# Patient Record
Sex: MALE | Race: WHITE | NOT HISPANIC OR LATINO | Employment: OTHER | URBAN - METROPOLITAN AREA
[De-identification: names, ages, dates, MRNs, and addresses within clinical notes are randomized per-mention and may not be internally consistent; named-entity substitution may affect disease eponyms.]

---

## 2017-06-08 ENCOUNTER — ALLSCRIPTS OFFICE VISIT (OUTPATIENT)
Dept: OTHER | Facility: OTHER | Age: 67
End: 2017-06-08

## 2017-08-10 ENCOUNTER — ALLSCRIPTS OFFICE VISIT (OUTPATIENT)
Dept: OTHER | Facility: OTHER | Age: 67
End: 2017-08-10

## 2017-11-09 ENCOUNTER — GENERIC CONVERSION - ENCOUNTER (OUTPATIENT)
Dept: OTHER | Facility: OTHER | Age: 67
End: 2017-11-09

## 2017-11-09 DIAGNOSIS — M54.16 RADICULOPATHY OF LUMBAR REGION: ICD-10-CM

## 2017-11-29 ENCOUNTER — ALLSCRIPTS OFFICE VISIT (OUTPATIENT)
Dept: RADIOLOGY | Facility: CLINIC | Age: 67
End: 2017-11-29
Payer: MEDICARE

## 2018-01-13 VITALS — TEMPERATURE: 97.5 F | DIASTOLIC BLOOD PRESSURE: 80 MMHG | WEIGHT: 220 LBS | SYSTOLIC BLOOD PRESSURE: 140 MMHG

## 2018-01-14 VITALS — TEMPERATURE: 98.2 F | DIASTOLIC BLOOD PRESSURE: 80 MMHG | SYSTOLIC BLOOD PRESSURE: 120 MMHG | WEIGHT: 214.5 LBS

## 2018-01-22 VITALS — SYSTOLIC BLOOD PRESSURE: 138 MMHG | DIASTOLIC BLOOD PRESSURE: 82 MMHG | WEIGHT: 240 LBS

## 2018-03-07 NOTE — PROCEDURES
Procedure    Fluoroscopically-guided lumbar Interlaminar Epidural Steroid Injection     Indication: Low back and leg pain   Preoperative diagnosis: Lumbar radiculitis   Postoperative diagnosis: Lumbar radiculitis    Procedure: Fluoroscopically-guided L5-S1 interlaminar epidural steroid injection under fluoroscopy     After discussing the risks, benefits, and alternatives to the procedure, the patient expressed understanding and wished to proceed  The patient was brought to the fluoroscopy suite and placed in the prone position  A procedural pause was conducted to verify: correct patient identity, procedure to be performed and as applicable, correct side and site, correct patient position, and availability of implants, special equipment and special requirements  After identifying the L5-S1 space fluoroscopically, the skin was sterilely prepped and draped in the usual fashion using Chloraprep skin prep  The skin and subcutaneous tissue were anesthetized with 1 % lidocaine  Utilizing a loss of resistance technique and intermittent fluoroscopic guidance, a 3 5" 20 gauge Tuohy needle was advanced into the epidural space  Proper needle positioning was confirmed using multiple fluoroscopic views  After negative aspiration, Omnipaque 300 contrast was injected confirming epidural spread without evidence of intravascular or intrathecal spread  A 4 ml solution consisting of 80 mg of Depo-Medrol in sterile saline was injected slowly and incrementally into the epidural space  Following the injection the needle was withdrawn slightly and flushed with 1 % lidocaine as it was fully extracted  The patient tolerated the procedure well and there were no apparent complications  After appropriate observation, the patient was dismissed from the clinic in good condition under their own power        Signatures   Electronically signed by : Annette Franco DO; Nov 29 2017  4:33PM EST                       (Author)

## 2018-04-13 ENCOUNTER — HOSPITAL ENCOUNTER (OUTPATIENT)
Dept: RADIOLOGY | Facility: CLINIC | Age: 68
Discharge: HOME | End: 2018-04-13
Admitting: ANESTHESIOLOGY
Payer: MEDICARE

## 2018-04-13 VITALS
OXYGEN SATURATION: 95 % | HEART RATE: 66 BPM | RESPIRATION RATE: 16 BRPM | DIASTOLIC BLOOD PRESSURE: 84 MMHG | SYSTOLIC BLOOD PRESSURE: 141 MMHG | TEMPERATURE: 99 F

## 2018-04-13 DIAGNOSIS — M54.16 LUMBAR RADICULOPATHY: ICD-10-CM

## 2018-04-13 PROCEDURE — 62323 NJX INTERLAMINAR LMBR/SAC: CPT | Performed by: ANESTHESIOLOGY

## 2018-04-13 RX ORDER — OMEPRAZOLE 20 MG/1
20 CAPSULE, DELAYED RELEASE ORAL DAILY
COMMUNITY
End: 2022-06-02

## 2018-04-13 RX ORDER — LIDOCAINE HYDROCHLORIDE 10 MG/ML
5 INJECTION, SOLUTION EPIDURAL; INFILTRATION; INTRACAUDAL; PERINEURAL ONCE
Status: COMPLETED | OUTPATIENT
Start: 2018-04-13 | End: 2018-04-13

## 2018-04-13 RX ORDER — LANOLIN ALCOHOL/MO/W.PET/CERES
1 CREAM (GRAM) TOPICAL DAILY
COMMUNITY
End: 2021-04-13

## 2018-04-13 RX ORDER — METHYLPREDNISOLONE ACETATE 80 MG/ML
80 INJECTION, SUSPENSION INTRA-ARTICULAR; INTRALESIONAL; INTRAMUSCULAR; PARENTERAL; SOFT TISSUE ONCE
Status: COMPLETED | OUTPATIENT
Start: 2018-04-13 | End: 2018-04-13

## 2018-04-13 RX ORDER — ATORVASTATIN CALCIUM 80 MG/1
80 TABLET, FILM COATED ORAL EVERY OTHER DAY
COMMUNITY

## 2018-04-13 RX ORDER — AMLODIPINE BESYLATE 5 MG/1
5 TABLET ORAL DAILY
COMMUNITY
End: 2022-06-20

## 2018-04-13 RX ORDER — METOPROLOL TARTRATE 100 MG/1
50 TABLET ORAL DAILY
COMMUNITY

## 2018-04-13 RX ORDER — TERAZOSIN 5 MG/1
5 CAPSULE ORAL
COMMUNITY
End: 2020-05-05

## 2018-04-13 RX ORDER — DICLOFENAC SODIUM 75 MG/1
75 TABLET, DELAYED RELEASE ORAL 2 TIMES DAILY PRN
COMMUNITY
End: 2019-11-22 | Stop reason: SDUPTHER

## 2018-04-13 RX ADMIN — METHYLPREDNISOLONE ACETATE 80 MG: 80 INJECTION, SUSPENSION INTRA-ARTICULAR; INTRALESIONAL; INTRAMUSCULAR; SOFT TISSUE at 15:50

## 2018-04-13 RX ADMIN — IOHEXOL 1 ML: 300 INJECTION, SOLUTION INTRAVENOUS at 15:49

## 2018-04-13 RX ADMIN — LIDOCAINE HYDROCHLORIDE 3 ML: 10 INJECTION, SOLUTION EPIDURAL; INFILTRATION; INTRACAUDAL; PERINEURAL at 15:46

## 2018-04-13 NOTE — DISCHARGE INSTRUCTIONS
Epidural Steroid Injection   WHAT YOU NEED TO KNOW:   An epidural steroid injection (JOHANNE) is a procedure to inject steroid medicine into the epidural space  The epidural space is between your spinal cord and vertebrae  Steroids reduce inflammation and fluid buildup in your spine that may be causing pain  You may be given pain medicine along with the steroids  ACTIVITY  · Do not drive or operate machinery today  · No strenuous activity today - bending, lifting, etc   · You may resume normal activites starting tomorrow - start slowly and as tolerated  · You may shower today, but no tub baths or hot tubs  · You may have numbness for several hours from the local anesthetic  Please use caution and common sense, especially with weight-bearing activities  CARE OF THE INJECTION SITE  · If you have soreness or pain, apply ice to the area today (20 minutes on/20 minutes off)  · Starting tomorrow, you may use warm, moist heat or ice if needed  · You may have an increase or change in your discomfort for 36-48 hours after your treatment  · Apply ice and continue with any pain medication you have been prescribed  · Notify [facility] if you have any of the following: redness, drainage, swelling, headache, stiff neck or fever above 100°F     SPECIAL INSTRUCTIONS  · Our office will contact you in approximately 7 days for a progress report  MEDICATIONS  · Continue to take all routine medications  · Our office may have instructed you to hold some medications  Resume Diclofenac tomorrow 4/14/18  If you have a problem specifically related to your procedure, please call our office at [phone]  Problems not related to your procedure should be directed to your primary care physician

## 2018-04-13 NOTE — H&P
History of Present Illness: The patient is a 70 y o  male who presents with complaints of low back and leg pain  There is no problem list on file for this patient  History reviewed  No pertinent past medical history  History reviewed  No pertinent surgical history  Current Outpatient Prescriptions:     amLODIPine (NORVASC) 5 mg tablet, Take 5 mg by mouth daily  , Disp: , Rfl:     atorvastatin (LIPITOR) 80 mg tablet, Take 80 mg by mouth daily, Disp: , Rfl:     calcium citrate-vitamin D (CITRACAL+D) 315-200 MG-UNIT per tablet, Take 1 tablet by mouth daily, Disp: , Rfl:     metoprolol tartrate (LOPRESSOR) 100 mg tablet, Take 100 mg by mouth daily  , Disp: , Rfl:     omeprazole (PriLOSEC) 20 mg delayed release capsule, Take 20 mg by mouth daily  , Disp: , Rfl:     terazosin (HYTRIN) 5 mg capsule, Take 5 mg by mouth daily at bedtime  , Disp: , Rfl:     diclofenac (VOLTAREN) 75 mg EC tablet, Take 75 mg by mouth 2 (two) times a day as needed  , Disp: , Rfl:     No Known Allergies    Physical Exam:   Vitals:    04/13/18 1532   BP: 119/75   Pulse: 71   Resp: 16   Temp: 99 °F (37 2 °C)   SpO2: 96%     General: Awake, Alert, Oriented x 3, Mood and affect appropriate  Respiratory: Respirations even and unlabored  Cardiovascular: Peripheral pulses intact; no edema  Musculoskeletal Exam:   Bilateral lumbar paraspinals tender to palpation  ASA Score: 2    Assessment:   1  Lumbar radiculopathy        Plan: REPEAT LESI         Assessment     1  Lumbar herniated disc (722 10) (M51 26)   2  Lumbar radiculopathy (724 4) (M54 16)   3  Myofascial pain (729 1) (M79 1)     Plan  Back pain, Lumbar herniated disc, Lumbar radiculopathy    · Renew: Diclofenac Sodium 75 MG Oral Tablet Delayed Release; take one tablet every 12hours as needed for pain  no other nsaids on this medication  take with food   Rx By: Brooke Lemos; Dispense: 30 Days ; #:60 Tablet Delayed Release;  Refill: 3;: Back pain, Lumbar herniated disc, Lumbar radiculopathy; RAHAT = N; Record  Lumbar radiculopathy    · *1 - [facility]  Consult: Evaluate and treatPlease evaluate the patient is a candidate for Luc based therapy  Status: Active Requested for: [id number]   Ordered;: Lumbar radiculopathy; Ordered By: Kristina March Performed:  Due: [id number]  () Care Summary provided  : Yes   50-year-old male returning for follow-up of lumbosacral back pain with radiculopathy in the L5-S1 distributions of bilateral lower extremities  The patient does have disc herniations at L4-5 and L5-S1 with possible right L4 and L5 nerve root impingement  He was initially improving with conservative therapy including oral NSAIDs, diclofenac 75 milligrams b i d  p r n  which she has tried to minimize his he wants to avoid long-term affects of NSAIDs  The patient feels as though the pain has plateaued and he is experiencing some numbness and paresthesias in his feet  1  I will schedule the patient for L5-S1 LESI to reduce the inflammatory component of his pain  The procedure was discussed in detail using diagrams and models  Risks associated with the procedure were discussed with the patient including, but not limited to bleeding, infection, bruising, and nerve damage  2  I will refill diclofenac 75 milligrams b i d  p r n  and he should avoid any other NSAIDs while on this medication 3  I did offer the patient a trial of gabapentin, however he would like to hold off on sedating medications 4  I will prescribe Luc based physical therapy for his radicular symptoms 5  I will follow up the patient in 4-6 weeks after inje   Discussion/Summary  The patient has the current Goals: Reduced pain and improved function  The patent has the current Barriers: Lumbar disc herniations and foraminal stenosis  Patient is able to Self-Care  Educational resources provided: Epidural steroid injection pamphlet     Possible side effects of new medications were reviewed with the patient/guardian today  The patient was counseled regarding diagnostic results,-- instructions for management,-- risk factor reductions,-- prognosis,-- patient and family education,-- impressions,-- risks and benefits of treatment options-- and-- importance of compliance with treatment  total time of encounter was 15 minutes       Self Referrals: No      Chief Complaint     1  Back Pain  Low back and leg pain      History of Present Illness  69-year-old male returning for follow-up of lumbosacral back pain with radiculopathy in the L5 distributions of bilateral lower extremities  He does have some numbness and paresthesias in his feet  He denies any subjective weakness, bladder or bowel incontinence, or saddle anesthesia  The patient does have disc herniations at L4-5 and L5-S1 with possible right L4 and L5 nerve root impingement  He was taking diclofenac 75 milligrams b i d  p r n  and this was giving him moderate relief, however he tried to cut back on this medication and is taking this sparingly as he does not like taking medications and he wanted to avoid any long-term affects of the NSAIDs  The patient feels fine when he is sitting and lying down, however when he is carrying anything of any substantial weight the pain limits his physical activity  patient rates his pain at 2/10 on the pain is worse in the morning and evening  The pain is intermittent and described as burning, pressure like, and stiffness  The pain is exacerbated with lifting, bending and twisting at the waist, and walking  The pain is alleviated with sitting and lying down  I have personally reviewed and/or updated the patient's past medical history, past surgical history, family history, social history, allergies, and vital signs today  than as stated above, the patient denies any interval changes in medications, medical condition, mental condition, symptoms, or allergies since the last office visit       Ovi Santoyo presents with complaints of bilateral lower back pain, described as burning and tingling, radiating to the bilateral buttock, bilateral thigh, bilateral lower leg and bilateral foot  On a scale of 1 to 10, the patient rates the pain as 2  Symptoms are improving  Review of Systems   Constitutional: no fever,-- no recent weight gain-- and-- no recent weight loss  Eyes: no double vision-- and-- no blurry vision  Cardiovascular: no chest pain,-- no palpitations-- and-- no lower extremity edema  Respiratory: no complaints of shortness of breath-- and-- no wheezing  Musculoskeletal: joint stiffness-- and-- pain in extremity , but-- no difficulty walking,-- no muscle weakness,-- no joint swelling,-- no limb swelling-- and-- no decreased range of motion  Neurological: no dizziness,-- no difficulty swallowing,-- no memory loss,-- no loss of consciousness-- and-- no seizures  Gastrointestinal: no nausea,-- no vomiting,-- no constipation-- and-- no diarrhea  Genitourinary: no difficulty initiating urine stream,-- no genital pain-- and-- no frequent urination  Integumentary: no complaints of skin rash  Psychiatric: no depression  Endocrine: no excessive thirst,-- no adrenal disease,-- no hypothyroidism-- and-- no hyperthyroidism  Hematologic/Lymphatic: no tendency for easy bruising-- and-- no tendency for easy bleeding       ROS reviewed  Active Problems  1  Back pain (724 5) (M54 9)   2  Lumbar herniated disc (722 10) (M51 26)   3  Lumbar radiculopathy (724 4) (M54 16)   4  Myofascial pain (729 1) (M79 1)     Past Medical History  1  History of hernia repair (V45 89) (Z98 890,Z87 19)   2  History of malignant neoplasm of skin (V10 83) (Z85 828)   3  History of Reported Hx Of Knee Replacement (V43 65)     Surgical History  1  History of Hernia Repair   2  History of Knee Arthroscopy With Medial And Lateral Meniscectomy     Family History  Family History    1   Family history of cerebrovascular accident (CVA) (V17 1) (Z82 3)   2  Family history of hypertension (V17 49) (Z82 49)   3  Family history of malignant neoplasm (V16 9) (Z80 9)     Social History      · Alcohol drinker (V49 89) (Z78 9)   · Lives with family   ·    · Non-smoker (V49 89) (Z78 9)     Current Meds   1  AmLODIPine Besylate 5 MG Oral Tablet; Therapy: (Recorded:08Jun2017) to Recorded   2  Diclofenac Sodium 75 MG Oral Tablet Delayed Release; take one tablet every 12 hours as needed for pain  no other nsaids on this medication  take with food; Last Rx:10Aug2017 Ordered   3  Methocarbamol 500 MG Oral Tablet; Therapy: (Recorded:08Jun2017) to Recorded   4  Metoprolol Tartrate 100 MG Oral Tablet; Therapy: (Recorded:08Jun2017) to Recorded   5  Omeprazole 40 MG Oral Capsule Delayed Release; Therapy: (Recorded:08Jun2017) to Recorded   6  Simvastatin 40 MG Oral Tablet; Therapy: (Recorded:08Jun2017) to Recorded   7  Terazosin HCl - 5 MG Oral Capsule; Therapy: (Recorded:08Jun2017) to Recorded     Allergies  1  No Known Drug Allergies     Vitals  Vital Signs         Physical Exam   Constitutional  General appearance: Well developed, well nourished, alert, in no distress, non-toxic and no overt pain behavior  Eyes  Sclera: anicteric  HEENT  Hearing grossly intact  Neck  Neck: Supple, symmetric, trachea midline, no masses  Pulmonary  Respiratory effort: Even and unlabored  Abdomen  Abdomen: Soft, non-tender, non-distended  Skin  Skin and subcutaneous tissue: Normal without rashes or lesions, well hydrated  Psychiatric  Mood and affect: Mood and affect appropriate  Neurologic  the muscle tone was normal  Musculoskeletal  Gait and station: Normal    Lumbar/Sacral Spine examination demonstrates  Bilateral lumbar paraspinals tender to palpation and ropy in texture  Equivocal seated straight leg raise bilaterally  Bilateral lower extremity strength 5/5 in all muscle groups

## 2018-04-19 ENCOUNTER — TELEPHONE (OUTPATIENT)
Dept: PAIN MEDICINE | Facility: CLINIC | Age: 68
End: 2018-04-19

## 2018-05-03 ENCOUNTER — EVALUATION (OUTPATIENT)
Dept: PHYSICAL THERAPY | Facility: CLINIC | Age: 68
End: 2018-05-03
Payer: MEDICARE

## 2018-05-03 DIAGNOSIS — M54.16 LUMBAR RADICULOPATHY: Primary | ICD-10-CM

## 2018-05-03 PROCEDURE — G8990 OTHER PT/OT CURRENT STATUS: HCPCS | Performed by: PHYSICAL THERAPIST

## 2018-05-03 PROCEDURE — 97163 PT EVAL HIGH COMPLEX 45 MIN: CPT | Performed by: PHYSICAL THERAPIST

## 2018-05-03 PROCEDURE — 97535 SELF CARE MNGMENT TRAINING: CPT | Performed by: PHYSICAL THERAPIST

## 2018-05-03 PROCEDURE — G8991 OTHER PT/OT GOAL STATUS: HCPCS | Performed by: PHYSICAL THERAPIST

## 2018-05-03 NOTE — PROGRESS NOTES
PT Evaluation     Today's date: 5/3/2018  Patient name: Briana Charles  : 1950  MRN: [id number]  Referring provider: Kylee Sheldon DO  Dx:   Encounter Diagnosis     ICD-10-CM    1  Lumbar radiculopathy M54 16                   Assessment  Impairments: abnormal or restricted ROM, activity intolerance, impaired balance, impaired physical strength and pain with function  Functional limitations: Difficulty with walking, lifting, standing, due to an increase in back pain  Assessment details: Briana Charles is a 70 y o  male who presents to outpatient PT with a  Lumbar radiculopathy  (primary encounter diagnosis)  No further referral appears necessary at this time based upon examination results  Pt presents with decreased strength, ROM, balance, functional activity tolerance, and pain with movement in Lumbar spine,  which is  limiting his ability to perform the aforementioned functional activities  L4 reflexes are a 1+ bilaterally, and patient has a directional preference for lumbar flexion exercises  Etiologic factors include repetitive poor body mechanics  Prognosis is good given HEP compliance and PT 2-3/wk  Please contact me if you have any questions or recommendations  Thank you for the opportunity to share in  [facility]  Understanding of Dx/Px/POC: good   Prognosis: good    Goals  1  In 4-6 weeks, patient will demonstrate a decrease in pain to 0/10 during functional activities  2  In 4-6 weeks, patient will demonstrate an increase in range of motion by 5 degrees  3  In 4-6 weeks, patient will demonstrate an increase in strength by 1/2 grade on MMT    1  In 6-8 weeks, patient will be independent with their home exercise program  2  In 6-8 weeks, patient will have zero limitations with strength  3  In 6-8 weeks, patient will have zero limitations with ROM  4  In 6-8 weeks, patient will have zero limitations with ambulation  5    In 6-8 weeks, patient will have zero limitations with stair negotiation    Plan  Patient would benefit from: skilled PT  Planned therapy interventions: manual therapy, therapeutic exercise and home exercise program  Frequency: 3x week  Duration in weeks: 4  Plan details: Patient was provided a home exercise program and demonstrated an understanding of exercises  Patient was advised to stop performing home exercise program if symptoms increase or new complaints developed  Verbal understanding demonstrated regarding home exercise program instructions  Subjective Evaluation    History of Present Illness  Date of onset: 5/3/1972  Mechanism of injury: Pr reports that he was diagnosed with arthritis in his lower back in , after he got out of the [place]  He reports that his back pain has just gotten worse over time  Pt reports that he is now getting injections in his lower back, and they seem to be helping  Pain  Current pain rating: 3  At best pain ratin  At worst pain ratin  Quality: throbbing  Aggravating factors: lifting, walking and standing  Progression: worsening    Treatments  Previous treatment: injection treatment and medication  Current treatment: physical therapy  Patient Goals  Patient goals for therapy: decreased pain          Objective     Special Questions  Negative for night pain, disturbed sleep, bladder dysfunction, bowel dysfunction and saddle (S4) numbness    Active Range of Motion     Lumbar   Flexion: 0 degrees   Extension: 25 degrees   Left lateral flexion: 57 degrees   Right lateral flexion: 54 degrees   Left Hip   Flexion: WFL  Abduction: WFL  Adduction: WFL    Right Hip   Flexion: WFL  Abduction: WFL  Adduction: WFL    Additional Active Range of Motion Details  Pain and tenderness to L4-L5 spinous process, L4-L5 paraspinals, L Piriformis  Right Rib hump during forward bending  Increased Lumbar lordosis during forward bending and static standing       90/90 hamstring -48 Right   -45 Left   YEFRI   + bilaterally  SLS   10 Seconds L    10 seconds R  Heel/toe waking Normal Bilaterally  Sensation - Intact Bilaterally  Reflexes L4 Abnormal Bilaterally 1+  S1 Normal 2+  Seated SLR - Negative Bilaterally  SLR Negative  Directional Preference for Lumbar flexion  Strength/Myotome Testing     Left Hip   Planes of Motion   Flexion: 4-  Abduction: 4-  Adduction: 4-    Right Hip   Planes of Motion   Flexion: 4-  Abduction: 4-  Adduction: 4-          Precautions:     Daily Treatment Diary     Manual              B HS              B Hip flexor                                                         Exercise Diary              PPT             Hip Add with Ball             Clamshells              SLR             Supine Marching              Pball ABS              Step Ups              Mini Squat              SLS              HR/TR             Nu Step              Lumbar flexion at edge of table              Pball flexion at edge of table              SKTC/DKTC                                                                                                  Modalities

## 2018-05-08 ENCOUNTER — OFFICE VISIT (OUTPATIENT)
Dept: PHYSICAL THERAPY | Facility: CLINIC | Age: 68
End: 2018-05-08
Payer: MEDICARE

## 2018-05-08 DIAGNOSIS — M54.16 LUMBAR RADICULOPATHY: Primary | ICD-10-CM

## 2018-05-08 PROCEDURE — 97140 MANUAL THERAPY 1/> REGIONS: CPT | Performed by: PHYSICAL THERAPIST

## 2018-05-08 PROCEDURE — 97110 THERAPEUTIC EXERCISES: CPT | Performed by: PHYSICAL THERAPIST

## 2018-05-08 NOTE — PROGRESS NOTES
Daily Note     Today's date: 2018  Patient name: Andrae Colón  : 1950  MRN: [id number]  Referring provider: Aniyah Jimenez DO  Dx:   Encounter Diagnosis     ICD-10-CM    1  Lumbar radiculopathy M54 16                   Subjective: Pt reports that his back hurts on the left side today  Objective: See treatment diary below    Daily Treatment Diary     Manual              B HS              B Hip flexor                                         15min                 Exercise Diary               PPT 10''10x            Hip Add with Ball 10''10x            Clamshells  3x10 blue             SLR 3x10 flexion             Supine Marching              Pball ABS  5''20x            Step Ups              Mini Squat  3x10            SLS              HR/TR 30x            Nu Step              Lumbar flexion at edge of table              Pball flexion at edge of table              SKTC/DKTC  Modalities                                                             Assessment: Tolerated treatment well  Patient exhibited good technique with therapeutic exercises  Good tolerance to initial treatment  Requires cuing for proper performance of mini squats       Plan: Continue per plan of care

## 2018-05-10 ENCOUNTER — OFFICE VISIT (OUTPATIENT)
Dept: PHYSICAL THERAPY | Facility: CLINIC | Age: 68
End: 2018-05-10
Payer: MEDICARE

## 2018-05-10 DIAGNOSIS — M54.16 LUMBAR RADICULOPATHY: Primary | ICD-10-CM

## 2018-05-10 PROCEDURE — 97140 MANUAL THERAPY 1/> REGIONS: CPT

## 2018-05-10 PROCEDURE — 97110 THERAPEUTIC EXERCISES: CPT

## 2018-05-10 NOTE — PROGRESS NOTES
Daily Note     Today's date: 5/10/2018  Patient name: Marzena Rodrigues  : 1950  MRN: [id number]  Referring provider: Dahlia Gregory DO  Dx:   Encounter Diagnosis     ICD-10-CM    1  Lumbar radiculopathy M54 16                   Subjective: Pt reports that he is having tightness on the L side of his L/S area  He is performing his HEP at home  Objective: See treatment diary below  Daily Treatment Diary     Manual  5/10            B HS              B Hip flexor                                         15min                 Exercise Diary  5/10            PPT 10''10x            Hip Add with Ball 5''20x            Clamshells  3x10 blue             SLR 3x10 flexion             Supine Marching  5" x20            Pball ABS  5''20x            Step Ups              Mini Squat  3x10            SLS              HR/TR 30x            Nu Step  10' L3            Lumbar flexion at edge of table              Pball flexion at edge of table  Blue Pball 10"x10            SKTC/DKTC  SKTC 30" x3 ea                                                                                              Modalities                                                         Assessment: Tolerated treatment well  Initiated a supine march, SKTC, and a seated Pball flexion stretch this date with no exacerbation of symptoms  Patient demonstrated fatigue post treatment, exhibited good technique with therapeutic exercises and would benefit from continued PT      Plan: Continue per plan of care  Progress treatment as tolerated

## 2018-05-11 ENCOUNTER — OFFICE VISIT (OUTPATIENT)
Dept: PHYSICAL THERAPY | Facility: CLINIC | Age: 68
End: 2018-05-11
Payer: MEDICARE

## 2018-05-11 DIAGNOSIS — M54.16 LUMBAR RADICULOPATHY: Primary | ICD-10-CM

## 2018-05-11 PROCEDURE — 97140 MANUAL THERAPY 1/> REGIONS: CPT

## 2018-05-11 PROCEDURE — 97150 GROUP THERAPEUTIC PROCEDURES: CPT

## 2018-05-11 NOTE — PROGRESS NOTES
Daily Note     Today's date: 2018  Patient name: Marzena Rodrigues  : 1950  MRN: [id number]  Referring provider: Dahlia Gregory DO  Dx:   Encounter Diagnosis     ICD-10-CM    1  Lumbar radiculopathy M54 16                   Subjective: Pt reports that he was sore after last visit  Objective: See treatment diary below  Daily Treatment Diary     Manual              B HS              B Hip flexor                                         15min                 Exercise Diary              PPT 10''10x            Hip Add with Ball 5''20x            Clamshells  3x10 blue             SLR 3x10 flexion             Supine Marching  5" x20            Pball ABS  5''20x            Step Ups              Mini Squat  3x10            SLS              HR/TR 30x            Nu Step  10' L3            Lumbar flexion at edge of table              Pball flexion at edge of table  Blue Pball 10"x10            SKTC/DKTC  SKTC 30" x3 ea                                                                                              Modalities                                                         Assessment: Tolerated treatment well  No progressions secondary to subjective comments Continues to have limited hip extension secondary to tight hip flexors during PROM  Patient demonstrated fatigue post treatment, exhibited good technique with therapeutic exercises and would benefit from continued PT      Plan: Continue per plan of care  Progress treatment as tolerated

## 2018-05-16 ENCOUNTER — OFFICE VISIT (OUTPATIENT)
Dept: PHYSICAL THERAPY | Facility: CLINIC | Age: 68
End: 2018-05-16
Payer: MEDICARE

## 2018-05-16 DIAGNOSIS — M54.16 LUMBAR RADICULOPATHY: Primary | ICD-10-CM

## 2018-05-16 PROCEDURE — 97140 MANUAL THERAPY 1/> REGIONS: CPT | Performed by: PHYSICAL THERAPIST

## 2018-05-16 PROCEDURE — 97110 THERAPEUTIC EXERCISES: CPT | Performed by: PHYSICAL THERAPIST

## 2018-05-16 NOTE — PROGRESS NOTES
Daily Note     Today's date: 2018  Patient name: Viky Smith  : 1950  MRN: [id number]  Referring provider: Katrina White DO  Dx:   Encounter Diagnosis     ICD-10-CM    1  Lumbar radiculopathy M54 16                   Subjective:     " my back really doesn't feel much different since I started PT  "       Objective: See treatment diary below      Daily Treatment Diary     Manual             B HS              B Hip flexor                                         15min  15 min               Exercise Diary            PPT 10''10x  10''10x          Hip Add with Ball 5''20x  5''20x          Clamshells  3x10 blue   3x10 Blue           SLR 3x10 flexion   3x10 flexion 2#          Supine Marching  5" x20            Pball ABS  5''20x  5''20x           Step Ups              Mini Squat  3x10  3x10          SLS              HR/TR 30x  30x          Nu Step  10' L3  10' L3           Lumbar flexion at edge of table    30x          Pball flexion at edge of table  Blue Pball 10"x10  Blue Ball 10''10x          SKTC/DKTC  SKTC 30" x3 ea  30''3x                                                                                            Modalities                                                           Assessment: Tolerated treatment well  Patient exhibited good technique with therapeutic exercises  Added 2 # ankle weights to SLR this session, to help with lower extremity strengthening  Added forward lumbar flexion to help increase flexibility in lumbar spine  Plan: Continue per plan of care

## 2018-05-18 ENCOUNTER — OFFICE VISIT (OUTPATIENT)
Dept: PHYSICAL THERAPY | Facility: CLINIC | Age: 68
End: 2018-05-18
Payer: MEDICARE

## 2018-05-18 DIAGNOSIS — M54.16 LUMBAR RADICULOPATHY: Primary | ICD-10-CM

## 2018-05-18 PROCEDURE — 97140 MANUAL THERAPY 1/> REGIONS: CPT

## 2018-05-18 PROCEDURE — 97110 THERAPEUTIC EXERCISES: CPT

## 2018-05-18 NOTE — PROGRESS NOTES
Daily Note     Today's date: 2018  Patient name: Tien Hirsch  : 1950  MRN: [id number]  Referring provider: Yisel De La Vega DO  Dx:   Encounter Diagnosis     ICD-10-CM    1  Lumbar radiculopathy M54 16                   Subjective: Pt offers no new comments and denies all pain  Objective: See treatment diary below    Daily Treatment Diary     Manual       B HS         B Hip flexor                          15min  15 min 15 min         Exercise Diary       PPT 10''10x 10''10x      Hip Add with Ball 5''20x 5''20x 5''20x     Clamshells  3x10 blue  3x10 Blue  3x10 Blue      SLR 3x10 flexion  3x10 flexion 2# 3x10 flexion 2#     Supine Marching  5" x20  5" x20 2#     Pball ABS  5''20x 5''20x  5" x20      Step Ups    3x10 ea "B"     Mini Squat  3x10 3x10 3x10     SLS         HR/TR 30x 30x 30x     Nu Step  10' L3 10' L3  L4 10'     Lumbar flexion at edge of table   30x 30x     Pball flexion at edge of table  Blue Pball 10"x10 [employer] 10''10x Blue Ball 10''10x     SKTC/DKTC  SKTC 30" x3 ea 30''3x SKTC 30" x3 ea                                                         Modalities                                     Assessment: Tolerated treatment well  Initiated step ups this date  Progressed by adding weight to multiple DLS exercises  Patient demonstrated fatigue post treatment, exhibited good technique with therapeutic exercises and would benefit from continued PT      Plan: Continue per plan of care  Progress treatment as tolerated

## 2018-05-21 ENCOUNTER — OFFICE VISIT (OUTPATIENT)
Dept: PHYSICAL THERAPY | Facility: CLINIC | Age: 68
End: 2018-05-21
Payer: MEDICARE

## 2018-05-21 DIAGNOSIS — M54.16 LUMBAR RADICULOPATHY: Primary | ICD-10-CM

## 2018-05-21 PROCEDURE — 97110 THERAPEUTIC EXERCISES: CPT | Performed by: PHYSICAL THERAPIST

## 2018-05-21 PROCEDURE — 97140 MANUAL THERAPY 1/> REGIONS: CPT | Performed by: PHYSICAL THERAPIST

## 2018-05-21 NOTE — PROGRESS NOTES
Daily Note     Today's date: 2018  Patient name: Jd Flanagan  : 1950  MRN: [id number]  Referring provider: Florinda Rodriguez DO  Dx:   Encounter Diagnosis     ICD-10-CM    1  Lumbar radiculopathy M54 16                   Subjective: "My back is really bothering me today, I really didn't do much, except get some plants for my yard "      Objective: See treatment diary below        Daily Treatment Diary     Manual      B HS         B Hip flexor                          15min  15 min 15 min 15 min        Exercise Diary      PPT 10''10x 10''10x      Hip Add with Ball 5''20x 5''20x 5''20x 5''20x    Clamshells  3x10 blue  3x10 Blue  3x10 Blue  3x10    SLR 3x10 flexion  3x10 flexion 2# 3x10 flexion 2#     Supine Marching  5" x20  5" x20 2# Supine marches    Pball ABS  5''20x 5''20x  5" x20  5''20x    Step Ups    3x10 ea "B" 3x10 B     Mini Squat  3x10 3x10 3x10 [id number]     SLS         HR/TR 30x 30x 30x 30x    Nu Step  10' L3 10' L3  L4 10' L4 10 min     Lumbar flexion at edge of table   30x 30x     Pball flexion at edge of table  Blue Pball 10"x10 [employer] 10''10x Blue Ball 10''10x     SKTC/DKTC  SKTC 30" x3 ea 30''3x SKTC 30" x3 ea     Bridge     5''20 H     Prone Ext     10''10x H     Prone Alt Hip ext     3''10x  H     MICHELLE     2 min H                        Modalities                                         Assessment: Tolerated treatment well  Patient exhibited good technique with therapeutic exercises  Pt continues to respond well to lumbar flexion exercises, attempted to add lumbar extension exercises, but pt notes increased pain  Pt notes decreased sx post treatment, after performing lumbar extension exercises  Plan: Continue per plan of care

## 2018-05-23 ENCOUNTER — OFFICE VISIT (OUTPATIENT)
Dept: PHYSICAL THERAPY | Facility: CLINIC | Age: 68
End: 2018-05-23
Payer: MEDICARE

## 2018-05-23 DIAGNOSIS — M54.16 LUMBAR RADICULOPATHY: Primary | ICD-10-CM

## 2018-05-23 PROCEDURE — 97110 THERAPEUTIC EXERCISES: CPT | Performed by: PHYSICAL THERAPIST

## 2018-05-23 PROCEDURE — 97140 MANUAL THERAPY 1/> REGIONS: CPT | Performed by: PHYSICAL THERAPIST

## 2018-05-23 NOTE — PROGRESS NOTES
Daily Note     Today's date: 2018  Patient name: Nicky Bran  : 1950  MRN: [id number]  Referring provider: Lynsey Sawant DO  Dx:   Encounter Diagnosis     ICD-10-CM    1  Lumbar radiculopathy M54 16                   Subjective: " My back felt pretty good yesterday, I think whatever we did here the last time helped "       Objective: See treatment diary below    Daily Treatment Diary     Manual     B HS         B Hip flexor                          15min  15 min 15 min 15 min 15 min       Exercise Diary     PPT 10''10x 10''10x      Hip Add with Ball 5''20x 5''20x 5''20x 5''20x 5''20x   Clamshells  3x10 blue  3x10 Blue  3x10 Blue  3x10 3x10   SLR 3x10 flexion  3x10 flexion 2# 3x10 flexion 2#     Supine Marching  5" x20  5" x20 2# Supine marches Supine Marches    Pball ABS  5''20x 5''20x  5" x20  5''20x 5''20x   Step Ups    3x10 ea "B" 3x10 B  3x10   Mini Squat  3x10 3x10 3x10 [id number]  3x10    SLS         HR/TR 30x 30x 30x 30x 30x   Nu Step  10' L3 10' L3  L4 10' L4 10 min  L4 10min    Lumbar flexion at edge of table   30x 30x     Pball flexion at edge of table  Blue Pball 10"x10 [employer] 10''10x Blue Ball 10''10x     SKTC/DKTC  SKTC 30" x3 ea 30''3x SKTC 30" x3 ea     Bridge     5''20  5''20x   Prone Ext     10''10x  10'10x   Prone Alt Hip ext     3''10x    3''10x   MICHELLE     2 min  2min                       Modalities                                             Assessment: Tolerated treatment well  Patient exhibited good technique with therapeutic exercises  Pt notes decreased sx post treatment  Continue with current POC  Plan: Continue per plan of care

## 2018-05-30 ENCOUNTER — OFFICE VISIT (OUTPATIENT)
Dept: PHYSICAL THERAPY | Facility: CLINIC | Age: 68
End: 2018-05-30
Payer: MEDICARE

## 2018-05-30 DIAGNOSIS — M54.16 LUMBAR RADICULOPATHY: Primary | ICD-10-CM

## 2018-05-30 PROCEDURE — 97012 MECHANICAL TRACTION THERAPY: CPT | Performed by: PHYSICAL THERAPIST

## 2018-05-30 PROCEDURE — 97140 MANUAL THERAPY 1/> REGIONS: CPT | Performed by: PHYSICAL THERAPIST

## 2018-05-30 PROCEDURE — 97110 THERAPEUTIC EXERCISES: CPT | Performed by: PHYSICAL THERAPIST

## 2018-05-30 NOTE — PROGRESS NOTES
Daily Note     Today's date: 2018  Patient name: Maryjane Kanner  : 1950  MRN: [id number]  Referring provider: Levy George DO  Dx:   Encounter Diagnosis     ICD-10-CM    1  Lumbar radiculopathy M54 16                   Subjective: "Really don't feel much difference since I started PT " " I just got in [place] last night "       Objective: See treatment diary below    Daily Treatment Diary     Manual     B HS         B Hip flexor                          15min  15 min 15 min 15 min 15 min       Exercise Diary     PPT 10''10x 10''10x      Hip Add with Ball 5''20x 5''20x 5''20x 5''20x 5''20x   Clamshells  3x10 blue  3x10 Blue  3x10 Blue  3x10 3x10   SLR 3x10 flexion  3x10 flexion 2# 3x10 flexion 2#     Supine Marching  5" x20  5" x20 2# Supine marches Supine Marches    Pball ABS   5''20x  5" x20  5''20x 5''20x   Step Ups    3x10 ea "B" 3x10 B  3x10   Mini Squat   3x10 3x10 [id number]  3x10    SLS         HR/TR  30x 30x 30x 30x   Nu Step   10' L3  L4 10' L4 10 min  L4 10min    Lumbar flexion at edge of table   30x 30x     Pball flexion at edge of table   [employer] 10''10x Blue Ball 10''10x     SKTC/DKTC    30''3x SKTC 30" x3 ea     Bridge     5''20  5''20x   Prone Ext     10''10x  10'10x   Prone Alt Hip ext     3''10x    3''10x   MICHELLE     2 min  2min                       Modalities          Mechanical Lumbar Traction, 100# 50% rope speed, 20 deg, 6 steps up and 6 steps down   15 minutes  (Pt denies Contraindications to traction, No adverse effects reported)                                   Assessment: Tolerated treatment well  Patient exhibited good technique with therapeutic exercises  Pt tolerated treatment, and the addition of mechanical traction well, without any exacerbation of sx  Held exercises in standing this session, due to patient request, and increased pain in Bilateral knees  Plan: Continue per plan of care

## 2018-06-01 ENCOUNTER — OFFICE VISIT (OUTPATIENT)
Dept: PHYSICAL THERAPY | Facility: CLINIC | Age: 68
End: 2018-06-01
Payer: MEDICARE

## 2018-06-01 DIAGNOSIS — M54.16 LUMBAR RADICULOPATHY: Primary | ICD-10-CM

## 2018-06-01 PROCEDURE — 97140 MANUAL THERAPY 1/> REGIONS: CPT

## 2018-06-01 PROCEDURE — 97110 THERAPEUTIC EXERCISES: CPT

## 2018-06-01 PROCEDURE — 97012 MECHANICAL TRACTION THERAPY: CPT

## 2018-06-01 NOTE — PROGRESS NOTES
Daily Note     Today's date: 2018  Patient name: Sukumar Bautista  : 1950  MRN: [id number]  Referring provider: Pratima Clarke DO  Dx:   Encounter Diagnosis     ICD-10-CM    1  Lumbar radiculopathy M54 16                   Subjective: Pt reports he was feeling very good after last treatment and mechanical traction  Objective: See treatment diary below  Daily Treatment Diary     Manual     B HS         B Hip flexor                          15min  15 min 15 min 15 min 15 min       Exercise Diary     PPT 10''10x 10''10x      Hip Add with Ball 5''20x 5''20x 5''20x 5''20x 5''20x   Clamshells  3x10 blue  3x10 Blue  3x10 Blue  3x10 3x10   SLR 3x10 flexion  3x10 flexion 2# 3x10 flexion 2#     Supine Marching  5" x20 5" x20 2# 5" x20 2# Supine marches Supine Marches    Pball ABS   5''20x  5" x20  5''20x 5''20x   Step Ups    3x10 ea "B" 3x10 B  3x10   Mini Squat    3x10 [id number]  3x10    SLS         HR/TR   30x 30x 30x   Nu Step   10' L3  L4 10' L4 10 min  L4 10min    Lumbar flexion at edge of table    30x     Pball flexion at edge of table    [employer] 10''10x     SKTC/DKTC  SKTC 30" x3 ea     Bridge     5''20  5''20x   Prone Ext     10''10x  10'10x   Prone Alt Hip ext     3''10x    3''10x   MICHELLE     2 min  2min                       Modalities        Mechanical Lumbar Traction  100# 50% rope speed, 20 deg, 6 steps up and 6 steps down   15 minutes  (Pt denies Contraindications to traction, No adverse effects reported) 100# 50% rope speed, 20 deg, 6 steps up and 6 steps down   10 minutes  (Pt denies Contraindications to traction, No adverse effects reported)                          Assessment: Tolerated treatment well  Pt has good tolerance to mechanical traction this date  Patient exhibited good technique with therapeutic exercises and would benefit from continued PT      Plan: Continue per plan of care  Progress note during next visit

## 2018-06-06 ENCOUNTER — EVALUATION (OUTPATIENT)
Dept: PHYSICAL THERAPY | Facility: CLINIC | Age: 68
End: 2018-06-06
Payer: MEDICARE

## 2018-06-06 DIAGNOSIS — M54.16 LUMBAR RADICULOPATHY: Primary | ICD-10-CM

## 2018-06-06 PROCEDURE — 97164 PT RE-EVAL EST PLAN CARE: CPT | Performed by: PHYSICAL THERAPIST

## 2018-06-06 PROCEDURE — G8991 OTHER PT/OT GOAL STATUS: HCPCS | Performed by: PHYSICAL THERAPIST

## 2018-06-06 PROCEDURE — 97110 THERAPEUTIC EXERCISES: CPT | Performed by: PHYSICAL THERAPIST

## 2018-06-06 PROCEDURE — 97012 MECHANICAL TRACTION THERAPY: CPT | Performed by: PHYSICAL THERAPIST

## 2018-06-06 PROCEDURE — G8990 OTHER PT/OT CURRENT STATUS: HCPCS | Performed by: PHYSICAL THERAPIST

## 2018-06-06 NOTE — PROGRESS NOTES
Assessment  Impairments: abnormal or restricted ROM, activity intolerance, impaired balance, impaired physical strength and pain with function  Functional limitations: Alex Gold has demonstrated decreased pain, increased strength, increased range of motion, and increased activity tolerance since starting physical therapy services  They report an overall improvement of 50% thus far  They continue to present with pain, decreased strength, decreased range of motion, and decreased activity tolerance and would benefit from additional skilled physical therapy interventions to address impairments and maximize function  Understanding of Dx/Px/POC: good   Prognosis: good    Goals  1  In 4-6 weeks, patient will demonstrate a decrease in pain to 0/10 during functional activities  Met   2  In 4-6 weeks, patient will demonstrate an increase in range of motion by 5 degrees  Met   3  In 4-6 weeks, patient will demonstrate an increase in strength by 1/2 grade on MMT  Met     1  In 6-8 weeks, patient will be independent with their home exercise program  Met   2  In 6-8 weeks, patient will have zero limitations with strength  Partially Met   3  In 6-8 weeks, patient will have zero limitations with ROM  Partially Met   4  In 6-8 weeks, patient will have zero limitations with ambulation  Met  5  In 6-8 weeks, patient will have zero limitations with stair negotiation  Met    Plan  Patient would benefit from: skilled PT  Planned therapy interventions: manual therapy, therapeutic exercise and home exercise program  Frequency: 3x week  Duration in weeks: 4  Plan details: Patient was provided a home exercise program and demonstrated an understanding of exercises  Patient was advised to stop performing home exercise program if symptoms increase or new complaints developed  Verbal understanding demonstrated regarding home exercise program instructions          Subjective Evaluation    History of Present Illness  Date of onset: 5/3/1972  Mechanism of injury:  Pt reports that his back feels about 50 percent better since beginning physical therapy  He reports that his lower back feels better since beginning the mechanical traction  He reports that he would like to continue to perform the mechanical traction  The patent reports that he has been getting in and out of bed a lot easier  Pain  Current pain ratin  At best pain ratin  At worst pain rating: 3  Quality: throbbing  Aggravating factors: lifting, walking and standing  Progression: worsening    Treatments  Previous treatment: injection treatment and medication  Current treatment: physical therapy  Patient Goals  Patient goals for therapy: decreased pain          Objective     Special Questions  Negative for night pain, disturbed sleep, bladder dysfunction, bowel dysfunction and saddle (S4) numbness    Active Range of Motion     Lumbar                                          6/5  Flexion: 0 degrees                        0 degrees  Extension: 25 degrees                  30 degrees   Left lateral flexion: 57 degrees      52 degrees   Right lateral flexion: 54 degrees    52 degrees   Left Hip   Flexion: WFL  Abduction: WFL  Adduction: WFL    Right Hip   Flexion: WFL  Abduction: WFL  Adduction: WFL    Additional Active Range of Motion Details  Pain and tenderness to L4-L5 spinous process, L4-L5 paraspinals, L Piriformis  Right Rib hump during forward bending  Increased Lumbar lordosis during forward bending and static standing  90/90 hamstring -48 Right   -45 Left   YEFRI   + bilaterally  SLS   10 Seconds L    10 seconds R  Heel/toe waking Normal Bilaterally  Sensation - Intact Bilaterally  Reflexes L4 Abnormal Bilaterally 1+  S1 Normal 2+  Seated SLR - Negative Bilaterally  SLR Negative  Directional Preference for Lumbar flexion         Strength/Myotome Testing     Left Hip                 6/6   Planes of Motion   Flexion: 4-            4  Abduction: 4-        4 Adduction: 4-        4     Right Hip               6/6  Planes of Motion   Flexion: 4-              4  Abduction: 4-          4  Adduction: 4-           4        Daily Treatment Diary     Manual  5/30 6/1 6/6 5/21 5/23   B HS         B Hip flexor                          15min  15 min NP due to time  15 min 15 min       Exercise Diary  5/30 6/1 6/6 5/21 5/23   PPT 10''10x 10''10x      Hip Add with Ball 5''20x 5''20x  5''20x 5''20x   Clamshells  3x10 blue  3x10 Blue   3x10 3x10   SLR 3x10 flexion  3x10 flexion 2#      Supine Marching  5" x20 5" x20 2#  Supine marches Supine Marches    Pball ABS   5''20x   5''20x 5''20x   Step Ups    3x10 ea "B"  Foam  3x10 B  3x10   Mini Squat    3x10 Foam  [id number]  3x10    SLS         HR/TR   30x 30x 30x   Nu Step   10' L3  L4 10' Foam  L4 10 min  L4 10min    Lumbar flexion at edge of table    30x     Pball flexion at edge of table    [employer] 10''10x     SKTC/DKTC  SKTC 30" x3 ea     Bridge     5''20  5''20x   Prone Ext     10''10x  10'10x   Prone Alt Hip ext     3''10x    3''10x   MICHELLE     2 min  2min                       Modalities  5/30 6/1 6/6     Mechanical Lumbar Traction  100# 50% rope speed, 20 deg, 6 steps up and 6 steps down   15 minutes  (Pt denies Contraindications to traction, No adverse effects reported) 100# 50% rope speed, 20 deg, 6 steps up and 6 steps down   10 minutes  (Pt denies Contraindications to traction, No adverse effects reported) 100# 50% rope speed, 20 deg, 6 steps up and 6 steps down   10 minutes   (Pt denies Contraindications to traction, No adverse effects reported)                        `

## 2018-06-08 ENCOUNTER — OFFICE VISIT (OUTPATIENT)
Dept: PHYSICAL THERAPY | Facility: CLINIC | Age: 68
End: 2018-06-08
Payer: MEDICARE

## 2018-06-08 DIAGNOSIS — M54.16 LUMBAR RADICULOPATHY: Primary | ICD-10-CM

## 2018-06-08 PROCEDURE — 97110 THERAPEUTIC EXERCISES: CPT | Performed by: PHYSICAL THERAPIST

## 2018-06-08 PROCEDURE — 97012 MECHANICAL TRACTION THERAPY: CPT | Performed by: PHYSICAL THERAPIST

## 2018-06-08 NOTE — PROGRESS NOTES
Daily Note     Today's date: 2018  Patient name: Brea Cervanets  : 1950  MRN: [id number]  Referring provider: Terrell Santoyo DO  Dx:   Encounter Diagnosis     ICD-10-CM    1  Lumbar radiculopathy M54 16                   Subjective: " I have to leave early, can we just do the traction table  Objective: See treatment diary below      Daily Treatment Diary     Manual     B HS         B Hip flexor                          15min  15 min NP due to time  15 min 15 min       Exercise Diary     PPT 10''10x 10''10x      Hip Add with Ball 5''20x 5''20x   5''20x   Clamshells  3x10 blue  3x10 Blue    3x10   SLR 3x10 flexion  3x10 flexion 2#      Supine Marching  5" x20 5" x20 2#   Supine Marches    Pball ABS   5''20x    5''20x   Step Ups    3x10 ea "B"  Foam   3x10   Mini Squat    3x10 Foam   3x10    SLS         HR/TR   30x  30x   Nu Step   10' L3  L4 10' Foam   L4 10min    Lumbar flexion at edge of table    30x     Pball flexion at edge of table    [employer] 10''10x     SKTC/DKTC  SKTC 30" x3 ea     Bridge      5''20x   Prone Ext      10'10x   Prone Alt Hip ext      3''10x   MICHELLE      2min                       Modalities      Mechanical Lumbar Traction  100# 50% rope speed, 20 deg, 6 steps up and 6 steps down   15 minutes  (Pt denies Contraindications to traction, No adverse effects reported) 100# 50% rope speed, 20 deg, 6 steps up and 6 steps down   10 minutes  (Pt denies Contraindications to traction, No adverse effects reported) 100# 50% rope speed, 20 deg, 6 steps up and 6 steps down   10 minutes  (Pt denies Contraindications to traction, No adverse effects reported) 100# 50% rope speed, 20 deg, 6 steps up and 6 steps down   10 minutes  (Pt denies Contraindications to traction, No adverse effects reported)                          `      Assessment: Tolerated treatment well  Patient exhibited good technique with therapeutic exercises   At this time, patient has achieved their maximum functional benefit from skilled physical therapy services and will be discharged to their [facility]  Patient is in agreement with the plan of care  As a result, patient is discharged from physical therapy      Plan: Continue per plan of care

## 2019-08-05 ENCOUNTER — TELEPHONE (OUTPATIENT)
Dept: PAIN MEDICINE | Facility: MEDICAL CENTER | Age: 69
End: 2019-08-05

## 2019-08-05 NOTE — TELEPHONE ENCOUNTER
Pt's wife called to schedule another procedure for her   Pt spoke to  already about having another inject  Pt is starting to feel pain again        Pt can be reached at [phone]

## 2019-08-05 NOTE — TELEPHONE ENCOUNTER
Dr Sneed Guardian was ok with scheduling Repeat LESI, patient has same pain  He will need to make an office appointment after that injection before scheduling any future injections  Pt's wife agreed  She denies any RX blood thinners or ASA products  Went over pre procedure instructions, NPO 1 hr prior, if sick or on abx needs to call to rs, wear loose, comf clothing- no buttons/zippers, needs   Pt verbalized understanding

## 2019-09-04 ENCOUNTER — HOSPITAL ENCOUNTER (OUTPATIENT)
Dept: RADIOLOGY | Facility: CLINIC | Age: 69
Discharge: HOME | End: 2019-09-04
Attending: ANESTHESIOLOGY
Payer: MEDICARE

## 2019-09-04 VITALS
OXYGEN SATURATION: 96 % | RESPIRATION RATE: 20 BRPM | DIASTOLIC BLOOD PRESSURE: 83 MMHG | HEART RATE: 64 BPM | SYSTOLIC BLOOD PRESSURE: 165 MMHG | TEMPERATURE: 98.3 F

## 2019-09-04 DIAGNOSIS — M54.16 LUMBAR RADICULOPATHY: ICD-10-CM

## 2019-09-04 PROCEDURE — 62323 NJX INTERLAMINAR LMBR/SAC: CPT | Performed by: ANESTHESIOLOGY

## 2019-09-04 RX ORDER — METHYLPREDNISOLONE ACETATE 80 MG/ML
80 INJECTION, SUSPENSION INTRA-ARTICULAR; INTRALESIONAL; INTRAMUSCULAR; PARENTERAL; SOFT TISSUE ONCE
Status: COMPLETED | OUTPATIENT
Start: 2019-09-04 | End: 2019-09-04

## 2019-09-04 RX ORDER — LIDOCAINE HYDROCHLORIDE 10 MG/ML
5 INJECTION, SOLUTION EPIDURAL; INFILTRATION; INTRACAUDAL; PERINEURAL ONCE
Status: COMPLETED | OUTPATIENT
Start: 2019-09-04 | End: 2019-09-04

## 2019-09-04 RX ADMIN — METHYLPREDNISOLONE ACETATE 80 MG: 80 INJECTION, SUSPENSION INTRA-ARTICULAR; INTRALESIONAL; INTRAMUSCULAR; SOFT TISSUE at 14:26

## 2019-09-04 RX ADMIN — LIDOCAINE HYDROCHLORIDE 3 ML: 10 INJECTION, SOLUTION EPIDURAL; INFILTRATION; INTRACAUDAL; PERINEURAL at 14:23

## 2019-09-04 RX ADMIN — IOHEXOL 1 ML: 300 INJECTION, SOLUTION INTRAVENOUS at 14:25

## 2019-09-04 NOTE — H&P
History of Present Illness: The patient is a 78 y o  male who presents with complaints of low back and hip pain  Patient Active Problem List   Diagnosis    Lumbar radiculopathy       History reviewed  No pertinent past medical history  History reviewed  No pertinent surgical history  Current Outpatient Medications:     amLODIPine (NORVASC) 5 mg tablet, Take 5 mg by mouth daily  , Disp: , Rfl:     atorvastatin (LIPITOR) 80 mg tablet, Take 80 mg by mouth daily, Disp: , Rfl:     calcium citrate-vitamin D (CITRACAL+D) 315-200 MG-UNIT per tablet, Take 1 tablet by mouth daily, Disp: , Rfl:     diclofenac (VOLTAREN) 75 mg EC tablet, Take 75 mg by mouth 2 (two) times a day as needed  , Disp: , Rfl:     metoprolol tartrate (LOPRESSOR) 100 mg tablet, Take 100 mg by mouth daily  , Disp: , Rfl:     omeprazole (PriLOSEC) 20 mg delayed release capsule, Take 20 mg by mouth daily  , Disp: , Rfl:     terazosin (HYTRIN) 5 mg capsule, Take 5 mg by mouth daily at bedtime  , Disp: , Rfl:     No Known Allergies    Physical Exam:   Vitals:    09/04/19 1400   BP: 138/82   Pulse: (!) 54   Resp: 16   Temp: 98 3 °F (36 8 °C)   SpO2: 96%     General: Awake, Alert, Oriented x 3, Mood and affect appropriate  Respiratory: Respirations even and unlabored  Cardiovascular: Peripheral pulses intact; no edema  Musculoskeletal Exam:  Bilateral lumbar paraspinals tender to palpation  ASA Score: 2    Patient/Chart Verification  Patient ID Verified: Verbal  ID Band Applied: No  Consents Confirmed: Procedural  H&P( within 30 days) Verified: To be obtained in the Pre-Procedure area  Interval H&P(within 24 hr) Complete (required for Outpatients and Surgery Admit only): To be obtained in the Pre-Procedure area  Allergies Reviewed: Yes  Anticoag/NSAID held?: No  Currently on antibiotics?: No  Pre-op Lab/Test Results Available: N/A    Assessment:   1   Lumbar radiculopathy        Plan: Repeat LESI

## 2019-09-04 NOTE — DISCHARGE INSTR - LAB
Epidural Steroid Injection   WHAT YOU NEED TO KNOW:   An epidural steroid injection (JOHANNE) is a procedure to inject steroid medicine into the epidural space  The epidural space is between your spinal cord and vertebrae  Steroids reduce inflammation and fluid buildup in your spine that may be causing pain  You may be given pain medicine along with the steroids  ACTIVITY  · Do not drive or operate machinery today  · No strenuous activity today - bending, lifting, etc   · You may resume normal activites starting tomorrow - start slowly and as tolerated  · You may shower today, but no tub baths or hot tubs  · You may have numbness for several hours from the local anesthetic  Please use caution and common sense, especially with weight-bearing activities  CARE OF THE INJECTION SITE  · If you have soreness or pain, apply ice to the area today (20 minutes on/20 minutes off)  · Starting tomorrow, you may use warm, moist heat or ice if needed  · You may have an increase or change in your discomfort for 36-48 hours after your treatment  · Apply ice and continue with any pain medication you have been prescribed  · Notify [facility] if you have any of the following: redness, drainage, swelling, headache, stiff neck or fever above 100°F     SPECIAL INSTRUCTIONS  · Our office will contact you in approximately 7 days for a progress report  MEDICATIONS  · Continue to take all routine medications  · Our office may have instructed you to hold some medications  If you have a problem specifically related to your procedure, please call our office at [phone]  Problems not related to your procedure should be directed to your primary care physician

## 2019-09-18 ENCOUNTER — TELEPHONE (OUTPATIENT)
Dept: PAIN MEDICINE | Facility: CLINIC | Age: 69
End: 2019-09-18

## 2019-10-04 ENCOUNTER — TRANSCRIBE ORDERS (OUTPATIENT)
Dept: ADMINISTRATIVE | Facility: HOSPITAL | Age: 69
End: 2019-10-04

## 2019-10-04 DIAGNOSIS — N20.0 URIC ACID NEPHROLITHIASIS: Primary | ICD-10-CM

## 2019-10-12 ENCOUNTER — HOSPITAL ENCOUNTER (OUTPATIENT)
Dept: ULTRASOUND IMAGING | Facility: HOSPITAL | Age: 69
Discharge: HOME | End: 2019-10-12
Attending: INTERNAL MEDICINE
Payer: MEDICARE

## 2019-10-12 DIAGNOSIS — N20.0 URIC ACID NEPHROLITHIASIS: ICD-10-CM

## 2019-10-12 PROCEDURE — 76770 US EXAM ABDO BACK WALL COMP: CPT

## 2019-11-22 ENCOUNTER — OFFICE VISIT (OUTPATIENT)
Dept: PAIN MEDICINE | Facility: CLINIC | Age: 69
End: 2019-11-22
Payer: MEDICARE

## 2019-11-22 VITALS
SYSTOLIC BLOOD PRESSURE: 154 MMHG | HEART RATE: 64 BPM | DIASTOLIC BLOOD PRESSURE: 90 MMHG | TEMPERATURE: 98.3 F | WEIGHT: 222 LBS

## 2019-11-22 DIAGNOSIS — M51.26 LUMBAR DISC HERNIATION: ICD-10-CM

## 2019-11-22 DIAGNOSIS — M47.816 LUMBAR SPONDYLOSIS: ICD-10-CM

## 2019-11-22 DIAGNOSIS — M54.16 LUMBAR RADICULOPATHY: Primary | ICD-10-CM

## 2019-11-22 PROCEDURE — 99214 OFFICE O/P EST MOD 30 MIN: CPT | Performed by: ANESTHESIOLOGY

## 2019-11-22 RX ORDER — DICLOFENAC SODIUM 75 MG/1
75 TABLET, DELAYED RELEASE ORAL 2 TIMES DAILY PRN
Qty: 60 TABLET | Refills: 2 | Status: SHIPPED | OUTPATIENT
Start: 2019-11-22 | End: 2019-11-22 | Stop reason: SDUPTHER

## 2019-11-22 RX ORDER — DICLOFENAC SODIUM 75 MG/1
75 TABLET, DELAYED RELEASE ORAL 2 TIMES DAILY PRN
Qty: 60 TABLET | Refills: 2 | Status: SHIPPED | OUTPATIENT
Start: 2019-11-22 | End: 2020-05-22

## 2019-11-22 NOTE — PROGRESS NOTES
Assessment  1  Lumbar radiculopathy    2  Lumbar disc herniation    3  Lumbar spondylosis        Plan  26-year-old male returning for follow-up of lumbosacral back pain with radiculopathy in bilateral lower extremities in the L5 distribution  The patient does have degenerative disc disease and disc herniations L4-5 and L5-S1 with possible right L4 and left L5 nerve root impingement  The last MRI of his lumbar spine was in 2017  The patient had an L5-S1 LESI in September 2019 which did not provide much relief of the patient's back or leg symptoms  The patient has previously had excellent relief with epidural steroid injections  He is currently taking diclofenac 75 mg b i d  P r n  He did not find much relief with gabapentin, however was on a low dose of this medication  1  I will order an updated MRI of the lumbar spine without contrast to evaluate for progression of discogenic disease  2  The patient may continue with diclofenac 75 mg b i d  P r n   3  The patient will continue his home exercise program  4  I will follow up the patient in 2 months and will call her with results of MRI once received and our recommendations based upon those results        My impressions and treatment recommendations were discussed in detail with the patient who verbalized understanding and had no further questions  Discharge instructions were provided  I personally saw and examined the patient and I agree with the above discussed plan of care  No orders of the defined types were placed in this encounter  No orders of the defined types were placed in this encounter  History of Present Illness    Deepti Baird is a 78 y o  male  returning for follow-up of lumbosacral back pain with radiculopathy in bilateral lower extremities in the L5 distribution  The patient does have degenerative disc disease and disc herniations L4-5 and L5-S1 with possible right L4 and left L5 nerve root impingement    The last MRI of his lumbar spine was in 2017  He denies any bladder or bowel incontinence or saddle anesthesia  He denies any lower extremity weakness  He does note that the back and leg pain precipitates even after short distances of walking and short periods of standing  He also notices that the numbness in his feet intensifies with prolonged walking  The patient had an L5-S1 LESI in September 2019 which did not provide much relief of the patient's back or leg symptoms  The patient has previously had excellent relief with epidural steroid injections  He is currently taking diclofenac 75 mg b i d  P r n  He did not find much relief with gabapentin, however was on a low dose of this medication  The patient rates his pain as 7/10 on the pain is worse in the morning  The pain is constant and described as burning, sharp, pressure-like, and numbness  The pain is worse with standing, walking, and exercise  The pain is alleviated with sitting, relaxation, and lying down  I have personally reviewed and/or updated the patient's past medical history, past surgical history, family history, social history, current medications, allergies, and vital signs today  Other than as stated above, the patient denies any interval changes in medications, medical condition, mental condition, symptoms, or allergies since the last office visit  Review of Systems   Constitutional: Negative for fever and unexpected weight change  HENT: Negative for trouble swallowing  Eyes: Negative for visual disturbance  Respiratory: Negative for shortness of breath and wheezing  Cardiovascular: Negative for chest pain and palpitations  Gastrointestinal: Negative for constipation, diarrhea, nausea and vomiting  Endocrine: Negative for cold intolerance, heat intolerance and polydipsia  Genitourinary: Negative for difficulty urinating and frequency  Musculoskeletal: Positive for gait problem and joint swelling   Negative for arthralgias and myalgias  Decreased ROM   Skin: Negative for rash  Neurological: Negative for dizziness, seizures, syncope, weakness and headaches  Hematological: Does not bruise/bleed easily  Psychiatric/Behavioral: Negative for dysphoric mood  All other systems reviewed and are negative  Patient Active Problem List   Diagnosis    Lumbar radiculopathy       No past medical history on file  No past surgical history on file  No family history on file  Social History     Occupational History    Not on file   Tobacco Use    Smoking status: Never Smoker    Smokeless tobacco: Never Used   Substance and Sexual Activity    Alcohol use: Not on file    Drug use: Not on file    Sexual activity: Not on file       Current Outpatient Medications on File Prior to Visit   Medication Sig    amLODIPine (NORVASC) 5 mg tablet Take 5 mg by mouth daily      atorvastatin (LIPITOR) 80 mg tablet Take 80 mg by mouth daily    calcium citrate-vitamin D (CITRACAL+D) 315-200 MG-UNIT per tablet Take 1 tablet by mouth daily    diclofenac (VOLTAREN) 75 mg EC tablet Take 75 mg by mouth 2 (two) times a day as needed      metoprolol tartrate (LOPRESSOR) 100 mg tablet Take 100 mg by mouth daily      omeprazole (PriLOSEC) 20 mg delayed release capsule Take 20 mg by mouth daily      terazosin (HYTRIN) 5 mg capsule Take 5 mg by mouth daily at bedtime       No current facility-administered medications on file prior to visit  No Known Allergies    Physical Exam    /90   Pulse 64   Temp 98 3 °F (36 8 °C) (Oral)   Wt 101 kg (222 lb)     Constitutional: normal, well developed, well nourished, alert, in no distress and non-toxic and no overt pain behavior    Eyes: anicteric  HEENT: grossly intact  Neck: supple, symmetric, trachea midline and no masses   Pulmonary:even and unlabored  Cardiovascular:No edema or pitting edema present  Skin:Normal without rashes or lesions and well hydrated  Psychiatric:Mood and affect appropriate  Neurologic:Cranial Nerves II-XII grossly intact  Musculoskeletal:normal gait  Bilateral lumbar paraspinals mildly tender to palpation from L4-L5  Bilateral SI joints nontender to palpation  Bilateral lower extremity strength 5/5 in all muscle groups  Sensation intact to light touch in L3 through S1 dermatomes bilaterally  Negative seated straight leg raise bilaterally      Imaging  Imaging reviewed

## 2019-12-02 ENCOUNTER — HOSPITAL ENCOUNTER (OUTPATIENT)
Dept: MRI IMAGING | Facility: HOSPITAL | Age: 69
Discharge: HOME | End: 2019-12-02
Payer: MEDICARE

## 2019-12-02 DIAGNOSIS — M54.16 LUMBAR RADICULOPATHY: ICD-10-CM

## 2019-12-02 PROCEDURE — 72148 MRI LUMBAR SPINE W/O DYE: CPT

## 2019-12-04 ENCOUNTER — TELEPHONE (OUTPATIENT)
Dept: PAIN MEDICINE | Facility: MEDICAL CENTER | Age: 69
End: 2019-12-04

## 2019-12-04 DIAGNOSIS — Z01.818 PREPROCEDURAL EXAMINATION: ICD-10-CM

## 2019-12-04 DIAGNOSIS — N28.89 RENAL MASS, LEFT: Primary | ICD-10-CM

## 2019-12-04 NOTE — TELEPHONE ENCOUNTER
Please notify the patient the MRI of his lumbar spine reveals degenerative disc disease and arthritis  He also has congenital stenosis (narrowing where nerves exist)  He has central stenosis from L3-4-L5-S1 and mild foraminal stenosis at L4-5  Stenosis is most severe at L3-4 and L4-5  Multiple left renal masses representing renal cysts were noted, however there is also a left upper pole renal mass in the radiologist has recommended a CT renal protocol for follow-up of this lesion  CT renal protocol was ordered for the patient

## 2019-12-04 NOTE — TELEPHONE ENCOUNTER
Bhumika Diaz from radiology called with significant findings on MRI of lumbar spine     Pt can be reached at [phone]

## 2019-12-10 ENCOUNTER — TRANSCRIBE ORDERS (OUTPATIENT)
Dept: LAB | Facility: HOSPITAL | Age: 69
End: 2019-12-10

## 2019-12-10 ENCOUNTER — APPOINTMENT (OUTPATIENT)
Dept: LAB | Facility: HOSPITAL | Age: 69
End: 2019-12-10
Payer: MEDICARE

## 2019-12-10 DIAGNOSIS — Z01.818 PREPROCEDURAL EXAMINATION: ICD-10-CM

## 2019-12-10 LAB
BUN SERPL-MCNC: 16 MG/DL (ref 7–25)
CREAT SERPL-MCNC: 1.24 MG/DL (ref 0.7–1.3)
GFR SERPL CREATININE-BSD FRML MDRD: 59 ML/MIN/1.73SQ M

## 2019-12-10 PROCEDURE — 36415 COLL VENOUS BLD VENIPUNCTURE: CPT

## 2019-12-10 PROCEDURE — 84520 ASSAY OF UREA NITROGEN: CPT

## 2019-12-10 PROCEDURE — 82565 ASSAY OF CREATININE: CPT

## 2019-12-10 NOTE — TELEPHONE ENCOUNTER
S/w Mrs Amato and reviewed  Patient will have blood work done tomorrow at a [facility] facility

## 2019-12-10 NOTE — TELEPHONE ENCOUNTER
[facility] dept  Phone: [phone]  Fax: [phone]    Chana is looking the patients BUN and creatinine for the last 3 months  Patient has an appt in 2 days

## 2019-12-12 ENCOUNTER — HOSPITAL ENCOUNTER (OUTPATIENT)
Dept: CT IMAGING | Facility: HOSPITAL | Age: 69
Discharge: HOME | End: 2019-12-12
Payer: MEDICARE

## 2019-12-12 DIAGNOSIS — N28.89 RENAL MASS, LEFT: ICD-10-CM

## 2019-12-12 PROCEDURE — 74178 CT ABD&PLV WO CNTR FLWD CNTR: CPT

## 2019-12-12 RX ADMIN — IOHEXOL 100 ML: 350 INJECTION, SOLUTION INTRAVENOUS at 09:43

## 2019-12-17 ENCOUNTER — TELEPHONE (OUTPATIENT)
Dept: PAIN MEDICINE | Facility: CLINIC | Age: 69
End: 2019-12-17

## 2019-12-17 DIAGNOSIS — N28.89 LEFT RENAL MASS: Primary | ICD-10-CM

## 2019-12-17 NOTE — TELEPHONE ENCOUNTER
Call from Dr Catherine Weaver, [facility] radiology  # [phone]    Caller would like to discuss CT scan with Dr Kim Morales   made aware

## 2019-12-17 NOTE — TELEPHONE ENCOUNTER
Spoke with Dr Ciera Reed and discussed the results of CT abdomen/pelvis renal protocol  Left upper pole renal mass is concerning for malignancy  I did reach out to the patient today via telephone, however went to voice mail  Message was left by myself to call our office at earliest convenience to discuss results  Dr Ciera Reed recommended urology consultation  An order was placed for Dr Mian Lew of Urology  The patient should call to set this up as soon as possible  If the patient would like to discuss further, please notify me and I will contact the patient personally again

## 2019-12-18 ENCOUNTER — TELEPHONE (OUTPATIENT)
Dept: UROLOGY | Facility: MEDICAL CENTER | Age: 69
End: 2019-12-18

## 2019-12-18 ENCOUNTER — TELEPHONE (OUTPATIENT)
Dept: PAIN MEDICINE | Facility: CLINIC | Age: 69
End: 2019-12-18

## 2019-12-18 NOTE — TELEPHONE ENCOUNTER
Called patient personally with results of CT renal protocol  Urology referral was placed  Please mail paper referral to the patient  Patient was given phone number and name of urologist, Dr Kimmy Jasso, to the patient so that he can schedule as soon as possible

## 2019-12-18 NOTE — TELEPHONE ENCOUNTER
Npt called stating he was referred by Dr Obrien who was contacting Miguel Angel Mirza to review CT Scan results please call his cell# [phone] when contacting with appointment or Dr Cortes's recommendation,he and his wife are scheduled to leave 12/21/19 for [facility] would like to know if they should cancel

## 2019-12-18 NOTE — TELEPHONE ENCOUNTER
Complaint/Diagnosis:Findings CT Scan Results    Insurance:[facility]/[employer] PPO    History of Cancer:NO    Previous urologist:NO    Outside testing/where:[facility]    If yes,what kind:[facility]    Records requested/where:[facility]    Preferred location:[place]

## 2019-12-19 NOTE — TELEPHONE ENCOUNTER
Called [phone] and left a message stating Dr Kimmy Jasso has an opening tomorrow at 315 pm at the [facility] office  Asked patient to please contact office to confirm

## 2019-12-19 NOTE — TELEPHONE ENCOUNTER
Patient currently in [place] and flying to [facility] from there thus not able to make appt offered tomorrow with Dr Louie Rubalcava  Patient will be returning to the [facility] on 12/28  Please arrange office visit promptly upon his return

## 2019-12-20 ENCOUNTER — TELEPHONE (OUTPATIENT)
Dept: UROLOGY | Facility: CLINIC | Age: 69
End: 2019-12-20

## 2019-12-20 NOTE — TELEPHONE ENCOUNTER
Contacted and spoke with patient to schedule new patient appointment  Patient scheduled January 3 2020 at 0730 with Dr Divina Case at the [facility] office  Patient aware of office location

## 2020-01-03 ENCOUNTER — TELEPHONE (OUTPATIENT)
Dept: PAIN MEDICINE | Facility: CLINIC | Age: 70
End: 2020-01-03

## 2020-01-03 ENCOUNTER — OFFICE VISIT (OUTPATIENT)
Dept: UROLOGY | Facility: CLINIC | Age: 70
End: 2020-01-03
Payer: MEDICARE

## 2020-01-03 VITALS — WEIGHT: 222 LBS | SYSTOLIC BLOOD PRESSURE: 122 MMHG | HEART RATE: 60 BPM | DIASTOLIC BLOOD PRESSURE: 70 MMHG

## 2020-01-03 DIAGNOSIS — N28.89 LEFT RENAL MASS: Primary | ICD-10-CM

## 2020-01-03 PROCEDURE — 88112 CYTOPATH CELL ENHANCE TECH: CPT | Performed by: PATHOLOGY

## 2020-01-03 PROCEDURE — 87086 URINE CULTURE/COLONY COUNT: CPT | Performed by: UROLOGY

## 2020-01-03 PROCEDURE — 99205 OFFICE O/P NEW HI 60 MIN: CPT | Performed by: UROLOGY

## 2020-01-03 NOTE — LETTER
January 3, 2020     Elver Martin DO  [id number] [facility]    Patient: Irene Oakes   YOB: 1950   Date of Visit: 1/3/2020       Dear Dr Carla Hendricks: Thank you for referring Irene Oakes to me for evaluation  Below are my notes for this consultation  If you have questions, please do not hesitate to call me  I look forward to following your patient along with you  Sincerely,        Bety Rivas MD        CC: DO Bety Maurer MD  1/3/2020  7:49 AM  Sign at close encounter    [address]   Irene Oakes is a 78 y o  male with a complaint of   Chief Complaint   Patient presents with    Renal Mass       History of Present Illness:     78 y o  male with a history of musculoskeletal back pain  Patient sees a pain management team and has been undergoing injections  MR imaging demonstrated a concerning left-sided renal lesion and the patient underwent a CT urogram   This demonstrates a endophytic infiltrating lesion, by my review of the films concerning for urothelial carcinoma  The patient has a remote history of smoking more than 40 years ago  He has no chemical exposures by his report  There is no family history of kidney or bladder cancer  Patient recently passed a stone in October which was his 1st episode  He had 1 day of hematuria following this but has not had any other gross hematuria that he noted  He had some flank pain at that time which has completely resolved  He presents today with his wife to discuss the findings of the CT scan  Past Medical History:   No past medical history on file  PAST SURGICAL HISTORY:   No past surgical history on file      CURRENT MEDICATIONS:     Current Outpatient Medications   Medication Sig Dispense Refill    amLODIPine (NORVASC) 5 mg tablet Take 5 mg by mouth daily        atorvastatin (LIPITOR) 80 mg tablet Take 80 mg by mouth daily      calcium citrate-vitamin D (CITRACAL+D) 315-200 MG-UNIT per tablet Take 1 tablet by mouth daily      diclofenac (VOLTAREN) 75 mg EC tablet Take 1 tablet (75 mg total) by mouth 2 (two) times a day as needed (moderate pain) 60 tablet 2    metoprolol tartrate (LOPRESSOR) 100 mg tablet Take 100 mg by mouth daily        omeprazole (PriLOSEC) 20 mg delayed release capsule Take 20 mg by mouth daily        terazosin (HYTRIN) 5 mg capsule Take 5 mg by mouth daily at bedtime         No current facility-administered medications for this visit          ALLERGIES:   No Known Allergies    SOCIAL HISTORY:     Social History     Socioeconomic History    Marital status: /Civil Union     Spouse name: Not on file    Number of children: Not on file    Years of education: Not on file    Highest education level: Not on file   Occupational History    Not on file   Social Needs    Financial resource strain: Not on file    Food insecurity:     Worry: Not on file     Inability: Not on file    Transportation needs:     Medical: Not on file     Non-medical: Not on file   Tobacco Use    Smoking status: Never Smoker    Smokeless tobacco: Never Used   Substance and Sexual Activity    Alcohol use: Not on file    Drug use: Not on file    Sexual activity: Not on file   Lifestyle    Physical activity:     Days per week: Not on file     Minutes per session: Not on file    Stress: Not on file   Relationships    Social connections:     Talks on phone: Not on file     Gets together: Not on file     Attends Congregational service: Not on file     Active member of club or organization: Not on file     Attends meetings of clubs or organizations: Not on file     Relationship status: Not on file    Intimate partner violence:     Fear of current or ex partner: Not on file     Emotionally abused: Not on file     Physically abused: Not on file     Forced sexual activity: Not on file   Other Topics Concern    Not on file   Social History Narrative    Not on file       SOCIAL HISTORY:   No family history on file  REVIEW OF SYSTEMS:     Review of Systems   Constitutional: Negative  Respiratory: Negative  Cardiovascular: Negative  Gastrointestinal: Negative  Genitourinary: Positive for flank pain and hematuria  Musculoskeletal: Positive for back pain  Skin: Negative  Psychiatric/Behavioral: Negative  PHYSICAL EXAM:     /70   Pulse 60   Wt 101 kg (222 lb)     General:  Healthy appearing male in no acute distress  They have a normal affect  There is not appear to be any gross neurologic defects or abnormalities  HEENT:  Normocephalic, atraumatic  Neck is supple without any palpable lymphadenopathy  Cardiovascular:  Patient has normal palpable distal radial pulses  There is no significant peripheral edema  No JVD is noted  Respiratory:  Patient has unlabored respirations  There is no audible wheeze or rhonchi  Abdomen:  Abdomen is with healed inguinal and umbilical (mesh by report) surgical scars  Abdomen is soft and nontender  There is no tympany  Inguinal and umbilical hernia are not appreciated  Musculoskeletal:  Patient does not have significant CVA tenderness in the  flank with palpation or percussion  They full range of motion in all 4 extremities  Strength in all 4 extremities appears congruent  Patient is able to ambulate without assistance or difficulty  Dermatologic:  Patient has no skin abnormalities or rashes  LABS:     CBC: No results found for: WBC, HGB, HCT, MCV, PLT    BMP:   Lab Results   Component Value Date    BUN 16 12/10/2019    CREATININE 1 24 12/10/2019     No results found for: PSA    IMAGIN/12/19  CT ABDOMEN AND PELVIS WITH AND WITHOUT IV CONTRAST     INDICATION:   N28 89:  Other specified disorders of kidney and ureter      COMPARISON:  None      TECHNIQUE: Initial CT of the abdomen and pelvis was performed without intravenous contrast   Subsequent dynamic CT evaluation of the abdomen and pelvis was performed after the administration of intravenous contrast in both nephrographic and delayed   phases after the administration of intravenous contrast   Axial, sagittal, and coronal 2D reformatted images were created from the source data and submitted for interpretation       Radiation dose length product (DLP) for this visit:  2345 1 mGy-cm   This examination, like all CT scans performed in the [facility], was performed utilizing techniques to minimize radiation dose exposure, including the use of iterative   reconstruction and automated exposure control      IV Contrast:  100 mL of iohexol (OMNIPAQUE)  Enteric Contrast:  Enteric contrast was not administered      FINDINGS:     ABDOMEN     RIGHT KIDNEY AND URETER:  No solid renal mass  Nonobstructing calculus seen in the lower pole of the right kidney which measures about 2 mm  No hydronephrosis or hydroureter  No urinary tract calculi  No perinephric collection      LEFT KIDNEY AND URETER:  There is an infiltrating lesion in the left kidney in the upper pole area which causes amputation and blunting of the upper pole kidney cysts  This mass measures 3 5 x 2 6 x 3 1 cm  There is no evidence of ureteric obstruction  There is a left renal cyst which measures 1 7 cm  No hydronephrosis or hydroureter  No urinary tract calculi  No perinephric collection      URINARY BLADDER:  There is mild urinary bladder wall thickening, this may be due to underdistention  No calculi         LOWER CHEST:  No clinically significant abnormality identified in the visualized lower chest      LIVER/BILIARY TREE:  Multiple hypodensities are seen within the liver parenchyma with attenuation of fluid compatible with cysts  A cluster of cysts /septated cyst seen in the left hepatic lobe in image 50 series 3     GALLBLADDER:  No calcified gallstones   No pericholecystic inflammatory change      SPLEEN:  Unremarkable      PANCREAS: Unremarkable      ADRENAL GLANDS:  Unremarkable      STOMACH AND BOWEL:  Diverticulosis seen     ABDOMINOPELVIC CAVITY:  No ascites  No free intraperitoneal air  No lymphadenopathy      VESSELS:  The celiac trunk appear unremarkable  The SMA appear unremarkable  The HODAN appear unremarkable     PELVIS     REPRODUCTIVE ORGANS:  Unremarkable for patient's age      APPENDIX: No findings to suggest appendicitis      ABDOMINAL WALL/INGUINAL REGIONS:  Small umbilical hernia seen     OSSEOUS STRUCTURES:  No acute fracture or destructive osseous lesion      IMPRESSION:     There is an infiltrating lesion in the upper pole of the left kidney which extends into the renal sinus with amputation of the upper pole calyces, measuring 3 5 x 2 6 x 3 1 cm  This represent a renal neoplasm, less likely may represent urothelial lesion     No retroperitoneal lymphadenopathy       Nonobstructing right renal calculus        ASSESSMENT:     78 y o  male with RIGHT upper pole infiltrating lesion, concerning for possible urothelial carcinoma    PLAN:       I had a lengthy discussion with the patient and his wife  We physically reviewed the films together  At this point, given my review of the films, I am concerned about an infiltrating upper pole urothelial carcinoma on the left side  In order to rule this in or out, I have strongly recommended that we proceed to the operating room for cystoscopy with bilateral retrograde pyelograms and selective cytology, left ureteroscopy with possible biopsy brushings and fulguration with stent placement and any necessary bladder biopsies  Risks and benefits of this procedure been discussed and the patient has signed informed consent  We will proceed with this surgery in the near future  If urothelial carcinoma is not the diagnosis, renal mass biopsy interventional Radiology may be prudent given the small size of the lesion prior to a completion nephrectomy    Patient would likely not be a candidate for partial nephrectomy given the endophytic nature of the tumor  Further testing is required and the patient understands the plan

## 2020-01-03 NOTE — PATIENT INSTRUCTIONS
Ureteroscopy   WHAT YOU NEED TO KNOW:   A ureteroscopy is a procedure to examine in the inside of your urinary tract, which includes your urethra, bladder, ureters, and kidneys  A ureteroscope is a small, thin tube with a light and camera on the end  Ureteroscopy can help your healthcare provider diagnose and treat problems in your urinary tract, such as kidney stones  HOW TO PREPARE:   The week before your procedure:   · Write down the correct date, time, and location of your procedure  · Ask your caregiver if you need to stop using aspirin or any other prescribed or over-the-counter medicine before your procedure or surgery  · Bring your medicine bottles or a list of your medicines when you see your caregiver  Tell your caregiver if you are allergic to any medicine  Tell your caregiver if you use any herbs, food supplements, or over-the-counter medicine  · Arrange a ride home  Ask a family member or friend to drive you home after your surgery or procedure  Do not drive yourself home  · You may need blood or urine tests before your procedure  You may also need an EKG  Ask your healthcare provider for more information about these and other tests that you may need  Write down the date, time, and location of each test   The night before your procedure:  Ask caregivers about directions for eating and drinking  The day of your procedure:   · Ask your caregiver before taking any medicine on the day of your procedure  These medicines include insulin, diabetic pills, high blood pressure pills, or heart pills  Bring a list of all the medicines you take, or your pill bottles, with you to the hospital     · You or a close family member will be asked to sign a legal document called a consent form  It gives caregivers permission to do the procedure or surgery  It also explains the problems that may happen, and your choices  Make sure all your questions are answered before you sign this form      · An anesthesiologist will talk to you before your surgery  You may need medicine to keep you asleep or numb an area of your body during surgery  Tell caregivers if you or anyone in your family has had a problem with anesthesia in the past     · Caregivers may insert an intravenous tube (IV) into your vein  A vein in the arm is usually chosen  Through the IV tube, you may be given liquids and medicine  WHAT WILL HAPPEN:   What will happen: Your healthcare provider will place the ureteroscope into your urethra  He will pass it through your bladder and into your ureters and kidneys  Your healthcare provider may place tools through the scope that will help him remove tissue or stones  The tools may also help him place stents or sheaths to help keep your ureters open  After your procedure: You will be taken to a room to rest until you are fully awake  Healthcare providers will monitor you closely for any problems  Do not get out of bed until your healthcare provider says it is okay  When your healthcare provider sees that you are okay, you will be able to go home or be taken to your hospital room  CONTACT YOUR HEALTHCARE PROVIDER IF:   · You cannot make it to your procedure  · You have a fever  · You get a cold or the flu  · You have questions or concerns about your procedure  SEEK CARE IMMEDIATELY IF:   · Your symptoms get worse  RISKS:   You may bleed more than expected or get an infection  One of your ureters may be injured  You may have a blockage in one of your ureters  You may need another procedure or surgery  CARE AGREEMENT:   You have the right to help plan your care  Learn about your health condition and how it may be treated  Discuss treatment options with your caregivers to decide what care you want to receive  You always have the right to refuse treatment     © 2017 [employer] Information is for End User's use only and may not be sold, redistributed or otherwise used for commercial purposes  All illustrations and images included in CareNotes® are the copyrighted property of [employer]  or Josse Sommer  The above information is an  only  It is not intended as medical advice for individual conditions or treatments  Talk to your doctor, nurse or pharmacist before following any medical regimen to see if it is safe and effective for you

## 2020-01-03 NOTE — H&P (VIEW-ONLY)
UROLOGY NEW CONSULT NOTE     CHIEF COMPLAINT   Donell Pinto is a 78 y o  male with a complaint of   Chief Complaint   Patient presents with    Renal Mass       History of Present Illness:     78 y o  male with a history of musculoskeletal back pain  Patient sees a pain management team and has been undergoing injections  MR imaging demonstrated a concerning left-sided renal lesion and the patient underwent a CT urogram   This demonstrates a endophytic infiltrating lesion, by my review of the films concerning for urothelial carcinoma  The patient has a remote history of smoking more than 40 years ago  He has no chemical exposures by his report  There is no family history of kidney or bladder cancer  Patient recently passed a stone in October which was his 1st episode  He had 1 day of hematuria following this but has not had any other gross hematuria that he noted  He had some flank pain at that time which has completely resolved  He presents today with his wife to discuss the findings of the CT scan  Past Medical History:   No past medical history on file  PAST SURGICAL HISTORY:   No past surgical history on file  CURRENT MEDICATIONS:     Current Outpatient Medications   Medication Sig Dispense Refill    amLODIPine (NORVASC) 5 mg tablet Take 5 mg by mouth daily        atorvastatin (LIPITOR) 80 mg tablet Take 80 mg by mouth daily      calcium citrate-vitamin D (CITRACAL+D) 315-200 MG-UNIT per tablet Take 1 tablet by mouth daily      diclofenac (VOLTAREN) 75 mg EC tablet Take 1 tablet (75 mg total) by mouth 2 (two) times a day as needed (moderate pain) 60 tablet 2    metoprolol tartrate (LOPRESSOR) 100 mg tablet Take 100 mg by mouth daily        omeprazole (PriLOSEC) 20 mg delayed release capsule Take 20 mg by mouth daily        terazosin (HYTRIN) 5 mg capsule Take 5 mg by mouth daily at bedtime         No current facility-administered medications for this visit          ALLERGIES:   No Known Allergies    SOCIAL HISTORY:     Social History     Socioeconomic History    Marital status: /Civil Union     Spouse name: Not on file    Number of children: Not on file    Years of education: Not on file    Highest education level: Not on file   Occupational History    Not on file   Social Needs    Financial resource strain: Not on file    Food insecurity:     Worry: Not on file     Inability: Not on file    Transportation needs:     Medical: Not on file     Non-medical: Not on file   Tobacco Use    Smoking status: Never Smoker    Smokeless tobacco: Never Used   Substance and Sexual Activity    Alcohol use: Not on file    Drug use: Not on file    Sexual activity: Not on file   Lifestyle    Physical activity:     Days per week: Not on file     Minutes per session: Not on file    Stress: Not on file   Relationships    Social connections:     Talks on phone: Not on file     Gets together: Not on file     Attends Islam service: Not on file     Active member of club or organization: Not on file     Attends meetings of clubs or organizations: Not on file     Relationship status: Not on file    Intimate partner violence:     Fear of current or ex partner: Not on file     Emotionally abused: Not on file     Physically abused: Not on file     Forced sexual activity: Not on file   Other Topics Concern    Not on file   Social History Narrative    Not on file       SOCIAL HISTORY:   No family history on file  REVIEW OF SYSTEMS:     Review of Systems   Constitutional: Negative  Respiratory: Negative  Cardiovascular: Negative  Gastrointestinal: Negative  Genitourinary: Positive for flank pain and hematuria  Musculoskeletal: Positive for back pain  Skin: Negative  Psychiatric/Behavioral: Negative  PHYSICAL EXAM:     /70   Pulse 60   Wt 101 kg (222 lb)     General:  Healthy appearing male in no acute distress  They have a normal affect    There is not appear to be any gross neurologic defects or abnormalities  HEENT:  Normocephalic, atraumatic  Neck is supple without any palpable lymphadenopathy  Cardiovascular:  Patient has normal palpable distal radial pulses  There is no significant peripheral edema  No JVD is noted  Respiratory:  Patient has unlabored respirations  There is no audible wheeze or rhonchi  Abdomen:  Abdomen is with healed inguinal and umbilical (mesh by report) surgical scars  Abdomen is soft and nontender  There is no tympany  Inguinal and umbilical hernia are not appreciated  Musculoskeletal:  Patient does not have significant CVA tenderness in the  flank with palpation or percussion  They full range of motion in all 4 extremities  Strength in all 4 extremities appears congruent  Patient is able to ambulate without assistance or difficulty  Dermatologic:  Patient has no skin abnormalities or rashes  LABS:     CBC: No results found for: WBC, HGB, HCT, MCV, PLT    BMP:   Lab Results   Component Value Date    BUN 16 12/10/2019    CREATININE 1 24 12/10/2019     No results found for: PSA    IMAGIN/12/19  CT ABDOMEN AND PELVIS WITH AND WITHOUT IV CONTRAST     INDICATION:   N28 89: Other specified disorders of kidney and ureter      COMPARISON:  None      TECHNIQUE: Initial CT of the abdomen and pelvis was performed without intravenous contrast   Subsequent dynamic CT evaluation of the abdomen and pelvis was performed after the administration of intravenous contrast in both nephrographic and delayed   phases after the administration of intravenous contrast   Axial, sagittal, and coronal 2D reformatted images were created from the source data and submitted for interpretation       Radiation dose length product (DLP) for this visit:  2345 1 mGy-cm     This examination, like all CT scans performed in the [facility], was performed utilizing techniques to minimize radiation dose exposure, including the use of iterative   reconstruction and automated exposure control      IV Contrast:  100 mL of iohexol (OMNIPAQUE)  Enteric Contrast:  Enteric contrast was not administered      FINDINGS:     ABDOMEN     RIGHT KIDNEY AND URETER:  No solid renal mass  Nonobstructing calculus seen in the lower pole of the right kidney which measures about 2 mm  No hydronephrosis or hydroureter  No urinary tract calculi  No perinephric collection      LEFT KIDNEY AND URETER:  There is an infiltrating lesion in the left kidney in the upper pole area which causes amputation and blunting of the upper pole kidney cysts  This mass measures 3 5 x 2 6 x 3 1 cm  There is no evidence of ureteric obstruction  There is a left renal cyst which measures 1 7 cm  No hydronephrosis or hydroureter  No urinary tract calculi  No perinephric collection      URINARY BLADDER:  There is mild urinary bladder wall thickening, this may be due to underdistention  No calculi         LOWER CHEST:  No clinically significant abnormality identified in the visualized lower chest      LIVER/BILIARY TREE:  Multiple hypodensities are seen within the liver parenchyma with attenuation of fluid compatible with cysts  A cluster of cysts /septated cyst seen in the left hepatic lobe in image 50 series 3     GALLBLADDER:  No calcified gallstones  No pericholecystic inflammatory change      SPLEEN:  Unremarkable      PANCREAS:  Unremarkable      ADRENAL GLANDS:  Unremarkable      STOMACH AND BOWEL:  Diverticulosis seen     ABDOMINOPELVIC CAVITY:  No ascites  No free intraperitoneal air   No lymphadenopathy      VESSELS:  The celiac trunk appear unremarkable  The SMA appear unremarkable  The HODAN appear unremarkable     PELVIS     REPRODUCTIVE ORGANS:  Unremarkable for patient's age      APPENDIX: No findings to suggest appendicitis      ABDOMINAL WALL/INGUINAL REGIONS:  Small umbilical hernia seen     OSSEOUS STRUCTURES:  No acute fracture or destructive osseous lesion      IMPRESSION:     There is an infiltrating lesion in the upper pole of the left kidney which extends into the renal sinus with amputation of the upper pole calyces, measuring 3 5 x 2 6 x 3 1 cm  This represent a renal neoplasm, less likely may represent urothelial lesion     No retroperitoneal lymphadenopathy       Nonobstructing right renal calculus        ASSESSMENT:     78 y o  male with RIGHT upper pole infiltrating lesion, concerning for possible urothelial carcinoma    PLAN:       I had a lengthy discussion with the patient and his wife  We physically reviewed the films together  At this point, given my review of the films, I am concerned about an infiltrating upper pole urothelial carcinoma on the left side  In order to rule this in or out, I have strongly recommended that we proceed to the operating room for cystoscopy with bilateral retrograde pyelograms and selective cytology, left ureteroscopy with possible biopsy brushings and fulguration with stent placement and any necessary bladder biopsies  Risks and benefits of this procedure been discussed and the patient has signed informed consent  We will proceed with this surgery in the near future  If urothelial carcinoma is not the diagnosis, renal mass biopsy interventional Radiology may be prudent given the small size of the lesion prior to a completion nephrectomy  Patient would likely not be a candidate for partial nephrectomy given the endophytic nature of the tumor  Further testing is required and the patient understands the plan

## 2020-01-03 NOTE — TELEPHONE ENCOUNTER
Aparna Garza  [facility] [employer] canceled More Detail >>   Appointment canceled   Catrachito Bingham   Sent: Nicola Ramos 2020  1:50 PM   To: [address] [place] [address]   MRN: [id number] : 1950   Pt Home: [phone]     Entered: [phone]        Message     Appointment canceled for Catrachito Bingham ([id number])   Visit Type: OFFICE VISIT SHORT [facility]   Date        Time      Length    Provider                  Department   2020    3:15 PM  15 mins  Dinorah Gómez DO         [facility]      Reason for Cancellation: Hospitalized      Patient Comments: surgery of kidney for biopsy on 2020

## 2020-01-03 NOTE — PROGRESS NOTES
UROLOGY NEW CONSULT NOTE     CHIEF COMPLAINT   Acosta Mosqueda is a 78 y o  male with a complaint of   Chief Complaint   Patient presents with    Renal Mass       History of Present Illness:     78 y o  male with a history of musculoskeletal back pain  Patient sees a pain management team and has been undergoing injections  MR imaging demonstrated a concerning left-sided renal lesion and the patient underwent a CT urogram   This demonstrates a endophytic infiltrating lesion, by my review of the films concerning for urothelial carcinoma  The patient has a remote history of smoking more than 40 years ago  He has no chemical exposures by his report  There is no family history of kidney or bladder cancer  Patient recently passed a stone in October which was his 1st episode  He had 1 day of hematuria following this but has not had any other gross hematuria that he noted  He had some flank pain at that time which has completely resolved  He presents today with his wife to discuss the findings of the CT scan  Past Medical History:   No past medical history on file  PAST SURGICAL HISTORY:   No past surgical history on file  CURRENT MEDICATIONS:     Current Outpatient Medications   Medication Sig Dispense Refill    amLODIPine (NORVASC) 5 mg tablet Take 5 mg by mouth daily        atorvastatin (LIPITOR) 80 mg tablet Take 80 mg by mouth daily      calcium citrate-vitamin D (CITRACAL+D) 315-200 MG-UNIT per tablet Take 1 tablet by mouth daily      diclofenac (VOLTAREN) 75 mg EC tablet Take 1 tablet (75 mg total) by mouth 2 (two) times a day as needed (moderate pain) 60 tablet 2    metoprolol tartrate (LOPRESSOR) 100 mg tablet Take 100 mg by mouth daily        omeprazole (PriLOSEC) 20 mg delayed release capsule Take 20 mg by mouth daily        terazosin (HYTRIN) 5 mg capsule Take 5 mg by mouth daily at bedtime         No current facility-administered medications for this visit          ALLERGIES:   No Known Allergies    SOCIAL HISTORY:     Social History     Socioeconomic History    Marital status: /Civil Union     Spouse name: Not on file    Number of children: Not on file    Years of education: Not on file    Highest education level: Not on file   Occupational History    Not on file   Social Needs    Financial resource strain: Not on file    Food insecurity:     Worry: Not on file     Inability: Not on file    Transportation needs:     Medical: Not on file     Non-medical: Not on file   Tobacco Use    Smoking status: Never Smoker    Smokeless tobacco: Never Used   Substance and Sexual Activity    Alcohol use: Not on file    Drug use: Not on file    Sexual activity: Not on file   Lifestyle    Physical activity:     Days per week: Not on file     Minutes per session: Not on file    Stress: Not on file   Relationships    Social connections:     Talks on phone: Not on file     Gets together: Not on file     Attends Latter-day service: Not on file     Active member of club or organization: Not on file     Attends meetings of clubs or organizations: Not on file     Relationship status: Not on file    Intimate partner violence:     Fear of current or ex partner: Not on file     Emotionally abused: Not on file     Physically abused: Not on file     Forced sexual activity: Not on file   Other Topics Concern    Not on file   Social History Narrative    Not on file       SOCIAL HISTORY:   No family history on file  REVIEW OF SYSTEMS:     Review of Systems   Constitutional: Negative  Respiratory: Negative  Cardiovascular: Negative  Gastrointestinal: Negative  Genitourinary: Positive for flank pain and hematuria  Musculoskeletal: Positive for back pain  Skin: Negative  Psychiatric/Behavioral: Negative  PHYSICAL EXAM:     /70   Pulse 60   Wt 101 kg (222 lb)     General:  Healthy appearing male in no acute distress  They have a normal affect    There is not appear to be any gross neurologic defects or abnormalities  HEENT:  Normocephalic, atraumatic  Neck is supple without any palpable lymphadenopathy  Cardiovascular:  Patient has normal palpable distal radial pulses  There is no significant peripheral edema  No JVD is noted  Respiratory:  Patient has unlabored respirations  There is no audible wheeze or rhonchi  Abdomen:  Abdomen is with healed inguinal and umbilical (mesh by report) surgical scars  Abdomen is soft and nontender  There is no tympany  Inguinal and umbilical hernia are not appreciated  Musculoskeletal:  Patient does not have significant CVA tenderness in the  flank with palpation or percussion  They full range of motion in all 4 extremities  Strength in all 4 extremities appears congruent  Patient is able to ambulate without assistance or difficulty  Dermatologic:  Patient has no skin abnormalities or rashes  LABS:     CBC: No results found for: WBC, HGB, HCT, MCV, PLT    BMP:   Lab Results   Component Value Date    BUN 16 12/10/2019    CREATININE 1 24 12/10/2019     No results found for: PSA    IMAGIN/12/19  CT ABDOMEN AND PELVIS WITH AND WITHOUT IV CONTRAST     INDICATION:   N28 89: Other specified disorders of kidney and ureter      COMPARISON:  None      TECHNIQUE: Initial CT of the abdomen and pelvis was performed without intravenous contrast   Subsequent dynamic CT evaluation of the abdomen and pelvis was performed after the administration of intravenous contrast in both nephrographic and delayed   phases after the administration of intravenous contrast   Axial, sagittal, and coronal 2D reformatted images were created from the source data and submitted for interpretation       Radiation dose length product (DLP) for this visit:  2345 1 mGy-cm     This examination, like all CT scans performed in the [facility], was performed utilizing techniques to minimize radiation dose exposure, including the use of iterative   reconstruction and automated exposure control      IV Contrast:  100 mL of iohexol (OMNIPAQUE)  Enteric Contrast:  Enteric contrast was not administered      FINDINGS:     ABDOMEN     RIGHT KIDNEY AND URETER:  No solid renal mass  Nonobstructing calculus seen in the lower pole of the right kidney which measures about 2 mm  No hydronephrosis or hydroureter  No urinary tract calculi  No perinephric collection      LEFT KIDNEY AND URETER:  There is an infiltrating lesion in the left kidney in the upper pole area which causes amputation and blunting of the upper pole kidney cysts  This mass measures 3 5 x 2 6 x 3 1 cm  There is no evidence of ureteric obstruction  There is a left renal cyst which measures 1 7 cm  No hydronephrosis or hydroureter  No urinary tract calculi  No perinephric collection      URINARY BLADDER:  There is mild urinary bladder wall thickening, this may be due to underdistention  No calculi         LOWER CHEST:  No clinically significant abnormality identified in the visualized lower chest      LIVER/BILIARY TREE:  Multiple hypodensities are seen within the liver parenchyma with attenuation of fluid compatible with cysts  A cluster of cysts /septated cyst seen in the left hepatic lobe in image 50 series 3     GALLBLADDER:  No calcified gallstones  No pericholecystic inflammatory change      SPLEEN:  Unremarkable      PANCREAS:  Unremarkable      ADRENAL GLANDS:  Unremarkable      STOMACH AND BOWEL:  Diverticulosis seen     ABDOMINOPELVIC CAVITY:  No ascites  No free intraperitoneal air   No lymphadenopathy      VESSELS:  The celiac trunk appear unremarkable  The SMA appear unremarkable  The HODAN appear unremarkable     PELVIS     REPRODUCTIVE ORGANS:  Unremarkable for patient's age      APPENDIX: No findings to suggest appendicitis      ABDOMINAL WALL/INGUINAL REGIONS:  Small umbilical hernia seen     OSSEOUS STRUCTURES:  No acute fracture or destructive osseous lesion      IMPRESSION:     There is an infiltrating lesion in the upper pole of the left kidney which extends into the renal sinus with amputation of the upper pole calyces, measuring 3 5 x 2 6 x 3 1 cm  This represent a renal neoplasm, less likely may represent urothelial lesion     No retroperitoneal lymphadenopathy       Nonobstructing right renal calculus        ASSESSMENT:     78 y o  male with RIGHT upper pole infiltrating lesion, concerning for possible urothelial carcinoma    PLAN:       I had a lengthy discussion with the patient and his wife  We physically reviewed the films together  At this point, given my review of the films, I am concerned about an infiltrating upper pole urothelial carcinoma on the left side  In order to rule this in or out, I have strongly recommended that we proceed to the operating room for cystoscopy with bilateral retrograde pyelograms and selective cytology, left ureteroscopy with possible biopsy brushings and fulguration with stent placement and any necessary bladder biopsies  Risks and benefits of this procedure been discussed and the patient has signed informed consent  We will proceed with this surgery in the near future  If urothelial carcinoma is not the diagnosis, renal mass biopsy interventional Radiology may be prudent given the small size of the lesion prior to a completion nephrectomy  Patient would likely not be a candidate for partial nephrectomy given the endophytic nature of the tumor  Further testing is required and the patient understands the plan

## 2020-01-04 LAB — BACTERIA UR CULT: NORMAL

## 2020-01-06 ENCOUNTER — ANESTHESIA EVENT (OUTPATIENT)
Dept: PERIOP | Facility: AMBULARY SURGERY CENTER | Age: 70
End: 2020-01-06
Payer: MEDICARE

## 2020-01-07 ENCOUNTER — HOSPITAL ENCOUNTER (OUTPATIENT)
Dept: NON INVASIVE DIAGNOSTICS | Facility: HOSPITAL | Age: 70
Discharge: HOME | End: 2020-01-07
Payer: MEDICARE

## 2020-01-07 DIAGNOSIS — R89.6 ABNORMAL CYTOLOGY: ICD-10-CM

## 2020-01-07 PROCEDURE — 93005 ELECTROCARDIOGRAM TRACING: CPT

## 2020-01-07 NOTE — PRE-PROCEDURE INSTRUCTIONS
Pre-Surgery Instructions:   Medication Instructions    amLODIPine (NORVASC) 5 mg tablet Instructed patient per Anesthesia Guidelines   atorvastatin (LIPITOR) 80 mg tablet Instructed patient per Anesthesia Guidelines   calcium citrate-vitamin D (CITRACAL+D) 315-200 MG-UNIT per tablet Instructed patient per Anesthesia Guidelines   diclofenac (VOLTAREN) 75 mg EC tablet Instructed patient per Anesthesia Guidelines   metoprolol tartrate (LOPRESSOR) 100 mg tablet Instructed patient per Anesthesia Guidelines   omeprazole (PriLOSEC) 20 mg delayed release capsule Instructed patient per Anesthesia Guidelines   terazosin (HYTRIN) 5 mg capsule Instructed patient per Anesthesia Guidelines      Pre-op medication, and showering instructions with antibacteral soap reviewed

## 2020-01-08 LAB
ATRIAL RATE: 61 BPM
P AXIS: 49 DEGREES
PR INTERVAL: 168 MS
QRS AXIS: 11 DEGREES
QRSD INTERVAL: 92 MS
QT INTERVAL: 402 MS
QTC INTERVAL: 404 MS
T WAVE AXIS: 25 DEGREES
VENTRICULAR RATE: 61 BPM

## 2020-01-08 PROCEDURE — 93010 ELECTROCARDIOGRAM REPORT: CPT | Performed by: INTERNAL MEDICINE

## 2020-01-09 ENCOUNTER — PREP FOR PROCEDURE (OUTPATIENT)
Dept: UROLOGY | Facility: CLINIC | Age: 70
End: 2020-01-09

## 2020-01-09 DIAGNOSIS — N39.8 UROTHELIAL LESION: Primary | ICD-10-CM

## 2020-01-09 RX ORDER — CEFAZOLIN SODIUM 2 G/50ML
2000 SOLUTION INTRAVENOUS ONCE
Status: CANCELLED | OUTPATIENT
Start: 2020-04-01 | End: 2020-01-09

## 2020-01-10 ENCOUNTER — TELEPHONE (OUTPATIENT)
Dept: UROLOGY | Facility: MEDICAL CENTER | Age: 70
End: 2020-01-10

## 2020-01-10 NOTE — TELEPHONE ENCOUNTER
Patient of Dr Fran Henriquez seen in the [address] office  Patient wife advised that she was not aware of the schedule surgery for 03/02/20  She would like more information on this  Please advise

## 2020-01-13 NOTE — TELEPHONE ENCOUNTER
Return the wife's call  Discussed the positive cytology  Discussed the March date which essentially is a tentative hold for robotic nephroureterectomy  Patient and his wife have plans to go to [place] on the 12th of March  They are unsure whether they would want to cancel their tripping keep the March 2nd date that I have tentatively held or push the surgery off until April  I would be fine either way  They understand that we would certainly move forward with her January surgery for visual inspection and definitive pathology but they do understand my concern given  The CT findings and cytology  They will call me back to let me know which way they would like to proceed

## 2020-01-14 ENCOUNTER — HOSPITAL ENCOUNTER (EMERGENCY)
Facility: HOSPITAL | Age: 70
Discharge: OTHER INSTITUTION | End: 2020-01-14
Attending: EMERGENCY MEDICINE | Admitting: EMERGENCY MEDICINE
Payer: MEDICARE

## 2020-01-14 ENCOUNTER — HOSPITAL ENCOUNTER (INPATIENT)
Facility: HOSPITAL | Age: 70
LOS: 1 days | Discharge: HOME | DRG: 687 | End: 2020-01-15
Attending: INTERNAL MEDICINE | Admitting: INTERNAL MEDICINE
Payer: MEDICARE

## 2020-01-14 ENCOUNTER — APPOINTMENT (EMERGENCY)
Dept: CT IMAGING | Facility: HOSPITAL | Age: 70
End: 2020-01-14
Payer: MEDICARE

## 2020-01-14 VITALS
TEMPERATURE: 97.4 F | SYSTOLIC BLOOD PRESSURE: 155 MMHG | HEART RATE: 64 BPM | OXYGEN SATURATION: 95 % | HEIGHT: 69 IN | RESPIRATION RATE: 18 BRPM | BODY MASS INDEX: 32.88 KG/M2 | DIASTOLIC BLOOD PRESSURE: 80 MMHG | WEIGHT: 222 LBS

## 2020-01-14 DIAGNOSIS — N28.89 RENAL MASS: ICD-10-CM

## 2020-01-14 DIAGNOSIS — N39.0 URINARY TRACT INFECTION WITH HEMATURIA: ICD-10-CM

## 2020-01-14 DIAGNOSIS — R31.9 URINARY TRACT INFECTION WITH HEMATURIA: ICD-10-CM

## 2020-01-14 DIAGNOSIS — R10.9 FLANK PAIN: ICD-10-CM

## 2020-01-14 DIAGNOSIS — N39.0 UTI (URINARY TRACT INFECTION): ICD-10-CM

## 2020-01-14 DIAGNOSIS — R31.9 HEMATURIA: Primary | ICD-10-CM

## 2020-01-14 DIAGNOSIS — N28.89 LEFT RENAL MASS: Primary | ICD-10-CM

## 2020-01-14 PROBLEM — I10 ESSENTIAL HYPERTENSION: Status: ACTIVE | Noted: 2020-01-14

## 2020-01-14 LAB
ALBUMIN SERPL BCP-MCNC: 4.1 G/DL (ref 3.5–5.7)
ALP SERPL-CCNC: 58 U/L (ref 55–165)
ALT SERPL W P-5'-P-CCNC: 22 U/L (ref 7–52)
ANION GAP SERPL CALCULATED.3IONS-SCNC: 6 MMOL/L (ref 4–13)
APTT PPP: 31 SECONDS (ref 23–37)
AST SERPL W P-5'-P-CCNC: 15 U/L (ref 13–39)
ATRIAL RATE: 56 BPM
BACTERIA UR QL AUTO: ABNORMAL /HPF
BASOPHILS # BLD AUTO: 0.1 THOUSANDS/ΜL (ref 0–0.1)
BASOPHILS NFR BLD AUTO: 1 % (ref 0–2)
BILIRUB SERPL-MCNC: 0.8 MG/DL (ref 0.2–1)
BILIRUB UR QL STRIP: ABNORMAL
BUN SERPL-MCNC: 18 MG/DL (ref 7–25)
CALCIUM SERPL-MCNC: 9.3 MG/DL (ref 8.6–10.5)
CHLORIDE SERPL-SCNC: 106 MMOL/L (ref 98–107)
CLARITY UR: ABNORMAL
CO2 SERPL-SCNC: 30 MMOL/L (ref 21–31)
COLOR UR: ABNORMAL
CREAT SERPL-MCNC: 1.22 MG/DL (ref 0.7–1.3)
EOSINOPHIL # BLD AUTO: 0.1 THOUSAND/ΜL (ref 0–0.61)
EOSINOPHIL NFR BLD AUTO: 2 % (ref 0–5)
ERYTHROCYTE [DISTWIDTH] IN BLOOD BY AUTOMATED COUNT: 13.2 % (ref 11.5–14.5)
GFR SERPL CREATININE-BSD FRML MDRD: 60 ML/MIN/1.73SQ M
GLUCOSE SERPL-MCNC: 101 MG/DL (ref 65–99)
GLUCOSE UR STRIP-MCNC: NEGATIVE MG/DL
HCT VFR BLD AUTO: 41 % (ref 42–47)
HGB BLD-MCNC: 13.4 G/DL (ref 14–18)
HGB UR QL STRIP.AUTO: ABNORMAL
INR PPP: 0.98 (ref 0.9–1.5)
KETONES UR STRIP-MCNC: NEGATIVE MG/DL
LEUKOCYTE ESTERASE UR QL STRIP: NEGATIVE
LIPASE SERPL-CCNC: 23 U/L (ref 11–82)
LYMPHOCYTES # BLD AUTO: 1.7 THOUSANDS/ΜL (ref 0.6–4.47)
LYMPHOCYTES NFR BLD AUTO: 30 % (ref 21–51)
MCH RBC QN AUTO: 30.1 PG (ref 26–34)
MCHC RBC AUTO-ENTMCNC: 32.6 G/DL (ref 31–37)
MCV RBC AUTO: 92 FL (ref 81–99)
MONOCYTES # BLD AUTO: 0.6 THOUSAND/ΜL (ref 0.17–1.22)
MONOCYTES NFR BLD AUTO: 10 % (ref 2–12)
NEUTROPHILS # BLD AUTO: 3.3 THOUSANDS/ΜL (ref 1.4–6.5)
NEUTS SEG NFR BLD AUTO: 57 % (ref 42–75)
NITRITE UR QL STRIP: POSITIVE
NON-SQ EPI CELLS URNS QL MICRO: ABNORMAL /HPF
P AXIS: 82 DEGREES
PH UR STRIP.AUTO: 5.5 [PH]
PLATELET # BLD AUTO: 208 THOUSANDS/UL (ref 149–390)
PMV BLD AUTO: 7.8 FL (ref 8.6–11.7)
POTASSIUM SERPL-SCNC: 4.1 MMOL/L (ref 3.5–5.5)
PR INTERVAL: 190 MS
PROT SERPL-MCNC: 6.6 G/DL (ref 6.4–8.9)
PROT UR STRIP-MCNC: ABNORMAL MG/DL
PROTHROMBIN TIME: 11.4 SECONDS (ref 10.2–13)
QRS AXIS: 12 DEGREES
QRSD INTERVAL: 96 MS
QT INTERVAL: 432 MS
QTC INTERVAL: 416 MS
RBC # BLD AUTO: 4.45 MILLION/UL (ref 4.3–5.9)
RBC #/AREA URNS AUTO: ABNORMAL /HPF
SODIUM SERPL-SCNC: 142 MMOL/L (ref 134–143)
SP GR UR STRIP.AUTO: >=1.03 (ref 1–1.03)
T WAVE AXIS: 41 DEGREES
UROBILINOGEN UR QL STRIP.AUTO: 1 E.U./DL
VENTRICULAR RATE: 56 BPM
WBC # BLD AUTO: 5.8 THOUSAND/UL (ref 4.8–10.8)
WBC #/AREA URNS AUTO: ABNORMAL /HPF

## 2020-01-14 PROCEDURE — 87086 URINE CULTURE/COLONY COUNT: CPT | Performed by: EMERGENCY MEDICINE

## 2020-01-14 PROCEDURE — 85025 COMPLETE CBC W/AUTO DIFF WBC: CPT | Performed by: EMERGENCY MEDICINE

## 2020-01-14 PROCEDURE — 96361 HYDRATE IV INFUSION ADD-ON: CPT

## 2020-01-14 PROCEDURE — 80053 COMPREHEN METABOLIC PANEL: CPT | Performed by: EMERGENCY MEDICINE

## 2020-01-14 PROCEDURE — 93005 ELECTROCARDIOGRAM TRACING: CPT

## 2020-01-14 PROCEDURE — 93010 ELECTROCARDIOGRAM REPORT: CPT | Performed by: INTERNAL MEDICINE

## 2020-01-14 PROCEDURE — 99285 EMERGENCY DEPT VISIT HI MDM: CPT

## 2020-01-14 PROCEDURE — 96365 THER/PROPH/DIAG IV INF INIT: CPT

## 2020-01-14 PROCEDURE — 36415 COLL VENOUS BLD VENIPUNCTURE: CPT | Performed by: EMERGENCY MEDICINE

## 2020-01-14 PROCEDURE — 99222 1ST HOSP IP/OBS MODERATE 55: CPT | Performed by: NURSE PRACTITIONER

## 2020-01-14 PROCEDURE — 99223 1ST HOSP IP/OBS HIGH 75: CPT | Performed by: INTERNAL MEDICINE

## 2020-01-14 PROCEDURE — 74177 CT ABD & PELVIS W/CONTRAST: CPT

## 2020-01-14 PROCEDURE — 83690 ASSAY OF LIPASE: CPT | Performed by: EMERGENCY MEDICINE

## 2020-01-14 PROCEDURE — 85610 PROTHROMBIN TIME: CPT | Performed by: EMERGENCY MEDICINE

## 2020-01-14 PROCEDURE — 96375 TX/PRO/DX INJ NEW DRUG ADDON: CPT

## 2020-01-14 PROCEDURE — 99285 EMERGENCY DEPT VISIT HI MDM: CPT | Performed by: EMERGENCY MEDICINE

## 2020-01-14 PROCEDURE — 85730 THROMBOPLASTIN TIME PARTIAL: CPT | Performed by: EMERGENCY MEDICINE

## 2020-01-14 PROCEDURE — 96374 THER/PROPH/DIAG INJ IV PUSH: CPT

## 2020-01-14 PROCEDURE — 96366 THER/PROPH/DIAG IV INF ADDON: CPT

## 2020-01-14 PROCEDURE — 81001 URINALYSIS AUTO W/SCOPE: CPT | Performed by: EMERGENCY MEDICINE

## 2020-01-14 RX ORDER — METOPROLOL TARTRATE 100 MG/1
100 TABLET ORAL DAILY
Status: DISCONTINUED | OUTPATIENT
Start: 2020-01-14 | End: 2020-01-15 | Stop reason: HOSPADM

## 2020-01-14 RX ORDER — PANTOPRAZOLE SODIUM 40 MG/1
40 TABLET, DELAYED RELEASE ORAL
Status: DISCONTINUED | OUTPATIENT
Start: 2020-01-14 | End: 2020-01-15 | Stop reason: HOSPADM

## 2020-01-14 RX ORDER — FENTANYL CITRATE 50 UG/ML
50 INJECTION, SOLUTION INTRAMUSCULAR; INTRAVENOUS ONCE
Status: COMPLETED | OUTPATIENT
Start: 2020-01-14 | End: 2020-01-14

## 2020-01-14 RX ORDER — OXYCODONE HYDROCHLORIDE 5 MG/1
2.5 TABLET ORAL EVERY 4 HOURS PRN
Status: DISCONTINUED | OUTPATIENT
Start: 2020-01-14 | End: 2020-01-15 | Stop reason: HOSPADM

## 2020-01-14 RX ORDER — SODIUM CHLORIDE 9 MG/ML
75 INJECTION, SOLUTION INTRAVENOUS CONTINUOUS
Status: DISCONTINUED | OUTPATIENT
Start: 2020-01-14 | End: 2020-01-15 | Stop reason: HOSPADM

## 2020-01-14 RX ORDER — ACETAMINOPHEN 325 MG/1
650 TABLET ORAL ONCE
Status: COMPLETED | OUTPATIENT
Start: 2020-01-14 | End: 2020-01-14

## 2020-01-14 RX ORDER — HYDROMORPHONE HCL/PF 1 MG/ML
0.6 SYRINGE (ML) INJECTION EVERY 4 HOURS PRN
Status: DISCONTINUED | OUTPATIENT
Start: 2020-01-14 | End: 2020-01-15 | Stop reason: HOSPADM

## 2020-01-14 RX ORDER — AMLODIPINE BESYLATE 5 MG/1
5 TABLET ORAL DAILY
Status: DISCONTINUED | OUTPATIENT
Start: 2020-01-14 | End: 2020-01-15 | Stop reason: HOSPADM

## 2020-01-14 RX ORDER — LIDOCAINE HYDROCHLORIDE 10 MG/ML
INJECTION, SOLUTION EPIDURAL; INFILTRATION; INTRACAUDAL; PERINEURAL
Status: DISCONTINUED
Start: 2020-01-14 | End: 2020-01-14 | Stop reason: HOSPADM

## 2020-01-14 RX ORDER — TERAZOSIN 5 MG/1
5 CAPSULE ORAL
Status: DISCONTINUED | OUTPATIENT
Start: 2020-01-14 | End: 2020-01-15 | Stop reason: HOSPADM

## 2020-01-14 RX ORDER — CEFAZOLIN SODIUM 1 G/50ML
1000 SOLUTION INTRAVENOUS EVERY 8 HOURS
Status: DISCONTINUED | OUTPATIENT
Start: 2020-01-14 | End: 2020-01-15 | Stop reason: HOSPADM

## 2020-01-14 RX ORDER — ONDANSETRON 2 MG/ML
4 INJECTION INTRAMUSCULAR; INTRAVENOUS ONCE
Status: COMPLETED | OUTPATIENT
Start: 2020-01-14 | End: 2020-01-14

## 2020-01-14 RX ORDER — ACETAMINOPHEN 325 MG/1
975 TABLET ORAL EVERY 8 HOURS SCHEDULED
Status: DISCONTINUED | OUTPATIENT
Start: 2020-01-14 | End: 2020-01-15 | Stop reason: HOSPADM

## 2020-01-14 RX ORDER — CALCIUM CARBONATE/VITAMIN D3 250-3.125
1 TABLET ORAL DAILY
Status: DISCONTINUED | OUTPATIENT
Start: 2020-01-14 | End: 2020-01-15 | Stop reason: HOSPADM

## 2020-01-14 RX ORDER — ONDANSETRON 2 MG/ML
4 INJECTION INTRAMUSCULAR; INTRAVENOUS EVERY 4 HOURS PRN
Status: DISCONTINUED | OUTPATIENT
Start: 2020-01-14 | End: 2020-01-15 | Stop reason: HOSPADM

## 2020-01-14 RX ORDER — MORPHINE SULFATE 4 MG/ML
4 INJECTION, SOLUTION INTRAMUSCULAR; INTRAVENOUS ONCE
Status: COMPLETED | OUTPATIENT
Start: 2020-01-14 | End: 2020-01-14

## 2020-01-14 RX ORDER — CEFTRIAXONE 1 G/50ML
1000 INJECTION, SOLUTION INTRAVENOUS ONCE
Status: COMPLETED | OUTPATIENT
Start: 2020-01-14 | End: 2020-01-14

## 2020-01-14 RX ORDER — ATORVASTATIN CALCIUM 40 MG/1
80 TABLET, FILM COATED ORAL DAILY
Status: DISCONTINUED | OUTPATIENT
Start: 2020-01-14 | End: 2020-01-15 | Stop reason: HOSPADM

## 2020-01-14 RX ORDER — OXYCODONE HYDROCHLORIDE 5 MG/1
5 TABLET ORAL EVERY 4 HOURS PRN
Status: DISCONTINUED | OUTPATIENT
Start: 2020-01-14 | End: 2020-01-15 | Stop reason: HOSPADM

## 2020-01-14 RX ORDER — AMOXICILLIN 250 MG
1 CAPSULE ORAL 2 TIMES DAILY
Status: DISCONTINUED | OUTPATIENT
Start: 2020-01-14 | End: 2020-01-15 | Stop reason: HOSPADM

## 2020-01-14 RX ADMIN — ACETAMINOPHEN 975 MG: 325 TABLET ORAL at 13:43

## 2020-01-14 RX ADMIN — SENNOSIDES AND DOCUSATE SODIUM 1 TABLET: 8.6; 5 TABLET ORAL at 18:09

## 2020-01-14 RX ADMIN — IOHEXOL 100 ML: 350 INJECTION, SOLUTION INTRAVENOUS at 03:31

## 2020-01-14 RX ADMIN — SENNOSIDES AND DOCUSATE SODIUM 1 TABLET: 8.6; 5 TABLET ORAL at 12:00

## 2020-01-14 RX ADMIN — METOPROLOL TARTRATE 100 MG: 100 TABLET, FILM COATED ORAL at 11:59

## 2020-01-14 RX ADMIN — SODIUM CHLORIDE 75 ML/HR: 0.9 INJECTION, SOLUTION INTRAVENOUS at 15:48

## 2020-01-14 RX ADMIN — CEFAZOLIN SODIUM 1000 MG: 1 SOLUTION INTRAVENOUS at 21:07

## 2020-01-14 RX ADMIN — Medication 106 MG: at 03:47

## 2020-01-14 RX ADMIN — CEFTRIAXONE 1000 MG: 1 INJECTION, SOLUTION INTRAVENOUS at 03:15

## 2020-01-14 RX ADMIN — ONDANSETRON 4 MG: 2 INJECTION INTRAMUSCULAR; INTRAVENOUS at 02:45

## 2020-01-14 RX ADMIN — HYDROMORPHONE HYDROCHLORIDE 0.6 MG: 1 INJECTION, SOLUTION INTRAMUSCULAR; INTRAVENOUS; SUBCUTANEOUS at 13:45

## 2020-01-14 RX ADMIN — TERAZOSIN HYDROCHLORIDE 5 MG: 5 CAPSULE ORAL at 21:06

## 2020-01-14 RX ADMIN — SODIUM CHLORIDE 75 ML/HR: 0.9 INJECTION, SOLUTION INTRAVENOUS at 12:12

## 2020-01-14 RX ADMIN — CEFAZOLIN SODIUM 1000 MG: 1 SOLUTION INTRAVENOUS at 13:50

## 2020-01-14 RX ADMIN — ACETAMINOPHEN 975 MG: 325 TABLET ORAL at 21:06

## 2020-01-14 RX ADMIN — HYDROMORPHONE HYDROCHLORIDE 0.6 MG: 1 INJECTION, SOLUTION INTRAMUSCULAR; INTRAVENOUS; SUBCUTANEOUS at 09:25

## 2020-01-14 RX ADMIN — ACETAMINOPHEN 650 MG: 325 TABLET ORAL at 02:45

## 2020-01-14 RX ADMIN — PANTOPRAZOLE SODIUM 40 MG: 40 TABLET, DELAYED RELEASE ORAL at 12:00

## 2020-01-14 RX ADMIN — FENTANYL CITRATE 50 MCG: 50 INJECTION INTRAMUSCULAR; INTRAVENOUS at 06:47

## 2020-01-14 RX ADMIN — MORPHINE SULFATE 4 MG: 4 INJECTION INTRAVENOUS at 04:37

## 2020-01-14 RX ADMIN — AMLODIPINE BESYLATE 5 MG: 5 TABLET ORAL at 12:01

## 2020-01-14 RX ADMIN — ATORVASTATIN CALCIUM 80 MG: 40 TABLET, FILM COATED ORAL at 11:59

## 2020-01-14 RX ADMIN — SODIUM CHLORIDE 1000 ML: 0.9 INJECTION, SOLUTION INTRAVENOUS at 02:45

## 2020-01-14 NOTE — ED NOTES
Reports pain was down to #8 but as administering morphine he reports his pain is going back to #10     Norberto Mccartney RN  01/14/20 [id number]

## 2020-01-14 NOTE — ED NOTES
Patient requested to get up to go to the bathroom - ambulatory to bathroom     Hany Mcdonnell RN  01/14/20 [id number]

## 2020-01-14 NOTE — PLAN OF CARE
Problem: PAIN - ADULT  Goal: Verbalizes/displays adequate comfort level or baseline comfort level  Description  Interventions:  - Encourage patient to monitor pain and request assistance  - Assess pain using appropriate pain scale  - Administer analgesics based on type and severity of pain and evaluate response  - Implement non-pharmacological measures as appropriate and evaluate response  - Consider cultural and social influences on pain and pain management  - Notify physician/advanced practitioner if interventions unsuccessful or patient reports new pain  Outcome: Progressing     Problem: INFECTION - ADULT  Goal: Absence or prevention of progression during hospitalization  Description  INTERVENTIONS:  - Assess and monitor for signs and symptoms of infection  - Monitor lab/diagnostic results  - Monitor all insertion sites, i e  indwelling lines, tubes, and drains  - Monitor endotracheal if appropriate and nasal secretions for changes in amount and color  - Butler appropriate cooling/warming therapies per order  - Administer medications as ordered  - Instruct and encourage patient and family to use good hand hygiene technique  - Identify and instruct in appropriate isolation precautions for identified infection/condition  Outcome: Progressing  Goal: Absence of fever/infection during neutropenic period  Description  INTERVENTIONS:  - Monitor WBC    Outcome: Progressing     Problem: DISCHARGE PLANNING  Goal: Discharge to home or other facility with appropriate resources  Description  INTERVENTIONS:  - Identify barriers to discharge w/patient and caregiver  - Arrange for needed discharge resources and transportation as appropriate  - Identify discharge learning needs (meds, wound care, etc )  - Arrange for interpretive services to assist at discharge as needed  - Refer to Case Management Department for coordinating discharge planning if the patient needs post-hospital services based on physician/advanced practitioner order or complex needs related to functional status, cognitive ability, or social support system  Outcome: Progressing     Problem: Knowledge Deficit  Goal: Patient/family/caregiver demonstrates understanding of disease process, treatment plan, medications, and discharge instructions  Description  Complete learning assessment and assess knowledge base    Interventions:  - Provide teaching at level of understanding  - Provide teaching via preferred learning methods  Outcome: Progressing

## 2020-01-14 NOTE — ED NOTES
Patient to be transferred to [address] [place] [address] for urology consultation     Walter Velázquez RN  01/14/20 9632

## 2020-01-14 NOTE — EMTALA/ACUTE CARE TRANSFER
[employer]  [address] [address] [phone]  Dept: [phone]      EMTALA TRANSFER CONSENT    NAME Bo CEJA 1950                              MRN [id number]    I have been informed of my rights regarding examination, treatment, and transfer   by Dr Nalini Price MD    Benefits: Specialized equipment and/or services available at the receiving facility (Include comment)________________________    Risks: Potential for delay in receiving treatment, Potential deterioration of medical condition, Loss of IV, Increased discomfort during transfer, Possible worsening of condition or death during transfer      Transfer Request   I acknowledge that my medical condition has been evaluated and explained to me by the emergency department physician or other qualified medical person and/or my attending physician who has recommended and offered to me further medical examination and treatment  I understand the Hospital's obligation with respect to the treatment and stabilization of my emergency medical condition  I nevertheless request to be transferred  I release the Hospital, the doctor, and any other persons caring for me from all responsibility or liability for any injury or ill effects that may result from my transfer and agree to accept all responsibility for the consequences of my choice to transfer, rather than receive stabilizing treatment at the Hospital  I understand that because the transfer is my request, my insurance may not provide reimbursement for the services  The Hospital will assist and direct me and my family in how to make arrangements for transfer, but the hospital is not liable for any fees charged by the transport service    In spite of this understanding, I refuse to consent to further medical examination and treatment which has been offered to me, and request transfer to Accepting Facility Name, [address] : THE [facility] AT [facility]  I authorize the performance of emergency medical procedures and treatments upon me in both transit and upon arrival at the receiving facility  Additionally, I authorize the release of any and all medical records to the receiving facility and request they be transported with me, if possible  I authorize the performance of emergency medical procedures and treatments upon me in both transit and upon arrival at the receiving facility  Additionally, I authorize the release of any and all medical records to the receiving facility and request they be transported with me, if possible  I understand that the safest mode of transportation during a medical emergency is an ambulance and that the Hospital advocates the use of this mode of transport  Risks of traveling to the receiving facility by car, including absence of medical control, life sustaining equipment, such as oxygen, and medical personnel has been explained to me and I fully understand them  (YUKI CORRECT BOX BELOW)  [  ]  I consent to the stated transfer and to be transported by ambulance/helicopter  [  ]  I consent to the stated transfer, but refuse transportation by ambulance and accept full responsibility for my transportation by car  I understand the risks of non-ambulance transfers and I exonerate the Hospital and its staff from any deterioration in my condition that results from this refusal     X___________________________________________    DATE  20  TIME________  Signature of patient or legally responsible individual signing on patient behalf           RELATIONSHIP TO PATIENT_________________________          Provider Certification    NAME Fiona Gabriel                                        [facility] 1950                              MRN [id number]    A medical screening exam was performed on the above named patient    Based on the examination:    Condition Necessitating Transfer The primary encounter diagnosis was Hematuria  Diagnoses of UTI (urinary tract infection) and Renal mass were also pertinent to this visit  Patient Condition: The patient has been stabilized such that within reasonable medical probability, no material deterioration of the patient condition or the condition of the unborn child(aaliyah) is likely to result from the transfer    Reason for Transfer: Level of Care needed not available at this facility    Transfer Requirements: [address]   · Space available and qualified personnel available for treatment as acknowledged by    · Agreed to accept transfer and to provide appropriate medical treatment as acknowledged by       [facility]  · Appropriate medical records of the examination and treatment of the patient are provided at the time of transfer   [address] _______  · Transfer will be performed by qualified personnel from    and appropriate transfer equipment as required, including the use of necessary and appropriate life support measures      Provider Certification: I have examined the patient and explained the following risks and benefits of being transferred/refusing transfer to the patient/family:  General risk, such as traffic hazards, adverse weather conditions, rough terrain or turbulence, possible failure of equipment (including vehicle or aircraft), or consequences of actions of persons outside the control of the transport personnel, Unanticipated needs of medical equipment and personnel during transport, The possibility of a transport vehicle being unavailable, Risk of worsening condition, Consent was not obtained as patient is committed to psychiatric facility and transfer is mandated      Based on these reasonable risks and benefits to the patient and/or the unborn child(aaliyah), and based upon the information available at the time of the patients examination, I certify that the medical benefits reasonably to be expected from the provision of appropriate medical treatments at another medical facility outweigh the increasing risks, if any, to the individuals medical condition, and in the case of labor to the unborn child, from effecting the transfer      X____________________________________________ DATE 01/14/20        TIME_______      ORIGINAL - SEND TO MEDICAL RECORDS   COPY - SEND WITH PATIENT DURING TRANSFER

## 2020-01-14 NOTE — ED NOTES
Patient returned from ct scan - still reports left flank pain -  lidocaine lulu Bonds RN  01/14/20 5212

## 2020-01-14 NOTE — ED NOTES
Patient standing at bedside and voided in urinal - urine tea colored - urine sample collected - patient's wife at bedside with patient     Zakia Cardoza RN  01/14/20 8460

## 2020-01-14 NOTE — ASSESSMENT & PLAN NOTE
· Presents from home with gross hematuria and left flank pain  · Recently diagnosed with left renal mass  · CT done in the ER at [facility] showed "Infiltrating lesion in the left kidney upper pole similar to prior study  Increased density within the left ureter representing a mass and less likely hemorrhage causing mild hydronephrosis"  · Urinalysis positive for moderate bacteria, 10-20 WBC, nitrite and blood    Urine cultures pending  · Patient reports diclofenac has been on hold prior to admission in preparation for urologic surgery  · Admit to med surg, NPO pending urology eval, pain control, continue empiric antibiotic  · Bladder scan protocol, if evidence of retention, initiate bladder irrigation  · Further definitive plans to be determined per Urology

## 2020-01-14 NOTE — ED NOTES
MD at bedside - patient and his wife questioning if his pain could be diverticulitits     Samuel Rader RN  01/14/20 9728

## 2020-01-14 NOTE — ED NOTES
Dr Mik Yeh returned paged regarding NPO order and all medications oral  Order to be changed to NPO except for medications       Jacques Pichardo RN  01/14/20 1007

## 2020-01-14 NOTE — ED NOTES
Patient dozing when aroused he reports he does feel a little better - pain still rated #7-8     True Rivera RN  01/14/20 9455

## 2020-01-14 NOTE — CONSULTS
UROLOGY CONSULTATION NOTE     Patient Identifiers: Cait Oakes (MRN [id number])  Service Requesting Consultation: Joan Bender MD    Service Providing Consultation:  Urology, RAGHU Bangura    Date of Service: 1/14/2020  Consults    Reason for Consultation:  Flank pain and hematuria    ASSESSMENT:     78 y o  old male with  recent diagnoses of left renal mass and possibly left ureteral mass with hydronephrosis, gross hematuria and possible UTI  PLAN:     Continue medical optimization and symptom management--IV fluids, IV pain control--weaned to oral   Await urine culture results  Continue antibiotics and narrow per sensitivities  Fortunately, patient is voiding adequately  Degree of hematuria is mild  Patient is neither hemorrhagic nor is there associated renal impairment  Recommend continue antibiosis  No requirement for emergent  instrumentation  May resume diet  If patient is adequately pain controlled, urine culture finalized and patient remains stable, can be discharged and proceed with ureteroscopy as scheduled  Prefer for patient to be sterilized prior to instrumentation  Patient/spouse/family member at bedside expressed understanding of impression and plan; in addition to, rationale and are in agreement  Will continue to follow  History of Present Illness:     Cait Oakes is a 78 y o  old male with history of chronic back pain, managed at pain management specialist   He was recently referred to outpatient MRI which demonstrated incidental finding of left-sided renal lesion  Patient was then referred for further workup with CT urogram   Findings suggested and if indicated infiltrating lesion concerning for urothelial carcinoma  Of note, patient denies prior urologic evaluation, consultation or  surgical manipulation  He has a remote history of tobacco use and quit greater than 40 years ago  He denies any familial history of  malignancy    Patient reports sensation of flank pain and hematuria with spontaneous resolution and October  He reported this to PCP and ultrasound was obtained positive for nonobstructing right nephrolithiasis  Patient was seen in the office by Dr Susan Gomez 01/03/2020 regarding recent image findings for further discussion of management options  Patient elected to undergo cystoscopy, retrograde pyelography, left ureteroscopy, possible ureteral brushings and ureteral stent placement on 1/17  He and spouse endorse positive onset of gross hematuria approximately 4:00 p m  Yesterday evening; not accompanied by fever, chills, dysuria, but positive occasional nausea/vomiting  Against background, emergency room provider's transferred patient to [facility] considering upcoming surgical procedure  Review of medical record does not show patient is anticoagulated  He is admitted to the Internal Medicine service  He is afebrile and hemodynamically stable  Hemoglobin 13 4  Lab work does not reveal either leukocytosis or acute kidney injury  Patient is voiding volitionally into bedside urinal which contained dark susanne/ tea colored urine  He has received 1 time dose of Dilaudid and is on IV fluids  Urinalysis is positive for nitrites, moderate bacteria and 10-20 WBCs per high-powered field  PTT is 31 and INR 0 98  Repeat CT demonstrates infiltrating lesion of the left upper pole with additional increased density within the left ureter, mass versus hemorrhage causing resultant mild hydronephrosis  Urologic consultation requested for further management recommendations      Past Medical, Past Surgical History:     Past Medical History:   Diagnosis Date    Cancer ([address] )     skin melanoma    GERD (gastroesophageal reflux disease)     Hyperlipidemia     Hypertension     Kidney lesion    :    Past Surgical History:   Procedure Laterality Date    COLONOSCOPY      HERNIA REPAIR      umbilical    JOINT REPLACEMENT Bilateral     partials     SKIN CANCER EXCISION      surface melanoma   :    Medications, Allergies:     Current Facility-Administered Medications   Medication Dose Route Frequency    acetaminophen (TYLENOL) tablet 975 mg  975 mg Oral Q8H [address]    amLODIPine (NORVASC) tablet 5 mg  5 mg Oral Daily    atorvastatin (LIPITOR) tablet 80 mg  80 mg Oral Daily    calcium citrate-vitamin D (CITRACAL+D) 315-200 MG-UNIT per tablet 1 tablet  1 tablet Oral Daily    ceFAZolin (ANCEF) IVPB (premix) 1,000 mg  1,000 mg Intravenous Q8H    HYDROmorphone (DILAUDID) injection 0 6 mg  0 6 mg Intravenous Q4H PRN    metoprolol tartrate (LOPRESSOR) tablet 100 mg  100 mg Oral Daily    naloxone (NARCAN) 0 04 mg/mL syringe 0 04 mg  0 04 mg Intravenous Q1MIN PRN    ondansetron (ZOFRAN) injection 4 mg  4 mg Intravenous Q4H PRN    oxyCODONE (ROXICODONE) IR tablet 2 5 mg  2 5 mg Oral Q4H PRN    oxyCODONE (ROXICODONE) IR tablet 5 mg  5 mg Oral Q4H PRN    pantoprazole (PROTONIX) EC tablet 40 mg  40 mg Oral Early Morning    senna-docusate sodium (SENOKOT S) 8 6-50 mg per tablet 1 tablet  1 tablet Oral BID    sodium chloride 0 9 % infusion  75 mL/hr Intravenous Continuous    terazosin (HYTRIN) capsule 5 mg  5 mg Oral HS       Allergies:  No Known Allergies:    Social and Family History:   Social History:   Social History     Tobacco Use    Smoking status: Never Smoker    Smokeless tobacco: Never Used   Substance Use Topics    Alcohol use: Yes     Alcohol/week: 4 0 standard drinks     Types: 4 Cans of beer per week     Frequency: 4 or more times a week     Drinks per session: 3 or 4     Comment: daily/socially    Drug use: Not Currently     Social History     Tobacco Use   Smoking Status Never Smoker   Smokeless Tobacco Never Used       Family History:  History reviewed   No pertinent family history :     Review of Systems:   Review of Systems - History obtained from chart review and the patient  General ROS: negative for - chills or fever  Respiratory ROS: no cough, shortness of breath, or wheezing  Cardiovascular ROS: no chest pain or dyspnea on exertion  Gastrointestinal ROS: positive for - abdominal pain and nausea/vomiting  Genito-Urinary ROS: positive for - hematuria  negative for - change in urinary stream, dysuria, genital discharge, genital ulcers, incontinence, nocturia, pelvic pain, scrotal mass/pain or urinary frequency/urgency  Neurological ROS: no TIA or stroke symptoms    All other systems queried were negative  Physical Exam:     /78 (BP Location: Left arm)   Pulse 63   Temp 97 8 °F (36 6 °C) (Oral)   Resp 18   Wt 105 kg (231 lb 7 7 oz)   SpO2 96%   BMI 34 18 kg/m² Temp (24hrs), Av 5 °F (36 4 °C), Min:97 4 °F (36 3 °C), Max:97 8 °F (36 6 °C)  current; Temperature: 97 8 °F (36 6 °C)  I/O last 24 hours: In: -   Out: 230 [Urine:230]    General appearance: alert and oriented, in no acute distress, appears stated age, cooperative and no distress  Head: Normocephalic, without obvious abnormality, atraumatic  Neck: no adenopathy, no carotid bruit, no JVD, supple, symmetrical, trachea midline and thyroid not enlarged, symmetric, no tenderness/mass/nodules  Lungs: diminished breath sounds  Heart: regular rate and rhythm, S1, S2 normal, no murmur, click, rub or gallop  Abdomen: abnormal findings:  moderate tenderness in the lower abdomen and in the left flank  Extremities: extremities normal, warm and well-perfused; no cyanosis, clubbing, or edema  Pulses: 2+ and symmetric  Neurologic: Grossly normal  No urinary drains    Bedside urinal contains dark susanne urine    Labs:     Lab Results   Component Value Date    HGB 13 4 (L) 2020    HCT 41 0 (L) 2020    WBC 5 80 2020     2020   ]    Lab Results   Component Value Date    K 4 1 2020     2020    CO2 30 2020    BUN 18 2020    CREATININE 1 22 2020    CALCIUM 9 3 2020   ]    Imaging:   I personally reviewed the images and report of the following studies, and reviewed them with the patient:    CT Abdomen: See below    CT abdomen pelvis with contrast [[id number]] Collected: 01/14/20 0338   Order Status: Completed Updated: 01/14/20 0418   Narrative:     CT ABDOMEN AND PELVIS WITH IV CONTRAST    INDICATION:   left flank pain, severe  COMPARISON:  CT scan dated December 12, 2019    TECHNIQUE:  CT examination of the abdomen and pelvis was performed  Axial, sagittal, and coronal 2D reformatted images were created from the source data and submitted for interpretation  Radiation dose length product (DLP) for this visit:  811 7 mGy-cm    This examination, like all CT scans performed in the [facility], was performed utilizing techniques to minimize radiation dose exposure, including the use of iterative   reconstruction and automated exposure control  IV Contrast:  100 mL of iohexol (OMNIPAQUE)  Enteric Contrast:  Enteric contrast was not administered  FINDINGS:    ABDOMEN    LOWER CHEST:  No clinically significant abnormality identified in the visualized lower chest     LIVER/BILIARY TREE:  One or more subcentimeter sharply circumscribed low-density hepatic lesion(s) are noted, too small to accurately characterize, but statistically most likely to represent subcentimeter hepatic cysts   No suspicious solid hepatic   lesion is identified   Hepatic contours are normal   No biliary dilatation  GALLBLADDER:  No calcified gallstones  No pericholecystic inflammatory change  SPLEEN:  Unremarkable  PANCREAS:  Unremarkable  ADRENAL GLANDS:  Unremarkable  KIDNEYS/URETERS:  Partially visualized infiltrating lesion in the left kidney upper pole is seen   Increased density in the left ureter representing a mass measuring 1 2 cm   Mild left-sided hydronephrosis is noted  STOMACH AND BOWEL: Magi Frater is colonic diverticulosis without evidence of acute diverticulitis      APPENDIX:  No findings to suggest appendicitis  ABDOMINOPELVIC CAVITY:  No ascites or free intraperitoneal air  No lymphadenopathy  VESSELS:  Unremarkable for patient's age  PELVIS    REPRODUCTIVE ORGANS:  Unremarkable for patient's age  URINARY BLADDER:  Unremarkable  ABDOMINAL WALL/INGUINAL REGIONS: Estle Anne Marie is a small fat-containing umbilical hernia  OSSEOUS STRUCTURES:  Degenerative changes in the spine  Impression:       Infiltrating lesion in the left kidney upper pole similar to prior study  Increased density within the left ureter representing a mass and less likely hemorrhage causing mild hydronephrosis         I personally discussed this study with Myriam Montoya on 1/14/2020 at 4:11 AM       1    Workstation performed: [id number]           Thank you for allowing me to participate in this patients care  Please do not hesitate to call with any additional questions    RAGHU Carey

## 2020-01-14 NOTE — ED PROVIDER NOTES
History  Chief Complaint   Patient presents with    Blood in Urine     reports since last pm around 6pm blood and clots in urine - patient is scheduled for cystoscopy on 1/17 due to recent CT scan showing a left renal lesion/mass     HPI     Patient is a pleasant 78year old male with hematuria and blood clots that started at around 6pm today  No vomiting or diarrhea  He notes severe left flank pain, radiates to anterior abdomen that started today  He is currently being seen by Barry Wolff for evaluation of a mass  Patient with tea colored urine and some clots  No lightheadedness or dizziness  MDM 78 yom, ct with mass in the ureter along with UTI on urinalysis, will admit for urology eval and antibiotics/pain control  Patient needs transfer to [facility] because that is where his urologist will see him, per request of urology  Prior to Admission Medications   Prescriptions Last Dose Informant Patient Reported? Taking?    amLODIPine (NORVASC) 5 mg tablet 1/13/2020 at Unknown time Self Yes Yes   Sig: Take 5 mg by mouth daily     atorvastatin (LIPITOR) 80 mg tablet 1/13/2020 at Unknown time Self Yes Yes   Sig: Take 80 mg by mouth daily   calcium citrate-vitamin D (CITRACAL+D) 315-200 MG-UNIT per tablet 1/13/2020 at Unknown time Self Yes Yes   Sig: Take 1 tablet by mouth daily   diclofenac (VOLTAREN) 75 mg EC tablet 1/13/2020 at Unknown time  No Yes   Sig: Take 1 tablet (75 mg total) by mouth 2 (two) times a day as needed (moderate pain)   metoprolol tartrate (LOPRESSOR) 100 mg tablet 1/13/2020 at Unknown time Self Yes Yes   Sig: Take 100 mg by mouth daily     omeprazole (PriLOSEC) 20 mg delayed release capsule 1/13/2020 at Unknown time Self Yes Yes   Sig: Take 20 mg by mouth daily     terazosin (HYTRIN) 5 mg capsule 1/13/2020 at Unknown time Self Yes Yes   Sig: Take 5 mg by mouth daily at bedtime        Facility-Administered Medications: None       Past Medical History:   Diagnosis Date    Cancer ([facility] [address] )     skin melanoma    GERD (gastroesophageal reflux disease)     Hyperlipidemia     Hypertension     Kidney lesion        Past Surgical History:   Procedure Laterality Date    COLONOSCOPY      HERNIA REPAIR      umbilical    JOINT REPLACEMENT Bilateral     partials     SKIN CANCER EXCISION      surface melanoma       History reviewed  No pertinent family history  I have reviewed and agree with the history as documented  Social History     Tobacco Use    Smoking status: Never Smoker    Smokeless tobacco: Never Used   Substance Use Topics    Alcohol use: Yes     Alcohol/week: 4 0 standard drinks     Types: 4 Cans of beer per week     Frequency: 4 or more times a week     Drinks per session: 3 or 4     Comment: daily/socially    Drug use: Not Currently        Review of Systems   Genitourinary: Positive for flank pain and hematuria  All other systems reviewed and are negative  Physical Exam  Physical Exam   Constitutional: He is oriented to person, place, and time  He appears well-developed and well-nourished  HENT:   Head: Normocephalic and atraumatic  Eyes: Pupils are equal, round, and reactive to light  EOM are normal    Neck: Normal range of motion  Neck supple  Cardiovascular: Normal rate, regular rhythm and normal heart sounds  No murmur heard  Pulmonary/Chest: Effort normal and breath sounds normal  No respiratory distress  He has no wheezes  Abdominal: Soft  Bowel sounds are normal  He exhibits no distension  There is no tenderness  + left CVA tenderness     Musculoskeletal: Normal range of motion  He exhibits no edema or tenderness  Neurological: He is alert and oriented to person, place, and time  No cranial nerve deficit  Coordination normal    Skin: Skin is warm and dry  He is not diaphoretic  No erythema  Psychiatric: He has a normal mood and affect  His behavior is normal    Nursing note and vitals reviewed        Vital Signs  ED Triage Vitals Temperature Pulse Respirations Blood Pressure SpO2   01/14/20 0222 01/14/20 0233 01/14/20 0222 01/14/20 0233 01/14/20 0222   (!) 97 4 °F (36 3 °C) 55 18 (!) 186/104 98 %      Temp Source Heart Rate Source Patient Position - Orthostatic VS BP Location FiO2 (%)   01/14/20 0222 01/14/20 0437 01/14/20 0233 01/14/20 0233 --   Temporal Monitor Sitting Left arm       Pain Score       01/14/20 0222       Worst Possible Pain           Vitals:    01/14/20 0416 01/14/20 0429 01/14/20 0437 01/14/20 0607   BP: 155/87 168/95 168/100 (!) 176/97   Pulse: 62 59 64 65   Patient Position - Orthostatic VS:             Visual Acuity      ED Medications  Medications   lidocaine (PF) (XYLOCAINE-MPF) 1 % injection **ADS Override Pull** (has no administration in time range)   sodium chloride 0 9 % bolus 1,000 mL (1,000 mL Intravenous New Bag 1/14/20 0245)   acetaminophen (TYLENOL) tablet 650 mg (650 mg Oral Given 1/14/20 0245)   lidocaine (PF) (XYLOCAINE-MPF) 1 % 106 mg in dextrose 5 % 50 mL IVPB (106 mg Intravenous New Bag 1/14/20 0347)   ondansetron (ZOFRAN) injection 4 mg (4 mg Intravenous Given 1/14/20 0245)   cefTRIAXone (ROCEPHIN) IVPB (premix) 1,000 mg (0 mg Intravenous Stopped 1/14/20 0347)   iohexol (OMNIPAQUE) 350 MG/ML injection (SINGLE-DOSE) 100 mL (100 mL Intravenous Given 1/14/20 0331)   morphine (PF) 4 mg/mL injection 4 mg (4 mg Intravenous Given 1/14/20 0437)       Diagnostic Studies  Results Reviewed     Procedure Component Value Units Date/Time    Urine Microscopic [[id number]]  (Abnormal) Collected:  01/14/20 0240    Lab Status:  Final result Specimen:  Urine, Clean Catch Updated:  01/14/20 0325     RBC, UA Innumerable /hpf      WBC, UA 10-20 /hpf      Epithelial Cells Occasional /hpf      Bacteria, UA Moderate /hpf     Urine culture [[id number]] Collected:  01/14/20 0240    Lab Status:   In process Specimen:  Urine, Clean Catch Updated:  01/14/20 0325    Lipase [[id number]]  (Normal) Collected:  01/14/20 0238    Lab Status:  Final result Specimen:  Blood from Arm, Right Updated:  01/14/20 0321     Lipase 23 u/L     Comprehensive metabolic panel [[id number]]  (Abnormal) Collected:  01/14/20 0238    Lab Status:  Final result Specimen:  Blood from Arm, Right Updated:  01/14/20 0321     Sodium 142 mmol/L      Potassium 4 1 mmol/L      Chloride 106 mmol/L      CO2 30 mmol/L      ANION GAP 6 mmol/L      BUN 18 mg/dL      Creatinine 1 22 mg/dL      Glucose 101 mg/dL      Calcium 9 3 mg/dL      AST 15 U/L      ALT 22 U/L      Alkaline Phosphatase 58 U/L      Total Protein 6 6 g/dL      Albumin 4 1 g/dL      Total Bilirubin 0 80 mg/dL      eGFR 60 ml/min/1 73sq m     Narrative:       [place] guidelines for Chronic Kidney Disease (CKD):     Stage 1 with normal or high GFR (GFR > 90 mL/min/1 73 square meters)    Stage 2 Mild CKD (GFR = 60-89 mL/min/1 73 square meters)    Stage 3A Moderate CKD (GFR = 45-59 mL/min/1 73 square meters)    Stage 3B Moderate CKD (GFR = 30-44 mL/min/1 73 square meters)    Stage 4 Severe CKD (GFR = 15-29 mL/min/1 73 square meters)    Stage 5 End Stage CKD (GFR <15 mL/min/1 73 square meters)  Note: GFR calculation is accurate only with a steady state creatinine    Protime-INR [[id number]]  (Normal) Collected:  01/14/20 0238    Lab Status:  Final result Specimen:  Blood from Arm, Right Updated:  01/14/20 0306     Protime 11 4 seconds      INR 0 98    APTT [[id number]]  (Normal) Collected:  01/14/20 0238    Lab Status:  Final result Specimen:  Blood from Arm, Right Updated:  01/14/20 0306     PTT 31 seconds     UA w Reflex to Microscopic w Reflex to Culture [[id number]]  (Abnormal) Collected:  01/14/20 0240    Lab Status:  Final result Specimen:  Urine, Clean Catch Updated:  01/14/20 0303     Color, UA Hemalatha Hobbs     Clarity, UA Cloudy     Specific Gravity, UA >=1 030     pH, UA 5 5     Leukocytes, UA Negative     Nitrite, UA Positive     Protein, UA 1+ mg/dl      Glucose, UA Negative mg/dl Ketones, UA Negative mg/dl      Urobilinogen, UA 1 0 E U /dl      Bilirubin, UA 1+     Blood, UA 3+    CBC and differential [[id number]]  (Abnormal) Collected:  01/14/20 0238    Lab Status:  Final result Specimen:  Blood from Arm, Right Updated:  01/14/20 0303     WBC 5 80 Thousand/uL      RBC 4 45 Million/uL      Hemoglobin 13 4 g/dL      Hematocrit 41 0 %      MCV 92 fL      MCH 30 1 pg      MCHC 32 6 g/dL      RDW 13 2 %      MPV 7 8 fL      Platelets 384 Thousands/uL      Neutrophils Relative 57 %      Lymphocytes Relative 30 %      Monocytes Relative 10 %      Eosinophils Relative 2 %      Basophils Relative 1 %      Neutrophils Absolute 3 30 Thousands/µL      Lymphocytes Absolute 1 70 Thousands/µL      Monocytes Absolute 0 60 Thousand/µL      Eosinophils Absolute 0 10 Thousand/µL      Basophils Absolute 0 10 Thousands/µL                  CT abdomen pelvis with contrast   Final Result by Melinda Vela DO (01/14 8683)      Infiltrating lesion in the left kidney upper pole similar to prior study  Increased density within the left ureter representing a mass and less likely hemorrhage causing mild hydronephrosis             I personally discussed this study with Miranda Madrid on 1/14/2020 at 4:11 AM          1      Workstation performed: [id number]                    Procedures  Procedures         ED Course  ED Course as of Jan 14 0612   Tue Jan 14, 2020   0426 IMPRESSION:     Infiltrating lesion in the left kidney upper pole similar to prior study      Increased density within the left ureter representing a mass and less likely hemorrhage causing mild hydronephrosis         0437 Dr Diana Gamboa is on call for Naty Zuleta [phone] Transfer to [facility]  [facility]  [facility]        [address]                                  [facility]      Disposition  Final diagnoses:   Hematuria   UTI (urinary tract infection)   Renal mass     Time reflects when diagnosis was documented in both MDM as applicable and the Disposition within this note     Time User Action Codes Description Comment    1/14/2020  6:12 AM Wendi Rich, [address] [R31 9] Hematuria     1/14/2020  6:12 AM Sonny Krause Add [N39 0] UTI (urinary tract infection)     1/14/2020  6:12 AM Sandoval Boswellalix Add [N28 89] Renal mass       ED Disposition     ED Disposition Condition Date/Time Comment    Transfer to Another Facility-In Network  Tue Jan 14, 2020  6:11 AM Radha Corral should be transferred out to [facility]  Follow-up Information    None         Patient's Medications   Discharge Prescriptions    No medications on file     No discharge procedures on file      ED Provider  Electronically Signed by           Joe Mae MD  01/14/20 8976

## 2020-01-14 NOTE — TELEPHONE ENCOUNTER
Great, do you mind moving the tentative hold for his robotic nephroureterectomy to early April  Can look for SLA date  Patient also being evaluated overnight in ER, may have to delay or adjust his ureteroscopy for 1/17  I will keep you posted   Thanks-Z

## 2020-01-14 NOTE — H&P
H&P- Sparkle Wang 1950, 78 y o  male MRN: [id number]    Unit/Bed#: ED 10 Encounter: [id number]    Primary Care Provider: Vida Gupta DO   Date and time admitted to hospital: 1/14/2020  8:26 AM    * Left renal mass  Assessment & Plan  · Presents from home with gross hematuria and left flank pain  · Recently diagnosed with left renal mass  · CT done in the ER at [facility] showed "Infiltrating lesion in the left kidney upper pole similar to prior study  Increased density within the left ureter representing a mass and less likely hemorrhage causing mild hydronephrosis"  · Urinalysis positive for moderate bacteria, 10-20 WBC, nitrite and blood  Urine cultures pending  · Patient reports diclofenac has been on hold prior to admission in preparation for urologic surgery  · Admit to med surg, NPO pending urology eval, pain control, continue empiric antibiotic  · Bladder scan protocol, if evidence of retention, initiate bladder irrigation  · Further definitive plans to be determined per Urology    Urinary tract infection with hematuria  Assessment & Plan  · UA on admission positive for moderate bacteria with 10-20 WBC, gross hematuria  · Continue empiric IV antibiotics with ceftriaxone pending urine culture results    Essential hypertension  Assessment & Plan  · Elevated blood pressure on presentation likely secondary to acute pain  · Continue Norvasc, add p r n  Hydralazine        VTE Prophylaxis: Pharmacologic VTE Prophylaxis contraindicated due to Gross hematuria  / sequential compression device     Code Status:  Full code    POLST: POLST form is not discussed and not completed at this time  Anticipated Length of Stay:  Patient will be admitted on an Inpatient basis with an anticipated length of stay of  > 2 midnights     Justification for Hospital Stay:  Left renal mass/gross hematuria    Chief Complaint:     Blood in stool with left flank pain    History of Present Illness:  Sparkle Wang is a 78 y o  male who presents with left flank pain  Patient reports onset of pain at about 2:00 a m  Last night  He had initially noted gross hematuria and presented to [place] ED  The patient was recently diagnosed with a left renal mass and was in the process of being scheduled for surgery  He reports pain has been intractable since onset  He received morphine and fentanyl at the ED without relief of symptoms  He denies any fever or chills  He has had nausea but no vomiting  Pain radiates to the back on the left  He recently stopped taking Voltaren in preparation for surgery  He denies any dysuria but reports gross hematuria       Review of Systems   Constitutional: Negative for chills, fatigue and fever  HENT: Negative  Respiratory: Negative  Cardiovascular: Negative  Gastrointestinal: Positive for abdominal pain and nausea  Negative for constipation, diarrhea and vomiting  Genitourinary: Positive for flank pain and hematuria  Negative for dysuria and urgency  Skin: Negative  Neurological: Negative  Psychiatric/Behavioral: Negative  All other systems reviewed and are negative        Past Medical History:   Diagnosis Date    Cancer ([facility] [address] )     skin melanoma    GERD (gastroesophageal reflux disease)     Hyperlipidemia     Hypertension     Kidney lesion        Past Surgical History:   Procedure Laterality Date    COLONOSCOPY      HERNIA REPAIR      umbilical    JOINT REPLACEMENT Bilateral     partials     SKIN CANCER EXCISION      surface melanoma       Family History:  non-contributory    Home Meds:  all medications and allergies reviewed    Allergies: No Known Allergies      Marital Status: /Civil Union     Social History     Substance and Sexual Activity   Alcohol Use Yes    Alcohol/week: 4 0 standard drinks    Types: 4 Cans of beer per week    Frequency: 4 or more times a week    Drinks per session: 3 or 4    Comment: daily/socially     Social History     Tobacco Use Smoking Status Never Smoker   Smokeless Tobacco Never Used     Social History     Substance and Sexual Activity   Drug Use Not Currently           Physical Exam:   Vitals:   Blood Pressure: 157/86 (01/14/20 1159)  Pulse: 75 (01/14/20 1130)  Temperature: 97 8 °F (36 6 °C) (01/14/20 0843)  Temp Source: Oral (01/14/20 0843)  Respirations: 18 (01/14/20 1130)  Weight - Scale: 105 kg (231 lb 7 7 oz) (01/14/20 0843)  SpO2: 95 % (01/14/20 1130)    Physical Exam   Constitutional: He is oriented to person, place, and time  He appears well-developed and well-nourished  No distress  HENT:   Head: Normocephalic and atraumatic  Eyes: EOM are normal  Right eye exhibits no discharge  Left eye exhibits no discharge  No scleral icterus  Neck: Normal range of motion  Neck supple  Cardiovascular: Normal rate, regular rhythm and normal heart sounds  No murmur heard  Pulmonary/Chest: Effort normal and breath sounds normal  No respiratory distress  He has no wheezes  He has no rales  Abdominal: Soft  Bowel sounds are normal  There is tenderness in the left upper quadrant and left lower quadrant  There is CVA tenderness  Musculoskeletal: Normal range of motion  He exhibits no edema or tenderness  Neurological: He is alert and oriented to person, place, and time  Skin: Skin is warm and dry  He is not diaphoretic  Psychiatric: He has a normal mood and affect  His behavior is normal    Vitals reviewed  Lab Results: I have personally reviewed pertinent reports        Results from last 7 days   Lab Units 01/14/20  0238   WBC Thousand/uL 5 80   HEMOGLOBIN g/dL 13 4*   HEMATOCRIT % 41 0*   PLATELETS Thousands/uL 208   NEUTROS PCT % 57   LYMPHS PCT % 30   MONOS PCT % 10   EOS PCT % 2     Results from last 7 days   Lab Units 01/14/20  0238   POTASSIUM mmol/L 4 1   CHLORIDE mmol/L 106   CO2 mmol/L 30   BUN mg/dL 18   CREATININE mg/dL 1 22   CALCIUM mg/dL 9 3   ALK PHOS U/L 58   ALT U/L 22   AST U/L 15     Results from last 7 days   Lab Units 01/14/20  0238   INR  0 98       Imaging: I have personally reviewed pertinent reports  Ct Abdomen Pelvis With Contrast    Result Date: 1/14/2020  Narrative: CT ABDOMEN AND PELVIS WITH IV CONTRAST INDICATION:   left flank pain, severe  COMPARISON:  CT scan dated December 12, 2019 TECHNIQUE:  CT examination of the abdomen and pelvis was performed  Axial, sagittal, and coronal 2D reformatted images were created from the source data and submitted for interpretation  Radiation dose length product (DLP) for this visit:  811 7 mGy-cm   This examination, like all CT scans performed in the [facility], was performed utilizing techniques to minimize radiation dose exposure, including the use of iterative reconstruction and automated exposure control  IV Contrast:  100 mL of iohexol (OMNIPAQUE) Enteric Contrast:  Enteric contrast was not administered  FINDINGS: ABDOMEN LOWER CHEST:  No clinically significant abnormality identified in the visualized lower chest  LIVER/BILIARY TREE:  One or more subcentimeter sharply circumscribed low-density hepatic lesion(s) are noted, too small to accurately characterize, but statistically most likely to represent subcentimeter hepatic cysts  No suspicious solid hepatic lesion is identified  Hepatic contours are normal   No biliary dilatation  GALLBLADDER:  No calcified gallstones  No pericholecystic inflammatory change  SPLEEN:  Unremarkable  PANCREAS:  Unremarkable  ADRENAL GLANDS:  Unremarkable  KIDNEYS/URETERS:  Partially visualized infiltrating lesion in the left kidney upper pole is seen  Increased density in the left ureter representing a mass measuring 1 2 cm  Mild left-sided hydronephrosis is noted  STOMACH AND BOWEL:  There is colonic diverticulosis without evidence of acute diverticulitis  APPENDIX:  No findings to suggest appendicitis  ABDOMINOPELVIC CAVITY:  No ascites or free intraperitoneal air  No lymphadenopathy   VESSELS: Unremarkable for patient's age  PELVIS REPRODUCTIVE ORGANS:  Unremarkable for patient's age  URINARY BLADDER:  Unremarkable  ABDOMINAL WALL/INGUINAL REGIONS:  There is a small fat-containing umbilical hernia  OSSEOUS STRUCTURES:  Degenerative changes in the spine  Impression: Infiltrating lesion in the left kidney upper pole similar to prior study  Increased density within the left ureter representing a mass and less likely hemorrhage causing mild hydronephrosis  I personally discussed this study with Lien Barger on 1/14/2020 at 4:11 AM  1 Workstation performed: [id number]     ** Please Note: Dragon 360 Dictation voice to text software may have been used in the creation of this document   **

## 2020-01-14 NOTE — ASSESSMENT & PLAN NOTE
· UA on admission positive for moderate bacteria with 10-20 WBC, gross hematuria  · Continue empiric IV antibiotics with ceftriaxone pending urine culture results

## 2020-01-14 NOTE — ASSESSMENT & PLAN NOTE
· Elevated blood pressure on presentation likely secondary to acute pain  · Continue Norvasc, add p r n   Hydralazine

## 2020-01-14 NOTE — ED NOTES
Patient returned from bathroom and requested to sit in chair at bedside - MD aware and ok if patient requests     Giancarlo Morse RN  01/14/20 1801

## 2020-01-15 VITALS
HEART RATE: 72 BPM | DIASTOLIC BLOOD PRESSURE: 75 MMHG | TEMPERATURE: 99.8 F | SYSTOLIC BLOOD PRESSURE: 140 MMHG | RESPIRATION RATE: 18 BRPM | OXYGEN SATURATION: 94 % | BODY MASS INDEX: 34.18 KG/M2 | WEIGHT: 231.48 LBS

## 2020-01-15 PROBLEM — R31.9 URINARY TRACT INFECTION WITH HEMATURIA: Status: RESOLVED | Noted: 2020-01-14 | Resolved: 2020-01-15

## 2020-01-15 PROBLEM — N39.0 URINARY TRACT INFECTION WITH HEMATURIA: Status: RESOLVED | Noted: 2020-01-14 | Resolved: 2020-01-15

## 2020-01-15 LAB
ANION GAP SERPL CALCULATED.3IONS-SCNC: 7 MMOL/L (ref 4–13)
BACTERIA UR CULT: NORMAL
BUN SERPL-MCNC: 22 MG/DL (ref 5–25)
CALCIUM SERPL-MCNC: 8.5 MG/DL (ref 8.3–10.1)
CHLORIDE SERPL-SCNC: 106 MMOL/L (ref 100–108)
CO2 SERPL-SCNC: 28 MMOL/L (ref 21–32)
CREAT SERPL-MCNC: 1.74 MG/DL (ref 0.6–1.3)
ERYTHROCYTE [DISTWIDTH] IN BLOOD BY AUTOMATED COUNT: 12.8 % (ref 11.6–15.1)
GFR SERPL CREATININE-BSD FRML MDRD: 39 ML/MIN/1.73SQ M
GLUCOSE SERPL-MCNC: 118 MG/DL (ref 65–140)
HCT VFR BLD AUTO: 37.3 % (ref 36.5–49.3)
HGB BLD-MCNC: 12 G/DL (ref 12–17)
MCH RBC QN AUTO: 30.3 PG (ref 26.8–34.3)
MCHC RBC AUTO-ENTMCNC: 32.2 G/DL (ref 31.4–37.4)
MCV RBC AUTO: 94 FL (ref 82–98)
PLATELET # BLD AUTO: 166 THOUSANDS/UL (ref 149–390)
PMV BLD AUTO: 9.8 FL (ref 8.9–12.7)
POTASSIUM SERPL-SCNC: 4.4 MMOL/L (ref 3.5–5.3)
RBC # BLD AUTO: 3.96 MILLION/UL (ref 3.88–5.62)
SODIUM SERPL-SCNC: 141 MMOL/L (ref 136–145)
WBC # BLD AUTO: 9.31 THOUSAND/UL (ref 4.31–10.16)

## 2020-01-15 PROCEDURE — 99239 HOSP IP/OBS DSCHRG MGMT >30: CPT | Performed by: INTERNAL MEDICINE

## 2020-01-15 PROCEDURE — 85027 COMPLETE CBC AUTOMATED: CPT | Performed by: INTERNAL MEDICINE

## 2020-01-15 PROCEDURE — 80048 BASIC METABOLIC PNL TOTAL CA: CPT | Performed by: INTERNAL MEDICINE

## 2020-01-15 PROCEDURE — 99232 SBSQ HOSP IP/OBS MODERATE 35: CPT | Performed by: NURSE PRACTITIONER

## 2020-01-15 RX ORDER — CEPHALEXIN 500 MG/1
500 CAPSULE ORAL EVERY 12 HOURS SCHEDULED
Qty: 6 CAPSULE | Refills: 0 | Status: SHIPPED | OUTPATIENT
Start: 2020-01-15 | End: 2020-01-18

## 2020-01-15 RX ORDER — OXYCODONE HYDROCHLORIDE 5 MG/1
5 TABLET ORAL EVERY 6 HOURS PRN
Qty: 20 TABLET | Refills: 0 | Status: ON HOLD | OUTPATIENT
Start: 2020-01-15 | End: 2020-04-01 | Stop reason: ALTCHOICE

## 2020-01-15 RX ORDER — ACETAMINOPHEN 325 MG/1
650 TABLET ORAL EVERY 6 HOURS PRN
Refills: 0
Start: 2020-01-15 | End: 2021-08-23 | Stop reason: SDUPTHER

## 2020-01-15 RX ADMIN — ACETAMINOPHEN 975 MG: 325 TABLET ORAL at 05:17

## 2020-01-15 RX ADMIN — ATORVASTATIN CALCIUM 80 MG: 40 TABLET, FILM COATED ORAL at 08:22

## 2020-01-15 RX ADMIN — PANTOPRAZOLE SODIUM 40 MG: 40 TABLET, DELAYED RELEASE ORAL at 05:17

## 2020-01-15 RX ADMIN — CALCIUM CARBONATE-CHOLECALCIFEROL TAB 250 MG-125 UNIT 1 TABLET: 250-125 TAB at 08:22

## 2020-01-15 RX ADMIN — METOPROLOL TARTRATE 100 MG: 100 TABLET, FILM COATED ORAL at 08:22

## 2020-01-15 RX ADMIN — SODIUM CHLORIDE 75 ML/HR: 0.9 INJECTION, SOLUTION INTRAVENOUS at 05:21

## 2020-01-15 RX ADMIN — CEFAZOLIN SODIUM 1000 MG: 1 SOLUTION INTRAVENOUS at 05:17

## 2020-01-15 RX ADMIN — AMLODIPINE BESYLATE 5 MG: 5 TABLET ORAL at 08:22

## 2020-01-15 RX ADMIN — SENNOSIDES AND DOCUSATE SODIUM 1 TABLET: 8.6; 5 TABLET ORAL at 08:22

## 2020-01-15 NOTE — TELEPHONE ENCOUNTER
Thank you  I am including Frankie Sidhuanita in this as well based on a recent question she emailed  I was scheduled to help Verges in the AM for a RALP 3/2 so he could be present for this patient's assist need in the PM     Since we are moving Mr Amato, I won't need to help Verges in AM and that MD assist can be cancelled on 3/2  I will still require Dr Hortencia Rhodes help on 4/1 for Mr Amato  If there is any robo case we place in the PM slot on 3/2, I'd be happy to provide MD assist if necessary      Thanks

## 2020-01-15 NOTE — DISCHARGE SUMMARY
Discharge Summary - [facility]    Patient Information: Jame Valladares 78 y o  male MRN: [id number]  Unit/Bed#: S -06 Encounter: [id number]    Discharging Physician / Practitioner: Darrin Clark MD  PCP: Merary Olivera DO  Admission Date: 1/14/2020  Discharge Date: 01/15/20    Reason for Admission:  Left flank pain    Discharge Diagnoses:     Principal Problem:    Left renal mass  Urothelial cancer  Active Problems:    Urinary tract infection with hematuria    Essential hypertension      Consultations During Hospital Stay:  · Urology    Procedures Performed:   · None    Significant findings:  · CT abdomen/pelvis -  Infiltrating lesion in the left kidney upper pole similar to prior study  Increased density within the left ureter representing a mass and less likely hemorrhage causing mild hydronephrosis    Hospital Course:   Jame Valladares is a 78 y o  male patient who originally presented to the hospital on 1/14/2020 due to left flank pain  The patient had recently been diagnosed with a left renal mass with positive cytology and was scheduled for outpatient urologic surgery  He presented to the emergency department due to sudden onset of left flank pain with gross hematuria  Initial urinalysis was concerning for UTI however subsequent urine cultures grew mixed contaminants less than 10,000  He was started on IV antibiotics with cephazolin and will complete a 5 day course  He was seen by urology and no immediate interventions were recommended in the hospital   Per Urology, "would prefer to continue plan for outpatient elective ureteroscopy"  The patient had resolution of pain over his hospital course and was cleared for discharge home 1/15/20  Patient was initially anticipated to require greater than 2 midnights in the setting of gross hematuria with flank pain and renal mass    However in view of resolution of symptoms with planned outpatient procedure, patient was cleared for discharge after 1st midnight  Condition at Discharge: stable     Discharge Day Visit / Exam:     Subjective:  Feels much better today  No pain  He reports urine clear at this morning  No gross blood    Vitals: Blood Pressure: 140/75 (01/15/20 0700)  Pulse: 72 (01/15/20 0700)  Temperature: 99 8 °F (37 7 °C) (01/15/20 0700)  Temp Source: Oral (01/15/20 0700)  Respirations: 18 (01/15/20 0700)  Weight - Scale: 105 kg (231 lb 7 7 oz) (01/14/20 0843)  SpO2: 94 % (01/15/20 0700)    General Appearance:    Alert, cooperative, no distress, appropriately responsive    Head:    Normocephalic, without obvious abnormality, atraumatic, mucous membranes moist    Eyes:    Conjunctiva/corneas clear, EOM's intact   Neck:   Supple   Lungs:     Clear to auscultation bilaterally, respirations unlabored, no crackles or wheeze     Heart:    Regular rate and rhythm, S1 and S2    Abdomen:     Soft, non-tender, no CVA tenderness   Extremities:   Extremities normal, atraumatic, no cyanosis or edema   Neurologic:  nonfocal      Discharge instructions/Information to patient and family:   See after visit summary for information provided to patient and family  Provisions for Follow-Up Care:  See after visit summary for information related to follow-up care and any pertinent home health orders  Disposition: Home    Discharge Statement:  I spent >30 minutes discharging the patient  This time was spent on the day of discharge  I had direct contact with the patient on the day of discharge  Greater than 50% of the total time was spent examining patient, answering all patient questions, arranging and discussing plan of care with patient as well as directly providing post-discharge instructions  Additional time then spent on discharge activities  Discharge Medications:  See after visit summary for reconciled discharge medications provided to patient and family        ** Please Note: Dragon 360 Dictation voice to text software may have been used in the creation of this document   **

## 2020-01-15 NOTE — PROGRESS NOTES
Progress Note - Diane Bruce 78 y o  male MRN: [id number]    Unit/Bed#: S -59 Encounter: [id number]      Assessment:  Diane Bruce is a 71-year-old male known to our group for left renal and ureteral lesion, admitted for left flank pain and gross hematuria  He was of for for approximately 24 hours with conservative management  He has responded positively to IV fluids, see pain medication and antibiosis  Plan:  We appreciate medical optimization   stable for discharge  Will proceed with ambulatory surgery 1/17 as scheduled  No further  intervention indicated  Subjective:   Endorses positive for relief from pain, nausea  Reports improvement in hematuria    Objective:     Vitals: Blood pressure 140/75, pulse 72, temperature 99 8 °F (37 7 °C), temperature source Oral, resp  rate 18, weight 105 kg (231 lb 7 7 oz), SpO2 94 %  ,Body mass index is 34 18 kg/m²        Intake/Output Summary (Last 24 hours) at 1/15/2020 1130  Last data filed at 1/15/2020 6970  Gross per 24 hour   Intake 1217 5 ml   Output 300 ml   Net 917 5 ml       Physical Exam: General appearance: alert and oriented, in no acute distress, appears stated age, cooperative and no distress  Head: Normocephalic, without obvious abnormality, atraumatic  Neck: no adenopathy, no carotid bruit, no JVD, supple, symmetrical, trachea midline and thyroid not enlarged, symmetric, no tenderness/mass/nodules  Lungs: diminished breath sounds  Heart: regular rate and rhythm, S1, S2 normal, no murmur, click, rub or gallop  Abdomen: soft, non-tender; bowel sounds normal; no masses,  no organomegaly  Extremities: extremities normal, warm and well-perfused; no cyanosis, clubbing, or edema  Pulses: 2+ and symmetric  Neurologic: Grossly normal  No urinary drains     Invasive Devices     Peripheral Intravenous Line            Peripheral IV 12/12/19 Left Antecubital 34 days    Peripheral IV 01/14/20 Right Antecubital 1 day              Lab Results Component Value Date    WBC 9 31 01/15/2020    HGB 12 0 01/15/2020    HCT 37 3 01/15/2020    MCV 94 01/15/2020     01/15/2020     Lab Results   Component Value Date    SODIUM 141 01/15/2020    K 4 4 01/15/2020     01/15/2020    CO2 28 01/15/2020    BUN 22 01/15/2020    CREATININE 1 74 (H) 01/15/2020    GLUC 118 01/15/2020    CALCIUM 8 5 01/15/2020       Lab, Imaging and other studies: I have personally reviewed pertinent reports

## 2020-01-15 NOTE — PLAN OF CARE
Problem: PAIN - ADULT  Goal: Verbalizes/displays adequate comfort level or baseline comfort level  Description  Interventions:  - Encourage patient to monitor pain and request assistance  - Assess pain using appropriate pain scale  - Administer analgesics based on type and severity of pain and evaluate response  - Implement non-pharmacological measures as appropriate and evaluate response  - Consider cultural and social influences on pain and pain management  - Notify physician/advanced practitioner if interventions unsuccessful or patient reports new pain  Outcome: Progressing     Problem: INFECTION - ADULT  Goal: Absence or prevention of progression during hospitalization  Description  INTERVENTIONS:  - Assess and monitor for signs and symptoms of infection  - Monitor lab/diagnostic results  - Monitor all insertion sites, i e  indwelling lines, tubes, and drains  - Monitor endotracheal if appropriate and nasal secretions for changes in amount and color  - Veblen appropriate cooling/warming therapies per order  - Administer medications as ordered  - Instruct and encourage patient and family to use good hand hygiene technique  - Identify and instruct in appropriate isolation precautions for identified infection/condition  Outcome: Progressing  Goal: Absence of fever/infection during neutropenic period  Description  INTERVENTIONS:  - Monitor WBC    Outcome: Progressing     Problem: DISCHARGE PLANNING  Goal: Discharge to home or other facility with appropriate resources  Description  INTERVENTIONS:  - Identify barriers to discharge w/patient and caregiver  - Arrange for needed discharge resources and transportation as appropriate  - Identify discharge learning needs (meds, wound care, etc )  - Arrange for interpretive services to assist at discharge as needed  - Refer to Case Management Department for coordinating discharge planning if the patient needs post-hospital services based on physician/advanced practitioner order or complex needs related to functional status, cognitive ability, or social support system  Outcome: Progressing     Problem: Knowledge Deficit  Goal: Patient/family/caregiver demonstrates understanding of disease process, treatment plan, medications, and discharge instructions  Description  Complete learning assessment and assess knowledge base    Interventions:  - Provide teaching at level of understanding  - Provide teaching via preferred learning methods  Outcome: Progressing

## 2020-01-15 NOTE — TELEPHONE ENCOUNTER
I was able to move the tentative hold for his robotic nephroureterectomy to April 1st at [facility] with Dr Rishi Prieto assisting  I will release the hold in March

## 2020-01-15 NOTE — DISCHARGE INSTR - AVS FIRST PAGE
Dear Raman Lombardi,     It was our pleasure to care for you here at [facility]  It is our hope that we were always able to exceed the expected standards for your care during your stay  You were hospitalized due to left flank pain with gross hematuria  You were cared for on the [facility] 4th floor under the service of Raquel Fabian MD with the [facility] who covers for your primary care physician (PCP), Shantal Brown DO, while you were hospitalized  If you have any questions or concerns related to this hospitalization, you may contact us at [phone]  For follow up as well as medication refills, we recommend that you follow up with your primary care physician  A registered nurse will reach out to you by phone within a few days after your discharge to answer any additional questions that you may have after going home  However, at this time we provide for you here, the most important instructions / recommendations at discharge:       · Notable Medication Adjustments -   · Added Keflex for presumed urinary infection x 3 more days  · Tylenol/Oxycodone to be taken as needed for mild or severe pain    · Testing Required after Discharge -   · None    · Important follow up information -   · Keep your scheduled appointment with the urologist for urethroscopy    · Other Instructions -   · Return to the emergency department for evaluation if you note recurrence of gross hematuria, worsening flank pain unrelieved with medications or fever  · Please review this entire after visit summary as additional general instructions including medication list, appointments, activity, diet, any pertinent wound care, and other additional recommendations from your care team that may be provided for you        Sincerely,     Raquel Fabian MD

## 2020-01-17 ENCOUNTER — APPOINTMENT (OUTPATIENT)
Dept: RADIOLOGY | Facility: AMBULARY SURGERY CENTER | Age: 70
End: 2020-01-17
Payer: MEDICARE

## 2020-01-17 ENCOUNTER — TELEPHONE (OUTPATIENT)
Dept: UROLOGY | Facility: CLINIC | Age: 70
End: 2020-01-17

## 2020-01-17 ENCOUNTER — ANESTHESIA (OUTPATIENT)
Dept: PERIOP | Facility: AMBULARY SURGERY CENTER | Age: 70
End: 2020-01-17
Payer: MEDICARE

## 2020-01-17 ENCOUNTER — HOSPITAL ENCOUNTER (OUTPATIENT)
Facility: AMBULARY SURGERY CENTER | Age: 70
Setting detail: OUTPATIENT SURGERY
Discharge: HOME | End: 2020-01-17
Attending: UROLOGY | Admitting: UROLOGY
Payer: MEDICARE

## 2020-01-17 VITALS
BODY MASS INDEX: 32.88 KG/M2 | WEIGHT: 222 LBS | HEIGHT: 69 IN | TEMPERATURE: 98.1 F | OXYGEN SATURATION: 96 % | DIASTOLIC BLOOD PRESSURE: 76 MMHG | HEART RATE: 77 BPM | RESPIRATION RATE: 18 BRPM | SYSTOLIC BLOOD PRESSURE: 158 MMHG

## 2020-01-17 DIAGNOSIS — N39.8 UROTHELIAL LESION: Primary | ICD-10-CM

## 2020-01-17 DIAGNOSIS — N28.89 LEFT RENAL MASS: ICD-10-CM

## 2020-01-17 PROCEDURE — 52332 CYSTOSCOPY AND TREATMENT: CPT | Performed by: UROLOGY

## 2020-01-17 PROCEDURE — 88305 TISSUE EXAM BY PATHOLOGIST: CPT | Performed by: PATHOLOGY

## 2020-01-17 PROCEDURE — 88342 IMHCHEM/IMCYTCHM 1ST ANTB: CPT | Performed by: PATHOLOGY

## 2020-01-17 PROCEDURE — C2617 STENT, NON-COR, TEM W/O DEL: HCPCS | Performed by: UROLOGY

## 2020-01-17 PROCEDURE — 88341 IMHCHEM/IMCYTCHM EA ADD ANTB: CPT | Performed by: PATHOLOGY

## 2020-01-17 PROCEDURE — 74420 UROGRAPHY RTRGR +-KUB: CPT

## 2020-01-17 PROCEDURE — 52204 CYSTOSCOPY W/BIOPSY(S): CPT | Performed by: UROLOGY

## 2020-01-17 PROCEDURE — 88112 CYTOPATH CELL ENHANCE TECH: CPT | Performed by: PATHOLOGY

## 2020-01-17 PROCEDURE — C1769 GUIDE WIRE: HCPCS | Performed by: UROLOGY

## 2020-01-17 PROCEDURE — 52354 CYSTOURETERO W/BIOPSY: CPT | Performed by: UROLOGY

## 2020-01-17 PROCEDURE — C1758 CATHETER, URETERAL: HCPCS | Performed by: UROLOGY

## 2020-01-17 DEVICE — INLAY OPTIMA URETERAL STENT W/O GUIDEWIRE
Type: IMPLANTABLE DEVICE | Status: FUNCTIONAL
Brand: BARD® INLAY OPTIMA® URETERAL STENT

## 2020-01-17 RX ORDER — TRAMADOL HYDROCHLORIDE 50 MG/1
50 TABLET ORAL EVERY 6 HOURS PRN
Qty: 10 TABLET | Refills: 0 | Status: SHIPPED | OUTPATIENT
Start: 2020-01-17 | End: 2020-05-22

## 2020-01-17 RX ORDER — MAGNESIUM HYDROXIDE 1200 MG/15ML
LIQUID ORAL AS NEEDED
Status: DISCONTINUED | OUTPATIENT
Start: 2020-01-17 | End: 2020-01-17 | Stop reason: HOSPADM

## 2020-01-17 RX ORDER — DEXAMETHASONE SODIUM PHOSPHATE 10 MG/ML
INJECTION, SOLUTION INTRAMUSCULAR; INTRAVENOUS AS NEEDED
Status: DISCONTINUED | OUTPATIENT
Start: 2020-01-17 | End: 2020-01-17 | Stop reason: SURG

## 2020-01-17 RX ORDER — LIDOCAINE HYDROCHLORIDE 10 MG/ML
0.5 INJECTION, SOLUTION EPIDURAL; INFILTRATION; INTRACAUDAL; PERINEURAL ONCE AS NEEDED
Status: DISCONTINUED | OUTPATIENT
Start: 2020-01-17 | End: 2020-01-17 | Stop reason: HOSPADM

## 2020-01-17 RX ORDER — SODIUM CHLORIDE, SODIUM LACTATE, POTASSIUM CHLORIDE, CALCIUM CHLORIDE 600; 310; 30; 20 MG/100ML; MG/100ML; MG/100ML; MG/100ML
125 INJECTION, SOLUTION INTRAVENOUS CONTINUOUS
Status: DISCONTINUED | OUTPATIENT
Start: 2020-01-17 | End: 2020-01-17 | Stop reason: HOSPADM

## 2020-01-17 RX ORDER — CEFTRIAXONE 1 G/1
1000 INJECTION, POWDER, FOR SOLUTION INTRAMUSCULAR; INTRAVENOUS ONCE
Status: COMPLETED | OUTPATIENT
Start: 2020-01-17 | End: 2020-01-17

## 2020-01-17 RX ORDER — LIDOCAINE HYDROCHLORIDE 10 MG/ML
INJECTION, SOLUTION EPIDURAL; INFILTRATION; INTRACAUDAL; PERINEURAL AS NEEDED
Status: DISCONTINUED | OUTPATIENT
Start: 2020-01-17 | End: 2020-01-17 | Stop reason: SURG

## 2020-01-17 RX ORDER — OXYBUTYNIN CHLORIDE 5 MG/1
5 TABLET, EXTENDED RELEASE ORAL
Qty: 21 TABLET | Refills: 0 | Status: ON HOLD | OUTPATIENT
Start: 2020-01-17 | End: 2020-04-01 | Stop reason: ALTCHOICE

## 2020-01-17 RX ORDER — ONDANSETRON 2 MG/ML
4 INJECTION INTRAMUSCULAR; INTRAVENOUS ONCE AS NEEDED
Status: DISCONTINUED | OUTPATIENT
Start: 2020-01-17 | End: 2020-01-17 | Stop reason: HOSPADM

## 2020-01-17 RX ORDER — OXYBUTYNIN CHLORIDE 5 MG/1
5 TABLET ORAL ONCE
Status: COMPLETED | OUTPATIENT
Start: 2020-01-17 | End: 2020-01-17

## 2020-01-17 RX ORDER — ONDANSETRON 2 MG/ML
INJECTION INTRAMUSCULAR; INTRAVENOUS AS NEEDED
Status: DISCONTINUED | OUTPATIENT
Start: 2020-01-17 | End: 2020-01-17 | Stop reason: SURG

## 2020-01-17 RX ORDER — LIDOCAINE HYDROCHLORIDE 20 MG/ML
JELLY TOPICAL AS NEEDED
Status: DISCONTINUED | OUTPATIENT
Start: 2020-01-17 | End: 2020-01-17 | Stop reason: HOSPADM

## 2020-01-17 RX ORDER — FENTANYL CITRATE 50 UG/ML
INJECTION, SOLUTION INTRAMUSCULAR; INTRAVENOUS AS NEEDED
Status: DISCONTINUED | OUTPATIENT
Start: 2020-01-17 | End: 2020-01-17 | Stop reason: SURG

## 2020-01-17 RX ORDER — SODIUM CHLORIDE, SODIUM LACTATE, POTASSIUM CHLORIDE, CALCIUM CHLORIDE 600; 310; 30; 20 MG/100ML; MG/100ML; MG/100ML; MG/100ML
100 INJECTION, SOLUTION INTRAVENOUS CONTINUOUS
Status: DISCONTINUED | OUTPATIENT
Start: 2020-01-17 | End: 2020-01-17 | Stop reason: HOSPADM

## 2020-01-17 RX ORDER — OXYBUTYNIN CHLORIDE 10 MG/1
10 TABLET, EXTENDED RELEASE ORAL
Qty: 30 TABLET | Refills: 0 | Status: SHIPPED | OUTPATIENT
Start: 2020-01-17 | End: 2020-01-17 | Stop reason: SDUPTHER

## 2020-01-17 RX ORDER — TAMSULOSIN HYDROCHLORIDE 0.4 MG/1
0.4 CAPSULE ORAL ONCE
Status: COMPLETED | OUTPATIENT
Start: 2020-01-17 | End: 2020-01-17

## 2020-01-17 RX ORDER — FENTANYL CITRATE/PF 50 MCG/ML
25 SYRINGE (ML) INJECTION
Status: DISCONTINUED | OUTPATIENT
Start: 2020-01-17 | End: 2020-01-17 | Stop reason: HOSPADM

## 2020-01-17 RX ORDER — PROPOFOL 10 MG/ML
INJECTION, EMULSION INTRAVENOUS AS NEEDED
Status: DISCONTINUED | OUTPATIENT
Start: 2020-01-17 | End: 2020-01-17 | Stop reason: SURG

## 2020-01-17 RX ORDER — CEFAZOLIN SODIUM 2 G/50ML
2000 SOLUTION INTRAVENOUS ONCE
Status: DISCONTINUED | OUTPATIENT
Start: 2020-01-17 | End: 2020-01-17

## 2020-01-17 RX ADMIN — DEXAMETHASONE SODIUM PHOSPHATE 4 MG: 10 INJECTION, SOLUTION INTRAMUSCULAR; INTRAVENOUS at 13:03

## 2020-01-17 RX ADMIN — ONDANSETRON 4 MG: 2 INJECTION INTRAMUSCULAR; INTRAVENOUS at 13:03

## 2020-01-17 RX ADMIN — FENTANYL CITRATE 50 MCG: 50 INJECTION, SOLUTION INTRAMUSCULAR; INTRAVENOUS at 13:00

## 2020-01-17 RX ADMIN — FENTANYL CITRATE 50 MCG: 50 INJECTION, SOLUTION INTRAMUSCULAR; INTRAVENOUS at 13:15

## 2020-01-17 RX ADMIN — FENTANYL CITRATE 50 MCG: 50 INJECTION, SOLUTION INTRAMUSCULAR; INTRAVENOUS at 13:14

## 2020-01-17 RX ADMIN — LIDOCAINE HYDROCHLORIDE 50 MG: 10 INJECTION, SOLUTION EPIDURAL; INFILTRATION; INTRACAUDAL; PERINEURAL at 13:03

## 2020-01-17 RX ADMIN — PHENYLEPHRINE HYDROCHLORIDE 100 MCG: 10 INJECTION INTRAVENOUS at 13:21

## 2020-01-17 RX ADMIN — PROPOFOL 200 MG: 10 INJECTION, EMULSION INTRAVENOUS at 13:03

## 2020-01-17 RX ADMIN — PHENYLEPHRINE HYDROCHLORIDE 200 MCG: 10 INJECTION INTRAVENOUS at 13:24

## 2020-01-17 RX ADMIN — OXYBUTYNIN CHLORIDE 5 MG: 5 TABLET ORAL at 14:53

## 2020-01-17 RX ADMIN — FENTANYL CITRATE 50 MCG: 50 INJECTION, SOLUTION INTRAMUSCULAR; INTRAVENOUS at 13:03

## 2020-01-17 RX ADMIN — CEFTRIAXONE 1000 MG: 1 INJECTION, POWDER, FOR SOLUTION INTRAMUSCULAR; INTRAVENOUS at 12:10

## 2020-01-17 RX ADMIN — TAMSULOSIN HYDROCHLORIDE 0.4 MG: 0.4 CAPSULE ORAL at 14:51

## 2020-01-17 RX ADMIN — SODIUM CHLORIDE, SODIUM LACTATE, POTASSIUM CHLORIDE, AND CALCIUM CHLORIDE: .6; .31; .03; .02 INJECTION, SOLUTION INTRAVENOUS at 13:00

## 2020-01-17 NOTE — INTERVAL H&P NOTE
H&P reviewed  After examining the patient I find no changes in the patients condition since the H&P had been written      Vitals:    01/17/20 1209   BP: (!) 216/101   Pulse:    Resp:    Temp:    SpO2:

## 2020-01-17 NOTE — DISCHARGE INSTRUCTIONS
Ureteral Stent Placement   WHAT YOU NEED TO KNOW:   Ureteral stent placement is a procedure to open a blocked or narrow ureter  The ureter is the tube that carries urine from your kidney into your bladder  A stent is a thin hollow plastic tube used to hold your ureter open and allow urine to flow  The stent may stay in for several weeks  DISCHARGE INSTRUCTIONS:   Medicines:   · Pain medicine  may be given to take away or decrease pain  Do not wait until the pain is severe before you take your medicine  · Antibiotics  help prevent infections  Your healthcare provider may prescribe these for you while your stent remains in  · Take your medicine as directed  Contact your healthcare provider if you think your medicine is not helping or if you have side effects  Tell him or her if you are allergic to any medicine  Keep a list of the medicines, vitamins, and herbs you take  Include the amounts, and when and why you take them  Bring the list or the pill bottles to follow-up visits  Carry your medicine list with you in case of an emergency  Follow up with your urologist as directed: You will need regular follow-up visits with your urologist as long as the stent remains in  He will check to make sure the stent is working properly  He may do urine cultures to check for infection  Write down your questions so you remember to ask them during your visits  Self-care:   · Drink liquids  as directed  Ask your healthcare provider how much liquid to drink each day and which liquids are best for you  Fluids such as cranberry or apple juice may be especially helpful to prevent urinary infections  · Return to normal activities  the day after your stent placement or as directed by your healthcare provider  · You may take a shower  the day after your stent placement if your healthcare provider says it is okay  Contact your healthcare provider or urologist if:   · You have a fever or chills      · You feel like you need to urinate often  · You have pain when you urinate or pain around your bladder or kidney  · You see blood in your urine or it looks cloudy  · You have questions or concerns about your condition or care  Seek care immediately or call 911 if:   · You urinate little or not at all  · You have severe pain in your abdomen  © 2017 [employer] Information is for End User's use only and may not be sold, redistributed or otherwise used for commercial purposes  All illustrations and images included in CareNotes® are the copyrighted property of [employer]  or Josse Sommer  The above information is an  only  It is not intended as medical advice for individual conditions or treatments  Talk to your doctor, nurse or pharmacist before following any medical regimen to see if it is safe and effective for you

## 2020-01-17 NOTE — ANESTHESIA PREPROCEDURE EVALUATION
Review of Systems/Medical History  Patient summary reviewed  Chart reviewed  No history of anesthetic complications     Cardiovascular  Hyperlipidemia, Hypertension (pt c/o signficant pain today causing HTN) poorly controlled,    Pulmonary  Negative pulmonary ROS Not a smoker , No recent URI , No sleep apnea ,        GI/Hepatic    GERD well controlled,        Chronic kidney disease stage 3, Genitourinary malignancy (left renal mass with pain, hematuria) ,   Comment: On keflex for UTI     Endo/Other    Obesity (BMI 33)    GYN       Hematology  Negative hematology ROS      Musculoskeletal    Arthritis     Neurology  Negative neurology ROS      Psychology   Negative psychology ROS            Physical Exam    Airway    Mallampati score: II  TM Distance: >3 FB  Neck ROM: full     Dental   No notable dental hx     Cardiovascular      Pulmonary      Other Findings       Lab Results   Component Value Date    WBC 9 31 01/15/2020    HGB 12 0 01/15/2020     01/15/2020     Lab Results   Component Value Date    SODIUM 141 01/15/2020    K 4 4 01/15/2020    BUN 22 01/15/2020    CREATININE 1 74 (H) 01/15/2020    EGFR 39 01/15/2020     Lab Results   Component Value Date    PTT 31 01/14/2020      Lab Results   Component Value Date    INR 0 98 01/14/2020     Anesthesia Plan  ASA Score- 3     Anesthesia Type- general with ASA Monitors  Additional Monitors:   Airway Plan: LMA  Plan Factors-    Induction- intravenous  Postoperative Plan-     Informed Consent- Anesthetic plan and risks discussed with patient and spouse  I personally reviewed this patient with the CRNA  Discussed and agreed on the Anesthesia Plan with the CRNA  Freddy Argueta

## 2020-01-17 NOTE — TELEPHONE ENCOUNTER
Can we please have availability for cystoscopy and stent removal for patient's followup 2/6? Would also like BMP prior to visit

## 2020-01-17 NOTE — ANESTHESIA POSTPROCEDURE EVALUATION
Post-Op Assessment Note    CV Status:  Stable    Pain management: adequate     Mental Status:  Alert and awake   Hydration Status:  Euvolemic   PONV Controlled:  Controlled   Airway Patency:  Patent   Post Op Vitals Reviewed: Yes      Staff: CRNA, Anesthesiologist           /81 (01/17/20 1402)    Temp 98 1 °F (36 7 °C) (01/17/20 1402)    Pulse 71 (01/17/20 1402)   Resp 16 (01/17/20 1402)    SpO2 93 % (01/17/20 1402)

## 2020-01-17 NOTE — OP NOTE
OPERATIVE REPORT  PATIENT NAME: Bridget Fine    :  1950  MRN: [id number]  Pt Location: [facility] OR ROOM 04    SURGERY DATE: 2020    Surgeon(s) and Role:     * Nikos Botello MD - Primary    Preop Diagnosis:  Left renal mass [N28 89]    Post-Op Diagnosis Codes:     * Left renal mass [N28 89]    Procedure(s) (LRB):  CYSTOSCOPY RETROGRADE PYELOGRAM WITH INSERTION STENT URETERAL, URETEROSCOPY, POSSIBLE BLADDER BIOPSY, POSSIBLE URETERAL BIOPSY, BRUSHING, SELECTIVE CYTOLOGY (Bilateral)    Specimen(s):  ID Type Source Tests Collected by Time Destination   1 : RIGHT URETERAL URINE FOR CYTOLOGY Urine Ureter, Right CYTOLOGY, URINE Nikos Botello MD 2020 1320    2 : LEFT RENAL PELVIS BIOPSY Tissue Kidney, Left TISSUE EXAM Nikos Botello MD 2020 1333    3 : BLADDER BIOPSY Tissue Urinary Bladder TISSUE EXAM Nikos Botello MD 2020 1351        Estimated Blood Loss:   Minimal    Drains:  Ureteral Drain/Stent Left ureter 4 7 Fr  (Active)   Number of days: 0       Anesthesia Type:   General    Operative Indications:  Left renal mass [N28 89]      Operative Findings:  1  Two small less than 0 5 cm papillary bladder lesions in the right posterior and left posterior bladder  2  Normal right retrograde grade pyelogram with cytologic sampling  3  Left upper portion of the renal pelvis was completely obscured by clot and a large papillary tumor which was biopsied  4  6 x 26 double-J stent placed in the normal standard fashion in the left collecting system    Complications:   None    Procedure and Technique:  Bridget Fine is a 78y o -year-old male who presented with flank pain and imaging from his pain management team that demonstrated a left renal versus urothelial lesion  Given its proximity the collecting system, urothelial carcinoma need to be ruled out    Office cytology returned positive the patient was admitted to the hospital before operation with flank pain and hematuria  They are proceeding to the operating room on 1/17/2020 to undergo cystoscopy with cytologic sampling, bilateral retrograde pyelograms and left-sided ureteroscopy with ureteral biopsy  Risk and benefits of the procedure were discussed and reviewed  Informed consent was obtained  After the smooth induction of general anesthesia, the patient was placed in the dorsal lithotomy position  His genitalia was prepped and draped in a sterile fashion  Intravenous antibiotics were administered in the form of ceftriaxone  A timeout was performed with all members of the operative team confirming the patient's identity, procedure to be performed, and laterality of the case  A 22 [place] rigid cystoscope with 30° lens was inserted  The bladder was thoroughly inspected  There were 2 small papillary bladder lesions in the right posterior and left posterior wall  These were biopsied with cold cup biopsy forceps and retrieved as specimen  Bugbee electrocautery was performed at the base  Attention was focused on the right ureteral orifice  Five [place] open-ended catheter and Bard solo +wire were used to cannulate the distal ureteral orifice  Retrograde pyelogram was performed and was noted to be normal in the distal mid and proximal ureter  Five [place] was advanced and a barbotage urine specimen was obtained for cytology  Since there was no filling abnormality ureteroscopy was not performed note stent was left in place  Attention was now turned to the left ureteral orifice  We cannulated left ureteral orifice with a Bard solo +wire and introduced over top a dual-lumen catheter  This was introduced into the distal ureter and retrograde pyelogram was performed  There did not appear to be distinct filling defects within the distal ureter  A 2nd wire was introduced  There was some bloody urine drainage at this time      With 2 wires in place, a 10/12 35cm ureteral access sheath was then inserted over the working wire  An 5 3F Olympus flexible ureteroscope was then passed  Immediately upon entering the renal pelvis, large amounts of clot and blood were noted  We did our best irrigate this for visualization  Visualization was noted to be poor  There appeared to be some abnormalities in the proximal portion of the kidney where the CT scan was noted to be abnormal   We did easily visualize a large papillary lesion concerning for urothelial carcinoma  Bard [employer] was used to grasp several large pieces of this tumor which were irrigated to do a saline solution to be passed off for pathology  There did not appear to be active bleeding at this point in time  Contrast was injected  Because of relatively poor visualization, careful inspection of each of the calices could not be performed individually  The flexible scope was slowly withdrawn through the ureter during a pull-out ureteroscopy  The wire was backloaded through the cystoscope and a 6 [place] 26cm double-J ureteral stent was then placed in the standard fashion  The proximal coil was appreciated in the left lateral calyx and the distal coil was visualized within the bladder  A string was not left in place  The bladder was emptied and the cystoscope was removed  2% viscous lidocaine was placed per urethra  Overall the patient tolerated the procedure well and there were no complications  The patient was taken out of dorsal lithotomy, extubated in the operating room and transferred to the PACU in stable condition at the conclusion of the case  Plan-I have some concerns that the patient may require nephro ureterectomy  He will return for pathology    If the patient is going to proceed with nephroureterectomy but is having significant stent colic, we may opt to remove his stent in advance of his intervention     I was present for the entire procedure    Patient Disposition:  PACU     SIGNATURE: Emilee Kincaid MD  DATE: January 17, 2020  TIME: 1:56 PM

## 2020-01-21 NOTE — PHYSICIAN ADVISOR
Current patient class: Outpatient Surgery  The patient is currently on Hospital Day: 1 at [facility]      The patient was admitted to the hospital  on N/A at N/A for the following diagnosis:  Left renal mass [N28 89]     After review of the relevant documentation, labs, vital signs and test results, the patient is a provider liable case and is most appropriate for OBSERVATION STATUS  In this particular case the patient was admitted to the hospital as an inpatient  The patient however fails to satisfy the 2 midnight benchmark and closer scrutiny of the case is warranted  After review of the patient presentation and relevant labs the patient was most appropriate for observation status on admission  Given that this patient has already been discharged prior to this review they become a provider liable case  Rationale is as follows: The patient is a 78 yrs   Male who presented to the ED at 1/17/2020 11:49 AM with a chief complaint of No chief complaint on file  Patient admitted with a report of gross hematuria and left flank pain  He was recently diagnosed with a left renal mass  He was placed on empiric IV antibiotics and kept NPO  He was evaluated by Urology who believed that no acute care was necessary and he was to have surgery for the renal mass in the near future  The patient had improvement with his symptoms after a one night stay and and OBSERVATION class would be considered appropriate for this patient               The patients vitals on arrival were ED Triage Vitals   Temperature Pulse Respirations Blood Pressure SpO2   01/17/20 1208 01/17/20 1208 01/17/20 1208 01/17/20 1209 01/17/20 1208   (!) 97 2 °F (36 2 °C) 66 18 (!) 216/101 98 %      Temp src Heart Rate Source Patient Position - Orthostatic VS BP Location FiO2 (%)   -- -- -- -- --             Pain Score       01/17/20 1208       8           Past Medical History:   Diagnosis Date    Cancer ([facility] [address] )     skin melanoma    GERD (gastroesophageal reflux disease)     Hyperlipidemia     Hypertension     Kidney lesion      Past Surgical History:   Procedure Laterality Date    COLONOSCOPY      HERNIA REPAIR      umbilical    JOINT REPLACEMENT Bilateral     partials     CA CYSTOURETHROSCOPY,URETER CATHETER Bilateral 1/17/2020    Procedure: CYSTOSCOPY; RIGHT RETROGRADE PYELOGRAM WITH RIGHT URETERAL CYTOLOGY SAMPLING; LEFT URETEROSCOPY WITH RENAL PELVIS BIOPSY AND LEFT STENT PLACEMENT;  Surgeon: Sindy Ribera MD;  [facility];  Service: Urology    SKIN CANCER EXCISION      surface melanoma           Consults have been placed to:   None    Vitals:    01/17/20 1420 01/17/20 1425 01/17/20 1430 01/17/20 1453   BP:  168/83 165/80 158/76   Pulse: 67 67 66 77   Resp: 20 18 18 18   Temp:       SpO2: 95% 96% 93% 96%   Weight:       Height:           Most recent labs:    No results for input(s): WBC, HGB, HCT, PLT, K, NA, CALCIUM, BUN, CREATININE, LIPASE, AMYLASE, INR, TROPONINI, CKTOTAL, AST, ALT, ALKPHOS, BILITOT in the last 72 hours  Scheduled Meds:  Continuous Infusions:  No current facility-administered medications for this encounter  PRN Meds:      Surgical procedures (if appropriate):  Procedure(s):  CYSTOSCOPY; RIGHT RETROGRADE PYELOGRAM WITH RIGHT URETERAL CYTOLOGY SAMPLING; LEFT URETEROSCOPY WITH RENAL PELVIS BIOPSY AND LEFT STENT PLACEMENT

## 2020-02-03 ENCOUNTER — APPOINTMENT (OUTPATIENT)
Dept: LAB | Facility: HOSPITAL | Age: 70
End: 2020-02-03
Payer: MEDICARE

## 2020-02-03 DIAGNOSIS — N39.8 UROTHELIAL LESION: ICD-10-CM

## 2020-02-03 LAB
ANION GAP SERPL CALCULATED.3IONS-SCNC: 5 MMOL/L (ref 4–13)
BUN SERPL-MCNC: 12 MG/DL (ref 7–25)
CALCIUM SERPL-MCNC: 9.3 MG/DL (ref 8.6–10.5)
CHLORIDE SERPL-SCNC: 106 MMOL/L (ref 98–107)
CO2 SERPL-SCNC: 31 MMOL/L (ref 21–31)
CREAT SERPL-MCNC: 1.23 MG/DL (ref 0.7–1.3)
GFR SERPL CREATININE-BSD FRML MDRD: 59 ML/MIN/1.73SQ M
GLUCOSE P FAST SERPL-MCNC: 101 MG/DL (ref 65–99)
POTASSIUM SERPL-SCNC: 4.6 MMOL/L (ref 3.5–5.5)
SODIUM SERPL-SCNC: 142 MMOL/L (ref 134–143)

## 2020-02-03 PROCEDURE — 36415 COLL VENOUS BLD VENIPUNCTURE: CPT

## 2020-02-03 PROCEDURE — 80048 BASIC METABOLIC PNL TOTAL CA: CPT

## 2020-02-07 ENCOUNTER — TELEPHONE (OUTPATIENT)
Dept: SURGICAL ONCOLOGY | Facility: CLINIC | Age: 70
End: 2020-02-07

## 2020-02-07 ENCOUNTER — PROCEDURE VISIT (OUTPATIENT)
Dept: UROLOGY | Facility: CLINIC | Age: 70
End: 2020-02-07
Payer: MEDICARE

## 2020-02-07 VITALS
DIASTOLIC BLOOD PRESSURE: 76 MMHG | WEIGHT: 220 LBS | SYSTOLIC BLOOD PRESSURE: 138 MMHG | HEART RATE: 62 BPM | BODY MASS INDEX: 32.58 KG/M2 | HEIGHT: 69 IN

## 2020-02-07 DIAGNOSIS — C64.2 UROTHELIAL CARCINOMA OF KIDNEY, LEFT (HCC): Primary | ICD-10-CM

## 2020-02-07 PROCEDURE — 52310 CYSTOSCOPY AND TREATMENT: CPT | Performed by: UROLOGY

## 2020-02-07 RX ORDER — LEVOFLOXACIN 250 MG/1
250 TABLET ORAL DAILY
Qty: 3 TABLET | Refills: 0 | Status: SHIPPED | OUTPATIENT
Start: 2020-02-07 | End: 2020-02-10

## 2020-02-07 NOTE — Clinical Note
Can we add cystoscopy and left ureteral catheter placement to surgery   May have to adjust surgical date if med onc wants to give chemo preop

## 2020-02-07 NOTE — TELEPHONE ENCOUNTER
New Patient Encounter    New Patient Intake Form   Patient Details:  Janelle Jamiosn  1950  [id number]    Background Information:  [address] starts by opening a telephone encounter and gathering the following information   Who is calling to schedule? If not self, relationship to patient? provider   Referring Provider uriology    What is the diagnosis? Urothelial carcinoma of kidney   Is this diagnosis confirmed Yes   Is there a confirmed diagnosis from a biopsy/tissue reviewed by pathology? Yes   Is there any prior history of Cancer? No   If yes, please explain na   When was the diagnosis? Is patient aware of diagnosis? Yes   Reason for visit? NP DX   Have you had any testing done? If so: when, where? Yes   Are records in [facility] [employer]? yes   Was the patient told to bring a disk? no   Scheduling Information:   Preferred [place]:  [facility]     Requesting Specific Provider? Dr Marcie Ford   Are there any dates/time the patient cannot be seen? na      Miscellaneous: Office stated that March 3 was okay, pt having surgery in  April  After completing the above information, please route to Financial Counselor and the appropriate Nurse Navigator for review

## 2020-02-07 NOTE — PROGRESS NOTES
UROLOGY FOLLOWUP NOTE     CHIEF COMPLAINT   Maria Esther Funk is a 78 y o  male with a complaint of   No chief complaint on file  History of Present Illness:     78 y o  male with a history of musculoskeletal back pain  Patient sees a pain management team and has been undergoing injections  MR imaging demonstrated a concerning left-sided renal lesion and the patient underwent a CT urogram   This demonstrates a endophytic infiltrating lesion, by my review of the films concerning for urothelial carcinoma  The patient has a remote history of smoking more than 40 years ago  He has no chemical exposures by his report  There is no family history of kidney or bladder cancer  Patient recently passed a stone in October which was his 1st episode  He had 1 day of hematuria following this but has not had any other gross hematuria that he noted  He had some flank pain at that time which has completely resolved  He presents today with his wife to discuss the findings of the CT scan  Patient presents today after his ureteroscopic biopsy  He presents to review pathology  We discussed removal of stent  We tentatively schedule the patient for definitive surgery in April  Patient did have 1 elevated creatinine value in preparation for surgery  I have strongly recommended repeat lab testing, renal scan for differential function and preoperative Nephrology consult  We also discussed the option of consideration of neoadjuvant chemotherapy given the removal of 1 of his renal units      Past Medical History:     Past Medical History:   Diagnosis Date    Cancer ([address] )     skin melanoma    GERD (gastroesophageal reflux disease)     Hyperlipidemia     Hypertension     Kidney lesion        PAST SURGICAL HISTORY:     Past Surgical History:   Procedure Laterality Date    COLONOSCOPY      FL RETROGRADE PYELOGRAM  1/17/2020    HERNIA REPAIR      umbilical    JOINT REPLACEMENT Bilateral     partials     LA CYSTOURETHROSCOPY,URETER CATHETER Bilateral 1/17/2020    Procedure: CYSTOSCOPY; RIGHT RETROGRADE PYELOGRAM WITH RIGHT URETERAL CYTOLOGY SAMPLING; LEFT URETEROSCOPY WITH RENAL PELVIS BIOPSY AND LEFT STENT PLACEMENT;  Surgeon: Sindy Ribera MD;  [facility];  Service: Urology    SKIN CANCER EXCISION      surface melanoma       CURRENT MEDICATIONS:     Current Outpatient Medications   Medication Sig Dispense Refill    acetaminophen (TYLENOL) 325 mg tablet Take 2 tablets (650 mg total) by mouth every 6 (six) hours as needed for mild pain or fever  0    amLODIPine (NORVASC) 5 mg tablet Take 5 mg by mouth daily        atorvastatin (LIPITOR) 80 mg tablet Take 80 mg by mouth daily      calcium citrate-vitamin D (CITRACAL+D) 315-200 MG-UNIT per tablet Take 1 tablet by mouth daily      diclofenac (VOLTAREN) 75 mg EC tablet Take 1 tablet (75 mg total) by mouth 2 (two) times a day as needed (moderate pain) 60 tablet 2    metoprolol tartrate (LOPRESSOR) 100 mg tablet Take 100 mg by mouth daily        omeprazole (PriLOSEC) 20 mg delayed release capsule Take 20 mg by mouth daily        oxybutynin (DITROPAN-XL) 5 mg 24 hr tablet Take 1 tablet (5 mg total) by mouth daily at bedtime for 21 doses 21 tablet 0    terazosin (HYTRIN) 5 mg capsule Take 5 mg by mouth daily at bedtime        oxyCODONE (ROXICODONE) 5 mg immediate release tablet Take 1 tablet (5 mg total) by mouth every 6 (six) hours as needed for severe painMax Daily Amount: 20 mg (Patient not taking: Reported on 2/7/2020) 20 tablet 0    traMADol (ULTRAM) 50 mg tablet Take 1 tablet (50 mg total) by mouth every 6 (six) hours as needed for moderate pain for up to 10 doses (Patient not taking: Reported on 2/7/2020) 10 tablet 0     No current facility-administered medications for this visit          ALLERGIES:   No Known Allergies    SOCIAL HISTORY:     Social History     Socioeconomic History    Marital status: /Civil Union     Spouse name: None    Number of children: None    Years of education: None    Highest education level: None   Occupational History    None   Social Needs    Financial resource strain: None    Food insecurity:     Worry: None     Inability: None    Transportation needs:     Medical: None     Non-medical: None   Tobacco Use    Smoking status: Never Smoker    Smokeless tobacco: Never Used   Substance and Sexual Activity    Alcohol use: Yes     Alcohol/week: 4 0 standard drinks     Types: 4 Cans of beer per week     Frequency: 4 or more times a week     Drinks per session: 3 or 4     Comment: daily/socially    Drug use: Not Currently    Sexual activity: None   Lifestyle    Physical activity:     Days per week: None     Minutes per session: None    Stress: None   Relationships    Social connections:     Talks on phone: None     Gets together: None     Attends Buddhist service: None     Active member of club or organization: None     Attends meetings of clubs or organizations: None     Relationship status: None    Intimate partner violence:     Fear of current or ex partner: None     Emotionally abused: None     Physically abused: None     Forced sexual activity: None   Other Topics Concern    None   Social History Narrative    None       SOCIAL HISTORY:   History reviewed  No pertinent family history  REVIEW OF SYSTEMS:     Review of Systems   Constitutional: Negative  Respiratory: Negative  Cardiovascular: Negative  Gastrointestinal: Negative  Genitourinary: Positive for flank pain  Skin: Negative  Psychiatric/Behavioral: Negative  PHYSICAL EXAM:     /76 (BP Location: Left arm, Patient Position: Sitting, Cuff Size: Adult)   Pulse 62   Ht 5' 9" (1 753 m)   Wt 99 8 kg (220 lb)   BMI 32 49 kg/m²     General:  Healthy appearing male in no acute distress  They have a normal affect  There is not appear to be any gross neurologic defects or abnormalities    HEENT:  Normocephalic, atraumatic  Neck is supple without any palpable lymphadenopathy  Cardiovascular:  Patient has normal palpable distal radial pulses  There is no significant peripheral edema  No JVD is noted  Respiratory:  Patient has unlabored respirations  There is no audible wheeze or rhonchi  Abdomen:  Abdomen is with healed inguinal and umbilical (mesh by report) surgical scars  Abdomen is soft and nontender  There is no tympany  Inguinal and umbilical hernia are not appreciated  : Circumcised phallus orthotopic meatus  Musculoskeletal:  Patient does not have significant CVA tenderness in the  flank with palpation or percussion  They full range of motion in all 4 extremities  Strength in all 4 extremities appears congruent  Patient is able to ambulate without assistance or difficulty  Dermatologic:  Patient has no skin abnormalities or rashes  LABS:     CBC:   Lab Results   Component Value Date    WBC 9 31 01/15/2020    HGB 12 0 01/15/2020    HCT 37 3 01/15/2020    MCV 94 01/15/2020     01/15/2020       BMP:   Lab Results   Component Value Date    CALCIUM 9 3 2020    K 4 6 2020    CO2 31 2020     2020    BUN 12 2020    CREATININE 1 23 2020     No results found for: PSA    IMAGIN/12/19  CT ABDOMEN AND PELVIS WITH AND WITHOUT IV CONTRAST     INDICATION:   N28 89: Other specified disorders of kidney and ureter      COMPARISON:  None      TECHNIQUE: Initial CT of the abdomen and pelvis was performed without intravenous contrast   Subsequent dynamic CT evaluation of the abdomen and pelvis was performed after the administration of intravenous contrast in both nephrographic and delayed   phases after the administration of intravenous contrast   Axial, sagittal, and coronal 2D reformatted images were created from the source data and submitted for interpretation       Radiation dose length product (DLP) for this visit:  2345 1 mGy-cm     This examination, like all CT scans performed in the [facility], was performed utilizing techniques to minimize radiation dose exposure, including the use of iterative   reconstruction and automated exposure control      IV Contrast:  100 mL of iohexol (OMNIPAQUE)  Enteric Contrast:  Enteric contrast was not administered      FINDINGS:     ABDOMEN     RIGHT KIDNEY AND URETER:  No solid renal mass  Nonobstructing calculus seen in the lower pole of the right kidney which measures about 2 mm  No hydronephrosis or hydroureter  No urinary tract calculi  No perinephric collection      LEFT KIDNEY AND URETER:  There is an infiltrating lesion in the left kidney in the upper pole area which causes amputation and blunting of the upper pole kidney cysts  This mass measures 3 5 x 2 6 x 3 1 cm  There is no evidence of ureteric obstruction  There is a left renal cyst which measures 1 7 cm  No hydronephrosis or hydroureter  No urinary tract calculi  No perinephric collection      URINARY BLADDER:  There is mild urinary bladder wall thickening, this may be due to underdistention  No calculi         LOWER CHEST:  No clinically significant abnormality identified in the visualized lower chest      LIVER/BILIARY TREE:  Multiple hypodensities are seen within the liver parenchyma with attenuation of fluid compatible with cysts  A cluster of cysts /septated cyst seen in the left hepatic lobe in image 50 series 3     GALLBLADDER:  No calcified gallstones  No pericholecystic inflammatory change      SPLEEN:  Unremarkable      PANCREAS:  Unremarkable      ADRENAL GLANDS:  Unremarkable      STOMACH AND BOWEL:  Diverticulosis seen     ABDOMINOPELVIC CAVITY:  No ascites  No free intraperitoneal air   No lymphadenopathy      VESSELS:  The celiac trunk appear unremarkable  The SMA appear unremarkable  The HODAN appear unremarkable     PELVIS     REPRODUCTIVE ORGANS:  Unremarkable for patient's age      APPENDIX: No findings to suggest appendicitis      ABDOMINAL WALL/INGUINAL REGIONS:  Small umbilical hernia seen     OSSEOUS STRUCTURES:  No acute fracture or destructive osseous lesion      IMPRESSION:     There is an infiltrating lesion in the upper pole of the left kidney which extends into the renal sinus with amputation of the upper pole calyces, measuring 3 5 x 2 6 x 3 1 cm  This represent a renal neoplasm, less likely may represent urothelial lesion     No retroperitoneal lymphadenopathy       Nonobstructing right renal calculus        PATHOLOGY:     1/17/20  Final Diagnosis   A  Ureter, Right, left renal pelvis biopsy  -Fragments of high grade urothelial carcinoma   -No evidence of lamina propria invasion   -Detrusor muscle/ muscularis propria is not present for evaluation      B  Urinary Bladder, bladder biopsy:  -Fragments of low grade papillary lesion  See note  -No evidence of invasion seen  -Unremarkable fragment of detrusor muscle seen      Separate fragment of urothelium with mild cytological atypia seen     Note  Differential diagnosis include low grade Papillary urothelial carcinoma vs apillary urothelial neoplasm of low malignant potential     Intradepartmental consultation with more than one staff pathologist is in agreement     PROCEDURE:     SEE NOTE    ASSESSMENT:     78 y o  male with LEFT upper pole infiltrating lesion,  Biopsy-proven high-grade urothelial carcinoma with small low-grade bladder tumor    PLAN:      I reviewed the patient's pathology  We removed his stent today with cystoscopy  Short course of antibiotics  The patient is scheduled for a left robotic assisted laparoscopic nephroureterectomy with bladder cuff excision on 4/1/2020  Because the patient's stent is removed, we may consider placement of ureteral catheter with cystoscopy at the time of the procedure  Risks and benefits were discussed and the patient signed informed consent    I recommended a renal scan repeat blood work to assess his kidney function prior to surgery  If these are abnormal, would consider preoperative Nephrology consult  I did discuss with the patient neoadjuvant chemotherapy and strongly recommended a preoperative consult with Medical Oncology in the event they would consider neoadjuvant chemotherapy  Patient is agreeable

## 2020-02-07 NOTE — PROGRESS NOTES
Cystoscopy  Date/Time: 2/7/2020 1:21 PM  Performed by: Sindy Ribera MD  Authorized by: Sindy Ribera MD     Procedure details: simple removal of a foreign body, stone, or stent    Patient tolerance: Patient tolerated the procedure well with no immediate complications    Additional Procedure Details:   Cystoscopy with stent removal procedure note: The patient returns to the office today to undergo cystoscopy with stent removal  Risks and benefits of the procedure were discussed and informed consent was obtained  The patient was placed in the modified supine position  Genitalia was prepped with Betadine and draped in a sterile fashion  Viscous 2% lidocaine was used for local anesthesia  The flexible cystoscope was passed  The bladder was inspected  The stent was identified coming from the Left ureteral orifice  The stent was grasped and easily removed fully intact without difficulty  Overall the patient tolerated the procedure

## 2020-02-10 NOTE — TELEPHONE ENCOUNTER
Pt was reviewed for potential clinical trial opportunities  He is not eligible for our urothelial trials due to upper tract disease and potentially resectable disease

## 2020-02-19 ENCOUNTER — HOSPITAL ENCOUNTER (OUTPATIENT)
Dept: NUCLEAR MEDICINE | Facility: HOSPITAL | Age: 70
Discharge: HOME | End: 2020-02-19
Payer: MEDICARE

## 2020-02-19 DIAGNOSIS — C64.2 UROTHELIAL CARCINOMA OF KIDNEY, LEFT (HCC): ICD-10-CM

## 2020-02-19 PROCEDURE — 78707 K FLOW/FUNCT IMAGE W/O DRUG: CPT

## 2020-02-19 PROCEDURE — A9562 TC99M MERTIATIDE: HCPCS

## 2020-02-21 ENCOUNTER — PATIENT OUTREACH (OUTPATIENT)
Dept: UROLOGY | Facility: AMBULATORY SURGERY CENTER | Age: 70
End: 2020-02-21

## 2020-02-21 NOTE — PROGRESS NOTES
Vesna Lynn was diagnosed with urothelial carcinoma of the left kidney  Called Pat  L/M [phone]  Left my direct extension and requested a call back

## 2020-02-24 NOTE — PROGRESS NOTES
Pat called  Introduced myself and gave him my contact information  He does not have any questions about his upcoming appointments  He is scheduled with Dr Marisel Hess 3/3/20 for an opinion about neoadjuvant chemotherapy  He is currently scheduled for nephroureterectomy 4/1/20 due to upcoming vacation  He was encouraged to call if he has questions or needs assistance

## 2020-03-03 ENCOUNTER — CONSULT (OUTPATIENT)
Dept: HEMATOLOGY ONCOLOGY | Facility: CLINIC | Age: 70
End: 2020-03-03
Payer: MEDICARE

## 2020-03-03 VITALS
SYSTOLIC BLOOD PRESSURE: 136 MMHG | HEIGHT: 69 IN | BODY MASS INDEX: 32.91 KG/M2 | TEMPERATURE: 96.7 F | WEIGHT: 222.2 LBS | DIASTOLIC BLOOD PRESSURE: 84 MMHG | HEART RATE: 66 BPM | RESPIRATION RATE: 18 BRPM | OXYGEN SATURATION: 97 %

## 2020-03-03 DIAGNOSIS — C64.2 UROTHELIAL CARCINOMA OF KIDNEY, LEFT (HCC): ICD-10-CM

## 2020-03-03 PROCEDURE — 99205 OFFICE O/P NEW HI 60 MIN: CPT | Performed by: INTERNAL MEDICINE

## 2020-03-03 NOTE — PROGRESS NOTES
Hematology/Oncology Outpatient Consult  Chivo Hughes 78 y o  male 1950 [id number]    Date:  3/3/2020        Assessment and Plan:  1  Urothelial carcinoma of kidney, left [facility])  The patient and his wife were both educated extensively about the new diagnosis of high-grade urothelial carcinoma of the left renal pelvis  They were told that Neoadjuvant chemotherapy has an important role in the management of patients with locally advanced urothelial cancer of the bladder  However, there are only limited data on the role of this approach in urothelial carcinoma of the upper urinary tract  Although neoadjuvant chemotherapy is not standard for upper tract malignancies, retrospective studies suggest improvement in DFS compared with historical controls  He was told that there is a theoretical advantage in using platinum-based chemotherapy prior to surgery, after which overall renal function will be reduced  However, the lack of high-level evidence for neoadjuvant chemotherapy makes this approach little bit questionable  He was told that the benefit of chemotherapy is only seen in patients treated with cisplatin based regimen  We did discuss the potential side effect and harm from cisplatin including worsening of hearing to the point that he can have severe hearing impairment since he has pre-existing hearing deficit  I strongly recommended audiometry testing and ENT evaluation if the patient is going to pursue any cisplatin based regimen at any point in time  We did discussed the timing of neoadjuvant chemotherapy with either gemcitabine/cisplatin versus dose dense MVAC type of regimen  We also discussed the potential benefit of cisplatin based adjuvant chemotherapy after pursuing the planned surgery according to the pathology, which is also an option even though it is not the most attractive option  The patient and his wife had a lot of questions, all the questions were answered    They stated that they are going to think about our discussion and make a final decision very soon regarding pursuing neoadjuvant chemotherapy followed by the surgery versus the surgery alone versus surgery followed by adjuvant chemotherapy if necessary  HPI:  This is a 50-year-old gentleman with history of hypertension, hyperlipidemia, chronic lower back pain  The patient had an MRI of the lower spine for further evaluation of his chronic lower back pain  The MRI on 12/02/2019 revealed Multiple left renal masses to include a left lower pole cyst and a rounded structure in the left upper pole which may also represent cyst   Left upper pole renal masses approximately 3 cm in greatest linear dimension  A CT with renal protocol was done on 12/12/2019 which also showed the infiltrating lesion in the upper pole of the left kidney measuring about the 3 5 cm in greatest dimension without any hint of retroperitoneal lymphadenopathy  A CT scan of the abdomen pelvis on 01/14/2020 showed the same findings with the mild hydronephrosis on the left  The urine cytology on 01/03/2020 showed high-grade urothelial carcinoma  A cystoscopy was then done, a biopsy was taken from the left renal pelvic region which showed high-grade urothelial carcinoma without evidence of lamina propria invasion  The detrusor muscle/muscularis propria were not present for evaluation  The recommendation was to pursue left robotic assisted laparoscopic nephroureterectomy with bladder cuff excision which is planned on 04/01/2020  The patient was sent to us for further evaluation and discussion of the neoadjuvant chemotherapy  Interval history:  The patient denies any hematuria at this point in time  He denied any pain  He seems to have the a history of hearing problems and uses the hearing aid in both ears  ROS: Review of Systems   Constitutional: Positive for fatigue  Negative for appetite change, diaphoresis and fever  HENT: Positive for hearing loss  Negative for congestion, dental problem, facial swelling, tinnitus and trouble swallowing  Eyes: Negative for visual disturbance  Respiratory: Negative for cough, chest tightness, shortness of breath and wheezing  Cardiovascular: Negative for chest pain and leg swelling  Gastrointestinal: Negative for abdominal distention, abdominal pain, blood in stool, constipation, diarrhea, nausea and vomiting  Genitourinary: Positive for dysuria  Negative for hematuria and urgency  Musculoskeletal: Negative for arthralgias, myalgias and neck pain  Skin: Negative  Negative for color change, pallor, rash and wound  Neurological: Positive for numbness  Negative for dizziness, weakness and headaches  Hematological: Negative for adenopathy  Psychiatric/Behavioral: Negative for agitation, behavioral problems, confusion, hallucinations, self-injury and sleep disturbance  The patient is not nervous/anxious and is not hyperactive          Past Medical History:   Diagnosis Date    Cancer ([facility] [address] )     skin melanoma    GERD (gastroesophageal reflux disease)     Hyperlipidemia     Hypertension     Kidney lesion        Past Surgical History:   Procedure Laterality Date    COLONOSCOPY      FL RETROGRADE PYELOGRAM  1/17/2020    HERNIA REPAIR      umbilical    JOINT REPLACEMENT Bilateral     partials     [facility] CYSTOURETHROSCOPY,URETER CATHETER Bilateral 1/17/2020    Procedure: CYSTOSCOPY; RIGHT RETROGRADE PYELOGRAM WITH RIGHT URETERAL CYTOLOGY SAMPLING; LEFT URETEROSCOPY WITH RENAL PELVIS BIOPSY AND LEFT STENT PLACEMENT;  Surgeon: Keven Walker MD;  [facility];  Service: Urology    SKIN CANCER EXCISION      surface melanoma       Social History     Socioeconomic History    Marital status: /Civil Union     Spouse name: None    Number of children: None    Years of education: None    Highest education level: None   Occupational History    None   Social Needs    Financial resource strain: None    Food insecurity:     Worry: None     Inability: None    Transportation needs:     Medical: None     Non-medical: None   Tobacco Use    Smoking status: Never Smoker    Smokeless tobacco: Never Used   Substance and Sexual Activity    Alcohol use: Yes     Alcohol/week: 4 0 standard drinks     Types: 4 Cans of beer per week     Frequency: 4 or more times a week     Drinks per session: 3 or 4     Comment: daily/socially    Drug use: Not Currently    Sexual activity: None   Lifestyle    Physical activity:     Days per week: None     Minutes per session: None    Stress: None   Relationships    Social connections:     Talks on phone: None     Gets together: None     Attends Orthodox service: None     Active member of club or organization: None     Attends meetings of clubs or organizations: None     Relationship status: None    Intimate partner violence:     Fear of current or ex partner: None     Emotionally abused: None     Physically abused: None     Forced sexual activity: None   Other Topics Concern    None   Social History Narrative    None       History reviewed  No pertinent family history      No Known Allergies      Current Outpatient Medications:     acetaminophen (TYLENOL) 325 mg tablet, Take 2 tablets (650 mg total) by mouth every 6 (six) hours as needed for mild pain or fever, Disp: , Rfl: 0    amLODIPine (NORVASC) 5 mg tablet, Take 5 mg by mouth daily  , Disp: , Rfl:     calcium citrate-vitamin D (CITRACAL+D) 315-200 MG-UNIT per tablet, Take 1 tablet by mouth daily, Disp: , Rfl:     diclofenac (VOLTAREN) 75 mg EC tablet, Take 1 tablet (75 mg total) by mouth 2 (two) times a day as needed (moderate pain), Disp: 60 tablet, Rfl: 2    metoprolol tartrate (LOPRESSOR) 100 mg tablet, Take 100 mg by mouth daily  , Disp: , Rfl:     omeprazole (PriLOSEC) 20 mg delayed release capsule, Take 20 mg by mouth daily  , Disp: , Rfl:     terazosin (HYTRIN) 5 mg capsule, Take 5 mg by mouth daily at bedtime  , Disp: , Rfl:     atorvastatin (LIPITOR) 80 mg tablet, Take 80 mg by mouth daily, Disp: , Rfl:     oxybutynin (DITROPAN-XL) 5 mg 24 hr tablet, Take 1 tablet (5 mg total) by mouth daily at bedtime for 21 doses, Disp: 21 tablet, Rfl: 0    oxyCODONE (ROXICODONE) 5 mg immediate release tablet, Take 1 tablet (5 mg total) by mouth every 6 (six) hours as needed for severe painMax Daily Amount: 20 mg (Patient not taking: Reported on 2/7/2020), Disp: 20 tablet, Rfl: 0    traMADol (ULTRAM) 50 mg tablet, Take 1 tablet (50 mg total) by mouth every 6 (six) hours as needed for moderate pain for up to 10 doses (Patient not taking: Reported on 2/7/2020), Disp: 10 tablet, Rfl: 0      Physical Exam:  /84 (BP Location: Left arm, Cuff Size: Adult)   Pulse 66   Temp (!) 96 7 °F (35 9 °C) (Tympanic)   Resp 18   Ht 5' 9" (1 753 m)   Wt 101 kg (222 lb 3 2 oz)   SpO2 97%   BMI 32 81 kg/m²     Physical Exam   Constitutional: He is oriented to person, place, and time  He appears well-developed and well-nourished  HENT:   Head: Normocephalic and atraumatic  Nose: Nose normal    Mouth/Throat: Oropharynx is clear and moist    Eyes: Pupils are equal, round, and reactive to light  Conjunctivae and EOM are normal  Right eye exhibits no discharge  Left eye exhibits no discharge  No scleral icterus  Neck: Normal range of motion  Neck supple  No tracheal deviation present  No thyromegaly present  Cardiovascular: Normal rate, regular rhythm and normal heart sounds  Exam reveals no friction rub  No murmur heard  Pulmonary/Chest: Effort normal and breath sounds normal  No respiratory distress  He has no wheezes  He has no rales  He exhibits no tenderness  Abdominal: Soft  Bowel sounds are normal  He exhibits no distension and no mass  There is no hepatosplenomegaly, splenomegaly or hepatomegaly  There is no tenderness  There is no rebound and no guarding  Musculoskeletal: Normal range of motion   He exhibits no edema, tenderness or deformity  Lymphadenopathy:     He has no cervical adenopathy  Neurological: He is alert and oriented to person, place, and time  He has normal reflexes  He displays normal reflexes  No cranial nerve deficit  Coordination normal    Skin: Skin is warm and dry  No rash noted  No erythema  No pallor  Psychiatric: He has a normal mood and affect  His behavior is normal  Judgment and thought content normal          Labs:  Lab Results   Component Value Date    WBC 9 31 01/15/2020    HGB 12 0 01/15/2020    HCT 37 3 01/15/2020    MCV 94 01/15/2020     01/15/2020     Lab Results   Component Value Date    K 4 6 02/03/2020     02/03/2020    CO2 31 02/03/2020    BUN 12 02/03/2020    CREATININE 1 23 02/03/2020    GLUF 101 (H) 02/03/2020    CALCIUM 9 3 02/03/2020    AST 15 01/14/2020    ALT 22 01/14/2020    ALKPHOS 58 01/14/2020    EGFR 59 02/03/2020     No results found for: TSH    Patient voiced understanding and agreement in the above discussion  Aware to contact our office with questions/symptoms in the interim

## 2020-03-04 NOTE — TELEPHONE ENCOUNTER
Pt has active medicare part A & B effective 03/01/14  Pt also has an active supplemental plan C thru [employer] effective 01/01/16 that plan  will pay the 20% that medicare doesn't pay   No need for me to contact the pt

## 2020-03-05 ENCOUNTER — TELEPHONE (OUTPATIENT)
Dept: HEMATOLOGY ONCOLOGY | Facility: CLINIC | Age: 70
End: 2020-03-05

## 2020-03-05 NOTE — TELEPHONE ENCOUNTER
Patient called to inform the office he decided to start chemo after his surgery on 04/01/2020 if it is needed  Patient can be reached at [phone]

## 2020-03-05 NOTE — TELEPHONE ENCOUNTER
Phoned patient back and spoke to wife  I told wife that I would discusws with Dr Clifford Davidson tomorrow and give them a call back

## 2020-03-05 NOTE — PROGRESS NOTES
Pre-op visit  3/9/2020      Chief Complaint   Patient presents with    Pre-op Exam       Assessment and Plan     78 y o  male managed by Dr Lisa Dotson    1  LEFT upper pole infiltrating lesion with biopsy-proven high-grade urothelial carcinoma  2  Bladder cancer with biopsy showing low grade bladder cancer    History and physical was performed for the patients upcoming nephroureterectomy scheduled for left nephrouterectomy with Dr Lisa Dotson  The patient and his wife had questions  All questions and concerns regarding surgery have been addressed and answered  Will proceed with surgery as planned as long as pre-admission testing within normal limits  History of Present Illness  Monika Loaiza is a 78 y o  male here for history and physical prior to their upcoming left nephroureterisctomy for a left upper pole infiltrating lesion  This was biopsied 1/17/20 and pathology revealedhigh-grade urothelial carcinoma  The patient also had a bladder lesion biopsy which revealed small low-grade bladder tumor  The patient has had no overall changes in their health since their last visit and denies any prior complications with anesthesia  Review of Systems   Constitutional: Negative for activity change, chills and fever  Gastrointestinal: Negative for abdominal distention and abdominal pain  Musculoskeletal: Negative for back pain and gait problem  Psychiatric/Behavioral: Negative for behavioral problems and confusion         Past Medical History  Past Medical History:   Diagnosis Date    Cancer ([facility] [address] )     skin melanoma    GERD (gastroesophageal reflux disease)     Hyperlipidemia     Hypertension     Kidney lesion        Past Social History  Past Surgical History:   Procedure Laterality Date    COLONOSCOPY      FL RETROGRADE PYELOGRAM  1/17/2020    HERNIA REPAIR      umbilical    JOINT REPLACEMENT Bilateral     partials     [place] CYSTOURETHROSCOPY,URETER CATHETER Bilateral 1/17/2020    Procedure: CYSTOSCOPY; RIGHT RETROGRADE PYELOGRAM WITH RIGHT URETERAL CYTOLOGY SAMPLING; LEFT URETEROSCOPY WITH RENAL PELVIS BIOPSY AND LEFT STENT PLACEMENT;  Surgeon: Mynor Marcelo MD;  [facility];  Service: Urology    SKIN CANCER EXCISION      surface melanoma       Past Family History  History reviewed  No pertinent family history  Past Social history  Social History     Socioeconomic History    Marital status: /Civil Union     Spouse name: Not on file    Number of children: Not on file    Years of education: Not on file    Highest education level: Not on file   Occupational History    Not on file   Social Needs    Financial resource strain: Not on file    Food insecurity:     Worry: Not on file     Inability: Not on file    Transportation needs:     Medical: Not on file     Non-medical: Not on file   Tobacco Use    Smoking status: Never Smoker    Smokeless tobacco: Never Used   Substance and Sexual Activity    Alcohol use:  Yes     Alcohol/week: 4 0 standard drinks     Types: 4 Cans of beer per week     Frequency: 4 or more times a week     Drinks per session: 3 or 4     Comment: daily/socially    Drug use: Not Currently    Sexual activity: Not on file   Lifestyle    Physical activity:     Days per week: Not on file     Minutes per session: Not on file    Stress: Not on file   Relationships    Social connections:     Talks on phone: Not on file     Gets together: Not on file     Attends Mu-ism service: Not on file     Active member of club or organization: Not on file     Attends meetings of clubs or organizations: Not on file     Relationship status: Not on file    Intimate partner violence:     Fear of current or ex partner: Not on file     Emotionally abused: Not on file     Physically abused: Not on file     Forced sexual activity: Not on file   Other Topics Concern    Not on file   Social History Narrative    Not on file       Current Medications  Current Outpatient Medications   Medication Sig Dispense Refill    acetaminophen (TYLENOL) 325 mg tablet Take 2 tablets (650 mg total) by mouth every 6 (six) hours as needed for mild pain or fever  0    amLODIPine (NORVASC) 5 mg tablet Take 5 mg by mouth daily        atorvastatin (LIPITOR) 80 mg tablet Take 80 mg by mouth daily      calcium citrate-vitamin D (CITRACAL+D) 315-200 MG-UNIT per tablet Take 1 tablet by mouth daily      diclofenac (VOLTAREN) 75 mg EC tablet Take 1 tablet (75 mg total) by mouth 2 (two) times a day as needed (moderate pain) 60 tablet 2    metoprolol tartrate (LOPRESSOR) 100 mg tablet Take 100 mg by mouth daily        omeprazole (PriLOSEC) 20 mg delayed release capsule Take 20 mg by mouth daily        oxybutynin (DITROPAN-XL) 5 mg 24 hr tablet Take 1 tablet (5 mg total) by mouth daily at bedtime for 21 doses 21 tablet 0    terazosin (HYTRIN) 5 mg capsule Take 5 mg by mouth daily at bedtime        oxyCODONE (ROXICODONE) 5 mg immediate release tablet Take 1 tablet (5 mg total) by mouth every 6 (six) hours as needed for severe painMax Daily Amount: 20 mg (Patient not taking: Reported on 2/7/2020) 20 tablet 0    traMADol (ULTRAM) 50 mg tablet Take 1 tablet (50 mg total) by mouth every 6 (six) hours as needed for moderate pain for up to 10 doses (Patient not taking: Reported on 2/7/2020) 10 tablet 0     No current facility-administered medications for this visit  Allergies  No Known Allergies      Past Medical History, Social History, Family History, medications and allergies were reviewed  Vitals  Vitals:    03/09/20 1240   Weight: 101 kg (223 lb)         Physical Exam  Constitutional   General appearance: Patient is seated and in no acute distress, well appearing and well nourished  Head and Face   Head and face: Normal     Eyes   Conjunctiva and lids: No erythema, swelling or discharge      Ears, Nose, Mouth, and Throat   Hearing: Normal     Pulmonary   Lungs: Clear to auscultation with no wheezes, rhonchi, or rales  Respiratory effort: No increased work of breathing or signs of respiratory distress  Cardiovascular   Heart: Regular rate and rhythm, no gallops, no murmurs  Examination of extremities for edema and/or varicosities: Normal     Abdomen   Abdomen: Non-tender, no masses  Musculoskeletal   Gait and station: Normal      Skin   Skin and subcutaneous tissue: Warm, dry, and intact  No visible rashes or lesions     Psychiatric   Mood and affect: Normal

## 2020-03-09 ENCOUNTER — APPOINTMENT (OUTPATIENT)
Dept: PREADMISSION TESTING | Facility: HOSPITAL | Age: 70
DRG: 658 | End: 2020-03-09
Payer: MEDICARE

## 2020-03-09 ENCOUNTER — HOSPITAL ENCOUNTER (OUTPATIENT)
Dept: NON INVASIVE DIAGNOSTICS | Facility: HOSPITAL | Age: 70
Discharge: HOME | DRG: 658 | End: 2020-03-09
Attending: UROLOGY
Payer: MEDICARE

## 2020-03-09 ENCOUNTER — ANESTHESIA EVENT (INPATIENT)
Dept: PERIOP | Facility: HOSPITAL | Age: 70
DRG: 658 | End: 2020-03-09
Payer: MEDICARE

## 2020-03-09 ENCOUNTER — OFFICE VISIT (OUTPATIENT)
Dept: UROLOGY | Facility: CLINIC | Age: 70
End: 2020-03-09
Payer: MEDICARE

## 2020-03-09 VITALS
BODY MASS INDEX: 32.93 KG/M2 | HEART RATE: 62 BPM | WEIGHT: 223 LBS | DIASTOLIC BLOOD PRESSURE: 80 MMHG | SYSTOLIC BLOOD PRESSURE: 132 MMHG

## 2020-03-09 DIAGNOSIS — N28.89 LEFT RENAL MASS: ICD-10-CM

## 2020-03-09 DIAGNOSIS — C64.2 UROTHELIAL CARCINOMA OF KIDNEY, LEFT (HCC): ICD-10-CM

## 2020-03-09 DIAGNOSIS — N39.8 UROTHELIAL LESION: Primary | ICD-10-CM

## 2020-03-09 LAB
ATRIAL RATE: 51 BPM
P AXIS: 48 DEGREES
PR INTERVAL: 172 MS
QRS AXIS: 3 DEGREES
QRSD INTERVAL: 84 MS
QT INTERVAL: 438 MS
QTC INTERVAL: 403 MS
T WAVE AXIS: 4 DEGREES
VENTRICULAR RATE: 51 BPM

## 2020-03-09 PROCEDURE — 99213 OFFICE O/P EST LOW 20 MIN: CPT | Performed by: PHYSICIAN ASSISTANT

## 2020-03-09 PROCEDURE — 93005 ELECTROCARDIOGRAM TRACING: CPT

## 2020-03-09 PROCEDURE — 87086 URINE CULTURE/COLONY COUNT: CPT | Performed by: PHYSICIAN ASSISTANT

## 2020-03-09 PROCEDURE — 93010 ELECTROCARDIOGRAM REPORT: CPT | Performed by: INTERNAL MEDICINE

## 2020-03-09 RX ORDER — SODIUM CHLORIDE 9 MG/ML
125 INJECTION, SOLUTION INTRAVENOUS CONTINUOUS
Status: CANCELLED | OUTPATIENT
Start: 2020-04-01

## 2020-03-10 LAB — BACTERIA UR CULT: NORMAL

## 2020-03-30 ENCOUNTER — CONSULT (OUTPATIENT)
Dept: CARDIOLOGY CLINIC | Facility: CLINIC | Age: 70
End: 2020-03-30
Payer: MEDICARE

## 2020-03-30 ENCOUNTER — TELEPHONE (OUTPATIENT)
Dept: UROLOGY | Facility: AMBULATORY SURGERY CENTER | Age: 70
End: 2020-03-30

## 2020-03-30 VITALS
HEIGHT: 69 IN | SYSTOLIC BLOOD PRESSURE: 130 MMHG | HEART RATE: 62 BPM | WEIGHT: 221 LBS | DIASTOLIC BLOOD PRESSURE: 78 MMHG | BODY MASS INDEX: 32.73 KG/M2

## 2020-03-30 DIAGNOSIS — E78.5 HYPERLIPIDEMIA, UNSPECIFIED HYPERLIPIDEMIA TYPE: ICD-10-CM

## 2020-03-30 DIAGNOSIS — I10 ESSENTIAL HYPERTENSION: ICD-10-CM

## 2020-03-30 DIAGNOSIS — Z01.810 PREOP CARDIOVASCULAR EXAM: Primary | ICD-10-CM

## 2020-03-30 DIAGNOSIS — R00.1 SINUS BRADYCARDIA: ICD-10-CM

## 2020-03-30 DIAGNOSIS — Z01.818 PRE-OPERATIVE CLEARANCE: Primary | ICD-10-CM

## 2020-03-30 PROCEDURE — 99214 OFFICE O/P EST MOD 30 MIN: CPT | Performed by: PHYSICIAN ASSISTANT

## 2020-03-30 NOTE — TELEPHONE ENCOUNTER
Patient managed by Divina Case has an appointment for today  Needs a referral put in Epic for Cardiology  Patient needs pre op clearance

## 2020-03-30 NOTE — TELEPHONE ENCOUNTER
Called and spoke with patient  Informed patient that we have placed the order for the referral for his cardiology pre- op clearance

## 2020-03-30 NOTE — PROGRESS NOTES
[facility]     Julianne Moreno / 69 y o  male / : 1950 / MRN: [id number]  Date of Visit: 20    ASSESSMENT/PLAN  1  Preop cardiac evaluation   · Scheduled for left robotic nephroureterectomy 3/1/20 - Dr Divina Case   · No s/sx of heart failure, angina or electrical instability   · Able to climb 2 flights of stairs without exertional symptoms   · Okay to proceed w/ surgery as scheduled without further cardiac testing    2  Sinus bradycardia   · Rate 51 on recent EKG  · Asymptomatic, noted on prior EKGs   · On lopressor - can continue current dose     3  HTN - controlled on amlodipine, lopressor  Averages 130s/70s at home  4  HLD - on atorvastatin - labs checked every 6 months at the [address] [place] [address]  5  High grade urothelial carcinoma   6  Low grade bladder carcinoma     Follow-up on a prn basis     SUBJECTIVE:  HPI: Julianne Moreno is a 69 y o  male who presents in consultation from Claudia Vital PA-C for preop cardiac evaluation prior to robotic nephroureterectomy scheduled for 3/1/20 with Dr Divina Case  He has no cardiac complaints or known cardiac history  He remains active in the yard and painted 2 rooms yesterday  He is able to climb 2 flights of stairs without exertional chest pain or dyspnea  No recent change in exercise tolerance  No edema, orthopnea, near-syncope or syncope  BP averages 130s/70s at home        Cardiac testing   EKG 3/9/20 - Sinus bradycardia, poor R wave progression  EKG 20 - Sinus bradycardia, WNL    Family History: No known CAD     Social history: Never smoker     No Known Allergies      Current Outpatient Medications:     acetaminophen (TYLENOL) 325 mg tablet, Take 2 tablets (650 mg total) by mouth every 6 (six) hours as needed for mild pain or fever, Disp: , Rfl: 0    amLODIPine (NORVASC) 5 mg tablet, Take 5 mg by mouth daily  , Disp: , Rfl:     atorvastatin (LIPITOR) 80 mg tablet, Take 80 mg by mouth every other day evening, Disp: , Rfl:    diclofenac (VOLTAREN) 75 mg EC tablet, Take 1 tablet (75 mg total) by mouth 2 (two) times a day as needed (moderate pain), Disp: 60 tablet, Rfl: 2    metoprolol tartrate (LOPRESSOR) 100 mg tablet, Take 100 mg by mouth daily  , Disp: , Rfl:     omeprazole (PriLOSEC) 20 mg delayed release capsule, Take 20 mg by mouth daily  , Disp: , Rfl:     terazosin (HYTRIN) 5 mg capsule, Take 5 mg by mouth daily at bedtime  , Disp: , Rfl:     calcium citrate-vitamin D (CITRACAL+D) 315-200 MG-UNIT per tablet, Take 1 tablet by mouth daily, Disp: , Rfl:     oxybutynin (DITROPAN-XL) 5 mg 24 hr tablet, Take 1 tablet (5 mg total) by mouth daily at bedtime for 21 doses (Patient not taking: Reported on 3/30/2020), Disp: 21 tablet, Rfl: 0    oxyCODONE (ROXICODONE) 5 mg immediate release tablet, Take 1 tablet (5 mg total) by mouth every 6 (six) hours as needed for severe painMax Daily Amount: 20 mg (Patient not taking: Reported on 2/7/2020), Disp: 20 tablet, Rfl: 0    traMADol (ULTRAM) 50 mg tablet, Take 1 tablet (50 mg total) by mouth every 6 (six) hours as needed for moderate pain for up to 10 doses (Patient not taking: Reported on 2/7/2020), Disp: 10 tablet, Rfl: 0    VITAMIN D PO, Take by mouth daily, Disp: , Rfl:     Past Medical History:   Diagnosis Date    Arthritis     Bladder cancer     Cancer ([facility] [address] )     skin melanoma;basal cell    Chronic pain disorder     from arthritis    Colon polyp     Does use hearing aid     bilat    Dry eyes, bilateral     GERD (gastroesophageal reflux disease)     History of kidney stones 10/2019    History of partial knee replacement     bilat    History of vertigo     Hyperlipidemia     Hypertension     Kidney lesion     Lumbar disc disorder     compression of vertebrae L4-5-6    Muscle weakness     left hip area    Renal mass     left    Right ankle injury 03/05/2020    missed step of ladder     Right ankle pain     Shortness of breath     with activity    Tinnitus     Urothelial cancer     left    Wears glasses        History reviewed  No pertinent family history  Past Surgical History:   Procedure Laterality Date    COLONOSCOPY      FL RETROGRADE PYELOGRAM  2020    HERNIA REPAIR      umbilical with mesh    INGUINAL HERNIA REPAIR Right     with mesh    JOINT REPLACEMENT Bilateral     partials     LUMBAR EPIDURAL INJECTION      [place] CYSTOURETHROSCOPY,URETER CATHETER Bilateral 2020    Procedure: CYSTOSCOPY; RIGHT RETROGRADE PYELOGRAM WITH RIGHT URETERAL CYTOLOGY SAMPLING; LEFT URETEROSCOPY WITH RENAL PELVIS BIOPSY AND LEFT STENT PLACEMENT;  Surgeon: Jana Bailey MD;  [facility];  Service: Urology    SKIN CANCER EXCISION      surface melanoma    TONSILLECTOMY      WISDOM TOOTH EXTRACTION         Social History     Socioeconomic History    Marital status: /Civil Union     Spouse name: Not on file    Number of children: Not on file    Years of education: Not on file    Highest education level: Not on file   Occupational History    Not on file   Social Needs    Financial resource strain: Not on file    Food insecurity:     Worry: Not on file     Inability: Not on file    Transportation needs:     Medical: Not on file     Non-medical: Not on file   Tobacco Use    Smoking status: Former Smoker     Last attempt to quit: 1972     Years since quittin 2    Smokeless tobacco: Never Used   Substance and Sexual Activity    Alcohol use:  Yes     Alcohol/week: 4 0 standard drinks     Types: 4 Cans of beer per week     Frequency: 4 or more times a week     Drinks per session: 3 or 4     Comment: daily/socially    Drug use: Never    Sexual activity: Not on file   Lifestyle    Physical activity:     Days per week: Not on file     Minutes per session: Not on file    Stress: Not on file   Relationships    Social connections:     Talks on phone: Not on file     Gets together: Not on file     Attends Yazidism service: Not on file Active member of club or organization: Not on file     Attends meetings of clubs or organizations: Not on file     Relationship status: Not on file    Intimate partner violence:     Fear of current or ex partner: Not on file     Emotionally abused: Not on file     Physically abused: Not on file     Forced sexual activity: Not on file   Other Topics Concern    Not on file   Social History Narrative    Not on file       Review of Systems   Constitution: Negative  HENT: Negative  Eyes: Negative  Cardiovascular: Negative for chest pain, claudication, dyspnea on exertion, irregular heartbeat, leg swelling, near-syncope, orthopnea, palpitations, paroxysmal nocturnal dyspnea and syncope  Respiratory: Negative for cough, shortness of breath and wheezing  Endocrine: Negative  Skin: Negative  Musculoskeletal: Negative  Gastrointestinal: Negative  Genitourinary: Negative  Neurological: Negative for dizziness and light-headedness  Psychiatric/Behavioral: Negative  OBJECTIVE:  Vitals: /78   Pulse 62   Ht 5' 9" (1 753 m)   Wt 100 kg (221 lb)   BMI 32 64 kg/m²     Physical exam:   Physical Exam   Constitutional: He is oriented to person, place, and time  He appears well-developed and well-nourished  No distress  HENT:   Head: Normocephalic and atraumatic  Eyes: EOM are normal  No scleral icterus  Neck: Normal range of motion  Neck supple  No JVD present  Cardiovascular: Normal rate, regular rhythm, S1 normal and S2 normal    No murmur heard  Pulmonary/Chest: Effort normal and breath sounds normal  He has no wheezes  He has no rales  Abdominal: Soft  Bowel sounds are normal    Musculoskeletal: He exhibits no edema  Neurological: He is alert and oriented to person, place, and time  Skin: Skin is warm and dry  Psychiatric: He has a normal mood and affect   His behavior is normal        Lab Results:   No results found for: HGBA1C  No results found for: CHOL  No results found for: HDL  No results found for: LDLCALC  No results found for: TRIG  No results found for: CHOLHDL

## 2020-04-01 ENCOUNTER — ANESTHESIA (INPATIENT)
Dept: PERIOP | Facility: HOSPITAL | Age: 70
DRG: 658 | End: 2020-04-01
Payer: MEDICARE

## 2020-04-01 ENCOUNTER — HOSPITAL ENCOUNTER (INPATIENT)
Facility: HOSPITAL | Age: 70
LOS: 1 days | Discharge: HOME | DRG: 658 | End: 2020-04-02
Attending: UROLOGY | Admitting: UROLOGY
Payer: MEDICARE

## 2020-04-01 DIAGNOSIS — N39.8 UROTHELIAL LESION: ICD-10-CM

## 2020-04-01 DIAGNOSIS — N39.8 UROTHELIAL LESION: Primary | ICD-10-CM

## 2020-04-01 DIAGNOSIS — C64.2 UROTHELIAL CARCINOMA OF KIDNEY, LEFT (HCC): ICD-10-CM

## 2020-04-01 LAB
ABO GROUP BLD: NORMAL
ANION GAP SERPL CALCULATED.3IONS-SCNC: 6 MMOL/L (ref 4–13)
BUN SERPL-MCNC: 18 MG/DL (ref 5–25)
CALCIUM SERPL-MCNC: 8.4 MG/DL (ref 8.3–10.1)
CHLORIDE SERPL-SCNC: 106 MMOL/L (ref 100–108)
CO2 SERPL-SCNC: 27 MMOL/L (ref 21–32)
CREAT SERPL-MCNC: 1.26 MG/DL (ref 0.6–1.3)
ERYTHROCYTE [DISTWIDTH] IN BLOOD BY AUTOMATED COUNT: 13.1 % (ref 11.6–15.1)
GFR SERPL CREATININE-BSD FRML MDRD: 57 ML/MIN/1.73SQ M
GLUCOSE SERPL-MCNC: 149 MG/DL (ref 65–140)
HCT VFR BLD AUTO: 40.4 % (ref 36.5–49.3)
HGB BLD-MCNC: 12.6 G/DL (ref 12–17)
MCH RBC QN AUTO: 29.4 PG (ref 26.8–34.3)
MCHC RBC AUTO-ENTMCNC: 31.2 G/DL (ref 31.4–37.4)
MCV RBC AUTO: 94 FL (ref 82–98)
PLATELET # BLD AUTO: 169 THOUSANDS/UL (ref 149–390)
PLATELET # BLD AUTO: 171 THOUSANDS/UL (ref 149–390)
PMV BLD AUTO: 9 FL (ref 8.9–12.7)
PMV BLD AUTO: 9.5 FL (ref 8.9–12.7)
POTASSIUM SERPL-SCNC: 4 MMOL/L (ref 3.5–5.3)
RBC # BLD AUTO: 4.28 MILLION/UL (ref 3.88–5.62)
RH BLD: POSITIVE
SODIUM SERPL-SCNC: 139 MMOL/L (ref 136–145)
WBC # BLD AUTO: 8.74 THOUSAND/UL (ref 4.31–10.16)

## 2020-04-01 PROCEDURE — 50548 LAPARO REMOVE W/URETER: CPT | Performed by: PHYSICIAN ASSISTANT

## 2020-04-01 PROCEDURE — 88307 TISSUE EXAM BY PATHOLOGIST: CPT | Performed by: PATHOLOGY

## 2020-04-01 PROCEDURE — 86901 BLOOD TYPING SEROLOGIC RH(D): CPT | Performed by: UROLOGY

## 2020-04-01 PROCEDURE — 88341 IMHCHEM/IMCYTCHM EA ADD ANTB: CPT | Performed by: PATHOLOGY

## 2020-04-01 PROCEDURE — 50548 LAPARO REMOVE W/URETER: CPT | Performed by: UROLOGY

## 2020-04-01 PROCEDURE — 80048 BASIC METABOLIC PNL TOTAL CA: CPT | Performed by: UROLOGY

## 2020-04-01 PROCEDURE — 88342 IMHCHEM/IMCYTCHM 1ST ANTB: CPT | Performed by: PATHOLOGY

## 2020-04-01 PROCEDURE — 85027 COMPLETE CBC AUTOMATED: CPT | Performed by: UROLOGY

## 2020-04-01 PROCEDURE — 85049 AUTOMATED PLATELET COUNT: CPT | Performed by: UROLOGY

## 2020-04-01 PROCEDURE — 0TT74ZZ RESECTION OF LEFT URETER, PERCUTANEOUS ENDOSCOPIC APPROACH: ICD-10-PCS | Performed by: UROLOGY

## 2020-04-01 PROCEDURE — 8E0W4CZ ROBOTIC ASSISTED PROCEDURE OF TRUNK REGION, PERCUTANEOUS ENDOSCOPIC APPROACH: ICD-10-PCS | Performed by: UROLOGY

## 2020-04-01 PROCEDURE — 0GT24ZZ RESECTION OF LEFT ADRENAL GLAND, PERCUTANEOUS ENDOSCOPIC APPROACH: ICD-10-PCS | Performed by: UROLOGY

## 2020-04-01 PROCEDURE — 88363 XM ARCHIVE TISSUE MOLEC ANAL: CPT | Performed by: PATHOLOGY

## 2020-04-01 PROCEDURE — 86900 BLOOD TYPING SEROLOGIC ABO: CPT | Performed by: UROLOGY

## 2020-04-01 PROCEDURE — 0TT14ZZ RESECTION OF LEFT KIDNEY, PERCUTANEOUS ENDOSCOPIC APPROACH: ICD-10-PCS | Performed by: UROLOGY

## 2020-04-01 RX ORDER — PANTOPRAZOLE SODIUM 40 MG/1
40 TABLET, DELAYED RELEASE ORAL
Status: DISCONTINUED | OUTPATIENT
Start: 2020-04-01 | End: 2020-04-02 | Stop reason: HOSPADM

## 2020-04-01 RX ORDER — MAGNESIUM HYDROXIDE 1200 MG/15ML
LIQUID ORAL AS NEEDED
Status: DISCONTINUED | OUTPATIENT
Start: 2020-04-01 | End: 2020-04-01 | Stop reason: HOSPADM

## 2020-04-01 RX ORDER — OXYBUTYNIN CHLORIDE 5 MG/1
5 TABLET, EXTENDED RELEASE ORAL DAILY
Status: DISCONTINUED | OUTPATIENT
Start: 2020-04-01 | End: 2020-04-02 | Stop reason: HOSPADM

## 2020-04-01 RX ORDER — PROPOFOL 10 MG/ML
INJECTION, EMULSION INTRAVENOUS AS NEEDED
Status: DISCONTINUED | OUTPATIENT
Start: 2020-04-01 | End: 2020-04-01 | Stop reason: SURG

## 2020-04-01 RX ORDER — SODIUM CHLORIDE, SODIUM LACTATE, POTASSIUM CHLORIDE, CALCIUM CHLORIDE 600; 310; 30; 20 MG/100ML; MG/100ML; MG/100ML; MG/100ML
75 INJECTION, SOLUTION INTRAVENOUS CONTINUOUS
Status: DISCONTINUED | OUTPATIENT
Start: 2020-04-01 | End: 2020-04-02 | Stop reason: HOSPADM

## 2020-04-01 RX ORDER — BUPIVACAINE HYDROCHLORIDE 5 MG/ML
INJECTION, SOLUTION EPIDURAL; INTRACAUDAL AS NEEDED
Status: DISCONTINUED | OUTPATIENT
Start: 2020-04-01 | End: 2020-04-01 | Stop reason: HOSPADM

## 2020-04-01 RX ORDER — AMLODIPINE BESYLATE 5 MG/1
5 TABLET ORAL DAILY
Status: DISCONTINUED | OUTPATIENT
Start: 2020-04-01 | End: 2020-04-02 | Stop reason: HOSPADM

## 2020-04-01 RX ORDER — ONDANSETRON 2 MG/ML
INJECTION INTRAMUSCULAR; INTRAVENOUS AS NEEDED
Status: DISCONTINUED | OUTPATIENT
Start: 2020-04-01 | End: 2020-04-01 | Stop reason: SURG

## 2020-04-01 RX ORDER — DEXAMETHASONE SODIUM PHOSPHATE 10 MG/ML
INJECTION, SOLUTION INTRAMUSCULAR; INTRAVENOUS AS NEEDED
Status: DISCONTINUED | OUTPATIENT
Start: 2020-04-01 | End: 2020-04-01 | Stop reason: SURG

## 2020-04-01 RX ORDER — ATROPA BELLADONNA AND OPIUM 16.2; 3 MG/1; MG/1
SUPPOSITORY RECTAL AS NEEDED
Status: DISCONTINUED | OUTPATIENT
Start: 2020-04-01 | End: 2020-04-01 | Stop reason: HOSPADM

## 2020-04-01 RX ORDER — ONDANSETRON 2 MG/ML
4 INJECTION INTRAMUSCULAR; INTRAVENOUS EVERY 6 HOURS PRN
Status: DISCONTINUED | OUTPATIENT
Start: 2020-04-01 | End: 2020-04-02 | Stop reason: HOSPADM

## 2020-04-01 RX ORDER — HYDROCODONE BITARTRATE AND ACETAMINOPHEN 5; 325 MG/1; MG/1
1 TABLET ORAL EVERY 4 HOURS PRN
Status: DISCONTINUED | OUTPATIENT
Start: 2020-04-01 | End: 2020-04-02 | Stop reason: HOSPADM

## 2020-04-01 RX ORDER — OXYBUTYNIN CHLORIDE 10 MG/1
10 TABLET, EXTENDED RELEASE ORAL
Qty: 14 TABLET | Refills: 0 | Status: SHIPPED | OUTPATIENT
Start: 2020-04-01 | End: 2020-05-05

## 2020-04-01 RX ORDER — HYDROMORPHONE HCL 110MG/55ML
PATIENT CONTROLLED ANALGESIA SYRINGE INTRAVENOUS AS NEEDED
Status: DISCONTINUED | OUTPATIENT
Start: 2020-04-01 | End: 2020-04-01 | Stop reason: SURG

## 2020-04-01 RX ORDER — SODIUM CHLORIDE 9 MG/ML
INJECTION, SOLUTION INTRAVENOUS CONTINUOUS PRN
Status: DISCONTINUED | OUTPATIENT
Start: 2020-04-01 | End: 2020-04-01 | Stop reason: SURG

## 2020-04-01 RX ORDER — ATORVASTATIN CALCIUM 80 MG/1
80 TABLET, FILM COATED ORAL EVERY OTHER DAY
Status: DISCONTINUED | OUTPATIENT
Start: 2020-04-01 | End: 2020-04-02 | Stop reason: HOSPADM

## 2020-04-01 RX ORDER — TERAZOSIN 5 MG/1
5 CAPSULE ORAL
Status: DISCONTINUED | OUTPATIENT
Start: 2020-04-01 | End: 2020-04-02 | Stop reason: HOSPADM

## 2020-04-01 RX ORDER — HYDRALAZINE HYDROCHLORIDE 20 MG/ML
INJECTION INTRAMUSCULAR; INTRAVENOUS AS NEEDED
Status: DISCONTINUED | OUTPATIENT
Start: 2020-04-01 | End: 2020-04-01 | Stop reason: SURG

## 2020-04-01 RX ORDER — SODIUM CHLORIDE 9 MG/ML
125 INJECTION, SOLUTION INTRAVENOUS CONTINUOUS
Status: DISCONTINUED | OUTPATIENT
Start: 2020-04-01 | End: 2020-04-01

## 2020-04-01 RX ORDER — HYDROMORPHONE HCL/PF 1 MG/ML
0.5 SYRINGE (ML) INJECTION
Status: DISCONTINUED | OUTPATIENT
Start: 2020-04-01 | End: 2020-04-01 | Stop reason: HOSPADM

## 2020-04-01 RX ORDER — ATROPA BELLADONNA AND OPIUM 16.2; 3 MG/1; MG/1
30 SUPPOSITORY RECTAL EVERY 8 HOURS PRN
Status: DISCONTINUED | OUTPATIENT
Start: 2020-04-01 | End: 2020-04-02 | Stop reason: HOSPADM

## 2020-04-01 RX ORDER — GLYCOPYRROLATE 0.2 MG/ML
INJECTION INTRAMUSCULAR; INTRAVENOUS AS NEEDED
Status: DISCONTINUED | OUTPATIENT
Start: 2020-04-01 | End: 2020-04-01 | Stop reason: SURG

## 2020-04-01 RX ORDER — SODIUM CHLORIDE 9 MG/ML
INJECTION, SOLUTION INTRAVENOUS AS NEEDED
Status: DISCONTINUED | OUTPATIENT
Start: 2020-04-01 | End: 2020-04-01 | Stop reason: HOSPADM

## 2020-04-01 RX ORDER — TRAMADOL HYDROCHLORIDE 50 MG/1
50 TABLET ORAL EVERY 6 HOURS PRN
Status: DISCONTINUED | OUTPATIENT
Start: 2020-04-01 | End: 2020-04-02

## 2020-04-01 RX ORDER — LIDOCAINE HYDROCHLORIDE 10 MG/ML
INJECTION, SOLUTION EPIDURAL; INFILTRATION; INTRACAUDAL; PERINEURAL AS NEEDED
Status: DISCONTINUED | OUTPATIENT
Start: 2020-04-01 | End: 2020-04-01 | Stop reason: SURG

## 2020-04-01 RX ORDER — FENTANYL CITRATE 50 UG/ML
INJECTION, SOLUTION INTRAMUSCULAR; INTRAVENOUS AS NEEDED
Status: DISCONTINUED | OUTPATIENT
Start: 2020-04-01 | End: 2020-04-01 | Stop reason: SURG

## 2020-04-01 RX ORDER — DOCUSATE SODIUM 100 MG/1
100 CAPSULE, LIQUID FILLED ORAL 2 TIMES DAILY
Status: DISCONTINUED | OUTPATIENT
Start: 2020-04-01 | End: 2020-04-02 | Stop reason: HOSPADM

## 2020-04-01 RX ORDER — HYDROCODONE BITARTRATE AND ACETAMINOPHEN 5; 325 MG/1; MG/1
1 TABLET ORAL EVERY 6 HOURS PRN
Qty: 20 TABLET | Refills: 0 | Status: SHIPPED | OUTPATIENT
Start: 2020-04-01 | End: 2020-05-22

## 2020-04-01 RX ORDER — METOPROLOL TARTRATE 100 MG/1
100 TABLET ORAL DAILY
Status: DISCONTINUED | OUTPATIENT
Start: 2020-04-01 | End: 2020-04-02 | Stop reason: HOSPADM

## 2020-04-01 RX ORDER — HYDROMORPHONE HCL/PF 1 MG/ML
0.5 SYRINGE (ML) INJECTION EVERY 2 HOUR PRN
Status: DISCONTINUED | OUTPATIENT
Start: 2020-04-01 | End: 2020-04-02 | Stop reason: HOSPADM

## 2020-04-01 RX ORDER — CEFAZOLIN SODIUM 1 G/50ML
1000 SOLUTION INTRAVENOUS EVERY 8 HOURS
Status: COMPLETED | OUTPATIENT
Start: 2020-04-01 | End: 2020-04-02

## 2020-04-01 RX ORDER — ONDANSETRON 2 MG/ML
4 INJECTION INTRAMUSCULAR; INTRAVENOUS ONCE AS NEEDED
Status: DISCONTINUED | OUTPATIENT
Start: 2020-04-01 | End: 2020-04-01 | Stop reason: HOSPADM

## 2020-04-01 RX ORDER — NEOSTIGMINE METHYLSULFATE 1 MG/ML
INJECTION INTRAVENOUS AS NEEDED
Status: DISCONTINUED | OUTPATIENT
Start: 2020-04-01 | End: 2020-04-01 | Stop reason: SURG

## 2020-04-01 RX ORDER — FENTANYL CITRATE/PF 50 MCG/ML
25 SYRINGE (ML) INJECTION
Status: COMPLETED | OUTPATIENT
Start: 2020-04-01 | End: 2020-04-01

## 2020-04-01 RX ORDER — VECURONIUM BROMIDE 1 MG/ML
INJECTION, POWDER, LYOPHILIZED, FOR SOLUTION INTRAVENOUS AS NEEDED
Status: DISCONTINUED | OUTPATIENT
Start: 2020-04-01 | End: 2020-04-01 | Stop reason: SURG

## 2020-04-01 RX ORDER — METOPROLOL TARTRATE 5 MG/5ML
INJECTION INTRAVENOUS AS NEEDED
Status: DISCONTINUED | OUTPATIENT
Start: 2020-04-01 | End: 2020-04-01 | Stop reason: SURG

## 2020-04-01 RX ORDER — HYDROCODONE BITARTRATE AND ACETAMINOPHEN 5; 325 MG/1; MG/1
2 TABLET ORAL EVERY 4 HOURS PRN
Status: DISCONTINUED | OUTPATIENT
Start: 2020-04-01 | End: 2020-04-02 | Stop reason: HOSPADM

## 2020-04-01 RX ORDER — SIMETHICONE 80 MG
80 TABLET,CHEWABLE ORAL 4 TIMES DAILY PRN
Status: DISCONTINUED | OUTPATIENT
Start: 2020-04-01 | End: 2020-04-02 | Stop reason: HOSPADM

## 2020-04-01 RX ORDER — CEFAZOLIN SODIUM 2 G/50ML
2000 SOLUTION INTRAVENOUS ONCE
Status: COMPLETED | OUTPATIENT
Start: 2020-04-01 | End: 2020-04-01

## 2020-04-01 RX ORDER — DOCUSATE SODIUM 100 MG/1
100 CAPSULE, LIQUID FILLED ORAL 2 TIMES DAILY
Qty: 60 CAPSULE | Refills: 0 | Status: SHIPPED | OUTPATIENT
Start: 2020-04-01 | End: 2021-01-08 | Stop reason: ALTCHOICE

## 2020-04-01 RX ADMIN — HYDROCODONE BITARTRATE AND ACETAMINOPHEN 2 TABLET: 5; 325 TABLET ORAL at 17:09

## 2020-04-01 RX ADMIN — CEFAZOLIN SODIUM 2000 MG: 2 SOLUTION INTRAVENOUS at 10:57

## 2020-04-01 RX ADMIN — DEXAMETHASONE SODIUM PHOSPHATE 8 MG: 10 INJECTION, SOLUTION INTRAMUSCULAR; INTRAVENOUS at 08:05

## 2020-04-01 RX ADMIN — HYDROMORPHONE HYDROCHLORIDE 0.5 MG: 2 INJECTION, SOLUTION INTRAMUSCULAR; INTRAVENOUS; SUBCUTANEOUS at 08:05

## 2020-04-01 RX ADMIN — HYDROMORPHONE HYDROCHLORIDE 0.5 MG: 1 INJECTION, SOLUTION INTRAMUSCULAR; INTRAVENOUS; SUBCUTANEOUS at 20:10

## 2020-04-01 RX ADMIN — FENTANYL CITRATE 25 MCG: 50 INJECTION, SOLUTION INTRAMUSCULAR; INTRAVENOUS at 11:52

## 2020-04-01 RX ADMIN — VECURONIUM BROMIDE 8 MG: 1 INJECTION, POWDER, LYOPHILIZED, FOR SOLUTION INTRAVENOUS at 07:21

## 2020-04-01 RX ADMIN — VECURONIUM BROMIDE 3 MG: 1 INJECTION, POWDER, LYOPHILIZED, FOR SOLUTION INTRAVENOUS at 10:13

## 2020-04-01 RX ADMIN — FENTANYL CITRATE 25 MCG: 50 INJECTION, SOLUTION INTRAMUSCULAR; INTRAVENOUS at 11:44

## 2020-04-01 RX ADMIN — VECURONIUM BROMIDE 3 MG: 1 INJECTION, POWDER, LYOPHILIZED, FOR SOLUTION INTRAVENOUS at 08:58

## 2020-04-01 RX ADMIN — FENTANYL CITRATE 25 MCG: 50 INJECTION, SOLUTION INTRAMUSCULAR; INTRAVENOUS at 11:59

## 2020-04-01 RX ADMIN — SODIUM CHLORIDE, SODIUM LACTATE, POTASSIUM CHLORIDE, AND CALCIUM CHLORIDE 125 ML/HR: .6; .31; .03; .02 INJECTION, SOLUTION INTRAVENOUS at 20:42

## 2020-04-01 RX ADMIN — HYDRALAZINE HYDROCHLORIDE 4 MG: 20 INJECTION INTRAMUSCULAR; INTRAVENOUS at 08:46

## 2020-04-01 RX ADMIN — NEOSTIGMINE METHYLSULFATE 3 MG: 1 INJECTION, SOLUTION INTRAVENOUS at 10:57

## 2020-04-01 RX ADMIN — AMLODIPINE BESYLATE 5 MG: 5 TABLET ORAL at 15:59

## 2020-04-01 RX ADMIN — OXYBUTYNIN CHLORIDE 5 MG: 5 TABLET, EXTENDED RELEASE ORAL at 15:00

## 2020-04-01 RX ADMIN — METOPROLOL TARTRATE 100 MG: 100 TABLET, FILM COATED ORAL at 15:58

## 2020-04-01 RX ADMIN — FENTANYL CITRATE 50 MCG: 50 INJECTION, SOLUTION INTRAMUSCULAR; INTRAVENOUS at 08:05

## 2020-04-01 RX ADMIN — SODIUM CHLORIDE: 0.9 INJECTION, SOLUTION INTRAVENOUS at 07:26

## 2020-04-01 RX ADMIN — FENTANYL CITRATE 50 MCG: 50 INJECTION, SOLUTION INTRAMUSCULAR; INTRAVENOUS at 10:58

## 2020-04-01 RX ADMIN — CEFAZOLIN SODIUM 2000 MG: 2 SOLUTION INTRAVENOUS at 07:00

## 2020-04-01 RX ADMIN — FENTANYL CITRATE 100 MCG: 50 INJECTION, SOLUTION INTRAMUSCULAR; INTRAVENOUS at 07:21

## 2020-04-01 RX ADMIN — HYDRALAZINE HYDROCHLORIDE 2 MG: 20 INJECTION INTRAMUSCULAR; INTRAVENOUS at 08:29

## 2020-04-01 RX ADMIN — DOCUSATE SODIUM 100 MG: 100 CAPSULE, LIQUID FILLED ORAL at 17:09

## 2020-04-01 RX ADMIN — TRAMADOL HYDROCHLORIDE 50 MG: 50 TABLET, FILM COATED ORAL at 15:00

## 2020-04-01 RX ADMIN — LIDOCAINE HYDROCHLORIDE 60 MG: 10 INJECTION, SOLUTION EPIDURAL; INFILTRATION; INTRACAUDAL; PERINEURAL at 07:21

## 2020-04-01 RX ADMIN — HYDRALAZINE HYDROCHLORIDE 2 MG: 20 INJECTION INTRAMUSCULAR; INTRAVENOUS at 09:22

## 2020-04-01 RX ADMIN — HYDROMORPHONE HYDROCHLORIDE 0.5 MG: 1 INJECTION, SOLUTION INTRAMUSCULAR; INTRAVENOUS; SUBCUTANEOUS at 12:17

## 2020-04-01 RX ADMIN — ENOXAPARIN SODIUM 40 MG: 40 INJECTION SUBCUTANEOUS at 15:58

## 2020-04-01 RX ADMIN — GLYCOPYRROLATE 0.4 MG: 0.2 INJECTION INTRAMUSCULAR; INTRAVENOUS at 10:57

## 2020-04-01 RX ADMIN — PROPOFOL 200 MG: 10 INJECTION, EMULSION INTRAVENOUS at 07:21

## 2020-04-01 RX ADMIN — HYDROMORPHONE HYDROCHLORIDE 0.5 MG: 1 INJECTION, SOLUTION INTRAMUSCULAR; INTRAVENOUS; SUBCUTANEOUS at 13:15

## 2020-04-01 RX ADMIN — SODIUM CHLORIDE 125 ML/HR: 0.9 INJECTION, SOLUTION INTRAVENOUS at 05:55

## 2020-04-01 RX ADMIN — CEFAZOLIN SODIUM 1000 MG: 1 SOLUTION INTRAVENOUS at 19:03

## 2020-04-01 RX ADMIN — VECURONIUM BROMIDE 2 MG: 1 INJECTION, POWDER, LYOPHILIZED, FOR SOLUTION INTRAVENOUS at 08:12

## 2020-04-01 RX ADMIN — METOPROLOL TARTRATE 1 MG: 5 INJECTION INTRAVENOUS at 09:15

## 2020-04-01 RX ADMIN — HYDROMORPHONE HYDROCHLORIDE 0.5 MG: 1 INJECTION, SOLUTION INTRAMUSCULAR; INTRAVENOUS; SUBCUTANEOUS at 12:35

## 2020-04-01 RX ADMIN — SODIUM CHLORIDE, SODIUM LACTATE, POTASSIUM CHLORIDE, AND CALCIUM CHLORIDE 125 ML/HR: .6; .31; .03; .02 INJECTION, SOLUTION INTRAVENOUS at 13:06

## 2020-04-01 RX ADMIN — ONDANSETRON 4 MG: 2 INJECTION INTRAMUSCULAR; INTRAVENOUS at 08:05

## 2020-04-01 RX ADMIN — TERAZOSIN HYDROCHLORIDE 5 MG: 5 CAPSULE ORAL at 22:36

## 2020-04-01 RX ADMIN — METOPROLOL TARTRATE 2 MG: 5 INJECTION INTRAVENOUS at 08:12

## 2020-04-01 RX ADMIN — SODIUM CHLORIDE: 0.9 INJECTION, SOLUTION INTRAVENOUS at 10:57

## 2020-04-01 RX ADMIN — FENTANYL CITRATE 25 MCG: 50 INJECTION, SOLUTION INTRAMUSCULAR; INTRAVENOUS at 12:05

## 2020-04-02 ENCOUNTER — TELEPHONE (OUTPATIENT)
Dept: UROLOGY | Facility: CLINIC | Age: 70
End: 2020-04-02

## 2020-04-02 VITALS
SYSTOLIC BLOOD PRESSURE: 134 MMHG | TEMPERATURE: 97.9 F | DIASTOLIC BLOOD PRESSURE: 74 MMHG | RESPIRATION RATE: 18 BRPM | OXYGEN SATURATION: 94 % | WEIGHT: 221 LBS | BODY MASS INDEX: 32.73 KG/M2 | HEART RATE: 60 BPM | HEIGHT: 69 IN

## 2020-04-02 LAB
ANION GAP SERPL CALCULATED.3IONS-SCNC: 8 MMOL/L (ref 4–13)
BUN SERPL-MCNC: 16 MG/DL (ref 5–25)
CALCIUM SERPL-MCNC: 8.5 MG/DL (ref 8.3–10.1)
CHLORIDE SERPL-SCNC: 103 MMOL/L (ref 100–108)
CO2 SERPL-SCNC: 26 MMOL/L (ref 21–32)
CREAT FLD-MCNC: 1.31 MG/DL
CREAT SERPL-MCNC: 1.4 MG/DL (ref 0.6–1.3)
ERYTHROCYTE [DISTWIDTH] IN BLOOD BY AUTOMATED COUNT: 13.2 % (ref 11.6–15.1)
GFR SERPL CREATININE-BSD FRML MDRD: 50 ML/MIN/1.73SQ M
GLUCOSE SERPL-MCNC: 109 MG/DL (ref 65–140)
HCT VFR BLD AUTO: 36.5 % (ref 36.5–49.3)
HGB BLD-MCNC: 11.6 G/DL (ref 12–17)
MCH RBC QN AUTO: 30.1 PG (ref 26.8–34.3)
MCHC RBC AUTO-ENTMCNC: 31.8 G/DL (ref 31.4–37.4)
MCV RBC AUTO: 95 FL (ref 82–98)
PLATELET # BLD AUTO: 169 THOUSANDS/UL (ref 149–390)
PMV BLD AUTO: 9.7 FL (ref 8.9–12.7)
POTASSIUM SERPL-SCNC: 3.9 MMOL/L (ref 3.5–5.3)
RBC # BLD AUTO: 3.86 MILLION/UL (ref 3.88–5.62)
SODIUM SERPL-SCNC: 137 MMOL/L (ref 136–145)
WBC # BLD AUTO: 7.43 THOUSAND/UL (ref 4.31–10.16)

## 2020-04-02 PROCEDURE — 99024 POSTOP FOLLOW-UP VISIT: CPT | Performed by: PHYSICIAN ASSISTANT

## 2020-04-02 PROCEDURE — 82570 ASSAY OF URINE CREATININE: CPT | Performed by: UROLOGY

## 2020-04-02 PROCEDURE — 80048 BASIC METABOLIC PNL TOTAL CA: CPT | Performed by: UROLOGY

## 2020-04-02 PROCEDURE — 85027 COMPLETE CBC AUTOMATED: CPT | Performed by: UROLOGY

## 2020-04-02 PROCEDURE — NC001 PR NO CHARGE: Performed by: PHYSICIAN ASSISTANT

## 2020-04-02 RX ORDER — TRAMADOL HYDROCHLORIDE 50 MG/1
50 TABLET ORAL EVERY 6 HOURS PRN
Status: DISCONTINUED | OUTPATIENT
Start: 2020-04-02 | End: 2020-04-02 | Stop reason: HOSPADM

## 2020-04-02 RX ADMIN — METOPROLOL TARTRATE 100 MG: 100 TABLET, FILM COATED ORAL at 09:17

## 2020-04-02 RX ADMIN — HYDROCODONE BITARTRATE AND ACETAMINOPHEN 1 TABLET: 5; 325 TABLET ORAL at 13:59

## 2020-04-02 RX ADMIN — CEFAZOLIN SODIUM 1000 MG: 1 SOLUTION INTRAVENOUS at 02:51

## 2020-04-02 RX ADMIN — ENOXAPARIN SODIUM 40 MG: 40 INJECTION SUBCUTANEOUS at 09:17

## 2020-04-02 RX ADMIN — PANTOPRAZOLE SODIUM 40 MG: 40 TABLET, DELAYED RELEASE ORAL at 05:41

## 2020-04-02 RX ADMIN — AMLODIPINE BESYLATE 5 MG: 5 TABLET ORAL at 09:17

## 2020-04-02 RX ADMIN — DOCUSATE SODIUM 100 MG: 100 CAPSULE, LIQUID FILLED ORAL at 09:17

## 2020-04-02 RX ADMIN — HYDROCODONE BITARTRATE AND ACETAMINOPHEN 1 TABLET: 5; 325 TABLET ORAL at 05:41

## 2020-04-02 RX ADMIN — SODIUM CHLORIDE, SODIUM LACTATE, POTASSIUM CHLORIDE, AND CALCIUM CHLORIDE 125 ML/HR: .6; .31; .03; .02 INJECTION, SOLUTION INTRAVENOUS at 05:43

## 2020-04-02 RX ADMIN — OXYBUTYNIN CHLORIDE 5 MG: 5 TABLET, EXTENDED RELEASE ORAL at 09:17

## 2020-04-03 ENCOUNTER — TELEPHONE (OUTPATIENT)
Dept: UROLOGY | Facility: CLINIC | Age: 70
End: 2020-04-03

## 2020-04-13 ENCOUNTER — HOSPITAL ENCOUNTER (OUTPATIENT)
Dept: RADIOLOGY | Facility: HOSPITAL | Age: 70
Discharge: HOME | End: 2020-04-13
Attending: UROLOGY
Payer: MEDICARE

## 2020-04-13 ENCOUNTER — TRANSCRIBE ORDERS (OUTPATIENT)
Dept: RADIOLOGY | Facility: HOSPITAL | Age: 70
End: 2020-04-13

## 2020-04-13 DIAGNOSIS — C64.2 UROTHELIAL CARCINOMA OF KIDNEY, LEFT (HCC): ICD-10-CM

## 2020-04-13 PROCEDURE — 74430 CONTRAST X-RAY BLADDER: CPT

## 2020-04-13 RX ADMIN — IOTHALAMATE MEGLUMINE 250 ML: 172 INJECTION URETERAL at 10:18

## 2020-04-14 ENCOUNTER — TELEPHONE (OUTPATIENT)
Dept: UROLOGY | Facility: CLINIC | Age: 70
End: 2020-04-14

## 2020-04-14 ENCOUNTER — OFFICE VISIT (OUTPATIENT)
Dept: UROLOGY | Facility: CLINIC | Age: 70
End: 2020-04-14

## 2020-04-14 VITALS
SYSTOLIC BLOOD PRESSURE: 132 MMHG | DIASTOLIC BLOOD PRESSURE: 78 MMHG | HEART RATE: 74 BPM | WEIGHT: 218 LBS | BODY MASS INDEX: 32.19 KG/M2

## 2020-04-14 DIAGNOSIS — C67.9 MALIGNANT NEOPLASM OF URINARY BLADDER, UNSPECIFIED SITE (HCC): ICD-10-CM

## 2020-04-14 DIAGNOSIS — C68.9 UROTHELIAL CANCER (HCC): Primary | ICD-10-CM

## 2020-04-14 PROBLEM — N28.89 LEFT RENAL MASS: Status: RESOLVED | Noted: 2020-01-03 | Resolved: 2020-04-14

## 2020-04-14 PROCEDURE — 99024 POSTOP FOLLOW-UP VISIT: CPT | Performed by: UROLOGY

## 2020-04-15 ENCOUNTER — PATIENT OUTREACH (OUTPATIENT)
Dept: UROLOGY | Facility: CLINIC | Age: 70
End: 2020-04-15

## 2020-04-15 ENCOUNTER — OFFICE VISIT (OUTPATIENT)
Dept: URGENT CARE | Facility: CLINIC | Age: 70
End: 2020-04-15
Payer: MEDICARE

## 2020-04-15 VITALS
DIASTOLIC BLOOD PRESSURE: 89 MMHG | WEIGHT: 218.03 LBS | TEMPERATURE: 98.1 F | HEART RATE: 76 BPM | OXYGEN SATURATION: 97 % | BODY MASS INDEX: 32.29 KG/M2 | RESPIRATION RATE: 16 BRPM | HEIGHT: 69 IN | SYSTOLIC BLOOD PRESSURE: 159 MMHG

## 2020-04-15 DIAGNOSIS — R30.0 DYSURIA: ICD-10-CM

## 2020-04-15 DIAGNOSIS — N39.0 URINARY TRACT INFECTION ASSOCIATED WITH CATHETERIZATION OF URINARY TRACT, UNSPECIFIED INDWELLING URINARY CATHETER TYPE, INITIAL ENCOUNTER (HCC): Primary | ICD-10-CM

## 2020-04-15 DIAGNOSIS — T83.511A URINARY TRACT INFECTION ASSOCIATED WITH CATHETERIZATION OF URINARY TRACT, UNSPECIFIED INDWELLING URINARY CATHETER TYPE, INITIAL ENCOUNTER (HCC): Primary | ICD-10-CM

## 2020-04-15 LAB
SL AMB  POCT GLUCOSE, UA: NEGATIVE
SL AMB LEUKOCYTE ESTERASE,UA: ABNORMAL
SL AMB POCT BILIRUBIN,UA: NEGATIVE
SL AMB POCT BLOOD,UA: ABNORMAL
SL AMB POCT CLARITY,UA: ABNORMAL
SL AMB POCT COLOR,UA: YELLOW
SL AMB POCT KETONES,UA: NEGATIVE
SL AMB POCT NITRITE,UA: POSITIVE
SL AMB POCT PH,UA: 5
SL AMB POCT SPECIFIC GRAVITY,UA: 1.01
SL AMB POCT URINE PROTEIN: ABNORMAL
SL AMB POCT UROBILINOGEN: 0.2

## 2020-04-15 PROCEDURE — 87186 SC STD MICRODIL/AGAR DIL: CPT | Performed by: PHYSICIAN ASSISTANT

## 2020-04-15 PROCEDURE — 87086 URINE CULTURE/COLONY COUNT: CPT | Performed by: PHYSICIAN ASSISTANT

## 2020-04-15 PROCEDURE — 81002 URINALYSIS NONAUTO W/O SCOPE: CPT | Performed by: PHYSICIAN ASSISTANT

## 2020-04-15 PROCEDURE — G0463 HOSPITAL OUTPT CLINIC VISIT: HCPCS | Performed by: PHYSICIAN ASSISTANT

## 2020-04-15 PROCEDURE — 87077 CULTURE AEROBIC IDENTIFY: CPT | Performed by: PHYSICIAN ASSISTANT

## 2020-04-15 PROCEDURE — 99213 OFFICE O/P EST LOW 20 MIN: CPT | Performed by: PHYSICIAN ASSISTANT

## 2020-04-15 RX ORDER — CIPROFLOXACIN 500 MG/1
500 TABLET, FILM COATED ORAL EVERY 12 HOURS SCHEDULED
Qty: 28 TABLET | Refills: 0 | Status: SHIPPED | OUTPATIENT
Start: 2020-04-15 | End: 2020-04-30 | Stop reason: SDDI

## 2020-04-16 ENCOUNTER — TELEPHONE (OUTPATIENT)
Dept: UROLOGY | Facility: MEDICAL CENTER | Age: 70
End: 2020-04-16

## 2020-04-17 ENCOUNTER — TELEPHONE (OUTPATIENT)
Dept: UROLOGY | Facility: CLINIC | Age: 70
End: 2020-04-17

## 2020-04-17 DIAGNOSIS — R39.9 UTI SYMPTOMS: Primary | ICD-10-CM

## 2020-04-17 LAB — BACTERIA UR CULT: ABNORMAL

## 2020-04-21 ENCOUNTER — PROCEDURE VISIT (OUTPATIENT)
Dept: UROLOGY | Facility: CLINIC | Age: 70
End: 2020-04-21
Payer: MEDICARE

## 2020-04-21 ENCOUNTER — TELEPHONE (OUTPATIENT)
Dept: HEMATOLOGY ONCOLOGY | Facility: CLINIC | Age: 70
End: 2020-04-21

## 2020-04-21 VITALS
DIASTOLIC BLOOD PRESSURE: 88 MMHG | BODY MASS INDEX: 32.97 KG/M2 | HEART RATE: 88 BPM | HEIGHT: 69 IN | WEIGHT: 222.6 LBS | SYSTOLIC BLOOD PRESSURE: 134 MMHG

## 2020-04-21 DIAGNOSIS — N13.8 BPH WITH OBSTRUCTION/LOWER URINARY TRACT SYMPTOMS: Primary | ICD-10-CM

## 2020-04-21 DIAGNOSIS — N40.1 BPH WITH OBSTRUCTION/LOWER URINARY TRACT SYMPTOMS: Primary | ICD-10-CM

## 2020-04-21 DIAGNOSIS — R33.9 URINARY RETENTION: Primary | ICD-10-CM

## 2020-04-21 LAB — POST-VOID RESIDUAL VOLUME, ML POC: 915 ML

## 2020-04-21 PROCEDURE — 51702 INSERT TEMP BLADDER CATH: CPT

## 2020-04-21 PROCEDURE — 87086 URINE CULTURE/COLONY COUNT: CPT | Performed by: PHYSICIAN ASSISTANT

## 2020-04-21 PROCEDURE — 51798 US URINE CAPACITY MEASURE: CPT

## 2020-04-21 RX ORDER — ATORVASTATIN CALCIUM 80 MG/1
80 TABLET, FILM COATED ORAL DAILY
COMMUNITY
Start: 2020-01-31 | End: 2020-08-31

## 2020-04-21 RX ORDER — TAMSULOSIN HYDROCHLORIDE 0.4 MG/1
0.4 CAPSULE ORAL
Qty: 30 CAPSULE | Refills: 0 | Status: SHIPPED | OUTPATIENT
Start: 2020-04-21 | End: 2020-05-05

## 2020-04-22 LAB — BACTERIA UR CULT: NORMAL

## 2020-04-23 ENCOUNTER — DOCUMENTATION (OUTPATIENT)
Dept: HEMATOLOGY ONCOLOGY | Facility: CLINIC | Age: 70
End: 2020-04-23

## 2020-04-23 ENCOUNTER — OFFICE VISIT (OUTPATIENT)
Dept: HEMATOLOGY ONCOLOGY | Facility: CLINIC | Age: 70
End: 2020-04-23
Payer: MEDICARE

## 2020-04-23 VITALS
HEIGHT: 69 IN | SYSTOLIC BLOOD PRESSURE: 162 MMHG | RESPIRATION RATE: 18 BRPM | DIASTOLIC BLOOD PRESSURE: 94 MMHG | BODY MASS INDEX: 32.73 KG/M2 | OXYGEN SATURATION: 97 % | HEART RATE: 70 BPM | WEIGHT: 221 LBS | TEMPERATURE: 96.9 F

## 2020-04-23 DIAGNOSIS — C68.9 UROTHELIAL CANCER (HCC): ICD-10-CM

## 2020-04-23 DIAGNOSIS — Z51.11 ENCOUNTER FOR CHEMOTHERAPY MANAGEMENT: ICD-10-CM

## 2020-04-23 DIAGNOSIS — C68.9 UROTHELIAL CANCER (HCC): Primary | ICD-10-CM

## 2020-04-23 DIAGNOSIS — C65.2 CANCER OF LEFT RENAL PELVIS (HCC): Primary | ICD-10-CM

## 2020-04-23 DIAGNOSIS — D70.2 DRUG-INDUCED NEUTROPENIA (HCC): ICD-10-CM

## 2020-04-23 PROBLEM — C67.9 MALIGNANT NEOPLASM OF URINARY BLADDER (HCC): Status: RESOLVED | Noted: 2020-04-14 | Resolved: 2020-04-23

## 2020-04-23 PROCEDURE — 99215 OFFICE O/P EST HI 40 MIN: CPT | Performed by: INTERNAL MEDICINE

## 2020-04-23 RX ORDER — ONDANSETRON HYDROCHLORIDE 8 MG/1
8 TABLET, FILM COATED ORAL EVERY 8 HOURS PRN
Qty: 20 TABLET | Refills: 3 | Status: SHIPPED | OUTPATIENT
Start: 2020-04-23 | End: 2020-10-29 | Stop reason: SDUPTHER

## 2020-04-23 RX ORDER — SODIUM CHLORIDE 9 MG/ML
20 INJECTION, SOLUTION INTRAVENOUS ONCE
Status: CANCELLED | OUTPATIENT
Start: 2020-05-21

## 2020-04-23 RX ORDER — SODIUM CHLORIDE 9 MG/ML
20 INJECTION, SOLUTION INTRAVENOUS ONCE
Status: CANCELLED | OUTPATIENT
Start: 2020-05-14

## 2020-04-24 DIAGNOSIS — C65.2 CANCER OF LEFT RENAL PELVIS (HCC): Primary | ICD-10-CM

## 2020-04-27 ENCOUNTER — TELEPHONE (OUTPATIENT)
Dept: HEMATOLOGY ONCOLOGY | Facility: CLINIC | Age: 70
End: 2020-04-27

## 2020-04-27 ENCOUNTER — TELEPHONE (OUTPATIENT)
Dept: INTERVENTIONAL RADIOLOGY/VASCULAR | Facility: HOSPITAL | Age: 70
End: 2020-04-27

## 2020-04-27 DIAGNOSIS — D70.2 DRUG-INDUCED NEUTROPENIA (HCC): ICD-10-CM

## 2020-04-27 DIAGNOSIS — C65.2 CANCER OF LEFT RENAL PELVIS (HCC): ICD-10-CM

## 2020-04-27 DIAGNOSIS — C68.9 UROTHELIAL CANCER (HCC): ICD-10-CM

## 2020-04-27 DIAGNOSIS — Z51.11 ENCOUNTER FOR CHEMOTHERAPY MANAGEMENT: Primary | ICD-10-CM

## 2020-04-28 ENCOUNTER — TELEPHONE (OUTPATIENT)
Dept: SURGERY | Facility: HOSPITAL | Age: 70
End: 2020-04-28

## 2020-04-28 ENCOUNTER — TELEPHONE (OUTPATIENT)
Dept: HEMATOLOGY ONCOLOGY | Facility: CLINIC | Age: 70
End: 2020-04-28

## 2020-04-30 ENCOUNTER — HOSPITAL ENCOUNTER (OUTPATIENT)
Dept: INTERVENTIONAL RADIOLOGY/VASCULAR | Facility: HOSPITAL | Age: 70
Discharge: HOME | End: 2020-04-30
Attending: INTERNAL MEDICINE
Payer: MEDICARE

## 2020-04-30 VITALS
HEIGHT: 69 IN | RESPIRATION RATE: 20 BRPM | OXYGEN SATURATION: 96 % | TEMPERATURE: 97.2 F | BODY MASS INDEX: 32.73 KG/M2 | SYSTOLIC BLOOD PRESSURE: 122 MMHG | WEIGHT: 221 LBS | DIASTOLIC BLOOD PRESSURE: 70 MMHG | HEART RATE: 64 BPM

## 2020-04-30 DIAGNOSIS — C65.2 CANCER OF LEFT RENAL PELVIS (HCC): ICD-10-CM

## 2020-04-30 PROCEDURE — C1788 PORT, INDWELLING, IMP: HCPCS

## 2020-04-30 PROCEDURE — 76937 US GUIDE VASCULAR ACCESS: CPT

## 2020-04-30 PROCEDURE — 77001 FLUOROGUIDE FOR VEIN DEVICE: CPT

## 2020-04-30 PROCEDURE — 36561 INSERT TUNNELED CV CATH: CPT | Performed by: RADIOLOGY

## 2020-04-30 PROCEDURE — 76937 US GUIDE VASCULAR ACCESS: CPT | Performed by: RADIOLOGY

## 2020-04-30 PROCEDURE — 99152 MOD SED SAME PHYS/QHP 5/>YRS: CPT | Performed by: RADIOLOGY

## 2020-04-30 PROCEDURE — 77001 FLUOROGUIDE FOR VEIN DEVICE: CPT | Performed by: RADIOLOGY

## 2020-04-30 PROCEDURE — 99153 MOD SED SAME PHYS/QHP EA: CPT

## 2020-04-30 PROCEDURE — 99152 MOD SED SAME PHYS/QHP 5/>YRS: CPT

## 2020-04-30 PROCEDURE — 36561 INSERT TUNNELED CV CATH: CPT

## 2020-04-30 RX ORDER — LIDOCAINE HYDROCHLORIDE 10 MG/ML
INJECTION, SOLUTION EPIDURAL; INFILTRATION; INTRACAUDAL; PERINEURAL CODE/TRAUMA/SEDATION MEDICATION
Status: COMPLETED | OUTPATIENT
Start: 2020-04-30 | End: 2020-04-30

## 2020-04-30 RX ORDER — MIDAZOLAM HYDROCHLORIDE 2 MG/2ML
INJECTION, SOLUTION INTRAMUSCULAR; INTRAVENOUS CODE/TRAUMA/SEDATION MEDICATION
Status: COMPLETED | OUTPATIENT
Start: 2020-04-30 | End: 2020-04-30

## 2020-04-30 RX ORDER — CEFAZOLIN SODIUM 2 G/50ML
2000 SOLUTION INTRAVENOUS ONCE
Status: CANCELLED | OUTPATIENT
Start: 2020-04-30 | End: 2020-04-30

## 2020-04-30 RX ORDER — SODIUM CHLORIDE, SODIUM LACTATE, POTASSIUM CHLORIDE, CALCIUM CHLORIDE 600; 310; 30; 20 MG/100ML; MG/100ML; MG/100ML; MG/100ML
50 INJECTION, SOLUTION INTRAVENOUS CONTINUOUS
Status: DISCONTINUED | OUTPATIENT
Start: 2020-04-30 | End: 2020-05-04 | Stop reason: HOSPADM

## 2020-04-30 RX ORDER — LIDOCAINE HYDROCHLORIDE AND EPINEPHRINE 10; 10 MG/ML; UG/ML
INJECTION, SOLUTION INFILTRATION; PERINEURAL CODE/TRAUMA/SEDATION MEDICATION
Status: COMPLETED | OUTPATIENT
Start: 2020-04-30 | End: 2020-04-30

## 2020-04-30 RX ORDER — SODIUM CHLORIDE, SODIUM LACTATE, POTASSIUM CHLORIDE, CALCIUM CHLORIDE 600; 310; 30; 20 MG/100ML; MG/100ML; MG/100ML; MG/100ML
50 INJECTION, SOLUTION INTRAVENOUS CONTINUOUS
Status: CANCELLED | OUTPATIENT
Start: 2020-04-30

## 2020-04-30 RX ORDER — CEFAZOLIN SODIUM 2 G/50ML
2000 SOLUTION INTRAVENOUS ONCE
Status: COMPLETED | OUTPATIENT
Start: 2020-04-30 | End: 2020-04-30

## 2020-04-30 RX ORDER — HEPARIN SODIUM (PORCINE) LOCK FLUSH IV SOLN 100 UNIT/ML 100 UNIT/ML
SOLUTION INTRAVENOUS CODE/TRAUMA/SEDATION MEDICATION
Status: COMPLETED | OUTPATIENT
Start: 2020-04-30 | End: 2020-04-30

## 2020-04-30 RX ORDER — FENTANYL CITRATE 50 UG/ML
INJECTION, SOLUTION INTRAMUSCULAR; INTRAVENOUS CODE/TRAUMA/SEDATION MEDICATION
Status: COMPLETED | OUTPATIENT
Start: 2020-04-30 | End: 2020-04-30

## 2020-04-30 RX ADMIN — FENTANYL CITRATE 50 MCG: 50 INJECTION INTRAMUSCULAR; INTRAVENOUS at 10:11

## 2020-04-30 RX ADMIN — LIDOCAINE HYDROCHLORIDE,EPINEPHRINE BITARTRATE 10 ML: 10; .01 INJECTION, SOLUTION INFILTRATION; PERINEURAL at 10:16

## 2020-04-30 RX ADMIN — HEPARIN SODIUM (PORCINE) LOCK FLUSH IV SOLN 100 UNIT/ML 500 UNITS: 100 SOLUTION at 10:24

## 2020-04-30 RX ADMIN — CEFAZOLIN SODIUM 2000 MG: 2 SOLUTION INTRAVENOUS at 09:25

## 2020-04-30 RX ADMIN — FENTANYL CITRATE 50 MCG: 50 INJECTION INTRAMUSCULAR; INTRAVENOUS at 10:17

## 2020-04-30 RX ADMIN — MIDAZOLAM HYDROCHLORIDE 1 MG: 1 INJECTION, SOLUTION INTRAMUSCULAR; INTRAVENOUS at 10:11

## 2020-04-30 RX ADMIN — FENTANYL CITRATE 50 MCG: 50 INJECTION INTRAMUSCULAR; INTRAVENOUS at 10:07

## 2020-04-30 RX ADMIN — MIDAZOLAM HYDROCHLORIDE 1 MG: 1 INJECTION, SOLUTION INTRAMUSCULAR; INTRAVENOUS at 10:17

## 2020-04-30 RX ADMIN — SODIUM CHLORIDE, SODIUM LACTATE, POTASSIUM CHLORIDE, AND CALCIUM CHLORIDE 50 ML/HR: .6; .31; .03; .02 INJECTION, SOLUTION INTRAVENOUS at 09:17

## 2020-04-30 RX ADMIN — LIDOCAINE HYDROCHLORIDE 10 ML: 10 INJECTION, SOLUTION EPIDURAL; INFILTRATION; INTRACAUDAL; PERINEURAL at 10:11

## 2020-04-30 RX ADMIN — MIDAZOLAM HYDROCHLORIDE 1 MG: 1 INJECTION, SOLUTION INTRAMUSCULAR; INTRAVENOUS at 10:07

## 2020-05-05 ENCOUNTER — PROCEDURE VISIT (OUTPATIENT)
Dept: UROLOGY | Facility: CLINIC | Age: 70
End: 2020-05-05
Payer: MEDICARE

## 2020-05-05 ENCOUNTER — TELEPHONE (OUTPATIENT)
Dept: UROLOGY | Facility: CLINIC | Age: 70
End: 2020-05-05

## 2020-05-05 ENCOUNTER — TELEPHONE (OUTPATIENT)
Dept: HEMATOLOGY ONCOLOGY | Facility: CLINIC | Age: 70
End: 2020-05-05

## 2020-05-05 VITALS
SYSTOLIC BLOOD PRESSURE: 142 MMHG | BODY MASS INDEX: 31.6 KG/M2 | HEART RATE: 72 BPM | WEIGHT: 214 LBS | DIASTOLIC BLOOD PRESSURE: 74 MMHG

## 2020-05-05 DIAGNOSIS — R33.9 URINARY RETENTION: Primary | ICD-10-CM

## 2020-05-05 DIAGNOSIS — R33.9 URINARY RETENTION: ICD-10-CM

## 2020-05-05 DIAGNOSIS — C68.9 UROTHELIAL CANCER (HCC): ICD-10-CM

## 2020-05-05 PROCEDURE — 51702 INSERT TEMP BLADDER CATH: CPT

## 2020-05-05 PROCEDURE — 51798 US URINE CAPACITY MEASURE: CPT

## 2020-05-05 PROCEDURE — NC001 PR NO CHARGE

## 2020-05-05 RX ORDER — TERAZOSIN 5 MG/1
10 CAPSULE ORAL
Qty: 30 CAPSULE | Refills: 6 | Status: SHIPPED | OUTPATIENT
Start: 2020-05-05 | End: 2022-04-28 | Stop reason: ALTCHOICE

## 2020-05-05 RX ORDER — FINASTERIDE 5 MG/1
5 TABLET, FILM COATED ORAL DAILY
Qty: 30 TABLET | Refills: 6 | Status: SHIPPED | OUTPATIENT
Start: 2020-05-05 | End: 2020-12-10 | Stop reason: ALTCHOICE

## 2020-05-08 ENCOUNTER — TELEPHONE (OUTPATIENT)
Dept: HEMATOLOGY ONCOLOGY | Facility: CLINIC | Age: 70
End: 2020-05-08

## 2020-05-11 ENCOUNTER — APPOINTMENT (OUTPATIENT)
Dept: LAB | Facility: MEDICAL CENTER | Age: 70
End: 2020-05-11
Payer: MEDICARE

## 2020-05-11 ENCOUNTER — PROCEDURE VISIT (OUTPATIENT)
Dept: UROLOGY | Facility: CLINIC | Age: 70
End: 2020-05-11
Payer: MEDICARE

## 2020-05-11 ENCOUNTER — DOCUMENTATION (OUTPATIENT)
Dept: HEMATOLOGY ONCOLOGY | Facility: CLINIC | Age: 70
End: 2020-05-11

## 2020-05-11 VITALS
SYSTOLIC BLOOD PRESSURE: 132 MMHG | WEIGHT: 218 LBS | DIASTOLIC BLOOD PRESSURE: 70 MMHG | HEART RATE: 60 BPM | BODY MASS INDEX: 32.29 KG/M2 | HEIGHT: 69 IN

## 2020-05-11 DIAGNOSIS — C68.9 UROTHELIAL CANCER (HCC): ICD-10-CM

## 2020-05-11 DIAGNOSIS — C68.9 UROTHELIAL CANCER (HCC): Primary | ICD-10-CM

## 2020-05-11 DIAGNOSIS — C65.2 CANCER OF LEFT RENAL PELVIS (HCC): ICD-10-CM

## 2020-05-11 DIAGNOSIS — D70.2 DRUG-INDUCED NEUTROPENIA (HCC): ICD-10-CM

## 2020-05-11 DIAGNOSIS — R33.9 URINARY RETENTION: ICD-10-CM

## 2020-05-11 LAB
ALBUMIN SERPL BCP-MCNC: 3.7 G/DL (ref 3.5–5)
ALP SERPL-CCNC: 78 U/L (ref 46–116)
ALT SERPL W P-5'-P-CCNC: 21 U/L (ref 12–78)
ANION GAP SERPL CALCULATED.3IONS-SCNC: 6 MMOL/L (ref 4–13)
AST SERPL W P-5'-P-CCNC: 9 U/L (ref 5–45)
BACTERIA UR QL AUTO: ABNORMAL /HPF
BASOPHILS # BLD AUTO: 0.06 THOUSANDS/ΜL (ref 0–0.1)
BASOPHILS NFR BLD AUTO: 1 % (ref 0–1)
BILIRUB SERPL-MCNC: 0.9 MG/DL (ref 0.2–1)
BILIRUB UR QL STRIP: NEGATIVE
BUN SERPL-MCNC: 19 MG/DL (ref 5–25)
CALCIUM SERPL-MCNC: 9.3 MG/DL (ref 8.3–10.1)
CHLORIDE SERPL-SCNC: 107 MMOL/L (ref 100–108)
CLARITY UR: CLEAR
CO2 SERPL-SCNC: 25 MMOL/L (ref 21–32)
COLOR UR: YELLOW
CREAT SERPL-MCNC: 1.6 MG/DL (ref 0.6–1.3)
EOSINOPHIL # BLD AUTO: 0.59 THOUSAND/ΜL (ref 0–0.61)
EOSINOPHIL NFR BLD AUTO: 10 % (ref 0–6)
ERYTHROCYTE [DISTWIDTH] IN BLOOD BY AUTOMATED COUNT: 13.6 % (ref 11.6–15.1)
GFR SERPL CREATININE-BSD FRML MDRD: 43 ML/MIN/1.73SQ M
GLUCOSE P FAST SERPL-MCNC: 90 MG/DL (ref 65–99)
GLUCOSE UR STRIP-MCNC: NEGATIVE MG/DL
HCT VFR BLD AUTO: 38.5 % (ref 36.5–49.3)
HGB BLD-MCNC: 12.1 G/DL (ref 12–17)
HGB UR QL STRIP.AUTO: NEGATIVE
HYALINE CASTS #/AREA URNS LPF: ABNORMAL /LPF
IMM GRANULOCYTES # BLD AUTO: 0.02 THOUSAND/UL (ref 0–0.2)
IMM GRANULOCYTES NFR BLD AUTO: 0 % (ref 0–2)
KETONES UR STRIP-MCNC: NEGATIVE MG/DL
LDH SERPL-CCNC: 166 U/L (ref 81–234)
LEUKOCYTE ESTERASE UR QL STRIP: ABNORMAL
LYMPHOCYTES # BLD AUTO: 1.44 THOUSANDS/ΜL (ref 0.6–4.47)
LYMPHOCYTES NFR BLD AUTO: 24 % (ref 14–44)
MAGNESIUM SERPL-MCNC: 2.2 MG/DL (ref 1.6–2.6)
MCH RBC QN AUTO: 28.9 PG (ref 26.8–34.3)
MCHC RBC AUTO-ENTMCNC: 31.4 G/DL (ref 31.4–37.4)
MCV RBC AUTO: 92 FL (ref 82–98)
MONOCYTES # BLD AUTO: 0.58 THOUSAND/ΜL (ref 0.17–1.22)
MONOCYTES NFR BLD AUTO: 10 % (ref 4–12)
NEUTROPHILS # BLD AUTO: 3.28 THOUSANDS/ΜL (ref 1.85–7.62)
NEUTS SEG NFR BLD AUTO: 55 % (ref 43–75)
NITRITE UR QL STRIP: NEGATIVE
NON-SQ EPI CELLS URNS QL MICRO: ABNORMAL /HPF
NRBC BLD AUTO-RTO: 0 /100 WBCS
PH UR STRIP.AUTO: 6 [PH]
PLATELET # BLD AUTO: 207 THOUSANDS/UL (ref 149–390)
PMV BLD AUTO: 9.9 FL (ref 8.9–12.7)
POST-VOID RESIDUAL VOLUME, ML POC: 49 ML
POTASSIUM SERPL-SCNC: 4.3 MMOL/L (ref 3.5–5.3)
PROT SERPL-MCNC: 7.1 G/DL (ref 6.4–8.2)
PROT UR STRIP-MCNC: NEGATIVE MG/DL
RBC # BLD AUTO: 4.18 MILLION/UL (ref 3.88–5.62)
RBC #/AREA URNS AUTO: ABNORMAL /HPF
SL AMB  POCT GLUCOSE, UA: NORMAL
SL AMB LEUKOCYTE ESTERASE,UA: NORMAL
SL AMB POCT BILIRUBIN,UA: NORMAL
SL AMB POCT BLOOD,UA: NORMAL
SL AMB POCT CLARITY,UA: CLEAR
SL AMB POCT COLOR,UA: YELLOW
SL AMB POCT KETONES,UA: NORMAL
SL AMB POCT NITRITE,UA: NORMAL
SL AMB POCT PH,UA: 5
SL AMB POCT SPECIFIC GRAVITY,UA: 1.01
SL AMB POCT URINE PROTEIN: NORMAL
SL AMB POCT UROBILINOGEN: 0.2
SODIUM SERPL-SCNC: 138 MMOL/L (ref 136–145)
SP GR UR STRIP.AUTO: 1.01 (ref 1–1.03)
UROBILINOGEN UR QL STRIP.AUTO: 0.2 E.U./DL
WBC # BLD AUTO: 5.97 THOUSAND/UL (ref 4.31–10.16)
WBC #/AREA URNS AUTO: ABNORMAL /HPF

## 2020-05-11 PROCEDURE — 83735 ASSAY OF MAGNESIUM: CPT

## 2020-05-11 PROCEDURE — ND001 PR NO DOCUMENTATION: Performed by: UROLOGY

## 2020-05-11 PROCEDURE — 80053 COMPREHEN METABOLIC PANEL: CPT

## 2020-05-11 PROCEDURE — 83615 LACTATE (LD) (LDH) ENZYME: CPT

## 2020-05-11 PROCEDURE — 85025 COMPLETE CBC W/AUTO DIFF WBC: CPT

## 2020-05-11 PROCEDURE — 99024 POSTOP FOLLOW-UP VISIT: CPT | Performed by: UROLOGY

## 2020-05-11 PROCEDURE — 81002 URINALYSIS NONAUTO W/O SCOPE: CPT | Performed by: UROLOGY

## 2020-05-11 PROCEDURE — 81001 URINALYSIS AUTO W/SCOPE: CPT

## 2020-05-11 PROCEDURE — 36415 COLL VENOUS BLD VENIPUNCTURE: CPT

## 2020-05-11 PROCEDURE — 51798 US URINE CAPACITY MEASURE: CPT | Performed by: UROLOGY

## 2020-05-11 PROCEDURE — 52000 CYSTOURETHROSCOPY: CPT | Performed by: UROLOGY

## 2020-05-12 ENCOUNTER — OFFICE VISIT (OUTPATIENT)
Dept: HEMATOLOGY ONCOLOGY | Facility: CLINIC | Age: 70
End: 2020-05-12
Payer: MEDICARE

## 2020-05-12 VITALS
TEMPERATURE: 98.3 F | SYSTOLIC BLOOD PRESSURE: 128 MMHG | WEIGHT: 220.1 LBS | OXYGEN SATURATION: 94 % | BODY MASS INDEX: 32.6 KG/M2 | HEIGHT: 69 IN | RESPIRATION RATE: 16 BRPM | HEART RATE: 73 BPM | DIASTOLIC BLOOD PRESSURE: 70 MMHG

## 2020-05-12 DIAGNOSIS — D70.2 DRUG-INDUCED NEUTROPENIA (HCC): ICD-10-CM

## 2020-05-12 DIAGNOSIS — C64.2 UROTHELIAL CARCINOMA OF KIDNEY, LEFT (HCC): Primary | ICD-10-CM

## 2020-05-12 PROCEDURE — 99214 OFFICE O/P EST MOD 30 MIN: CPT | Performed by: NURSE PRACTITIONER

## 2020-05-14 ENCOUNTER — DOCUMENTATION (OUTPATIENT)
Dept: INFUSION CENTER | Facility: CLINIC | Age: 70
End: 2020-05-14

## 2020-05-14 ENCOUNTER — HOSPITAL ENCOUNTER (OUTPATIENT)
Dept: INFUSION CENTER | Facility: HOSPITAL | Age: 70
Discharge: HOME | End: 2020-05-14
Payer: MEDICARE

## 2020-05-14 VITALS
HEART RATE: 71 BPM | SYSTOLIC BLOOD PRESSURE: 155 MMHG | HEIGHT: 69 IN | BODY MASS INDEX: 32.56 KG/M2 | WEIGHT: 219.8 LBS | OXYGEN SATURATION: 96 % | RESPIRATION RATE: 18 BRPM | TEMPERATURE: 98.7 F | DIASTOLIC BLOOD PRESSURE: 74 MMHG

## 2020-05-14 DIAGNOSIS — D70.2 DRUG-INDUCED NEUTROPENIA (HCC): Primary | ICD-10-CM

## 2020-05-14 DIAGNOSIS — C68.9 UROTHELIAL CANCER (HCC): ICD-10-CM

## 2020-05-14 DIAGNOSIS — C65.2 CANCER OF LEFT RENAL PELVIS (HCC): ICD-10-CM

## 2020-05-14 PROCEDURE — 96413 CHEMO IV INFUSION 1 HR: CPT

## 2020-05-14 PROCEDURE — 96361 HYDRATE IV INFUSION ADD-ON: CPT

## 2020-05-14 PROCEDURE — 96417 CHEMO IV INFUS EACH ADDL SEQ: CPT

## 2020-05-14 PROCEDURE — 96367 TX/PROPH/DG ADDL SEQ IV INF: CPT

## 2020-05-14 RX ORDER — SODIUM CHLORIDE 9 MG/ML
20 INJECTION, SOLUTION INTRAVENOUS ONCE
Status: COMPLETED | OUTPATIENT
Start: 2020-05-14 | End: 2020-05-14

## 2020-05-14 RX ADMIN — SODIUM CHLORIDE 150 MG: 0.9 INJECTION, SOLUTION INTRAVENOUS at 11:32

## 2020-05-14 RX ADMIN — SODIUM CHLORIDE 20 ML/HR: 0.9 INJECTION, SOLUTION INTRAVENOUS at 10:49

## 2020-05-14 RX ADMIN — SODIUM CHLORIDE 1000 ML: 0.9 INJECTION, SOLUTION INTRAVENOUS at 09:19

## 2020-05-14 RX ADMIN — DEXAMETHASONE SODIUM PHOSPHATE: 10 INJECTION, SOLUTION INTRAMUSCULAR; INTRAVENOUS at 10:49

## 2020-05-14 RX ADMIN — CISPLATIN 75.3 MG: 1 INJECTION INTRAVENOUS at 13:02

## 2020-05-14 RX ADMIN — GEMCITABINE 2200 MG: 38 INJECTION INTRAVENOUS at 12:24

## 2020-05-14 RX ADMIN — SODIUM CHLORIDE 1000 ML: 0.9 INJECTION, SOLUTION INTRAVENOUS at 14:07

## 2020-05-18 ENCOUNTER — TELEPHONE (OUTPATIENT)
Dept: HEMATOLOGY ONCOLOGY | Facility: CLINIC | Age: 70
End: 2020-05-18

## 2020-05-18 ENCOUNTER — APPOINTMENT (OUTPATIENT)
Dept: LAB | Facility: HOSPITAL | Age: 70
End: 2020-05-18
Payer: MEDICARE

## 2020-05-18 DIAGNOSIS — C64.2 UROTHELIAL CARCINOMA OF KIDNEY, LEFT (HCC): ICD-10-CM

## 2020-05-18 LAB
ALBUMIN SERPL BCP-MCNC: 3.7 G/DL (ref 3.5–5.7)
ALP SERPL-CCNC: 57 U/L (ref 55–165)
ALT SERPL W P-5'-P-CCNC: 18 U/L (ref 7–52)
ANION GAP SERPL CALCULATED.3IONS-SCNC: 5 MMOL/L (ref 4–13)
AST SERPL W P-5'-P-CCNC: 9 U/L (ref 13–39)
BASOPHILS # BLD AUTO: 0 THOUSANDS/ΜL (ref 0–0.1)
BASOPHILS NFR BLD AUTO: 1 % (ref 0–2)
BILIRUB SERPL-MCNC: 0.7 MG/DL (ref 0.2–1)
BUN SERPL-MCNC: 25 MG/DL (ref 7–25)
CALCIUM SERPL-MCNC: 9 MG/DL (ref 8.6–10.5)
CHLORIDE SERPL-SCNC: 104 MMOL/L (ref 98–107)
CO2 SERPL-SCNC: 29 MMOL/L (ref 21–31)
CREAT SERPL-MCNC: 1.57 MG/DL (ref 0.7–1.3)
EOSINOPHIL # BLD AUTO: 0.3 THOUSAND/ΜL (ref 0–0.61)
EOSINOPHIL NFR BLD AUTO: 6 % (ref 0–5)
ERYTHROCYTE [DISTWIDTH] IN BLOOD BY AUTOMATED COUNT: 14.1 % (ref 11.5–14.5)
GFR SERPL CREATININE-BSD FRML MDRD: 44 ML/MIN/1.73SQ M
GLUCOSE P FAST SERPL-MCNC: 137 MG/DL (ref 65–99)
HCT VFR BLD AUTO: 34.7 % (ref 42–47)
HGB BLD-MCNC: 11.2 G/DL (ref 14–18)
LYMPHOCYTES # BLD AUTO: 1 THOUSANDS/ΜL (ref 0.6–4.47)
LYMPHOCYTES NFR BLD AUTO: 20 % (ref 21–51)
MAGNESIUM SERPL-MCNC: 1.8 MG/DL (ref 1.9–2.7)
MCH RBC QN AUTO: 29.1 PG (ref 26–34)
MCHC RBC AUTO-ENTMCNC: 32.2 G/DL (ref 31–37)
MCV RBC AUTO: 90 FL (ref 81–99)
MONOCYTES # BLD AUTO: 0.1 THOUSAND/ΜL (ref 0.17–1.22)
MONOCYTES NFR BLD AUTO: 2 % (ref 2–12)
NEUTROPHILS # BLD AUTO: 3.4 THOUSANDS/ΜL (ref 1.4–6.5)
NEUTS SEG NFR BLD AUTO: 72 % (ref 42–75)
PLATELET # BLD AUTO: 168 THOUSANDS/UL (ref 149–390)
PMV BLD AUTO: 8.2 FL (ref 8.6–11.7)
POTASSIUM SERPL-SCNC: 4.1 MMOL/L (ref 3.5–5.5)
PROT SERPL-MCNC: 6.3 G/DL (ref 6.4–8.9)
RBC # BLD AUTO: 3.84 MILLION/UL (ref 4.3–5.9)
SODIUM SERPL-SCNC: 138 MMOL/L (ref 134–143)
WBC # BLD AUTO: 4.7 THOUSAND/UL (ref 4.8–10.8)

## 2020-05-18 PROCEDURE — 36415 COLL VENOUS BLD VENIPUNCTURE: CPT

## 2020-05-18 PROCEDURE — 85025 COMPLETE CBC W/AUTO DIFF WBC: CPT

## 2020-05-18 PROCEDURE — 80053 COMPREHEN METABOLIC PANEL: CPT

## 2020-05-18 PROCEDURE — 83735 ASSAY OF MAGNESIUM: CPT

## 2020-05-19 ENCOUNTER — OFFICE VISIT (OUTPATIENT)
Dept: HEMATOLOGY ONCOLOGY | Facility: CLINIC | Age: 70
End: 2020-05-19
Payer: MEDICARE

## 2020-05-19 VITALS
HEART RATE: 71 BPM | OXYGEN SATURATION: 97 % | BODY MASS INDEX: 32.56 KG/M2 | TEMPERATURE: 99 F | DIASTOLIC BLOOD PRESSURE: 68 MMHG | RESPIRATION RATE: 18 BRPM | SYSTOLIC BLOOD PRESSURE: 130 MMHG | HEIGHT: 69 IN | WEIGHT: 219.8 LBS

## 2020-05-19 DIAGNOSIS — I82.4Z9 DEEP VEIN THROMBOSIS (DVT) OF DISTAL VEIN OF LOWER EXTREMITY, UNSPECIFIED CHRONICITY, UNSPECIFIED LATERALITY (HCC): ICD-10-CM

## 2020-05-19 DIAGNOSIS — D70.2 DRUG-INDUCED NEUTROPENIA (HCC): ICD-10-CM

## 2020-05-19 DIAGNOSIS — C64.2 UROTHELIAL CARCINOMA OF KIDNEY, LEFT (HCC): Primary | ICD-10-CM

## 2020-05-19 PROCEDURE — 99214 OFFICE O/P EST MOD 30 MIN: CPT | Performed by: INTERNAL MEDICINE

## 2020-05-20 ENCOUNTER — HOSPITAL ENCOUNTER (OUTPATIENT)
Dept: NON INVASIVE DIAGNOSTICS | Facility: HOSPITAL | Age: 70
Discharge: HOME | End: 2020-05-20
Attending: INTERNAL MEDICINE
Payer: MEDICARE

## 2020-05-20 ENCOUNTER — APPOINTMENT (OUTPATIENT)
Dept: LAB | Facility: HOSPITAL | Age: 70
End: 2020-05-20
Attending: INTERNAL MEDICINE
Payer: MEDICARE

## 2020-05-20 ENCOUNTER — HOSPITAL ENCOUNTER (OUTPATIENT)
Dept: RADIOLOGY | Facility: HOSPITAL | Age: 70
Discharge: HOME | End: 2020-05-20
Attending: INTERNAL MEDICINE
Payer: MEDICARE

## 2020-05-20 DIAGNOSIS — C68.9 UROTHELIAL CANCER (HCC): ICD-10-CM

## 2020-05-20 DIAGNOSIS — C65.2 CANCER OF LEFT RENAL PELVIS (HCC): ICD-10-CM

## 2020-05-20 DIAGNOSIS — D70.2 DRUG-INDUCED NEUTROPENIA (HCC): ICD-10-CM

## 2020-05-20 DIAGNOSIS — I82.4Z9 DEEP VEIN THROMBOSIS (DVT) OF DISTAL VEIN OF LOWER EXTREMITY, UNSPECIFIED CHRONICITY, UNSPECIFIED LATERALITY (HCC): ICD-10-CM

## 2020-05-20 DIAGNOSIS — C64.2 UROTHELIAL CARCINOMA OF KIDNEY, LEFT (HCC): ICD-10-CM

## 2020-05-20 LAB
ALBUMIN SERPL BCP-MCNC: 4 G/DL (ref 3.5–5.7)
ALP SERPL-CCNC: 62 U/L (ref 55–165)
ALT SERPL W P-5'-P-CCNC: 13 U/L (ref 7–52)
ANION GAP SERPL CALCULATED.3IONS-SCNC: 8 MMOL/L (ref 4–13)
AST SERPL W P-5'-P-CCNC: 9 U/L (ref 13–39)
BILIRUB SERPL-MCNC: 0.7 MG/DL (ref 0.2–1)
BUN SERPL-MCNC: 27 MG/DL (ref 7–25)
CALCIUM SERPL-MCNC: 9.1 MG/DL (ref 8.6–10.5)
CHLORIDE SERPL-SCNC: 102 MMOL/L (ref 98–107)
CO2 SERPL-SCNC: 29 MMOL/L (ref 21–31)
CREAT SERPL-MCNC: 1.88 MG/DL (ref 0.7–1.3)
EOSINOPHIL # BLD AUTO: 0.12 THOUSAND/UL (ref 0–0.61)
EOSINOPHIL NFR BLD MANUAL: 5 % (ref 0–6)
ERYTHROCYTE [DISTWIDTH] IN BLOOD BY AUTOMATED COUNT: 13.7 % (ref 11.5–14.5)
GFR SERPL CREATININE-BSD FRML MDRD: 35 ML/MIN/1.73SQ M
GLUCOSE P FAST SERPL-MCNC: 100 MG/DL (ref 65–99)
HCT VFR BLD AUTO: 33.1 % (ref 42–47)
HGB BLD-MCNC: 10.9 G/DL (ref 14–18)
LYMPHOCYTES # BLD AUTO: 0.98 THOUSAND/UL (ref 0.6–4.47)
LYMPHOCYTES # BLD AUTO: 41 % (ref 20–51)
MAGNESIUM SERPL-MCNC: 1.8 MG/DL (ref 1.9–2.7)
MCH RBC QN AUTO: 29.4 PG (ref 26–34)
MCHC RBC AUTO-ENTMCNC: 33 G/DL (ref 31–37)
MCV RBC AUTO: 89 FL (ref 81–99)
MONOCYTES # BLD AUTO: 0.1 THOUSAND/UL (ref 0–1.22)
MONOCYTES NFR BLD AUTO: 4 % (ref 4–12)
NEUTS SEG # BLD: 1.2 THOUSAND/UL (ref 1.81–6.82)
NEUTS SEG NFR BLD AUTO: 50 % (ref 42–75)
PLATELET # BLD AUTO: 146 THOUSANDS/UL (ref 149–390)
PLATELET BLD QL SMEAR: ADEQUATE
PMV BLD AUTO: 7.6 FL (ref 8.6–11.7)
POTASSIUM SERPL-SCNC: 4.1 MMOL/L (ref 3.5–5.5)
PROT SERPL-MCNC: 6.7 G/DL (ref 6.4–8.9)
RBC # BLD AUTO: 3.71 MILLION/UL (ref 4.3–5.9)
RBC MORPH BLD: NORMAL
SODIUM SERPL-SCNC: 139 MMOL/L (ref 134–143)
TOTAL CELLS COUNTED SPEC: 100
URATE SERPL-MCNC: 7.5 MG/DL (ref 2.3–7.6)
WBC # BLD AUTO: 2.4 THOUSAND/UL (ref 4.8–10.8)

## 2020-05-20 PROCEDURE — 73620 X-RAY EXAM OF FOOT: CPT

## 2020-05-20 PROCEDURE — 85027 COMPLETE CBC AUTOMATED: CPT

## 2020-05-20 PROCEDURE — 84550 ASSAY OF BLOOD/URIC ACID: CPT

## 2020-05-20 PROCEDURE — 80053 COMPREHEN METABOLIC PANEL: CPT

## 2020-05-20 PROCEDURE — 85007 BL SMEAR W/DIFF WBC COUNT: CPT

## 2020-05-20 PROCEDURE — 93970 EXTREMITY STUDY: CPT

## 2020-05-20 PROCEDURE — 83735 ASSAY OF MAGNESIUM: CPT

## 2020-05-20 PROCEDURE — 36415 COLL VENOUS BLD VENIPUNCTURE: CPT

## 2020-05-21 ENCOUNTER — HOSPITAL ENCOUNTER (OUTPATIENT)
Dept: INFUSION CENTER | Facility: HOSPITAL | Age: 70
Discharge: HOME | End: 2020-05-21
Payer: MEDICARE

## 2020-05-21 VITALS
OXYGEN SATURATION: 95 % | WEIGHT: 220.68 LBS | HEART RATE: 58 BPM | SYSTOLIC BLOOD PRESSURE: 160 MMHG | BODY MASS INDEX: 32.69 KG/M2 | TEMPERATURE: 97.2 F | DIASTOLIC BLOOD PRESSURE: 82 MMHG | RESPIRATION RATE: 18 BRPM | HEIGHT: 69 IN

## 2020-05-21 DIAGNOSIS — C68.9 UROTHELIAL CANCER (HCC): ICD-10-CM

## 2020-05-21 DIAGNOSIS — D70.2 DRUG-INDUCED NEUTROPENIA (HCC): Primary | ICD-10-CM

## 2020-05-21 DIAGNOSIS — C65.2 CANCER OF LEFT RENAL PELVIS (HCC): ICD-10-CM

## 2020-05-21 PROCEDURE — 96377 APPLICATON ON-BODY INJECTOR: CPT

## 2020-05-21 PROCEDURE — 96361 HYDRATE IV INFUSION ADD-ON: CPT

## 2020-05-21 PROCEDURE — 96367 TX/PROPH/DG ADDL SEQ IV INF: CPT

## 2020-05-21 PROCEDURE — 96413 CHEMO IV INFUSION 1 HR: CPT

## 2020-05-21 PROCEDURE — 93970 EXTREMITY STUDY: CPT | Performed by: SURGERY

## 2020-05-21 PROCEDURE — 96417 CHEMO IV INFUS EACH ADDL SEQ: CPT

## 2020-05-21 RX ORDER — SODIUM CHLORIDE 9 MG/ML
20 INJECTION, SOLUTION INTRAVENOUS ONCE
Status: COMPLETED | OUTPATIENT
Start: 2020-05-21 | End: 2020-05-21

## 2020-05-21 RX ADMIN — ONDANSETRON 8 MG: 2 INJECTION INTRAMUSCULAR; INTRAVENOUS at 10:46

## 2020-05-21 RX ADMIN — SODIUM CHLORIDE 1000 ML: 0.9 INJECTION, SOLUTION INTRAVENOUS at 09:15

## 2020-05-21 RX ADMIN — CISPLATIN 75.3 MG: 1 INJECTION INTRAVENOUS at 12:11

## 2020-05-21 RX ADMIN — GEMCITABINE 2200 MG: 38 INJECTION INTRAVENOUS at 11:18

## 2020-05-21 RX ADMIN — SODIUM CHLORIDE 20 ML/HR: 0.9 INJECTION, SOLUTION INTRAVENOUS at 10:46

## 2020-05-21 RX ADMIN — SODIUM CHLORIDE 1000 ML: 0.9 INJECTION, SOLUTION INTRAVENOUS at 13:13

## 2020-05-21 RX ADMIN — PEGFILGRASTIM 6 MG: KIT SUBCUTANEOUS at 14:33

## 2020-05-22 ENCOUNTER — TELEMEDICINE (OUTPATIENT)
Dept: PAIN MEDICINE | Facility: CLINIC | Age: 70
End: 2020-05-22
Payer: MEDICARE

## 2020-05-22 ENCOUNTER — TELEPHONE (OUTPATIENT)
Dept: HEMATOLOGY ONCOLOGY | Facility: CLINIC | Age: 70
End: 2020-05-22

## 2020-05-22 DIAGNOSIS — M48.061 SPINAL STENOSIS OF LUMBAR REGION, UNSPECIFIED WHETHER NEUROGENIC CLAUDICATION PRESENT: ICD-10-CM

## 2020-05-22 DIAGNOSIS — G89.4 CHRONIC PAIN DISORDER: Primary | ICD-10-CM

## 2020-05-22 DIAGNOSIS — M47.816 LUMBAR SPONDYLOSIS: ICD-10-CM

## 2020-05-22 DIAGNOSIS — M54.16 LUMBAR RADICULOPATHY: ICD-10-CM

## 2020-05-22 PROCEDURE — 99442 PR PHYS/QHP TELEPHONE EVALUATION 11-20 MIN: CPT | Performed by: ANESTHESIOLOGY

## 2020-05-22 RX ORDER — TRAMADOL HYDROCHLORIDE 50 MG/1
50 TABLET ORAL 2 TIMES DAILY PRN
Qty: 60 TABLET | Refills: 0 | Status: SHIPPED | OUTPATIENT
Start: 2020-05-22 | End: 2020-08-31 | Stop reason: SDUPTHER

## 2020-05-26 DIAGNOSIS — C65.2 CANCER OF LEFT RENAL PELVIS (HCC): ICD-10-CM

## 2020-05-26 DIAGNOSIS — R11.0 NAUSEA: Primary | ICD-10-CM

## 2020-05-26 RX ORDER — PROCHLORPERAZINE MALEATE 10 MG
10 TABLET ORAL EVERY 6 HOURS PRN
Qty: 30 TABLET | Refills: 3 | Status: SHIPPED | OUTPATIENT
Start: 2020-05-26 | End: 2020-08-31

## 2020-06-02 ENCOUNTER — OFFICE VISIT (OUTPATIENT)
Dept: HEMATOLOGY ONCOLOGY | Facility: CLINIC | Age: 70
End: 2020-06-02
Payer: MEDICARE

## 2020-06-02 VITALS
BODY MASS INDEX: 31.92 KG/M2 | SYSTOLIC BLOOD PRESSURE: 124 MMHG | DIASTOLIC BLOOD PRESSURE: 70 MMHG | TEMPERATURE: 98.8 F | RESPIRATION RATE: 18 BRPM | HEIGHT: 69 IN | WEIGHT: 215.5 LBS | OXYGEN SATURATION: 97 % | HEART RATE: 65 BPM

## 2020-06-02 DIAGNOSIS — R07.89 OTHER CHEST PAIN: ICD-10-CM

## 2020-06-02 DIAGNOSIS — R11.2 CHEMOTHERAPY INDUCED NAUSEA AND VOMITING: ICD-10-CM

## 2020-06-02 DIAGNOSIS — D70.2 DRUG-INDUCED NEUTROPENIA (HCC): ICD-10-CM

## 2020-06-02 DIAGNOSIS — C68.9 UROTHELIAL CANCER (HCC): ICD-10-CM

## 2020-06-02 DIAGNOSIS — T45.1X5A CHEMOTHERAPY INDUCED NAUSEA AND VOMITING: ICD-10-CM

## 2020-06-02 DIAGNOSIS — C65.2 CANCER OF LEFT RENAL PELVIS (HCC): ICD-10-CM

## 2020-06-02 DIAGNOSIS — C64.2 UROTHELIAL CARCINOMA OF KIDNEY, LEFT (HCC): Primary | ICD-10-CM

## 2020-06-02 PROCEDURE — 99215 OFFICE O/P EST HI 40 MIN: CPT | Performed by: NURSE PRACTITIONER

## 2020-06-02 RX ORDER — LORAZEPAM 0.5 MG/1
0.5 TABLET ORAL EVERY 6 HOURS PRN
Qty: 30 TABLET | Refills: 0 | Status: SHIPPED | OUTPATIENT
Start: 2020-06-02 | End: 2020-12-29 | Stop reason: SDUPTHER

## 2020-06-02 RX ORDER — SODIUM CHLORIDE 9 MG/ML
20 INJECTION, SOLUTION INTRAVENOUS ONCE
Status: CANCELLED | OUTPATIENT
Start: 2020-06-18

## 2020-06-02 RX ORDER — COLCHICINE 0.6 MG/1
0.6 TABLET ORAL 2 TIMES DAILY
COMMUNITY
End: 2021-04-13

## 2020-06-02 RX ORDER — OLANZAPINE 10 MG/1
10 TABLET ORAL
Qty: 12 TABLET | Refills: 1 | Status: SHIPPED | OUTPATIENT
Start: 2020-06-02 | End: 2020-06-30 | Stop reason: ALTCHOICE

## 2020-06-02 RX ORDER — SODIUM CHLORIDE 9 MG/ML
20 INJECTION, SOLUTION INTRAVENOUS ONCE
Status: CANCELLED | OUTPATIENT
Start: 2020-06-12

## 2020-06-03 ENCOUNTER — APPOINTMENT (OUTPATIENT)
Dept: LAB | Facility: HOSPITAL | Age: 70
End: 2020-06-03
Payer: MEDICARE

## 2020-06-03 ENCOUNTER — OFFICE VISIT (OUTPATIENT)
Dept: LAB | Facility: HOSPITAL | Age: 70
End: 2020-06-03
Payer: MEDICARE

## 2020-06-03 DIAGNOSIS — R07.89 OTHER CHEST PAIN: ICD-10-CM

## 2020-06-03 DIAGNOSIS — C64.2 UROTHELIAL CARCINOMA OF KIDNEY, LEFT (HCC): ICD-10-CM

## 2020-06-03 LAB
ALBUMIN SERPL BCP-MCNC: 4.1 G/DL (ref 3.5–5.7)
ALP SERPL-CCNC: 81 U/L (ref 55–165)
ALT SERPL W P-5'-P-CCNC: 11 U/L (ref 7–52)
ANION GAP SERPL CALCULATED.3IONS-SCNC: 7 MMOL/L (ref 4–13)
ANISOCYTOSIS BLD QL SMEAR: PRESENT
AST SERPL W P-5'-P-CCNC: 13 U/L (ref 13–39)
BILIRUB SERPL-MCNC: 0.4 MG/DL (ref 0.2–1)
BUN SERPL-MCNC: 26 MG/DL (ref 7–25)
CALCIUM SERPL-MCNC: 9 MG/DL (ref 8.6–10.5)
CHLORIDE SERPL-SCNC: 105 MMOL/L (ref 98–107)
CO2 SERPL-SCNC: 31 MMOL/L (ref 21–31)
CREAT SERPL-MCNC: 2.23 MG/DL (ref 0.7–1.3)
ERYTHROCYTE [DISTWIDTH] IN BLOOD BY AUTOMATED COUNT: 14.1 % (ref 11.5–14.5)
GFR SERPL CREATININE-BSD FRML MDRD: 29 ML/MIN/1.73SQ M
GLUCOSE SERPL-MCNC: 80 MG/DL (ref 65–99)
HCT VFR BLD AUTO: 31.2 % (ref 42–47)
HGB BLD-MCNC: 10.6 G/DL (ref 14–18)
LYMPHOCYTES # BLD AUTO: 16 % (ref 20–51)
LYMPHOCYTES # BLD AUTO: 2.32 THOUSAND/UL (ref 0.6–4.47)
MAGNESIUM SERPL-MCNC: 1.6 MG/DL (ref 1.9–2.7)
MCH RBC QN AUTO: 29.9 PG (ref 26–34)
MCHC RBC AUTO-ENTMCNC: 34 G/DL (ref 31–37)
MCV RBC AUTO: 88 FL (ref 81–99)
METAMYELOCYTES NFR BLD MANUAL: 1 % (ref 0–1)
MONOCYTES # BLD AUTO: 1.16 THOUSAND/UL (ref 0–1.22)
MONOCYTES NFR BLD AUTO: 8 % (ref 4–12)
NEUTS BAND NFR BLD MANUAL: 3 % (ref 0–8)
NEUTS SEG # BLD: 10.88 THOUSAND/UL (ref 1.81–6.82)
NEUTS SEG NFR BLD AUTO: 72 % (ref 42–75)
PLATELET # BLD AUTO: 332 THOUSANDS/UL (ref 149–390)
PMV BLD AUTO: 7.5 FL (ref 8.6–11.7)
POTASSIUM SERPL-SCNC: 4.3 MMOL/L (ref 3.5–5.5)
PROT SERPL-MCNC: 7.3 G/DL (ref 6.4–8.9)
RBC # BLD AUTO: 3.55 MILLION/UL (ref 4.3–5.9)
RBC MORPH BLD: ABNORMAL
SODIUM SERPL-SCNC: 143 MMOL/L (ref 134–143)
TOTAL CELLS COUNTED SPEC: 100
URATE SERPL-MCNC: 9.2 MG/DL (ref 2.3–7.6)
WBC # BLD AUTO: 14.5 THOUSAND/UL (ref 4.8–10.8)

## 2020-06-03 PROCEDURE — 84550 ASSAY OF BLOOD/URIC ACID: CPT

## 2020-06-03 PROCEDURE — 85027 COMPLETE CBC AUTOMATED: CPT | Performed by: NURSE PRACTITIONER

## 2020-06-03 PROCEDURE — 36415 COLL VENOUS BLD VENIPUNCTURE: CPT

## 2020-06-03 PROCEDURE — 93005 ELECTROCARDIOGRAM TRACING: CPT

## 2020-06-03 PROCEDURE — 83735 ASSAY OF MAGNESIUM: CPT | Performed by: NURSE PRACTITIONER

## 2020-06-03 PROCEDURE — 80053 COMPREHEN METABOLIC PANEL: CPT | Performed by: NURSE PRACTITIONER

## 2020-06-03 PROCEDURE — 85007 BL SMEAR W/DIFF WBC COUNT: CPT | Performed by: NURSE PRACTITIONER

## 2020-06-04 ENCOUNTER — TELEPHONE (OUTPATIENT)
Dept: HEMATOLOGY ONCOLOGY | Facility: CLINIC | Age: 70
End: 2020-06-04

## 2020-06-04 DIAGNOSIS — C65.2 CANCER OF LEFT RENAL PELVIS (HCC): Primary | ICD-10-CM

## 2020-06-04 DIAGNOSIS — E79.0 ELEVATED BLOOD URIC ACID LEVEL: ICD-10-CM

## 2020-06-04 DIAGNOSIS — C65.2 CANCER OF LEFT RENAL PELVIS (HCC): ICD-10-CM

## 2020-06-04 DIAGNOSIS — D70.2 DRUG-INDUCED NEUTROPENIA (HCC): ICD-10-CM

## 2020-06-04 DIAGNOSIS — C68.9 UROTHELIAL CANCER (HCC): ICD-10-CM

## 2020-06-04 PROBLEM — Z45.2 ENCOUNTER FOR CENTRAL LINE CARE: Status: ACTIVE | Noted: 2020-06-04

## 2020-06-04 LAB
ATRIAL RATE: 51 BPM
P AXIS: 100 DEGREES
PR INTERVAL: 168 MS
QRS AXIS: 166 DEGREES
QRSD INTERVAL: 82 MS
QT INTERVAL: 450 MS
QTC INTERVAL: 414 MS
T WAVE AXIS: 163 DEGREES
VENTRICULAR RATE: 51 BPM

## 2020-06-04 PROCEDURE — 93010 ELECTROCARDIOGRAM REPORT: CPT | Performed by: INTERNAL MEDICINE

## 2020-06-05 ENCOUNTER — HOSPITAL ENCOUNTER (OUTPATIENT)
Dept: INFUSION CENTER | Facility: HOSPITAL | Age: 70
Discharge: HOME | End: 2020-06-05
Payer: MEDICARE

## 2020-06-05 ENCOUNTER — HOSPITAL ENCOUNTER (OUTPATIENT)
Dept: INFUSION CENTER | Facility: HOSPITAL | Age: 70
Discharge: HOME | End: 2020-06-05

## 2020-06-05 VITALS
SYSTOLIC BLOOD PRESSURE: 153 MMHG | HEART RATE: 61 BPM | RESPIRATION RATE: 18 BRPM | DIASTOLIC BLOOD PRESSURE: 72 MMHG | OXYGEN SATURATION: 95 % | TEMPERATURE: 97.8 F

## 2020-06-05 DIAGNOSIS — C68.9 UROTHELIAL CANCER (HCC): ICD-10-CM

## 2020-06-05 DIAGNOSIS — C65.2 CANCER OF LEFT RENAL PELVIS (HCC): ICD-10-CM

## 2020-06-05 DIAGNOSIS — E79.0 HYPERURICEMIA: Primary | ICD-10-CM

## 2020-06-05 PROCEDURE — 96361 HYDRATE IV INFUSION ADD-ON: CPT

## 2020-06-05 PROCEDURE — 96365 THER/PROPH/DIAG IV INF INIT: CPT

## 2020-06-05 RX ADMIN — SODIUM CHLORIDE 1000 ML: 0.9 INJECTION, SOLUTION INTRAVENOUS at 08:40

## 2020-06-05 RX ADMIN — SODIUM CHLORIDE 3 MG: 9 INJECTION, SOLUTION INTRAVENOUS at 09:16

## 2020-06-08 ENCOUNTER — APPOINTMENT (OUTPATIENT)
Dept: LAB | Facility: HOSPITAL | Age: 70
End: 2020-06-08
Payer: MEDICARE

## 2020-06-08 ENCOUNTER — TRANSCRIBE ORDERS (OUTPATIENT)
Dept: LAB | Facility: HOSPITAL | Age: 70
End: 2020-06-08

## 2020-06-08 DIAGNOSIS — C68.9 UROTHELIAL CANCER (HCC): ICD-10-CM

## 2020-06-08 DIAGNOSIS — D70.2 DRUG-INDUCED NEUTROPENIA (HCC): ICD-10-CM

## 2020-06-08 DIAGNOSIS — E79.0 ELEVATED BLOOD URIC ACID LEVEL: ICD-10-CM

## 2020-06-08 DIAGNOSIS — C65.2 CANCER OF LEFT RENAL PELVIS (HCC): ICD-10-CM

## 2020-06-08 LAB — URATE SERPL-MCNC: 2.7 MG/DL (ref 2.3–7.6)

## 2020-06-08 PROCEDURE — 84550 ASSAY OF BLOOD/URIC ACID: CPT

## 2020-06-09 ENCOUNTER — OFFICE VISIT (OUTPATIENT)
Dept: HEMATOLOGY ONCOLOGY | Facility: CLINIC | Age: 70
End: 2020-06-09
Payer: MEDICARE

## 2020-06-09 VITALS
BODY MASS INDEX: 31.5 KG/M2 | WEIGHT: 212.7 LBS | HEART RATE: 59 BPM | HEIGHT: 69 IN | RESPIRATION RATE: 18 BRPM | SYSTOLIC BLOOD PRESSURE: 130 MMHG | OXYGEN SATURATION: 95 % | DIASTOLIC BLOOD PRESSURE: 70 MMHG | TEMPERATURE: 97.2 F

## 2020-06-09 DIAGNOSIS — C68.9 UROTHELIAL CANCER (HCC): Primary | ICD-10-CM

## 2020-06-09 PROCEDURE — 99215 OFFICE O/P EST HI 40 MIN: CPT | Performed by: INTERNAL MEDICINE

## 2020-06-11 ENCOUNTER — HOSPITAL ENCOUNTER (OUTPATIENT)
Dept: INFUSION CENTER | Facility: HOSPITAL | Age: 70
Discharge: HOME | End: 2020-06-11
Payer: MEDICARE

## 2020-06-11 ENCOUNTER — HOSPITAL ENCOUNTER (OUTPATIENT)
Dept: INFUSION CENTER | Facility: HOSPITAL | Age: 70
End: 2020-06-11

## 2020-06-11 VITALS
SYSTOLIC BLOOD PRESSURE: 143 MMHG | DIASTOLIC BLOOD PRESSURE: 76 MMHG | TEMPERATURE: 97.9 F | RESPIRATION RATE: 18 BRPM | HEART RATE: 54 BPM | OXYGEN SATURATION: 95 %

## 2020-06-11 DIAGNOSIS — E79.0 HYPERURICEMIA: Primary | ICD-10-CM

## 2020-06-11 DIAGNOSIS — C68.9 UROTHELIAL CANCER (HCC): ICD-10-CM

## 2020-06-11 DIAGNOSIS — C65.2 CANCER OF LEFT RENAL PELVIS (HCC): ICD-10-CM

## 2020-06-11 PROCEDURE — 96360 HYDRATION IV INFUSION INIT: CPT

## 2020-06-11 PROCEDURE — 96361 HYDRATE IV INFUSION ADD-ON: CPT

## 2020-06-11 RX ADMIN — SODIUM CHLORIDE 1000 ML: 0.9 INJECTION, SOLUTION INTRAVENOUS at 08:27

## 2020-06-12 ENCOUNTER — HOSPITAL ENCOUNTER (OUTPATIENT)
Dept: INFUSION CENTER | Facility: HOSPITAL | Age: 70
Discharge: HOME | End: 2020-06-12
Payer: MEDICARE

## 2020-06-12 VITALS
HEART RATE: 62 BPM | TEMPERATURE: 97.4 F | SYSTOLIC BLOOD PRESSURE: 170 MMHG | DIASTOLIC BLOOD PRESSURE: 72 MMHG | RESPIRATION RATE: 18 BRPM | OXYGEN SATURATION: 96 %

## 2020-06-12 DIAGNOSIS — C68.9 UROTHELIAL CANCER (HCC): Primary | ICD-10-CM

## 2020-06-12 DIAGNOSIS — E79.0 ELEVATED BLOOD URIC ACID LEVEL: ICD-10-CM

## 2020-06-12 DIAGNOSIS — C65.2 CANCER OF LEFT RENAL PELVIS (HCC): ICD-10-CM

## 2020-06-12 DIAGNOSIS — E79.0 HYPERURICEMIA: Primary | ICD-10-CM

## 2020-06-12 DIAGNOSIS — C68.9 UROTHELIAL CANCER (HCC): ICD-10-CM

## 2020-06-12 PROCEDURE — 96360 HYDRATION IV INFUSION INIT: CPT

## 2020-06-12 PROCEDURE — 96361 HYDRATE IV INFUSION ADD-ON: CPT

## 2020-06-12 RX ADMIN — SODIUM CHLORIDE 1000 ML: 0.9 INJECTION, SOLUTION INTRAVENOUS at 08:10

## 2020-06-13 ENCOUNTER — HOSPITAL ENCOUNTER (OUTPATIENT)
Dept: ULTRASOUND IMAGING | Facility: HOSPITAL | Age: 70
Discharge: HOME | End: 2020-06-13
Payer: MEDICARE

## 2020-06-13 DIAGNOSIS — R33.9 URINARY RETENTION: ICD-10-CM

## 2020-06-13 PROCEDURE — 51798 US URINE CAPACITY MEASURE: CPT

## 2020-06-16 ENCOUNTER — APPOINTMENT (OUTPATIENT)
Dept: LAB | Facility: HOSPITAL | Age: 70
End: 2020-06-16
Attending: INTERNAL MEDICINE
Payer: MEDICARE

## 2020-06-16 DIAGNOSIS — C68.9 UROTHELIAL CANCER (HCC): ICD-10-CM

## 2020-06-16 DIAGNOSIS — E79.0 ELEVATED BLOOD URIC ACID LEVEL: ICD-10-CM

## 2020-06-16 LAB
ALBUMIN SERPL BCP-MCNC: 3.7 G/DL (ref 3.5–5.7)
ALP SERPL-CCNC: 53 U/L (ref 55–165)
ALT SERPL W P-5'-P-CCNC: 7 U/L (ref 7–52)
ANION GAP SERPL CALCULATED.3IONS-SCNC: 8 MMOL/L (ref 4–13)
AST SERPL W P-5'-P-CCNC: 8 U/L (ref 13–39)
BASOPHILS # BLD AUTO: 0.2 THOUSANDS/ΜL (ref 0–0.1)
BASOPHILS NFR BLD AUTO: 3 % (ref 0–2)
BILIRUB SERPL-MCNC: 1 MG/DL (ref 0.2–1)
BUN SERPL-MCNC: 31 MG/DL (ref 7–25)
CALCIUM SERPL-MCNC: 9.1 MG/DL (ref 8.6–10.5)
CHLORIDE SERPL-SCNC: 106 MMOL/L (ref 98–107)
CO2 SERPL-SCNC: 25 MMOL/L (ref 21–31)
CREAT SERPL-MCNC: 1.86 MG/DL (ref 0.7–1.3)
EOSINOPHIL # BLD AUTO: 0.1 THOUSAND/ΜL (ref 0–0.61)
EOSINOPHIL NFR BLD AUTO: 1 % (ref 0–5)
ERYTHROCYTE [DISTWIDTH] IN BLOOD BY AUTOMATED COUNT: 17.6 % (ref 11.5–14.5)
GFR SERPL CREATININE-BSD FRML MDRD: 36 ML/MIN/1.73SQ M
GLUCOSE P FAST SERPL-MCNC: 85 MG/DL (ref 65–99)
HCT VFR BLD AUTO: 32.2 % (ref 42–47)
HGB BLD-MCNC: 10.8 G/DL (ref 14–18)
LDH SERPL-CCNC: 131 U/L (ref 84–246)
LYMPHOCYTES # BLD AUTO: 1.5 THOUSANDS/ΜL (ref 0.6–4.47)
LYMPHOCYTES NFR BLD AUTO: 21 % (ref 21–51)
MAGNESIUM SERPL-MCNC: 1.7 MG/DL (ref 1.9–2.7)
MCH RBC QN AUTO: 30.4 PG (ref 26–34)
MCHC RBC AUTO-ENTMCNC: 33.6 G/DL (ref 31–37)
MCV RBC AUTO: 91 FL (ref 81–99)
MONOCYTES # BLD AUTO: 0.8 THOUSAND/ΜL (ref 0.17–1.22)
MONOCYTES NFR BLD AUTO: 11 % (ref 2–12)
NEUTROPHILS # BLD AUTO: 4.8 THOUSANDS/ΜL (ref 1.4–6.5)
NEUTS SEG NFR BLD AUTO: 65 % (ref 42–75)
PLATELET # BLD AUTO: 272 THOUSANDS/UL (ref 149–390)
PMV BLD AUTO: 8.5 FL (ref 8.6–11.7)
POTASSIUM SERPL-SCNC: 4.7 MMOL/L (ref 3.5–5.5)
PROT SERPL-MCNC: 6.5 G/DL (ref 6.4–8.9)
RBC # BLD AUTO: 3.56 MILLION/UL (ref 4.3–5.9)
SODIUM SERPL-SCNC: 139 MMOL/L (ref 134–143)
URATE SERPL-MCNC: 7.6 MG/DL (ref 2.3–7.6)
WBC # BLD AUTO: 7.3 THOUSAND/UL (ref 4.8–10.8)

## 2020-06-16 PROCEDURE — 83735 ASSAY OF MAGNESIUM: CPT

## 2020-06-16 PROCEDURE — 84550 ASSAY OF BLOOD/URIC ACID: CPT

## 2020-06-16 PROCEDURE — 85025 COMPLETE CBC W/AUTO DIFF WBC: CPT

## 2020-06-16 PROCEDURE — 80053 COMPREHEN METABOLIC PANEL: CPT

## 2020-06-16 PROCEDURE — 36415 COLL VENOUS BLD VENIPUNCTURE: CPT

## 2020-06-16 PROCEDURE — 83615 LACTATE (LD) (LDH) ENZYME: CPT

## 2020-06-18 ENCOUNTER — HOSPITAL ENCOUNTER (OUTPATIENT)
Dept: INFUSION CENTER | Facility: HOSPITAL | Age: 70
Discharge: HOME | End: 2020-06-18
Payer: MEDICARE

## 2020-06-18 VITALS
HEART RATE: 67 BPM | TEMPERATURE: 96 F | SYSTOLIC BLOOD PRESSURE: 123 MMHG | RESPIRATION RATE: 16 BRPM | DIASTOLIC BLOOD PRESSURE: 70 MMHG

## 2020-06-18 DIAGNOSIS — E79.0 HYPERURICEMIA: Primary | ICD-10-CM

## 2020-06-18 DIAGNOSIS — Z45.2 ENCOUNTER FOR CENTRAL LINE CARE: ICD-10-CM

## 2020-06-18 DIAGNOSIS — C68.9 UROTHELIAL CANCER (HCC): ICD-10-CM

## 2020-06-18 DIAGNOSIS — C65.2 CANCER OF LEFT RENAL PELVIS (HCC): ICD-10-CM

## 2020-06-18 PROCEDURE — 96360 HYDRATION IV INFUSION INIT: CPT

## 2020-06-18 PROCEDURE — 96361 HYDRATE IV INFUSION ADD-ON: CPT

## 2020-06-18 RX ADMIN — SODIUM CHLORIDE 1000 ML: 0.9 INJECTION, SOLUTION INTRAVENOUS at 08:41

## 2020-06-19 ENCOUNTER — TELEMEDICINE (OUTPATIENT)
Dept: PAIN MEDICINE | Facility: CLINIC | Age: 70
End: 2020-06-19
Payer: MEDICARE

## 2020-06-19 ENCOUNTER — HOSPITAL ENCOUNTER (OUTPATIENT)
Dept: INFUSION CENTER | Facility: HOSPITAL | Age: 70
Discharge: HOME | End: 2020-06-19
Payer: MEDICARE

## 2020-06-19 VITALS
SYSTOLIC BLOOD PRESSURE: 128 MMHG | TEMPERATURE: 98.6 F | OXYGEN SATURATION: 97 % | RESPIRATION RATE: 16 BRPM | HEART RATE: 72 BPM | DIASTOLIC BLOOD PRESSURE: 67 MMHG

## 2020-06-19 DIAGNOSIS — E79.0 HYPERURICEMIA: Primary | ICD-10-CM

## 2020-06-19 DIAGNOSIS — C65.2 CANCER OF LEFT RENAL PELVIS (HCC): ICD-10-CM

## 2020-06-19 DIAGNOSIS — F11.20 UNCOMPLICATED OPIOID DEPENDENCE (HCC): ICD-10-CM

## 2020-06-19 DIAGNOSIS — Z79.891 ENCOUNTER FOR LONG-TERM OPIATE ANALGESIC USE: ICD-10-CM

## 2020-06-19 DIAGNOSIS — M51.26 LUMBAR DISC HERNIATION: ICD-10-CM

## 2020-06-19 DIAGNOSIS — M48.061 SPINAL STENOSIS OF LUMBAR REGION, UNSPECIFIED WHETHER NEUROGENIC CLAUDICATION PRESENT: ICD-10-CM

## 2020-06-19 DIAGNOSIS — M47.816 LUMBAR SPONDYLOSIS: ICD-10-CM

## 2020-06-19 DIAGNOSIS — G89.4 CHRONIC PAIN DISORDER: Primary | ICD-10-CM

## 2020-06-19 DIAGNOSIS — C68.9 UROTHELIAL CANCER (HCC): ICD-10-CM

## 2020-06-19 DIAGNOSIS — M54.16 LUMBAR RADICULOPATHY: ICD-10-CM

## 2020-06-19 PROCEDURE — 96360 HYDRATION IV INFUSION INIT: CPT

## 2020-06-19 PROCEDURE — 99214 OFFICE O/P EST MOD 30 MIN: CPT | Performed by: NURSE PRACTITIONER

## 2020-06-19 PROCEDURE — 96361 HYDRATE IV INFUSION ADD-ON: CPT

## 2020-06-19 RX ADMIN — SODIUM CHLORIDE 1000 ML: 0.9 INJECTION, SOLUTION INTRAVENOUS at 12:35

## 2020-06-24 ENCOUNTER — TELEPHONE (OUTPATIENT)
Dept: HEMATOLOGY ONCOLOGY | Facility: CLINIC | Age: 70
End: 2020-06-24

## 2020-06-24 ENCOUNTER — HOSPITAL ENCOUNTER (OUTPATIENT)
Dept: RADIOLOGY | Facility: CLINIC | Age: 70
Discharge: HOME | End: 2020-06-24
Attending: ANESTHESIOLOGY | Admitting: ANESTHESIOLOGY
Payer: MEDICARE

## 2020-06-24 VITALS
OXYGEN SATURATION: 98 % | HEART RATE: 62 BPM | RESPIRATION RATE: 20 BRPM | SYSTOLIC BLOOD PRESSURE: 145 MMHG | DIASTOLIC BLOOD PRESSURE: 85 MMHG | TEMPERATURE: 97.3 F

## 2020-06-24 DIAGNOSIS — M47.816 LUMBAR SPONDYLOSIS: ICD-10-CM

## 2020-06-24 DIAGNOSIS — C68.9 UROTHELIAL CANCER (HCC): Primary | ICD-10-CM

## 2020-06-24 PROCEDURE — 64495 INJ PARAVERT F JNT L/S 3 LEV: CPT | Performed by: ANESTHESIOLOGY

## 2020-06-24 PROCEDURE — 64493 INJ PARAVERT F JNT L/S 1 LEV: CPT | Performed by: ANESTHESIOLOGY

## 2020-06-24 PROCEDURE — 64494 INJ PARAVERT F JNT L/S 2 LEV: CPT | Performed by: ANESTHESIOLOGY

## 2020-06-24 RX ORDER — LIDOCAINE HYDROCHLORIDE 10 MG/ML
10 INJECTION, SOLUTION EPIDURAL; INFILTRATION; INTRACAUDAL; PERINEURAL ONCE
Status: COMPLETED | OUTPATIENT
Start: 2020-06-24 | End: 2020-06-24

## 2020-06-24 RX ADMIN — LIDOCAINE HYDROCHLORIDE 8 ML: 10 INJECTION, SOLUTION EPIDURAL; INFILTRATION; INTRACAUDAL; PERINEURAL at 10:12

## 2020-06-24 RX ADMIN — LIDOCAINE HYDROCHLORIDE 4 ML: 20 INJECTION, SOLUTION EPIDURAL; INFILTRATION; INTRACAUDAL; PERINEURAL at 10:18

## 2020-06-25 ENCOUNTER — APPOINTMENT (OUTPATIENT)
Dept: LAB | Facility: HOSPITAL | Age: 70
End: 2020-06-25
Attending: INTERNAL MEDICINE
Payer: MEDICARE

## 2020-06-25 ENCOUNTER — TRANSCRIBE ORDERS (OUTPATIENT)
Dept: LAB | Facility: HOSPITAL | Age: 70
End: 2020-06-25

## 2020-06-25 DIAGNOSIS — C68.9 UROTHELIAL CANCER (HCC): ICD-10-CM

## 2020-06-25 LAB
ALBUMIN SERPL BCP-MCNC: 4.2 G/DL (ref 3.5–5.7)
ALP SERPL-CCNC: 54 U/L (ref 55–165)
ALT SERPL W P-5'-P-CCNC: 9 U/L (ref 7–52)
ANION GAP SERPL CALCULATED.3IONS-SCNC: 8 MMOL/L (ref 4–13)
AST SERPL W P-5'-P-CCNC: 11 U/L (ref 13–39)
BASOPHILS # BLD AUTO: 0.2 THOUSANDS/ΜL (ref 0–0.1)
BASOPHILS NFR BLD AUTO: 2 % (ref 0–2)
BILIRUB SERPL-MCNC: 0.8 MG/DL (ref 0.2–1)
BUN SERPL-MCNC: 33 MG/DL (ref 7–25)
CALCIUM SERPL-MCNC: 9.4 MG/DL (ref 8.6–10.5)
CHLORIDE SERPL-SCNC: 106 MMOL/L (ref 98–107)
CO2 SERPL-SCNC: 25 MMOL/L (ref 21–31)
CREAT SERPL-MCNC: 1.65 MG/DL (ref 0.7–1.3)
EOSINOPHIL # BLD AUTO: 0.3 THOUSAND/ΜL (ref 0–0.61)
EOSINOPHIL NFR BLD AUTO: 4 % (ref 0–5)
ERYTHROCYTE [DISTWIDTH] IN BLOOD BY AUTOMATED COUNT: 18.8 % (ref 11.5–14.5)
GFR SERPL CREATININE-BSD FRML MDRD: 41 ML/MIN/1.73SQ M
GLUCOSE P FAST SERPL-MCNC: 96 MG/DL (ref 65–99)
HCT VFR BLD AUTO: 33.8 % (ref 42–47)
HGB BLD-MCNC: 11.4 G/DL (ref 14–18)
LDH SERPL-CCNC: 115 U/L (ref 84–246)
LYMPHOCYTES # BLD AUTO: 1.5 THOUSANDS/ΜL (ref 0.6–4.47)
LYMPHOCYTES NFR BLD AUTO: 19 % (ref 21–51)
MAGNESIUM SERPL-MCNC: 1.8 MG/DL (ref 1.9–2.7)
MCH RBC QN AUTO: 30.8 PG (ref 26–34)
MCHC RBC AUTO-ENTMCNC: 33.7 G/DL (ref 31–37)
MCV RBC AUTO: 92 FL (ref 81–99)
MONOCYTES # BLD AUTO: 0.6 THOUSAND/ΜL (ref 0.17–1.22)
MONOCYTES NFR BLD AUTO: 8 % (ref 2–12)
NEUTROPHILS # BLD AUTO: 5.6 THOUSANDS/ΜL (ref 1.4–6.5)
NEUTS SEG NFR BLD AUTO: 68 % (ref 42–75)
PLATELET # BLD AUTO: 130 THOUSANDS/UL (ref 149–390)
PMV BLD AUTO: 8.5 FL (ref 8.6–11.7)
POTASSIUM SERPL-SCNC: 4.4 MMOL/L (ref 3.5–5.5)
PROT SERPL-MCNC: 6.5 G/DL (ref 6.4–8.9)
RBC # BLD AUTO: 3.7 MILLION/UL (ref 4.3–5.9)
SODIUM SERPL-SCNC: 139 MMOL/L (ref 134–143)
URATE SERPL-MCNC: 8 MG/DL (ref 2.3–7.6)
WBC # BLD AUTO: 8.2 THOUSAND/UL (ref 4.8–10.8)

## 2020-06-25 PROCEDURE — 36415 COLL VENOUS BLD VENIPUNCTURE: CPT

## 2020-06-25 PROCEDURE — 85025 COMPLETE CBC W/AUTO DIFF WBC: CPT

## 2020-06-25 PROCEDURE — 83735 ASSAY OF MAGNESIUM: CPT

## 2020-06-25 PROCEDURE — 84550 ASSAY OF BLOOD/URIC ACID: CPT

## 2020-06-25 PROCEDURE — 83615 LACTATE (LD) (LDH) ENZYME: CPT

## 2020-06-25 PROCEDURE — 80053 COMPREHEN METABOLIC PANEL: CPT

## 2020-06-26 ENCOUNTER — TELEPHONE (OUTPATIENT)
Dept: HEMATOLOGY ONCOLOGY | Facility: CLINIC | Age: 70
End: 2020-06-26

## 2020-06-26 ENCOUNTER — HOSPITAL ENCOUNTER (OUTPATIENT)
Dept: INFUSION CENTER | Facility: HOSPITAL | Age: 70
Discharge: HOME | End: 2020-06-26

## 2020-06-26 ENCOUNTER — TELEPHONE (OUTPATIENT)
Dept: RADIOLOGY | Facility: CLINIC | Age: 70
End: 2020-06-26

## 2020-06-29 ENCOUNTER — DOCUMENTATION (OUTPATIENT)
Dept: HEMATOLOGY ONCOLOGY | Facility: CLINIC | Age: 70
End: 2020-06-29

## 2020-06-30 ENCOUNTER — OFFICE VISIT (OUTPATIENT)
Dept: HEMATOLOGY ONCOLOGY | Facility: CLINIC | Age: 70
End: 2020-06-30
Payer: MEDICARE

## 2020-06-30 VITALS
BODY MASS INDEX: 30.95 KG/M2 | WEIGHT: 209.6 LBS | TEMPERATURE: 97.4 F | SYSTOLIC BLOOD PRESSURE: 156 MMHG | OXYGEN SATURATION: 99 % | DIASTOLIC BLOOD PRESSURE: 76 MMHG | RESPIRATION RATE: 18 BRPM | HEART RATE: 61 BPM

## 2020-06-30 DIAGNOSIS — D64.9 NORMOCYTIC ANEMIA: ICD-10-CM

## 2020-06-30 DIAGNOSIS — C64.2 UROTHELIAL CARCINOMA OF KIDNEY, LEFT (HCC): Primary | ICD-10-CM

## 2020-06-30 DIAGNOSIS — D53.9 NUTRITIONAL ANEMIA, UNSPECIFIED: ICD-10-CM

## 2020-06-30 DIAGNOSIS — R30.0 DYSURIA: ICD-10-CM

## 2020-06-30 DIAGNOSIS — E79.0 ELEVATED BLOOD URIC ACID LEVEL: ICD-10-CM

## 2020-06-30 PROCEDURE — 99215 OFFICE O/P EST HI 40 MIN: CPT | Performed by: NURSE PRACTITIONER

## 2020-06-30 RX ORDER — ALLOPURINOL 100 MG/1
100 TABLET ORAL DAILY
Qty: 30 TABLET | Refills: 3 | Status: SHIPPED | OUTPATIENT
Start: 2020-06-30 | End: 2021-05-06

## 2020-07-06 ENCOUNTER — HOSPITAL ENCOUNTER (OUTPATIENT)
Dept: RADIOLOGY | Facility: CLINIC | Age: 70
Discharge: HOME | End: 2020-07-06
Attending: ANESTHESIOLOGY
Payer: MEDICARE

## 2020-07-06 VITALS
HEART RATE: 67 BPM | SYSTOLIC BLOOD PRESSURE: 139 MMHG | OXYGEN SATURATION: 98 % | DIASTOLIC BLOOD PRESSURE: 88 MMHG | RESPIRATION RATE: 18 BRPM | TEMPERATURE: 98.4 F

## 2020-07-06 DIAGNOSIS — M47.816 LUMBAR SPONDYLOSIS: ICD-10-CM

## 2020-07-06 PROCEDURE — 64494 INJ PARAVERT F JNT L/S 2 LEV: CPT | Performed by: ANESTHESIOLOGY

## 2020-07-06 PROCEDURE — 64495 INJ PARAVERT F JNT L/S 3 LEV: CPT | Performed by: ANESTHESIOLOGY

## 2020-07-06 PROCEDURE — 64493 INJ PARAVERT F JNT L/S 1 LEV: CPT | Performed by: ANESTHESIOLOGY

## 2020-07-06 RX ORDER — LIDOCAINE HYDROCHLORIDE 10 MG/ML
10 INJECTION, SOLUTION EPIDURAL; INFILTRATION; INTRACAUDAL; PERINEURAL ONCE
Status: COMPLETED | OUTPATIENT
Start: 2020-07-06 | End: 2020-07-06

## 2020-07-06 RX ORDER — BUPIVACAINE HYDROCHLORIDE 5 MG/ML
30 INJECTION, SOLUTION EPIDURAL; INTRACAUDAL ONCE
Status: COMPLETED | OUTPATIENT
Start: 2020-07-06 | End: 2020-07-06

## 2020-07-06 RX ADMIN — LIDOCAINE HYDROCHLORIDE 8 ML: 10 INJECTION, SOLUTION EPIDURAL; INFILTRATION; INTRACAUDAL; PERINEURAL at 15:38

## 2020-07-06 RX ADMIN — BUPIVACAINE HYDROCHLORIDE 4 ML: 5 INJECTION, SOLUTION EPIDURAL; INTRACAUDAL at 15:45

## 2020-07-06 NOTE — H&P
History of Present Illness: The patient is a 69 y o  male who presents with complaints of low back pain      Patient Active Problem List   Diagnosis    Lumbar radiculopathy    Lumbar disc herniation    Lumbar spondylosis    [facility]rothelial cancer ([facility] [address] )    Essential hypertension    Sinus bradycardia    Hyperlipidemia    Cancer of left renal pelvis (HCC)    Drug-induced neutropenia (HCC)    Chronic pain disorder    Spinal stenosis of lumbar region    Encounter for central line care    Hyperuricemia    Encounter for long-term opiate analgesic use    Uncomplicated opioid dependence ([facility] [address] )       Past Medical History:   Diagnosis Date    Arthritis     Bladder cancer     Cancer ([facility] [address] )     skin melanoma;basal cell    Chronic pain disorder     from arthritis    Colon polyp     Does use hearing aid     bilat    Dry eyes, bilateral     GERD (gastroesophageal reflux disease)     History of kidney stones 10/2019    History of partial knee replacement     bilat    History of vertigo     Hyperlipidemia     Hypertension     Kidney lesion     Lumbar disc disorder     compression of vertebrae L4-5-6    Muscle weakness     left hip area    Renal mass     left    Right ankle injury 03/05/2020    missed step of ladder     Right ankle pain     Shortness of breath     with activity    Tinnitus     Urothelial cancer     left    Wears glasses        Past Surgical History:   Procedure Laterality Date    COLONOSCOPY      CYSTOSCOPY Left 4/1/2020    Procedure: CYSTOSCOPY; URETERAL CATHETER PLACEMENT;  Surgeon: Yesenia Nugent MD;  [facility];  Service: Urology    CYSTOSCOPY  05/11/2020    FL CYSTOGRAM  4/13/2020    FL RETROGRADE PYELOGRAM  1/17/2020    HERNIA REPAIR      umbilical with mesh    INGUINAL HERNIA REPAIR Right     with mesh    IR PORT PLACEMENT  4/30/2020    JOINT REPLACEMENT Bilateral     partials knee    LUMBAR EPIDURAL INJECTION      [place] CYSTOURETHROSCOPY,URETER CATHETER Bilateral 1/17/2020    Procedure: CYSTOSCOPY; RIGHT RETROGRADE PYELOGRAM WITH RIGHT URETERAL CYTOLOGY SAMPLING; LEFT URETEROSCOPY WITH RENAL PELVIS BIOPSY AND LEFT STENT PLACEMENT;  Surgeon: Ana Sorensen MD;  [facility];  Service: Urology    ND NEPHRECTOMY, W/PART   URETECTOMY Left 4/1/2020    Procedure: ROBOTIC LAPAROSCOPIC NEPHRO-URETERECTOMY;  Surgeon: Ana Sorensen MD;  [facility];  Service: Urology    SKIN CANCER EXCISION      surface melanoma    TONSILLECTOMY      WISDOM TOOTH EXTRACTION           Current Outpatient Medications:     acetaminophen (TYLENOL) 325 mg tablet, Take 2 tablets (650 mg total) by mouth every 6 (six) hours as needed for mild pain or fever, Disp: , Rfl: 0    allopurinol (ZYLOPRIM) 100 mg tablet, Take 1 tablet (100 mg total) by mouth daily, Disp: 30 tablet, Rfl: 3    amLODIPine (NORVASC) 5 mg tablet, Take 5 mg by mouth daily  , Disp: , Rfl:     atorvastatin (LIPITOR) 80 mg tablet, Take 80 mg by mouth every other day evening, Disp: , Rfl:     atorvastatin (LIPITOR) 80 mg tablet, Take 80 mg by mouth Daily, Disp: , Rfl:     calcium citrate-vitamin D (CITRACAL+D) 315-200 MG-UNIT per tablet, Take 1 tablet by mouth daily, Disp: , Rfl:     colchicine (COLCRYS) 0 6 mg tablet, Take 0 6 mg by mouth 2 (two) times a day, Disp: , Rfl:     docusate sodium (COLACE) 100 mg capsule, Take 1 capsule (100 mg total) by mouth 2 (two) times a day for 60 doses, Disp: 60 capsule, Rfl: 0    finasteride (PROSCAR) 5 mg tablet, Take 1 tablet (5 mg total) by mouth daily for 30 doses, Disp: 30 tablet, Rfl: 6    LORazepam (ATIVAN) 0 5 mg tablet, Take 1 tablet (0 5 mg total) by mouth every 6 (six) hours as needed (nausea and vomiting), Disp: 30 tablet, Rfl: 0    metoprolol tartrate (LOPRESSOR) 100 mg tablet, Take 100 mg by mouth daily  , Disp: , Rfl:     omeprazole (PriLOSEC) 20 mg delayed release capsule, Take 20 mg by mouth daily  , Disp: , Rfl:     ondansetron (ZOFRAN) 8 mg tablet, Take 1 tablet (8 mg total) by mouth every 8 (eight) hours as needed for nausea or vomiting, Disp: 20 tablet, Rfl: 3    prochlorperazine (COMPAZINE) 10 mg tablet, Take 1 tablet (10 mg total) by mouth every 6 (six) hours as needed for nausea or vomiting, Disp: 30 tablet, Rfl: 3    terazosin (HYTRIN) 5 mg capsule, Take 2 capsules (10 mg total) by mouth daily at bedtime, Disp: 30 capsule, Rfl: 6    traMADol (ULTRAM) 50 mg tablet, Take 1 tablet (50 mg total) by mouth 2 (two) times a day as needed for severe pain, Disp: 60 tablet, Rfl: 0    VITAMIN D PO, Take 1,000 Units by mouth daily , Disp: , Rfl:     No Known Allergies    Physical Exam: There were no vitals filed for this visit  General: Awake, Alert, Oriented x 3, Mood and affect appropriate  Respiratory: Respirations even and unlabored  Cardiovascular: Peripheral pulses intact; no edema  Musculoskeletal Exam:  Bilateral lumbar paraspinals tender to palpation  ASA Score: 3    Patient/Chart Verification  Patient ID Verified: Verbal  ID Band Applied: No  Consents Confirmed: Procedural, To be obtained in the Pre-Procedure area  H&P( within 30 days) Verified: To be obtained in the Pre-Procedure area  Allergies Reviewed: Yes  Anticoag/NSAID held?: No  Currently on antibiotics?: No    Assessment:   1   Lumbar spondylosis        Plan: B/L L2-L5 MBB#2

## 2020-07-06 NOTE — DISCHARGE INSTR - LAB

## 2020-07-07 ENCOUNTER — APPOINTMENT (OUTPATIENT)
Dept: LAB | Facility: CLINIC | Age: 70
End: 2020-07-07
Payer: MEDICARE

## 2020-07-07 DIAGNOSIS — R30.0 DYSURIA: ICD-10-CM

## 2020-07-07 LAB
BACTERIA UR QL AUTO: ABNORMAL /HPF
BILIRUB UR QL STRIP: NEGATIVE
CLARITY UR: CLEAR
COLOR UR: YELLOW
GLUCOSE UR STRIP-MCNC: NEGATIVE MG/DL
HGB UR QL STRIP.AUTO: NEGATIVE
HYALINE CASTS #/AREA URNS LPF: ABNORMAL /LPF
KETONES UR STRIP-MCNC: NEGATIVE MG/DL
LEUKOCYTE ESTERASE UR QL STRIP: ABNORMAL
NITRITE UR QL STRIP: NEGATIVE
NON-SQ EPI CELLS URNS QL MICRO: ABNORMAL /HPF
PH UR STRIP.AUTO: 5.5 [PH]
PROT UR STRIP-MCNC: NEGATIVE MG/DL
RBC #/AREA URNS AUTO: ABNORMAL /HPF
SP GR UR STRIP.AUTO: 1.02 (ref 1–1.03)
UROBILINOGEN UR QL STRIP.AUTO: 0.2 E.U./DL
WBC #/AREA URNS AUTO: ABNORMAL /HPF

## 2020-07-07 PROCEDURE — 87086 URINE CULTURE/COLONY COUNT: CPT

## 2020-07-07 PROCEDURE — 81001 URINALYSIS AUTO W/SCOPE: CPT

## 2020-07-08 LAB
BACTERIA UR CULT: NORMAL
BACTERIA UR CULT: NORMAL

## 2020-07-09 DIAGNOSIS — M47.816 LUMBAR SPONDYLOSIS: Primary | ICD-10-CM

## 2020-07-14 ENCOUNTER — HOSPITAL ENCOUNTER (OUTPATIENT)
Dept: CT IMAGING | Facility: HOSPITAL | Age: 70
Discharge: HOME | End: 2020-07-14
Payer: MEDICARE

## 2020-07-14 DIAGNOSIS — C68.9 UROTHELIAL CANCER (HCC): ICD-10-CM

## 2020-07-14 DIAGNOSIS — C67.9 MALIGNANT NEOPLASM OF URINARY BLADDER, UNSPECIFIED SITE (HCC): ICD-10-CM

## 2020-07-14 PROCEDURE — 74178 CT ABD&PLV WO CNTR FLWD CNTR: CPT

## 2020-07-14 PROCEDURE — 71260 CT THORAX DX C+: CPT

## 2020-07-14 RX ADMIN — IODIXANOL 100 ML: 320 INJECTION, SOLUTION INTRAVASCULAR at 09:05

## 2020-07-15 ENCOUNTER — HOSPITAL ENCOUNTER (OUTPATIENT)
Dept: RADIOLOGY | Facility: CLINIC | Age: 70
Discharge: HOME | End: 2020-07-15
Attending: ANESTHESIOLOGY | Admitting: ANESTHESIOLOGY
Payer: MEDICARE

## 2020-07-15 ENCOUNTER — TELEPHONE (OUTPATIENT)
Dept: PAIN MEDICINE | Facility: CLINIC | Age: 70
End: 2020-07-15

## 2020-07-15 VITALS
TEMPERATURE: 98.5 F | RESPIRATION RATE: 20 BRPM | HEART RATE: 55 BPM | OXYGEN SATURATION: 97 % | DIASTOLIC BLOOD PRESSURE: 88 MMHG | SYSTOLIC BLOOD PRESSURE: 146 MMHG

## 2020-07-15 DIAGNOSIS — M47.816 LUMBAR SPONDYLOSIS: ICD-10-CM

## 2020-07-15 PROCEDURE — 64636 DESTROY L/S FACET JNT ADDL: CPT | Performed by: ANESTHESIOLOGY

## 2020-07-15 PROCEDURE — 64635 DESTROY LUMB/SAC FACET JNT: CPT | Performed by: ANESTHESIOLOGY

## 2020-07-15 RX ORDER — LIDOCAINE HYDROCHLORIDE 10 MG/ML
10 INJECTION, SOLUTION EPIDURAL; INFILTRATION; INTRACAUDAL; PERINEURAL ONCE
Status: COMPLETED | OUTPATIENT
Start: 2020-07-15 | End: 2020-07-15

## 2020-07-15 RX ORDER — BUPIVACAINE HYDROCHLORIDE 5 MG/ML
30 INJECTION, SOLUTION EPIDURAL; INTRACAUDAL ONCE
Status: COMPLETED | OUTPATIENT
Start: 2020-07-15 | End: 2020-07-15

## 2020-07-15 RX ADMIN — LIDOCAINE HYDROCHLORIDE 8 ML: 10 INJECTION, SOLUTION EPIDURAL; INFILTRATION; INTRACAUDAL; PERINEURAL at 13:14

## 2020-07-15 RX ADMIN — BUPIVACAINE HYDROCHLORIDE 4 ML: 5 INJECTION, SOLUTION EPIDURAL; INTRACAUDAL at 13:19

## 2020-07-15 RX ADMIN — LIDOCAINE HYDROCHLORIDE 4 ML: 20 INJECTION, SOLUTION EPIDURAL; INFILTRATION; INTRACAUDAL; PERINEURAL at 13:18

## 2020-07-15 NOTE — DISCHARGE INSTR - LAB

## 2020-07-15 NOTE — H&P
History of Present Illness: The patient is a 69 y o  male who presents with complaints of low back pain      Patient Active Problem List   Diagnosis    Lumbar radiculopathy    Lumbar disc herniation    Lumbar spondylosis    [facility]rothelial cancer ([facility] [address] )    Essential hypertension    Sinus bradycardia    Hyperlipidemia    Cancer of left renal pelvis (HCC)    Drug-induced neutropenia (HCC)    Chronic pain disorder    Spinal stenosis of lumbar region    Encounter for central line care    Hyperuricemia    Encounter for long-term opiate analgesic use    Uncomplicated opioid dependence ([facility] [address] )       Past Medical History:   Diagnosis Date    Arthritis     Bladder cancer     Cancer ([facility] [address] )     skin melanoma;basal cell    Chronic pain disorder     from arthritis    Colon polyp     Does use hearing aid     bilat    Dry eyes, bilateral     GERD (gastroesophageal reflux disease)     History of kidney stones 10/2019    History of partial knee replacement     bilat    History of vertigo     Hyperlipidemia     Hypertension     Kidney lesion     Lumbar disc disorder     compression of vertebrae L4-5-6    Muscle weakness     left hip area    Renal mass     left    Right ankle injury 03/05/2020    missed step of ladder     Right ankle pain     Shortness of breath     with activity    Tinnitus     Urothelial cancer     left    Wears glasses        Past Surgical History:   Procedure Laterality Date    COLONOSCOPY      CYSTOSCOPY Left 4/1/2020    Procedure: CYSTOSCOPY; URETERAL CATHETER PLACEMENT;  Surgeon: Brayden Potts MD;  [facility];  Service: Urology    CYSTOSCOPY  05/11/2020    FL CYSTOGRAM  4/13/2020    FL RETROGRADE PYELOGRAM  1/17/2020    HERNIA REPAIR      umbilical with mesh    INGUINAL HERNIA REPAIR Right     with mesh    IR PORT PLACEMENT  4/30/2020    JOINT REPLACEMENT Bilateral     partials knee    LUMBAR EPIDURAL INJECTION      [place] CYSTOURETHROSCOPY,URETER CATHETER Bilateral 1/17/2020    Procedure: CYSTOSCOPY; RIGHT RETROGRADE PYELOGRAM WITH RIGHT URETERAL CYTOLOGY SAMPLING; LEFT URETEROSCOPY WITH RENAL PELVIS BIOPSY AND LEFT STENT PLACEMENT;  Surgeon: Verdell Gilford, MD;  [facility];  Service: Urology    KY NEPHRECTOMY, W/PART   URETECTOMY Left 4/1/2020    Procedure: ROBOTIC LAPAROSCOPIC NEPHRO-URETERECTOMY;  Surgeon: Verdell Gilford, MD;  [facility];  Service: Urology    SKIN CANCER EXCISION      surface melanoma    TONSILLECTOMY      WISDOM TOOTH EXTRACTION           Current Outpatient Medications:     acetaminophen (TYLENOL) 325 mg tablet, Take 2 tablets (650 mg total) by mouth every 6 (six) hours as needed for mild pain or fever, Disp: , Rfl: 0    allopurinol (ZYLOPRIM) 100 mg tablet, Take 1 tablet (100 mg total) by mouth daily, Disp: 30 tablet, Rfl: 3    amLODIPine (NORVASC) 5 mg tablet, Take 5 mg by mouth daily  , Disp: , Rfl:     atorvastatin (LIPITOR) 80 mg tablet, Take 80 mg by mouth every other day evening, Disp: , Rfl:     atorvastatin (LIPITOR) 80 mg tablet, Take 80 mg by mouth Daily, Disp: , Rfl:     calcium citrate-vitamin D (CITRACAL+D) 315-200 MG-UNIT per tablet, Take 1 tablet by mouth daily, Disp: , Rfl:     colchicine (COLCRYS) 0 6 mg tablet, Take 0 6 mg by mouth 2 (two) times a day, Disp: , Rfl:     docusate sodium (COLACE) 100 mg capsule, Take 1 capsule (100 mg total) by mouth 2 (two) times a day for 60 doses, Disp: 60 capsule, Rfl: 0    finasteride (PROSCAR) 5 mg tablet, Take 1 tablet (5 mg total) by mouth daily for 30 doses, Disp: 30 tablet, Rfl: 6    LORazepam (ATIVAN) 0 5 mg tablet, Take 1 tablet (0 5 mg total) by mouth every 6 (six) hours as needed (nausea and vomiting), Disp: 30 tablet, Rfl: 0    metoprolol tartrate (LOPRESSOR) 100 mg tablet, Take 100 mg by mouth daily  , Disp: , Rfl:     omeprazole (PriLOSEC) 20 mg delayed release capsule, Take 20 mg by mouth daily  , Disp: , Rfl:     ondansetron (ZOFRAN) 8 mg tablet, Take 1 tablet (8 mg total) by mouth every 8 (eight) hours as needed for nausea or vomiting, Disp: 20 tablet, Rfl: 3    prochlorperazine (COMPAZINE) 10 mg tablet, Take 1 tablet (10 mg total) by mouth every 6 (six) hours as needed for nausea or vomiting, Disp: 30 tablet, Rfl: 3    terazosin (HYTRIN) 5 mg capsule, Take 2 capsules (10 mg total) by mouth daily at bedtime, Disp: 30 capsule, Rfl: 6    traMADol (ULTRAM) 50 mg tablet, Take 1 tablet (50 mg total) by mouth 2 (two) times a day as needed for severe pain, Disp: 60 tablet, Rfl: 0    VITAMIN D PO, Take 1,000 Units by mouth daily , Disp: , Rfl:     No Known Allergies    Physical Exam: There were no vitals filed for this visit  General: Awake, Alert, Oriented x 3, Mood and affect appropriate  Respiratory: Respirations even and unlabored  Cardiovascular: Peripheral pulses intact; no edema  Musculoskeletal Exam:  Bilateral lumbar paraspinals tender to palpation  ASA Score: 3         Assessment:   1   Lumbar spondylosis        Plan: LEFT L2-L5 RADIO FREQUENCY ABLATION

## 2020-07-15 NOTE — TELEPHONE ENCOUNTER
Patient is S/P a left L2-L5 RFA c/ JW on 7/15/20  Next ovs scheduled for 8/4  Next OPRO scheduled for 8/5  Please call on 7/16 post OPRO   Thanks

## 2020-07-16 NOTE — TELEPHONE ENCOUNTER
Pt's wife called and stated the pt is doing fine and feels his pain level is now at a 4  Injection site feels fine        Pt can be reached at [phone] (3) adequate

## 2020-07-16 NOTE — TELEPHONE ENCOUNTER
KHURRAM patient, states he has slight soreness this morning  Patient denies any s/s of infection, fever >100, redness, drainage, headache, sun-burning sensation  Patient is scheduled for 8/5/2020 for next procedure

## 2020-07-17 ENCOUNTER — TELEPHONE (OUTPATIENT)
Dept: UROLOGY | Facility: CLINIC | Age: 70
End: 2020-07-17

## 2020-07-17 DIAGNOSIS — C68.9 UROTHELIAL CANCER (HCC): Primary | ICD-10-CM

## 2020-07-30 ENCOUNTER — TELEPHONE (OUTPATIENT)
Dept: UROLOGY | Facility: CLINIC | Age: 70
End: 2020-07-30

## 2020-07-31 ENCOUNTER — HOSPITAL ENCOUNTER (OUTPATIENT)
Dept: INFUSION CENTER | Facility: HOSPITAL | Age: 70
Discharge: HOME | End: 2020-07-31
Payer: MEDICARE

## 2020-07-31 VITALS
HEART RATE: 69 BPM | RESPIRATION RATE: 18 BRPM | SYSTOLIC BLOOD PRESSURE: 132 MMHG | OXYGEN SATURATION: 96 % | TEMPERATURE: 97.7 F | DIASTOLIC BLOOD PRESSURE: 63 MMHG

## 2020-07-31 DIAGNOSIS — C65.2 CANCER OF LEFT RENAL PELVIS (HCC): ICD-10-CM

## 2020-07-31 DIAGNOSIS — Z45.2 ENCOUNTER FOR CENTRAL LINE CARE: Primary | ICD-10-CM

## 2020-07-31 PROCEDURE — 96523 IRRIG DRUG DELIVERY DEVICE: CPT

## 2020-08-05 ENCOUNTER — TELEPHONE (OUTPATIENT)
Dept: RADIOLOGY | Facility: CLINIC | Age: 70
End: 2020-08-05

## 2020-08-05 ENCOUNTER — HOSPITAL ENCOUNTER (OUTPATIENT)
Dept: RADIOLOGY | Facility: CLINIC | Age: 70
Discharge: HOME | End: 2020-08-05
Attending: ANESTHESIOLOGY
Payer: MEDICARE

## 2020-08-05 VITALS
SYSTOLIC BLOOD PRESSURE: 156 MMHG | HEART RATE: 58 BPM | OXYGEN SATURATION: 96 % | TEMPERATURE: 99 F | DIASTOLIC BLOOD PRESSURE: 79 MMHG | RESPIRATION RATE: 20 BRPM

## 2020-08-05 DIAGNOSIS — M47.816 LUMBAR SPONDYLOSIS: ICD-10-CM

## 2020-08-05 PROCEDURE — 64635 DESTROY LUMB/SAC FACET JNT: CPT | Performed by: ANESTHESIOLOGY

## 2020-08-05 PROCEDURE — 64636 DESTROY L/S FACET JNT ADDL: CPT | Performed by: ANESTHESIOLOGY

## 2020-08-05 RX ORDER — BUPIVACAINE HYDROCHLORIDE 5 MG/ML
30 INJECTION, SOLUTION EPIDURAL; INTRACAUDAL ONCE
Status: COMPLETED | OUTPATIENT
Start: 2020-08-05 | End: 2020-08-05

## 2020-08-05 RX ORDER — LIDOCAINE HYDROCHLORIDE 10 MG/ML
10 INJECTION, SOLUTION EPIDURAL; INFILTRATION; INTRACAUDAL; PERINEURAL ONCE
Status: COMPLETED | OUTPATIENT
Start: 2020-08-05 | End: 2020-08-05

## 2020-08-05 RX ADMIN — LIDOCAINE HYDROCHLORIDE 4 ML: 20 INJECTION, SOLUTION EPIDURAL; INFILTRATION; INTRACAUDAL; PERINEURAL at 13:21

## 2020-08-05 RX ADMIN — BUPIVACAINE HYDROCHLORIDE 4 ML: 5 INJECTION, SOLUTION EPIDURAL; INTRACAUDAL at 13:22

## 2020-08-05 RX ADMIN — LIDOCAINE HYDROCHLORIDE 8 ML: 10 INJECTION, SOLUTION EPIDURAL; INFILTRATION; INTRACAUDAL; PERINEURAL at 13:11

## 2020-08-05 NOTE — H&P
History of Present Illness: The patient is a 69 y o  male who presents with complaints of low back pain      Patient Active Problem List   Diagnosis    Lumbar radiculopathy    Lumbar disc herniation    Lumbar spondylosis    [facility]ro[facility] ([facility] [address] [facility]    Essential hypertension    Sinus bradycardia    Hyperlipidemia    Cancer of left renal pelvis (HCC)    Drug-induced neutropenia (HCC)    Chronic pain disorder    Spinal stenosis of lumbar region    Encounter for central line care    Hyperuricemia    Encounter for long-term opiate analgesic use    Uncomplicated opioid dependence ([facility] [address] )       Past Medical History:   Diagnosis Date    Arthritis     Bladder cancer     Cancer ([facility] [address] [facility]     skin melanoma;basal cell    Chronic pain disorder     from arthritis    Colon polyp     Does use hearing aid     bilat    Dry eyes, bilateral     GERD (gastroesophageal reflux disease)     History of kidney stones 10/2019    History of partial knee replacement     bilat    History of vertigo     Hyperlipidemia     Hypertension     Kidney lesion     Lumbar disc disorder     compression of vertebrae L4-5-6    Muscle weakness     left hip area    Renal mass     left    Right ankle injury 03/05/2020    missed step of ladder     Right ankle pain     Shortness of breath     with activity    Tinnitus     Urothelial cancer     left    Wears glasses        Past Surgical History:   Procedure Laterality Date    COLONOSCOPY      CYSTOSCOPY Left 4/1/2020    Procedure: CYSTOSCOPY; URETERAL CATHETER PLACEMENT;  Surgeon: Kelsy Tatum MD;  [facility];  Service: Urology    CYSTOSCOPY  05/11/2020    FL CYSTOGRAM  4/13/2020    FL RETROGRADE PYELOGRAM  1/17/2020    HERNIA REPAIR      umbilical with mesh    INGUINAL HERNIA REPAIR Right     with mesh    IR PORT PLACEMENT  4/30/2020    JOINT REPLACEMENT Bilateral     partials knee    LUMBAR EPIDURAL INJECTION      SD CYSTOURETHROSCOPY,URETER CATHETER Bilateral 1/17/2020    Procedure: CYSTOSCOPY; RIGHT RETROGRADE PYELOGRAM WITH RIGHT URETERAL CYTOLOGY SAMPLING; LEFT URETEROSCOPY WITH RENAL PELVIS BIOPSY AND LEFT STENT PLACEMENT;  Surgeon: Martita Calle MD;  [facility];  Service: Urology    ME NEPHRECTOMY, W/PART   URETECTOMY Left 4/1/2020    Procedure: ROBOTIC LAPAROSCOPIC NEPHRO-URETERECTOMY;  Surgeon: Martita Calle MD;  [facility];  Service: Urology    SKIN CANCER EXCISION      surface melanoma    TONSILLECTOMY      WISDOM TOOTH EXTRACTION           Current Outpatient Medications:     acetaminophen (TYLENOL) 325 mg tablet, Take 2 tablets (650 mg total) by mouth every 6 (six) hours as needed for mild pain or fever, Disp: , Rfl: 0    allopurinol (ZYLOPRIM) 100 mg tablet, Take 1 tablet (100 mg total) by mouth daily, Disp: 30 tablet, Rfl: 3    amLODIPine (NORVASC) 5 mg tablet, Take 5 mg by mouth daily  , Disp: , Rfl:     atorvastatin (LIPITOR) 80 mg tablet, Take 80 mg by mouth every other day evening, Disp: , Rfl:     atorvastatin (LIPITOR) 80 mg tablet, Take 80 mg by mouth Daily, Disp: , Rfl:     calcium citrate-vitamin D (CITRACAL+D) 315-200 MG-UNIT per tablet, Take 1 tablet by mouth daily, Disp: , Rfl:     colchicine (COLCRYS) 0 6 mg tablet, Take 0 6 mg by mouth 2 (two) times a day, Disp: , Rfl:     docusate sodium (COLACE) 100 mg capsule, Take 1 capsule (100 mg total) by mouth 2 (two) times a day for 60 doses, Disp: 60 capsule, Rfl: 0    finasteride (PROSCAR) 5 mg tablet, Take 1 tablet (5 mg total) by mouth daily for 30 doses, Disp: 30 tablet, Rfl: 6    LORazepam (ATIVAN) 0 5 mg tablet, Take 1 tablet (0 5 mg total) by mouth every 6 (six) hours as needed (nausea and vomiting), Disp: 30 tablet, Rfl: 0    metoprolol tartrate (LOPRESSOR) 100 mg tablet, Take 100 mg by mouth daily  , Disp: , Rfl:     omeprazole (PriLOSEC) 20 mg delayed release capsule, Take 20 mg by mouth daily  , Disp: , Rfl:     ondansetron (ZOFRAN) 8 mg tablet, Take 1 tablet (8 mg total) by mouth every 8 (eight) hours as needed for nausea or vomiting, Disp: 20 tablet, Rfl: 3    prochlorperazine (COMPAZINE) 10 mg tablet, Take 1 tablet (10 mg total) by mouth every 6 (six) hours as needed for nausea or vomiting, Disp: 30 tablet, Rfl: 3    terazosin (HYTRIN) 5 mg capsule, Take 2 capsules (10 mg total) by mouth daily at bedtime, Disp: 30 capsule, Rfl: 6    traMADol (ULTRAM) 50 mg tablet, Take 1 tablet (50 mg total) by mouth 2 (two) times a day as needed for severe pain, Disp: 60 tablet, Rfl: 0    VITAMIN D PO, Take 1,000 Units by mouth daily , Disp: , Rfl:     No Known Allergies    Physical Exam:   Vitals:    08/05/20 1258   BP: 135/77   Pulse: 77   Resp: 20   Temp: 99 °F (37 2 °C)   SpO2: 94%     General: Awake, Alert, Oriented x 3, Mood and affect appropriate  Respiratory: Respirations even and unlabored  Cardiovascular: Peripheral pulses intact; no edema  Musculoskeletal Exam:  Right lumbar paraspinals tender to palpation    ASA Score: 3         Assessment:   1   Lumbar spondylosis        Plan: RIGHT L2-L5 RADIO FREQUENCY ABLATION

## 2020-08-05 NOTE — DISCHARGE INSTRUCTIONS

## 2020-08-05 NOTE — TELEPHONE ENCOUNTER
Patient is S/P a Right L2-L5 RFA c / JW on 8/5/20  Next ovs scheduled for 8/31/20  Please call on 8/6/20   thanks

## 2020-08-06 NOTE — TELEPHONE ENCOUNTER
SW patient, denies any redness, drainage, s/s of infection, fever, headache, or sun-burning sensation  Patient is scheduled for follow office visit on 8/312020

## 2020-08-18 ENCOUNTER — TRANSCRIBE ORDERS (OUTPATIENT)
Dept: LAB | Facility: CLINIC | Age: 70
End: 2020-08-18

## 2020-08-18 ENCOUNTER — APPOINTMENT (OUTPATIENT)
Dept: LAB | Facility: CLINIC | Age: 70
End: 2020-08-18
Payer: MEDICARE

## 2020-08-18 ENCOUNTER — APPOINTMENT (OUTPATIENT)
Dept: RADIOLOGY | Facility: CLINIC | Age: 70
End: 2020-08-18
Payer: MEDICARE

## 2020-08-18 DIAGNOSIS — R10.9 ABDOMINAL PAIN, UNSPECIFIED ABDOMINAL LOCATION: Primary | ICD-10-CM

## 2020-08-18 DIAGNOSIS — R10.9 ABDOMINAL PAIN, UNSPECIFIED ABDOMINAL LOCATION: ICD-10-CM

## 2020-08-18 LAB
ANION GAP SERPL CALCULATED.3IONS-SCNC: 5 MMOL/L (ref 4–13)
BILIRUB UR QL STRIP: NEGATIVE
BUN SERPL-MCNC: 19 MG/DL (ref 5–25)
CALCIUM SERPL-MCNC: 9 MG/DL (ref 8.3–10.1)
CHLORIDE SERPL-SCNC: 110 MMOL/L (ref 100–108)
CLARITY UR: CLEAR
CO2 SERPL-SCNC: 26 MMOL/L (ref 21–32)
COLOR UR: YELLOW
CREAT SERPL-MCNC: 1.49 MG/DL (ref 0.6–1.3)
ERYTHROCYTE [DISTWIDTH] IN BLOOD BY AUTOMATED COUNT: 13.2 % (ref 11.6–15.1)
GFR SERPL CREATININE-BSD FRML MDRD: 47 ML/MIN/1.73SQ M
GLUCOSE P FAST SERPL-MCNC: 108 MG/DL (ref 65–99)
GLUCOSE UR STRIP-MCNC: NEGATIVE MG/DL
HCT VFR BLD AUTO: 37.9 % (ref 36.5–49.3)
HGB BLD-MCNC: 11.8 G/DL (ref 12–17)
HGB UR QL STRIP.AUTO: NEGATIVE
KETONES UR STRIP-MCNC: NEGATIVE MG/DL
LEUKOCYTE ESTERASE UR QL STRIP: NEGATIVE
MCH RBC QN AUTO: 30.6 PG (ref 26.8–34.3)
MCHC RBC AUTO-ENTMCNC: 31.1 G/DL (ref 31.4–37.4)
MCV RBC AUTO: 98 FL (ref 82–98)
NITRITE UR QL STRIP: NEGATIVE
PH UR STRIP.AUTO: 6 [PH]
PLATELET # BLD AUTO: 177 THOUSANDS/UL (ref 149–390)
PMV BLD AUTO: 9.7 FL (ref 8.9–12.7)
POTASSIUM SERPL-SCNC: 4.2 MMOL/L (ref 3.5–5.3)
PROT UR STRIP-MCNC: NEGATIVE MG/DL
RBC # BLD AUTO: 3.86 MILLION/UL (ref 3.88–5.62)
SODIUM SERPL-SCNC: 141 MMOL/L (ref 136–145)
SP GR UR STRIP.AUTO: 1.02 (ref 1–1.03)
UROBILINOGEN UR QL STRIP.AUTO: 0.2 E.U./DL
WBC # BLD AUTO: 5.69 THOUSAND/UL (ref 4.31–10.16)

## 2020-08-18 PROCEDURE — 85027 COMPLETE CBC AUTOMATED: CPT

## 2020-08-18 PROCEDURE — 74022 RADEX COMPL AQT ABD SERIES: CPT

## 2020-08-18 PROCEDURE — 36415 COLL VENOUS BLD VENIPUNCTURE: CPT

## 2020-08-18 PROCEDURE — 81003 URINALYSIS AUTO W/O SCOPE: CPT | Performed by: INTERNAL MEDICINE

## 2020-08-18 PROCEDURE — 80048 BASIC METABOLIC PNL TOTAL CA: CPT

## 2020-08-27 ENCOUNTER — PROCEDURE VISIT (OUTPATIENT)
Dept: UROLOGY | Facility: CLINIC | Age: 70
End: 2020-08-27
Payer: MEDICARE

## 2020-08-27 VITALS
HEART RATE: 64 BPM | TEMPERATURE: 97.5 F | BODY MASS INDEX: 30.78 KG/M2 | SYSTOLIC BLOOD PRESSURE: 122 MMHG | DIASTOLIC BLOOD PRESSURE: 80 MMHG | WEIGHT: 208.4 LBS

## 2020-08-27 DIAGNOSIS — C68.9 UROTHELIAL CANCER (HCC): Primary | ICD-10-CM

## 2020-08-27 LAB

## 2020-08-27 PROCEDURE — 51798 US URINE CAPACITY MEASURE: CPT | Performed by: UROLOGY

## 2020-08-27 PROCEDURE — 81002 URINALYSIS NONAUTO W/O SCOPE: CPT | Performed by: UROLOGY

## 2020-08-27 PROCEDURE — 99213 OFFICE O/P EST LOW 20 MIN: CPT | Performed by: UROLOGY

## 2020-08-27 PROCEDURE — 52000 CYSTOURETHROSCOPY: CPT | Performed by: UROLOGY

## 2020-08-27 NOTE — PROGRESS NOTES
UROLOGY FOLLOWUP NOTE     CHIEF COMPLAINT   Steve Rueda is a 69 y o  male with a complaint of   Chief Complaint   Patient presents with    Urothelial cancer ([facility] [address] )    Cystoscopy       History of Present Illness:     69 y o  male with a history of musculoskeletal back pain  Patient sees a pain management team and has been undergoing injections  MR imaging demonstrated a concerning left-sided renal lesion and the patient underwent a CT urogram   This demonstrates a endophytic infiltrating lesion, by my review of the films concerning for urothelial carcinoma  The patient has a remote history of smoking more than 40 years ago  Patient did undergo a robotic assisted left nephroureterectomy 4/1/20  Removal of the kidney was somewhat challenging given perihilar fat  Pathology does return T3 disease with invasion into the peripelvic fat  Patient had been sent for preoperative evaluation for neoadjuvant chemotherapy but had deferred  It was recommended the patient return to see medical oncology postoperatively and he did agree to adjuvant chemotherapy  Port has been placed  Unfortunately, with some progressive renal dysfunction and concerns about hearing loss, additional chemotherapy was aborted  Patient had some postoperative retention which has resolved  He reports he has been urinating well  He does have some left lower quadrant abdominal tenderness and the feeling of hardness slightly medial to his extraction site  He denies issues with his bowels      Past Medical History:     Past Medical History:   Diagnosis Date    Arthritis     Bladder cancer     Cancer ([facility] [address] )     skin melanoma;basal cell    Chronic pain disorder     from arthritis    Colon polyp     Does use hearing aid     bilat    Dry eyes, bilateral     GERD (gastroesophageal reflux disease)     History of kidney stones 10/2019    History of partial knee replacement     bilat    History of vertigo     Hyperlipidemia  Hypertension     Kidney lesion     Lumbar disc disorder     compression of vertebrae L4-5-6    Muscle weakness     left hip area    Renal mass     left    Right ankle injury 03/05/2020    missed step of ladder     Right ankle pain     Shortness of breath     with activity    Tinnitus     Urothelial cancer     left    Wears glasses        PAST SURGICAL HISTORY:     Past Surgical History:   Procedure Laterality Date    COLONOSCOPY      CYSTOSCOPY Left 4/1/2020    Procedure: CYSTOSCOPY; URETERAL CATHETER PLACEMENT;  Surgeon: Brayden Potts MD;  [facility];  Service: Urology    CYSTOSCOPY  05/11/2020    FL CYSTOGRAM  4/13/2020    FL RETROGRADE PYELOGRAM  1/17/2020    HERNIA REPAIR      umbilical with mesh    INGUINAL HERNIA REPAIR Right     with mesh    IR PORT PLACEMENT  4/30/2020    JOINT REPLACEMENT Bilateral     partials knee    LUMBAR EPIDURAL INJECTION      [place] CYSTOURETHROSCOPY,URETER CATHETER Bilateral 1/17/2020    Procedure: CYSTOSCOPY; RIGHT RETROGRADE PYELOGRAM WITH RIGHT URETERAL CYTOLOGY SAMPLING; LEFT URETEROSCOPY WITH RENAL PELVIS BIOPSY AND LEFT STENT PLACEMENT;  Surgeon: Brayden Potts MD;  [facility];  Service: Urology    [place] NEPHRECTOMY, W/PART   URETECTOMY Left 4/1/2020    Procedure: ROBOTIC LAPAROSCOPIC NEPHRO-URETERECTOMY;  Surgeon: Brayden Potts MD;  [facility];  Service: Urology    SKIN CANCER EXCISION      surface melanoma    TONSILLECTOMY      WISDOM TOOTH EXTRACTION         CURRENT MEDICATIONS:     Current Outpatient Medications   Medication Sig Dispense Refill    acetaminophen (TYLENOL) 325 mg tablet Take 2 tablets (650 mg total) by mouth every 6 (six) hours as needed for mild pain or fever  0    allopurinol (ZYLOPRIM) 100 mg tablet Take 1 tablet (100 mg total) by mouth daily 30 tablet 3    amLODIPine (NORVASC) 5 mg tablet Take 5 mg by mouth daily        atorvastatin (LIPITOR) 80 mg tablet Take 80 mg by mouth every other day evening      atorvastatin (LIPITOR) 80 mg tablet Take 80 mg by mouth Daily      calcium citrate-vitamin D (CITRACAL+D) 315-200 MG-UNIT per tablet Take 1 tablet by mouth daily      colchicine (COLCRYS) 0 6 mg tablet Take 0 6 mg by mouth 2 (two) times a day      LORazepam (ATIVAN) 0 5 mg tablet Take 1 tablet (0 5 mg total) by mouth every 6 (six) hours as needed (nausea and vomiting) 30 tablet 0    metoprolol tartrate (LOPRESSOR) 100 mg tablet Take 100 mg by mouth daily        omeprazole (PriLOSEC) 20 mg delayed release capsule Take 20 mg by mouth daily        ondansetron (ZOFRAN) 8 mg tablet Take 1 tablet (8 mg total) by mouth every 8 (eight) hours as needed for nausea or vomiting 20 tablet 3    terazosin (HYTRIN) 5 mg capsule Take 2 capsules (10 mg total) by mouth daily at bedtime 30 capsule 6    traMADol (ULTRAM) 50 mg tablet Take 1 tablet (50 mg total) by mouth 2 (two) times a day as needed for severe pain 60 tablet 0    VITAMIN D PO Take 1,000 Units by mouth daily       docusate sodium (COLACE) 100 mg capsule Take 1 capsule (100 mg total) by mouth 2 (two) times a day for 60 doses 60 capsule 0    finasteride (PROSCAR) 5 mg tablet Take 1 tablet (5 mg total) by mouth daily for 30 doses 30 tablet 6    prochlorperazine (COMPAZINE) 10 mg tablet Take 1 tablet (10 mg total) by mouth every 6 (six) hours as needed for nausea or vomiting 30 tablet 3     No current facility-administered medications for this visit          ALLERGIES:   No Known Allergies    SOCIAL HISTORY:     Social History     Socioeconomic History    Marital status: /Civil Union     Spouse name: None    Number of children: None    Years of education: None    Highest education level: None   Occupational History    None   Social Needs    Financial resource strain: None    Food insecurity     Worry: None     Inability: None    Transportation needs     Medical: None     Non-medical: None   Tobacco Use    Smoking status: Former Smoker     Last attempt to quit: 1972     Years since quittin 10    Smokeless tobacco: Never Used   Substance and Sexual Activity    Alcohol use: Yes     Alcohol/week: 4 0 standard drinks     Types: 4 Cans of beer per week     Frequency: 4 or more times a week     Drinks per session: 3 or 4     Binge frequency: Daily or almost daily     Comment: daily/socially    Drug use: Never    Sexual activity: Not Currently   Lifestyle    Physical activity     Days per week: None     Minutes per session: None    Stress: None   Relationships    Social connections     Talks on phone: None     Gets together: None     Attends [employer] service: None     Active member of club or organization: None     Attends meetings of clubs or organizations: None     Relationship status: None    Intimate partner violence     Fear of current or ex partner: None     Emotionally abused: None     Physically abused: None     Forced sexual activity: None   Other Topics Concern    None   Social History Narrative    Daily caffeine use - 2 cups coffee        SOCIAL HISTORY:   History reviewed  No pertinent family history  REVIEW OF SYSTEMS:     Review of Systems   Constitutional: Negative  Respiratory: Negative  Cardiovascular: Negative  Gastrointestinal: Positive for abdominal pain (Improving postop pain)  Musculoskeletal: Positive for back pain  Skin: Negative  Psychiatric/Behavioral: Negative  PHYSICAL EXAM:     /80   Pulse 64   Temp 97 5 °F (36 4 °C)   Wt 94 5 kg (208 lb 6 4 oz)   BMI 30 78 kg/m²     General:  Healthy appearing male in no acute distress  They have a normal affect  There is not appear to be any gross neurologic defects or abnormalities  HEENT:  Normocephalic, atraumatic  Neck is supple without any palpable lymphadenopathy  Cardiovascular:  Patient has normal palpable distal radial pulses  There is no significant peripheral edema  No JVD is noted    Respiratory: Patient has unlabored respirations  There is no audible wheeze or rhonchi  Abdomen:  Abdomen is with healed inguinal and umbilical (mesh by report) surgical scars  Abdomen is soft and nontender  There is no tympany  Inguinal and umbilical hernia are not appreciated  : Circumcised phallus orthotopic meatus  Musculoskeletal:  Patient does not have significant CVA tenderness in the  flank with palpation or percussion  They full range of motion in all 4 extremities  Strength in all 4 extremities appears congruent  Patient is able to ambulate without assistance or difficulty  Dermatologic:  Patient has no skin abnormalities or rashes  LABS:     CBC:   Lab Results   Component Value Date    WBC 5 69 2020    HGB 11 8 (L) 2020    HCT 37 9 2020    MCV 98 2020     2020       BMP:   Lab Results   Component Value Date    CALCIUM 9 0 2020    K 4 2 2020    CO2 26 2020     (H) 2020    BUN 19 2020    CREATININE 1 49 (H) 2020     IMAGIN/14/20  CT CHEST WITH IV CONTRAST     INDICATION:  C68 9: Malignant neoplasm of urinary organ, unspecified  C67 9: Malignant neoplasm of bladder, unspecified      COMPARISON:  No prior chest CT study, CT abdomen and pelvis 2020     TECHNIQUE: CT examination of the chest was performed  Axial, sagittal, and coronal 2D reformatted images were created from the source data and submitted for interpretation        Radiation dose length product (DLP) for this visit:  653 1 mGy-cm   This examination, like all CT scans performed in the [facility], was performed utilizing techniques to minimize radiation dose exposure, including the use of iterative   reconstruction and automated exposure control      IV Contrast:  100 mL of iodixanol (VISIPAQUE)     FINDINGS:     LUNGS:  Lungs are clear    There is no tracheal or endobronchial lesion      PLEURA:  Unremarkable      HEART/GREAT VESSELS: The heart is normal size  Tiny amount of pericardial fluid noted anteriorly  Coronary artery calcifications      MEDIASTINUM AND MIGUEL:  No pathologic adenopathy by CT criteria  A couple subcentimeter lymph nodes are seen along the distal thoracic aorta medially  These are more prominent than CT abdomen study from January 2020, however, could be reactive from   recent surgery  The esophagus is unremarkable      CHEST WALL AND LOWER NECK:   Right chest wall Port-A-Cath      VISUALIZED STRUCTURES IN THE UPPER ABDOMEN:  Multiple small hepatic cysts and subcentimeter hypodensities too small to characterize are seen  The left adrenal gland is not clearly visualized  Scar tissue is suggested in the medial left retroperitoneum   presumably from left nephrectomy which is incompletely visualized  See separate report of CT abdomen also on this date      Descending colon diverticulosis      OSSEOUS STRUCTURES:  No acute fracture or destructive osseous lesion  Degenerative changes of the spine and suggestive evidence of DISH  Osteoarthritis right glenohumeral joint      IMPRESSION:     No convincing evidence of metastatic disease in the chest      Subcentimeter right periaortic lower mediastinal lymph nodes appear more prominent than abdominal CT imaging in January 2020, however could be reactive from recent abdominal surgery  Attention on follow-up advised      Coronary artery calcifications  6/13/20  RENAL ULTRASOUND     INDICATION:   R33 9: Retention of urine, unspecified      COMPARISON: Renal ultrasound 10/12/2019     TECHNIQUE:   Ultrasound of the retroperitoneum was performed with a curvilinear transducer utilizing volumetric sweeps and still imaging techniques       FINDINGS:     KIDNEYS:  Normal size  Right kidney:  13 x 5 4 cm  Left kidney:  Interval left nephrectomy      Right kidney  Normal echogenicity and contour  No suspicious masses detected    7 mm exophytic simple cyst arising from the lower pole minimally enlarged since October 2019 when it measured 5 mm  This is not an unexpected finding  No hydronephrosis  No shadowing calculi  No perinephric fluid collections      Left kidney  Interval nephrectomy  Suboptimal evaluation of the postsurgical bed due to overlying bowel gas      URETERS:  Nonvisualized      BLADDER:   Normally distended  No focal thickening or mass lesions  Right ureteral jet is present  Pre and post void bladder volume approximately 290 cc and 60 cc respectively      IMPRESSION:     Interval left nephrectomy  Suboptimal evaluation of the postsurgical bed due to overlying bowel gas      7 mm right renal simple cyst      Small post void bladder residual      PATHOLOGY:     4/1/20  Case Report   Surgical Pathology Report                         Case: [id number]                                    Authorizing Provider: Verdell Gilford, MD   Collected:           04/01/2020 1048               Ordering Location:     [facility]        Received:            04/01/2020 [address] [place] [address] Operating Room                                                      Pathologist:           Sara Self MD                                                                  Specimen:    Kidney, Left, left kidney, ureter and bladder cuff                                         Final Diagnosis   A  Left kidney, ureter, and bladder cuff, nephroureterectomy:  - Invasive high grade urothelial carcinoma arising in renal pelvis  - Bladder cuff margin is negative for carcinoma and no evidence of high grade dysplasia  - Ureters with no significant pathologic abnormality  - Two benign simple cysts  - Adrenal gland is negative for malignancy  - One lymph node, negative for malignancy (0/1)  1/17/20  Final Diagnosis   A   Ureter, Right, left renal pelvis biopsy  -Fragments of high grade urothelial carcinoma   -No evidence of lamina propria invasion   -Detrusor muscle/ muscularis propria is not present for evaluation      B  Urinary Bladder, bladder biopsy:  -Fragments of low grade papillary lesion  See note  -No evidence of invasion seen  -Unremarkable fragment of detrusor muscle seen      Separate fragment of urothelium with mild cytological atypia seen     Note  Differential diagnosis include low grade Papillary urothelial carcinoma vs apillary urothelial neoplasm of low malignant potential     Intradepartmental consultation with more than one staff pathologist is in agreement     PROCEDURE:     SEE NOTE    ASSESSMENT:     69 y o  male with LEFT upper tract urothelial carcinoma s/p robotic LEFT nephroureterectomy for [id number] disease, planning to undergoing adjuvant chemotherapy, now with recurrent urinary retention    PLAN:       Patient's cystoscopy today is normal   Will repeat in 3 months  Patient continues to follow with Medical Oncology and is due for upcoming visit  Will reach out to Dr Humberto Torres  Who recommended CT of the chest abdomen pelvis at 3 month interval following the June visit  Patient did have an interval CT of the chest which showed some small sub cm mediastinal nodes  Patient will likely have CT scan before his next visit with me  Will defer timing for regular films to the Medical Oncology group

## 2020-08-27 NOTE — PROGRESS NOTES
Cystoscopy    Date/Time: 8/27/2020 10:13 AM  Performed by: Kristina Dennis MD  Authorized by: Kristina Dennis MD     Procedure details: cystoscopy    Patient tolerance: Patient tolerated the procedure well with no immediate complications    Additional Procedure Details:   Cystoscopy procedure note:    Risk and benefits of flexible cystoscopy were discussed  Informed consent was obtained  Urine dip was adequate for cystoscopy  The patient was placed in the supine position  His genitalia was prepped with Betadine and draped in a sterile fashion  Viscous 2% lidocaine jelly was instilled into the urethra and allowed dwell time for local anesthesia  Flexible cystoscopy was then performed using a 16F scope  The distal urethra and prostatic urethra were evaluated and were normal in course and caliber  Once inside the bladder, it was carefully inspected in a 360 degree fashion  There was no evidence of mucosal abnormalities, lesions, diverticula or stones  Left ureteral orifice was surgically resected, right ureteral orifice was orthotopic  Retroflexion for evaluation of the bladder neck was normal      Overall this was a negative cystoscopy  Postvoid residual was performed following the cystoscopy

## 2020-08-27 NOTE — Clinical Note
Assessment/Plan:    No problem-specific Assessment & Plan notes found for this encounter  Wants STD check  No rash or dc  Unprotected intercourse with a few partners that are friends  No ivda per pt    Dm monitoring advised  May need formal DM education, did have some in hospital  Needs to do smbg as instructed  Cont meds  f/u after labs, 1m    ckd stable  Std prevention advised  chf stable, cont cardio f/u     Diagnoses and all orders for this visit:    Uncontrolled type 2 diabetes mellitus with chronic kidney disease, without long-term current use of insulin, unspecified CKD stage ([facility] )    CKD stage 1 due to type 2 diabetes mellitus ([facility] )    Heart failure, left, with LVEF 41-49% ([facility] )    Exposure to potentially hazardous body fluids  -     HIV 1/2 AG-AB combo; Future  -     RPR; Future    Type 2 diabetes mellitus with complication, without long-term current use of insulin (HCC)  -     metFORMIN (GLUCOPHAGE-XR) 500 mg 24 hr tablet; Take 1 tablet (500 mg total) by mouth daily As directed    Seasonal allergic rhinitis due to other allergic trigger              Return in about 6 weeks (around 6/25/2018) for Recheck  Subjective:      Patient ID: Sanju Rene is a 47 y o  female  Chief Complaint   Patient presents with    Follow-up     diabetes drh lpn       HPI    Has been taking meds  Unaware of need for f/u  Not strict with diet or exercise  a1c elevated in past     Allergies bad lately  Mold and pollen  Worse outdoors  No f/c/dizziness    htn stable    The following portions of the patient's history were reviewed and updated as appropriate: allergies, current medications, past family history, past medical history, past social history, past surgical history and problem list     Review of Systems   Neurological: Negative for dizziness and syncope           Current Outpatient Prescriptions   Medication Sig Dispense Refill    albuterol (PROAIR HFA) 90 mcg/act inhaler Inhale 1-2 puffs      carvedilol (COREG) Patient saw you in June, you recommended CT T/A/P 3 mos (around Sept)  He had a CT Chest alone in July, subcm mediastinal nodes  Due for appt with you 9/1, no films scheduled  Would you like me to order CT T/A/P and would you want to reschedule your visit for after results return  Appreciate your input on film timing  6 25 mg tablet Take 1 tablet (6 25 mg total) by mouth 2 (two) times a day with meals 180 tablet 3    losartan (COZAAR) 25 mg tablet Take 1 tablet (25 mg total) by mouth daily 90 tablet 3    metFORMIN (GLUCOPHAGE-XR) 500 mg 24 hr tablet Take 1 tablet (500 mg total) by mouth daily As directed 90 tablet 0    spironolactone (ALDACTONE) 25 mg tablet TAKE 1 TABLET BY MOUTH DAILY 30 tablet 0    torsemide (DEMADEX) 20 mg tablet Take by mouth      loratadine (CLARITIN) 10 mg tablet Take 1 tablet by mouth       No current facility-administered medications for this visit  Objective:    /72   Pulse 76   Temp 99 1 °F (37 3 °C)   Resp 20   Ht 5' 9" (1 753 m)   Wt (!) 154 kg (340 lb)   LMP 03/26/2018   BMI 50 21 kg/m²        Physical Exam   Constitutional: She appears well-developed  HENT:   Head: Normocephalic  Eyes: Conjunctivae are normal    Neck: Neck supple  Cardiovascular: Normal rate and intact distal pulses  Pulmonary/Chest: Effort normal  No respiratory distress  Abdominal: Soft  Musculoskeletal: She exhibits no edema or deformity  Neurological: She is alert  Skin: Skin is warm and dry  Psychiatric: Her behavior is normal  Thought content normal    Nursing note and vitals reviewed               Valdemar Hewitt DO

## 2020-08-28 DIAGNOSIS — C68.9 UROTHELIAL CANCER (HCC): Primary | ICD-10-CM

## 2020-08-31 ENCOUNTER — OFFICE VISIT (OUTPATIENT)
Dept: PAIN MEDICINE | Facility: CLINIC | Age: 70
End: 2020-08-31
Payer: MEDICARE

## 2020-08-31 VITALS
DIASTOLIC BLOOD PRESSURE: 83 MMHG | TEMPERATURE: 98.6 F | HEIGHT: 69 IN | HEART RATE: 63 BPM | SYSTOLIC BLOOD PRESSURE: 124 MMHG | WEIGHT: 208 LBS | BODY MASS INDEX: 30.81 KG/M2

## 2020-08-31 DIAGNOSIS — M51.26 LUMBAR DISC HERNIATION: ICD-10-CM

## 2020-08-31 DIAGNOSIS — M47.816 LUMBAR SPONDYLOSIS: ICD-10-CM

## 2020-08-31 DIAGNOSIS — Z79.891 ENCOUNTER FOR LONG-TERM OPIATE ANALGESIC USE: ICD-10-CM

## 2020-08-31 DIAGNOSIS — G89.4 CHRONIC PAIN DISORDER: Primary | ICD-10-CM

## 2020-08-31 DIAGNOSIS — M54.16 LUMBAR RADICULOPATHY: ICD-10-CM

## 2020-08-31 DIAGNOSIS — M48.061 SPINAL STENOSIS OF LUMBAR REGION, UNSPECIFIED WHETHER NEUROGENIC CLAUDICATION PRESENT: ICD-10-CM

## 2020-08-31 DIAGNOSIS — F11.20 UNCOMPLICATED OPIOID DEPENDENCE (HCC): ICD-10-CM

## 2020-08-31 PROCEDURE — 99214 OFFICE O/P EST MOD 30 MIN: CPT | Performed by: NURSE PRACTITIONER

## 2020-08-31 PROCEDURE — 80305 DRUG TEST PRSMV DIR OPT OBS: CPT | Performed by: NURSE PRACTITIONER

## 2020-08-31 RX ORDER — TRAMADOL HYDROCHLORIDE 50 MG/1
50 TABLET ORAL 2 TIMES DAILY PRN
Qty: 60 TABLET | Refills: 2 | Status: SHIPPED | OUTPATIENT
Start: 2020-08-31 | End: 2020-11-16

## 2020-08-31 RX ORDER — SILDENAFIL 100 MG/1
TABLET, FILM COATED ORAL
COMMUNITY
End: 2021-12-07

## 2020-08-31 RX ORDER — NALOXONE HYDROCHLORIDE 4 MG/.1ML
SPRAY NASAL
Qty: 1 EACH | Refills: 1 | Status: SHIPPED | OUTPATIENT
Start: 2020-08-31

## 2020-08-31 NOTE — PATIENT INSTRUCTIONS
Opioid Pain Management   AMBULATORY CARE:   An opioid  is a type of medicine used to treat pain  Examples of opioids are oxycodone, morphine, fentanyl, or codeine  Take opioid medicines as directed, for the condition it is prescribed:  Common problems that may occur when you do not take opioid medicines as directed include the following:  · Health problems  may occur  You may have trouble breathing  You may also develop liver or kidney damage, or stomach bleeding  Any of these health problems can become life-threatening  · Opioid dependence  means your body needs the opioid medicine to keep it from going through withdrawal      · Opioid tolerance  means the opioid does not control pain as well as it used to  You need higher doses of the opioid to get pain relief  · Opioid addiction  means you are not able to control the use of the opioid medicine  You use it when you do not have pain  You crave the opioid medicine  Call 911 or have someone call 911 for any of the following:   · You are breathing slower than normal, or you have trouble breathing  · You cannot be awakened  · You have a seizure  Seek care immediately if:   · Your heart is beating slower than usual     · Your heart feels like it is jumping or fluttering  · You are so sleepy that you cannot stay awake  · You have severe muscle pain or weakness  · You see or hear things that are not real   Contact your healthcare provider if:   · You are too dizzy to stand up  · Your pain gets worse or you have new pain  · You cannot do your usual activities because of side effects from the opioid  · You are constipated or have abdominal pain  · You have questions or concerns about your condition or care  Opioid safety measures:   · Take your medicine as directed  Ask if you need more information on how to take your medicine correctly  Follow up with your healthcare provider regularly  You may need to have your dose adjusted   Do not use opioid medicine if you are pregnant or breastfeeding  · Give your healthcare provider a list of all your medicines  Include any over-the-counter medicines, vitamins, and herbs  It can be dangerous to take opioids with certain other medicines, such as antihistamines  · Keep opioid medicine in a safe place  Store your opioid medicine in a locked cabinet to keep it away from children and others  · Do not drink alcohol while you use opioids  Alcohol use with an opioid medicine can make you sleepy and slow your breathing rate  You may stop breathing completely  · Do not drive or operate heavy machinery after you take opioid medicine  Opioid medicine can make you drowsy and make it hard to concentrate  You may injure yourself or others if you drive or operate heavy machinery while taking your medicine  · Drink fluids and eat high-fiber foods  Fluids and fiber will help prevent constipation  Ask your healthcare provider what fluids are right for you and how much you should drink  Also ask for a list of foods that contain fiber  Follow up with your healthcare provider as directed: You may need to have your dose adjusted  You may be referred to a pain specialist  Write down your questions so you remember to ask them during your visits  © 2017 [employer] Information is for End User's use only and may not be sold, redistributed or otherwise used for commercial purposes  All illustrations and images included in CareNotes® are the copyrighted property of [email or web address]  or Josse Sommer  The above information is an  only  It is not intended as medical advice for individual conditions or treatments  Talk to your doctor, nurse or pharmacist before following any medical regimen to see if it is safe and effective for you

## 2020-08-31 NOTE — PROGRESS NOTES
Assessment:  1  Chronic pain disorder    2  Lumbar radiculopathy    3  Lumbar disc herniation    4  Lumbar spondylosis    5  Spinal stenosis of lumbar region, unspecified whether neurogenic claudication present    6  Encounter for long-term opiate analgesic use    7  Uncomplicated opioid dependence ([address] )        Plan:  1  The patient reports a 50% improvement of his pain when he takes tramadol  The patient may continue tramadol 50 mg twice a day as needed for pain #60 tablets for 30 days  The patient was given a 3 month supply of prescriptions with a Do Not Fill date(s) of today and 2 refills  He was advised to try taking the medication more often since he only takes 1 tablet a day every couple of days but still continues with rather significant low back pain  While the patient was in the office today an opioid contract was thoroughly reviewed and signed by the patient  The patient was given adequate time to ask questions in regards to the contract and a signed copy was sent home for their records  [place] Prescription Drug Monitoring Program report was reviewed and was appropriate  A urine drug screen was collected at today's office visit as part of our medication management protocol  The point of care testing results were appropriate for what was being prescribed  The specimen will be sent for confirmatory testing  The drug screen is medically necessary because the patient is either dependent on opioid medication or is being considered for opioid medication therapy and the results could impact ongoing or future treatment  The drug screen is to evaluate for the presences or absence of prescribed, non-prescribed, and/or illicit drugs/substances  The patient is currently receiving a benzodiazepine and an opioid medication  This places him at a higher-risk for respiratory depression/overdose  Therefore, I  will prescribe naloxone nasal spray   It was explained to the patient that this is an injectable opiate antagonist which is indicated for emergency treatment for opiate overdose  The patient was also instructed on how to use the Narcan nasal spray, in the case of an opioid emergency  2  I will avoid NSAIDs secondary to CKD and history of nephrectomy  3  The patient may continue Tylenol p r n  And should not exceed more than 4000 mg in 24 hours  4  The patient will continue with his home exercise program  5  The patient will follow-up in 12 week for medication prescription refill and reevaluation  The patient was advised to contact the office should their symptoms worsen in the interim  The patient was agreeable and verbalized an understanding  [employer] software was used to dictate this note  It may contain errors with dictating incorrect words or incorrect spelling  Please contact the provider directly with any questions  History of Present Illness: The patient is a 69 y o  male the history of left nephrectomy and ureterectomy secondary to urothelial carcinoma last seen on 6/19/20 who presents for a follow up office visit in regards to chronic axial lumbosacral back pain that does not radiate into the lower extremity secondary to lumbar spondylosis and stenosis and chronic pain syndrome  The patient is status post bilateral L2-5 RFA, completed on August 5, 2020 and reports no significant improvement of his back pain from the procedure  He does, however, understand that it can take at least 6 weeks for improvement  He has had epidural injections in the past as well without any significant improvement of his pain  He is managing his pain with tramadol 50 mg twice a day as needed  He does admit that he hardly takes this medication, however also admits that he usually has rather significant pain in his low back  When he does take the medication, he reports a 50% improvement without side effects  He rates his pain a 7/10 on the numeric pain rating scale    States the pain is constant nature and bothersome the morning in the evening  He characterizes the pain as dull aching and sharp  He also takes Tylenol p r n  He is unable to take NSAIDs secondary to history of nephrectomy and CKD  Pain Contract Signed: 8/31/20  Last Urine Drug Screen: 8/31/20  Last tramadol 8/30/20 per pt  I have personally reviewed and/or updated the patient's past medical history, past surgical history, family history, social history, current medications, allergies, and vital signs today  Review of Systems:    Review of Systems   Constitutional: Negative for fever and unexpected weight change  HENT: Negative for trouble swallowing  Eyes: Negative for visual disturbance  Respiratory: Negative for shortness of breath and wheezing  Cardiovascular: Negative for chest pain and palpitations  Gastrointestinal: Negative for constipation, diarrhea, nausea and vomiting  Endocrine: Negative for cold intolerance, heat intolerance and polydipsia  Genitourinary: Negative for difficulty urinating and frequency  Musculoskeletal: Negative for arthralgias, gait problem, joint swelling and myalgias  Skin: Negative for rash  Neurological: Negative for dizziness, seizures, syncope, weakness and headaches  Hematological: Does not bruise/bleed easily  Psychiatric/Behavioral: Negative for dysphoric mood  All other systems reviewed and are negative          Past Medical History:   Diagnosis Date    Arthritis     Bladder cancer     Cancer ([address] )     skin melanoma;basal cell    Chronic pain disorder     from arthritis    Colon polyp     Does use hearing aid     bilat    Dry eyes, bilateral     GERD (gastroesophageal reflux disease)     History of kidney stones 10/2019    History of partial knee replacement     bilat    History of vertigo     Hyperlipidemia     Hypertension     Kidney lesion     Lumbar disc disorder     compression of vertebrae L4-5-6    Muscle weakness     left hip area    Renal mass     left    Right ankle injury 2020    missed step of ladder     Right ankle pain     Shortness of breath     with activity    Tinnitus     Urothelial cancer     left    Wears glasses        Past Surgical History:   Procedure Laterality Date    COLONOSCOPY      CYSTOSCOPY Left 2020    Procedure: CYSTOSCOPY; URETERAL CATHETER PLACEMENT;  Surgeon: Leidy Olivo MD;  Location[facility];  Service: Urology    CYSTOSCOPY  2020    FL CYSTOGRAM  2020    FL RETROGRADE PYELOGRAM  2020    HERNIA REPAIR      umbilical with mesh    INGUINAL HERNIA REPAIR Right     with mesh    IR PORT PLACEMENT  2020    JOINT REPLACEMENT Bilateral     partials knee    LUMBAR EPIDURAL INJECTION      WA CYSTOURETHROSCOPY,URETER CATHETER Bilateral 2020    Procedure: CYSTOSCOPY; RIGHT RETROGRADE PYELOGRAM WITH RIGHT URETERAL CYTOLOGY SAMPLING; LEFT URETEROSCOPY WITH RENAL PELVIS BIOPSY AND LEFT STENT PLACEMENT;  Surgeon: Leidy Olivo MD;  [facility];  Service: Urology    WA NEPHRECTOMY, W/PART  URETECTOMY Left 2020    Procedure: ROBOTIC LAPAROSCOPIC NEPHRO-URETERECTOMY;  Surgeon: Leidy Olivo MD;  Location[facility];  Service: Urology    SKIN CANCER EXCISION      surface melanoma    TONSILLECTOMY      WISDOM TOOTH EXTRACTION         No family history on file  Social History     Occupational History    Not on file   Tobacco Use    Smoking status: Former Smoker     Last attempt to quit: 1972     Years since quittin 6    Smokeless tobacco: Never Used   Substance and Sexual Activity    Alcohol use:  Yes     Alcohol/week: 4 0 standard drinks     Types: 4 Cans of beer per week     Frequency: 4 or more times a week     Drinks per session: 3 or 4     Binge frequency: Daily or almost daily     Comment: daily/socially    Drug use: Never    Sexual activity: Not Currently         Current Outpatient Medications:     acetaminophen (TYLENOL) 325 mg tablet, Take 2 tablets (650 mg total) by mouth every 6 (six) hours as needed for mild pain or fever, Disp: , Rfl: 0    allopurinol (ZYLOPRIM) 100 mg tablet, Take 1 tablet (100 mg total) by mouth daily, Disp: 30 tablet, Rfl: 3    amLODIPine (NORVASC) 5 mg tablet, Take 5 mg by mouth daily  , Disp: , Rfl:     atorvastatin (LIPITOR) 80 mg tablet, Take 80 mg by mouth every other day evening, Disp: , Rfl:     calcium citrate-vitamin D (CITRACAL+D) 315-200 MG-UNIT per tablet, Take 1 tablet by mouth daily, Disp: , Rfl:     colchicine (COLCRYS) 0 6 mg tablet, Take 0 6 mg by mouth 2 (two) times a day, Disp: , Rfl:     LORazepam (ATIVAN) 0 5 mg tablet, Take 1 tablet (0 5 mg total) by mouth every 6 (six) hours as needed (nausea and vomiting), Disp: 30 tablet, Rfl: 0    metoprolol tartrate (LOPRESSOR) 100 mg tablet, Take 100 mg by mouth daily  , Disp: , Rfl:     omeprazole (PriLOSEC) 20 mg delayed release capsule, Take 20 mg by mouth daily  , Disp: , Rfl:     ondansetron (ZOFRAN) 8 mg tablet, Take 1 tablet (8 mg total) by mouth every 8 (eight) hours as needed for nausea or vomiting, Disp: 20 tablet, Rfl: 3    sildenafil (VIAGRA) 100 mg tablet, TAKE ONE TABLET BY MOUTH  AS NEEDED PRIOR TO SEXUAL ACTIVITY FOR ERECTILE DYSFUNCTION, Disp: , Rfl:     terazosin (HYTRIN) 5 mg capsule, Take 2 capsules (10 mg total) by mouth daily at bedtime, Disp: 30 capsule, Rfl: 6    traMADol (ULTRAM) 50 mg tablet, Take 1 tablet (50 mg total) by mouth 2 (two) times a day as needed for severe pain, Disp: 60 tablet, Rfl: 2    VITAMIN D PO, Take 1,000 Units by mouth daily , Disp: , Rfl:     docusate sodium (COLACE) 100 mg capsule, Take 1 capsule (100 mg total) by mouth 2 (two) times a day for 60 doses, Disp: 60 capsule, Rfl: 0    finasteride (PROSCAR) 5 mg tablet, Take 1 tablet (5 mg total) by mouth daily for 30 doses, Disp: 30 tablet, Rfl: 6    naloxone (NARCAN) 4 mg/0 1 mL nasal spray, Administer 1 spray into a nostril  If breathing does not return to normal or if breathing difficulty resumes after 2-3 minutes, give another dose in the other nostril using a new spray , Disp: 1 each, Rfl: 1    No Known Allergies    Physical Exam:    /83   Pulse 63   Temp 98 6 °F (37 °C)   Ht 5' 9" (1 753 m)   Wt 94 3 kg (208 lb)   BMI 30 72 kg/m²     Constitutional:normal, well developed, well nourished, alert, in no distress and non-toxic and no overt pain behavior  Eyes:anicteric  HEENT:grossly intact  Neck:supple, symmetric, trachea midline and no masses   Pulmonary:even and unlabored  Cardiovascular:No edema or pitting edema present  Skin:Normal without rashes or lesions and well hydrated  Psychiatric:Mood and affect appropriate  Neurologic:Cranial Nerves II-XII grossly intact  Musculoskeletal:Slightly antalgic gait but steady without the use of assistive devices      Imaging  No orders to display     ADDENDUM:     Incompletely evaluated 3 cm left upper pole renal mass  Renal CT protocol is suggested to evaluate left kidney      The study was marked in EPIC for significant and one month follow-up notification  Addended by Renetta Powell MD on 12/4/2019 10:29 AM       Study Result     MRI LUMBAR SPINE WITHOUT CONTRAST     INDICATION: [id number]: Radiculopathy, lumbar region      COMPARISON:  None      TECHNIQUE:  Sagittal T1, sagittal T2, sagittal inversion recovery, axial T1 and axial T2, coronal T2     IMAGE QUALITY:  Diagnostic     FINDINGS:     VERTEBRAL BODIES: Straightening of normal lumbar lordosis  Prominent right lateral osteophytes L2-L3 and left lateral osteophytes L3-L4  Marrow edema is evident at the L4-L5 level likely, related to degenerative disease      SACRUM:  Normal signal within the sacrum  No evidence of insufficiency or stress fracture      DISTAL CORD AND CONUS:  Normal size and signal within the distal cord and conus  Conus terminates at the L1 level    Moderate degree of congenital canal stenosis, exacerbated by lipomatosis and spondylosis and osteoarthritis  Minor redundancy of the   cauda equina      PARASPINAL SOFT TISSUES:  Multiple left renal masses to include a left lower pole cyst and a rounded structure in the left upper pole which may also represent cyst   Left upper pole renal masses approximately 3 cm in greatest linear dimension and not   described on recent ultrasound  Renal protocol CT is suggested to evaluate the left upper kidney      LOWER THORACIC DISC SPACES:  Normal disc height and signal   No disc herniation, canal stenosis or foraminal narrowing  Endplate Schmorl's node through the low thoracic and upper lumbar spine     LUMBAR DISC SPACES:     L1-L2:  Mild facet arthrosis, right lateral osteophytes      L2-L3:  Reduction disc height, a superimposed right lateral osteophytes  Circumferential bulge  Moderate facet arthrosis      L3-L4:  Decreased disc height, marginal osteophytes asymmetric to the left, advanced facet arthrosis  Sac reduced to approximately 5-6 mm, with effacement of CSF within the sac      L4-L5:  Decreased disc height, marginal osteophytes, right greater than left facet arthrosis  AP dimension of the sac reduced to 5-6 mm again with the effacement of the CSF  Mild foraminal stenosis  Mild lipomatosis      L5-S1:  Reduction disc height, circumferential bulge with marginal osteophytes and bilateral facet arthrosis present to a moderate degree  Lipomatosis narrows the thecal sac      IMPRESSION:     Multifactorial spinal stenosis most severe at the L3-L4 and L4-L5 levels (5-6 mm)    Correlate for signs and symptoms of spinal stenosis, bilateral L4 and L5 radiculitis                Orders Placed This Encounter   Procedures    MM ALL_Prescribed Meds and Special Instructions    MM DT_Alprazolam Definitive Test    MM DT_Amphetamine Definitive Test    MM DT_Aripiprazole Definitive Test    MM DT_Bath Salts Definitive Test    MM DT_Buprenorphine Definitive Test    MM DT_Butalbital Definitive Test    MM DT_Clonazepam Definitive Test    MM DT_Clozapine Definitive Test    MM DT_Cocaine Definitive Test    MM DT_Codeine Definitive Test    MM DT_Desipramine Definitive Test    MM DT_Dextromethorphan Definitive Test    MM Diazepam Definitive Test    MM DT_Ethyl Glucuronide/Ethyl Sulfate Definitive Test    MM DT_Fentanyl Definitive Test    MM DT_Haloperidol Definitive Test    MM DT_Heroin Definitive Test    MM DT_Hydrocodone Definitive Test    MM DT_Hydromorphone Definitive Test    MM DT_Imipramine Definitive Test    MM DT_Kratom Definitive Test    MM DT_Levorphanol Definitive Test    MM Lorazepam Definitive Test    MM DT_MDMA Definitive Test    MM DT_Meperidine Definitive Test    MM DT_Methadone Definitive Test    MM DT_Methamphetamine Definitive Test    MM DT_Morphine Definitive Test    MM DT_Olanzapine Definitive Test    MM DT_Oxazepam Definitive Test    MM DT_Oxycodone Definitive Test    MM DT_Oxymorphone Definitive Test    MM DT_Phencyclidine Definitive Test    MM DT_Phenobarbital Definitive Test    MM DT_Phentermine Definitive Test    MM DT_Quetiapine Definitive Test    MM DT_Risperidone Definitive Test    MM DT_Secobarbital Definitive Test    MM DT_Spice Definitive Test    MM DT_Tapentadol Definitive Test    MM DT_Temazapam Definitive Test    MM DT_THC Definitive Test    MM DT_Tramadol Definitive Test    MM DT_Methylphenidate Definitive Test

## 2020-09-02 LAB
6MAM UR QL CFM: NEGATIVE NG/ML
7AMINOCLONAZEPAM UR QL CFM: NEGATIVE NG/ML
A-OH ALPRAZ UR QL CFM: NEGATIVE NG/ML
AMPHET UR QL CFM: NEGATIVE NG/ML
AMPHET UR QL CFM: NEGATIVE NG/ML
BUPRENORPHINE UR QL CFM: NEGATIVE NG/ML
BUTALBITAL UR QL CFM: NEGATIVE NG/ML
BZE UR QL CFM: NEGATIVE NG/ML
CODEINE UR QL CFM: NEGATIVE NG/ML
DESIPRAMINE UR QL CFM: NEGATIVE NG/ML
DESIPRAMINE UR QL CFM: NEGATIVE NG/ML
EDDP UR QL CFM: NEGATIVE NG/ML
ETHYL GLUCURONIDE UR QL CFM: ABNORMAL NG/ML
ETHYL SULFATE UR QL SCN: ABNORMAL NG/ML
FENTANYL UR QL CFM: NEGATIVE NG/ML
GLIADIN IGG SER IA-ACNC: NEGATIVE NG/ML
GLUCOSE 30M P 50 G LAC PO SERPL-MCNC: NEGATIVE NG/ML
HYDROCODONE UR QL CFM: NEGATIVE NG/ML
HYDROCODONE UR QL CFM: NEGATIVE NG/ML
HYDROMORPHONE UR QL CFM: NEGATIVE NG/ML
IMIPRAMINE UR QL CFM: NEGATIVE NG/ML
LORAZEPAM UR QL CFM: ABNORMAL NG/ML
MDMA UR QL CFM: NEGATIVE NG/ML
ME-PHENIDATE UR QL CFM: NEGATIVE NG/ML
MEPERIDINE UR QL CFM: NEGATIVE NG/ML
MEPHEDRONE UR QL CFM: NEGATIVE NG/ML
METHADONE UR QL CFM: NEGATIVE NG/ML
METHAMPHET UR QL CFM: NEGATIVE NG/ML
MORPHINE UR QL CFM: NEGATIVE NG/ML
MORPHINE UR QL CFM: NEGATIVE NG/ML
NORBUPRENORPHINE UR QL CFM: NEGATIVE NG/ML
NORDIAZEPAM UR QL CFM: NEGATIVE NG/ML
NORFENTANYL UR QL CFM: NEGATIVE NG/ML
NORHYDROCODONE UR QL CFM: NEGATIVE NG/ML
NORHYDROCODONE UR QL CFM: NEGATIVE NG/ML
NORMEPERIDINE UR QL CFM: NEGATIVE NG/ML
NOROXYCODONE UR QL CFM: NEGATIVE NG/ML
OLANZAPINE QUANTIFICATION: NEGATIVE NG/ML
OPC-3373 QUANTIFICATION: NEGATIVE
OXAZEPAM UR QL CFM: NEGATIVE NG/ML
OXYCODONE UR QL CFM: NEGATIVE NG/ML
OXYMORPHONE UR QL CFM: NEGATIVE NG/ML
OXYMORPHONE UR QL CFM: NEGATIVE NG/ML
PCP UR QL CFM: NEGATIVE NG/ML
PHENOBARB UR QL CFM: NEGATIVE NG/ML
SECOBARBITAL UR QL CFM: NEGATIVE NG/ML
SL AMB 3-METHYL-FENTANYL QUANTIFICATION: NORMAL NG/ML
SL AMB 4-ANPP QUANTIFICATION: NORMAL NG/ML
SL AMB 4-FIBF QUANTIFICATION: NORMAL NG/ML
SL AMB 5F-ADB-M7 METABOLITE QUANTIFICATION: NEGATIVE NG/ML
SL AMB 7-OH-MITRAGYNINE (KRATOM ALKALOID) QUANTIFICATION: NEGATIVE NG/ML
SL AMB AB-FUBINACA-M3 METABOLITE QUANTIFICATION: NEGATIVE NG/ML
SL AMB ACETYL FENTANYL QUANTIFICATION: NORMAL NG/ML
SL AMB ACETYL NORFENTANYL QUANTIFICATION: NORMAL NG/ML
SL AMB ACRYL FENTANYL QUANTIFICATION: NORMAL NG/ML
SL AMB BATH SALTS: NEGATIVE NG/ML
SL AMB BUTRYL FENTANYL QUANTIFICATION: NORMAL NG/ML
SL AMB CARFENTANIL QUANTIFICATION: NORMAL NG/ML
SL AMB CLOZAPINE QUANTIFICATION: NEGATIVE NG/ML
SL AMB CTHC (MARIJUANA METABOLITE) QUANTIFICATION: NEGATIVE NG/ML
SL AMB CYCLOPROPYL FENTANYL QUANTIFICATION: NORMAL NG/ML
SL AMB DEXTROMETHORPHAN QUANTIFICATION: NEGATIVE NG/ML
SL AMB DEXTRORPHAN (DEXTROMETHORPHAN METABOLITE) QUANT: NEGATIVE NG/ML
SL AMB DEXTRORPHAN (DEXTROMETHORPHAN METABOLITE) QUANT: NEGATIVE NG/ML
SL AMB FURANYL FENTANYL QUANTIFICATION: NORMAL NG/ML
SL AMB HALOPERIDOL  QUANTIFICATION: NEGATIVE NG/ML
SL AMB HALOPERIDOL METABOLITE QUANTIFICATION: NEGATIVE NG/ML
SL AMB HYDROXYRISPERIDONE QUANTIFICATION: NEGATIVE NG/ML
SL AMB JWH018 METABOLITE QUANTIFICATION: NEGATIVE NG/ML
SL AMB JWH073 METABOLITE QUANTIFICATION: NEGATIVE NG/ML
SL AMB MDMB-FUBINACA-M1 METABOLITE QUANTIFICATION: NEGATIVE NG/ML
SL AMB METHOXYACETYL FENTANYL QUANTIFICATION: NORMAL NG/ML
SL AMB METHYLONE QUANTIFICATION: NEGATIVE NG/ML
SL AMB N-DESMETHYL U-47700 QUANTIFICATION: NORMAL NG/ML
SL AMB N-DESMETHYL-TRAMADOL QUANTIFICATION: NORMAL NG/ML
SL AMB N-DESMETHYLCLOZAPINE QUANTIFICATION: NEGATIVE NG/ML
SL AMB NORQUETIAPINE QUANTIFICATION: NEGATIVE NG/ML
SL AMB PHENTERMINE QUANTIFICATION: NEGATIVE NG/ML
SL AMB QUETIAPINE QUANTIFICATION: NEGATIVE NG/ML
SL AMB RCS4 METABOLITE QUANTIFICATION: NEGATIVE NG/ML
SL AMB RISPERIDONE QUANTIFICATION: NEGATIVE NG/ML
SL AMB RITALINIC ACID QUANTIFICATION: NEGATIVE NG/ML
SL AMB U-47700 QUANTIFICATION: NORMAL NG/ML
SPECIMEN DRAWN SERPL: NEGATIVE NG/ML
TAPENTADOL UR QL CFM: NEGATIVE NG/ML
TEMAZEPAM UR QL CFM: NEGATIVE NG/ML
TEMAZEPAM UR QL CFM: NEGATIVE NG/ML
TRAMADOL UR QL CFM: NORMAL NG/ML
URATE/CREAT 24H UR: NORMAL NG/ML

## 2020-09-03 ENCOUNTER — TELEPHONE (OUTPATIENT)
Dept: HEMATOLOGY ONCOLOGY | Facility: CLINIC | Age: 70
End: 2020-09-03

## 2020-09-04 ENCOUNTER — APPOINTMENT (OUTPATIENT)
Dept: LAB | Facility: HOSPITAL | Age: 70
End: 2020-09-04
Payer: MEDICARE

## 2020-09-04 ENCOUNTER — HOSPITAL ENCOUNTER (OUTPATIENT)
Dept: CT IMAGING | Facility: HOSPITAL | Age: 70
Discharge: HOME | End: 2020-09-04
Attending: INTERNAL MEDICINE
Payer: MEDICARE

## 2020-09-04 DIAGNOSIS — E79.0 ELEVATED BLOOD URIC ACID LEVEL: ICD-10-CM

## 2020-09-04 DIAGNOSIS — D53.9 NUTRITIONAL ANEMIA, UNSPECIFIED: ICD-10-CM

## 2020-09-04 DIAGNOSIS — R39.9 UTI SYMPTOMS: ICD-10-CM

## 2020-09-04 DIAGNOSIS — C67.9 MALIGNANT NEOPLASM OF URINARY BLADDER, UNSPECIFIED SITE (HCC): ICD-10-CM

## 2020-09-04 DIAGNOSIS — C68.9 UROTHELIAL CANCER (HCC): ICD-10-CM

## 2020-09-04 DIAGNOSIS — D64.9 NORMOCYTIC ANEMIA: ICD-10-CM

## 2020-09-04 DIAGNOSIS — C64.2 UROTHELIAL CARCINOMA OF KIDNEY, LEFT (HCC): ICD-10-CM

## 2020-09-04 LAB
ALBUMIN SERPL BCP-MCNC: 4.1 G/DL (ref 3.5–5.7)
ALP SERPL-CCNC: 70 U/L (ref 55–165)
ALT SERPL W P-5'-P-CCNC: 7 U/L (ref 7–52)
ANION GAP SERPL CALCULATED.3IONS-SCNC: 8 MMOL/L (ref 4–13)
AST SERPL W P-5'-P-CCNC: 8 U/L (ref 13–39)
BACTERIA UR QL AUTO: ABNORMAL /HPF
BASOPHILS # BLD AUTO: 0.1 THOUSANDS/ΜL (ref 0–0.1)
BASOPHILS NFR BLD AUTO: 1 % (ref 0–2)
BILIRUB SERPL-MCNC: 0.8 MG/DL (ref 0.2–1)
BILIRUB UR QL STRIP: NEGATIVE
BUN SERPL-MCNC: 24 MG/DL (ref 7–25)
CALCIUM SERPL-MCNC: 9.5 MG/DL (ref 8.6–10.5)
CHLORIDE SERPL-SCNC: 106 MMOL/L (ref 98–107)
CLARITY UR: CLEAR
CO2 SERPL-SCNC: 27 MMOL/L (ref 21–31)
COLOR UR: YELLOW
CREAT SERPL-MCNC: 1.6 MG/DL (ref 0.7–1.3)
EOSINOPHIL # BLD AUTO: 0.1 THOUSAND/ΜL (ref 0–0.61)
EOSINOPHIL NFR BLD AUTO: 2 % (ref 0–5)
ERYTHROCYTE [DISTWIDTH] IN BLOOD BY AUTOMATED COUNT: 12.6 % (ref 11.5–14.5)
FERRITIN SERPL-MCNC: 208 NG/ML (ref 8–388)
FOLATE SERPL-MCNC: 7.2 NG/ML (ref 3.1–17.5)
GFR SERPL CREATININE-BSD FRML MDRD: 43 ML/MIN/1.73SQ M
GLUCOSE P FAST SERPL-MCNC: 96 MG/DL (ref 65–99)
GLUCOSE UR STRIP-MCNC: NEGATIVE MG/DL
HCT VFR BLD AUTO: 34.7 % (ref 42–47)
HGB BLD-MCNC: 11.8 G/DL (ref 14–18)
HGB UR QL STRIP.AUTO: NEGATIVE
IRON SATN MFR SERPL: 25 %
IRON SERPL-MCNC: 59 UG/DL (ref 65–175)
KETONES UR STRIP-MCNC: NEGATIVE MG/DL
LDH SERPL-CCNC: 117 U/L (ref 84–246)
LEUKOCYTE ESTERASE UR QL STRIP: ABNORMAL
LYMPHOCYTES # BLD AUTO: 1.3 THOUSANDS/ΜL (ref 0.6–4.47)
LYMPHOCYTES NFR BLD AUTO: 23 % (ref 21–51)
MAGNESIUM SERPL-MCNC: 1.9 MG/DL (ref 1.9–2.7)
MCH RBC QN AUTO: 31.5 PG (ref 26–34)
MCHC RBC AUTO-ENTMCNC: 33.9 G/DL (ref 31–37)
MCV RBC AUTO: 93 FL (ref 81–99)
MONOCYTES # BLD AUTO: 0.6 THOUSAND/ΜL (ref 0.17–1.22)
MONOCYTES NFR BLD AUTO: 10 % (ref 2–12)
NEUTROPHILS # BLD AUTO: 3.8 THOUSANDS/ΜL (ref 1.4–6.5)
NEUTS SEG NFR BLD AUTO: 64 % (ref 42–75)
NITRITE UR QL STRIP: NEGATIVE
NON-SQ EPI CELLS URNS QL MICRO: ABNORMAL /HPF
PH UR STRIP.AUTO: 6 [PH]
PLATELET # BLD AUTO: 181 THOUSANDS/UL (ref 149–390)
PMV BLD AUTO: 7.6 FL (ref 8.6–11.7)
POTASSIUM SERPL-SCNC: 4.3 MMOL/L (ref 3.5–5.5)
PROT SERPL-MCNC: 6.9 G/DL (ref 6.4–8.9)
PROT UR STRIP-MCNC: NEGATIVE MG/DL
RBC # BLD AUTO: 3.73 MILLION/UL (ref 4.3–5.9)
RBC #/AREA URNS AUTO: ABNORMAL /HPF
SODIUM SERPL-SCNC: 141 MMOL/L (ref 134–143)
SP GR UR STRIP.AUTO: 1.02 (ref 1–1.03)
TIBC SERPL-MCNC: 232 UG/DL (ref 250–450)
URATE SERPL-MCNC: 8.6 MG/DL (ref 2.3–7.6)
UROBILINOGEN UR QL STRIP.AUTO: 0.2 E.U./DL
VIT B12 SERPL-MCNC: 449 PG/ML (ref 100–900)
WBC # BLD AUTO: 6 THOUSAND/UL (ref 4.8–10.8)
WBC #/AREA URNS AUTO: ABNORMAL /HPF

## 2020-09-04 PROCEDURE — 84550 ASSAY OF BLOOD/URIC ACID: CPT

## 2020-09-04 PROCEDURE — 82728 ASSAY OF FERRITIN: CPT

## 2020-09-04 PROCEDURE — 83540 ASSAY OF IRON: CPT

## 2020-09-04 PROCEDURE — 82607 VITAMIN B-12: CPT

## 2020-09-04 PROCEDURE — 81001 URINALYSIS AUTO W/SCOPE: CPT

## 2020-09-04 PROCEDURE — 36415 COLL VENOUS BLD VENIPUNCTURE: CPT

## 2020-09-04 PROCEDURE — 71260 CT THORAX DX C+: CPT

## 2020-09-04 PROCEDURE — 83550 IRON BINDING TEST: CPT

## 2020-09-04 PROCEDURE — 83735 ASSAY OF MAGNESIUM: CPT

## 2020-09-04 PROCEDURE — G1004 CDSM NDSC: HCPCS

## 2020-09-04 PROCEDURE — 83615 LACTATE (LD) (LDH) ENZYME: CPT

## 2020-09-04 PROCEDURE — 82746 ASSAY OF FOLIC ACID SERUM: CPT

## 2020-09-04 PROCEDURE — 87086 URINE CULTURE/COLONY COUNT: CPT

## 2020-09-04 PROCEDURE — 80053 COMPREHEN METABOLIC PANEL: CPT

## 2020-09-04 PROCEDURE — 85025 COMPLETE CBC W/AUTO DIFF WBC: CPT

## 2020-09-04 PROCEDURE — 74177 CT ABD & PELVIS W/CONTRAST: CPT

## 2020-09-04 RX ADMIN — IOHEXOL 100 ML: 350 INJECTION, SOLUTION INTRAVENOUS at 12:27

## 2020-09-05 LAB — BACTERIA UR CULT: NORMAL

## 2020-09-09 ENCOUNTER — TELEPHONE (OUTPATIENT)
Dept: HEMATOLOGY ONCOLOGY | Facility: CLINIC | Age: 70
End: 2020-09-09

## 2020-09-09 NOTE — TELEPHONE ENCOUNTER
Significant Findings     Call Received From [facility]    Radiology Department Location [place] Study  CT chest abdomen pelvis w contrast  Dr Joe Mcnamara   Date of Study 9/9/2020   Relevant Information

## 2020-09-09 NOTE — TELEPHONE ENCOUNTER
IMPRESSION:     Status post left nephrectomy for resection of urothelial neoplasm  Lymphadenopathy in the left nephrectomy bed has increased since the prior examination, concerning for metastatic disease      Ill-defined hyperattenuating masslike focus in the left rectus muscle, not identified on the prior examination  This is suspicious for a metastatic lesion  A rectus hematoma is also considered         The study was marked in EPIC for significant notification        Will pass on to Dr Lawanda Gresham RN

## 2020-09-10 ENCOUNTER — OFFICE VISIT (OUTPATIENT)
Dept: HEMATOLOGY ONCOLOGY | Facility: CLINIC | Age: 70
End: 2020-09-10
Payer: MEDICARE

## 2020-09-10 VITALS
TEMPERATURE: 98.6 F | BODY MASS INDEX: 31.22 KG/M2 | SYSTOLIC BLOOD PRESSURE: 130 MMHG | HEIGHT: 69 IN | RESPIRATION RATE: 18 BRPM | DIASTOLIC BLOOD PRESSURE: 76 MMHG | HEART RATE: 65 BPM | WEIGHT: 210.8 LBS | OXYGEN SATURATION: 96 %

## 2020-09-10 DIAGNOSIS — C68.9 UROTHELIAL CANCER (HCC): Primary | ICD-10-CM

## 2020-09-10 DIAGNOSIS — E79.0 ELEVATED BLOOD URIC ACID LEVEL: ICD-10-CM

## 2020-09-10 DIAGNOSIS — C64.2 UROTHELIAL CARCINOMA OF KIDNEY, LEFT (HCC): ICD-10-CM

## 2020-09-10 PROCEDURE — 99214 OFFICE O/P EST MOD 30 MIN: CPT | Performed by: INTERNAL MEDICINE

## 2020-09-10 NOTE — PROGRESS NOTES
Hematology/Oncology Outpatient Follow-up  Gail Ibrahim 69 y o  male 1950 [id number]    Date:  9/10/2020        Assessment and Plan:  1  Urothelial cancer ([address] )  I did discuss with the patient and his wife the CT scan finding from the recent imaging on 09/04/2020  This revealed lymphadenopathy in the left nephrectomy site with ill-defined masslike focus in the left rectus muscle which could be the explanation of his is left inguinal pain  The patient was told that we will pursue a PET-CT scan hoping that this will provide some information regarding the etiology of his pain in the significance of the adenopathy and the masslike focus in the left rectus months  He was told if we see significant hypermetabolic activity then a biopsy from that region will be entertained to rule out recurrence of his urothelial cancer  We briefly discussed the potential benefit of immunotherapy in case we are dealing with recurrence of his malignancy  - NM PET CT skull base to mid thigh; Future    2  Urothelial carcinoma of kidney, left (HCC)    - NM PET CT skull base to mid thigh; Future    3  Elevated blood uric acid level  He was supposed to be started on low-dose allopurinol 100 mg once a day for his elevated uric acid  The patient apparently is not on the allopurinol yet  I did advise him to get it started and will recheck his uric acid prior to his next visit  - CBC and differential; Future  - Comprehensive metabolic panel; Future  - Uric acid; Future        HPI:  The patient came today for a follow-up visit  He only received 1 cycle of adjuvant chemotherapy with split dose cisplatin and gemcitabine  The treatment had to be stopped due to significant worsening of his kidney function  The patient just had a CT scan of the chest abdomen pelvis with contrast on 09/04/2020 for close monitoring which showed:     IMPRESSION:     Status post left nephrectomy for resection of urothelial neoplasm    Lymphadenopathy in the left nephrectomy bed has increased since the prior examination, concerning for metastatic disease      Ill-defined hyperattenuating masslike focus in the left rectus muscle, not identified on the prior examination  This is suspicious for a metastatic lesion  A rectus hematoma is also considered  He complained today about significant pain in the left inguinal area  He had blood work on 09/04/2020 which showed normal white cell count of 6 0 with normal white cell differential   Hemoglobin 11 8 with MCV of 93 and platelet count of 097  Creatinine of 1 6 with normal liver enzymes  Uric acid of 8 6 with normal iron panel  He denies bleeding from any sites  Oncology History   Patient has a history of hypertension, hyperlipidemia, chronic lower back pain  He had an MRI of the lower spine for further evaluation of his chronic lower back pain   The MRI on 12/02/2019 revealed Multiple left renal masses to include a left lower pole cyst and a rounded structure in the left upper pole which may also represent cyst   Left upper pole renal masses approximately 3 cm in greatest linear dimension  A CT with renal protocol was done on 12/12/2019 which also showed the infiltrating lesion in the upper pole of the left kidney measuring about the 3 5 cm in greatest dimension without any hint of retroperitoneal lymphadenopathy       A CT scan of the abdomen pelvis on 01/14/2020 showed the same findings with the mild hydronephrosis on the left   The urine cytology on 01/03/2020 showed high-grade urothelial carcinoma   A cystoscopy was then done, a biopsy was taken from the left renal pelvic region which showed high-grade urothelial carcinoma without evidence of lamina propria invasion   The detrusor muscle/muscularis propria were not present for evaluation  The recommendation was to pursue left robotic assisted laparoscopic nephroureterectomy with bladder cuff excision which was done on 04/01/2020      The final pathology revealed;  - Invasive high grade urothelial carcinoma arising in renal pelvis  - Bladder cuff margin is negative for carcinoma and no evidence of high grade dysplasia  - Ureters with no significant pathologic abnormality  - Two benign simple cysts  - Adrenal gland is negative for malignancy  - One lymph node, negative for malignancy (0/1)  The tumor size was 4 5 cm with invasion beyond the muscularis into the periurethral fat or peripelvic fat or the renal parenchyma  The margins were uninvolved by carcinoma or carcinoma in situ  The final pathology was pT3 pN0           Urothelial cancer ([facility] [address] )   1/9/2020 Initial Diagnosis    Urothelial cancer (HCC)  T3 N0 (stage IIIA)     5/14/2020 - 6/3/2020 Chemotherapy    CISplatin (PLATINOL) split dose 35 mg/m2 = 75 3 mg (50 % of original dose 70 mg/m2), Intravenous,   Administration: 75 3 mg (5/14/2020), 75 3 mg (5/21/2020)  gemcitabine (GEMZAR) 2,200 mg in sodium chloride 0 9 % 250 mL infusion, 2,150 2 mg (80 % of original dose 1,250 mg/m2), Intravenous,   Administration: 2,200 mg (5/14/2020), 2,200 mg (5/21/2020)    (only completed 1 cycle- d/c d/t adverse effects and worsening renal dysfunction)     Cancer of left renal pelvis ([facility] [address] )   4/23/2020 Initial Diagnosis    Cancer of left renal pelvis (HCC)         Interval history:    ROS: Review of Systems   Constitutional: Positive for fatigue  Negative for appetite change, diaphoresis and fever  HENT: Negative for congestion, dental problem, facial swelling, hearing loss, tinnitus and trouble swallowing  Eyes: Negative for visual disturbance  Respiratory: Positive for cough and shortness of breath ( on exertion)  Negative for chest tightness and wheezing  Cardiovascular: Negative for chest pain and leg swelling  Gastrointestinal: Positive for abdominal pain (Left lower quadrant area)  Negative for abdominal distention, blood in stool, constipation, diarrhea, nausea and vomiting     Genitourinary: Negative for dysuria, hematuria and urgency  Musculoskeletal: Positive for arthralgias  Negative for myalgias and neck pain  Skin: Negative  Negative for color change, pallor, rash and wound  Neurological: Positive for numbness and headaches  Negative for dizziness and weakness  Hematological: Negative for adenopathy  Psychiatric/Behavioral: Negative for agitation, behavioral problems, confusion, hallucinations, self-injury and sleep disturbance  The patient is not nervous/anxious and is not hyperactive          Past Medical History:   Diagnosis Date    Arthritis     Bladder cancer     Cancer ([facility] [address] )     skin melanoma;basal cell    Chronic pain disorder     from arthritis    Colon polyp     Does use hearing aid     bilat    Dry eyes, bilateral     GERD (gastroesophageal reflux disease)     History of kidney stones 10/2019    History of partial knee replacement     bilat    History of vertigo     Hyperlipidemia     Hypertension     Kidney lesion     Lumbar disc disorder     compression of vertebrae L4-5-6    Muscle weakness     left hip area    Renal mass     left    Right ankle injury 03/05/2020    missed step of ladder     Right ankle pain     Shortness of breath     with activity    Tinnitus     Urothelial cancer     left    Wears glasses        Past Surgical History:   Procedure Laterality Date    COLONOSCOPY      CYSTOSCOPY Left 4/1/2020    Procedure: CYSTOSCOPY; URETERAL CATHETER PLACEMENT;  Surgeon: Keven Hill MD;  Location[facility];  Service: Urology    CYSTOSCOPY  05/11/2020    FL CYSTOGRAM  4/13/2020    FL RETROGRADE PYELOGRAM  1/17/2020    HERNIA REPAIR      umbilical with mesh    INGUINAL HERNIA REPAIR Right     with mesh    IR PORT PLACEMENT  4/30/2020    JOINT REPLACEMENT Bilateral     partials knee    LUMBAR EPIDURAL INJECTION      MT CYSTOURETHROSCOPY,URETER CATHETER Bilateral 1/17/2020    Procedure: CYSTOSCOPY; RIGHT RETROGRADE PYELOGRAM WITH RIGHT URETERAL CYTOLOGY SAMPLING; LEFT URETEROSCOPY WITH RENAL PELVIS BIOPSY AND LEFT STENT PLACEMENT;  Surgeon: Eddie Cox MD;  [facility];  Service: Urology    DC NEPHRECTOMY, W/PART  URETECTOMY Left 2020    Procedure: ROBOTIC LAPAROSCOPIC NEPHRO-URETERECTOMY;  Surgeon: Eddie Cox MD;  [facility];  Service: Urology    SKIN CANCER EXCISION      surface melanoma    TONSILLECTOMY      WISDOM TOOTH EXTRACTION         Social History     Socioeconomic History    Marital status: /Civil Union     Spouse name: None    Number of children: None    Years of education: None    Highest education level: None   Occupational History    None   Social Needs    Financial resource strain: None    Food insecurity     Worry: None     Inability: None    Transportation needs     Medical: None     Non-medical: None   Tobacco Use    Smoking status: Former Smoker     Last attempt to quit: 1972     Years since quittin 7    Smokeless tobacco: Never Used   Substance and Sexual Activity    Alcohol use:  Yes     Alcohol/week: 4 0 standard drinks     Types: 4 Cans of beer per week     Frequency: 4 or more times a week     Drinks per session: 3 or 4     Binge frequency: Daily or almost daily     Comment: daily/socially    Drug use: Never    Sexual activity: Not Currently   Lifestyle    Physical activity     Days per week: None     Minutes per session: None    Stress: None   Relationships    Social connections     Talks on phone: None     Gets together: None     Attends Christian service: None     Active member of club or organization: None     Attends meetings of clubs or organizations: None     Relationship status: None    Intimate partner violence     Fear of current or ex partner: None     Emotionally abused: None     Physically abused: None     Forced sexual activity: None   Other Topics Concern    None   Social History Narrative    Daily caffeine use - 2 cups coffee        History reviewed  No pertinent family history  No Known Allergies      Current Outpatient Medications:     acetaminophen (TYLENOL) 325 mg tablet, Take 2 tablets (650 mg total) by mouth every 6 (six) hours as needed for mild pain or fever, Disp: , Rfl: 0    allopurinol (ZYLOPRIM) 100 mg tablet, Take 1 tablet (100 mg total) by mouth daily, Disp: 30 tablet, Rfl: 3    amLODIPine (NORVASC) 5 mg tablet, Take 5 mg by mouth daily  , Disp: , Rfl:     atorvastatin (LIPITOR) 80 mg tablet, Take 80 mg by mouth every other day evening, Disp: , Rfl:     calcium citrate-vitamin D (CITRACAL+D) 315-200 MG-UNIT per tablet, Take 1 tablet by mouth daily, Disp: , Rfl:     colchicine (COLCRYS) 0 6 mg tablet, Take 0 6 mg by mouth 2 (two) times a day, Disp: , Rfl:     LORazepam (ATIVAN) 0 5 mg tablet, Take 1 tablet (0 5 mg total) by mouth every 6 (six) hours as needed (nausea and vomiting), Disp: 30 tablet, Rfl: 0    metoprolol tartrate (LOPRESSOR) 100 mg tablet, Take 100 mg by mouth daily  , Disp: , Rfl:     naloxone (NARCAN) 4 mg/0 1 mL nasal spray, Administer 1 spray into a nostril   If breathing does not return to normal or if breathing difficulty resumes after 2-3 minutes, give another dose in the other nostril using a new spray , Disp: 1 each, Rfl: 1    omeprazole (PriLOSEC) 20 mg delayed release capsule, Take 20 mg by mouth daily  , Disp: , Rfl:     ondansetron (ZOFRAN) 8 mg tablet, Take 1 tablet (8 mg total) by mouth every 8 (eight) hours as needed for nausea or vomiting, Disp: 20 tablet, Rfl: 3    sildenafil (VIAGRA) 100 mg tablet, TAKE ONE TABLET BY MOUTH  AS NEEDED PRIOR TO SEXUAL ACTIVITY FOR ERECTILE DYSFUNCTION, Disp: , Rfl:     terazosin (HYTRIN) 5 mg capsule, Take 2 capsules (10 mg total) by mouth daily at bedtime, Disp: 30 capsule, Rfl: 6    traMADol (ULTRAM) 50 mg tablet, Take 1 tablet (50 mg total) by mouth 2 (two) times a day as needed for severe pain, Disp: 60 tablet, Rfl: 2    VITAMIN D PO, Take 1,000 Units by mouth daily , Disp: , Rfl:     docusate sodium (COLACE) 100 mg capsule, Take 1 capsule (100 mg total) by mouth 2 (two) times a day for 60 doses, Disp: 60 capsule, Rfl: 0    finasteride (PROSCAR) 5 mg tablet, Take 1 tablet (5 mg total) by mouth daily for 30 doses, Disp: 30 tablet, Rfl: 6      Physical Exam:  /76 (BP Location: Left arm, Patient Position: Sitting)   Pulse 65   Temp 98 6 °F (37 °C) (Tympanic)   Resp 18   Ht 5' 9" (1 753 m)   Wt 95 6 kg (210 lb 12 8 oz)   SpO2 96%   BMI 31 13 kg/m²     Physical Exam  Constitutional:       Appearance: He is well-developed  He is obese  HENT:      Head: Normocephalic and atraumatic  Eyes:      General: No scleral icterus  Right eye: No discharge  Left eye: No discharge  Conjunctiva/sclera: Conjunctivae normal       Pupils: Pupils are equal, round, and reactive to light  Neck:      Musculoskeletal: Normal range of motion and neck supple  Thyroid: No thyromegaly  Trachea: No tracheal deviation  Cardiovascular:      Rate and Rhythm: Normal rate and regular rhythm  Heart sounds: Normal heart sounds  No murmur  No friction rub  Pulmonary:      Effort: Pulmonary effort is normal  No respiratory distress  Breath sounds: Normal breath sounds  No wheezing or rales  Chest:      Chest wall: No tenderness  Abdominal:      General: Bowel sounds are normal  There is no distension  Palpations: Abdomen is soft  There is no hepatomegaly, splenomegaly or mass  Tenderness: There is abdominal tenderness (On deep palpation in the left lower quadrant area)  There is no guarding or rebound  Musculoskeletal: Normal range of motion  General: No tenderness or deformity  Lymphadenopathy:      Cervical: No cervical adenopathy  Skin:     General: Skin is warm and dry  Coloration: Skin is not pale  Findings: No erythema or rash     Neurological:      Mental Status: He is alert and oriented to person, place, and time  Cranial Nerves: No cranial nerve deficit  Coordination: Coordination normal       Deep Tendon Reflexes: Reflexes are normal and symmetric  Reflexes normal    Psychiatric:         Behavior: Behavior normal          Thought Content: Thought content normal          Judgment: Judgment normal            Labs:  Lab Results   Component Value Date    WBC 6 00 09/04/2020    HGB 11 8 (L) 09/04/2020    HCT 34 7 (L) 09/04/2020    MCV 93 09/04/2020     09/04/2020     Lab Results   Component Value Date    K 4 3 09/04/2020     09/04/2020    CO2 27 09/04/2020    BUN 24 09/04/2020    CREATININE 1 60 (H) 09/04/2020    GLUF 96 09/04/2020    CALCIUM 9 5 09/04/2020    AST 8 (L) 09/04/2020    ALT 7 09/04/2020    ALKPHOS 70 09/04/2020    EGFR 43 09/04/2020     No results found for: TSH    Patient voiced understanding and agreement in the above discussion  Aware to contact our office with questions/symptoms in the interim

## 2020-09-11 ENCOUNTER — TELEPHONE (OUTPATIENT)
Dept: OTHER | Facility: OTHER | Age: 70
End: 2020-09-11

## 2020-09-11 ENCOUNTER — TELEPHONE (OUTPATIENT)
Dept: HEMATOLOGY ONCOLOGY | Facility: CLINIC | Age: 70
End: 2020-09-11

## 2020-09-11 NOTE — TELEPHONE ENCOUNTER
Patient's wife Lata Keller  called to ask if CT from 9-4-2020  could be signed off by Dr Valera to be released to patient's my chart   For any questions please [phone]

## 2020-09-11 NOTE — TELEPHONE ENCOUNTER
Returned a call to pts wife and told her that Dr Muñoz Pi had signed off on the Ct scan and not sure why it is not showing up in my chart  I offered to fax a copy and did to [phone]

## 2020-09-11 NOTE — TELEPHONE ENCOUNTER
Patient's wife is calling to confirm appointment on 10/09/20 3:00 pm at  [facility] Altman with   Kelsy Tatum  Stated she received a letter with appointment information

## 2020-09-14 ENCOUNTER — TELEPHONE (OUTPATIENT)
Dept: HEMATOLOGY ONCOLOGY | Facility: CLINIC | Age: 70
End: 2020-09-14

## 2020-09-14 NOTE — TELEPHONE ENCOUNTER
Patients wife called to let Dr Catherine Gan know that his Left sided lower abdominal pain is getting worse  Patient did discuss this with Dr Catherine Gan at visit on 9/10/20  PET CT was scheduled for the end of the month  Patient has been using tramadol PRN for pain without relied  This used to work but is not working now  Pain is 8/10  Patient is looking for something else he can take for the pain - either in addition to tramadol or something new all together    Will forward to RN with Dr Catherine Gan to review    Please let me know and I can return call to patients wife with update

## 2020-09-14 NOTE — TELEPHONE ENCOUNTER
Called Wandy(wife of pt), advised per Dr Devante Valdes notes, patient does not need a Cysto until November  Pt is scheduled for 12/4/20, as there is nothing available sooner  Pt's wife stated that pt had CT, and based on CT, and upcoming NM test, she is unsure if patient needs to come in sooner, especially since patient is having pain  I advised Carlos Dick, that I would notify nurse, and they will call pt or wife back to answer any concerns when able  Please contact Carlos Dick at [phone], thanks

## 2020-09-14 NOTE — TELEPHONE ENCOUNTER
Just saw regina message at 5:30 and Dr Terrell Oh is gone for the day   I will discuss with him in the AM

## 2020-09-14 NOTE — TELEPHONE ENCOUNTER
Spoke with pts wife Daphney Price  She states she was a bit confused regarding her receiving a letter about an appt in 10/6 and our office calling to cancel pts 11/27  Apologized for any confusion  Advised e[place] per Dr Jourdan Sánchez office note from 8/27 pt was to be seen back in 3 months which would be 11/27 not sure why appt was made in October  Advised Dr Bonny Cavazos ordered Nuclear test and he will follow pt regarding results if there's any significant finding that Dr Bonny Cavazos feels Dr Jazmyne Gunn should be made aware the provider will be in contact       Pt verbalized understanding and confirmed 12/4 appt

## 2020-09-15 NOTE — TELEPHONE ENCOUNTER
Phoned pts wife back and she stated her  was only taking tramadol once a day  Pts pain has eased up today and now that he has taken tramadol twice a day this is helping  No need for further pain medicine at this time

## 2020-09-18 ENCOUNTER — HOSPITAL ENCOUNTER (OUTPATIENT)
Dept: INFUSION CENTER | Facility: HOSPITAL | Age: 70
Discharge: HOME | End: 2020-09-18
Payer: MEDICARE

## 2020-09-18 VITALS — TEMPERATURE: 97.9 F

## 2020-09-18 DIAGNOSIS — Z45.2 ENCOUNTER FOR CENTRAL LINE CARE: Primary | ICD-10-CM

## 2020-09-18 DIAGNOSIS — C65.2 CANCER OF LEFT RENAL PELVIS (HCC): ICD-10-CM

## 2020-09-18 PROCEDURE — 96523 IRRIG DRUG DELIVERY DEVICE: CPT

## 2020-09-18 NOTE — PLAN OF CARE
Problem: INFECTION - ADULT  Goal: Absence or prevention of progression during hospitalization  Description: INTERVENTIONS:  - Assess and monitor for signs and symptoms of infection  - Monitor lab/diagnostic results  - Monitor all insertion sites, i e  indwelling lines, tubes, and drains  - Monitor endotracheal if appropriate and nasal secretions for changes in amount and color  - Leivasy appropriate cooling/warming therapies per order  - Administer medications as ordered  - Instruct and encourage patient and family to use good hand hygiene technique  - Identify and instruct in appropriate isolation precautions for identified infection/condition  Outcome: Progressing     Problem: Knowledge Deficit  Goal: Patient/family/caregiver demonstrates understanding of disease process, treatment plan, medications, and discharge instructions  Description: Complete learning assessment and assess knowledge base    Interventions:  - Provide teaching at level of understanding  - Provide teaching via preferred learning methods  Outcome: Progressing

## 2020-09-22 ENCOUNTER — TELEPHONE (OUTPATIENT)
Dept: HEMATOLOGY ONCOLOGY | Facility: CLINIC | Age: 70
End: 2020-09-22

## 2020-09-22 NOTE — TELEPHONE ENCOUNTER
Patient's wife Colonel Shearer called stating patient does not have a physical order for PET CT for 9-  I informed Colonel Shearer , order is in patient 's chart  Colonel Shearer understood

## 2020-09-23 ENCOUNTER — HOSPITAL ENCOUNTER (OUTPATIENT)
Dept: NUCLEAR MEDICINE | Facility: HOSPITAL | Age: 70
Discharge: HOME | End: 2020-09-23
Attending: INTERNAL MEDICINE
Payer: MEDICARE

## 2020-09-23 DIAGNOSIS — C68.9 UROTHELIAL CANCER (HCC): ICD-10-CM

## 2020-09-23 DIAGNOSIS — C64.2 UROTHELIAL CARCINOMA OF KIDNEY, LEFT (HCC): ICD-10-CM

## 2020-09-23 LAB — GLUCOSE SERPL-MCNC: 96 MG/DL (ref 65–140)

## 2020-09-23 PROCEDURE — G1004 CDSM NDSC: HCPCS

## 2020-09-23 PROCEDURE — 82948 REAGENT STRIP/BLOOD GLUCOSE: CPT

## 2020-09-23 PROCEDURE — 78815 PET IMAGE W/CT SKULL-THIGH: CPT

## 2020-09-23 PROCEDURE — A9552 F18 FDG: HCPCS

## 2020-09-25 ENCOUNTER — APPOINTMENT (OUTPATIENT)
Dept: LAB | Facility: HOSPITAL | Age: 70
End: 2020-09-25
Attending: INTERNAL MEDICINE
Payer: MEDICARE

## 2020-09-25 ENCOUNTER — TELEPHONE (OUTPATIENT)
Dept: HEMATOLOGY ONCOLOGY | Facility: MEDICAL CENTER | Age: 70
End: 2020-09-25

## 2020-09-25 DIAGNOSIS — E79.0 ELEVATED BLOOD URIC ACID LEVEL: ICD-10-CM

## 2020-09-25 LAB
ALBUMIN SERPL BCP-MCNC: 4.1 G/DL (ref 3.5–5.7)
ALP SERPL-CCNC: 71 U/L (ref 55–165)
ALT SERPL W P-5'-P-CCNC: 6 U/L (ref 7–52)
ANION GAP SERPL CALCULATED.3IONS-SCNC: 9 MMOL/L (ref 4–13)
AST SERPL W P-5'-P-CCNC: 8 U/L (ref 13–39)
BASOPHILS # BLD AUTO: 0 THOUSANDS/ΜL (ref 0–0.1)
BASOPHILS NFR BLD AUTO: 1 % (ref 0–2)
BILIRUB SERPL-MCNC: 0.7 MG/DL (ref 0.2–1)
BUN SERPL-MCNC: 24 MG/DL (ref 7–25)
CALCIUM SERPL-MCNC: 9.4 MG/DL (ref 8.6–10.5)
CHLORIDE SERPL-SCNC: 102 MMOL/L (ref 98–107)
CO2 SERPL-SCNC: 28 MMOL/L (ref 21–31)
CREAT SERPL-MCNC: 1.55 MG/DL (ref 0.7–1.3)
EOSINOPHIL # BLD AUTO: 0.2 THOUSAND/ΜL (ref 0–0.61)
EOSINOPHIL NFR BLD AUTO: 3 % (ref 0–5)
ERYTHROCYTE [DISTWIDTH] IN BLOOD BY AUTOMATED COUNT: 12.6 % (ref 11.5–14.5)
GFR SERPL CREATININE-BSD FRML MDRD: 44 ML/MIN/1.73SQ M
GLUCOSE P FAST SERPL-MCNC: 96 MG/DL (ref 65–99)
HCT VFR BLD AUTO: 35.6 % (ref 42–47)
HGB BLD-MCNC: 12.1 G/DL (ref 14–18)
LYMPHOCYTES # BLD AUTO: 1.3 THOUSANDS/ΜL (ref 0.6–4.47)
LYMPHOCYTES NFR BLD AUTO: 20 % (ref 21–51)
MCH RBC QN AUTO: 31 PG (ref 26–34)
MCHC RBC AUTO-ENTMCNC: 34.1 G/DL (ref 31–37)
MCV RBC AUTO: 91 FL (ref 81–99)
MONOCYTES # BLD AUTO: 0.6 THOUSAND/ΜL (ref 0.17–1.22)
MONOCYTES NFR BLD AUTO: 9 % (ref 2–12)
NEUTROPHILS # BLD AUTO: 4.3 THOUSANDS/ΜL (ref 1.4–6.5)
NEUTS SEG NFR BLD AUTO: 67 % (ref 42–75)
PLATELET # BLD AUTO: 209 THOUSANDS/UL (ref 149–390)
PMV BLD AUTO: 7.7 FL (ref 8.6–11.7)
POTASSIUM SERPL-SCNC: 4.2 MMOL/L (ref 3.5–5.5)
PROT SERPL-MCNC: 6.5 G/DL (ref 6.4–8.9)
RBC # BLD AUTO: 3.91 MILLION/UL (ref 4.3–5.9)
SODIUM SERPL-SCNC: 139 MMOL/L (ref 134–143)
WBC # BLD AUTO: 6.4 THOUSAND/UL (ref 4.8–10.8)

## 2020-09-25 PROCEDURE — 36415 COLL VENOUS BLD VENIPUNCTURE: CPT

## 2020-09-25 PROCEDURE — 85025 COMPLETE CBC W/AUTO DIFF WBC: CPT

## 2020-09-25 PROCEDURE — 80053 COMPREHEN METABOLIC PANEL: CPT

## 2020-09-25 NOTE — TELEPHONE ENCOUNTER
Patient wife Colonel Shearer called in to see if we if we have the PET scan results - a good call [phone]

## 2020-09-25 NOTE — TELEPHONE ENCOUNTER
Patient has follow up appointment with Dr Latonya Hester on 10/8/20  She is calling for 9/23/20 PET/CT results  Please review with Dr Xavi Bianchi:     1   Multiple FDG avid lymph nodes in the retrocrural region, retroperitoneum and the left renal bed compatible with metastasis  These have progressed from recent CT  2   FDG avid mass involving the left rectus abdominal musculature compatible with metastasis which is larger from recent CT  3  Several small lymph nodes adjacent to the mid to distal esophagus with FDG uptake suspicious for metastasis  Small focus of FDG uptake also suggested in the right hilar region, metastasis is not excluded  This could be reassessed on follow-up  4   Subtle focus of FDG uptake at the T2 level on the left, nonspecific, could be related to early degenerative changes, developing metastasis is not entirely excluded  This could be reassessed on follow-up  5  Prostate is mildly prominent with heterogeneous FDG uptake  This could be inflammatory  Correlate for prostatitis      The study was marked in EPIC for significant notification

## 2020-09-28 ENCOUNTER — TELEPHONE (OUTPATIENT)
Dept: HEMATOLOGY ONCOLOGY | Facility: CLINIC | Age: 70
End: 2020-09-28

## 2020-09-28 NOTE — TELEPHONE ENCOUNTER
Patient's wife would like a call back to why is patient having constant left lower abdominal pain after eating during the day  Pain is shooting pain  Pain level can reach 7-8/10 at times  Patient is using Tramadol without relief  Denies nausea, vomiting, constipation or diarrhea  Patient reports after he eats he has very loud hyperactive bowel sounds  Denies fevers  Patient had a PET scan on 9/23/20  Please review above with Dr Gildardo Santoyo for recommendations

## 2020-09-29 NOTE — TELEPHONE ENCOUNTER
Returned a phone call  to patient and let him know that I spoke to his wife after she was explaining that pt now had a golf ball sized lump to his left lower side  Wife thought this was new but pt stated he has had this  I explained that we are not able to tell him what is causing his left lower abdominal pain  I spoke to Kirby Oakes and she suggested pt could go to ED to be worked up  Pt did not sound too keen on going to Ed as he just had a PET scan recently

## 2020-09-30 ENCOUNTER — TELEPHONE (OUTPATIENT)
Dept: OTHER | Facility: HOSPITAL | Age: 70
End: 2020-09-30

## 2020-09-30 DIAGNOSIS — C68.9 UROTHELIAL CANCER (HCC): ICD-10-CM

## 2020-09-30 DIAGNOSIS — D70.2 DRUG-INDUCED NEUTROPENIA (HCC): ICD-10-CM

## 2020-09-30 DIAGNOSIS — C65.2 CANCER OF LEFT RENAL PELVIS (HCC): Primary | ICD-10-CM

## 2020-09-30 NOTE — TELEPHONE ENCOUNTER
Pt has OV with you on Thurs 10/8/20 at 1:40  I will educate afterwards and plan is already is system  We are calling [facility] to get him scheduled and wife is aware of this

## 2020-09-30 NOTE — TELEPHONE ENCOUNTER
I called the patient and discussed with him and his wife the results of the PET-CT scan which was done recently  This showed:  IMPRESSION:     1   Multiple FDG avid lymph nodes in the retrocrural region, retroperitoneum and the left renal bed compatible with metastasis  These have progressed from recent CT  2   FDG avid mass involving the left rectus abdominal musculature compatible with metastasis which is larger from recent CT  3  Several small lymph nodes adjacent to the mid to distal esophagus with FDG uptake suspicious for metastasis  Small focus of FDG uptake also suggested in the right hilar region, metastasis is not excluded  This could be reassessed on follow-up  4   Subtle focus of FDG uptake at the T2 level on the left, nonspecific, could be related to early degenerative changes, developing metastasis is not entirely excluded  This could be reassessed on follow-up  5  Prostate is mildly prominent with heterogeneous FDG uptake  This could be inflammatory  Correlate for prostatitis  The patient was educated about the need to get him started on immunotherapy with Keytruda on every 3 week basis  We discussed briefly the potential side effects from immunotherapy in the potential benefit  He will need to be also seen by the palliative care team for pain management as soon as possible  He agrees with the plan

## 2020-10-01 ENCOUNTER — TELEPHONE (OUTPATIENT)
Dept: HEMATOLOGY ONCOLOGY | Facility: MEDICAL CENTER | Age: 70
End: 2020-10-01

## 2020-10-01 NOTE — TELEPHONE ENCOUNTER
Phoned wife and they were fine with having the education on the same day as OV and then having chemo the next day  They come to [facility] so that arrangement saves them a trip

## 2020-10-05 DIAGNOSIS — D70.2 DRUG-INDUCED NEUTROPENIA (HCC): ICD-10-CM

## 2020-10-05 DIAGNOSIS — C68.9 UROTHELIAL CANCER (HCC): ICD-10-CM

## 2020-10-05 DIAGNOSIS — C65.2 CANCER OF LEFT RENAL PELVIS (HCC): Primary | ICD-10-CM

## 2020-10-05 RX ORDER — SODIUM CHLORIDE 9 MG/ML
20 INJECTION, SOLUTION INTRAVENOUS ONCE
Status: CANCELLED | OUTPATIENT
Start: 2020-10-09

## 2020-10-08 ENCOUNTER — OFFICE VISIT (OUTPATIENT)
Dept: HEMATOLOGY ONCOLOGY | Facility: CLINIC | Age: 70
End: 2020-10-08
Payer: MEDICARE

## 2020-10-08 VITALS
TEMPERATURE: 98.7 F | RESPIRATION RATE: 18 BRPM | HEART RATE: 64 BPM | SYSTOLIC BLOOD PRESSURE: 128 MMHG | DIASTOLIC BLOOD PRESSURE: 70 MMHG | OXYGEN SATURATION: 96 % | WEIGHT: 201.9 LBS | HEIGHT: 69 IN | BODY MASS INDEX: 29.9 KG/M2

## 2020-10-08 DIAGNOSIS — G89.3 CANCER RELATED PAIN: ICD-10-CM

## 2020-10-08 DIAGNOSIS — C64.2 UROTHELIAL CARCINOMA OF KIDNEY, LEFT (HCC): Primary | ICD-10-CM

## 2020-10-08 PROCEDURE — 99215 OFFICE O/P EST HI 40 MIN: CPT | Performed by: INTERNAL MEDICINE

## 2020-10-08 RX ORDER — OXYCODONE HYDROCHLORIDE AND ACETAMINOPHEN 5; 325 MG/1; MG/1
1 TABLET ORAL EVERY 8 HOURS PRN
COMMUNITY
Start: 2020-09-30 | End: 2020-10-29 | Stop reason: ALTCHOICE

## 2020-10-08 RX ORDER — OXYCODONE HCL 10 MG/1
10 TABLET, FILM COATED, EXTENDED RELEASE ORAL EVERY 12 HOURS SCHEDULED
Qty: 60 TABLET | Refills: 0 | Status: SHIPPED | OUTPATIENT
Start: 2020-10-08 | End: 2020-10-29 | Stop reason: SDUPTHER

## 2020-10-09 ENCOUNTER — HOSPITAL ENCOUNTER (OUTPATIENT)
Dept: INFUSION CENTER | Facility: HOSPITAL | Age: 70
Discharge: HOME | End: 2020-10-09
Attending: INTERNAL MEDICINE
Payer: MEDICARE

## 2020-10-09 VITALS
RESPIRATION RATE: 16 BRPM | WEIGHT: 203.04 LBS | HEART RATE: 64 BPM | SYSTOLIC BLOOD PRESSURE: 147 MMHG | BODY MASS INDEX: 30.07 KG/M2 | HEIGHT: 69 IN | DIASTOLIC BLOOD PRESSURE: 76 MMHG | TEMPERATURE: 97.8 F

## 2020-10-09 DIAGNOSIS — D70.2 DRUG-INDUCED NEUTROPENIA (HCC): Primary | ICD-10-CM

## 2020-10-09 DIAGNOSIS — C68.9 UROTHELIAL CANCER (HCC): ICD-10-CM

## 2020-10-09 DIAGNOSIS — C65.2 CANCER OF LEFT RENAL PELVIS (HCC): ICD-10-CM

## 2020-10-09 DIAGNOSIS — C64.2 UROTHELIAL CARCINOMA OF KIDNEY, LEFT (HCC): ICD-10-CM

## 2020-10-09 LAB
ALBUMIN SERPL BCP-MCNC: 4 G/DL (ref 3.5–5.7)
ALP SERPL-CCNC: 73 U/L (ref 55–165)
ALT SERPL W P-5'-P-CCNC: 7 U/L (ref 7–52)
ANION GAP SERPL CALCULATED.3IONS-SCNC: 10 MMOL/L (ref 4–13)
AST SERPL W P-5'-P-CCNC: 8 U/L (ref 13–39)
BILIRUB SERPL-MCNC: 0.6 MG/DL (ref 0.2–1)
BUN SERPL-MCNC: 22 MG/DL (ref 7–25)
CALCIUM SERPL-MCNC: 9.7 MG/DL (ref 8.6–10.5)
CHLORIDE SERPL-SCNC: 100 MMOL/L (ref 98–107)
CO2 SERPL-SCNC: 28 MMOL/L (ref 21–31)
CREAT SERPL-MCNC: 1.47 MG/DL (ref 0.7–1.3)
GFR SERPL CREATININE-BSD FRML MDRD: 47 ML/MIN/1.73SQ M
GLUCOSE SERPL-MCNC: 99 MG/DL (ref 65–99)
MAGNESIUM SERPL-MCNC: 1.8 MG/DL (ref 1.9–2.7)
POTASSIUM SERPL-SCNC: 3.9 MMOL/L (ref 3.5–5.5)
PROT SERPL-MCNC: 7.2 G/DL (ref 6.4–8.9)
SODIUM SERPL-SCNC: 138 MMOL/L (ref 134–143)
T3FREE SERPL-MCNC: 2.48 PG/ML (ref 2.3–4.2)
TSH SERPL DL<=0.05 MIU/L-ACNC: 1.91 UIU/ML (ref 0.45–5.33)

## 2020-10-09 PROCEDURE — 84443 ASSAY THYROID STIM HORMONE: CPT | Performed by: INTERNAL MEDICINE

## 2020-10-09 PROCEDURE — 96413 CHEMO IV INFUSION 1 HR: CPT

## 2020-10-09 PROCEDURE — 80053 COMPREHEN METABOLIC PANEL: CPT

## 2020-10-09 PROCEDURE — 83735 ASSAY OF MAGNESIUM: CPT

## 2020-10-09 PROCEDURE — 84481 FREE ASSAY (FT-3): CPT | Performed by: INTERNAL MEDICINE

## 2020-10-09 RX ORDER — SODIUM CHLORIDE 9 MG/ML
20 INJECTION, SOLUTION INTRAVENOUS ONCE
Status: COMPLETED | OUTPATIENT
Start: 2020-10-09 | End: 2020-10-09

## 2020-10-09 RX ADMIN — SODIUM CHLORIDE 20 ML/HR: 0.9 INJECTION, SOLUTION INTRAVENOUS at 09:25

## 2020-10-09 RX ADMIN — SODIUM CHLORIDE 200 MG: 9 INJECTION, SOLUTION INTRAVENOUS at 09:32

## 2020-10-18 LAB
BASOPHILS # BLD AUTO: 0 THOUSANDS/ΜL (ref 0–0.1)
BASOPHILS NFR BLD AUTO: 0 % (ref 0–1)
EOSINOPHIL # BLD AUTO: 0.2 THOUSAND/ΜL (ref 0–0.61)
EOSINOPHIL NFR BLD AUTO: 2 % (ref 0–6)
HCT VFR BLD AUTO: 34.7 % (ref 36.5–49.3)
HGB BLD-MCNC: 11.5 G/DL (ref 12–17)
LYMPHOCYTES # BLD AUTO: 0.9 THOUSANDS/ΜL (ref 0.6–4.47)
LYMPHOCYTES NFR BLD AUTO: 14 % (ref 14–44)
MCH RBC QN AUTO: 30 PG (ref 26.8–34.3)
MCHC RBC AUTO-ENTMCNC: 33.1 G/DL (ref 31.4–37.4)
MCV RBC AUTO: 90 FL (ref 82–98)
MONOCYTES # BLD AUTO: 0.7 THOUSAND/ΜL (ref 0.17–1.22)
MONOCYTES NFR BLD AUTO: 10 % (ref 4–12)
NEUTROPHILS # BLD AUTO: 5 THOUSANDS/ΜL (ref 1.85–7.62)
NEUTS SEG NFR BLD AUTO: 74 % (ref 43–75)
PLATELET # BLD AUTO: 224 THOUSANDS/UL (ref 149–390)
PMV BLD AUTO: 7.7 FL (ref 8.9–12.7)
RBC # BLD AUTO: 3.84 MILLION/UL (ref 3.88–5.62)
WBC # BLD AUTO: 6.9 THOUSAND/UL (ref 4.31–10.16)

## 2020-10-26 DIAGNOSIS — C68.9 UROTHELIAL CANCER (HCC): ICD-10-CM

## 2020-10-26 DIAGNOSIS — D70.2 DRUG-INDUCED NEUTROPENIA (HCC): ICD-10-CM

## 2020-10-26 DIAGNOSIS — C65.2 CANCER OF LEFT RENAL PELVIS (HCC): Primary | ICD-10-CM

## 2020-10-26 RX ORDER — SODIUM CHLORIDE 9 MG/ML
20 INJECTION, SOLUTION INTRAVENOUS ONCE
Status: CANCELLED | OUTPATIENT
Start: 2020-10-30

## 2020-10-28 ENCOUNTER — LAB (OUTPATIENT)
Dept: LAB | Facility: HOSPITAL | Age: 70
End: 2020-10-28
Attending: INTERNAL MEDICINE
Payer: MEDICARE

## 2020-10-28 DIAGNOSIS — D70.2 DRUG-INDUCED NEUTROPENIA (HCC): ICD-10-CM

## 2020-10-28 DIAGNOSIS — C68.9 UROTHELIAL CANCER (HCC): ICD-10-CM

## 2020-10-28 DIAGNOSIS — C65.2 CANCER OF LEFT RENAL PELVIS (HCC): ICD-10-CM

## 2020-10-28 LAB
ALBUMIN SERPL BCP-MCNC: 3.4 G/DL (ref 3.5–5.7)
ALP SERPL-CCNC: 103 U/L (ref 55–165)
ALT SERPL W P-5'-P-CCNC: 16 U/L (ref 7–52)
ANION GAP SERPL CALCULATED.3IONS-SCNC: 12 MMOL/L (ref 4–13)
AST SERPL W P-5'-P-CCNC: 11 U/L (ref 13–39)
BASOPHILS # BLD AUTO: 0 THOUSANDS/ΜL (ref 0–0.1)
BASOPHILS NFR BLD AUTO: 1 % (ref 0–2)
BILIRUB SERPL-MCNC: 0.7 MG/DL (ref 0.2–1)
BUN SERPL-MCNC: 17 MG/DL (ref 7–25)
CALCIUM ALBUM COR SERPL-MCNC: 9.8 MG/DL (ref 8.3–10.1)
CALCIUM SERPL-MCNC: 9.3 MG/DL (ref 8.6–10.5)
CHLORIDE SERPL-SCNC: 98 MMOL/L (ref 98–107)
CO2 SERPL-SCNC: 28 MMOL/L (ref 21–31)
CREAT SERPL-MCNC: 1.49 MG/DL (ref 0.7–1.3)
EOSINOPHIL # BLD AUTO: 0.2 THOUSAND/ΜL (ref 0–0.61)
EOSINOPHIL NFR BLD AUTO: 2 % (ref 0–5)
ERYTHROCYTE [DISTWIDTH] IN BLOOD BY AUTOMATED COUNT: 12.8 % (ref 11.5–14.5)
GFR SERPL CREATININE-BSD FRML MDRD: 47 ML/MIN/1.73SQ M
GLUCOSE P FAST SERPL-MCNC: 106 MG/DL (ref 65–99)
HCT VFR BLD AUTO: 33 % (ref 42–47)
HGB BLD-MCNC: 10.8 G/DL (ref 14–18)
LYMPHOCYTES # BLD AUTO: 1.6 THOUSANDS/ΜL (ref 0.6–4.47)
LYMPHOCYTES NFR BLD AUTO: 17 % (ref 21–51)
MCH RBC QN AUTO: 28.8 PG (ref 26–34)
MCHC RBC AUTO-ENTMCNC: 32.8 G/DL (ref 31–37)
MCV RBC AUTO: 88 FL (ref 81–99)
MONOCYTES # BLD AUTO: 1 THOUSAND/ΜL (ref 0.17–1.22)
MONOCYTES NFR BLD AUTO: 10 % (ref 2–12)
NEUTROPHILS # BLD AUTO: 6.8 THOUSANDS/ΜL (ref 1.4–6.5)
NEUTS SEG NFR BLD AUTO: 71 % (ref 42–75)
PLATELET # BLD AUTO: 312 THOUSANDS/UL (ref 149–390)
PMV BLD AUTO: 7.7 FL (ref 8.6–11.7)
POTASSIUM SERPL-SCNC: 3.9 MMOL/L (ref 3.5–5.5)
PROT SERPL-MCNC: 7.1 G/DL (ref 6.4–8.9)
RBC # BLD AUTO: 3.75 MILLION/UL (ref 4.3–5.9)
SODIUM SERPL-SCNC: 138 MMOL/L (ref 134–143)
T3FREE SERPL-MCNC: 2.05 PG/ML (ref 2.3–4.2)
TSH SERPL DL<=0.05 MIU/L-ACNC: 1.88 UIU/ML (ref 0.45–5.33)
URATE SERPL-MCNC: 6.8 MG/DL (ref 2.3–7.6)
WBC # BLD AUTO: 9.7 THOUSAND/UL (ref 4.8–10.8)

## 2020-10-28 PROCEDURE — 36415 COLL VENOUS BLD VENIPUNCTURE: CPT

## 2020-10-28 PROCEDURE — 84443 ASSAY THYROID STIM HORMONE: CPT

## 2020-10-28 PROCEDURE — 80053 COMPREHEN METABOLIC PANEL: CPT

## 2020-10-28 PROCEDURE — 84481 FREE ASSAY (FT-3): CPT

## 2020-10-28 PROCEDURE — 84550 ASSAY OF BLOOD/URIC ACID: CPT

## 2020-10-28 PROCEDURE — 85025 COMPLETE CBC W/AUTO DIFF WBC: CPT

## 2020-10-29 ENCOUNTER — OFFICE VISIT (OUTPATIENT)
Dept: HEMATOLOGY ONCOLOGY | Facility: CLINIC | Age: 70
End: 2020-10-29
Payer: MEDICARE

## 2020-10-29 VITALS
TEMPERATURE: 97.2 F | WEIGHT: 196.4 LBS | BODY MASS INDEX: 29.09 KG/M2 | RESPIRATION RATE: 18 BRPM | OXYGEN SATURATION: 99 % | HEART RATE: 74 BPM | SYSTOLIC BLOOD PRESSURE: 124 MMHG | DIASTOLIC BLOOD PRESSURE: 72 MMHG | HEIGHT: 69 IN

## 2020-10-29 DIAGNOSIS — D53.9 NUTRITIONAL ANEMIA, UNSPECIFIED: ICD-10-CM

## 2020-10-29 DIAGNOSIS — C68.9 UROTHELIAL CANCER (HCC): ICD-10-CM

## 2020-10-29 DIAGNOSIS — G89.3 CANCER ASSOCIATED PAIN: ICD-10-CM

## 2020-10-29 DIAGNOSIS — C64.2 UROTHELIAL CARCINOMA OF KIDNEY, LEFT (HCC): Primary | ICD-10-CM

## 2020-10-29 DIAGNOSIS — Z51.11 ENCOUNTER FOR CHEMOTHERAPY MANAGEMENT: ICD-10-CM

## 2020-10-29 DIAGNOSIS — C65.2 CANCER OF LEFT RENAL PELVIS (HCC): ICD-10-CM

## 2020-10-29 DIAGNOSIS — K59.03 CONSTIPATION DUE TO OPIOID THERAPY: ICD-10-CM

## 2020-10-29 DIAGNOSIS — T40.2X5A CONSTIPATION DUE TO OPIOID THERAPY: ICD-10-CM

## 2020-10-29 DIAGNOSIS — D64.9 NORMOCYTIC ANEMIA: ICD-10-CM

## 2020-10-29 PROCEDURE — 99215 OFFICE O/P EST HI 40 MIN: CPT | Performed by: NURSE PRACTITIONER

## 2020-10-29 RX ORDER — ONDANSETRON HYDROCHLORIDE 8 MG/1
8 TABLET, FILM COATED ORAL EVERY 8 HOURS PRN
Qty: 20 TABLET | Refills: 3 | Status: SHIPPED | OUTPATIENT
Start: 2020-10-29 | End: 2021-04-13

## 2020-10-29 RX ORDER — LINACLOTIDE 145 UG/1
1 CAPSULE, GELATIN COATED ORAL DAILY
Qty: 30 CAPSULE | Refills: 5 | Status: SHIPPED | OUTPATIENT
Start: 2020-10-29 | End: 2020-11-16

## 2020-10-29 RX ORDER — OXYCODONE HYDROCHLORIDE 5 MG/1
10 TABLET ORAL EVERY 4 HOURS PRN
Qty: 90 TABLET | Refills: 0 | Status: SHIPPED | OUTPATIENT
Start: 2020-10-29 | End: 2020-11-16

## 2020-10-29 RX ORDER — OXYCODONE HCL 10 MG/1
20 TABLET, FILM COATED, EXTENDED RELEASE ORAL
Qty: 60 TABLET | Refills: 0 | Status: SHIPPED | OUTPATIENT
Start: 2020-10-29 | End: 2020-11-27 | Stop reason: SDUPTHER

## 2020-10-29 RX ORDER — SODIUM CHLORIDE 9 MG/ML
20 INJECTION, SOLUTION INTRAVENOUS ONCE
Status: CANCELLED | OUTPATIENT
Start: 2020-11-20

## 2020-10-30 ENCOUNTER — HOSPITAL ENCOUNTER (OUTPATIENT)
Dept: INFUSION CENTER | Facility: HOSPITAL | Age: 70
Discharge: HOME | End: 2020-10-30
Attending: INTERNAL MEDICINE
Payer: MEDICARE

## 2020-10-30 VITALS
DIASTOLIC BLOOD PRESSURE: 72 MMHG | BODY MASS INDEX: 29.09 KG/M2 | WEIGHT: 196.43 LBS | HEART RATE: 78 BPM | SYSTOLIC BLOOD PRESSURE: 141 MMHG | TEMPERATURE: 98.6 F | RESPIRATION RATE: 18 BRPM | HEIGHT: 69 IN

## 2020-10-30 DIAGNOSIS — C68.9 UROTHELIAL CANCER (HCC): ICD-10-CM

## 2020-10-30 DIAGNOSIS — C65.2 CANCER OF LEFT RENAL PELVIS (HCC): ICD-10-CM

## 2020-10-30 DIAGNOSIS — D70.2 DRUG-INDUCED NEUTROPENIA (HCC): Primary | ICD-10-CM

## 2020-10-30 PROCEDURE — 96413 CHEMO IV INFUSION 1 HR: CPT

## 2020-10-30 RX ORDER — SODIUM CHLORIDE 9 MG/ML
20 INJECTION, SOLUTION INTRAVENOUS ONCE
Status: COMPLETED | OUTPATIENT
Start: 2020-10-30 | End: 2020-10-30

## 2020-10-30 RX ADMIN — SODIUM CHLORIDE 200 MG: 9 INJECTION, SOLUTION INTRAVENOUS at 09:48

## 2020-10-30 RX ADMIN — SODIUM CHLORIDE 20 ML/HR: 0.9 INJECTION, SOLUTION INTRAVENOUS at 09:30

## 2020-11-06 ENCOUNTER — CONSULT (OUTPATIENT)
Dept: PALLIATIVE MEDICINE | Facility: CLINIC | Age: 70
End: 2020-11-06
Payer: MEDICARE

## 2020-11-06 ENCOUNTER — SOCIAL WORK (OUTPATIENT)
Dept: PALLIATIVE MEDICINE | Facility: CLINIC | Age: 70
End: 2020-11-06
Payer: MEDICARE

## 2020-11-06 VITALS
HEIGHT: 69 IN | TEMPERATURE: 97.4 F | RESPIRATION RATE: 18 BRPM | WEIGHT: 193.56 LBS | HEART RATE: 71 BPM | OXYGEN SATURATION: 97 % | SYSTOLIC BLOOD PRESSURE: 112 MMHG | DIASTOLIC BLOOD PRESSURE: 72 MMHG | BODY MASS INDEX: 28.67 KG/M2

## 2020-11-06 DIAGNOSIS — Z51.5 PALLIATIVE CARE PATIENT: ICD-10-CM

## 2020-11-06 DIAGNOSIS — Z71.89 COUNSELING AND COORDINATION OF CARE: Primary | ICD-10-CM

## 2020-11-06 DIAGNOSIS — G89.3 CANCER RELATED PAIN: ICD-10-CM

## 2020-11-06 DIAGNOSIS — K59.03 THERAPEUTIC OPIOID-INDUCED CONSTIPATION (OIC): Primary | ICD-10-CM

## 2020-11-06 DIAGNOSIS — C64.2 UROTHELIAL CARCINOMA OF KIDNEY, LEFT (HCC): ICD-10-CM

## 2020-11-06 DIAGNOSIS — T40.2X5A THERAPEUTIC OPIOID-INDUCED CONSTIPATION (OIC): Primary | ICD-10-CM

## 2020-11-06 PROCEDURE — NC001 PR NO CHARGE

## 2020-11-06 PROCEDURE — 99204 OFFICE O/P NEW MOD 45 MIN: CPT | Performed by: FAMILY MEDICINE

## 2020-11-06 RX ORDER — SENNOSIDES 8.6 MG
TABLET ORAL
Status: CANCELLED | OUTPATIENT
Start: 2020-11-06

## 2020-11-06 RX ORDER — SENNOSIDES 8.6 MG
8.6 TABLET ORAL
Qty: 30 EACH | Refills: 0 | Status: SHIPPED | OUTPATIENT
Start: 2020-11-06 | End: 2021-01-08 | Stop reason: SDUPTHER

## 2020-11-06 RX ORDER — LIDOCAINE 50 MG/G
1 PATCH TOPICAL DAILY
Qty: 6 PATCH | Refills: 0 | Status: SHIPPED | OUTPATIENT
Start: 2020-11-06 | End: 2020-11-16

## 2020-11-06 RX ORDER — HYDROMORPHONE HYDROCHLORIDE 2 MG/1
2 TABLET ORAL EVERY 4 HOURS PRN
Qty: 90 TABLET | Refills: 0 | Status: SHIPPED | OUTPATIENT
Start: 2020-11-06 | End: 2020-11-27 | Stop reason: SDUPTHER

## 2020-11-06 RX ORDER — LIDOCAINE 50 MG/G
1 PATCH TOPICAL DAILY
Status: CANCELLED | OUTPATIENT
Start: 2020-11-06

## 2020-11-09 ENCOUNTER — APPOINTMENT (OUTPATIENT)
Dept: RADIATION ONCOLOGY | Facility: CLINIC | Age: 70
End: 2020-11-09
Attending: RADIOLOGY
Payer: MEDICARE

## 2020-11-09 VITALS
TEMPERATURE: 97.1 F | RESPIRATION RATE: 18 BRPM | HEIGHT: 69 IN | WEIGHT: 193 LBS | BODY MASS INDEX: 28.58 KG/M2 | SYSTOLIC BLOOD PRESSURE: 119 MMHG | OXYGEN SATURATION: 98 % | DIASTOLIC BLOOD PRESSURE: 75 MMHG | HEART RATE: 87 BPM

## 2020-11-09 DIAGNOSIS — C68.9 UROTHELIAL CANCER (HCC): Primary | ICD-10-CM

## 2020-11-09 DIAGNOSIS — C79.89 SECONDARY MALIGNANT NEOPLASM OF MUSCLE OF ABDOMEN (HCC): Primary | ICD-10-CM

## 2020-11-09 DIAGNOSIS — G89.3 CANCER ASSOCIATED PAIN: ICD-10-CM

## 2020-11-09 DIAGNOSIS — C64.2 UROTHELIAL CARCINOMA OF KIDNEY, LEFT (HCC): ICD-10-CM

## 2020-11-09 PROCEDURE — 99211 OFF/OP EST MAY X REQ PHY/QHP: CPT | Performed by: RADIOLOGY

## 2020-11-09 PROCEDURE — 77470 SPECIAL RADIATION TREATMENT: CPT | Performed by: RADIOLOGY

## 2020-11-09 PROCEDURE — 77334 RADIATION TREATMENT AID(S): CPT | Performed by: RADIOLOGY

## 2020-11-09 PROCEDURE — 77290 THER RAD SIMULAJ FIELD CPLX: CPT | Performed by: RADIOLOGY

## 2020-11-09 PROCEDURE — 77370 RADIATION PHYSICS CONSULT: CPT | Performed by: RADIOLOGY

## 2020-11-10 ENCOUNTER — HOSPITAL ENCOUNTER (OUTPATIENT)
Dept: RADIOLOGY | Facility: HOSPITAL | Age: 70
Discharge: HOME | End: 2020-11-10
Payer: MEDICARE

## 2020-11-10 ENCOUNTER — TELEPHONE (OUTPATIENT)
Dept: NUTRITION | Facility: CLINIC | Age: 70
End: 2020-11-10

## 2020-11-10 DIAGNOSIS — C79.89 SECONDARY MALIGNANT NEOPLASM OF MUSCLE OF ABDOMEN (HCC): ICD-10-CM

## 2020-11-10 DIAGNOSIS — G89.3 CANCER ASSOCIATED PAIN: ICD-10-CM

## 2020-11-10 DIAGNOSIS — C64.2 UROTHELIAL CARCINOMA OF KIDNEY, LEFT (HCC): ICD-10-CM

## 2020-11-10 PROCEDURE — 73620 X-RAY EXAM OF FOOT: CPT

## 2020-11-11 ENCOUNTER — LAB (OUTPATIENT)
Dept: LAB | Facility: HOSPITAL | Age: 70
End: 2020-11-11

## 2020-11-11 ENCOUNTER — DOCUMENTATION (OUTPATIENT)
Dept: RADIATION ONCOLOGY | Facility: HOSPITAL | Age: 70
End: 2020-11-11

## 2020-11-11 ENCOUNTER — TELEPHONE (OUTPATIENT)
Dept: RADIATION ONCOLOGY | Facility: HOSPITAL | Age: 70
End: 2020-11-11

## 2020-11-11 ENCOUNTER — TRANSCRIBE ORDERS (OUTPATIENT)
Dept: LAB | Facility: HOSPITAL | Age: 70
End: 2020-11-11

## 2020-11-11 DIAGNOSIS — Z11.59 ENCOUNTER FOR SCREENING FOR OTHER VIRAL DISEASES: ICD-10-CM

## 2020-11-11 DIAGNOSIS — Z77.21 EXPOSURE TO BLOODBORNE PATHOGEN: Primary | ICD-10-CM

## 2020-11-11 DIAGNOSIS — Z77.21 EXPOSURE TO BLOODBORNE PATHOGEN: ICD-10-CM

## 2020-11-11 DIAGNOSIS — Z77.21 EXPOSURE TO BODY FLUIDS BY CONTAMINATED HYPODERMIC NEEDLE STICK: Primary | ICD-10-CM
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
## 2020-11-11 LAB
HBV SURFACE AG SER QL: NORMAL
HCV AB SER QL: NORMAL
HIV 1+2 AB+HIV1 P24 AG SERPL QL IA: NORMAL
HIV1 P24 AG SER QL: NORMAL

## 2020-11-11 PROCEDURE — 87806 HIV AG W/HIV1&2 ANTB W/OPTIC: CPT

## 2020-11-11 PROCEDURE — 87340 HEPATITIS B SURFACE AG IA: CPT

## 2020-11-11 PROCEDURE — 36415 COLL VENOUS BLD VENIPUNCTURE: CPT

## 2020-11-11 PROCEDURE — 86803 HEPATITIS C AB TEST: CPT

## 2020-11-12 ENCOUNTER — TELEPHONE (OUTPATIENT)
Dept: PALLIATIVE MEDICINE | Facility: CLINIC | Age: 70
End: 2020-11-12

## 2020-11-13 PROCEDURE — 77300 RADIATION THERAPY DOSE PLAN: CPT | Performed by: RADIOLOGY

## 2020-11-13 PROCEDURE — 77334 RADIATION TREATMENT AID(S): CPT | Performed by: RADIOLOGY

## 2020-11-13 PROCEDURE — 77295 3-D RADIOTHERAPY PLAN: CPT | Performed by: RADIOLOGY

## 2020-11-15 PROBLEM — K59.03 THERAPEUTIC OPIOID INDUCED CONSTIPATION: Status: ACTIVE | Noted: 2020-11-15

## 2020-11-15 PROBLEM — R63.0 DECREASED APPETITE: Status: ACTIVE | Noted: 2020-11-15

## 2020-11-15 PROBLEM — T40.2X5A THERAPEUTIC OPIOID INDUCED CONSTIPATION: Status: ACTIVE | Noted: 2020-11-15

## 2020-11-15 PROBLEM — G89.3 CANCER RELATED PAIN: Status: ACTIVE | Noted: 2020-11-15

## 2020-11-16 ENCOUNTER — OFFICE VISIT (OUTPATIENT)
Dept: PALLIATIVE MEDICINE | Facility: CLINIC | Age: 70
End: 2020-11-16
Payer: MEDICARE

## 2020-11-16 VITALS
WEIGHT: 190 LBS | HEIGHT: 69 IN | SYSTOLIC BLOOD PRESSURE: 112 MMHG | HEART RATE: 75 BPM | TEMPERATURE: 97 F | OXYGEN SATURATION: 96 % | BODY MASS INDEX: 28.14 KG/M2 | RESPIRATION RATE: 16 BRPM | DIASTOLIC BLOOD PRESSURE: 58 MMHG

## 2020-11-16 DIAGNOSIS — M48.061 SPINAL STENOSIS OF LUMBAR REGION, UNSPECIFIED WHETHER NEUROGENIC CLAUDICATION PRESENT: ICD-10-CM

## 2020-11-16 DIAGNOSIS — C65.2 CANCER OF LEFT RENAL PELVIS (HCC): ICD-10-CM

## 2020-11-16 DIAGNOSIS — G89.3 CANCER RELATED PAIN: ICD-10-CM

## 2020-11-16 DIAGNOSIS — Z51.5 PALLIATIVE CARE PATIENT: ICD-10-CM

## 2020-11-16 DIAGNOSIS — F11.20 UNCOMPLICATED OPIOID DEPENDENCE (HCC): ICD-10-CM

## 2020-11-16 DIAGNOSIS — T40.2X5A THERAPEUTIC OPIOID INDUCED CONSTIPATION: ICD-10-CM

## 2020-11-16 DIAGNOSIS — R22.2 ABDOMINAL WALL MASS: ICD-10-CM

## 2020-11-16 DIAGNOSIS — C68.9 UROTHELIAL CANCER (HCC): Primary | ICD-10-CM

## 2020-11-16 DIAGNOSIS — Z79.899 MEDICAL MARIJUANA USE: ICD-10-CM

## 2020-11-16 DIAGNOSIS — R63.0 DECREASED APPETITE: ICD-10-CM

## 2020-11-16 DIAGNOSIS — G89.4 CHRONIC PAIN DISORDER: ICD-10-CM

## 2020-11-16 DIAGNOSIS — K59.03 THERAPEUTIC OPIOID INDUCED CONSTIPATION: ICD-10-CM

## 2020-11-16 PROCEDURE — 99214 OFFICE O/P EST MOD 30 MIN: CPT | Performed by: SURGERY

## 2020-11-16 RX ORDER — LIDOCAINE 40 MG/G
CREAM TOPICAL AS NEEDED
Qty: 30 G | Refills: 0 | Status: SHIPPED | OUTPATIENT
Start: 2020-11-16 | End: 2021-04-13

## 2020-11-18 ENCOUNTER — APPOINTMENT (OUTPATIENT)
Dept: LAB | Facility: HOSPITAL | Age: 70
End: 2020-11-18
Attending: INTERNAL MEDICINE
Payer: MEDICARE

## 2020-11-18 ENCOUNTER — RADIATION THERAPY TREATMENT (OUTPATIENT)
Dept: RADIATION ONCOLOGY | Facility: CLINIC | Age: 70
End: 2020-11-18
Attending: RADIOLOGY
Payer: MEDICARE

## 2020-11-18 DIAGNOSIS — C64.2 UROTHELIAL CARCINOMA OF KIDNEY, LEFT (HCC): ICD-10-CM

## 2020-11-18 DIAGNOSIS — C68.9 UROTHELIAL CANCER (HCC): ICD-10-CM

## 2020-11-18 DIAGNOSIS — C65.2 CANCER OF LEFT RENAL PELVIS (HCC): ICD-10-CM

## 2020-11-18 DIAGNOSIS — D64.9 NORMOCYTIC ANEMIA: ICD-10-CM

## 2020-11-18 DIAGNOSIS — D53.9 NUTRITIONAL ANEMIA, UNSPECIFIED: ICD-10-CM

## 2020-11-18 LAB
ALBUMIN SERPL BCP-MCNC: 3.9 G/DL (ref 3.5–5.7)
ALP SERPL-CCNC: 145 U/L (ref 55–165)
ALT SERPL W P-5'-P-CCNC: 17 U/L (ref 7–52)
ANION GAP SERPL CALCULATED.3IONS-SCNC: 9 MMOL/L (ref 4–13)
AST SERPL W P-5'-P-CCNC: 13 U/L (ref 13–39)
BASOPHILS # BLD AUTO: 0.1 THOUSANDS/ΜL (ref 0–0.1)
BASOPHILS NFR BLD AUTO: 1 % (ref 0–2)
BILIRUB SERPL-MCNC: 0.7 MG/DL (ref 0.2–1)
BUN SERPL-MCNC: 23 MG/DL (ref 7–25)
CALCIUM SERPL-MCNC: 9.9 MG/DL (ref 8.6–10.5)
CHLORIDE SERPL-SCNC: 100 MMOL/L (ref 98–107)
CO2 SERPL-SCNC: 29 MMOL/L (ref 21–31)
CREAT SERPL-MCNC: 1.5 MG/DL (ref 0.7–1.3)
EOSINOPHIL # BLD AUTO: 0.2 THOUSAND/ΜL (ref 0–0.61)
EOSINOPHIL NFR BLD AUTO: 3 % (ref 0–5)
ERYTHROCYTE [DISTWIDTH] IN BLOOD BY AUTOMATED COUNT: 13.7 % (ref 11.5–14.5)
FERRITIN SERPL-MCNC: 398 NG/ML (ref 8–388)
FOLATE SERPL-MCNC: 7.8 NG/ML (ref 3.1–17.5)
GFR SERPL CREATININE-BSD FRML MDRD: 46 ML/MIN/1.73SQ M
GLUCOSE P FAST SERPL-MCNC: 101 MG/DL (ref 65–99)
HCT VFR BLD AUTO: 32.8 % (ref 42–47)
HGB BLD-MCNC: 10.8 G/DL (ref 14–18)
IRON SATN MFR SERPL: 13 %
IRON SERPL-MCNC: 29 UG/DL (ref 65–175)
LDH SERPL-CCNC: 95 U/L (ref 84–246)
LYMPHOCYTES # BLD AUTO: 1.4 THOUSANDS/ΜL (ref 0.6–4.47)
LYMPHOCYTES NFR BLD AUTO: 18 % (ref 21–51)
MAGNESIUM SERPL-MCNC: 2 MG/DL (ref 1.9–2.7)
MCH RBC QN AUTO: 28.5 PG (ref 26–34)
MCHC RBC AUTO-ENTMCNC: 32.9 G/DL (ref 31–37)
MCV RBC AUTO: 87 FL (ref 81–99)
MONOCYTES # BLD AUTO: 0.5 THOUSAND/ΜL (ref 0.17–1.22)
MONOCYTES NFR BLD AUTO: 7 % (ref 2–12)
NEUTROPHILS # BLD AUTO: 5.6 THOUSANDS/ΜL (ref 1.4–6.5)
NEUTS SEG NFR BLD AUTO: 72 % (ref 42–75)
PLATELET # BLD AUTO: 313 THOUSANDS/UL (ref 149–390)
PMV BLD AUTO: 7.3 FL (ref 8.6–11.7)
POTASSIUM SERPL-SCNC: 4.2 MMOL/L (ref 3.5–5.5)
PROT SERPL-MCNC: 7.4 G/DL (ref 6.4–8.9)
RBC # BLD AUTO: 3.8 MILLION/UL (ref 4.3–5.9)
SODIUM SERPL-SCNC: 138 MMOL/L (ref 134–143)
T3FREE SERPL-MCNC: 2.46 PG/ML (ref 2.3–4.2)
TIBC SERPL-MCNC: 231 UG/DL (ref 250–450)
TSH SERPL DL<=0.05 MIU/L-ACNC: 2.73 UIU/ML (ref 0.45–5.33)
URATE SERPL-MCNC: 6.5 MG/DL (ref 2.3–7.6)
VIT B12 SERPL-MCNC: 1489 PG/ML (ref 100–900)
WBC # BLD AUTO: 7.8 THOUSAND/UL (ref 4.8–10.8)

## 2020-11-18 PROCEDURE — 77280 THER RAD SIMULAJ FIELD SMPL: CPT | Performed by: RADIOLOGY

## 2020-11-18 PROCEDURE — 84550 ASSAY OF BLOOD/URIC ACID: CPT

## 2020-11-18 PROCEDURE — 83615 LACTATE (LD) (LDH) ENZYME: CPT

## 2020-11-18 PROCEDURE — 36415 COLL VENOUS BLD VENIPUNCTURE: CPT

## 2020-11-18 PROCEDURE — 80053 COMPREHEN METABOLIC PANEL: CPT

## 2020-11-18 PROCEDURE — 82728 ASSAY OF FERRITIN: CPT

## 2020-11-18 PROCEDURE — 83550 IRON BINDING TEST: CPT

## 2020-11-18 PROCEDURE — 84481 FREE ASSAY (FT-3): CPT

## 2020-11-18 PROCEDURE — 85025 COMPLETE CBC W/AUTO DIFF WBC: CPT

## 2020-11-18 PROCEDURE — 82607 VITAMIN B-12: CPT

## 2020-11-18 PROCEDURE — 83735 ASSAY OF MAGNESIUM: CPT

## 2020-11-18 PROCEDURE — 84443 ASSAY THYROID STIM HORMONE: CPT

## 2020-11-18 PROCEDURE — 82746 ASSAY OF FOLIC ACID SERUM: CPT

## 2020-11-18 PROCEDURE — 83540 ASSAY OF IRON: CPT

## 2020-11-19 ENCOUNTER — APPOINTMENT (OUTPATIENT)
Dept: RADIATION ONCOLOGY | Facility: CLINIC | Age: 70
End: 2020-11-19
Attending: RADIOLOGY
Payer: MEDICARE

## 2020-11-19 ENCOUNTER — OFFICE VISIT (OUTPATIENT)
Dept: HEMATOLOGY ONCOLOGY | Facility: CLINIC | Age: 70
End: 2020-11-19
Payer: MEDICARE

## 2020-11-19 VITALS
HEART RATE: 58 BPM | WEIGHT: 190.4 LBS | TEMPERATURE: 97.6 F | RESPIRATION RATE: 16 BRPM | DIASTOLIC BLOOD PRESSURE: 70 MMHG | OXYGEN SATURATION: 97 % | SYSTOLIC BLOOD PRESSURE: 108 MMHG | HEIGHT: 69 IN | BODY MASS INDEX: 28.2 KG/M2

## 2020-11-19 DIAGNOSIS — D70.2 DRUG-INDUCED NEUTROPENIA (HCC): ICD-10-CM

## 2020-11-19 DIAGNOSIS — D64.9 NORMOCYTIC ANEMIA: ICD-10-CM

## 2020-11-19 DIAGNOSIS — E06.4 DRUG-INDUCED THYROIDITIS: ICD-10-CM

## 2020-11-19 DIAGNOSIS — E79.0 HYPERURICEMIA: ICD-10-CM

## 2020-11-19 DIAGNOSIS — C65.2 CANCER OF LEFT RENAL PELVIS (HCC): Primary | ICD-10-CM

## 2020-11-19 DIAGNOSIS — C68.9 UROTHELIAL CANCER (HCC): ICD-10-CM

## 2020-11-19 PROCEDURE — 77387 GUIDANCE FOR RADJ TX DLVR: CPT | Performed by: RADIOLOGY

## 2020-11-19 PROCEDURE — 77331 SPECIAL RADIATION DOSIMETRY: CPT | Performed by: RADIOLOGY

## 2020-11-19 PROCEDURE — 77412 RADIATION TX DELIVERY LVL 3: CPT | Performed by: RADIOLOGY

## 2020-11-19 PROCEDURE — 99214 OFFICE O/P EST MOD 30 MIN: CPT | Performed by: INTERNAL MEDICINE

## 2020-11-19 RX ORDER — SODIUM CHLORIDE 9 MG/ML
20 INJECTION, SOLUTION INTRAVENOUS ONCE
Status: CANCELLED | OUTPATIENT
Start: 2020-11-20

## 2020-11-19 RX ORDER — SODIUM CHLORIDE 9 MG/ML
20 INJECTION, SOLUTION INTRAVENOUS ONCE
Status: CANCELLED | OUTPATIENT
Start: 2020-12-11

## 2020-11-20 ENCOUNTER — APPOINTMENT (OUTPATIENT)
Dept: RADIATION ONCOLOGY | Facility: CLINIC | Age: 70
End: 2020-11-20
Attending: RADIOLOGY
Payer: MEDICARE

## 2020-11-20 ENCOUNTER — HOSPITAL ENCOUNTER (OUTPATIENT)
Dept: INFUSION CENTER | Facility: HOSPITAL | Age: 70
Discharge: HOME | End: 2020-11-20
Attending: INTERNAL MEDICINE
Payer: MEDICARE

## 2020-11-20 VITALS — WEIGHT: 189.6 LBS | HEIGHT: 69 IN | TEMPERATURE: 97.5 F | BODY MASS INDEX: 28.08 KG/M2

## 2020-11-20 DIAGNOSIS — C68.9 UROTHELIAL CANCER (HCC): ICD-10-CM

## 2020-11-20 DIAGNOSIS — D64.9 NORMOCYTIC ANEMIA: ICD-10-CM

## 2020-11-20 DIAGNOSIS — D70.2 DRUG-INDUCED NEUTROPENIA (HCC): Primary | ICD-10-CM

## 2020-11-20 DIAGNOSIS — C65.2 CANCER OF LEFT RENAL PELVIS (HCC): ICD-10-CM

## 2020-11-20 DIAGNOSIS — E79.0 HYPERURICEMIA: ICD-10-CM

## 2020-11-20 PROCEDURE — 77387 GUIDANCE FOR RADJ TX DLVR: CPT | Performed by: RADIOLOGY

## 2020-11-20 PROCEDURE — 96413 CHEMO IV INFUSION 1 HR: CPT

## 2020-11-20 PROCEDURE — 77412 RADIATION TX DELIVERY LVL 3: CPT | Performed by: RADIOLOGY

## 2020-11-20 PROCEDURE — 96367 TX/PROPH/DG ADDL SEQ IV INF: CPT

## 2020-11-20 RX ORDER — SODIUM CHLORIDE 9 MG/ML
20 INJECTION, SOLUTION INTRAVENOUS ONCE
Status: DISCONTINUED | OUTPATIENT
Start: 2020-11-20 | End: 2020-11-24 | Stop reason: HOSPADM

## 2020-11-20 RX ORDER — SODIUM CHLORIDE 9 MG/ML
20 INJECTION, SOLUTION INTRAVENOUS ONCE
Status: COMPLETED | OUTPATIENT
Start: 2020-11-20 | End: 2020-11-20

## 2020-11-20 RX ORDER — SODIUM CHLORIDE 9 MG/ML
20 INJECTION, SOLUTION INTRAVENOUS ONCE
Status: CANCELLED | OUTPATIENT
Start: 2020-12-11

## 2020-11-20 RX ADMIN — SODIUM CHLORIDE 200 MG: 9 INJECTION, SOLUTION INTRAVENOUS at 11:29

## 2020-11-20 RX ADMIN — SODIUM CHLORIDE 200 MG: 9 INJECTION, SOLUTION INTRAVENOUS at 10:24

## 2020-11-20 RX ADMIN — SODIUM CHLORIDE 20 ML/HR: 0.9 INJECTION, SOLUTION INTRAVENOUS at 09:42

## 2020-11-22 ENCOUNTER — APPOINTMENT (OUTPATIENT)
Dept: RADIATION ONCOLOGY | Facility: CLINIC | Age: 70
End: 2020-11-22
Payer: MEDICARE

## 2020-11-22 PROCEDURE — 77412 RADIATION TX DELIVERY LVL 3: CPT | Performed by: RADIOLOGY

## 2020-11-22 PROCEDURE — 77387 GUIDANCE FOR RADJ TX DLVR: CPT | Performed by: RADIOLOGY

## 2020-11-23 ENCOUNTER — OFFICE VISIT (OUTPATIENT)
Dept: PAIN MEDICINE | Facility: CLINIC | Age: 70
End: 2020-11-23
Payer: MEDICARE

## 2020-11-23 ENCOUNTER — APPOINTMENT (OUTPATIENT)
Dept: RADIATION ONCOLOGY | Facility: CLINIC | Age: 70
End: 2020-11-23
Attending: RADIOLOGY
Payer: MEDICARE

## 2020-11-23 VITALS — TEMPERATURE: 98.2 F | BODY MASS INDEX: 27.85 KG/M2 | HEIGHT: 69 IN | WEIGHT: 188 LBS

## 2020-11-23 DIAGNOSIS — G89.3 CANCER RELATED PAIN: ICD-10-CM

## 2020-11-23 DIAGNOSIS — G89.4 CHRONIC PAIN DISORDER: Primary | ICD-10-CM

## 2020-11-23 PROCEDURE — 77387 GUIDANCE FOR RADJ TX DLVR: CPT | Performed by: RADIOLOGY

## 2020-11-23 PROCEDURE — 99213 OFFICE O/P EST LOW 20 MIN: CPT | Performed by: NURSE PRACTITIONER

## 2020-11-23 PROCEDURE — 77412 RADIATION TX DELIVERY LVL 3: CPT | Performed by: RADIOLOGY

## 2020-11-24 ENCOUNTER — APPOINTMENT (OUTPATIENT)
Dept: RADIATION ONCOLOGY | Facility: CLINIC | Age: 70
End: 2020-11-24
Attending: RADIOLOGY
Payer: MEDICARE

## 2020-11-24 PROCEDURE — 77336 RADIATION PHYSICS CONSULT: CPT | Performed by: RADIOLOGY

## 2020-11-24 PROCEDURE — 77412 RADIATION TX DELIVERY LVL 3: CPT | Performed by: RADIOLOGY

## 2020-11-24 PROCEDURE — 77417 THER RADIOLOGY PORT IMAGE(S): CPT | Performed by: RADIOLOGY

## 2020-11-24 PROCEDURE — 77387 GUIDANCE FOR RADJ TX DLVR: CPT | Performed by: RADIOLOGY

## 2020-11-25 ENCOUNTER — APPOINTMENT (OUTPATIENT)
Dept: RADIATION ONCOLOGY | Facility: CLINIC | Age: 70
End: 2020-11-25
Attending: RADIOLOGY
Payer: MEDICARE

## 2020-11-25 PROCEDURE — 77412 RADIATION TX DELIVERY LVL 3: CPT | Performed by: RADIOLOGY

## 2020-11-25 PROCEDURE — 77387 GUIDANCE FOR RADJ TX DLVR: CPT | Performed by: RADIOLOGY

## 2020-11-27 ENCOUNTER — OFFICE VISIT (OUTPATIENT)
Dept: PALLIATIVE MEDICINE | Facility: CLINIC | Age: 70
End: 2020-11-27
Payer: MEDICARE

## 2020-11-27 ENCOUNTER — HOSPITAL ENCOUNTER (OUTPATIENT)
Dept: INFUSION CENTER | Facility: HOSPITAL | Age: 70
Discharge: HOME | End: 2020-11-27

## 2020-11-27 VITALS
SYSTOLIC BLOOD PRESSURE: 106 MMHG | BODY MASS INDEX: 28.13 KG/M2 | OXYGEN SATURATION: 97 % | HEART RATE: 66 BPM | RESPIRATION RATE: 14 BRPM | WEIGHT: 190.48 LBS | DIASTOLIC BLOOD PRESSURE: 70 MMHG | TEMPERATURE: 97.3 F

## 2020-11-27 DIAGNOSIS — G89.3 CANCER ASSOCIATED PAIN: ICD-10-CM

## 2020-11-27 DIAGNOSIS — C64.2 UROTHELIAL CARCINOMA OF KIDNEY, LEFT (HCC): ICD-10-CM

## 2020-11-27 DIAGNOSIS — G89.3 CANCER RELATED PAIN: ICD-10-CM

## 2020-11-27 PROCEDURE — 99214 OFFICE O/P EST MOD 30 MIN: CPT | Performed by: FAMILY MEDICINE

## 2020-11-27 PROCEDURE — 99497 ADVNCD CARE PLAN 30 MIN: CPT | Performed by: FAMILY MEDICINE

## 2020-11-27 RX ORDER — HYDROMORPHONE HYDROCHLORIDE 2 MG/1
2 TABLET ORAL
Qty: 90 TABLET | Refills: 0 | Status: SHIPPED | OUTPATIENT
Start: 2020-11-27 | End: 2020-12-27

## 2020-11-27 RX ORDER — OXYCODONE HCL 10 MG/1
20 TABLET, FILM COATED, EXTENDED RELEASE ORAL
Qty: 60 TABLET | Refills: 0 | Status: SHIPPED | OUTPATIENT
Start: 2020-11-27 | End: 2021-01-08 | Stop reason: ALTCHOICE

## 2020-11-30 ENCOUNTER — APPOINTMENT (OUTPATIENT)
Dept: RADIATION ONCOLOGY | Facility: CLINIC | Age: 70
End: 2020-11-30
Attending: RADIOLOGY
Payer: MEDICARE

## 2020-11-30 PROCEDURE — 77387 GUIDANCE FOR RADJ TX DLVR: CPT | Performed by: RADIOLOGY

## 2020-11-30 PROCEDURE — 77412 RADIATION TX DELIVERY LVL 3: CPT | Performed by: RADIOLOGY

## 2020-12-01 ENCOUNTER — APPOINTMENT (OUTPATIENT)
Dept: RADIATION ONCOLOGY | Facility: CLINIC | Age: 70
End: 2020-12-01
Attending: RADIOLOGY
Payer: MEDICARE

## 2020-12-01 PROCEDURE — 77387 GUIDANCE FOR RADJ TX DLVR: CPT | Performed by: RADIOLOGY

## 2020-12-01 PROCEDURE — 77412 RADIATION TX DELIVERY LVL 3: CPT | Performed by: RADIOLOGY

## 2020-12-02 ENCOUNTER — APPOINTMENT (OUTPATIENT)
Dept: RADIATION ONCOLOGY | Facility: CLINIC | Age: 70
End: 2020-12-02
Attending: RADIOLOGY
Payer: MEDICARE

## 2020-12-02 PROCEDURE — 77387 GUIDANCE FOR RADJ TX DLVR: CPT | Performed by: RADIOLOGY

## 2020-12-02 PROCEDURE — 77417 THER RADIOLOGY PORT IMAGE(S): CPT | Performed by: RADIOLOGY

## 2020-12-02 PROCEDURE — 77412 RADIATION TX DELIVERY LVL 3: CPT | Performed by: RADIOLOGY

## 2020-12-03 ENCOUNTER — APPOINTMENT (OUTPATIENT)
Dept: RADIATION ONCOLOGY | Facility: CLINIC | Age: 70
End: 2020-12-03
Attending: RADIOLOGY
Payer: MEDICARE

## 2020-12-03 PROCEDURE — 77336 RADIATION PHYSICS CONSULT: CPT | Performed by: RADIOLOGY

## 2020-12-03 PROCEDURE — 77412 RADIATION TX DELIVERY LVL 3: CPT | Performed by: RADIOLOGY

## 2020-12-03 PROCEDURE — 77387 GUIDANCE FOR RADJ TX DLVR: CPT | Performed by: RADIOLOGY

## 2020-12-04 ENCOUNTER — PROCEDURE VISIT (OUTPATIENT)
Dept: UROLOGY | Facility: CLINIC | Age: 70
End: 2020-12-04
Payer: MEDICARE

## 2020-12-04 ENCOUNTER — APPOINTMENT (OUTPATIENT)
Dept: RADIATION ONCOLOGY | Facility: CLINIC | Age: 70
End: 2020-12-04
Payer: MEDICARE

## 2020-12-04 VITALS
DIASTOLIC BLOOD PRESSURE: 68 MMHG | HEART RATE: 66 BPM | WEIGHT: 192 LBS | HEIGHT: 69 IN | SYSTOLIC BLOOD PRESSURE: 124 MMHG | BODY MASS INDEX: 28.44 KG/M2

## 2020-12-04 DIAGNOSIS — C68.9 UROTHELIAL CANCER (HCC): Primary | ICD-10-CM

## 2020-12-04 PROCEDURE — 52000 CYSTOURETHROSCOPY: CPT | Performed by: UROLOGY

## 2020-12-04 PROCEDURE — 88112 CYTOPATH CELL ENHANCE TECH: CPT | Performed by: PATHOLOGY

## 2020-12-04 PROCEDURE — 99213 OFFICE O/P EST LOW 20 MIN: CPT | Performed by: UROLOGY

## 2020-12-09 ENCOUNTER — LAB (OUTPATIENT)
Dept: LAB | Facility: HOSPITAL | Age: 70
End: 2020-12-09
Attending: INTERNAL MEDICINE
Payer: MEDICARE

## 2020-12-09 DIAGNOSIS — C68.9 UROTHELIAL CANCER (HCC): ICD-10-CM

## 2020-12-09 DIAGNOSIS — D70.2 DRUG-INDUCED NEUTROPENIA (HCC): ICD-10-CM

## 2020-12-09 DIAGNOSIS — C65.2 CANCER OF LEFT RENAL PELVIS (HCC): ICD-10-CM

## 2020-12-09 DIAGNOSIS — E06.4 DRUG-INDUCED THYROIDITIS: ICD-10-CM

## 2020-12-09 DIAGNOSIS — D64.9 NORMOCYTIC ANEMIA: ICD-10-CM

## 2020-12-09 LAB
ALBUMIN SERPL BCP-MCNC: 3.8 G/DL (ref 3.5–5.7)
ALP SERPL-CCNC: 84 U/L (ref 55–165)
ALT SERPL W P-5'-P-CCNC: 12 U/L (ref 7–52)
ANION GAP SERPL CALCULATED.3IONS-SCNC: 11 MMOL/L (ref 4–13)
AST SERPL W P-5'-P-CCNC: 10 U/L (ref 13–39)
BASOPHILS # BLD AUTO: 0 THOUSANDS/ΜL (ref 0–0.1)
BASOPHILS NFR BLD AUTO: 1 % (ref 0–2)
BILIRUB SERPL-MCNC: 0.7 MG/DL (ref 0.2–1)
BUN SERPL-MCNC: 20 MG/DL (ref 7–25)
CALCIUM SERPL-MCNC: 9 MG/DL (ref 8.6–10.5)
CHLORIDE SERPL-SCNC: 105 MMOL/L (ref 98–107)
CO2 SERPL-SCNC: 26 MMOL/L (ref 21–31)
CREAT SERPL-MCNC: 1.26 MG/DL (ref 0.7–1.3)
EOSINOPHIL # BLD AUTO: 0.2 THOUSAND/ΜL (ref 0–0.61)
EOSINOPHIL NFR BLD AUTO: 4 % (ref 0–5)
ERYTHROCYTE [DISTWIDTH] IN BLOOD BY AUTOMATED COUNT: 17.1 % (ref 11.5–14.5)
GFR SERPL CREATININE-BSD FRML MDRD: 57 ML/MIN/1.73SQ M
GLUCOSE SERPL-MCNC: 120 MG/DL (ref 65–99)
HCT VFR BLD AUTO: 34.5 % (ref 42–47)
HEMOCCULT STL QL IA: POSITIVE
HGB BLD-MCNC: 10.9 G/DL (ref 14–18)
LYMPHOCYTES # BLD AUTO: 1.2 THOUSANDS/ΜL (ref 0.6–4.47)
LYMPHOCYTES NFR BLD AUTO: 25 % (ref 21–51)
MCH RBC QN AUTO: 28 PG (ref 26–34)
MCHC RBC AUTO-ENTMCNC: 31.6 G/DL (ref 31–37)
MCV RBC AUTO: 89 FL (ref 81–99)
MONOCYTES # BLD AUTO: 0.4 THOUSAND/ΜL (ref 0.17–1.22)
MONOCYTES NFR BLD AUTO: 9 % (ref 2–12)
NEUTROPHILS # BLD AUTO: 3 THOUSANDS/ΜL (ref 1.4–6.5)
NEUTS SEG NFR BLD AUTO: 62 % (ref 42–75)
PLATELET # BLD AUTO: 192 THOUSANDS/UL (ref 149–390)
PMV BLD AUTO: 7.7 FL (ref 8.6–11.7)
POTASSIUM SERPL-SCNC: 3.8 MMOL/L (ref 3.5–5.5)
PROT SERPL-MCNC: 6.6 G/DL (ref 6.4–8.9)
RBC # BLD AUTO: 3.89 MILLION/UL (ref 4.3–5.9)
SODIUM SERPL-SCNC: 142 MMOL/L (ref 134–143)
T3FREE SERPL-MCNC: 2.45 PG/ML (ref 2.3–4.2)
TSH SERPL DL<=0.05 MIU/L-ACNC: 2.47 UIU/ML (ref 0.45–5.33)
WBC # BLD AUTO: 4.8 THOUSAND/UL (ref 4.8–10.8)

## 2020-12-09 PROCEDURE — 84481 FREE ASSAY (FT-3): CPT

## 2020-12-09 PROCEDURE — 84443 ASSAY THYROID STIM HORMONE: CPT

## 2020-12-09 PROCEDURE — 80053 COMPREHEN METABOLIC PANEL: CPT

## 2020-12-09 PROCEDURE — 85025 COMPLETE CBC W/AUTO DIFF WBC: CPT

## 2020-12-09 PROCEDURE — 36415 COLL VENOUS BLD VENIPUNCTURE: CPT

## 2020-12-09 PROCEDURE — G0328 FECAL BLOOD SCRN IMMUNOASSAY: HCPCS

## 2020-12-10 ENCOUNTER — OFFICE VISIT (OUTPATIENT)
Dept: HEMATOLOGY ONCOLOGY | Facility: CLINIC | Age: 70
End: 2020-12-10
Payer: MEDICARE

## 2020-12-10 VITALS
HEIGHT: 68 IN | WEIGHT: 193.5 LBS | BODY MASS INDEX: 29.33 KG/M2 | OXYGEN SATURATION: 97 % | RESPIRATION RATE: 16 BRPM | DIASTOLIC BLOOD PRESSURE: 70 MMHG | HEART RATE: 75 BPM | SYSTOLIC BLOOD PRESSURE: 118 MMHG | TEMPERATURE: 97.1 F

## 2020-12-10 DIAGNOSIS — R19.5 HEME + STOOL: ICD-10-CM

## 2020-12-10 DIAGNOSIS — C65.2 CANCER OF LEFT RENAL PELVIS (HCC): Primary | ICD-10-CM

## 2020-12-10 DIAGNOSIS — D70.2 DRUG-INDUCED NEUTROPENIA (HCC): ICD-10-CM

## 2020-12-10 DIAGNOSIS — C65.2 CANCER OF LEFT RENAL PELVIS (HCC): ICD-10-CM

## 2020-12-10 DIAGNOSIS — C68.9 UROTHELIAL CANCER (HCC): ICD-10-CM

## 2020-12-10 DIAGNOSIS — E06.4 DRUG-INDUCED THYROIDITIS: ICD-10-CM

## 2020-12-10 DIAGNOSIS — C68.9 UROTHELIAL CANCER (HCC): Primary | ICD-10-CM

## 2020-12-10 DIAGNOSIS — D64.9 NORMOCYTIC ANEMIA: ICD-10-CM

## 2020-12-10 PROCEDURE — 99214 OFFICE O/P EST MOD 30 MIN: CPT | Performed by: NURSE PRACTITIONER

## 2020-12-10 RX ORDER — SODIUM CHLORIDE 9 MG/ML
20 INJECTION, SOLUTION INTRAVENOUS ONCE
Status: CANCELLED | OUTPATIENT
Start: 2020-12-31

## 2020-12-11 ENCOUNTER — HOSPITAL ENCOUNTER (OUTPATIENT)
Dept: INFUSION CENTER | Facility: HOSPITAL | Age: 70
Discharge: HOME | End: 2020-12-11
Attending: INTERNAL MEDICINE
Payer: MEDICARE

## 2020-12-11 VITALS — BODY MASS INDEX: 29.1 KG/M2 | WEIGHT: 192 LBS | HEIGHT: 68 IN

## 2020-12-11 DIAGNOSIS — E79.0 HYPERURICEMIA: ICD-10-CM

## 2020-12-11 DIAGNOSIS — C65.2 CANCER OF LEFT RENAL PELVIS (HCC): ICD-10-CM

## 2020-12-11 DIAGNOSIS — C68.9 UROTHELIAL CANCER (HCC): Primary | ICD-10-CM

## 2020-12-11 DIAGNOSIS — D64.9 NORMOCYTIC ANEMIA: ICD-10-CM

## 2020-12-11 PROCEDURE — 96413 CHEMO IV INFUSION 1 HR: CPT

## 2020-12-11 PROCEDURE — 96367 TX/PROPH/DG ADDL SEQ IV INF: CPT

## 2020-12-11 RX ORDER — SODIUM CHLORIDE 9 MG/ML
20 INJECTION, SOLUTION INTRAVENOUS ONCE
Status: DISCONTINUED | OUTPATIENT
Start: 2020-12-11 | End: 2020-12-15 | Stop reason: HOSPADM

## 2020-12-11 RX ORDER — SODIUM CHLORIDE 9 MG/ML
20 INJECTION, SOLUTION INTRAVENOUS ONCE
Status: COMPLETED | OUTPATIENT
Start: 2020-12-11 | End: 2020-12-11

## 2020-12-11 RX ORDER — SODIUM CHLORIDE 9 MG/ML
20 INJECTION, SOLUTION INTRAVENOUS ONCE
Status: CANCELLED | OUTPATIENT
Start: 2020-12-11

## 2020-12-11 RX ADMIN — SODIUM CHLORIDE 20 ML/HR: 0.9 INJECTION, SOLUTION INTRAVENOUS at 09:41

## 2020-12-11 RX ADMIN — SODIUM CHLORIDE 200 MG: 9 INJECTION, SOLUTION INTRAVENOUS at 10:46

## 2020-12-11 RX ADMIN — SODIUM CHLORIDE 200 MG: 9 INJECTION, SOLUTION INTRAVENOUS at 09:44

## 2020-12-24 ENCOUNTER — TELEPHONE (OUTPATIENT)
Dept: HEMATOLOGY ONCOLOGY | Facility: CLINIC | Age: 70
End: 2020-12-24

## 2020-12-28 ENCOUNTER — LAB (OUTPATIENT)
Dept: LAB | Facility: HOSPITAL | Age: 70
End: 2020-12-28
Payer: MEDICARE

## 2020-12-28 DIAGNOSIS — E06.4 DRUG-INDUCED THYROIDITIS: ICD-10-CM

## 2020-12-28 DIAGNOSIS — D64.9 NORMOCYTIC ANEMIA: ICD-10-CM

## 2020-12-28 DIAGNOSIS — C68.9 UROTHELIAL CANCER (HCC): ICD-10-CM

## 2020-12-28 LAB
ALBUMIN SERPL BCP-MCNC: 3.9 G/DL (ref 3.5–5.7)
ALP SERPL-CCNC: 78 U/L (ref 55–165)
ALT SERPL W P-5'-P-CCNC: 9 U/L (ref 7–52)
ANION GAP SERPL CALCULATED.3IONS-SCNC: 7 MMOL/L (ref 4–13)
AST SERPL W P-5'-P-CCNC: 9 U/L (ref 13–39)
BASOPHILS # BLD AUTO: 0 THOUSANDS/ΜL (ref 0–0.1)
BASOPHILS NFR BLD AUTO: 1 % (ref 0–2)
BILIRUB SERPL-MCNC: 0.5 MG/DL (ref 0.2–1)
BUN SERPL-MCNC: 17 MG/DL (ref 7–25)
CALCIUM SERPL-MCNC: 9.2 MG/DL (ref 8.6–10.5)
CHLORIDE SERPL-SCNC: 104 MMOL/L (ref 98–107)
CO2 SERPL-SCNC: 29 MMOL/L (ref 21–31)
CREAT SERPL-MCNC: 1.35 MG/DL (ref 0.7–1.3)
CRP SERPL QL: <5 MG/L
EOSINOPHIL # BLD AUTO: 0.2 THOUSAND/ΜL (ref 0–0.61)
EOSINOPHIL NFR BLD AUTO: 4 % (ref 0–5)
ERYTHROCYTE [DISTWIDTH] IN BLOOD BY AUTOMATED COUNT: 19.1 % (ref 11.5–14.5)
ERYTHROCYTE [SEDIMENTATION RATE] IN BLOOD: 37 MM/HOUR (ref 0–19)
GFR SERPL CREATININE-BSD FRML MDRD: 52 ML/MIN/1.73SQ M
GLUCOSE P FAST SERPL-MCNC: 108 MG/DL (ref 65–99)
HCT VFR BLD AUTO: 36.3 % (ref 42–47)
HGB BLD-MCNC: 11.7 G/DL (ref 14–18)
LYMPHOCYTES # BLD AUTO: 1.2 THOUSANDS/ΜL (ref 0.6–4.47)
LYMPHOCYTES NFR BLD AUTO: 22 % (ref 21–51)
MCH RBC QN AUTO: 28.6 PG (ref 26–34)
MCHC RBC AUTO-ENTMCNC: 32.2 G/DL (ref 31–37)
MCV RBC AUTO: 89 FL (ref 81–99)
MONOCYTES # BLD AUTO: 0.5 THOUSAND/ΜL (ref 0.17–1.22)
MONOCYTES NFR BLD AUTO: 9 % (ref 2–12)
NEUTROPHILS # BLD AUTO: 3.4 THOUSANDS/ΜL (ref 1.4–6.5)
NEUTS SEG NFR BLD AUTO: 64 % (ref 42–75)
PLATELET # BLD AUTO: 195 THOUSANDS/UL (ref 149–390)
PMV BLD AUTO: 7.5 FL (ref 8.6–11.7)
POTASSIUM SERPL-SCNC: 4 MMOL/L (ref 3.5–5.5)
PROT SERPL-MCNC: 6.5 G/DL (ref 6.4–8.9)
RBC # BLD AUTO: 4.08 MILLION/UL (ref 4.3–5.9)
SODIUM SERPL-SCNC: 140 MMOL/L (ref 134–143)
TSH SERPL DL<=0.05 MIU/L-ACNC: 2.3 UIU/ML (ref 0.45–5.33)
WBC # BLD AUTO: 5.2 THOUSAND/UL (ref 4.8–10.8)

## 2020-12-28 PROCEDURE — 83540 ASSAY OF IRON: CPT

## 2020-12-28 PROCEDURE — 80053 COMPREHEN METABOLIC PANEL: CPT

## 2020-12-28 PROCEDURE — 83550 IRON BINDING TEST: CPT

## 2020-12-28 PROCEDURE — 86140 C-REACTIVE PROTEIN: CPT

## 2020-12-28 PROCEDURE — 84443 ASSAY THYROID STIM HORMONE: CPT

## 2020-12-28 PROCEDURE — 85025 COMPLETE CBC W/AUTO DIFF WBC: CPT

## 2020-12-28 PROCEDURE — 85652 RBC SED RATE AUTOMATED: CPT

## 2020-12-28 PROCEDURE — 36415 COLL VENOUS BLD VENIPUNCTURE: CPT

## 2020-12-28 PROCEDURE — 82728 ASSAY OF FERRITIN: CPT

## 2020-12-29 ENCOUNTER — OFFICE VISIT (OUTPATIENT)
Dept: HEMATOLOGY ONCOLOGY | Facility: CLINIC | Age: 70
End: 2020-12-29
Payer: MEDICARE

## 2020-12-29 VITALS
BODY MASS INDEX: 30.04 KG/M2 | HEIGHT: 68 IN | RESPIRATION RATE: 16 BRPM | OXYGEN SATURATION: 99 % | HEART RATE: 63 BPM | TEMPERATURE: 98.6 F | SYSTOLIC BLOOD PRESSURE: 124 MMHG | DIASTOLIC BLOOD PRESSURE: 76 MMHG | WEIGHT: 198.2 LBS

## 2020-12-29 DIAGNOSIS — D53.9 NUTRITIONAL ANEMIA, UNSPECIFIED: ICD-10-CM

## 2020-12-29 DIAGNOSIS — R19.5 HEME + STOOL: ICD-10-CM

## 2020-12-29 DIAGNOSIS — T45.1X5A CHEMOTHERAPY INDUCED NAUSEA AND VOMITING: ICD-10-CM

## 2020-12-29 DIAGNOSIS — C68.9 UROTHELIAL CANCER (HCC): Primary | ICD-10-CM

## 2020-12-29 DIAGNOSIS — C65.2 CANCER OF LEFT RENAL PELVIS (HCC): ICD-10-CM

## 2020-12-29 DIAGNOSIS — R11.2 CHEMOTHERAPY INDUCED NAUSEA AND VOMITING: ICD-10-CM

## 2020-12-29 DIAGNOSIS — D64.9 NORMOCYTIC ANEMIA: ICD-10-CM

## 2020-12-29 DIAGNOSIS — C65.2 MALIGNANT NEOPLASM OF LEFT RENAL PELVIS (HCC): ICD-10-CM

## 2020-12-29 LAB
FERRITIN SERPL-MCNC: 308 NG/ML (ref 8–388)
IRON SATN MFR SERPL: 19 %
IRON SERPL-MCNC: 45 UG/DL (ref 65–175)
TIBC SERPL-MCNC: 236 UG/DL (ref 250–450)

## 2020-12-29 PROCEDURE — 99215 OFFICE O/P EST HI 40 MIN: CPT | Performed by: NURSE PRACTITIONER

## 2020-12-29 RX ORDER — SODIUM CHLORIDE 9 MG/ML
20 INJECTION, SOLUTION INTRAVENOUS ONCE
Status: CANCELLED | OUTPATIENT
Start: 2021-01-22

## 2020-12-29 RX ORDER — LORAZEPAM 0.5 MG/1
0.5 TABLET ORAL EVERY 6 HOURS PRN
Qty: 15 TABLET | Refills: 0 | Status: SHIPPED | OUTPATIENT
Start: 2020-12-29 | End: 2021-04-02 | Stop reason: CLARIF

## 2020-12-31 ENCOUNTER — HOSPITAL ENCOUNTER (OUTPATIENT)
Dept: INFUSION CENTER | Facility: HOSPITAL | Age: 70
Discharge: HOME | End: 2020-12-31
Payer: MEDICARE

## 2020-12-31 VITALS
DIASTOLIC BLOOD PRESSURE: 75 MMHG | WEIGHT: 198.19 LBS | SYSTOLIC BLOOD PRESSURE: 128 MMHG | BODY MASS INDEX: 30.04 KG/M2 | HEIGHT: 68 IN | OXYGEN SATURATION: 98 % | RESPIRATION RATE: 18 BRPM | HEART RATE: 60 BPM | TEMPERATURE: 98 F

## 2020-12-31 DIAGNOSIS — C65.2 CANCER OF LEFT RENAL PELVIS (HCC): Primary | ICD-10-CM

## 2020-12-31 DIAGNOSIS — C68.9 UROTHELIAL CANCER (HCC): ICD-10-CM

## 2020-12-31 PROCEDURE — 96413 CHEMO IV INFUSION 1 HR: CPT

## 2020-12-31 RX ORDER — SODIUM CHLORIDE 9 MG/ML
20 INJECTION, SOLUTION INTRAVENOUS ONCE
Status: COMPLETED | OUTPATIENT
Start: 2020-12-31 | End: 2020-12-31

## 2020-12-31 RX ADMIN — SODIUM CHLORIDE 20 ML/HR: 0.9 INJECTION, SOLUTION INTRAVENOUS at 13:04

## 2020-12-31 RX ADMIN — SODIUM CHLORIDE 200 MG: 9 INJECTION, SOLUTION INTRAVENOUS at 13:06

## 2020-12-31 NOTE — PLAN OF CARE
Problem: Potential for Falls  Goal: Patient will remain free of falls  Description: INTERVENTIONS:  - Assess patient frequently for physical needs  -  Identify cognitive and physical deficits and behaviors that affect risk of falls  -  Elmer fall precautions as indicated by assessment   - Educate patient/family on patient safety including physical limitations  - Instruct patient to call for assistance with activity based on assessment  - Modify environment to reduce risk of injury  - Consider OT/PT consult to assist with strengthening/mobility  Outcome: Progressing     Problem: PAIN - ADULT  Goal: Verbalizes/displays adequate comfort level or baseline comfort level  Description: Interventions:  - Encourage patient to monitor pain and request assistance  - Assess pain using appropriate pain scale  - Administer analgesics based on type and severity of pain and evaluate response  - Implement non-pharmacological measures as appropriate and evaluate response  - Consider cultural and social influences on pain and pain management  - Notify physician/advanced practitioner if interventions unsuccessful or patient reports new pain  Outcome: Progressing     Problem: SAFETY ADULT  Goal: Patient will remain free of falls  Description: INTERVENTIONS:  - Assess patient frequently for physical needs  -  Identify cognitive and physical deficits and behaviors that affect risk of falls    -  Elmer fall precautions as indicated by assessment   - Educate patient/family on patient safety including physical limitations  - Instruct patient to call for assistance with activity based on assessment  - Modify environment to reduce risk of injury  - Consider OT/PT consult to assist with strengthening/mobility  Outcome: Progressing     Problem: DISCHARGE PLANNING  Goal: Discharge to home or other facility with appropriate resources  Description: INTERVENTIONS:  - Identify barriers to discharge w/patient and caregiver  - Arrange for needed discharge resources and transportation as appropriate  - Identify discharge learning needs (meds, wound care, etc )  - Arrange for interpretive services to assist at discharge as needed  - Refer to Case Management Department for coordinating discharge planning if the patient needs post-hospital services based on physician/advanced practitioner order or complex needs related to functional status, cognitive ability, or social support system  Outcome: Progressing

## 2020-12-31 NOTE — PROGRESS NOTES
Patient to the unit for Keytruda infusion  Tolerated without adverse reaction  AVS given, aware of future appointments  Voices no questions or concerns at discharge

## 2021-01-07 ENCOUNTER — DOCUMENTATION (OUTPATIENT)
Dept: RADIATION ONCOLOGY | Facility: CLINIC | Age: 71
End: 2021-01-07

## 2021-01-08 ENCOUNTER — OFFICE VISIT (OUTPATIENT)
Dept: PALLIATIVE MEDICINE | Facility: CLINIC | Age: 71
End: 2021-01-08
Payer: MEDICARE

## 2021-01-08 VITALS
WEIGHT: 195.11 LBS | TEMPERATURE: 97.1 F | OXYGEN SATURATION: 92 % | HEART RATE: 89 BPM | RESPIRATION RATE: 18 BRPM | BODY MASS INDEX: 29.67 KG/M2 | DIASTOLIC BLOOD PRESSURE: 65 MMHG | SYSTOLIC BLOOD PRESSURE: 110 MMHG

## 2021-01-08 DIAGNOSIS — Z51.5 PALLIATIVE CARE PATIENT: ICD-10-CM

## 2021-01-08 DIAGNOSIS — R63.0 DECREASED APPETITE: ICD-10-CM

## 2021-01-08 DIAGNOSIS — C68.9 UROTHELIAL CANCER (HCC): Primary | ICD-10-CM

## 2021-01-08 DIAGNOSIS — M51.26 LUMBAR DISC HERNIATION: ICD-10-CM

## 2021-01-08 DIAGNOSIS — K59.03 THERAPEUTIC OPIOID-INDUCED CONSTIPATION (OIC): ICD-10-CM

## 2021-01-08 DIAGNOSIS — T40.2X5A THERAPEUTIC OPIOID-INDUCED CONSTIPATION (OIC): ICD-10-CM

## 2021-01-08 DIAGNOSIS — G89.3 CANCER RELATED PAIN: ICD-10-CM

## 2021-01-08 PROCEDURE — 99214 OFFICE O/P EST MOD 30 MIN: CPT | Performed by: FAMILY MEDICINE

## 2021-01-08 RX ORDER — SENNOSIDES 8.6 MG
8.6 TABLET ORAL
Qty: 30 EACH | Refills: 0 | Status: SHIPPED | OUTPATIENT
Start: 2021-01-08

## 2021-01-08 RX ORDER — HYDROMORPHONE HYDROCHLORIDE 2 MG/1
2 TABLET ORAL EVERY 4 HOURS PRN
Qty: 60 TABLET | Refills: 0 | Status: SHIPPED | OUTPATIENT
Start: 2021-01-08 | End: 2021-01-26 | Stop reason: SDUPTHER

## 2021-01-08 NOTE — PATIENT INSTRUCTIONS
Pharmacological Management of Cancer Pain   WHAT YOU NEED TO KNOW:   Cancer pain may be short-term or long-term  It may come and go  You may have pain if the tumor damages or blocks tissues, nerves, and blood vessels as it becomes larger  Some cancer cells may produce chemicals that cause pain  Chemotherapy, radiation therapy, or surgery may cause pain  Pain management is an important part of cancer care  DISCHARGE INSTRUCTIONS:   Call 911 for any of the following:   · Your arm or leg feels warm, tender, and painful  It may look swollen and red  · You suddenly feel lightheaded and short of breath  · You have chest pain when you take a deep breath or cough  · You cough up blood  Return to the emergency department if:   · You feel more pain even after you take your pain medicine  · You feel so depressed that you cannot cope  · You feel very anxious or irritable after you take your medicines  · You have problems thinking clearly  · You cannot control when you urinate or have a bowel movement  Contact your healthcare provider or oncologist if:   · You have a fever  · You have chills, a cough, or feel weak and achy  · You have nausea or vomiting  · Your skin is itchy, swollen, or has a rash  · You have questions or concerns about your condition or care  Medicines: You may need any of the following:  · Acetaminophen  decreases pain  Acetaminophen is available without a doctor's order  Ask how much to take and how often to take it  Follow directions  Acetaminophen can cause liver damage if not taken correctly  · NSAIDs , such as ibuprofen, help decrease swelling, pain, and fever  This medicine is available with or without a doctor's order  NSAIDs can cause stomach bleeding or kidney problems in certain people  If you take blood thinner medicine, always ask your healthcare provider if NSAIDs are safe for you  Always read the medicine label and follow directions      · Narcotic analgesics  are used for moderate to severe pain  They may be used to control cancer pain or after surgery and other procedures  · Other medicines  include steroids, antidepressants, antianxiety medicine, muscle relaxers, bisphosphonates, or anticonvulsants  They may be used with your pain medicine to help decrease pain  Ask your healthcare provider or pain specialist for more information about these medicines  · Take your medicine as directed  Contact your healthcare provider if you think your medicine is not helping or if you have side effects  Tell him of her if you are allergic to any medicine  Keep a list of the medicines, vitamins, and herbs you take  Include the amounts, and when and why you take them  Bring the list or the pill bottles to follow-up visits  Carry your medicine list with you in case of an emergency  Follow up with your healthcare provider or oncologist as directed:  Write down your questions so you remember to ask them during your visits  Self-care:   · Rest as often as you need to  Rest is important for your recovery  Do not return to your regular activities too quickly  Start slowly and do more as you feel stronger  Rest during the day  Plan for 6 to 8 hours of sleep each night  Contact your healthcare provider if you are not able to sleep  · Go to rehabilitation as directed  Rehabilitation may include physical and occupational therapy  A physical therapist teaches you exercises to help improve movement and strength, and to decrease pain  An occupational therapist teaches you skills to help with your daily activities  · Exercise as directed  Activity may help increase your strength and control cancer pain  Ask your healthcare provider or pain specialist about the best exercise plan for you  · Keep a pain diary  A pain diary may help track pain cycles so you know when and how your pain starts and ends  Include anything that makes your pain worse or better   Bring the pain diary to follow-up visits with your healthcare provider  · Prevent bed sores  You may need an egg crate or air mattress on your bed to help prevent bed sores  If you cannot move by yourself, someone will need to turn you from side to side often  © 2017 [employer] Information is for End User's use only and may not be sold, redistributed or otherwise used for commercial purposes  All illustrations and images included in CareNotes® are the copyrighted property of A D [employer] M [employer]  or Josse Sommer  The above information is an  only  It is not intended as medical advice for individual conditions or treatments  Talk to your doctor, nurse or pharmacist before following any medical regimen to see if it is safe and effective for you

## 2021-01-08 NOTE — PROGRESS NOTES
Outpatient Follow-Up - Palliative and Supportive Care   Lorie Linton 69 y o  male [id number]    Assessment & Plan  1  Urothelial cancer ([address] )    2  Decreased appetite    3  Palliative care patient    4  Cancer related pain    5  Lumbar disc herniation    6   Therapeutic opioid-induced constipation (OIC)        Medications adjusted this encounter:  Requested Prescriptions     Signed Prescriptions Disp Refills    senna (SENOKOT) 8 6 mg 30 each 0     Sig: Take 1 tablet (8 6 mg total) by mouth daily at bedtime    HYDROmorphone (DILAUDID) 2 mg tablet 60 tablet 0     Sig: Take 1 tablet (2 mg total) by mouth every 4 (four) hours as needed for moderate painMax Daily Amount: 12 mg       #symptom management   - cancer-related pain   - PDMP Reviewed              - last fill of oxy-ER 20 mg PO QHS [60-10 mg tabs] for 30-day Rx was on 11/05/2020                          - patient discontinued at request              - last fill of hydromorphone 2 mg PO Q3H PRN [90 tabs] for 20-day Rx was on 11/27/2020                          - Rx sent to pharmacy   - continue APAP 650 mg PO Q6H [address]   - continue current bowel regimen to prevent OIC              - miralax QDaily + linaclotide 145 mcg PO QDaily + senna QDaily PRN   - continue nausea regimen              - zofran 4 mg PO Q*H PRN   - will continue to closely monitor symptoms    #medical marijuana   - medical marijuana effective overall but patient does not like euphoric sensation 2 hours immediately following use   - trial of 2 doses with same outcome; discontinued   - discussed with patient to discuss with pharmacist at dispensary if other options with less euphoria given reported effectiveness after initial symptom; reports no PO dilaudid required during two trials of medical marijuana    #goals of care   - treatment focused care with no limitations at this time   - AD completed previous appt; scanned in EMR   - POLST completed, scanned in EMR      [facility] Follow up in 4 weeks    No orders of the defined types were placed in this encounter  Medications Discontinued During This Encounter   Medication Reason    oxyCODONE (OxyCONTIN) 10 mg 12 hr tablet Alternate therapy    docusate sodium (COLACE) 100 mg capsule Alternate therapy    senna (SENOKOT) 8 6 mg Reorder        Mr Hemalatha Wise was seen today for symptoms and planning cares related to above illnesses  I have reviewed the patient's controlled substance dispensing history in the Prescription Drug Monitoring Program in compliance with the [facility] regulations before prescribing any controlled substances  Evangelina Tyrese is invited to continue to follow with us  If there are questions or concerns, please contact us through our clinic/answering service 24 hours a day, seven days a week  Jhonathan Barker MD  [facility]  [phone]      Visit Information    Accompanied By: Spouse    Source of History: Self, Spouse, Medical record    History Limitations: None      Follow up visit for:  symptom management, pain, neoplasm related, assessment of goals of care    History of Present Illness    Stephen Amato is a 69 y  o  male with a PMH of stage IIIA urothelial carcinoma of L kidney s/p nephroureterectomy [04/2020] on Keytruda who presents for initial [facility] consultation      Primary Oncologist: Dr Bre Clemens              Initial diagnosis: Urine cytology 01/03/2020 with high-grade urothelial carcinoma + invasive high-grade urothelia carcinoma arising from renal pelvis 04/01/2020 from surgical biopsy              Initial imaging MRI abdomen 12/02/2019 which revealed multiple L renal masses    Patient reports persistent R-flank radiating towards R-sided abdominal pain, intermittent, with noticeable worsening daily between 16:00-19:00; notes that adequate relief with PO dilaudid 1-2 tabs depending on severity; will take from 7/10 to 3/10  Reports EGD performed yesterday with biopsy x 4   Denies nausea, vomiting, fever/chills  Appetite intact  Sleeping at baseline without issue  Last BM yesterday, current use of bowel regimen PRN  Notes that medical marijuana effective overall but patient does not like euphoric sensation 2 hours immediately following use  Tried two times with same sensation and subsequently stopped use  Discontinued use of oxy-ER medication and only currently uses PO dilaudid for pain management  Past medical, surgical, social, and family histories are reviewed and pertinent updates are made  Review of Systems   Constitution: Positive for malaise/fatigue  Negative for chills and fever  HENT: Negative for congestion  Cardiovascular: Negative for chest pain  Respiratory: Negative for cough and shortness of breath  Musculoskeletal: Negative for neck pain  Gastrointestinal: Positive for abdominal pain  Negative for nausea and vomiting  Genitourinary: Negative for dysuria and hematuria  Neurological: Negative for headaches  Psychiatric/Behavioral: Negative for hallucinations  All other systems reviewed and are negative  Vital Signs    /65 (BP Location: Left arm, Patient Position: Sitting, Cuff Size: Standard)   Pulse 89   Temp (!) 97 1 °F (36 2 °C) (Temporal)   Resp 18   Wt 88 5 kg (195 lb 1 7 oz)   SpO2 92%   BMI 29 67 kg/m²     Physical Exam and Objective Data  Physical Exam  Constitutional:       Appearance: Normal appearance  He is not ill-appearing or toxic-appearing  HENT:      Head: Normocephalic and atraumatic  Right Ear: Hearing and external ear normal       Left Ear: Hearing and external ear normal    Eyes:      Comments: No gaze preference   Neck:      Musculoskeletal: Normal range of motion  Pulmonary:      Effort: No tachypnea, accessory muscle usage or respiratory distress  Comments: Completes full sentences without difficulty  Abdominal:      General: Abdomen is flat  Palpations: Abdomen is soft  Tenderness:  There is abdominal tenderness  There is no guarding  Neurological:      General: No focal deficit present  Mental Status: He is alert and oriented to person, place, and time  Radiology and Laboratory:  I personally reviewed and interpreted the following results:    Most Recent COVID-19 Results:  No results found for: Charla Quiñonez, [address] [employer]    Most Recent Lab Work:  Lab Results   Component Value Date/Time    SODIUM 140 12/28/2020 11:33 AM    K 4 0 12/28/2020 11:33 AM    BUN 17 12/28/2020 11:33 AM    CREATININE 1 35 (H) 12/28/2020 11:33 AM    GLUC 120 (H) 12/09/2020 01:04 PM     Lab Results   Component Value Date/Time    AST 9 (L) 12/28/2020 11:33 AM    ALT 9 12/28/2020 11:33 AM    ALB 3 9 12/28/2020 11:33 AM     Lab Results   Component Value Date/Time    HGB 11 7 (L) 12/28/2020 11:33 AM    WBC 5 20 12/28/2020 11:33 AM     12/28/2020 11:33 AM    INR 0 97 03/09/2020 02:44 PM    PTT 30 03/09/2020 02:44 PM       Most Recent Imaging (w/i last 30-days):  No results found  40 minutes was spent face to face with Cesar Dsouza and his spouse with greater than 50% of the time spent in counseling or coordination of care including discussions of symptoms assessment, chart review, emotional support  All of the patient's questions were answered during this discussion

## 2021-01-13 ENCOUNTER — HOSPITAL ENCOUNTER (OUTPATIENT)
Dept: NUCLEAR MEDICINE | Facility: HOSPITAL | Age: 71
Discharge: HOME | End: 2021-01-13
Payer: MEDICARE

## 2021-01-13 ENCOUNTER — TELEPHONE (OUTPATIENT)
Dept: HEMATOLOGY ONCOLOGY | Facility: CLINIC | Age: 71
End: 2021-01-13

## 2021-01-13 DIAGNOSIS — C65.2 MALIGNANT NEOPLASM OF LEFT RENAL PELVIS (HCC): ICD-10-CM

## 2021-01-13 DIAGNOSIS — C68.9 UROTHELIAL CANCER (HCC): ICD-10-CM

## 2021-01-13 LAB — GLUCOSE SERPL-MCNC: 93 MG/DL (ref 65–140)

## 2021-01-13 PROCEDURE — 82948 REAGENT STRIP/BLOOD GLUCOSE: CPT

## 2021-01-13 PROCEDURE — A9552 F18 FDG: HCPCS

## 2021-01-13 PROCEDURE — G1004 CDSM NDSC: HCPCS

## 2021-01-13 PROCEDURE — 78815 PET IMAGE W/CT SKULL-THIGH: CPT

## 2021-01-13 NOTE — TELEPHONE ENCOUNTER
Radiology calling with significant finding - PET CT done 1/13/21  Will send to RN to review with MD/NP      IMPRESSION:     1  Mixed findings for response to therapy  2   Significant improvement in previously seen FDG avid lymph nodes in the periesophageal region, retrocrural region, retroperitoneum and near the left renal bed  3  Largely resolved FDG avid left rectus abdominis lesion  4   However, there are new FDG avid lymph nodes in the portacaval region and retroperitoneum suspicious for malignancy  5   New asymmetric focus of FDG uptake at the level of the right vocal fold, SUV max of 5 0  This may be inflammatory but can be reassessed on follow up         The study was marked in EPIC for significant notification

## 2021-01-18 ENCOUNTER — TRANSCRIBE ORDERS (OUTPATIENT)
Dept: LAB | Facility: HOSPITAL | Age: 71
End: 2021-01-18

## 2021-01-18 ENCOUNTER — LAB (OUTPATIENT)
Dept: LAB | Facility: HOSPITAL | Age: 71
End: 2021-01-18
Attending: INTERNAL MEDICINE
Payer: MEDICARE

## 2021-01-18 ENCOUNTER — DOCUMENTATION (OUTPATIENT)
Dept: RADIATION ONCOLOGY | Facility: HOSPITAL | Age: 71
End: 2021-01-18

## 2021-01-18 DIAGNOSIS — C68.9 UROTHELIAL CANCER (HCC): ICD-10-CM

## 2021-01-18 DIAGNOSIS — C65.2 CANCER OF LEFT RENAL PELVIS (HCC): ICD-10-CM

## 2021-01-18 DIAGNOSIS — D53.9 NUTRITIONAL ANEMIA, UNSPECIFIED: ICD-10-CM

## 2021-01-18 DIAGNOSIS — M10.9 GOUT OF LEFT ANKLE, UNSPECIFIED CAUSE, UNSPECIFIED CHRONICITY: ICD-10-CM

## 2021-01-18 LAB
ALBUMIN SERPL BCP-MCNC: 4.1 G/DL (ref 3.5–5.7)
ALP SERPL-CCNC: 80 U/L (ref 55–165)
ALT SERPL W P-5'-P-CCNC: 10 U/L (ref 7–52)
ANION GAP SERPL CALCULATED.3IONS-SCNC: 5 MMOL/L (ref 4–13)
AST SERPL W P-5'-P-CCNC: 10 U/L (ref 13–39)
BASOPHILS # BLD AUTO: 0 THOUSANDS/ΜL (ref 0–0.1)
BASOPHILS NFR BLD AUTO: 1 % (ref 0–2)
BILIRUB SERPL-MCNC: 0.6 MG/DL (ref 0.2–1)
BUN SERPL-MCNC: 22 MG/DL (ref 7–25)
CALCIUM SERPL-MCNC: 9.6 MG/DL (ref 8.6–10.5)
CHLORIDE SERPL-SCNC: 104 MMOL/L (ref 98–107)
CO2 SERPL-SCNC: 31 MMOL/L (ref 21–31)
CREAT SERPL-MCNC: 1.31 MG/DL (ref 0.7–1.3)
CRP SERPL QL: <5 MG/L
EOSINOPHIL # BLD AUTO: 0.2 THOUSAND/ΜL (ref 0–0.61)
EOSINOPHIL NFR BLD AUTO: 3 % (ref 0–5)
ERYTHROCYTE [DISTWIDTH] IN BLOOD BY AUTOMATED COUNT: 18.3 % (ref 11.5–14.5)
ERYTHROCYTE [SEDIMENTATION RATE] IN BLOOD: 25 MM/HOUR (ref 0–19)
GFR SERPL CREATININE-BSD FRML MDRD: 54 ML/MIN/1.73SQ M
GLUCOSE SERPL-MCNC: 111 MG/DL (ref 65–99)
HCT VFR BLD AUTO: 38.5 % (ref 42–47)
HGB BLD-MCNC: 12.5 G/DL (ref 14–18)
LYMPHOCYTES # BLD AUTO: 1.2 THOUSANDS/ΜL (ref 0.6–4.47)
LYMPHOCYTES NFR BLD AUTO: 20 % (ref 21–51)
MAGNESIUM SERPL-MCNC: 1.8 MG/DL (ref 1.9–2.7)
MCH RBC QN AUTO: 29 PG (ref 26–34)
MCHC RBC AUTO-ENTMCNC: 32.4 G/DL (ref 31–37)
MCV RBC AUTO: 90 FL (ref 81–99)
MONOCYTES # BLD AUTO: 0.5 THOUSAND/ΜL (ref 0.17–1.22)
MONOCYTES NFR BLD AUTO: 9 % (ref 2–12)
NEUTROPHILS # BLD AUTO: 4 THOUSANDS/ΜL (ref 1.4–6.5)
NEUTS SEG NFR BLD AUTO: 67 % (ref 42–75)
PLATELET # BLD AUTO: 173 THOUSANDS/UL (ref 149–390)
PMV BLD AUTO: 7.9 FL (ref 8.6–11.7)
POTASSIUM SERPL-SCNC: 3.8 MMOL/L (ref 3.5–5.5)
PROT SERPL-MCNC: 6.7 G/DL (ref 6.4–8.9)
RBC # BLD AUTO: 4.3 MILLION/UL (ref 4.3–5.9)
SODIUM SERPL-SCNC: 140 MMOL/L (ref 134–143)
T3FREE SERPL-MCNC: 2.52 PG/ML (ref 2.3–4.2)
TSH SERPL DL<=0.05 MIU/L-ACNC: 2.16 UIU/ML (ref 0.45–5.33)
WBC # BLD AUTO: 6 THOUSAND/UL (ref 4.8–10.8)

## 2021-01-18 PROCEDURE — 83735 ASSAY OF MAGNESIUM: CPT

## 2021-01-18 PROCEDURE — 86140 C-REACTIVE PROTEIN: CPT

## 2021-01-18 PROCEDURE — 84481 FREE ASSAY (FT-3): CPT

## 2021-01-18 PROCEDURE — 85025 COMPLETE CBC W/AUTO DIFF WBC: CPT

## 2021-01-18 PROCEDURE — 36415 COLL VENOUS BLD VENIPUNCTURE: CPT

## 2021-01-18 PROCEDURE — 85652 RBC SED RATE AUTOMATED: CPT

## 2021-01-18 PROCEDURE — 80053 COMPREHEN METABOLIC PANEL: CPT

## 2021-01-18 PROCEDURE — 84550 ASSAY OF BLOOD/URIC ACID: CPT

## 2021-01-18 PROCEDURE — 84443 ASSAY THYROID STIM HORMONE: CPT

## 2021-01-19 ENCOUNTER — TELEMEDICINE (OUTPATIENT)
Dept: RADIATION ONCOLOGY | Facility: HOSPITAL | Age: 71
End: 2021-01-19
Attending: RADIOLOGY

## 2021-01-19 ENCOUNTER — OFFICE VISIT (OUTPATIENT)
Dept: HEMATOLOGY ONCOLOGY | Facility: CLINIC | Age: 71
End: 2021-01-19
Payer: MEDICARE

## 2021-01-19 VITALS
RESPIRATION RATE: 18 BRPM | HEART RATE: 67 BPM | WEIGHT: 193.3 LBS | SYSTOLIC BLOOD PRESSURE: 116 MMHG | TEMPERATURE: 96.9 F | HEIGHT: 67 IN | DIASTOLIC BLOOD PRESSURE: 86 MMHG | OXYGEN SATURATION: 94 % | BODY MASS INDEX: 30.34 KG/M2

## 2021-01-19 DIAGNOSIS — R53.83 OTHER FATIGUE: ICD-10-CM

## 2021-01-19 DIAGNOSIS — E53.8 FOLIC ACID DEFICIENCY: ICD-10-CM

## 2021-01-19 DIAGNOSIS — C79.89 SECONDARY MALIGNANT NEOPLASM OF MUSCLE OF ABDOMEN (HCC): Primary | ICD-10-CM

## 2021-01-19 DIAGNOSIS — C65.2 CANCER OF LEFT RENAL PELVIS (HCC): ICD-10-CM

## 2021-01-19 DIAGNOSIS — D64.9 NORMOCYTIC ANEMIA: ICD-10-CM

## 2021-01-19 DIAGNOSIS — C68.9 UROTHELIAL CANCER (HCC): ICD-10-CM

## 2021-01-19 DIAGNOSIS — M10.9 GOUT OF LEFT ANKLE, UNSPECIFIED CAUSE, UNSPECIFIED CHRONICITY: ICD-10-CM

## 2021-01-19 DIAGNOSIS — C68.9 UROTHELIAL CANCER (HCC): Primary | ICD-10-CM

## 2021-01-19 LAB — URATE SERPL-MCNC: 7 MG/DL (ref 2.3–7.6)

## 2021-01-19 PROCEDURE — 99214 OFFICE O/P EST MOD 30 MIN: CPT | Performed by: NURSE PRACTITIONER

## 2021-01-19 RX ORDER — SODIUM CHLORIDE 9 MG/ML
20 INJECTION, SOLUTION INTRAVENOUS ONCE
Status: CANCELLED | OUTPATIENT
Start: 2021-02-12

## 2021-01-19 RX ORDER — FOLIC ACID 1 MG/1
1 TABLET ORAL DAILY
Qty: 90 TABLET | Refills: 4 | Status: SHIPPED | OUTPATIENT
Start: 2021-01-19 | End: 2022-04-05

## 2021-01-19 RX ORDER — HYDROCODONE BITARTRATE, ACETAMINOPHEN 2.5; 325 MG/1; MG/1
TABLET ORAL EVERY 4 HOURS
COMMUNITY
End: 2021-04-13

## 2021-01-19 NOTE — PROGRESS NOTES
Hematology/Oncology Outpatient Follow-up  Kylie Morales 69 y o  male 1950 [id number]    Date:  1/19/2021      Assessment and Plan:  1  Urothelial cancer ([address] )  Patient's recent PET-CT scan did show mixed response to therapy  He has had resolution of FDG activity at his primary metastatic lesion at the left rectus abdominus with significant improvement in previously seen FDG avid lymph nodes   In the periesophageal, retrocrural and retroperitoneum/left renal bed; however there are new FDG avid lymph nodes noted in the portal caval and retroperitoneal area suspicious for active malignancy  Patient's case and imaging was discussed and reviewed with Dr Verona Suarez  We will continue him on his current treatment without any changes  Re-evaluate the new FDG avid lymph nodes on repeat surveillance study in about 3 months  He will be continued on his current treatment with single agent immunotherapy Keytruda on an every 3 weeks basis since he seems to be responding to therapy and will likely not tolerate chemotherapy  He is due for treatment #6 on Thursday 01/21/2021  We will continue to monitor patient and his laboratory studies closely on an every three-week basis  - CBC and differential; Future  - Comprehensive metabolic panel; Future  - TSH, 3rd generation with Free T4 reflex; Future  - T3, free; Future  - CBC and differential; Future  - Comprehensive metabolic panel; Future  - TSH, 3rd generation with Free T4 reflex; Future  - Magnesium; Future    2  Normocytic anemia  Patient continues to have stable/improved normocytic anemia which is likely multifactorial including anemia of chronic renal dysfunction and anemia of chronic disease  He did receive 2 doses of IV iron recently  Will repeat his iron panel before his next follow-up since he is reporting ongoing fatigue  Admits that his fatigue could partially be due to decreased activity with the COVID-19 situation and his current health status  Encouraged him to be as active as possible even if it is just walking around his property for 10-15 minutes today to get outside the house  - CBC and differential; Future  - Iron Panel (Includes Ferritin, Iron Sat%, Iron, and TIBC); Future  - Ferritin; Future    3  Folic acid deficiency   Patient's recent folic acid level is lower than average 7  8  will start him on folic acid supplements to see if it will improve his fatigue/anemia  - folic acid (FOLVITE) 1 mg tablet; Take 1 tablet (1 mg total) by mouth daily  Dispense: 90 tablet; Refill: 4    4  Gout of left ankle, unspecified cause, unspecified chronicity  Patient believes he is starting to get gout flare-up in his left ankle which is not unusual for him  He already started his colchicine  Will add uric acid to his labs from yesterday  - Uric acid; Future      HPI:  Patient presents today for a follow-up visit accompanied by his wife  He continues to report fatigue which is bothersome to him  Denies bleeding from any site  Also continues to have chronic right low abdominal/ groin pain and chronic back pain  Reports that he was seeing pain management up until recent events and getting injections to his back would like to resume in the near future if feasible  Is also reporting some discomfort in his left ankle which he feels is due to his gout; did start his colchicine  His most recent laboratory studies from 01/18/2021 showed normal white cells and platelets, he continues to have stable/improved normocytic anemia H&H 12 5/38 5, MCV 90  Stable chronic renal dysfunction BUN 22, creatinine 1 31, GFR 54,   Nonfasting glucose 111 remaining metabolic panel is appropriate for patient  TSH is normal   Sed rate slightly elevated 25 with normal C-reactive protein  He had a repeat PET-CT scan done last week 01/13/2021 which showed:   "IMPRESSION:  1  Mixed findings for response to therapy    2   Significant improvement in previously seen FDG avid lymph nodes in the periesophageal region, retrocrural region, retroperitoneum and near the left renal bed  3  Largely resolved FDG avid left rectus abdominis lesion  4   However, there are new FDG avid lymph nodes in the portacaval region and retroperitoneum suspicious for malignancy  5   New asymmetric focus of FDG uptake at the level of the right vocal fold, SUV max of 5 0  This may be inflammatory but can be reassessed on follow up "    Oncology History Overview Note   Patient is a 69 y o male with stage IV metastatic urothelial carcinoma of the left renal pelvis  He completed palliative radiation to the left abdomen 12/3/20     12/4/20 Dr Linden Joyce- F/u in 6 months for possible cystoscopy at that time  He reports he is no longer taking the Proscar and feels though he is emptying his bladder well     12/29/20 Rosie Botello NP- patient will be continued on his current palliative treatment with single agent immunotherapy Keytruda on a every 3 week basis which he is tolerating well  F/u in 3 weeks with labs  We will pursue PET prior to his next follow-up in 3 weeks after 5 treatment to rule out progressive disease since he is complaining about more persistent pain especially in the right abdomen/flank area  1/13/21 PET scan- 1  Mixed findings for response to therapy  2   Significant improvement in previously seen FDG avid lymph nodes in the periesophageal region, retrocrural region, retroperitoneum and near the left renal bed  3  Largely resolved FDG avid left rectus abdominis lesion  4   However, there are new FDG avid lymph nodes in the portacaval region and retroperitoneum suspicious for malignancy  5   New asymmetric focus of FDG uptake at the level of the right vocal fold, SUV max of 5 0  This may be inflammatory but can be reassessed on follow up      1/19/21 Rosie Botello NP  1/22/20 infusion  2/5/21 palliative care  6/4/21 Dr Linden Joyce     Urothelial cancer [facility])   1/3/2020 Biopsy    Final Diagnosis    A  Urine, Clean Catch, Thin Prep:  High grade urothelial carcinoma (HGUC) - see comment  1/3/2020 Initial Diagnosis    Urothelial [address] ([facility] [address] )  T3 N0 (stage IIIA)     1/17/2020 Biopsy    Final Diagnosis    A  Ureter, Right, left renal pelvis biopsy  -Fragments of high grade urothelial carcinoma   -No evidence of lamina propria invasion   -Detrusor muscle/ muscularis propria is not present for evaluation  B  Urinary Bladder, bladder biopsy:  -Fragments of low grade papillary lesion  See note  -No evidence of invasion seen  -Unremarkable fragment of detrusor muscle seen  4/1/2020 Surgery    left robotic assisted laparoscopic nephroureterectomy with bladder cuff excision     Final Diagnosis    A  Left kidney, ureter, and bladder cuff, nephroureterectomy:  - Invasive high grade urothelial carcinoma arising in renal pelvis  - Bladder cuff margin is negative for carcinoma and no evidence of high grade dysplasia  - Ureters with no significant pathologic abnormality  - Two benign simple cysts  - Adrenal gland is negative for malignancy  - One lymph node, negative for malignancy (0/1)          5/14/2020 - 6/3/2020 Chemotherapy    CISplatin (PLATINOL) split dose 35 mg/m2 = 75 3 mg (50 % of original dose 70 mg/m2), Intravenous,   Administration: 75 3 mg (5/14/2020), 75 3 mg (5/21/2020)  gemcitabine (GEMZAR) 2,200 mg in sodium chloride 0 9 % 250 mL infusion, 2,150 2 mg (80 % of original dose 1,250 mg/m2), Intravenous,   Administration: 2,200 mg (5/14/2020), 2,200 mg (5/21/2020)    (only completed 1 cycle- d/c d/t adverse effects and worsening renal dysfunction)     10/9/2020 -  Chemotherapy    pembrolizumab (KEYTRUDA) 200 mg in sodium chloride 0 9 % 50 mL IVPB, 200 mg, Intravenous, Once, 5 of 14 cycles  Administration: 200 mg (10/9/2020), 200 mg (10/30/2020), 200 mg (11/20/2020), 200 mg (12/11/2020), 200 mg (12/31/2020)     11/19/2020 - 12/3/2020 Radiation    Treatment:  Course: C1    Plan ID Energy Fractions Dose per Fraction (cGy) Dose Correction (cGy) Total Dose Delivered (cGy) Elapsed Days   L Abdomen 6X 10 / 10 300 0 3,000 14        Cancer of left renal pelvis ([facility] [address] )   4/23/2020 Initial Diagnosis    Cancer of left renal pelvis ([facility])     10/9/2020 -  Chemotherapy    pembrolizumab (KEYTRUDA) 200 mg in sodium chloride 0 9 % 50 mL IVPB, 200 mg, Intravenous, Once, 5 of 14 cycles  Administration: 200 mg (10/9/2020), 200 mg (10/30/2020), 200 mg (11/20/2020), 200 mg (12/11/2020), 200 mg (12/31/2020)         Interval history:    ROS: Review of Systems   Constitutional: Positive for fatigue  Negative for activity change, appetite change, chills, fever and unexpected weight change  HENT: Positive for hearing loss  Negative for congestion, mouth sores, nosebleeds, sore throat and trouble swallowing  Eyes: Negative  Respiratory: Negative for cough, chest tightness and shortness of breath  Cardiovascular: Negative for chest pain, palpitations and leg swelling  Gastrointestinal: Positive for abdominal pain and constipation  Negative for abdominal distention, blood in stool, diarrhea, nausea and vomiting  Genitourinary: Negative for difficulty urinating, dysuria, frequency, hematuria and urgency  Musculoskeletal: Positive for arthralgias (left ankle) and back pain  Negative for gait problem, joint swelling and myalgias  Skin: Negative for color change, pallor and rash  Neurological: Positive for numbness (  And tingling mild)  Negative for dizziness, weakness, light-headedness and headaches  Hematological: Negative for adenopathy  Does not bruise/bleed easily  Psychiatric/Behavioral: Negative for dysphoric mood and sleep disturbance  The patient is not nervous/anxious          Past Medical History:   Diagnosis Date    Arthritis     Bladder cancer     [facility] )     skin melanoma;basal cell    Chronic pain disorder     from arthritis    Colon polyp     Does use hearing aid     bilat  Dry eyes, bilateral     GERD (gastroesophageal reflux disease)     History of kidney stones 10/2019    History of partial knee replacement     bilat    History of vertigo     Hyperlipidemia     Hypertension     Kidney lesion     Lumbar disc disorder     compression of vertebrae L4-5-6    Muscle weakness     left hip area    Renal mass     left    Right ankle injury 03/05/2020    missed step of ladder     Right ankle pain     Shortness of breath     with activity    Tinnitus     Urothelial cancer     left    Wears glasses        Past Surgical History:   Procedure Laterality Date    COLONOSCOPY      CYSTOSCOPY Left 4/1/2020    Procedure: CYSTOSCOPY; URETERAL CATHETER PLACEMENT;  Surgeon: Mynor Marcelo MD;  Location[facility];  Service: Urology    CYSTOSCOPY  05/11/2020    FL CYSTOGRAM  4/13/2020    FL RETROGRADE PYELOGRAM  1/17/2020    HERNIA REPAIR      umbilical with mesh    INGUINAL HERNIA REPAIR Right     with mesh    IR PORT PLACEMENT  4/30/2020    JOINT REPLACEMENT Bilateral     partials knee    LUMBAR EPIDURAL INJECTION      WI CYSTOURETHROSCOPY,URETER CATHETER Bilateral 1/17/2020    Procedure: CYSTOSCOPY; RIGHT RETROGRADE PYELOGRAM WITH RIGHT URETERAL CYTOLOGY SAMPLING; LEFT URETEROSCOPY WITH RENAL PELVIS BIOPSY AND LEFT STENT PLACEMENT;  Surgeon: Mynor Marcelo MD;  [facility];  Service: Urology    WI NEPHRECTOMY, W/PART   URETECTOMY Left 4/1/2020    Procedure: ROBOTIC LAPAROSCOPIC NEPHRO-URETERECTOMY;  Surgeon: Mynor Marcelo MD;  [facility];  Service: Urology    SKIN CANCER EXCISION      surface melanoma    TONSILLECTOMY      WISDOM TOOTH EXTRACTION         Social History     Socioeconomic History    Marital status: /Civil Union     Spouse name: Not on file    Number of children: Not on file    Years of education: Not on file    Highest education level: Not on file   Occupational History    Not on file   Social Needs    Financial resource strain: Not on file    Food insecurity     Worry: Not on file     Inability: Not on file    Transportation needs     Medical: Not on file     Non-medical: Not on file   Tobacco Use    Smoking status: Former Smoker     Quit date: 1972     Years since quittin 0    Smokeless tobacco: Never Used   Substance and Sexual Activity    Alcohol use: Not Currently     Alcohol/week: 4 0 standard drinks     Types: 4 Cans of beer per week     Frequency: 4 or more times a week     Drinks per session: 3 or 4     Binge frequency: Daily or almost daily     Comment: daily/socially    Drug use: Never    Sexual activity: Not Currently   Lifestyle    Physical activity     Days per week: Not on file     Minutes per session: Not on file    Stress: Not on file   Relationships    Social connections     Talks on phone: Not on file     Gets together: Not on file     Attends Yazidism service: Not on file     Active member of club or organization: Not on file     Attends meetings of clubs or organizations: Not on file     Relationship status: Not on file    Intimate partner violence     Fear of current or ex partner: Not on file     Emotionally abused: Not on file     Physically abused: Not on file     Forced sexual activity: Not on file   Other Topics Concern    Not on file   Social History Narrative    Daily caffeine use - 2 cups coffee        Family History   Problem Relation Age of Onset    Liver cancer Father        No Known Allergies      Current Outpatient Medications:     acetaminophen (TYLENOL) 325 mg tablet, Take 2 tablets (650 mg total) by mouth every 6 (six) hours as needed for mild pain or fever (Patient taking differently: Take 650 mg by mouth every 6 (six) hours as needed for mild pain or fever ), Disp: , Rfl: 0    allopurinol (ZYLOPRIM) 100 mg tablet, Take 1 tablet (100 mg total) by mouth daily, Disp: 30 tablet, Rfl: 3    amLODIPine (NORVASC) 5 mg tablet, Take 5 mg by mouth daily , Disp: , Rfl:     atorvastatin (LIPITOR) 80 mg tablet, Take 80 mg by mouth every other day evening, Disp: , Rfl:     calcium citrate-vitamin D (CITRACAL+D) 315-200 MG-UNIT per tablet, Take 1 tablet by mouth daily, Disp: , Rfl:     colchicine (COLCRYS) 0 6 mg tablet, Take 0 6 mg by mouth 2 (two) times a day, Disp: , Rfl:     HYDROcodone-Acetaminophen 2 5-325 MG TABS, Take by mouth every 4 (four) hours, Disp: , Rfl:     HYDROmorphone (DILAUDID) 2 mg tablet, Take 1 tablet (2 mg total) by mouth every 4 (four) hours as needed for moderate painMax Daily Amount: 12 mg, Disp: 60 tablet, Rfl: 0    LORazepam (ATIVAN) 0 5 mg tablet, Take 1 tablet (0 5 mg total) by mouth every 6 (six) hours as needed for anxiety (or restless legs), Disp: 15 tablet, Rfl: 0    metoprolol tartrate (LOPRESSOR) 100 mg tablet, Take 100 mg by mouth daily  , Disp: , Rfl:     naloxone (NARCAN) 4 mg/0 1 mL nasal spray, Administer 1 spray into a nostril  If breathing does not return to normal or if breathing difficulty resumes after 2-3 minutes, give another dose in the other nostril using a new spray , Disp: 1 each, Rfl: 1    omeprazole (PriLOSEC) 20 mg delayed release capsule, Take 20 mg by mouth daily  , Disp: , Rfl:     senna (SENOKOT) 8 6 mg, Take 1 tablet (8 6 mg total) by mouth daily at bedtime, Disp: 30 each, Rfl: 0    sildenafil (VIAGRA) 100 mg tablet, TAKE ONE TABLET BY MOUTH  AS NEEDED PRIOR TO SEXUAL ACTIVITY FOR ERECTILE DYSFUNCTION, Disp: , Rfl:     terazosin (HYTRIN) 5 mg capsule, Take 2 capsules (10 mg total) by mouth daily at bedtime, Disp: 30 capsule, Rfl: 6    VITAMIN D PO, Take 1,000 Units by mouth daily , Disp: , Rfl:     folic acid (FOLVITE) 1 mg tablet, Take 1 tablet (1 mg total) by mouth daily, Disp: 90 tablet, Rfl: 4    lidocaine (LMX) 4 % cream, Apply topically as needed for mild pain To abdomen and to L ankle as needed   (Patient not taking: Reported on 12/29/2020), Disp: 30 g, Rfl: 0    ondansetron (ZOFRAN) 8 mg tablet, Take 1 tablet (8 mg total) by mouth every 8 (eight) hours as needed for nausea or vomiting (Patient not taking: Reported on 12/29/2020), Disp: 20 tablet, Rfl: 3      Physical Exam:  /86 (BP Location: Left arm, Patient Position: Sitting, Cuff Size: Large)   Pulse 67   Temp (!) 96 9 °F (36 1 °C) (Tympanic)   Resp 18   Ht 5' 7" (1 702 m)   Wt 87 7 kg (193 lb 4 8 oz)   SpO2 94%   BMI 30 28 kg/m²     Physical Exam  Vitals signs reviewed  Constitutional:       General: He is not in acute distress  Appearance: He is well-developed  He is not diaphoretic  HENT:      Head: Normocephalic and atraumatic  Eyes:      General: Lids are normal  No scleral icterus  Conjunctiva/sclera: Conjunctivae normal       Pupils: Pupils are equal, round, and reactive to light  Neck:      Musculoskeletal: Normal range of motion and neck supple  Thyroid: No thyromegaly  Cardiovascular:      Rate and Rhythm: Normal rate and regular rhythm  Heart sounds: Normal heart sounds  No murmur  Pulmonary:      Effort: Pulmonary effort is normal  No respiratory distress  Breath sounds: Normal breath sounds  Abdominal:      General: There is no distension  Palpations: Abdomen is soft  There is no hepatomegaly or splenomegaly  Tenderness: There is no abdominal tenderness  Musculoskeletal: Normal range of motion  General: No swelling  Lymphadenopathy:      Cervical: No cervical adenopathy  Upper Body:      Right upper body: No axillary adenopathy  Left upper body: No axillary adenopathy  Skin:     General: Skin is warm and dry  Findings: No erythema or rash  Neurological:      General: No focal deficit present  Mental Status: He is alert and oriented to person, place, and time  Psychiatric:         Mood and Affect: Mood is depressed  Affect is blunt  Behavior: Behavior normal  Behavior is cooperative  Thought Content:  Thought content normal          Judgment: Judgment normal            Labs:  Lab Results   Component Value Date    WBC 6 00 01/18/2021    HGB 12 5 (L) 01/18/2021    HCT 38 5 (L) 01/18/2021    MCV 90 01/18/2021     01/18/2021     Lab Results   Component Value Date    K 3 8 01/18/2021     01/18/2021    CO2 31 01/18/2021    BUN 22 01/18/2021    CREATININE 1 31 (H) 01/18/2021    GLUF 108 (H) 12/28/2020    CALCIUM 9 6 01/18/2021    CORRECTEDCA 9 8 10/28/2020    AST 10 (L) 01/18/2021    ALT 10 01/18/2021    ALKPHOS 80 01/18/2021    EGFR 54 01/18/2021       Patient voiced understanding and agreement in the above discussion  Aware to contact our office with questions/symptoms in the interim  This note has been generated by voice recognition software system  Therefore, there may be spelling, grammar, and or syntax errors  Please contact if questions arise

## 2021-01-21 ENCOUNTER — TELEPHONE (OUTPATIENT)
Dept: PAIN MEDICINE | Facility: CLINIC | Age: 71
End: 2021-01-21

## 2021-01-21 NOTE — TELEPHONE ENCOUNTER
Patient called requesting to schedule his next injection   Please advise, natasha    Call back# [phone]

## 2021-01-22 ENCOUNTER — HOSPITAL ENCOUNTER (OUTPATIENT)
Dept: INFUSION CENTER | Facility: HOSPITAL | Age: 71
Discharge: HOME | End: 2021-01-22
Payer: MEDICARE

## 2021-01-22 VITALS
HEIGHT: 67 IN | TEMPERATURE: 97.4 F | WEIGHT: 190.92 LBS | RESPIRATION RATE: 16 BRPM | DIASTOLIC BLOOD PRESSURE: 77 MMHG | HEART RATE: 80 BPM | BODY MASS INDEX: 29.97 KG/M2 | SYSTOLIC BLOOD PRESSURE: 138 MMHG

## 2021-01-22 DIAGNOSIS — C65.2 CANCER OF LEFT RENAL PELVIS (HCC): Primary | ICD-10-CM

## 2021-01-22 DIAGNOSIS — C68.9 UROTHELIAL CANCER (HCC): ICD-10-CM

## 2021-01-22 PROCEDURE — 96413 CHEMO IV INFUSION 1 HR: CPT

## 2021-01-22 RX ORDER — SODIUM CHLORIDE 9 MG/ML
20 INJECTION, SOLUTION INTRAVENOUS ONCE
Status: COMPLETED | OUTPATIENT
Start: 2021-01-22 | End: 2021-01-22

## 2021-01-22 RX ADMIN — SODIUM CHLORIDE 200 MG: 9 INJECTION, SOLUTION INTRAVENOUS at 10:48

## 2021-01-22 RX ADMIN — SODIUM CHLORIDE 20 ML/HR: 0.9 INJECTION, SOLUTION INTRAVENOUS at 10:27

## 2021-01-22 NOTE — TELEPHONE ENCOUNTER
Pt called in to schedule his procedure  Please be advise thank you        Please call patient back @ [phone]

## 2021-01-22 NOTE — PROGRESS NOTES
Pt tolerated todays dose of keytruda well  No adverse reactions noted  Port flushed and deaccessed per routine   Discharged ambulatory with avs

## 2021-01-26 ENCOUNTER — OFFICE VISIT (OUTPATIENT)
Dept: PAIN MEDICINE | Facility: CLINIC | Age: 71
End: 2021-01-26
Payer: MEDICARE

## 2021-01-26 VITALS
SYSTOLIC BLOOD PRESSURE: 169 MMHG | HEART RATE: 62 BPM | TEMPERATURE: 98.2 F | BODY MASS INDEX: 29.51 KG/M2 | WEIGHT: 188 LBS | HEIGHT: 67 IN | DIASTOLIC BLOOD PRESSURE: 78 MMHG

## 2021-01-26 DIAGNOSIS — M47.816 LUMBAR SPONDYLOSIS: ICD-10-CM

## 2021-01-26 DIAGNOSIS — M48.061 SPINAL STENOSIS OF LUMBAR REGION, UNSPECIFIED WHETHER NEUROGENIC CLAUDICATION PRESENT: ICD-10-CM

## 2021-01-26 DIAGNOSIS — G89.3 CANCER RELATED PAIN: ICD-10-CM

## 2021-01-26 DIAGNOSIS — G89.4 CHRONIC PAIN DISORDER: Primary | ICD-10-CM

## 2021-01-26 DIAGNOSIS — Z51.5 PALLIATIVE CARE PATIENT: ICD-10-CM

## 2021-01-26 DIAGNOSIS — M51.26 LUMBAR DISC HERNIATION: ICD-10-CM

## 2021-01-26 DIAGNOSIS — C68.9 UROTHELIAL CANCER (HCC): ICD-10-CM

## 2021-01-26 PROCEDURE — 99214 OFFICE O/P EST MOD 30 MIN: CPT | Performed by: NURSE PRACTITIONER

## 2021-01-26 RX ORDER — HYDROMORPHONE HYDROCHLORIDE 2 MG/1
2 TABLET ORAL EVERY 4 HOURS PRN
Qty: 90 TABLET | Refills: 0 | Status: SHIPPED | OUTPATIENT
Start: 2021-01-26 | End: 2021-04-13

## 2021-01-26 RX ORDER — HYDROMORPHONE HYDROCHLORIDE 2 MG/1
2 TABLET ORAL EVERY 4 HOURS PRN
Qty: 120 TABLET | Refills: 0 | Status: SHIPPED | OUTPATIENT
Start: 2021-01-26 | End: 2021-01-26 | Stop reason: SDUPTHER

## 2021-01-26 NOTE — TELEPHONE ENCOUNTER
Patient is S/P bilateral RFA's in July & August of 2020 states his pain is coming back, OV was scheduled to discuss treatment

## 2021-01-26 NOTE — TELEPHONE ENCOUNTER
Spouse requesting refill of Hydromorphone 2mg  She states patient has 4 days left of this medication    Christ  Next scheduled appt 2/5/21

## 2021-01-26 NOTE — PROGRESS NOTES
Assessment:  1  Chronic pain disorder    2  Lumbar spondylosis    3  Spinal stenosis of lumbar region, unspecified whether neurogenic claudication present        Plan:  1  We will schedule the patient for repeat bilateral L2-5 medial branch blocks with intention of moving forward towards radiofrequency ablation if there is an appropriate diagnostic response  The initial blocks will be performed with 2% lidocaine and if an appropriate response is obtained upon review of the patient's pain diary, a confirmatory block will be scheduled  Complete risks and benefits including bleeding, infection, tissue reaction, nerve injury and allergic reaction were discussed  The patient was agreeable and verbalized an understanding  2  The patient will continue to follow with palliative medicine   3  I will follow up with the patient pending results of the procedure or sooner if needed     M*Modal software was used to dictate this note  It may contain errors with dictating incorrect words or incorrect spelling  Please contact the provider directly with any questions  History of Present Illness: The patient is a 69 y o  male with a history of left nephrectomy and ureterectomy secondary to urothelial carcinoma last seen on 11/23/20 who presents for a follow up office visit in regards to chronic lumbosacral back pain that is nonradiating into the lower extremities secondary to  Lumbar spondylosis, lumbar stenosis and chronic pain syndrome  The patient is currently following with palliative medicine is under the care for his cancer related pain complaints  The patient presents today to discuss repeating lumbar rhizotomy  He states that he had about 75% improvement of his low back pain for 6 months  He does feel that the pain has started to return  He rates his pain a 7/10 on the numeric pain rating scale  States pain is intermittent in nature and bothersome the evening    He characterizes the pain as sharp and pressure like   Palliative medicine is currently prescribing the patient hydromorphone 2 mg Q4 hours PRN pain #60 tablets for 30 days  I have personally reviewed and/or updated the patient's past medical history, past surgical history, family history, social history, current medications, allergies, and vital signs today  Review of Systems:    Review of Systems   Respiratory: Negative for shortness of breath  Cardiovascular: Negative for chest pain  Gastrointestinal: Negative for constipation, diarrhea, nausea and vomiting  Musculoskeletal: Positive for gait problem  Negative for arthralgias, joint swelling and myalgias  Skin: Negative for rash  Neurological: Negative for dizziness, seizures and weakness  All other systems reviewed and are negative          Past Medical History:   Diagnosis Date    Arthritis     Bladder cancer     Cancer ([facility] [address] )     skin melanoma;basal cell    Chronic pain disorder     from arthritis    Colon polyp     Does use hearing aid     bilat    Dry eyes, bilateral     GERD (gastroesophageal reflux disease)     History of kidney stones 10/2019    History of partial knee replacement     bilat    History of vertigo     Hyperlipidemia     Hypertension     Kidney lesion     Lumbar disc disorder     compression of vertebrae L4-5-6    Muscle weakness     left hip area    Renal mass     left    Right ankle injury 03/05/2020    missed step of ladder     Right ankle pain     Shortness of breath     with activity    Tinnitus     Urothelial cancer     left    Wears glasses        Past Surgical History:   Procedure Laterality Date    COLONOSCOPY      CYSTOSCOPY Left 4/1/2020    Procedure: CYSTOSCOPY; URETERAL CATHETER PLACEMENT;  Surgeon: Mynor Marcelo MD;  [facility];  Service: Urology    CYSTOSCOPY  05/11/2020    FL CYSTOGRAM  4/13/2020    FL RETROGRADE PYELOGRAM  1/17/2020    HERNIA REPAIR      umbilical with mesh    INGUINAL HERNIA REPAIR Right     with mesh    IR PORT PLACEMENT  2020    JOINT REPLACEMENT Bilateral     partials knee    LUMBAR EPIDURAL INJECTION      AR CYSTOURETHROSCOPY,URETER CATHETER Bilateral 2020    Procedure: CYSTOSCOPY; RIGHT RETROGRADE PYELOGRAM WITH RIGHT URETERAL CYTOLOGY SAMPLING; LEFT URETEROSCOPY WITH RENAL PELVIS BIOPSY AND LEFT STENT PLACEMENT;  Surgeon: Stacy Galeana MD;  [facility];  Service: Urology    AR NEPHRECTOMY, W/PART   URETECTOMY Left 2020    Procedure: ROBOTIC LAPAROSCOPIC NEPHRO-URETERECTOMY;  Surgeon: Stacy Galeana MD;  [facility];  Service: Urology    SKIN CANCER EXCISION      surface melanoma    TONSILLECTOMY      WISDOM TOOTH EXTRACTION         Family History   Problem Relation Age of Onset    Liver cancer Father        Social History     Occupational History    Not on file   Tobacco Use    Smoking status: Former Smoker     Quit date:      Years since quittin 1    Smokeless tobacco: Never Used   Substance and Sexual Activity    Alcohol use: Not Currently     Alcohol/week: 4 0 standard drinks     Types: 4 Cans of beer per week     Frequency: 4 or more times a week     Drinks per session: 3 or 4     Binge frequency: Daily or almost daily     Comment: daily/socially    Drug use: Never    Sexual activity: Not Currently         Current Outpatient Medications:     acetaminophen (TYLENOL) 325 mg tablet, Take 2 tablets (650 mg total) by mouth every 6 (six) hours as needed for mild pain or fever (Patient taking differently: Take 650 mg by mouth every 6 (six) hours as needed for mild pain or fever ), Disp: , Rfl: 0    allopurinol (ZYLOPRIM) 100 mg tablet, Take 1 tablet (100 mg total) by mouth daily, Disp: 30 tablet, Rfl: 3    amLODIPine (NORVASC) 5 mg tablet, Take 5 mg by mouth daily  , Disp: , Rfl:     atorvastatin (LIPITOR) 80 mg tablet, Take 80 mg by mouth every other day evening, Disp: , Rfl:     calcium citrate-vitamin D (CITRACAL+D) 315-200 MG-UNIT per tablet, Take 1 tablet by mouth daily, Disp: , Rfl:     colchicine (COLCRYS) 0 6 mg tablet, Take 0 6 mg by mouth 2 (two) times a day, Disp: , Rfl:     folic acid (FOLVITE) 1 mg tablet, Take 1 tablet (1 mg total) by mouth daily, Disp: 90 tablet, Rfl: 4    HYDROcodone-Acetaminophen 2 5-325 MG TABS, Take by mouth every 4 (four) hours, Disp: , Rfl:     HYDROmorphone (DILAUDID) 2 mg tablet, Take 1 tablet (2 mg total) by mouth every 4 (four) hours as needed for moderate painMax Daily Amount: 12 mg, Disp: 60 tablet, Rfl: 0    lidocaine (LMX) 4 % cream, Apply topically as needed for mild pain To abdomen and to L ankle as needed  (Patient not taking: Reported on 12/29/2020), Disp: 30 g, Rfl: 0    LORazepam (ATIVAN) 0 5 mg tablet, Take 1 tablet (0 5 mg total) by mouth every 6 (six) hours as needed for anxiety (or restless legs), Disp: 15 tablet, Rfl: 0    metoprolol tartrate (LOPRESSOR) 100 mg tablet, Take 100 mg by mouth daily  , Disp: , Rfl:     naloxone (NARCAN) 4 mg/0 1 mL nasal spray, Administer 1 spray into a nostril   If breathing does not return to normal or if breathing difficulty resumes after 2-3 minutes, give another dose in the other nostril using a new spray , Disp: 1 each, Rfl: 1    omeprazole (PriLOSEC) 20 mg delayed release capsule, Take 20 mg by mouth daily  , Disp: , Rfl:     ondansetron (ZOFRAN) 8 mg tablet, Take 1 tablet (8 mg total) by mouth every 8 (eight) hours as needed for nausea or vomiting (Patient not taking: Reported on 12/29/2020), Disp: 20 tablet, Rfl: 3    senna (SENOKOT) 8 6 mg, Take 1 tablet (8 6 mg total) by mouth daily at bedtime, Disp: 30 each, Rfl: 0    sildenafil (VIAGRA) 100 mg tablet, TAKE ONE TABLET BY MOUTH  AS NEEDED PRIOR TO SEXUAL ACTIVITY FOR ERECTILE DYSFUNCTION, Disp: , Rfl:     terazosin (HYTRIN) 5 mg capsule, Take 2 capsules (10 mg total) by mouth daily at bedtime, Disp: 30 capsule, Rfl: 6    VITAMIN D PO, Take 1,000 Units by mouth daily , Disp: , Rfl:     No Known Allergies    Physical Exam:    /78   Pulse 62   Temp 98 2 °F (36 8 °C)   Ht 5' 7 01" (1 702 m)   Wt 85 3 kg (188 lb)   BMI 29 44 kg/m²     Constitutional:normal, well developed, well nourished, alert, in no distress and non-toxic and no overt pain behavior  Eyes:anicteric  HEENT:grossly intact  Neck:supple, symmetric, trachea midline and no masses   Pulmonary:even and unlabored  Cardiovascular:No edema or pitting edema present  Skin:Normal without rashes or lesions and well hydrated  Psychiatric:Mood and affect appropriate  Neurologic:Cranial Nerves II-XII grossly intact  Musculoskeletal:antalgic gait but steady without the use of assistive devices  Lumbar facet loading maneuvers reproduces patient's pain complaints  Imaging  FL spine and pain procedure    (Results Pending)     ADDENDUM:     Incompletely evaluated 3 cm left upper pole renal mass  Renal CT protocol is suggested to evaluate left kidney      The study was marked in EPIC for significant and one month follow-up notification  Addended by Christen Owens MD on 12/4/2019 10:29 AM      Study Result    MRI LUMBAR SPINE WITHOUT CONTRAST     INDICATION: [id number]: Radiculopathy, lumbar region      COMPARISON:  None      TECHNIQUE:  Sagittal T1, sagittal T2, sagittal inversion recovery, axial T1 and axial T2, coronal T2     IMAGE QUALITY:  Diagnostic     FINDINGS:     VERTEBRAL BODIES: Straightening of normal lumbar lordosis  Prominent right lateral osteophytes L2-L3 and left lateral osteophytes L3-L4  Marrow edema is evident at the L4-L5 level likely, related to degenerative disease      SACRUM:  Normal signal within the sacrum  No evidence of insufficiency or stress fracture      DISTAL CORD AND CONUS:  Normal size and signal within the distal cord and conus  Conus terminates at the L1 level    Moderate degree of congenital canal stenosis, exacerbated by lipomatosis and spondylosis and osteoarthritis  Minor redundancy of the   cauda equina      PARASPINAL SOFT TISSUES:  Multiple left renal masses to include a left lower pole cyst and a rounded structure in the left upper pole which may also represent cyst   Left upper pole renal masses approximately 3 cm in greatest linear dimension and not   described on recent ultrasound  Renal protocol CT is suggested to evaluate the left upper kidney      LOWER THORACIC DISC SPACES:  Normal disc height and signal   No disc herniation, canal stenosis or foraminal narrowing  Endplate Schmorl's node through the low thoracic and upper lumbar spine     LUMBAR DISC SPACES:     L1-L2:  Mild facet arthrosis, right lateral osteophytes      L2-L3:  Reduction disc height, a superimposed right lateral osteophytes  Circumferential bulge  Moderate facet arthrosis      L3-L4:  Decreased disc height, marginal osteophytes asymmetric to the left, advanced facet arthrosis  Sac reduced to approximately 5-6 mm, with effacement of CSF within the sac      L4-L5:  Decreased disc height, marginal osteophytes, right greater than left facet arthrosis  AP dimension of the sac reduced to 5-6 mm again with the effacement of the CSF  Mild foraminal stenosis  Mild lipomatosis      L5-S1:  Reduction disc height, circumferential bulge with marginal osteophytes and bilateral facet arthrosis present to a moderate degree  Lipomatosis narrows the thecal sac      IMPRESSION:     Multifactorial spinal stenosis most severe at the L3-L4 and L4-L5 levels (5-6 mm)    Correlate for signs and symptoms of spinal stenosis, bilateral L4 and L5 radiculitis                Orders Placed This Encounter   Procedures    FL spine and pain procedure

## 2021-01-26 NOTE — TELEPHONE ENCOUNTER
PDMP Reviewed  Last Rx filled 01/08/2021 for 60 tabs [approximately 10-day Rx; if taken Q4H] if 4-days remain on current Rx -> patient's taking 2-3 tabs/day  Refill for 90 tabs - hydromorphone 2 mg Q4H PRN - sent to pharmacy for full 30-day Rx

## 2021-01-27 ENCOUNTER — HOSPITAL ENCOUNTER (OUTPATIENT)
Dept: RADIOLOGY | Facility: CLINIC | Age: 71
Discharge: HOME | End: 2021-01-27
Attending: ANESTHESIOLOGY | Admitting: ANESTHESIOLOGY
Payer: MEDICARE

## 2021-01-27 VITALS
TEMPERATURE: 98.5 F | RESPIRATION RATE: 18 BRPM | HEART RATE: 54 BPM | DIASTOLIC BLOOD PRESSURE: 79 MMHG | OXYGEN SATURATION: 97 % | SYSTOLIC BLOOD PRESSURE: 141 MMHG

## 2021-01-27 DIAGNOSIS — M47.816 LUMBAR SPONDYLOSIS: ICD-10-CM

## 2021-01-27 PROCEDURE — 64493 INJ PARAVERT F JNT L/S 1 LEV: CPT | Performed by: ANESTHESIOLOGY

## 2021-01-27 PROCEDURE — 64494 INJ PARAVERT F JNT L/S 2 LEV: CPT | Performed by: ANESTHESIOLOGY

## 2021-01-27 PROCEDURE — 64495 INJ PARAVERT F JNT L/S 3 LEV: CPT | Performed by: ANESTHESIOLOGY

## 2021-01-27 RX ORDER — LIDOCAINE HYDROCHLORIDE 10 MG/ML
10 INJECTION, SOLUTION EPIDURAL; INFILTRATION; INTRACAUDAL; PERINEURAL ONCE
Status: COMPLETED | OUTPATIENT
Start: 2021-01-27 | End: 2021-01-27

## 2021-01-27 RX ADMIN — LIDOCAINE HYDROCHLORIDE 4 ML: 20 INJECTION, SOLUTION EPIDURAL; INFILTRATION; INTRACAUDAL; PERINEURAL at 10:21

## 2021-01-27 RX ADMIN — LIDOCAINE HYDROCHLORIDE 5 ML: 10 INJECTION, SOLUTION EPIDURAL; INFILTRATION; INTRACAUDAL; PERINEURAL at 10:16

## 2021-01-27 NOTE — DISCHARGE INSTRUCTIONS

## 2021-01-27 NOTE — H&P
History of Present Illness: The patient is a 69 y o  male who presents with complaints of  Low back pain      Patient Active Problem List   Diagnosis    Lumbar radiculopathy    Lumbar disc herniation    Lumbar spondylosis    [facility]ro[facility]ial cancer ([facility] [address]    Essential hypertension    Sinus bradycardia    Hyperlipidemia    Cancer of left renal pelvis (HCC)    Drug-induced neutropenia (HCC)    Chronic pain disorder    Spinal stenosis of lumbar region    Encounter for central line care    Hyperuricemia    Encounter for long-term opiate analgesic use    Uncomplicated opioid dependence ([facility] [address] )    Palliative care patient    Decreased appetite    Cancer related pain    Therapeutic opioid induced constipation    Medical marijuana use    Normocytic anemia    Secondary malignant neoplasm of muscle of abdomen ([facility] [address] )       Past Medical History:   Diagnosis Date    Arthritis     Bladder cancer     Cancer ([facility] [address] )     skin melanoma;basal cell    Chronic pain disorder     from arthritis    Colon polyp     Does use hearing aid     bilat    Dry eyes, bilateral     GERD (gastroesophageal reflux disease)     History of kidney stones 10/2019    History of partial knee replacement     bilat    History of vertigo     Hyperlipidemia     Hypertension     Kidney lesion     Lumbar disc disorder     compression of vertebrae L4-5-6    Muscle weakness     left hip area    Renal mass     left    Right ankle injury 03/05/2020    missed step of ladder     Right ankle pain     Shortness of breath     with activity    Tinnitus     Urothelial cancer     left    Wears glasses        Past Surgical History:   Procedure Laterality Date    COLONOSCOPY      CYSTOSCOPY Left 4/1/2020    Procedure: CYSTOSCOPY; URETERAL CATHETER PLACEMENT;  Surgeon: Mendoza Rogers MD;  [facility];  Service: Urology    CYSTOSCOPY  05/11/2020    FL CYSTOGRAM  4/13/2020    FL RETROGRADE PYELOGRAM  1/17/2020    HERNIA REPAIR umbilical with mesh    INGUINAL HERNIA REPAIR Right     with mesh    IR PORT PLACEMENT  4/30/2020    JOINT REPLACEMENT Bilateral     partials knee    LUMBAR EPIDURAL INJECTION      ID CYSTOURETHROSCOPY,URETER CATHETER Bilateral 1/17/2020    Procedure: CYSTOSCOPY; RIGHT RETROGRADE PYELOGRAM WITH RIGHT URETERAL CYTOLOGY SAMPLING; LEFT URETEROSCOPY WITH RENAL PELVIS BIOPSY AND LEFT STENT PLACEMENT;  Surgeon: Garima Delacruz MD;  [facility];  Service: Urology    ID NEPHRECTOMY, W/PART   URETECTOMY Left 4/1/2020    Procedure: ROBOTIC LAPAROSCOPIC NEPHRO-URETERECTOMY;  Surgeon: Garima Delacruz MD;  [facility];  Service: Urology    SKIN CANCER EXCISION      surface melanoma    TONSILLECTOMY      WISDOM TOOTH EXTRACTION           Current Outpatient Medications:     acetaminophen (TYLENOL) 325 mg tablet, Take 2 tablets (650 mg total) by mouth every 6 (six) hours as needed for mild pain or fever (Patient taking differently: Take 650 mg by mouth every 6 (six) hours as needed for mild pain or fever ), Disp: , Rfl: 0    allopurinol (ZYLOPRIM) 100 mg tablet, Take 1 tablet (100 mg total) by mouth daily, Disp: 30 tablet, Rfl: 3    amLODIPine (NORVASC) 5 mg tablet, Take 5 mg by mouth daily  , Disp: , Rfl:     atorvastatin (LIPITOR) 80 mg tablet, Take 80 mg by mouth every other day evening, Disp: , Rfl:     calcium citrate-vitamin D (CITRACAL+D) 315-200 MG-UNIT per tablet, Take 1 tablet by mouth daily, Disp: , Rfl:     colchicine (COLCRYS) 0 6 mg tablet, Take 0 6 mg by mouth 2 (two) times a day, Disp: , Rfl:     folic acid (FOLVITE) 1 mg tablet, Take 1 tablet (1 mg total) by mouth daily, Disp: 90 tablet, Rfl: 4    HYDROcodone-Acetaminophen 2 5-325 MG TABS, Take by mouth every 4 (four) hours, Disp: , Rfl:     HYDROmorphone (DILAUDID) 2 mg tablet, Take 1 tablet (2 mg total) by mouth every 4 (four) hours as needed for moderate painMax Daily Amount: 12 mg, Disp: 90 tablet, Rfl: 0   lidocaine (LMX) 4 % cream, Apply topically as needed for mild pain To abdomen and to L ankle as needed  (Patient not taking: Reported on 12/29/2020), Disp: 30 g, Rfl: 0    LORazepam (ATIVAN) 0 5 mg tablet, Take 1 tablet (0 5 mg total) by mouth every 6 (six) hours as needed for anxiety (or restless legs), Disp: 15 tablet, Rfl: 0    metoprolol tartrate (LOPRESSOR) 100 mg tablet, Take 100 mg by mouth daily  , Disp: , Rfl:     naloxone (NARCAN) 4 mg/0 1 mL nasal spray, Administer 1 spray into a nostril   If breathing does not return to normal or if breathing difficulty resumes after 2-3 minutes, give another dose in the other nostril using a new spray , Disp: 1 each, Rfl: 1    omeprazole (PriLOSEC) 20 mg delayed release capsule, Take 20 mg by mouth daily  , Disp: , Rfl:     ondansetron (ZOFRAN) 8 mg tablet, Take 1 tablet (8 mg total) by mouth every 8 (eight) hours as needed for nausea or vomiting (Patient not taking: Reported on 12/29/2020), Disp: 20 tablet, Rfl: 3    senna (SENOKOT) 8 6 mg, Take 1 tablet (8 6 mg total) by mouth daily at bedtime, Disp: 30 each, Rfl: 0    sildenafil (VIAGRA) 100 mg tablet, TAKE ONE TABLET BY MOUTH  AS NEEDED PRIOR TO SEXUAL ACTIVITY FOR ERECTILE DYSFUNCTION, Disp: , Rfl:     terazosin (HYTRIN) 5 mg capsule, Take 2 capsules (10 mg total) by mouth daily at bedtime, Disp: 30 capsule, Rfl: 6    VITAMIN D PO, Take 1,000 Units by mouth daily , Disp: , Rfl:     Current Facility-Administered Medications:     lidocaine (PF) (XYLOCAINE-MPF) 1 % injection 10 mL, 10 mL, Other, Once, [place],     lidocaine (PF) (XYLOCAINE-MPF) 2 % injection 4 mL, 4 mL, Other, Once, [place], DO    No Known Allergies    Physical Exam:   Vitals:    01/27/21 0952   BP: 121/72   Pulse: 57   Resp: 20   Temp: 98 5 °F (36 9 °C)   SpO2: 97%     General: Awake, Alert, Oriented x 3, Mood and affect appropriate  Respiratory: Respirations even and unlabored  Cardiovascular: Peripheral pulses intact; no edema  Musculoskeletal Exam:   Bilateral lumbar paraspinals tender to palpation  ASA Score: 3         Assessment:   1   Lumbar spondylosis        Plan: Repeat B/L L2-5 MBB

## 2021-02-04 DIAGNOSIS — M47.816 LUMBAR SPONDYLOSIS: Primary | ICD-10-CM

## 2021-02-05 ENCOUNTER — OFFICE VISIT (OUTPATIENT)
Dept: PALLIATIVE MEDICINE | Facility: CLINIC | Age: 71
End: 2021-02-05
Payer: MEDICARE

## 2021-02-05 VITALS
OXYGEN SATURATION: 95 % | BODY MASS INDEX: 30.55 KG/M2 | HEART RATE: 71 BPM | RESPIRATION RATE: 18 BRPM | SYSTOLIC BLOOD PRESSURE: 118 MMHG | WEIGHT: 195.11 LBS | DIASTOLIC BLOOD PRESSURE: 78 MMHG | TEMPERATURE: 98 F

## 2021-02-05 DIAGNOSIS — Z51.5 PALLIATIVE CARE PATIENT: ICD-10-CM

## 2021-02-05 DIAGNOSIS — G89.3 CANCER RELATED PAIN: ICD-10-CM

## 2021-02-05 DIAGNOSIS — R63.0 DECREASED APPETITE: ICD-10-CM

## 2021-02-05 DIAGNOSIS — C65.2 CANCER OF LEFT RENAL PELVIS (HCC): ICD-10-CM

## 2021-02-05 DIAGNOSIS — C79.89 SECONDARY MALIGNANT NEOPLASM OF MUSCLE OF ABDOMEN (HCC): ICD-10-CM

## 2021-02-05 DIAGNOSIS — C68.9 UROTHELIAL CANCER (HCC): Primary | ICD-10-CM

## 2021-02-05 PROCEDURE — 99214 OFFICE O/P EST MOD 30 MIN: CPT | Performed by: FAMILY MEDICINE

## 2021-02-05 RX ORDER — PREDNISONE 10 MG/1
10 TABLET ORAL DAILY
Qty: 7 TABLET | Refills: 0 | Status: SHIPPED | OUTPATIENT
Start: 2021-02-05 | End: 2021-02-12 | Stop reason: SDUPTHER

## 2021-02-05 NOTE — PATIENT INSTRUCTIONS
Pharmacological Management of Cancer Pain   WHAT YOU NEED TO KNOW:   What do I need to know about cancer pain? Cancer pain may be short-term or long-term  It may come and go  You may have pain if the tumor damages or blocks tissues, nerves, and blood vessels as it becomes larger  Some cancer cells may produce chemicals that cause pain  Chemotherapy, radiation therapy, or surgery may cause pain  Pain management is an important part of cancer care  How is the cause of cancer pain diagnosed? · A pain diary  can help your healthcare provider find the cause of your pain  Include anything that makes your pain better or worse  Also include when your pain begins and ends  Bring the pain diary to follow-up visits with your healthcare provider  · Pain scales  help measure the amount of pain you feel  Pain scales may include numbers or faces  Your healthcare provider may ask you to rate the pain on a scale of 0 to 10  · An x-ray, CT, or MRI  may be used to find the cause of your cancer pain  You may be given contrast liquid to help the pictures show up better  Tell the healthcare provider if you have ever had an allergic reaction to contrast liquid  Do not enter the MRI room with anything metal  Metal can cause serious injury  Tell the healthcare provider if you have any metal in or on your body  · Stimulation tests  may help find nerves or muscles affected by pain  These tests may include electromyography (EMG), nerve conduction studies, and evoked potential (EP) studies  Which pain medicines may I need? · Acetaminophen  is available without a doctor's order  Ask how much to take and how often to take it  Follow directions  Acetaminophen can cause liver damage if not taken correctly  · NSAIDs , such as ibuprofen, help decrease swelling, pain, and fever  This medicine is available with or without a doctor's order  NSAIDs can cause stomach bleeding or kidney problems in certain people   If you take blood thinner medicine, always ask your healthcare provider if NSAIDs are safe for you  Always read the medicine label and follow directions  · Narcotic analgesic  medicines are used for moderate to severe pain  They may be used to control cancer pain or after surgery and other procedures  · Anesthetic  medicines may be injected in or around a nerve to block pain signals from the nerves  Which other medicines are used to treat cancer pain? · Antidepressants  may be used to help decrease or prevent the symptoms of depression or anxiety  They are also used to treat nerve pain  · Antianxiety  medicine may help you feel calm and relaxed  It may also decrease pain and help you sleep  · Muscle relaxers  help decrease pain and muscle spasms  · Steroid  medicine may be given to decrease inflammation that causes pain  · Medicines  may be given to help decrease cancer growth, pain, and inflammation that happens when cancer gets inside bones  · Anticonvulsant  medicine may given to control seizures  It may also be used to decrease chronic pain  What else can I do to help manage my pain? · Rest as often as you need to  Rest is important for your recovery  Do not return to your regular activities too quickly  Start slowly and do more as you feel stronger  Rest during the day  Plan for 6 to 8 hours of sleep each night  Contact your healthcare provider if you are not able to sleep  · Go to physical and occupational therapy as directed  A physical therapist teaches you exercises to help improve movement and strength, and to decrease pain  An occupational therapist teaches you skills to help with your daily activities  · Exercise as directed  Activity may help increase your strength and control cancer pain  Ask your healthcare provider or pain specialist about the best exercise plan for you  · Prevent bed sores    You may need an egg crate or air mattress on your bed to help prevent bed sores  If you cannot move by yourself, someone will need to turn you from side to side often  Call 911 for any of the following:   · Your arm or leg feels warm, tender, and painful  It may look swollen and red  · You suddenly feel lightheaded and short of breath  · You have chest pain when you take a deep breath or cough  · You cough up blood  When should I seek immediate care? · You feel more pain even after you take your pain medicine  · You feel so depressed that you cannot cope  · You feel very anxious or irritable after you take your medicines  · You have problems thinking clearly  · You cannot control when you urinate or have a bowel movement  When should I contact my healthcare provider? · You have a fever  · You have chills, a cough, or feel weak and achy  · You have nausea or vomiting  · Your skin is itchy, swollen, or has a rash  · You have questions or concerns about your condition or care  CARE AGREEMENT:   You have the right to help plan your care  Learn about your health condition and how it may be treated  Discuss treatment options with your caregivers to decide what care you want to receive  You always have the right to refuse treatment  The above information is an  only  It is not intended as medical advice for individual conditions or treatments  Talk to your doctor, nurse or pharmacist before following any medical regimen to see if it is safe and effective for you  © 2017 [employer] Information is for End User's use only and may not be sold, redistributed or otherwise used for commercial purposes  All illustrations and images included in CareNotes® are the copyrighted property of [employer]  or Josse Sommer

## 2021-02-05 NOTE — PROGRESS NOTES
Outpatient Follow-Up - Palliative and Supportive Care   Bashir Guerra 69 y o  male [id number]    Assessment & Plan  1  Urothelial cancer ([facility] [address] )    2  Decreased appetite    3  Palliative care patient    4  Cancer of left renal pelvis ([facility] [address] )    5  Secondary malignant neoplasm of muscle of abdomen ([facility] [address] )    6  Cancer related pain      #symptom management   - cancer-related pain   - PDMP Reviewed              - last fill of hydromorphone 2 mg PO Q3H PRN [120 tabs] for 20-day Rx was on 01/26/2021                          - currently patient reports taking 2 tabs [4 mg] Q8H daily [6 tabs/day]    - next refill due on 02/14/2021   - continue APAP 650 mg PO Q6H [address]   - trial prednisone 1 mg PO QDaily x 7 days   - Rx sent to pharmacy   - continue current bowel regimen to prevent OIC              - miralax QDaily + linaclotide 145 mcg PO QDaily + senna QDaily PRN   - continue nausea regimen              - zofran 4 mg PO Q*H PRN   - will continue to closely monitor symptoms    #goals of care   - treatment focused care with no limitations at this time   - AD completed previous appt; scanned in EMR   - POLST completed, scanned in EMR    #psychosocial support   - emotional support provided   - wife accompanied to appointment today      [address] [facility] Follow up in 2 months  Medications adjusted this encounter:  Requested Prescriptions     Signed Prescriptions Disp Refills    predniSONE 10 mg tablet 7 tablet 0     Sig: Take 1 tablet (10 mg total) by mouth daily for 7 days     No orders of the defined types were placed in this encounter  There are no discontinued medications  Bashir Guerra was seen today for symptoms and planning cares related to above illnesses  I have reviewed the patient's controlled substance dispensing history in the Prescription Drug Monitoring Program in compliance with the [facility] regulations before prescribing any controlled substances  They are invited to continue to follow with us    If there are questions or concerns, please contact us through our clinic/answering service 24 hours a day, seven days a week  Mayco Baker MD  [facility] Palliative and Supportive Care        Visit Information    Accompanied By: Spouse    Source of History: Self, Spouse, Medical record    History Limitations: None    History of Present Illness    Ashley Amato is a 69 y  o  male with a PMH of stage IIIA urothelial carcinoma of L kidney s/p nephroureterectomy [04/2020] on Keytruda who presents for routine [facility] clinic follow up visit      Primary Oncologist: Dr Kacy Floyd              Initial diagnosis: Urine cytology 01/03/2020 with high-grade urothelial carcinoma + invasive high-grade urothelia carcinoma arising from renal pelvis 04/01/2020 from surgical biopsy              Initial imaging MRI abdomen 12/02/2019 which revealed multiple L renal masses    Pertinent issues include: symptom management, pain, neoplasm related    Patient reports adequate pain control with current pain regimen, persists in R-flank, intermittent, no aggravating factors identified  Currently taking 2 tabs [4 mg] TID, onset 30-45 minutes with 4-6 hours of coverage  Overall feels good  Denies nausea, vomiting  Appetite intact  BM at baseline  Sleep adequate  Recently shoveled during recent snowstorm  Patient's wife inquired about steroids for pain management - feels that if helpful, would be good adjunct given non-opioid and no sedating effect to negatively impact functionality  Past medical, surgical, social, and family histories are reviewed and pertinent updates are made  Review of Systems   Constitution: Negative for chills, decreased appetite, fever, malaise/fatigue and weight loss  HENT: Negative for congestion  Eyes: Negative for visual disturbance  Cardiovascular: Negative for chest pain  Respiratory: Negative for shortness of breath  Musculoskeletal: Negative for back pain     Gastrointestinal: Positive for abdominal pain  Negative for nausea and vomiting  Genitourinary: Positive for flank pain  Negative for dysuria, frequency and hematuria  Neurological: Negative for headaches  Psychiatric/Behavioral: Negative for hallucinations  The patient does not have insomnia  Vital Signs    /78 (BP Location: Right arm, Patient Position: Sitting, Cuff Size: Standard)   Pulse 71   Temp 98 °F (36 7 °C) (Temporal)   Resp 18   Wt 88 5 kg (195 lb 1 7 oz)   SpO2 95%   BMI 30 55 kg/m²     Physical Exam and Objective Data  Physical Exam  Constitutional:       General: He is awake  Comments: Sitting up comfortably in NAD  Non-toxic appearing   HENT:      Head: Normocephalic and atraumatic  Right Ear: External ear normal       Left Ear: External ear normal    Neck:      Musculoskeletal: Normal range of motion  Cardiovascular:      Rate and Rhythm: Normal rate  Pulmonary:      Effort: No tachypnea, accessory muscle usage or respiratory distress  Comments: Completes full sentences without difficulty  Abdominal:      General: Abdomen is flat  Palpations: Abdomen is soft  Neurological:      General: No focal deficit present  Mental Status: He is alert and oriented to person, place, and time     Psychiatric:         Attention and Perception: Attention normal          Mood and Affect: Mood and affect normal          Speech: Speech normal            Radiology and Laboratory:  I personally reviewed and interpreted the following results:    Most Recent COVID-19 Results:  No results found for: [place], [facility]    Most Recent Lab Work:  Lab Results   Component Value Date/Time    SODIUM 140 01/18/2021 10:22 AM    K 3 8 01/18/2021 10:22 AM    BUN 22 01/18/2021 10:22 AM    CREATININE 1 31 (H) 01/18/2021 10:22 AM    GLUC 111 (H) 01/18/2021 10:22 AM     Lab Results   Component Value Date/Time    AST 10 (L) 01/18/2021 10:22 AM    ALT 10 01/18/2021 10:22 AM    ALB 4 1 01/18/2021 10:22 AM     Lab Results   Component Value Date/Time    HGB 12 5 (L) 01/18/2021 10:22 AM    WBC 6 00 01/18/2021 10:22 AM     01/18/2021 10:22 AM    INR 0 97 03/09/2020 02:44 PM    PTT 30 03/09/2020 02:44 PM       Most Recent Imaging (w/i last 30-days):  Procedure: Nm Pet Ct Skull Base To Mid Thigh    Result Date: 1/13/2021  Narrative: PET/CT SCAN INDICATION: History of urothelial carcinoma status post left nephroureterectomy  C65 2: Malignant neoplasm of left renal pelvis C68 9: Malignant neoplasm of urinary organ, unspecified MODIFIER: PS COMPARISON: PET/CT 9/23/2020 CELL TYPE:  High grade urothelial carcinoma TECHNIQUE:   8 6 mCi F-18-FDG administered IV  Multiplanar attenuation corrected and non attenuation corrected PET images were acquired 60 minutes post injection  Contiguous, low dose, axial CT sections were obtained from the skull base through the femurs   Intravenous contrast material was not utilized  This examination, like all CT scans performed in the [facility], was performed utilizing techniques to minimize radiation dose exposure, including the use of iterative reconstruction and automated exposure control  Fasting serum glucose: 93 mg/dl FINDINGS: VISUALIZED BRAIN:   No acute abnormalities are seen  HEAD/NECK:   New asymmetric focus of FDG uptake at the level of the right vocal fold, SUV max of 5 0  No obvious findings on limited CT  This may be inflammatory  No FDG avid cervical adenopathy is seen  CT images: Scattered mucosal thickening noted in the paranasal sinuses  CHEST:   Largely resolved nodular foci of FDG uptake adjacent to the mid to distal esophagus  There is a small focus of FDG uptake overlying the distal esophagus rapidly, SUV max of 4 2, previously 3 9  Less conspicuous right hilar focus now SUV max of 3 1, previously 3 9  CT images: Right-sided Mediport line tip terminates in the SVC  Mild to moderate coronary artery calcifications    Mildly aneurysmal ascending thoracic aorta at 4 1 cm  ABDOMEN:   Resolution of the previously seen FDG avid right retrocrural lymph node  New FDG avid portacaval lymph node, SUV max of 9 0  This measures 2 0 x 1 4 cm image 125 series 4  New FDG avid lymph node in the retroperitoneum adjacent to the SMA, SUV max of 18 4  This measures 2 6 x 1 6 cm image 129 series 4  The FDG avid lymph node in the portacaval region, SUV max of 5 7  This measures 1 6 x 1 3 cm image 139 series 4  Otherwise previously seen FDG avid retroperitoneal lymph nodes and lesions near the left renal bed have largely improved or cleared  Patchy FDG uptake in the left para-aortic region remaining, SUV max of 4 1, previously 9 6  There are ill-defined infiltrative changes remaining here on CT  Largely resolved FDG avid left rectus abdominis lesion with patchy FDG uptake remaining, SUV max of 3 7, previously 24 6  CT images: Scattered small hepatic cysts and additional subcentimeter hypodensities, too small to characterize  Colonic diverticulosis  PELVIS: Decreased patchy FDG uptake at the prostate, now SUV max of 3 5, previously 4 6  Prostate gland is mildly enlarged on CT  No FDG avid lymph nodes  CT images: No additional significant findings  OSSEOUS STRUCTURES: No FDG avid lesions are seen  Previously noted mild focus of FDG uptake at the left T2 transverse process is no longer seen  CT images: Multilevel degenerative spurring of the spine  Impression: 1  Mixed findings for response to therapy  2   Significant improvement in previously seen FDG avid lymph nodes in the periesophageal region, retrocrural region, retroperitoneum and near the left renal bed  3  Largely resolved FDG avid left rectus abdominis lesion  4   However, there are new FDG avid lymph nodes in the portacaval region and retroperitoneum suspicious for malignancy  5   New asymmetric focus of FDG uptake at the level of the right vocal fold, SUV max of 5 0    This may be inflammatory but can be reassessed on follow up  The study was marked in EPIC for significant notification  Workstation performed: [id number]     Procedure: Fl Spine And Pain Procedure    Result Date: 1/27/2021  Narrative: LUMBAR Medial Branch Nerve Block Indication: Mechanical low back pain Preoperative diagnosis:  1  Lumbar spondylosis without myelopathy       2  Low back pain Postoperative diagnosis: 1  Lumbar spondylosis without myelopathy        2  Low back pain Procedure: Fluoroscopically-guided Medial Branch Nerve/Dorsal Ramus Blocks of the bilateral L3-4, L4-5, and L5-S1 facet joint(s) using 2% lidocaine After discussing the risks, benefits, and alternatives to the procedure, the patient expressed understanding and wished to proceed  The patient was brought to the fluoroscopy suite and placed in the prone position  Procedural pause conducted to verify:  correct patient identity, procedure to be performed and as applicable, correct side and site, correct patient position, and availability of implants, special equipment and special requirements  Using fluoroscopy, the junction of the transverse process and superior articulating process of the right L3, L4, L5, and sacral ala levels were identified and marked  The skin was sterilely prepped and draped in the usual fashion using Chloraprep skin prep  Skin wheal was made at each site with 1% lidocaine  Using fluoroscopic guidance, a 3 5 inch 25 gauge spinal needle was advanced to each target  After negative aspiration, 0 5cc of 2% lidocaine was injected at each site and the needles were then removed  The procedure was repeated in the exact same way on the opposite side for the same levels  The patient tolerated the procedure well and there were no apparent complications  After appropriate observation, the patient was dismissed from the clinic in good condition under their own power    The patient was instructed to keep a pain diary and report the results at her follow up appointment  EBL:  Minimal Specimen:  None           40 minutes was spent face to face with Sudhakar Castellon and his spouse with greater than 50% of the time spent in counseling or coordination of care including discussions of chart review, symptoms assessment, adjunctive pain regimen adjustment  Additional time was also spent in discussing coronavirus vaccine indications, availability, and logistical challenges  All of the patient's or agent's questions were answered during this discussion

## 2021-02-08 ENCOUNTER — LAB (OUTPATIENT)
Dept: LAB | Facility: HOSPITAL | Age: 71
End: 2021-02-08
Payer: MEDICARE

## 2021-02-08 DIAGNOSIS — D64.9 NORMOCYTIC ANEMIA: ICD-10-CM

## 2021-02-08 DIAGNOSIS — C68.9 UROTHELIAL CANCER (HCC): ICD-10-CM

## 2021-02-08 DIAGNOSIS — C65.2 CANCER OF LEFT RENAL PELVIS (HCC): ICD-10-CM

## 2021-02-08 DIAGNOSIS — R53.83 OTHER FATIGUE: ICD-10-CM

## 2021-02-08 LAB
ALBUMIN SERPL BCP-MCNC: 4 G/DL (ref 3.5–5.7)
ALP SERPL-CCNC: 66 U/L (ref 55–165)
ALT SERPL W P-5'-P-CCNC: 13 U/L (ref 7–52)
ANION GAP SERPL CALCULATED.3IONS-SCNC: 9 MMOL/L (ref 4–13)
AST SERPL W P-5'-P-CCNC: 10 U/L (ref 13–39)
BASOPHILS # BLD AUTO: 0 THOUSANDS/ΜL (ref 0–0.1)
BASOPHILS NFR BLD AUTO: 1 % (ref 0–2)
BILIRUB SERPL-MCNC: 0.6 MG/DL (ref 0.2–1)
BUN SERPL-MCNC: 22 MG/DL (ref 7–25)
CALCIUM SERPL-MCNC: 9.5 MG/DL (ref 8.6–10.5)
CHLORIDE SERPL-SCNC: 102 MMOL/L (ref 98–107)
CO2 SERPL-SCNC: 30 MMOL/L (ref 21–31)
CREAT SERPL-MCNC: 1.25 MG/DL (ref 0.7–1.3)
EOSINOPHIL # BLD AUTO: 0.1 THOUSAND/ΜL (ref 0–0.61)
EOSINOPHIL NFR BLD AUTO: 2 % (ref 0–5)
ERYTHROCYTE [DISTWIDTH] IN BLOOD BY AUTOMATED COUNT: 17 % (ref 11.5–14.5)
FERRITIN SERPL-MCNC: 230 NG/ML (ref 8–388)
GFR SERPL CREATININE-BSD FRML MDRD: 58 ML/MIN/1.73SQ M
GLUCOSE P FAST SERPL-MCNC: 98 MG/DL (ref 65–99)
HCT VFR BLD AUTO: 38.1 % (ref 42–47)
HGB BLD-MCNC: 12.3 G/DL (ref 14–18)
IRON SATN MFR SERPL: 33 %
IRON SERPL-MCNC: 76 UG/DL (ref 65–175)
LYMPHOCYTES # BLD AUTO: 1.5 THOUSANDS/ΜL (ref 0.6–4.47)
LYMPHOCYTES NFR BLD AUTO: 22 % (ref 21–51)
MAGNESIUM SERPL-MCNC: 1.8 MG/DL (ref 1.9–2.7)
MCH RBC QN AUTO: 29.1 PG (ref 26–34)
MCHC RBC AUTO-ENTMCNC: 32.2 G/DL (ref 31–37)
MCV RBC AUTO: 90 FL (ref 81–99)
MONOCYTES # BLD AUTO: 0.7 THOUSAND/ΜL (ref 0.17–1.22)
MONOCYTES NFR BLD AUTO: 10 % (ref 2–12)
NEUTROPHILS # BLD AUTO: 4.6 THOUSANDS/ΜL (ref 1.4–6.5)
NEUTS SEG NFR BLD AUTO: 66 % (ref 42–75)
PLATELET # BLD AUTO: 187 THOUSANDS/UL (ref 149–390)
PMV BLD AUTO: 7.7 FL (ref 8.6–11.7)
POTASSIUM SERPL-SCNC: 3.9 MMOL/L (ref 3.5–5.5)
PROT SERPL-MCNC: 6.6 G/DL (ref 6.4–8.9)
RBC # BLD AUTO: 4.23 MILLION/UL (ref 4.3–5.9)
SODIUM SERPL-SCNC: 141 MMOL/L (ref 134–143)
T3FREE SERPL-MCNC: 2.01 PG/ML (ref 2.3–4.2)
TIBC SERPL-MCNC: 227 UG/DL (ref 250–450)
TSH SERPL DL<=0.05 MIU/L-ACNC: 1.46 UIU/ML (ref 0.45–5.33)
WBC # BLD AUTO: 7 THOUSAND/UL (ref 4.8–10.8)

## 2021-02-08 PROCEDURE — 85025 COMPLETE CBC W/AUTO DIFF WBC: CPT

## 2021-02-08 PROCEDURE — 36415 COLL VENOUS BLD VENIPUNCTURE: CPT

## 2021-02-08 PROCEDURE — 80053 COMPREHEN METABOLIC PANEL: CPT

## 2021-02-08 PROCEDURE — 83550 IRON BINDING TEST: CPT

## 2021-02-08 PROCEDURE — 83540 ASSAY OF IRON: CPT

## 2021-02-08 PROCEDURE — 84481 FREE ASSAY (FT-3): CPT

## 2021-02-08 PROCEDURE — 84443 ASSAY THYROID STIM HORMONE: CPT

## 2021-02-08 PROCEDURE — 83735 ASSAY OF MAGNESIUM: CPT

## 2021-02-08 PROCEDURE — 82728 ASSAY OF FERRITIN: CPT

## 2021-02-09 ENCOUNTER — TELEPHONE (OUTPATIENT)
Dept: HEMATOLOGY ONCOLOGY | Facility: CLINIC | Age: 71
End: 2021-02-09

## 2021-02-09 NOTE — TELEPHONE ENCOUNTER
Patient has 11:40 a m  f/u apt tomorrow Wed 2/10/2021 w/Dr Renuka Badillo at the [facility] office  Called pt and left message to call the office to do covid pre screening questionnaire, [phone]

## 2021-02-10 ENCOUNTER — TELEPHONE (OUTPATIENT)
Dept: PAIN MEDICINE | Facility: CLINIC | Age: 71
End: 2021-02-10

## 2021-02-10 ENCOUNTER — HOSPITAL ENCOUNTER (OUTPATIENT)
Dept: RADIOLOGY | Facility: CLINIC | Age: 71
Discharge: HOME | End: 2021-02-10
Attending: ANESTHESIOLOGY
Payer: MEDICARE

## 2021-02-10 ENCOUNTER — OFFICE VISIT (OUTPATIENT)
Dept: HEMATOLOGY ONCOLOGY | Facility: CLINIC | Age: 71
End: 2021-02-10
Payer: MEDICARE

## 2021-02-10 VITALS
RESPIRATION RATE: 18 BRPM | DIASTOLIC BLOOD PRESSURE: 76 MMHG | SYSTOLIC BLOOD PRESSURE: 140 MMHG | HEART RATE: 60 BPM | OXYGEN SATURATION: 95 % | TEMPERATURE: 97.1 F

## 2021-02-10 VITALS
DIASTOLIC BLOOD PRESSURE: 88 MMHG | WEIGHT: 193.9 LBS | HEART RATE: 61 BPM | SYSTOLIC BLOOD PRESSURE: 132 MMHG | RESPIRATION RATE: 18 BRPM | BODY MASS INDEX: 28.72 KG/M2 | TEMPERATURE: 98.6 F | OXYGEN SATURATION: 97 % | HEIGHT: 69 IN

## 2021-02-10 DIAGNOSIS — M47.816 LUMBAR SPONDYLOSIS: ICD-10-CM

## 2021-02-10 DIAGNOSIS — C65.2 CANCER OF LEFT RENAL PELVIS (HCC): Primary | ICD-10-CM

## 2021-02-10 DIAGNOSIS — C68.9 UROTHELIAL CANCER (HCC): ICD-10-CM

## 2021-02-10 PROCEDURE — 64635 DESTROY LUMB/SAC FACET JNT: CPT | Performed by: ANESTHESIOLOGY

## 2021-02-10 PROCEDURE — 64636 DESTROY L/S FACET JNT ADDL: CPT | Performed by: ANESTHESIOLOGY

## 2021-02-10 PROCEDURE — 99214 OFFICE O/P EST MOD 30 MIN: CPT | Performed by: INTERNAL MEDICINE

## 2021-02-10 RX ORDER — LIDOCAINE HYDROCHLORIDE 10 MG/ML
10 INJECTION, SOLUTION EPIDURAL; INFILTRATION; INTRACAUDAL; PERINEURAL ONCE
Status: COMPLETED | OUTPATIENT
Start: 2021-02-10 | End: 2021-02-10

## 2021-02-10 RX ORDER — BUPIVACAINE HYDROCHLORIDE 5 MG/ML
30 INJECTION, SOLUTION EPIDURAL; INTRACAUDAL ONCE
Status: COMPLETED | OUTPATIENT
Start: 2021-02-10 | End: 2021-02-10

## 2021-02-10 RX ORDER — SODIUM CHLORIDE 9 MG/ML
20 INJECTION, SOLUTION INTRAVENOUS ONCE
Status: CANCELLED | OUTPATIENT
Start: 2021-03-05

## 2021-02-10 RX ADMIN — LIDOCAINE HYDROCHLORIDE 8 ML: 10 INJECTION, SOLUTION EPIDURAL; INFILTRATION; INTRACAUDAL; PERINEURAL at 14:45

## 2021-02-10 RX ADMIN — BUPIVACAINE HYDROCHLORIDE 4 ML: 5 INJECTION, SOLUTION EPIDURAL; INTRACAUDAL at 14:50

## 2021-02-10 RX ADMIN — LIDOCAINE HYDROCHLORIDE 4 ML: 20 INJECTION, SOLUTION EPIDURAL; INFILTRATION; INTRACAUDAL; PERINEURAL at 14:50

## 2021-02-10 NOTE — H&P
History of Present Illness: The patient is a 69 y o  male who presents with complaints of  Low back pain      Patient Active Problem List   Diagnosis    Lumbar radiculopathy    Lumbar disc herniation    Lumbar spondylosis    [facility]ro[facility]ial cancer ([facility] [address] [facility]    Essential hypertension    Sinus bradycardia    Hyperlipidemia    Cancer of left renal pelvis (HCC)    Drug-induced neutropenia (HCC)    Chronic pain disorder    Spinal stenosis of lumbar region    Encounter for central line care    Hyperuricemia    Encounter for long-term opiate analgesic use    Uncomplicated opioid dependence ([facility] [address] )    Palliative care patient    Decreased appetite    Cancer related pain    Therapeutic opioid induced constipation    Medical marijuana use    Normocytic anemia    Secondary malignant neoplasm of muscle of abdomen ([facility] [address] )       Past Medical History:   Diagnosis Date    Arthritis     Bladder cancer     Cancer ([facility] [address] )     skin melanoma;basal cell    Chronic pain disorder     from arthritis    Colon polyp     Does use hearing aid     bilat    Dry eyes, bilateral     GERD (gastroesophageal reflux disease)     History of kidney stones 10/2019    History of partial knee replacement     bilat    History of vertigo     Hyperlipidemia     Hypertension     Kidney lesion     Lumbar disc disorder     compression of vertebrae L4-5-6    Muscle weakness     left hip area    Renal mass     left    Right ankle injury 03/05/2020    missed step of ladder     Right ankle pain     Shortness of breath     with activity    Tinnitus     Urothelial cancer     left    Wears glasses        Past Surgical History:   Procedure Laterality Date    COLONOSCOPY      CYSTOSCOPY Left 4/1/2020    Procedure: CYSTOSCOPY; URETERAL CATHETER PLACEMENT;  Surgeon: Nancy Rahman MD;  Location[facility];  Service: Urology    CYSTOSCOPY  05/11/2020    FL CYSTOGRAM  4/13/2020    FL RETROGRADE PYELOGRAM  1/17/2020    HERNIA REPAIR umbilical with mesh    INGUINAL HERNIA REPAIR Right     with mesh    IR PORT PLACEMENT  4/30/2020    JOINT REPLACEMENT Bilateral     partials knee    LUMBAR EPIDURAL INJECTION      RI CYSTOURETHROSCOPY,URETER CATHETER Bilateral 1/17/2020    Procedure: CYSTOSCOPY; RIGHT RETROGRADE PYELOGRAM WITH RIGHT URETERAL CYTOLOGY SAMPLING; LEFT URETEROSCOPY WITH RENAL PELVIS BIOPSY AND LEFT STENT PLACEMENT;  Surgeon: Thelma Braga MD;  [facility];  Service: Urology    RI NEPHRECTOMY, W/PART   URETECTOMY Left 4/1/2020    Procedure: ROBOTIC LAPAROSCOPIC NEPHRO-URETERECTOMY;  Surgeon: Thelma Braga MD;  Location[facility];  Service: Urology    SKIN CANCER EXCISION      surface melanoma    TONSILLECTOMY      WISDOM TOOTH EXTRACTION           Current Outpatient Medications:     acetaminophen (TYLENOL) 325 mg tablet, Take 2 tablets (650 mg total) by mouth every 6 (six) hours as needed for mild pain or fever (Patient taking differently: Take 650 mg by mouth every 6 (six) hours as needed for mild pain or fever ), Disp: , Rfl: 0    allopurinol (ZYLOPRIM) 100 mg tablet, Take 1 tablet (100 mg total) by mouth daily, Disp: 30 tablet, Rfl: 3    amLODIPine (NORVASC) 5 mg tablet, Take 5 mg by mouth daily  , Disp: , Rfl:     atorvastatin (LIPITOR) 80 mg tablet, Take 80 mg by mouth every other day evening, Disp: , Rfl:     calcium citrate-vitamin D (CITRACAL+D) 315-200 MG-UNIT per tablet, Take 1 tablet by mouth daily, Disp: , Rfl:     colchicine (COLCRYS) 0 6 mg tablet, Take 0 6 mg by mouth 2 (two) times a day, Disp: , Rfl:     folic acid (FOLVITE) 1 mg tablet, Take 1 tablet (1 mg total) by mouth daily, Disp: 90 tablet, Rfl: 4    HYDROcodone-Acetaminophen 2 5-325 MG TABS, Take by mouth every 4 (four) hours, Disp: , Rfl:     HYDROmorphone (DILAUDID) 2 mg tablet, Take 1 tablet (2 mg total) by mouth every 4 (four) hours as needed for moderate painMax Daily Amount: 12 mg, Disp: 90 tablet, Rfl: 0   lidocaine (LMX) 4 % cream, Apply topically as needed for mild pain To abdomen and to L ankle as needed  , Disp: 30 g, Rfl: 0    LORazepam (ATIVAN) 0 5 mg tablet, Take 1 tablet (0 5 mg total) by mouth every 6 (six) hours as needed for anxiety (or restless legs), Disp: 15 tablet, Rfl: 0    metoprolol tartrate (LOPRESSOR) 100 mg tablet, Take 100 mg by mouth daily  , Disp: , Rfl:     naloxone (NARCAN) 4 mg/0 1 mL nasal spray, Administer 1 spray into a nostril  If breathing does not return to normal or if breathing difficulty resumes after 2-3 minutes, give another dose in the other nostril using a new spray , Disp: 1 each, Rfl: 1    omeprazole (PriLOSEC) 20 mg delayed release capsule, Take 20 mg by mouth daily  , Disp: , Rfl:     ondansetron (ZOFRAN) 8 mg tablet, Take 1 tablet (8 mg total) by mouth every 8 (eight) hours as needed for nausea or vomiting, Disp: 20 tablet, Rfl: 3    predniSONE 10 mg tablet, Take 1 tablet (10 mg total) by mouth daily for 7 days, Disp: 7 tablet, Rfl: 0    senna (SENOKOT) 8 6 mg, Take 1 tablet (8 6 mg total) by mouth daily at bedtime, Disp: 30 each, Rfl: 0    sildenafil (VIAGRA) 100 mg tablet, TAKE ONE TABLET BY MOUTH  AS NEEDED PRIOR TO SEXUAL ACTIVITY FOR ERECTILE DYSFUNCTION, Disp: , Rfl:     terazosin (HYTRIN) 5 mg capsule, Take 2 capsules (10 mg total) by mouth daily at bedtime, Disp: 30 capsule, Rfl: 6    VITAMIN D PO, Take 1,000 Units by mouth daily , Disp: , Rfl:     No Known Allergies    Physical Exam:   Vitals:    02/10/21 1420   BP: 136/86   Pulse: 63   Resp: 20   Temp: (!) 97 1 °F (36 2 °C)   SpO2: 97%     General: Awake, Alert, Oriented x 3, Mood and affect appropriate  Respiratory: Respirations even and unlabored  Cardiovascular: Peripheral pulses intact; no edema  Musculoskeletal Exam:   Right lumbar paraspinals tender to palpation      ASA Score: 3    Patient/Chart Verification  Patient ID Verified: Verbal  ID Band Applied: No  Consents Confirmed: Procedural  H&P( within 30 days) Verified: To be obtained in the Pre-Procedure area  Interval H&P(within 24 hr) Complete (required for Outpatients and Surgery Admit only): To be obtained in the Pre-Procedure area  Allergies Reviewed: Yes  Anticoag/NSAID held?: No  Currently on antibiotics?: No  Pre-op Lab/Test Results Available: N/A    Assessment:   1   Lumbar spondylosis        Plan: RIGHT L2-5 RFA

## 2021-02-10 NOTE — TELEPHONE ENCOUNTER
Patient is S/P a Right L2-L5 RFA c/ JW on 2/10/21  Next Amalia Joseph is scheduled for 2/24/21  Please call on 2/11/21 post RFA   Thanks

## 2021-02-10 NOTE — PROGRESS NOTES
Hematology/Oncology Outpatient Follow-up  Kylie Morales 69 y o  male 1950 [id number]    Date:  2/10/2021        Assessment and Plan:  1  Cancer of left renal pelvis [facility])    I discussed with the patient the PET-CT scan result which was done in the middle of January while he is on the current immunotherapy with Keytruda on every 3 week basis  The patient was told that the PET scan showed mixed results and would like to continue with the immunotherapy without any changes on every 3 week basis since he is tolerating the treatment relatively well  we will aim to get another PET-CT scan somewhere around the end of April protocol surveillance  Patient had 2 or 3 sessions of iron IV which improved his hemoglobin level  His hemoglobin level seems to be stable at this point  We did discuss pursuing  The COVID-19 vaccination once the vaccine becomes available for him  - CBC and differential; Future  - Comprehensive metabolic panel; Future  - TSH, 3rd generation with Free T4 reflex; Future  - T3, free; Future  - CBC and differential; Future  - Comprehensive metabolic panel; Future            HPI:   the patient came in today for a follow-up visit accompanied by his wife  He continues to be on the [facility] which he is tolerating very well  His most recent blood work on 02/08/2021 showed normal TSH of 1 46  His CMP was entirely normal   CBC showed hemoglobin of 12 3 with normal white cells and platelets  Oncology History Overview Note   Patient is a 69 y o male with stage IV metastatic urothelial carcinoma of the left renal pelvis  He completed palliative radiation to the left abdomen 12/3/20     12/4/20 Dr Mian Lew- F/u in 6 months for possible cystoscopy at that time   He reports he is no longer taking the Proscar and feels though he is emptying his bladder well     12/29/20 Violeta Haywood NP- patient will be continued on his current palliative treatment with single agent immunotherapy Keytruda on a every 3 week basis which he is tolerating well  F/u in 3 weeks with labs  We will pursue PET prior to his next follow-up in 3 weeks after 5 treatment to rule out progressive disease since he is complaining about more persistent pain especially in the right abdomen/flank area  1/13/21 PET scan- 1  Mixed findings for response to therapy  2   Significant improvement in previously seen FDG avid lymph nodes in the periesophageal region, retrocrural region, retroperitoneum and near the left renal bed  3  Largely resolved FDG avid left rectus abdominis lesion  4   However, there are new FDG avid lymph nodes in the portacaval region and retroperitoneum suspicious for malignancy  5   New asymmetric focus of FDG uptake at the level of the right vocal fold, SUV max of 5 0  This may be inflammatory but can be reassessed on follow up  1/19/21 Vivek Soares NP  1/22/20 infusion  2/5/21 palliative care  6/4/21 Dr Galindo An     Urothelial cancer [facility])   1/3/2020 Biopsy    Final Diagnosis    A  Urine, Clean Catch, Thin Prep:  High grade urothelial carcinoma (HGUC) - see comment  1/3/2020 Initial Diagnosis    Urothelial cancer ([facility] [address] )  T3 N0 (stage IIIA)     1/17/2020 Biopsy    Final Diagnosis    A  Ureter, Right, left renal pelvis biopsy  -Fragments of high grade urothelial carcinoma   -No evidence of lamina propria invasion   -Detrusor muscle/ muscularis propria is not present for evaluation  B  Urinary Bladder, bladder biopsy:  -Fragments of low grade papillary lesion  See note  -No evidence of invasion seen  -Unremarkable fragment of detrusor muscle seen  4/1/2020 Surgery    left robotic assisted laparoscopic nephroureterectomy with bladder cuff excision     Final Diagnosis    A  Left kidney, ureter, and bladder cuff, nephroureterectomy:  - Invasive high grade urothelial carcinoma arising in renal pelvis  - Bladder cuff margin is negative for carcinoma and no evidence of high grade dysplasia    - Ureters with no significant pathologic abnormality  - Two benign simple cysts  - Adrenal gland is negative for malignancy  - One lymph node, negative for malignancy (0/1)  5/14/2020 - 6/3/2020 Chemotherapy    CISplatin (PLATINOL) split dose 35 mg/m2 = 75 3 mg (50 % of original dose 70 mg/m2), Intravenous,   Administration: 75 3 mg (5/14/2020), 75 3 mg (5/21/2020)  gemcitabine (GEMZAR) 2,200 mg in sodium chloride 0 9 % 250 mL infusion, 2,150 2 mg (80 % of original dose 1,250 mg/m2), Intravenous,   Administration: 2,200 mg (5/14/2020), 2,200 mg (5/21/2020)    (only completed 1 cycle- d/c d/t adverse effects and worsening renal dysfunction)     10/9/2020 -  Chemotherapy    pembrolizumab (KEYTRUDA) 200 mg in sodium chloride 0 9 % 50 mL IVPB, 200 mg, Intravenous, Once, 6 of 14 cycles  Administration: 200 mg (10/9/2020), 200 mg (10/30/2020), 200 mg (11/20/2020), 200 mg (12/11/2020), 200 mg (12/31/2020), 200 mg (1/22/2021)     11/19/2020 - 12/3/2020 Radiation    Treatment:  Course: C1    Plan ID Energy Fractions Dose per Fraction (cGy) Dose Correction (cGy) Total Dose Delivered (cGy) Elapsed Days   L Abdomen 6X 10 / 10 300 0 3,000 14        Cancer of left renal pelvis ([address] )   4/23/2020 Initial Diagnosis    Cancer of left renal pelvis (HCC)     10/9/2020 -  Chemotherapy    pembrolizumab (KEYTRUDA) 200 mg in sodium chloride 0 9 % 50 mL IVPB, 200 mg, Intravenous, Once, 6 of 14 cycles  Administration: 200 mg (10/9/2020), 200 mg (10/30/2020), 200 mg (11/20/2020), 200 mg (12/11/2020), 200 mg (12/31/2020), 200 mg (1/22/2021)         Interval history:    ROS: Review of Systems   Constitutional: Positive for fatigue  Negative for chills and fever  HENT: Negative for ear pain and sore throat  Eyes: Negative for pain and visual disturbance  Respiratory: Positive for cough  Negative for shortness of breath  Cardiovascular: Negative for chest pain and palpitations  Gastrointestinal: Positive for constipation   Negative for abdominal pain and vomiting  Genitourinary: Negative for dysuria and hematuria  Musculoskeletal: Negative for arthralgias and back pain  Skin: Negative for color change and rash  Neurological: Positive for numbness  Negative for seizures and syncope  All other systems reviewed and are negative  Past Medical History:   Diagnosis Date    Arthritis     Bladder cancer     Cancer ([address] )     skin melanoma;basal cell    Chronic pain disorder     from arthritis    Colon polyp     Does use hearing aid     bilat    Dry eyes, bilateral     GERD (gastroesophageal reflux disease)     History of kidney stones 10/2019    History of partial knee replacement     bilat    History of vertigo     Hyperlipidemia     Hypertension     Kidney lesion     Lumbar disc disorder     compression of vertebrae L4-5-6    Muscle weakness     left hip area    Renal mass     left    Right ankle injury 03/05/2020    missed step of ladder     Right ankle pain     Shortness of breath     with activity    Tinnitus     Urothelial cancer     left    Wears glasses        Past Surgical History:   Procedure Laterality Date    COLONOSCOPY      CYSTOSCOPY Left 4/1/2020    Procedure: CYSTOSCOPY; URETERAL CATHETER PLACEMENT;  Surgeon: Keven Walker MD;  [facility];  Service: Urology    CYSTOSCOPY  05/11/2020    FL CYSTOGRAM  4/13/2020    FL RETROGRADE PYELOGRAM  1/17/2020    HERNIA REPAIR      umbilical with mesh    INGUINAL HERNIA REPAIR Right     with mesh    IR PORT PLACEMENT  4/30/2020    JOINT REPLACEMENT Bilateral     partials knee    LUMBAR EPIDURAL INJECTION      [place] CYSTOURETHROSCOPY,URETER CATHETER Bilateral 1/17/2020    Procedure: CYSTOSCOPY; RIGHT RETROGRADE PYELOGRAM WITH RIGHT URETERAL CYTOLOGY SAMPLING; LEFT URETEROSCOPY WITH RENAL PELVIS BIOPSY AND LEFT STENT PLACEMENT;  Surgeon: Keven Walker MD;  [facility];  Service: Urology    [place] NEPHRECTOMY, W/PART  URETECTOMY Left 2020    Procedure: ROBOTIC LAPAROSCOPIC NEPHRO-URETERECTOMY;  Surgeon: Sindy Ribera MD;  [facility];  Service: Urology    SKIN CANCER EXCISION      surface melanoma    TONSILLECTOMY      WISDOM TOOTH EXTRACTION         Social History     Socioeconomic History    Marital status: /Civil Union     Spouse name: None    Number of children: None    Years of education: None    Highest education level: None   Occupational History    None   Social Needs    Financial resource strain: None    Food insecurity     Worry: None     Inability: None    Transportation needs     Medical: None     Non-medical: None   Tobacco Use    Smoking status: Former Smoker     Quit date:      Years since quittin 1    Smokeless tobacco: Never Used   Substance and Sexual Activity    Alcohol use: Not Currently     Alcohol/week: 4 0 standard drinks     Types: 4 Cans of beer per week     Frequency: 4 or more times a week     Drinks per session: 3 or 4     Binge frequency: Daily or almost daily     Comment: daily/socially    Drug use: Never    Sexual activity: Not Currently   Lifestyle    Physical activity     Days per week: None     Minutes per session: None    Stress: None   Relationships    Social connections     Talks on phone: None     Gets together: None     Attends Voodoo service: None     Active member of club or organization: None     Attends meetings of clubs or organizations: None     Relationship status: None    Intimate partner violence     Fear of current or ex partner: None     Emotionally abused: None     Physically abused: None     Forced sexual activity: None   Other Topics Concern    None   Social History Narrative    Daily caffeine use - 2 cups coffee        Family History   Problem Relation Age of Onset    Liver cancer Father        No Known Allergies      Current Outpatient Medications:     acetaminophen (TYLENOL) 325 mg tablet, Take 2 tablets (650 mg total) by mouth every 6 (six) hours as needed for mild pain or fever (Patient taking differently: Take 650 mg by mouth every 6 (six) hours as needed for mild pain or fever ), Disp: , Rfl: 0    allopurinol (ZYLOPRIM) 100 mg tablet, Take 1 tablet (100 mg total) by mouth daily, Disp: 30 tablet, Rfl: 3    amLODIPine (NORVASC) 5 mg tablet, Take 5 mg by mouth daily  , Disp: , Rfl:     atorvastatin (LIPITOR) 80 mg tablet, Take 80 mg by mouth every other day evening, Disp: , Rfl:     calcium citrate-vitamin D (CITRACAL+D) 315-200 MG-UNIT per tablet, Take 1 tablet by mouth daily, Disp: , Rfl:     colchicine (COLCRYS) 0 6 mg tablet, Take 0 6 mg by mouth 2 (two) times a day, Disp: , Rfl:     folic acid (FOLVITE) 1 mg tablet, Take 1 tablet (1 mg total) by mouth daily, Disp: 90 tablet, Rfl: 4    HYDROcodone-Acetaminophen 2 5-325 MG TABS, Take by mouth every 4 (four) hours, Disp: , Rfl:     HYDROmorphone (DILAUDID) 2 mg tablet, Take 1 tablet (2 mg total) by mouth every 4 (four) hours as needed for moderate painMax Daily Amount: 12 mg, Disp: 90 tablet, Rfl: 0    lidocaine (LMX) 4 % cream, Apply topically as needed for mild pain To abdomen and to L ankle as needed  , Disp: 30 g, Rfl: 0    LORazepam (ATIVAN) 0 5 mg tablet, Take 1 tablet (0 5 mg total) by mouth every 6 (six) hours as needed for anxiety (or restless legs), Disp: 15 tablet, Rfl: 0    metoprolol tartrate (LOPRESSOR) 100 mg tablet, Take 100 mg by mouth daily  , Disp: , Rfl:     naloxone (NARCAN) 4 mg/0 1 mL nasal spray, Administer 1 spray into a nostril   If breathing does not return to normal or if breathing difficulty resumes after 2-3 minutes, give another dose in the other nostril using a new spray , Disp: 1 each, Rfl: 1    omeprazole (PriLOSEC) 20 mg delayed release capsule, Take 20 mg by mouth daily  , Disp: , Rfl:     ondansetron (ZOFRAN) 8 mg tablet, Take 1 tablet (8 mg total) by mouth every 8 (eight) hours as needed for nausea or vomiting, Disp: 20 tablet, Rfl: 3    predniSONE 10 mg tablet, Take 1 tablet (10 mg total) by mouth daily for 7 days, Disp: 7 tablet, Rfl: 0    senna (SENOKOT) 8 6 mg, Take 1 tablet (8 6 mg total) by mouth daily at bedtime, Disp: 30 each, Rfl: 0    sildenafil (VIAGRA) 100 mg tablet, TAKE ONE TABLET BY MOUTH  AS NEEDED PRIOR TO SEXUAL ACTIVITY FOR ERECTILE DYSFUNCTION, Disp: , Rfl:     terazosin (HYTRIN) 5 mg capsule, Take 2 capsules (10 mg total) by mouth daily at bedtime, Disp: 30 capsule, Rfl: 6    VITAMIN D PO, Take 1,000 Units by mouth daily , Disp: , Rfl:       Physical Exam:  /88 (BP Location: Left arm, Patient Position: Sitting, Cuff Size: Adult)   Pulse 61   Temp 98 6 °F (37 °C) (Oral)   Resp 18   Ht 5' 8 5" (1 74 m)   Wt 88 kg (193 lb 14 4 oz)   SpO2 97%   BMI 29 05 kg/m²     Physical Exam  Constitutional:       Appearance: He is well-developed  HENT:      Head: Normocephalic and atraumatic  Eyes:      General: No scleral icterus  Right eye: No discharge  Left eye: No discharge  Conjunctiva/sclera: Conjunctivae normal       Pupils: Pupils are equal, round, and reactive to light  Neck:      Musculoskeletal: Normal range of motion and neck supple  Thyroid: No thyromegaly  Trachea: No tracheal deviation  Cardiovascular:      Rate and Rhythm: Normal rate and regular rhythm  Heart sounds: Normal heart sounds  No murmur  No friction rub  Pulmonary:      Effort: Pulmonary effort is normal  No respiratory distress  Breath sounds: Normal breath sounds  No wheezing or rales  Chest:      Chest wall: No tenderness  Abdominal:      General: There is no distension  Palpations: Abdomen is soft  There is no hepatomegaly or splenomegaly  Tenderness: There is abdominal tenderness (  On deep palpation in the lower quadrant area)  There is no guarding or rebound  Musculoskeletal: Normal range of motion  General: No tenderness or deformity  Lymphadenopathy:      Cervical: No cervical adenopathy  Skin:     General: Skin is warm and dry  Coloration: Skin is not pale  Findings: No erythema or rash  Neurological:      Mental Status: He is alert and oriented to person, place, and time  Cranial Nerves: No cranial nerve deficit  Coordination: Coordination normal       Deep Tendon Reflexes: Reflexes are normal and symmetric  Reflexes normal    Psychiatric:         Behavior: Behavior normal          Thought Content: Thought content normal          Judgment: Judgment normal            Labs:  Lab Results   Component Value Date    WBC 7 00 02/08/2021    HGB 12 3 (L) 02/08/2021    HCT 38 1 (L) 02/08/2021    MCV 90 02/08/2021     02/08/2021     Lab Results   Component Value Date    K 3 9 02/08/2021     02/08/2021    CO2 30 02/08/2021    BUN 22 02/08/2021    CREATININE 1 25 02/08/2021    GLUF 98 02/08/2021    CALCIUM 9 5 02/08/2021    CORRECTEDCA 9 8 10/28/2020    AST 10 (L) 02/08/2021    ALT 13 02/08/2021    ALKPHOS 66 02/08/2021    EGFR 58 02/08/2021     No results found for: TSH    Patient voiced understanding and agreement in the above discussion  Aware to contact our office with questions/symptoms in the interim

## 2021-02-10 NOTE — DISCHARGE INSTRUCTIONS

## 2021-02-11 NOTE — TELEPHONE ENCOUNTER
S/w the patient and he stated everything is fine  The band aids are off and the area seems ok  Reviewed the postop instructions and he appreciated the call

## 2021-02-12 ENCOUNTER — HOSPITAL ENCOUNTER (OUTPATIENT)
Dept: INFUSION CENTER | Facility: HOSPITAL | Age: 71
Discharge: HOME | End: 2021-02-12
Payer: MEDICARE

## 2021-02-12 ENCOUNTER — IMMUNIZATIONS (OUTPATIENT)
Dept: FAMILY MEDICINE CLINIC | Facility: HOSPITAL | Age: 71
End: 2021-02-12
Payer: MEDICARE

## 2021-02-12 ENCOUNTER — TRANSCRIBE ORDERS (OUTPATIENT)
Dept: ADMINISTRATIVE | Facility: HOSPITAL | Age: 71
End: 2021-02-12

## 2021-02-12 VITALS
SYSTOLIC BLOOD PRESSURE: 116 MMHG | BODY MASS INDEX: 29.03 KG/M2 | HEIGHT: 69 IN | TEMPERATURE: 96.3 F | HEART RATE: 56 BPM | RESPIRATION RATE: 18 BRPM | DIASTOLIC BLOOD PRESSURE: 76 MMHG | WEIGHT: 195.99 LBS

## 2021-02-12 DIAGNOSIS — Z51.5 PALLIATIVE CARE PATIENT: ICD-10-CM

## 2021-02-12 DIAGNOSIS — R19.5 ABNORMAL FECES: Primary | ICD-10-CM

## 2021-02-12 DIAGNOSIS — C68.9 UROTHELIAL CANCER (HCC): ICD-10-CM

## 2021-02-12 DIAGNOSIS — R14.0 ABDOMINAL DISTENTION: ICD-10-CM

## 2021-02-12 DIAGNOSIS — Z23 ENCOUNTER FOR IMMUNIZATION: Primary | ICD-10-CM

## 2021-02-12 DIAGNOSIS — C68.9 UROTHELIAL CANCER (HCC): Primary | ICD-10-CM

## 2021-02-12 DIAGNOSIS — R63.0 DECREASED APPETITE: ICD-10-CM

## 2021-02-12 DIAGNOSIS — C65.2 CANCER OF LEFT RENAL PELVIS (HCC): ICD-10-CM

## 2021-02-12 PROCEDURE — 0011A SARS-COV-2 / COVID-19 MRNA VACCINE (MODERNA) 100 MCG: CPT

## 2021-02-12 PROCEDURE — 91301 SARS-COV-2 / COVID-19 MRNA VACCINE (MODERNA) 100 MCG: CPT

## 2021-02-12 PROCEDURE — 96413 CHEMO IV INFUSION 1 HR: CPT

## 2021-02-12 RX ORDER — SODIUM CHLORIDE 9 MG/ML
20 INJECTION, SOLUTION INTRAVENOUS ONCE
Status: COMPLETED | OUTPATIENT
Start: 2021-02-12 | End: 2021-02-12

## 2021-02-12 RX ORDER — PREDNISONE 10 MG/1
10 TABLET ORAL DAILY
Qty: 30 TABLET | Refills: 0 | Status: SHIPPED | OUTPATIENT
Start: 2021-02-12 | End: 2021-03-14

## 2021-02-12 RX ADMIN — SODIUM CHLORIDE 20 ML/HR: 0.9 INJECTION, SOLUTION INTRAVENOUS at 10:37

## 2021-02-12 RX ADMIN — SODIUM CHLORIDE 200 MG: 9 INJECTION, SOLUTION INTRAVENOUS at 10:38

## 2021-02-12 NOTE — PROGRESS NOTES
Pt toelrated todays [facility] ([facility]) well  No adverse reactions noted  Port flushed and deaccessed per routine   Discharged ambulatory with avs

## 2021-02-12 NOTE — TELEPHONE ENCOUNTER
Patient called office asking for refill to his prednisone  He is hoping for 1 month refill  He states it is working very well

## 2021-02-22 ENCOUNTER — HOSPITAL ENCOUNTER (OUTPATIENT)
Dept: RADIOLOGY | Facility: HOSPITAL | Age: 71
Discharge: HOME | End: 2021-02-22
Attending: PHYSICIAN ASSISTANT
Payer: MEDICARE

## 2021-02-22 DIAGNOSIS — R19.5 ABNORMAL FECES: ICD-10-CM

## 2021-02-22 DIAGNOSIS — R14.0 ABDOMINAL DISTENTION: ICD-10-CM

## 2021-02-22 PROCEDURE — 74250 X-RAY XM SM INT 1CNTRST STD: CPT

## 2021-02-24 ENCOUNTER — TELEPHONE (OUTPATIENT)
Dept: PAIN MEDICINE | Facility: CLINIC | Age: 71
End: 2021-02-24

## 2021-02-24 ENCOUNTER — HOSPITAL ENCOUNTER (OUTPATIENT)
Dept: RADIOLOGY | Facility: CLINIC | Age: 71
Discharge: HOME | End: 2021-02-24
Attending: ANESTHESIOLOGY | Admitting: ANESTHESIOLOGY
Payer: MEDICARE

## 2021-02-24 VITALS
DIASTOLIC BLOOD PRESSURE: 82 MMHG | SYSTOLIC BLOOD PRESSURE: 139 MMHG | RESPIRATION RATE: 18 BRPM | HEART RATE: 60 BPM | TEMPERATURE: 98.8 F | OXYGEN SATURATION: 97 %

## 2021-02-24 DIAGNOSIS — M47.816 LUMBAR SPONDYLOSIS: ICD-10-CM

## 2021-02-24 PROCEDURE — 64635 DESTROY LUMB/SAC FACET JNT: CPT | Performed by: ANESTHESIOLOGY

## 2021-02-24 PROCEDURE — 64636 DESTROY L/S FACET JNT ADDL: CPT | Performed by: ANESTHESIOLOGY

## 2021-02-24 RX ORDER — BUPIVACAINE HYDROCHLORIDE 5 MG/ML
30 INJECTION, SOLUTION EPIDURAL; INTRACAUDAL ONCE
Status: COMPLETED | OUTPATIENT
Start: 2021-02-24 | End: 2021-02-24

## 2021-02-24 RX ORDER — LIDOCAINE HYDROCHLORIDE 10 MG/ML
10 INJECTION, SOLUTION EPIDURAL; INFILTRATION; INTRACAUDAL; PERINEURAL ONCE
Status: COMPLETED | OUTPATIENT
Start: 2021-02-24 | End: 2021-02-24

## 2021-02-24 RX ADMIN — BUPIVACAINE HYDROCHLORIDE 4 ML: 5 INJECTION, SOLUTION EPIDURAL; INTRACAUDAL at 11:32

## 2021-02-24 RX ADMIN — LIDOCAINE HYDROCHLORIDE 4 ML: 20 INJECTION, SOLUTION EPIDURAL; INFILTRATION; INTRACAUDAL; PERINEURAL at 11:32

## 2021-02-24 RX ADMIN — LIDOCAINE HYDROCHLORIDE 10 ML: 10 INJECTION, SOLUTION EPIDURAL; INFILTRATION; INTRACAUDAL; PERINEURAL at 11:21

## 2021-02-24 NOTE — H&P
History of Present Illness: The patient is a 69 y o  male who presents with complaints of  Low back pain      Patient Active Problem List   Diagnosis    Lumbar radiculopathy    Lumbar disc herniation    Lumbar spondylosis    Urothelial cancer ([facility] [address] )    Essential hypertension    Sinus bradycardia    Hyperlipidemia    Cancer of left renal pelvis (HCC)    Drug-induced neutropenia (HCC)    Chronic pain disorder    Spinal stenosis of lumbar region    Encounter for central line care    Hyperuricemia    Encounter for long-term opiate analgesic use    Uncomplicated opioid dependence ([facility] [address] )    Palliative care patient    Decreased appetite    Cancer related pain    Therapeutic opioid induced constipation    Medical marijuana use    Normocytic anemia    Secondary malignant neoplasm of muscle of abdomen ([facility] [address] )       Past Medical History:   Diagnosis Date    Arthritis     Bladder cancer     Cancer ([facility] [address] )     skin melanoma;basal cell    Chronic pain disorder     from arthritis    Colon polyp     Does use hearing aid     bilat    Dry eyes, bilateral     GERD (gastroesophageal reflux disease)     History of kidney stones 10/2019    History of partial knee replacement     bilat    History of vertigo     Hyperlipidemia     Hypertension     Kidney lesion     Lumbar disc disorder     compression of vertebrae L4-5-6    Muscle weakness     left hip area    Renal mass     left    Right ankle injury 03/05/2020    missed step of ladder     Right ankle pain     Shortness of breath     with activity    Tinnitus     Urothelial cancer     left    Wears glasses        Past Surgical History:   Procedure Laterality Date    COLONOSCOPY      CYSTOSCOPY Left 4/1/2020    Procedure: CYSTOSCOPY; URETERAL CATHETER PLACEMENT;  Surgeon: Nubia Troncoso MD;  [facility];  Service: Urology    CYSTOSCOPY  05/11/2020    FL CYSTOGRAM  4/13/2020    FL RETROGRADE PYELOGRAM  1/17/2020    HERNIA REPAIR umbilical with mesh    INGUINAL HERNIA REPAIR Right     with mesh    IR PORT PLACEMENT  4/30/2020    JOINT REPLACEMENT Bilateral     partials knee    LUMBAR EPIDURAL INJECTION      [place] CYSTOURETHROSCOPY,URETER CATHETER Bilateral 1/17/2020    Procedure: CYSTOSCOPY; RIGHT RETROGRADE PYELOGRAM WITH RIGHT URETERAL CYTOLOGY SAMPLING; LEFT URETEROSCOPY WITH RENAL PELVIS BIOPSY AND LEFT STENT PLACEMENT;  Surgeon: Mendoza Rogers MD;  [facility];  Service: Urology    IL NEPHRECTOMY, W/PART   URETECTOMY Left 4/1/2020    Procedure: ROBOTIC LAPAROSCOPIC NEPHRO-URETERECTOMY;  Surgeon: Mendoza Rogers MD;  [facility];  Service: Urology    SKIN CANCER EXCISION      surface melanoma    TONSILLECTOMY      WISDOM TOOTH EXTRACTION           Current Outpatient Medications:     acetaminophen (TYLENOL) 325 mg tablet, Take 2 tablets (650 mg total) by mouth every 6 (six) hours as needed for mild pain or fever (Patient taking differently: Take 650 mg by mouth every 6 (six) hours as needed for mild pain or fever ), Disp: , Rfl: 0    allopurinol (ZYLOPRIM) 100 mg tablet, Take 1 tablet (100 mg total) by mouth daily, Disp: 30 tablet, Rfl: 3    amLODIPine (NORVASC) 5 mg tablet, Take 5 mg by mouth daily  , Disp: , Rfl:     atorvastatin (LIPITOR) 80 mg tablet, Take 80 mg by mouth every other day evening, Disp: , Rfl:     calcium citrate-vitamin D (CITRACAL+D) 315-200 MG-UNIT per tablet, Take 1 tablet by mouth daily, Disp: , Rfl:     colchicine (COLCRYS) 0 6 mg tablet, Take 0 6 mg by mouth 2 (two) times a day, Disp: , Rfl:     folic acid (FOLVITE) 1 mg tablet, Take 1 tablet (1 mg total) by mouth daily, Disp: 90 tablet, Rfl: 4    HYDROcodone-Acetaminophen 2 5-325 MG TABS, Take by mouth every 4 (four) hours, Disp: , Rfl:     HYDROmorphone (DILAUDID) 2 mg tablet, Take 1 tablet (2 mg total) by mouth every 4 (four) hours as needed for moderate painMax Daily Amount: 12 mg, Disp: 90 tablet, Rfl: 0   lidocaine (LMX) 4 % cream, Apply topically as needed for mild pain To abdomen and to L ankle as needed  , Disp: 30 g, Rfl: 0    LORazepam (ATIVAN) 0 5 mg tablet, Take 1 tablet (0 5 mg total) by mouth every 6 (six) hours as needed for anxiety (or restless legs), Disp: 15 tablet, Rfl: 0    metoprolol tartrate (LOPRESSOR) 100 mg tablet, Take 100 mg by mouth daily  , Disp: , Rfl:     naloxone (NARCAN) 4 mg/0 1 mL nasal spray, Administer 1 spray into a nostril  If breathing does not return to normal or if breathing difficulty resumes after 2-3 minutes, give another dose in the other nostril using a new spray , Disp: 1 each, Rfl: 1    omeprazole (PriLOSEC) 20 mg delayed release capsule, Take 20 mg by mouth daily  , Disp: , Rfl:     ondansetron (ZOFRAN) 8 mg tablet, Take 1 tablet (8 mg total) by mouth every 8 (eight) hours as needed for nausea or vomiting, Disp: 20 tablet, Rfl: 3    predniSONE 10 mg tablet, Take 1 tablet (10 mg total) by mouth daily, Disp: 30 tablet, Rfl: 0    senna (SENOKOT) 8 6 mg, Take 1 tablet (8 6 mg total) by mouth daily at bedtime, Disp: 30 each, Rfl: 0    sildenafil (VIAGRA) 100 mg tablet, TAKE ONE TABLET BY MOUTH  AS NEEDED PRIOR TO SEXUAL ACTIVITY FOR ERECTILE DYSFUNCTION, Disp: , Rfl:     terazosin (HYTRIN) 5 mg capsule, Take 2 capsules (10 mg total) by mouth daily at bedtime, Disp: 30 capsule, Rfl: 6    VITAMIN D PO, Take 1,000 Units by mouth daily , Disp: , Rfl:     No Known Allergies    Physical Exam:   Vitals:    02/24/21 1102   BP: 133/80   Pulse: 60   Resp: 20   Temp: 98 8 °F (37 1 °C)   SpO2: 97%     General: Awake, Alert, Oriented x 3, Mood and affect appropriate  Respiratory: Respirations even and unlabored  Cardiovascular: Peripheral pulses intact; no edema  Musculoskeletal Exam:   Left lumbar paraspinals tender to palpation  ASA Score: 3         Assessment:   1   Lumbar spondylosis        Plan: LEFT L2-5 RFA

## 2021-02-24 NOTE — DISCHARGE INSTRUCTIONS

## 2021-02-24 NOTE — TELEPHONE ENCOUNTER
Patient is S/P a Left L2-L5 RFA c/ JW on 2/24/21  Patient is scheduled for an ovs on 4/7/21  Please call on 2/25/21 post RFA   Thanks

## 2021-02-25 NOTE — TELEPHONE ENCOUNTER
S/W pt  Pt stated needle sites look good, denies S&S of infection, denies fevers, denies soreness and pain and denies sun burn like sensation  Advised pt if he does get pain to take his prescribed or OTC pain medications and/or use ice/heat and that it takes 4 to 6 weeks to see the full effect  Confirmed next appt w/ pt  Pt verbalized understanding

## 2021-03-03 ENCOUNTER — LAB (OUTPATIENT)
Dept: LAB | Facility: HOSPITAL | Age: 71
End: 2021-03-03
Attending: INTERNAL MEDICINE
Payer: MEDICARE

## 2021-03-03 DIAGNOSIS — C65.2 CANCER OF LEFT RENAL PELVIS (HCC): ICD-10-CM

## 2021-03-03 DIAGNOSIS — C68.9 UROTHELIAL CANCER (HCC): ICD-10-CM

## 2021-03-03 LAB
ALBUMIN SERPL BCP-MCNC: 3.8 G/DL (ref 3.5–5.7)
ALP SERPL-CCNC: 75 U/L (ref 55–165)
ALT SERPL W P-5'-P-CCNC: 13 U/L (ref 7–52)
ANION GAP SERPL CALCULATED.3IONS-SCNC: 9 MMOL/L (ref 4–13)
AST SERPL W P-5'-P-CCNC: 10 U/L (ref 13–39)
BASOPHILS # BLD AUTO: 0 THOUSANDS/ΜL (ref 0–0.1)
BASOPHILS NFR BLD AUTO: 1 % (ref 0–2)
BILIRUB SERPL-MCNC: 0.7 MG/DL (ref 0.2–1)
BUN SERPL-MCNC: 19 MG/DL (ref 7–25)
CALCIUM SERPL-MCNC: 9.1 MG/DL (ref 8.6–10.5)
CHLORIDE SERPL-SCNC: 103 MMOL/L (ref 98–107)
CO2 SERPL-SCNC: 31 MMOL/L (ref 21–31)
CREAT SERPL-MCNC: 1.29 MG/DL (ref 0.7–1.3)
EOSINOPHIL # BLD AUTO: 0.2 THOUSAND/ΜL (ref 0–0.61)
EOSINOPHIL NFR BLD AUTO: 3 % (ref 0–5)
ERYTHROCYTE [DISTWIDTH] IN BLOOD BY AUTOMATED COUNT: 14.7 % (ref 11.5–14.5)
GFR SERPL CREATININE-BSD FRML MDRD: 55 ML/MIN/1.73SQ M
GLUCOSE P FAST SERPL-MCNC: 93 MG/DL (ref 65–99)
HCT VFR BLD AUTO: 38 % (ref 42–47)
HGB BLD-MCNC: 12.4 G/DL (ref 14–18)
LYMPHOCYTES # BLD AUTO: 1 THOUSANDS/ΜL (ref 0.6–4.47)
LYMPHOCYTES NFR BLD AUTO: 19 % (ref 21–51)
MCH RBC QN AUTO: 29.7 PG (ref 26–34)
MCHC RBC AUTO-ENTMCNC: 32.6 G/DL (ref 31–37)
MCV RBC AUTO: 91 FL (ref 81–99)
MONOCYTES # BLD AUTO: 0.5 THOUSAND/ΜL (ref 0.17–1.22)
MONOCYTES NFR BLD AUTO: 9 % (ref 2–12)
NEUTROPHILS # BLD AUTO: 3.7 THOUSANDS/ΜL (ref 1.4–6.5)
NEUTS SEG NFR BLD AUTO: 68 % (ref 42–75)
PLATELET # BLD AUTO: 200 THOUSANDS/UL (ref 149–390)
PMV BLD AUTO: 7.3 FL (ref 8.6–11.7)
POTASSIUM SERPL-SCNC: 4.2 MMOL/L (ref 3.5–5.5)
PROT SERPL-MCNC: 6.5 G/DL (ref 6.4–8.9)
RBC # BLD AUTO: 4.17 MILLION/UL (ref 4.3–5.9)
SODIUM SERPL-SCNC: 143 MMOL/L (ref 134–143)
T3FREE SERPL-MCNC: 2.4 PG/ML (ref 2.3–4.2)
TSH SERPL DL<=0.05 MIU/L-ACNC: 2.46 UIU/ML (ref 0.45–5.33)
WBC # BLD AUTO: 5.4 THOUSAND/UL (ref 4.8–10.8)

## 2021-03-03 PROCEDURE — 85025 COMPLETE CBC W/AUTO DIFF WBC: CPT

## 2021-03-03 PROCEDURE — 80053 COMPREHEN METABOLIC PANEL: CPT

## 2021-03-03 PROCEDURE — 84443 ASSAY THYROID STIM HORMONE: CPT

## 2021-03-03 PROCEDURE — 84481 FREE ASSAY (FT-3): CPT

## 2021-03-03 PROCEDURE — 36415 COLL VENOUS BLD VENIPUNCTURE: CPT

## 2021-03-04 ENCOUNTER — HOSPITAL ENCOUNTER (OUTPATIENT)
Dept: RADIOLOGY | Facility: HOSPITAL | Age: 71
Discharge: HOME | End: 2021-03-04
Payer: MEDICARE

## 2021-03-04 ENCOUNTER — OFFICE VISIT (OUTPATIENT)
Dept: HEMATOLOGY ONCOLOGY | Facility: CLINIC | Age: 71
End: 2021-03-04
Payer: MEDICARE

## 2021-03-04 ENCOUNTER — HOSPITAL ENCOUNTER (OUTPATIENT)
Dept: NON INVASIVE DIAGNOSTICS | Facility: HOSPITAL | Age: 71
Discharge: HOME | End: 2021-03-04
Payer: MEDICARE

## 2021-03-04 ENCOUNTER — TELEPHONE (OUTPATIENT)
Dept: HEMATOLOGY ONCOLOGY | Facility: CLINIC | Age: 71
End: 2021-03-04

## 2021-03-04 VITALS
SYSTOLIC BLOOD PRESSURE: 136 MMHG | HEART RATE: 67 BPM | DIASTOLIC BLOOD PRESSURE: 74 MMHG | TEMPERATURE: 96.4 F | HEIGHT: 69 IN | BODY MASS INDEX: 29.16 KG/M2 | WEIGHT: 196.9 LBS | OXYGEN SATURATION: 98 % | RESPIRATION RATE: 18 BRPM

## 2021-03-04 DIAGNOSIS — M79.662 PAIN OF LEFT CALF: ICD-10-CM

## 2021-03-04 DIAGNOSIS — C65.2 CANCER OF LEFT RENAL PELVIS (HCC): ICD-10-CM

## 2021-03-04 DIAGNOSIS — M25.512 ACUTE PAIN OF LEFT SHOULDER: ICD-10-CM

## 2021-03-04 DIAGNOSIS — R21 RASH AND NONSPECIFIC SKIN ERUPTION: ICD-10-CM

## 2021-03-04 DIAGNOSIS — C68.9 UROTHELIAL CANCER (HCC): Primary | ICD-10-CM

## 2021-03-04 DIAGNOSIS — R53.83 OTHER FATIGUE: ICD-10-CM

## 2021-03-04 PROCEDURE — 93970 EXTREMITY STUDY: CPT | Performed by: SURGERY

## 2021-03-04 PROCEDURE — 73030 X-RAY EXAM OF SHOULDER: CPT

## 2021-03-04 PROCEDURE — 93970 EXTREMITY STUDY: CPT

## 2021-03-04 PROCEDURE — 99215 OFFICE O/P EST HI 40 MIN: CPT | Performed by: NURSE PRACTITIONER

## 2021-03-04 RX ORDER — SODIUM CHLORIDE 9 MG/ML
20 INJECTION, SOLUTION INTRAVENOUS ONCE
Status: CANCELLED | OUTPATIENT
Start: 2021-03-26

## 2021-03-04 RX ORDER — TRIAMCINOLONE ACETONIDE 0.25 MG/G
CREAM TOPICAL 2 TIMES DAILY
Qty: 30 G | Refills: 5 | Status: SHIPPED | OUTPATIENT
Start: 2021-03-04 | End: 2021-04-13

## 2021-03-04 NOTE — TELEPHONE ENCOUNTER
Call from Payam Mendoza at Vascular lab    Negative for DVT    Chronic superficial venous thrombosis in right calf

## 2021-03-04 NOTE — PROGRESS NOTES
Hematology/Oncology Outpatient Follow-up  Ayaz Stroud 69 y o  male 1950 [id number]    Date:  3/4/2021      Assessment and Plan:  1  Urothelial cancer [facility])  The patient will be continued on his current treatment with single agent Keytruda on an every three-week basis  He is due for his 8th treatment tomorrow  Is tolerating treatment fairly well with the exception of a new mild rash and some tolerable joint achiness  We will continue to monitor him closely  He will be back for follow-up again in about 3 weeks with repeat labs prior  The patient and his wife mentions today that they are planning a vacation/trip leaving May 12th and will be away for about 12 days  She states that if there is no changes with his treatment it should fall in between cycles  - CBC and differential; Future  - Comprehensive metabolic panel; Future  - TSH, 3rd generation with Free T4 reflex; Future  - T3, free; Future  - CBC and differential; Future  - Comprehensive metabolic panel; Future  - TSH, 3rd generation with Free T4 reflex; Future  - Magnesium; Future    2  Rash and nonspecific skin eruption  The patient has developed mild (grade 1) pruritic rash which is likely secondary to his immunotherapy  We will continue supportive care measures for now  Will start him on triamcinolone cream 2-3 times per day  He was also advised to take his low-dose prednisone 10 mg daily as prescribed by the palliative care team which may also improve his rash and joint pain  Will continue to monitor closely  The patient and his wife were instructed to notify us with any significant worsening rash which may require deferring treatment with high-dose steroids  - triamcinolone (KENALOG) 0 025 % cream; Apply topically 2 (two) times a day To chemo rash  Dispense: 30 g; Refill: 5    3  Pain of left calf  The patient states that after shoveling 3 weeks ago he developed significant bilateral calf discomfort    Has resolved on the left side but the right side persist and has worsened  Will pursue venous duplex of the lower extremities for completeness sake to rule out acute DVT  - VAS lower limb venous duplex study, complete bilateral; Future    4  Acute pain of left shoulder  The patient has been complaining left shoulder pain since shoveling about 3 weeks; is very bothersome to him  Has tried muscle relaxer without any relief  We will pursue plain film of the left shoulder to rule out series trauma/injury  - XR shoulder 2+ vw left; Future      HPI:  Patient presents today for follow-up visit accompanied by his wife  He states that he is no longer having abdominal pain and is off narcotics completely  His chronic back pain is improved now that he is working with his pain management specialist again  However he started to develop shoulder pain especially on the left side and bilateral calf pain after shoveling recently about 3 weeks ago  His left shoulder continues to bother him and his right calf pain is worsening with resolution of the left calf discomfort  He did try taking a muscle relaxer without any improvement  He also recently developed a mild pruritic rash it started on his bilateral upper and lower extremities and then resolved  Currently he has a patch on his left chest wall below the breast, forehead and few small lesions on the right forearm and left abdomen  Has been applying Benadryl cream without any relief  He also admits that he occasionally gets some joint aches after his treatments  He was started on low-dose prednisone 10 mg daily recently by the palliative care team but admits that he has not been taking it on a daily basis as advised  His most recent laboratory studies from 03/03/2021 showed normal white cells and platelets, he continues to have stable mild normocytic anemia H&H 12 4/38, MCV 91  CMP is essentially normal creatinine 1 29 with GFR 55  TSH is normal 2 46      Oncology History Overview Note Patient is a 69 y o male with stage IV metastatic urothelial carcinoma of the left renal pelvis  He completed palliative radiation to the left abdomen 12/3/20     12/4/20 Dr Cait Allen- F/u in 6 months for possible cystoscopy at that time  He reports he is no longer taking the Proscar and feels though he is emptying his bladder well     20 Salvatore Luna NP- patient will be continued on his current palliative treatment with single agent immunotherapy Keytruda on a every 3 week basis which he is tolerating well  F/u in 3 weeks with labs  We will pursue PET prior to his next follow-up in 3 weeks after 5 treatment to rule out progressive disease since he is complaining about more persistent pain especially in the right abdomen/flank area  21 PET scan- 1  Mixed findings for response to therapy  2   Significant improvement in previously seen FDG avid lymph nodes in the periesophageal region, retrocrural region, retroperitoneum and near the left renal bed  3  Largely resolved FDG avid left rectus abdominis lesion  4   However, there are new FDG avid lymph nodes in the portacaval region and retroperitoneum suspicious for malignancy  5   New asymmetric focus of FDG uptake at the level of the right vocal fold, SUV max of 5 0  This may be inflammatory but can be reassessed on follow up  21 Salvatore Luna NP  20 infusion  21 palliative care  21 Dr Cait Allen     Urothelial cancer [facility])   1/3/2020 Biopsy    Final Diagnosis    A  Urine, Clean Catch, Thin Prep:  High grade urothelial carcinoma (HGUC) - see comment  1/3/2020 Initial Diagnosis    Urothelial cancer ([facility] [address] )  T3 N0 (stage IIIA)     2020 Biopsy    Final Diagnosis    A  Ureter, Right, left renal pelvis biopsy  -Fragments of high grade urothelial carcinoma   -No evidence of lamina propria invasion   -Detrusor muscle/ muscularis propria is not present for evaluation       B  Urinary Bladder, bladder biopsy:  -Fragments of low grade papillary lesion  See note  -No evidence of invasion seen  -Unremarkable fragment of detrusor muscle seen  4/1/2020 Surgery    left robotic assisted laparoscopic nephroureterectomy with bladder cuff excision     Final Diagnosis    A  Left kidney, ureter, and bladder cuff, nephroureterectomy:  - Invasive high grade urothelial carcinoma arising in renal pelvis  - Bladder cuff margin is negative for carcinoma and no evidence of high grade dysplasia  - Ureters with no significant pathologic abnormality  - Two benign simple cysts  - Adrenal gland is negative for malignancy  - One lymph node, negative for malignancy (0/1)          5/14/2020 - 6/3/2020 Chemotherapy    CISplatin (PLATINOL) split dose 35 mg/m2 = 75 3 mg (50 % of original dose 70 mg/m2), Intravenous,   Administration: 75 3 mg (5/14/2020), 75 3 mg (5/21/2020)  gemcitabine (GEMZAR) 2,200 mg in sodium chloride 0 9 % 250 mL infusion, 2,150 2 mg (80 % of original dose 1,250 mg/m2), Intravenous,   Administration: 2,200 mg (5/14/2020), 2,200 mg (5/21/2020)    (only completed 1 cycle- d/c d/t adverse effects and worsening renal dysfunction)     10/9/2020 -  Chemotherapy    pembrolizumab (KEYTRUDA) 200 mg in sodium chloride 0 9 % 50 mL IVPB, 200 mg, Intravenous, Once, 7 of 14 cycles  Administration: 200 mg (10/9/2020), 200 mg (10/30/2020), 200 mg (11/20/2020), 200 mg (12/11/2020), 200 mg (12/31/2020), 200 mg (1/22/2021), 200 mg (2/12/2021)     11/19/2020 - 12/3/2020 Radiation    Treatment:  Course: C1    Plan ID Energy Fractions Dose per Fraction (cGy) Dose Correction (cGy) Total Dose Delivered (cGy) Elapsed Days   L Abdomen 6X 10 / 10 300 0 3,000 14        Cancer of left renal pelvis ([facility] [address] )   4/23/2020 Initial Diagnosis    Cancer of left renal pelvis ([address] )     10/9/2020 -  Chemotherapy    pembrolizumab (KEYTRUDA) 200 mg in sodium chloride 0 9 % 50 mL IVPB, 200 mg, Intravenous, Once, 7 of 14 cycles  Administration: 200 mg (10/9/2020), 200 mg (10/30/2020), 200 mg (11/20/2020), 200 mg (12/11/2020), 200 mg (12/31/2020), 200 mg (1/22/2021), 200 mg (2/12/2021)         Interval history:    ROS: Review of Systems   Constitutional: Negative for activity change, appetite change, chills, fatigue, fever and unexpected weight change  HENT: Positive for hearing loss  Negative for congestion, mouth sores, nosebleeds, sore throat and trouble swallowing  Eyes: Negative  Respiratory: Negative for cough, chest tightness and shortness of breath  Cardiovascular: Negative for chest pain, palpitations and leg swelling  Gastrointestinal: Negative for abdominal distention, abdominal pain, blood in stool, constipation, diarrhea, nausea and vomiting  Genitourinary: Negative for difficulty urinating, dysuria, frequency, hematuria and urgency  Musculoskeletal: Positive for arthralgias, back pain and myalgias  Negative for gait problem and joint swelling  Skin: Positive for rash  Negative for color change and pallor  Neurological: Negative for dizziness, weakness, light-headedness, numbness and headaches  Hematological: Negative for adenopathy  Does not bruise/bleed easily  Psychiatric/Behavioral: Negative for dysphoric mood and sleep disturbance  The patient is not nervous/anxious          Past Medical History:   Diagnosis Date    Arthritis     Bladder cancer     Cancer ([facility] [address] )     skin melanoma;basal cell    Chronic pain disorder     from arthritis    Colon polyp     Does use hearing aid     bilat    Dry eyes, bilateral     GERD (gastroesophageal reflux disease)     History of kidney stones 10/2019    History of partial knee replacement     bilat    History of vertigo     Hyperlipidemia     Hypertension     Kidney lesion     Lumbar disc disorder     compression of vertebrae L4-5-6    Muscle weakness     left hip area    Renal mass     left    Right ankle injury 03/05/2020    missed step of ladder     Right ankle pain     Shortness of breath     with activity    Tinnitus     Urothelial cancer     left    Wears glasses        Past Surgical History:   Procedure Laterality Date    COLONOSCOPY      CYSTOSCOPY Left 2020    Procedure: CYSTOSCOPY; URETERAL CATHETER PLACEMENT;  Surgeon: Starr Dubose MD;  Location[facility];  Service: Urology    CYSTOSCOPY  2020    FL CYSTOGRAM  2020    FL RETROGRADE PYELOGRAM  2020    HERNIA REPAIR      umbilical with mesh    INGUINAL HERNIA REPAIR Right     with mesh    IR PORT PLACEMENT  2020    JOINT REPLACEMENT Bilateral     partials knee    LUMBAR EPIDURAL INJECTION      VA CYSTOURETHROSCOPY,URETER CATHETER Bilateral 2020    Procedure: CYSTOSCOPY; RIGHT RETROGRADE PYELOGRAM WITH RIGHT URETERAL CYTOLOGY SAMPLING; LEFT URETEROSCOPY WITH RENAL PELVIS BIOPSY AND LEFT STENT PLACEMENT;  Surgeon: Starr Dubose MD;  [facility];  Service: Urology    VA NEPHRECTOMY, W/PART   URETECTOMY Left 2020    Procedure: ROBOTIC LAPAROSCOPIC NEPHRO-URETERECTOMY;  Surgeon: Starr Dubose MD;  Location[facility];  Service: Urology    SKIN CANCER EXCISION      surface melanoma    TONSILLECTOMY      WISDOM TOOTH EXTRACTION         Social History     Socioeconomic History    Marital status: /Civil Union     Spouse name: None    Number of children: None    Years of education: None    Highest education level: None   Occupational History    None   Social Needs    Financial resource strain: None    Food insecurity     Worry: None     Inability: None    Transportation needs     Medical: None     Non-medical: None   Tobacco Use    Smoking status: Former Smoker     Quit date:      Years since quittin 2    Smokeless tobacco: Never Used   Substance and Sexual Activity    Alcohol use: Not Currently     Alcohol/week: 4 0 standard drinks     Types: 4 Cans of beer per week     Frequency: 4 or more times a week     Drinks per session: 3 or 4     Binge frequency: Daily or almost daily     Comment: daily/socially    Drug use: Never    Sexual activity: Not Currently   Lifestyle    Physical activity     Days per week: None     Minutes per session: None    Stress: None   Relationships    Social connections     Talks on phone: None     Gets together: None     Attends Latter day service: None     Active member of club or organization: None     Attends meetings of clubs or organizations: None     Relationship status: None    Intimate partner violence     Fear of current or ex partner: None     Emotionally abused: None     Physically abused: None     Forced sexual activity: None   Other Topics Concern    None   Social History Narrative    Daily caffeine use - 2 cups coffee        Family History   Problem Relation Age of Onset    Liver cancer Father        No Known Allergies      Current Outpatient Medications:     allopurinol (ZYLOPRIM) 100 mg tablet, Take 1 tablet (100 mg total) by mouth daily, Disp: 30 tablet, Rfl: 3    amLODIPine (NORVASC) 5 mg tablet, Take 5 mg by mouth daily  , Disp: , Rfl:     atorvastatin (LIPITOR) 80 mg tablet, Take 80 mg by mouth every other day evening, Disp: , Rfl:     calcium citrate-vitamin D (CITRACAL+D) 315-200 MG-UNIT per tablet, Take 1 tablet by mouth daily, Disp: , Rfl:     colchicine (COLCRYS) 0 6 mg tablet, Take 0 6 mg by mouth 2 (two) times a day, Disp: , Rfl:     folic acid (FOLVITE) 1 mg tablet, Take 1 tablet (1 mg total) by mouth daily, Disp: 90 tablet, Rfl: 4    HYDROcodone-Acetaminophen 2 5-325 MG TABS, Take by mouth every 4 (four) hours, Disp: , Rfl:     HYDROmorphone (DILAUDID) 2 mg tablet, Take 1 tablet (2 mg total) by mouth every 4 (four) hours as needed for moderate painMax Daily Amount: 12 mg, Disp: 90 tablet, Rfl: 0    lidocaine (LMX) 4 % cream, Apply topically as needed for mild pain To abdomen and to L ankle as needed  , Disp: 30 g, Rfl: 0    LORazepam (ATIVAN) 0 5 mg tablet, Take 1 tablet (0 5 mg total) by mouth every 6 (six) hours as needed for anxiety (or restless legs), Disp: 15 tablet, Rfl: 0    metoprolol tartrate (LOPRESSOR) 100 mg tablet, Take 100 mg by mouth daily  , Disp: , Rfl:     naloxone (NARCAN) 4 mg/0 1 mL nasal spray, Administer 1 spray into a nostril  If breathing does not return to normal or if breathing difficulty resumes after 2-3 minutes, give another dose in the other nostril using a new spray , Disp: 1 each, Rfl: 1    omeprazole (PriLOSEC) 20 mg delayed release capsule, Take 20 mg by mouth daily  , Disp: , Rfl:     ondansetron (ZOFRAN) 8 mg tablet, Take 1 tablet (8 mg total) by mouth every 8 (eight) hours as needed for nausea or vomiting, Disp: 20 tablet, Rfl: 3    predniSONE 10 mg tablet, Take 1 tablet (10 mg total) by mouth daily, Disp: 30 tablet, Rfl: 0    senna (SENOKOT) 8 6 mg, Take 1 tablet (8 6 mg total) by mouth daily at bedtime, Disp: 30 each, Rfl: 0    sildenafil (VIAGRA) 100 mg tablet, TAKE ONE TABLET BY MOUTH  AS NEEDED PRIOR TO SEXUAL ACTIVITY FOR ERECTILE DYSFUNCTION, Disp: , Rfl:     terazosin (HYTRIN) 5 mg capsule, Take 2 capsules (10 mg total) by mouth daily at bedtime, Disp: 30 capsule, Rfl: 6    VITAMIN D PO, Take 1,000 Units by mouth daily , Disp: , Rfl:     acetaminophen (TYLENOL) 325 mg tablet, Take 2 tablets (650 mg total) by mouth every 6 (six) hours as needed for mild pain or fever (Patient not taking: Reported on 3/4/2021), Disp: , Rfl: 0    triamcinolone (KENALOG) 0 025 % cream, Apply topically 2 (two) times a day To chemo rash, Disp: 30 g, Rfl: 5      Physical Exam:  /74 (BP Location: Left arm, Patient Position: Sitting, Cuff Size: Large)   Pulse 67   Temp (!) 96 4 °F (35 8 °C) (Tympanic)   Resp 18   Ht 5' 8 5" (1 74 m)   Wt 89 3 kg (196 lb 14 4 oz)   SpO2 98%   BMI 29 50 kg/m²     Physical Exam  Vitals signs reviewed  Constitutional:       General: He is not in acute distress  Appearance: He is well-developed   He is not diaphoretic  HENT:      Head: Normocephalic and atraumatic  Eyes:      General: Lids are normal  No scleral icterus  Conjunctiva/sclera: Conjunctivae normal       Pupils: Pupils are equal, round, and reactive to light  Neck:      Musculoskeletal: Normal range of motion and neck supple  Thyroid: No thyromegaly  Cardiovascular:      Rate and Rhythm: Normal rate and regular rhythm  Heart sounds: Normal heart sounds  No murmur  Pulmonary:      Effort: Pulmonary effort is normal  No respiratory distress  Breath sounds: Normal breath sounds  Abdominal:      General: There is no distension  Palpations: Abdomen is soft  There is no hepatomegaly or splenomegaly  Tenderness: There is no abdominal tenderness  Musculoskeletal: Normal range of motion  General: No swelling  Lymphadenopathy:      Cervical: No cervical adenopathy  Upper Body:      Right upper body: No axillary adenopathy  Left upper body: No axillary adenopathy  Skin:     General: Skin is warm and dry  Findings: Rash (pink with small flesh colored lesions (see below image)) present  No erythema  Rash is macular and papular  Neurological:      General: No focal deficit present  Mental Status: He is alert and oriented to person, place, and time  Psychiatric:         Mood and Affect: Mood and affect normal          Behavior: Behavior normal  Behavior is cooperative  Thought Content:  Thought content normal          Judgment: Judgment normal            Labs:  Lab Results   Component Value Date    WBC 5 40 03/03/2021    HGB 12 4 (L) 03/03/2021    HCT 38 0 (L) 03/03/2021    MCV 91 03/03/2021     03/03/2021     Lab Results   Component Value Date    K 4 2 03/03/2021     03/03/2021    CO2 31 03/03/2021    BUN 19 03/03/2021    CREATININE 1 29 03/03/2021    GLUF 93 03/03/2021    CALCIUM 9 1 03/03/2021    CORRECTEDCA 9 8 10/28/2020    AST 10 (L) 03/03/2021    ALT 13 03/03/2021 ALKPHOS 75 03/03/2021    EGFR 55 03/03/2021       Patient voiced understanding and agreement in the above discussion  Aware to contact our office with questions/symptoms in the interim  This note has been generated by voice recognition software system  Therefore, there may be spelling, grammar, and or syntax errors  Please contact if questions arise

## 2021-03-05 ENCOUNTER — HOSPITAL ENCOUNTER (OUTPATIENT)
Dept: INFUSION CENTER | Facility: HOSPITAL | Age: 71
Discharge: HOME | End: 2021-03-05
Payer: MEDICARE

## 2021-03-05 VITALS
SYSTOLIC BLOOD PRESSURE: 112 MMHG | WEIGHT: 199.3 LBS | TEMPERATURE: 97.8 F | HEIGHT: 69 IN | BODY MASS INDEX: 29.52 KG/M2 | RESPIRATION RATE: 16 BRPM | DIASTOLIC BLOOD PRESSURE: 57 MMHG | HEART RATE: 57 BPM

## 2021-03-05 DIAGNOSIS — C65.2 CANCER OF LEFT RENAL PELVIS (HCC): ICD-10-CM

## 2021-03-05 DIAGNOSIS — C68.9 UROTHELIAL CANCER (HCC): Primary | ICD-10-CM

## 2021-03-05 PROCEDURE — 96413 CHEMO IV INFUSION 1 HR: CPT

## 2021-03-05 RX ORDER — SODIUM CHLORIDE 9 MG/ML
20 INJECTION, SOLUTION INTRAVENOUS ONCE
Status: COMPLETED | OUTPATIENT
Start: 2021-03-05 | End: 2021-03-05

## 2021-03-05 RX ADMIN — SODIUM CHLORIDE 20 ML/HR: 0.9 INJECTION, SOLUTION INTRAVENOUS at 10:16

## 2021-03-05 RX ADMIN — SODIUM CHLORIDE 200 MG: 9 INJECTION, SOLUTION INTRAVENOUS at 10:57

## 2021-03-05 NOTE — PROGRESS NOTES
Pt tolerated todays keytruda dose well  No adverse reactions noted  Port flushed and deaccessed per routine   Discharged ambulatory with avs

## 2021-03-09 ENCOUNTER — TELEPHONE (OUTPATIENT)
Dept: HEMATOLOGY ONCOLOGY | Facility: CLINIC | Age: 71
End: 2021-03-09

## 2021-03-09 DIAGNOSIS — R21 RASH AND NONSPECIFIC SKIN ERUPTION: ICD-10-CM

## 2021-03-09 DIAGNOSIS — R21 RASH: Primary | ICD-10-CM

## 2021-03-09 RX ORDER — HYDROXYZINE HYDROCHLORIDE 25 MG/1
25 TABLET, FILM COATED ORAL EVERY 6 HOURS PRN
Qty: 60 TABLET | Refills: 2 | Status: SHIPPED | OUTPATIENT
Start: 2021-03-09 | End: 2022-06-02

## 2021-03-09 RX ORDER — TRIAMCINOLONE ACETONIDE 1 MG/ML
LOTION TOPICAL 3 TIMES DAILY
Qty: 60 ML | Refills: 5 | Status: SHIPPED | OUTPATIENT
Start: 2021-03-09 | End: 2022-06-02

## 2021-03-09 NOTE — TELEPHONE ENCOUNTER
Phoned pt to let his wife know that Rx for Triamcinolone0 1% sent and Rx for hydroxyzine 25 mg to be taken Q 6 hrs prn for itching and anxiety  I asked for a return call so I can ask ore about his restless body syndrome

## 2021-03-15 ENCOUNTER — IMMUNIZATIONS (OUTPATIENT)
Dept: FAMILY MEDICINE CLINIC | Facility: HOSPITAL | Age: 71
End: 2021-03-15

## 2021-03-15 DIAGNOSIS — Z23 ENCOUNTER FOR IMMUNIZATION: Primary | ICD-10-CM

## 2021-03-15 PROCEDURE — 0012A SARS-COV-2 / COVID-19 MRNA VACCINE (MODERNA) 100 MCG: CPT

## 2021-03-15 PROCEDURE — 91301 SARS-COV-2 / COVID-19 MRNA VACCINE (MODERNA) 100 MCG: CPT

## 2021-03-23 ENCOUNTER — APPOINTMENT (OUTPATIENT)
Dept: LAB | Facility: HOSPITAL | Age: 71
End: 2021-03-23
Attending: INTERNAL MEDICINE
Payer: MEDICARE

## 2021-03-23 DIAGNOSIS — R53.83 OTHER FATIGUE: ICD-10-CM

## 2021-03-23 DIAGNOSIS — C65.2 CANCER OF LEFT RENAL PELVIS (HCC): ICD-10-CM

## 2021-03-23 DIAGNOSIS — C68.9 UROTHELIAL CANCER (HCC): ICD-10-CM

## 2021-03-23 LAB
ALBUMIN SERPL BCP-MCNC: 3.9 G/DL (ref 3.5–5.7)
ALP SERPL-CCNC: 77 U/L (ref 55–165)
ALT SERPL W P-5'-P-CCNC: 16 U/L (ref 7–52)
ANION GAP SERPL CALCULATED.3IONS-SCNC: 7 MMOL/L (ref 4–13)
AST SERPL W P-5'-P-CCNC: 10 U/L (ref 13–39)
BASOPHILS # BLD AUTO: 0 THOUSANDS/ΜL (ref 0–0.1)
BASOPHILS NFR BLD AUTO: 1 % (ref 0–2)
BILIRUB SERPL-MCNC: 0.7 MG/DL (ref 0.2–1)
BUN SERPL-MCNC: 21 MG/DL (ref 7–25)
CALCIUM SERPL-MCNC: 9.2 MG/DL (ref 8.6–10.5)
CHLORIDE SERPL-SCNC: 103 MMOL/L (ref 98–107)
CO2 SERPL-SCNC: 31 MMOL/L (ref 21–31)
CREAT SERPL-MCNC: 1.47 MG/DL (ref 0.7–1.3)
EOSINOPHIL # BLD AUTO: 0.2 THOUSAND/ΜL (ref 0–0.61)
EOSINOPHIL NFR BLD AUTO: 3 % (ref 0–5)
ERYTHROCYTE [DISTWIDTH] IN BLOOD BY AUTOMATED COUNT: 14.1 % (ref 11.5–14.5)
GFR SERPL CREATININE-BSD FRML MDRD: 47 ML/MIN/1.73SQ M
GLUCOSE P FAST SERPL-MCNC: 98 MG/DL (ref 65–99)
HCT VFR BLD AUTO: 38.2 % (ref 42–47)
HGB BLD-MCNC: 12.5 G/DL (ref 14–18)
LYMPHOCYTES # BLD AUTO: 1.4 THOUSANDS/ΜL (ref 0.6–4.47)
LYMPHOCYTES NFR BLD AUTO: 23 % (ref 21–51)
MAGNESIUM SERPL-MCNC: 1.9 MG/DL (ref 1.9–2.7)
MCH RBC QN AUTO: 30 PG (ref 26–34)
MCHC RBC AUTO-ENTMCNC: 32.8 G/DL (ref 31–37)
MCV RBC AUTO: 91 FL (ref 81–99)
MONOCYTES # BLD AUTO: 0.4 THOUSAND/ΜL (ref 0.17–1.22)
MONOCYTES NFR BLD AUTO: 7 % (ref 2–12)
NEUTROPHILS # BLD AUTO: 4 THOUSANDS/ΜL (ref 1.4–6.5)
NEUTS SEG NFR BLD AUTO: 66 % (ref 42–75)
PLATELET # BLD AUTO: 191 THOUSANDS/UL (ref 149–390)
PMV BLD AUTO: 7.4 FL (ref 8.6–11.7)
POTASSIUM SERPL-SCNC: 4.3 MMOL/L (ref 3.5–5.5)
PROT SERPL-MCNC: 6.6 G/DL (ref 6.4–8.9)
RBC # BLD AUTO: 4.18 MILLION/UL (ref 4.3–5.9)
SODIUM SERPL-SCNC: 141 MMOL/L (ref 134–143)
T3FREE SERPL-MCNC: 2.15 PG/ML (ref 2.3–4.2)
TSH SERPL DL<=0.05 MIU/L-ACNC: 2.46 UIU/ML (ref 0.45–5.33)
WBC # BLD AUTO: 6.2 THOUSAND/UL (ref 4.8–10.8)

## 2021-03-23 PROCEDURE — 83735 ASSAY OF MAGNESIUM: CPT

## 2021-03-23 PROCEDURE — 85025 COMPLETE CBC W/AUTO DIFF WBC: CPT

## 2021-03-23 PROCEDURE — 84443 ASSAY THYROID STIM HORMONE: CPT

## 2021-03-23 PROCEDURE — 80053 COMPREHEN METABOLIC PANEL: CPT

## 2021-03-23 PROCEDURE — 36415 COLL VENOUS BLD VENIPUNCTURE: CPT

## 2021-03-23 PROCEDURE — 84481 FREE ASSAY (FT-3): CPT

## 2021-03-25 ENCOUNTER — OFFICE VISIT (OUTPATIENT)
Dept: HEMATOLOGY ONCOLOGY | Facility: CLINIC | Age: 71
End: 2021-03-25
Payer: MEDICARE

## 2021-03-25 VITALS
RESPIRATION RATE: 18 BRPM | WEIGHT: 196.4 LBS | HEIGHT: 69 IN | OXYGEN SATURATION: 97 % | BODY MASS INDEX: 29.09 KG/M2 | SYSTOLIC BLOOD PRESSURE: 116 MMHG | TEMPERATURE: 97.2 F | HEART RATE: 65 BPM | DIASTOLIC BLOOD PRESSURE: 66 MMHG

## 2021-03-25 DIAGNOSIS — R21 RASH AND NONSPECIFIC SKIN ERUPTION: ICD-10-CM

## 2021-03-25 DIAGNOSIS — F41.9 ANXIETY: Primary | ICD-10-CM

## 2021-03-25 DIAGNOSIS — I80.01 THROMBOPHLEBITIS OF SUPERFICIAL VEINS OF RIGHT LOWER EXTREMITY: ICD-10-CM

## 2021-03-25 DIAGNOSIS — C68.9 UROTHELIAL CANCER (HCC): ICD-10-CM

## 2021-03-25 PROCEDURE — 99214 OFFICE O/P EST MOD 30 MIN: CPT | Performed by: INTERNAL MEDICINE

## 2021-03-25 RX ORDER — ALPRAZOLAM 0.25 MG/1
0.25 TABLET ORAL
Qty: 30 TABLET | Refills: 0 | Status: SHIPPED | OUTPATIENT
Start: 2021-03-25 | End: 2021-04-13

## 2021-03-25 NOTE — PROGRESS NOTES
Hematology/Oncology Outpatient Follow-up  Germán Boucher 69 y o  male 1950 [id number]    Date:  3/25/2021        Assessment and Plan:  1  Anxiety  The patient seems to have mild to moderate anxiety on a daily basis starting 7:00 p m     I did ask him to continue with the Atarax and try Xanax only on as-needed basis at the low-dose of 0 25 or 0 5 mg   - ALPRAZolam (XANAX) 0 25 mg tablet; Take 1 tablet (0 25 mg total) by mouth daily at bedtime as needed for anxiety  Dispense: 30 tablet; Refill: 0    2  Urothelial cancer ([address] )   he denied any hematuria  He seems to be tolerating the [facility] relatively well which will be continued on every 3 week basis  We did discuss pursuing another imaging study most likely a PET-CT scan somewhere the end of April or the beginning of May   - CBC and differential; Future  - Comprehensive metabolic panel; Future  - Magnesium; Future    3  Rash and nonspecific skin eruption    He was instructed to continue with the low-dose prednisone on a daily basis of 10 mg along with the triamcinolone cream which is improving his faint skin rash  4  Thrombophlebitis of superficial veins of right lower extremity    He continues to complain about significant right calf pain which is most likely related to the superficial thrombophlebitis seen on the recent imaging  The patient was told that there is no need to put him on full anticoagulation since there is no hint of deep vein thrombosis  The patient was instructed to take baby or full-dose aspirin which may improve his symptoms  If his symptoms do not get better then anticoagulation should be tried  HPI:   the patient came today for a follow-up visit accompanied by his wife  He continues to complain about pain in his right calf which was investigated with a Doppler ultrasound on 03/04/2021    The Doppler ultrasound  Was negative for deep vein thrombosis but it did show chronic superficial thrombophlebitis in the small saphenous vein in the mid calf area of the right lower extremity  He also had an x-ray x2 views of the left shoulder which was read as normal   The patient is using triamcinolone cream for his faint rash which is improving the rash along with the 10 mg prednisone on a daily basis  The patient today complained about significant agitation and anxiety which usually starts on a daily basis around 7:00 p m  until around 3:00 a m  He is taking Atarax 25 mg sometimes 50 mg with mild improvement of his symptoms  His most recent blood work on 03/23/2021 showed hemoglobin of 12 5 with normal white cells and platelets  Creatinine 1 47 with normal calcium and liver enzymes  TSH was 2 4 with the slightly lower than average free T3 of 2 1 and normal magnesium of 1 9  Oncology History Overview Note   Patient is a 69 y o male with stage IV metastatic urothelial carcinoma of the left renal pelvis  He completed palliative radiation to the left abdomen 12/3/20     12/4/20 Dr Lisa Dotson- F/u in 6 months for possible cystoscopy at that time  He reports he is no longer taking the Proscar and feels though he is emptying his bladder well     12/29/20 Soto Willis NP- patient will be continued on his current palliative treatment with single agent immunotherapy Keytruda on a every 3 week basis which he is tolerating well  F/u in 3 weeks with labs  We will pursue PET prior to his next follow-up in 3 weeks after 5 treatment to rule out progressive disease since he is complaining about more persistent pain especially in the right abdomen/flank area  1/13/21 PET scan- 1  Mixed findings for response to therapy  2   Significant improvement in previously seen FDG avid lymph nodes in the periesophageal region, retrocrural region, retroperitoneum and near the left renal bed  3  Largely resolved FDG avid left rectus abdominis lesion    4   However, there are new FDG avid lymph nodes in the portacaval region and retroperitoneum suspicious for malignancy  5   New asymmetric focus of FDG uptake at the level of the right vocal fold, SUV max of 5 0  This may be inflammatory but can be reassessed on follow up  21 Salvatore Luna NP  20 infusion  21 palliative care  21 Dr Chavira      Urothelial cancer [facility])   1/3/2020 Biopsy    Final Diagnosis    A  Urine, Clean Catch, Thin Prep:  High grade urothelial carcinoma (HGUC) - see comment  1/3/2020 Initial Diagnosis    Urothelial cancer ([address] )  T3 N0 (stage IIIA)     2020 Biopsy    Final Diagnosis    A  Ureter, Right, left renal pelvis biopsy  -Fragments of high grade urothelial carcinoma   -No evidence of lamina propria invasion   -Detrusor muscle/ muscularis propria is not present for evaluation  B  Urinary Bladder, bladder biopsy:  -Fragments of low grade papillary lesion  See note  -No evidence of invasion seen  -Unremarkable fragment of detrusor muscle seen  2020 Surgery    left robotic assisted laparoscopic nephroureterectomy with bladder cuff excision     Final Diagnosis    A  Left kidney, ureter, and bladder cuff, nephroureterectomy:  - Invasive high grade urothelial carcinoma arising in renal pelvis  - Bladder cuff margin is negative for carcinoma and no evidence of high grade dysplasia  - Ureters with no significant pathologic abnormality  - Two benign simple cysts  - Adrenal gland is negative for malignancy  - One lymph node, negative for malignancy (0)          2020 - 6/3/2020 Chemotherapy    CISplatin (PLATINOL) split dose 35 mg/m2 = 75 3 mg (50 % of original dose 70 mg/m2), Intravenous,   Administration: 75 3 mg (2020), 75 3 mg (2020)  gemcitabine (GEMZAR) 2,200 mg in sodium chloride 0 9 % 250 mL infusion, 2,150 2 mg (80 % of original dose 1,250 mg/m2), Intravenous,   Administration: 2,200 mg (2020), 2,200 mg (2020)    (only completed 1 cycle- d/c d/t adverse effects and worsening renal dysfunction) 10/9/2020 -  Chemotherapy    pembrolizumab (KEYTRUDA) 200 mg in sodium chloride 0 9 % 50 mL IVPB, 200 mg, Intravenous, Once, 8 of 14 cycles  Administration: 200 mg (10/9/2020), 200 mg (10/30/2020), 200 mg (11/20/2020), 200 mg (12/11/2020), 200 mg (12/31/2020), 200 mg (1/22/2021), 200 mg (2/12/2021), 200 mg (3/5/2021)     11/19/2020 - 12/3/2020 Radiation    Treatment:  Course: C1    Plan ID Energy Fractions Dose per Fraction (cGy) Dose Correction (cGy) Total Dose Delivered (cGy) Elapsed Days   L Abdomen 6X 10 / 10 300 0 3,000 14        Cancer of left renal pelvis ([address] )   4/23/2020 Initial Diagnosis    Cancer of left renal pelvis ([address] )     10/9/2020 -  Chemotherapy    pembrolizumab (KEYTRUDA) 200 mg in sodium chloride 0 9 % 50 mL IVPB, 200 mg, Intravenous, Once, 8 of 14 cycles  Administration: 200 mg (10/9/2020), 200 mg (10/30/2020), 200 mg (11/20/2020), 200 mg (12/11/2020), 200 mg (12/31/2020), 200 mg (1/22/2021), 200 mg (2/12/2021), 200 mg (3/5/2021)         Interval history:    ROS: Review of Systems   Constitutional: Positive for fatigue  Negative for chills and fever  HENT: Positive for hearing loss  Negative for ear pain and sore throat  Eyes: Negative for pain and visual disturbance  Respiratory: Negative for cough and shortness of breath  Cardiovascular: Negative for chest pain and palpitations  Gastrointestinal: Negative for abdominal pain and vomiting  Genitourinary: Negative for dysuria and hematuria  Musculoskeletal: Negative for arthralgias and back pain  Skin: Positive for rash  Negative for color change  Neurological: Positive for numbness  Negative for seizures and syncope  Psychiatric/Behavioral: The patient is nervous/anxious  All other systems reviewed and are negative        Past Medical History:   Diagnosis Date    Arthritis     Bladder cancer     Cancer ([address] )     skin melanoma;basal cell    Chronic pain disorder     from arthritis    Colon polyp     Does use hearing aid     bilat    Dry eyes, bilateral     GERD (gastroesophageal reflux disease)     History of kidney stones 10/2019    History of partial knee replacement     bilat    History of vertigo     Hyperlipidemia     Hypertension     Kidney lesion     Lumbar disc disorder     compression of vertebrae L4-5-6    Muscle weakness     left hip area    Renal mass     left    Right ankle injury 03/05/2020    missed step of ladder     Right ankle pain     Shortness of breath     with activity    Tinnitus     Urothelial cancer     left    Wears glasses        Past Surgical History:   Procedure Laterality Date    COLONOSCOPY      CYSTOSCOPY Left 4/1/2020    Procedure: CYSTOSCOPY; URETERAL CATHETER PLACEMENT;  Surgeon: Kerri Salinas MD;  [facility];  Service: Urology    CYSTOSCOPY  05/11/2020    FL CYSTOGRAM  4/13/2020    FL RETROGRADE PYELOGRAM  1/17/2020    HERNIA REPAIR      umbilical with mesh    INGUINAL HERNIA REPAIR Right     with mesh    IR PORT PLACEMENT  4/30/2020    JOINT REPLACEMENT Bilateral     partials knee    LUMBAR EPIDURAL INJECTION      WI CYSTOURETHROSCOPY,URETER CATHETER Bilateral 1/17/2020    Procedure: CYSTOSCOPY; RIGHT RETROGRADE PYELOGRAM WITH RIGHT URETERAL CYTOLOGY SAMPLING; LEFT URETEROSCOPY WITH RENAL PELVIS BIOPSY AND LEFT STENT PLACEMENT;  Surgeon: Kerri Salinas MD;  [facility];  Service: Urology    WI NEPHRECTOMY, W/PART   URETECTOMY Left 4/1/2020    Procedure: ROBOTIC LAPAROSCOPIC NEPHRO-URETERECTOMY;  Surgeon: Kerri Salinas MD;  [facility];  Service: Urology    SKIN CANCER EXCISION      surface melanoma    TONSILLECTOMY      WISDOM TOOTH EXTRACTION         Social History     Socioeconomic History    Marital status: /Civil Union     Spouse name: None    Number of children: None    Years of education: None    Highest education level: None   Occupational History    None   Social Needs    Financial resource strain: None    Food insecurity     Worry: None     Inability: None    Transportation needs     Medical: None     Non-medical: None   Tobacco Use    Smoking status: Former Smoker     Quit date:      Years since quittin 1    Smokeless tobacco: Never Used   Substance and Sexual Activity    Alcohol use: Not Currently     Alcohol/week: 4 0 standard drinks     Types: 4 Cans of beer per week     Frequency: 4 or more times a week     Drinks per session: 3 or 4     Binge frequency: Daily or almost daily     Comment: daily/socially    Drug use: Never    Sexual activity: Not Currently   Lifestyle    Physical activity     Days per week: None     Minutes per session: None    Stress: None   Relationships    Social connections     Talks on phone: None     Gets together: None     Attends Jewish service: None     Active member of club or organization: None     Attends meetings of clubs or organizations: None     Relationship status: None    Intimate partner violence     Fear of current or ex partner: None     Emotionally abused: None     Physically abused: None     Forced sexual activity: None   Other Topics Concern    None   Social History Narrative    Daily caffeine use - 2 cups coffee        Family History   Problem Relation Age of Onset    Liver cancer Father        No Known Allergies      Current Outpatient Medications:     allopurinol (ZYLOPRIM) 100 mg tablet, Take 1 tablet (100 mg total) by mouth daily, Disp: 30 tablet, Rfl: 3    amLODIPine (NORVASC) 5 mg tablet, Take 5 mg by mouth daily  , Disp: , Rfl:     atorvastatin (LIPITOR) 80 mg tablet, Take 80 mg by mouth every other day evening, Disp: , Rfl:     colchicine (COLCRYS) 0 6 mg tablet, Take 0 6 mg by mouth 2 (two) times a day, Disp: , Rfl:     folic acid (FOLVITE) 1 mg tablet, Take 1 tablet (1 mg total) by mouth daily, Disp: 90 tablet, Rfl: 4    hydrOXYzine HCL (ATARAX) 25 mg tablet, Take 1 tablet (25 mg total) by mouth every 6 (six) hours as needed for itching or anxiety, Disp: 60 tablet, Rfl: 2    metoprolol tartrate (LOPRESSOR) 100 mg tablet, Take 100 mg by mouth daily  , Disp: , Rfl:     omeprazole (PriLOSEC) 20 mg delayed release capsule, Take 20 mg by mouth daily  , Disp: , Rfl:     sildenafil (VIAGRA) 100 mg tablet, TAKE ONE TABLET BY MOUTH  AS NEEDED PRIOR TO SEXUAL ACTIVITY FOR ERECTILE DYSFUNCTION, Disp: , Rfl:     terazosin (HYTRIN) 5 mg capsule, Take 2 capsules (10 mg total) by mouth daily at bedtime, Disp: 30 capsule, Rfl: 6    triamcinolone (KENALOG) 0 025 % cream, Apply topically 2 (two) times a day To chemo rash, Disp: 30 g, Rfl: 5    triamcinolone (KENALOG) 0 1 % lotion, Apply topically 3 (three) times a day, Disp: 60 mL, Rfl: 5    VITAMIN D PO, Take 1,000 Units by mouth daily , Disp: , Rfl:     acetaminophen (TYLENOL) 325 mg tablet, Take 2 tablets (650 mg total) by mouth every 6 (six) hours as needed for mild pain or fever (Patient not taking: Reported on 3/4/2021), Disp: , Rfl: 0    ALPRAZolam (XANAX) 0 25 mg tablet, Take 1 tablet (0 25 mg total) by mouth daily at bedtime as needed for anxiety, Disp: 30 tablet, Rfl: 0    calcium citrate-vitamin D (CITRACAL+D) 315-200 MG-UNIT per tablet, Take 1 tablet by mouth daily, Disp: , Rfl:     HYDROcodone-Acetaminophen 2 5-325 MG TABS, Take by mouth every 4 (four) hours, Disp: , Rfl:     HYDROmorphone (DILAUDID) 2 mg tablet, Take 1 tablet (2 mg total) by mouth every 4 (four) hours as needed for moderate painMax Daily Amount: 12 mg, Disp: 90 tablet, Rfl: 0    lidocaine (LMX) 4 % cream, Apply topically as needed for mild pain To abdomen and to L ankle as needed  , Disp: 30 g, Rfl: 0    LORazepam (ATIVAN) 0 5 mg tablet, Take 1 tablet (0 5 mg total) by mouth every 6 (six) hours as needed for anxiety (or restless legs), Disp: 15 tablet, Rfl: 0    naloxone (NARCAN) 4 mg/0 1 mL nasal spray, Administer 1 spray into a nostril   If breathing does not return to normal or if breathing difficulty resumes after 2-3 minutes, give another dose in the other nostril using a new spray , Disp: 1 each, Rfl: 1    ondansetron (ZOFRAN) 8 mg tablet, Take 1 tablet (8 mg total) by mouth every 8 (eight) hours as needed for nausea or vomiting, Disp: 20 tablet, Rfl: 3    senna (SENOKOT) 8 6 mg, Take 1 tablet (8 6 mg total) by mouth daily at bedtime, Disp: 30 each, Rfl: 0      Physical Exam:  /66 (BP Location: Left arm, Patient Position: Sitting, Cuff Size: Adult)   Pulse 65   Temp (!) 97 2 °F (36 2 °C) (Tympanic)   Resp 18   Ht 5' 8 5" (1 74 m)   Wt 89 1 kg (196 lb 6 4 oz)   SpO2 97%   BMI 29 43 kg/m²     Physical Exam  Constitutional:       Appearance: He is well-developed  HENT:      Head: Normocephalic and atraumatic  Eyes:      General: No scleral icterus  Right eye: No discharge  Left eye: No discharge  Conjunctiva/sclera: Conjunctivae normal       Pupils: Pupils are equal, round, and reactive to light  Neck:      Musculoskeletal: Normal range of motion and neck supple  Thyroid: No thyromegaly  Trachea: No tracheal deviation  Cardiovascular:      Rate and Rhythm: Normal rate and regular rhythm  Heart sounds: Normal heart sounds  No murmur  No friction rub  Pulmonary:      Effort: Pulmonary effort is normal  No respiratory distress  Breath sounds: Normal breath sounds  No wheezing or rales  Chest:      Chest wall: No tenderness  Abdominal:      General: There is no distension  Palpations: Abdomen is soft  There is no hepatomegaly or splenomegaly  Tenderness: There is no abdominal tenderness  There is no guarding or rebound  Musculoskeletal: Normal range of motion  General: No tenderness or deformity  Lymphadenopathy:      Cervical: No cervical adenopathy  Skin:     General: Skin is warm and dry  Coloration: Skin is not pale        Findings: Rash (Faint acne like skin rash scattered on the abdominal wall) present  No erythema  Neurological:      Mental Status: He is alert and oriented to person, place, and time  Cranial Nerves: No cranial nerve deficit  Coordination: Coordination normal       Deep Tendon Reflexes: Reflexes are normal and symmetric  Reflexes normal    Psychiatric:         Behavior: Behavior normal          Thought Content: Thought content normal          Judgment: Judgment normal            Labs:  Lab Results   Component Value Date    WBC 6 20 03/23/2021    HGB 12 5 (L) 03/23/2021    HCT 38 2 (L) 03/23/2021    MCV 91 03/23/2021     03/23/2021     Lab Results   Component Value Date    K 4 3 03/23/2021     03/23/2021    CO2 31 03/23/2021    BUN 21 03/23/2021    CREATININE 1 47 (H) 03/23/2021    GLUF 98 03/23/2021    CALCIUM 9 2 03/23/2021    CORRECTEDCA 9 8 10/28/2020    AST 10 (L) 03/23/2021    ALT 16 03/23/2021    ALKPHOS 77 03/23/2021    EGFR 47 03/23/2021     No results found for: TSH    Patient voiced understanding and agreement in the above discussion  Aware to contact our office with questions/symptoms in the interim

## 2021-03-26 ENCOUNTER — HOSPITAL ENCOUNTER (OUTPATIENT)
Dept: INFUSION CENTER | Facility: HOSPITAL | Age: 71
Discharge: HOME | End: 2021-03-26
Attending: INTERNAL MEDICINE
Payer: MEDICARE

## 2021-03-26 VITALS
HEIGHT: 69 IN | SYSTOLIC BLOOD PRESSURE: 128 MMHG | TEMPERATURE: 97.5 F | BODY MASS INDEX: 29.39 KG/M2 | HEART RATE: 56 BPM | OXYGEN SATURATION: 98 % | RESPIRATION RATE: 18 BRPM | WEIGHT: 198.41 LBS | DIASTOLIC BLOOD PRESSURE: 71 MMHG

## 2021-03-26 DIAGNOSIS — C65.2 CANCER OF LEFT RENAL PELVIS (HCC): ICD-10-CM

## 2021-03-26 DIAGNOSIS — C68.9 UROTHELIAL CANCER (HCC): Primary | ICD-10-CM

## 2021-03-26 PROCEDURE — 96413 CHEMO IV INFUSION 1 HR: CPT

## 2021-03-26 RX ORDER — SODIUM CHLORIDE 9 MG/ML
20 INJECTION, SOLUTION INTRAVENOUS ONCE
Status: COMPLETED | OUTPATIENT
Start: 2021-03-26 | End: 2021-03-26

## 2021-03-26 RX ADMIN — SODIUM CHLORIDE 20 ML/HR: 0.9 INJECTION, SOLUTION INTRAVENOUS at 10:19

## 2021-03-26 RX ADMIN — SODIUM CHLORIDE 200 MG: 9 INJECTION, SOLUTION INTRAVENOUS at 10:42

## 2021-03-26 NOTE — PROGRESS NOTES
Pt tolerated todays [facility] ([place]) well  No adverse reaction noted  Port flushed and deaccessed per routine  Pt is aware of upcoming appts  avs provided

## 2021-03-26 NOTE — PLAN OF CARE
Problem: Potential for Falls  Goal: Patient will remain free of falls  Description: INTERVENTIONS:  - Assess patient frequently for physical needs  -  Identify cognitive and physical deficits and behaviors that affect risk of falls    -  [place] fall precautions as indicated by assessment   - Educate patient/family on patient safety including physical limitations  - Instruct patient to call for assistance with activity based on assessment  - Modify environment to reduce risk of injury  - Consider OT/PT consult to assist with strengthening/mobility  Outcome: Progressing

## 2021-04-02 ENCOUNTER — OFFICE VISIT (OUTPATIENT)
Dept: PALLIATIVE MEDICINE | Facility: CLINIC | Age: 71
End: 2021-04-02
Payer: MEDICARE

## 2021-04-02 VITALS
DIASTOLIC BLOOD PRESSURE: 72 MMHG | SYSTOLIC BLOOD PRESSURE: 114 MMHG | OXYGEN SATURATION: 99 % | BODY MASS INDEX: 29.53 KG/M2 | HEART RATE: 63 BPM | WEIGHT: 197.09 LBS | RESPIRATION RATE: 16 BRPM | TEMPERATURE: 98 F

## 2021-04-02 DIAGNOSIS — I80.01 THROMBOPHLEBITIS OF SUPERFICIAL VEINS OF RIGHT LOWER EXTREMITY: ICD-10-CM

## 2021-04-02 DIAGNOSIS — Z51.5 PALLIATIVE CARE PATIENT: ICD-10-CM

## 2021-04-02 DIAGNOSIS — C68.9 UROTHELIAL CANCER (HCC): Primary | ICD-10-CM

## 2021-04-02 DIAGNOSIS — G89.3 CANCER RELATED PAIN: ICD-10-CM

## 2021-04-02 PROCEDURE — 99214 OFFICE O/P EST MOD 30 MIN: CPT | Performed by: FAMILY MEDICINE

## 2021-04-02 RX ORDER — PREDNISONE 10 MG/1
10 TABLET ORAL DAILY
Qty: 30 TABLET | Refills: 0 | Status: SHIPPED | OUTPATIENT
Start: 2021-04-02 | End: 2021-05-03 | Stop reason: SDUPTHER

## 2021-04-02 NOTE — PATIENT INSTRUCTIONS
It was a pleasure to see you again today  Thank you for coming in  · Refill sent to pharmacy for prednisone x 30 days  · Return in about 2 months  · Call us for refills on medications that we supply, as needed  · If something changes and you need to come in sooner, please call our office  PRESCRIPTION REFILL REMINDER:  All medication refills should be requested prior to [facility] on Friday  Any refill requests after noon on Friday would be addressed the following Monday  Please protect yourself from the novel Coronavirus (COVID-19)! The numbers of cases of Coronavirus are spiking in [address]  This is not a more dangerous virus, but a sign that more people in a community are spreading the virus  Please check the local disease reports near you if you consider travelling this summer  We do not advise travel to any community or State with a rising viral caseload   Wash your hands! Soap and water, or hand  with at least 60% alcohol, are both effective at killing the virus   Wear a mask! This will help protect others from any virus particles you might spread  Your mouth and nose BOTH need to be covered   Keep the distance! Keep 6 feet of distance from others people, even if they seem healthy  Keeping distance protects you from the other person's virus spread   Get a vaccine! The [employer] and [employer] are approved for emergency use in the [place]  These vaccines have been shown to be 90+% effective at preventing severe infections when combined with masking, hand-washing, and distancing    As of 1/26/2021, the following priority groups may get either vaccine:  o nursing home residents and staff  o front-line health workers  o ALL persons over age 72 (ages 76 and up can be seen at [facility])  o ANY person over age 12 that has a qualifying risk factor, such as diabetes, lung disease, or obesity    Please use  the following website  to check your eligibility in [place]:   Tj martinez    If you are under age 72, but still qualify, you may get a shot from many other locations outside [facility]: [facility], [place] [address], [facility]    We are recommending that ALL our patients get two shots of either vaccine, as early as it is available to them  Please keep an eye on the [employer], [place], or call 7-825-PZOHJBJ to see when you will become eligible  If you are eligible by the criteria above, please ask our team to get you a shot! Numbers of Coronavirus cases are spiking in many [place] States  This is not a more dangerous virus, but a sign that more people in a community are spreading the virus  Please check the local disease reports near you if you consider travelling  As of 1/25/21, we do NOT advise travel outside the [facility] ([place] or [place])  Check out You.Do for [place] data that are updated daily:   http://www YABUY/   Global Epidemics from [facility] (OUTPATIENT CAMPUS), will give you aoxiel-[id number] information on virus cases:   https://globalepidemics  org/

## 2021-04-02 NOTE — PROGRESS NOTES
Outpatient Follow-Up - Palliative and Supportive Care   Les Alfaro 66 y o  male [id number]    Assessment & Plan  1  Urothelial cancer ([address] )    2  Thrombophlebitis of superficial veins of right lower extremity    3  Palliative care patient    4  Cancer related pain        #symptom management   - cancer-related pain   - PDMP Reviewed              - last fill of hydromorphone 2 mg PO Q3H PRN [120 tabs] was on 01/26/2021                          - no longer taking   - last fill of alprazolam 0 25 mg x 30 tabs was on 03/25/2021    - mild/mod improvement of symptoms, not using nightly   - continue APAP 650 mg PO Q6H [address]   - continue prednisone 10 mg PO QDaily for appetite/energy/pain              - Rx sent to pharmacy   - continue current bowel regimen to prevent OIC              - miralax QDaily + linaclotide 145 mcg PO QDaily + senna QDaily PRN   - continue nausea regimen              - zofran 4 mg PO Q*H PRN   - will continue to closely monitor symptoms     #goals of care   - treatment focused care with no limitations at this time   - AD completed previous appt; scanned in EMR   - POLST completed, scanned in EMR     #psychosocial support   - emotional support provided   - wife accompanied to appointment today        [address] [facility] Follow up in 2 months  Medications adjusted this encounter:  Requested Prescriptions     Signed Prescriptions Disp Refills    predniSONE 10 mg tablet 30 tablet 0     Sig: Take 1 tablet (10 mg total) by mouth daily     No orders of the defined types were placed in this encounter  Medications Discontinued During This Encounter   Medication Reason    LORazepam (ATIVAN) 0 5 mg tablet Formulary change         Les Alfaro was seen today for symptoms and planning cares related to above illnesses    I have reviewed the patient's controlled substance dispensing history in the Prescription Drug Monitoring Program in compliance with the [facility] regulations before prescribing any controlled substances  They are invited to continue to follow with us  If there are questions or concerns, please contact us through our clinic/answering service 24 hours a day, seven days a week  Mela Nguyen MD  [facility]        Visit Information    Accompanied By: Spouse    Source of History: Self, Spouse, Medical record    History Limitations: None    History of Present Illness    Elmer Amato is a 69 y  o  male with a PMH of stage IIIA urothelial carcinoma of L kidney s/p nephroureterectomy [04/2020] on Keytruda who presents for routine [facility] clinic follow up visit      Primary Oncologist: Dr David Jaramillo              Initial diagnosis: Urine cytology 01/03/2020 with high-grade urothelial carcinoma + invasive high-grade urothelia carcinoma arising from renal pelvis 04/01/2020 from surgical biopsy              Initial imaging MRI abdomen 12/02/2019 which revealed multiple L renal masses     Pertinent issues include: symptom management, pain, neoplasm related    Patient reports adequate pain control with APAP and addition of dexamethasone, no longer use of hydromorphone  Intermittent R-flank pain, no aggravating factors identified, non-radiating  Overall feels good  Recently able to eat at a restaurant with wife to celebrate 66nd birthday  Completed both rounds of COVID vax  Denies nausea/vomiting  Appetite intact  BM daily  Sleep adequate  Reports nightly body tremors for the past month, intermittent, negatively impacts sleep initiation  No LOC  Aware occurring  Wife also reports occurs after patient goes to sleep  Frequent nightly awakenings [3-4 times/day] to void  Able to go back to sleep easily  Awakens at 7-8 am daily  Denies daytime sleepiness  Heme/onc Rx for alprazolam for symptomatic relief, mild/mod relief with use infrequently  Denies HA, numbness, paresthesia, focal weakness, vertigo, dysarthria, dysphagia      Past medical, surgical, social, and family histories are reviewed and pertinent updates are made  Review of Systems   Constitution: Negative for chills, decreased appetite, fever, malaise/fatigue and weight loss  HENT: Negative for congestion  Cardiovascular: Negative for chest pain  Respiratory: Negative for shortness of breath  Gastrointestinal: Negative for constipation, diarrhea, nausea and vomiting  Genitourinary: Positive for flank pain and nocturia  Neurological: Positive for tremors  Negative for focal weakness, headaches, numbness, paresthesias and vertigo  Psychiatric/Behavioral: The patient does not have insomnia  All other systems reviewed and are negative  Vital Signs    /72 (BP Location: Left arm, Patient Position: Sitting, Cuff Size: Standard)   Pulse 63   Temp 98 °F (36 7 °C) (Temporal)   Resp 16   Wt 89 4 kg (197 lb 1 5 oz)   SpO2 99%   BMI 29 53 kg/m²     Physical Exam and Objective Data  Physical Exam  Constitutional:       Comments: Sitting up comfortably in NAD  Non-toxic appearing   HENT:      Head: Normocephalic and atraumatic  Right Ear: External ear normal       Left Ear: External ear normal    Eyes:      Comments: No gaze preference   Neck:      Musculoskeletal: Normal range of motion  Pulmonary:      Effort: No tachypnea, accessory muscle usage or respiratory distress  Comments: Completes full sentences without difficulty  Abdominal:      General: Abdomen is flat  Palpations: Abdomen is soft  Neurological:      General: No focal deficit present  Mental Status: He is alert and oriented to person, place, and time     Psychiatric:         Attention and Perception: Attention and perception normal          Mood and Affect: Mood and affect normal          Speech: Speech normal          Behavior: Behavior normal            Radiology and Laboratory:  I personally reviewed and interpreted the following results:    Most Recent COVID-19 Results:  No results found for: [address] [place] [address], Brooklynn Grossman    Most Recent Lab Work:  Lab Results   Component Value Date/Time    SODIUM 141 03/23/2021 11:12 AM    K 4 3 03/23/2021 11:12 AM    BUN 21 03/23/2021 11:12 AM    CREATININE 1 47 (H) 03/23/2021 11:12 AM    GLUC 111 (H) 01/18/2021 10:22 AM     Lab Results   Component Value Date/Time    AST 10 (L) 03/23/2021 11:12 AM    ALT 16 03/23/2021 11:12 AM    ALB 3 9 03/23/2021 11:12 AM     Lab Results   Component Value Date/Time    HGB 12 5 (L) 03/23/2021 11:12 AM    WBC 6 20 03/23/2021 11:12 AM     03/23/2021 11:12 AM    INR 0 97 03/09/2020 02:44 PM    PTT 30 03/09/2020 02:44 PM       Most Recent Imaging (w/i last 30-days):  Procedure: Xr Shoulder 2+ Vw Left    Result Date: 3/8/2021  Narrative: LEFT SHOULDER INDICATION:   M25 512: Pain in left shoulder  COMPARISON:  None VIEWS:  XR SHOULDER 2+ VW LEFT FINDINGS: There is no acute fracture or dislocation  Mild osteoarthritis glenohumeral and AC joints  No lytic or blastic osseous lesion  Soft tissues are unremarkable  Impression: No acute osseous abnormality  Workstation performed: [id number]     Procedure: Vas Lower Limb Venous Duplex Study, Complete Bilateral    Result Date: 3/4/2021  Narrative:  [facility] REPORT CLINICAL: Indications: Patient presents with right calf pain x three weeks  Operative History: 4/1/2020 Pr nephrectomy, w/part  ureterectomy (Left) Risk Factors The patient has history of HTN and HLD  FINDINGS:  Right         Impression            SSV Mid Calf  Thrombosed (Chronic)     CONCLUSION: Impression: RIGHT LOWER LIMB: No evidence of acute or chronic deep vein thrombosis  No evidence of superficial thrombophlebitis noted in the Great Saphenous Vein  There is chronic superficial thrombophlebitis noted in the Small Saphenous vein in the mid calf  Doppler evaluation shows a normal response to augmentation maneuvers  Popliteal, posterior tibial and anterior tibial arterial Doppler waveforms are triphasic    LEFT LOWER LIMB: No evidence of acute or chronic deep vein thrombosis  No evidence of superficial thrombophlebitis noted  Doppler evaluation shows a normal response to augmentation maneuvers  Popliteal, posterior tibial and anterior tibial arterial Doppler waveforms are triphasic  Technical findings were given to Alexys Barragan RN  SIGNATURE: Electronically Signed by: Dee Ibarra MD on 2021-03-04 04:55:43 PM      40+ minutes was spent face to face with Juan José Pereira and his spouse with greater than 50% of the time spent in counseling or coordination of care including discussions of chart review, symptoms assessment, medication refill, emotional support  Additional time was also spent in discussing coronavirus vaccine indications, availability, and logistical challenges  All of the patient's or agent's questions were answered during this discussion

## 2021-04-09 DIAGNOSIS — C68.9 UROTHELIAL CANCER (HCC): ICD-10-CM

## 2021-04-09 DIAGNOSIS — C65.2 CANCER OF LEFT RENAL PELVIS (HCC): Primary | ICD-10-CM

## 2021-04-09 RX ORDER — SODIUM CHLORIDE 9 MG/ML
20 INJECTION, SOLUTION INTRAVENOUS ONCE
Status: CANCELLED | OUTPATIENT
Start: 2021-04-16

## 2021-04-12 ENCOUNTER — APPOINTMENT (OUTPATIENT)
Dept: LAB | Facility: HOSPITAL | Age: 71
End: 2021-04-12
Attending: INTERNAL MEDICINE
Payer: MEDICARE

## 2021-04-12 DIAGNOSIS — C68.9 UROTHELIAL CANCER (HCC): ICD-10-CM

## 2021-04-12 LAB
ALBUMIN SERPL BCP-MCNC: 3.9 G/DL (ref 3.5–5.7)
ALP SERPL-CCNC: 67 U/L (ref 55–165)
ALT SERPL W P-5'-P-CCNC: 14 U/L (ref 7–52)
ANION GAP SERPL CALCULATED.3IONS-SCNC: 7 MMOL/L (ref 4–13)
AST SERPL W P-5'-P-CCNC: 10 U/L (ref 13–39)
BASOPHILS # BLD AUTO: 0 THOUSANDS/ΜL (ref 0–0.1)
BASOPHILS NFR BLD AUTO: 1 % (ref 0–2)
BILIRUB SERPL-MCNC: 1.2 MG/DL (ref 0.2–1)
BUN SERPL-MCNC: 23 MG/DL (ref 7–25)
CALCIUM SERPL-MCNC: 9.2 MG/DL (ref 8.6–10.5)
CHLORIDE SERPL-SCNC: 99 MMOL/L (ref 98–107)
CO2 SERPL-SCNC: 32 MMOL/L (ref 21–31)
CREAT SERPL-MCNC: 1.71 MG/DL (ref 0.7–1.3)
EOSINOPHIL # BLD AUTO: 0.1 THOUSAND/ΜL (ref 0–0.61)
EOSINOPHIL NFR BLD AUTO: 3 % (ref 0–5)
ERYTHROCYTE [DISTWIDTH] IN BLOOD BY AUTOMATED COUNT: 15.2 % (ref 11.5–14.5)
GFR SERPL CREATININE-BSD FRML MDRD: 39 ML/MIN/1.73SQ M
GLUCOSE SERPL-MCNC: 121 MG/DL (ref 65–99)
HCT VFR BLD AUTO: 39.5 % (ref 42–47)
HGB BLD-MCNC: 13.2 G/DL (ref 14–18)
LYMPHOCYTES # BLD AUTO: 1.3 THOUSANDS/ΜL (ref 0.6–4.47)
LYMPHOCYTES NFR BLD AUTO: 21 % (ref 21–51)
MAGNESIUM SERPL-MCNC: 1.9 MG/DL (ref 1.9–2.7)
MCH RBC QN AUTO: 30.8 PG (ref 26–34)
MCHC RBC AUTO-ENTMCNC: 33.3 G/DL (ref 31–37)
MCV RBC AUTO: 92 FL (ref 81–99)
MONOCYTES # BLD AUTO: 0.6 THOUSAND/ΜL (ref 0.17–1.22)
MONOCYTES NFR BLD AUTO: 10 % (ref 2–12)
NEUTROPHILS # BLD AUTO: 4 THOUSANDS/ΜL (ref 1.4–6.5)
NEUTS SEG NFR BLD AUTO: 66 % (ref 42–75)
PLATELET # BLD AUTO: 138 THOUSANDS/UL (ref 149–390)
PMV BLD AUTO: 7.7 FL (ref 8.6–11.7)
POTASSIUM SERPL-SCNC: 4.4 MMOL/L (ref 3.5–5.5)
PROT SERPL-MCNC: 6.3 G/DL (ref 6.4–8.9)
RBC # BLD AUTO: 4.28 MILLION/UL (ref 4.3–5.9)
SODIUM SERPL-SCNC: 138 MMOL/L (ref 134–143)
WBC # BLD AUTO: 6 THOUSAND/UL (ref 4.8–10.8)

## 2021-04-12 PROCEDURE — 85025 COMPLETE CBC W/AUTO DIFF WBC: CPT

## 2021-04-12 PROCEDURE — 80053 COMPREHEN METABOLIC PANEL: CPT

## 2021-04-12 PROCEDURE — 83735 ASSAY OF MAGNESIUM: CPT

## 2021-04-12 PROCEDURE — 36415 COLL VENOUS BLD VENIPUNCTURE: CPT

## 2021-04-13 ENCOUNTER — OFFICE VISIT (OUTPATIENT)
Dept: PAIN MEDICINE | Facility: CLINIC | Age: 71
End: 2021-04-13
Payer: MEDICARE

## 2021-04-13 VITALS
SYSTOLIC BLOOD PRESSURE: 140 MMHG | BODY MASS INDEX: 29.18 KG/M2 | HEIGHT: 69 IN | DIASTOLIC BLOOD PRESSURE: 86 MMHG | TEMPERATURE: 97.9 F | HEART RATE: 60 BPM | WEIGHT: 197 LBS

## 2021-04-13 DIAGNOSIS — M54.16 LUMBAR RADICULOPATHY: ICD-10-CM

## 2021-04-13 DIAGNOSIS — G89.4 CHRONIC PAIN DISORDER: Primary | ICD-10-CM

## 2021-04-13 DIAGNOSIS — M47.816 LUMBAR SPONDYLOSIS: ICD-10-CM

## 2021-04-13 DIAGNOSIS — M51.26 LUMBAR DISC HERNIATION: ICD-10-CM

## 2021-04-13 DIAGNOSIS — M48.061 SPINAL STENOSIS OF LUMBAR REGION, UNSPECIFIED WHETHER NEUROGENIC CLAUDICATION PRESENT: ICD-10-CM

## 2021-04-13 PROCEDURE — 99213 OFFICE O/P EST LOW 20 MIN: CPT | Performed by: NURSE PRACTITIONER

## 2021-04-13 RX ORDER — MULTIVITAMIN WITH IRON
1 TABLET ORAL 2 TIMES DAILY
COMMUNITY

## 2021-04-13 NOTE — PROGRESS NOTES
Assessment:  1  Chronic pain disorder    2  Lumbar spondylosis    3  Lumbar disc herniation    4  Lumbar radiculopathy    5  Spinal stenosis of lumbar region, unspecified whether neurogenic claudication present        Plan:  1  We can repeat bilateral L2-5 RFA every 6-9 months p r n  2  The patient will continue to follow with palliative medicine  3  I will avoid NSAIDs secondary to chronic prednisone therapy  4  The patient will continue with his home exercise program  5  Patient will follow up on a p r n  basis at this time     M*Modal software was used to dictate this note  It may contain errors with dictating incorrect words or incorrect spelling  Please contact the provider directly with any questions  History of Present Illness: The patient is a 66 y o  male the history of left nephrectomy a ureterectomy secondary to urothelial carcinoma last seen on 1/26/21 who presents for a follow up office visit in regards to chronic low back pain that is nonradiating into the lower extremities secondary to  Lumbar spondylosis, lumbar stenosis and chronic pain syndrome  The patient denies bowel or bladder incontinence or saddle anesthesia  The patient is currently following with palliative medicine is under the care for cancer related pain complaints  He is status post bilateral L2-5 RFA and reports an ongoing 75% improvement of his pain from this procedure  He states he is no longer taking Dilaudid or medical marijuana per palliative medicine as he does not find it necessary  He did recently start on prednisone daily as prescribed by palliative Medicine with improvement of his symptoms  the patient currently rates the pain  A 2/10 on the numeric pain rating scale  States his pain is intermittent in nature and bothersome the morning  He characterizes the pain as sharp      I have personally reviewed and/or updated the patient's past medical history, past surgical history, family history, social history, current medications, allergies, and vital signs today  Review of Systems:    Review of Systems      Past Medical History:   Diagnosis Date    Arthritis     Bladder cancer     Cancer ([facility] [address] )     skin melanoma;basal cell    Chronic pain disorder     from arthritis    Colon polyp     Does use hearing aid     bilat    Dry eyes, bilateral     GERD (gastroesophageal reflux disease)     History of kidney stones 10/2019    History of partial knee replacement     bilat    History of vertigo     Hyperlipidemia     Hypertension     Kidney lesion     Lumbar disc disorder     compression of vertebrae L4-5-6    Muscle weakness     left hip area    Renal mass     left    Right ankle injury 03/05/2020    missed step of ladder     Right ankle pain     Shortness of breath     with activity    Tinnitus     Urothelial cancer     left    Wears glasses        Past Surgical History:   Procedure Laterality Date    COLONOSCOPY      CYSTOSCOPY Left 4/1/2020    Procedure: CYSTOSCOPY; URETERAL CATHETER PLACEMENT;  Surgeon: Peggyann Bosworth, MD;  Location[facility];  Service: Urology    CYSTOSCOPY  05/11/2020    FL CYSTOGRAM  4/13/2020    FL RETROGRADE PYELOGRAM  1/17/2020    HERNIA REPAIR      umbilical with mesh    INGUINAL HERNIA REPAIR Right     with mesh    IR PORT PLACEMENT  4/30/2020    JOINT REPLACEMENT Bilateral     partials knee    LUMBAR EPIDURAL INJECTION      HI CYSTOURETHROSCOPY,URETER CATHETER Bilateral 1/17/2020    Procedure: CYSTOSCOPY; RIGHT RETROGRADE PYELOGRAM WITH RIGHT URETERAL CYTOLOGY SAMPLING; LEFT URETEROSCOPY WITH RENAL PELVIS BIOPSY AND LEFT STENT PLACEMENT;  Surgeon: Peggyann Bosworth, MD;  [facility];  Service: Urology    HI NEPHRECTOMY, W/PART   URETECTOMY Left 4/1/2020    Procedure: ROBOTIC LAPAROSCOPIC NEPHRO-URETERECTOMY;  Surgeon: Peggyann Bosworth, MD;  [facility];  Service: Urology    SKIN CANCER EXCISION      surface melanoma  TONSILLECTOMY      WISDOM TOOTH EXTRACTION         Family History   Problem Relation Age of Onset    Liver cancer Father        Social History     Occupational History    Not on file   Tobacco Use    Smoking status: Former Smoker     Quit date:      Years since quittin 3    Smokeless tobacco: Never Used   Substance and Sexual Activity    Alcohol use: Not Currently     Alcohol/week: 4 0 standard drinks     Types: 4 Cans of beer per week     Frequency: 4 or more times a week     Drinks per session: 3 or 4     Binge frequency: Daily or almost daily     Comment: daily/socially    Drug use: Never    Sexual activity: Not Currently         Current Outpatient Medications:     acetaminophen (TYLENOL) 325 mg tablet, Take 2 tablets (650 mg total) by mouth every 6 (six) hours as needed for mild pain or fever, Disp: , Rfl: 0    allopurinol (ZYLOPRIM) 100 mg tablet, Take 1 tablet (100 mg total) by mouth daily, Disp: 30 tablet, Rfl: 3    ALPRAZolam (XANAX) 0 25 mg tablet, Take 1 tablet (0 25 mg total) by mouth daily at bedtime as needed for anxiety, Disp: 30 tablet, Rfl: 0    amLODIPine (NORVASC) 5 mg tablet, Take 5 mg by mouth daily  , Disp: , Rfl:     atorvastatin (LIPITOR) 80 mg tablet, Take 80 mg by mouth every other day evening, Disp: , Rfl:     calcium citrate-vitamin D (CITRACAL+D) 315-200 MG-UNIT per tablet, Take 1 tablet by mouth daily, Disp: , Rfl:     colchicine (COLCRYS) 0 6 mg tablet, Take 0 6 mg by mouth 2 (two) times a day, Disp: , Rfl:     folic acid (FOLVITE) 1 mg tablet, Take 1 tablet (1 mg total) by mouth daily, Disp: 90 tablet, Rfl: 4    HYDROcodone-Acetaminophen 2 5-325 MG TABS, Take by mouth every 4 (four) hours, Disp: , Rfl:     HYDROmorphone (DILAUDID) 2 mg tablet, Take 1 tablet (2 mg total) by mouth every 4 (four) hours as needed for moderate painMax Daily Amount: 12 mg (Patient not taking: Reported on 2021), Disp: 90 tablet, Rfl: 0    hydrOXYzine HCL (ATARAX) 25 mg tablet, Take 1 tablet (25 mg total) by mouth every 6 (six) hours as needed for itching or anxiety, Disp: 60 tablet, Rfl: 2    lidocaine (LMX) 4 % cream, Apply topically as needed for mild pain To abdomen and to L ankle as needed  , Disp: 30 g, Rfl: 0    metoprolol tartrate (LOPRESSOR) 100 mg tablet, Take 100 mg by mouth daily  , Disp: , Rfl:     naloxone (NARCAN) 4 mg/0 1 mL nasal spray, Administer 1 spray into a nostril  If breathing does not return to normal or if breathing difficulty resumes after 2-3 minutes, give another dose in the other nostril using a new spray , Disp: 1 each, Rfl: 1    omeprazole (PriLOSEC) 20 mg delayed release capsule, Take 20 mg by mouth daily  , Disp: , Rfl:     ondansetron (ZOFRAN) 8 mg tablet, Take 1 tablet (8 mg total) by mouth every 8 (eight) hours as needed for nausea or vomiting (Patient not taking: Reported on 4/2/2021), Disp: 20 tablet, Rfl: 3    predniSONE 10 mg tablet, Take 1 tablet (10 mg total) by mouth daily, Disp: 30 tablet, Rfl: 0    senna (SENOKOT) 8 6 mg, Take 1 tablet (8 6 mg total) by mouth daily at bedtime (Patient not taking: Reported on 4/2/2021), Disp: 30 each, Rfl: 0    sildenafil (VIAGRA) 100 mg tablet, TAKE ONE TABLET BY MOUTH  AS NEEDED PRIOR TO SEXUAL ACTIVITY FOR ERECTILE DYSFUNCTION, Disp: , Rfl:     terazosin (HYTRIN) 5 mg capsule, Take 2 capsules (10 mg total) by mouth daily at bedtime, Disp: 30 capsule, Rfl: 6    triamcinolone (KENALOG) 0 025 % cream, Apply topically 2 (two) times a day To chemo rash, Disp: 30 g, Rfl: 5    triamcinolone (KENALOG) 0 1 % lotion, Apply topically 3 (three) times a day, Disp: 60 mL, Rfl: 5    VITAMIN D PO, Take 1,000 Units by mouth daily , Disp: , Rfl:     No Known Allergies    Physical Exam:    There were no vitals taken for this visit  Constitutional:normal, well developed, well nourished, alert, in no distress and non-toxic and no overt pain behavior    Eyes:anicteric  HEENT:grossly intact  Neck:supple, symmetric, trachea midline and no masses   Pulmonary:even and unlabored  Cardiovascular:No edema or pitting edema present  Skin:Normal without rashes or lesions and well hydrated  Psychiatric:Mood and affect appropriate  Neurologic:Cranial Nerves II-XII grossly intact  Musculoskeletal:normal  Gait      Imaging  No orders to display     ADDENDUM:   Incompletely evaluated 3 cm left upper pole renal mass  Renal CT protocol is suggested to evaluate left kidney  The study was marked in EPIC for significant and one month follow-up notification  Addended by Roxie Blanco MD on 12/4/2019 10:29 AM   Study Result  MRI LUMBAR SPINE WITHOUT CONTRAST   INDICATION: M54 16: Radiculopathy, lumbar region  COMPARISON: None  TECHNIQUE: Sagittal T1, sagittal T2, sagittal inversion recovery, axial T1 and axial T2, coronal T2    IMAGE QUALITY: Diagnostic   FINDINGS:   VERTEBRAL BODIES: Straightening of normal lumbar lordosis  Prominent right lateral osteophytes L2-L3 and left lateral osteophytes L3-L4  Marrow edema is evident at the L4-L5 level likely, related to degenerative disease  SACRUM: Normal signal within the sacrum  No evidence of insufficiency or stress fracture  DISTAL CORD AND CONUS: Normal size and signal within the distal cord and conus  Conus terminates at the L1 level  Moderate degree of congenital canal stenosis, exacerbated by lipomatosis and spondylosis and osteoarthritis  Minor redundancy of the   cauda equina  PARASPINAL SOFT TISSUES: Multiple left renal masses to include a left lower pole cyst and a rounded structure in the left upper pole which may also represent cyst  Left upper pole renal masses approximately 3 cm in greatest linear dimension and not   described on recent ultrasound  Renal protocol CT is suggested to evaluate the left upper kidney  LOWER THORACIC DISC SPACES: Normal disc height and signal  No disc herniation, canal stenosis or foraminal narrowing   Endplate Schmorl's node through the low thoracic and upper lumbar spine   LUMBAR DISC SPACES:   L1-L2: Mild facet arthrosis, right lateral osteophytes  L2-L3: Reduction disc height, a superimposed right lateral osteophytes  Circumferential bulge  Moderate facet arthrosis  L3-L4: Decreased disc height, marginal osteophytes asymmetric to the left, advanced facet arthrosis  Sac reduced to approximately 5-6 mm, with effacement of CSF within the sac  L4-L5: Decreased disc height, marginal osteophytes, right greater than left facet arthrosis  AP dimension of the sac reduced to 5-6 mm again with the effacement of the CSF  Mild foraminal stenosis  Mild lipomatosis  L5-S1: Reduction disc height, circumferential bulge with marginal osteophytes and bilateral facet arthrosis present to a moderate degree  Lipomatosis narrows the thecal sac  IMPRESSION:   Multifactorial spinal stenosis most severe at the L3-L4 and L4-L5 levels (5-6 mm)  Correlate for signs and symptoms of spinal stenosis, bilateral L4 and L5 radiculitis  No orders of the defined types were placed in this encounter

## 2021-04-14 ENCOUNTER — OFFICE VISIT (OUTPATIENT)
Dept: HEMATOLOGY ONCOLOGY | Facility: CLINIC | Age: 71
End: 2021-04-14
Payer: MEDICARE

## 2021-04-14 VITALS
OXYGEN SATURATION: 99 % | HEART RATE: 60 BPM | HEIGHT: 69 IN | SYSTOLIC BLOOD PRESSURE: 120 MMHG | RESPIRATION RATE: 16 BRPM | DIASTOLIC BLOOD PRESSURE: 74 MMHG | WEIGHT: 194.7 LBS | BODY MASS INDEX: 28.84 KG/M2 | TEMPERATURE: 96.8 F

## 2021-04-14 DIAGNOSIS — D53.9 NUTRITIONAL ANEMIA, UNSPECIFIED: ICD-10-CM

## 2021-04-14 DIAGNOSIS — F41.9 ANXIETY: ICD-10-CM

## 2021-04-14 DIAGNOSIS — N28.9 RENAL DYSFUNCTION: ICD-10-CM

## 2021-04-14 DIAGNOSIS — C68.9 UROTHELIAL CANCER (HCC): Primary | ICD-10-CM

## 2021-04-14 DIAGNOSIS — C64.2 MALIGNANT NEOPLASM OF LEFT KIDNEY, EXCEPT RENAL PELVIS (HCC): ICD-10-CM

## 2021-04-14 DIAGNOSIS — I80.01 THROMBOPHLEBITIS OF SUPERFICIAL VEINS OF RIGHT LOWER EXTREMITY: ICD-10-CM

## 2021-04-14 DIAGNOSIS — R21 RASH AND NONSPECIFIC SKIN ERUPTION: ICD-10-CM

## 2021-04-14 PROCEDURE — 99214 OFFICE O/P EST MOD 30 MIN: CPT | Performed by: INTERNAL MEDICINE

## 2021-04-14 NOTE — PROGRESS NOTES
Hematology/Oncology Outpatient Follow-up  Pablo Jarvis 66 y o  male 1950 [id number]    Date:  4/14/2021        Assessment and Plan:  1  Urothelial cancer [facility])  The patient will be continued on the [facility] on every 3 week basis  We will pursue a PET-CT scan prior to his next visit to evaluate the status of his metastatic disease   - CBC and differential; Future  - Comprehensive metabolic panel; Future  - TSH, 3rd generation with Free T4 reflex; Future  - Magnesium  - NM PET CT skull base to mid thigh; Future  - Ambulatory referral to Nephrology; Future    2  Thrombophlebitis of superficial veins of right lower extremity    He was instructed to continue with the baby aspirin for now  3  Anxiety    I instructed him to only take a Xanax on as-needed basis at the current dose of 0 25 mg     4  Rash and nonspecific skin eruption   the skin rash seems to be under control with the 10 mg of prednisone on a daily basis  5  Renal dysfunction    He will be sent to the nephrology team for further evaluation of worsening of his kidney function since he only has 1 kidney  - Ambulatory referral to Nephrology; Future              HPI:   the patient came today for a follow-up visit accompanied by his wife  He continues to take the prednisone 10 mg once a day which is improving his muscle pain and right lower extremity pain  He also started to take Xanax 0 25 mg as needed for anxiety which is improving his quality of life significantly  He stated that his skin rash is under control  He continues to be on the [facility] on every 3 week basis which he is tolerating relatively well  His most recent blood work on 04/12/2021 showed hemoglobin of 13 2 with platelet count 739 white cell count is 6 0  Creatinine 1 7 up from 1 47  Magnesium 1 9  Recent TSH from March was 2 4  Oncology History Overview Note   Patient is a 69 y o male with stage IV metastatic urothelial carcinoma of the left renal pelvis   He completed palliative radiation to the left abdomen 12/3/20     12/4/20 Dr Kemar Ghosh- F/u in 6 months for possible cystoscopy at that time  He reports he is no longer taking the Proscar and feels though he is emptying his bladder well     12/29/20 Yazmin President NP- patient will be continued on his current palliative treatment with single agent immunotherapy Keytruda on a every 3 week basis which he is tolerating well  F/u in 3 weeks with labs  We will pursue PET prior to his next follow-up in 3 weeks after 5 treatment to rule out progressive disease since he is complaining about more persistent pain especially in the right abdomen/flank area  1/13/21 PET scan- 1  Mixed findings for response to therapy  2   Significant improvement in previously seen FDG avid lymph nodes in the periesophageal region, retrocrural region, retroperitoneum and near the left renal bed  3  Largely resolved FDG avid left rectus abdominis lesion  4   However, there are new FDG avid lymph nodes in the portacaval region and retroperitoneum suspicious for malignancy  5   New asymmetric focus of FDG uptake at the level of the right vocal fold, SUV max of 5 0  This may be inflammatory but can be reassessed on follow up  1/19/21 Yazmin President NP  1/22/20 infusion  2/5/21 palliative care  6/4/21 Dr Kemar Ghosh     Urothelial cancer [facility])   1/3/2020 Biopsy    Final Diagnosis    A  Urine, Clean Catch, Thin Prep:  High grade urothelial carcinoma (HGUC) - see comment  1/3/2020 Initial Diagnosis    Urothelial cancer ([address] )  T3 N0 (stage IIIA)     1/17/2020 Biopsy    Final Diagnosis    A  Ureter, Right, left renal pelvis biopsy  -Fragments of high grade urothelial carcinoma   -No evidence of lamina propria invasion   -Detrusor muscle/ muscularis propria is not present for evaluation  B  Urinary Bladder, bladder biopsy:  -Fragments of low grade papillary lesion   See note  -No evidence of invasion seen  -Unremarkable fragment of detrusor muscle seen  4/1/2020 Surgery    left robotic assisted laparoscopic nephroureterectomy with bladder cuff excision     Final Diagnosis    A  Left kidney, ureter, and bladder cuff, nephroureterectomy:  - Invasive high grade urothelial carcinoma arising in renal pelvis  - Bladder cuff margin is negative for carcinoma and no evidence of high grade dysplasia  - Ureters with no significant pathologic abnormality  - Two benign simple cysts  - Adrenal gland is negative for malignancy  - One lymph node, negative for malignancy (0/1)          5/14/2020 - 6/3/2020 Chemotherapy    CISplatin (PLATINOL) split dose 35 mg/m2 = 75 3 mg (50 % of original dose 70 mg/m2), Intravenous,   Administration: 75 3 mg (5/14/2020), 75 3 mg (5/21/2020)  gemcitabine (GEMZAR) 2,200 mg in sodium chloride 0 9 % 250 mL infusion, 2,150 2 mg (80 % of original dose 1,250 mg/m2), Intravenous,   Administration: 2,200 mg (5/14/2020), 2,200 mg (5/21/2020)    (only completed 1 cycle- d/c d/t adverse effects and worsening renal dysfunction)     10/9/2020 -  Chemotherapy    pembrolizumab (KEYTRUDA) 200 mg in sodium chloride 0 9 % 50 mL IVPB, 200 mg, Intravenous, Once, 9 of 14 cycles  Administration: 200 mg (10/9/2020), 200 mg (10/30/2020), 200 mg (11/20/2020), 200 mg (12/11/2020), 200 mg (12/31/2020), 200 mg (1/22/2021), 200 mg (2/12/2021), 200 mg (3/5/2021), 200 mg (3/26/2021)     11/19/2020 - 12/3/2020 Radiation    Treatment:  Course: C1    Plan ID Energy Fractions Dose per Fraction (cGy) Dose Correction (cGy) Total Dose Delivered (cGy) Elapsed Days   L Abdomen 6X 10 / 10 300 0 3,000 14        Cancer of left renal pelvis ([address] )   4/23/2020 Initial Diagnosis    Cancer of left renal pelvis ([facility] [address] )     10/9/2020 -  Chemotherapy    pembrolizumab (KEYTRUDA) 200 mg in sodium chloride 0 9 % 50 mL IVPB, 200 mg, Intravenous, Once, 9 of 14 cycles  Administration: 200 mg (10/9/2020), 200 mg (10/30/2020), 200 mg (11/20/2020), 200 mg (12/11/2020), 200 mg (12/31/2020), 200 mg (1/22/2021), 200 mg (2/12/2021), 200 mg (3/5/2021), 200 mg (3/26/2021)         Interval history:    ROS: Review of Systems   Constitutional: Negative for chills and fever  HENT: Positive for hearing loss  Negative for ear pain and sore throat  Eyes: Negative for pain and visual disturbance  Respiratory: Negative for cough and shortness of breath  Cardiovascular: Negative for chest pain and palpitations  Gastrointestinal: Negative for abdominal pain and vomiting  Genitourinary: Negative for dysuria and hematuria  Musculoskeletal: Negative for arthralgias and back pain  Skin: Positive for rash  Negative for color change  Neurological: Positive for numbness and headaches  Negative for seizures and syncope  All other systems reviewed and are negative        Past Medical History:   Diagnosis Date    Arthritis     Bladder cancer     Cancer ([facility] [address] )     skin melanoma;basal cell    Chronic pain disorder     from arthritis    Colon polyp     Does use hearing aid     bilat    Dry eyes, bilateral     GERD (gastroesophageal reflux disease)     History of kidney stones 10/2019    History of partial knee replacement     bilat    History of vertigo     Hyperlipidemia     Hypertension     Kidney lesion     Lumbar disc disorder     compression of vertebrae L4-5-6    Muscle weakness     left hip area    Renal mass     left    Right ankle injury 03/05/2020    missed step of ladder     Right ankle pain     Shortness of breath     with activity    Tinnitus     Urothelial cancer     left    Wears glasses        Past Surgical History:   Procedure Laterality Date    COLONOSCOPY      CYSTOSCOPY Left 4/1/2020    Procedure: CYSTOSCOPY; URETERAL CATHETER PLACEMENT;  Surgeon: Ana Sorensen MD;  Location[facility];  Service: Urology    CYSTOSCOPY  05/11/2020    FL CYSTOGRAM  4/13/2020    FL RETROGRADE PYELOGRAM  1/17/2020    HERNIA REPAIR      umbilical with mesh   [facility] INGUINAL HERNIA REPAIR Right     with mesh    IR PORT PLACEMENT  2020    JOINT REPLACEMENT Bilateral     partials knee    LUMBAR EPIDURAL INJECTION      [place] CYSTOURETHROSCOPY,URETER CATHETER Bilateral 2020    Procedure: CYSTOSCOPY; RIGHT RETROGRADE PYELOGRAM WITH RIGHT URETERAL CYTOLOGY SAMPLING; LEFT URETEROSCOPY WITH RENAL PELVIS BIOPSY AND LEFT STENT PLACEMENT;  Surgeon: Latricia Oakes MD;  [facility];  Service: Urology    [place] NEPHRECTOMY, W/PART   URETECTOMY Left 2020    Procedure: ROBOTIC LAPAROSCOPIC NEPHRO-URETERECTOMY;  Surgeon: Latricia Oakes MD;  [facility];  Service: Urology    SKIN CANCER EXCISION      surface melanoma    TONSILLECTOMY      WISDOM TOOTH EXTRACTION         Social History     Socioeconomic History    Marital status: /Civil Union     Spouse name: None    Number of children: None    Years of education: None    Highest education level: None   Occupational History    None   Social Needs    Financial resource strain: None    Food insecurity     Worry: None     Inability: None    Transportation needs     Medical: None     Non-medical: None   Tobacco Use    Smoking status: Former Smoker     Quit date:      Years since quittin 3    Smokeless tobacco: Never Used   Substance and Sexual Activity    Alcohol use: Not Currently     Alcohol/week: 4 0 standard drinks     Types: 4 Cans of beer per week     Frequency: 4 or more times a week     Drinks per session: 3 or 4     Binge frequency: Daily or almost daily     Comment: daily/socially    Drug use: Never    Sexual activity: Not Currently   Lifestyle    Physical activity     Days per week: None     Minutes per session: None    Stress: None   Relationships    Social connections     Talks on phone: None     Gets together: None     Attends L[employer] day service: None     Active member of club or organization: None     Attends meetings of clubs or organizations: None Relationship status: None    Intimate partner violence     Fear of current or ex partner: None     Emotionally abused: None     Physically abused: None     Forced sexual activity: None   Other Topics Concern    None   Social History Narrative    Daily caffeine use - 2 cups coffee        Family History   Problem Relation Age of Onset    Liver cancer Father        No Known Allergies      Current Outpatient Medications:     acetaminophen (TYLENOL) 325 mg tablet, Take 2 tablets (650 mg total) by mouth every 6 (six) hours as needed for mild pain or fever, Disp: , Rfl: 0    allopurinol (ZYLOPRIM) 100 mg tablet, Take 1 tablet (100 mg total) by mouth daily, Disp: 30 tablet, Rfl: 3    amLODIPine (NORVASC) 5 mg tablet, Take 5 mg by mouth daily  , Disp: , Rfl:     atorvastatin (LIPITOR) 80 mg tablet, Take 80 mg by mouth every other day evening, Disp: , Rfl:     folic acid (FOLVITE) 1 mg tablet, Take 1 tablet (1 mg total) by mouth daily, Disp: 90 tablet, Rfl: 4    hydrOXYzine HCL (ATARAX) 25 mg tablet, Take 1 tablet (25 mg total) by mouth every 6 (six) hours as needed for itching or anxiety, Disp: 60 tablet, Rfl: 2    Magnesium 250 MG TABS, , Disp: , Rfl:     metoprolol tartrate (LOPRESSOR) 100 mg tablet, Take 100 mg by mouth daily  , Disp: , Rfl:     naloxone (NARCAN) 4 mg/0 1 mL nasal spray, Administer 1 spray into a nostril   If breathing does not return to normal or if breathing difficulty resumes after 2-3 minutes, give another dose in the other nostril using a new spray , Disp: 1 each, Rfl: 1    omeprazole (PriLOSEC) 20 mg delayed release capsule, Take 20 mg by mouth daily  , Disp: , Rfl:     predniSONE 10 mg tablet, Take 1 tablet (10 mg total) by mouth daily, Disp: 30 tablet, Rfl: 0    sildenafil (VIAGRA) 100 mg tablet, TAKE ONE TABLET BY MOUTH  AS NEEDED PRIOR TO SEXUAL ACTIVITY FOR ERECTILE DYSFUNCTION, Disp: , Rfl:     terazosin (HYTRIN) 5 mg capsule, Take 2 capsules (10 mg total) by mouth daily at bedtime, Disp: 30 capsule, Rfl: 6    triamcinolone (KENALOG) 0 1 % lotion, Apply topically 3 (three) times a day, Disp: 60 mL, Rfl: 5    VITAMIN D PO, Take 1,000 Units by mouth daily , Disp: , Rfl:     senna (SENOKOT) 8 6 mg, Take 1 tablet (8 6 mg total) by mouth daily at bedtime (Patient not taking: Reported on 4/2/2021), Disp: 30 each, Rfl: 0      Physical Exam:  /74 (BP Location: Left arm, Patient Position: Sitting, Cuff Size: Standard)   Pulse 60   Temp (!) 96 8 °F (36 °C) (Tympanic)   Resp 16   Ht 5' 8 5" (1 74 m)   Wt 88 3 kg (194 lb 11 2 oz)   SpO2 99%   BMI 29 17 kg/m²     Physical Exam  Constitutional:       Appearance: He is well-developed  He is ill-appearing  HENT:      Head: Normocephalic and atraumatic  Eyes:      General: No scleral icterus  Right eye: No discharge  Left eye: No discharge  Conjunctiva/sclera: Conjunctivae normal       Pupils: Pupils are equal, round, and reactive to light  Neck:      Musculoskeletal: Normal range of motion and neck supple  Thyroid: No thyromegaly  Trachea: No tracheal deviation  Cardiovascular:      Rate and Rhythm: Normal rate and regular rhythm  Heart sounds: Normal heart sounds  No murmur  No friction rub  Pulmonary:      Effort: Pulmonary effort is normal  No respiratory distress  Breath sounds: Normal breath sounds  No wheezing or rales  Chest:      Chest wall: No tenderness  Abdominal:      General: There is no distension  Palpations: Abdomen is soft  There is no hepatomegaly or splenomegaly  Tenderness: There is no abdominal tenderness  There is no guarding or rebound  Hernia: A hernia (  Abdominal wallHernia in the left lower quadrantof the abdomen) is present  Musculoskeletal: Normal range of motion  General: No tenderness or deformity  Lymphadenopathy:      Cervical: No cervical adenopathy  Skin:     General: Skin is warm and dry        Coloration: Skin is not pale       Findings: No erythema or rash  Neurological:      Mental Status: He is alert and oriented to person, place, and time  Cranial Nerves: No cranial nerve deficit  Coordination: Coordination normal       Deep Tendon Reflexes: Reflexes are normal and symmetric  Reflexes normal    Psychiatric:         Behavior: Behavior normal          Thought Content: Thought content normal          Judgment: Judgment normal            Labs:  Lab Results   Component Value Date    WBC 6 00 04/12/2021    HGB 13 2 (L) 04/12/2021    HCT 39 5 (L) 04/12/2021    MCV 92 04/12/2021     (L) 04/12/2021     Lab Results   Component Value Date    K 4 4 04/12/2021    CL 99 04/12/2021    CO2 32 (H) 04/12/2021    BUN 23 04/12/2021    CREATININE 1 71 (H) 04/12/2021    GLUF 98 03/23/2021    CALCIUM 9 2 04/12/2021    CORRECTEDCA 9 8 10/28/2020    AST 10 (L) 04/12/2021    ALT 14 04/12/2021    ALKPHOS 67 04/12/2021    EGFR 39 04/12/2021     No results found for: TSH    Patient voiced understanding and agreement in the above discussion  Aware to contact our office with questions/symptoms in the interim

## 2021-04-15 ENCOUNTER — PATIENT OUTREACH (OUTPATIENT)
Dept: CASE MANAGEMENT | Facility: HOSPITAL | Age: 71
End: 2021-04-15

## 2021-04-15 DIAGNOSIS — Z78.9 NEED FOR FOLLOW-UP BY SOCIAL WORKER: Primary | ICD-10-CM

## 2021-04-15 NOTE — PROGRESS NOTES
Oncology LSW attempted to outreach PT to introduce herself, explain her role within the oncology department, and assess for areas of need  LSW left detailed voicemail requesting return call at PT's earliest convenience  Oncology LSW received return call from PT  PT reported that he was doing well and did not have any areas of concern at this time  LSW explained that she would be available in the future if PT had any further questions or concerned  PT receptive and thanked LSW for her call

## 2021-04-16 ENCOUNTER — HOSPITAL ENCOUNTER (OUTPATIENT)
Dept: INFUSION CENTER | Facility: HOSPITAL | Age: 71
Discharge: HOME | End: 2021-04-16
Attending: INTERNAL MEDICINE
Payer: MEDICARE

## 2021-04-16 VITALS
DIASTOLIC BLOOD PRESSURE: 60 MMHG | BODY MASS INDEX: 29.49 KG/M2 | TEMPERATURE: 97 F | WEIGHT: 199.08 LBS | SYSTOLIC BLOOD PRESSURE: 116 MMHG | RESPIRATION RATE: 18 BRPM | OXYGEN SATURATION: 98 % | HEART RATE: 52 BPM | HEIGHT: 69 IN

## 2021-04-16 DIAGNOSIS — C65.2 CANCER OF LEFT RENAL PELVIS (HCC): ICD-10-CM

## 2021-04-16 DIAGNOSIS — C68.9 UROTHELIAL CANCER (HCC): Primary | ICD-10-CM

## 2021-04-16 PROCEDURE — 96413 CHEMO IV INFUSION 1 HR: CPT

## 2021-04-16 RX ORDER — SODIUM CHLORIDE 9 MG/ML
20 INJECTION, SOLUTION INTRAVENOUS ONCE
Status: COMPLETED | OUTPATIENT
Start: 2021-04-16 | End: 2021-04-16

## 2021-04-16 RX ADMIN — SODIUM CHLORIDE 200 MG: 9 INJECTION, SOLUTION INTRAVENOUS at 11:11

## 2021-04-16 RX ADMIN — SODIUM CHLORIDE 20 ML/HR: 0.9 INJECTION, SOLUTION INTRAVENOUS at 11:11

## 2021-04-19 ENCOUNTER — CONSULT (OUTPATIENT)
Dept: NEPHROLOGY | Facility: CLINIC | Age: 71
End: 2021-04-19
Payer: MEDICARE

## 2021-04-19 VITALS
OXYGEN SATURATION: 95 % | BODY MASS INDEX: 29.47 KG/M2 | DIASTOLIC BLOOD PRESSURE: 68 MMHG | WEIGHT: 199 LBS | HEIGHT: 69 IN | HEART RATE: 56 BPM | SYSTOLIC BLOOD PRESSURE: 118 MMHG

## 2021-04-19 DIAGNOSIS — E79.0 HYPERURICEMIA: ICD-10-CM

## 2021-04-19 DIAGNOSIS — I10 ESSENTIAL HYPERTENSION: ICD-10-CM

## 2021-04-19 DIAGNOSIS — C68.9 UROTHELIAL CANCER (HCC): ICD-10-CM

## 2021-04-19 DIAGNOSIS — N18.31 STAGE 3A CHRONIC KIDNEY DISEASE (HCC): Primary | ICD-10-CM

## 2021-04-19 DIAGNOSIS — C65.2 CANCER OF LEFT RENAL PELVIS (HCC): ICD-10-CM

## 2021-04-19 DIAGNOSIS — N28.9 RENAL DYSFUNCTION: ICD-10-CM

## 2021-04-19 DIAGNOSIS — N17.9 ACUTE RENAL FAILURE, UNSPECIFIED ACUTE RENAL FAILURE TYPE (HCC): ICD-10-CM

## 2021-04-19 DIAGNOSIS — M48.061 SPINAL STENOSIS OF LUMBAR REGION, UNSPECIFIED WHETHER NEUROGENIC CLAUDICATION PRESENT: ICD-10-CM

## 2021-04-19 LAB
SL AMB  POCT GLUCOSE, UA: NORMAL
SL AMB LEUKOCYTE ESTERASE,UA: NORMAL
SL AMB POCT BILIRUBIN,UA: NORMAL
SL AMB POCT BLOOD,UA: NORMAL
SL AMB POCT CLARITY,UA: CLEAR
SL AMB POCT COLOR,UA: YELLOW
SL AMB POCT KETONES,UA: NORMAL
SL AMB POCT NITRITE,UA: NORMAL
SL AMB POCT PH,UA: NORMAL
SL AMB POCT SPECIFIC GRAVITY,UA: 1.01
SL AMB POCT URINE PROTEIN: NORMAL
SL AMB POCT UROBILINOGEN: 0.2

## 2021-04-19 PROCEDURE — 81002 URINALYSIS NONAUTO W/O SCOPE: CPT | Performed by: INTERNAL MEDICINE

## 2021-04-19 PROCEDURE — 99204 OFFICE O/P NEW MOD 45 MIN: CPT | Performed by: INTERNAL MEDICINE

## 2021-04-19 NOTE — ASSESSMENT & PLAN NOTE
He had a rise in his creatinine after using cisplatin for chemotherapeutic agent  However, his creatinine after with gross dropped to 1 4-1 6 mg/dL  In December of 2020 his creatinine was 1 26 mg/dL  During the summer la and hovered between 1 3 and 1 6 mg/dL  He had a left nephrectomy in May of 2020  He has been on [address] [place] since September of 2020  He denies any use of Advil or Aleve  His blood pressure is well controlled and he is not on an Ace/ Arb  Urine in the office is bland  Will check a kidney ultrasound of the right kidney  Will check protein to creatinine ratio as well as microalbumin to creatinine ratio  Will also check urine electrolytes  I explained to eladio and his wife the meaning of creatinine and GFR  Most probably the initial insult was with the use of cisplatin but he did have renal recovery    Will await studies to determine why his creatinine la to 1 71 mg/dL

## 2021-04-19 NOTE — PATIENT INSTRUCTIONS
Urine studies now  Have an ultrasound  No NSAID's  List of safe OTC meds given  Have blood drawn prior to next visit in 2 months

## 2021-04-19 NOTE — PROGRESS NOTES
[address] [facility], [place]    Name: Chely Gross  YOB: 1950      Assessment/Plan:         Problem List Items Addressed This Visit        Cardiovascular and Mediastinum    Essential hypertension       Genitourinary    Urothelial cancer [facility])    Cancer of left renal pelvis [facility])      He had high-grade urothelial cancer  He underwent a nephrectomy followed by cisplatin  And Gemzar, 10 radiation sessions, and subsequent treatment with Keytruda every 3 weeks         Stage 3a chronic kidney disease - Primary    Acute renal failure ([address] )      He had a rise in his creatinine after using cisplatin for chemotherapeutic agent  However, his creatinine after with gross dropped to 1 4-1 6 mg/dL  In December of 2020 his creatinine was 1 26 mg/dL  During the summer la and hovered between 1 3 and 1 6 mg/dL  He had a left nephrectomy in May of 2020  He has been on [place] since September of 2020  He denies any use of Advil or Aleve  His blood pressure is well controlled and he is not on an Ace/ Arb  Urine in the office is bland  Will check a kidney ultrasound of the right kidney  Will check protein to creatinine ratio as well as microalbumin to creatinine ratio  Will also check urine electrolytes  I explained to pat and his wife the meaning of creatinine and GFR  Most probably the initial insult was with the use of cisplatin but he did have renal recovery  Will await studies to determine why his creatinine la to 1 71 mg/dL            Other    Spinal stenosis of lumbar region      Sees pain management         Hyperuricemia      He takes allopurinol         Renal dysfunction    Relevant Orders    POCT urine dip (Completed)            Subjective:      Patient ID: Chely Gross is a 66 y o  male  HPI Pap presents with a drop in his kidney function to 39 mils per minute  Had a GFR between 52 and 58 prior to that      He had high-grade urothelial carcinoma  Initially he was treated with chemotherapy  With plateau in all 1845 milligrams/meters subcutaneously IV and Gemzar 2200 mg but he had a drop in his kidney function  May 14, 2020   On May 11, 2020 his creatinine ws 1 6 mg/d ( GFR 43 ml/min)  Oct 2020 he had 10 treatments of radiation  On 04/01/2020 he underwent a left robotic assisted laparoscopic nephroureterectomy    He feels well on Keytruda  He has aching of his neck and shoulder muscles  Prednisone 10mg daily being given for these side effects (takes Prednisone 10mg daily)  He has a rash after the treatment of head, torso and arms which is fine and helped by the prednisone  The following portions of the patient's history were reviewed and updated as appropriate: allergies, current medications, past family history, past medical history, past social history, past surgical history and problem list     Review of Systems   Constitutional: Negative for activity change, appetite change and fatigue  He had leg pain after one hour on a stationary bike   HENT: Negative for hearing loss  Uses hearing ads     Eyes: Negative for visual disturbance  Respiratory: Negative for cough and shortness of breath  Cardiovascular: Positive for chest pain  Negative for palpitations and leg swelling  Dec with burping due to gas   Gastrointestinal: Negative for blood in stool, constipation, diarrhea, nausea and vomiting  Had GI screening with colonoscopy and UGI   Genitourinary: Negative for decreased urine volume, difficulty urinating, dysuria, hematuria and urgency  Drinks almost 60 ounces a day   Musculoskeletal: Positive for back pain  Sees pain mgmt   Neurological: Positive for weakness and headaches  Negative for dizziness and light-headedness  Occasional slight headaches  Leg weakness   Hematological: Does not bruise/bleed easily  Psychiatric/Behavioral: Negative for dysphoric mood           Social History Socioeconomic History    Marital status: /Civil Union     Spouse name: None    Number of children: None    Years of education: None    Highest education level: None   Occupational History    None   Social Needs    Financial resource strain: None    Food insecurity     Worry: None     Inability: None    Transportation needs     Medical: None     Non-medical: None   Tobacco Use    Smoking status: Former Smoker     Quit date:      Years since quittin 3    Smokeless tobacco: Never Used   Substance and Sexual Activity    Alcohol use: Not Currently     Alcohol/week: 4 0 standard drinks     Types: 4 Cans of beer per week     Frequency: 4 or more times a week     Drinks per session: 3 or 4     Binge frequency: Daily or almost daily     Comment: daily/socially    Drug use: Never    Sexual activity: Not Currently   Lifestyle    Physical activity     Days per week: None     Minutes per session: None    Stress: None   Relationships    Social connections     Talks on phone: None     Gets together: None     Attends Jewish service: None     Active member of club or organization: None     Attends meetings of clubs or organizations: None     Relationship status: None    Intimate partner violence     Fear of current or ex partner: None     Emotionally abused: None     Physically abused: None     Forced sexual activity: None   Other Topics Concern    None   Social History Narrative    Daily caffeine use - 2 cups coffee      Past Medical History:   Diagnosis Date    Arthritis     Bladder cancer     [facility]     skin melanoma;basal cell    Chronic pain disorder     from arthritis    Colon polyp     Does use hearing aid     bilat    Dry eyes, bilateral     GERD (gastroesophageal reflux disease)     History of kidney stones 10/2019    History of partial knee replacement     bilat    History of vertigo     Hyperlipidemia     Hypertension     Kidney lesion     Lumbar disc disorder compression of vertebrae L4-5-6    Muscle weakness     left hip area    Renal mass     left    Right ankle injury 03/05/2020    missed step of ladder     Right ankle pain     Shortness of breath     with activity    Tinnitus     Urothelial cancer     left    Wears glasses      Past Surgical History:   Procedure Laterality Date    COLONOSCOPY      CYSTOSCOPY Left 4/1/2020    Procedure: CYSTOSCOPY; URETERAL CATHETER PLACEMENT;  Surgeon: Ankita Wise MD;  Location[facility];  Service: Urology    CYSTOSCOPY  05/11/2020    FL CYSTOGRAM  4/13/2020    FL RETROGRADE PYELOGRAM  1/17/2020    HERNIA REPAIR      umbilical with mesh    INGUINAL HERNIA REPAIR Right     with mesh    IR PORT PLACEMENT  4/30/2020    JOINT REPLACEMENT Bilateral     partials knee    LUMBAR EPIDURAL INJECTION      SC CYSTOURETHROSCOPY,URETER CATHETER Bilateral 1/17/2020    Procedure: CYSTOSCOPY; RIGHT RETROGRADE PYELOGRAM WITH RIGHT URETERAL CYTOLOGY SAMPLING; LEFT URETEROSCOPY WITH RENAL PELVIS BIOPSY AND LEFT STENT PLACEMENT;  Surgeon: Ankita Wise MD;  [facility];  Service: Urology    SC NEPHRECTOMY, W/PART   URETECTOMY Left 4/1/2020    Procedure: ROBOTIC LAPAROSCOPIC NEPHRO-URETERECTOMY;  Surgeon: Ankita Wise MD;  Location[facility];  Service: Urology    SKIN CANCER EXCISION      surface melanoma    TONSILLECTOMY      WISDOM TOOTH EXTRACTION         Current Outpatient Medications:     acetaminophen (TYLENOL) 325 mg tablet, Take 2 tablets (650 mg total) by mouth every 6 (six) hours as needed for mild pain or fever, Disp: , Rfl: 0    allopurinol (ZYLOPRIM) 100 mg tablet, Take 1 tablet (100 mg total) by mouth daily, Disp: 30 tablet, Rfl: 3    amLODIPine (NORVASC) 5 mg tablet, Take 5 mg by mouth daily  , Disp: , Rfl:     atorvastatin (LIPITOR) 80 mg tablet, Take 80 mg by mouth every other day evening, Disp: , Rfl:     folic acid (FOLVITE) 1 mg tablet, Take 1 tablet (1 mg total) by mouth daily, Disp: 90 tablet, Rfl: 4    hydrOXYzine HCL (ATARAX) 25 mg tablet, Take 1 tablet (25 mg total) by mouth every 6 (six) hours as needed for itching or anxiety, Disp: 60 tablet, Rfl: 2    Magnesium 250 MG TABS, , Disp: , Rfl:     metoprolol tartrate (LOPRESSOR) 100 mg tablet, Take 100 mg by mouth daily  , Disp: , Rfl:     naloxone (NARCAN) 4 mg/0 1 mL nasal spray, Administer 1 spray into a nostril   If breathing does not return to normal or if breathing difficulty resumes after 2-3 minutes, give another dose in the other nostril using a new spray , Disp: 1 each, Rfl: 1    omeprazole (PriLOSEC) 20 mg delayed release capsule, Take 20 mg by mouth daily  , Disp: , Rfl:     predniSONE 10 mg tablet, Take 1 tablet (10 mg total) by mouth daily, Disp: 30 tablet, Rfl: 0    senna (SENOKOT) 8 6 mg, Take 1 tablet (8 6 mg total) by mouth daily at bedtime, Disp: 30 each, Rfl: 0    sildenafil (VIAGRA) 100 mg tablet, TAKE ONE TABLET BY MOUTH  AS NEEDED PRIOR TO SEXUAL ACTIVITY FOR ERECTILE DYSFUNCTION, Disp: , Rfl:     terazosin (HYTRIN) 5 mg capsule, Take 2 capsules (10 mg total) by mouth daily at bedtime, Disp: 30 capsule, Rfl: 6    triamcinolone (KENALOG) 0 1 % lotion, Apply topically 3 (three) times a day, Disp: 60 mL, Rfl: 5    VITAMIN D PO, Take 1,000 Units by mouth daily , Disp: , Rfl:     Lab Results   Component Value Date    SODIUM 138 04/12/2021    K 4 4 04/12/2021    CL 99 04/12/2021    CO2 32 (H) 04/12/2021    AGAP 7 04/12/2021    BUN 23 04/12/2021    CREATININE 1 71 (H) 04/12/2021    GLUC 121 (H) 04/12/2021    GLUF 98 03/23/2021    CALCIUM 9 2 04/12/2021    AST 10 (L) 04/12/2021    ALT 14 04/12/2021    ALKPHOS 67 04/12/2021    TP 6 3 (L) 04/12/2021    TBILI 1 20 (H) 04/12/2021    EGFR 39 04/12/2021     Lab Results   Component Value Date    WBC 6 00 04/12/2021    HGB 13 2 (L) 04/12/2021    HCT 39 5 (L) 04/12/2021    MCV 92 04/12/2021     (L) 04/12/2021     No results found for: CHOLESTEROL  No results found for: HDL  No results found for: LDLCALC  No results found for: TRIG  No results found for: [place], [place]  Lab Results   Component Value Date    VGW2SLORRSWV 2 460 03/23/2021     Lab Results   Component Value Date    CALCIUM 9 2 04/12/2021     No results found for: SPEP, UPEP  No results found for: DEBRAALKAJAL [id number]        Objective:      /68   Pulse 56   Ht 5' 8 5" (1 74 m)   Wt 90 3 kg (199 lb)   SpO2 95%   BMI 29 82 kg/m²          Physical Exam  Constitutional:       General: He is not in acute distress  Appearance: Normal appearance  He is normal weight  He is not toxic-appearing  HENT:      Head: Normocephalic and atraumatic  Right Ear: External ear normal       Left Ear: External ear normal    Eyes:      Extraocular Movements: Extraocular movements intact  Pupils: Pupils are equal, round, and reactive to light  Neck:      Musculoskeletal: Normal range of motion  No neck rigidity  Vascular: No carotid bruit  Cardiovascular:      Rate and Rhythm: Normal rate and regular rhythm  Heart sounds: No murmur  No gallop  Pulmonary:      Effort: Pulmonary effort is normal  No respiratory distress  Breath sounds: No wheezing or rales  Abdominal:      General: Bowel sounds are normal  There is no distension  Palpations: Abdomen is soft  Tenderness: There is no abdominal tenderness  Hernia: A hernia is present  Musculoskeletal: Normal range of motion  General: No swelling  Right lower leg: No edema  Left lower leg: No edema  Lymphadenopathy:      Cervical: No cervical adenopathy  Skin:     General: Skin is warm and dry  Coloration: Skin is not jaundiced  Neurological:      General: No focal deficit present  Mental Status: He is alert  Mental status is at baseline  Motor: No weakness        Gait: Gait normal    Psychiatric:         Mood and Affect: Mood normal          Behavior: Behavior normal          Thought Content:  Thought content normal          Judgment: Judgment normal

## 2021-04-19 NOTE — ASSESSMENT & PLAN NOTE
He had high-grade urothelial cancer    He underwent a nephrectomy followed by cisplatin  And Gemzar, 10 radiation sessions, and subsequent treatment with Keytruda every 3 weeks

## 2021-04-21 ENCOUNTER — HOSPITAL ENCOUNTER (OUTPATIENT)
Dept: ULTRASOUND IMAGING | Facility: HOSPITAL | Age: 71
Discharge: HOME | End: 2021-04-21
Attending: INTERNAL MEDICINE
Payer: MEDICARE

## 2021-04-21 DIAGNOSIS — C68.9 UROTHELIAL CANCER (HCC): ICD-10-CM

## 2021-04-21 DIAGNOSIS — E79.0 HYPERURICEMIA: ICD-10-CM

## 2021-04-21 DIAGNOSIS — N18.31 STAGE 3A CHRONIC KIDNEY DISEASE (HCC): ICD-10-CM

## 2021-04-21 DIAGNOSIS — N17.9 ACUTE RENAL FAILURE, UNSPECIFIED ACUTE RENAL FAILURE TYPE (HCC): ICD-10-CM

## 2021-04-21 PROCEDURE — 76770 US EXAM ABDO BACK WALL COMP: CPT

## 2021-04-30 ENCOUNTER — HOSPITAL ENCOUNTER (OUTPATIENT)
Dept: NUCLEAR MEDICINE | Facility: HOSPITAL | Age: 71
Discharge: HOME | End: 2021-04-30
Attending: INTERNAL MEDICINE
Payer: MEDICARE

## 2021-04-30 DIAGNOSIS — C64.2 MALIGNANT NEOPLASM OF LEFT KIDNEY, EXCEPT RENAL PELVIS (HCC): ICD-10-CM

## 2021-04-30 DIAGNOSIS — C68.9 UROTHELIAL CANCER (HCC): ICD-10-CM

## 2021-04-30 LAB — GLUCOSE SERPL-MCNC: 110 MG/DL (ref 65–140)

## 2021-04-30 PROCEDURE — 78815 PET IMAGE W/CT SKULL-THIGH: CPT

## 2021-04-30 PROCEDURE — G1004 CDSM NDSC: HCPCS

## 2021-04-30 PROCEDURE — A9552 F18 FDG: HCPCS

## 2021-04-30 PROCEDURE — 82948 REAGENT STRIP/BLOOD GLUCOSE: CPT

## 2021-05-02 DIAGNOSIS — C68.9 UROTHELIAL CANCER (HCC): ICD-10-CM

## 2021-05-02 DIAGNOSIS — C65.2 CANCER OF LEFT RENAL PELVIS (HCC): Primary | ICD-10-CM

## 2021-05-02 RX ORDER — SODIUM CHLORIDE 9 MG/ML
20 INJECTION, SOLUTION INTRAVENOUS ONCE
Status: CANCELLED | OUTPATIENT
Start: 2021-05-07

## 2021-05-03 DIAGNOSIS — C68.9 UROTHELIAL CANCER (HCC): ICD-10-CM

## 2021-05-03 DIAGNOSIS — Z51.5 PALLIATIVE CARE PATIENT: ICD-10-CM

## 2021-05-03 DIAGNOSIS — G89.3 CANCER RELATED PAIN: ICD-10-CM

## 2021-05-03 RX ORDER — PREDNISONE 10 MG/1
10 TABLET ORAL DAILY
Qty: 30 TABLET | Refills: 0 | Status: SHIPPED | OUTPATIENT
Start: 2021-05-03 | End: 2021-06-09 | Stop reason: SDUPTHER

## 2021-05-03 NOTE — TELEPHONE ENCOUNTER
Patient will be going on vacation on the week of May 11th and will need refill prior to leaving  Please pre-date a script for the prednisone  Thank you

## 2021-05-05 ENCOUNTER — APPOINTMENT (OUTPATIENT)
Dept: LAB | Facility: HOSPITAL | Age: 71
End: 2021-05-05
Attending: INTERNAL MEDICINE
Payer: MEDICARE

## 2021-05-05 DIAGNOSIS — C65.2 CANCER OF LEFT RENAL PELVIS (HCC): ICD-10-CM

## 2021-05-05 DIAGNOSIS — C68.9 UROTHELIAL CANCER (HCC): ICD-10-CM

## 2021-05-05 DIAGNOSIS — D53.9 NUTRITIONAL ANEMIA, UNSPECIFIED: ICD-10-CM

## 2021-05-05 DIAGNOSIS — N18.31 STAGE 3A CHRONIC KIDNEY DISEASE (HCC): ICD-10-CM

## 2021-05-05 DIAGNOSIS — N17.9 ACUTE RENAL FAILURE, UNSPECIFIED ACUTE RENAL FAILURE TYPE (HCC): ICD-10-CM

## 2021-05-05 DIAGNOSIS — E79.0 HYPERURICEMIA: ICD-10-CM

## 2021-05-05 LAB
ALBUMIN SERPL BCP-MCNC: 4.1 G/DL (ref 3.5–5.7)
ALP SERPL-CCNC: 55 U/L (ref 55–165)
ALT SERPL W P-5'-P-CCNC: 16 U/L (ref 7–52)
ANION GAP SERPL CALCULATED.3IONS-SCNC: 7 MMOL/L (ref 4–13)
AST SERPL W P-5'-P-CCNC: 11 U/L (ref 13–39)
BACTERIA UR QL AUTO: ABNORMAL /HPF
BASOPHILS # BLD AUTO: 0 THOUSANDS/ΜL (ref 0–0.1)
BASOPHILS NFR BLD AUTO: 0 % (ref 0–2)
BILIRUB SERPL-MCNC: 0.9 MG/DL (ref 0.2–1)
BILIRUB UR QL STRIP: NEGATIVE
BUN SERPL-MCNC: 27 MG/DL (ref 7–25)
CALCIUM SERPL-MCNC: 9.5 MG/DL (ref 8.6–10.5)
CHLORIDE SERPL-SCNC: 104 MMOL/L (ref 98–107)
CLARITY UR: CLEAR
CO2 SERPL-SCNC: 30 MMOL/L (ref 21–31)
COLOR UR: YELLOW
CREAT SERPL-MCNC: 1.54 MG/DL (ref 0.7–1.3)
CREAT UR-MCNC: 58.4 MG/DL
CREAT UR-MCNC: 58.4 MG/DL
EOSINOPHIL # BLD AUTO: 0 THOUSAND/ΜL (ref 0–0.61)
EOSINOPHIL NFR BLD AUTO: 1 % (ref 0–5)
ERYTHROCYTE [DISTWIDTH] IN BLOOD BY AUTOMATED COUNT: 15.5 % (ref 11.5–14.5)
GFR SERPL CREATININE-BSD FRML MDRD: 44 ML/MIN/1.73SQ M
GLUCOSE P FAST SERPL-MCNC: 93 MG/DL (ref 65–99)
GLUCOSE UR STRIP-MCNC: NEGATIVE MG/DL
HCT VFR BLD AUTO: 38.8 % (ref 42–47)
HGB BLD-MCNC: 12.7 G/DL (ref 14–18)
HGB UR QL STRIP.AUTO: NEGATIVE
KETONES UR STRIP-MCNC: NEGATIVE MG/DL
LEUKOCYTE ESTERASE UR QL STRIP: ABNORMAL
LYMPHOCYTES # BLD AUTO: 0.9 THOUSANDS/ΜL (ref 0.6–4.47)
LYMPHOCYTES NFR BLD AUTO: 11 % (ref 21–51)
MAGNESIUM SERPL-MCNC: 1.9 MG/DL (ref 1.9–2.7)
MCH RBC QN AUTO: 30.9 PG (ref 26–34)
MCHC RBC AUTO-ENTMCNC: 32.9 G/DL (ref 31–37)
MCV RBC AUTO: 94 FL (ref 81–99)
MICROALBUMIN UR-MCNC: 19.1 MG/L (ref 0–20)
MICROALBUMIN/CREAT 24H UR: 33 MG/G CREATININE (ref 0–30)
MONOCYTES # BLD AUTO: 0.5 THOUSAND/ΜL (ref 0.17–1.22)
MONOCYTES NFR BLD AUTO: 6 % (ref 2–12)
NEUTROPHILS # BLD AUTO: 7.1 THOUSANDS/ΜL (ref 1.4–6.5)
NEUTS SEG NFR BLD AUTO: 83 % (ref 42–75)
NITRITE UR QL STRIP: NEGATIVE
NON-SQ EPI CELLS URNS QL MICRO: ABNORMAL /HPF
PH UR STRIP.AUTO: 5.5 [PH]
PLATELET # BLD AUTO: 156 THOUSANDS/UL (ref 149–390)
PMV BLD AUTO: 7.8 FL (ref 8.6–11.7)
POTASSIUM SERPL-SCNC: 4.5 MMOL/L (ref 3.5–5.5)
PROT SERPL-MCNC: 6.5 G/DL (ref 6.4–8.9)
PROT UR STRIP-MCNC: NEGATIVE MG/DL
PROT UR-MCNC: 7 MG/DL
PROT/CREAT UR: 0.12 MG/G{CREAT} (ref 0–0.1)
RBC # BLD AUTO: 4.12 MILLION/UL (ref 4.3–5.9)
RBC #/AREA URNS AUTO: ABNORMAL /HPF
SODIUM SERPL-SCNC: 141 MMOL/L (ref 134–143)
SP GR UR STRIP.AUTO: 1.01 (ref 1–1.03)
T3FREE SERPL-MCNC: 2.04 PG/ML (ref 2.3–4.2)
TSH SERPL DL<=0.05 MIU/L-ACNC: 1.29 UIU/ML (ref 0.45–5.33)
URATE SERPL-MCNC: 8.7 MG/DL (ref 2.3–7.6)
UROBILINOGEN UR QL STRIP.AUTO: 0.2 E.U./DL
WBC # BLD AUTO: 8.5 THOUSAND/UL (ref 4.8–10.8)
WBC #/AREA URNS AUTO: ABNORMAL /HPF

## 2021-05-05 PROCEDURE — 85025 COMPLETE CBC W/AUTO DIFF WBC: CPT

## 2021-05-05 PROCEDURE — 81001 URINALYSIS AUTO W/SCOPE: CPT

## 2021-05-05 PROCEDURE — 84443 ASSAY THYROID STIM HORMONE: CPT

## 2021-05-05 PROCEDURE — 84550 ASSAY OF BLOOD/URIC ACID: CPT

## 2021-05-05 PROCEDURE — 36415 COLL VENOUS BLD VENIPUNCTURE: CPT

## 2021-05-05 PROCEDURE — 82570 ASSAY OF URINE CREATININE: CPT

## 2021-05-05 PROCEDURE — 84156 ASSAY OF PROTEIN URINE: CPT

## 2021-05-05 PROCEDURE — 84481 FREE ASSAY (FT-3): CPT

## 2021-05-05 PROCEDURE — 80053 COMPREHEN METABOLIC PANEL: CPT

## 2021-05-05 PROCEDURE — 83735 ASSAY OF MAGNESIUM: CPT | Performed by: INTERNAL MEDICINE

## 2021-05-05 PROCEDURE — 82043 UR ALBUMIN QUANTITATIVE: CPT

## 2021-05-06 ENCOUNTER — OFFICE VISIT (OUTPATIENT)
Dept: HEMATOLOGY ONCOLOGY | Facility: CLINIC | Age: 71
End: 2021-05-06
Payer: MEDICARE

## 2021-05-06 VITALS
WEIGHT: 199.7 LBS | RESPIRATION RATE: 16 BRPM | HEART RATE: 48 BPM | OXYGEN SATURATION: 98 % | HEIGHT: 69 IN | SYSTOLIC BLOOD PRESSURE: 130 MMHG | TEMPERATURE: 96.8 F | BODY MASS INDEX: 29.58 KG/M2 | DIASTOLIC BLOOD PRESSURE: 74 MMHG

## 2021-05-06 DIAGNOSIS — R53.83 OTHER FATIGUE: ICD-10-CM

## 2021-05-06 DIAGNOSIS — E79.0 HYPERURICEMIA: ICD-10-CM

## 2021-05-06 DIAGNOSIS — C65.2 CANCER OF LEFT RENAL PELVIS (HCC): Primary | ICD-10-CM

## 2021-05-06 DIAGNOSIS — R00.1 BRADYCARDIA: ICD-10-CM

## 2021-05-06 DIAGNOSIS — F41.9 ANXIETY: ICD-10-CM

## 2021-05-06 DIAGNOSIS — C65.2 CANCER OF LEFT RENAL PELVIS (HCC): ICD-10-CM

## 2021-05-06 DIAGNOSIS — C68.9 UROTHELIAL CANCER (HCC): ICD-10-CM

## 2021-05-06 DIAGNOSIS — C68.9 UROTHELIAL CANCER (HCC): Primary | ICD-10-CM

## 2021-05-06 PROCEDURE — 99214 OFFICE O/P EST MOD 30 MIN: CPT | Performed by: NURSE PRACTITIONER

## 2021-05-06 RX ORDER — SODIUM CHLORIDE 9 MG/ML
20 INJECTION, SOLUTION INTRAVENOUS ONCE
Status: CANCELLED | OUTPATIENT
Start: 2021-05-28

## 2021-05-06 RX ORDER — ALPRAZOLAM 0.25 MG/1
0.25 TABLET ORAL
Qty: 30 TABLET | Refills: 1 | Status: SHIPPED | OUTPATIENT
Start: 2021-05-06 | End: 2022-06-02

## 2021-05-06 RX ORDER — ALLOPURINOL 300 MG/1
300 TABLET ORAL DAILY
Qty: 30 TABLET | Refills: 3 | Status: SHIPPED | OUTPATIENT
Start: 2021-05-06 | End: 2021-08-30

## 2021-05-06 NOTE — PROGRESS NOTES
Hematology/Oncology Outpatient Follow-up  Jnen Lamas 66 y o  male 1950 [id number]    Date:  5/6/2021      Assessment and Plan:  1  Urothelial cancer ([facility] [address] )  Patient's repeat PET-CT imaging showed mixed response in the abdomen  Overall he seems to be responding to his current treatment with the exception of portal caval lymph node with increased FDG activities from 9 to 11 5; the size of the lymph node has not changed in comparison to prior studies  Clinically patient is doing much better  Will aim to do repeat PET-CT scan in 3-4 months for close monitoring  He will be continued on his current treatment with single agent immunotherapy Keytruda on an every three-week basis; is scheduled for his next treatment on Friday  He will also continue his low-dose prednisone 10 mg daily which has significantly improved his rash and pruritus  The patient his wife states that they are planning a trip to [place] after his treatment this week  He will be back for follow-up again in 3 weeks with repeat labs prior     - CBC and differential; Future  - Comprehensive metabolic panel; Future  - TSH, 3rd generation with Free T4 reflex; Future  - T3, free; Future  - CBC and differential; Future  - Comprehensive metabolic panel; Future  - Magnesium; Future  - Uric acid; Future  - TSH, 3rd generation with Free T4 reflex; Future    2  Hyperuricemia  Patient continues to have elevated uric acid level despite taking allopurinol 100 mg daily without interruption  We will increase his allopurinol dose to 300 mg daily  Script sent to his local pharmacy  - Uric acid; Future  - allopurinol (ZYLOPRIM) 300 mg tablet; Take 1 tablet (300 mg total) by mouth daily  Dispense: 30 tablet; Refill: 3    3  Anxiety  Patient is asking for refill on his Xanax 0 25 mg that he takes as needed at bedtime for anxiety/restless legs  PDMP checked an appropriate  Script sent to his local pharmacy     - ALPRAZolam Ezequiel Lions) 0 25 mg tablet;  Take 1 tablet (0 25 mg total) by mouth daily at bedtime as needed for anxiety (restless legs)  Dispense: 30 tablet; Refill: 1    4  Bradycardia  Patient is asymptomatic- heart rate 48  Suspect it is due to his high-dose metoprolol 100 mg  Advised patient his wife to call his primary care team immediately when he gets home for dose adjustment  I also advised them to continue to monitor his heart rate closely at home with his home blood pressure monitoring machine  HPI:  Patient presents today for a follow-up visit accompanied by his wife  Is doing well and has no new complaints  He states that his rash is barely noticeable any more and is taking the low-dose prednisone 10 mg daily; he occasionally has some mild itching  His heart rate today upon arrival is 48 is completely asymptomatic; seems to be taking metoprolol 100 mg daily and states that he has been doing so for many years  He was seen by Nephrology recently for his chronic renal dysfunction  His most recent laboratory studies from yesterday 05/05/2021 showed normal white cells and platelets, he has stable mild normocytic anemia H&H 12 7/38 8 MCV 94  BUN 27, creatinine 1 54, GFR 44 remaining metabolic panel is normal   TSH is normal 1 29  His uric acid is elevated 8 7 despite taking allopurinol 100 mg daily  He had repeat imaging done with a PET-CT scan 04/30/2021 which showed:  IMPRESSION:  1  Resolution of previously abnormal activity in the region of the distal most esophagus  2  Currently no suspicious hypermetabolic foci in the neck or chest  3  Mixed response within the abdomen  Although portacaval lymph node has demonstrated an increased degree of glucose avidity, previously seen masslike opacity adjacent to the left aspect of the SMA has shown improvement  No new areas of abnormal   activity in the abdomen  4   No hypermetabolic suspicious foci within the pelvis or osseous structures    ABDOMEN:     As on the prior study, there is an area of intense portacaval activity prior SUV max 9 0 currently 11 5  The lymph node size of approximately 2 0 x 1 4 cm has not changed  Prior study demonstrated intense activity in the region of the superior   mesenteric artery to the left of midline with SUV max of 18 4 and soft tissue mass or lymph node measuring 2 6 x 1 6 cm, currently significant improvement with residual SUV max 3 7 residual density approximately 1 4 x 0 7 cm  No new areas of abnormal   activity    Oncology History Overview Note   Patient has a history of hypertension, hyperlipidemia, chronic lower back pain  He had an MRI of the lower spine for further evaluation of his chronic lower back pain  The MRI on 12/02/2019 revealed Multiple left renal masses to include a left lower pole cyst and a rounded structure in the left upper pole which may also represent cyst   Left upper pole renal masses approximately 3 cm in greatest linear dimension  A CT with renal protocol was done on 12/12/2019 which also showed the infiltrating lesion in the upper pole of the left kidney measuring about the 3 5 cm in greatest dimension without any hint of retroperitoneal lymphadenopathy  A CT scan of the abdomen pelvis on 01/14/2020 showed the same findings with the mild hydronephrosis on the left  The urine cytology on 01/03/2020 showed high-grade urothelial carcinoma  A cystoscopy was then done, a biopsy was taken from the left renal pelvic region which showed high-grade urothelial carcinoma without evidence of lamina propria invasion  The detrusor muscle/muscularis propria were not present for evaluation  The recommendation was to pursue left robotic assisted laparoscopic nephroureterectomy with bladder cuff excision which was done on 04/01/2020  The final pathology revealed;  - Invasive high grade urothelial carcinoma arising in renal pelvis  - Bladder cuff margin is negative for carcinoma and no evidence of high grade dysplasia    - Ureters with no significant pathologic abnormality  - Two benign simple cysts  - Adrenal gland is negative for malignancy  - One lymph node, negative for malignancy (0/1)  The tumor size was 4 5 cm with invasion beyond the muscularis into the periurethral fat or peripelvic fat or the renal parenchyma  The margins were uninvolved by carcinoma or carcinoma in situ  The final pathology was pT3 pN0  Patient barely tolerated 1 cycle of adjuvant chemotherapy with split dose cisplatin and gemcitabine with a lot of side effects  The treatment had to be discontinued due to significant worsening renal dysfunction 6/2020  Patient started to complain about significant abdominal/left inguinal pain around August/September 2020  Unfortunately repeat imaging revealed recurrent/metastatic disease  9/4/20 CT C/A/P- Status post left nephrectomy for resection of urothelial neoplasm  Lymphadenopathy in the left nephrectomy bed has increased since the prior examination, concerning for metastatic disease  Ill-defined hyperattenuating masslike focus in the left rectus muscle, not identified on the prior examination  This is suspicious for a metastatic lesion  A rectus hematoma is also considered  9/23/20 PET scan- 1  Multiple FDG avid lymph nodes in the retrocrural region, retroperitoneum and the left renal bed compatible with metastasis  These have progressed from recent CT  2   FDG avid mass involving the left rectus abdominal musculature compatible with metastasis which is larger from recent CT  3  Several small lymph nodes adjacent to the mid to distal esophagus with FDG uptake suspicious for metastasis  Small focus of FDG uptake also suggested in the right hilar region, metastasis is not excluded  This could be reassessed on follow-up  4   Subtle focus of FDG uptake at the T2 level on the left, nonspecific, could be related to early degenerative changes, developing metastasis is not entirely excluded    This could be reassessed on follow-up  5  Prostate is mildly prominent with heterogeneous FDG uptake  This could be inflammatory  Correlate for prostatitis  He completed palliative radiation to the left abdomen 12/3/20     Urothelial cancer ([facility] [address] )   1/3/2020 Biopsy    Final Diagnosis    A  Urine, Clean Catch, Thin Prep:  High grade urothelial carcinoma (HGUC) - see comment  1/3/2020 Initial Diagnosis    Urothelial cancer ([facility] [address] )  T3 N0 (stage IIIA)     1/17/2020 Biopsy    Final Diagnosis    A  Ureter, Right, left renal pelvis biopsy  -Fragments of high grade urothelial carcinoma   -No evidence of lamina propria invasion   -Detrusor muscle/ muscularis propria is not present for evaluation  B  Urinary Bladder, bladder biopsy:  -Fragments of low grade papillary lesion  See note  -No evidence of invasion seen  -Unremarkable fragment of detrusor muscle seen  4/1/2020 Surgery    left robotic assisted laparoscopic nephroureterectomy with bladder cuff excision     Final Diagnosis    A  Left kidney, ureter, and bladder cuff, nephroureterectomy:  - Invasive high grade urothelial carcinoma arising in renal pelvis  - Bladder cuff margin is negative for carcinoma and no evidence of high grade dysplasia  - Ureters with no significant pathologic abnormality  - Two benign simple cysts  - Adrenal gland is negative for malignancy  - One lymph node, negative for malignancy (0/1)          5/14/2020 - 6/3/2020 Chemotherapy    CISplatin (PLATINOL) split dose 35 mg/m2 = 75 3 mg (50 % of original dose 70 mg/m2), Intravenous,   Administration: 75 3 mg (5/14/2020), 75 3 mg (5/21/2020)  gemcitabine (GEMZAR) 2,200 mg in sodium chloride 0 9 % 250 mL infusion, 2,150 2 mg (80 % of original dose 1,250 mg/m2), Intravenous,   Administration: 2,200 mg (5/14/2020), 2,200 mg (5/21/2020)    (only completed 1 cycle- d/c d/t adverse effects and worsening renal dysfunction)     10/9/2020 -  Chemotherapy    pembrolizumab (Maru Sanders) 200 mg in sodium chloride 0 9 % 50 mL IVPB, 200 mg, Intravenous, Once, 10 of 14 cycles  Administration: 200 mg (10/9/2020), 200 mg (10/30/2020), 200 mg (11/20/2020), 200 mg (12/11/2020), 200 mg (12/31/2020), 200 mg (1/22/2021), 200 mg (2/12/2021), 200 mg (3/5/2021), 200 mg (3/26/2021), 200 mg (4/16/2021)     11/19/2020 - 12/3/2020 Radiation    Treatment:  Course: C1    Plan ID Energy Fractions Dose per Fraction (cGy) Dose Correction (cGy) Total Dose Delivered (cGy) Elapsed Days   L Abdomen 6X 10 / 10 300 0 3,000 14        Cancer of left renal pelvis ([facility] [address] )   4/23/2020 Initial Diagnosis    Cancer of left renal pelvis ([facility] [address] )     10/9/2020 -  Chemotherapy    pembrolizumab (KEYTRUDA) 200 mg in sodium chloride 0 9 % 50 mL IVPB, 200 mg, Intravenous, Once, 10 of 14 cycles  Administration: 200 mg (10/9/2020), 200 mg (10/30/2020), 200 mg (11/20/2020), 200 mg (12/11/2020), 200 mg (12/31/2020), 200 mg (1/22/2021), 200 mg (2/12/2021), 200 mg (3/5/2021), 200 mg (3/26/2021), 200 mg (4/16/2021)         Interval history:    ROS: Review of Systems   Constitutional: Negative for activity change, appetite change, chills, fatigue, fever and unexpected weight change  HENT: Positive for hearing loss  Negative for congestion, mouth sores, nosebleeds, sore throat and trouble swallowing  Eyes: Negative  Respiratory: Negative for cough, chest tightness and shortness of breath  Cardiovascular: Negative for chest pain, palpitations and leg swelling  Gastrointestinal: Negative for abdominal distention, abdominal pain, blood in stool, constipation, diarrhea, nausea and vomiting  Genitourinary: Negative for difficulty urinating, dysuria, frequency, hematuria and urgency  Musculoskeletal: Positive for back pain  Negative for arthralgias, gait problem, joint swelling and myalgias  Skin: Negative for color change, pallor and rash  Neurological: Positive for headaches  Negative for dizziness, weakness, light-headedness and numbness  Hematological: Negative for adenopathy  Does not bruise/bleed easily  Psychiatric/Behavioral: Negative for dysphoric mood and sleep disturbance  The patient is not nervous/anxious  Past Medical History:   Diagnosis Date    Arthritis     Bladder cancer     Cancer ([address] )     skin melanoma;basal cell    Chronic pain disorder     from arthritis    Colon polyp     Does use hearing aid     bilat    Dry eyes, bilateral     GERD (gastroesophageal reflux disease)     History of kidney stones 10/2019    History of partial knee replacement     bilat    History of vertigo     Hyperlipidemia     Hypertension     Kidney lesion     Lumbar disc disorder     compression of vertebrae L4-5-6    Muscle weakness     left hip area    Renal mass     left    Right ankle injury 03/05/2020    missed step of ladder     Right ankle pain     Shortness of breath     with activity    Tinnitus     Urothelial cancer     left    Wears glasses        Past Surgical History:   Procedure Laterality Date    COLONOSCOPY      CYSTOSCOPY Left 4/1/2020    Procedure: CYSTOSCOPY; URETERAL CATHETER PLACEMENT;  Surgeon: Kristel Ojeda MD;  Location[facility];  Service: Urology    CYSTOSCOPY  05/11/2020    FL CYSTOGRAM  4/13/2020    FL RETROGRADE PYELOGRAM  1/17/2020    HERNIA REPAIR      umbilical with mesh    INGUINAL HERNIA REPAIR Right     with mesh    IR PORT PLACEMENT  4/30/2020    JOINT REPLACEMENT Bilateral     partials knee    LUMBAR EPIDURAL INJECTION      NJ CYSTOURETHROSCOPY,URETER CATHETER Bilateral 1/17/2020    Procedure: CYSTOSCOPY; RIGHT RETROGRADE PYELOGRAM WITH RIGHT URETERAL CYTOLOGY SAMPLING; LEFT URETEROSCOPY WITH RENAL PELVIS BIOPSY AND LEFT STENT PLACEMENT;  Surgeon: Kristel Ojeda MD;  [facility];  Service: Urology    [place] NEPHRECTOMY, W/PART   URETECTOMY Left 4/1/2020    Procedure: ROBOTIC LAPAROSCOPIC NEPHRO-URETERECTOMY;  Surgeon: Kristel Ojeda MD;  Location[facility];  Service: Urology    SKIN CANCER EXCISION      surface melanoma    TONSILLECTOMY      WISDOM TOOTH EXTRACTION         Social History     Socioeconomic History    Marital status: /Civil Union     Spouse name: None    Number of children: None    Years of education: None    Highest education level: None   Occupational History    None   Social Needs    Financial resource strain: None    Food insecurity     Worry: None     Inability: None    Transportation needs     Medical: None     Non-medical: None   Tobacco Use    Smoking status: Former Smoker     Quit date:      Years since quittin 3    Smokeless tobacco: Never Used   Substance and Sexual Activity    Alcohol use: Not Currently     Alcohol/week: 4 0 standard drinks     Types: 4 Cans of beer per week     Frequency: 4 or more times a week     Drinks per session: 3 or 4     Binge frequency: Daily or almost daily     Comment: daily/socially    Drug use: Never    Sexual activity: Not Currently   Lifestyle    Physical activity     Days per week: None     Minutes per session: None    Stress: None   Relationships    Social connections     Talks on phone: None     Gets together: None     Attends Islam service: None     Active member of club or organization: None     Attends meetings of clubs or organizations: None     Relationship status: None    Intimate partner violence     Fear of current or ex partner: None     Emotionally abused: None     Physically abused: None     Forced sexual activity: None   Other Topics Concern    None   Social History Narrative    Daily caffeine use - 2 cups coffee        Family History   Problem Relation Age of Onset    Liver cancer Father        No Known Allergies      Current Outpatient Medications:     acetaminophen (TYLENOL) 325 mg tablet, Take 2 tablets (650 mg total) by mouth every 6 (six) hours as needed for mild pain or fever, Disp: , Rfl: 0    allopurinol (ZYLOPRIM) 100 mg tablet, Take 1 tablet (100 mg total) by mouth daily, Disp: 30 tablet, Rfl: 3    amLODIPine (NORVASC) 5 mg tablet, Take 5 mg by mouth daily  , Disp: , Rfl:     atorvastatin (LIPITOR) 80 mg tablet, Take 80 mg by mouth every other day evening, Disp: , Rfl:     folic acid (FOLVITE) 1 mg tablet, Take 1 tablet (1 mg total) by mouth daily, Disp: 90 tablet, Rfl: 4    hydrOXYzine HCL (ATARAX) 25 mg tablet, Take 1 tablet (25 mg total) by mouth every 6 (six) hours as needed for itching or anxiety, Disp: 60 tablet, Rfl: 2    Magnesium 250 MG TABS, , Disp: , Rfl:     metoprolol tartrate (LOPRESSOR) 100 mg tablet, Take 100 mg by mouth daily  , Disp: , Rfl:     omeprazole (PriLOSEC) 20 mg delayed release capsule, Take 20 mg by mouth daily  , Disp: , Rfl:     predniSONE 10 mg tablet, Take 1 tablet (10 mg total) by mouth daily, Disp: 30 tablet, Rfl: 0    senna (SENOKOT) 8 6 mg, Take 1 tablet (8 6 mg total) by mouth daily at bedtime, Disp: 30 each, Rfl: 0    sildenafil (VIAGRA) 100 mg tablet, TAKE ONE TABLET BY MOUTH  AS NEEDED PRIOR TO SEXUAL ACTIVITY FOR ERECTILE DYSFUNCTION, Disp: , Rfl:     terazosin (HYTRIN) 5 mg capsule, Take 2 capsules (10 mg total) by mouth daily at bedtime, Disp: 30 capsule, Rfl: 6    triamcinolone (KENALOG) 0 1 % lotion, Apply topically 3 (three) times a day, Disp: 60 mL, Rfl: 5    VITAMIN D PO, Take 1,000 Units by mouth daily , Disp: , Rfl:     naloxone (NARCAN) 4 mg/0 1 mL nasal spray, Administer 1 spray into a nostril  If breathing does not return to normal or if breathing difficulty resumes after 2-3 minutes, give another dose in the other nostril using a new spray   (Patient not taking: Reported on 5/6/2021), Disp: 1 each, Rfl: 1      Physical Exam:  /74 (BP Location: Left arm, Patient Position: Sitting, Cuff Size: Standard)   Pulse (!) 48   Temp (!) 96 8 °F (36 °C) (Tympanic)   Resp 16   Ht 5' 8 5" (1 74 m)   Wt 90 6 kg (199 lb 11 2 oz)   SpO2 98%   BMI 29 92 kg/m²     Physical Exam  Vitals signs reviewed  Constitutional:       General: He is not in acute distress  Appearance: He is well-developed  He is not diaphoretic  HENT:      Head: Normocephalic and atraumatic  Eyes:      General: Lids are normal  No scleral icterus  Conjunctiva/sclera: Conjunctivae normal       Pupils: Pupils are equal, round, and reactive to light  Neck:      Musculoskeletal: Normal range of motion and neck supple  Thyroid: No thyromegaly  Cardiovascular:      Rate and Rhythm: Regular rhythm  Bradycardia present  Heart sounds: Normal heart sounds  No murmur  Pulmonary:      Effort: Pulmonary effort is normal  No respiratory distress  Breath sounds: Normal breath sounds  Abdominal:      General: There is no distension  Palpations: Abdomen is soft  There is no hepatomegaly or splenomegaly  Tenderness: There is no abdominal tenderness  Musculoskeletal: Normal range of motion  General: No swelling  Lymphadenopathy:      Cervical: No cervical adenopathy  Upper Body:      Right upper body: No axillary adenopathy  Left upper body: No axillary adenopathy  Skin:     General: Skin is warm and dry  Findings: No erythema or rash  Neurological:      General: No focal deficit present  Mental Status: He is alert and oriented to person, place, and time  Psychiatric:         Mood and Affect: Mood normal  Affect is blunt  Behavior: Behavior normal  Behavior is cooperative  Thought Content:  Thought content normal          Judgment: Judgment normal            Labs:  Lab Results   Component Value Date    WBC 8 50 05/05/2021    HGB 12 7 (L) 05/05/2021    HCT 38 8 (L) 05/05/2021    MCV 94 05/05/2021     05/05/2021     Lab Results   Component Value Date    K 4 5 05/05/2021     05/05/2021    CO2 30 05/05/2021    BUN 27 (H) 05/05/2021    CREATININE 1 54 (H) 05/05/2021    GLUF 93 05/05/2021    CALCIUM 9 5 05/05/2021 CORRECTEDCA 9 8 10/28/2020    AST 11 (L) 05/05/2021    ALT 16 05/05/2021    ALKPHOS 55 05/05/2021    EGFR 44 05/05/2021       Patient voiced understanding and agreement in the above discussion  Aware to contact our office with questions/symptoms in the interim  This note has been generated by voice recognition software system  Therefore, there may be spelling, grammar, and or syntax errors  Please contact if questions arise

## 2021-05-07 ENCOUNTER — HOSPITAL ENCOUNTER (OUTPATIENT)
Dept: INFUSION CENTER | Facility: HOSPITAL | Age: 71
Discharge: HOME | End: 2021-05-07
Attending: INTERNAL MEDICINE
Payer: MEDICARE

## 2021-05-07 VITALS
HEIGHT: 69 IN | DIASTOLIC BLOOD PRESSURE: 63 MMHG | TEMPERATURE: 96.9 F | BODY MASS INDEX: 29.84 KG/M2 | HEART RATE: 50 BPM | RESPIRATION RATE: 16 BRPM | WEIGHT: 201.5 LBS | SYSTOLIC BLOOD PRESSURE: 140 MMHG | OXYGEN SATURATION: 99 %

## 2021-05-07 DIAGNOSIS — C65.2 CANCER OF LEFT RENAL PELVIS (HCC): ICD-10-CM

## 2021-05-07 DIAGNOSIS — C68.9 UROTHELIAL CANCER (HCC): Primary | ICD-10-CM

## 2021-05-07 PROCEDURE — 96413 CHEMO IV INFUSION 1 HR: CPT

## 2021-05-07 RX ORDER — SODIUM CHLORIDE 9 MG/ML
20 INJECTION, SOLUTION INTRAVENOUS ONCE
Status: COMPLETED | OUTPATIENT
Start: 2021-05-07 | End: 2021-05-07

## 2021-05-07 RX ADMIN — SODIUM CHLORIDE 20 ML/HR: 0.9 INJECTION, SOLUTION INTRAVENOUS at 10:40

## 2021-05-07 RX ADMIN — SODIUM CHLORIDE 200 MG: 9 INJECTION, SOLUTION INTRAVENOUS at 10:51

## 2021-05-07 NOTE — PLAN OF CARE
Problem: Potential for Falls  Goal: Patient will remain free of falls  Description: INTERVENTIONS:  - Assess patient frequently for physical needs  -  Identify cognitive and physical deficits and behaviors that affect risk of falls    -  [place] fall precautions as indicated by assessment   - Educate patient/family on patient safety including physical limitations  - Instruct patient to call for assistance with activity based on assessment  - Modify environment to reduce risk of injury  - Consider OT/PT consult to assist with strengthening/mobility  Outcome: Progressing

## 2021-05-24 ENCOUNTER — TRANSCRIBE ORDERS (OUTPATIENT)
Dept: LAB | Facility: HOSPITAL | Age: 71
End: 2021-05-24

## 2021-05-24 ENCOUNTER — APPOINTMENT (OUTPATIENT)
Dept: LAB | Facility: HOSPITAL | Age: 71
End: 2021-05-24
Payer: MEDICARE

## 2021-05-24 DIAGNOSIS — C68.9 UROTHELIAL CANCER (HCC): ICD-10-CM

## 2021-05-24 DIAGNOSIS — R53.83 OTHER FATIGUE: ICD-10-CM

## 2021-05-24 DIAGNOSIS — E79.0 HYPERURICEMIA: ICD-10-CM

## 2021-05-24 LAB
ALBUMIN SERPL BCP-MCNC: 3.6 G/DL (ref 3.5–5.7)
ALP SERPL-CCNC: 59 U/L (ref 55–165)
ALT SERPL W P-5'-P-CCNC: 26 U/L (ref 7–52)
ANION GAP SERPL CALCULATED.3IONS-SCNC: 8 MMOL/L (ref 4–13)
AST SERPL W P-5'-P-CCNC: 17 U/L (ref 13–39)
BASOPHILS # BLD AUTO: 0 THOUSANDS/ΜL (ref 0–0.1)
BASOPHILS NFR BLD AUTO: 0 % (ref 0–2)
BILIRUB SERPL-MCNC: 0.6 MG/DL (ref 0.2–1)
BUN SERPL-MCNC: 24 MG/DL (ref 7–25)
CALCIUM SERPL-MCNC: 8.9 MG/DL (ref 8.6–10.5)
CHLORIDE SERPL-SCNC: 105 MMOL/L (ref 98–107)
CO2 SERPL-SCNC: 28 MMOL/L (ref 21–31)
CREAT SERPL-MCNC: 1.37 MG/DL (ref 0.7–1.3)
EOSINOPHIL # BLD AUTO: 0.1 THOUSAND/ΜL (ref 0–0.61)
EOSINOPHIL NFR BLD AUTO: 2 % (ref 0–5)
ERYTHROCYTE [DISTWIDTH] IN BLOOD BY AUTOMATED COUNT: 15.2 % (ref 11.5–14.5)
GFR SERPL CREATININE-BSD FRML MDRD: 51 ML/MIN/1.73SQ M
GLUCOSE P FAST SERPL-MCNC: 119 MG/DL (ref 65–99)
HCT VFR BLD AUTO: 37.5 % (ref 42–47)
HGB BLD-MCNC: 12.3 G/DL (ref 14–18)
LYMPHOCYTES # BLD AUTO: 1.3 THOUSANDS/ΜL (ref 0.6–4.47)
LYMPHOCYTES NFR BLD AUTO: 20 % (ref 21–51)
MAGNESIUM SERPL-MCNC: 1.6 MG/DL (ref 1.9–2.7)
MCH RBC QN AUTO: 31 PG (ref 26–34)
MCHC RBC AUTO-ENTMCNC: 32.9 G/DL (ref 31–37)
MCV RBC AUTO: 94 FL (ref 81–99)
MONOCYTES # BLD AUTO: 0.6 THOUSAND/ΜL (ref 0.17–1.22)
MONOCYTES NFR BLD AUTO: 9 % (ref 2–12)
NEUTROPHILS # BLD AUTO: 4.4 THOUSANDS/ΜL (ref 1.4–6.5)
NEUTS SEG NFR BLD AUTO: 69 % (ref 42–75)
PLATELET # BLD AUTO: 163 THOUSANDS/UL (ref 149–390)
PMV BLD AUTO: 7.8 FL (ref 8.6–11.7)
POTASSIUM SERPL-SCNC: 3.8 MMOL/L (ref 3.5–5.5)
PROT SERPL-MCNC: 5.9 G/DL (ref 6.4–8.9)
RBC # BLD AUTO: 3.98 MILLION/UL (ref 4.3–5.9)
SODIUM SERPL-SCNC: 141 MMOL/L (ref 134–143)
TSH SERPL DL<=0.05 MIU/L-ACNC: 2.58 UIU/ML (ref 0.45–5.33)
URATE SERPL-MCNC: 4.5 MG/DL (ref 2.3–7.6)
WBC # BLD AUTO: 6.4 THOUSAND/UL (ref 4.8–10.8)

## 2021-05-24 PROCEDURE — 36415 COLL VENOUS BLD VENIPUNCTURE: CPT

## 2021-05-24 PROCEDURE — 84443 ASSAY THYROID STIM HORMONE: CPT

## 2021-05-24 PROCEDURE — 84550 ASSAY OF BLOOD/URIC ACID: CPT

## 2021-05-24 PROCEDURE — 85025 COMPLETE CBC W/AUTO DIFF WBC: CPT

## 2021-05-24 PROCEDURE — 80053 COMPREHEN METABOLIC PANEL: CPT

## 2021-05-24 PROCEDURE — 83735 ASSAY OF MAGNESIUM: CPT

## 2021-05-25 ENCOUNTER — OFFICE VISIT (OUTPATIENT)
Dept: HEMATOLOGY ONCOLOGY | Facility: CLINIC | Age: 71
End: 2021-05-25
Payer: MEDICARE

## 2021-05-25 VITALS
RESPIRATION RATE: 16 BRPM | OXYGEN SATURATION: 98 % | HEART RATE: 60 BPM | HEIGHT: 69 IN | TEMPERATURE: 97.6 F | WEIGHT: 205.9 LBS | SYSTOLIC BLOOD PRESSURE: 132 MMHG | BODY MASS INDEX: 30.49 KG/M2 | DIASTOLIC BLOOD PRESSURE: 78 MMHG

## 2021-05-25 DIAGNOSIS — R53.83 OTHER FATIGUE: ICD-10-CM

## 2021-05-25 DIAGNOSIS — C68.9 UROTHELIAL CANCER (HCC): Primary | ICD-10-CM

## 2021-05-25 DIAGNOSIS — C65.2 CANCER OF LEFT RENAL PELVIS (HCC): ICD-10-CM

## 2021-05-25 DIAGNOSIS — M79.10 MYALGIA: ICD-10-CM

## 2021-05-25 PROCEDURE — 99214 OFFICE O/P EST MOD 30 MIN: CPT | Performed by: NURSE PRACTITIONER

## 2021-05-25 RX ORDER — SODIUM CHLORIDE 9 MG/ML
20 INJECTION, SOLUTION INTRAVENOUS ONCE
Status: CANCELLED | OUTPATIENT
Start: 2021-06-18

## 2021-05-27 DIAGNOSIS — C65.2 CANCER OF LEFT RENAL PELVIS (HCC): ICD-10-CM

## 2021-05-27 DIAGNOSIS — C68.9 UROTHELIAL CANCER (HCC): ICD-10-CM

## 2021-05-27 DIAGNOSIS — D64.9 NORMOCYTIC ANEMIA: ICD-10-CM

## 2021-05-27 DIAGNOSIS — E79.0 HYPERURICEMIA: ICD-10-CM

## 2021-05-27 DIAGNOSIS — E83.42 HYPOMAGNESEMIA: Primary | ICD-10-CM

## 2021-05-28 ENCOUNTER — HOSPITAL ENCOUNTER (OUTPATIENT)
Dept: INFUSION CENTER | Facility: HOSPITAL | Age: 71
Discharge: HOME | End: 2021-05-28
Attending: INTERNAL MEDICINE
Payer: MEDICARE

## 2021-05-28 VITALS
TEMPERATURE: 96.5 F | BODY MASS INDEX: 30.4 KG/M2 | HEART RATE: 45 BPM | WEIGHT: 205.25 LBS | DIASTOLIC BLOOD PRESSURE: 80 MMHG | OXYGEN SATURATION: 97 % | RESPIRATION RATE: 16 BRPM | SYSTOLIC BLOOD PRESSURE: 143 MMHG | HEIGHT: 69 IN

## 2021-05-28 DIAGNOSIS — C65.2 CANCER OF LEFT RENAL PELVIS (HCC): ICD-10-CM

## 2021-05-28 DIAGNOSIS — E83.42 HYPOMAGNESEMIA: ICD-10-CM

## 2021-05-28 DIAGNOSIS — D64.9 NORMOCYTIC ANEMIA: ICD-10-CM

## 2021-05-28 DIAGNOSIS — E79.0 HYPERURICEMIA: ICD-10-CM

## 2021-05-28 DIAGNOSIS — C68.9 UROTHELIAL CANCER (HCC): Primary | ICD-10-CM

## 2021-05-28 PROCEDURE — 96413 CHEMO IV INFUSION 1 HR: CPT

## 2021-05-28 PROCEDURE — 96367 TX/PROPH/DG ADDL SEQ IV INF: CPT

## 2021-05-28 RX ORDER — SODIUM CHLORIDE 9 MG/ML
20 INJECTION, SOLUTION INTRAVENOUS ONCE
Status: COMPLETED | OUTPATIENT
Start: 2021-05-28 | End: 2021-05-28

## 2021-05-28 RX ADMIN — MAGNESIUM SULFATE HEPTAHYDRATE: 500 INJECTION, SOLUTION INTRAMUSCULAR; INTRAVENOUS at 10:15

## 2021-05-28 RX ADMIN — SODIUM CHLORIDE 200 MG: 9 INJECTION, SOLUTION INTRAVENOUS at 11:13

## 2021-05-28 RX ADMIN — SODIUM CHLORIDE 20 ML/HR: 0.9 INJECTION, SOLUTION INTRAVENOUS at 11:13

## 2021-05-28 NOTE — PROGRESS NOTES
Pt tolerated chemotherapy and IV magnesium well without incident  IV magnesium added to treatment plan d/t magnesium level 1 6  Pt discharged in stable condition and is aware of next infusion appointment  AVS provided

## 2021-05-28 NOTE — PLAN OF CARE
Problem: Potential for Falls  Goal: Patient will remain free of falls  Description: INTERVENTIONS:  - Assess patient frequently for physical needs  -  Identify cognitive and physical deficits and behaviors that affect risk of falls  -  [place] fall precautions as indicated by assessment   - Educate patient/family on patient safety including physical limitations  - Instruct patient to call for assistance with activity based on assessment  - Modify environment to reduce risk of injury  - Consider OT/PT consult to assist with strengthening/mobility  Outcome: Progressing     Problem: Knowledge Deficit  Goal: Patient/family/caregiver demonstrates understanding of disease process, treatment plan, medications, and discharge instructions  Description: Complete learning assessment and assess knowledge base    Interventions:  - Provide teaching at level of understanding  - Provide teaching via preferred learning methods  Outcome: Progressing

## 2021-06-03 ENCOUNTER — TELEPHONE (OUTPATIENT)
Dept: PALLIATIVE MEDICINE | Facility: CLINIC | Age: 71
End: 2021-06-03

## 2021-06-03 NOTE — TELEPHONE ENCOUNTER
[facility] request to cancel June apt with Dr Kendra Alford  Patient states he is feeling much better and will call if he needs an apt

## 2021-06-04 ENCOUNTER — PROCEDURE VISIT (OUTPATIENT)
Dept: UROLOGY | Facility: CLINIC | Age: 71
End: 2021-06-04
Payer: MEDICARE

## 2021-06-04 VITALS
WEIGHT: 204.8 LBS | HEIGHT: 69 IN | SYSTOLIC BLOOD PRESSURE: 126 MMHG | DIASTOLIC BLOOD PRESSURE: 82 MMHG | BODY MASS INDEX: 30.33 KG/M2 | HEART RATE: 52 BPM

## 2021-06-04 DIAGNOSIS — C68.9 UROTHELIAL CANCER (HCC): Primary | ICD-10-CM

## 2021-06-04 LAB
SL AMB  POCT GLUCOSE, UA: NORMAL
SL AMB LEUKOCYTE ESTERASE,UA: NORMAL
SL AMB POCT BILIRUBIN,UA: NORMAL
SL AMB POCT BLOOD,UA: NORMAL
SL AMB POCT CLARITY,UA: CLEAR
SL AMB POCT COLOR,UA: YELLOW
SL AMB POCT KETONES,UA: NORMAL
SL AMB POCT NITRITE,UA: NORMAL
SL AMB POCT PH,UA: 6
SL AMB POCT SPECIFIC GRAVITY,UA: 1.01
SL AMB POCT URINE PROTEIN: NORMAL
SL AMB POCT UROBILINOGEN: 0.2

## 2021-06-04 PROCEDURE — 88112 CYTOPATH CELL ENHANCE TECH: CPT | Performed by: PATHOLOGY

## 2021-06-04 PROCEDURE — 52000 CYSTOURETHROSCOPY: CPT | Performed by: UROLOGY

## 2021-06-04 PROCEDURE — 81002 URINALYSIS NONAUTO W/O SCOPE: CPT | Performed by: UROLOGY

## 2021-06-04 NOTE — PROGRESS NOTES
Cystoscopy     Date/Time 6/4/2021 1:54 PM     Performed by  David Eaton MD     Authorized by David Eaton MD      [place] Protocol:  Timeout called at: 6/4/2021 1:54 PM       Procedure Details:  Procedure type: cystoscopy    Patient tolerance: Patient tolerated the procedure well with no immediate complications    Additional Procedure Details:   Cystoscopy procedure note:    Risk and benefits of flexible cystoscopy were discussed  Informed consent was obtained  Urine dip was adequate for cystoscopy  The patient was placed in the supine position  His genitalia was prepped with Betadine and draped in a sterile fashion  Viscous 2% lidocaine jelly was instilled into the urethra and allowed dwell time for local anesthesia  Flexible cystoscopy was then performed using a 16F scope  The distal urethra and prostatic urethra were evaluated and were normal in course and caliber  Once inside the bladder, it was carefully inspected in a 360 degree fashion  There was no evidence of mucosal abnormalities, lesions, diverticula or stones  Both ureteral orifices in their orthotopic location were visualized with clear efflux of urine  Retroflexion for evaluation of the bladder neck was normal      Overall this was a negative cystoscopy  Urine cytology will be sent

## 2021-06-04 NOTE — PROGRESS NOTES
UROLOGY FOLLOWUP NOTE     CHIEF COMPLAINT   Katy Escobedo is a 66 y o  male with a complaint of   Chief Complaint   Patient presents with    Cystoscopy       History of Present Illness:     66 y o  male with a history of musculoskeletal back pain  Patient sees a pain management team and has been undergoing injections  MR imaging demonstrated a concerning left-sided renal lesion and the patient underwent a CT urogram   This demonstrates a endophytic infiltrating lesion, by my review of the films concerning for urothelial carcinoma  The patient has a remote history of smoking more than 40 years ago  Patient did undergo a robotic assisted left nephroureterectomy 4/1/20  Removal of the kidney was somewhat challenging given perihilar fat  Pathology does return T3 disease with invasion into the peripelvic fat  Patient had been sent for preoperative evaluation for neoadjuvant chemotherapy but had deferred  It was recommended the patient return to see medical oncology postoperatively and he did agree to adjuvant chemotherapy  Port has been placed  Unfortunately, with some progressive renal dysfunction and concerns about hearing loss, additional chemotherapy was aborted  In September of 2020, the patient was noted to have progressive lymphadenopathy and concern in the left rectus muscle  Patient underwent PET scan which showed activity  Patient was started on Keytruda and recommended to undergo some palliative radiation  He has had equivocal response of some of the abdominal nodes but the chest and esophageal activity has diminished      Past Medical History:     Past Medical History:   Diagnosis Date    Arthritis     Bladder cancer     Cancer ([address] )     skin melanoma;basal cell    Chronic pain disorder     from arthritis    Colon polyp     Does use hearing aid     bilat    Dry eyes, bilateral     GERD (gastroesophageal reflux disease)     History of kidney stones 10/2019    History of partial knee replacement     bilat    History of vertigo     Hyperlipidemia     Hypertension     Kidney lesion     Lumbar disc disorder     compression of vertebrae L4-5-6    Muscle weakness     left hip area    Renal mass     left    Right ankle injury 03/05/2020    missed step of ladder     Right ankle pain     Shortness of breath     with activity    Tinnitus     Urothelial cancer     left    Wears glasses        PAST SURGICAL HISTORY:     Past Surgical History:   Procedure Laterality Date    COLONOSCOPY      CYSTOSCOPY Left 4/1/2020    Procedure: CYSTOSCOPY; URETERAL CATHETER PLACEMENT;  Surgeon: Charly Chavarria MD;  Location[facility];  Service: Urology    CYSTOSCOPY  05/11/2020    CYSTOSCOPY  06/04/2021    FL CYSTOGRAM  4/13/2020    FL RETROGRADE PYELOGRAM  1/17/2020    HERNIA REPAIR      umbilical with mesh    INGUINAL HERNIA REPAIR Right     with mesh    IR PORT PLACEMENT  4/30/2020    JOINT REPLACEMENT Bilateral     partials knee    LUMBAR EPIDURAL INJECTION      [place] CYSTOURETHROSCOPY,URETER CATHETER Bilateral 1/17/2020    Procedure: CYSTOSCOPY; RIGHT RETROGRADE PYELOGRAM WITH RIGHT URETERAL CYTOLOGY SAMPLING; LEFT URETEROSCOPY WITH RENAL PELVIS BIOPSY AND LEFT STENT PLACEMENT;  Surgeon: Charly Chavarria MD;  [facility];  Service: Urology    [place] NEPHRECTOMY, W/PART   URETECTOMY Left 4/1/2020    Procedure: ROBOTIC LAPAROSCOPIC NEPHRO-URETERECTOMY;  Surgeon: Charly Chavarria MD;  Location[facility];  Service: Urology    SKIN CANCER EXCISION      surface melanoma    TONSILLECTOMY      WISDOM TOOTH EXTRACTION         CURRENT MEDICATIONS:     Current Outpatient Medications   Medication Sig Dispense Refill    acetaminophen (TYLENOL) 325 mg tablet Take 2 tablets (650 mg total) by mouth every 6 (six) hours as needed for mild pain or fever  0    allopurinol (ZYLOPRIM) 300 mg tablet Take 1 tablet (300 mg total) by mouth daily 30 tablet 3    ALPRAZolam Rylee Ready) 0 25 mg tablet Take 1 tablet (0 25 mg total) by mouth daily at bedtime as needed for anxiety (restless legs) 30 tablet 1    amLODIPine (NORVASC) 5 mg tablet Take 5 mg by mouth daily        atorvastatin (LIPITOR) 80 mg tablet Take 80 mg by mouth every other day evening      folic acid (FOLVITE) 1 mg tablet Take 1 tablet (1 mg total) by mouth daily 90 tablet 4    hydrOXYzine HCL (ATARAX) 25 mg tablet Take 1 tablet (25 mg total) by mouth every 6 (six) hours as needed for itching or anxiety 60 tablet 2    Magnesium 250 MG TABS 1 tablet 2 (two) times a day      metoprolol tartrate (LOPRESSOR) 100 mg tablet Take 50 mg by mouth daily      naloxone (NARCAN) 4 mg/0 1 mL nasal spray Administer 1 spray into a nostril  If breathing does not return to normal or if breathing difficulty resumes after 2-3 minutes, give another dose in the other nostril using a new spray  1 each 1    omeprazole (PriLOSEC) 20 mg delayed release capsule Take 20 mg by mouth daily        predniSONE 10 mg tablet Take 1 tablet (10 mg total) by mouth daily 30 tablet 0    senna (SENOKOT) 8 6 mg Take 1 tablet (8 6 mg total) by mouth daily at bedtime 30 each 0    sildenafil (VIAGRA) 100 mg tablet TAKE ONE TABLET BY MOUTH  AS NEEDED PRIOR TO SEXUAL ACTIVITY FOR ERECTILE DYSFUNCTION      terazosin (HYTRIN) 5 mg capsule Take 2 capsules (10 mg total) by mouth daily at bedtime 30 capsule 6    triamcinolone (KENALOG) 0 1 % lotion Apply topically 3 (three) times a day 60 mL 5    VITAMIN D PO Take 1,000 Units by mouth daily        No current facility-administered medications for this visit          ALLERGIES:   No Known Allergies    SOCIAL HISTORY:     Social History     Socioeconomic History    Marital status: /Civil Union     Spouse name: None    Number of children: None    Years of education: None    Highest education level: None   Occupational History    None   Social Needs    Financial resource strain: None    Food insecurity Worry: None     Inability: None    Transportation needs     Medical: None     Non-medical: None   Tobacco Use    Smoking status: Former Smoker     Quit date:      Years since quittin 2    Smokeless tobacco: Never Used   Substance and Sexual Activity    Alcohol use: Not Currently     Alcohol/week: 4 0 standard drinks     Types: 4 Cans of beer per week     Frequency: 4 or more times a week     Drinks per session: 3 or 4     Binge frequency: Daily or almost daily     Comment: daily/socially    Drug use: Never    Sexual activity: Not Currently   Lifestyle    Physical activity     Days per week: None     Minutes per session: None    Stress: None   Relationships    Social connections     Talks on phone: None     Gets together: None     Attends Holiness service: None     Active member of club or organization: None     Attends meetings of clubs or organizations: None     Relationship status: None    Intimate partner violence     Fear of current or ex partner: None     Emotionally abused: None     Physically abused: None     Forced sexual activity: None   Other Topics Concern    None   Social History Narrative    Daily caffeine use - 2 cups coffee        SOCIAL HISTORY:     Family History   Problem Relation Age of Onset    Liver cancer Father        REVIEW OF SYSTEMS:     Review of Systems   Constitutional: Negative  Respiratory: Negative  Cardiovascular: Negative  Gastrointestinal: Negative  Negative for abdominal pain  Genitourinary: Negative  Negative for dysuria, flank pain and hematuria  Musculoskeletal: Negative  Skin: Negative  Psychiatric/Behavioral: Negative  PHYSICAL EXAM:     /82 (BP Location: Left arm, Patient Position: Sitting, Cuff Size: Adult)   Pulse (!) 52   Ht 5' 8 5" (1 74 m)   Wt 92 9 kg (204 lb 12 8 oz)   BMI 30 69 kg/m²     General:  Healthy appearing male in no acute distress  They have a normal affect    There is not appear to be any gross neurologic defects or abnormalities  HEENT:  Normocephalic, atraumatic  Neck is supple without any palpable lymphadenopathy  Cardiovascular:  Patient has normal palpable distal radial pulses  There is no significant peripheral edema  No JVD is noted  Respiratory:  Patient has unlabored respirations  There is no audible wheeze or rhonchi  Abdomen:  Abdomen is with healed inguinal and umbilical (mesh by report) surgical scars  Extraction incision in the left upper quadrant demonstrates hernia with Valsalva but this is soft and reducible  Abdomen is soft and nontender  There is no tympany  Inguinal and umbilical hernia are not appreciated  : Circumcised phallus orthotopic meatus  Musculoskeletal:  Patient does not have significant CVA tenderness in the  flank with palpation or percussion  They full range of motion in all 4 extremities  Strength in all 4 extremities appears congruent  Patient is able to ambulate without assistance or difficulty  Dermatologic:  Patient has no skin abnormalities or rashes  LABS:     CBC:   Lab Results   Component Value Date    WBC 6 40 2021    HGB 12 3 (L) 2021    HCT 37 5 (L) 2021    MCV 94 2021     2021       BMP:   Lab Results   Component Value Date    CALCIUM 8 9 2021    K 3 8 2021    CO2 28 2021     2021    BUN 24 2021    CREATININE 1 37 (H) 2021     IMAGIN/30/21  PET/CT SCAN     INDICATION:  C64 2: Malignant neoplasm of left kidney, except renal pelvis  C68 9: Malignant neoplasm of urinary organ, unspecified     Urothelial carcinoma, left renal pelvis  Prior history of bladder cancer  Prior surgery and status post chemoradiation  Staging study      MODIFIER: PS      COMPARISON: Prior PET/CT 2021     CELL TYPE:  Invasive high-grade urothelial carcinoma     TECHNIQUE:   8 8 mCi F-18-FDG administered IV   Multiplanar attenuation corrected and non attenuation corrected PET images were acquired 60 minutes post injection  Contiguous, low dose, axial CT sections were obtained from the skull base through the   femurs   Intravenous contrast material was not utilized  This examination, like all CT scans performed in the [facility], was performed utilizing techniques to minimize radiation dose exposure, including the use of iterative   reconstruction and automated exposure control       Fasting serum glucose: 110 mg/dl     FINDINGS:      VISUALIZED BRAIN:     No acute abnormalities are seen      HEAD/NECK:     There is a physiologic distribution of FDG  No FDG avid cervical adenopathy is seen  CT images: Improvement of previously seen asymmetric vocal cord activity earlier SUV max 5 0 now 2 7     CHEST:     No areas of new or increasing glucose activity within the thorax  Prior study demonstrated a focus of activity in the region of the distal most esophagus with SUV max of 4 2 now activity in this location is only 2 7 similar to background activity  CT images: No development of pleural or pericardial effusion  [id number] catheter is again seen  Mild aneurysmal dilatation of the ascending thoracic aorta measuring approximately 4 1 cm and coronary artery calcification, unchanged      ABDOMEN:     As on the prior study, there is an area of intense portacaval activity prior SUV max 9 0 currently 11 5  The lymph node size of approximately 2 0 x 1 4 cm has not changed  Prior study demonstrated intense activity in the region of the superior   mesenteric artery to the left of midline with SUV max of 18 4 and soft tissue mass or lymph node measuring 2 6 x 1 6 cm, currently significant improvement with residual SUV max 3 7 residual density approximately 1 4 x 0 7 cm  No new areas of abnormal   activity  CT images: Left kidney surgically absent  Hepatic cysts are again seen  There is no ascites    No hydronephrosis on the right      PELVIS:   No FDG avid soft tissue lesions are seen  CT images: Prostate gland appears unchanged from prior study, again mildly enlarged      OSSEOUS STRUCTURES:  No FDG avid lesions are seen  CT images: No significant findings      IMPRESSION:     1  Resolution of previously abnormal activity in the region of the distal most esophagus  2  Currently no suspicious hypermetabolic foci in the neck or chest  3  Mixed response within the abdomen  Although portacaval lymph node has demonstrated an increased degree of glucose avidity, previously seen masslike opacity adjacent to the left aspect of the SMA has shown improvement  No new areas of abnormal   activity in the abdomen  4  No hypermetabolic suspicious foci within the pelvis or osseous structures    PATHOLOGY:     4/1/20  Case Report   Surgical Pathology Report                         Case: [id number]                                    Authorizing Provider: Sanya Contreras MD   Collected:           04/01/2020 [id number]               Ordering Location:     [facility]        Received:            04/01/2020 [address] [place] [address] Operating Room                                                      Pathologist:           Joann Mejía MD                                                                  Specimen:    Kidney, Left, left kidney, ureter and bladder cuff                                         Final Diagnosis   A  Left kidney, ureter, and bladder cuff, nephroureterectomy:  - Invasive high grade urothelial carcinoma arising in renal pelvis  - Bladder cuff margin is negative for carcinoma and no evidence of high grade dysplasia  - Ureters with no significant pathologic abnormality  - Two benign simple cysts  - Adrenal gland is negative for malignancy  - One lymph node, negative for malignancy (0/1)  1/17/20  Final Diagnosis   A   Ureter, Right, left renal pelvis biopsy  -Fragments of high grade urothelial carcinoma   -No evidence of lamina propria invasion   -Detrusor muscle/ muscularis propria is not present for evaluation      B  Urinary Bladder, bladder biopsy:  -Fragments of low grade papillary lesion  See note  -No evidence of invasion seen  -Unremarkable fragment of detrusor muscle seen      Separate fragment of urothelium with mild cytological atypia seen     Note  Differential diagnosis include low grade Papillary urothelial carcinoma vs apillary urothelial neoplasm of low malignant potential     Intradepartmental consultation with more than one staff pathologist is in agreement     PROCEDURE:     SEE NOTE    ASSESSMENT:     66 y o  male with LEFT upper tract urothelial carcinoma s/p robotic LEFT nephroureterectomy for [id number] disease, now with signs of metastatic disease undergoing palliative radiation and immunotherapy    PLAN:     Patient will continue his palliative immunotherapy  I will see him back in 6 months for possible cystoscopy at that time  He does have a hernia at the extraction incision in the left lower quadrant  This is not painful or tender does not have any irritation  We discussed hernia precautions  I offered him referral to General surgery but patient defers at this current time

## 2021-06-09 DIAGNOSIS — C68.9 UROTHELIAL CANCER (HCC): Primary | ICD-10-CM

## 2021-06-09 DIAGNOSIS — Z51.5 PALLIATIVE CARE PATIENT: ICD-10-CM

## 2021-06-09 DIAGNOSIS — G89.3 CANCER RELATED PAIN: ICD-10-CM

## 2021-06-09 RX ORDER — PREDNISONE 10 MG/1
10 TABLET ORAL DAILY
Qty: 30 TABLET | Refills: 3 | Status: SHIPPED | OUTPATIENT
Start: 2021-06-09 | End: 2021-07-29 | Stop reason: SDUPTHER

## 2021-06-14 ENCOUNTER — APPOINTMENT (OUTPATIENT)
Dept: LAB | Facility: HOSPITAL | Age: 71
End: 2021-06-14
Attending: INTERNAL MEDICINE
Payer: MEDICARE

## 2021-06-14 DIAGNOSIS — M79.10 MYALGIA: ICD-10-CM

## 2021-06-14 DIAGNOSIS — R53.83 OTHER FATIGUE: ICD-10-CM

## 2021-06-14 DIAGNOSIS — C68.9 UROTHELIAL CANCER (HCC): ICD-10-CM

## 2021-06-14 DIAGNOSIS — C65.2 CANCER OF LEFT RENAL PELVIS (HCC): ICD-10-CM

## 2021-06-14 LAB
ALBUMIN SERPL BCP-MCNC: 3.7 G/DL (ref 3.5–5.7)
ALP SERPL-CCNC: 70 U/L (ref 55–165)
ALT SERPL W P-5'-P-CCNC: 13 U/L (ref 7–52)
ANION GAP SERPL CALCULATED.3IONS-SCNC: 7 MMOL/L (ref 4–13)
AST SERPL W P-5'-P-CCNC: 12 U/L (ref 13–39)
BASOPHILS # BLD AUTO: 0 THOUSANDS/ΜL (ref 0–0.1)
BASOPHILS NFR BLD AUTO: 0 % (ref 0–2)
BILIRUB SERPL-MCNC: 0.7 MG/DL (ref 0.2–1)
BUN SERPL-MCNC: 23 MG/DL (ref 7–25)
CALCIUM SERPL-MCNC: 9.1 MG/DL (ref 8.6–10.5)
CHLORIDE SERPL-SCNC: 102 MMOL/L (ref 98–107)
CK SERPL-CCNC: 24 U/L (ref 30–308)
CO2 SERPL-SCNC: 30 MMOL/L (ref 21–31)
CREAT SERPL-MCNC: 1.45 MG/DL (ref 0.7–1.3)
CRP SERPL QL: 12.5 MG/L
EOSINOPHIL # BLD AUTO: 0 THOUSAND/ΜL (ref 0–0.61)
EOSINOPHIL NFR BLD AUTO: 0 % (ref 0–5)
ERYTHROCYTE [DISTWIDTH] IN BLOOD BY AUTOMATED COUNT: 14.7 % (ref 11.5–14.5)
ERYTHROCYTE [SEDIMENTATION RATE] IN BLOOD: 36 MM/HOUR (ref 0–19)
GFR SERPL CREATININE-BSD FRML MDRD: 48 ML/MIN/1.73SQ M
GLUCOSE SERPL-MCNC: 102 MG/DL (ref 65–99)
HCT VFR BLD AUTO: 38.2 % (ref 42–47)
HGB BLD-MCNC: 12.8 G/DL (ref 14–18)
LDH SERPL-CCNC: 116 U/L (ref 84–246)
LYMPHOCYTES # BLD AUTO: 2.1 THOUSANDS/ΜL (ref 0.6–4.47)
LYMPHOCYTES NFR BLD AUTO: 40 % (ref 21–51)
MAGNESIUM SERPL-MCNC: 1.8 MG/DL (ref 1.9–2.7)
MCH RBC QN AUTO: 31.3 PG (ref 26–34)
MCHC RBC AUTO-ENTMCNC: 33.5 G/DL (ref 31–37)
MCV RBC AUTO: 94 FL (ref 81–99)
MONOCYTES # BLD AUTO: 0.6 THOUSAND/ΜL (ref 0.17–1.22)
MONOCYTES NFR BLD AUTO: 12 % (ref 2–12)
NEUTROPHILS # BLD AUTO: 2.6 THOUSANDS/ΜL (ref 1.4–6.5)
NEUTS SEG NFR BLD AUTO: 48 % (ref 42–75)
PLATELET # BLD AUTO: 120 THOUSANDS/UL (ref 149–390)
PMV BLD AUTO: 7.6 FL (ref 8.6–11.7)
POTASSIUM SERPL-SCNC: 4.2 MMOL/L (ref 3.5–5.5)
PROT SERPL-MCNC: 6.2 G/DL (ref 6.4–8.9)
RBC # BLD AUTO: 4.09 MILLION/UL (ref 4.3–5.9)
SODIUM SERPL-SCNC: 139 MMOL/L (ref 134–143)
T3FREE SERPL-MCNC: 2.39 PG/ML (ref 2.3–4.2)
TSH SERPL DL<=0.05 MIU/L-ACNC: 1.92 UIU/ML (ref 0.45–5.33)
URATE SERPL-MCNC: 4.8 MG/DL (ref 2.3–7.6)
WBC # BLD AUTO: 5.3 THOUSAND/UL (ref 4.8–10.8)

## 2021-06-14 PROCEDURE — 85652 RBC SED RATE AUTOMATED: CPT

## 2021-06-14 PROCEDURE — 84550 ASSAY OF BLOOD/URIC ACID: CPT

## 2021-06-14 PROCEDURE — 82550 ASSAY OF CK (CPK): CPT

## 2021-06-14 PROCEDURE — 85025 COMPLETE CBC W/AUTO DIFF WBC: CPT

## 2021-06-14 PROCEDURE — 83735 ASSAY OF MAGNESIUM: CPT

## 2021-06-14 PROCEDURE — 83615 LACTATE (LD) (LDH) ENZYME: CPT

## 2021-06-14 PROCEDURE — 86200 CCP ANTIBODY: CPT

## 2021-06-14 PROCEDURE — 82085 ASSAY OF ALDOLASE: CPT

## 2021-06-14 PROCEDURE — 36415 COLL VENOUS BLD VENIPUNCTURE: CPT

## 2021-06-14 PROCEDURE — 80053 COMPREHEN METABOLIC PANEL: CPT

## 2021-06-14 PROCEDURE — 84481 FREE ASSAY (FT-3): CPT

## 2021-06-14 PROCEDURE — 84443 ASSAY THYROID STIM HORMONE: CPT

## 2021-06-14 PROCEDURE — 86140 C-REACTIVE PROTEIN: CPT

## 2021-06-15 ENCOUNTER — OFFICE VISIT (OUTPATIENT)
Dept: HEMATOLOGY ONCOLOGY | Facility: CLINIC | Age: 71
End: 2021-06-15
Payer: MEDICARE

## 2021-06-15 VITALS
SYSTOLIC BLOOD PRESSURE: 140 MMHG | OXYGEN SATURATION: 97 % | RESPIRATION RATE: 16 BRPM | WEIGHT: 202.2 LBS | DIASTOLIC BLOOD PRESSURE: 86 MMHG | HEART RATE: 96 BPM | BODY MASS INDEX: 29.95 KG/M2 | TEMPERATURE: 97.3 F | HEIGHT: 69 IN

## 2021-06-15 DIAGNOSIS — D69.6 PLATELETS DECREASED (HCC): ICD-10-CM

## 2021-06-15 DIAGNOSIS — I80.01 THROMBOPHLEBITIS OF SUPERFICIAL VEINS OF RIGHT LOWER EXTREMITY: ICD-10-CM

## 2021-06-15 DIAGNOSIS — E83.42 HYPOMAGNESEMIA: ICD-10-CM

## 2021-06-15 DIAGNOSIS — C65.2 CANCER OF LEFT RENAL PELVIS (HCC): ICD-10-CM

## 2021-06-15 DIAGNOSIS — C68.9 UROTHELIAL CANCER (HCC): Primary | ICD-10-CM

## 2021-06-15 DIAGNOSIS — E06.4 DRUG-INDUCED THYROIDITIS: ICD-10-CM

## 2021-06-15 LAB — ALDOLASE SERPL-CCNC: 3.6 U/L (ref 3.3–10.3)

## 2021-06-15 PROCEDURE — 99214 OFFICE O/P EST MOD 30 MIN: CPT | Performed by: INTERNAL MEDICINE

## 2021-06-15 RX ORDER — SODIUM CHLORIDE 9 MG/ML
20 INJECTION, SOLUTION INTRAVENOUS ONCE
Status: CANCELLED | OUTPATIENT
Start: 2021-07-09

## 2021-06-15 NOTE — PROGRESS NOTES
Hematology/Oncology Outpatient Follow-up  Tracy Jackson 66 y o  male 1950 [id number]    Date:  6/15/2021        Assessment and Plan:  1  Urothelial cancer [facility])    The patient was again educated about the PET-CT scan result from 04/30/2021 while on immunotherapy  This showed significant improvement of his metastatic disease  I did explain to the patient that we will continue with the Garcia Ball as immunotherapy on every 3 week basis since he is tolerating it relatively well  The patient was concerned about his arthralgias  I did ask him to continue at the current dose of the prednisone 10 mg once a day and consider taking an extra dose of prednisone of 5 mg every now and then  - CBC and differential; Future  - Comprehensive metabolic panel; Future  - Magnesium; Future  - TSH, 3rd generation with Free T4 reflex; Future  - VAS lower limb venous duplex study, complete bilateral; Future  - CBC and differential; Future  - Comprehensive metabolic panel; Future  - TSH, 3rd generation with Free T4 reflex; Future  - T3, free; Future    2  Thrombophlebitis of superficial veins of right lower extremity   he seems to have worsening of the right lower extremity pain  He was recently  Diagnosed 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  We will pursue another Doppler study of the lower extremities to rule out any obvious hint of   DVT  - VAS lower limb venous duplex study, complete bilateral; Future    3  Platelets decreased ([address] )   he seems to have intermittent slightly below normal platelet count which will be monitored  4  Drug-induced thyroiditis     Thyroid function will be monitored closely while on immuno therapy  His most recent levels are within normal range   - TSH, 3rd generation with Free T4 reflex; Future    5  Hypomagnesemia    He was asked to take an extra magnesium supplement on a daily basis  6  Cancer of left renal pelvis ([address] )    As above    - CBC and differential; Future  - Comprehensive metabolic panel; Future  - TSH, 3rd generation with Free T4 reflex; Future  - T3, free; Future        HPI:  The patient came today for a follow-up visit accompanied by his wife  He did complain about today about significant pain in his shoulders, neck, both legs mainly more on the right than the left behind the right knee  He continues to take prednisone 10 mg once a day and was wondering if he can increase the dose of the prednisone to improve his arthralgias  His most recent blood work on 06/14/2021 showed hemoglobin of 12 8 with normal white cells  The platelet count is slightly lower than average [id number]  Creatinine 1 45 with normal liver enzymes and normal calcium  TSH was normal   Magnesium was slightly below normal 1 8 with normal LDH and uric acid  Inflammatory markers are slightly above normal   Oncology History Overview Note   Patient has a history of hypertension, hyperlipidemia, chronic lower back pain  He had an MRI of the lower spine for further evaluation of his chronic lower back pain  The MRI on 12/02/2019 revealed Multiple left renal masses to include a left lower pole cyst and a rounded structure in the left upper pole which may also represent cyst   Left upper pole renal masses approximately 3 cm in greatest linear dimension  A CT with renal protocol was done on 12/12/2019 which also showed the infiltrating lesion in the upper pole of the left kidney measuring about the 3 5 cm in greatest dimension without any hint of retroperitoneal lymphadenopathy  A CT scan of the abdomen pelvis on 01/14/2020 showed the same findings with the mild hydronephrosis on the left  The urine cytology on 01/03/2020 showed high-grade urothelial carcinoma  A cystoscopy was then done, a biopsy was taken from the left renal pelvic region which showed high-grade urothelial carcinoma without evidence of lamina propria invasion    The detrusor muscle/muscularis propria were not present for evaluation  The recommendation was to pursue left robotic assisted laparoscopic nephroureterectomy with bladder cuff excision which was done on 04/01/2020  The final pathology revealed;  - Invasive high grade urothelial carcinoma arising in renal pelvis  - Bladder cuff margin is negative for carcinoma and no evidence of high grade dysplasia  - Ureters with no significant pathologic abnormality  - Two benign simple cysts  - Adrenal gland is negative for malignancy  - One lymph node, negative for malignancy (0/1)  The tumor size was 4 5 cm with invasion beyond the muscularis into the periurethral fat or peripelvic fat or the renal parenchyma  The margins were uninvolved by carcinoma or carcinoma in situ  The final pathology was pT3 pN0  Patient barely tolerated 1 cycle of adjuvant chemotherapy with split dose cisplatin and gemcitabine with a lot of side effects  The treatment had to be discontinued due to significant worsening renal dysfunction 6/2020  Patient started to complain about significant abdominal/left inguinal pain around August/September 2020  Unfortunately repeat imaging revealed recurrent/metastatic disease  9/4/20 CT C/A/P- Status post left nephrectomy for resection of urothelial neoplasm  Lymphadenopathy in the left nephrectomy bed has increased since the prior examination, concerning for metastatic disease  Ill-defined hyperattenuating masslike focus in the left rectus muscle, not identified on the prior examination  This is suspicious for a metastatic lesion  A rectus hematoma is also considered  9/23/20 PET scan- 1  Multiple FDG avid lymph nodes in the retrocrural region, retroperitoneum and the left renal bed compatible with metastasis  These have progressed from recent CT  2   FDG avid mass involving the left rectus abdominal musculature compatible with metastasis which is larger from recent CT    3  Several small lymph nodes adjacent to the mid to distal esophagus with FDG uptake suspicious for metastasis  Small focus of FDG uptake also suggested in the right hilar region, metastasis is not excluded  This could be reassessed on follow-up  4   Subtle focus of FDG uptake at the T2 level on the left, nonspecific, could be related to early degenerative changes, developing metastasis is not entirely excluded  This could be reassessed on follow-up  5  Prostate is mildly prominent with heterogeneous FDG uptake  This could be inflammatory  Correlate for prostatitis  He completed palliative radiation to the left abdomen 12/3/20     Urothelial cancer ([facility] [address] )   1/3/2020 Biopsy    Final Diagnosis    A  Urine, Clean Catch, Thin Prep:  High grade urothelial carcinoma (HGUC) - see comment  1/3/2020 Initial Diagnosis    Urothelial cancer ([facility] [address] )  T3 N0 (stage IIIA)     1/17/2020 Biopsy    Final Diagnosis    A  Ureter, Right, left renal pelvis biopsy  -Fragments of high grade urothelial carcinoma   -No evidence of lamina propria invasion   -Detrusor muscle/ muscularis propria is not present for evaluation  B  Urinary Bladder, bladder biopsy:  -Fragments of low grade papillary lesion  See note  -No evidence of invasion seen  -Unremarkable fragment of detrusor muscle seen  4/1/2020 Surgery    left robotic assisted laparoscopic nephroureterectomy with bladder cuff excision     Final Diagnosis    A  Left kidney, ureter, and bladder cuff, nephroureterectomy:  - Invasive high grade urothelial carcinoma arising in renal pelvis  - Bladder cuff margin is negative for carcinoma and no evidence of high grade dysplasia  - Ureters with no significant pathologic abnormality  - Two benign simple cysts  - Adrenal gland is negative for malignancy  - One lymph node, negative for malignancy (0/1)          5/14/2020 - 6/3/2020 Chemotherapy    CISplatin (PLATINOL) split dose 35 mg/m2 = 75 3 mg (50 % of original dose 70 mg/m2), Intravenous, Administration: 75 3 mg (5/14/2020), 75 3 mg (5/21/2020)  gemcitabine (GEMZAR) 2,200 mg in sodium chloride 0 9 % 250 mL infusion, 2,150 2 mg (80 % of original dose 1,250 mg/m2), Intravenous,   Administration: 2,200 mg (5/14/2020), 2,200 mg (5/21/2020)    (only completed 1 cycle- d/c d/t adverse effects and worsening renal dysfunction)     10/9/2020 -  Chemotherapy    pembrolizumab (KEYTRUDA) IVPB, 200 mg, Intravenous, Once, 12 of 20 cycles  Administration: 200 mg (10/9/2020), 200 mg (10/30/2020), 200 mg (11/20/2020), 200 mg (12/11/2020), 200 mg (12/31/2020), 200 mg (1/22/2021), 200 mg (2/12/2021), 200 mg (3/5/2021), 200 mg (3/26/2021), 200 mg (4/16/2021), 200 mg (5/7/2021), 200 mg (5/28/2021)     11/19/2020 - 12/3/2020 Radiation    Treatment:  Course: C1    Plan ID Energy Fractions Dose per Fraction (cGy) Dose Correction (cGy) Total Dose Delivered (cGy) Elapsed Days   L Abdomen 6X 10 / 10 300 0 3,000 14        Cancer of left renal pelvis ([facility] [address] )   4/23/2020 Initial Diagnosis    Cancer of left renal pelvis ([facility] [address] )     10/9/2020 -  Chemotherapy    pembrolizumab (KEYTRUDA) IVPB, 200 mg, Intravenous, Once, 12 of 20 cycles  Administration: 200 mg (10/9/2020), 200 mg (10/30/2020), 200 mg (11/20/2020), 200 mg (12/11/2020), 200 mg (12/31/2020), 200 mg (1/22/2021), 200 mg (2/12/2021), 200 mg (3/5/2021), 200 mg (3/26/2021), 200 mg (4/16/2021), 200 mg (5/7/2021), 200 mg (5/28/2021)         Interval history:    ROS: Review of Systems   Constitutional: Positive for fatigue  Negative for chills and fever  HENT: Positive for hearing loss  Negative for ear pain and sore throat  Eyes: Negative for pain and visual disturbance  Respiratory: Positive for cough and shortness of breath  Cardiovascular: Negative for chest pain and palpitations  Gastrointestinal: Negative for abdominal pain and vomiting  Genitourinary: Negative for dysuria and hematuria  Musculoskeletal: Positive for arthralgias and neck pain   Negative for back pain  Skin: Negative for color change and rash  Neurological: Positive for headaches  Negative for seizures and syncope  All other systems reviewed and are negative  Past Medical History:   Diagnosis Date    Arthritis     Bladder cancer     Cancer ([address] )     skin melanoma;basal cell    Chronic pain disorder     from arthritis    Colon polyp     Does use hearing aid     bilat    Dry eyes, bilateral     GERD (gastroesophageal reflux disease)     History of kidney stones 10/2019    History of partial knee replacement     bilat    History of vertigo     Hyperlipidemia     Hypertension     Kidney lesion     Lumbar disc disorder     compression of vertebrae L4-5-6    Muscle weakness     left hip area    Renal mass     left    Right ankle injury 03/05/2020    missed step of ladder     Right ankle pain     Shortness of breath     with activity    Tinnitus     Urothelial cancer     left    Wears glasses        Past Surgical History:   Procedure Laterality Date    COLONOSCOPY      CYSTOSCOPY Left 4/1/2020    Procedure: CYSTOSCOPY; URETERAL CATHETER PLACEMENT;  Surgeon: Lencho Bae MD;  Location[facility];  Service: Urology    CYSTOSCOPY  05/11/2020    CYSTOSCOPY  06/04/2021    FL CYSTOGRAM  4/13/2020    FL RETROGRADE PYELOGRAM  1/17/2020    HERNIA REPAIR      umbilical with mesh    INGUINAL HERNIA REPAIR Right     with mesh    IR PORT PLACEMENT  4/30/2020    JOINT REPLACEMENT Bilateral     partials knee    LUMBAR EPIDURAL INJECTION      PA CYSTOURETHROSCOPY,URETER CATHETER Bilateral 1/17/2020    Procedure: CYSTOSCOPY; RIGHT RETROGRADE PYELOGRAM WITH RIGHT URETERAL CYTOLOGY SAMPLING; LEFT URETEROSCOPY WITH RENAL PELVIS BIOPSY AND LEFT STENT PLACEMENT;  Surgeon: Lencho Bae MD;  [facility];  Service: Urology    PA NEPHRECTOMY, W/PART   URETECTOMY Left 4/1/2020    Procedure: ROBOTIC LAPAROSCOPIC NEPHRO-URETERECTOMY;  Surgeon: Paulette Killian Eda Valdes MD;  Location: [facility] OR;  Service: Urology    SKIN CANCER EXCISION      surface melanoma    TONSILLECTOMY      WISDOM TOOTH EXTRACTION         Social History     Socioeconomic History    Marital status: /Civil Union     Spouse name: None    Number of children: None    Years of education: None    Highest education level: None   Occupational History    None   Tobacco Use    Smoking status: Former Smoker     Quit date:      Years since quittin 4    Smokeless tobacco: Never Used   Vaping Use    Vaping Use: Never used   Substance and Sexual Activity    Alcohol use: Not Currently     Alcohol/week: 4 0 standard drinks     Types: 4 Cans of beer per week     Comment: daily/socially    Drug use: Never    Sexual activity: Not Currently   Other Topics Concern    None   Social History Narrative    Daily caffeine use - 2 cups coffee      Social Determinants of Health     Financial Resource Strain:     Difficulty of Paying Living Expenses:    Food Insecurity:     Worried About Running Out of Food in the Last Year:     Ran Out of Food in the Last Year:    Transportation Needs:     Lack of Transportation (Medical):      Lack of Transportation (Non-Medical):    Physical Activity:     Days of Exercise per Week:     Minutes of Exercise per Session:    Stress:     Feeling of Stress :    Social Connections:     Frequency of Communication with Friends and Family:     Frequency of Social Gatherings with Friends and Family:     Attends [employer]ab[employer] Services:     Active Member of Clubs or Organizations:     Attends Club or Organization Meetings:     Marital Status:    Intimate Partner Violence:     Fear of Current or Ex-Partner:     Emotionally Abused:     Physically Abused:     Sexually Abused:        Family History   Problem Relation Age of Onset    Liver cancer Father        No Known Allergies      Current Outpatient Medications:     acetaminophen (TYLENOL) 325 mg tablet, Take 2 tablets (650 mg total) by mouth every 6 (six) hours as needed for mild pain or fever, Disp: , Rfl: 0    allopurinol (ZYLOPRIM) 300 mg tablet, Take 1 tablet (300 mg total) by mouth daily, Disp: 30 tablet, Rfl: 3    ALPRAZolam (XANAX) 0 25 mg tablet, Take 1 tablet (0 25 mg total) by mouth daily at bedtime as needed for anxiety (restless legs), Disp: 30 tablet, Rfl: 1    amLODIPine (NORVASC) 5 mg tablet, Take 5 mg by mouth daily  , Disp: , Rfl:     atorvastatin (LIPITOR) 80 mg tablet, Take 80 mg by mouth every other day evening, Disp: , Rfl:     folic acid (FOLVITE) 1 mg tablet, Take 1 tablet (1 mg total) by mouth daily, Disp: 90 tablet, Rfl: 4    hydrOXYzine HCL (ATARAX) 25 mg tablet, Take 1 tablet (25 mg total) by mouth every 6 (six) hours as needed for itching or anxiety, Disp: 60 tablet, Rfl: 2    Magnesium 250 MG TABS, 1 tablet 2 (two) times a day, Disp: , Rfl:     metoprolol tartrate (LOPRESSOR) 100 mg tablet, Take 50 mg by mouth daily, Disp: , Rfl:     naloxone (NARCAN) 4 mg/0 1 mL nasal spray, Administer 1 spray into a nostril   If breathing does not return to normal or if breathing difficulty resumes after 2-3 minutes, give another dose in the other nostril using a new spray , Disp: 1 each, Rfl: 1    omeprazole (PriLOSEC) 20 mg delayed release capsule, Take 20 mg by mouth daily  , Disp: , Rfl:     predniSONE 10 mg tablet, Take 1 tablet (10 mg total) by mouth daily, Disp: 30 tablet, Rfl: 3    senna (SENOKOT) 8 6 mg, Take 1 tablet (8 6 mg total) by mouth daily at bedtime, Disp: 30 each, Rfl: 0    sildenafil (VIAGRA) 100 mg tablet, TAKE ONE TABLET BY MOUTH  AS NEEDED PRIOR TO SEXUAL ACTIVITY FOR ERECTILE DYSFUNCTION, Disp: , Rfl:     terazosin (HYTRIN) 5 mg capsule, Take 2 capsules (10 mg total) by mouth daily at bedtime, Disp: 30 capsule, Rfl: 6    triamcinolone (KENALOG) 0 1 % lotion, Apply topically 3 (three) times a day, Disp: 60 mL, Rfl: 5    VITAMIN D PO, Take 1,000 Units by mouth daily , Disp: , Rfl:       Physical Exam:  /86 (BP Location: Left arm, Patient Position: Sitting, Cuff Size: Adult)   Pulse 96   Temp (!) 97 3 °F (36 3 °C) (Tympanic)   Resp 16   Ht 5' 8 5" (1 74 m)   Wt 91 7 kg (202 lb 3 2 oz)   SpO2 97%   BMI 30 30 kg/m²     Physical Exam  Constitutional:       Appearance: He is well-developed  HENT:      Head: Normocephalic and atraumatic  Eyes:      General: No scleral icterus  Right eye: No discharge  Left eye: No discharge  Conjunctiva/sclera: Conjunctivae normal       Pupils: Pupils are equal, round, and reactive to light  Neck:      Thyroid: No thyromegaly  Trachea: No tracheal deviation  Cardiovascular:      Rate and Rhythm: Normal rate and regular rhythm  Heart sounds: Normal heart sounds  No murmur heard  No friction rub  Pulmonary:      Effort: Pulmonary effort is normal  No respiratory distress  Breath sounds: Normal breath sounds  No wheezing or rales  Chest:      Chest wall: No tenderness  Abdominal:      General: There is no distension  Palpations: Abdomen is soft  There is no hepatomegaly or splenomegaly  Tenderness: There is no abdominal tenderness  There is no guarding or rebound  Musculoskeletal:         General: No tenderness or deformity  Normal range of motion  Cervical back: Normal range of motion and neck supple  Lymphadenopathy:      Cervical: No cervical adenopathy  Skin:     General: Skin is warm and dry  Coloration: Skin is not pale  Findings: No erythema or rash  Neurological:      Mental Status: He is alert and oriented to person, place, and time  Cranial Nerves: No cranial nerve deficit  Coordination: Coordination normal       Deep Tendon Reflexes: Reflexes are normal and symmetric  Psychiatric:         Behavior: Behavior normal          Thought Content:  Thought content normal          Judgment: Judgment normal            Labs:  Lab Results   Component Value Date    WBC 5 30 06/14/2021    HGB 12 8 (L) 06/14/2021    HCT 38 2 (L) 06/14/2021    MCV 94 06/14/2021     (L) 06/14/2021     Lab Results   Component Value Date    K 4 2 06/14/2021     06/14/2021    CO2 30 06/14/2021    BUN 23 06/14/2021    CREATININE 1 45 (H) 06/14/2021    GLUF 119 (H) 05/24/2021    CALCIUM 9 1 06/14/2021    CORRECTEDCA 9 8 10/28/2020    AST 12 (L) 06/14/2021    ALT 13 06/14/2021    ALKPHOS 70 06/14/2021    EGFR 48 06/14/2021     No results found for: TSH    Patient voiced understanding and agreement in the above discussion  Aware to contact our office with questions/symptoms in the interim

## 2021-06-16 LAB — CCP AB SER IA-ACNC: 0.6

## 2021-06-18 ENCOUNTER — HOSPITAL ENCOUNTER (OUTPATIENT)
Dept: INFUSION CENTER | Facility: HOSPITAL | Age: 71
Discharge: HOME | End: 2021-06-18
Attending: INTERNAL MEDICINE
Payer: MEDICARE

## 2021-06-18 VITALS
SYSTOLIC BLOOD PRESSURE: 134 MMHG | WEIGHT: 205.47 LBS | OXYGEN SATURATION: 98 % | RESPIRATION RATE: 16 BRPM | HEART RATE: 47 BPM | HEIGHT: 69 IN | BODY MASS INDEX: 30.43 KG/M2 | TEMPERATURE: 97.9 F | DIASTOLIC BLOOD PRESSURE: 69 MMHG

## 2021-06-18 DIAGNOSIS — C65.2 CANCER OF LEFT RENAL PELVIS (HCC): ICD-10-CM

## 2021-06-18 DIAGNOSIS — C68.9 UROTHELIAL CANCER (HCC): Primary | ICD-10-CM

## 2021-06-18 PROCEDURE — 96413 CHEMO IV INFUSION 1 HR: CPT

## 2021-06-18 RX ORDER — SODIUM CHLORIDE 9 MG/ML
20 INJECTION, SOLUTION INTRAVENOUS ONCE
Status: COMPLETED | OUTPATIENT
Start: 2021-06-18 | End: 2021-06-18

## 2021-06-18 RX ADMIN — SODIUM CHLORIDE 200 MG: 9 INJECTION, SOLUTION INTRAVENOUS at 10:44

## 2021-06-18 RX ADMIN — SODIUM CHLORIDE 20 ML/HR: 0.9 INJECTION, SOLUTION INTRAVENOUS at 10:44

## 2021-06-18 NOTE — PROGRESS NOTES
Pt tolerated todays keytruda dose well  No adverse reactions noted  Port flsuhed and deaccessed per routine   Discharged ambulatory with avs

## 2021-06-23 ENCOUNTER — OFFICE VISIT (OUTPATIENT)
Dept: NEPHROLOGY | Facility: CLINIC | Age: 71
End: 2021-06-23
Payer: MEDICARE

## 2021-06-23 VITALS
HEART RATE: 80 BPM | OXYGEN SATURATION: 94 % | WEIGHT: 205 LBS | HEIGHT: 69 IN | SYSTOLIC BLOOD PRESSURE: 118 MMHG | BODY MASS INDEX: 30.36 KG/M2 | DIASTOLIC BLOOD PRESSURE: 72 MMHG

## 2021-06-23 DIAGNOSIS — E55.9 VITAMIN D DEFICIENCY: ICD-10-CM

## 2021-06-23 DIAGNOSIS — E83.42 HYPOMAGNESEMIA: ICD-10-CM

## 2021-06-23 DIAGNOSIS — Z90.5 H/O LEFT NEPHRECTOMY: ICD-10-CM

## 2021-06-23 DIAGNOSIS — M89.9 CHRONIC KIDNEY DISEASE-MINERAL AND BONE DISORDER: ICD-10-CM

## 2021-06-23 DIAGNOSIS — N18.9 CHRONIC KIDNEY DISEASE-MINERAL AND BONE DISORDER: ICD-10-CM

## 2021-06-23 DIAGNOSIS — C68.9 UROTHELIAL CANCER (HCC): ICD-10-CM

## 2021-06-23 DIAGNOSIS — N18.31 STAGE 3A CHRONIC KIDNEY DISEASE (HCC): Primary | ICD-10-CM

## 2021-06-23 DIAGNOSIS — E83.9 CHRONIC KIDNEY DISEASE-MINERAL AND BONE DISORDER: ICD-10-CM

## 2021-06-23 PROBLEM — N17.9 ACUTE RENAL FAILURE (HCC): Status: RESOLVED | Noted: 2021-04-19 | Resolved: 2021-06-23

## 2021-06-23 PROCEDURE — 99214 OFFICE O/P EST MOD 30 MIN: CPT | Performed by: NURSE PRACTITIONER

## 2021-06-23 NOTE — PROGRESS NOTES
Nephrology   Office Follow-Up  Brittnee Vicente 66 y o  male MRN: [id number]    Encounter: [id number]        Assessment & Plan    Brittnee Vicente was seen in the [place] office today  All diagnoses and orders for visit:     1  Stage 3a chronic kidney disease ([facility] [address] )  · Patient appears to be at his post-nephrectomy baseline sCr which is around 1 3-1 4 mg/dL  He avoids NSAIDs  He will return to office in 4 months with Dr Logan  -     CBC and differential; Future; Expected date: 09/08/2021   -     Comprehensive metabolic panel; Future; Expected date: 09/08/2021   -     Microalbumin / creatinine urine ratio; Future; Expected date: 09/08/2021   -     Urinalysis with microscopic; Future; Expected date: 09/08/2021  2  H/O left nephrectomy  · In the setting of #3  Prior to nephrectomy Kidney flow and function test revealed left kidney 55% and right kidney 45%   3  Urothelial cancer [facility])  · Management per Urology, Oncology colleagues  Remains on Keytruda  4  Hypomagnesemia  · Continue oral supplementation  5  Chronic kidney disease-mineral and bone disorder   -     Phosphorus; Future; Expected date: 09/08/2021   -     PTH, intact; Future; Expected date: 09/08/2021  6  Vitamin D deficiency   -     Vitamin D 25 hydroxy; Future; Expected date: 09/08/2021      HPI: Brittnee Vicente is a 66 y o  male with an active problem list significant for History of high-grade urothelial cancer s/p left nephrectomy May of 2020, cisplatic Gemzar 10 radiation sessions now on Keytruda every 3 weeks, CKD 3, who is here for scheduled follow-up  Patient is stable from the renal perspective  No changes made today  Will continue to avoid NSAIDs  Return to office in 4 months with Dr Logan    The medical record, including Care Everywhere and media tabs were reviewed  ROS:   Review of Systems   Constitutional: Negative  HENT: Negative  Eyes: Negative  Respiratory: Negative  Cardiovascular: Negative      Gastrointestinal: Negative  Endocrine: Negative  Genitourinary: Negative  Musculoskeletal: Negative  Allergic/Immunologic: Negative  Neurological: Negative  Hematological: Negative  Psychiatric/Behavioral: Negative  All other systems reviewed and are negative  Allergies: Patient has no known allergies  Medications:   Current Outpatient Medications:     acetaminophen (TYLENOL) 325 mg tablet, Take 2 tablets (650 mg total) by mouth every 6 (six) hours as needed for mild pain or fever, Disp: , Rfl: 0    allopurinol (ZYLOPRIM) 300 mg tablet, Take 1 tablet (300 mg total) by mouth daily, Disp: 30 tablet, Rfl: 3    ALPRAZolam (XANAX) 0 25 mg tablet, Take 1 tablet (0 25 mg total) by mouth daily at bedtime as needed for anxiety (restless legs), Disp: 30 tablet, Rfl: 1    amLODIPine (NORVASC) 5 mg tablet, Take 5 mg by mouth daily  , Disp: , Rfl:     atorvastatin (LIPITOR) 80 mg tablet, Take 80 mg by mouth every other day evening, Disp: , Rfl:     folic acid (FOLVITE) 1 mg tablet, Take 1 tablet (1 mg total) by mouth daily, Disp: 90 tablet, Rfl: 4    hydrOXYzine HCL (ATARAX) 25 mg tablet, Take 1 tablet (25 mg total) by mouth every 6 (six) hours as needed for itching or anxiety, Disp: 60 tablet, Rfl: 2    Magnesium 250 MG TABS, 1 tablet 2 (two) times a day Taking 500mg in the morning and 500mg at night, Disp: , Rfl:     metoprolol tartrate (LOPRESSOR) 100 mg tablet, Take 50 mg by mouth daily, Disp: , Rfl:     naloxone (NARCAN) 4 mg/0 1 mL nasal spray, Administer 1 spray into a nostril   If breathing does not return to normal or if breathing difficulty resumes after 2-3 minutes, give another dose in the other nostril using a new spray , Disp: 1 each, Rfl: 1    omeprazole (PriLOSEC) 20 mg delayed release capsule, Take 20 mg by mouth daily  , Disp: , Rfl:     predniSONE 10 mg tablet, Take 1 tablet (10 mg total) by mouth daily, Disp: 30 tablet, Rfl: 3    senna (SENOKOT) 8 6 mg, Take 1 tablet (8 6 mg total) by mouth daily at bedtime, Disp: 30 each, Rfl: 0    sildenafil (VIAGRA) 100 mg tablet, TAKE ONE TABLET BY MOUTH  AS NEEDED PRIOR TO SEXUAL ACTIVITY FOR ERECTILE DYSFUNCTION, Disp: , Rfl:     terazosin (HYTRIN) 5 mg capsule, Take 2 capsules (10 mg total) by mouth daily at bedtime, Disp: 30 capsule, Rfl: 6    triamcinolone (KENALOG) 0 1 % lotion, Apply topically 3 (three) times a day, Disp: 60 mL, Rfl: 5    VITAMIN D PO, Take 1,000 Units by mouth daily , Disp: , Rfl:     Past Medical History:   Diagnosis Date    Arthritis     Bladder cancer     Cancer ([facility] [address] )     skin melanoma;basal cell    Chronic pain disorder     from arthritis    Colon polyp     Does use hearing aid     bilat    Dry eyes, bilateral     GERD (gastroesophageal reflux disease)     History of kidney stones 10/2019    History of partial knee replacement     bilat    History of vertigo     Hyperlipidemia     Hypertension     Kidney lesion     Lumbar disc disorder     compression of vertebrae L4-5-6    Muscle weakness     left hip area    Renal mass     left    Right ankle injury 03/05/2020    missed step of ladder     Right ankle pain     Shortness of breath     with activity    Tinnitus     Urothelial cancer     left    Wears glasses      Past Surgical History:   Procedure Laterality Date    COLONOSCOPY      CYSTOSCOPY Left 4/1/2020    Procedure: CYSTOSCOPY; URETERAL CATHETER PLACEMENT;  Surgeon: Verdell Gilford, MD;  Location[facility];  Service: Urology    CYSTOSCOPY  05/11/2020    CYSTOSCOPY  06/04/2021    FL CYSTOGRAM  4/13/2020    FL RETROGRADE PYELOGRAM  1/17/2020    HERNIA REPAIR      umbilical with mesh    INGUINAL HERNIA REPAIR Right     with mesh    IR PORT PLACEMENT  4/30/2020    JOINT REPLACEMENT Bilateral     partials knee    LUMBAR EPIDURAL INJECTION      NM CYSTOURETHROSCOPY,URETER CATHETER Bilateral 1/17/2020    Procedure: CYSTOSCOPY; RIGHT RETROGRADE PYELOGRAM WITH RIGHT URETERAL CYTOLOGY SAMPLING; LEFT URETEROSCOPY WITH RENAL PELVIS BIOPSY AND LEFT STENT PLACEMENT;  Surgeon: Gina Thompson MD;  [facility];  Service: Urology    AR NEPHRECTOMY, W/PART  URETECTOMY Left 4/1/2020    Procedure: ROBOTIC LAPAROSCOPIC NEPHRO-URETERECTOMY;  Surgeon: Gina Thompson MD;  Location[facility];  Service: Urology    SKIN CANCER EXCISION      surface melanoma    TONSILLECTOMY      WISDOM TOOTH EXTRACTION       Family History   Problem Relation Age of Onset    Liver cancer Father       reports that he quit smoking about 49 years ago  He has never used smokeless tobacco  He reports previous alcohol use of about 4 0 standard drinks of alcohol per week  He reports that he does not use drugs  Physical Exam:   Vitals:    06/23/21 0904   BP: 118/72   BP Location: Left arm   Patient Position: Sitting   Cuff Size: Standard   Pulse: 80   SpO2: 94%   Weight: 93 kg (205 lb)   Height: 5' 8 5" (1 74 m)     Body mass index is 30 72 kg/m²  General: conscious, cooperative, in no acute distress, appears stated age  Eyes: conjunctivae pale, anicteric sclerae  ENT: lips and mucous membranes moist  Neck: supple, no JVD, no masses  Chest:  essentially clear breath sounds bilaterally, no crackles, ronchus or wheezings  CVS: S1 & S2, normal rate, regular rhythm  Abdomen: soft, non-tender, non-distended, normoactive bowel sounds, rounded  Extremities: no edema of both legs  Skin: no rash   Neuro: awake, alert, oriented       Diagnostic Data:  Lab: I have personally reviewed pertinent lab results  ,   CBC:       CMP: No results found for: SODIUM, K, CL, CO2, ANIONGAP, BUN, CREATININE, GLUCOSE, CALCIUM, AST, ALT, ALKPHOS, PROT, BILITOT, EGFR,   PT/INR: No results found for: PT, INR,   Magnesium: No components found for: MAG,  Phosphorous: No results found for: PHOS    There are no Patient Instructions on file for this visit      Portions of the record may have been created with voice recognition software  Occasional wrong word or "sound a like" substitutions may have occurred due to the inherent limitations of voice recognition software  Read the chart carefully and recognize, using context, where substitutions have occurred  If you have any questions, please contact the dictating provider

## 2021-06-23 NOTE — PATIENT INSTRUCTIONS
Blood work in September  Continue to avoid NSAIDs or "anti-inflammatory pain medicine"  Call with any issues

## 2021-06-25 ENCOUNTER — TELEPHONE (OUTPATIENT)
Dept: OTHER | Facility: HOSPITAL | Age: 71
End: 2021-06-25

## 2021-06-25 NOTE — TELEPHONE ENCOUNTER
All questions and concerns answered regarding split kidney function test and current renal function

## 2021-06-29 ENCOUNTER — HOSPITAL ENCOUNTER (OUTPATIENT)
Dept: NON INVASIVE DIAGNOSTICS | Facility: HOSPITAL | Age: 71
Discharge: HOME | End: 2021-06-29
Attending: INTERNAL MEDICINE
Payer: MEDICARE

## 2021-06-29 DIAGNOSIS — C68.9 UROTHELIAL CANCER (HCC): ICD-10-CM

## 2021-06-29 DIAGNOSIS — I80.01 THROMBOPHLEBITIS OF SUPERFICIAL VEINS OF RIGHT LOWER EXTREMITY: ICD-10-CM

## 2021-06-29 PROCEDURE — 93970 EXTREMITY STUDY: CPT

## 2021-06-29 PROCEDURE — 93970 EXTREMITY STUDY: CPT | Performed by: SURGERY

## 2021-07-05 ENCOUNTER — APPOINTMENT (OUTPATIENT)
Dept: LAB | Facility: HOSPITAL | Age: 71
End: 2021-07-05
Attending: INTERNAL MEDICINE
Payer: MEDICARE

## 2021-07-05 DIAGNOSIS — C68.9 UROTHELIAL CANCER (HCC): ICD-10-CM

## 2021-07-05 DIAGNOSIS — E06.4 DRUG-INDUCED THYROIDITIS: ICD-10-CM

## 2021-07-05 DIAGNOSIS — E55.9 VITAMIN D DEFICIENCY: ICD-10-CM

## 2021-07-05 LAB
ALBUMIN SERPL BCP-MCNC: 3.6 G/DL (ref 3.5–5.7)
ALP SERPL-CCNC: 64 U/L (ref 55–165)
ALT SERPL W P-5'-P-CCNC: 12 U/L (ref 7–52)
ANION GAP SERPL CALCULATED.3IONS-SCNC: 8 MMOL/L (ref 4–13)
AST SERPL W P-5'-P-CCNC: 9 U/L (ref 13–39)
BASOPHILS # BLD AUTO: 0 THOUSANDS/ΜL (ref 0–0.1)
BASOPHILS NFR BLD AUTO: 0 % (ref 0–2)
BILIRUB SERPL-MCNC: 0.6 MG/DL (ref 0.2–1)
BUN SERPL-MCNC: 20 MG/DL (ref 7–25)
CALCIUM SERPL-MCNC: 9.2 MG/DL (ref 8.6–10.5)
CHLORIDE SERPL-SCNC: 102 MMOL/L (ref 98–107)
CO2 SERPL-SCNC: 29 MMOL/L (ref 21–31)
CREAT SERPL-MCNC: 1.54 MG/DL (ref 0.7–1.3)
EOSINOPHIL # BLD AUTO: 0 THOUSAND/ΜL (ref 0–0.61)
EOSINOPHIL NFR BLD AUTO: 0 % (ref 0–5)
ERYTHROCYTE [DISTWIDTH] IN BLOOD BY AUTOMATED COUNT: 14.5 % (ref 11.5–14.5)
GFR SERPL CREATININE-BSD FRML MDRD: 44 ML/MIN/1.73SQ M
GLUCOSE P FAST SERPL-MCNC: 97 MG/DL (ref 65–99)
HCT VFR BLD AUTO: 40.4 % (ref 42–47)
HGB BLD-MCNC: 13.3 G/DL (ref 14–18)
LYMPHOCYTES # BLD AUTO: 1.4 THOUSANDS/ΜL (ref 0.6–4.47)
LYMPHOCYTES NFR BLD AUTO: 20 % (ref 21–51)
MAGNESIUM SERPL-MCNC: 1.8 MG/DL (ref 1.9–2.7)
MCH RBC QN AUTO: 31.1 PG (ref 26–34)
MCHC RBC AUTO-ENTMCNC: 32.9 G/DL (ref 31–37)
MCV RBC AUTO: 95 FL (ref 81–99)
MONOCYTES # BLD AUTO: 1 THOUSAND/ΜL (ref 0.17–1.22)
MONOCYTES NFR BLD AUTO: 14 % (ref 2–12)
NEUTROPHILS # BLD AUTO: 4.7 THOUSANDS/ΜL (ref 1.4–6.5)
NEUTS SEG NFR BLD AUTO: 66 % (ref 42–75)
PLATELET # BLD AUTO: 131 THOUSANDS/UL (ref 149–390)
PMV BLD AUTO: 7.6 FL (ref 8.6–11.7)
POTASSIUM SERPL-SCNC: 3.7 MMOL/L (ref 3.5–5.5)
PROT SERPL-MCNC: 6.6 G/DL (ref 6.4–8.9)
RBC # BLD AUTO: 4.27 MILLION/UL (ref 4.3–5.9)
SODIUM SERPL-SCNC: 139 MMOL/L (ref 134–143)
TSH SERPL DL<=0.05 MIU/L-ACNC: 2.57 UIU/ML (ref 0.45–5.33)
WBC # BLD AUTO: 7 THOUSAND/UL (ref 4.8–10.8)

## 2021-07-05 PROCEDURE — 83735 ASSAY OF MAGNESIUM: CPT

## 2021-07-05 PROCEDURE — 84443 ASSAY THYROID STIM HORMONE: CPT

## 2021-07-05 PROCEDURE — 36415 COLL VENOUS BLD VENIPUNCTURE: CPT

## 2021-07-05 PROCEDURE — 85025 COMPLETE CBC W/AUTO DIFF WBC: CPT

## 2021-07-05 PROCEDURE — 80053 COMPREHEN METABOLIC PANEL: CPT

## 2021-07-06 ENCOUNTER — OFFICE VISIT (OUTPATIENT)
Dept: HEMATOLOGY ONCOLOGY | Facility: CLINIC | Age: 71
End: 2021-07-06
Payer: MEDICARE

## 2021-07-06 ENCOUNTER — HOSPITAL ENCOUNTER (OUTPATIENT)
Dept: CT IMAGING | Facility: HOSPITAL | Age: 71
Discharge: HOME | End: 2021-07-06
Payer: MEDICARE

## 2021-07-06 VITALS
DIASTOLIC BLOOD PRESSURE: 78 MMHG | BODY MASS INDEX: 30.01 KG/M2 | SYSTOLIC BLOOD PRESSURE: 112 MMHG | TEMPERATURE: 98.3 F | HEIGHT: 69 IN | WEIGHT: 202.6 LBS | HEART RATE: 62 BPM | RESPIRATION RATE: 16 BRPM | OXYGEN SATURATION: 96 %

## 2021-07-06 DIAGNOSIS — R53.83 OTHER FATIGUE: ICD-10-CM

## 2021-07-06 DIAGNOSIS — E83.42 HYPOMAGNESEMIA: ICD-10-CM

## 2021-07-06 DIAGNOSIS — I80.01 THROMBOPHLEBITIS OF SUPERFICIAL VEINS OF RIGHT LOWER EXTREMITY: ICD-10-CM

## 2021-07-06 DIAGNOSIS — R19.7 DIARRHEA, UNSPECIFIED TYPE: ICD-10-CM

## 2021-07-06 DIAGNOSIS — R06.02 SHORTNESS OF BREATH: ICD-10-CM

## 2021-07-06 DIAGNOSIS — C68.9 UROTHELIAL CANCER (HCC): ICD-10-CM

## 2021-07-06 DIAGNOSIS — C65.2 CANCER OF LEFT RENAL PELVIS (HCC): Primary | ICD-10-CM

## 2021-07-06 PROCEDURE — 99215 OFFICE O/P EST HI 40 MIN: CPT | Performed by: NURSE PRACTITIONER

## 2021-07-06 PROCEDURE — 71250 CT THORAX DX C-: CPT

## 2021-07-06 RX ORDER — CHOLESTYRAMINE 4 G/9G
1 POWDER, FOR SUSPENSION ORAL 2 TIMES DAILY PRN
Qty: 30 PACKET | Refills: 5 | Status: SHIPPED | OUTPATIENT
Start: 2021-07-06 | End: 2021-10-07 | Stop reason: CLARIF

## 2021-07-06 RX ORDER — SODIUM CHLORIDE 9 MG/ML
20 INJECTION, SOLUTION INTRAVENOUS ONCE
Status: CANCELLED | OUTPATIENT
Start: 2021-07-30

## 2021-07-06 NOTE — PROGRESS NOTES
Hematology/Oncology Outpatient Follow-up  rAtis Gomez 66 y o  male 1950 [id number]    Date:  7/6/2021      Assessment and Plan:  1  Cancer of left renal pelvis [facility])  Patient will be continued on his current treatment with single agent immunotherapy Keytruda on an every three-week basis unless his CT scan would show findings for which holding treatment would be indicated such as pneumonitis  He will continue his low-dose prednisone daily 10 mg for now which he is taking for generalized arthralgias and myalgias  Patient will also continue to follow up with the palliative care team on a regular basis for pain and symptom management  Will follow up again with repeat labs in 3 weeks from now  Will likely aim to do repeat imaging with a PET-CT scan sometime in August     He will continue to follow-up with his Nephrology team on a regular basis for monitoring of his chronic renal dysfunction and also palliative care for his pain and symptom management  - CBC and differential; Future  - Comprehensive metabolic panel; Future  - TSH, 3rd generation with Free T4 reflex; Future  - T3, free; Future  - CBC and differential; Future  - Comprehensive metabolic panel; Future  - TSH, 3rd generation with Free T4 reflex; Future  - Magnesium; Future    2  Hypomagnesemia  Patient was advised to decrease his magnesium supplement to 1 tablet in the morning and 2 tablets in the evening in hopes this will improve his diarrhea  His magnesium is slightly below average 1 8 and will be monitored closely  - Magnesium; Future    3  Shortness of breath  The patient is concerned about worsening dyspnea that he developed over the past week  Noted that with minimal activity he is becoming dyspneic  Has been afebrile and O2 sat on room air today 96%  For completeness sake we will pursue V/Q scan rather than CTA to rule out PE and avoid contrast with his renal dysfunction status post nephrectomy    Will also pursue CT scan of the chest without contrast to evaluate for inflammatory changes/pneumonitis or other pathology as the cause  - NM lung ventilation / perfusion; Future  - CT chest wo contrast; Future    4  Diarrhea, unspecified type  Patient developed watery diarrhea approximately 3 days ago  Has been having no more than 2 episodes per 24 hour  May be due to increase in magnesium supplement but will need to be monitored closely for immune mediated colitis/diarrhea  We will start him on Questran 1 to 2 times a day as needed for the diarrhea along with his usual Imodium  The patient and his wife were educated to notify us should he not respond to his antidiarrheal agents or have worsening diarrhea more than 4 episodes a day  If his diarrhea continues to progress/worsen low threshold to start him on high-dose steroids for immune mediated etiology  - cholestyramine (QUESTRAN) 4 g packet; Take 1 packet (4 g total) by mouth 2 (two) times a day as needed (diarrhea)  Dispense: 30 packet; Refill: 5    5  Thrombophlebitis of superficial veins of right lower extremity  Patient will continue his baby aspirin on a daily basis  We did briefly discuss considered starting low-dose anticoagulation for 45 days for his superficial thrombophlebitis of the saphenous vessel however he declined as his symptom is improving with conservative measures  HPI:  Patient presents today for a follow-up visit continues to report chronic right shoulder pain and abdominal pain which is ongoing  He mentions that he is a little bit more fatigued than usual since he developed diarrhea about 3 days ago  He states that he has not had anymore more than 2 episodes in a 24 hour  Yesterday he had none up until 3:00 a m     The diarrhea has been watery; is not completely responding to Imodium  He did recently increase his magnesium supplements to (2) 250 mg tablets twice a day and believes this may be contributory to his diarrhea    He also states that he has noticed more dyspnea with minimal exertion over the past week or so  Reports mild cough  Denies fever  He continues to take the baby aspirin for his superficial thrombophlebitis; states that the discomfort has resolved  His most recent laboratory studies from yesterday showed normal white cells WBC with mild thrombocytopenia platelet count a 429236, H&H is in the low-normal range 13 3/40 4, MCV 95  His chronic renal dysfunction seems to be stable with slight increase of his creatinine 1 54, GFR 44 otherwise normal CMP  Magnesium continues to be slightly lower than average 1 8   TSH normal 2 57  He had another venous duplex of the bilateral lower extremities done 06/29/2020 which continues to show chronic nonocclusive superficial thrombophlebitis in the small saphenous vein at the knee and mid calf no DVT  Oncology History Overview Note   Patient has a history of hypertension, hyperlipidemia, chronic lower back pain  He had an MRI of the lower spine for further evaluation of his chronic lower back pain  The MRI on 12/02/2019 revealed Multiple left renal masses to include a left lower pole cyst and a rounded structure in the left upper pole which may also represent cyst   Left upper pole renal masses approximately 3 cm in greatest linear dimension  A CT with renal protocol was done on 12/12/2019 which also showed the infiltrating lesion in the upper pole of the left kidney measuring about the 3 5 cm in greatest dimension without any hint of retroperitoneal lymphadenopathy  A CT scan of the abdomen pelvis on 01/14/2020 showed the same findings with the mild hydronephrosis on the left  The urine cytology on 01/03/2020 showed high-grade urothelial carcinoma  A cystoscopy was then done, a biopsy was taken from the left renal pelvic region which showed high-grade urothelial carcinoma without evidence of lamina propria invasion    The detrusor muscle/muscularis propria were not present for evaluation  The recommendation was to pursue left robotic assisted laparoscopic nephroureterectomy with bladder cuff excision which was done on 04/01/2020  The final pathology revealed;  - Invasive high grade urothelial carcinoma arising in renal pelvis  - Bladder cuff margin is negative for carcinoma and no evidence of high grade dysplasia  - Ureters with no significant pathologic abnormality  - Two benign simple cysts  - Adrenal gland is negative for malignancy  - One lymph node, negative for malignancy (0/1)  The tumor size was 4 5 cm with invasion beyond the muscularis into the periurethral fat or peripelvic fat or the renal parenchyma  The margins were uninvolved by carcinoma or carcinoma in situ  The final pathology was pT3 pN0  Patient barely tolerated 1 cycle of adjuvant chemotherapy with split dose cisplatin and gemcitabine with a lot of side effects  The treatment had to be discontinued due to significant worsening renal dysfunction 6/2020  Patient started to complain about significant abdominal/left inguinal pain around August/September 2020  Unfortunately repeat imaging revealed recurrent/metastatic disease  9/4/20 CT C/A/P- Status post left nephrectomy for resection of urothelial neoplasm  Lymphadenopathy in the left nephrectomy bed has increased since the prior examination, concerning for metastatic disease  Ill-defined hyperattenuating masslike focus in the left rectus muscle, not identified on the prior examination  This is suspicious for a metastatic lesion  A rectus hematoma is also considered  9/23/20 PET scan- 1  Multiple FDG avid lymph nodes in the retrocrural region, retroperitoneum and the left renal bed compatible with metastasis  These have progressed from recent CT  2   FDG avid mass involving the left rectus abdominal musculature compatible with metastasis which is larger from recent CT    3  Several small lymph nodes adjacent to the mid to distal esophagus with FDG uptake suspicious for metastasis  Small focus of FDG uptake also suggested in the right hilar region, metastasis is not excluded  This could be reassessed on follow-up  4   Subtle focus of FDG uptake at the T2 level on the left, nonspecific, could be related to early degenerative changes, developing metastasis is not entirely excluded  This could be reassessed on follow-up  5  Prostate is mildly prominent with heterogeneous FDG uptake  This could be inflammatory  Correlate for prostatitis  He completed palliative radiation to the left abdomen 12/3/20     Urothelial cancer ([facility] [address] )   1/3/2020 Biopsy    Final Diagnosis    A  Urine, Clean Catch, Thin Prep:  High grade urothelial carcinoma (HGUC) - see comment  1/3/2020 Initial Diagnosis    Urothelial cancer ([facility] [address] )  T3 N0 (stage IIIA)     1/17/2020 Biopsy    Final Diagnosis    A  Ureter, Right, left renal pelvis biopsy  -Fragments of high grade urothelial carcinoma   -No evidence of lamina propria invasion   -Detrusor muscle/ muscularis propria is not present for evaluation  B  Urinary Bladder, bladder biopsy:  -Fragments of low grade papillary lesion  See note  -No evidence of invasion seen  -Unremarkable fragment of detrusor muscle seen  4/1/2020 Surgery    left robotic assisted laparoscopic nephroureterectomy with bladder cuff excision     Final Diagnosis    A  Left kidney, ureter, and bladder cuff, nephroureterectomy:  - Invasive high grade urothelial carcinoma arising in renal pelvis  - Bladder cuff margin is negative for carcinoma and no evidence of high grade dysplasia  - Ureters with no significant pathologic abnormality  - Two benign simple cysts  - Adrenal gland is negative for malignancy  - One lymph node, negative for malignancy (0/1)          5/14/2020 - 6/3/2020 Chemotherapy    CISplatin (PLATINOL) split dose 35 mg/m2 = 75 3 mg (50 % of original dose 70 mg/m2), Intravenous,   Administration: 75 3 mg (5/14/2020), 75 3 mg (5/21/2020)  gemcitabine (GEMZAR) 2,200 mg in sodium chloride 0 9 % 250 mL infusion, 2,150 2 mg (80 % of original dose 1,250 mg/m2), Intravenous,   Administration: 2,200 mg (5/14/2020), 2,200 mg (5/21/2020)    (only completed 1 cycle- d/c d/t adverse effects and worsening renal dysfunction)     10/9/2020 -  Chemotherapy    pembrolizumab (KEYTRUDA) IVPB, 200 mg, Intravenous, Once, 13 of 20 cycles  Administration: 200 mg (10/9/2020), 200 mg (10/30/2020), 200 mg (11/20/2020), 200 mg (12/11/2020), 200 mg (12/31/2020), 200 mg (1/22/2021), 200 mg (2/12/2021), 200 mg (3/5/2021), 200 mg (3/26/2021), 200 mg (4/16/2021), 200 mg (5/7/2021), 200 mg (5/28/2021), 200 mg (6/18/2021)     11/19/2020 - 12/3/2020 Radiation    Treatment:  Course: C1    Plan ID Energy Fractions Dose per Fraction (cGy) Dose Correction (cGy) Total Dose Delivered (cGy) Elapsed Days   L Abdomen 6X 10 / 10 300 0 3,000 14        Cancer of left renal pelvis ([facility] [address] )   4/23/2020 Initial Diagnosis    Cancer of left renal pelvis ([facility] [address] )     10/9/2020 -  Chemotherapy    pembrolizumab (KEYTRUDA) IVPB, 200 mg, Intravenous, Once, 13 of 20 cycles  Administration: 200 mg (10/9/2020), 200 mg (10/30/2020), 200 mg (11/20/2020), 200 mg (12/11/2020), 200 mg (12/31/2020), 200 mg (1/22/2021), 200 mg (2/12/2021), 200 mg (3/5/2021), 200 mg (3/26/2021), 200 mg (4/16/2021), 200 mg (5/7/2021), 200 mg (5/28/2021), 200 mg (6/18/2021)         Interval history:    ROS: Review of Systems   Constitutional: Positive for fatigue  Negative for activity change, appetite change, chills, fever and unexpected weight change  HENT: Negative for congestion, mouth sores, nosebleeds, sore throat and trouble swallowing  Eyes: Negative  Respiratory: Positive for cough and shortness of breath  Negative for chest tightness  Cardiovascular: Negative for chest pain, palpitations and leg swelling  Gastrointestinal: Positive for abdominal pain and diarrhea   Negative for abdominal distention, blood in stool, constipation, nausea and vomiting  Genitourinary: Negative for difficulty urinating, dysuria, frequency, hematuria and urgency  Musculoskeletal: Positive for arthralgias, back pain and myalgias  Negative for gait problem and joint swelling  Skin: Negative for color change, pallor and rash  Neurological: Positive for headaches  Negative for dizziness, weakness, light-headedness and numbness  Hematological: Negative for adenopathy  Does not bruise/bleed easily  Psychiatric/Behavioral: Negative for dysphoric mood and sleep disturbance  The patient is not nervous/anxious          Past Medical History:   Diagnosis Date    Arthritis     Bladder cancer     Cancer ([facility] [address] )     skin melanoma;basal cell    Chronic pain disorder     from arthritis    Colon polyp     Does use hearing aid     bilat    Dry eyes, bilateral     GERD (gastroesophageal reflux disease)     History of kidney stones 10/2019    History of partial knee replacement     bilat    History of vertigo     Hyperlipidemia     Hypertension     Kidney lesion     Lumbar disc disorder     compression of vertebrae L4-5-6    Muscle weakness     left hip area    Renal mass     left    Right ankle injury 03/05/2020    missed step of ladder     Right ankle pain     Shortness of breath     with activity    Tinnitus     Urothelial cancer     left    Wears glasses        Past Surgical History:   Procedure Laterality Date    COLONOSCOPY      CYSTOSCOPY Left 4/1/2020    Procedure: CYSTOSCOPY; URETERAL CATHETER PLACEMENT;  Surgeon: Oscar Farrar MD;  Location[facility];  Service: Urology    CYSTOSCOPY  05/11/2020    CYSTOSCOPY  06/04/2021    FL CYSTOGRAM  4/13/2020    FL RETROGRADE PYELOGRAM  1/17/2020    HERNIA REPAIR      umbilical with mesh    INGUINAL HERNIA REPAIR Right     with mesh    IR PORT PLACEMENT  4/30/2020    JOINT REPLACEMENT Bilateral     partials knee    LUMBAR EPIDURAL INJECTION      IN CYSTOURETHROSCOPY,URETER CATHETER Bilateral 2020    Procedure: CYSTOSCOPY; RIGHT RETROGRADE PYELOGRAM WITH RIGHT URETERAL CYTOLOGY SAMPLING; LEFT URETEROSCOPY WITH RENAL PELVIS BIOPSY AND LEFT STENT PLACEMENT;  Surgeon: Cassidy Warner MD;  [facility];  Service: Urology    IN NEPHRECTOMY, W/PART  URETECTOMY Left 2020    Procedure: ROBOTIC LAPAROSCOPIC NEPHRO-URETERECTOMY;  Surgeon: Cassidy Warner MD;  [facility];  Service: Urology    SKIN CANCER EXCISION      surface melanoma    TONSILLECTOMY      WISDOM TOOTH EXTRACTION         Social History     Socioeconomic History    Marital status: /Civil Union     Spouse name: None    Number of children: None    Years of education: None    Highest education level: None   Occupational History    None   Tobacco Use    Smoking status: Former Smoker     Quit date:      Years since quittin 5    Smokeless tobacco: Never Used   Vaping Use    Vaping Use: Never used   Substance and Sexual Activity    Alcohol use: Not Currently     Alcohol/week: 4 0 standard drinks     Types: 4 Cans of beer per week     Comment: daily/socially    Drug use: Never    Sexual activity: Not Currently   Other Topics Concern    None   Social History Narrative    Daily caffeine use - 2 cups coffee      Social Determinants of Health     Financial Resource Strain:     Difficulty of Paying Living Expenses:    Food Insecurity:     Worried About Running Out of Food in the Last Year:     Ran Out of Food in the Last Year:    Transportation Needs:     Lack of Transportation (Medical):      Lack of Transportation (Non-Medical):    Physical Activity:     Days of Exercise per Week:     Minutes of Exercise per Session:    Stress:     Feeling of Stress :    Social Connections:     Frequency of Communication with Friends and Family:     Frequency of Social Gatherings with Friends and Family:     Attends Spiritism Services:     Active Member of Clubs or Organizations:     Attends Club or Organization Meetings:     Marital Status:    Intimate Partner Violence:     Fear of Current or Ex-Partner:     Emotionally Abused:     Physically Abused:     Sexually Abused:        Family History   Problem Relation Age of Onset    Liver cancer Father        No Known Allergies      Current Outpatient Medications:     acetaminophen (TYLENOL) 325 mg tablet, Take 2 tablets (650 mg total) by mouth every 6 (six) hours as needed for mild pain or fever, Disp: , Rfl: 0    allopurinol (ZYLOPRIM) 300 mg tablet, Take 1 tablet (300 mg total) by mouth daily, Disp: 30 tablet, Rfl: 3    ALPRAZolam (XANAX) 0 25 mg tablet, Take 1 tablet (0 25 mg total) by mouth daily at bedtime as needed for anxiety (restless legs), Disp: 30 tablet, Rfl: 1    amLODIPine (NORVASC) 5 mg tablet, Take 5 mg by mouth daily  , Disp: , Rfl:     atorvastatin (LIPITOR) 80 mg tablet, Take 80 mg by mouth every other day evening, Disp: , Rfl:     folic acid (FOLVITE) 1 mg tablet, Take 1 tablet (1 mg total) by mouth daily, Disp: 90 tablet, Rfl: 4    hydrOXYzine HCL (ATARAX) 25 mg tablet, Take 1 tablet (25 mg total) by mouth every 6 (six) hours as needed for itching or anxiety, Disp: 60 tablet, Rfl: 2    Magnesium 250 MG TABS, 1 tablet 2 (two) times a day Taking 500mg in the morning and 500mg at night, Disp: , Rfl:     metoprolol tartrate (LOPRESSOR) 100 mg tablet, Take 50 mg by mouth daily, Disp: , Rfl:     naloxone (NARCAN) 4 mg/0 1 mL nasal spray, Administer 1 spray into a nostril   If breathing does not return to normal or if breathing difficulty resumes after 2-3 minutes, give another dose in the other nostril using a new spray , Disp: 1 each, Rfl: 1    omeprazole (PriLOSEC) 20 mg delayed release capsule, Take 20 mg by mouth daily  , Disp: , Rfl:     predniSONE 10 mg tablet, Take 1 tablet (10 mg total) by mouth daily, Disp: 30 tablet, Rfl: 3    senna (SENOKOT) 8 6 mg, Take 1 tablet (8 6 mg total) by mouth daily at bedtime, Disp: 30 each, Rfl: 0    sildenafil (VIAGRA) 100 mg tablet, TAKE ONE TABLET BY MOUTH  AS NEEDED PRIOR TO SEXUAL ACTIVITY FOR ERECTILE DYSFUNCTION, Disp: , Rfl:     terazosin (HYTRIN) 5 mg capsule, Take 2 capsules (10 mg total) by mouth daily at bedtime, Disp: 30 capsule, Rfl: 6    triamcinolone (KENALOG) 0 1 % lotion, Apply topically 3 (three) times a day, Disp: 60 mL, Rfl: 5    VITAMIN D PO, Take 1,000 Units by mouth daily , Disp: , Rfl:     cholestyramine (QUESTRAN) 4 g packet, Take 1 packet (4 g total) by mouth 2 (two) times a day as needed (diarrhea), Disp: 30 packet, Rfl: 5      Physical Exam:  /78 (BP Location: Left arm, Patient Position: Sitting, Cuff Size: Large)   Pulse 62   Temp 98 3 °F (36 8 °C) (Tympanic)   Resp 16   Ht 5' 8 5" (1 74 m)   Wt 91 9 kg (202 lb 9 6 oz)   SpO2 96%   BMI 30 36 kg/m²     Physical Exam  Vitals reviewed  Constitutional:       General: He is not in acute distress  Appearance: He is well-developed  He is not diaphoretic  HENT:      Head: Normocephalic and atraumatic  Eyes:      General: Lids are normal  No scleral icterus  Conjunctiva/sclera: Conjunctivae normal       Pupils: Pupils are equal, round, and reactive to light  Neck:      Thyroid: No thyromegaly  Cardiovascular:      Rate and Rhythm: Normal rate and regular rhythm  Heart sounds: Normal heart sounds  No murmur heard  Pulmonary:      Effort: Pulmonary effort is normal  No respiratory distress  Breath sounds: Normal breath sounds  Abdominal:      General: There is no distension  Palpations: Abdomen is soft  There is no hepatomegaly or splenomegaly  Tenderness: There is no abdominal tenderness  Musculoskeletal:         General: No swelling  Normal range of motion  Cervical back: Normal range of motion and neck supple  Lymphadenopathy:      Cervical: No cervical adenopathy        Upper Body: Right upper body: No axillary adenopathy  Left upper body: No axillary adenopathy  Skin:     General: Skin is warm and dry  Findings: No erythema or rash  Neurological:      General: No focal deficit present  Mental Status: He is alert and oriented to person, place, and time  Psychiatric:         Mood and Affect: Mood and affect normal          Behavior: Behavior normal  Behavior is cooperative  Thought Content: Thought content normal          Judgment: Judgment normal            Labs:  Lab Results   Component Value Date    WBC 7 00 07/05/2021    HGB 13 3 (L) 07/05/2021    HCT 40 4 (L) 07/05/2021    MCV 95 07/05/2021     (L) 07/05/2021     Lab Results   Component Value Date    K 3 7 07/05/2021     07/05/2021    CO2 29 07/05/2021    BUN 20 07/05/2021    CREATININE 1 54 (H) 07/05/2021    GLUF 97 07/05/2021    CALCIUM 9 2 07/05/2021    CORRECTEDCA 9 8 10/28/2020    AST 9 (L) 07/05/2021    ALT 12 07/05/2021    ALKPHOS 64 07/05/2021    EGFR 44 07/05/2021       Patient voiced understanding and agreement in the above discussion  Aware to contact our office with questions/symptoms in the interim  This note has been generated by voice recognition software system  Therefore, there may be spelling, grammar, and or syntax errors  Please contact if questions arise

## 2021-07-07 ENCOUNTER — HOSPITAL ENCOUNTER (OUTPATIENT)
Dept: NUCLEAR MEDICINE | Facility: HOSPITAL | Age: 71
Discharge: HOME | End: 2021-07-07
Payer: MEDICARE

## 2021-07-07 ENCOUNTER — HOSPITAL ENCOUNTER (OUTPATIENT)
Dept: RADIOLOGY | Facility: HOSPITAL | Age: 71
Discharge: HOME | End: 2021-07-07
Payer: MEDICARE

## 2021-07-07 ENCOUNTER — TELEPHONE (OUTPATIENT)
Dept: HEMATOLOGY ONCOLOGY | Facility: CLINIC | Age: 71
End: 2021-07-07

## 2021-07-07 DIAGNOSIS — R06.02 SHORTNESS OF BREATH: ICD-10-CM

## 2021-07-07 PROCEDURE — A9540 TC99M MAA: HCPCS

## 2021-07-07 PROCEDURE — 78580 LUNG PERFUSION IMAGING: CPT

## 2021-07-07 PROCEDURE — 71046 X-RAY EXAM CHEST 2 VIEWS: CPT

## 2021-07-07 NOTE — TELEPHONE ENCOUNTER
Phoned pt and spoke to his wife to let her know very low probability of blood clot in lung and Ct scan of chest showed no abnormalities

## 2021-07-09 ENCOUNTER — HOSPITAL ENCOUNTER (OUTPATIENT)
Dept: INFUSION CENTER | Facility: HOSPITAL | Age: 71
Discharge: HOME | End: 2021-07-09
Attending: INTERNAL MEDICINE
Payer: MEDICARE

## 2021-07-09 VITALS
HEART RATE: 61 BPM | OXYGEN SATURATION: 97 % | RESPIRATION RATE: 16 BRPM | BODY MASS INDEX: 29.75 KG/M2 | HEIGHT: 69 IN | TEMPERATURE: 97.5 F | DIASTOLIC BLOOD PRESSURE: 74 MMHG | SYSTOLIC BLOOD PRESSURE: 136 MMHG | WEIGHT: 200.84 LBS

## 2021-07-09 DIAGNOSIS — C68.9 UROTHELIAL CANCER (HCC): Primary | ICD-10-CM

## 2021-07-09 DIAGNOSIS — C65.2 CANCER OF LEFT RENAL PELVIS (HCC): ICD-10-CM

## 2021-07-09 PROCEDURE — 96413 CHEMO IV INFUSION 1 HR: CPT

## 2021-07-09 RX ORDER — SODIUM CHLORIDE 9 MG/ML
20 INJECTION, SOLUTION INTRAVENOUS ONCE
Status: COMPLETED | OUTPATIENT
Start: 2021-07-09 | End: 2021-07-09

## 2021-07-09 RX ADMIN — SODIUM CHLORIDE 200 MG: 9 INJECTION, SOLUTION INTRAVENOUS at 10:50

## 2021-07-09 RX ADMIN — SODIUM CHLORIDE 20 ML/HR: 0.9 INJECTION, SOLUTION INTRAVENOUS at 10:49

## 2021-07-14 ENCOUNTER — TELEPHONE (OUTPATIENT)
Dept: HEMATOLOGY ONCOLOGY | Facility: CLINIC | Age: 71
End: 2021-07-14

## 2021-07-14 ENCOUNTER — HOSPITAL ENCOUNTER (OUTPATIENT)
Dept: RADIOLOGY | Facility: HOSPITAL | Age: 71
Discharge: HOME | End: 2021-07-14
Attending: INTERNAL MEDICINE
Payer: MEDICARE

## 2021-07-14 DIAGNOSIS — E86.0 DEHYDRATION: Primary | ICD-10-CM

## 2021-07-14 DIAGNOSIS — M25.511 RIGHT SHOULDER PAIN, UNSPECIFIED CHRONICITY: ICD-10-CM

## 2021-07-14 PROCEDURE — 73030 X-RAY EXAM OF SHOULDER: CPT

## 2021-07-14 NOTE — TELEPHONE ENCOUNTER
----- Message from Kurtis Brenden sent at 7/14/2021  2:43 PM EDT -----  Regarding: FW: Non-Urgent Medical Question  Contact: [phone]  Please advise     ----- Message -----  From: Alcides Leija  Sent: 7/14/2021   2:39 PM EDT  To: Hematology Oncology [facility]  Subject: Non-Urgent Medical Question                      This message is being sent by Ian Constantino on behalf of Lorelie Evans Dr Simona Heimlich,  I want to let you know Pat,s diarrhea has stopped but he is weak and tired  He is drinking lots of fluids and gator aide  Is there something else to be done to get him back on track  He is not very happy feeling this way  Mini Pettit  Please respond    Thanks

## 2021-07-14 NOTE — TELEPHONE ENCOUNTER
Called patient's wife back regarding the my chart message  She states that his diarrhea resolved 2 days ago after taking Questran however he continues to feel "drained" and fatigued  She states that he has not been experiencing any fever or signs of infection  Continues to report some exertional dyspnea that she thinks may be due to being fatigued  Has been hydrating himself well  He does seem to be interested in receiving some additional IV hydration in the infusion center; will reach out to them to arrange (had to leave message)  I also advised her to double up on his prednisone 10 mg (take 20 mg) for the next to 3 days to see if this will improve his symptoms  I did advise her to let us know if there is no improvement or worsening over the next few days; she agrees

## 2021-07-15 ENCOUNTER — HOSPITAL ENCOUNTER (OUTPATIENT)
Dept: INFUSION CENTER | Facility: HOSPITAL | Age: 71
Discharge: HOME | End: 2021-07-15
Payer: MEDICARE

## 2021-07-15 VITALS
SYSTOLIC BLOOD PRESSURE: 143 MMHG | TEMPERATURE: 96.8 F | HEART RATE: 68 BPM | DIASTOLIC BLOOD PRESSURE: 68 MMHG | RESPIRATION RATE: 18 BRPM

## 2021-07-15 DIAGNOSIS — E86.0 DEHYDRATION: Primary | ICD-10-CM

## 2021-07-15 DIAGNOSIS — E83.42 HYPOMAGNESEMIA: ICD-10-CM

## 2021-07-15 DIAGNOSIS — R19.7 DIARRHEA, UNSPECIFIED TYPE: ICD-10-CM

## 2021-07-15 LAB
ANION GAP SERPL CALCULATED.3IONS-SCNC: 7 MMOL/L (ref 4–13)
BUN SERPL-MCNC: 27 MG/DL (ref 7–25)
CALCIUM SERPL-MCNC: 9.1 MG/DL (ref 8.6–10.5)
CHLORIDE SERPL-SCNC: 101 MMOL/L (ref 98–107)
CO2 SERPL-SCNC: 30 MMOL/L (ref 21–31)
CREAT SERPL-MCNC: 1.55 MG/DL (ref 0.7–1.3)
GFR SERPL CREATININE-BSD FRML MDRD: 44 ML/MIN/1.73SQ M
GLUCOSE SERPL-MCNC: 100 MG/DL (ref 65–99)
MAGNESIUM SERPL-MCNC: 1.9 MG/DL (ref 1.9–2.7)
POTASSIUM SERPL-SCNC: 3.8 MMOL/L (ref 3.5–5.5)
SODIUM SERPL-SCNC: 138 MMOL/L (ref 134–143)

## 2021-07-15 PROCEDURE — 83735 ASSAY OF MAGNESIUM: CPT

## 2021-07-15 PROCEDURE — 80048 BASIC METABOLIC PNL TOTAL CA: CPT

## 2021-07-15 PROCEDURE — 96360 HYDRATION IV INFUSION INIT: CPT

## 2021-07-15 RX ADMIN — SODIUM CHLORIDE 1000 ML: 0.9 INJECTION, SOLUTION INTRAVENOUS at 10:13

## 2021-07-15 NOTE — PROGRESS NOTES
Pt hydrated as per orders  Port flushed and deaccessed per routine  Labs drawn on admit reviewed and noted  Discharged ambulatory aware to call office if he feels he needs a repeat visit tomorrow

## 2021-07-23 ENCOUNTER — TELEPHONE (OUTPATIENT)
Dept: PAIN MEDICINE | Facility: CLINIC | Age: 71
End: 2021-07-23

## 2021-07-23 ENCOUNTER — TELEPHONE (OUTPATIENT)
Dept: HEMATOLOGY ONCOLOGY | Facility: CLINIC | Age: 71
End: 2021-07-23

## 2021-07-23 NOTE — TELEPHONE ENCOUNTER
Returned a call to Meagan De Luna pts wife and let her know that we received message after hrs and that is why we are gettiing back to her the next day  Pt is on Magnesium  Supplements one tab in AM and two tabs in PM  Per Ann instructed pt to hold the Magnesium for a few days to see if diarrhea improves  Pt will take the Cholestyramine powder for the diarrhea  Pt said he feels better all the way around when he is on Prednisone 20 mg so he will take 20 mg daily  Pt has F/U in office 7/29/21             ----- Message from Jada Amato on behalf of Cait Amato sent at 7/22/2021  6:24 PM EDT -----  Regarding: Non-Urgent Medical Question  Contact: [phone]  This message is being sent by Lilly Tierney on behalf of Atilio Branch,  I had sent a message for Samuel Dennis just a minute ago not sure if it went through  About his short breath and weakness that is till continuing  If you did not receive it  can you please call me so I can explain and if you did receive it , I am sure I will hear from you  phone # [phone] or Coltello Ristorante's cell [phone]

## 2021-07-23 NOTE — TELEPHONE ENCOUNTER
Pt said his family doctor gave him an injection for shoulder pain but It did not work  Pt wondering if dr Jhon Licea can help him with his right shoulder pain? He has an xray that's in his chart   [phone]

## 2021-07-23 NOTE — TELEPHONE ENCOUNTER
S/w pt  Pt states he is having RT shoulder pain which is new  Pt scheduled for same day appt on 7/26 with CARLOS with 11:30 arrival time  Next available was 1 month out

## 2021-07-26 ENCOUNTER — OFFICE VISIT (OUTPATIENT)
Dept: PAIN MEDICINE | Facility: CLINIC | Age: 71
End: 2021-07-26
Payer: MEDICARE

## 2021-07-26 VITALS — WEIGHT: 203 LBS | HEIGHT: 68 IN | BODY MASS INDEX: 30.77 KG/M2

## 2021-07-26 DIAGNOSIS — Z79.891 LONG-TERM CURRENT USE OF OPIATE ANALGESIC: ICD-10-CM

## 2021-07-26 DIAGNOSIS — G89.29 CHRONIC RIGHT SHOULDER PAIN: ICD-10-CM

## 2021-07-26 DIAGNOSIS — G89.4 CHRONIC PAIN DISORDER: ICD-10-CM

## 2021-07-26 DIAGNOSIS — Z79.891 ENCOUNTER FOR LONG-TERM OPIATE ANALGESIC USE: ICD-10-CM

## 2021-07-26 DIAGNOSIS — M48.061 SPINAL STENOSIS OF LUMBAR REGION, UNSPECIFIED WHETHER NEUROGENIC CLAUDICATION PRESENT: ICD-10-CM

## 2021-07-26 DIAGNOSIS — F11.20 UNCOMPLICATED OPIOID DEPENDENCE (HCC): ICD-10-CM

## 2021-07-26 DIAGNOSIS — G89.4 CHRONIC PAIN SYNDROME: Primary | ICD-10-CM

## 2021-07-26 DIAGNOSIS — M25.511 CHRONIC RIGHT SHOULDER PAIN: ICD-10-CM

## 2021-07-26 DIAGNOSIS — M47.816 LUMBAR SPONDYLOSIS: ICD-10-CM

## 2021-07-26 DIAGNOSIS — M19.011 PRIMARY OSTEOARTHRITIS OF RIGHT SHOULDER: ICD-10-CM

## 2021-07-26 PROCEDURE — 99214 OFFICE O/P EST MOD 30 MIN: CPT | Performed by: ANESTHESIOLOGY

## 2021-07-26 PROCEDURE — 80305 DRUG TEST PRSMV DIR OPT OBS: CPT | Performed by: ANESTHESIOLOGY

## 2021-07-26 RX ORDER — TRAMADOL HYDROCHLORIDE 50 MG/1
50 TABLET ORAL 2 TIMES DAILY PRN
Qty: 60 TABLET | Refills: 1 | Status: SHIPPED | OUTPATIENT
Start: 2021-07-26 | End: 2021-08-23 | Stop reason: SDUPTHER

## 2021-07-26 NOTE — TELEPHONE ENCOUNTER
Pt called in to provide the phone for Dr Melissa Bean Horse requested this information from the [address] [place] [address] today  Dr Doug Leal  [phone]      Please be advise thank you

## 2021-07-28 ENCOUNTER — APPOINTMENT (OUTPATIENT)
Dept: LAB | Facility: HOSPITAL | Age: 71
End: 2021-07-28
Payer: MEDICARE

## 2021-07-28 DIAGNOSIS — C68.9 UROTHELIAL CANCER (HCC): ICD-10-CM

## 2021-07-28 DIAGNOSIS — E83.42 HYPOMAGNESEMIA: ICD-10-CM

## 2021-07-28 DIAGNOSIS — R53.83 OTHER FATIGUE: ICD-10-CM

## 2021-07-28 DIAGNOSIS — C65.2 CANCER OF LEFT RENAL PELVIS (HCC): ICD-10-CM

## 2021-07-28 LAB
ALBUMIN SERPL BCP-MCNC: 3.5 G/DL (ref 3.5–5.7)
ALP SERPL-CCNC: 54 U/L (ref 55–165)
ALT SERPL W P-5'-P-CCNC: 20 U/L (ref 7–52)
ANION GAP SERPL CALCULATED.3IONS-SCNC: 6 MMOL/L (ref 4–13)
AST SERPL W P-5'-P-CCNC: 11 U/L (ref 13–39)
BASOPHILS # BLD AUTO: 0 THOUSANDS/ΜL (ref 0–0.1)
BASOPHILS NFR BLD AUTO: 0 % (ref 0–2)
BILIRUB SERPL-MCNC: 0.7 MG/DL (ref 0.2–1)
BUN SERPL-MCNC: 33 MG/DL (ref 7–25)
CALCIUM SERPL-MCNC: 9.3 MG/DL (ref 8.6–10.5)
CHLORIDE SERPL-SCNC: 101 MMOL/L (ref 98–107)
CO2 SERPL-SCNC: 31 MMOL/L (ref 21–31)
CREAT SERPL-MCNC: 1.63 MG/DL (ref 0.7–1.3)
EOSINOPHIL # BLD AUTO: 0 THOUSAND/ΜL (ref 0–0.61)
EOSINOPHIL NFR BLD AUTO: 0 % (ref 0–5)
ERYTHROCYTE [DISTWIDTH] IN BLOOD BY AUTOMATED COUNT: 14.8 % (ref 11.5–14.5)
GFR SERPL CREATININE-BSD FRML MDRD: 41 ML/MIN/1.73SQ M
GLUCOSE P FAST SERPL-MCNC: 100 MG/DL (ref 65–99)
HCT VFR BLD AUTO: 41.3 % (ref 42–47)
HGB BLD-MCNC: 13.7 G/DL (ref 14–18)
LYMPHOCYTES # BLD AUTO: 1.4 THOUSANDS/ΜL (ref 0.6–4.47)
LYMPHOCYTES NFR BLD AUTO: 16 % (ref 21–51)
MAGNESIUM SERPL-MCNC: 2 MG/DL (ref 1.9–2.7)
MCH RBC QN AUTO: 31.2 PG (ref 26–34)
MCHC RBC AUTO-ENTMCNC: 33.1 G/DL (ref 31–37)
MCV RBC AUTO: 94 FL (ref 81–99)
MONOCYTES # BLD AUTO: 0.8 THOUSAND/ΜL (ref 0.17–1.22)
MONOCYTES NFR BLD AUTO: 9 % (ref 2–12)
NEUTROPHILS # BLD AUTO: 6.8 THOUSANDS/ΜL (ref 1.4–6.5)
NEUTS SEG NFR BLD AUTO: 75 % (ref 42–75)
PLATELET # BLD AUTO: 187 THOUSANDS/UL (ref 149–390)
PMV BLD AUTO: 7.4 FL (ref 8.6–11.7)
POTASSIUM SERPL-SCNC: 4.5 MMOL/L (ref 3.5–5.5)
PROT SERPL-MCNC: 6.2 G/DL (ref 6.4–8.9)
RBC # BLD AUTO: 4.39 MILLION/UL (ref 4.3–5.9)
SODIUM SERPL-SCNC: 138 MMOL/L (ref 134–143)
T3FREE SERPL-MCNC: 2.05 PG/ML (ref 2.3–4.2)
TSH SERPL DL<=0.05 MIU/L-ACNC: 2.05 UIU/ML (ref 0.45–5.33)
WBC # BLD AUTO: 9.1 THOUSAND/UL (ref 4.8–10.8)

## 2021-07-28 PROCEDURE — 84481 FREE ASSAY (FT-3): CPT

## 2021-07-28 PROCEDURE — 84443 ASSAY THYROID STIM HORMONE: CPT

## 2021-07-28 PROCEDURE — 80053 COMPREHEN METABOLIC PANEL: CPT

## 2021-07-28 PROCEDURE — 85025 COMPLETE CBC W/AUTO DIFF WBC: CPT

## 2021-07-28 PROCEDURE — 83735 ASSAY OF MAGNESIUM: CPT

## 2021-07-28 PROCEDURE — 36415 COLL VENOUS BLD VENIPUNCTURE: CPT

## 2021-07-29 ENCOUNTER — PROCEDURE VISIT (OUTPATIENT)
Dept: PAIN MEDICINE | Facility: CLINIC | Age: 71
End: 2021-07-29
Payer: MEDICARE

## 2021-07-29 ENCOUNTER — OFFICE VISIT (OUTPATIENT)
Dept: HEMATOLOGY ONCOLOGY | Facility: CLINIC | Age: 71
End: 2021-07-29
Payer: MEDICARE

## 2021-07-29 VITALS
BODY MASS INDEX: 29.58 KG/M2 | WEIGHT: 195.2 LBS | OXYGEN SATURATION: 95 % | HEIGHT: 68 IN | TEMPERATURE: 97.1 F | SYSTOLIC BLOOD PRESSURE: 122 MMHG | DIASTOLIC BLOOD PRESSURE: 84 MMHG | RESPIRATION RATE: 18 BRPM | HEART RATE: 78 BPM

## 2021-07-29 VITALS
HEART RATE: 81 BPM | DIASTOLIC BLOOD PRESSURE: 84 MMHG | WEIGHT: 195 LBS | HEIGHT: 68 IN | SYSTOLIC BLOOD PRESSURE: 126 MMHG | BODY MASS INDEX: 29.55 KG/M2

## 2021-07-29 DIAGNOSIS — C65.2 CANCER OF LEFT RENAL PELVIS (HCC): ICD-10-CM

## 2021-07-29 DIAGNOSIS — Z51.5 PALLIATIVE CARE PATIENT: ICD-10-CM

## 2021-07-29 DIAGNOSIS — R53.83 OTHER FATIGUE: ICD-10-CM

## 2021-07-29 DIAGNOSIS — C68.9 UROTHELIAL CANCER (HCC): Primary | ICD-10-CM

## 2021-07-29 DIAGNOSIS — G89.3 CANCER RELATED PAIN: ICD-10-CM

## 2021-07-29 DIAGNOSIS — G89.29 CHRONIC RIGHT SHOULDER PAIN: Primary | ICD-10-CM

## 2021-07-29 DIAGNOSIS — M25.511 CHRONIC RIGHT SHOULDER PAIN: Primary | ICD-10-CM

## 2021-07-29 PROCEDURE — 20611 DRAIN/INJ JOINT/BURSA W/US: CPT | Performed by: ANESTHESIOLOGY

## 2021-07-29 PROCEDURE — 99214 OFFICE O/P EST MOD 30 MIN: CPT | Performed by: NURSE PRACTITIONER

## 2021-07-29 RX ORDER — METHYLPREDNISOLONE ACETATE 40 MG/ML
40 INJECTION, SUSPENSION INTRA-ARTICULAR; INTRALESIONAL; INTRAMUSCULAR; SOFT TISSUE ONCE
Status: COMPLETED | OUTPATIENT
Start: 2021-07-29 | End: 2021-07-29

## 2021-07-29 RX ORDER — SODIUM CHLORIDE 9 MG/ML
20 INJECTION, SOLUTION INTRAVENOUS ONCE
Status: CANCELLED | OUTPATIENT
Start: 2021-08-20

## 2021-07-29 RX ORDER — PREDNISONE 10 MG/1
20 TABLET ORAL DAILY
Qty: 60 TABLET | Refills: 3 | Status: SHIPPED | OUTPATIENT
Start: 2021-07-29 | End: 2022-01-19 | Stop reason: SDUPTHER

## 2021-07-29 RX ORDER — BUPIVACAINE HYDROCHLORIDE 2.5 MG/ML
10 INJECTION, SOLUTION EPIDURAL; INFILTRATION; INTRACAUDAL ONCE
Status: COMPLETED | OUTPATIENT
Start: 2021-07-29 | End: 2021-07-29

## 2021-07-29 RX ADMIN — METHYLPREDNISOLONE ACETATE 40 MG: 40 INJECTION, SUSPENSION INTRA-ARTICULAR; INTRALESIONAL; INTRAMUSCULAR; SOFT TISSUE at 11:30

## 2021-07-29 RX ADMIN — BUPIVACAINE HYDROCHLORIDE 10 ML: 2.5 INJECTION, SOLUTION EPIDURAL; INFILTRATION; INTRACAUDAL at 11:30

## 2021-07-29 NOTE — PROGRESS NOTES
Large joint arthrocentesis: R subacromial bursa  Universal Protocol:  Consent: Verbal consent obtained  Written consent obtained  Risks and benefits: risks, benefits and alternatives were discussed  Consent given by: patient  Time out: Immediately prior to procedure a "time out" was called to verify the correct patient, procedure, equipment, support staff and site/side marked as required  Timeout called at: 7/29/2021 11:31 AM   Patient understanding: patient states understanding of the procedure being performed  Patient consent: the patient's understanding of the procedure matches consent given  Procedure consent: procedure consent matches procedure scheduled  Site marked: the operative site was marked  Required items: required blood products, implants, devices, and special equipment available  Patient identity confirmed: verbally with patient    Supporting Documentation  Indications: pain   Procedure Details  Location: shoulder - R subacromial bursa  Preparation: Patient was prepped and draped in the usual sterile fashion  Needle size: 25 G  Ultrasound guidance: yes            Ultrasound-guided right subacromial subdeltoid bursa injection    Indication:  Right shoulder pain  Preoperative diagnosis:  Right shoulder bursitis  Postoperative diagnosis:  Right shoulder bursitis  Procedure: Ultrasound-guided right subacromial subdeltoid bursa injection    After discussing the risks, benefits, and alternatives to the procedure, the patient expressed understanding and wished to proceed  The patient was brought to the procedure suite and placed in the side lying position  A procedural pause was conducted to verify: correct patient identity, procedure to be performed and as applicable, correct side and site, correct patient position, and availability of implants, special equipment, or special requirements  A simple surgical tray was used   Prior to the procedure, the right shoulder was examined with a 12MHz linear transducer to visualize the subacromial-subdeltoid bursa and determine the optimal needle path  Following this, the right shoulder was prepared with a Chloraprep scrub, then re-examined using the same transducer, a sterile utrasound transducer cover, and sterile ultrasound transducer gel  Thereafter, using ultrasound guidance, a 2 5 inch 25 guage needle was advanced into the right subacromial-subdeltoid bursa  After visualization of the tip of the needle in the target area and negative aspiration for blood, a mixture of 40mg of Depo-Medrol in 2cc of 0 25% buivicaine was injected into the right subacromial subdeltoid bursa  The patient tolerated the procedure well and there were no apparent complications  After an appropriate amount of observation, the patient was dismissed from the recovery area in good condition under their own power  COMMENTS:  The patient received a total steroid dose of 40mg of Depo-Medrol

## 2021-07-29 NOTE — PROGRESS NOTES
Hematology/Oncology Outpatient Follow-up  Monica Rodriguez 66 y o  male 1950 [id number]    Date:  7/29/2021      Assessment and Plan:  1  Urothelial cancer [facility])  Patient will be continued on his current treatment with single agent immunotherapy Keytruda on every three-week basis which he is tolerating fairly well  His daily prednisone dose was recently increased from 10 mg to 20 mg which is improving his generalized arthralgias/myalgias and weakness  His diarrhea has also resolved; not entirely sure if the diarrhea was due to the immunotherapy verses more likely the magnesium supplements which he recently discontinued  He also has been taking Questran twice a day for the diarrhea; advised him to trial slowing down on the Questran to see if the diarrhea reoccurs  Encouraged him to continue to hydrate himself well  Patient will be back for follow-up again with repeat labs in 3 weeks  He will also be due for repeat imaging with a PET-CT scan which we will order and schedule today  He will continue to follow up with Palliative Care and Nephrology on a regular basis  His renal function is slightly above his usual baseline and may be related to dehydration  Will repeat BMP tomorrow  Patient seems to have a solid nodule to the right upper back area of unclear etiology  He is already scheduled to have the lesion evaluated/removed by General surgery 08/09/2021  Malignant/metastatic lesion cannot be entirely ruled out     - CBC and differential; Future  - Comprehensive metabolic panel; Future  - Magnesium; Future  - TSH, 3rd generation with Free T4 reflex; Future  - NM PET CT skull base to mid thigh; Future  - Basic metabolic panel; Future  - predniSONE 10 mg tablet; Take 2 tablets (20 mg total) by mouth daily  Dispense: 60 tablet; Refill: 3  - CBC and differential; Future  - Comprehensive metabolic panel; Future  - TSH, 3rd generation with Free T4 reflex; Future  - T3, free;  Future      HPI:  Patient presents today for a follow-up visit accompanied by his wife  Has stopped taking his magnesium supplements as instructed and has been using his Questran twice a day; diarrhea has since resolved  He also increased his prednisone to 20 mg as instructed which is improving his generalized arthralgias /myalgias and weakness  Has been hydrating himself well  He was seen by pain management recently was planning to do an injection to his right shoulder  He also noted a skin nodule to his right upper back that has been present for at least the past month his wife referred to as a boil; will be seeing Dr George Leblanc surgery to evaluate and remove 08/09/2021  Believes he had a similar skin lesion to the area in the past     His most recent laboratory studies from yesterday 07/28/2021 showedNormal white cells and platelets, he has stable mild normocytic anemia H&H 13 7/41 3, MCV 94  Kidney function is just slightly above his usual baseline BUN 33, creatinine 1 63, GFR 41 remaining metabolic panel is normal   His magnesium is in the normal range 2 0  off supplements  TSH is normal 2 05  Oncology History Overview Note   Patient has a history of hypertension, hyperlipidemia, chronic lower back pain  He had an MRI of the lower spine for further evaluation of his chronic lower back pain  The MRI on 12/02/2019 revealed Multiple left renal masses to include a left lower pole cyst and a rounded structure in the left upper pole which may also represent cyst   Left upper pole renal masses approximately 3 cm in greatest linear dimension  A CT with renal protocol was done on 12/12/2019 which also showed the infiltrating lesion in the upper pole of the left kidney measuring about the 3 5 cm in greatest dimension without any hint of retroperitoneal lymphadenopathy  A CT scan of the abdomen pelvis on 01/14/2020 showed the same findings with the mild hydronephrosis on the left    The urine cytology on 01/03/2020 showed high-grade urothelial carcinoma  A cystoscopy was then done, a biopsy was taken from the left renal pelvic region which showed high-grade urothelial carcinoma without evidence of lamina propria invasion  The detrusor muscle/muscularis propria were not present for evaluation  The recommendation was to pursue left robotic assisted laparoscopic nephroureterectomy with bladder cuff excision which was done on 04/01/2020  The final pathology revealed;  - Invasive high grade urothelial carcinoma arising in renal pelvis  - Bladder cuff margin is negative for carcinoma and no evidence of high grade dysplasia  - Ureters with no significant pathologic abnormality  - Two benign simple cysts  - Adrenal gland is negative for malignancy  - One lymph node, negative for malignancy (0/1)  The tumor size was 4 5 cm with invasion beyond the muscularis into the periurethral fat or peripelvic fat or the renal parenchyma  The margins were uninvolved by carcinoma or carcinoma in situ  The final pathology was pT3 pN0  Patient barely tolerated 1 cycle of adjuvant chemotherapy with split dose cisplatin and gemcitabine with a lot of side effects  The treatment had to be discontinued due to significant worsening renal dysfunction 6/2020  Patient started to complain about significant abdominal/left inguinal pain around August/September 2020  Unfortunately repeat imaging revealed recurrent/metastatic disease  9/4/20 CT C/A/P- Status post left nephrectomy for resection of urothelial neoplasm  Lymphadenopathy in the left nephrectomy bed has increased since the prior examination, concerning for metastatic disease  Ill-defined hyperattenuating masslike focus in the left rectus muscle, not identified on the prior examination  This is suspicious for a metastatic lesion  A rectus hematoma is also considered  9/23/20 PET scan- 1    Multiple FDG avid lymph nodes in the retrocrural region, retroperitoneum and the left renal bed compatible with metastasis  These have progressed from recent CT  2   FDG avid mass involving the left rectus abdominal musculature compatible with metastasis which is larger from recent CT  3  Several small lymph nodes adjacent to the mid to distal esophagus with FDG uptake suspicious for metastasis  Small focus of FDG uptake also suggested in the right hilar region, metastasis is not excluded  This could be reassessed on follow-up  4   Subtle focus of FDG uptake at the T2 level on the left, nonspecific, could be related to early degenerative changes, developing metastasis is not entirely excluded  This could be reassessed on follow-up  5  Prostate is mildly prominent with heterogeneous FDG uptake  This could be inflammatory  Correlate for prostatitis  He completed palliative radiation to the left abdomen 12/3/20     Urothelial cancer ([facility] [address] )   1/3/2020 Biopsy    Final Diagnosis    A  Urine, Clean Catch, Thin Prep:  High grade urothelial carcinoma (HGUC) - see comment  1/3/2020 Initial Diagnosis    Urothelial cancer ([facility] [address] )  T3 N0 (stage IIIA)     1/17/2020 Biopsy    Final Diagnosis    A  Ureter, Right, left renal pelvis biopsy  -Fragments of high grade urothelial carcinoma   -No evidence of lamina propria invasion   -Detrusor muscle/ muscularis propria is not present for evaluation  B  Urinary Bladder, bladder biopsy:  -Fragments of low grade papillary lesion  See note  -No evidence of invasion seen  -Unremarkable fragment of detrusor muscle seen  4/1/2020 Surgery    left robotic assisted laparoscopic nephroureterectomy with bladder cuff excision     Final Diagnosis    A  Left kidney, ureter, and bladder cuff, nephroureterectomy:  - Invasive high grade urothelial carcinoma arising in renal pelvis  - Bladder cuff margin is negative for carcinoma and no evidence of high grade dysplasia  - Ureters with no significant pathologic abnormality  - Two benign simple cysts    - Adrenal gland is negative for malignancy  - One lymph node, negative for malignancy (0/1)  5/14/2020 - 6/3/2020 Chemotherapy    CISplatin (PLATINOL) split dose 35 mg/m2 = 75 3 mg (50 % of original dose 70 mg/m2), Intravenous,   Administration: 75 3 mg (5/14/2020), 75 3 mg (5/21/2020)  gemcitabine (GEMZAR) 2,200 mg in sodium chloride 0 9 % 250 mL infusion, 2,150 2 mg (80 % of original dose 1,250 mg/m2), Intravenous,   Administration: 2,200 mg (5/14/2020), 2,200 mg (5/21/2020)    (only completed 1 cycle- d/c d/t adverse effects and worsening renal dysfunction)     10/9/2020 -  Chemotherapy    pembrolizumab (KEYTRUDA) IVPB, 200 mg, Intravenous, Once, 14 of 20 cycles  Administration: 200 mg (10/9/2020), 200 mg (10/30/2020), 200 mg (11/20/2020), 200 mg (12/11/2020), 200 mg (12/31/2020), 200 mg (1/22/2021), 200 mg (2/12/2021), 200 mg (3/5/2021), 200 mg (3/26/2021), 200 mg (4/16/2021), 200 mg (5/7/2021), 200 mg (5/28/2021), 200 mg (6/18/2021), 200 mg (7/9/2021)     11/19/2020 - 12/3/2020 Radiation    Treatment:  Course: C1    Plan ID Energy Fractions Dose per Fraction (cGy) Dose Correction (cGy) Total Dose Delivered (cGy) Elapsed Days   L Abdomen 6X 10 / 10 300 0 3,000 14        Cancer of left renal pelvis ([address] )   4/23/2020 Initial Diagnosis    Cancer of left renal pelvis ([address] )     10/9/2020 -  Chemotherapy    pembrolizumab (KEYTRUDA) IVPB, 200 mg, Intravenous, Once, 14 of 20 cycles  Administration: 200 mg (10/9/2020), 200 mg (10/30/2020), 200 mg (11/20/2020), 200 mg (12/11/2020), 200 mg (12/31/2020), 200 mg (1/22/2021), 200 mg (2/12/2021), 200 mg (3/5/2021), 200 mg (3/26/2021), 200 mg (4/16/2021), 200 mg (5/7/2021), 200 mg (5/28/2021), 200 mg (6/18/2021), 200 mg (7/9/2021)         Interval history:    ROS: Review of Systems   Constitutional: Positive for activity change and fatigue  Negative for appetite change, chills, fever and unexpected weight change  HENT: Positive for hearing loss   Negative for congestion, mouth sores, nosebleeds, sore throat and trouble swallowing  Eyes: Negative  Respiratory: Positive for shortness of breath  Negative for cough and chest tightness  Cardiovascular: Negative for chest pain, palpitations and leg swelling  Gastrointestinal: Negative for abdominal distention, abdominal pain, blood in stool, constipation, diarrhea, nausea and vomiting  Genitourinary: Negative for difficulty urinating, dysuria, frequency, hematuria and urgency  Musculoskeletal: Positive for arthralgias, back pain and myalgias  Negative for gait problem and joint swelling  Skin: Negative for color change, pallor and rash  Neurological: Positive for dizziness and headaches  Negative for weakness, light-headedness and numbness  Hematological: Negative for adenopathy  Does not bruise/bleed easily  Psychiatric/Behavioral: Negative for dysphoric mood and sleep disturbance  The patient is not nervous/anxious          Past Medical History:   Diagnosis Date    Arthritis     Bladder cancer     Cancer ([facility] [address] )     skin melanoma;basal cell    Chronic pain disorder     from arthritis    Colon polyp     Does use hearing aid     bilat    Dry eyes, bilateral     GERD (gastroesophageal reflux disease)     History of kidney stones 10/2019    History of partial knee replacement     bilat    History of vertigo     Hyperlipidemia     Hypertension     Kidney lesion     Lumbar disc disorder     compression of vertebrae L4-5-6    Muscle weakness     left hip area    Renal mass     left    Right ankle injury 03/05/2020    missed step of ladder     Right ankle pain     Shortness of breath     with activity    Tinnitus     Urothelial cancer     left    Wears glasses        Past Surgical History:   Procedure Laterality Date    COLONOSCOPY      CYSTOSCOPY Left 4/1/2020    Procedure: CYSTOSCOPY; URETERAL CATHETER PLACEMENT;  Surgeon: Tiffany Randolph MD;  Location: [facility] OR;  Service: Urology    CYSTOSCOPY 2020    CYSTOSCOPY  2021    FL CYSTOGRAM  2020    FL RETROGRADE PYELOGRAM  2020    HERNIA REPAIR      umbilical with mesh    INGUINAL HERNIA REPAIR Right     with mesh    IR PORT PLACEMENT  2020    JOINT REPLACEMENT Bilateral     partials knee    LUMBAR EPIDURAL INJECTION      SC CYSTOURETHROSCOPY,URETER CATHETER Bilateral 2020    Procedure: CYSTOSCOPY; RIGHT RETROGRADE PYELOGRAM WITH RIGHT URETERAL CYTOLOGY SAMPLING; LEFT URETEROSCOPY WITH RENAL PELVIS BIOPSY AND LEFT STENT PLACEMENT;  Surgeon: Sid Cross MD;  [facility];  Service: Urology    SC NEPHRECTOMY, W/PART  URETECTOMY Left 2020    Procedure: ROBOTIC LAPAROSCOPIC NEPHRO-URETERECTOMY;  Surgeon: Sid Cross MD;  Location[facility];  Service: Urology    SKIN CANCER EXCISION      surface melanoma    TONSILLECTOMY      WISDOM TOOTH EXTRACTION         Social History     Socioeconomic History    Marital status: /Civil Union     Spouse name: None    Number of children: None    Years of education: None    Highest education level: None   Occupational History    None   Tobacco Use    Smoking status: Former Smoker     Quit date:      Years since quittin 6    Smokeless tobacco: Never Used   Vaping Use    Vaping Use: Never used   Substance and Sexual Activity    Alcohol use: Not Currently     Alcohol/week: 4 0 standard drinks     Types: 4 Cans of beer per week     Comment: daily/socially    Drug use: Never    Sexual activity: Not Currently   Other Topics Concern    None   Social History Narrative    Daily caffeine use - 2 cups coffee      Social Determinants of Health     Financial Resource Strain:     Difficulty of Paying Living Expenses:    Food Insecurity:     Worried About Running Out of Food in the Last Year:     Ran Out of Food in the Last Year:    Transportation Needs:     Lack of Transportation (Medical):      Lack of Transportation (Non-Medical): Physical Activity:     Days of Exercise per Week:     Minutes of Exercise per Session:    Stress:     Feeling of Stress :    Social Connections:     Frequency of Communication with Friends and Family:     Frequency of Social Gatherings with Friends and Family:     Attends [employer] Services:     Active Member of Clubs or Organizations:     Attends Club or Organization Meetings:     Marital Status:    Intimate Partner Violence:     Fear of Current or Ex-Partner:     Emotionally Abused:     Physically Abused:     Sexually Abused:        Family History   Problem Relation Age of Onset    Liver cancer Father        Allergies   Allergen Reactions    Poison Ivy Extract Rash         Current Outpatient Medications:     acetaminophen (TYLENOL) 325 mg tablet, Take 2 tablets (650 mg total) by mouth every 6 (six) hours as needed for mild pain or fever, Disp: , Rfl: 0    allopurinol (ZYLOPRIM) 300 mg tablet, Take 1 tablet (300 mg total) by mouth daily, Disp: 30 tablet, Rfl: 3    ALPRAZolam (XANAX) 0 25 mg tablet, Take 1 tablet (0 25 mg total) by mouth daily at bedtime as needed for anxiety (restless legs), Disp: 30 tablet, Rfl: 1    amLODIPine (NORVASC) 5 mg tablet, Take 5 mg by mouth daily  , Disp: , Rfl:     atorvastatin (LIPITOR) 80 mg tablet, Take 80 mg by mouth every other day evening, Disp: , Rfl:     cholestyramine (QUESTRAN) 4 g packet, Take 1 packet (4 g total) by mouth 2 (two) times a day as needed (diarrhea), Disp: 30 packet, Rfl: 5    folic acid (FOLVITE) 1 mg tablet, Take 1 tablet (1 mg total) by mouth daily, Disp: 90 tablet, Rfl: 4    hydrOXYzine HCL (ATARAX) 25 mg tablet, Take 1 tablet (25 mg total) by mouth every 6 (six) hours as needed for itching or anxiety, Disp: 60 tablet, Rfl: 2    Magnesium 250 MG TABS, 1 tablet 2 (two) times a day Taking 500mg in the morning and 500mg at night, Disp: , Rfl:     metoprolol tartrate (LOPRESSOR) 100 mg tablet, Take 50 mg by mouth daily, Disp: , Rfl:   naloxone (NARCAN) 4 mg/0 1 mL nasal spray, Administer 1 spray into a nostril  If breathing does not return to normal or if breathing difficulty resumes after 2-3 minutes, give another dose in the other nostril using a new spray , Disp: 1 each, Rfl: 1    omeprazole (PriLOSEC) 20 mg delayed release capsule, Take 20 mg by mouth daily  , Disp: , Rfl:     predniSONE 10 mg tablet, Take 2 tablets (20 mg total) by mouth daily, Disp: 60 tablet, Rfl: 3    senna (SENOKOT) 8 6 mg, Take 1 tablet (8 6 mg total) by mouth daily at bedtime, Disp: 30 each, Rfl: 0    sildenafil (VIAGRA) 100 mg tablet, TAKE ONE TABLET BY MOUTH  AS NEEDED PRIOR TO SEXUAL ACTIVITY FOR ERECTILE DYSFUNCTION, Disp: , Rfl:     terazosin (HYTRIN) 5 mg capsule, Take 2 capsules (10 mg total) by mouth daily at bedtime, Disp: 30 capsule, Rfl: 6    traMADol (ULTRAM) 50 mg tablet, Take 1 tablet (50 mg total) by mouth 2 (two) times a day as needed for severe pain, Disp: 60 tablet, Rfl: 1    triamcinolone (KENALOG) 0 1 % lotion, Apply topically 3 (three) times a day, Disp: 60 mL, Rfl: 5    VITAMIN D PO, Take 1,000 Units by mouth daily , Disp: , Rfl:       Physical Exam:  /84 (BP Location: Left arm, Patient Position: Sitting, Cuff Size: Adult)   Pulse 78   Temp (!) 97 1 °F (36 2 °C) (Tympanic)   Resp 18   Ht 5' 8" (1 727 m)   Wt 88 5 kg (195 lb 3 2 oz)   SpO2 95%   BMI 29 68 kg/m²     Physical Exam  Vitals reviewed  Constitutional:       General: He is not in acute distress  Appearance: He is well-developed  He is not diaphoretic  HENT:      Head: Normocephalic and atraumatic  Eyes:      General: Lids are normal  No scleral icterus  Conjunctiva/sclera: Conjunctivae normal       Pupils: Pupils are equal, round, and reactive to light  Neck:      Thyroid: No thyromegaly  Cardiovascular:      Rate and Rhythm: Normal rate and regular rhythm  Heart sounds: Normal heart sounds  No murmur heard       Pulmonary:      Effort: Pulmonary effort is normal  No respiratory distress  Breath sounds: Normal breath sounds  Abdominal:      General: There is no distension  Palpations: Abdomen is soft  There is no hepatomegaly or splenomegaly  Tenderness: There is no abdominal tenderness  Musculoskeletal:         General: No swelling  Normal range of motion  Cervical back: Normal range of motion and neck supple  Lymphadenopathy:      Cervical: No cervical adenopathy  Upper Body:      Right upper body: No axillary adenopathy  Left upper body: No axillary adenopathy  Skin:     General: Skin is warm and dry  Findings: No erythema or rash  Neurological:      General: No focal deficit present  Mental Status: He is alert and oriented to person, place, and time  Psychiatric:         Mood and Affect: Mood and affect normal          Behavior: Behavior normal  Behavior is cooperative  Thought Content: Thought content normal          Judgment: Judgment normal            Labs:  Lab Results   Component Value Date    WBC 9 10 07/28/2021    HGB 13 7 (L) 07/28/2021    HCT 41 3 (L) 07/28/2021    MCV 94 07/28/2021     07/28/2021     Lab Results   Component Value Date    K 4 5 07/28/2021     07/28/2021    CO2 31 07/28/2021    BUN 33 (H) 07/28/2021    CREATININE 1 63 (H) 07/28/2021    GLUF 100 (H) 07/28/2021    CALCIUM 9 3 07/28/2021    CORRECTEDCA 9 8 10/28/2020    AST 11 (L) 07/28/2021    ALT 20 07/28/2021    ALKPHOS 54 (L) 07/28/2021    EGFR 41 07/28/2021       Patient voiced understanding and agreement in the above discussion  Aware to contact our office with questions/symptoms in the interim  This note has been generated by voice recognition software system  Therefore, there may be spelling, grammar, and or syntax errors  Please contact if questions arise

## 2021-07-30 ENCOUNTER — HOSPITAL ENCOUNTER (OUTPATIENT)
Dept: INFUSION CENTER | Facility: HOSPITAL | Age: 71
Discharge: HOME | End: 2021-07-30
Attending: INTERNAL MEDICINE
Payer: MEDICARE

## 2021-07-30 ENCOUNTER — PATIENT OUTREACH (OUTPATIENT)
Dept: CASE MANAGEMENT | Facility: HOSPITAL | Age: 71
End: 2021-07-30

## 2021-07-30 DIAGNOSIS — E86.0 DEHYDRATION: ICD-10-CM

## 2021-07-30 DIAGNOSIS — C65.2 CANCER OF LEFT RENAL PELVIS (HCC): ICD-10-CM

## 2021-07-30 DIAGNOSIS — C68.9 UROTHELIAL CANCER (HCC): Primary | ICD-10-CM

## 2021-07-30 LAB
6MAM UR QL CFM: NEGATIVE NG/ML
7AMINOCLONAZEPAM UR QL CFM: NEGATIVE NG/ML
A-OH ALPRAZ UR QL CFM: NEGATIVE NG/ML
AMPHET UR QL CFM: NEGATIVE NG/ML
AMPHET UR QL CFM: NEGATIVE NG/ML
ANION GAP SERPL CALCULATED.3IONS-SCNC: 5 MMOL/L (ref 4–13)
BUN SERPL-MCNC: 27 MG/DL (ref 7–25)
BUPRENORPHINE UR QL CFM: NEGATIVE NG/ML
BUTALBITAL UR QL CFM: NEGATIVE NG/ML
BZE UR QL CFM: NEGATIVE NG/ML
CALCIUM SERPL-MCNC: 9.3 MG/DL (ref 8.6–10.5)
CHLORIDE SERPL-SCNC: 104 MMOL/L (ref 98–107)
CO2 SERPL-SCNC: 30 MMOL/L (ref 21–31)
CODEINE UR QL CFM: NEGATIVE NG/ML
CREAT SERPL-MCNC: 1.45 MG/DL (ref 0.7–1.3)
DESIPRAMINE UR QL CFM: NEGATIVE NG/ML
DESIPRAMINE UR QL CFM: NEGATIVE NG/ML
EDDP UR QL CFM: NEGATIVE NG/ML
ETHYL GLUCURONIDE UR QL CFM: ABNORMAL NG/ML
ETHYL SULFATE UR QL SCN: NEGATIVE NG/ML
FENTANYL UR QL CFM: NEGATIVE NG/ML
GFR SERPL CREATININE-BSD FRML MDRD: 48 ML/MIN/1.73SQ M
GLIADIN IGG SER IA-ACNC: NEGATIVE NG/ML
GLUCOSE 30M P 50 G LAC PO SERPL-MCNC: NEGATIVE NG/ML
GLUCOSE SERPL-MCNC: 90 MG/DL (ref 65–99)
HYDROCODONE UR QL CFM: NEGATIVE NG/ML
HYDROCODONE UR QL CFM: NEGATIVE NG/ML
HYDROMORPHONE UR QL CFM: NEGATIVE NG/ML
IMIPRAMINE UR QL CFM: NEGATIVE NG/ML
LORAZEPAM UR QL CFM: NEGATIVE NG/ML
MDMA UR QL CFM: NEGATIVE NG/ML
ME-PHENIDATE UR QL CFM: NEGATIVE NG/ML
MEPERIDINE UR QL CFM: NEGATIVE NG/ML
MEPHEDRONE UR QL CFM: NEGATIVE NG/ML
METHADONE UR QL CFM: NEGATIVE NG/ML
METHAMPHET UR QL CFM: NEGATIVE NG/ML
MORPHINE UR QL CFM: NEGATIVE NG/ML
MORPHINE UR QL CFM: NEGATIVE NG/ML
NORBUPRENORPHINE UR QL CFM: NEGATIVE NG/ML
NORDIAZEPAM UR QL CFM: NEGATIVE NG/ML
NORFENTANYL UR QL CFM: NEGATIVE NG/ML
NORHYDROCODONE UR QL CFM: NEGATIVE NG/ML
NORHYDROCODONE UR QL CFM: NEGATIVE NG/ML
NORMEPERIDINE UR QL CFM: NEGATIVE NG/ML
NOROXYCODONE UR QL CFM: NEGATIVE NG/ML
OLANZAPINE QUANTIFICATION: NEGATIVE NG/ML
OPC-3373 QUANTIFICATION: NEGATIVE
OXAZEPAM UR QL CFM: NEGATIVE NG/ML
OXYCODONE UR QL CFM: NEGATIVE NG/ML
OXYMORPHONE UR QL CFM: NEGATIVE NG/ML
OXYMORPHONE UR QL CFM: NEGATIVE NG/ML
PCP UR QL CFM: NEGATIVE NG/ML
PHENOBARB UR QL CFM: NEGATIVE NG/ML
POTASSIUM SERPL-SCNC: 4.1 MMOL/L (ref 3.5–5.5)
RESULT ALL_PRESCRIBED MEDS AND SPECIAL INSTRUCTIONS: NORMAL
SECOBARBITAL UR QL CFM: NEGATIVE NG/ML
SL AMB 3-METHYL-FENTANYL QUANTIFICATION: NORMAL NG/ML
SL AMB 4-ANPP QUANTIFICATION: NORMAL NG/ML
SL AMB 4-FIBF QUANTIFICATION: NORMAL NG/ML
SL AMB 5F-ADB-M7 METABOLITE QUANTIFICATION: NEGATIVE NG/ML
SL AMB 7-OH-MITRAGYNINE (KRATOM ALKALOID) QUANTIFICATION: NEGATIVE NG/ML
SL AMB AB-FUBINACA-M3 METABOLITE QUANTIFICATION: NEGATIVE NG/ML
SL AMB ACETYL FENTANYL QUANTIFICATION: NORMAL NG/ML
SL AMB ACETYL NORFENTANYL QUANTIFICATION: NORMAL NG/ML
SL AMB ACRYL FENTANYL QUANTIFICATION: NORMAL NG/ML
SL AMB BATH SALTS: NEGATIVE NG/ML
SL AMB BUTRYL FENTANYL QUANTIFICATION: NORMAL NG/ML
SL AMB CARFENTANIL QUANTIFICATION: NORMAL NG/ML
SL AMB CLOZAPINE QUANTIFICATION: NEGATIVE NG/ML
SL AMB CTHC (MARIJUANA METABOLITE) QUANTIFICATION: NEGATIVE NG/ML
SL AMB CYCLOPROPYL FENTANYL QUANTIFICATION: NORMAL NG/ML
SL AMB DEXTROMETHORPHAN QUANTIFICATION: NEGATIVE NG/ML
SL AMB DEXTRORPHAN (DEXTROMETHORPHAN METABOLITE) QUANT: NEGATIVE NG/ML
SL AMB DEXTRORPHAN (DEXTROMETHORPHAN METABOLITE) QUANT: NEGATIVE NG/ML
SL AMB FURANYL FENTANYL QUANTIFICATION: NORMAL NG/ML
SL AMB HALOPERIDOL  QUANTIFICATION: NEGATIVE NG/ML
SL AMB HALOPERIDOL METABOLITE QUANTIFICATION: NEGATIVE NG/ML
SL AMB HYDROXYRISPERIDONE QUANTIFICATION: NEGATIVE NG/ML
SL AMB JWH018 METABOLITE QUANTIFICATION: NEGATIVE NG/ML
SL AMB JWH073 METABOLITE QUANTIFICATION: NEGATIVE NG/ML
SL AMB MDMB-FUBINACA-M1 METABOLITE QUANTIFICATION: NEGATIVE NG/ML
SL AMB METHOXYACETYL FENTANYL QUANTIFICATION: NORMAL NG/ML
SL AMB METHYLONE QUANTIFICATION: NEGATIVE NG/ML
SL AMB N-DESMETHYL U-47700 QUANTIFICATION: NORMAL NG/ML
SL AMB N-DESMETHYL-TRAMADOL QUANTIFICATION: ABNORMAL NG/ML
SL AMB N-DESMETHYLCLOZAPINE QUANTIFICATION: NEGATIVE NG/ML
SL AMB NORQUETIAPINE QUANTIFICATION: NEGATIVE NG/ML
SL AMB PHENTERMINE QUANTIFICATION: NEGATIVE NG/ML
SL AMB QUETIAPINE QUANTIFICATION: NEGATIVE NG/ML
SL AMB RCS4 METABOLITE QUANTIFICATION: NEGATIVE NG/ML
SL AMB RISPERIDONE QUANTIFICATION: NEGATIVE NG/ML
SL AMB RITALINIC ACID QUANTIFICATION: NEGATIVE NG/ML
SL AMB U-47700 QUANTIFICATION: NORMAL NG/ML
SODIUM SERPL-SCNC: 139 MMOL/L (ref 134–143)
SPECIMEN DRAWN SERPL: NEGATIVE NG/ML
TAPENTADOL UR QL CFM: NEGATIVE NG/ML
TEMAZEPAM UR QL CFM: NEGATIVE NG/ML
TEMAZEPAM UR QL CFM: NEGATIVE NG/ML
TRAMADOL UR QL CFM: ABNORMAL NG/ML
URATE/CREAT 24H UR: ABNORMAL NG/ML

## 2021-07-30 PROCEDURE — 96361 HYDRATE IV INFUSION ADD-ON: CPT

## 2021-07-30 PROCEDURE — 80048 BASIC METABOLIC PNL TOTAL CA: CPT

## 2021-07-30 PROCEDURE — 96413 CHEMO IV INFUSION 1 HR: CPT

## 2021-07-30 RX ORDER — SODIUM CHLORIDE 9 MG/ML
20 INJECTION, SOLUTION INTRAVENOUS ONCE
Status: COMPLETED | OUTPATIENT
Start: 2021-07-30 | End: 2021-07-30

## 2021-07-30 RX ADMIN — SODIUM CHLORIDE 20 ML/HR: 0.9 INJECTION, SOLUTION INTRAVENOUS at 11:33

## 2021-07-30 RX ADMIN — SODIUM CHLORIDE 1000 ML: 0.9 INJECTION, SOLUTION INTRAVENOUS at 10:20

## 2021-07-30 RX ADMIN — SODIUM CHLORIDE 200 MG: 9 INJECTION, SOLUTION INTRAVENOUS at 11:34

## 2021-07-30 NOTE — PROGRESS NOTES
Oncology LSW met with PT chairside in the infusion center to provide support and assess for areas of need  PT reported that things were "still going well at home" and did not have any concerns regarding medication, insurance, or finances at this time  PT did not indicate any emotional distress throughout the duration of their conversation  LSW provided PT with her business card and encouraged him to reach out to her should any questions or areas of concern arise  PT was agreeable with this plan and thanked LSW for her time  Emotional support provided

## 2021-08-04 ENCOUNTER — TELEPHONE (OUTPATIENT)
Dept: PAIN MEDICINE | Facility: CLINIC | Age: 71
End: 2021-08-04

## 2021-08-04 NOTE — TELEPHONE ENCOUNTER
Patient   [phone]  Dr Obrien    Patient called in saying that he had no relief from the injection  He would like to setup another appt for another injection       Pain level is a 7/10

## 2021-08-04 NOTE — TELEPHONE ENCOUNTER
S/w the patient and he stated since his Right shoulder injection on 7/29 that he has no relief  Reviewed the postop instructions that it's been less than a week  Encouraged ice vs heat, he continues on the tramadol and tylenol  Patient aware that you will review and we will f/u next week and JW to advise   thanks

## 2021-08-05 ENCOUNTER — TELEPHONE (OUTPATIENT)
Dept: PAIN MEDICINE | Facility: CLINIC | Age: 71
End: 2021-08-05

## 2021-08-05 ENCOUNTER — TRANSCRIBE ORDERS (OUTPATIENT)
Dept: SURGERY | Facility: CLINIC | Age: 71
End: 2021-08-05

## 2021-08-05 DIAGNOSIS — L03.319: Primary | ICD-10-CM

## 2021-08-09 ENCOUNTER — CONSULT (OUTPATIENT)
Dept: SURGERY | Facility: CLINIC | Age: 71
End: 2021-08-09
Payer: MEDICARE

## 2021-08-09 VITALS
HEIGHT: 68 IN | DIASTOLIC BLOOD PRESSURE: 68 MMHG | BODY MASS INDEX: 30.01 KG/M2 | TEMPERATURE: 97.7 F | RESPIRATION RATE: 18 BRPM | WEIGHT: 198 LBS | HEART RATE: 89 BPM | SYSTOLIC BLOOD PRESSURE: 126 MMHG

## 2021-08-09 DIAGNOSIS — D48.7 NEOPLASM OF UNCERTAIN BEHAVIOR OF BACK: Primary | ICD-10-CM

## 2021-08-09 DIAGNOSIS — L03.319: ICD-10-CM

## 2021-08-09 PROCEDURE — 88305 TISSUE EXAM BY PATHOLOGIST: CPT | Performed by: PATHOLOGY

## 2021-08-09 PROCEDURE — 88341 IMHCHEM/IMCYTCHM EA ADD ANTB: CPT | Performed by: PATHOLOGY

## 2021-08-09 PROCEDURE — 99203 OFFICE O/P NEW LOW 30 MIN: CPT | Performed by: SURGERY

## 2021-08-09 PROCEDURE — 88342 IMHCHEM/IMCYTCHM 1ST ANTB: CPT | Performed by: PATHOLOGY

## 2021-08-09 PROCEDURE — 11606 EXC TR-EXT MAL+MARG >4 CM: CPT | Performed by: SURGERY

## 2021-08-09 NOTE — PROGRESS NOTES
Assessment/Plan:    Neoplasm of uncertain behavior of back   The patient is a pleasant 78-year-old male with a past medical history significant for urothelial carcinoma being actively treated presenting with a 2 month history of a nonhealing upper right back lesion for definitive diagnosis and treatment  According the patient he developed a lump 2 months ago  It has been treated with course of antibiotics which failed to resolve the lesion  He is interested in definitive treatment by excision here in the office today  On physical examination he is a pleasant well-nourished well-developed male  He is in no acute distress  Competent reliable as a historian  Inspection and palpation of the upper right back reveals a he 15 mm round subcutaneous mass with features most consistent with that of an inflamed or chronically infected sebaceous cyst       We discussed the various treatment options which include I and D, open packing and excision with or without primary closure  All questions answered to the satisfaction of the patient and informed consent taken to proceed  Diagnoses and all orders for this visit:    Neoplasm of uncertain behavior of back    Cellulitis of trunk  -     Ambulatory referral to General Surgery          Subjective:      Patient ID: Artis Gomez is a 66 y o  male  HPI    The following portions of the patient's history were reviewed and updated as appropriate: allergies, current medications, past family history, past medical history, past social history, past surgical history and problem list     Review of Systems   Constitutional: Negative for chills and fever  HENT: Negative for ear pain and sore throat  Eyes: Negative for pain and visual disturbance  Respiratory: Negative for cough and shortness of breath  Cardiovascular: Negative for chest pain and palpitations  Gastrointestinal: Negative for abdominal pain and vomiting     Genitourinary: Positive for difficulty urinating  Negative for dysuria and hematuria  Musculoskeletal: Negative for arthralgias and back pain  Skin: Negative for color change and rash  Neurological: Negative for seizures and syncope  All other systems reviewed and are negative  Objective:      /68 (BP Location: Left arm, Patient Position: Sitting, Cuff Size: Standard)   Pulse 89   Temp 97 7 °F (36 5 °C) (Temporal)   Resp 18   Ht 5' 8" (1 727 m)   Wt 89 8 kg (198 lb)   BMI 30 11 kg/m²            Physical Exam  Vitals and nursing note reviewed  Constitutional:       Appearance: He is well-developed  HENT:      Head: Normocephalic and atraumatic  Eyes:      Conjunctiva/sclera: Conjunctivae normal       Pupils: Pupils are equal, round, and reactive to light  Cardiovascular:      Rate and Rhythm: Normal rate and regular rhythm  Pulmonary:      Effort: Pulmonary effort is normal       Breath sounds: Normal breath sounds  Abdominal:      General: Bowel sounds are normal       Palpations: Abdomen is soft  Musculoskeletal:         General: Normal range of motion  Cervical back: Normal range of motion and neck supple  Skin:     General: Skin is warm and dry  Comments: Upper right back exam as described above   Neurological:      Mental Status: He is alert and oriented to person, place, and time  Psychiatric:         Behavior: Behavior normal          Thought Content: Thought content normal          Judgment: Judgment normal        Skin excision    Date/Time: 8/9/2021 9:23 AM  Performed by: Celestino Pelayo MD  Authorized by: Celestino Pelayo MD   Universal Protocol:  Consent: Verbal consent obtained  Risks and benefits: risks, benefits and alternatives were discussed  Consent given by: patient  Time out: Immediately prior to procedure a "time out" was called to verify the correct patient, procedure, equipment, support staff and site/side marked as required    Timeout called at: 8/9/2021 9:23 BLAKE   Patient understanding: patient states understanding of the procedure being performed  Patient consent: the patient's understanding of the procedure matches consent given  Site marked: the operative site was marked  Patient identity confirmed: verbally with patient      Procedure Details - Skin Excision:     Number of Lesions:  1  Lesion 1:     Body area:  Trunk    Trunk location:  Back       Final defect size (mm):  15    Malignancy: malignancy unknown      Repair type:  Linear closure    Graft type: full-thickness      Closure complexity: intermediate       Repair Comments:  Procedure note: With informed verbal consent under sterile conditions using aseptic technique using 1% lidocaine local anesthesia with epinephrine and bicarbonate the 15 mm subcutaneous mass was sharply excised including the overlying skin  Dissection carried down to the deep fascia  The lesion encountered appears most consistent with that of metastatic urothelial carcinoma  The wound closed in layers with deep sutures of 4-0 Vicryl and skin sutures of vertical mattress 3-0 nylon suture  The patient was educated regarding my concerns for metastatic malignancy

## 2021-08-09 NOTE — ASSESSMENT & PLAN NOTE
The patient is a pleasant 76-year-old male with a past medical history significant for urothelial carcinoma being actively treated presenting with a 2 month history of a nonhealing upper right back lesion for definitive diagnosis and treatment  According the patient he developed a lump 2 months ago  It has been treated with course of antibiotics which failed to resolve the lesion  He is interested in definitive treatment by excision here in the office today  On physical examination he is a pleasant well-nourished well-developed male  He is in no acute distress  Competent reliable as a historian  Inspection and palpation of the upper right back reveals a he 15 mm round subcutaneous mass with features most consistent with that of an inflamed or chronically infected sebaceous cyst       We discussed the various treatment options which include I and D, open packing and excision with or without primary closure  All questions answered to the satisfaction of the patient and informed consent taken to proceed

## 2021-08-09 NOTE — LETTER
August 9, 2021     [phone]    Patient: Rosana Mojica   YOB: 1950   Date of Visit: 8/9/2021       Dear Dr Ruslan Zamarripa: Thank you for referring Nicole Covarrubias to me for evaluation  Below are my notes for this consultation  If you have questions, please do not hesitate to call me  I look forward to following your patient along with you  Sincerely,        Nakita Amezquita MD        CC: No Recipients  Nakita Amezquita MD  8/9/2021  9:25 AM  Sign when Signing Visit  Assessment/Plan:    Neoplasm of uncertain behavior of back   The patient is a pleasant 18-year-old male with a past medical history significant for urothelial carcinoma being actively treated presenting with a 2 month history of a nonhealing upper right back lesion for definitive diagnosis and treatment  According the patient he developed a lump 2 months ago  It has been treated with course of antibiotics which failed to resolve the lesion  He is interested in definitive treatment by excision here in the office today  On physical examination he is a pleasant well-nourished well-developed male  He is in no acute distress  Competent reliable as a historian  Inspection and palpation of the upper right back reveals a he 15 mm round subcutaneous mass with features most consistent with that of an inflamed or chronically infected sebaceous cyst       We discussed the various treatment options which include I and D, open packing and excision with or without primary closure  All questions answered to the satisfaction of the patient and informed consent taken to proceed  Diagnoses and all orders for this visit:    Neoplasm of uncertain behavior of back    Cellulitis of trunk  -     Ambulatory referral to General Surgery          Subjective:      Patient ID: Rosana Mojica is a 66 y o  male      HPI    The following portions of the patient's history were reviewed and updated as appropriate: allergies, current medications, past family history, past medical history, past social history, past surgical history and problem list     Review of Systems   Constitutional: Negative for chills and fever  HENT: Negative for ear pain and sore throat  Eyes: Negative for pain and visual disturbance  Respiratory: Negative for cough and shortness of breath  Cardiovascular: Negative for chest pain and palpitations  Gastrointestinal: Negative for abdominal pain and vomiting  Genitourinary: Positive for difficulty urinating  Negative for dysuria and hematuria  Musculoskeletal: Negative for arthralgias and back pain  Skin: Negative for color change and rash  Neurological: Negative for seizures and syncope  All other systems reviewed and are negative  Objective:      /68 (BP Location: Left arm, Patient Position: Sitting, Cuff Size: Standard)   Pulse 89   Temp 97 7 °F (36 5 °C) (Temporal)   Resp 18   Ht 5' 8" (1 727 m)   Wt 89 8 kg (198 lb)   BMI 30 11 kg/m²            Physical Exam  Vitals and nursing note reviewed  Constitutional:       Appearance: He is well-developed  HENT:      Head: Normocephalic and atraumatic  Eyes:      Conjunctiva/sclera: Conjunctivae normal       Pupils: Pupils are equal, round, and reactive to light  Cardiovascular:      Rate and Rhythm: Normal rate and regular rhythm  Pulmonary:      Effort: Pulmonary effort is normal       Breath sounds: Normal breath sounds  Abdominal:      General: Bowel sounds are normal       Palpations: Abdomen is soft  Musculoskeletal:         General: Normal range of motion  Cervical back: Normal range of motion and neck supple  Skin:     General: Skin is warm and dry  Comments: Upper right back exam as described above   Neurological:      Mental Status: He is alert and oriented to person, place, and time     Psychiatric:         Behavior: Behavior normal  Thought Content: Thought content normal          Judgment: Judgment normal        Skin excision    Date/Time: 8/9/2021 9:23 AM  Performed by: Brody Whitney MD  Authorized by: Brody Whitney MD   Universal Protocol:  Consent: Verbal consent obtained  Risks and benefits: risks, benefits and alternatives were discussed  Consent given by: patient  Time out: Immediately prior to procedure a "time out" was called to verify the correct patient, procedure, equipment, support staff and site/side marked as required  Timeout called at: 8/9/2021 9:23 AM   Patient understanding: patient states understanding of the procedure being performed  Patient consent: the patient's understanding of the procedure matches consent given  Site marked: the operative site was marked  Patient identity confirmed: verbally with patient      Procedure Details - Skin Excision:     Number of Lesions:  1  Lesion 1:     Body area:  Trunk    Trunk location:  Back       Final defect size (mm):  15    Malignancy: malignancy unknown      Repair type:  Linear closure    Graft type: full-thickness      Closure complexity: intermediate       Repair Comments:  Procedure note: With informed verbal consent under sterile conditions using aseptic technique using 1% lidocaine local anesthesia with epinephrine and bicarbonate the 15 mm subcutaneous mass was sharply excised including the overlying skin  Dissection carried down to the deep fascia  The lesion encountered appears most consistent with that of metastatic urothelial carcinoma  The wound closed in layers with deep sutures of 4-0 Vicryl and skin sutures of vertical mattress 3-0 nylon suture  The patient was educated regarding my concerns for metastatic malignancy

## 2021-08-10 NOTE — TELEPHONE ENCOUNTER
Will see how the pt does over the next week    May consider an intra-articular shoulder injection if no more relief over this next week

## 2021-08-10 NOTE — TELEPHONE ENCOUNTER
S/w pt  Advised of same  Pt will CB in a week with update on his status  Pt verbalized understanding and appreciation

## 2021-08-11 ENCOUNTER — TELEPHONE (OUTPATIENT)
Dept: SURGERY | Facility: CLINIC | Age: 71
End: 2021-08-11

## 2021-08-11 NOTE — TELEPHONE ENCOUNTER
Called patient in follow up in regards to his appointment on 8/09/2021  Stated that he was satisfied with his visit and did not have any new questions or concerns

## 2021-08-16 ENCOUNTER — TELEPHONE (OUTPATIENT)
Dept: HEMATOLOGY ONCOLOGY | Facility: CLINIC | Age: 71
End: 2021-08-16

## 2021-08-16 ENCOUNTER — HOSPITAL ENCOUNTER (OUTPATIENT)
Dept: NUCLEAR MEDICINE | Facility: HOSPITAL | Age: 71
Discharge: HOME | End: 2021-08-16
Payer: MEDICARE

## 2021-08-16 DIAGNOSIS — C65.2 CANCER OF LEFT RENAL PELVIS (HCC): ICD-10-CM

## 2021-08-16 DIAGNOSIS — C68.9 UROTHELIAL CANCER (HCC): ICD-10-CM

## 2021-08-16 PROCEDURE — 82948 REAGENT STRIP/BLOOD GLUCOSE: CPT

## 2021-08-16 PROCEDURE — 78815 PET IMAGE W/CT SKULL-THIGH: CPT

## 2021-08-16 PROCEDURE — A9552 F18 FDG: HCPCS

## 2021-08-16 NOTE — TELEPHONE ENCOUNTER
Patient states he has  0 % of improvement & pain level 8/10  The medication Tramadol is not working     Please be advise thank you      Patient call back # [phone]

## 2021-08-16 NOTE — TELEPHONE ENCOUNTER
Significant Findings     Call Received From [facility]   Radiology Department Location [facility]    Study Pet CT    Date of Study 8-   Relevant Information

## 2021-08-17 NOTE — TELEPHONE ENCOUNTER
Contacted patient to offer repeat shoulder injection and he said he had a PET scan and was told he has lesions in that shoulder and will hold off until he sees oncologist     He also asked if he could have more tramadol because he is needs it twice a day

## 2021-08-17 NOTE — TELEPHONE ENCOUNTER
The patient feels his tramadol which is prescribed for twice a day as needed on July 26th and there is 1 refill left on that prescription  Okay to take it twice a day as needed    Will hold off on injection

## 2021-08-18 ENCOUNTER — APPOINTMENT (OUTPATIENT)
Dept: LAB | Facility: HOSPITAL | Age: 71
End: 2021-08-18
Payer: MEDICARE

## 2021-08-18 DIAGNOSIS — R53.83 OTHER FATIGUE: ICD-10-CM

## 2021-08-18 DIAGNOSIS — C68.9 UROTHELIAL CANCER (HCC): ICD-10-CM

## 2021-08-18 DIAGNOSIS — C65.2 CANCER OF LEFT RENAL PELVIS (HCC): ICD-10-CM

## 2021-08-18 LAB
ALBUMIN SERPL BCP-MCNC: 3.8 G/DL (ref 3.5–5.7)
ALP SERPL-CCNC: 64 U/L (ref 55–165)
ALT SERPL W P-5'-P-CCNC: 12 U/L (ref 7–52)
ANION GAP SERPL CALCULATED.3IONS-SCNC: 6 MMOL/L (ref 4–13)
AST SERPL W P-5'-P-CCNC: 10 U/L (ref 13–39)
BASOPHILS # BLD AUTO: 0 THOUSANDS/ΜL (ref 0–0.1)
BASOPHILS NFR BLD AUTO: 0 % (ref 0–2)
BILIRUB SERPL-MCNC: 0.6 MG/DL (ref 0.2–1)
BUN SERPL-MCNC: 24 MG/DL (ref 7–25)
CALCIUM SERPL-MCNC: 9.8 MG/DL (ref 8.6–10.5)
CHLORIDE SERPL-SCNC: 99 MMOL/L (ref 98–107)
CO2 SERPL-SCNC: 33 MMOL/L (ref 21–31)
CREAT SERPL-MCNC: 1.48 MG/DL (ref 0.7–1.3)
EOSINOPHIL # BLD AUTO: 0 THOUSAND/ΜL (ref 0–0.61)
EOSINOPHIL NFR BLD AUTO: 0 % (ref 0–5)
ERYTHROCYTE [DISTWIDTH] IN BLOOD BY AUTOMATED COUNT: 15.5 % (ref 11.5–14.5)
GFR SERPL CREATININE-BSD FRML MDRD: 47 ML/MIN/1.73SQ M
GLUCOSE SERPL-MCNC: 88 MG/DL (ref 65–99)
HCT VFR BLD AUTO: 40.2 % (ref 42–47)
HGB BLD-MCNC: 13.2 G/DL (ref 14–18)
LYMPHOCYTES # BLD AUTO: 1 THOUSANDS/ΜL (ref 0.6–4.47)
LYMPHOCYTES NFR BLD AUTO: 15 % (ref 21–51)
MAGNESIUM SERPL-MCNC: 1.8 MG/DL (ref 1.9–2.7)
MCH RBC QN AUTO: 31.5 PG (ref 26–34)
MCHC RBC AUTO-ENTMCNC: 32.9 G/DL (ref 31–37)
MCV RBC AUTO: 96 FL (ref 81–99)
MONOCYTES # BLD AUTO: 0.6 THOUSAND/ΜL (ref 0.17–1.22)
MONOCYTES NFR BLD AUTO: 10 % (ref 2–12)
NEUTROPHILS # BLD AUTO: 4.9 THOUSANDS/ΜL (ref 1.4–6.5)
NEUTS SEG NFR BLD AUTO: 75 % (ref 42–75)
PLATELET # BLD AUTO: 160 THOUSANDS/UL (ref 149–390)
PMV BLD AUTO: 7.3 FL (ref 8.6–11.7)
POTASSIUM SERPL-SCNC: 3.7 MMOL/L (ref 3.5–5.5)
PROT SERPL-MCNC: 6.6 G/DL (ref 6.4–8.9)
RBC # BLD AUTO: 4.2 MILLION/UL (ref 4.3–5.9)
SODIUM SERPL-SCNC: 138 MMOL/L (ref 134–143)
T3FREE SERPL-MCNC: 2.09 PG/ML (ref 2.3–4.2)
TSH SERPL DL<=0.05 MIU/L-ACNC: 1.54 UIU/ML (ref 0.45–5.33)
WBC # BLD AUTO: 6.5 THOUSAND/UL (ref 4.8–10.8)

## 2021-08-18 PROCEDURE — 84481 FREE ASSAY (FT-3): CPT

## 2021-08-18 PROCEDURE — 85025 COMPLETE CBC W/AUTO DIFF WBC: CPT

## 2021-08-18 PROCEDURE — 80053 COMPREHEN METABOLIC PANEL: CPT

## 2021-08-18 PROCEDURE — 83735 ASSAY OF MAGNESIUM: CPT

## 2021-08-18 PROCEDURE — 84443 ASSAY THYROID STIM HORMONE: CPT

## 2021-08-18 PROCEDURE — 36415 COLL VENOUS BLD VENIPUNCTURE: CPT

## 2021-08-19 ENCOUNTER — OFFICE VISIT (OUTPATIENT)
Dept: HEMATOLOGY ONCOLOGY | Facility: CLINIC | Age: 71
End: 2021-08-19
Payer: MEDICARE

## 2021-08-19 ENCOUNTER — TELEPHONE (OUTPATIENT)
Dept: SURGERY | Facility: CLINIC | Age: 71
End: 2021-08-19

## 2021-08-19 VITALS
BODY MASS INDEX: 30.08 KG/M2 | SYSTOLIC BLOOD PRESSURE: 130 MMHG | WEIGHT: 198.5 LBS | OXYGEN SATURATION: 94 % | HEIGHT: 68 IN | TEMPERATURE: 97.1 F | DIASTOLIC BLOOD PRESSURE: 86 MMHG | HEART RATE: 76 BPM | RESPIRATION RATE: 18 BRPM

## 2021-08-19 DIAGNOSIS — C68.9 UROTHELIAL CANCER (HCC): Primary | ICD-10-CM

## 2021-08-19 DIAGNOSIS — G89.3 CANCER RELATED PAIN: ICD-10-CM

## 2021-08-19 DIAGNOSIS — E06.4 DRUG-INDUCED THYROIDITIS: ICD-10-CM

## 2021-08-19 LAB — GLUCOSE SERPL-MCNC: 88 MG/DL (ref 65–140)

## 2021-08-19 PROCEDURE — 99215 OFFICE O/P EST HI 40 MIN: CPT | Performed by: NURSE PRACTITIONER

## 2021-08-19 NOTE — Clinical Note
Please read my note  Patient with obvious progression on his PET-CT scan  I did treat him with his Salvador James 8/20 but told them it will likely be the last and we would need to switch his treatment per your recommendation(?Enfortumab Vedotin?)  If you can please discuss with Damien Peterson and let her know what the plan is going forward she can bring him in for teach and start planning for new treatment during the week of 8/23  He also seems to have significant pain from his new lesions at the right shoulder area so I sent him back to Radiation Oncology for palliative radiation and also sent him back to palliative care since he was not following up with them regularly

## 2021-08-19 NOTE — PROGRESS NOTES
Hematology/Oncology Outpatient Follow-up  Brayden Manzanares 66 y o  male 1950 [id number]    Date:  8/19/2021      Assessment and Plan:  1  Urothelial cancer [facility])  Patient will proceed with his immunotherapy [facility] that is already scheduled for tomorrow however he seems to have progression of his metastatic urothelial cancer on his current treatment with new soft tissue lesions the right shoulder region/axilla  He did have 1 of the lesions from the right posterior shoulder excised recently by his surgeon which showed findings consistent with his known metastatic urothelial carcinoma  His systemic treatment will need to be changed this will be discussed with Dr Rodrick Ortiz extensively; consider 3rd line Enfortumab Vedotin  The patient and his wife were told that we will contact him early next week with the plan and most likely bring him in afterward for further education and planning  Will arrange for tentative follow-up appointment/labs in 3 weeks that can not be adjusted according to Dr Katey Morelos  He seems to have significant pain to his right shoulder and arm region likely due to the new metastatic lesions which is affecting his quality of life  Will send him back to Radiation Oncology to consider palliative radiation  We will also schedule follow-up appointment with palliative care for pain and symptom management; has seen them in the past but has not been active with them more recently  He will continue his tramadol and Tylenol for now as needed p r n  for pain  Is hesitant to use stronger narcotic as he does not like the way they make him feel in the past with worsening drowsiness/ fatigue     - CBC and differential; Future  - Comprehensive metabolic panel; Future  - TSH, 3rd generation with Free T4 reflex; Future  - Magnesium; Future  - Ambulatory referral to Radiation Oncology; Future    2  Cancer related pain  As above        HPI:  Patient presents today for a follow-up visit accompanied by his wife  They did review his PET-CT results an are aware of the new progressive/ metastatic findings  Patient admits that he has been experiencing significant progressing pain recently across his right shoulder, down the right arm and also to the right rib area  Has been using his tramadol several times a day is leery about using something stronger as it makes him exhausted and drowsy  Also continues his prednisone 20 mg daily which he feels is helping his generalized pain  Has  Also tried topical agents which do not seem to help much  Is not active with palliative care currently but has seen them in the past     He did have the new lesion to his right upper back/shoulder excised by his surgeon 08/09/2021  Pathology confirmed metastatic high-grade carcinoma consistent with his no primary urothelial carcinoma  His most recent laboratory studies from yesterday 08/18/2021 showed normal white cells and platelets, stable normocytic anemia H&H 13 2/40 2, MCV 96  Stable chronic renal dysfunction BUN 24, creatinine 1 48, GFR 47 remaining metabolic panel is normal   Magnesium slightly lower than average 1 8  TSH is normal 1 54  He had a repeat PET-CT scan done 08/16/2021 which unfortunately showed progression with new metastatic lesions:  IMPRESSION:  1  New hypermetabolic soft tissue lesions at the level of the right shoulder and right axilla as above, most concerning for metastases  2   Interval progression of retroperitoneal and mesenteric hypermetabolic metastases  Oncology History Overview Note   Patient has a history of hypertension, hyperlipidemia, chronic lower back pain  He had an MRI of the lower spine for further evaluation of his chronic lower back pain    The MRI on 12/02/2019 revealed Multiple left renal masses to include a left lower pole cyst and a rounded structure in the left upper pole which may also represent cyst   Left upper pole renal masses approximately 3 cm in greatest linear dimension  A CT with renal protocol was done on 12/12/2019 which also showed the infiltrating lesion in the upper pole of the left kidney measuring about the 3 5 cm in greatest dimension without any hint of retroperitoneal lymphadenopathy  A CT scan of the abdomen pelvis on 01/14/2020 showed the same findings with the mild hydronephrosis on the left  The urine cytology on 01/03/2020 showed high-grade urothelial carcinoma  A cystoscopy was then done, a biopsy was taken from the left renal pelvic region which showed high-grade urothelial carcinoma without evidence of lamina propria invasion  The detrusor muscle/muscularis propria were not present for evaluation  The recommendation was to pursue left robotic assisted laparoscopic nephroureterectomy with bladder cuff excision which was done on 04/01/2020  The final pathology revealed;  - Invasive high grade urothelial carcinoma arising in renal pelvis  - Bladder cuff margin is negative for carcinoma and no evidence of high grade dysplasia  - Ureters with no significant pathologic abnormality  - Two benign simple cysts  - Adrenal gland is negative for malignancy  - One lymph node, negative for malignancy (0/1)  The tumor size was 4 5 cm with invasion beyond the muscularis into the periurethral fat or peripelvic fat or the renal parenchyma  The margins were uninvolved by carcinoma or carcinoma in situ  The final pathology was pT3 pN0  Patient barely tolerated 1 cycle of adjuvant chemotherapy with split dose cisplatin and gemcitabine with a lot of side effects  The treatment had to be discontinued due to significant worsening renal dysfunction 6/2020  Patient started to complain about significant abdominal/left inguinal pain around August/September 2020  Unfortunately repeat imaging revealed recurrent/metastatic disease  9/4/20 CT C/A/P- Status post left nephrectomy for resection of urothelial neoplasm    Lymphadenopathy in the left nephrectomy bed has increased since the prior examination, concerning for metastatic disease  Ill-defined hyperattenuating masslike focus in the left rectus muscle, not identified on the prior examination  This is suspicious for a metastatic lesion  A rectus hematoma is also considered  9/23/20 PET scan- 1  Multiple FDG avid lymph nodes in the retrocrural region, retroperitoneum and the left renal bed compatible with metastasis  These have progressed from recent CT  2   FDG avid mass involving the left rectus abdominal musculature compatible with metastasis which is larger from recent CT  3  Several small lymph nodes adjacent to the mid to distal esophagus with FDG uptake suspicious for metastasis  Small focus of FDG uptake also suggested in the right hilar region, metastasis is not excluded  This could be reassessed on follow-up  4   Subtle focus of FDG uptake at the T2 level on the left, nonspecific, could be related to early degenerative changes, developing metastasis is not entirely excluded  This could be reassessed on follow-up  5  Prostate is mildly prominent with heterogeneous FDG uptake  This could be inflammatory  Correlate for prostatitis  He completed palliative radiation to the left abdomen 12/3/20     Urothelial cancer ([address] )   1/3/2020 Biopsy    Final Diagnosis    A  Urine, Clean Catch, Thin Prep:  High grade urothelial carcinoma (HGUC) - see comment  1/3/2020 Initial Diagnosis    Urothelial cancer ([address] )  T3 N0 (stage IIIA)     1/17/2020 Biopsy    Final Diagnosis    A  Ureter, Right, left renal pelvis biopsy  -Fragments of high grade urothelial carcinoma   -No evidence of lamina propria invasion   -Detrusor muscle/ muscularis propria is not present for evaluation  B  Urinary Bladder, bladder biopsy:  -Fragments of low grade papillary lesion  See note  -No evidence of invasion seen  -Unremarkable fragment of detrusor muscle seen          4/1/2020 Surgery    left robotic assisted laparoscopic nephroureterectomy with bladder cuff excision     Final Diagnosis    A  Left kidney, ureter, and bladder cuff, nephroureterectomy:  - Invasive high grade urothelial carcinoma arising in renal pelvis  - Bladder cuff margin is negative for carcinoma and no evidence of high grade dysplasia  - Ureters with no significant pathologic abnormality  - Two benign simple cysts  - Adrenal gland is negative for malignancy  - One lymph node, negative for malignancy (0/1)          5/14/2020 - 6/3/2020 Chemotherapy    CISplatin (PLATINOL) split dose 35 mg/m2 = 75 3 mg (50 % of original dose 70 mg/m2), Intravenous,   Administration: 75 3 mg (5/14/2020), 75 3 mg (5/21/2020)  gemcitabine (GEMZAR) 2,200 mg in sodium chloride 0 9 % 250 mL infusion, 2,150 2 mg (80 % of original dose 1,250 mg/m2), Intravenous,   Administration: 2,200 mg (5/14/2020), 2,200 mg (5/21/2020)    (only completed 1 cycle- d/c d/t adverse effects and worsening renal dysfunction)     10/9/2020 -  Chemotherapy    pembrolizumab (KEYTRUDA) IVPB, 200 mg, Intravenous, Once, 15 of 20 cycles  Administration: 200 mg (10/9/2020), 200 mg (10/30/2020), 200 mg (11/20/2020), 200 mg (12/11/2020), 200 mg (12/31/2020), 200 mg (1/22/2021), 200 mg (2/12/2021), 200 mg (3/5/2021), 200 mg (3/26/2021), 200 mg (4/16/2021), 200 mg (5/7/2021), 200 mg (5/28/2021), 200 mg (6/18/2021), 200 mg (7/9/2021), 200 mg (7/30/2021)     11/19/2020 - 12/3/2020 Radiation    Treatment:  Course: C1    Plan ID Energy Fractions Dose per Fraction (cGy) Dose Correction (cGy) Total Dose Delivered (cGy) Elapsed Days   L Abdomen 6X 10 / 10 300 0 3,000 14        Cancer of left renal pelvis ([facility] [address] )   4/23/2020 Initial Diagnosis    Cancer of left renal pelvis ([facility] [address] )     10/9/2020 -  Chemotherapy    pembrolizumab (KEYTRUDA) IVPB, 200 mg, Intravenous, Once, 15 of 20 cycles  Administration: 200 mg (10/9/2020), 200 mg (10/30/2020), 200 mg (11/20/2020), 200 mg (12/11/2020), 200 mg (12/31/2020), 200 mg (1/22/2021), 200 mg (2/12/2021), 200 mg (3/5/2021), 200 mg (3/26/2021), 200 mg (4/16/2021), 200 mg (5/7/2021), 200 mg (5/28/2021), 200 mg (6/18/2021), 200 mg (7/9/2021), 200 mg (7/30/2021)         Interval history:    ROS: Review of Systems   Constitutional: Positive for fatigue  Negative for activity change, appetite change, chills, fever and unexpected weight change  HENT: Positive for hearing loss  Negative for congestion, mouth sores, nosebleeds, sore throat and trouble swallowing  Eyes: Negative  Respiratory: Negative for cough, chest tightness and shortness of breath  Cardiovascular: Negative for chest pain, palpitations and leg swelling  Gastrointestinal: Positive for constipation  Negative for abdominal distention, abdominal pain, blood in stool, diarrhea, nausea and vomiting  Genitourinary: Negative for difficulty urinating, dysuria, frequency, hematuria and urgency  Musculoskeletal: Positive for arthralgias, back pain, myalgias and neck pain  Negative for gait problem and joint swelling  Skin: Negative for color change, pallor and rash  Neurological: Positive for dizziness  Negative for weakness, light-headedness, numbness and headaches  Hematological: Negative for adenopathy  Does not bruise/bleed easily  Psychiatric/Behavioral: Negative for dysphoric mood and sleep disturbance  The patient is not nervous/anxious          Past Medical History:   Diagnosis Date    Arthritis     Bladder cancer     Cancer ([facility])     skin melanoma;basal cell    Chronic pain disorder     from arthritis    Colon polyp     Does use hearing aid     bilat    Dry eyes, bilateral     GERD (gastroesophageal reflux disease)     History of kidney stones 10/2019    History of partial knee replacement     bilat    History of vertigo     Hyperlipidemia     Hypertension     Kidney lesion     Lumbar disc disorder     compression of vertebrae L4-5-6    Muscle weakness     left hip area    Renal mass     left    Right ankle injury 2020    missed step of ladder     Right ankle pain     Shortness of breath     with activity    Tinnitus     Urothelial cancer     left    Wears glasses        Past Surgical History:   Procedure Laterality Date    COLONOSCOPY      CYSTOSCOPY Left 2020    Procedure: CYSTOSCOPY; URETERAL CATHETER PLACEMENT;  Surgeon: John Gong MD;  Location[facility];  Service: Urology    CYSTOSCOPY  2020    CYSTOSCOPY  2021    FL CYSTOGRAM  2020    FL RETROGRADE PYELOGRAM  2020    HERNIA REPAIR      umbilical with mesh    INGUINAL HERNIA REPAIR Right     with mesh    IR PORT PLACEMENT  2020    JOINT REPLACEMENT Bilateral     partials knee    LUMBAR EPIDURAL INJECTION      NJ CYSTOURETHROSCOPY,URETER CATHETER Bilateral 2020    Procedure: CYSTOSCOPY; RIGHT RETROGRADE PYELOGRAM WITH RIGHT URETERAL CYTOLOGY SAMPLING; LEFT URETEROSCOPY WITH RENAL PELVIS BIOPSY AND LEFT STENT PLACEMENT;  Surgeon: John Gong MD;  [facility];  Service: Urology    [place] NEPHRECTOMY, W/PART   URETECTOMY Left 2020    Procedure: ROBOTIC LAPAROSCOPIC NEPHRO-URETERECTOMY;  Surgeon: John Gong MD;  [facility];  Service: Urology    SKIN CANCER EXCISION      surface melanoma    TONSILLECTOMY      WISDOM TOOTH EXTRACTION         Social History     Socioeconomic History    Marital status: /Civil Union     Spouse name: None    Number of children: None    Years of education: None    Highest education level: None   Occupational History    None   Tobacco Use    Smoking status: Former Smoker     Types: Cigarettes     Quit date:      Years since quittin 6    Smokeless tobacco: Never Used   Vaping Use    Vaping Use: Never used   Substance and Sexual Activity    Alcohol use: Not Currently     Alcohol/week: 4 0 standard drinks     Types: 4 Cans of beer per week     Comment: daily/socially    Drug use: Never    Sexual activity: Not Currently   Other Topics Concern    None   Social History Narrative    Daily caffeine use - 2 cups coffee      Social Determinants of Health     Financial Resource Strain:     Difficulty of Paying Living Expenses:    Food Insecurity:     Worried About Running Out of Food in the Last Year:     Ran Out of Food in the Last Year:    Transportation Needs:     Lack of Transportation (Medical):      Lack of Transportation (Non-Medical):    Physical Activity:     Days of Exercise per Week:     Minutes of Exercise per Session:    Stress:     Feeling of Stress :    Social Connections:     Frequency of Communication with Friends and Family:     Frequency of Social Gatherings with Friends and Family:     Attends Judaism Services:     Active Member of Clubs or Organizations:     Attends Club or Organization Meetings:     Marital Status:    Intimate Partner Violence:     Fear of Current or Ex-Partner:     Emotionally Abused:     Physically Abused:     Sexually Abused:        Family History   Problem Relation Age of Onset    Liver cancer Father        Allergies   Allergen Reactions    Poison Ivy Extract Rash         Current Outpatient Medications:     acetaminophen (TYLENOL) 325 mg tablet, Take 2 tablets (650 mg total) by mouth every 6 (six) hours as needed for mild pain or fever, Disp: , Rfl: 0    allopurinol (ZYLOPRIM) 300 mg tablet, Take 1 tablet (300 mg total) by mouth daily, Disp: 30 tablet, Rfl: 3    ALPRAZolam (XANAX) 0 25 mg tablet, Take 1 tablet (0 25 mg total) by mouth daily at bedtime as needed for anxiety (restless legs), Disp: 30 tablet, Rfl: 1    amLODIPine (NORVASC) 5 mg tablet, Take 5 mg by mouth daily  , Disp: , Rfl:     atorvastatin (LIPITOR) 80 mg tablet, Take 80 mg by mouth every other day evening, Disp: , Rfl:     cholestyramine (QUESTRAN) 4 g packet, Take 1 packet (4 g total) by mouth 2 (two) times a day as needed (diarrhea), Disp: 30 packet, Rfl: 5    folic acid (FOLVITE) 1 mg tablet, Take 1 tablet (1 mg total) by mouth daily, Disp: 90 tablet, Rfl: 4    hydrOXYzine HCL (ATARAX) 25 mg tablet, Take 1 tablet (25 mg total) by mouth every 6 (six) hours as needed for itching or anxiety, Disp: 60 tablet, Rfl: 2    Magnesium 250 MG TABS, 1 tablet 2 (two) times a day Taking 500mg in the morning and 500mg at night, Disp: , Rfl:     metoprolol tartrate (LOPRESSOR) 100 mg tablet, Take 50 mg by mouth daily, Disp: , Rfl:     naloxone (NARCAN) 4 mg/0 1 mL nasal spray, Administer 1 spray into a nostril  If breathing does not return to normal or if breathing difficulty resumes after 2-3 minutes, give another dose in the other nostril using a new spray , Disp: 1 each, Rfl: 1    omeprazole (PriLOSEC) 20 mg delayed release capsule, Take 20 mg by mouth daily  , Disp: , Rfl:     predniSONE 10 mg tablet, Take 2 tablets (20 mg total) by mouth daily, Disp: 60 tablet, Rfl: 3    senna (SENOKOT) 8 6 mg, Take 1 tablet (8 6 mg total) by mouth daily at bedtime, Disp: 30 each, Rfl: 0    sildenafil (VIAGRA) 100 mg tablet, TAKE ONE TABLET BY MOUTH  AS NEEDED PRIOR TO SEXUAL ACTIVITY FOR ERECTILE DYSFUNCTION, Disp: , Rfl:     terazosin (HYTRIN) 5 mg capsule, Take 2 capsules (10 mg total) by mouth daily at bedtime, Disp: 30 capsule, Rfl: 6    traMADol (ULTRAM) 50 mg tablet, Take 1 tablet (50 mg total) by mouth 2 (two) times a day as needed for severe pain, Disp: 60 tablet, Rfl: 1    triamcinolone (KENALOG) 0 1 % lotion, Apply topically 3 (three) times a day, Disp: 60 mL, Rfl: 5    VITAMIN D PO, Take 1,000 Units by mouth daily , Disp: , Rfl:       Physical Exam:  /86 (BP Location: Left arm, Patient Position: Sitting, Cuff Size: Adult)   Pulse 76   Temp (!) 97 1 °F (36 2 °C) (Tympanic)   Resp 18   Ht 5' 8" (1 727 m)   Wt 90 kg (198 lb 8 oz)   SpO2 94%   BMI 30 18 kg/m²     Physical Exam  Vitals reviewed     Constitutional:       General: He is not in acute distress  Appearance: He is well-developed  He is not diaphoretic  HENT:      Head: Normocephalic and atraumatic  Eyes:      General: Lids are normal  No scleral icterus  Conjunctiva/sclera: Conjunctivae normal       Pupils: Pupils are equal, round, and reactive to light  Neck:      Thyroid: No thyromegaly  Cardiovascular:      Rate and Rhythm: Normal rate and regular rhythm  Heart sounds: Normal heart sounds  No murmur heard  Pulmonary:      Effort: Pulmonary effort is normal  No respiratory distress  Breath sounds: Normal breath sounds  Abdominal:      General: There is no distension  Palpations: Abdomen is soft  There is no hepatomegaly or splenomegaly  Tenderness: There is no abdominal tenderness  Musculoskeletal:         General: No swelling  Normal range of motion  Cervical back: Normal range of motion and neck supple  Lymphadenopathy:      Cervical: No cervical adenopathy  Upper Body:      Right upper body: No axillary adenopathy  Left upper body: No axillary adenopathy  Skin:     General: Skin is warm and dry  Findings: No erythema or rash  Neurological:      General: No focal deficit present  Mental Status: He is alert and oriented to person, place, and time  Psychiatric:         Mood and Affect: Mood is depressed  Affect is blunt  Behavior: Behavior normal  Behavior is cooperative  Thought Content:  Thought content normal          Judgment: Judgment normal            Labs:  Lab Results   Component Value Date    WBC 6 50 08/18/2021    HGB 13 2 (L) 08/18/2021    HCT 40 2 (L) 08/18/2021    MCV 96 08/18/2021     08/18/2021     Lab Results   Component Value Date    K 3 7 08/18/2021    CL 99 08/18/2021    CO2 33 (H) 08/18/2021    BUN 24 08/18/2021    CREATININE 1 48 (H) 08/18/2021    GLUF 100 (H) 07/28/2021    CALCIUM 9 8 08/18/2021    CORRECTEDCA 9 8 10/28/2020    AST 10 (L) 08/18/2021 ALT 12 08/18/2021    ALKPHOS 64 08/18/2021    EGFR 47 08/18/2021       Patient voiced understanding and agreement in the above discussion  Aware to contact our office with questions/symptoms in the interim  This note has been generated by voice recognition software system  Therefore, there may be spelling, grammar, and or syntax errors  Please contact if questions arise

## 2021-08-20 ENCOUNTER — HOSPITAL ENCOUNTER (OUTPATIENT)
Dept: INFUSION CENTER | Facility: HOSPITAL | Age: 71
Discharge: HOME | End: 2021-08-20
Attending: INTERNAL MEDICINE
Payer: MEDICARE

## 2021-08-20 VITALS
SYSTOLIC BLOOD PRESSURE: 158 MMHG | OXYGEN SATURATION: 94 % | RESPIRATION RATE: 18 BRPM | WEIGHT: 200.18 LBS | HEIGHT: 68 IN | TEMPERATURE: 97.9 F | BODY MASS INDEX: 30.34 KG/M2 | HEART RATE: 77 BPM | DIASTOLIC BLOOD PRESSURE: 94 MMHG

## 2021-08-20 DIAGNOSIS — C65.2 CANCER OF LEFT RENAL PELVIS (HCC): ICD-10-CM

## 2021-08-20 DIAGNOSIS — C68.9 UROTHELIAL CANCER (HCC): Primary | ICD-10-CM

## 2021-08-20 PROCEDURE — 96413 CHEMO IV INFUSION 1 HR: CPT

## 2021-08-20 RX ORDER — SODIUM CHLORIDE 9 MG/ML
20 INJECTION, SOLUTION INTRAVENOUS ONCE
Status: COMPLETED | OUTPATIENT
Start: 2021-08-20 | End: 2021-08-20

## 2021-08-20 RX ADMIN — SODIUM CHLORIDE 200 MG: 9 INJECTION, SOLUTION INTRAVENOUS at 10:35

## 2021-08-20 RX ADMIN — SODIUM CHLORIDE 20 ML/HR: 0.9 INJECTION, SOLUTION INTRAVENOUS at 10:34

## 2021-08-20 NOTE — PROGRESS NOTES
[facility]  Emotional support given in regards to recent pet results and impending change in treatment plan  Pt is in good spirits and seems to be very interested in starting asap  Port flushed and deaccessed per routine   discharged ambulatory with avs

## 2021-08-23 ENCOUNTER — OFFICE VISIT (OUTPATIENT)
Dept: PALLIATIVE MEDICINE | Facility: CLINIC | Age: 71
End: 2021-08-23
Payer: MEDICARE

## 2021-08-23 ENCOUNTER — OFFICE VISIT (OUTPATIENT)
Dept: SURGERY | Facility: CLINIC | Age: 71
End: 2021-08-23

## 2021-08-23 VITALS
TEMPERATURE: 97.7 F | DIASTOLIC BLOOD PRESSURE: 72 MMHG | SYSTOLIC BLOOD PRESSURE: 120 MMHG | HEART RATE: 70 BPM | BODY MASS INDEX: 30.69 KG/M2 | RESPIRATION RATE: 16 BRPM | WEIGHT: 201.8 LBS | OXYGEN SATURATION: 94 %

## 2021-08-23 DIAGNOSIS — C79.10 METASTATIC UROTHELIAL CARCINOMA (HCC): Primary | ICD-10-CM

## 2021-08-23 DIAGNOSIS — K59.03 DRUG INDUCED CONSTIPATION: ICD-10-CM

## 2021-08-23 DIAGNOSIS — C65.2 CANCER OF LEFT RENAL PELVIS (HCC): ICD-10-CM

## 2021-08-23 DIAGNOSIS — C68.9 UROTHELIAL CANCER (HCC): ICD-10-CM

## 2021-08-23 DIAGNOSIS — R10.9 FLANK PAIN: ICD-10-CM

## 2021-08-23 DIAGNOSIS — M25.511 CHRONIC RIGHT SHOULDER PAIN: ICD-10-CM

## 2021-08-23 DIAGNOSIS — G89.29 CHRONIC RIGHT SHOULDER PAIN: ICD-10-CM

## 2021-08-23 DIAGNOSIS — C79.10 METASTATIC UROTHELIAL CARCINOMA (HCC): ICD-10-CM

## 2021-08-23 DIAGNOSIS — Z51.5 PALLIATIVE CARE PATIENT: ICD-10-CM

## 2021-08-23 DIAGNOSIS — G89.3 CANCER RELATED PAIN: Primary | ICD-10-CM

## 2021-08-23 DIAGNOSIS — G89.4 CHRONIC PAIN SYNDROME: ICD-10-CM

## 2021-08-23 PROCEDURE — 99214 OFFICE O/P EST MOD 30 MIN: CPT | Performed by: INTERNAL MEDICINE

## 2021-08-23 PROCEDURE — 99024 POSTOP FOLLOW-UP VISIT: CPT | Performed by: SURGERY

## 2021-08-23 RX ORDER — TRAMADOL HYDROCHLORIDE 50 MG/1
50 TABLET ORAL EVERY 6 HOURS PRN
Qty: 120 TABLET | Refills: 0 | Status: SHIPPED | OUTPATIENT
Start: 2021-08-23 | End: 2021-08-23 | Stop reason: SDUPTHER

## 2021-08-23 RX ORDER — ACETAMINOPHEN 500 MG
1000 TABLET ORAL EVERY 8 HOURS
Start: 2021-08-23

## 2021-08-23 RX ORDER — TRAMADOL HYDROCHLORIDE 50 MG/1
50 TABLET ORAL EVERY 6 HOURS PRN
Qty: 120 TABLET | Refills: 0 | Status: SHIPPED | OUTPATIENT
Start: 2021-08-23 | End: 2021-09-04 | Stop reason: CLARIF

## 2021-08-23 NOTE — LETTER
August 23, 2021     Jovanni Dumont [address] [facility]  [address] [id number]    Patient: Quinn Valdez   YOB: 1950   Date of Visit: 8/23/2021       Dear Dr Cristina Garza: Thank you for referring Jd Flanagan to me for evaluation  Below are my notes for this consultation  If you have questions, please do not hesitate to call me  I look forward to following your patient along with you  Sincerely,        Uzma Mckeon MD        CC: No Recipients  Uzma Mckeon MD  8/23/2021 11:02 AM  Incomplete  Assessment/Plan:    Metastatic urothelial carcinoma [facility])   Patient returns to the office for suture removal status post excision of a mass from the skin of the upper right back  Today in the office the patient denies all complaints referable to the wound  On physical examination the surgical wounds clean dry and intact  The sutures were removed  The final pathology confirmed the presence of metastatic urothelial carcinoma  A copy of the pathology report provided to the patient  All questions were answered to the satisfaction of the patient and his wife to the best of my ability  He will be following up in the near term with both Medical Oncology and Radiation Oncology  I have encouraged the patient follow-up with our practice at any point future with questions or concerns if there is any way in which we can help improve his quality of life  Diagnoses and all orders for this visit:    Metastatic urothelial carcinoma ([facility] [address] )          Subjective:      Patient ID: Quinn Valdez is a 66 y o  male  HPI    The following portions of the patient's history were reviewed and updated as appropriate: allergies, current medications, past family history, past medical history, past social history, past surgical history and problem list     Review of Systems   Constitutional: Negative for chills and fever  HENT: Negative for ear pain and sore throat  Eyes: Negative for pain and visual disturbance  Respiratory: Negative for cough and shortness of breath  Cardiovascular: Negative for chest pain and palpitations  Gastrointestinal: Negative for abdominal pain and vomiting  Genitourinary: Negative for dysuria and hematuria  Musculoskeletal: Negative for arthralgias and back pain  Skin: Negative for color change and rash  Neurological: Negative for seizures and syncope  All other systems reviewed and are negative  Objective: There were no vitals taken for this visit  Physical Exam  Vitals and nursing note reviewed  Constitutional:       Appearance: He is well-developed  HENT:      Head: Normocephalic and atraumatic  Eyes:      Conjunctiva/sclera: Conjunctivae normal       Pupils: Pupils are equal, round, and reactive to light  Cardiovascular:      Rate and Rhythm: Normal rate and regular rhythm  Pulmonary:      Effort: Pulmonary effort is normal       Breath sounds: Normal breath sounds  Abdominal:      General: Bowel sounds are normal       Palpations: Abdomen is soft  Musculoskeletal:         General: Normal range of motion  Cervical back: Normal range of motion and neck supple  Skin:     General: Skin is warm and dry  Comments: Wound clean dry intact   Neurological:      Mental Status: He is alert and oriented to person, place, and time  Psychiatric:         Behavior: Behavior normal          Thought Content:  Thought content normal          Judgment: Judgment normal

## 2021-08-23 NOTE — ASSESSMENT & PLAN NOTE
Patient returns to the office for suture removal status post excision of a mass from the skin of the upper right back  Today in the office the patient denies all complaints referable to the wound  On physical examination the surgical wounds clean dry and intact  The sutures were removed  The final pathology confirmed the presence of metastatic urothelial carcinoma  A copy of the pathology report provided to the patient  All questions were answered to the satisfaction of the patient and his wife to the best of my ability  He will be following up in the near term with both Medical Oncology and Radiation Oncology  I have encouraged the patient follow-up with our practice at any point future with questions or concerns if there is any way in which we can help improve his quality of life

## 2021-08-23 NOTE — PROGRESS NOTES
Palliative and Supportive Care   Liseth Choi 66 y o  male [id number]    Assessment/Plan:  1  Cancer related pain    2  Chronic pain syndrome    3  Flank pain    4  Cancer of left renal pelvis ([facility] [address] )    5  Chronic right shoulder pain    6  Metastatic urothelial carcinoma ([facility] [address] )    7  Palliative care patient    8  Drug induced constipation      · Continue tramadol 50mg q6h prn - overall still helpful for him  · Decrease tylenol to 1000mg PO q8H ATC (was doing more than 4000mg/24H)  · Senokot prn to prevent OIC  · He remains treatment-focused  He has na upcoming RadOnc consult to see if RT to the R shoulder/axilla feasible as well as Oncology to consider 3rd line treatment  · RTO in 3 weeks to follow up closely on pain, call sooner if needed    Controlled Substance Review    [place] or [place]  reviewed: No red flags were identified; safe to proceed with prescription  Doris Settle     07/26/2021  1   07/26/2021  Tramadol Hcl 50 MG Tablet  60 00  30 Samson Argue   [id number]   Annel ([id number])   0  10 00 MME  [place]   06/04/2021  1   05/06/2021  Alprazolam 0 25 MG Tablet  30 00  30 [place]   [id number]   Annel ([id number])   1   [place]   05/06/2021  1   05/06/2021  Alprazolam 0 25 MG Tablet  30 00  30 [place]   [id number]   Annel ([id number])   0           Requested Prescriptions     Pending Prescriptions Disp Refills    traMADol (ULTRAM) 50 mg tablet 120 tablet 0     Sig: Take 1 tablet (50 mg total) by mouth every 6 (six) hours as needed for severe pain     Signed Prescriptions Disp Refills    traMADol (ULTRAM) 50 mg tablet 120 tablet 0     Sig: Take 1 tablet (50 mg total) by mouth every 6 (six) hours as needed for severe pain    acetaminophen (TYLENOL) 500 mg tablet       Sig: Take 2 tablets (1,000 mg total) by mouth every 8 (eight) hours     Medications Discontinued During This Encounter   Medication Reason    acetaminophen (TYLENOL) 325 mg tablet Reorder    traMADol (ULTRAM) 50 mg tablet Reorder       Representatives have queried the patient's controlled substance dispensing history in the Prescription Drug Monitoring Program in compliance with regulations before I have prescribed any controlled substances  The prescription history is consistent with prescribed therapy and our practice policies  30 minutes were spent face to face with his spouse with greater than 50% of the time spent in counseling or coordination of care including discussions of etiology of diagnosis, pathogenesis of diagnosis, diagnostic results, impression, and recommendations, risks and benefits of treatment, instructions for disease self management, treatment instructions, follow up requirements, risk factors and risk reduction of disease, patient and family counseling/involvement in care and compliance with treatment regimen   All of the patient's questions were answered during this discussion  No follow-ups on file  Subjective:   Chief Complaint  Follow up visit for:  symptom management, pain, neoplasm related, assessment of goals of care, disease process education and discussion of prognosis  NAHUN Begum is a 66 y o  male with metastatic urothelial cancer who recently showed disease progression on second line [place]  He was initially diagnosed in 1/2020 and has since underwent left robotic assisted laparoscopic nephroureterectomy with bladder cuff excision and chemotherapy  He tried cisplatin but did not tolerate well and so was switched to [facility] which he had been on since 10/2020  He also underwent RT to the L abdomen form 11-12/2020  He then started to develop new pain in his R shoulder  He went for excision of a soft tissue mass in the R shoulder/axilla which revealed metastatic high-grade carcinoma consistent with his known primary urothelial carcinoma in 8/9/2021  He used to see palliative care for symptoms but he said his pain got better/gone since Keytruda   He comes back today because of worsening cancer causing worsening pain in his R shoulder  He came in today with his wife  He said his pain in the R shoulder is still very much controlled with just tramadol and tylenol  However, upon review of his tylenol usage, he is using more than 4g a day  We discussed recommendations as mentioned above and he is agreeable to continue  He remains treatment-focused and did say he is to see [facility] for Rt to the R shoulder and Dr Fred Dunne did mention a possible 3rd line treatment (review of notes, Enfortumab Vedotin)  The following portions of the medical history were reviewed: past medical history, problem list, medication list, and social history      Current Outpatient Medications:     acetaminophen (TYLENOL) 500 mg tablet, Take 2 tablets (1,000 mg total) by mouth every 8 (eight) hours, Disp: , Rfl:     allopurinol (ZYLOPRIM) 300 mg tablet, Take 1 tablet (300 mg total) by mouth daily, Disp: 30 tablet, Rfl: 3    ALPRAZolam (XANAX) 0 25 mg tablet, Take 1 tablet (0 25 mg total) by mouth daily at bedtime as needed for anxiety (restless legs), Disp: 30 tablet, Rfl: 1    amLODIPine (NORVASC) 5 mg tablet, Take 5 mg by mouth daily  , Disp: , Rfl:     atorvastatin (LIPITOR) 80 mg tablet, Take 80 mg by mouth every other day evening, Disp: , Rfl:     folic acid (FOLVITE) 1 mg tablet, Take 1 tablet (1 mg total) by mouth daily, Disp: 90 tablet, Rfl: 4    hydrOXYzine HCL (ATARAX) 25 mg tablet, Take 1 tablet (25 mg total) by mouth every 6 (six) hours as needed for itching or anxiety, Disp: 60 tablet, Rfl: 2    metoprolol tartrate (LOPRESSOR) 100 mg tablet, Take 50 mg by mouth daily, Disp: , Rfl:     omeprazole (PriLOSEC) 20 mg delayed release capsule, Take 20 mg by mouth daily  , Disp: , Rfl:     predniSONE 10 mg tablet, Take 2 tablets (20 mg total) by mouth daily, Disp: 60 tablet, Rfl: 3    sildenafil (VIAGRA) 100 mg tablet, TAKE ONE TABLET BY MOUTH  AS NEEDED PRIOR TO SEXUAL ACTIVITY FOR ERECTILE DYSFUNCTION, Disp: , Rfl:     terazosin (HYTRIN) 5 mg capsule, Take 2 capsules (10 mg total) by mouth daily at bedtime, Disp: 30 capsule, Rfl: 6    traMADol (ULTRAM) 50 mg tablet, Take 1 tablet (50 mg total) by mouth every 6 (six) hours as needed for severe pain, Disp: 120 tablet, Rfl: 0    triamcinolone (KENALOG) 0 1 % lotion, Apply topically 3 (three) times a day, Disp: 60 mL, Rfl: 5    VITAMIN D PO, Take 1,000 Units by mouth daily , Disp: , Rfl:     cholestyramine (QUESTRAN) 4 g packet, Take 1 packet (4 g total) by mouth 2 (two) times a day as needed (diarrhea) (Patient not taking: Reported on 8/23/2021), Disp: 30 packet, Rfl: 5    Magnesium 250 MG TABS, 1 tablet 2 (two) times a day Taking 500mg in the morning and 500mg at night, Disp: , Rfl:     naloxone (NARCAN) 4 mg/0 1 mL nasal spray, Administer 1 spray into a nostril  If breathing does not return to normal or if breathing difficulty resumes after 2-3 minutes, give another dose in the other nostril using a new spray  (Patient not taking: Reported on 8/23/2021), Disp: 1 each, Rfl: 1    senna (SENOKOT) 8 6 mg, Take 1 tablet (8 6 mg total) by mouth daily at bedtime (Patient not taking: Reported on 8/23/2021), Disp: 30 each, Rfl: 0  Review of Systems   Constitutional: Negative for activity change, appetite change, chills, fatigue and fever  HENT: Negative for trouble swallowing  Respiratory: Negative for shortness of breath  Cardiovascular: Negative for chest pain and leg swelling  Gastrointestinal: Negative for abdominal pain, constipation, diarrhea, nausea and vomiting  Musculoskeletal: Negative for back pain  Pain in the R shoulder   Neurological: Negative for weakness  Psychiatric/Behavioral: Negative for sleep disturbance  The patient is not nervous/anxious  All other systems reviewed and are negative        All other systems negative    Objective:  Vital Signs  /72 (BP Location: Left arm, Patient Position: Sitting, Cuff Size: Standard)   Pulse 70   Temp 97 7 °F (36 5 °C) (Temporal)   Resp 16   Wt 91 5 kg (201 lb 12 8 oz)   SpO2 94%   BMI 30 69 kg/m²    Physical Exam    Constitutional: Appears well-developed and well-nourished  Does not appear sick  Appears younger than stated age  In good spirits  Pleasant, jovial  In no acute physical or emotional distress  Head: Normocephalic and atraumatic  Eyes: EOM are normal  No ocular discharge  No scleral icterus  Neck: No visible adenopathy or masses  Respiratory: Effort normal  No stridor  No respiratory distress  Gastrointestinal: No abdominal distension  Musculoskeletal: No edema  Neurological: Alert, oriented and appropriately conversant  Hard of hearing  Skin: No diaphoresis, no rashes seen on exposed areas of skin  Psychiatric: Displays a normal mood and affect   Behavior, judgement and thought content appear normal      Ramses Valderrama MD  Palliative Medicine & Supportive Care  Internal Medicine  Available via [facility] Text  Office: [phone]  Fax: [phone]

## 2021-08-23 NOTE — PATIENT INSTRUCTIONS
PRESCRIPTION REFILL REMINDER:  All medication refills should be requested prior to [facility] on Friday  Any refill requests after noon on Friday would be addressed the following Monday  Please protect yourself from COVID-19 and the delta variant! Even though we do not good antiviral drugs for this infection, the following tools can help you stay healthy:    = Wash your hands! Soap and water, or hand  with at least 60% alcohol, are both effective at killing the virus  = Wear a mask! This will help protect others from any virus particles you might spread  Your mouth and nose BOTH need to be covered  = Keep the distance! Keep 6 feet of distance from other people, even if they seem healthy  Keeping distance protects you from the other person's virus spread     = Get a vaccine! Three vaccines are approved for emergency use in the [place]; they are made by [employer] Jenkins, and [employer]  These vaccines have been shown to be 90+% effective at preventing severe infections when combined with masking, hand-washing, and distancing  These vaccines do provide protection against the dangerous delta variant!  + Pfizer may be given to all persons age 15 and older   + Mateo Block may be given to all adults age 25 and older   + [employer] may be given to all adults age 25 and older  (Serious blood clot side effects have only been reported in reproductive-age women, and these are about 1-in-a-million  We do NOT recommend J&J for people who have had Gu[place] syndrome )  = You may get a shot from many other locations outside [place]: [facility], [employer], [facility], [facility], and certain [facility] locations  + As of 8/13/21, the CDC recommends booster shots on Pfizer and Moderna vaccines for immune-suppressed populations  Ask your doctor if you qualify    https://www CancerIQ/      We are recommending that ALL our patients get a complete series of one of the three vaccines, as early as it is available to them  Please keep an eye on the Ziios, Fanergies, or call 3-545-XRUBBZM (Choose Option 7 for faster service!) to see when you will become eligible  If you are eligible by the criteria above, please ask our team to help get you a shot! Informacion en espanol sobre vacunas, de nos [facility] --  Kinza howard      Numbers of Coronavirus cases (and COVID deaths) are worsening in many [place] States, among unvaccinated people, we think this is because vaccines are working, but only if you get one! As of 7/1/21, we do NOT advise travel OUTSIDE the [address], and we encourage you to be very careful when planning travel INSIDE the [address]  Check out [employer] for [place] data that are updated daily:    http://www Samba.me/     Global Epidemics  Org, from [facility] (OUTPATIENT [facility]), will give you Xowpgc-fo-Whnevx information on virus cases and vaccination rates:    Https://globalepidemics  org/    Frequently Asked Questions about COVID, answered by [facility]    Amira es

## 2021-08-23 NOTE — PROGRESS NOTES
Assessment/Plan:    Metastatic urothelial carcinoma ([facility] [address] )   Patient returns to the office for suture removal status post excision of a mass from the skin of the upper right back  Today in the office the patient denies all complaints referable to the wound  On physical examination the surgical wounds clean dry and intact  The sutures were removed  The final pathology confirmed the presence of metastatic urothelial carcinoma  A copy of the pathology report provided to the patient  All questions were answered to the satisfaction of the patient and his wife to the best of my ability  He will be following up in the near term with both Medical Oncology and Radiation Oncology  I have encouraged the patient follow-up with our practice at any point future with questions or concerns if there is any way in which we can help improve his quality of life  Diagnoses and all orders for this visit:    Metastatic urothelial carcinoma ([facility] [address] )          Subjective:      Patient ID: Eddie Price is a 66 y o  male  HPI    The following portions of the patient's history were reviewed and updated as appropriate: allergies, current medications, past family history, past medical history, past social history, past surgical history and problem list     Review of Systems   Constitutional: Negative for chills and fever  HENT: Negative for ear pain and sore throat  Eyes: Negative for pain and visual disturbance  Respiratory: Negative for cough and shortness of breath  Cardiovascular: Negative for chest pain and palpitations  Gastrointestinal: Negative for abdominal pain and vomiting  Genitourinary: Negative for dysuria and hematuria  Musculoskeletal: Negative for arthralgias and back pain  Skin: Negative for color change and rash  Neurological: Negative for seizures and syncope  All other systems reviewed and are negative  Objective:       There were no vitals taken for this visit          Physical Exam  Vitals and nursing note reviewed  Constitutional:       Appearance: He is well-developed  HENT:      Head: Normocephalic and atraumatic  Eyes:      Conjunctiva/sclera: Conjunctivae normal       Pupils: Pupils are equal, round, and reactive to light  Cardiovascular:      Rate and Rhythm: Normal rate and regular rhythm  Pulmonary:      Effort: Pulmonary effort is normal       Breath sounds: Normal breath sounds  Abdominal:      General: Bowel sounds are normal       Palpations: Abdomen is soft  Musculoskeletal:         General: Normal range of motion  Cervical back: Normal range of motion and neck supple  Skin:     General: Skin is warm and dry  Comments: Wound clean dry intact   Neurological:      Mental Status: He is alert and oriented to person, place, and time  Psychiatric:         Behavior: Behavior normal          Thought Content:  Thought content normal          Judgment: Judgment normal

## 2021-08-26 ENCOUNTER — TELEPHONE (OUTPATIENT)
Dept: HEMATOLOGY ONCOLOGY | Facility: CLINIC | Age: 71
End: 2021-08-26

## 2021-08-26 NOTE — TELEPHONE ENCOUNTER
Returned a call to pt's wife and reviewed the plan with her  Pt will have Padcev on days 1,8,15 and teaching will be done on 9/9/21 at his next OV  Treatment tentatively scheduled for 9/10/21 this is subject to change if pt requires Radiation treatment  Pt being seen by Rad on 9/1/21

## 2021-08-26 NOTE — TELEPHONE ENCOUNTER
Patients wife calling to follow up on treatment plan  Per wife they were to hear back from [facility] this week about patient proceeding with new treatment  Last office note from [address] [place] [address] states:  His systemic treatment will need to be changed this will be discussed with Dr Evelyn Melvin extensively; consider 3rd line Enfortumab Vedotin  The patient and his wife were told that we will contact him early next week with the plan and most likely bring him in afterward for further education and planning      Will forward to RN to discuss with Dr Evelyn Melvin as [facility] is out of the office  Wife requesting a call back with plan - [phone]

## 2021-08-29 DIAGNOSIS — E79.0 HYPERURICEMIA: ICD-10-CM

## 2021-08-30 RX ORDER — ALLOPURINOL 300 MG/1
TABLET ORAL
Qty: 30 TABLET | Refills: 3 | Status: SHIPPED | OUTPATIENT
Start: 2021-08-30 | End: 2022-01-03

## 2021-09-01 ENCOUNTER — RADIATION ONCOLOGY CONSULT (OUTPATIENT)
Dept: RADIATION ONCOLOGY | Facility: CLINIC | Age: 71
End: 2021-09-01
Attending: RADIOLOGY
Payer: MEDICARE

## 2021-09-01 ENCOUNTER — PATIENT OUTREACH (OUTPATIENT)
Dept: CASE MANAGEMENT | Facility: HOSPITAL | Age: 71
End: 2021-09-01

## 2021-09-01 VITALS
SYSTOLIC BLOOD PRESSURE: 130 MMHG | WEIGHT: 196.65 LBS | HEIGHT: 69 IN | BODY MASS INDEX: 29.13 KG/M2 | DIASTOLIC BLOOD PRESSURE: 76 MMHG | OXYGEN SATURATION: 97 % | TEMPERATURE: 96.9 F | RESPIRATION RATE: 16 BRPM | HEART RATE: 73 BPM

## 2021-09-01 DIAGNOSIS — C79.89 SECONDARY MALIGNANT NEOPLASM OF SOFT TISSUE (HCC): Primary | ICD-10-CM

## 2021-09-01 DIAGNOSIS — C68.9 UROTHELIAL CANCER (HCC): ICD-10-CM

## 2021-09-01 DIAGNOSIS — C79.10 METASTATIC UROTHELIAL CARCINOMA (HCC): ICD-10-CM

## 2021-09-01 DIAGNOSIS — C79.89 SECONDARY MALIGNANT NEOPLASM OF AXILLA (HCC): ICD-10-CM

## 2021-09-01 DIAGNOSIS — C68.9 UROTHELIAL CANCER (HCC): Primary | ICD-10-CM

## 2021-09-01 PROCEDURE — 99214 OFFICE O/P EST MOD 30 MIN: CPT | Performed by: RADIOLOGY

## 2021-09-01 PROCEDURE — 99211 OFF/OP EST MAY X REQ PHY/QHP: CPT | Performed by: RADIOLOGY

## 2021-09-01 PROCEDURE — 77290 THER RAD SIMULAJ FIELD CPLX: CPT | Performed by: RADIOLOGY

## 2021-09-01 PROCEDURE — 77334 RADIATION TREATMENT AID(S): CPT | Performed by: RADIOLOGY

## 2021-09-01 PROCEDURE — 77399 UNLISTED PX MED RADJ PHYSICS: CPT | Performed by: RADIOLOGY

## 2021-09-01 PROCEDURE — 77263 THER RADIOLOGY TX PLNG CPLX: CPT | Performed by: RADIOLOGY

## 2021-09-01 NOTE — PROGRESS NOTES
Zeny Kirk 1950 is a 66 y o  male  Zeny Kirk is a 66year old male with stage IV metastatic urothelial carcinoma of the left renal pelvis  He is status post left  nephrectomy (April 2020) and now has evidence of widespread metastatic disease on PET-CT study September 23, 2020 as outlined above  He was seen by Dr Emely Dumont and has started on immunotherapy with [facility] October 9, 2020  He has a 4 1 x 3 2 symptomatic left rectus abdominal muscle mass  He completed a course of palliative radiation 10/10 to the left abdomen 12  3 2020  Pt is followed by Dr Annette Franco for Pain Medicine/Anesthesia (Injections) for chronic back pain    1 19 21-Virtual visit Dr Manuel Arceo  Patient completed palliative radiation to the left abdomen  This is a follow up visit one month after treatment completed  "He has had a good response to radiation therapy with less pain in the left abdominal wall  Repeat PET-CT study January 13, 2021 revealed a mixed response to his systemic treatment and a good response to radiation to the left rectus abdominal muscle mass with resolution of FDG uptake  Results were discussed with him today  He will continue with his Keytruda infusion every 3 weeks which is scheduled again on January 22, 2021  We do not recommend any additional palliative radiation therapy  He will be seen here on a p r n  basis since he will continue to follow with Dr Emely Dumont in [place]  which is also where he receives his chemotherapy "    6 23 21 Dena Kruger, 10 [facility] Nephrology  Stage 3a CKD-Return to the office in 4 months/Dr Lucetta Prader      7 6 21   CT Chest wo contrast  IMPRESSION:  No acute intrathoracic abnormality      7 14 21-XR shoulder 2+ vw right  IMPRESSION:   No acute osseous abnormality        8/9/21-Consult surg Dr Susu Gautam  Patient presents with non healing mass on his back x 2 months  Mass removed and closed skin with sutures  Final Diagnosis   A   Skin, Upper Back, Excision:  - Metastatic high grade carcinoma consistent with the patient's known urothelial primary  See note  8 16 21 PET  IMPRESSION:  1  New hypermetabolic soft tissue lesions at the level of the right shoulder and right axilla as above, most concerning for metastases  2   Interval progression of retroperitoneal and mesenteric hypermetabolic metastases  8 19 21 RAGHU Juárez/Med Onc  Keytruda planned on 8 20 21 as scheduled however progression of his metastatic urothelial cancer on his current txmt with new soft tissue lesions in the right shoulder region/axilla  Recent excision of one of the right posterior shoulder lesions by general surgeon  Findings consistent w/metastatic urothelial carcinoma  Change in systemic txmt to be discussed w/Dr Oneill Record  Consider third line Enfortumab Vedotin  Pt reporting significant pain in his right shoulder and arm region due to new  Metastatic lesions which is affecting his quality of life  Refer to Rad Onc to consider palliative radiation  Follow up appt with palliative care to reestablish care and for pain and sx management    8 23 21 Dr Jamilah Mcginnis visit for suture removal status post excision of a mass from the skin of the upper right back  Final pathology confirmed the presence of metastatic urothelial carcinoma  Follow up prn      8 23 21 Dr Corin Bender  Cancer related pain--Continue Tramadol 50mg/Tylenol 1000mg ATC/Sen[facility]ot/ Rad Onc Consult/Med Onc-consider 3rd line of tx/  Return in 3 wks for follow up on pain or call sooner, if needed  Appts:  9 9 21 Dr Oneill Record   9 10 21 Infusion-Patient to start Padcev  9 14 21 Dr Elaine Ruffin   9 28 21 RAGHU Sebastian-Pain Med  9 29 21 Dr Chastity Josue  9 30 21 RAGHU Juárez  80 13 76 Dr Oneill Record          Oncology History   Urothelial cancer [facility])   1/3/2020 Biopsy    Final Diagnosis    A  Urine, Clean Catch, Thin Prep:  High grade urothelial carcinoma (HGUC) - see comment  1/3/2020 Initial Diagnosis    Urothelial cancer ([facility] [address] )  T3 N0 (stage IIIA)     1/17/2020 Biopsy    Final Diagnosis    A  Ureter, Right, left renal pelvis biopsy  -Fragments of high grade urothelial carcinoma   -No evidence of lamina propria invasion   -Detrusor muscle/ muscularis propria is not present for evaluation  B  Urinary Bladder, bladder biopsy:  -Fragments of low grade papillary lesion  See note  -No evidence of invasion seen  -Unremarkable fragment of detrusor muscle seen  4/1/2020 Surgery    left robotic assisted laparoscopic nephroureterectomy with bladder cuff excision     Final Diagnosis    A  Left kidney, ureter, and bladder cuff, nephroureterectomy:  - Invasive high grade urothelial carcinoma arising in renal pelvis  - Bladder cuff margin is negative for carcinoma and no evidence of high grade dysplasia  - Ureters with no significant pathologic abnormality  - Two benign simple cysts  - Adrenal gland is negative for malignancy  - One lymph node, negative for malignancy (0/1)          5/14/2020 - 6/3/2020 Chemotherapy    CISplatin (PLATINOL) split dose 35 mg/m2 = 75 3 mg (50 % of original dose 70 mg/m2), Intravenous,   Administration: 75 3 mg (5/14/2020), 75 3 mg (5/21/2020)  gemcitabine (GEMZAR) 2,200 mg in sodium chloride 0 9 % 250 mL infusion, 2,150 2 mg (80 % of original dose 1,250 mg/m2), Intravenous,   Administration: 2,200 mg (5/14/2020), 2,200 mg (5/21/2020)    (only completed 1 cycle- d/c d/t adverse effects and worsening renal dysfunction)     10/9/2020 - 9/9/2021 Chemotherapy    pembrolizumab (KEYTRUDA) IVPB, 200 mg, Intravenous, Once, 16 of 20 cycles  Administration: 200 mg (10/9/2020), 200 mg (10/30/2020), 200 mg (11/20/2020), 200 mg (12/11/2020), 200 mg (12/31/2020), 200 mg (1/22/2021), 200 mg (2/12/2021), 200 mg (3/5/2021), 200 mg (3/26/2021), 200 mg (4/16/2021), 200 mg (5/7/2021), 200 mg (5/28/2021), 200 mg (6/18/2021), 200 mg (7/9/2021), 200 mg (7/30/2021), 200 mg (8/20/2021)     11/19/2020 - 12/3/2020 Radiation    Treatment:  Course: C1    Plan ID Energy Fractions Dose per Fraction (cGy) Dose Correction (cGy) Total Dose Delivered (cGy) Elapsed Days   L Abdomen 6X 10 / 10 300 0 3,000 14        9/10/2021 -  Chemotherapy    enfortumab vedotin-ejfv (PADCEV) IVPB, 1 25 mg/kg, Intravenous, Once, 0 of 6 cycles     Cancer of left renal pelvis (HCC)   4/23/2020 Initial Diagnosis    Cancer of left renal pelvis (HCC)     10/9/2020 - 9/9/2021 Chemotherapy    pembrolizumab (KEYTRUDA) IVPB, 200 mg, Intravenous, Once, 16 of 20 cycles  Administration: 200 mg (10/9/2020), 200 mg (10/30/2020), 200 mg (11/20/2020), 200 mg (12/11/2020), 200 mg (12/31/2020), 200 mg (1/22/2021), 200 mg (2/12/2021), 200 mg (3/5/2021), 200 mg (3/26/2021), 200 mg (4/16/2021), 200 mg (5/7/2021), 200 mg (5/28/2021), 200 mg (6/18/2021), 200 mg (7/9/2021), 200 mg (7/30/2021), 200 mg (8/20/2021)     9/10/2021 -  Chemotherapy    enfortumab vedotin-ejfv (PADCEV) IVPB, 1 25 mg/kg, Intravenous, Once, 0 of 6 cycles      Surgery       Metastatic urothelial carcinoma ([facility] [address] )   8/9/2021 Initial Diagnosis    Metastatic urothelial carcinoma ([facility] [address] )     9/10/2021 -  Chemotherapy    enfortumab vedotin-ejfv (PADCEV) IVPB, 1 25 mg/kg, Intravenous, Once, 0 of 6 cycles         Clinical Trial: no      Health Maintenance   Topic Date Due    Medicare Annual Wellness Visit (AWV)  Never done    BMI: Followup Plan  Never done    Pneumococcal Vaccine: 65+ Years (2 of 4 - PPSV23) 08/18/2016    PT PLAN OF CARE  07/06/2018    Fall Risk  05/03/2019    COVID-19 Vaccine (3 - Moderna risk 3-dose series) 04/12/2021    Influenza Vaccine (1) 09/01/2021    Colorectal Cancer Screening  12/09/2021    BMI: Adult  08/23/2022    Depression Screening PHQ  09/01/2022    DTaP,Tdap,and Td Vaccines (2 - Td or Tdap) 12/16/2025    Hepatitis C Screening  Completed    HIB Vaccine  Aged Out    Hepatitis B Vaccine  Aged Out    IPV Vaccine  Aged C/ Shad Dil[address] 19 Hepatitis A Vaccine  Aged Out    Meningococcal ACWY Vaccine  Aged Out    HPV Vaccine  Aged Out       Past Medical History:   Diagnosis Date    Arthritis     Bladder cancer     Cancer ([facility] [address] )     skin melanoma;basal cell    Chronic pain disorder     from arthritis    Colon polyp     Does use hearing aid     bilat    Dry eyes, bilateral     GERD (gastroesophageal reflux disease)     History of kidney stones 10/2019    History of partial knee replacement     bilat    History of vertigo     Hyperlipidemia     Hypertension     Kidney lesion     Lumbar disc disorder     compression of vertebrae L4-5-6    Muscle weakness     left hip area    Renal mass     left    Right ankle injury 03/05/2020    missed step of ladder     Right ankle pain     Shortness of breath     with activity    Tinnitus     Urothelial cancer     left    Wears glasses        Past Surgical History:   Procedure Laterality Date    COLONOSCOPY      CYSTOSCOPY Left 4/1/2020    Procedure: CYSTOSCOPY; URETERAL CATHETER PLACEMENT;  Surgeon: Dominique Mcintosh MD;  Location[facility];  Service: Urology    CYSTOSCOPY  05/11/2020    CYSTOSCOPY  06/04/2021    FL CYSTOGRAM  4/13/2020    FL RETROGRADE PYELOGRAM  1/17/2020    HERNIA REPAIR      umbilical with mesh    INGUINAL HERNIA REPAIR Right     with mesh    IR PORT PLACEMENT  4/30/2020    JOINT REPLACEMENT Bilateral     partials knee    LUMBAR EPIDURAL INJECTION      NM CYSTOURETHROSCOPY,URETER CATHETER Bilateral 1/17/2020    Procedure: CYSTOSCOPY; RIGHT RETROGRADE PYELOGRAM WITH RIGHT URETERAL CYTOLOGY SAMPLING; LEFT URETEROSCOPY WITH RENAL PELVIS BIOPSY AND LEFT STENT PLACEMENT;  Surgeon: Dominique Mcintosh MD;  [facility];  Service: Urology    NM NEPHRECTOMY, W/PART   URETECTOMY Left 4/1/2020    Procedure: ROBOTIC LAPAROSCOPIC NEPHRO-URETERECTOMY;  Surgeon: Dominique Mcintosh MD;  Location[facility];  Service: Urology    SKIN CANCER EXCISION surface melanoma    TONSILLECTOMY      WISDOM TOOTH EXTRACTION         Family History   Problem Relation Age of Onset    Liver cancer Father        Social History     Tobacco Use    Smoking status: Former Smoker     Types: Cigarettes     Quit date:      Years since quittin 7    Smokeless tobacco: Never Used   Vaping Use    Vaping Use: Never used   Substance Use Topics    Alcohol use: Yes     Alcohol/week: 17 0 standard drinks     Types: 7 Glasses of wine, 10 Cans of beer per week     Comment: socially    Drug use: Never          Current Outpatient Medications:     acetaminophen (TYLENOL) 500 mg tablet, Take 2 tablets (1,000 mg total) by mouth every 8 (eight) hours, Disp: , Rfl:     allopurinol (ZYLOPRIM) 300 mg tablet, take 1 tablet by mouth once daily, Disp: 30 tablet, Rfl: 3    ALPRAZolam (XANAX) 0 25 mg tablet, Take 1 tablet (0 25 mg total) by mouth daily at bedtime as needed for anxiety (restless legs), Disp: 30 tablet, Rfl: 1    amLODIPine (NORVASC) 5 mg tablet, Take 5 mg by mouth daily  , Disp: , Rfl:     atorvastatin (LIPITOR) 80 mg tablet, Take 80 mg by mouth every other day evening, Disp: , Rfl:     folic acid (FOLVITE) 1 mg tablet, Take 1 tablet (1 mg total) by mouth daily, Disp: 90 tablet, Rfl: 4    hydrOXYzine HCL (ATARAX) 25 mg tablet, Take 1 tablet (25 mg total) by mouth every 6 (six) hours as needed for itching or anxiety, Disp: 60 tablet, Rfl: 2    metoprolol tartrate (LOPRESSOR) 100 mg tablet, Take 50 mg by mouth daily, Disp: , Rfl:     naloxone (NARCAN) 4 mg/0 1 mL nasal spray, Administer 1 spray into a nostril   If breathing does not return to normal or if breathing difficulty resumes after 2-3 minutes, give another dose in the other nostril using a new spray , Disp: 1 each, Rfl: 1    omeprazole (PriLOSEC) 20 mg delayed release capsule, Take 20 mg by mouth daily  , Disp: , Rfl:     predniSONE 10 mg tablet, Take 2 tablets (20 mg total) by mouth daily, Disp: 60 tablet, Rfl: 3    senna (SENOKOT) 8 6 mg, Take 1 tablet (8 6 mg total) by mouth daily at bedtime (Patient taking differently: Take 8 6 mg by mouth daily at bedtime as needed ), Disp: 30 each, Rfl: 0    sildenafil (VIAGRA) 100 mg tablet, TAKE ONE TABLET BY MOUTH  AS NEEDED PRIOR TO SEXUAL ACTIVITY FOR ERECTILE DYSFUNCTION, Disp: , Rfl:     terazosin (HYTRIN) 5 mg capsule, Take 2 capsules (10 mg total) by mouth daily at bedtime, Disp: 30 capsule, Rfl: 6    traMADol (ULTRAM) 50 mg tablet, Take 1 tablet (50 mg total) by mouth every 6 (six) hours as needed for severe pain, Disp: 120 tablet, Rfl: 0    triamcinolone (KENALOG) 0 1 % lotion, Apply topically 3 (three) times a day, Disp: 60 mL, Rfl: 5    VITAMIN D PO, Take 1,000 Units by mouth daily , Disp: , Rfl:     cholestyramine (QUESTRAN) 4 g packet, Take 1 packet (4 g total) by mouth 2 (two) times a day as needed (diarrhea) (Patient not taking: Reported on 8/23/2021), Disp: 30 packet, Rfl: 5    Magnesium 250 MG TABS, 1 tablet 2 (two) times a day Taking 500mg in the morning and 500mg at night (Patient not taking: Reported on 9/1/2021), Disp: , Rfl:     Allergies   Allergen Reactions    Poison Ivy Extract Rash        Review of Systems:  Review of Systems   Constitutional: Positive for fatigue  Negative for appetite change, fever and unexpected weight change  HENT: Positive for hearing loss and postnasal drip  Dry mouth   Eyes:        Wears glasses   Respiratory: Negative  Negative for cough and shortness of breath  Cardiovascular: Negative  Negative for chest pain and leg swelling  Gastrointestinal: Positive for abdominal pain  Negative for constipation, diarrhea, nausea and vomiting  Genitourinary: Negative  Musculoskeletal: Positive for back pain  Right shoulder pain that radiates across back and down arm  Right rib pain that is fairly new  Skin: Negative  Allergic/Immunologic: Negative for environmental allergies     Neurological: Positive for dizziness, light-headedness and numbness  Hematological: Does not bruise/bleed easily  Psychiatric/Behavioral: Positive for dysphoric mood  Negative for sleep disturbance  Vitals:    09/01/21 0832   BP: 130/76   BP Location: Left arm   Patient Position: Sitting   Pulse: 73   Resp: 16   Temp: (!) 96 9 °F (36 1 °C)   TempSrc: Temporal   SpO2: 97%   Weight: 89 2 kg (196 lb 10 4 oz)   Height: 5' 9" (1 753 m)                Pain assessment: 7    PFT  n/a    Imaging:No images are attached to the encounter       Teaching  [employer] teaching and handouts given    MST  Pt declined    Implantable Devices [facility], pacemaker, pain stimulator)  R CW PAC, bilateral partial knee replacements    Hip Replacement  no

## 2021-09-01 NOTE — PROGRESS NOTES
Consultation - Radiation Oncology      JRP:[id number] : 1950  Encounter: [id number]  Patient Information: [address]  Chief Complaint   Patient presents with    Consult     radiation oncology     Cancer Staging  Stage IV metastatic urothelial carcinoma of the left renal pelvis  History of Present Illness   Bay Remy is a 66y o  year old male who presents with stage IV metastatic urothelial carcinoma of the left renal pelvis   He is status post left nephrectomy (2020) and now has evidence of widespread metastatic disease on PET-CT study 2020 as outlined above  [facility] was seen by Dr Rowena Davis has started on immunotherapy with [facility] 2020   [facility] has a 4 1 x 3 2 symptomatic left rectus abdominal muscle mass  He completed a course of palliative radiation 10/10 to the left abdomen 12  3 2020      Pt is followed by Dr Alicia Knowles for Pain Medicine/Anesthesia (Injections) for chronic lower back pain     21-Virtual visit Dr Kami Segura  Patient completed palliative radiation to the left abdomen  This is a follow up visit one month after treatment completed  "He has had a good response to radiation therapy with less pain in the left abdominal wall   Repeat PET-CT study 2021 revealed a mixed response to his systemic treatment and a good response to radiation to the left rectus abdominal muscle mass with resolution of FDG uptake   Results were discussed with him today  [facility] will continue with his Keytruda infusion every 3 weeks which is scheduled again on 2021   We do not recommend any additional palliative radiation therapy  [facility] will be seen here on a p r n  basis since he will continue to follow with Dr Faustina Santana in [facility] is also where he receives his chemotherapy "     21 Timo Ocasio, 10 Joe  Nephrology  Stage 3a CKD-Return to the office in 4 months/Dr Nilsa Mares    7 6    CT Chest wo contrast  IMPRESSION:  No acute intrathoracic abnormality     7 14 21-XR shoulder 2+ vw right  IMPRESSION:   No acute osseous abnormality      8/9/21-Consult surg Dr Hilary Nicholson  Patient presents with non healing mass on his back x 2 months  Mass removed and closed skin with sutures  Final Diagnosis   A  Skin, Upper Back, Excision:  - Metastatic high grade carcinoma consistent with the patient's known urothelial primary  See note       8 16 21 PET  IMPRESSION:  1   New hypermetabolic soft tissue lesions at the level of the right shoulder and right axilla as above, most concerning for metastases  2   Interval progression of retroperitoneal and mesenteric hypermetabolic metastases      7 62 60 RAGHU Cooley/Med Onc  Keytruda planned on 8 20 21 as scheduled however progression of his metastatic urothelial cancer on his current txmt with new soft tissue lesions in the right shoulder region/axilla  Recent excision of one of the right posterior shoulder lesions by general surgeon  Findings consistent w/metastatic urothelial carcinoma  Change in systemic txmt to be discussed w/Dr Stevie Ayala  Consider third line Enfortumab Vedotin  Pt reporting significant pain in his right shoulder and arm region due to new  Metastatic lesions which is affecting his quality of life  Refer to Rad Onc to consider palliative radiation  Follow up appt with palliative care to reestablish care and for pain and sx management     8 23 21 Dr Kevan Fall visit for suture removal status post excision of a mass from the skin of the upper right back  Final pathology confirmed the presence of metastatic urothelial carcinoma  Follow up prn       8 23 21 Dr Ruy Huggins     Cancer related pain--Continue Tramadol 50mg/Tylenol 1000mg ATC/Senekot/ Rad Onc Consult/Med Onc-consider 3rd line of tx/  Return in 3 wks for follow up on pain or call sooner, if needed       Patient seen for consultation today with his wife   He denies any pain in the previously treated left abdominal wall region  He is having pain now in the right shoulder that radiates down the right arm as well as radiates toward his upper back and right neck  He also has some discomfort in the right upper back at the site of the lesion that was biopsied/excised by Dr Latisha Bardales on August 9th  He also occasionally has some right lower rib discomfort  He denies any trauma nor falls  His pain is controlled with tramadol 50 mg every 6 hours and then in between he sometimes takes Tylenol 1000 mg  He is having no respiratory complaints with no cough nor any shortness of breath  Patient and his wife plan a family vacation on October 23rd to [place] of the [place] as well as their upcoming 50th wedding anniversary  Appts:  9 9 21 Dr Brandon Kinney - [place] [facility]  9 10 21 [facility]- Patient to start Padcev  9 14 21 Dr German Carrillo   [address] [facility]  9 29 21 Dr John Vides  9 30 21 Lai Brothers, 10 [facility]  59 13 69 Dr Ewing    Oncology History   Urothelial cancer [facility])   1/3/2020 Biopsy    Final Diagnosis    A  Urine, Clean Catch, Thin Prep:  High grade urothelial carcinoma (HGUC) - see comment  1/3/2020 Initial Diagnosis    Urothelial cancer ([facility] [address] )  T3 N0 (stage IIIA)     1/17/2020 Biopsy    Final Diagnosis    A  Ureter, Right, left renal pelvis biopsy  -Fragments of high grade urothelial carcinoma   -No evidence of lamina propria invasion   -Detrusor muscle/ muscularis propria is not present for evaluation  B  Urinary Bladder, bladder biopsy:  -Fragments of low grade papillary lesion  See note  -No evidence of invasion seen  -Unremarkable fragment of detrusor muscle seen  4/1/2020 Surgery    left robotic assisted laparoscopic nephroureterectomy with bladder cuff excision     Final Diagnosis    A   Left kidney, ureter, and bladder cuff, nephroureterectomy:  - Invasive high grade urothelial carcinoma arising in renal pelvis  - Bladder cuff margin is negative for carcinoma and no evidence of high grade dysplasia  - Ureters with no significant pathologic abnormality  - Two benign simple cysts  - Adrenal gland is negative for malignancy  - One lymph node, negative for malignancy (0/1)          5/14/2020 - 6/3/2020 Chemotherapy    CISplatin (PLATINOL) split dose 35 mg/m2 = 75 3 mg (50 % of original dose 70 mg/m2), Intravenous,   Administration: 75 3 mg (5/14/2020), 75 3 mg (5/21/2020)  gemcitabine (GEMZAR) 2,200 mg in sodium chloride 0 9 % 250 mL infusion, 2,150 2 mg (80 % of original dose 1,250 mg/m2), Intravenous,   Administration: 2,200 mg (5/14/2020), 2,200 mg (5/21/2020)    (only completed 1 cycle- d/c d/t adverse effects and worsening renal dysfunction)     10/9/2020 - 9/9/2021 Chemotherapy    pembrolizumab (KEYTRUDA) IVPB, 200 mg, Intravenous, Once, 16 of 20 cycles  Administration: 200 mg (10/9/2020), 200 mg (10/30/2020), 200 mg (11/20/2020), 200 mg (12/11/2020), 200 mg (12/31/2020), 200 mg (1/22/2021), 200 mg (2/12/2021), 200 mg (3/5/2021), 200 mg (3/26/2021), 200 mg (4/16/2021), 200 mg (5/7/2021), 200 mg (5/28/2021), 200 mg (6/18/2021), 200 mg (7/9/2021), 200 mg (7/30/2021), 200 mg (8/20/2021)     11/19/2020 - 12/3/2020 Radiation    Treatment:  Course: C1    Plan ID Energy Fractions Dose per Fraction (cGy) Dose Correction (cGy) Total Dose Delivered (cGy) Elapsed Days   L Abdomen 6X 10 / 10 300 0 3,000 14        9/10/2021 -  Chemotherapy    enfortumab vedotin-ejfv (PADCEV) IVPB, 1 25 mg/kg, Intravenous, Once, 0 of 6 cycles     Cancer of left renal pelvis ([facility] [address] )   4/23/2020 Initial Diagnosis    Cancer of left renal pelvis ([facility] [address] )     10/9/2020 - 9/9/2021 Chemotherapy    pembrolizumab (KEYTRUDA) IVPB, 200 mg, Intravenous, Once, 16 of 20 cycles  Administration: 200 mg (10/9/2020), 200 mg (10/30/2020), 200 mg (11/20/2020), 200 mg (12/11/2020), 200 mg (12/31/2020), 200 mg (1/22/2021), 200 mg (2/12/2021), 200 mg (3/5/2021), 200 mg (3/26/2021), 200 mg (4/16/2021), 200 mg (5/7/2021), 200 mg (5/28/2021), 200 mg (6/18/2021), 200 mg (7/9/2021), 200 mg (7/30/2021), 200 mg (8/20/2021)     9/10/2021 -  Chemotherapy    enfortumab vedotin-ejfv (PADCEV) IVPB, 1 25 mg/kg, Intravenous, Once, 0 of 6 cycles      Surgery       Metastatic urothelial carcinoma ([facility] [address] )   8/9/2021 Initial Diagnosis    Metastatic urothelial carcinoma ([facility] [address] )     9/10/2021 -  Chemotherapy    enfortumab vedotin-ejfv (PADCEV) IVPB, 1 25 mg/kg, Intravenous, Once, 0 of 6 cycles           Past Medical History:   Diagnosis Date    Arthritis     Bladder cancer     Cancer ([facility] [address] )     skin melanoma;basal cell    Chronic pain disorder     from arthritis    Colon polyp     Does use hearing aid     bilat    Dry eyes, bilateral     GERD (gastroesophageal reflux disease)     History of kidney stones 10/2019    History of partial knee replacement     bilat    History of vertigo     Hyperlipidemia     Hypertension     Kidney lesion     Lumbar disc disorder     compression of vertebrae L4-5-6    Muscle weakness     left hip area    Renal mass     left    Right ankle injury 03/05/2020    missed step of ladder     Right ankle pain     Shortness of breath     with activity    Tinnitus     Urothelial cancer     left    Wears glasses      Past Surgical History:   Procedure Laterality Date    COLONOSCOPY      CYSTOSCOPY Left 4/1/2020    Procedure: CYSTOSCOPY; URETERAL CATHETER PLACEMENT;  Surgeon: Maryann Tejeda MD;  Location[facility];  Service: Urology    CYSTOSCOPY  05/11/2020    CYSTOSCOPY  06/04/2021    FL CYSTOGRAM  4/13/2020    FL RETROGRADE PYELOGRAM  1/17/2020    HERNIA REPAIR      umbilical with mesh    INGUINAL HERNIA REPAIR Right     with mesh    IR PORT PLACEMENT  4/30/2020    JOINT REPLACEMENT Bilateral     partials knee    LUMBAR EPIDURAL INJECTION      SD CYSTOURETHROSCOPY,URETER CATHETER Bilateral 1/17/2020 Procedure: CYSTOSCOPY; RIGHT RETROGRADE PYELOGRAM WITH RIGHT URETERAL CYTOLOGY SAMPLING; LEFT URETEROSCOPY WITH RENAL PELVIS BIOPSY AND LEFT STENT PLACEMENT;  Surgeon: Cassidy Warner MD;  [facility];  Service: Urology    WI NEPHRECTOMY, W/PART   URETECTOMY Left 2020    Procedure: ROBOTIC LAPAROSCOPIC NEPHRO-URETERECTOMY;  Surgeon: Cassidy Warner MD;  [facility];  Service: Urology    SKIN CANCER EXCISION      surface melanoma    TONSILLECTOMY      WISDOM TOOTH EXTRACTION         Family History   Problem Relation Age of Onset    Liver cancer Father        Social History   Social History     Substance and Sexual Activity   Alcohol Use Yes    Alcohol/week: 17 0 standard drinks    Types: 7 Glasses of wine, 10 Cans of beer per week    Comment: socially     Social History     Substance and Sexual Activity   Drug Use Never     Social History     Tobacco Use   Smoking Status Former Smoker    Types: Cigarettes    Quit date: 0    Years since quittin 7   Smokeless Tobacco Never Used     Meds/Allergies     Current Outpatient Medications:     acetaminophen (TYLENOL) 500 mg tablet, Take 2 tablets (1,000 mg total) by mouth every 8 (eight) hours, Disp: , Rfl:     allopurinol (ZYLOPRIM) 300 mg tablet, take 1 tablet by mouth once daily, Disp: 30 tablet, Rfl: 3    ALPRAZolam (XANAX) 0 25 mg tablet, Take 1 tablet (0 25 mg total) by mouth daily at bedtime as needed for anxiety (restless legs), Disp: 30 tablet, Rfl: 1    amLODIPine (NORVASC) 5 mg tablet, Take 5 mg by mouth daily  , Disp: , Rfl:     atorvastatin (LIPITOR) 80 mg tablet, Take 80 mg by mouth every other day evening, Disp: , Rfl:     folic acid (FOLVITE) 1 mg tablet, Take 1 tablet (1 mg total) by mouth daily, Disp: 90 tablet, Rfl: 4    hydrOXYzine HCL (ATARAX) 25 mg tablet, Take 1 tablet (25 mg total) by mouth every 6 (six) hours as needed for itching or anxiety, Disp: 60 tablet, Rfl: 2    metoprolol tartrate (LOPRESSOR) 100 mg tablet, Take 50 mg by mouth daily, Disp: , Rfl:     naloxone (NARCAN) 4 mg/0 1 mL nasal spray, Administer 1 spray into a nostril  If breathing does not return to normal or if breathing difficulty resumes after 2-3 minutes, give another dose in the other nostril using a new spray , Disp: 1 each, Rfl: 1    omeprazole (PriLOSEC) 20 mg delayed release capsule, Take 20 mg by mouth daily  , Disp: , Rfl:     predniSONE 10 mg tablet, Take 2 tablets (20 mg total) by mouth daily, Disp: 60 tablet, Rfl: 3    senna (SENOKOT) 8 6 mg, Take 1 tablet (8 6 mg total) by mouth daily at bedtime (Patient taking differently: Take 8 6 mg by mouth daily at bedtime as needed ), Disp: 30 each, Rfl: 0    sildenafil (VIAGRA) 100 mg tablet, TAKE ONE TABLET BY MOUTH  AS NEEDED PRIOR TO SEXUAL ACTIVITY FOR ERECTILE DYSFUNCTION, Disp: , Rfl:     terazosin (HYTRIN) 5 mg capsule, Take 2 capsules (10 mg total) by mouth daily at bedtime, Disp: 30 capsule, Rfl: 6    traMADol (ULTRAM) 50 mg tablet, Take 1 tablet (50 mg total) by mouth every 6 (six) hours as needed for severe pain, Disp: 120 tablet, Rfl: 0    triamcinolone (KENALOG) 0 1 % lotion, Apply topically 3 (three) times a day, Disp: 60 mL, Rfl: 5    VITAMIN D PO, Take 1,000 Units by mouth daily , Disp: , Rfl:     cholestyramine (QUESTRAN) 4 g packet, Take 1 packet (4 g total) by mouth 2 (two) times a day as needed (diarrhea) (Patient not taking: Reported on 8/23/2021), Disp: 30 packet, Rfl: 5    Magnesium 250 MG TABS, 1 tablet 2 (two) times a day Taking 500mg in the morning and 500mg at night (Patient not taking: Reported on 9/1/2021), Disp: , Rfl:   Allergies   Allergen Reactions    Poison Ivy Extract Rash     Review of Systems   Constitutional: Positive for fatigue  Negative for appetite change, fever and unexpected weight change  HENT: Positive for hearing loss and postnasal drip  Dry mouth   Eyes:        Wears glasses   Respiratory: Negative    Negative for cough and shortness of breath  Cardiovascular: Negative  Negative for chest pain and leg swelling  Gastrointestinal: Positive for abdominal pain  Negative for constipation, diarrhea, nausea and vomiting  Genitourinary: Negative  Musculoskeletal: Positive for back pain  Right shoulder pain that radiates across back and down arm  Right rib pain that is fairly new  Skin: Negative  Allergic/Immunologic: Negative for environmental allergies  Neurological: Positive for dizziness, light-headedness and numbness  Hematological: Does not bruise/bleed easily  Psychiatric/Behavioral: Positive for dysphoric mood  Negative for sleep disturbance  OBJECTIVE:   /76 (BP Location: Left arm, Patient Position: Sitting)   Pulse 73   Temp (!) 96 9 °F (36 1 °C) (Temporal)   Resp 16   Ht 5' 9" (1 753 m)   Wt 89 2 kg (196 lb 10 4 oz)   SpO2 97%   BMI 29 04 kg/m²   Pain Assessment:  7  Performance Status: ECOG/Zubrod/WHO: 1 - Symptomatic but completely ambulatory    Physical Exam  Vitals and nursing note reviewed  Constitutional:       General: He is not in acute distress  Appearance: He is well-developed  He is not diaphoretic  HENT:      Head: Normocephalic and atraumatic  Mouth/Throat:      Pharynx: No oropharyngeal exudate  Eyes:      General: No scleral icterus  Conjunctiva/sclera: Conjunctivae normal       Pupils: Pupils are equal, round, and reactive to light  Neck:      Thyroid: No thyromegaly  Trachea: No tracheal deviation  Cardiovascular:      Rate and Rhythm: Normal rate and regular rhythm  Heart sounds: Normal heart sounds  Pulmonary:      Effort: Pulmonary effort is normal  No respiratory distress  Breath sounds: Normal breath sounds  No wheezing or rales  Chest:      Chest wall: No tenderness  Abdominal:      General: Bowel sounds are normal  There is no distension  Palpations: Abdomen is soft  There is no mass        Tenderness: There is no abdominal tenderness  Comments: Previous 4 cm left abdominal rectus muscle mass has not recurred and the area is nontender  Musculoskeletal:         General: Swelling ( There is a 7 x 12 cm tender subcutaneous mass over the right lateral shoulder region extending down into the upper humerus ) and deformity present  No tenderness  Normal range of motion  Cervical back: Normal range of motion and neck supple  Lymphadenopathy:      Cervical: No cervical adenopathy  Upper Body:      Right upper body: No supraclavicular or axillary adenopathy  Left upper body: No supraclavicular or axillary adenopathy  Lower Body: No right inguinal adenopathy  No left inguinal adenopathy  Skin:     General: Skin is warm and dry  Coloration: Skin is not pale  Findings: No erythema or rash  Comments: There is a 2 5cm area of erythema and induration with a well-healed surgical incision in the right upper back at the site of excision of subcutaneous metastatic nodule that is slightly tender to the touch  Neurological:      General: No focal deficit present  Mental Status: He is alert and oriented to person, place, and time  Cranial Nerves: No cranial nerve deficit  Coordination: Coordination normal    Psychiatric:         Mood and Affect: Mood normal          Behavior: Behavior normal          Thought Content:  Thought content normal          Judgment: Judgment normal        RESULTS  Lab Results    Chemistry        Component Value Date/Time    K 3 7 08/18/2021 1132    CL 99 08/18/2021 1132    CO2 33 (H) 08/18/2021 1132    BUN 24 08/18/2021 1132    CREATININE 1 48 (H) 08/18/2021 1132        Component Value Date/Time    CALCIUM 9 8 08/18/2021 1132    ALKPHOS 64 08/18/2021 1132    AST 10 (L) 08/18/2021 1132    ALT 12 08/18/2021 1132            Lab Results   Component Value Date    WBC 6 50 08/18/2021    HGB 13 2 (L) 08/18/2021    HCT 40 2 (L) 08/18/2021    MCV 96 08/18/2021     08/18/2021     Imaging Studies  NM PET CT skull base to mid thigh    Result Date: 8/16/2021  Narrative: PET/CT SCAN INDICATION:  C68 9: Malignant neoplasm of urinary organ, unspecified C65 2: Malignant neoplasm of left renal pelvis   , history of solid nodule right upper back, bladder cancer, left vertebral bodies laparoscopic nephroureterectomy 4/2020 MODIFIER: PS COMPARISON: CT chest 7/6/2021, PET/CT 4/30/2021 and priors CELL TYPE:  High-grade urothelial carcinoma TECHNIQUE:   8 6 mCi F-18-FDG administered IV  Multiplanar attenuation corrected and non attenuation corrected PET images were acquired 60 minutes post injection  Contiguous, low dose, axial CT sections were obtained from the skull base through the femurs   Intravenous contrast material was not utilized  This examination, like all CT scans performed in the [facility], was performed utilizing techniques to minimize radiation dose exposure, including the use of iterative reconstruction and automated exposure control  Fasting serum glucose: 88 mg/dl FINDINGS: VISUALIZED BRAIN:   No acute abnormalities are seen  HEAD/NECK:   There is a physiologic distribution of FDG  No FDG avid cervical adenopathy is seen  CT images: Stable  CHEST:   There is a new hypermetabolic soft tissue intramuscular mass along the lateral right shoulder, SUV 22 5, most concerning for metastasis  This measures approximately 4 2 x 4 x 9 cm based on the PET images  New hypermetabolic subcutaneous nodule along the posterior right back image 4/51 measures 2 1 x 1 5 cm, SUV 10 5  This is likely metastatic  New hypermetabolic necrotic right axillary node is also likely metastatic, image 4/74, measuring 1 9 x 2 6 cm, SUV 15 1  CT images: Coronary atherosclerosis  Stable borderline aneurysmal ascending thoracic aorta measuring 4 cm  ABDOMEN:   Enlarging retroperitoneal aortocaval adenopathy most concerning for metastasis    On image 4/121 this measures 3 3 x 2 9 cm, SUV 17 9  Prior measurement 2 x 1 4 cm, SUV 11 5  There are also several new upper mesenteric and retroperitoneal hypermetabolic nodules/nodes, most concerning for metastases  For example, hypermetabolic node image 9/163 measures 1 2 x 0 9 cm, SUV 7 7  Hypermetabolic node image 1/102 measures 9 x 9 mm, SUV 6 8  CT images: Stable hepatic hypodensities  Left nephrectomy  Colon diverticula  PELVIS: No FDG avid soft tissue lesions are seen  CT images: Stable  OSSEOUS STRUCTURES: No FDG avid lesions are seen  CT images: Spine degenerative change  Impression: 1  New hypermetabolic soft tissue lesions at the level of the right shoulder and right axilla as above, most concerning for metastases  2   Interval progression of retroperitoneal and mesenteric hypermetabolic metastases  The study was marked in EPIC for significant notification  Workstation performed: [id number]     Pathology: See Above    ASSESSMENT  1  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)  Radiation Simulation Treatment   2  Secondary malignant neoplasm of Right Axilla [facility])  Radiation Simulation Treatment   3  Urothelial cancer [facility])  Ambulatory referral to Radiation Oncology    Ambulatory referral to oncology social worker   4  Metastatic urothelial carcinoma ([address] )       Cancer Staging  Stage IV metastatic urothelial carcinoma of the left renal pelvis      PLAN/DISCUSSION  Orders Placed This Encounter   Procedures    Ambulatory referral to oncology social worker   [address] [facility] Juwan is a 66y o  year old male with stage IV metastatic urothelial carcinoma of the left renal pelvis   He is status post nephrectomy and had widespread metastatic disease on PET-CT study September 23, 2020  Arabella Ford was seen by Dr Marvia Canavan has started on immunotherapy with Ciaran Jones October 9, 2020 that he tolerated well  Arabella Ford had a 4 1 x 3 2 symptomatic left rectus abdominal muscle mass   We recommend palliative radiation therapy to this mass with 3000 cGy in 2 weeks/10 fractions  He completed palliative radiation therapy on December 3, 2020  He had a good response to radiation therapy with resolution of pain in the left abdominal wall  Repeat PET-CT study January 13, 2021 revealed a mixed response to his systemic treatment and a good response to radiation to the left rectus abdominal muscle mass with resolution of FDG uptake  He continued with his Keytruda infusion every 3 weeks up until August 20, 2021  He developed what he described as a non healing boil on the right upper back in July  He saw Dr Melissa Medina on August 9, 2021 who excised the area which came back positive for metastatic high-grade carcinoma consistent with his known urothelial primary  Repeat PET-CT study August 16, 2021 revealed a new hypermetabolic soft tissue mass at the level of the right shoulder as well as in the right axilla consistent with metastatic disease   There was also uptake in the right upper back area at the excision site  There was progression of retroperitoneal and mesenteric metastasis  He saw Dr Uvaldo Velasquez in [address] [place] [address] who stopped his Tamy Mercury and now he will be starting Padcev on September 10, 2021  He has been having pain in the right shoulder radiating down the right arm and into the right neck and upper back regions  He previously responded very well to palliative radiation therapy  We therefore recommend palliative radiation therapy to the right shoulder, axilla and the subcutaneous area in the right upper back  We recommend a course of 3000 cGy in 10 fractions over 2 weeks  We discussed the acute side effects and the potential chronic complications of treatment with him and he agrees to proceed with the treatment  He had simulation performed today and will return to start treatment next week in the [address] [place] [address] Way        Rosana Kitchen MD  2/3/6598,9:01 AM      Portions of the record may have been created with voice recognition software  Occasional wrong word or "sound a like" substitutions may have occurred due to the inherent limitations of voice recognition software  Read the chart carefully and recognize, using context, where substitutions have occurred

## 2021-09-01 NOTE — PROGRESS NOTES
Oncology LSW received pt's completed distress thermometer in which pt self scored a 7/10 and noted nervousness, sadness, worry, loss of interest in usual activities, and 7/22 physical concerns  Pt has a h/o metastatic urothelial carcinoma  Met pt and wife after radiation oncology consultation  Introduced self and role to Mr  And Mrs Amato  Pt stated he is to have radiation on his shoulder at \[facility]", however will resume infusions at [facility]  He stated he is comfortable driving himself to treatments and denied any needs  Offered [facility] transport should he find increasing difficulty  Pt's wife shared she also drives and can assist if he needs help  Mr  And Mrs Amato have 4 adult children, 9 grand-children and 2 great-grand children  All are local except for a daughter who lives in [place]  Provided business card and offered any supportive needs if they arise  Also shared LSW colleague is available at [facility] as well  Both appreciative of support

## 2021-09-02 PROCEDURE — 77300 RADIATION THERAPY DOSE PLAN: CPT | Performed by: RADIOLOGY

## 2021-09-02 PROCEDURE — 77295 3-D RADIOTHERAPY PLAN: CPT | Performed by: RADIOLOGY

## 2021-09-02 PROCEDURE — 77334 RADIATION TREATMENT AID(S): CPT | Performed by: RADIOLOGY

## 2021-09-03 DIAGNOSIS — C65.2 CANCER OF LEFT RENAL PELVIS (HCC): ICD-10-CM

## 2021-09-03 DIAGNOSIS — C68.9 UROTHELIAL CANCER (HCC): ICD-10-CM

## 2021-09-03 DIAGNOSIS — C79.10 METASTATIC UROTHELIAL CARCINOMA (HCC): Primary | ICD-10-CM

## 2021-09-03 RX ORDER — SODIUM CHLORIDE 9 MG/ML
20 INJECTION, SOLUTION INTRAVENOUS ONCE
Status: CANCELLED | OUTPATIENT
Start: 2021-09-10

## 2021-09-03 RX ORDER — SODIUM CHLORIDE 9 MG/ML
20 INJECTION, SOLUTION INTRAVENOUS ONCE
Status: CANCELLED | OUTPATIENT
Start: 2021-09-24

## 2021-09-03 RX ORDER — SODIUM CHLORIDE 9 MG/ML
20 INJECTION, SOLUTION INTRAVENOUS ONCE
Status: CANCELLED | OUTPATIENT
Start: 2021-09-17

## 2021-09-04 ENCOUNTER — TELEPHONE (OUTPATIENT)
Dept: PALLIATIVE MEDICINE | Facility: CLINIC | Age: 71
End: 2021-09-04

## 2021-09-04 ENCOUNTER — TELEPHONE (OUTPATIENT)
Dept: OTHER | Facility: OTHER | Age: 71
End: 2021-09-04

## 2021-09-04 DIAGNOSIS — C68.9 UROTHELIAL CANCER (HCC): ICD-10-CM

## 2021-09-04 DIAGNOSIS — C79.89 SECONDARY MALIGNANT NEOPLASM OF MUSCLE OF ABDOMEN (HCC): ICD-10-CM

## 2021-09-04 DIAGNOSIS — G89.3 CANCER RELATED PAIN: Primary | ICD-10-CM

## 2021-09-04 RX ORDER — HYDROMORPHONE HYDROCHLORIDE 2 MG/1
TABLET ORAL
Qty: 40 TABLET | Refills: 0 | Status: SHIPPED | OUTPATIENT
Start: 2021-09-04 | End: 2021-09-16 | Stop reason: SDUPTHER

## 2021-09-04 NOTE — TELEPHONE ENCOUNTER
Patient wife called saying that her  is in a lot of pain and want to know if she can give him more of his traMADol (ULTRAM) 50 mg medication or is there something else that he can take for the pain

## 2021-09-04 NOTE — TELEPHONE ENCOUNTER
Received notification from on-call service  Controlled Substance Review    [place] or [place]  reviewed: No red flags were identified; safe to proceed with prescription  [place] PDMP Review       Value Time User    PDMP Reviewed  Yes 9/4/2021  4:17 PM Rey Ruiz MD        Prescriptions  Total Prescriptions: 21    Total Private Pay: 0    Fill Date ID   Written Drug Qty Days Prescriber Rx # Pharmacy Refill   Daily Dose* Pymt Type      08/25/2021  1   08/23/2021  Tramadol Hcl 50 MG Tablet  120 00  30 Ti Delon   [id number]   Annel ([id number])   0  20 00 MME  [place]   07/26/2021  1   07/26/2021  Tramadol Hcl 50 MG Tablet  60 00  30 Janeen Wer   [id number]   Annel ([id number])   0  10 00 [place]   06/04/2021  1   05/06/2021  Alprazolam 0 25 MG Tablet  30 00  30 La Bac   [id number]   Annel ([id number])   1   [place]   05/06/2021  1   05/06/2021  Alprazolam 0 25 MG Tablet  30 00  30 La Bac   [id number]   Annel ([id number])   [place]   [place]   03/25/2021  1   03/25/2021  Alprazolam 0 25 MG Tablet  30 00  30 Ta Hab   [id number]   Annel ([id number])   [place]   [place]       Return call and spoke with patient's spouse [Wandy]  Patient with increased cancer-related pain over the past 2-3 days, Tramadol, previously effective, no longer efficacious  Pain localizes to R shoulder across thoracic back, constant, worsening, no alleviating factors  Denies nausea, vomiting  BM daily  Denies CP, SOB, lightheadedness, fever/chills  No longer use of MMJ given undesirable side effects  Sleeping well  Plan to initiate new RT x 10 fractions this upcoming Tuesday and new immunotherapy regimen this upcoming Friday  Discussed potential to increase frequency of tramadol 50 mg to Q4H dosing [max daily 300 mg] v use of tramadol 100 mg Q8H; given lack of reported efficacy, will opioid rotate to a medication previously efficacious early on cancer-related pain -> PO hydromorphone      Plan:   - discontinue tramadol   - continue APAP 1000 mg PO Q8H [address]   - max daily 4000 mg   - opioid rotation hydromorphone 2-4 mg PO Q4H PRN [mod/severe pain]   - initial use of 2 mg, if inefficacious plan to call back to [facility] provider to discuss if increase to 4 mg    - if pain remains uncontrollable, recommendation to ED for further pain management and evaluation   - goal per patient is to remain home at this time   - [facility] team to follow up on Tuesday 300 Teo Zarco MD  Palliative and Supportive Care  Ph: [phone]

## 2021-09-07 ENCOUNTER — TELEPHONE (OUTPATIENT)
Dept: PALLIATIVE MEDICINE | Facility: CLINIC | Age: 71
End: 2021-09-07

## 2021-09-07 ENCOUNTER — APPOINTMENT (OUTPATIENT)
Dept: RADIATION ONCOLOGY | Facility: CLINIC | Age: 71
End: 2021-09-07
Attending: RADIOLOGY
Payer: MEDICARE

## 2021-09-07 PROCEDURE — 77280 THER RAD SIMULAJ FIELD SMPL: CPT | Performed by: INTERNAL MEDICINE

## 2021-09-07 NOTE — TELEPHONE ENCOUNTER
----- Message from April D Mateo Salter sent at 9/7/2021  8:28 AM EDT -----  See Below: Can someone follow up with Pat's pain control  Thank you! April  ----- Message -----  From: Autumn Hernandez MD  Sent: 9/4/2021   4:28 PM EDT  To: Sophie Pearson MD    Carri,    Patient's spouse called reporting increased pain over the past few days  Tramadol no longer efficacious  Rotated to PO hydromorphone 2-4 mg Q4H PRN [patient previously on medication when I saw him in fellows clinic]  If this regimen doesn't work, recommend to ED for pain management and further evaluation  Please have Serafin Vásquez or RN follow up on Tuesday  Thanks!     Ishmael Beck

## 2021-09-07 NOTE — TELEPHONE ENCOUNTER
Patient is doing much better per report of his wife  He has better pain control with the dilaudid  Appt with Dr Moe Smith in 1 week  They know to call our [facility] office should he develop more new pain

## 2021-09-08 ENCOUNTER — APPOINTMENT (OUTPATIENT)
Dept: RADIATION ONCOLOGY | Facility: CLINIC | Age: 71
End: 2021-09-08
Attending: RADIOLOGY
Payer: MEDICARE

## 2021-09-08 ENCOUNTER — APPOINTMENT (OUTPATIENT)
Dept: LAB | Facility: HOSPITAL | Age: 71
End: 2021-09-08
Payer: MEDICARE

## 2021-09-08 DIAGNOSIS — N18.31 STAGE 3A CHRONIC KIDNEY DISEASE (HCC): ICD-10-CM

## 2021-09-08 DIAGNOSIS — M89.9 CHRONIC KIDNEY DISEASE-MINERAL AND BONE DISORDER: ICD-10-CM

## 2021-09-08 DIAGNOSIS — N18.9 CHRONIC KIDNEY DISEASE-MINERAL AND BONE DISORDER: ICD-10-CM

## 2021-09-08 DIAGNOSIS — E83.9 CHRONIC KIDNEY DISEASE-MINERAL AND BONE DISORDER: ICD-10-CM

## 2021-09-08 DIAGNOSIS — C79.10 METASTATIC UROTHELIAL CARCINOMA (HCC): ICD-10-CM

## 2021-09-08 DIAGNOSIS — C68.9 UROTHELIAL CANCER (HCC): ICD-10-CM

## 2021-09-08 DIAGNOSIS — E06.4 DRUG-INDUCED THYROIDITIS: ICD-10-CM

## 2021-09-08 DIAGNOSIS — C65.2 CANCER OF LEFT RENAL PELVIS (HCC): ICD-10-CM

## 2021-09-08 LAB
25(OH)D3 SERPL-MCNC: 42.6 NG/ML (ref 30–100)
ALBUMIN SERPL BCP-MCNC: 4 G/DL (ref 3.5–5.7)
ALP SERPL-CCNC: 66 U/L (ref 55–165)
ALT SERPL W P-5'-P-CCNC: 12 U/L (ref 7–52)
ANION GAP SERPL CALCULATED.3IONS-SCNC: 7 MMOL/L (ref 4–13)
AST SERPL W P-5'-P-CCNC: 10 U/L (ref 13–39)
BACTERIA UR QL AUTO: ABNORMAL /HPF
BASOPHILS # BLD AUTO: 0 THOUSANDS/ΜL (ref 0–0.1)
BASOPHILS NFR BLD AUTO: 0 % (ref 0–2)
BILIRUB SERPL-MCNC: 0.6 MG/DL (ref 0.2–1)
BILIRUB UR QL STRIP: NEGATIVE
BUN SERPL-MCNC: 21 MG/DL (ref 7–25)
CALCIUM SERPL-MCNC: 10.2 MG/DL (ref 8.6–10.5)
CHLORIDE SERPL-SCNC: 99 MMOL/L (ref 98–107)
CLARITY UR: CLEAR
CO2 SERPL-SCNC: 32 MMOL/L (ref 21–31)
COLOR UR: YELLOW
CREAT SERPL-MCNC: 1.5 MG/DL (ref 0.7–1.3)
CREAT UR-MCNC: 127 MG/DL
EOSINOPHIL # BLD AUTO: 0 THOUSAND/ΜL (ref 0–0.61)
EOSINOPHIL NFR BLD AUTO: 0 % (ref 0–5)
ERYTHROCYTE [DISTWIDTH] IN BLOOD BY AUTOMATED COUNT: 15.5 % (ref 11.5–14.5)
GFR SERPL CREATININE-BSD FRML MDRD: 46 ML/MIN/1.73SQ M
GLUCOSE SERPL-MCNC: 123 MG/DL (ref 65–99)
GLUCOSE UR STRIP-MCNC: NEGATIVE MG/DL
HCT VFR BLD AUTO: 39.6 % (ref 42–47)
HGB BLD-MCNC: 13 G/DL (ref 14–18)
HGB UR QL STRIP.AUTO: NEGATIVE
KETONES UR STRIP-MCNC: ABNORMAL MG/DL
LEUKOCYTE ESTERASE UR QL STRIP: NEGATIVE
LYMPHOCYTES # BLD AUTO: 0.8 THOUSANDS/ΜL (ref 0.6–4.47)
LYMPHOCYTES NFR BLD AUTO: 11 % (ref 21–51)
MAGNESIUM SERPL-MCNC: 1.8 MG/DL (ref 1.9–2.7)
MCH RBC QN AUTO: 31.2 PG (ref 26–34)
MCHC RBC AUTO-ENTMCNC: 32.7 G/DL (ref 31–37)
MCV RBC AUTO: 95 FL (ref 81–99)
MICROALBUMIN UR-MCNC: <5 MG/L (ref 0–20)
MICROALBUMIN/CREAT 24H UR: <4 MG/G CREATININE (ref 0–30)
MONOCYTES # BLD AUTO: 0.4 THOUSAND/ΜL (ref 0.17–1.22)
MONOCYTES NFR BLD AUTO: 5 % (ref 2–12)
NEUTROPHILS # BLD AUTO: 6.5 THOUSANDS/ΜL (ref 1.4–6.5)
NEUTS SEG NFR BLD AUTO: 84 % (ref 42–75)
NITRITE UR QL STRIP: NEGATIVE
NON-SQ EPI CELLS URNS QL MICRO: ABNORMAL /HPF
PH UR STRIP.AUTO: 6 [PH]
PHOSPHATE SERPL-MCNC: 4.1 MG/DL (ref 3–5.5)
PLATELET # BLD AUTO: 229 THOUSANDS/UL (ref 149–390)
PMV BLD AUTO: 7.1 FL (ref 8.6–11.7)
POTASSIUM SERPL-SCNC: 4.5 MMOL/L (ref 3.5–5.5)
PROT SERPL-MCNC: 6.7 G/DL (ref 6.4–8.9)
PROT UR STRIP-MCNC: NEGATIVE MG/DL
PTH-INTACT SERPL-MCNC: 47.1 PG/ML (ref 18.4–80.1)
RBC # BLD AUTO: 4.15 MILLION/UL (ref 4.3–5.9)
RBC #/AREA URNS AUTO: ABNORMAL /HPF
SODIUM SERPL-SCNC: 138 MMOL/L (ref 134–143)
SP GR UR STRIP.AUTO: 1.01 (ref 1–1.03)
TSH SERPL DL<=0.05 MIU/L-ACNC: 2.62 UIU/ML (ref 0.45–5.33)
UROBILINOGEN UR QL STRIP.AUTO: 0.2 E.U./DL
WBC # BLD AUTO: 7.7 THOUSAND/UL (ref 4.8–10.8)
WBC #/AREA URNS AUTO: ABNORMAL /HPF

## 2021-09-08 PROCEDURE — 83735 ASSAY OF MAGNESIUM: CPT

## 2021-09-08 PROCEDURE — 77331 SPECIAL RADIATION DOSIMETRY: CPT | Performed by: RADIOLOGY

## 2021-09-08 PROCEDURE — 84443 ASSAY THYROID STIM HORMONE: CPT

## 2021-09-08 PROCEDURE — 81001 URINALYSIS AUTO W/SCOPE: CPT

## 2021-09-08 PROCEDURE — 80053 COMPREHEN METABOLIC PANEL: CPT

## 2021-09-08 PROCEDURE — 36415 COLL VENOUS BLD VENIPUNCTURE: CPT

## 2021-09-08 PROCEDURE — 82570 ASSAY OF URINE CREATININE: CPT

## 2021-09-08 PROCEDURE — 83970 ASSAY OF PARATHORMONE: CPT

## 2021-09-08 PROCEDURE — 82043 UR ALBUMIN QUANTITATIVE: CPT

## 2021-09-08 PROCEDURE — 77427 RADIATION TX MANAGEMENT X5: CPT | Performed by: RADIOLOGY

## 2021-09-08 PROCEDURE — 77412 RADIATION TX DELIVERY LVL 3: CPT | Performed by: RADIOLOGY

## 2021-09-08 PROCEDURE — 84100 ASSAY OF PHOSPHORUS: CPT

## 2021-09-08 PROCEDURE — 85025 COMPLETE CBC W/AUTO DIFF WBC: CPT

## 2021-09-08 PROCEDURE — 82306 VITAMIN D 25 HYDROXY: CPT

## 2021-09-09 ENCOUNTER — APPOINTMENT (OUTPATIENT)
Dept: RADIATION ONCOLOGY | Facility: CLINIC | Age: 71
End: 2021-09-09
Payer: MEDICARE

## 2021-09-09 ENCOUNTER — OFFICE VISIT (OUTPATIENT)
Dept: HEMATOLOGY ONCOLOGY | Facility: CLINIC | Age: 71
End: 2021-09-09
Payer: MEDICARE

## 2021-09-09 ENCOUNTER — APPOINTMENT (OUTPATIENT)
Dept: RADIATION ONCOLOGY | Facility: CLINIC | Age: 71
End: 2021-09-09
Attending: RADIOLOGY
Payer: MEDICARE

## 2021-09-09 VITALS
DIASTOLIC BLOOD PRESSURE: 84 MMHG | OXYGEN SATURATION: 95 % | HEIGHT: 68 IN | BODY MASS INDEX: 29.9 KG/M2 | TEMPERATURE: 98.2 F | RESPIRATION RATE: 16 BRPM | SYSTOLIC BLOOD PRESSURE: 114 MMHG | HEART RATE: 71 BPM

## 2021-09-09 DIAGNOSIS — C79.51 SECONDARY MALIGNANT NEOPLASM OF BONE (HCC): Primary | ICD-10-CM

## 2021-09-09 DIAGNOSIS — C79.10 METASTATIC UROTHELIAL CARCINOMA (HCC): Primary | ICD-10-CM

## 2021-09-09 PROCEDURE — 77412 RADIATION TX DELIVERY LVL 3: CPT | Performed by: RADIOLOGY

## 2021-09-09 PROCEDURE — 99214 OFFICE O/P EST MOD 30 MIN: CPT | Performed by: INTERNAL MEDICINE

## 2021-09-09 RX ORDER — ACETAMINOPHEN 325 MG/1
650 TABLET ORAL ONCE
Status: CANCELLED | OUTPATIENT
Start: 2021-09-10

## 2021-09-09 NOTE — PROGRESS NOTES
Educated pt and his wife about Enfortumab Vedotin for Days 1,8,15 Q 4 wks  Pt has nausea meds at home is familiar with diarrhea management protocol  Reviewed signs of infection and what symptoms he should notify us of  Chemo consent was signed  Pt does have phone contact list so knows how to contact us directly

## 2021-09-09 NOTE — PROGRESS NOTES
Hematology/Oncology Outpatient Follow-up  Deana Cam 66 y o  male 1950 [id number]    Date:  9/9/2021        Assessment and Plan:  1  Metastatic urothelial carcinoma ([facility] [address] )    I did discuss with the patient and his wife the PET-CT scan result which showed clear progression of his urothelial carcinoma while on immunotherapy  The patient was told that we will switch his treatment to the  Next line  of palliative treatment with enfortumab vedotin at the usual dose of 1 25 mg/kg on a weekly basis 3 weeks on 1 week off  He is scheduled to get his 1st cycle day 1 tomorrow  He is also to continue with the palliative radiation treatment to the right shoulder  We will continue to monitor him on a weekly basis while on the on  Enfortumab Vedotin  CBC and differential; Future  - Comprehensive metabolic panel; Future  - Magnesium; Future        HPI:    The patient came today for follow-up visit accompanied by his wife  He is currently on palliative radiation to the right shoulder which was started yesterday  He is expected to get 10 sessions  His PET scan from 08/16/2021 showed progression of his disease with hypermetabolic soft tissue lesions at the level of the right shoulder and right axilla with interval progression of the retroperitoneal and mesenteric hypermetabolic metastasis  Today the patient complained about some abdominal pain mainly at night which could be due to constipation  He continues to have pain in his right shoulder  His blood work from yesterday showed hemoglobin of 13 0 with normal white cells and platelets  Creatinine 1 5 with the normal liver enzymes  TSH 2 6  Oncology History   Urothelial cancer ([facility] [address] )   1/3/2020 Biopsy    Final Diagnosis    A  Urine, Clean Catch, Thin Prep:  High grade urothelial carcinoma (HGUC) - see comment  1/3/2020 Initial Diagnosis    Urothelial cancer ([facility] [address] )  T3 N0 (stage IIIA)     1/17/2020 Biopsy    Final Diagnosis    A   Ureter, Right, left renal pelvis biopsy  -Fragments of high grade urothelial carcinoma   -No evidence of lamina propria invasion   -Detrusor muscle/ muscularis propria is not present for evaluation  B  Urinary Bladder, bladder biopsy:  -Fragments of low grade papillary lesion  See note  -No evidence of invasion seen  -Unremarkable fragment of detrusor muscle seen  4/1/2020 Surgery    left robotic assisted laparoscopic nephroureterectomy with bladder cuff excision     Final Diagnosis    A  Left kidney, ureter, and bladder cuff, nephroureterectomy:  - Invasive high grade urothelial carcinoma arising in renal pelvis  - Bladder cuff margin is negative for carcinoma and no evidence of high grade dysplasia  - Ureters with no significant pathologic abnormality  - Two benign simple cysts  - Adrenal gland is negative for malignancy  - One lymph node, negative for malignancy (0/1)          5/14/2020 - 6/3/2020 Chemotherapy    CISplatin (PLATINOL) split dose 35 mg/m2 = 75 3 mg (50 % of original dose 70 mg/m2), Intravenous,   Administration: 75 3 mg (5/14/2020), 75 3 mg (5/21/2020)  gemcitabine (GEMZAR) 2,200 mg in sodium chloride 0 9 % 250 mL infusion, 2,150 2 mg (80 % of original dose 1,250 mg/m2), Intravenous,   Administration: 2,200 mg (5/14/2020), 2,200 mg (5/21/2020)    (only completed 1 cycle- d/c d/t adverse effects and worsening renal dysfunction)     10/9/2020 - 9/9/2021 Chemotherapy    pembrolizumab (KEYTRUDA) IVPB, 200 mg, Intravenous, Once, 16 of 20 cycles  Administration: 200 mg (10/9/2020), 200 mg (10/30/2020), 200 mg (11/20/2020), 200 mg (12/11/2020), 200 mg (12/31/2020), 200 mg (1/22/2021), 200 mg (2/12/2021), 200 mg (3/5/2021), 200 mg (3/26/2021), 200 mg (4/16/2021), 200 mg (5/7/2021), 200 mg (5/28/2021), 200 mg (6/18/2021), 200 mg (7/9/2021), 200 mg (7/30/2021), 200 mg (8/20/2021)     11/19/2020 - 12/3/2020 Radiation    Treatment:  Course: C1    Plan ID Energy Fractions Dose per Fraction (cGy) Dose Correction (cGy) Total Dose Delivered (cGy) Elapsed Days   L Abdomen 6X 10 / 10 300 0 3,000 14        9/10/2021 -  Chemotherapy    enfortumab vedotin-ejfv (PADCEV) IVPB, 1 25 mg/kg = 114 mg, Intravenous, Once, 0 of 6 cycles     Cancer of left renal pelvis (HCC)   4/23/2020 Initial Diagnosis    Cancer of left renal pelvis (HCC)     10/9/2020 - 9/9/2021 Chemotherapy    pembrolizumab (KEYTRUDA) IVPB, 200 mg, Intravenous, Once, 16 of 20 cycles  Administration: 200 mg (10/9/2020), 200 mg (10/30/2020), 200 mg (11/20/2020), 200 mg (12/11/2020), 200 mg (12/31/2020), 200 mg (1/22/2021), 200 mg (2/12/2021), 200 mg (3/5/2021), 200 mg (3/26/2021), 200 mg (4/16/2021), 200 mg (5/7/2021), 200 mg (5/28/2021), 200 mg (6/18/2021), 200 mg (7/9/2021), 200 mg (7/30/2021), 200 mg (8/20/2021)     9/10/2021 -  Chemotherapy    enfortumab vedotin-ejfv (PADCEV) IVPB, 1 25 mg/kg = 114 mg, Intravenous, Once, 0 of 6 cycles      Surgery       Metastatic urothelial carcinoma ([facility] [address] )   8/9/2021 Initial Diagnosis    Metastatic urothelial carcinoma ([facility] [address] )     9/10/2021 -  Chemotherapy    enfortumab vedotin-ejfv (PADCEV) IVPB, 1 25 mg/kg = 114 mg, Intravenous, Once, 0 of 6 cycles         Interval history:    ROS: Review of Systems   Constitutional: Positive for fatigue  Negative for chills and fever  HENT: Positive for hearing loss  Negative for ear pain and sore throat  Eyes: Negative for pain and visual disturbance  Respiratory: Negative for cough and shortness of breath  Cardiovascular: Negative for chest pain and palpitations  Gastrointestinal: Positive for constipation  Negative for abdominal pain and vomiting  Genitourinary: Negative for dysuria and hematuria  Musculoskeletal: Positive for arthralgias  Negative for back pain  Skin: Negative for color change and rash  Neurological: Positive for numbness  Negative for seizures and syncope  All other systems reviewed and are negative        Past Medical History:   Diagnosis Date    Arthritis     Bladder cancer     Cancer ([facility] [address] )     skin melanoma;basal cell    Chronic pain disorder     from arthritis    Colon polyp     Does use hearing aid     bilat    Dry eyes, bilateral     GERD (gastroesophageal reflux disease)     History of kidney stones 10/2019    History of partial knee replacement     bilat    History of vertigo     Hyperlipidemia     Hypertension     Kidney lesion     Lumbar disc disorder     compression of vertebrae L4-5-6    Muscle weakness     left hip area    Renal mass     left    Right ankle injury 03/05/2020    missed step of ladder     Right ankle pain     Shortness of breath     with activity    Tinnitus     Urothelial cancer     left    Wears glasses        Past Surgical History:   Procedure Laterality Date    COLONOSCOPY      CYSTOSCOPY Left 4/1/2020    Procedure: CYSTOSCOPY; URETERAL CATHETER PLACEMENT;  Surgeon: Sid Cross MD;  Location[facility];  Service: Urology    CYSTOSCOPY  05/11/2020    CYSTOSCOPY  06/04/2021    FL CYSTOGRAM  4/13/2020    FL RETROGRADE PYELOGRAM  1/17/2020    HERNIA REPAIR      umbilical with mesh    INGUINAL HERNIA REPAIR Right     with mesh    IR PORT PLACEMENT  4/30/2020    JOINT REPLACEMENT Bilateral     partials knee    LUMBAR EPIDURAL INJECTION      UT CYSTOURETHROSCOPY,URETER CATHETER Bilateral 1/17/2020    Procedure: CYSTOSCOPY; RIGHT RETROGRADE PYELOGRAM WITH RIGHT URETERAL CYTOLOGY SAMPLING; LEFT URETEROSCOPY WITH RENAL PELVIS BIOPSY AND LEFT STENT PLACEMENT;  Surgeon: Sid Cross MD;  [facility];  Service: Urology    UT NEPHRECTOMY, W/PART   URETECTOMY Left 4/1/2020    Procedure: ROBOTIC LAPAROSCOPIC NEPHRO-URETERECTOMY;  Surgeon: Sid Cross MD;  [facility];  Service: Urology    SKIN CANCER EXCISION      surface melanoma    TONSILLECTOMY      WISDOM TOOTH EXTRACTION         Social History     Socioeconomic History    Marital status: /Civil Union     Spouse name: None    Number of children: None    Years of education: None    Highest education level: None   Occupational History    None   Tobacco Use    Smoking status: Former Smoker     Types: Cigarettes     Quit date:      Years since quittin 7    Smokeless tobacco: Never Used   Vaping Use    Vaping Use: Never used   Substance and Sexual Activity    Alcohol use: Yes     Alcohol/week: 17 0 standard drinks     Types: 7 Glasses of wine, 10 Cans of beer per week     Comment: socially    Drug use: Never    Sexual activity: Not Currently   Other Topics Concern    None   Social History Narrative    Daily caffeine use - 2 cups coffee      Social Determinants of Health     Financial Resource Strain:     Difficulty of Paying Living Expenses:    Food Insecurity:     Worried About Running Out of Food in the Last Year:     Ran Out of Food in the Last Year:    Transportation Needs:     Lack of Transportation (Medical):      Lack of Transportation (Non-Medical):    Physical Activity:     Days of Exercise per Week:     Minutes of Exercise per Session:    Stress:     Feeling of Stress :    Social Connections:     Frequency of Communication with Friends and Family:     Frequency of Social Gatherings with Friends and Family:     Attends [employer] Services:     Active Member of Clubs or Organizations:     Attends Club or Organization Meetings:     Marital Status:    Intimate Partner Violence:     Fear of Current or Ex-Partner:     Emotionally Abused:     Physically Abused:     Sexually Abused:        Family History   Problem Relation Age of Onset    Liver cancer Father        Allergies   Allergen Reactions    Poison Ivy Extract Rash         Current Outpatient Medications:     acetaminophen (TYLENOL) 500 mg tablet, Take 2 tablets (1,000 mg total) by mouth every 8 (eight) hours, Disp: , Rfl:     allopurinol (ZYLOPRIM) 300 mg tablet, take 1 tablet by mouth once daily, Disp: 30 tablet, Rfl: 3    ALPRAZolam (XANAX) 0 25 mg tablet, Take 1 tablet (0 25 mg total) by mouth daily at bedtime as needed for anxiety (restless legs), Disp: 30 tablet, Rfl: 1    amLODIPine (NORVASC) 5 mg tablet, Take 5 mg by mouth daily  , Disp: , Rfl:     atorvastatin (LIPITOR) 80 mg tablet, Take 80 mg by mouth every other day evening, Disp: , Rfl:     folic acid (FOLVITE) 1 mg tablet, Take 1 tablet (1 mg total) by mouth daily, Disp: 90 tablet, Rfl: 4    HYDROmorphone (DILAUDID) 2 mg tablet, Take 2-4 mg [1-2 tabs] PO Q4H PRN for moderate/severe pain  Max Daily: 24 mg, Disp: 40 tablet, Rfl: 0    hydrOXYzine HCL (ATARAX) 25 mg tablet, Take 1 tablet (25 mg total) by mouth every 6 (six) hours as needed for itching or anxiety, Disp: 60 tablet, Rfl: 2    metoprolol tartrate (LOPRESSOR) 100 mg tablet, Take 50 mg by mouth daily, Disp: , Rfl:     naloxone (NARCAN) 4 mg/0 1 mL nasal spray, Administer 1 spray into a nostril   If breathing does not return to normal or if breathing difficulty resumes after 2-3 minutes, give another dose in the other nostril using a new spray , Disp: 1 each, Rfl: 1    omeprazole (PriLOSEC) 20 mg delayed release capsule, Take 20 mg by mouth daily  , Disp: , Rfl:     predniSONE 10 mg tablet, Take 2 tablets (20 mg total) by mouth daily, Disp: 60 tablet, Rfl: 3    senna (SENOKOT) 8 6 mg, Take 1 tablet (8 6 mg total) by mouth daily at bedtime (Patient taking differently: Take 8 6 mg by mouth daily at bedtime as needed ), Disp: 30 each, Rfl: 0    sildenafil (VIAGRA) 100 mg tablet, TAKE ONE TABLET BY MOUTH  AS NEEDED PRIOR TO SEXUAL ACTIVITY FOR ERECTILE DYSFUNCTION, Disp: , Rfl:     terazosin (HYTRIN) 5 mg capsule, Take 2 capsules (10 mg total) by mouth daily at bedtime, Disp: 30 capsule, Rfl: 6    triamcinolone (KENALOG) 0 1 % lotion, Apply topically 3 (three) times a day, Disp: 60 mL, Rfl: 5    VITAMIN D PO, Take 1,000 Units by mouth daily , Disp: , Rfl:     cholestyramine (QUESTRAN) 4 g packet, Take 1 packet (4 g total) by mouth 2 (two) times a day as needed (diarrhea) (Patient not taking: Reported on 8/23/2021), Disp: 30 packet, Rfl: 5    Magnesium 250 MG TABS, 1 tablet 2 (two) times a day Taking 500mg in the morning and 500mg at night (Patient not taking: Reported on 9/1/2021), Disp: , Rfl:       Physical Exam:  /84 (BP Location: Left arm, Patient Position: Sitting, Cuff Size: Adult)   Pulse 71   Temp 98 2 °F (36 8 °C) (Temporal)   Resp 16   Ht 5' 8" (1 727 m)   SpO2 95%   BMI 29 90 kg/m²     Physical Exam  Constitutional:       Appearance: He is well-developed  HENT:      Head: Normocephalic and atraumatic  Eyes:      General: No scleral icterus  Right eye: No discharge  Left eye: No discharge  Conjunctiva/sclera: Conjunctivae normal       Pupils: Pupils are equal, round, and reactive to light  Neck:      Thyroid: No thyromegaly  Trachea: No tracheal deviation  Cardiovascular:      Rate and Rhythm: Normal rate and regular rhythm  Heart sounds: Normal heart sounds  No murmur heard  No friction rub  Pulmonary:      Effort: Pulmonary effort is normal  No respiratory distress  Breath sounds: Normal breath sounds  No wheezing or rales  Chest:      Chest wall: No tenderness  Abdominal:      General: There is no distension  Palpations: Abdomen is soft  There is no hepatomegaly or splenomegaly  Tenderness: There is no abdominal tenderness  There is no guarding or rebound  Musculoskeletal:         General: No tenderness or deformity  Normal range of motion  Cervical back: Normal range of motion and neck supple  Lymphadenopathy:      Cervical: No cervical adenopathy  Skin:     General: Skin is warm and dry  Coloration: Skin is not pale  Findings: No erythema or rash  Neurological:      Mental Status: He is alert and oriented to person, place, and time  Cranial Nerves: No cranial nerve deficit        Coordination: Coordination normal       Deep Tendon Reflexes: Reflexes are normal and symmetric  Psychiatric:         Behavior: Behavior normal          Thought Content: Thought content normal          Judgment: Judgment normal            Labs:  Lab Results   Component Value Date    WBC 7 70 09/08/2021    HGB 13 0 (L) 09/08/2021    HCT 39 6 (L) 09/08/2021    MCV 95 09/08/2021     09/08/2021     Lab Results   Component Value Date    K 4 5 09/08/2021    CL 99 09/08/2021    CO2 32 (H) 09/08/2021    BUN 21 09/08/2021    CREATININE 1 50 (H) 09/08/2021    GLUF 100 (H) 07/28/2021    CALCIUM 10 2 09/08/2021    CORRECTEDCA 9 8 10/28/2020    AST 10 (L) 09/08/2021    ALT 12 09/08/2021    ALKPHOS 66 09/08/2021    EGFR 46 09/08/2021     No results found for: TSH    Patient voiced understanding and agreement in the above discussion  Aware to contact our office with questions/symptoms in the interim

## 2021-09-10 ENCOUNTER — APPOINTMENT (OUTPATIENT)
Dept: RADIATION ONCOLOGY | Facility: CLINIC | Age: 71
End: 2021-09-10
Attending: RADIOLOGY
Payer: MEDICARE

## 2021-09-10 ENCOUNTER — HOSPITAL ENCOUNTER (OUTPATIENT)
Dept: INFUSION CENTER | Facility: HOSPITAL | Age: 71
Discharge: HOME | End: 2021-09-10
Attending: INTERNAL MEDICINE
Payer: MEDICARE

## 2021-09-10 VITALS
BODY MASS INDEX: 30.54 KG/M2 | WEIGHT: 201.5 LBS | TEMPERATURE: 97.9 F | HEART RATE: 54 BPM | DIASTOLIC BLOOD PRESSURE: 77 MMHG | HEIGHT: 68 IN | RESPIRATION RATE: 16 BRPM | OXYGEN SATURATION: 96 % | SYSTOLIC BLOOD PRESSURE: 132 MMHG

## 2021-09-10 DIAGNOSIS — C79.10 METASTATIC UROTHELIAL CARCINOMA (HCC): ICD-10-CM

## 2021-09-10 DIAGNOSIS — C65.2 CANCER OF LEFT RENAL PELVIS (HCC): ICD-10-CM

## 2021-09-10 DIAGNOSIS — C68.9 UROTHELIAL CANCER (HCC): Primary | ICD-10-CM

## 2021-09-10 PROCEDURE — 77412 RADIATION TX DELIVERY LVL 3: CPT | Performed by: RADIOLOGY

## 2021-09-10 PROCEDURE — 96367 TX/PROPH/DG ADDL SEQ IV INF: CPT

## 2021-09-10 PROCEDURE — 96413 CHEMO IV INFUSION 1 HR: CPT

## 2021-09-10 RX ORDER — ACETAMINOPHEN 325 MG/1
650 TABLET ORAL ONCE
Status: CANCELLED | OUTPATIENT
Start: 2021-09-24

## 2021-09-10 RX ORDER — ACETAMINOPHEN 325 MG/1
650 TABLET ORAL ONCE
Status: COMPLETED | OUTPATIENT
Start: 2021-09-10 | End: 2021-09-10

## 2021-09-10 RX ORDER — SODIUM CHLORIDE 9 MG/ML
20 INJECTION, SOLUTION INTRAVENOUS ONCE
Status: COMPLETED | OUTPATIENT
Start: 2021-09-10 | End: 2021-09-10

## 2021-09-10 RX ORDER — ACETAMINOPHEN 325 MG/1
650 TABLET ORAL ONCE
Status: CANCELLED | OUTPATIENT
Start: 2021-09-17

## 2021-09-10 RX ADMIN — DIPHENHYDRAMINE HYDROCHLORIDE 25 MG: 50 INJECTION, SOLUTION INTRAMUSCULAR; INTRAVENOUS at 13:47

## 2021-09-10 RX ADMIN — ACETAMINOPHEN 650 MG: 325 TABLET ORAL at 13:47

## 2021-09-10 RX ADMIN — SODIUM CHLORIDE 20 ML/HR: 0.9 INJECTION, SOLUTION INTRAVENOUS at 13:46

## 2021-09-10 RX ADMIN — DEXAMETHASONE SODIUM PHOSPHATE: 10 INJECTION, SOLUTION INTRAMUSCULAR; INTRAVENOUS at 14:11

## 2021-09-10 RX ADMIN — ENFORTUMAB VEDOTIN 114 MG: 30 INJECTION, POWDER, LYOPHILIZED, FOR SOLUTION INTRAVENOUS at 14:49

## 2021-09-10 NOTE — PROGRESS NOTES
Pt tolerated todays enfortumab vedotin well  No adverse reactions  Re educated on side effects to observe for  Pt education booklets given to pt provided by drug   Port flushed and deaccessed pe routine   Discharged ambulatory with avs

## 2021-09-13 ENCOUNTER — APPOINTMENT (OUTPATIENT)
Dept: RADIATION ONCOLOGY | Facility: CLINIC | Age: 71
End: 2021-09-13
Attending: RADIOLOGY
Payer: MEDICARE

## 2021-09-13 PROCEDURE — 77412 RADIATION TX DELIVERY LVL 3: CPT | Performed by: RADIOLOGY

## 2021-09-13 NOTE — TELEPHONE ENCOUNTER
[address] [facility] [address]  [facility] [place] [address]  Dept: [phone]  Loc: [phone]     Gabriel Granados is a 14 y.o. female who presents today for:  Chief Complaint   Patient presents with    1 Month Follow-Up     review Echocardiogram completed 08/20/2021        Goals    None         HPI:     HPI  69-year-old female here for 1-month follow-up on murmur / echo results. She was previously having chest pain and had 4 pre-syncopal episodes. EKG normal. Echo normal.    Palpitations / tachycardia: still occurring. She recently got an [address] [place] and has been monitoring her HR. Tachycardia occurring every few days - at least weekly, but not daily. She recalled 3 episodes with high heart rate -- she was watching TV and began having chest tightness and dizziness. Her HR went from 94 to 137 at rest. It took about 5 minutes to resolve. Another episode she was kicking during kickball (she did not run bases) and she had substernal chest pain with shortness of breath. This episode also lasted 5 minute. The other episode she was doing homework and her HR went up to 140 and she felt dizzy and short of breath. She denies any diaphoresis, nausea/vomiting. She also denies any preceding stressful events or emotions. She did not take her inhaler before any of the events. Denies syncope. Mom reports family history of SVT - grandma needed an ablation. Current Outpatient Medications   Medication Sig Dispense Refill    albuterol sulfate HFA (VENTOLIN HFA) 108 (90 Base) MCG/ACT inhaler Inhale 1 puff into the lungs 4 times daily as needed for Wheezing or Shortness of Breath 1 Inhaler 1    EPINEPHrine (EPIPEN JR 2-GARO) 0.15 MG/0.3ML SOAJ Inject 0.3 mLs into the muscle once for 1 dose Use as directed for allergic reaction. Seek emergency medical evaluation after administration.  2 Device 0     No current facility-administered medications for this Aware, thank you visit. Food Insecurity: Food Insecurity Present    Worried About Running Out of Food in the Last Year: Sometimes true    Ran Out of Food in the Last Year: Sometimes true       Health Maintenance   Topic Date Due    Hepatitis B vaccine (1 of 3 - 3-dose primary series) Never done    Polio vaccine (1 of 3 - 4-dose series) Never done    Hepatitis A vaccine (1 of 2 - 2-dose series) Never done    Measles,Mumps,Rubella (MMR) vaccine (1 of 2 - Standard series) Never done    Varicella vaccine (1 of 2 - 2-dose childhood series) Never done    DTaP/Tdap/Td vaccine (1 - Tdap) Never done    Meningococcal (ACWY) vaccine (1 - 2-dose series) Never done    COVID-19 Vaccine (1) Never done    HPV vaccine (2 - 2-dose series) 03/24/2020    Flu vaccine (1) Never done    Hib vaccine  Aged Out    Pneumococcal 0-64 years Vaccine  Aged Out       ROS:      Review of Systems   Constitutional: Negative for chills, diaphoresis, fatigue and fever. HENT: Negative. Eyes: Negative. Respiratory: Positive for chest tightness and shortness of breath. Negative for cough. Cardiovascular: Positive for palpitations. Negative for chest pain. Gastrointestinal: Negative for abdominal pain, constipation, diarrhea, nausea and vomiting. Endocrine: Negative. Musculoskeletal: Negative. Skin: Negative. Allergic/Immunologic: Negative. Neurological: Positive for dizziness and light-headedness. Negative for weakness and numbness. Hematological: Negative. Psychiatric/Behavioral: Negative for dysphoric mood. The patient is not nervous/anxious. Objective:     Vitals:    09/13/21 1107   BP: 112/70   Site: Right Upper Arm   Position: Sitting   Cuff Size: Medium Adult   Pulse: 88   Resp: 12   Temp: 96.9 °F (36.1 °C)   TempSrc: Temporal   SpO2: 99%   Weight: 104 lb 9.6 oz (47.4 kg)       There is no height or weight on file to calculate BMI.     Wt Readings from Last 3 Encounters:   09/13/21 104 lb 9.6 oz (47.4 kg) (38 %, Z= -0.29)*   08/16/21 105 lb 3.2 oz (47.7 kg) (41 %, Z= -0.23)*   05/06/21 105 lb (47.6 kg) (44 %, Z= -0.14)*     * Growth percentiles are based on [facility] (Girls, 2-20 Years) data. BP Readings from Last 3 Encounters:   09/13/21 112/70 (63 %, Z = 0.32 /  68 %, Z = 0.45)*   08/16/21 100/60 (19 %, Z = -0.88 /  29 %, Z = -0.54)*   05/06/21 109/61 (50 %, Z = 0.01 /  33 %, Z = -0.45)*     *BP percentiles are based on the 2017 AAP Clinical Practice Guideline for girls       Physical Exam  Vitals reviewed. Constitutional:       General: She is not in acute distress. Appearance: Normal appearance. HENT:      Head: Normocephalic and atraumatic. Right Ear: External ear normal.      Left Ear: External ear normal.      Nose: Nose normal.      Mouth/Throat:      Mouth: Mucous membranes are moist.   Eyes:      Conjunctiva/sclera: Conjunctivae normal.   Cardiovascular:      Rate and Rhythm: Normal rate and regular rhythm. Pulses: Normal pulses. Heart sounds: Murmur heard. Comments: CARLOTTA left sternal border with valsalva  Pulmonary:      Effort: Pulmonary effort is normal. No respiratory distress. Breath sounds: Normal breath sounds. No wheezing, rhonchi or rales. Abdominal:      General: Abdomen is flat. Bowel sounds are normal.      Palpations: Abdomen is soft. Musculoskeletal:         General: Normal range of motion. Cervical back: Normal range of motion and neck supple. Skin:     General: Skin is warm and dry. Capillary Refill: Capillary refill takes less than 2 seconds. Neurological:      General: No focal deficit present. Mental Status: She is alert. Psychiatric:         Mood and Affect: Mood normal.         Behavior: Behavior normal.         Study Information: Study image quality was adequate. The study experienced  technical challenges due to poor echo windows.     Referral Indication:  ^murmur.   Procedures Performed: Complete Transthoracic Echocardiogram (2D, Spectral                        Doppler, Color Doppler)     SUMMARY:   1. No structural heart abnormalities. 2. No valvular abnormalities. 3. Normal biventricular size and systolic function. 4. No pericardial effusion.        Blood Pressure  100/60        SEGMENTAL ANATOMY, CARDIAC POSITION AND SITUS: Normal visceral situs.     SYSTEMIC VEINS:  A superior vena cava is right-sided and drains normally to the right atrium. A  left superior vena cava is not present. The inferior vena cava is right-sided  and inserts into the right atrium normally.     PULMONARY VEINS:  The pulmonary veins drain normally into the left atrium.     ATRIA:  The atrial septum was not well delineated. The right atrium is normal in size. The left atrium is normal in size.     TRICUSPID VALVE:  The tricuspid valve is normal. There is trivial (physiologic) tricuspid valve  regurgitation.     RIGHT VENTRICLE:  There is normal right ventricular size and systolic function.     MITRAL VALVE:  The mitral valve is normal. There is no mitral valve regurgitation.     LEFT VENTRICLE:  There is normal left ventricular size and systolic function. No regional wall  motion abnormalities seen.     VENTRICULAR SEPTUM:  No ventricular septal defect is seen.     PULMONARY VALVE:  The pulmonary valve is normal. There is no pulmonary valve stenosis. There is  trivial (physiologic) pulmonary valve regurgitation.     PULMONARY ARTERIES:  The branch pulmonary arteries are normal.     AORTIC VALVE:  The aortic valve is normal. There is no aortic valve stenosis. There is no  aortic valve regurgitation.     AORTA:  The ascending aorta, transverse arch and descending aorta are unobstructed.  Arch  sidedness not well delineated on the study.     CORONARY ARTERIES:  The proximal coronary origins are normal with no evidence of coronary dilation,  ectasia or aneurysms.     PERICARDIUM:  There is no pericardial effusion.     2-Dimensional: EKG [facility]   12/13/2021  1:00 PM Andrey Jones MD [facility]       Patient given educational materials - see patient instructions. Discussed use, benefit, and side effects of prescribed medications. All patient questions answered. Patient voiced understanding. Reviewed health maintenance. Instructed to continue current medications, diet and exercise. Patient agreed with treatment plan. Follow up as directed.      Electronically signed by Andrey Jones MD on 9/15/2021 at 2:37 PM

## 2021-09-14 ENCOUNTER — APPOINTMENT (OUTPATIENT)
Dept: RADIATION ONCOLOGY | Facility: CLINIC | Age: 71
End: 2021-09-14
Attending: RADIOLOGY
Payer: MEDICARE

## 2021-09-14 PROCEDURE — 77336 RADIATION PHYSICS CONSULT: CPT | Performed by: RADIOLOGY

## 2021-09-14 PROCEDURE — 77412 RADIATION TX DELIVERY LVL 3: CPT | Performed by: RADIOLOGY

## 2021-09-14 PROCEDURE — 77417 THER RADIOLOGY PORT IMAGE(S): CPT | Performed by: RADIOLOGY

## 2021-09-15 ENCOUNTER — APPOINTMENT (OUTPATIENT)
Dept: RADIATION ONCOLOGY | Facility: CLINIC | Age: 71
End: 2021-09-15
Attending: RADIOLOGY
Payer: MEDICARE

## 2021-09-15 ENCOUNTER — APPOINTMENT (OUTPATIENT)
Dept: LAB | Facility: HOSPITAL | Age: 71
End: 2021-09-15
Attending: INTERNAL MEDICINE
Payer: MEDICARE

## 2021-09-15 DIAGNOSIS — C79.10 METASTATIC UROTHELIAL CARCINOMA (HCC): ICD-10-CM

## 2021-09-15 DIAGNOSIS — C65.2 CANCER OF LEFT RENAL PELVIS (HCC): ICD-10-CM

## 2021-09-15 DIAGNOSIS — C68.9 UROTHELIAL CANCER (HCC): ICD-10-CM

## 2021-09-15 LAB
ALBUMIN SERPL BCP-MCNC: 3.6 G/DL (ref 3.5–5.7)
ALP SERPL-CCNC: 63 U/L (ref 55–165)
ALT SERPL W P-5'-P-CCNC: 10 U/L (ref 7–52)
ANION GAP SERPL CALCULATED.3IONS-SCNC: 7 MMOL/L (ref 4–13)
AST SERPL W P-5'-P-CCNC: 9 U/L (ref 13–39)
BASOPHILS # BLD AUTO: 0 THOUSANDS/ΜL (ref 0–0.1)
BASOPHILS NFR BLD AUTO: 0 % (ref 0–2)
BILIRUB SERPL-MCNC: 1.1 MG/DL (ref 0.2–1)
BUN SERPL-MCNC: 21 MG/DL (ref 7–25)
CALCIUM SERPL-MCNC: 9.5 MG/DL (ref 8.6–10.5)
CHLORIDE SERPL-SCNC: 95 MMOL/L (ref 98–107)
CO2 SERPL-SCNC: 31 MMOL/L (ref 21–31)
CREAT SERPL-MCNC: 1.52 MG/DL (ref 0.7–1.3)
EOSINOPHIL # BLD AUTO: 0 THOUSAND/ΜL (ref 0–0.61)
EOSINOPHIL NFR BLD AUTO: 0 % (ref 0–5)
ERYTHROCYTE [DISTWIDTH] IN BLOOD BY AUTOMATED COUNT: 15.1 % (ref 11.5–14.5)
GFR SERPL CREATININE-BSD FRML MDRD: 45 ML/MIN/1.73SQ M
GLUCOSE P FAST SERPL-MCNC: 110 MG/DL (ref 65–99)
HCT VFR BLD AUTO: 38.3 % (ref 42–47)
HGB BLD-MCNC: 12.7 G/DL (ref 14–18)
LYMPHOCYTES # BLD AUTO: 1 THOUSANDS/ΜL (ref 0.6–4.47)
LYMPHOCYTES NFR BLD AUTO: 13 % (ref 21–51)
MAGNESIUM SERPL-MCNC: 1.7 MG/DL (ref 1.9–2.7)
MCH RBC QN AUTO: 31.5 PG (ref 26–34)
MCHC RBC AUTO-ENTMCNC: 33 G/DL (ref 31–37)
MCV RBC AUTO: 95 FL (ref 81–99)
MONOCYTES # BLD AUTO: 0.8 THOUSAND/ΜL (ref 0.17–1.22)
MONOCYTES NFR BLD AUTO: 11 % (ref 2–12)
NEUTROPHILS # BLD AUTO: 5.7 THOUSANDS/ΜL (ref 1.4–6.5)
NEUTS SEG NFR BLD AUTO: 76 % (ref 42–75)
PLATELET # BLD AUTO: 180 THOUSANDS/UL (ref 149–390)
PMV BLD AUTO: 7.5 FL (ref 8.6–11.7)
POTASSIUM SERPL-SCNC: 3.6 MMOL/L (ref 3.5–5.5)
PROT SERPL-MCNC: 6.2 G/DL (ref 6.4–8.9)
RBC # BLD AUTO: 4.01 MILLION/UL (ref 4.3–5.9)
SODIUM SERPL-SCNC: 133 MMOL/L (ref 134–143)
WBC # BLD AUTO: 7.5 THOUSAND/UL (ref 4.8–10.8)

## 2021-09-15 PROCEDURE — 85025 COMPLETE CBC W/AUTO DIFF WBC: CPT

## 2021-09-15 PROCEDURE — 77427 RADIATION TX MANAGEMENT X5: CPT | Performed by: RADIOLOGY

## 2021-09-15 PROCEDURE — 36415 COLL VENOUS BLD VENIPUNCTURE: CPT

## 2021-09-15 PROCEDURE — 83735 ASSAY OF MAGNESIUM: CPT

## 2021-09-15 PROCEDURE — 80053 COMPREHEN METABOLIC PANEL: CPT

## 2021-09-15 PROCEDURE — 77412 RADIATION TX DELIVERY LVL 3: CPT | Performed by: RADIOLOGY

## 2021-09-16 ENCOUNTER — TELEPHONE (OUTPATIENT)
Dept: PALLIATIVE MEDICINE | Facility: CLINIC | Age: 71
End: 2021-09-16

## 2021-09-16 ENCOUNTER — OFFICE VISIT (OUTPATIENT)
Dept: HEMATOLOGY ONCOLOGY | Facility: CLINIC | Age: 71
End: 2021-09-16
Payer: MEDICARE

## 2021-09-16 ENCOUNTER — APPOINTMENT (OUTPATIENT)
Dept: RADIATION ONCOLOGY | Facility: CLINIC | Age: 71
End: 2021-09-16
Attending: RADIOLOGY
Payer: MEDICARE

## 2021-09-16 VITALS
SYSTOLIC BLOOD PRESSURE: 138 MMHG | WEIGHT: 198.4 LBS | DIASTOLIC BLOOD PRESSURE: 84 MMHG | TEMPERATURE: 98.2 F | HEART RATE: 85 BPM | HEIGHT: 68 IN | BODY MASS INDEX: 30.07 KG/M2 | OXYGEN SATURATION: 96 % | RESPIRATION RATE: 18 BRPM

## 2021-09-16 DIAGNOSIS — C65.2 CANCER OF LEFT RENAL PELVIS (HCC): ICD-10-CM

## 2021-09-16 DIAGNOSIS — G89.3 CANCER RELATED PAIN: ICD-10-CM

## 2021-09-16 DIAGNOSIS — C79.89 SECONDARY MALIGNANT NEOPLASM OF MUSCLE OF ABDOMEN (HCC): ICD-10-CM

## 2021-09-16 DIAGNOSIS — C68.9 UROTHELIAL CANCER (HCC): ICD-10-CM

## 2021-09-16 DIAGNOSIS — C79.10 METASTATIC UROTHELIAL CARCINOMA (HCC): Primary | ICD-10-CM

## 2021-09-16 PROCEDURE — 77412 RADIATION TX DELIVERY LVL 3: CPT | Performed by: RADIOLOGY

## 2021-09-16 PROCEDURE — 99214 OFFICE O/P EST MOD 30 MIN: CPT | Performed by: INTERNAL MEDICINE

## 2021-09-16 RX ORDER — HYDROMORPHONE HYDROCHLORIDE 2 MG/1
TABLET ORAL
Qty: 90 TABLET | Refills: 0 | Status: SHIPPED | OUTPATIENT
Start: 2021-09-16 | End: 2021-09-27 | Stop reason: SDUPTHER

## 2021-09-16 NOTE — PROGRESS NOTES
Hematology/Oncology Outpatient Follow-up  Chelsey Amezquita 66 y o  male 1950 [id number]    Date:  9/16/2021        Assessment and Plan:  1  [facility] )   the patient will be continued on cycle 1 day 2 of Enfortumab vedotin at the current dose of 1 25 mg/kg  He will be seen again next week to see if he continues to tolerate the current line of palliative treatment  He has low magnesium level which will be corrected with magnesium IV tomorrow during the treatment  - CBC and differential; Future  - Comprehensive metabolic panel; Future  - Magnesium; Future    2  Cancer related pain    I did advise him to take an extra Dilaudid and to get in touch with his palliative care team regarding his worsening right shoulder pain  HPI:   the patient came today for follow-up visit accompanied by his wife  He received 1st dose of Enfortumab vedotin last week which he tolerated well  Today he complained about significant pain in his right shoulder  He continues to get palliative radiation 7/10 sessions already  He did also complain about some GI symptoms after he eats with bloating  Blood work from yesterday showed mild drop of his hemoglobin level down to 12 7 with normal white cells and platelets  Creatinine 1 5 with normal calcium and liver enzymes  Magnesium 1 7  Oncology History   [facility]rothel[facility] ([facility] )   1/3/2020 Biopsy    Final Diagnosis    A  Urine, Clean Catch, Thin Prep:  High grade urothelial carcinoma ([facility]UC) - see comment  1/3/2020 Initial Diagnosis    Urothelial cancer ([facility] )  T3 N0 (stage IIIA)     1/17/2020 Biopsy    Final Diagnosis    A  Ureter, Right, left renal pelvis biopsy  -Fragments of high grade urothelial carcinoma   -No evidence of lamina propria invasion   -Detrusor muscle/ muscularis propria is not present for evaluation  B  Urinary Bladder, bladder biopsy:  -Fragments of low grade papillary lesion   See note  -No evidence of invasion seen  -Unremarkable fragment of detrusor muscle seen  4/1/2020 Surgery    left robotic assisted laparoscopic nephroureterectomy with bladder cuff excision     Final Diagnosis    A  Left kidney, ureter, and bladder cuff, nephroureterectomy:  - Invasive high grade urothelial carcinoma arising in renal pelvis  - Bladder cuff margin is negative for carcinoma and no evidence of high grade dysplasia  - Ureters with no significant pathologic abnormality  - Two benign simple cysts  - Adrenal gland is negative for malignancy  - One lymph node, negative for malignancy (0/1)          5/14/2020 - 6/3/2020 Chemotherapy    CISplatin (PLATINOL) split dose 35 mg/m2 = 75 3 mg (50 % of original dose 70 mg/m2), Intravenous,   Administration: 75 3 mg (5/14/2020), 75 3 mg (5/21/2020)  gemcitabine (GEMZAR) 2,200 mg in sodium chloride 0 9 % 250 mL infusion, 2,150 2 mg (80 % of original dose 1,250 mg/m2), Intravenous,   Administration: 2,200 mg (5/14/2020), 2,200 mg (5/21/2020)    (only completed 1 cycle- d/c d/t adverse effects and worsening renal dysfunction)     10/9/2020 - 9/9/2021 Chemotherapy    pembrolizumab (KEYTRUDA) IVPB, 200 mg, Intravenous, Once, 16 of 20 cycles  Administration: 200 mg (10/9/2020), 200 mg (10/30/2020), 200 mg (11/20/2020), 200 mg (12/11/2020), 200 mg (12/31/2020), 200 mg (1/22/2021), 200 mg (2/12/2021), 200 mg (3/5/2021), 200 mg (3/26/2021), 200 mg (4/16/2021), 200 mg (5/7/2021), 200 mg (5/28/2021), 200 mg (6/18/2021), 200 mg (7/9/2021), 200 mg (7/30/2021), 200 mg (8/20/2021)     11/19/2020 - 12/3/2020 Radiation    Treatment:  Course: C1    Plan ID Energy Fractions Dose per Fraction (cGy) Dose Correction (cGy) Total Dose Delivered (cGy) Elapsed Days   L Abdomen 6X 10 / 10 300 0 3,000 14        9/10/2021 -  Chemotherapy    enfortumab vedotin-ejfv (PADCEV) IVPB, 1 25 mg/kg = 114 mg, Intravenous, Once, 1 of 6 cycles  Administration: 114 mg (9/10/2021)     Cancer of left renal pelvis ([address] )   4/23/2020 Initial Diagnosis    [facility]     10/9/2020 - 9/9/2021 Chemotherapy    pembrolizumab (KEYTRUDA) IVPB, 200 mg, Intravenous, Once, 16 of 20 cycles  Administration: 200 mg (10/9/2020), 200 mg (10/30/2020), 200 mg (11/20/2020), 200 mg (12/11/2020), 200 mg (12/31/2020), 200 mg (1/22/2021), 200 mg (2/12/2021), 200 mg (3/5/2021), 200 mg (3/26/2021), 200 mg (4/16/2021), 200 mg (5/7/2021), 200 mg (5/28/2021), 200 mg (6/18/2021), 200 mg (7/9/2021), 200 mg (7/30/2021), 200 mg (8/20/2021)     9/10/2021 -  Chemotherapy    enfortumab vedotin-ejfv (PADCEV) IVPB, 1 25 mg/kg = 114 mg, Intravenous, Once, 1 of 6 cycles  Administration: 114 mg (9/10/2021)      Surgery       Metastatic [facility] )   8/9/2021 Initial Diagnosis    Metastatic urothelial carcinoma ([place] [address] )     9/10/2021 -  Chemotherapy    enfortumab vedotin-ejfv (PADCEV) IVPB, 1 25 mg/kg = 114 mg, Intravenous, Once, 1 of 6 cycles  Administration: 114 mg (9/10/2021)         Interval history:    ROS: Review of Systems   Constitutional: Positive for fatigue  Negative for chills and fever  HENT: Positive for hearing loss  Negative for ear pain and sore throat  Eyes: Negative for pain and visual disturbance  Respiratory: Negative for cough and shortness of breath  Cardiovascular: Negative for chest pain and palpitations  Gastrointestinal: Positive for constipation  Negative for abdominal pain and vomiting  Genitourinary: Negative for dysuria and hematuria  Musculoskeletal: Positive for arthralgias  Negative for back pain  Skin: Negative for color change and rash  Neurological: Positive for dizziness and numbness  Negative for seizures and syncope  All other systems reviewed and are negative        Past Medical History:   Diagnosis Date    Arthritis     Bladder cancer     Cancer ([address] )     skin melanoma;basal cell    Chronic pain disorder     from arthritis    Colon polyp     Does use hearing aid     bilat    Dry eyes, bilateral     GERD (gastroesophageal reflux disease)     History of kidney stones 10/2019    History of partial knee replacement     bilat    History of vertigo     Hyperlipidemia     Hypertension     Kidney lesion     Lumbar disc disorder     compression of vertebrae L4-5-6    Muscle weakness     left hip area    Renal mass     left    Right ankle injury 03/05/2020    missed step of ladder     Right ankle pain     Shortness of breath     with activity    Tinnitus     Urothelial cancer     left    Wears glasses        Past Surgical History:   Procedure Laterality Date    COLONOSCOPY      CYSTOSCOPY Left 4/1/2020    Procedure: CYSTOSCOPY; URETERAL CATHETER PLACEMENT;  Surgeon: Bernice Fine MD;  Location[facility];  Service: Urology    CYSTOSCOPY  05/11/2020    CYSTOSCOPY  06/04/2021    FL CYSTOGRAM  4/13/2020    FL RETROGRADE PYELOGRAM  1/17/2020    HERNIA REPAIR      umbilical with mesh    INGUINAL HERNIA REPAIR Right     with mesh    IR PORT PLACEMENT  4/30/2020    JOINT REPLACEMENT Bilateral     partials knee    LUMBAR EPIDURAL INJECTION      [place] CYSTOURETHROSCOPY,URETER CATHETER Bilateral 1/17/2020    Procedure: CYSTOSCOPY; RIGHT RETROGRADE PYELOGRAM WITH RIGHT URETERAL CYTOLOGY SAMPLING; LEFT URETEROSCOPY WITH RENAL PELVIS BIOPSY AND LEFT STENT PLACEMENT;  Surgeon: Bernice Fine MD;  [facility];  Service: Urology    [place] NEPHRECTOMY, W/PART   URETECTOMY Left 4/1/2020    Procedure: ROBOTIC LAPAROSCOPIC NEPHRO-URETERECTOMY;  Surgeon: Bernice Fine MD;  [facility];  Service: Urology    SKIN CANCER EXCISION      surface melanoma    TONSILLECTOMY      WISDOM TOOTH EXTRACTION         Social History     Socioeconomic History    Marital status: /Civil Union     Spouse name: None    Number of children: None    Years of education: None    Highest education level: None   Occupational History    None   Tobacco Use    Smoking status: Former Smoker     Types: Cigarettes     Quit date:      Years since quittin 7    Smokeless tobacco: Never Used   Vaping Use    Vaping Use: Never used   Substance and Sexual Activity    Alcohol use: Yes     Alcohol/week: 17 0 standard drinks     Types: 7 Glasses of wine, 10 Cans of beer per week     Comment: socially    Drug use: Never    Sexual activity: Not Currently   Other Topics Concern    None   Social History Narrative    Daily caffeine use - 2 cups coffee      Social Determinants of Health     Financial Resource Strain:     Difficulty of Paying Living Expenses:    Food Insecurity:     Worried About Running Out of Food in the Last Year:     Ran Out of Food in the Last Year:    Transportation Needs:     Lack of Transportation (Medical):      Lack of Transportation (Non-Medical):    Physical Activity:     Days of Exercise per Week:     Minutes of Exercise per Session:    Stress:     Feeling of Stress :    Social Connections:     Frequency of Communication with Friends and Family:     Frequency of Social Gatherings with Friends and Family:     Attends Shinto Services:     Active Member of Clubs or Organizations:     Attends Club or Organization Meetings:     Marital Status:    Intimate Partner Violence:     Fear of Current or Ex-Partner:     Emotionally Abused:     Physically Abused:     Sexually Abused:        Family History   Problem Relation Age of Onset    Liver cancer Father        Allergies   Allergen Reactions    Poison Ivy Extract Rash         Current Outpatient Medications:     acetaminophen (TYLENOL) 500 mg tablet, Take 2 tablets (1,000 mg total) by mouth every 8 (eight) hours, Disp: , Rfl:     allopurinol (ZYLOPRIM) 300 mg tablet, take 1 tablet by mouth once daily, Disp: 30 tablet, Rfl: 3    ALPRAZolam (XANAX) 0 25 mg tablet, Take 1 tablet (0 25 mg total) by mouth daily at bedtime as needed for anxiety (restless legs), Disp: 30 tablet, Rfl: 1   amLODIPine (NORVASC) 5 mg tablet, Take 5 mg by mouth daily  , Disp: , Rfl:     atorvastatin (LIPITOR) 80 mg tablet, Take 80 mg by mouth every other day evening, Disp: , Rfl:     folic acid (FOLVITE) 1 mg tablet, Take 1 tablet (1 mg total) by mouth daily, Disp: 90 tablet, Rfl: 4    HYDROmorphone (DILAUDID) 2 mg tablet, Take 2-4 mg [1-2 tabs] PO Q4H PRN for moderate/severe pain  Max Daily: 24 mg, Disp: 40 tablet, Rfl: 0    hydrOXYzine HCL (ATARAX) 25 mg tablet, Take 1 tablet (25 mg total) by mouth every 6 (six) hours as needed for itching or anxiety, Disp: 60 tablet, Rfl: 2    metoprolol tartrate (LOPRESSOR) 100 mg tablet, Take 50 mg by mouth daily, Disp: , Rfl:     naloxone (NARCAN) 4 mg/0 1 mL nasal spray, Administer 1 spray into a nostril   If breathing does not return to normal or if breathing difficulty resumes after 2-3 minutes, give another dose in the other nostril using a new spray , Disp: 1 each, Rfl: 1    omeprazole (PriLOSEC) 20 mg delayed release capsule, Take 20 mg by mouth daily  , Disp: , Rfl:     predniSONE 10 mg tablet, Take 2 tablets (20 mg total) by mouth daily, Disp: 60 tablet, Rfl: 3    senna (SENOKOT) 8 6 mg, Take 1 tablet (8 6 mg total) by mouth daily at bedtime (Patient taking differently: Take 8 6 mg by mouth daily at bedtime as needed ), Disp: 30 each, Rfl: 0    sildenafil (VIAGRA) 100 mg tablet, TAKE ONE TABLET BY MOUTH  AS NEEDED PRIOR TO SEXUAL ACTIVITY FOR ERECTILE DYSFUNCTION, Disp: , Rfl:     terazosin (HYTRIN) 5 mg capsule, Take 2 capsules (10 mg total) by mouth daily at bedtime, Disp: 30 capsule, Rfl: 6    triamcinolone (KENALOG) 0 1 % lotion, Apply topically 3 (three) times a day, Disp: 60 mL, Rfl: 5    VITAMIN D PO, Take 1,000 Units by mouth daily , Disp: , Rfl:     cholestyramine (QUESTRAN) 4 g packet, Take 1 packet (4 g total) by mouth 2 (two) times a day as needed (diarrhea) (Patient not taking: Reported on 8/23/2021), Disp: 30 packet, Rfl: 5    Magnesium 250 MG TABS, 1 tablet 2 (two) times a day Taking 500mg in the morning and 500mg at night (Patient not taking: Reported on 9/1/2021), Disp: , Rfl:       Physical Exam:  /84 (BP Location: Left arm, Patient Position: Sitting, Cuff Size: Adult)   Pulse 85   Temp 98 2 °F (36 8 °C) (Tympanic)   Resp 18   Ht 5' 8" (1 727 m)   Wt 90 kg (198 lb 6 4 oz)   SpO2 96%   BMI 30 17 kg/m²     Physical Exam  Constitutional:       Appearance: He is well-developed  He is ill-appearing  HENT:      Head: Normocephalic and atraumatic  Eyes:      General: No scleral icterus  Right eye: No discharge  Left eye: No discharge  Conjunctiva/sclera: Conjunctivae normal       Pupils: Pupils are equal, round, and reactive to light  Neck:      Thyroid: No thyromegaly  Trachea: No tracheal deviation  Cardiovascular:      Rate and Rhythm: Normal rate and regular rhythm  Heart sounds: Normal heart sounds  No murmur heard  No friction rub  Pulmonary:      Effort: Pulmonary effort is normal  No respiratory distress  Breath sounds: Normal breath sounds  No wheezing or rales  Chest:      Chest wall: No tenderness  Abdominal:      General: There is distension  Palpations: Abdomen is soft  There is no hepatomegaly or splenomegaly  Tenderness: There is no abdominal tenderness  There is no guarding or rebound  Musculoskeletal:         General: No tenderness or deformity  Normal range of motion  Cervical back: Normal range of motion and neck supple  Lymphadenopathy:      Cervical: No cervical adenopathy  Skin:     General: Skin is warm and dry  Coloration: Skin is not pale  Findings: No erythema or rash  Neurological:      Mental Status: He is alert and oriented to person, place, and time  Cranial Nerves: No cranial nerve deficit  Coordination: Coordination normal       Deep Tendon Reflexes: Reflexes are normal and symmetric     Psychiatric:         Behavior: Behavior normal          Thought Content: Thought content normal          Judgment: Judgment normal            Labs:  Lab Results   Component Value Date    WBC 7 50 09/15/2021    HGB 12 7 (L) 09/15/2021    HCT 38 3 (L) 09/15/2021    MCV 95 09/15/2021     09/15/2021     Lab Results   Component Value Date    K 3 6 09/15/2021    CL 95 (L) 09/15/2021    CO2 31 09/15/2021    BUN 21 09/15/2021    CREATININE 1 52 (H) 09/15/2021    GLUF 110 (H) 09/15/2021    CALCIUM 9 5 09/15/2021    CORRECTEDCA 9 8 10/28/2020    AST 9 (L) 09/15/2021    ALT 10 09/15/2021    ALKPHOS 63 09/15/2021    EGFR 45 09/15/2021     No results found for: TSH    Patient voiced understanding and agreement in the above discussion  Aware to contact our office with questions/symptoms in the interim

## 2021-09-16 NOTE — TELEPHONE ENCOUNTER
Primary palliative medicine provider: Dr Gildardo Shaver  Medication requested:Dilaudid    If for pain, how has the patient been taking their pain medicine?      Last appointment:    Next scheduled appointment: 9/24/21    PDMP review:    09/04/2021 1 09/04/2021   HYDROMORPHONE 2 MG TABLET  40 0 2 CH SMI   [id number]  MANDI ([id number]) 0 160 0 [facility] [employer] [place]  08/25/2021 1 08/23/2021   TRAMADOL HCL 50 MG TABLET  120 0 30 TI RENETTA   [id number]  MANDI ([id number]) 0 20 0 [place]  07/26/2021 1 07/26/2021   TRAMADOL HCL 50 MG TABLET  60 0 30 EIPFANIO WER   [id number]  MANDI ([id number]) 0 10 0 [place] [place] [employer] [place]

## 2021-09-17 ENCOUNTER — APPOINTMENT (OUTPATIENT)
Dept: RADIATION ONCOLOGY | Facility: CLINIC | Age: 71
End: 2021-09-17
Attending: RADIOLOGY
Payer: MEDICARE

## 2021-09-17 ENCOUNTER — HOSPITAL ENCOUNTER (OUTPATIENT)
Dept: INFUSION CENTER | Facility: HOSPITAL | Age: 71
Discharge: HOME | End: 2021-09-17
Attending: INTERNAL MEDICINE
Payer: MEDICARE

## 2021-09-17 VITALS
BODY MASS INDEX: 30.54 KG/M2 | WEIGHT: 201.5 LBS | DIASTOLIC BLOOD PRESSURE: 73 MMHG | SYSTOLIC BLOOD PRESSURE: 133 MMHG | HEIGHT: 68 IN | HEART RATE: 67 BPM | RESPIRATION RATE: 18 BRPM | TEMPERATURE: 98.2 F | OXYGEN SATURATION: 97 %

## 2021-09-17 DIAGNOSIS — C68.9 UROTHELIAL CANCER (HCC): Primary | ICD-10-CM

## 2021-09-17 DIAGNOSIS — C65.2 CANCER OF LEFT RENAL PELVIS (HCC): ICD-10-CM

## 2021-09-17 DIAGNOSIS — E86.0 DEHYDRATION: ICD-10-CM

## 2021-09-17 DIAGNOSIS — C79.10 METASTATIC UROTHELIAL CARCINOMA (HCC): ICD-10-CM

## 2021-09-17 PROCEDURE — 96413 CHEMO IV INFUSION 1 HR: CPT

## 2021-09-17 PROCEDURE — 96367 TX/PROPH/DG ADDL SEQ IV INF: CPT

## 2021-09-17 PROCEDURE — 77412 RADIATION TX DELIVERY LVL 3: CPT | Performed by: RADIOLOGY

## 2021-09-17 RX ORDER — ACETAMINOPHEN 325 MG/1
650 TABLET ORAL ONCE
Status: COMPLETED | OUTPATIENT
Start: 2021-09-17 | End: 2021-09-17

## 2021-09-17 RX ORDER — SODIUM CHLORIDE 9 MG/ML
20 INJECTION, SOLUTION INTRAVENOUS ONCE
Status: COMPLETED | OUTPATIENT
Start: 2021-09-17 | End: 2021-09-17

## 2021-09-17 RX ADMIN — MAGNESIUM SULFATE HEPTAHYDRATE: 500 INJECTION, SOLUTION INTRAMUSCULAR; INTRAVENOUS at 11:05

## 2021-09-17 RX ADMIN — ACETAMINOPHEN 650 MG: 325 TABLET ORAL at 09:27

## 2021-09-17 RX ADMIN — ENFORTUMAB VEDOTIN 114 MG: 30 INJECTION, POWDER, LYOPHILIZED, FOR SOLUTION INTRAVENOUS at 10:19

## 2021-09-17 RX ADMIN — DEXAMETHASONE SODIUM PHOSPHATE: 10 INJECTION, SOLUTION INTRAMUSCULAR; INTRAVENOUS at 09:48

## 2021-09-17 RX ADMIN — DIPHENHYDRAMINE HYDROCHLORIDE 25 MG: 50 INJECTION, SOLUTION INTRAMUSCULAR; INTRAVENOUS at 09:26

## 2021-09-17 RX ADMIN — SODIUM CHLORIDE 20 ML/HR: 0.9 INJECTION, SOLUTION INTRAVENOUS at 09:30

## 2021-09-17 NOTE — PROGRESS NOTES
Padcev was stopped at 12 today and given without incident  Pt tolerated treatment and Mag was added   AVS given

## 2021-09-20 ENCOUNTER — APPOINTMENT (OUTPATIENT)
Dept: RADIATION ONCOLOGY | Facility: CLINIC | Age: 71
End: 2021-09-20
Attending: RADIOLOGY
Payer: MEDICARE

## 2021-09-20 PROCEDURE — 77412 RADIATION TX DELIVERY LVL 3: CPT | Performed by: RADIOLOGY

## 2021-09-21 ENCOUNTER — APPOINTMENT (OUTPATIENT)
Dept: RADIATION ONCOLOGY | Facility: CLINIC | Age: 71
End: 2021-09-21
Attending: RADIOLOGY
Payer: MEDICARE

## 2021-09-21 PROCEDURE — 77336 RADIATION PHYSICS CONSULT: CPT | Performed by: RADIOLOGY

## 2021-09-21 PROCEDURE — 77412 RADIATION TX DELIVERY LVL 3: CPT | Performed by: RADIOLOGY

## 2021-09-22 ENCOUNTER — APPOINTMENT (OUTPATIENT)
Dept: LAB | Facility: HOSPITAL | Age: 71
End: 2021-09-22
Attending: INTERNAL MEDICINE
Payer: MEDICARE

## 2021-09-22 DIAGNOSIS — C79.10 METASTATIC UROTHELIAL CARCINOMA (HCC): ICD-10-CM

## 2021-09-22 LAB
ALBUMIN SERPL BCP-MCNC: 3.5 G/DL (ref 3.5–5.7)
ALP SERPL-CCNC: 53 U/L (ref 55–165)
ALT SERPL W P-5'-P-CCNC: 15 U/L (ref 7–52)
ANION GAP SERPL CALCULATED.3IONS-SCNC: 10 MMOL/L (ref 4–13)
AST SERPL W P-5'-P-CCNC: 11 U/L (ref 13–39)
BASOPHILS # BLD AUTO: 0 THOUSANDS/ΜL (ref 0–0.1)
BASOPHILS NFR BLD AUTO: 0 % (ref 0–2)
BILIRUB SERPL-MCNC: 0.7 MG/DL (ref 0.2–1)
BUN SERPL-MCNC: 27 MG/DL (ref 7–25)
CALCIUM SERPL-MCNC: 9.7 MG/DL (ref 8.6–10.5)
CHLORIDE SERPL-SCNC: 100 MMOL/L (ref 98–107)
CO2 SERPL-SCNC: 31 MMOL/L (ref 21–31)
CREAT SERPL-MCNC: 1.48 MG/DL (ref 0.7–1.3)
EOSINOPHIL # BLD AUTO: 0 THOUSAND/ΜL (ref 0–0.61)
EOSINOPHIL NFR BLD AUTO: 0 % (ref 0–5)
ERYTHROCYTE [DISTWIDTH] IN BLOOD BY AUTOMATED COUNT: 15.2 % (ref 11.5–14.5)
GFR SERPL CREATININE-BSD FRML MDRD: 47 ML/MIN/1.73SQ M
GLUCOSE P FAST SERPL-MCNC: 111 MG/DL (ref 65–99)
HCT VFR BLD AUTO: 37.7 % (ref 42–47)
HGB BLD-MCNC: 12.3 G/DL (ref 14–18)
LYMPHOCYTES # BLD AUTO: 1 THOUSANDS/ΜL (ref 0.6–4.47)
LYMPHOCYTES NFR BLD AUTO: 13 % (ref 21–51)
MAGNESIUM SERPL-MCNC: 1.8 MG/DL (ref 1.9–2.7)
MCH RBC QN AUTO: 31.4 PG (ref 26–34)
MCHC RBC AUTO-ENTMCNC: 32.6 G/DL (ref 31–37)
MCV RBC AUTO: 96 FL (ref 81–99)
MONOCYTES # BLD AUTO: 0.7 THOUSAND/ΜL (ref 0.17–1.22)
MONOCYTES NFR BLD AUTO: 9 % (ref 2–12)
NEUTROPHILS # BLD AUTO: 6.2 THOUSANDS/ΜL (ref 1.4–6.5)
NEUTS SEG NFR BLD AUTO: 78 % (ref 42–75)
PLATELET # BLD AUTO: 226 THOUSANDS/UL (ref 149–390)
PMV BLD AUTO: 7.4 FL (ref 8.6–11.7)
POTASSIUM SERPL-SCNC: 4.2 MMOL/L (ref 3.5–5.5)
PROT SERPL-MCNC: 6.1 G/DL (ref 6.4–8.9)
RBC # BLD AUTO: 3.92 MILLION/UL (ref 4.3–5.9)
SODIUM SERPL-SCNC: 141 MMOL/L (ref 134–143)
WBC # BLD AUTO: 8 THOUSAND/UL (ref 4.8–10.8)

## 2021-09-22 PROCEDURE — 80053 COMPREHEN METABOLIC PANEL: CPT

## 2021-09-22 PROCEDURE — 36415 COLL VENOUS BLD VENIPUNCTURE: CPT

## 2021-09-22 PROCEDURE — 85025 COMPLETE CBC W/AUTO DIFF WBC: CPT

## 2021-09-22 PROCEDURE — 83735 ASSAY OF MAGNESIUM: CPT

## 2021-09-23 ENCOUNTER — TELEPHONE (OUTPATIENT)
Dept: PALLIATIVE MEDICINE | Facility: CLINIC | Age: 71
End: 2021-09-23

## 2021-09-23 ENCOUNTER — HOSPITAL ENCOUNTER (OUTPATIENT)
Dept: INFUSION CENTER | Facility: HOSPITAL | Age: 71
Discharge: HOME | End: 2021-09-23
Payer: MEDICARE

## 2021-09-23 ENCOUNTER — OFFICE VISIT (OUTPATIENT)
Dept: HEMATOLOGY ONCOLOGY | Facility: CLINIC | Age: 71
End: 2021-09-23
Payer: MEDICARE

## 2021-09-23 VITALS
HEIGHT: 68 IN | RESPIRATION RATE: 16 BRPM | DIASTOLIC BLOOD PRESSURE: 70 MMHG | OXYGEN SATURATION: 97 % | WEIGHT: 200.2 LBS | SYSTOLIC BLOOD PRESSURE: 128 MMHG | HEART RATE: 100 BPM | TEMPERATURE: 97.8 F | BODY MASS INDEX: 30.34 KG/M2

## 2021-09-23 DIAGNOSIS — G89.3 CANCER ASSOCIATED PAIN: Primary | ICD-10-CM

## 2021-09-23 DIAGNOSIS — E86.0 DEHYDRATION: ICD-10-CM

## 2021-09-23 DIAGNOSIS — C65.2 CANCER OF LEFT RENAL PELVIS (HCC): ICD-10-CM

## 2021-09-23 DIAGNOSIS — E83.42 HYPOMAGNESEMIA: ICD-10-CM

## 2021-09-23 DIAGNOSIS — G89.3 CANCER RELATED PAIN: ICD-10-CM

## 2021-09-23 DIAGNOSIS — C79.10 METASTATIC UROTHELIAL CARCINOMA (HCC): Primary | ICD-10-CM

## 2021-09-23 DIAGNOSIS — C68.9 UROTHELIAL CANCER (HCC): Primary | ICD-10-CM

## 2021-09-23 DIAGNOSIS — Z45.2 ENCOUNTER FOR CENTRAL LINE CARE: ICD-10-CM

## 2021-09-23 PROCEDURE — 96374 THER/PROPH/DIAG INJ IV PUSH: CPT

## 2021-09-23 PROCEDURE — 99215 OFFICE O/P EST HI 40 MIN: CPT | Performed by: NURSE PRACTITIONER

## 2021-09-23 RX ORDER — ACETAMINOPHEN 325 MG/1
650 TABLET ORAL ONCE
Status: CANCELLED | OUTPATIENT
Start: 2021-10-15

## 2021-09-23 RX ORDER — SODIUM CHLORIDE 9 MG/ML
20 INJECTION, SOLUTION INTRAVENOUS ONCE
Status: CANCELLED | OUTPATIENT
Start: 2021-10-22

## 2021-09-23 RX ORDER — ACETAMINOPHEN 325 MG/1
650 TABLET ORAL ONCE
Status: CANCELLED | OUTPATIENT
Start: 2021-10-22

## 2021-09-23 RX ORDER — KETOROLAC TROMETHAMINE 30 MG/ML
15 INJECTION, SOLUTION INTRAMUSCULAR; INTRAVENOUS ONCE
Status: CANCELLED
Start: 2021-09-23 | End: 2021-09-23

## 2021-09-23 RX ORDER — KETOROLAC TROMETHAMINE 30 MG/ML
30 INJECTION, SOLUTION INTRAMUSCULAR; INTRAVENOUS ONCE
Status: DISCONTINUED | OUTPATIENT
Start: 2021-09-23 | End: 2021-09-27 | Stop reason: HOSPADM

## 2021-09-23 RX ORDER — KETOROLAC TROMETHAMINE 30 MG/ML
15 INJECTION, SOLUTION INTRAMUSCULAR; INTRAVENOUS ONCE
Status: COMPLETED | OUTPATIENT
Start: 2021-09-23 | End: 2021-09-23

## 2021-09-23 RX ORDER — SODIUM CHLORIDE 9 MG/ML
20 INJECTION, SOLUTION INTRAVENOUS ONCE
Status: CANCELLED | OUTPATIENT
Start: 2021-10-15

## 2021-09-23 RX ORDER — KETOROLAC TROMETHAMINE 30 MG/ML
30 INJECTION, SOLUTION INTRAMUSCULAR; INTRAVENOUS ONCE
Status: CANCELLED
Start: 2021-09-23 | End: 2021-09-23

## 2021-09-23 RX ORDER — ACETAMINOPHEN 325 MG/1
650 TABLET ORAL ONCE
Status: CANCELLED | OUTPATIENT
Start: 2021-10-08

## 2021-09-23 RX ORDER — MORPHINE SULFATE 30 MG/1
30 TABLET, FILM COATED, EXTENDED RELEASE ORAL 2 TIMES DAILY
Qty: 45 TABLET | Refills: 0 | Status: SHIPPED | OUTPATIENT
Start: 2021-09-23 | End: 2021-10-19 | Stop reason: SDUPTHER

## 2021-09-23 RX ORDER — SODIUM CHLORIDE 9 MG/ML
20 INJECTION, SOLUTION INTRAVENOUS ONCE
Status: CANCELLED | OUTPATIENT
Start: 2021-10-08

## 2021-09-23 RX ORDER — DEXAMETHASONE 2 MG/1
2 TABLET ORAL
Qty: 20 TABLET | Refills: 0 | Status: SHIPPED | OUTPATIENT
Start: 2021-09-23 | End: 2021-10-07 | Stop reason: CLARIF

## 2021-09-23 RX ADMIN — KETOROLAC TROMETHAMINE 15 MG: 30 INJECTION, SOLUTION INTRAMUSCULAR at 09:46

## 2021-09-23 NOTE — TELEPHONE ENCOUNTER
Thank you! If he is using 4mg of dilaudid every 4 hours, that is 24mg dilaudid in 24H which is equivalent to about 96mg oral morphine  Please advise of the ff:    1  Start 1000mg tylenol every 8 hours scheduled  NO more extra tylenol after this  Max is 3000mg in 24H  2  Start Morphine ER 30mg q8H scheduled  Pls take this together with the tylenol to make it easier  3  Continue dilaudid but please try to take only 1 tab (2mg) because of the large addition of morphine in extended release form  He may take 2mg of dilaudid every 4 hours as needed for breakthrough pain  4  Will add narcan as well prn if in case of respiratory depression at home  5  Start decadron 2mg in the am and 2mg at noon  We will do this for about 10 days  All above is to address acute cancer pain  We will check in again tomorrow  If at any time, pain is uncontrolled despite above, pls proceed to the ED     Controlled Substance Review    [place] PDMP or [place]  reviewed: No red flags were identified; safe to proceed with prescription  Lui Spruce       09/16/2021  1   09/16/2021  Hydromorphone 2 MG Tablet  90 00  8 Ti [address]on   [id number]   Annel ([id number])   0  90 00 MME  [place]  09/04/2021  1   09/04/2021  Hydromorphone 2 MG Tablet  40 00  2 Ch Smi   [id number]   Annel ([id number])   0  160 00 MME       Vanessa Garcia MD  Palliative Medicine & Supportive Care  Internal Medicine  Available via [facility] Text  Office: [phone]  Fax: [phone]

## 2021-09-23 NOTE — TELEPHONE ENCOUNTER
Pt called office c/o pain  Pt was asked the following questions to get a better understanding of their concern  WHERE is the pain:   Right shoulder to middle of back of neck  WHAT kind of pain? Pt stated a lot of pain 10/10      What relieves the pain? Patient is not getting any relief from medication      HOW are you taking your pain medication:  Pt taking 2 tablets of Hydromorphone 2mg every 4 hours    Next scheduled appt 10/6/21  [address],  Vernell clark

## 2021-09-23 NOTE — TELEPHONE ENCOUNTER
Call placed to pt re escalating pain  Pt received Toradol 15 mg IV at infusion center at 930 am  Currently at time of call 1200 pm, pain level 3/10  Asked if pain is different in character  Pt states it is the same on going pain he has had but just much worse  As reported pt has been taking hydromorphone 2  Tablets ( total 4 mg) every 4 hours  This am pt admits to taking following  0500 Hydromorphone 4 mg  0700 hydromorphone 4 mg and Tylenol 1300 mg ( 2 arthritis strength)   Without relief  Pt usually only takes 650 mg tylenol as adjunct for pain       Pt awaiting further insttructions   Next office visit 10/06

## 2021-09-23 NOTE — PROGRESS NOTES
Spoke with Jamar Chase RN in reference to red box warning on Pat's Toradol  Creat clearance less than 60 noted and therefore Ann KRISHNAMURTHY lowered toradol 30 mg to 15 mg iv  Same given  Pain 8-9/10 in shoulder and right side of chest  Recently completed fradiation to neck mass  Will be returning tomorrow for treatment and iv mag bolus

## 2021-09-23 NOTE — PROGRESS NOTES
Hematology/Oncology Outpatient Follow-up  Monica Rodriguez 66 y o  male 1950 [id number]    Date:  9/23/2021      Assessment and Plan:  1  Metastatic urothelial carcinoma ([facility] [address] )  Patient will continue his current treatment with Padcev 3 weeks on with 1 week of a break which he is tolerating well the exception of some very mild neuropathy in the fingertips  He will be due for cycle 1 day 15 tomorrow  Will arrange for a follow-up appointment with repeat labs in 2 weeks before starting cycle 2     -- CBC and differential; Future  - Comprehensive metabolic panel; Future  - Magnesium; Future    2  Hypomagnesemia  We treat patient with another dose of IV magnesium 2 g in 1000 mL of normal saline since he cannot tolerate increase of his oral magnesium  He will continue his magnesium daily  3  Cancer related pain  Patient seems to be experiencing severe pain today to his upper right back radiating to the neck region  Has taken 2 doses of his his 4 mg Dilaudid x2 since early this morning without any relief  Will reach out to his palliative care team   Advised patient his wife to contact them today as well  Will give him a dose of IV Toradol x1 now in the infusion center to see if this will allow for some relief  HPI:  Patient presents today for a follow-up visit  He is reporting that he is having a bad day today with severe pain to his mid upper back and radiating to the neck that he rates 10/10  The pain is located above his mass on the right upper shoulder region  He has doubled up on his Dilaudid as directed by palliative care is taking two 2 mg tablets every 4 hours which does not seem to be controlling the pain as it did prior  He mentions that the pain became significant this morning before that he was managing with good days and bad days  He also reports radiation skin changes to the right axilla which is uncomfortable    He has been tolerating his new treatment Padcev well with the exception of development of very mild neuropathy to the fingertips which is manageable  His most recent laboratory studies from 09/22/2021 showed normal white cells and platelets he has stable normocytic anemia H&H 12 3/37 7, MCV 96  His stable renal dysfunction BUN 27, creatinine 1 48, GFR 47, total protein decreased 6 1, glucose 111, remaining metabolic panel is appropriate for patient  His magnesium continues to be mildly decreased 1 8  He is taking magnesium daily had to slow down on use as it was causing diarrhea  Oncology History Overview Note   Patient has a history of hypertension, hyperlipidemia, chronic lower back pain  He had an MRI of the lower spine for further evaluation of his chronic lower back pain  The MRI on 12/02/2019 revealed Multiple left renal masses to include a left lower pole cyst and a rounded structure in the left upper pole which may also represent cyst   Left upper pole renal masses approximately 3 cm in greatest linear dimension  A CT with renal protocol was done on 12/12/2019 which also showed the infiltrating lesion in the upper pole of the left kidney measuring about the 3 5 cm in greatest dimension without any hint of retroperitoneal lymphadenopathy  A CT scan of the abdomen pelvis on 01/14/2020 showed the same findings with the mild hydronephrosis on the left  The urine cytology on 01/03/2020 showed high-grade urothelial carcinoma  A cystoscopy was then done, a biopsy was taken from the left renal pelvic region which showed high-grade urothelial carcinoma without evidence of lamina propria invasion  The detrusor muscle/muscularis propria were not present for evaluation  The recommendation was to pursue left robotic assisted laparoscopic nephroureterectomy with bladder cuff excision which was done on 04/01/2020  The final pathology revealed;  - Invasive high grade urothelial carcinoma arising in renal pelvis     - Bladder cuff margin is negative for carcinoma and no evidence of high grade dysplasia  - Ureters with no significant pathologic abnormality  - Two benign simple cysts  - Adrenal gland is negative for malignancy  - One lymph node, negative for malignancy (0/1)  The tumor size was 4 5 cm with invasion beyond the muscularis into the periurethral fat or peripelvic fat or the renal parenchyma  The margins were uninvolved by carcinoma or carcinoma in situ  The final pathology was pT3 pN0  Patient barely tolerated 1 cycle of adjuvant chemotherapy with split dose cisplatin and gemcitabine with a lot of side effects  The treatment had to be discontinued due to significant worsening renal dysfunction 6/2020  Patient started to complain about significant abdominal/left inguinal pain around August/September 2020  Unfortunately repeat imaging revealed recurrent/metastatic disease  9/4/20 CT C/A/P- Status post left nephrectomy for resection of urothelial neoplasm  Lymphadenopathy in the left nephrectomy bed has increased since the prior examination, concerning for metastatic disease  Ill-defined hyperattenuating masslike focus in the left rectus muscle, not identified on the prior examination  This is suspicious for a metastatic lesion  A rectus hematoma is also considered  9/23/20 PET scan- 1  Multiple FDG avid lymph nodes in the retrocrural region, retroperitoneum and the left renal bed compatible with metastasis  These have progressed from recent CT  2   FDG avid mass involving the left rectus abdominal musculature compatible with metastasis which is larger from recent CT  3  Several small lymph nodes adjacent to the mid to distal esophagus with FDG uptake suspicious for metastasis  Small focus of FDG uptake also suggested in the right hilar region, metastasis is not excluded  This could be reassessed on follow-up    4   Subtle focus of FDG uptake at the T2 level on the left, nonspecific, could be related to early degenerative changes, developing metastasis is not entirely excluded  This could be reassessed on follow-up  5  Prostate is mildly prominent with heterogeneous FDG uptake  This could be inflammatory  Correlate for prostatitis  He completed palliative radiation to the left abdomen 12/3/20    His PET scan from 08/16/2021 showed progression of his disease with hypermetabolic soft tissue lesions at the level of the right shoulder and right axilla with interval progression of the retroperitoneal and mesenteric hypermetabolic metastasis  He did have the new lesion to his right upper back/shoulder which was causing significant localized pain  This excised by his surgeon 08/09/2021  Pathology confirmed metastatic high-grade carcinoma consistent with his no primary urothelial carcinoma  He received palliative radiation to his right shoulder/axilla region  Urothelial cancer ([facility] [address] )   1/3/2020 Biopsy    Final Diagnosis    A  Urine, Clean Catch, Thin Prep:  High grade urothelial carcinoma (HGUC) - see comment  1/3/2020 Initial Diagnosis    Urothelial cancer ([facility] [address] )  T3 N0 (stage IIIA)     1/17/2020 Biopsy    Final Diagnosis    A  Ureter, Right, left renal pelvis biopsy  -Fragments of high grade urothelial carcinoma   -No evidence of lamina propria invasion   -Detrusor muscle/ muscularis propria is not present for evaluation  B  Urinary Bladder, bladder biopsy:  -Fragments of low grade papillary lesion  See note  -No evidence of invasion seen  -Unremarkable fragment of detrusor muscle seen  4/1/2020 Surgery    left robotic assisted laparoscopic nephroureterectomy with bladder cuff excision     Final Diagnosis    A  Left kidney, ureter, and bladder cuff, nephroureterectomy:  - Invasive high grade urothelial carcinoma arising in renal pelvis  - Bladder cuff margin is negative for carcinoma and no evidence of high grade dysplasia  - Ureters with no significant pathologic abnormality  - Two benign simple cysts    - Adrenal gland is negative for malignancy  - One lymph node, negative for malignancy (0/1)          5/14/2020 - 6/3/2020 Chemotherapy    CISplatin (PLATINOL) split dose 35 mg/m2 = 75 3 mg (50 % of original dose 70 mg/m2), Intravenous,   Administration: 75 3 mg (5/14/2020), 75 3 mg (5/21/2020)  gemcitabine (GEMZAR) 2,200 mg in sodium chloride 0 9 % 250 mL infusion, 2,150 2 mg (80 % of original dose 1,250 mg/m2), Intravenous,   Administration: 2,200 mg (5/14/2020), 2,200 mg (5/21/2020)    (only completed 1 cycle- d/c d/t adverse effects and worsening renal dysfunction)     10/9/2020 - 9/9/2021 Chemotherapy    pembrolizumab (KEYTRUDA) IVPB, 200 mg, Intravenous, Once, 16 of 20 cycles  Administration: 200 mg (10/9/2020), 200 mg (10/30/2020), 200 mg (11/20/2020), 200 mg (12/11/2020), 200 mg (12/31/2020), 200 mg (1/22/2021), 200 mg (2/12/2021), 200 mg (3/5/2021), 200 mg (3/26/2021), 200 mg (4/16/2021), 200 mg (5/7/2021), 200 mg (5/28/2021), 200 mg (6/18/2021), 200 mg (7/9/2021), 200 mg (7/30/2021), 200 mg (8/20/2021)     11/19/2020 - 12/3/2020 Radiation    Treatment:  Course: C1    Plan ID Energy Fractions Dose per Fraction (cGy) Dose Correction (cGy) Total Dose Delivered (cGy) Elapsed Days   L Abdomen 6X 10 / 10 300 0 3,000 14        9/10/2021 -  Chemotherapy    enfortumab vedotin-ejfv (PADCEV) IVPB, 1 25 mg/kg = 114 mg, Intravenous, Once, 1 of 6 cycles  Administration: 114 mg (9/10/2021), 114 mg (9/17/2021)     Cancer of left renal pelvis ([address] )   4/23/2020 Initial Diagnosis    Cancer of left renal pelvis (HCC)     10/9/2020 - 9/9/2021 Chemotherapy    pembrolizumab (KEYTRUDA) IVPB, 200 mg, Intravenous, Once, 16 of 20 cycles  Administration: 200 mg (10/9/2020), 200 mg (10/30/2020), 200 mg (11/20/2020), 200 mg (12/11/2020), 200 mg (12/31/2020), 200 mg (1/22/2021), 200 mg (2/12/2021), 200 mg (3/5/2021), 200 mg (3/26/2021), 200 mg (4/16/2021), 200 mg (5/7/2021), 200 mg (5/28/2021), 200 mg (6/18/2021), 200 mg (7/9/2021), 200 mg (7/30/2021), 200 mg (8/20/2021)     9/10/2021 -  Chemotherapy    enfortumab vedotin-ejfv (PADCEV) IVPB, 1 25 mg/kg = 114 mg, Intravenous, Once, 1 of 6 cycles  Administration: 114 mg (9/10/2021), 114 mg (9/17/2021)      Surgery       Metastatic [facility]ial carcinoma ([facility] )   8/9/2021 Initial Diagnosis    Metastatic urothelial carcinoma ([facility] )     9/10/2021 -  Chemotherapy    enfortumab vedotin-ejfv (PADCEV) IVPB, 1 25 mg/kg = 114 mg, Intravenous, Once, 1 of 6 cycles  Administration: 114 mg (9/10/2021), 114 mg (9/17/2021)         Interval history:    ROS: Review of Systems   Constitutional: Positive for fatigue  Negative for activity change, appetite change, chills, fever and unexpected weight change  HENT: Positive for hearing loss  Negative for congestion, mouth sores, nosebleeds, sore throat and trouble swallowing  Eyes: Negative  Respiratory: Positive for shortness of breath  Negative for cough and chest tightness  Cardiovascular: Negative for chest pain, palpitations and leg swelling  Gastrointestinal: Positive for constipation  Negative for abdominal distention, abdominal pain, blood in stool, diarrhea, nausea and vomiting  Genitourinary: Negative for difficulty urinating, dysuria, frequency, hematuria and urgency  Musculoskeletal: Positive for arthralgias, back pain, myalgias and neck pain  Negative for gait problem and joint swelling  Skin: Positive for color change and rash (right axilla)  Negative for pallor  Neurological: Positive for dizziness, numbness and headaches  Negative for weakness and light-headedness  Hematological: Negative for adenopathy  Does not bruise/bleed easily  Psychiatric/Behavioral: Negative for dysphoric mood and sleep disturbance  The patient is not nervous/anxious          Past Medical History:   Diagnosis Date    Arthritis     Bladder cancer     Cancer ([facility] )     skin melanoma;basal cell    Chronic pain disorder     from arthritis    Colon polyp     Does use hearing aid     bilat    Dry eyes, bilateral     GERD (gastroesophageal reflux disease)     History of kidney stones 10/2019    History of partial knee replacement     bilat    History of vertigo     Hyperlipidemia     Hypertension     Kidney lesion     Lumbar disc disorder     compression of vertebrae L4-5-6    Muscle weakness     left hip area    Renal mass     left    Right ankle injury 03/05/2020    missed step of ladder     Right ankle pain     Shortness of breath     with activity    Tinnitus     Urothelial cancer     left    Wears glasses        Past Surgical History:   Procedure Laterality Date    COLONOSCOPY      CYSTOSCOPY Left 4/1/2020    Procedure: CYSTOSCOPY; URETERAL CATHETER PLACEMENT;  Surgeon: Chase Morgan MD;  Location[facility];  Service: Urology    CYSTOSCOPY  05/11/2020    CYSTOSCOPY  06/04/2021    FL CYSTOGRAM  4/13/2020    FL RETROGRADE PYELOGRAM  1/17/2020    HERNIA REPAIR      umbilical with mesh    INGUINAL HERNIA REPAIR Right     with mesh    IR PORT PLACEMENT  4/30/2020    JOINT REPLACEMENT Bilateral     partials knee    LUMBAR EPIDURAL INJECTION      ID CYSTOURETHROSCOPY,URETER CATHETER Bilateral 1/17/2020    Procedure: CYSTOSCOPY; RIGHT RETROGRADE PYELOGRAM WITH RIGHT URETERAL CYTOLOGY SAMPLING; LEFT URETEROSCOPY WITH RENAL PELVIS BIOPSY AND LEFT STENT PLACEMENT;  Surgeon: Chase Morgan MD;  [facility];  Service: Urology    ID NEPHRECTOMY, W/PART   URETECTOMY Left 4/1/2020    Procedure: ROBOTIC LAPAROSCOPIC NEPHRO-URETERECTOMY;  Surgeon: Chase Morgan MD;  [facility];  Service: Urology    SKIN CANCER EXCISION      surface melanoma    TONSILLECTOMY      WISDOM TOOTH EXTRACTION         Social History     Socioeconomic History    Marital status: /Civil Union     Spouse name: None    Number of children: None    Years of education: None    Highest education level: None   Occupational History    None   Tobacco Use    Smoking status: Former Smoker     Types: Cigarettes     Quit date:      Years since quittin 7    Smokeless tobacco: Never Used   Vaping Use    Vaping Use: Never used   Substance and Sexual Activity    Alcohol use: Yes     Alcohol/week: 17 0 standard drinks     Types: 7 Glasses of wine, 10 Cans of beer per week     Comment: socially    Drug use: Never    Sexual activity: Not Currently   Other Topics Concern    None   Social History Narrative    Daily caffeine use - 2 cups coffee      Social Determinants of Health     Financial Resource Strain:     Difficulty of Paying Living Expenses:    Food Insecurity:     Worried About Running Out of Food in the Last Year:     Ran Out of Food in the Last Year:    Transportation Needs:     Lack of Transportation (Medical):      Lack of Transportation (Non-Medical):    Physical Activity:     Days of Exercise per Week:     Minutes of Exercise per Session:    Stress:     Feeling of Stress :    Social Connections:     Frequency of Communication with Friends and Family:     Frequency of Social Gatherings with Friends and Family:     Attends Zoroastrian Services:     Active Member of Clubs or Organizations:     Attends Club or Organization Meetings:     Marital Status:    Intimate Partner Violence:     Fear of Current or Ex-Partner:     Emotionally Abused:     Physically Abused:     Sexually Abused:        Family History   Problem Relation Age of Onset    Liver cancer Father        Allergies   Allergen Reactions    Poison Ivy Extract Rash         Current Outpatient Medications:     acetaminophen (TYLENOL) 500 mg tablet, Take 2 tablets (1,000 mg total) by mouth every 8 (eight) hours, Disp: , Rfl:     allopurinol (ZYLOPRIM) 300 mg tablet, take 1 tablet by mouth once daily, Disp: 30 tablet, Rfl: 3    ALPRAZolam (XANAX) 0 25 mg tablet, Take 1 tablet (0 25 mg total) by mouth daily at bedtime as needed for anxiety (restless legs), Disp: 30 tablet, Rfl: 1    amLODIPine (NORVASC) 5 mg tablet, Take 5 mg by mouth daily  , Disp: , Rfl:     atorvastatin (LIPITOR) 80 mg tablet, Take 80 mg by mouth every other day evening, Disp: , Rfl:     cholestyramine (QUESTRAN) 4 g packet, Take 1 packet (4 g total) by mouth 2 (two) times a day as needed (diarrhea), Disp: 30 packet, Rfl: 5    folic acid (FOLVITE) 1 mg tablet, Take 1 tablet (1 mg total) by mouth daily, Disp: 90 tablet, Rfl: 4    HYDROmorphone (DILAUDID) 2 mg tablet, Take 2-4 mg [1-2 tabs] PO Q4H PRN for moderate/severe pain  Max Daily: 24 mg, Disp: 90 tablet, Rfl: 0    hydrOXYzine HCL (ATARAX) 25 mg tablet, Take 1 tablet (25 mg total) by mouth every 6 (six) hours as needed for itching or anxiety, Disp: 60 tablet, Rfl: 2    Magnesium 250 MG TABS, 1 tablet 2 (two) times a day Taking 500mg in the morning and 500mg at night, Disp: , Rfl:     metoprolol tartrate (LOPRESSOR) 100 mg tablet, Take 50 mg by mouth daily, Disp: , Rfl:     naloxone (NARCAN) 4 mg/0 1 mL nasal spray, Administer 1 spray into a nostril   If breathing does not return to normal or if breathing difficulty resumes after 2-3 minutes, give another dose in the other nostril using a new spray , Disp: 1 each, Rfl: 1    omeprazole (PriLOSEC) 20 mg delayed release capsule, Take 20 mg by mouth daily  , Disp: , Rfl:     predniSONE 10 mg tablet, Take 2 tablets (20 mg total) by mouth daily, Disp: 60 tablet, Rfl: 3    senna (SENOKOT) 8 6 mg, Take 1 tablet (8 6 mg total) by mouth daily at bedtime (Patient taking differently: Take 8 6 mg by mouth daily at bedtime as needed ), Disp: 30 each, Rfl: 0    sildenafil (VIAGRA) 100 mg tablet, TAKE ONE TABLET BY MOUTH  AS NEEDED PRIOR TO SEXUAL ACTIVITY FOR ERECTILE DYSFUNCTION, Disp: , Rfl:     terazosin (HYTRIN) 5 mg capsule, Take 2 capsules (10 mg total) by mouth daily at bedtime, Disp: 30 capsule, Rfl: 6    triamcinolone (KENALOG) 0 1 % lotion, Apply topically 3 (three) times a day, Disp: 60 mL, Rfl: 5    VITAMIN D PO, Take 1,000 Units by mouth daily , Disp: , Rfl:       Physical Exam:  /70 (BP Location: Left arm, Patient Position: Sitting, Cuff Size: Adult)   Pulse 100   Temp 97 8 °F (36 6 °C) (Tympanic)   Resp 16   Ht 5' 8" (1 727 m)   Wt 90 8 kg (200 lb 3 2 oz)   SpO2 97%   BMI 30 44 kg/m²     Physical Exam  Vitals reviewed  Constitutional:       General: He is in acute distress (d/t pain)  Appearance: He is not diaphoretic  HENT:      Head: Normocephalic and atraumatic  Eyes:      General: Lids are normal  No scleral icterus  Conjunctiva/sclera: Conjunctivae normal       Pupils: Pupils are equal, round, and reactive to light  Neck:      Thyroid: No thyromegaly  Cardiovascular:      Rate and Rhythm: Normal rate and regular rhythm  Heart sounds: Normal heart sounds  No murmur heard  Pulmonary:      Effort: Pulmonary effort is normal  No respiratory distress  Breath sounds: Normal breath sounds  Abdominal:      General: There is no distension  Palpations: Abdomen is soft  There is no hepatomegaly or splenomegaly  Tenderness: There is no abdominal tenderness  Musculoskeletal:         General: No swelling  Normal range of motion  Cervical back: Normal range of motion and neck supple  Lymphadenopathy:      Cervical: No cervical adenopathy  Upper Body:      Right upper body: No axillary adenopathy  Left upper body: No axillary adenopathy  Skin:     General: Skin is warm and dry  Findings: Erythema (right axilla d/t XRT) present  No rash  Neurological:      General: No focal deficit present  Mental Status: He is alert and oriented to person, place, and time  Psychiatric:         Mood and Affect: Mood is depressed  Affect is blunt  Behavior: Behavior normal  Behavior is cooperative  Thought Content:  Thought content normal          Judgment: Judgment normal            Labs:  Lab Results   Component Value Date    WBC 8 00 09/22/2021    HGB 12 3 (L) 09/22/2021    HCT 37 7 (L) 09/22/2021    MCV 96 09/22/2021     09/22/2021     Lab Results   Component Value Date    K 4 2 09/22/2021     09/22/2021    CO2 31 09/22/2021    BUN 27 (H) 09/22/2021    CREATININE 1 48 (H) 09/22/2021    GLUF 111 (H) 09/22/2021    CALCIUM 9 7 09/22/2021    CORRECTEDCA 9 8 10/28/2020    AST 11 (L) 09/22/2021    ALT 15 09/22/2021    ALKPHOS 53 (L) 09/22/2021    EGFR 47 09/22/2021       Patient voiced understanding and agreement in the above discussion  Aware to contact our office with questions/symptoms in the interim  This note has been generated by voice recognition software system  Therefore, there may be spelling, grammar, and or syntax errors  Please contact if questions arise

## 2021-09-23 NOTE — PROGRESS NOTES
Pt toelrated the dose of toradol iv well  States he has slight relief already  Rested with eye closed for a few minutes afterward  Port flushed and deaccessed per routine   Discharged ambulatory deferring avs

## 2021-09-23 NOTE — TELEPHONE ENCOUNTER
This nurse called the patient back and gave him and the wife the detailed instructions a s to how to take all the new medications, tylenol and decadron and when to go to the ED if needed for uncontrolled pain  They wrote all the directions down  Will reach out to patient tomorrow to see how everything is working for him

## 2021-09-24 ENCOUNTER — HOSPITAL ENCOUNTER (OUTPATIENT)
Dept: INFUSION CENTER | Facility: HOSPITAL | Age: 71
Discharge: HOME | End: 2021-09-24
Attending: INTERNAL MEDICINE
Payer: MEDICARE

## 2021-09-24 VITALS
WEIGHT: 199.74 LBS | HEIGHT: 68 IN | HEART RATE: 70 BPM | DIASTOLIC BLOOD PRESSURE: 84 MMHG | SYSTOLIC BLOOD PRESSURE: 140 MMHG | RESPIRATION RATE: 18 BRPM | BODY MASS INDEX: 30.27 KG/M2

## 2021-09-24 DIAGNOSIS — C65.2 CANCER OF LEFT RENAL PELVIS (HCC): ICD-10-CM

## 2021-09-24 DIAGNOSIS — G89.3 CANCER ASSOCIATED PAIN: ICD-10-CM

## 2021-09-24 DIAGNOSIS — E83.42 HYPOMAGNESEMIA: ICD-10-CM

## 2021-09-24 DIAGNOSIS — E86.0 DEHYDRATION: ICD-10-CM

## 2021-09-24 DIAGNOSIS — C79.10 METASTATIC UROTHELIAL CARCINOMA (HCC): ICD-10-CM

## 2021-09-24 DIAGNOSIS — C68.9 UROTHELIAL CANCER (HCC): Primary | ICD-10-CM

## 2021-09-24 PROCEDURE — 96375 TX/PRO/DX INJ NEW DRUG ADDON: CPT

## 2021-09-24 PROCEDURE — 96413 CHEMO IV INFUSION 1 HR: CPT

## 2021-09-24 PROCEDURE — 96368 THER/DIAG CONCURRENT INF: CPT

## 2021-09-24 PROCEDURE — 96367 TX/PROPH/DG ADDL SEQ IV INF: CPT

## 2021-09-24 RX ORDER — KETOROLAC TROMETHAMINE 30 MG/ML
15 INJECTION, SOLUTION INTRAMUSCULAR; INTRAVENOUS ONCE
Status: CANCELLED
Start: 2021-09-24 | End: 2021-09-24

## 2021-09-24 RX ORDER — KETOROLAC TROMETHAMINE 30 MG/ML
15 INJECTION, SOLUTION INTRAMUSCULAR; INTRAVENOUS ONCE
Status: COMPLETED | OUTPATIENT
Start: 2021-09-24 | End: 2021-09-24

## 2021-09-24 RX ORDER — ACETAMINOPHEN 325 MG/1
650 TABLET ORAL ONCE
Status: COMPLETED | OUTPATIENT
Start: 2021-09-24 | End: 2021-09-24

## 2021-09-24 RX ORDER — SODIUM CHLORIDE 9 MG/ML
20 INJECTION, SOLUTION INTRAVENOUS ONCE
Status: COMPLETED | OUTPATIENT
Start: 2021-09-24 | End: 2021-09-24

## 2021-09-24 RX ADMIN — ACETAMINOPHEN 650 MG: 325 TABLET ORAL at 13:23

## 2021-09-24 RX ADMIN — KETOROLAC TROMETHAMINE 15 MG: 30 INJECTION, SOLUTION INTRAMUSCULAR at 15:12

## 2021-09-24 RX ADMIN — ENFORTUMAB VEDOTIN 110 MG: 30 INJECTION, POWDER, LYOPHILIZED, FOR SOLUTION INTRAVENOUS at 14:35

## 2021-09-24 RX ADMIN — DIPHENHYDRAMINE HYDROCHLORIDE 25 MG: 50 INJECTION, SOLUTION INTRAMUSCULAR; INTRAVENOUS at 13:50

## 2021-09-24 RX ADMIN — DEXAMETHASONE SODIUM PHOSPHATE: 10 INJECTION, SOLUTION INTRAMUSCULAR; INTRAVENOUS at 14:13

## 2021-09-24 RX ADMIN — SODIUM CHLORIDE 20 ML/HR: 0.9 INJECTION, SOLUTION INTRAVENOUS at 13:17

## 2021-09-24 RX ADMIN — MAGNESIUM SULFATE HEPTAHYDRATE: 500 INJECTION, SOLUTION INTRAMUSCULAR; INTRAVENOUS at 13:18

## 2021-09-24 NOTE — PROGRESS NOTES
Pt tolerated todays iv Mag bolus and IV padcev well  Pt does state that his pain which was a 9/10 yesterday in the right shoulder " is down to a 2/10 but is starting to creep in" and is requesting a repeat dose if possible  Order obtained and pt was medicated prior to leaving unit  Port flushed and deaccessed per routine   Discharged ambulatory with avs

## 2021-09-24 NOTE — PLAN OF CARE
Problem: Knowledge Deficit  Goal: Patient/family/caregiver demonstrates understanding of disease process, treatment plan, medications, and discharge instructions  Description: Complete learning assessment and assess knowledge base    Interventions:  - Provide teaching at level of understanding  - Provide teaching via preferred learning methods  Outcome: Progressing     Problem: INFECTION - ADULT  Goal: Absence or prevention of progression during hospitalization  Description: INTERVENTIONS:  - Assess and monitor for signs and symptoms of infection  - Monitor lab/diagnostic results  - Monitor all insertion sites, i e  indwelling lines, tubes, and drains  - Monitor endotracheal if appropriate and nasal secretions for changes in amount and color  - [place] appropriate cooling/warming therapies per order  - Administer medications as ordered  - Instruct and encourage patient and family to use good hand hygiene technique  - Identify and instruct in appropriate isolation precautions for identified infection/condition  Outcome: Progressing

## 2021-09-27 DIAGNOSIS — C79.89 SECONDARY MALIGNANT NEOPLASM OF MUSCLE OF ABDOMEN (HCC): ICD-10-CM

## 2021-09-27 DIAGNOSIS — C68.9 UROTHELIAL CANCER (HCC): ICD-10-CM

## 2021-09-27 DIAGNOSIS — G89.3 CANCER RELATED PAIN: ICD-10-CM

## 2021-09-27 RX ORDER — HYDROMORPHONE HYDROCHLORIDE 2 MG/1
TABLET ORAL
Qty: 90 TABLET | Refills: 0 | Status: SHIPPED | OUTPATIENT
Start: 2021-09-27 | End: 2021-11-10 | Stop reason: SDUPTHER

## 2021-09-27 NOTE — TELEPHONE ENCOUNTER
Per Dr David Ferguson 9/23/21 note: "Continue dilaudid but please try to take only 1 tab (2mg) because of the large addition of morphine in extended release form   He may take 2mg of dilaudid every 4 hours as needed for breakthrough pain "

## 2021-09-27 NOTE — TELEPHONE ENCOUNTER
Primary palliative medicine provider:   Ronan Guzman     Medication requested:  Hydromorphone 2 mg     If for pain, how has the patient been taking their pain medicine?   Take 2 to 4 mg every 4 hours as needed     Last appointment: 08/23     Next scheduled appointment: 10/06     PDMP review:  Last fill krista 09/16   90/8

## 2021-09-29 ENCOUNTER — OFFICE VISIT (OUTPATIENT)
Dept: NEPHROLOGY | Facility: CLINIC | Age: 71
End: 2021-09-29
Payer: MEDICARE

## 2021-09-29 VITALS — OXYGEN SATURATION: 94 % | SYSTOLIC BLOOD PRESSURE: 98 MMHG | DIASTOLIC BLOOD PRESSURE: 60 MMHG | HEART RATE: 71 BPM

## 2021-09-29 DIAGNOSIS — Z90.5 H/O LEFT NEPHRECTOMY: ICD-10-CM

## 2021-09-29 DIAGNOSIS — N18.9 CHRONIC KIDNEY DISEASE-MINERAL AND BONE DISORDER: ICD-10-CM

## 2021-09-29 DIAGNOSIS — E83.9 CHRONIC KIDNEY DISEASE-MINERAL AND BONE DISORDER: ICD-10-CM

## 2021-09-29 DIAGNOSIS — C68.9 UROTHELIAL CANCER (HCC): ICD-10-CM

## 2021-09-29 DIAGNOSIS — N18.31 STAGE 3A CHRONIC KIDNEY DISEASE (HCC): Primary | ICD-10-CM

## 2021-09-29 DIAGNOSIS — M89.9 CHRONIC KIDNEY DISEASE-MINERAL AND BONE DISORDER: ICD-10-CM

## 2021-09-29 DIAGNOSIS — E55.9 VITAMIN D DEFICIENCY: ICD-10-CM

## 2021-09-29 DIAGNOSIS — I10 ESSENTIAL HYPERTENSION: ICD-10-CM

## 2021-09-29 PROCEDURE — 99214 OFFICE O/P EST MOD 30 MIN: CPT | Performed by: NURSE PRACTITIONER

## 2021-09-29 NOTE — PROGRESS NOTES
Nephrology   Office Follow-Up  Rosana Mojica 66 y o  male MRN: [id number]    Encounter: [id number]        Assessment & Plan    Rosana Mojica was seen in the [place] office today  All diagnoses and orders for visit:     1  Stage 3a chronic kidney disease ([facility] [address] )  · Most recent sCr 1 5 mg/dL which is within known baseline  Urine bland and albuminuria improved  Patient will continue to avoid potential nephrotoxins, cancer care according to Dr Chloe Gonzales, and RTO in 6 months with Dr John Vides   -     Comprehensive metabolic panel; Future; Expected date: 04/01/2022   -     Microalbumin / creatinine urine ratio; Future; Expected date: 04/01/2022   -     Phosphorus; Future; Expected date: 04/01/2022   -     Urinalysis with microscopic; Future; Expected date: 04/01/2022  2  H/O left nephrectomy May 2020 in setting of #3  ? Prior to nephrectomy Kidney flow and function test revealed left kidney 55% and right kidney 45%   3  [facility] )  · Under the care of Dr Brandon Kinney   4  Essential hypertension  · Blood pressure is appropriate, no changes made   5  Chronic kidney disease-mineral and bone disorder  · PTH appropriate, no indication for activated Vitamin D   6  Vitamin D deficiency  · Vitamin D appropriate  7  Hypomagnesemia  · Receiving IV magnesium infusions per Dr Brandon Kinney    HPI: Rosana Mojica is a 66 y o  male who is here for scheduled follow-up regarding CKD stage 3  Patient's primary nephrologist is Dr John Vides and last visit together was April 2021  Other pertinent medical problems include metastatic high-grade urothelial cancer s/p left nephrectomy May of 2020, cancer-related pain  PET scan in August showed new area of hypermetabolic lesions  He is under the care of Dr Brandon Kinney treated with Cisplatin -> Keytruda, radiation  currently receiving Enfortumab vedotin and palliative radiation  He is hypomagnesemic and receiving magnesium infusions  Patient is stable from a renal perspective   Will return to office with Dr Josue Krueger in 6 months  Will call PCP if left foot edema persistent/worsens  ROS:   Review of Systems   Constitutional: Negative  Negative for activity change and appetite change  HENT: Negative  Eyes: Negative  Respiratory: Negative  Negative for shortness of breath  Cardiovascular: Positive for leg swelling  Left ankle edema   Gastrointestinal: Negative  Genitourinary: Negative  Negative for decreased urine volume, difficulty urinating, dysuria, frequency and urgency  Musculoskeletal: Negative  Skin: Negative  Negative for rash  Neurological: Negative  Negative for dizziness, weakness and headaches  Psychiatric/Behavioral: Negative  Allergies: Poison ivy extract    Medications:   Current Outpatient Medications:     allopurinol (ZYLOPRIM) 300 mg tablet, take 1 tablet by mouth once daily, Disp: 30 tablet, Rfl: 3    ALPRAZolam (XANAX) 0 25 mg tablet, Take 1 tablet (0 25 mg total) by mouth daily at bedtime as needed for anxiety (restless legs), Disp: 30 tablet, Rfl: 1    amLODIPine (NORVASC) 5 mg tablet, Take 5 mg by mouth daily  , Disp: , Rfl:     atorvastatin (LIPITOR) 80 mg tablet, Take 80 mg by mouth every other day evening, Disp: , Rfl:     cholestyramine (QUESTRAN) 4 g packet, Take 1 packet (4 g total) by mouth 2 (two) times a day as needed (diarrhea), Disp: 30 packet, Rfl: 5    dexamethasone (DECADRON) 2 mg tablet, Take 1 tablet (2 mg total) by mouth 2 (two) times a day, Disp: 20 tablet, Rfl: 0    folic acid (FOLVITE) 1 mg tablet, Take 1 tablet (1 mg total) by mouth daily, Disp: 90 tablet, Rfl: 4    HYDROmorphone (DILAUDID) 2 mg tablet, Take 2mg [1 tab] PO Q4H PRN for breakthrough pain   Max Daily: 12 mg, Disp: 90 tablet, Rfl: 0    hydrOXYzine HCL (ATARAX) 25 mg tablet, Take 1 tablet (25 mg total) by mouth every 6 (six) hours as needed for itching or anxiety, Disp: 60 tablet, Rfl: 2    Magnesium 250 MG TABS, 1 tablet 2 (two) times a day Taking 500mg in the morning and 500mg at night, Disp: , Rfl:     metoprolol tartrate (LOPRESSOR) 100 mg tablet, Take 50 mg by mouth daily, Disp: , Rfl:     morphine (MS CONTIN) 30 mg 12 hr tablet, Take 1 tablet (30 mg total) by mouth 2 (two) times a dayMax Daily Amount: 60 mg, Disp: 45 tablet, Rfl: 0    naloxone (NARCAN) 4 mg/0 1 mL nasal spray, Administer 1 spray into a nostril   If breathing does not return to normal or if breathing difficulty resumes after 2-3 minutes, give another dose in the other nostril using a new spray , Disp: 1 each, Rfl: 1    omeprazole (PriLOSEC) 20 mg delayed release capsule, Take 20 mg by mouth daily  , Disp: , Rfl:     predniSONE 10 mg tablet, Take 2 tablets (20 mg total) by mouth daily, Disp: 60 tablet, Rfl: 3    senna (SENOKOT) 8 6 mg, Take 1 tablet (8 6 mg total) by mouth daily at bedtime (Patient taking differently: Take 8 6 mg by mouth daily at bedtime as needed ), Disp: 30 each, Rfl: 0    sildenafil (VIAGRA) 100 mg tablet, TAKE ONE TABLET BY MOUTH  AS NEEDED PRIOR TO SEXUAL ACTIVITY FOR ERECTILE DYSFUNCTION, Disp: , Rfl:     terazosin (HYTRIN) 5 mg capsule, Take 2 capsules (10 mg total) by mouth daily at bedtime, Disp: 30 capsule, Rfl: 6    triamcinolone (KENALOG) 0 1 % lotion, Apply topically 3 (three) times a day, Disp: 60 mL, Rfl: 5    VITAMIN D PO, Take 1,000 Units by mouth daily , Disp: , Rfl:     acetaminophen (TYLENOL) 500 mg tablet, Take 2 tablets (1,000 mg total) by mouth every 8 (eight) hours, Disp: , Rfl:     Past Medical History:   Diagnosis Date    Arthritis     Bladder cancer     Cancer ([facility] [address] )     skin melanoma;basal cell    Chronic pain disorder     from arthritis    Colon polyp     Does use hearing aid     bilat    Dry eyes, bilateral     GERD (gastroesophageal reflux disease)     History of kidney stones 10/2019    History of partial knee replacement     bilat    History of vertigo     Hyperlipidemia     Hypertension     Kidney lesion     Lumbar disc disorder     compression of vertebrae L4-5-6    Muscle weakness     left hip area    Renal mass     left    Right ankle injury 03/05/2020    missed step of ladder     Right ankle pain     Shortness of breath     with activity    Tinnitus     Urothelial cancer     left    Wears glasses      Past Surgical History:   Procedure Laterality Date    COLONOSCOPY      CYSTOSCOPY Left 4/1/2020    Procedure: CYSTOSCOPY; URETERAL CATHETER PLACEMENT;  Surgeon: Nell Connolly MD;  Location[facility];  Service: Urology    CYSTOSCOPY  05/11/2020    CYSTOSCOPY  06/04/2021    FL CYSTOGRAM  4/13/2020    FL RETROGRADE PYELOGRAM  1/17/2020    HERNIA REPAIR      umbilical with mesh    INGUINAL HERNIA REPAIR Right     with mesh    IR PORT PLACEMENT  4/30/2020    JOINT REPLACEMENT Bilateral     partials knee    LUMBAR EPIDURAL INJECTION      OR CYSTOURETHROSCOPY,URETER CATHETER Bilateral 1/17/2020    Procedure: CYSTOSCOPY; RIGHT RETROGRADE PYELOGRAM WITH RIGHT URETERAL CYTOLOGY SAMPLING; LEFT URETEROSCOPY WITH RENAL PELVIS BIOPSY AND LEFT STENT PLACEMENT;  Surgeon: Nell Connolly MD;  [facility];  Service: Urology    OR NEPHRECTOMY, W/PART  URETECTOMY Left 4/1/2020    Procedure: ROBOTIC LAPAROSCOPIC NEPHRO-URETERECTOMY;  Surgeon: Nell Connolly MD;  Location[facility];  Service: Urology    SKIN CANCER EXCISION      surface melanoma    TONSILLECTOMY      WISDOM TOOTH EXTRACTION       Family History   Problem Relation Age of Onset    Liver cancer Father       reports that he quit smoking about 49 years ago  His smoking use included cigarettes  He has never used smokeless tobacco  He reports current alcohol use of about 17 0 standard drinks of alcohol per week  He reports that he does not use drugs        Physical Exam:   Vitals:    09/29/21 1505   BP: 98/60   BP Location: Left arm   Patient Position: Sitting   Cuff Size: Standard   Pulse: 71   SpO2: 94%     There is no height or weight on file to calculate BMI  General: conscious, cooperative, in no acute distress, appears stated age  Eyes: conjunctivae pale, anicteric sclerae  ENT: lips and mucous membranes moist  Neck: supple, no JVD, no masses  Chest:  essentially clear breath sounds bilaterally, no crackles, ronchus or wheezings  CVS: S1 & S2, normal rate, regular rhythm  Abdomen: soft, non-tender, non-distended, normoactive bowel sounds, rounded  Extremities: left foot and ankle edema present  Skin: no rash   Neuro: awake, alert, oriented       Diagnostic Data:  Lab: I have personally reviewed pertinent lab results  ,   CBC:       CMP: No results found for: SODIUM, K, CL, CO2, ANIONGAP, BUN, CREATININE, GLUCOSE, CALCIUM, AST, ALT, ALKPHOS, PROT, BILITOT, EGFR,   PT/INR: No results found for: PT, INR,   Magnesium: No components found for: MAG,  Phosphorous: No results found for: PHOS    Patient Instructions   Call PCP or Dr Constance Deluna If left foot swelling gets worse  Blood work for us in April 2022      Portions of the record may have been created with voice recognition software  Occasional wrong word or "sound a like" substitutions may have occurred due to the inherent limitations of voice recognition software  Read the chart carefully and recognize, using context, where substitutions have occurred  If you have any questions, please contact the dictating provider  27-Oct-2019

## 2021-10-06 ENCOUNTER — SOCIAL WORK (OUTPATIENT)
Dept: PALLIATIVE MEDICINE | Facility: CLINIC | Age: 71
End: 2021-10-06
Payer: MEDICARE

## 2021-10-06 ENCOUNTER — APPOINTMENT (OUTPATIENT)
Dept: LAB | Facility: HOSPITAL | Age: 71
End: 2021-10-06
Attending: INTERNAL MEDICINE
Payer: MEDICARE

## 2021-10-06 ENCOUNTER — OFFICE VISIT (OUTPATIENT)
Dept: PALLIATIVE MEDICINE | Facility: CLINIC | Age: 71
End: 2021-10-06
Payer: MEDICARE

## 2021-10-06 VITALS
DIASTOLIC BLOOD PRESSURE: 60 MMHG | SYSTOLIC BLOOD PRESSURE: 96 MMHG | HEART RATE: 67 BPM | TEMPERATURE: 96.8 F | RESPIRATION RATE: 16 BRPM | BODY MASS INDEX: 31.24 KG/M2 | WEIGHT: 205.4 LBS | OXYGEN SATURATION: 96 %

## 2021-10-06 DIAGNOSIS — C65.2 CANCER OF LEFT RENAL PELVIS (HCC): ICD-10-CM

## 2021-10-06 DIAGNOSIS — C79.89 SECONDARY MALIGNANT NEOPLASM OF MUSCLE OF ABDOMEN (HCC): ICD-10-CM

## 2021-10-06 DIAGNOSIS — C79.51 SECONDARY MALIGNANT NEOPLASM OF BONE (HCC): ICD-10-CM

## 2021-10-06 DIAGNOSIS — K59.03 DRUG INDUCED CONSTIPATION: ICD-10-CM

## 2021-10-06 DIAGNOSIS — C79.10 METASTATIC UROTHELIAL CARCINOMA (HCC): ICD-10-CM

## 2021-10-06 DIAGNOSIS — Z71.89 COUNSELING AND COORDINATION OF CARE: Primary | ICD-10-CM

## 2021-10-06 DIAGNOSIS — G89.29 CHRONIC RIGHT SHOULDER PAIN: ICD-10-CM

## 2021-10-06 DIAGNOSIS — G89.3 CANCER ASSOCIATED PAIN: Primary | ICD-10-CM

## 2021-10-06 DIAGNOSIS — M25.511 CHRONIC RIGHT SHOULDER PAIN: ICD-10-CM

## 2021-10-06 LAB
ALBUMIN SERPL BCP-MCNC: 3.5 G/DL (ref 3.5–5.7)
ALP SERPL-CCNC: 48 U/L (ref 55–165)
ALT SERPL W P-5'-P-CCNC: 19 U/L (ref 7–52)
ANION GAP SERPL CALCULATED.3IONS-SCNC: 7 MMOL/L (ref 4–13)
AST SERPL W P-5'-P-CCNC: 12 U/L (ref 13–39)
BILIRUB SERPL-MCNC: 0.9 MG/DL (ref 0.2–1)
BUN SERPL-MCNC: 32 MG/DL (ref 7–25)
CALCIUM SERPL-MCNC: 8.5 MG/DL (ref 8.6–10.5)
CHLORIDE SERPL-SCNC: 101 MMOL/L (ref 98–107)
CO2 SERPL-SCNC: 31 MMOL/L (ref 21–31)
CREAT SERPL-MCNC: 1.57 MG/DL (ref 0.7–1.3)
ERYTHROCYTE [DISTWIDTH] IN BLOOD BY AUTOMATED COUNT: 15.9 % (ref 11.5–14.5)
GFR SERPL CREATININE-BSD FRML MDRD: 43 ML/MIN/1.73SQ M
GLUCOSE SERPL-MCNC: 123 MG/DL (ref 65–99)
HCT VFR BLD AUTO: 37.2 % (ref 42–47)
HGB BLD-MCNC: 12 G/DL (ref 14–18)
LYMPHOCYTES # BLD AUTO: 0.08 THOUSAND/UL (ref 0.6–4.47)
LYMPHOCYTES # BLD AUTO: 1 % (ref 20–51)
MAGNESIUM SERPL-MCNC: 2 MG/DL (ref 1.9–2.7)
MCH RBC QN AUTO: 31 PG (ref 26–34)
MCHC RBC AUTO-ENTMCNC: 32.3 G/DL (ref 31–37)
MCV RBC AUTO: 96 FL (ref 81–99)
MONOCYTES # BLD AUTO: 0.08 THOUSAND/UL (ref 0–1.22)
MONOCYTES NFR BLD AUTO: 1 % (ref 4–12)
NEUTS BAND NFR BLD MANUAL: 3 % (ref 0–8)
NEUTS SEG # BLD: 8.04 THOUSAND/UL (ref 1.81–6.82)
NEUTS SEG NFR BLD AUTO: 95 % (ref 42–75)
PLATELET # BLD AUTO: 123 THOUSANDS/UL (ref 149–390)
PLATELET BLD QL SMEAR: ADEQUATE
PMV BLD AUTO: 8.6 FL (ref 8.6–11.7)
POTASSIUM SERPL-SCNC: 4.3 MMOL/L (ref 3.5–5.5)
PROT SERPL-MCNC: 5.7 G/DL (ref 6.4–8.9)
RBC # BLD AUTO: 3.87 MILLION/UL (ref 4.3–5.9)
RBC MORPH BLD: NORMAL
SODIUM SERPL-SCNC: 139 MMOL/L (ref 134–143)
TOTAL CELLS COUNTED SPEC: 100
WBC # BLD AUTO: 8.2 THOUSAND/UL (ref 4.8–10.8)

## 2021-10-06 PROCEDURE — 99214 OFFICE O/P EST MOD 30 MIN: CPT | Performed by: INTERNAL MEDICINE

## 2021-10-06 PROCEDURE — 85027 COMPLETE CBC AUTOMATED: CPT

## 2021-10-06 PROCEDURE — 36415 COLL VENOUS BLD VENIPUNCTURE: CPT

## 2021-10-06 PROCEDURE — 80053 COMPREHEN METABOLIC PANEL: CPT

## 2021-10-06 PROCEDURE — 83735 ASSAY OF MAGNESIUM: CPT

## 2021-10-06 PROCEDURE — NC001 PR NO CHARGE

## 2021-10-06 PROCEDURE — 85007 BL SMEAR W/DIFF WBC COUNT: CPT

## 2021-10-07 ENCOUNTER — OFFICE VISIT (OUTPATIENT)
Dept: HEMATOLOGY ONCOLOGY | Facility: CLINIC | Age: 71
End: 2021-10-07
Payer: MEDICARE

## 2021-10-07 VITALS
OXYGEN SATURATION: 95 % | BODY MASS INDEX: 31.25 KG/M2 | TEMPERATURE: 97.4 F | HEIGHT: 68 IN | HEART RATE: 56 BPM | DIASTOLIC BLOOD PRESSURE: 72 MMHG | SYSTOLIC BLOOD PRESSURE: 106 MMHG | WEIGHT: 206.2 LBS | RESPIRATION RATE: 18 BRPM

## 2021-10-07 DIAGNOSIS — C79.10 METASTATIC UROTHELIAL CARCINOMA (HCC): Primary | ICD-10-CM

## 2021-10-07 DIAGNOSIS — E83.42 HYPOMAGNESEMIA: ICD-10-CM

## 2021-10-07 PROCEDURE — 99214 OFFICE O/P EST MOD 30 MIN: CPT | Performed by: NURSE PRACTITIONER

## 2021-10-08 ENCOUNTER — HOSPITAL ENCOUNTER (OUTPATIENT)
Dept: INFUSION CENTER | Facility: HOSPITAL | Age: 71
Discharge: HOME | End: 2021-10-08
Attending: INTERNAL MEDICINE
Payer: MEDICARE

## 2021-10-08 VITALS
BODY MASS INDEX: 30.97 KG/M2 | HEIGHT: 68 IN | OXYGEN SATURATION: 96 % | DIASTOLIC BLOOD PRESSURE: 57 MMHG | HEART RATE: 74 BPM | TEMPERATURE: 97.5 F | WEIGHT: 204.37 LBS | SYSTOLIC BLOOD PRESSURE: 119 MMHG | RESPIRATION RATE: 18 BRPM

## 2021-10-08 DIAGNOSIS — C68.9 UROTHELIAL CANCER (HCC): Primary | ICD-10-CM

## 2021-10-08 DIAGNOSIS — C79.10 METASTATIC UROTHELIAL CARCINOMA (HCC): ICD-10-CM

## 2021-10-08 DIAGNOSIS — C65.2 CANCER OF LEFT RENAL PELVIS (HCC): ICD-10-CM

## 2021-10-08 PROCEDURE — 96367 TX/PROPH/DG ADDL SEQ IV INF: CPT

## 2021-10-08 PROCEDURE — 96413 CHEMO IV INFUSION 1 HR: CPT

## 2021-10-08 RX ORDER — ACETAMINOPHEN 325 MG/1
650 TABLET ORAL ONCE
Status: DISCONTINUED | OUTPATIENT
Start: 2021-10-08 | End: 2021-10-12 | Stop reason: HOSPADM

## 2021-10-08 RX ORDER — SODIUM CHLORIDE 9 MG/ML
20 INJECTION, SOLUTION INTRAVENOUS ONCE
Status: COMPLETED | OUTPATIENT
Start: 2021-10-08 | End: 2021-10-08

## 2021-10-08 RX ADMIN — ENFORTUMAB VEDOTIN 110 MG: 30 INJECTION, POWDER, LYOPHILIZED, FOR SOLUTION INTRAVENOUS at 10:05

## 2021-10-08 RX ADMIN — DIPHENHYDRAMINE HYDROCHLORIDE 25 MG: 50 INJECTION, SOLUTION INTRAMUSCULAR; INTRAVENOUS at 09:11

## 2021-10-08 RX ADMIN — SODIUM CHLORIDE 20 ML/HR: 0.9 INJECTION, SOLUTION INTRAVENOUS at 09:05

## 2021-10-08 RX ADMIN — DEXAMETHASONE SODIUM PHOSPHATE: 10 INJECTION, SOLUTION INTRAMUSCULAR; INTRAVENOUS at 09:37

## 2021-10-14 ENCOUNTER — APPOINTMENT (OUTPATIENT)
Dept: LAB | Facility: HOSPITAL | Age: 71
End: 2021-10-14
Payer: MEDICARE

## 2021-10-14 DIAGNOSIS — C79.89 SECONDARY MALIGNANT NEOPLASM OF MUSCLE OF ABDOMEN (HCC): ICD-10-CM

## 2021-10-14 DIAGNOSIS — C79.89 SECONDARY MALIGNANT NEOPLASM OF MUSCLE OF ABDOMEN (HCC): Primary | ICD-10-CM

## 2021-10-14 LAB
ALBUMIN SERPL BCP-MCNC: 3.8 G/DL (ref 3.5–5.7)
ALP SERPL-CCNC: 55 U/L (ref 55–165)
ALT SERPL W P-5'-P-CCNC: 21 U/L (ref 7–52)
ANION GAP SERPL CALCULATED.3IONS-SCNC: 8 MMOL/L (ref 4–13)
AST SERPL W P-5'-P-CCNC: 13 U/L (ref 13–39)
BASOPHILS # BLD AUTO: 0 THOUSANDS/ΜL (ref 0–0.1)
BASOPHILS NFR BLD AUTO: 0 % (ref 0–2)
BILIRUB SERPL-MCNC: 1.2 MG/DL (ref 0.2–1)
BUN SERPL-MCNC: 25 MG/DL (ref 7–25)
CALCIUM SERPL-MCNC: 9.3 MG/DL (ref 8.6–10.5)
CHLORIDE SERPL-SCNC: 99 MMOL/L (ref 98–107)
CO2 SERPL-SCNC: 32 MMOL/L (ref 21–31)
CREAT SERPL-MCNC: 1.47 MG/DL (ref 0.7–1.3)
EOSINOPHIL # BLD AUTO: 0 THOUSAND/ΜL (ref 0–0.61)
EOSINOPHIL NFR BLD AUTO: 0 % (ref 0–5)
ERYTHROCYTE [DISTWIDTH] IN BLOOD BY AUTOMATED COUNT: 15.8 % (ref 11.5–14.5)
GFR SERPL CREATININE-BSD FRML MDRD: 47 ML/MIN/1.73SQ M
GLUCOSE SERPL-MCNC: 110 MG/DL (ref 65–99)
HCT VFR BLD AUTO: 37.7 % (ref 42–47)
HGB BLD-MCNC: 12.5 G/DL (ref 14–18)
LYMPHOCYTES # BLD AUTO: 0.7 THOUSANDS/ΜL (ref 0.6–4.47)
LYMPHOCYTES NFR BLD AUTO: 13 % (ref 21–51)
MAGNESIUM SERPL-MCNC: 2 MG/DL (ref 1.9–2.7)
MCH RBC QN AUTO: 31.7 PG (ref 26–34)
MCHC RBC AUTO-ENTMCNC: 33.3 G/DL (ref 31–37)
MCV RBC AUTO: 95 FL (ref 81–99)
MONOCYTES # BLD AUTO: 0.5 THOUSAND/ΜL (ref 0.17–1.22)
MONOCYTES NFR BLD AUTO: 9 % (ref 2–12)
NEUTROPHILS # BLD AUTO: 4.6 THOUSANDS/ΜL (ref 1.4–6.5)
NEUTS SEG NFR BLD AUTO: 79 % (ref 42–75)
PLATELET # BLD AUTO: 119 THOUSANDS/UL (ref 149–390)
PMV BLD AUTO: 7.5 FL (ref 8.6–11.7)
POTASSIUM SERPL-SCNC: 4.1 MMOL/L (ref 3.5–5.5)
PROT SERPL-MCNC: 6.5 G/DL (ref 6.4–8.9)
RBC # BLD AUTO: 3.95 MILLION/UL (ref 4.3–5.9)
SODIUM SERPL-SCNC: 139 MMOL/L (ref 134–143)
WBC # BLD AUTO: 5.9 THOUSAND/UL (ref 4.8–10.8)

## 2021-10-14 PROCEDURE — 83735 ASSAY OF MAGNESIUM: CPT | Performed by: NURSE PRACTITIONER

## 2021-10-14 PROCEDURE — 80053 COMPREHEN METABOLIC PANEL: CPT | Performed by: NURSE PRACTITIONER

## 2021-10-14 PROCEDURE — 85025 COMPLETE CBC W/AUTO DIFF WBC: CPT | Performed by: NURSE PRACTITIONER

## 2021-10-14 PROCEDURE — 36415 COLL VENOUS BLD VENIPUNCTURE: CPT | Performed by: NURSE PRACTITIONER

## 2021-10-15 ENCOUNTER — HOSPITAL ENCOUNTER (OUTPATIENT)
Dept: INFUSION CENTER | Facility: HOSPITAL | Age: 71
Discharge: HOME | End: 2021-10-15
Attending: INTERNAL MEDICINE
Payer: MEDICARE

## 2021-10-15 VITALS
TEMPERATURE: 97.9 F | OXYGEN SATURATION: 96 % | SYSTOLIC BLOOD PRESSURE: 138 MMHG | HEART RATE: 80 BPM | BODY MASS INDEX: 30.54 KG/M2 | WEIGHT: 201.5 LBS | DIASTOLIC BLOOD PRESSURE: 76 MMHG | RESPIRATION RATE: 18 BRPM | HEIGHT: 68 IN

## 2021-10-15 DIAGNOSIS — C65.2 CANCER OF LEFT RENAL PELVIS (HCC): ICD-10-CM

## 2021-10-15 DIAGNOSIS — C68.9 UROTHELIAL CANCER (HCC): Primary | ICD-10-CM

## 2021-10-15 DIAGNOSIS — C79.10 METASTATIC UROTHELIAL CARCINOMA (HCC): ICD-10-CM

## 2021-10-15 PROCEDURE — 96413 CHEMO IV INFUSION 1 HR: CPT

## 2021-10-15 PROCEDURE — 96367 TX/PROPH/DG ADDL SEQ IV INF: CPT

## 2021-10-15 RX ORDER — SODIUM CHLORIDE 9 MG/ML
20 INJECTION, SOLUTION INTRAVENOUS ONCE
Status: COMPLETED | OUTPATIENT
Start: 2021-10-15 | End: 2021-10-15

## 2021-10-15 RX ORDER — ACETAMINOPHEN 325 MG/1
650 TABLET ORAL ONCE
Status: DISCONTINUED | OUTPATIENT
Start: 2021-10-15 | End: 2021-10-19 | Stop reason: HOSPADM

## 2021-10-15 RX ADMIN — ENFORTUMAB VEDOTIN 110 MG: 30 INJECTION, POWDER, LYOPHILIZED, FOR SOLUTION INTRAVENOUS at 13:17

## 2021-10-15 RX ADMIN — DIPHENHYDRAMINE HYDROCHLORIDE 25 MG: 50 INJECTION, SOLUTION INTRAMUSCULAR; INTRAVENOUS at 12:14

## 2021-10-15 RX ADMIN — SODIUM CHLORIDE 20 ML/HR: 0.9 INJECTION, SOLUTION INTRAVENOUS at 11:47

## 2021-10-15 RX ADMIN — DEXAMETHASONE SODIUM PHOSPHATE: 10 INJECTION, SOLUTION INTRAMUSCULAR; INTRAVENOUS at 11:50

## 2021-10-19 ENCOUNTER — TELEPHONE (OUTPATIENT)
Dept: PALLIATIVE MEDICINE | Facility: CLINIC | Age: 71
End: 2021-10-19

## 2021-10-19 ENCOUNTER — OFFICE VISIT (OUTPATIENT)
Dept: HEMATOLOGY ONCOLOGY | Facility: CLINIC | Age: 71
End: 2021-10-19
Payer: MEDICARE

## 2021-10-19 VITALS
HEIGHT: 68 IN | RESPIRATION RATE: 16 BRPM | BODY MASS INDEX: 30.45 KG/M2 | OXYGEN SATURATION: 95 % | SYSTOLIC BLOOD PRESSURE: 102 MMHG | TEMPERATURE: 97.5 F | WEIGHT: 200.9 LBS | DIASTOLIC BLOOD PRESSURE: 86 MMHG | HEART RATE: 86 BPM

## 2021-10-19 DIAGNOSIS — R21 SKIN RASH: ICD-10-CM

## 2021-10-19 DIAGNOSIS — C79.89 SECONDARY MALIGNANT NEOPLASM OF MUSCLE OF ABDOMEN (HCC): Primary | ICD-10-CM

## 2021-10-19 DIAGNOSIS — C68.9 UROTHELIAL CANCER (HCC): ICD-10-CM

## 2021-10-19 DIAGNOSIS — G89.3 CANCER RELATED PAIN: ICD-10-CM

## 2021-10-19 PROCEDURE — 99214 OFFICE O/P EST MOD 30 MIN: CPT | Performed by: INTERNAL MEDICINE

## 2021-10-19 RX ORDER — NYSTATIN 100000 U/G
CREAM TOPICAL 2 TIMES DAILY
Qty: 30 G | Refills: 0 | Status: SHIPPED | OUTPATIENT
Start: 2021-10-19

## 2021-10-19 RX ORDER — MORPHINE SULFATE 30 MG/1
30 TABLET, FILM COATED, EXTENDED RELEASE ORAL 2 TIMES DAILY
Qty: 60 TABLET | Refills: 0 | Status: SHIPPED | OUTPATIENT
Start: 2021-10-19 | End: 2021-11-10 | Stop reason: SDUPTHER

## 2021-10-21 ENCOUNTER — APPOINTMENT (OUTPATIENT)
Dept: LAB | Facility: HOSPITAL | Age: 71
End: 2021-10-21
Payer: MEDICARE

## 2021-10-21 ENCOUNTER — RADIATION ONCOLOGY FOLLOW-UP (OUTPATIENT)
Dept: RADIATION ONCOLOGY | Facility: CLINIC | Age: 71
End: 2021-10-21
Attending: RADIOLOGY
Payer: MEDICARE

## 2021-10-21 ENCOUNTER — TELEPHONE (OUTPATIENT)
Dept: HEMATOLOGY ONCOLOGY | Facility: CLINIC | Age: 71
End: 2021-10-21

## 2021-10-21 ENCOUNTER — APPOINTMENT (OUTPATIENT)
Dept: RADIATION ONCOLOGY | Facility: CLINIC | Age: 71
End: 2021-10-21
Payer: MEDICARE

## 2021-10-21 VITALS
TEMPERATURE: 97.1 F | DIASTOLIC BLOOD PRESSURE: 73 MMHG | WEIGHT: 200.62 LBS | RESPIRATION RATE: 18 BRPM | SYSTOLIC BLOOD PRESSURE: 120 MMHG | HEIGHT: 68 IN | BODY MASS INDEX: 30.41 KG/M2 | OXYGEN SATURATION: 96 % | HEART RATE: 91 BPM

## 2021-10-21 DIAGNOSIS — C65.2 CANCER OF LEFT RENAL PELVIS (HCC): ICD-10-CM

## 2021-10-21 DIAGNOSIS — C79.10 METASTATIC UROTHELIAL CARCINOMA (HCC): Primary | ICD-10-CM

## 2021-10-21 DIAGNOSIS — C68.9 UROTHELIAL CANCER (HCC): Primary | ICD-10-CM

## 2021-10-21 PROCEDURE — 99211 OFF/OP EST MAY X REQ PHY/QHP: CPT | Performed by: RADIOLOGY

## 2021-10-21 PROCEDURE — 99024 POSTOP FOLLOW-UP VISIT: CPT | Performed by: RADIOLOGY

## 2021-10-21 RX ORDER — SODIUM CHLORIDE 9 MG/ML
500 INJECTION, SOLUTION INTRAVENOUS ONCE
Status: CANCELLED
Start: 2021-10-22 | End: 2021-10-22

## 2021-10-22 ENCOUNTER — HOSPITAL ENCOUNTER (OUTPATIENT)
Dept: INFUSION CENTER | Facility: HOSPITAL | Age: 71
Discharge: HOME | End: 2021-10-22
Attending: INTERNAL MEDICINE
Payer: MEDICARE

## 2021-10-22 VITALS
DIASTOLIC BLOOD PRESSURE: 81 MMHG | BODY MASS INDEX: 30.31 KG/M2 | HEIGHT: 68 IN | SYSTOLIC BLOOD PRESSURE: 132 MMHG | OXYGEN SATURATION: 95 % | HEART RATE: 85 BPM | RESPIRATION RATE: 18 BRPM | WEIGHT: 199.96 LBS | TEMPERATURE: 98.3 F

## 2021-10-22 DIAGNOSIS — E83.42 HYPOMAGNESEMIA: ICD-10-CM

## 2021-10-22 DIAGNOSIS — G89.3 CANCER ASSOCIATED PAIN: ICD-10-CM

## 2021-10-22 DIAGNOSIS — C68.9 UROTHELIAL CANCER (HCC): Primary | ICD-10-CM

## 2021-10-22 DIAGNOSIS — E86.0 DEHYDRATION: ICD-10-CM

## 2021-10-22 DIAGNOSIS — C79.10 METASTATIC UROTHELIAL CARCINOMA (HCC): ICD-10-CM

## 2021-10-22 DIAGNOSIS — C65.2 CANCER OF LEFT RENAL PELVIS (HCC): ICD-10-CM

## 2021-10-22 PROCEDURE — 96367 TX/PROPH/DG ADDL SEQ IV INF: CPT

## 2021-10-22 PROCEDURE — 96413 CHEMO IV INFUSION 1 HR: CPT

## 2021-10-22 RX ORDER — SODIUM CHLORIDE 9 MG/ML
500 INJECTION, SOLUTION INTRAVENOUS ONCE
Status: CANCELLED
Start: 2021-10-22 | End: 2021-10-22

## 2021-10-22 RX ORDER — SODIUM CHLORIDE 9 MG/ML
500 INJECTION, SOLUTION INTRAVENOUS ONCE
Status: DISCONTINUED | OUTPATIENT
Start: 2021-10-22 | End: 2021-10-26 | Stop reason: HOSPADM

## 2021-10-22 RX ORDER — SODIUM CHLORIDE 9 MG/ML
20 INJECTION, SOLUTION INTRAVENOUS ONCE
Status: COMPLETED | OUTPATIENT
Start: 2021-10-22 | End: 2021-10-22

## 2021-10-22 RX ORDER — ACETAMINOPHEN 325 MG/1
650 TABLET ORAL ONCE
Status: COMPLETED | OUTPATIENT
Start: 2021-10-22 | End: 2021-10-22

## 2021-10-22 RX ADMIN — ACETAMINOPHEN 650 MG: 325 TABLET ORAL at 10:48

## 2021-10-22 RX ADMIN — DIPHENHYDRAMINE HYDROCHLORIDE 25 MG: 50 INJECTION, SOLUTION INTRAMUSCULAR; INTRAVENOUS at 10:46

## 2021-10-22 RX ADMIN — ENFORTUMAB VEDOTIN 110 MG: 30 INJECTION, POWDER, LYOPHILIZED, FOR SOLUTION INTRAVENOUS at 11:50

## 2021-10-22 RX ADMIN — DEXAMETHASONE SODIUM PHOSPHATE: 10 INJECTION, SOLUTION INTRAMUSCULAR; INTRAVENOUS at 11:13

## 2021-10-22 RX ADMIN — MAGNESIUM SULFATE HEPTAHYDRATE: 500 INJECTION, SOLUTION INTRAMUSCULAR; INTRAVENOUS at 09:41

## 2021-10-22 RX ADMIN — SODIUM CHLORIDE 20 ML/HR: 0.9 INJECTION, SOLUTION INTRAVENOUS at 09:45

## 2021-11-05 DIAGNOSIS — C68.9 UROTHELIAL CANCER (HCC): ICD-10-CM

## 2021-11-05 DIAGNOSIS — C65.2 CANCER OF LEFT RENAL PELVIS (HCC): ICD-10-CM

## 2021-11-05 DIAGNOSIS — C79.10 METASTATIC UROTHELIAL CARCINOMA (HCC): Primary | ICD-10-CM

## 2021-11-05 RX ORDER — SODIUM CHLORIDE 9 MG/ML
20 INJECTION, SOLUTION INTRAVENOUS ONCE
Status: CANCELLED | OUTPATIENT
Start: 2021-11-26

## 2021-11-05 RX ORDER — ACETAMINOPHEN 325 MG/1
650 TABLET ORAL ONCE
Status: CANCELLED | OUTPATIENT
Start: 2021-11-19

## 2021-11-05 RX ORDER — ACETAMINOPHEN 325 MG/1
650 TABLET ORAL ONCE
Status: CANCELLED | OUTPATIENT
Start: 2021-11-12

## 2021-11-05 RX ORDER — ACETAMINOPHEN 325 MG/1
650 TABLET ORAL ONCE
Status: CANCELLED | OUTPATIENT
Start: 2021-11-26

## 2021-11-05 RX ORDER — SODIUM CHLORIDE 9 MG/ML
20 INJECTION, SOLUTION INTRAVENOUS ONCE
Status: CANCELLED | OUTPATIENT
Start: 2021-11-12

## 2021-11-05 RX ORDER — SODIUM CHLORIDE 9 MG/ML
20 INJECTION, SOLUTION INTRAVENOUS ONCE
Status: CANCELLED | OUTPATIENT
Start: 2021-11-19

## 2021-11-10 ENCOUNTER — APPOINTMENT (OUTPATIENT)
Dept: LAB | Facility: HOSPITAL | Age: 71
End: 2021-11-10
Attending: INTERNAL MEDICINE
Payer: MEDICARE

## 2021-11-10 ENCOUNTER — OFFICE VISIT (OUTPATIENT)
Dept: PALLIATIVE MEDICINE | Facility: CLINIC | Age: 71
End: 2021-11-10
Payer: MEDICARE

## 2021-11-10 VITALS
RESPIRATION RATE: 16 BRPM | WEIGHT: 200 LBS | BODY MASS INDEX: 30.41 KG/M2 | SYSTOLIC BLOOD PRESSURE: 120 MMHG | DIASTOLIC BLOOD PRESSURE: 68 MMHG | HEART RATE: 78 BPM | TEMPERATURE: 96.3 F | OXYGEN SATURATION: 94 %

## 2021-11-10 DIAGNOSIS — T40.2X5A THERAPEUTIC OPIOID INDUCED CONSTIPATION: ICD-10-CM

## 2021-11-10 DIAGNOSIS — G89.3 CANCER ASSOCIATED PAIN: Primary | ICD-10-CM

## 2021-11-10 DIAGNOSIS — C77.2 METASTASIS TO RETROPERITONEAL LYMPH NODE (HCC): ICD-10-CM

## 2021-11-10 DIAGNOSIS — G89.3 CANCER RELATED PAIN: ICD-10-CM

## 2021-11-10 DIAGNOSIS — C79.10 METASTATIC UROTHELIAL CARCINOMA (HCC): ICD-10-CM

## 2021-11-10 DIAGNOSIS — E83.42 HYPOMAGNESEMIA: Primary | ICD-10-CM

## 2021-11-10 DIAGNOSIS — C68.9 UROTHELIAL CANCER (HCC): ICD-10-CM

## 2021-11-10 DIAGNOSIS — C65.2 CANCER OF LEFT RENAL PELVIS (HCC): ICD-10-CM

## 2021-11-10 DIAGNOSIS — D64.9 NORMOCYTIC ANEMIA: ICD-10-CM

## 2021-11-10 DIAGNOSIS — R10.84 GENERALIZED ABDOMINAL PAIN: ICD-10-CM

## 2021-11-10 DIAGNOSIS — E83.42 HYPOMAGNESEMIA: ICD-10-CM

## 2021-11-10 DIAGNOSIS — M25.511 CHRONIC RIGHT SHOULDER PAIN: ICD-10-CM

## 2021-11-10 DIAGNOSIS — G89.29 CHRONIC RIGHT SHOULDER PAIN: ICD-10-CM

## 2021-11-10 DIAGNOSIS — R68.81 EARLY SATIETY: ICD-10-CM

## 2021-11-10 DIAGNOSIS — K59.03 THERAPEUTIC OPIOID INDUCED CONSTIPATION: ICD-10-CM

## 2021-11-10 DIAGNOSIS — C79.89 SECONDARY MALIGNANT NEOPLASM OF MUSCLE OF ABDOMEN (HCC): ICD-10-CM

## 2021-11-10 DIAGNOSIS — C79.51 SECONDARY MALIGNANT NEOPLASM OF BONE (HCC): ICD-10-CM

## 2021-11-10 DIAGNOSIS — D53.9 NUTRITIONAL ANEMIA: ICD-10-CM

## 2021-11-10 LAB
ALBUMIN SERPL BCP-MCNC: 3.6 G/DL (ref 3.5–5.7)
ALP SERPL-CCNC: 60 U/L (ref 55–165)
ALT SERPL W P-5'-P-CCNC: 9 U/L (ref 7–52)
ANION GAP SERPL CALCULATED.3IONS-SCNC: 6 MMOL/L (ref 4–13)
AST SERPL W P-5'-P-CCNC: 13 U/L (ref 13–39)
BASOPHILS # BLD AUTO: 0 THOUSANDS/ΜL (ref 0–0.1)
BASOPHILS NFR BLD AUTO: 1 % (ref 0–2)
BILIRUB SERPL-MCNC: 0.7 MG/DL (ref 0.2–1)
BUN SERPL-MCNC: 14 MG/DL (ref 7–25)
CALCIUM SERPL-MCNC: 9.4 MG/DL (ref 8.6–10.5)
CHLORIDE SERPL-SCNC: 101 MMOL/L (ref 98–107)
CO2 SERPL-SCNC: 30 MMOL/L (ref 21–31)
CREAT SERPL-MCNC: 1.35 MG/DL (ref 0.7–1.3)
EOSINOPHIL # BLD AUTO: 0 THOUSAND/ΜL (ref 0–0.61)
EOSINOPHIL NFR BLD AUTO: 0 % (ref 0–5)
ERYTHROCYTE [DISTWIDTH] IN BLOOD BY AUTOMATED COUNT: 17 % (ref 11.5–14.5)
GFR SERPL CREATININE-BSD FRML MDRD: 52 ML/MIN/1.73SQ M
GLUCOSE SERPL-MCNC: 106 MG/DL (ref 65–99)
HCT VFR BLD AUTO: 33.3 % (ref 42–47)
HGB BLD-MCNC: 10.8 G/DL (ref 14–18)
LYMPHOCYTES # BLD AUTO: 1.3 THOUSANDS/ΜL (ref 0.6–4.47)
LYMPHOCYTES NFR BLD AUTO: 27 % (ref 21–51)
MAGNESIUM SERPL-MCNC: 1.7 MG/DL (ref 1.9–2.7)
MCH RBC QN AUTO: 30.7 PG (ref 26–34)
MCHC RBC AUTO-ENTMCNC: 32.4 G/DL (ref 31–37)
MCV RBC AUTO: 95 FL (ref 81–99)
MONOCYTES # BLD AUTO: 0.7 THOUSAND/ΜL (ref 0.17–1.22)
MONOCYTES NFR BLD AUTO: 14 % (ref 2–12)
NEUTROPHILS # BLD AUTO: 2.8 THOUSANDS/ΜL (ref 1.4–6.5)
NEUTS SEG NFR BLD AUTO: 59 % (ref 42–75)
PLATELET # BLD AUTO: 282 THOUSANDS/UL (ref 149–390)
PMV BLD AUTO: 7.6 FL (ref 8.6–11.7)
POTASSIUM SERPL-SCNC: 4.1 MMOL/L (ref 3.5–5.5)
PROT SERPL-MCNC: 6.2 G/DL (ref 6.4–8.9)
RBC # BLD AUTO: 3.51 MILLION/UL (ref 4.3–5.9)
SODIUM SERPL-SCNC: 137 MMOL/L (ref 134–143)
WBC # BLD AUTO: 4.8 THOUSAND/UL (ref 4.8–10.8)

## 2021-11-10 PROCEDURE — 82746 ASSAY OF FOLIC ACID SERUM: CPT

## 2021-11-10 PROCEDURE — 80053 COMPREHEN METABOLIC PANEL: CPT

## 2021-11-10 PROCEDURE — 36415 COLL VENOUS BLD VENIPUNCTURE: CPT

## 2021-11-10 PROCEDURE — 82607 VITAMIN B-12: CPT

## 2021-11-10 PROCEDURE — 82728 ASSAY OF FERRITIN: CPT

## 2021-11-10 PROCEDURE — 83540 ASSAY OF IRON: CPT

## 2021-11-10 PROCEDURE — 83735 ASSAY OF MAGNESIUM: CPT

## 2021-11-10 PROCEDURE — 83550 IRON BINDING TEST: CPT

## 2021-11-10 PROCEDURE — 85025 COMPLETE CBC W/AUTO DIFF WBC: CPT

## 2021-11-10 PROCEDURE — 99214 OFFICE O/P EST MOD 30 MIN: CPT | Performed by: INTERNAL MEDICINE

## 2021-11-10 RX ORDER — MORPHINE SULFATE 30 MG/1
30 TABLET, FILM COATED, EXTENDED RELEASE ORAL 2 TIMES DAILY
Qty: 60 TABLET | Refills: 0 | Status: SHIPPED | OUTPATIENT
Start: 2021-11-10 | End: 2021-12-08

## 2021-11-10 RX ORDER — DOCUSATE SODIUM 250 MG
250 CAPSULE ORAL DAILY
Qty: 60 CAPSULE | Refills: 6 | Status: SHIPPED | OUTPATIENT
Start: 2021-11-10

## 2021-11-10 RX ORDER — HYDROMORPHONE HYDROCHLORIDE 2 MG/1
2 TABLET ORAL EVERY 4 HOURS PRN
Qty: 90 TABLET | Refills: 0 | Status: SHIPPED | OUTPATIENT
Start: 2021-11-10 | End: 2022-03-10

## 2021-11-11 ENCOUNTER — OFFICE VISIT (OUTPATIENT)
Dept: HEMATOLOGY ONCOLOGY | Facility: CLINIC | Age: 71
End: 2021-11-11
Payer: MEDICARE

## 2021-11-11 VITALS
BODY MASS INDEX: 30.92 KG/M2 | WEIGHT: 204 LBS | RESPIRATION RATE: 16 BRPM | OXYGEN SATURATION: 98 % | TEMPERATURE: 97.7 F | HEIGHT: 68 IN | DIASTOLIC BLOOD PRESSURE: 68 MMHG | HEART RATE: 96 BPM | SYSTOLIC BLOOD PRESSURE: 118 MMHG

## 2021-11-11 DIAGNOSIS — E83.42 HYPOMAGNESEMIA: ICD-10-CM

## 2021-11-11 DIAGNOSIS — D64.9 NORMOCYTIC ANEMIA: ICD-10-CM

## 2021-11-11 DIAGNOSIS — D53.9 NUTRITIONAL ANEMIA: ICD-10-CM

## 2021-11-11 DIAGNOSIS — C79.10 METASTATIC UROTHELIAL CARCINOMA (HCC): Primary | ICD-10-CM

## 2021-11-11 LAB
FERRITIN SERPL-MCNC: 439 NG/ML (ref 8–388)
FOLATE SERPL-MCNC: >20 NG/ML (ref 3.1–17.5)
IRON SATN MFR SERPL: 29 % (ref 20–50)
IRON SERPL-MCNC: 71 UG/DL (ref 65–175)
TIBC SERPL-MCNC: 248 UG/DL (ref 250–450)
VIT B12 SERPL-MCNC: 448 PG/ML (ref 100–900)

## 2021-11-11 PROCEDURE — 99214 OFFICE O/P EST MOD 30 MIN: CPT | Performed by: NURSE PRACTITIONER

## 2021-11-12 ENCOUNTER — HOSPITAL ENCOUNTER (OUTPATIENT)
Dept: INFUSION CENTER | Facility: HOSPITAL | Age: 71
Discharge: HOME | End: 2021-11-12
Attending: INTERNAL MEDICINE
Payer: MEDICARE

## 2021-11-12 VITALS — WEIGHT: 203.93 LBS | HEIGHT: 68 IN | BODY MASS INDEX: 30.91 KG/M2

## 2021-11-12 DIAGNOSIS — E86.0 DEHYDRATION: ICD-10-CM

## 2021-11-12 DIAGNOSIS — C68.9 UROTHELIAL CANCER (HCC): Primary | ICD-10-CM

## 2021-11-12 DIAGNOSIS — C65.2 CANCER OF LEFT RENAL PELVIS (HCC): ICD-10-CM

## 2021-11-12 DIAGNOSIS — C79.10 METASTATIC UROTHELIAL CARCINOMA (HCC): ICD-10-CM

## 2021-11-12 DIAGNOSIS — G89.3 CANCER ASSOCIATED PAIN: ICD-10-CM

## 2021-11-12 DIAGNOSIS — E83.42 HYPOMAGNESEMIA: ICD-10-CM

## 2021-11-12 PROCEDURE — 96413 CHEMO IV INFUSION 1 HR: CPT

## 2021-11-12 PROCEDURE — 96367 TX/PROPH/DG ADDL SEQ IV INF: CPT

## 2021-11-12 RX ORDER — SODIUM CHLORIDE 9 MG/ML
20 INJECTION, SOLUTION INTRAVENOUS ONCE
Status: COMPLETED | OUTPATIENT
Start: 2021-11-12 | End: 2021-11-12

## 2021-11-12 RX ORDER — ACETAMINOPHEN 325 MG/1
650 TABLET ORAL ONCE
Status: COMPLETED | OUTPATIENT
Start: 2021-11-12 | End: 2021-11-12

## 2021-11-12 RX ADMIN — ENFORTUMAB VEDOTIN 110 MG: 30 INJECTION, POWDER, LYOPHILIZED, FOR SOLUTION INTRAVENOUS at 10:20

## 2021-11-12 RX ADMIN — MAGNESIUM SULFATE HEPTAHYDRATE: 500 INJECTION, SOLUTION INTRAMUSCULAR; INTRAVENOUS at 08:13

## 2021-11-12 RX ADMIN — DIPHENHYDRAMINE HYDROCHLORIDE 25 MG: 50 INJECTION INTRAMUSCULAR; INTRAVENOUS at 09:19

## 2021-11-12 RX ADMIN — DEXAMETHASONE SODIUM PHOSPHATE: 10 INJECTION, SOLUTION INTRAMUSCULAR; INTRAVENOUS at 09:41

## 2021-11-12 RX ADMIN — ACETAMINOPHEN 650 MG: 325 TABLET ORAL at 08:10

## 2021-11-12 RX ADMIN — SODIUM CHLORIDE 20 ML/HR: 0.9 INJECTION, SOLUTION INTRAVENOUS at 08:13

## 2021-11-18 ENCOUNTER — TELEPHONE (OUTPATIENT)
Dept: HEMATOLOGY ONCOLOGY | Facility: CLINIC | Age: 71
End: 2021-11-18

## 2021-11-18 ENCOUNTER — APPOINTMENT (OUTPATIENT)
Dept: LAB | Facility: HOSPITAL | Age: 71
End: 2021-11-18
Payer: MEDICARE

## 2021-11-18 LAB
ALBUMIN SERPL BCP-MCNC: 3.5 G/DL (ref 3.5–5.7)
ALP SERPL-CCNC: 51 U/L (ref 55–165)
ALT SERPL W P-5'-P-CCNC: 11 U/L (ref 7–52)
ANION GAP SERPL CALCULATED.3IONS-SCNC: 8 MMOL/L (ref 4–13)
AST SERPL W P-5'-P-CCNC: 15 U/L (ref 13–39)
BASOPHILS # BLD AUTO: 0 THOUSANDS/ΜL (ref 0–0.1)
BASOPHILS NFR BLD AUTO: 0 % (ref 0–2)
BILIRUB SERPL-MCNC: 1.3 MG/DL (ref 0.2–1)
BUN SERPL-MCNC: 18 MG/DL (ref 7–25)
CALCIUM SERPL-MCNC: 9.2 MG/DL (ref 8.6–10.5)
CHLORIDE SERPL-SCNC: 102 MMOL/L (ref 98–107)
CO2 SERPL-SCNC: 30 MMOL/L (ref 21–31)
CREAT SERPL-MCNC: 1.31 MG/DL (ref 0.7–1.3)
EOSINOPHIL # BLD AUTO: 0 THOUSAND/ΜL (ref 0–0.61)
EOSINOPHIL NFR BLD AUTO: 0 % (ref 0–5)
ERYTHROCYTE [DISTWIDTH] IN BLOOD BY AUTOMATED COUNT: 17.8 % (ref 11.5–14.5)
GFR SERPL CREATININE-BSD FRML MDRD: 54 ML/MIN/1.73SQ M
GLUCOSE P FAST SERPL-MCNC: 117 MG/DL (ref 65–99)
HCT VFR BLD AUTO: 34.5 % (ref 42–47)
HGB BLD-MCNC: 11.3 G/DL (ref 14–18)
LYMPHOCYTES # BLD AUTO: 1 THOUSANDS/ΜL (ref 0.6–4.47)
LYMPHOCYTES NFR BLD AUTO: 20 % (ref 21–51)
MAGNESIUM SERPL-MCNC: 1.8 MG/DL (ref 1.9–2.7)
MCH RBC QN AUTO: 31.7 PG (ref 26–34)
MCHC RBC AUTO-ENTMCNC: 32.8 G/DL (ref 31–37)
MCV RBC AUTO: 97 FL (ref 81–99)
MONOCYTES # BLD AUTO: 0.5 THOUSAND/ΜL (ref 0.17–1.22)
MONOCYTES NFR BLD AUTO: 9 % (ref 2–12)
NEUTROPHILS # BLD AUTO: 3.8 THOUSANDS/ΜL (ref 1.4–6.5)
NEUTS SEG NFR BLD AUTO: 71 % (ref 42–75)
PLATELET # BLD AUTO: 181 THOUSANDS/UL (ref 149–390)
PMV BLD AUTO: 8.7 FL (ref 8.6–11.7)
POTASSIUM SERPL-SCNC: 3.6 MMOL/L (ref 3.5–5.5)
PROT SERPL-MCNC: 6 G/DL (ref 6.4–8.9)
RBC # BLD AUTO: 3.57 MILLION/UL (ref 4.3–5.9)
SODIUM SERPL-SCNC: 140 MMOL/L (ref 134–143)
WBC # BLD AUTO: 5.3 THOUSAND/UL (ref 4.8–10.8)

## 2021-11-18 PROCEDURE — 36415 COLL VENOUS BLD VENIPUNCTURE: CPT | Performed by: NURSE PRACTITIONER

## 2021-11-18 PROCEDURE — 83735 ASSAY OF MAGNESIUM: CPT | Performed by: NURSE PRACTITIONER

## 2021-11-18 PROCEDURE — 85025 COMPLETE CBC W/AUTO DIFF WBC: CPT | Performed by: NURSE PRACTITIONER

## 2021-11-18 PROCEDURE — 80053 COMPREHEN METABOLIC PANEL: CPT | Performed by: NURSE PRACTITIONER

## 2021-11-19 ENCOUNTER — HOSPITAL ENCOUNTER (OUTPATIENT)
Dept: INFUSION CENTER | Facility: HOSPITAL | Age: 71
Discharge: HOME | End: 2021-11-19
Attending: INTERNAL MEDICINE
Payer: MEDICARE

## 2021-11-19 VITALS
OXYGEN SATURATION: 97 % | SYSTOLIC BLOOD PRESSURE: 100 MMHG | TEMPERATURE: 97.2 F | RESPIRATION RATE: 16 BRPM | BODY MASS INDEX: 30.87 KG/M2 | HEART RATE: 61 BPM | DIASTOLIC BLOOD PRESSURE: 64 MMHG | WEIGHT: 203.71 LBS | HEIGHT: 68 IN

## 2021-11-19 DIAGNOSIS — E83.42 HYPOMAGNESEMIA: ICD-10-CM

## 2021-11-19 DIAGNOSIS — C65.2 CANCER OF LEFT RENAL PELVIS (HCC): ICD-10-CM

## 2021-11-19 DIAGNOSIS — G89.3 CANCER ASSOCIATED PAIN: ICD-10-CM

## 2021-11-19 DIAGNOSIS — E86.0 DEHYDRATION: ICD-10-CM

## 2021-11-19 DIAGNOSIS — C79.10 METASTATIC UROTHELIAL CARCINOMA (HCC): ICD-10-CM

## 2021-11-19 DIAGNOSIS — C68.9 UROTHELIAL CANCER (HCC): Primary | ICD-10-CM

## 2021-11-19 PROCEDURE — 96413 CHEMO IV INFUSION 1 HR: CPT

## 2021-11-19 PROCEDURE — 96367 TX/PROPH/DG ADDL SEQ IV INF: CPT

## 2021-11-19 RX ORDER — ACETAMINOPHEN 325 MG/1
650 TABLET ORAL ONCE
Status: COMPLETED | OUTPATIENT
Start: 2021-11-19 | End: 2021-11-19

## 2021-11-19 RX ORDER — SODIUM CHLORIDE 9 MG/ML
20 INJECTION, SOLUTION INTRAVENOUS ONCE
Status: COMPLETED | OUTPATIENT
Start: 2021-11-19 | End: 2021-11-19

## 2021-11-19 RX ADMIN — ACETAMINOPHEN 650 MG: 325 TABLET ORAL at 10:04

## 2021-11-19 RX ADMIN — DIPHENHYDRAMINE HYDROCHLORIDE 25 MG: 50 INJECTION INTRAMUSCULAR; INTRAVENOUS at 10:02

## 2021-11-19 RX ADMIN — ENFORTUMAB VEDOTIN 110 MG: 30 INJECTION, POWDER, LYOPHILIZED, FOR SOLUTION INTRAVENOUS at 11:09

## 2021-11-19 RX ADMIN — DEXAMETHASONE SODIUM PHOSPHATE: 10 INJECTION, SOLUTION INTRAMUSCULAR; INTRAVENOUS at 10:23

## 2021-11-19 RX ADMIN — SODIUM CHLORIDE 20 ML/HR: 0.9 INJECTION, SOLUTION INTRAVENOUS at 08:55

## 2021-11-19 RX ADMIN — MAGNESIUM SULFATE HEPTAHYDRATE: 500 INJECTION, SOLUTION INTRAMUSCULAR; INTRAVENOUS at 08:56

## 2021-11-24 ENCOUNTER — APPOINTMENT (OUTPATIENT)
Dept: LAB | Facility: HOSPITAL | Age: 71
End: 2021-11-24
Attending: INTERNAL MEDICINE
Payer: MEDICARE

## 2021-11-24 ENCOUNTER — OFFICE VISIT (OUTPATIENT)
Dept: HEMATOLOGY ONCOLOGY | Facility: CLINIC | Age: 71
End: 2021-11-24
Payer: MEDICARE

## 2021-11-24 VITALS
DIASTOLIC BLOOD PRESSURE: 60 MMHG | HEIGHT: 68 IN | RESPIRATION RATE: 16 BRPM | SYSTOLIC BLOOD PRESSURE: 116 MMHG | BODY MASS INDEX: 30.62 KG/M2 | TEMPERATURE: 98.2 F | OXYGEN SATURATION: 96 % | HEART RATE: 77 BPM | WEIGHT: 202 LBS

## 2021-11-24 DIAGNOSIS — C65.2 CANCER OF LEFT RENAL PELVIS (HCC): ICD-10-CM

## 2021-11-24 DIAGNOSIS — C79.10 METASTATIC UROTHELIAL CARCINOMA (HCC): Primary | ICD-10-CM

## 2021-11-24 DIAGNOSIS — G89.3 CANCER RELATED PAIN: ICD-10-CM

## 2021-11-24 DIAGNOSIS — C68.9 UROTHELIAL CANCER (HCC): ICD-10-CM

## 2021-11-24 DIAGNOSIS — C67.3 MALIGNANT NEOPLASM OF ANTERIOR WALL OF BLADDER (HCC): ICD-10-CM

## 2021-11-24 DIAGNOSIS — E83.42 HYPOMAGNESEMIA: ICD-10-CM

## 2021-11-24 DIAGNOSIS — C79.10 METASTATIC UROTHELIAL CARCINOMA (HCC): ICD-10-CM

## 2021-11-24 DIAGNOSIS — D64.9 NORMOCYTIC ANEMIA: ICD-10-CM

## 2021-11-24 PROCEDURE — 99214 OFFICE O/P EST MOD 30 MIN: CPT | Performed by: INTERNAL MEDICINE

## 2021-11-24 RX ORDER — SODIUM CHLORIDE 9 MG/ML
20 INJECTION, SOLUTION INTRAVENOUS ONCE
Status: CANCELLED | OUTPATIENT
Start: 2021-12-10

## 2021-11-24 RX ORDER — ACETAMINOPHEN 325 MG/1
650 TABLET ORAL ONCE
Status: CANCELLED | OUTPATIENT
Start: 2021-12-23

## 2021-11-24 RX ORDER — ACETAMINOPHEN 325 MG/1
650 TABLET ORAL ONCE
Status: CANCELLED | OUTPATIENT
Start: 2021-12-17

## 2021-11-24 RX ORDER — SODIUM CHLORIDE 9 MG/ML
20 INJECTION, SOLUTION INTRAVENOUS ONCE
Status: CANCELLED | OUTPATIENT
Start: 2021-12-17

## 2021-11-24 RX ORDER — ACETAMINOPHEN 325 MG/1
650 TABLET ORAL ONCE
Status: CANCELLED | OUTPATIENT
Start: 2021-12-10

## 2021-11-24 RX ORDER — SODIUM CHLORIDE 9 MG/ML
20 INJECTION, SOLUTION INTRAVENOUS ONCE
Status: CANCELLED | OUTPATIENT
Start: 2021-12-23

## 2021-11-26 ENCOUNTER — HOSPITAL ENCOUNTER (OUTPATIENT)
Dept: INFUSION CENTER | Facility: HOSPITAL | Age: 71
Discharge: HOME | End: 2021-11-26
Attending: INTERNAL MEDICINE
Payer: MEDICARE

## 2021-11-26 VITALS
TEMPERATURE: 96.9 F | RESPIRATION RATE: 16 BRPM | SYSTOLIC BLOOD PRESSURE: 106 MMHG | HEART RATE: 70 BPM | HEIGHT: 68 IN | OXYGEN SATURATION: 96 % | DIASTOLIC BLOOD PRESSURE: 65 MMHG | BODY MASS INDEX: 30.34 KG/M2 | WEIGHT: 200.18 LBS

## 2021-11-26 DIAGNOSIS — G89.3 CANCER ASSOCIATED PAIN: ICD-10-CM

## 2021-11-26 DIAGNOSIS — C68.9 UROTHELIAL CANCER (HCC): Primary | ICD-10-CM

## 2021-11-26 DIAGNOSIS — E83.42 HYPOMAGNESEMIA: ICD-10-CM

## 2021-11-26 DIAGNOSIS — C65.2 CANCER OF LEFT RENAL PELVIS (HCC): ICD-10-CM

## 2021-11-26 DIAGNOSIS — E86.0 DEHYDRATION: ICD-10-CM

## 2021-11-26 DIAGNOSIS — C79.10 METASTATIC UROTHELIAL CARCINOMA (HCC): ICD-10-CM

## 2021-11-26 PROCEDURE — 96413 CHEMO IV INFUSION 1 HR: CPT

## 2021-11-26 PROCEDURE — 96367 TX/PROPH/DG ADDL SEQ IV INF: CPT

## 2021-11-26 RX ORDER — ACETAMINOPHEN 325 MG/1
650 TABLET ORAL ONCE
Status: COMPLETED | OUTPATIENT
Start: 2021-11-26 | End: 2021-11-26

## 2021-11-26 RX ORDER — SODIUM CHLORIDE 9 MG/ML
20 INJECTION, SOLUTION INTRAVENOUS ONCE
Status: COMPLETED | OUTPATIENT
Start: 2021-11-26 | End: 2021-11-26

## 2021-11-26 RX ADMIN — ACETAMINOPHEN 650 MG: 325 TABLET ORAL at 10:36

## 2021-11-26 RX ADMIN — ENFORTUMAB VEDOTIN 110 MG: 30 INJECTION, POWDER, LYOPHILIZED, FOR SOLUTION INTRAVENOUS at 11:34

## 2021-11-26 RX ADMIN — SODIUM CHLORIDE 20 ML/HR: 0.9 INJECTION, SOLUTION INTRAVENOUS at 09:21

## 2021-11-26 RX ADMIN — DIPHENHYDRAMINE HYDROCHLORIDE 25 MG: 50 INJECTION INTRAMUSCULAR; INTRAVENOUS at 10:32

## 2021-11-26 RX ADMIN — MAGNESIUM SULFATE HEPTAHYDRATE: 500 INJECTION, SOLUTION INTRAMUSCULAR; INTRAVENOUS at 09:24

## 2021-11-26 RX ADMIN — DEXAMETHASONE SODIUM PHOSPHATE: 10 INJECTION, SOLUTION INTRAMUSCULAR; INTRAVENOUS at 11:04

## 2021-11-30 ENCOUNTER — APPOINTMENT (OUTPATIENT)
Dept: LAB | Facility: HOSPITAL | Age: 71
End: 2021-11-30
Attending: INTERNAL MEDICINE
Payer: MEDICARE

## 2021-11-30 DIAGNOSIS — E78.5 HYPERLIPIDEMIA, UNSPECIFIED HYPERLIPIDEMIA TYPE: ICD-10-CM

## 2021-11-30 LAB
CHOLEST SERPL-MCNC: 126 MG/DL
HDLC SERPL-MCNC: 57 MG/DL
LDLC SERPL CALC-MCNC: 52 MG/DL (ref 0–100)
NONHDLC SERPL-MCNC: 69 MG/DL
TRIGL SERPL-MCNC: 83 MG/DL

## 2021-11-30 PROCEDURE — 80061 LIPID PANEL: CPT

## 2021-12-03 ENCOUNTER — HOSPITAL ENCOUNTER (OUTPATIENT)
Dept: NUCLEAR MEDICINE | Facility: HOSPITAL | Age: 71
Discharge: HOME | End: 2021-12-03
Attending: INTERNAL MEDICINE
Payer: MEDICARE

## 2021-12-03 DIAGNOSIS — C79.10 METASTATIC UROTHELIAL CARCINOMA (HCC): ICD-10-CM

## 2021-12-03 DIAGNOSIS — C67.3 MALIGNANT NEOPLASM OF ANTERIOR WALL OF BLADDER (HCC): ICD-10-CM

## 2021-12-03 LAB — GLUCOSE SERPL-MCNC: 81 MG/DL (ref 65–140)

## 2021-12-03 PROCEDURE — A9552 F18 FDG: HCPCS

## 2021-12-03 PROCEDURE — 78815 PET IMAGE W/CT SKULL-THIGH: CPT

## 2021-12-03 PROCEDURE — 82948 REAGENT STRIP/BLOOD GLUCOSE: CPT

## 2021-12-07 ENCOUNTER — PROCEDURE VISIT (OUTPATIENT)
Dept: UROLOGY | Facility: CLINIC | Age: 71
End: 2021-12-07
Payer: MEDICARE

## 2021-12-07 VITALS
HEART RATE: 63 BPM | BODY MASS INDEX: 30.37 KG/M2 | SYSTOLIC BLOOD PRESSURE: 110 MMHG | WEIGHT: 200.4 LBS | DIASTOLIC BLOOD PRESSURE: 72 MMHG | HEIGHT: 68 IN

## 2021-12-07 DIAGNOSIS — C67.9 MALIGNANT NEOPLASM OF URINARY BLADDER, UNSPECIFIED SITE (HCC): ICD-10-CM

## 2021-12-07 DIAGNOSIS — C68.9 UROTHELIAL CANCER (HCC): Primary | ICD-10-CM

## 2021-12-07 DIAGNOSIS — N52.01 ERECTILE DYSFUNCTION DUE TO ARTERIAL INSUFFICIENCY: ICD-10-CM

## 2021-12-07 DIAGNOSIS — R39.9 UTI SYMPTOMS: ICD-10-CM

## 2021-12-07 DIAGNOSIS — C65.2 CANCER OF LEFT RENAL PELVIS (HCC): ICD-10-CM

## 2021-12-07 LAB
SL AMB  POCT GLUCOSE, UA: ABNORMAL
SL AMB LEUKOCYTE ESTERASE,UA: ABNORMAL
SL AMB POCT BILIRUBIN,UA: ABNORMAL
SL AMB POCT BLOOD,UA: ABNORMAL
SL AMB POCT CLARITY,UA: CLEAR
SL AMB POCT COLOR,UA: ABNORMAL
SL AMB POCT KETONES,UA: ABNORMAL
SL AMB POCT NITRITE,UA: ABNORMAL
SL AMB POCT PH,UA: 5
SL AMB POCT SPECIFIC GRAVITY,UA: 1.02
SL AMB POCT URINE PROTEIN: ABNORMAL
SL AMB POCT UROBILINOGEN: 0.2

## 2021-12-07 PROCEDURE — 81002 URINALYSIS NONAUTO W/O SCOPE: CPT | Performed by: UROLOGY

## 2021-12-07 PROCEDURE — 88112 CYTOPATH CELL ENHANCE TECH: CPT | Performed by: PATHOLOGY

## 2021-12-07 PROCEDURE — 87086 URINE CULTURE/COLONY COUNT: CPT | Performed by: UROLOGY

## 2021-12-07 PROCEDURE — 52000 CYSTOURETHROSCOPY: CPT | Performed by: UROLOGY

## 2021-12-07 PROCEDURE — 99214 OFFICE O/P EST MOD 30 MIN: CPT | Performed by: UROLOGY

## 2021-12-07 RX ORDER — FAMOTIDINE 20 MG/1
TABLET, FILM COATED ORAL
COMMUNITY
Start: 2021-11-24 | End: 2022-06-02

## 2021-12-07 RX ORDER — TADALAFIL 20 MG/1
20 TABLET ORAL DAILY PRN
Qty: 20 TABLET | Refills: 3 | Status: SHIPPED | OUTPATIENT
Start: 2021-12-07 | End: 2021-12-14 | Stop reason: SDUPTHER

## 2021-12-08 ENCOUNTER — OFFICE VISIT (OUTPATIENT)
Dept: PALLIATIVE MEDICINE | Facility: CLINIC | Age: 71
End: 2021-12-08
Payer: MEDICARE

## 2021-12-08 ENCOUNTER — SOCIAL WORK (OUTPATIENT)
Dept: PALLIATIVE MEDICINE | Facility: CLINIC | Age: 71
End: 2021-12-08
Payer: MEDICARE

## 2021-12-08 VITALS
TEMPERATURE: 97 F | HEART RATE: 72 BPM | BODY MASS INDEX: 30.23 KG/M2 | SYSTOLIC BLOOD PRESSURE: 128 MMHG | RESPIRATION RATE: 16 BRPM | OXYGEN SATURATION: 95 % | DIASTOLIC BLOOD PRESSURE: 78 MMHG | WEIGHT: 198.8 LBS

## 2021-12-08 DIAGNOSIS — C77.2 METASTASIS TO RETROPERITONEAL LYMPH NODE (HCC): ICD-10-CM

## 2021-12-08 DIAGNOSIS — C79.51 SECONDARY MALIGNANT NEOPLASM OF BONE (HCC): ICD-10-CM

## 2021-12-08 DIAGNOSIS — Z71.89 COUNSELING AND COORDINATION OF CARE: Primary | ICD-10-CM

## 2021-12-08 DIAGNOSIS — G89.3 CANCER ASSOCIATED PAIN: Primary | ICD-10-CM

## 2021-12-08 DIAGNOSIS — R43.2 DYSGEUSIA: ICD-10-CM

## 2021-12-08 DIAGNOSIS — C79.10 METASTATIC UROTHELIAL CARCINOMA (HCC): ICD-10-CM

## 2021-12-08 PROCEDURE — NC001 PR NO CHARGE

## 2021-12-08 PROCEDURE — 99214 OFFICE O/P EST MOD 30 MIN: CPT | Performed by: INTERNAL MEDICINE

## 2021-12-08 RX ORDER — MORPHINE SULFATE 15 MG/1
TABLET, FILM COATED, EXTENDED RELEASE ORAL
Qty: 15 TABLET | Refills: 0 | Status: SHIPPED | OUTPATIENT
Start: 2021-12-08 | End: 2021-12-08

## 2021-12-08 RX ORDER — MORPHINE SULFATE 15 MG/1
TABLET ORAL
Qty: 15 TABLET | Refills: 0 | Status: SHIPPED | OUTPATIENT
Start: 2021-12-08 | End: 2022-03-10 | Stop reason: SDUPTHER

## 2021-12-09 ENCOUNTER — OFFICE VISIT (OUTPATIENT)
Dept: HEMATOLOGY ONCOLOGY | Facility: CLINIC | Age: 71
End: 2021-12-09
Payer: MEDICARE

## 2021-12-09 ENCOUNTER — APPOINTMENT (OUTPATIENT)
Dept: LAB | Facility: CLINIC | Age: 71
End: 2021-12-09
Payer: MEDICARE

## 2021-12-09 VITALS
TEMPERATURE: 97.9 F | SYSTOLIC BLOOD PRESSURE: 106 MMHG | BODY MASS INDEX: 30.16 KG/M2 | OXYGEN SATURATION: 97 % | RESPIRATION RATE: 16 BRPM | DIASTOLIC BLOOD PRESSURE: 74 MMHG | HEIGHT: 68 IN | HEART RATE: 65 BPM | WEIGHT: 199 LBS

## 2021-12-09 DIAGNOSIS — E83.42 HYPOMAGNESEMIA: ICD-10-CM

## 2021-12-09 DIAGNOSIS — C65.2 CANCER OF LEFT RENAL PELVIS (HCC): ICD-10-CM

## 2021-12-09 DIAGNOSIS — D64.9 NORMOCYTIC ANEMIA: ICD-10-CM

## 2021-12-09 DIAGNOSIS — C79.10 METASTATIC UROTHELIAL CARCINOMA (HCC): Primary | ICD-10-CM

## 2021-12-09 DIAGNOSIS — C79.10 METASTATIC UROTHELIAL CARCINOMA (HCC): ICD-10-CM

## 2021-12-09 DIAGNOSIS — C68.9 UROTHELIAL CANCER (HCC): ICD-10-CM

## 2021-12-09 LAB
ALBUMIN SERPL BCP-MCNC: 3.3 G/DL (ref 3.5–5)
ALP SERPL-CCNC: 56 U/L (ref 46–116)
ALT SERPL W P-5'-P-CCNC: 28 U/L (ref 12–78)
ANION GAP SERPL CALCULATED.3IONS-SCNC: 9 MMOL/L (ref 4–13)
AST SERPL W P-5'-P-CCNC: 12 U/L (ref 5–45)
BACTERIA UR CULT: ABNORMAL
BASOPHILS # BLD AUTO: 0.02 THOUSANDS/ΜL (ref 0–0.1)
BASOPHILS NFR BLD AUTO: 0 % (ref 0–1)
BILIRUB SERPL-MCNC: 1.29 MG/DL (ref 0.2–1)
BUN SERPL-MCNC: 23 MG/DL (ref 5–25)
CALCIUM ALBUM COR SERPL-MCNC: 9.5 MG/DL (ref 8.3–10.1)
CALCIUM SERPL-MCNC: 8.9 MG/DL (ref 8.3–10.1)
CHLORIDE SERPL-SCNC: 104 MMOL/L (ref 100–108)
CO2 SERPL-SCNC: 27 MMOL/L (ref 21–32)
CREAT SERPL-MCNC: 1.47 MG/DL (ref 0.6–1.3)
EOSINOPHIL # BLD AUTO: 0 THOUSAND/ΜL (ref 0–0.61)
EOSINOPHIL NFR BLD AUTO: 0 % (ref 0–6)
ERYTHROCYTE [DISTWIDTH] IN BLOOD BY AUTOMATED COUNT: 16.7 % (ref 11.6–15.1)
GFR SERPL CREATININE-BSD FRML MDRD: 47 ML/MIN/1.73SQ M
GLUCOSE P FAST SERPL-MCNC: 98 MG/DL (ref 65–99)
HCT VFR BLD AUTO: 36.1 % (ref 36.5–49.3)
HGB BLD-MCNC: 11.4 G/DL (ref 12–17)
IMM GRANULOCYTES # BLD AUTO: 0.04 THOUSAND/UL (ref 0–0.2)
IMM GRANULOCYTES NFR BLD AUTO: 1 % (ref 0–2)
LYMPHOCYTES # BLD AUTO: 1.5 THOUSANDS/ΜL (ref 0.6–4.47)
LYMPHOCYTES NFR BLD AUTO: 27 % (ref 14–44)
MAGNESIUM SERPL-MCNC: 2 MG/DL (ref 1.6–2.6)
MCH RBC QN AUTO: 31.6 PG (ref 26.8–34.3)
MCHC RBC AUTO-ENTMCNC: 31.6 G/DL (ref 31.4–37.4)
MCV RBC AUTO: 100 FL (ref 82–98)
MONOCYTES # BLD AUTO: 0.71 THOUSAND/ΜL (ref 0.17–1.22)
MONOCYTES NFR BLD AUTO: 13 % (ref 4–12)
NEUTROPHILS # BLD AUTO: 3.4 THOUSANDS/ΜL (ref 1.85–7.62)
NEUTS SEG NFR BLD AUTO: 59 % (ref 43–75)
NRBC BLD AUTO-RTO: 0 /100 WBCS
PLATELET # BLD AUTO: 155 THOUSANDS/UL (ref 149–390)
PMV BLD AUTO: 11.6 FL (ref 8.9–12.7)
POTASSIUM SERPL-SCNC: 3.6 MMOL/L (ref 3.5–5.3)
PROT SERPL-MCNC: 6.3 G/DL (ref 6.4–8.2)
RBC # BLD AUTO: 3.61 MILLION/UL (ref 3.88–5.62)
SODIUM SERPL-SCNC: 140 MMOL/L (ref 136–145)
WBC # BLD AUTO: 5.67 THOUSAND/UL (ref 4.31–10.16)

## 2021-12-09 PROCEDURE — 36415 COLL VENOUS BLD VENIPUNCTURE: CPT

## 2021-12-09 PROCEDURE — 99214 OFFICE O/P EST MOD 30 MIN: CPT | Performed by: INTERNAL MEDICINE

## 2021-12-09 PROCEDURE — 83735 ASSAY OF MAGNESIUM: CPT | Performed by: INTERNAL MEDICINE

## 2021-12-09 PROCEDURE — 80053 COMPREHEN METABOLIC PANEL: CPT

## 2021-12-09 PROCEDURE — 85025 COMPLETE CBC W/AUTO DIFF WBC: CPT

## 2021-12-09 RX ORDER — SODIUM CHLORIDE 9 MG/ML
20 INJECTION, SOLUTION INTRAVENOUS ONCE
Status: CANCELLED | OUTPATIENT
Start: 2022-01-21

## 2021-12-09 RX ORDER — ACETAMINOPHEN 325 MG/1
650 TABLET ORAL ONCE
Status: CANCELLED | OUTPATIENT
Start: 2022-01-14

## 2021-12-09 RX ORDER — SODIUM CHLORIDE 9 MG/ML
20 INJECTION, SOLUTION INTRAVENOUS ONCE
Status: CANCELLED | OUTPATIENT
Start: 2022-01-07

## 2021-12-09 RX ORDER — SODIUM CHLORIDE 9 MG/ML
20 INJECTION, SOLUTION INTRAVENOUS ONCE
Status: CANCELLED | OUTPATIENT
Start: 2022-01-14

## 2021-12-09 RX ORDER — ACETAMINOPHEN 325 MG/1
650 TABLET ORAL ONCE
Status: CANCELLED | OUTPATIENT
Start: 2022-01-07

## 2021-12-09 RX ORDER — ACETAMINOPHEN 325 MG/1
650 TABLET ORAL ONCE
Status: CANCELLED | OUTPATIENT
Start: 2022-01-21

## 2021-12-10 ENCOUNTER — TELEPHONE (OUTPATIENT)
Dept: UROLOGY | Facility: CLINIC | Age: 71
End: 2021-12-10

## 2021-12-10 ENCOUNTER — HOSPITAL ENCOUNTER (OUTPATIENT)
Dept: INFUSION CENTER | Facility: HOSPITAL | Age: 71
Discharge: HOME | End: 2021-12-10
Attending: INTERNAL MEDICINE
Payer: MEDICARE

## 2021-12-10 VITALS
SYSTOLIC BLOOD PRESSURE: 113 MMHG | DIASTOLIC BLOOD PRESSURE: 71 MMHG | TEMPERATURE: 97.3 F | BODY MASS INDEX: 30.14 KG/M2 | RESPIRATION RATE: 16 BRPM | OXYGEN SATURATION: 94 % | WEIGHT: 198.85 LBS | HEIGHT: 68 IN | HEART RATE: 69 BPM

## 2021-12-10 DIAGNOSIS — C79.10 METASTATIC UROTHELIAL CARCINOMA (HCC): ICD-10-CM

## 2021-12-10 DIAGNOSIS — C68.9 UROTHELIAL CANCER (HCC): Primary | ICD-10-CM

## 2021-12-10 DIAGNOSIS — N52.01 ERECTILE DYSFUNCTION DUE TO ARTERIAL INSUFFICIENCY: Primary | ICD-10-CM

## 2021-12-10 DIAGNOSIS — C65.2 CANCER OF LEFT RENAL PELVIS (HCC): ICD-10-CM

## 2021-12-10 PROCEDURE — 96367 TX/PROPH/DG ADDL SEQ IV INF: CPT

## 2021-12-10 PROCEDURE — 96413 CHEMO IV INFUSION 1 HR: CPT

## 2021-12-10 RX ORDER — ACETAMINOPHEN 325 MG/1
650 TABLET ORAL ONCE
Status: COMPLETED | OUTPATIENT
Start: 2021-12-10 | End: 2021-12-10

## 2021-12-10 RX ORDER — SODIUM CHLORIDE 9 MG/ML
20 INJECTION, SOLUTION INTRAVENOUS ONCE
Status: COMPLETED | OUTPATIENT
Start: 2021-12-10 | End: 2021-12-10

## 2021-12-10 RX ORDER — FLUCONAZOLE 100 MG/1
100 TABLET ORAL DAILY
Qty: 3 TABLET | Refills: 0 | Status: SHIPPED | OUTPATIENT
Start: 2021-12-10 | End: 2021-12-13

## 2021-12-10 RX ADMIN — ACETAMINOPHEN 650 MG: 325 TABLET ORAL at 09:03

## 2021-12-10 RX ADMIN — DEXAMETHASONE SODIUM PHOSPHATE: 10 INJECTION, SOLUTION INTRAMUSCULAR; INTRAVENOUS at 09:23

## 2021-12-10 RX ADMIN — SODIUM CHLORIDE 20 ML/HR: 0.9 INJECTION, SOLUTION INTRAVENOUS at 09:01

## 2021-12-10 RX ADMIN — ENFORTUMAB VEDOTIN 110 MG: 30 INJECTION, POWDER, LYOPHILIZED, FOR SOLUTION INTRAVENOUS at 09:56

## 2021-12-10 RX ADMIN — DIPHENHYDRAMINE HYDROCHLORIDE 25 MG: 50 INJECTION INTRAMUSCULAR; INTRAVENOUS at 09:01

## 2021-12-15 RX ORDER — TADALAFIL 20 MG/1
TABLET ORAL
Qty: 12 TABLET | Refills: 3 | Status: SHIPPED | OUTPATIENT
Start: 2021-12-15 | End: 2022-04-28

## 2021-12-16 ENCOUNTER — APPOINTMENT (OUTPATIENT)
Dept: LAB | Facility: CLINIC | Age: 71
End: 2021-12-16
Payer: MEDICARE

## 2021-12-16 DIAGNOSIS — C68.9 UROTHELIAL CANCER (HCC): ICD-10-CM

## 2021-12-16 DIAGNOSIS — C65.2 CANCER OF LEFT RENAL PELVIS (HCC): ICD-10-CM

## 2021-12-16 DIAGNOSIS — C79.10 METASTATIC UROTHELIAL CARCINOMA (HCC): ICD-10-CM

## 2021-12-16 LAB
ALBUMIN SERPL BCP-MCNC: 3.6 G/DL (ref 3.5–5)
ALP SERPL-CCNC: 61 U/L (ref 46–116)
ALT SERPL W P-5'-P-CCNC: 34 U/L (ref 12–78)
ANION GAP SERPL CALCULATED.3IONS-SCNC: 7 MMOL/L (ref 4–13)
AST SERPL W P-5'-P-CCNC: 18 U/L (ref 5–45)
BASOPHILS # BLD AUTO: 0.01 THOUSANDS/ΜL (ref 0–0.1)
BASOPHILS NFR BLD AUTO: 0 % (ref 0–1)
BILIRUB SERPL-MCNC: 1.41 MG/DL (ref 0.2–1)
BUN SERPL-MCNC: 26 MG/DL (ref 5–25)
CALCIUM SERPL-MCNC: 9.4 MG/DL (ref 8.3–10.1)
CHLORIDE SERPL-SCNC: 106 MMOL/L (ref 100–108)
CO2 SERPL-SCNC: 28 MMOL/L (ref 21–32)
CREAT SERPL-MCNC: 1.45 MG/DL (ref 0.6–1.3)
EOSINOPHIL # BLD AUTO: 0 THOUSAND/ΜL (ref 0–0.61)
EOSINOPHIL NFR BLD AUTO: 0 % (ref 0–6)
ERYTHROCYTE [DISTWIDTH] IN BLOOD BY AUTOMATED COUNT: 16.6 % (ref 11.6–15.1)
GFR SERPL CREATININE-BSD FRML MDRD: 47 ML/MIN/1.73SQ M
GLUCOSE P FAST SERPL-MCNC: 113 MG/DL (ref 65–99)
GLUCOSE SERPL-MCNC: 113 MG/DL (ref 65–140)
HCT VFR BLD AUTO: 38.6 % (ref 36.5–49.3)
HGB BLD-MCNC: 12.4 G/DL (ref 12–17)
IMM GRANULOCYTES # BLD AUTO: 0.02 THOUSAND/UL (ref 0–0.2)
IMM GRANULOCYTES NFR BLD AUTO: 0 % (ref 0–2)
LYMPHOCYTES # BLD AUTO: 1.24 THOUSANDS/ΜL (ref 0.6–4.47)
LYMPHOCYTES NFR BLD AUTO: 20 % (ref 14–44)
MAGNESIUM SERPL-MCNC: 1.9 MG/DL (ref 1.6–2.6)
MCH RBC QN AUTO: 31.6 PG (ref 26.8–34.3)
MCHC RBC AUTO-ENTMCNC: 32.1 G/DL (ref 31.4–37.4)
MCV RBC AUTO: 98 FL (ref 82–98)
MONOCYTES # BLD AUTO: 0.6 THOUSAND/ΜL (ref 0.17–1.22)
MONOCYTES NFR BLD AUTO: 10 % (ref 4–12)
NEUTROPHILS # BLD AUTO: 4.47 THOUSANDS/ΜL (ref 1.85–7.62)
NEUTS SEG NFR BLD AUTO: 70 % (ref 43–75)
NRBC BLD AUTO-RTO: 0 /100 WBCS
PLATELET # BLD AUTO: 146 THOUSANDS/UL (ref 149–390)
PMV BLD AUTO: 12.1 FL (ref 8.9–12.7)
POTASSIUM SERPL-SCNC: 3.9 MMOL/L (ref 3.5–5.3)
PROT SERPL-MCNC: 6.5 G/DL (ref 6.4–8.2)
RBC # BLD AUTO: 3.93 MILLION/UL (ref 3.88–5.62)
SODIUM SERPL-SCNC: 141 MMOL/L (ref 136–145)
WBC # BLD AUTO: 6.34 THOUSAND/UL (ref 4.31–10.16)

## 2021-12-16 PROCEDURE — 80053 COMPREHEN METABOLIC PANEL: CPT

## 2021-12-16 PROCEDURE — 85025 COMPLETE CBC W/AUTO DIFF WBC: CPT

## 2021-12-16 PROCEDURE — 36415 COLL VENOUS BLD VENIPUNCTURE: CPT

## 2021-12-16 PROCEDURE — 83735 ASSAY OF MAGNESIUM: CPT

## 2021-12-17 ENCOUNTER — HOSPITAL ENCOUNTER (OUTPATIENT)
Dept: INFUSION CENTER | Facility: HOSPITAL | Age: 71
Discharge: HOME | End: 2021-12-17
Attending: INTERNAL MEDICINE
Payer: MEDICARE

## 2021-12-17 VITALS
TEMPERATURE: 97.8 F | HEART RATE: 91 BPM | RESPIRATION RATE: 16 BRPM | WEIGHT: 197.53 LBS | HEIGHT: 68 IN | SYSTOLIC BLOOD PRESSURE: 143 MMHG | BODY MASS INDEX: 29.94 KG/M2 | DIASTOLIC BLOOD PRESSURE: 82 MMHG

## 2021-12-17 DIAGNOSIS — C65.2 CANCER OF LEFT RENAL PELVIS (HCC): ICD-10-CM

## 2021-12-17 DIAGNOSIS — C68.9 UROTHELIAL CANCER (HCC): ICD-10-CM

## 2021-12-17 DIAGNOSIS — C79.10 METASTATIC UROTHELIAL CARCINOMA (HCC): Primary | ICD-10-CM

## 2021-12-17 PROCEDURE — 96367 TX/PROPH/DG ADDL SEQ IV INF: CPT

## 2021-12-17 PROCEDURE — 96413 CHEMO IV INFUSION 1 HR: CPT

## 2021-12-17 RX ORDER — SODIUM CHLORIDE 9 MG/ML
20 INJECTION, SOLUTION INTRAVENOUS ONCE
Status: COMPLETED | OUTPATIENT
Start: 2021-12-17 | End: 2021-12-17

## 2021-12-17 RX ORDER — ACETAMINOPHEN 325 MG/1
650 TABLET ORAL ONCE
Status: COMPLETED | OUTPATIENT
Start: 2021-12-17 | End: 2021-12-17

## 2021-12-17 RX ADMIN — DEXAMETHASONE SODIUM PHOSPHATE: 10 INJECTION, SOLUTION INTRAMUSCULAR; INTRAVENOUS at 09:38

## 2021-12-17 RX ADMIN — ACETAMINOPHEN 650 MG: 325 TABLET ORAL at 09:07

## 2021-12-17 RX ADMIN — SODIUM CHLORIDE 20 ML/HR: 0.9 INJECTION, SOLUTION INTRAVENOUS at 09:08

## 2021-12-17 RX ADMIN — ENFORTUMAB VEDOTIN 110 MG: 30 INJECTION, POWDER, LYOPHILIZED, FOR SOLUTION INTRAVENOUS at 10:17

## 2021-12-17 RX ADMIN — DIPHENHYDRAMINE HYDROCHLORIDE 25 MG: 50 INJECTION INTRAMUSCULAR; INTRAVENOUS at 09:13

## 2021-12-20 DIAGNOSIS — C79.10 METASTATIC UROTHELIAL CARCINOMA (HCC): ICD-10-CM

## 2021-12-20 DIAGNOSIS — E83.42 HYPOMAGNESEMIA: Primary | ICD-10-CM

## 2021-12-22 ENCOUNTER — APPOINTMENT (OUTPATIENT)
Dept: LAB | Facility: CLINIC | Age: 71
End: 2021-12-22
Payer: MEDICARE

## 2021-12-22 DIAGNOSIS — C65.2 CANCER OF LEFT RENAL PELVIS (HCC): ICD-10-CM

## 2021-12-22 DIAGNOSIS — C68.9 UROTHELIAL CANCER (HCC): ICD-10-CM

## 2021-12-22 DIAGNOSIS — C79.10 METASTATIC UROTHELIAL CARCINOMA (HCC): ICD-10-CM

## 2021-12-22 LAB
BASOPHILS # BLD AUTO: 0.02 THOUSANDS/ΜL (ref 0–0.1)
BASOPHILS NFR BLD AUTO: 0 % (ref 0–1)
EOSINOPHIL # BLD AUTO: 0 THOUSAND/ΜL (ref 0–0.61)
EOSINOPHIL NFR BLD AUTO: 0 % (ref 0–6)
ERYTHROCYTE [DISTWIDTH] IN BLOOD BY AUTOMATED COUNT: 16.2 % (ref 11.6–15.1)
HCT VFR BLD AUTO: 37.7 % (ref 36.5–49.3)
HGB BLD-MCNC: 12.2 G/DL (ref 12–17)
IMM GRANULOCYTES # BLD AUTO: 0.05 THOUSAND/UL (ref 0–0.2)
IMM GRANULOCYTES NFR BLD AUTO: 1 % (ref 0–2)
LYMPHOCYTES # BLD AUTO: 0.82 THOUSANDS/ΜL (ref 0.6–4.47)
LYMPHOCYTES NFR BLD AUTO: 11 % (ref 14–44)
MAGNESIUM SERPL-MCNC: 1.9 MG/DL (ref 1.6–2.6)
MCH RBC QN AUTO: 32 PG (ref 26.8–34.3)
MCHC RBC AUTO-ENTMCNC: 32.4 G/DL (ref 31.4–37.4)
MCV RBC AUTO: 99 FL (ref 82–98)
MONOCYTES # BLD AUTO: 0.27 THOUSAND/ΜL (ref 0.17–1.22)
MONOCYTES NFR BLD AUTO: 4 % (ref 4–12)
NEUTROPHILS # BLD AUTO: 6.02 THOUSANDS/ΜL (ref 1.85–7.62)
NEUTS SEG NFR BLD AUTO: 84 % (ref 43–75)
NRBC BLD AUTO-RTO: 0 /100 WBCS
PLATELET # BLD AUTO: 155 THOUSANDS/UL (ref 149–390)
PMV BLD AUTO: 11.6 FL (ref 8.9–12.7)
RBC # BLD AUTO: 3.81 MILLION/UL (ref 3.88–5.62)
WBC # BLD AUTO: 7.18 THOUSAND/UL (ref 4.31–10.16)

## 2021-12-22 PROCEDURE — 80053 COMPREHEN METABOLIC PANEL: CPT

## 2021-12-22 PROCEDURE — 36415 COLL VENOUS BLD VENIPUNCTURE: CPT

## 2021-12-22 PROCEDURE — 83735 ASSAY OF MAGNESIUM: CPT

## 2021-12-22 PROCEDURE — 85025 COMPLETE CBC W/AUTO DIFF WBC: CPT

## 2021-12-23 ENCOUNTER — OFFICE VISIT (OUTPATIENT)
Dept: HEMATOLOGY ONCOLOGY | Facility: CLINIC | Age: 71
End: 2021-12-23
Payer: MEDICARE

## 2021-12-23 ENCOUNTER — HOSPITAL ENCOUNTER (OUTPATIENT)
Dept: INFUSION CENTER | Facility: HOSPITAL | Age: 71
Discharge: HOME | End: 2021-12-23
Attending: INTERNAL MEDICINE
Payer: MEDICARE

## 2021-12-23 VITALS
DIASTOLIC BLOOD PRESSURE: 72 MMHG | BODY MASS INDEX: 29.66 KG/M2 | OXYGEN SATURATION: 94 % | HEIGHT: 68 IN | WEIGHT: 195.7 LBS | TEMPERATURE: 97.5 F | RESPIRATION RATE: 18 BRPM | HEART RATE: 92 BPM | SYSTOLIC BLOOD PRESSURE: 120 MMHG

## 2021-12-23 VITALS
DIASTOLIC BLOOD PRESSURE: 74 MMHG | OXYGEN SATURATION: 92 % | RESPIRATION RATE: 18 BRPM | BODY MASS INDEX: 29.64 KG/M2 | WEIGHT: 195.55 LBS | HEIGHT: 68 IN | SYSTOLIC BLOOD PRESSURE: 136 MMHG | HEART RATE: 97 BPM | TEMPERATURE: 97 F

## 2021-12-23 DIAGNOSIS — C68.9 UROTHELIAL CANCER (HCC): Primary | ICD-10-CM

## 2021-12-23 DIAGNOSIS — G47.09 OTHER INSOMNIA: ICD-10-CM

## 2021-12-23 DIAGNOSIS — C65.2 CANCER OF LEFT RENAL PELVIS (HCC): ICD-10-CM

## 2021-12-23 DIAGNOSIS — E86.0 DEHYDRATION: ICD-10-CM

## 2021-12-23 DIAGNOSIS — C79.10 METASTATIC UROTHELIAL CARCINOMA (HCC): ICD-10-CM

## 2021-12-23 DIAGNOSIS — C79.10 METASTATIC UROTHELIAL CARCINOMA (HCC): Primary | ICD-10-CM

## 2021-12-23 LAB
ALBUMIN SERPL BCP-MCNC: 3.5 G/DL (ref 3.5–5)
ALP SERPL-CCNC: 61 U/L (ref 46–116)
ALT SERPL W P-5'-P-CCNC: 47 U/L (ref 12–78)
ANION GAP SERPL CALCULATED.3IONS-SCNC: 9 MMOL/L (ref 4–13)
AST SERPL W P-5'-P-CCNC: 22 U/L (ref 5–45)
BILIRUB SERPL-MCNC: 0.96 MG/DL (ref 0.2–1)
BUN SERPL-MCNC: 20 MG/DL (ref 5–25)
CALCIUM SERPL-MCNC: 9.3 MG/DL (ref 8.3–10.1)
CHLORIDE SERPL-SCNC: 107 MMOL/L (ref 100–108)
CO2 SERPL-SCNC: 24 MMOL/L (ref 21–32)
CREAT SERPL-MCNC: 1.54 MG/DL (ref 0.6–1.3)
GFR SERPL CREATININE-BSD FRML MDRD: 44 ML/MIN/1.73SQ M
GLUCOSE SERPL-MCNC: 143 MG/DL (ref 65–140)
POTASSIUM SERPL-SCNC: 4.2 MMOL/L (ref 3.5–5.3)
PROT SERPL-MCNC: 6.4 G/DL (ref 6.4–8.2)
SODIUM SERPL-SCNC: 140 MMOL/L (ref 136–145)

## 2021-12-23 PROCEDURE — 96413 CHEMO IV INFUSION 1 HR: CPT

## 2021-12-23 PROCEDURE — 96367 TX/PROPH/DG ADDL SEQ IV INF: CPT

## 2021-12-23 PROCEDURE — 99214 OFFICE O/P EST MOD 30 MIN: CPT | Performed by: NURSE PRACTITIONER

## 2021-12-23 RX ORDER — ACETAMINOPHEN 325 MG/1
650 TABLET ORAL ONCE
Status: COMPLETED | OUTPATIENT
Start: 2021-12-23 | End: 2021-12-23

## 2021-12-23 RX ORDER — SODIUM CHLORIDE 9 MG/ML
20 INJECTION, SOLUTION INTRAVENOUS ONCE
Status: COMPLETED | OUTPATIENT
Start: 2021-12-23 | End: 2021-12-23

## 2021-12-23 RX ORDER — ZOLPIDEM TARTRATE 5 MG/1
5 TABLET ORAL
Qty: 15 TABLET | Refills: 0 | Status: SHIPPED | OUTPATIENT
Start: 2021-12-23 | End: 2022-02-01 | Stop reason: SDUPTHER

## 2021-12-23 RX ADMIN — SODIUM CHLORIDE 1000 ML: 0.9 INJECTION, SOLUTION INTRAVENOUS at 09:25

## 2021-12-23 RX ADMIN — ACETAMINOPHEN 650 MG: 325 TABLET ORAL at 09:30

## 2021-12-23 RX ADMIN — DEXAMETHASONE SODIUM PHOSPHATE: 10 INJECTION, SOLUTION INTRAMUSCULAR; INTRAVENOUS at 09:49

## 2021-12-23 RX ADMIN — DIPHENHYDRAMINE HYDROCHLORIDE 25 MG: 50 INJECTION INTRAMUSCULAR; INTRAVENOUS at 09:27

## 2021-12-23 RX ADMIN — ENFORTUMAB VEDOTIN 110 MG: 30 INJECTION, POWDER, LYOPHILIZED, FOR SOLUTION INTRAVENOUS at 10:26

## 2021-12-23 RX ADMIN — SODIUM CHLORIDE 20 ML/HR: 0.9 INJECTION, SOLUTION INTRAVENOUS at 09:25

## 2022-01-01 DIAGNOSIS — E79.0 HYPERURICEMIA: ICD-10-CM

## 2022-01-03 RX ORDER — ALLOPURINOL 300 MG/1
TABLET ORAL
Qty: 30 TABLET | Refills: 3 | Status: SHIPPED | OUTPATIENT
Start: 2022-01-03 | End: 2022-05-02

## 2022-01-04 ENCOUNTER — APPOINTMENT (OUTPATIENT)
Dept: LAB | Facility: HOSPITAL | Age: 72
End: 2022-01-04
Payer: MEDICARE

## 2022-01-04 ENCOUNTER — HOSPITAL ENCOUNTER (OUTPATIENT)
Dept: INFUSION CENTER | Facility: HOSPITAL | Age: 72
Discharge: HOME | End: 2022-01-04
Payer: MEDICARE

## 2022-01-04 VITALS
SYSTOLIC BLOOD PRESSURE: 91 MMHG | TEMPERATURE: 96.9 F | DIASTOLIC BLOOD PRESSURE: 64 MMHG | HEART RATE: 62 BPM | RESPIRATION RATE: 18 BRPM | OXYGEN SATURATION: 97 %

## 2022-01-04 DIAGNOSIS — C79.10 METASTATIC UROTHELIAL CARCINOMA (HCC): ICD-10-CM

## 2022-01-04 DIAGNOSIS — E86.0 DEHYDRATION: Primary | ICD-10-CM

## 2022-01-04 LAB
ALBUMIN SERPL BCP-MCNC: 3.5 G/DL (ref 3.5–5)
ALP SERPL-CCNC: 67 U/L (ref 46–116)
ALT SERPL W P-5'-P-CCNC: 41 U/L (ref 12–78)
ANION GAP SERPL CALCULATED.3IONS-SCNC: 2 MMOL/L (ref 4–13)
AST SERPL W P-5'-P-CCNC: 19 U/L (ref 5–45)
BASOPHILS # BLD AUTO: 0.03 THOUSANDS/ΜL (ref 0–0.1)
BASOPHILS NFR BLD AUTO: 1 % (ref 0–1)
BILIRUB SERPL-MCNC: 0.87 MG/DL (ref 0.2–1)
BUN SERPL-MCNC: 20 MG/DL (ref 5–25)
CALCIUM SERPL-MCNC: 9.3 MG/DL (ref 8.3–10.1)
CHLORIDE SERPL-SCNC: 106 MMOL/L (ref 100–108)
CO2 SERPL-SCNC: 29 MMOL/L (ref 21–32)
CREAT SERPL-MCNC: 1.54 MG/DL (ref 0.6–1.3)
EOSINOPHIL # BLD AUTO: 0 THOUSAND/ΜL (ref 0–0.61)
EOSINOPHIL NFR BLD AUTO: 0 % (ref 0–6)
ERYTHROCYTE [DISTWIDTH] IN BLOOD BY AUTOMATED COUNT: 17.8 % (ref 11.6–15.1)
GFR SERPL CREATININE-BSD FRML MDRD: 44 ML/MIN/1.73SQ M
GLUCOSE SERPL-MCNC: 154 MG/DL (ref 65–140)
HCT VFR BLD AUTO: 41.7 % (ref 36.5–49.3)
HGB BLD-MCNC: 13.2 G/DL (ref 12–17)
IMM GRANULOCYTES # BLD AUTO: 0.06 THOUSAND/UL (ref 0–0.2)
IMM GRANULOCYTES NFR BLD AUTO: 1 % (ref 0–2)
LYMPHOCYTES # BLD AUTO: 1.15 THOUSANDS/ΜL (ref 0.6–4.47)
LYMPHOCYTES NFR BLD AUTO: 18 % (ref 14–44)
MAGNESIUM SERPL-MCNC: 2.2 MG/DL (ref 1.6–2.6)
MCH RBC QN AUTO: 32 PG (ref 26.8–34.3)
MCHC RBC AUTO-ENTMCNC: 31.7 G/DL (ref 31.4–37.4)
MCV RBC AUTO: 101 FL (ref 82–98)
MONOCYTES # BLD AUTO: 0.29 THOUSAND/ΜL (ref 0.17–1.22)
MONOCYTES NFR BLD AUTO: 4 % (ref 4–12)
NEUTROPHILS # BLD AUTO: 5.04 THOUSANDS/ΜL (ref 1.85–7.62)
NEUTS SEG NFR BLD AUTO: 76 % (ref 43–75)
NRBC BLD AUTO-RTO: 0 /100 WBCS
PLATELET # BLD AUTO: 177 THOUSANDS/UL (ref 149–390)
PMV BLD AUTO: 10.8 FL (ref 8.9–12.7)
POTASSIUM SERPL-SCNC: 5 MMOL/L (ref 3.5–5.3)
PROT SERPL-MCNC: 6.5 G/DL (ref 6.4–8.2)
RBC # BLD AUTO: 4.13 MILLION/UL (ref 3.88–5.62)
SODIUM SERPL-SCNC: 137 MMOL/L (ref 136–145)
WBC # BLD AUTO: 6.57 THOUSAND/UL (ref 4.31–10.16)

## 2022-01-04 PROCEDURE — 85025 COMPLETE CBC W/AUTO DIFF WBC: CPT

## 2022-01-04 PROCEDURE — 96360 HYDRATION IV INFUSION INIT: CPT

## 2022-01-04 PROCEDURE — 36415 COLL VENOUS BLD VENIPUNCTURE: CPT

## 2022-01-04 PROCEDURE — 80053 COMPREHEN METABOLIC PANEL: CPT

## 2022-01-04 PROCEDURE — 83735 ASSAY OF MAGNESIUM: CPT

## 2022-01-04 RX ADMIN — SODIUM CHLORIDE 1000 ML: 0.9 INJECTION, SOLUTION INTRAVENOUS at 11:46

## 2022-01-05 ENCOUNTER — OFFICE VISIT (OUTPATIENT)
Dept: HEMATOLOGY ONCOLOGY | Facility: CLINIC | Age: 72
End: 2022-01-05
Payer: MEDICARE

## 2022-01-05 ENCOUNTER — TELEPHONE (OUTPATIENT)
Dept: HEMATOLOGY ONCOLOGY | Facility: CLINIC | Age: 72
End: 2022-01-05

## 2022-01-05 VITALS
HEART RATE: 85 BPM | HEIGHT: 68 IN | BODY MASS INDEX: 30.48 KG/M2 | WEIGHT: 201.1 LBS | SYSTOLIC BLOOD PRESSURE: 128 MMHG | OXYGEN SATURATION: 98 % | RESPIRATION RATE: 16 BRPM | DIASTOLIC BLOOD PRESSURE: 78 MMHG | TEMPERATURE: 97.5 F

## 2022-01-05 DIAGNOSIS — C79.10 METASTATIC UROTHELIAL CARCINOMA (HCC): Primary | ICD-10-CM

## 2022-01-05 DIAGNOSIS — D64.9 NORMOCYTIC ANEMIA: ICD-10-CM

## 2022-01-05 PROCEDURE — 99214 OFFICE O/P EST MOD 30 MIN: CPT | Performed by: INTERNAL MEDICINE

## 2022-01-05 NOTE — PROGRESS NOTES
Hematology/Oncology Outpatient Follow-up  Jennifer Thompson 66 y o  male 1950 [id number]    Date:  1/5/2022        Assessment and Plan:  1  Metastatic urothelial carcinoma ([address] )  The patient is tolerating Enfortumab Vedotin at the usual dose of 1 25 mg/kg on a daily basis 3 weeks on 1 week off  He will be due for cycle 5 day 1 on 01/07/2022  He will be continued on the IV hydration twice a week on as-needed basis to improve his energy level and kidney function     - CBC and differential; Future  - Comprehensive metabolic panel; Future  - Magnesium; Future    2  Normocytic anemia    - TSH, 3rd generation with Free T4 reflex; Future        HPI:  Patient came today for follow-up visit accompanied by his wife  He is tolerating the treatment relatively well  However today he complained about significant fatigue  He also complained about the worsening neuropathy in his feet and his hands with restless legs at night  His most recent blood work on 01/04/2022 showed hemoglobin of 13 2 with normal white cells and platelets  Creatinine 1 5 with normal calcium liver enzymes  Magnesium 2 2  He denied obvious bleeding from any sites  Oncology History Overview Note   Patient has a history of hypertension, hyperlipidemia, chronic lower back pain  He had an MRI of the lower spine for further evaluation of his chronic lower back pain  The MRI on 12/02/2019 revealed Multiple left renal masses to include a left lower pole cyst and a rounded structure in the left upper pole which may also represent cyst   Left upper pole renal masses approximately 3 cm in greatest linear dimension  A CT with renal protocol was done on 12/12/2019 which also showed the infiltrating lesion in the upper pole of the left kidney measuring about the 3 5 cm in greatest dimension without any hint of retroperitoneal lymphadenopathy         A CT scan of the abdomen pelvis on 01/14/2020 showed the same findings with the mild hydronephrosis on the left  The urine cytology on 01/03/2020 showed high-grade urothelial carcinoma  A cystoscopy was then done, a biopsy was taken from the left renal pelvic region which showed high-grade urothelial carcinoma without evidence of lamina propria invasion  The detrusor muscle/muscularis propria were not present for evaluation  The recommendation was to pursue left robotic assisted laparoscopic nephroureterectomy with bladder cuff excision which was done on 04/01/2020  The final pathology revealed;  - Invasive high grade urothelial carcinoma arising in renal pelvis  - Bladder cuff margin is negative for carcinoma and no evidence of high grade dysplasia  - Ureters with no significant pathologic abnormality  - Two benign simple cysts  - Adrenal gland is negative for malignancy  - One lymph node, negative for malignancy (0/1)  The tumor size was 4 5 cm with invasion beyond the muscularis into the periurethral fat or peripelvic fat or the renal parenchyma  The margins were uninvolved by carcinoma or carcinoma in situ  The final pathology was pT3 pN0  Patient barely tolerated 1 cycle of adjuvant chemotherapy with split dose cisplatin and gemcitabine with a lot of side effects  The treatment had to be discontinued due to significant worsening renal dysfunction 6/2020  Patient started to complain about significant abdominal/left inguinal pain around August/September 2020  Unfortunately repeat imaging revealed recurrent/metastatic disease  9/4/20 CT C/A/P- Status post left nephrectomy for resection of urothelial neoplasm  Lymphadenopathy in the left nephrectomy bed has increased since the prior examination, concerning for metastatic disease  Ill-defined hyperattenuating masslike focus in the left rectus muscle, not identified on the prior examination  This is suspicious for a metastatic lesion  A rectus hematoma is also considered  9/23/20 PET scan- 1    Multiple FDG avid lymph nodes in the retrocrural region, retroperitoneum and the left renal bed compatible with metastasis  These have progressed from recent CT  2   FDG avid mass involving the left rectus abdominal musculature compatible with metastasis which is larger from recent CT  3  Several small lymph nodes adjacent to the mid to distal esophagus with FDG uptake suspicious for metastasis  Small focus of FDG uptake also suggested in the right hilar region, metastasis is not excluded  This could be reassessed on follow-up  4   Subtle focus of FDG uptake at the T2 level on the left, nonspecific, could be related to early degenerative changes, developing metastasis is not entirely excluded  This could be reassessed on follow-up  5  Prostate is mildly prominent with heterogeneous FDG uptake  This could be inflammatory  Correlate for prostatitis  He completed palliative radiation to the left abdomen 12/3/20     His PET scan from 08/16/2021 showed progression of his disease with hypermetabolic soft tissue lesions at the level of the right shoulder and right axilla with interval progression of the retroperitoneal and mesenteric hypermetabolic metastasis  He did have the new lesion to his right upper back/shoulder which was causing significant localized pain  This excised by his surgeon 08/09/2021  Pathology confirmed metastatic high-grade carcinoma consistent with his no primary urothelial carcinoma  He received palliative radiation to his right shoulder/axilla region  Urothelial cancer ([address] )   1/3/2020 Biopsy    Final Diagnosis    A  Urine, Clean Catch, Thin Prep:  High grade urothelial carcinoma (HGUC) - see comment  1/3/2020 Initial Diagnosis    Urothelial cancer ([address] )  T3 N0 (stage IIIA)     1/17/2020 Biopsy    Final Diagnosis    A   Ureter, Right, left renal pelvis biopsy  -Fragments of high grade urothelial carcinoma   -No evidence of lamina propria invasion   -Detrusor muscle/ muscularis propria is not present for evaluation  B  Urinary Bladder, bladder biopsy:  -Fragments of low grade papillary lesion  See note  -No evidence of invasion seen  -Unremarkable fragment of detrusor muscle seen  4/1/2020 Surgery    left robotic assisted laparoscopic nephroureterectomy with bladder cuff excision     Final Diagnosis    A  Left kidney, ureter, and bladder cuff, nephroureterectomy:  - Invasive high grade urothelial carcinoma arising in renal pelvis  - Bladder cuff margin is negative for carcinoma and no evidence of high grade dysplasia  - Ureters with no significant pathologic abnormality  - Two benign simple cysts  - Adrenal gland is negative for malignancy  - One lymph node, negative for malignancy (0/1)          5/14/2020 - 6/3/2020 Chemotherapy    CISplatin (PLATINOL) split dose 35 mg/m2 = 75 3 mg (50 % of original dose 70 mg/m2), Intravenous,   Administration: 75 3 mg (5/14/2020), 75 3 mg (5/21/2020)  gemcitabine (GEMZAR) 2,200 mg in sodium chloride 0 9 % 250 mL infusion, 2,150 2 mg (80 % of original dose 1,250 mg/m2), Intravenous,   Administration: 2,200 mg (5/14/2020), 2,200 mg (5/21/2020)    (only completed 1 cycle- d/c d/t adverse effects and worsening renal dysfunction)     10/9/2020 - 9/9/2021 Chemotherapy    pembrolizumab (KEYTRUDA) IVPB, 200 mg, Intravenous, Once, 16 of 20 cycles  Administration: 200 mg (10/9/2020), 200 mg (10/30/2020), 200 mg (11/20/2020), 200 mg (12/11/2020), 200 mg (12/31/2020), 200 mg (1/22/2021), 200 mg (2/12/2021), 200 mg (3/5/2021), 200 mg (3/26/2021), 200 mg (4/16/2021), 200 mg (5/7/2021), 200 mg (5/28/2021), 200 mg (6/18/2021), 200 mg (7/9/2021), 200 mg (7/30/2021), 200 mg (8/20/2021)     11/19/2020 - 12/3/2020 Radiation    Treatment:  Course: C1    Plan ID Energy Fractions Dose per Fraction (cGy) Dose Correction (cGy) Total Dose Delivered (cGy) Elapsed Days   L Abdomen 6X 10 / 10 300 0 3,000 14        9/10/2021 -  Chemotherapy    enfortumab vedotin-ejfv (PADCEV) IVPB, 1 25 mg/kg = 114 mg, Intravenous, Once, 4 of 8 cycles  Administration: 114 mg (9/10/2021), 114 mg (9/17/2021), 110 mg (10/8/2021), 110 mg (9/24/2021), 110 mg (10/15/2021), 110 mg (11/12/2021), 110 mg (10/22/2021), 110 mg (11/19/2021), 110 mg (12/10/2021), 110 mg (11/26/2021), 110 mg (12/17/2021), 110 mg (12/23/2021)     Cancer of left renal pelvis ([address] )   4/23/2020 Initial Diagnosis    Cancer of left renal pelvis ([facility] [address] )     10/9/2020 - 9/9/2021 Chemotherapy    pembrolizumab (KEYTRUDA) IVPB, 200 mg, Intravenous, Once, 16 of 20 cycles  Administration: 200 mg (10/9/2020), 200 mg (10/30/2020), 200 mg (11/20/2020), 200 mg (12/11/2020), 200 mg (12/31/2020), 200 mg (1/22/2021), 200 mg (2/12/2021), 200 mg (3/5/2021), 200 mg (3/26/2021), 200 mg (4/16/2021), 200 mg (5/7/2021), 200 mg (5/28/2021), 200 mg (6/18/2021), 200 mg (7/9/2021), 200 mg (7/30/2021), 200 mg (8/20/2021)     9/10/2021 -  Chemotherapy    enfortumab vedotin-ejfv (PADCEV) IVPB, 1 25 mg/kg = 114 mg, Intravenous, Once, 4 of 8 cycles  Administration: 114 mg (9/10/2021), 114 mg (9/17/2021), 110 mg (10/8/2021), 110 mg (9/24/2021), 110 mg (10/15/2021), 110 mg (11/12/2021), 110 mg (10/22/2021), 110 mg (11/19/2021), 110 mg (12/10/2021), 110 mg (11/26/2021), 110 mg (12/17/2021), 110 mg (12/23/2021)      Surgery       Metastatic urothelial carcinoma ([facility] [address] )   8/9/2021 Initial Diagnosis    Metastatic urothelial carcinoma ([facility] [address] )     9/8/2021 - 9/21/2021 Radiation    Treatment:  Course: C2    Plan ID Energy Fractions Dose per Fraction (cGy) Dose Correction (cGy) Total Dose Delivered (cGy) Elapsed Days   R Axil_Shl # 6X 10 / 10 300 0 3,000 13      Treatment dates:  C2: 9/8/2021 - 9/21/2021     9/10/2021 -  Chemotherapy    enfortumab vedotin-ejfv (PADCEV) IVPB, 1 25 mg/kg = 114 mg, Intravenous, Once, 4 of 8 cycles  Administration: 114 mg (9/10/2021), 114 mg (9/17/2021), 110 mg (10/8/2021), 110 mg (9/24/2021), 110 mg (10/15/2021), 110 mg (11/12/2021), 110 mg (10/22/2021), 110 mg (11/19/2021), 110 mg (12/10/2021), 110 mg (11/26/2021), 110 mg (12/17/2021), 110 mg (12/23/2021)         Interval history:    ROS: Review of Systems   Constitutional: Positive for fatigue  Negative for chills and fever  HENT: Positive for hearing loss  Negative for ear pain and sore throat  Eyes: Negative for pain and visual disturbance  Respiratory: Positive for cough and shortness of breath  Cardiovascular: Negative for chest pain and palpitations  Gastrointestinal: Negative for abdominal pain and vomiting  Genitourinary: Negative for dysuria and hematuria  Musculoskeletal: Negative for arthralgias and back pain  Skin: Negative for color change and rash  Neurological: Positive for dizziness and numbness  Negative for seizures and syncope  Psychiatric/Behavioral: Positive for sleep disturbance  All other systems reviewed and are negative        Past Medical History:   Diagnosis Date    Arthritis     Bladder cancer     Cancer ([facility] [address] )     skin melanoma;basal cell    Chronic pain disorder     from arthritis    Colon polyp     Does use hearing aid     bilat    Dry eyes, bilateral     GERD (gastroesophageal reflux disease)     History of kidney stones 10/2019    History of partial knee replacement     bilat    History of vertigo     Hyperlipidemia     Hypertension     Infusion extravasation of chemotherapy vesicant 11/2021    Kidney lesion     Lumbar disc disorder     compression of vertebrae L4-5-6    Muscle weakness     left hip area    Renal mass     left    Right ankle injury 03/05/2020    missed step of ladder     Right ankle pain     Shortness of breath     with activity    Tinnitus     Urothelial cancer     left    Wears glasses        Past Surgical History:   Procedure Laterality Date    COLONOSCOPY      CYSTOSCOPY Left 4/1/2020    Procedure: CYSTOSCOPY; URETERAL CATHETER PLACEMENT;  Surgeon: Kristel Ojeda MD;  Location[facility];  Service: Urology    CYSTOSCOPY  2020    CYSTOSCOPY  2021    FL CYSTOGRAM  2020    FL RETROGRADE PYELOGRAM  2020    HERNIA REPAIR      umbilical with mesh    INGUINAL HERNIA REPAIR Right     with mesh    IR PORT PLACEMENT  2020    JOINT REPLACEMENT Bilateral     partials knee    LUMBAR EPIDURAL INJECTION      HI CYSTOURETHROSCOPY,URETER CATHETER Bilateral 2020    Procedure: CYSTOSCOPY; RIGHT RETROGRADE PYELOGRAM WITH RIGHT URETERAL CYTOLOGY SAMPLING; LEFT URETEROSCOPY WITH RENAL PELVIS BIOPSY AND LEFT STENT PLACEMENT;  Surgeon: Peggyann Bosworth, MD;  [facility];  Service: Urology    HI NEPHRECTOMY, W/PART  URETECTOMY Left 2020    Procedure: ROBOTIC LAPAROSCOPIC NEPHRO-URETERECTOMY;  Surgeon: Peggyann Bosworth, MD;  Location[facility];  Service: Urology    SKIN CANCER EXCISION      surface melanoma    TONSILLECTOMY      WISDOM TOOTH EXTRACTION         Social History     Socioeconomic History    Marital status: /Civil Union     Spouse name: None    Number of children: None    Years of education: None    Highest education level: None   Occupational History    None   Tobacco Use    Smoking status: Former Smoker     Types: Cigarettes     Quit date:      Years since quittin 0    Smokeless tobacco: Never Used   Vaping Use    Vaping Use: Never used   Substance and Sexual Activity    Alcohol use:  Yes     Alcohol/week: 17 0 standard drinks     Types: 7 Glasses of wine, 10 Cans of beer per week     Comment: socially    Drug use: Never    Sexual activity: Not Currently   Other Topics Concern    None   Social History Narrative    Daily caffeine use - 2 cups coffee      Social Determinants of Health     Financial Resource Strain: Not on file   Food Insecurity: Not on file   Transportation Needs: Not on file   Physical Activity: Not on file   Stress: Not on file   Social Connections: Not on file   Intimate Partner Violence: Not on file   Housing Stability: Not on file       Family History   Problem Relation Age of Onset    Liver cancer Father        Allergies   Allergen Reactions    Poison Ivy Extract Rash         Current Outpatient Medications:     acetaminophen (TYLENOL) 500 mg tablet, Take 2 tablets (1,000 mg total) by mouth every 8 (eight) hours, Disp: , Rfl:     allopurinol (ZYLOPRIM) 300 mg tablet, take 1 tablet by mouth once daily, Disp: 30 tablet, Rfl: 3    ALPRAZolam (XANAX) 0 25 mg tablet, Take 1 tablet (0 25 mg total) by mouth daily at bedtime as needed for anxiety (restless legs), Disp: 30 tablet, Rfl: 1    amLODIPine (NORVASC) 5 mg tablet, Take 5 mg by mouth daily  , Disp: , Rfl:     atorvastatin (LIPITOR) 80 mg tablet, Take 80 mg by mouth every other day evening, Disp: , Rfl:     docusate sodium (COLACE) 250 MG capsule, Take 1 capsule (250 mg total) by mouth daily, Disp: 60 capsule, Rfl: 6    famotidine (PEPCID) 20 mg tablet, take 1-2 tablets by mouth every evening, Disp: , Rfl:     folic acid (FOLVITE) 1 mg tablet, Take 1 tablet (1 mg total) by mouth daily, Disp: 90 tablet, Rfl: 4    hydrOXYzine HCL (ATARAX) 25 mg tablet, Take 1 tablet (25 mg total) by mouth every 6 (six) hours as needed for itching or anxiety, Disp: 60 tablet, Rfl: 2    Magnesium 250 MG TABS, 1 tablet 2 (two) times a day Taking 500mg in the morning and 500mg at night, Disp: , Rfl:     metoprolol tartrate (LOPRESSOR) 100 mg tablet, Take 50 mg by mouth daily, Disp: , Rfl:     morphine (MSIR) 15 mg tablet, Take 1/2 tablet 3 times a day for 5 days, then 1/2 tablet 2 times a day for 5 days, then 1/2 tablet once a day for 5 days, then stop, Disp: 15 tablet, Rfl: 0    naloxone (NARCAN) 4 mg/0 1 mL nasal spray, Administer 1 spray into a nostril  If breathing does not return to normal or if breathing difficulty resumes after 2-3 minutes, give another dose in the other nostril using a new spray  (Patient taking differently: Administer 1 spray into a nostril   If breathing does not return to normal or if breathing difficulty resumes after 2-3 minutes, give another dose in the other nostril using a new spray  As needed), Disp: 1 each, Rfl: 1    nystatin (MYCOSTATIN) cream, Apply topically 2 (two) times a day, Disp: 30 g, Rfl: 0    omeprazole (PriLOSEC) 20 mg delayed release capsule, Take 20 mg by mouth daily  , Disp: , Rfl:     predniSONE 10 mg tablet, Take 2 tablets (20 mg total) by mouth daily, Disp: 60 tablet, Rfl: 3    senna (SENOKOT) 8 6 mg, Take 1 tablet (8 6 mg total) by mouth daily at bedtime (Patient taking differently: Take 8 6 mg by mouth daily at bedtime As needed), Disp: 30 each, Rfl: 0    tadalafil (CIALIS) 20 MG tablet, Take 1 tablet by mouth one (1) hour prior to intercourse  Do not exceed more than two tablets per week , Disp: 12 tablet, Rfl: 3    terazosin (HYTRIN) 5 mg capsule, Take 2 capsules (10 mg total) by mouth daily at bedtime, Disp: 30 capsule, Rfl: 6    triamcinolone (KENALOG) 0 1 % lotion, Apply topically 3 (three) times a day, Disp: 60 mL, Rfl: 5    VITAMIN D PO, Take 1,000 Units by mouth daily , Disp: , Rfl:     zolpidem (AMBIEN) 5 mg tablet, Take 1 tablet (5 mg total) by mouth daily at bedtime as needed for sleep, Disp: 15 tablet, Rfl: 0    HYDROmorphone (DILAUDID) 2 mg tablet, Take 1 tablet (2 mg total) by mouth every 4 (four) hours as needed for moderate pain Max Daily Amount: 12 mg (Patient not taking: Reported on 11/24/2021 ), Disp: 90 tablet, Rfl: 0      Physical Exam:  /78 (BP Location: Left arm, Patient Position: Sitting, Cuff Size: Adult)   Pulse 85   Temp 97 5 °F (36 4 °C) (Tympanic)   Resp 16   Ht 5' 8" (1 727 m)   Wt 91 2 kg (201 lb 1 6 oz)   SpO2 98%   BMI 30 58 kg/m²     Physical Exam  Constitutional:       Appearance: He is well-developed  HENT:      Head: Normocephalic and atraumatic  Eyes:      General: No scleral icterus  Right eye: No discharge  Left eye: No discharge  Conjunctiva/sclera: Conjunctivae normal       Pupils: Pupils are equal, round, and reactive to light  Neck:      Thyroid: No thyromegaly  Trachea: No tracheal deviation  Cardiovascular:      Rate and Rhythm: Normal rate and regular rhythm  Heart sounds: Normal heart sounds  No murmur heard  No friction rub  Pulmonary:      Effort: Pulmonary effort is normal  No respiratory distress  Breath sounds: Normal breath sounds  No wheezing or rales  Chest:      Chest wall: No tenderness  Abdominal:      General: There is no distension  Palpations: Abdomen is soft  There is no hepatomegaly or splenomegaly  Tenderness: There is no abdominal tenderness  There is no guarding or rebound  Musculoskeletal:         General: No tenderness or deformity  Normal range of motion  Cervical back: Normal range of motion and neck supple  Lymphadenopathy:      Cervical: No cervical adenopathy  Skin:     General: Skin is warm and dry  Coloration: Skin is not pale  Findings: No erythema or rash  Neurological:      Mental Status: He is alert and oriented to person, place, and time  Cranial Nerves: No cranial nerve deficit  Coordination: Coordination normal       Deep Tendon Reflexes: Reflexes are normal and symmetric  Psychiatric:         Behavior: Behavior normal          Thought Content:  Thought content normal          Judgment: Judgment normal            Labs:  Lab Results   Component Value Date    WBC 6 57 01/04/2022    HGB 13 2 01/04/2022    HCT 41 7 01/04/2022     (H) 01/04/2022     01/04/2022     Lab Results   Component Value Date    K 5 0 01/04/2022     01/04/2022    CO2 29 01/04/2022    BUN 20 01/04/2022    CREATININE 1 54 (H) 01/04/2022    GLUF 113 (H) 12/16/2021    CALCIUM 9 3 01/04/2022    CORRECTEDCA 9 5 12/09/2021    AST 19 01/04/2022    ALT 41 01/04/2022    ALKPHOS 67 01/04/2022    EGFR 44 01/04/2022     No results found for: TSH    Patient voiced understanding and agreement in the above discussion  Aware to contact our office with questions/symptoms in the interim

## 2022-01-05 NOTE — TELEPHONE ENCOUNTER
Phoned [facility] infusion and spoke to Josue Schaeffer RN to let staff know that pt would like to have IV hydration twice a week on Tues and Fri ongoing

## 2022-01-07 ENCOUNTER — HOSPITAL ENCOUNTER (OUTPATIENT)
Dept: INFUSION CENTER | Facility: HOSPITAL | Age: 72
Discharge: HOME | End: 2022-01-07
Attending: INTERNAL MEDICINE
Payer: MEDICARE

## 2022-01-07 VITALS
DIASTOLIC BLOOD PRESSURE: 64 MMHG | BODY MASS INDEX: 30.74 KG/M2 | RESPIRATION RATE: 18 BRPM | SYSTOLIC BLOOD PRESSURE: 104 MMHG | HEIGHT: 68 IN | WEIGHT: 202.82 LBS | HEART RATE: 64 BPM | TEMPERATURE: 97 F

## 2022-01-07 DIAGNOSIS — C68.9 UROTHELIAL CANCER (HCC): Primary | ICD-10-CM

## 2022-01-07 DIAGNOSIS — C79.10 METASTATIC UROTHELIAL CARCINOMA (HCC): ICD-10-CM

## 2022-01-07 DIAGNOSIS — C65.2 CANCER OF LEFT RENAL PELVIS (HCC): ICD-10-CM

## 2022-01-07 DIAGNOSIS — E86.0 DEHYDRATION: ICD-10-CM

## 2022-01-07 PROCEDURE — 96361 HYDRATE IV INFUSION ADD-ON: CPT

## 2022-01-07 PROCEDURE — 96413 CHEMO IV INFUSION 1 HR: CPT

## 2022-01-07 PROCEDURE — 96367 TX/PROPH/DG ADDL SEQ IV INF: CPT

## 2022-01-07 RX ORDER — ACETAMINOPHEN 325 MG/1
650 TABLET ORAL ONCE
Status: COMPLETED | OUTPATIENT
Start: 2022-01-07 | End: 2022-01-07

## 2022-01-07 RX ORDER — SODIUM CHLORIDE 9 MG/ML
20 INJECTION, SOLUTION INTRAVENOUS ONCE
Status: COMPLETED | OUTPATIENT
Start: 2022-01-07 | End: 2022-01-07

## 2022-01-07 RX ADMIN — DEXAMETHASONE SODIUM PHOSPHATE: 10 INJECTION, SOLUTION INTRAMUSCULAR; INTRAVENOUS at 11:20

## 2022-01-07 RX ADMIN — ENFORTUMAB VEDOTIN 110 MG: 30 INJECTION, POWDER, LYOPHILIZED, FOR SOLUTION INTRAVENOUS at 13:25

## 2022-01-07 RX ADMIN — SODIUM CHLORIDE 20 ML/HR: 0.9 INJECTION, SOLUTION INTRAVENOUS at 11:18

## 2022-01-07 RX ADMIN — DIPHENHYDRAMINE HYDROCHLORIDE 25 MG: 50 INJECTION INTRAMUSCULAR; INTRAVENOUS at 11:37

## 2022-01-07 RX ADMIN — ACETAMINOPHEN 650 MG: 325 TABLET ORAL at 11:20

## 2022-01-07 RX ADMIN — SODIUM CHLORIDE 1000 ML: 0.9 INJECTION, SOLUTION INTRAVENOUS at 12:07

## 2022-01-11 ENCOUNTER — HOSPITAL ENCOUNTER (OUTPATIENT)
Dept: INFUSION CENTER | Facility: HOSPITAL | Age: 72
Discharge: HOME | End: 2022-01-11
Payer: MEDICARE

## 2022-01-11 VITALS
DIASTOLIC BLOOD PRESSURE: 75 MMHG | TEMPERATURE: 97.5 F | HEART RATE: 71 BPM | OXYGEN SATURATION: 96 % | SYSTOLIC BLOOD PRESSURE: 131 MMHG | RESPIRATION RATE: 18 BRPM

## 2022-01-11 DIAGNOSIS — E86.0 DEHYDRATION: Primary | ICD-10-CM

## 2022-01-11 PROCEDURE — 96360 HYDRATION IV INFUSION INIT: CPT

## 2022-01-11 RX ADMIN — SODIUM CHLORIDE 1000 ML: 0.9 INJECTION, SOLUTION INTRAVENOUS at 11:07

## 2022-01-11 NOTE — PROGRESS NOTES
Patient tolerated IV hydration without issue  AVS given to patient  Patient ambulated off unit without incident  All personal belongings taken with patient

## 2022-01-12 ENCOUNTER — APPOINTMENT (OUTPATIENT)
Dept: LAB | Facility: HOSPITAL | Age: 72
End: 2022-01-12
Attending: INTERNAL MEDICINE
Payer: MEDICARE

## 2022-01-12 DIAGNOSIS — C68.9 UROTHELIAL CANCER (HCC): ICD-10-CM

## 2022-01-12 DIAGNOSIS — C79.10 METASTATIC UROTHELIAL CARCINOMA (HCC): ICD-10-CM

## 2022-01-12 DIAGNOSIS — D64.9 NORMOCYTIC ANEMIA: ICD-10-CM

## 2022-01-12 DIAGNOSIS — C65.2 CANCER OF LEFT RENAL PELVIS (HCC): ICD-10-CM

## 2022-01-12 LAB
ALBUMIN SERPL BCP-MCNC: 3.5 G/DL (ref 3.5–5)
ALP SERPL-CCNC: 58 U/L (ref 46–116)
ALT SERPL W P-5'-P-CCNC: 41 U/L (ref 12–78)
ANION GAP SERPL CALCULATED.3IONS-SCNC: 4 MMOL/L (ref 4–13)
AST SERPL W P-5'-P-CCNC: 17 U/L (ref 5–45)
BASOPHILS # BLD AUTO: 0.01 THOUSANDS/ΜL (ref 0–0.1)
BASOPHILS NFR BLD AUTO: 0 % (ref 0–1)
BILIRUB SERPL-MCNC: 1.31 MG/DL (ref 0.2–1)
BUN SERPL-MCNC: 28 MG/DL (ref 5–25)
CALCIUM SERPL-MCNC: 9.4 MG/DL (ref 8.3–10.1)
CHLORIDE SERPL-SCNC: 109 MMOL/L (ref 100–108)
CO2 SERPL-SCNC: 30 MMOL/L (ref 21–32)
CREAT SERPL-MCNC: 1.48 MG/DL (ref 0.6–1.3)
EOSINOPHIL # BLD AUTO: 0 THOUSAND/ΜL (ref 0–0.61)
EOSINOPHIL NFR BLD AUTO: 0 % (ref 0–6)
ERYTHROCYTE [DISTWIDTH] IN BLOOD BY AUTOMATED COUNT: 17.3 % (ref 11.6–15.1)
GFR SERPL CREATININE-BSD FRML MDRD: 46 ML/MIN/1.73SQ M
GLUCOSE SERPL-MCNC: 85 MG/DL (ref 65–140)
HCT VFR BLD AUTO: 41.2 % (ref 36.5–49.3)
HGB BLD-MCNC: 13.3 G/DL (ref 12–17)
IMM GRANULOCYTES # BLD AUTO: 0.03 THOUSAND/UL (ref 0–0.2)
IMM GRANULOCYTES NFR BLD AUTO: 0 % (ref 0–2)
LYMPHOCYTES # BLD AUTO: 0.83 THOUSANDS/ΜL (ref 0.6–4.47)
LYMPHOCYTES NFR BLD AUTO: 10 % (ref 14–44)
MCH RBC QN AUTO: 32 PG (ref 26.8–34.3)
MCHC RBC AUTO-ENTMCNC: 32.3 G/DL (ref 31.4–37.4)
MCV RBC AUTO: 99 FL (ref 82–98)
MONOCYTES # BLD AUTO: 0.68 THOUSAND/ΜL (ref 0.17–1.22)
MONOCYTES NFR BLD AUTO: 8 % (ref 4–12)
NEUTROPHILS # BLD AUTO: 7.17 THOUSANDS/ΜL (ref 1.85–7.62)
NEUTS SEG NFR BLD AUTO: 82 % (ref 43–75)
NRBC BLD AUTO-RTO: 0 /100 WBCS
PLATELET # BLD AUTO: 167 THOUSANDS/UL (ref 149–390)
PMV BLD AUTO: 11.7 FL (ref 8.9–12.7)
POTASSIUM SERPL-SCNC: 4.1 MMOL/L (ref 3.5–5.3)
PROT SERPL-MCNC: 6.5 G/DL (ref 6.4–8.2)
RBC # BLD AUTO: 4.15 MILLION/UL (ref 3.88–5.62)
SODIUM SERPL-SCNC: 143 MMOL/L (ref 136–145)
TSH SERPL DL<=0.05 MIU/L-ACNC: 1.59 UIU/ML (ref 0.36–3.74)
WBC # BLD AUTO: 8.72 THOUSAND/UL (ref 4.31–10.16)

## 2022-01-12 PROCEDURE — 84443 ASSAY THYROID STIM HORMONE: CPT

## 2022-01-12 PROCEDURE — 36415 COLL VENOUS BLD VENIPUNCTURE: CPT

## 2022-01-12 PROCEDURE — 80053 COMPREHEN METABOLIC PANEL: CPT

## 2022-01-12 PROCEDURE — 85025 COMPLETE CBC W/AUTO DIFF WBC: CPT

## 2022-01-13 ENCOUNTER — TELEMEDICINE (OUTPATIENT)
Dept: HEMATOLOGY ONCOLOGY | Facility: CLINIC | Age: 72
End: 2022-01-13
Payer: MEDICARE

## 2022-01-13 DIAGNOSIS — C65.2 CANCER OF LEFT RENAL PELVIS (HCC): ICD-10-CM

## 2022-01-13 DIAGNOSIS — C79.10 METASTATIC UROTHELIAL CARCINOMA (HCC): Primary | ICD-10-CM

## 2022-01-13 DIAGNOSIS — C68.9 UROTHELIAL CANCER (HCC): ICD-10-CM

## 2022-01-13 PROCEDURE — 99214 OFFICE O/P EST MOD 30 MIN: CPT | Performed by: INTERNAL MEDICINE

## 2022-01-13 RX ORDER — SODIUM CHLORIDE 9 MG/ML
20 INJECTION, SOLUTION INTRAVENOUS ONCE
Status: CANCELLED | OUTPATIENT
Start: 2022-02-25

## 2022-01-13 RX ORDER — ACETAMINOPHEN 325 MG/1
650 TABLET ORAL ONCE
Status: CANCELLED | OUTPATIENT
Start: 2022-02-18

## 2022-01-13 RX ORDER — ACETAMINOPHEN 325 MG/1
650 TABLET ORAL ONCE
Status: CANCELLED | OUTPATIENT
Start: 2022-03-04

## 2022-01-13 RX ORDER — SODIUM CHLORIDE 9 MG/ML
20 INJECTION, SOLUTION INTRAVENOUS ONCE
Status: CANCELLED | OUTPATIENT
Start: 2022-03-04

## 2022-01-13 RX ORDER — SODIUM CHLORIDE 9 MG/ML
20 INJECTION, SOLUTION INTRAVENOUS ONCE
Status: CANCELLED | OUTPATIENT
Start: 2022-02-18

## 2022-01-13 RX ORDER — ACETAMINOPHEN 325 MG/1
650 TABLET ORAL ONCE
Status: CANCELLED | OUTPATIENT
Start: 2022-02-25

## 2022-01-13 NOTE — PROGRESS NOTES
Virtual Regular Visit    Verification of patient location:    Patient is located in the following state in which I hold an active license [place]      Assessment/Plan:  1  Metastatic urothelial carcinoma ([facility] )  The patient is tolerating Enfortumab Vedotin at the usual dose of 1 25 mg/kg on a weekly basis 3 weeks on 1 week off with some fatiuge  He will be due for cycle 5 day 8 on 01/14/2022  He will be continued on the IV hydration twice a week on as-needed basis to improve his energy level and kidney function   - CBC and differential; Future  - Comprehensive metabolic panel; Future  - Magnesium; Future  - Infusion Calculated Appointment Request; Future  - CBC and differential; Future  - Comprehensive metabolic panel; Future  - Infusion Calculated Appointment Request; Future  - CBC and differential; Future  - Infusion Calculated Appointment Request; Future  - CBC and differential; Future    2  Cancer of left renal pelvis (HCC)    - Infusion Calculated Appointment Request; Future  - CBC and differential; Future  - Comprehensive metabolic panel; Future  - Infusion Calculated Appointment Request; Future  - CBC and differential; Future  - Infusion Calculated Appointment Request; Future  - CBC and differential; Future    3  Urothelial cancer ([facility] )    - Infusion Calculated Appointment Request; Future  - CBC and differential; Future  - Comprehensive metabolic panel;  Future  - Infusion Calculated Appointment Request; Future  - CBC and differential; Future  - Infusion Calculated Appointment Request; Future  - CBC and differential; Future    Problem List Items Addressed This Visit        Genitourinary    Urothelial cancer ([facility] )    Relevant Orders    Infusion Calculated Appointment Request    CBC and differential    Comprehensive metabolic panel    Infusion Calculated Appointment Request    CBC and differential    Infusion Calculated Appointment Request    CBC and differential    Cancer of left renal pelvis (HCC)    Relevant Orders    Infusion Calculated Appointment Request    CBC and differential    Comprehensive metabolic panel    Infusion Calculated Appointment Request    CBC and differential    Infusion Calculated Appointment Request    CBC and differential    Metastatic urothelial carcinoma (HCC) - Primary    Relevant Orders    CBC and differential    Comprehensive metabolic panel    Magnesium    Infusion Calculated Appointment Request    CBC and differential    Comprehensive metabolic panel    Infusion Calculated Appointment Request    CBC and differential    Infusion Calculated Appointment Request    CBC and differential               Reason for visit is   Chief Complaint   Patient presents with    Virtual Regular Visit        Encounter provider Michael Crowley MD    Provider located at [address] [place] [id number] [phone]      Recent Visits  No visits were found meeting these conditions  Showing recent visits within past 7 days and meeting all other requirements  Today's Visits  Date Type Provider Dept   01/13/22 Telemedicine Michael Crowley MD [facility]   Showing today's visits and meeting all other requirements  Future Appointments  No visits were found meeting these conditions  Showing future appointments within next 150 days and meeting all other requirements       The patient was identified by name and date of birth  Leena Hannah was informed that this is a telemedicine visit and that the visit is being conducted through [address] [place] [address] Now and patient was informed that this is a secure, HIPAA-compliant platform  He agrees to proceed     My office door was closed  No one else was in the room  He acknowledged consent and understanding of privacy and security of the video platform  The patient has agreed to participate and understands they can discontinue the visit at any time      Patient is aware this is a billable service  Subjective  Brittnee Vicente is a 66 y o  male   HPI     The patient is doing relatively well with the current palliative treatment  His blood work from yesterday seems stable with normal H/H, WBC and plts  Cr stable 1 48  Oncology History Overview Note    Patient has a history of hypertension, hyperlipidemia, chronic lower back pain      He had an MRI of the lower spine for further evaluation of his chronic lower back pain   The MRI on 12/02/2019 revealed Multiple left renal masses to include a left lower pole cyst and a rounded structure in the left upper pole which may also represent cyst   Left upper pole renal masses approximately 3 cm in greatest linear dimension      A CT with renal protocol was done on 12/12/2019 which also showed the infiltrating lesion in the upper pole of the left kidney measuring about the 3 5 cm in greatest dimension without any hint of retroperitoneal lymphadenopathy        A CT scan of the abdomen pelvis on 01/14/2020 showed the same findings with the mild hydronephrosis on the left   The urine cytology on 01/03/2020 showed high-grade urothelial carcinoma   A cystoscopy was then done, a biopsy was taken from the left renal pelvic region which showed high-grade urothelial carcinoma without evidence of lamina propria invasion   The detrusor muscle/muscularis propria were not present for evaluation      The recommendation was to pursue left robotic assisted laparoscopic nephroureterectomy with bladder cuff excision which was done on 04/01/2020     The final pathology revealed;  - Invasive high grade urothelial carcinoma arising in renal pelvis  - Bladder cuff margin is negative for carcinoma and no evidence of high grade dysplasia  - Ureters with no significant pathologic abnormality  - Two benign simple cysts  - Adrenal gland is negative for malignancy  - One lymph node, negative for malignancy (0/1)    The tumor size was 4 5 cm with invasion beyond the muscularis into the periurethral fat or peripelvic fat or the renal parenchyma   The margins were uninvolved by carcinoma or carcinoma in situ   The final pathology was pT3 pN0      Patient barely tolerated 1 cycle of adjuvant chemotherapy with split dose cisplatin and gemcitabine with a lot of side effects   The treatment had to be discontinued due to significant worsening renal dysfunction 6/2020      Patient started to complain about significant abdominal/left inguinal pain around August/September 2020  Unfortunately repeat imaging revealed recurrent/metastatic disease  9/4/20 CT C/A/P- Status post left nephrectomy for resection of urothelial neoplasm   Lymphadenopathy in the left nephrectomy bed has increased since the prior examination, concerning for metastatic disease      Ill-defined hyperattenuating masslike focus in the left rectus muscle, not identified on the prior examination   This is suspicious for a metastatic lesion   A rectus hematoma is also considered       9/23/20 PET scan- 1   Multiple FDG avid lymph nodes in the retrocrural region, retroperitoneum and the left renal bed compatible with metastasis   These have progressed from recent CT  2   FDG avid mass involving the left rectus abdominal musculature compatible with metastasis which is larger from recent CT  3  Several small lymph nodes adjacent to the mid to distal esophagus with FDG uptake suspicious for metastasis   Small focus of FDG uptake also suggested in the right hilar region, metastasis is not excluded   This could be reassessed on follow-up  4   Subtle focus of FDG uptake at the T2 level on the left, nonspecific, could be related to early degenerative changes, developing metastasis is not entirely excluded   This could be reassessed on follow-up    5  Prostate is mildly prominent with heterogeneous FDG uptake   This could be inflammatory   Correlate for prostatitis      He completed palliative radiation to the left abdomen 12/3/20     His PET scan from 08/16/2021 showed progression of his disease with hypermetabolic soft tissue lesions at the level of the right shoulder and right axilla with interval progression of the retroperitoneal and mesenteric hypermetabolic metastasis        He did have the new lesion to his right upper back/shoulder which was causing significant localized pain  This excised by his surgeon 08/09/2021  Pathology confirmed metastatic high-grade carcinoma consistent with his no primary urothelial carcinoma   He received palliative radiation to his right shoulder/axilla region       Urothelial cancer ([address] )    1/3/2020 Biopsy      Final Diagnosis    A  Urine, Clean Catch, Thin Prep:  High grade urothelial carcinoma (HGUC) - see comment            1/3/2020 Initial Diagnosis      Urothelial cancer (HCC)  T3 N0 (stage IIIA)       1/17/2020 Biopsy      Final Diagnosis    A  Ureter, Right, left renal pelvis biopsy  -Fragments of high grade urothelial carcinoma   -No evidence of lamina propria invasion   -Detrusor muscle/ muscularis propria is not present for evaluation      B  Urinary Bladder, bladder biopsy:  -Fragments of low grade papillary lesion  See note  -No evidence of invasion seen  -Unremarkable fragment of detrusor muscle seen            4/1/2020 Surgery      left robotic assisted laparoscopic nephroureterectomy with bladder cuff excision      Final Diagnosis    A  Left kidney, ureter, and bladder cuff, nephroureterectomy:  - Invasive high grade urothelial carcinoma arising in renal pelvis  - Bladder cuff margin is negative for carcinoma and no evidence of high grade dysplasia  - Ureters with no significant pathologic abnormality  - Two benign simple cysts  - Adrenal gland is negative for malignancy  - One lymph node, negative for malignancy (0/1)             5/14/2020 - 6/3/2020 Chemotherapy      CISplatin (PLATINOL) split dose 35 mg/m2 = 75 3 mg (50 % of original dose 70 mg/m2), Intravenous, Administration: 75 3 mg (5/14/2020), 75 3 mg (5/21/2020)  gemcitabine (GEMZAR) 2,200 mg in sodium chloride 0 9 % 250 mL infusion, 2,150 2 mg (80 % of original dose 1,250 mg/m2), Intravenous,   Administration: 2,200 mg (5/14/2020), 2,200 mg (5/21/2020)     (only completed 1 cycle- d/c d/t adverse effects and worsening renal dysfunction)       10/9/2020 - 9/9/2021 Chemotherapy      pembrolizumab (KEYTRUDA) IVPB, 200 mg, Intravenous, Once, 16 of 20 cycles  Administration: 200 mg (10/9/2020), 200 mg (10/30/2020), 200 mg (11/20/2020), 200 mg (12/11/2020), 200 mg (12/31/2020), 200 mg (1/22/2021), 200 mg (2/12/2021), 200 mg (3/5/2021), 200 mg (3/26/2021), 200 mg (4/16/2021), 200 mg (5/7/2021), 200 mg (5/28/2021), 200 mg (6/18/2021), 200 mg (7/9/2021), 200 mg (7/30/2021), 200 mg (8/20/2021)       11/19/2020 - 12/3/2020 Radiation      Treatment:  Course: C1     Plan ID Energy Fractions Dose per Fraction (cGy) Dose Correction (cGy) Total Dose Delivered (cGy) Elapsed Days   L Abdomen 6X 10 / 10 300 0 3,000 14           9/10/2021 -  Chemotherapy      enfortumab vedotin-ejfv (PADCEV) IVPB, 1 25 mg/kg = 114 mg, Intravenous, Once, 4 of 8 cycles  Administration: 114 mg (9/10/2021), 114 mg (9/17/2021), 110 mg (10/8/2021), 110 mg (9/24/2021), 110 mg (10/15/2021), 110 mg (11/12/2021), 110 mg (10/22/2021), 110 mg (11/19/2021), 110 mg (12/10/2021), 110 mg (11/26/2021), 110 mg (12/17/2021), 110 mg (12/23/2021)       Cancer of left renal pelvis (HCC)    4/23/2020 Initial Diagnosis      Cancer of left renal pelvis (HCC)       10/9/2020 - 9/9/2021 Chemotherapy      pembrolizumab (KEYTRUDA) IVPB, 200 mg, Intravenous, Once, 16 of 20 cycles  Administration: 200 mg (10/9/2020), 200 mg (10/30/2020), 200 mg (11/20/2020), 200 mg (12/11/2020), 200 mg (12/31/2020), 200 mg (1/22/2021), 200 mg (2/12/2021), 200 mg (3/5/2021), 200 mg (3/26/2021), 200 mg (4/16/2021), 200 mg (5/7/2021), 200 mg (5/28/2021), 200 mg (6/18/2021), 200 mg (7/9/2021), 200 mg (7/30/2021), 200 mg (8/20/2021)       9/10/2021 -  Chemotherapy      enfortumab vedotin-ejfv (PADCEV) IVPB, 1 25 mg/kg = 114 mg, Intravenous, Once, 4 of 8 cycles  Administration: 114 mg (9/10/2021), 114 mg (9/17/2021), 110 mg (10/8/2021), 110 mg (9/24/2021), 110 mg (10/15/2021), 110 mg (11/12/2021), 110 mg (10/22/2021), 110 mg (11/19/2021), 110 mg (12/10/2021), 110 mg (11/26/2021), 110 mg (12/17/2021), 110 mg (12/23/2021)         Surgery           Metastatic urothelial carcinoma ([facility] )    8/9/2021 Initial Diagnosis      [facility]       9/8/2021 - 9/21/2021 Radiation      Treatment:  Course: C2     Plan ID Energy Fractions Dose per Fraction (cGy) Dose Correction (cGy) Total Dose Delivered (cGy) Elapsed Days   R Axil_Shl # 6X 10 / 10 300 0 3,000 13      Treatment dates:  C2: 9/8/2021 - 9/21/2021       9/10/2021 -  Chemotherapy      enfortumab vedotin-ejfv (PADCEV) IVPB, 1 25 mg/kg = 114 mg, Intravenous, Once, 4 of 8 cycles  Administration: 114 mg (9/10/2021), 114 mg (9/17/2021), 110 mg (10/8/2021), 110 mg (9/24/2021), 110 mg (10/15/2021), 110 mg (11/12/2021), 110 mg (10/22/2021), 110 mg (11/19/2021), 110 mg (12/10/2021), 110 mg (11/26/2021), 110 mg (12/17/2021), 110 mg (12/23/2021)              Past Medical History:   Diagnosis Date    Arthritis     Bladder cancer     Cancer ([address] )     skin melanoma;basal cell    Chronic pain disorder     from arthritis    Colon polyp     Does use hearing aid     bilat    Dry eyes, bilateral     GERD (gastroesophageal reflux disease)     History of kidney stones 10/2019    History of partial knee replacement     bilat    History of vertigo     Hyperlipidemia     Hypertension     Infusion extravasation of chemotherapy vesicant 11/2021    Kidney lesion     Lumbar disc disorder     compression of vertebrae L4-5-6    Muscle weakness     left hip area    Renal mass     left    Right ankle injury 03/05/2020    missed step of ladder     Right ankle pain     Shortness of breath     with activity    Tinnitus     Urothelial cancer     left    Wears glasses        Past Surgical History:   Procedure Laterality Date    COLONOSCOPY      CYSTOSCOPY Left 4/1/2020    Procedure: CYSTOSCOPY; URETERAL CATHETER PLACEMENT;  Surgeon: Kelsy Tatum MD;  Location[facility];  Service: Urology    CYSTOSCOPY  05/11/2020    CYSTOSCOPY  06/04/2021    FL CYSTOGRAM  4/13/2020    FL RETROGRADE PYELOGRAM  1/17/2020    HERNIA REPAIR      umbilical with mesh    INGUINAL HERNIA REPAIR Right     with mesh    IR PORT PLACEMENT  4/30/2020    JOINT REPLACEMENT Bilateral     partials knee    LUMBAR EPIDURAL INJECTION      AL CYSTOURETHROSCOPY,URETER CATHETER Bilateral 1/17/2020    Procedure: CYSTOSCOPY; RIGHT RETROGRADE PYELOGRAM WITH RIGHT URETERAL CYTOLOGY SAMPLING; LEFT URETEROSCOPY WITH RENAL PELVIS BIOPSY AND LEFT STENT PLACEMENT;  Surgeon: Kelsy Tatum MD;  [facility];  Service: Urology    AL NEPHRECTOMY, W/PART   URETECTOMY Left 4/1/2020    Procedure: ROBOTIC LAPAROSCOPIC NEPHRO-URETERECTOMY;  Surgeon: Kelsy Tatum MD;  [facility][facility];  Service: Urology    SKIN CANCER EXCISION      surface melanoma    TONSILLECTOMY      WISDOM TOOTH EXTRACTION         Current Outpatient Medications   Medication Sig Dispense Refill    acetaminophen (TYLENOL) 500 mg tablet Take 2 tablets (1,000 mg total) by mouth every 8 (eight) hours      allopurinol (ZYLOPRIM) 300 mg tablet take 1 tablet by mouth once daily 30 tablet 3    ALPRAZolam (XANAX) 0 25 mg tablet Take 1 tablet (0 25 mg total) by mouth daily at bedtime as needed for anxiety (restless legs) 30 tablet 1    amLODIPine (NORVASC) 5 mg tablet Take 5 mg by mouth daily        atorvastatin (LIPITOR) 80 mg tablet Take 80 mg by mouth every other day evening      docusate sodium (COLACE) 250 MG capsule Take 1 capsule (250 mg total) by mouth daily 60 capsule 6    famotidine (PEPCID) 20 mg tablet take 1-2 tablets by mouth every evening      folic acid (FOLVITE) 1 mg tablet Take 1 tablet (1 mg total) by mouth daily 90 tablet 4    HYDROmorphone (DILAUDID) 2 mg tablet Take 1 tablet (2 mg total) by mouth every 4 (four) hours as needed for moderate pain Max Daily Amount: 12 mg (Patient not taking: Reported on 11/24/2021 ) 90 tablet 0    hydrOXYzine HCL (ATARAX) 25 mg tablet Take 1 tablet (25 mg total) by mouth every 6 (six) hours as needed for itching or anxiety 60 tablet 2    Magnesium 250 MG TABS 1 tablet 2 (two) times a day Taking 500mg in the morning and 500mg at night      metoprolol tartrate (LOPRESSOR) 100 mg tablet Take 50 mg by mouth daily      morphine (MSIR) 15 mg tablet Take 1/2 tablet 3 times a day for 5 days, then 1/2 tablet 2 times a day for 5 days, then 1/2 tablet once a day for 5 days, then stop 15 tablet 0    naloxone (NARCAN) 4 mg/0 1 mL nasal spray Administer 1 spray into a nostril  If breathing does not return to normal or if breathing difficulty resumes after 2-3 minutes, give another dose in the other nostril using a new spray  (Patient taking differently: Administer 1 spray into a nostril  If breathing does not return to normal or if breathing difficulty resumes after 2-3 minutes, give another dose in the other nostril using a new spray  As needed) 1 each 1    nystatin (MYCOSTATIN) cream Apply topically 2 (two) times a day 30 g 0    omeprazole (PriLOSEC) 20 mg delayed release capsule Take 20 mg by mouth daily        predniSONE 10 mg tablet Take 2 tablets (20 mg total) by mouth daily 60 tablet 3    senna (SENOKOT) 8 6 mg Take 1 tablet (8 6 mg total) by mouth daily at bedtime (Patient taking differently: Take 8 6 mg by mouth daily at bedtime As needed) 30 each 0    tadalafil (CIALIS) 20 MG tablet Take 1 tablet by mouth one (1) hour prior to intercourse  Do not exceed more than two tablets per week   12 tablet 3    terazosin (HYTRIN) 5 mg capsule Take 2 capsules (10 mg total) by mouth daily at bedtime 30 capsule 6    triamcinolone (KENALOG) 0 1 % lotion Apply topically 3 (three) times a day 60 mL 5    VITAMIN D PO Take 1,000 Units by mouth daily       zolpidem (AMBIEN) 5 mg tablet Take 1 tablet (5 mg total) by mouth daily at bedtime as needed for sleep 15 tablet 0     No current facility-administered medications for this visit  Allergies   Allergen Reactions    Poison Ivy Extract Rash       Review of Systems    Video Exam    There were no vitals filed for this visit  Physical Exam     I spent 20 minutes directly with the patient during this visit    VIRTUAL VISIT 310 [facility] verbally agrees to participate in [employer]  Pt is aware that [employer] could be limited without vital signs or the ability to perform a full hands-on physical Elenora Reach understands he or the provider may request at any time to terminate the video visit and request the patient to seek care or treatment in person

## 2022-01-14 ENCOUNTER — HOSPITAL ENCOUNTER (OUTPATIENT)
Dept: INFUSION CENTER | Facility: HOSPITAL | Age: 72
Discharge: HOME | End: 2022-01-14
Attending: INTERNAL MEDICINE
Payer: MEDICARE

## 2022-01-14 VITALS
OXYGEN SATURATION: 97 % | BODY MASS INDEX: 30.17 KG/M2 | TEMPERATURE: 97.2 F | HEIGHT: 68 IN | WEIGHT: 199.08 LBS | HEART RATE: 65 BPM | DIASTOLIC BLOOD PRESSURE: 75 MMHG | RESPIRATION RATE: 18 BRPM | SYSTOLIC BLOOD PRESSURE: 124 MMHG

## 2022-01-14 DIAGNOSIS — C68.9 UROTHELIAL CANCER (HCC): Primary | ICD-10-CM

## 2022-01-14 DIAGNOSIS — C65.2 CANCER OF LEFT RENAL PELVIS (HCC): ICD-10-CM

## 2022-01-14 DIAGNOSIS — C79.10 METASTATIC UROTHELIAL CARCINOMA (HCC): ICD-10-CM

## 2022-01-14 DIAGNOSIS — E86.0 DEHYDRATION: ICD-10-CM

## 2022-01-14 PROCEDURE — 96413 CHEMO IV INFUSION 1 HR: CPT

## 2022-01-14 PROCEDURE — 96367 TX/PROPH/DG ADDL SEQ IV INF: CPT

## 2022-01-14 RX ORDER — ACETAMINOPHEN 325 MG/1
650 TABLET ORAL ONCE
Status: COMPLETED | OUTPATIENT
Start: 2022-01-14 | End: 2022-01-14

## 2022-01-14 RX ORDER — SODIUM CHLORIDE 9 MG/ML
20 INJECTION, SOLUTION INTRAVENOUS ONCE
Status: COMPLETED | OUTPATIENT
Start: 2022-01-14 | End: 2022-01-14

## 2022-01-14 RX ADMIN — ENFORTUMAB VEDOTIN 110 MG: 30 INJECTION, POWDER, LYOPHILIZED, FOR SOLUTION INTRAVENOUS at 11:19

## 2022-01-14 RX ADMIN — SODIUM CHLORIDE 1000 ML: 0.9 INJECTION, SOLUTION INTRAVENOUS at 10:14

## 2022-01-14 RX ADMIN — DEXAMETHASONE SODIUM PHOSPHATE: 10 INJECTION, SOLUTION INTRAMUSCULAR; INTRAVENOUS at 10:49

## 2022-01-14 RX ADMIN — ACETAMINOPHEN 650 MG: 325 TABLET ORAL at 10:15

## 2022-01-14 RX ADMIN — SODIUM CHLORIDE 20 ML/HR: 0.9 INJECTION, SOLUTION INTRAVENOUS at 10:13

## 2022-01-14 RX ADMIN — DIPHENHYDRAMINE HYDROCHLORIDE 25 MG: 50 INJECTION INTRAMUSCULAR; INTRAVENOUS at 10:17

## 2022-01-14 NOTE — PROGRESS NOTES
Pt tolerated todays padcev and hydration well  Excellent blood return noted from port  Flushed and deaccessed per routine   Discharged ambulatory with avs

## 2022-01-18 ENCOUNTER — HOSPITAL ENCOUNTER (OUTPATIENT)
Dept: INFUSION CENTER | Facility: HOSPITAL | Age: 72
Discharge: HOME | End: 2022-01-18
Payer: MEDICARE

## 2022-01-18 VITALS
HEART RATE: 66 BPM | OXYGEN SATURATION: 98 % | SYSTOLIC BLOOD PRESSURE: 115 MMHG | TEMPERATURE: 96.9 F | RESPIRATION RATE: 18 BRPM | DIASTOLIC BLOOD PRESSURE: 75 MMHG

## 2022-01-18 DIAGNOSIS — C79.10 METASTATIC UROTHELIAL CARCINOMA (HCC): ICD-10-CM

## 2022-01-18 DIAGNOSIS — E83.42 HYPOMAGNESEMIA: Primary | ICD-10-CM

## 2022-01-18 DIAGNOSIS — C65.2 CANCER OF LEFT RENAL PELVIS (HCC): ICD-10-CM

## 2022-01-18 DIAGNOSIS — E86.0 DEHYDRATION: ICD-10-CM

## 2022-01-18 DIAGNOSIS — C68.9 UROTHELIAL CANCER (HCC): ICD-10-CM

## 2022-01-18 LAB
ALBUMIN SERPL BCP-MCNC: 3.7 G/DL (ref 3.5–5)
ALP SERPL-CCNC: 48 U/L (ref 34–104)
ALT SERPL W P-5'-P-CCNC: 40 U/L (ref 7–52)
ANION GAP SERPL CALCULATED.3IONS-SCNC: 7 MMOL/L (ref 4–13)
AST SERPL W P-5'-P-CCNC: 22 U/L (ref 13–39)
BASOPHILS # BLD AUTO: 0.02 THOUSANDS/ΜL (ref 0–0.1)
BASOPHILS NFR BLD AUTO: 0 % (ref 0–1)
BILIRUB SERPL-MCNC: 1.08 MG/DL (ref 0.2–1)
BUN SERPL-MCNC: 26 MG/DL (ref 5–25)
CALCIUM SERPL-MCNC: 9.2 MG/DL (ref 8.4–10.2)
CHLORIDE SERPL-SCNC: 103 MMOL/L (ref 96–108)
CO2 SERPL-SCNC: 30 MMOL/L (ref 21–32)
CREAT SERPL-MCNC: 1.51 MG/DL (ref 0.6–1.3)
EOSINOPHIL # BLD AUTO: 0 THOUSAND/ΜL (ref 0–0.61)
EOSINOPHIL NFR BLD AUTO: 0 % (ref 0–6)
ERYTHROCYTE [DISTWIDTH] IN BLOOD BY AUTOMATED COUNT: 16.7 % (ref 11.6–15.1)
GFR SERPL CREATININE-BSD FRML MDRD: 45 ML/MIN/1.73SQ M
GLUCOSE SERPL-MCNC: 104 MG/DL (ref 65–140)
HCT VFR BLD AUTO: 38.5 % (ref 36.5–49.3)
HGB BLD-MCNC: 12.4 G/DL (ref 12–17)
IMM GRANULOCYTES # BLD AUTO: 0.03 THOUSAND/UL (ref 0–0.2)
IMM GRANULOCYTES NFR BLD AUTO: 0 % (ref 0–2)
LYMPHOCYTES # BLD AUTO: 1.54 THOUSANDS/ΜL (ref 0.6–4.47)
LYMPHOCYTES NFR BLD AUTO: 21 % (ref 14–44)
MAGNESIUM SERPL-MCNC: 1.7 MG/DL (ref 1.9–2.7)
MCH RBC QN AUTO: 32 PG (ref 26.8–34.3)
MCHC RBC AUTO-ENTMCNC: 32.2 G/DL (ref 31.4–37.4)
MCV RBC AUTO: 99 FL (ref 82–98)
MONOCYTES # BLD AUTO: 0.57 THOUSAND/ΜL (ref 0.17–1.22)
MONOCYTES NFR BLD AUTO: 8 % (ref 4–12)
NEUTROPHILS # BLD AUTO: 5.04 THOUSANDS/ΜL (ref 1.85–7.62)
NEUTS SEG NFR BLD AUTO: 71 % (ref 43–75)
NRBC BLD AUTO-RTO: 0 /100 WBCS
PLATELET # BLD AUTO: 158 THOUSANDS/UL (ref 149–390)
PMV BLD AUTO: 10.8 FL (ref 8.9–12.7)
POTASSIUM SERPL-SCNC: 3.9 MMOL/L (ref 3.5–5.3)
PROT SERPL-MCNC: 5.9 G/DL (ref 6.4–8.4)
RBC # BLD AUTO: 3.88 MILLION/UL (ref 3.88–5.62)
SODIUM SERPL-SCNC: 140 MMOL/L (ref 135–147)
WBC # BLD AUTO: 7.2 THOUSAND/UL (ref 4.31–10.16)

## 2022-01-18 PROCEDURE — 96360 HYDRATION IV INFUSION INIT: CPT

## 2022-01-18 PROCEDURE — 85025 COMPLETE CBC W/AUTO DIFF WBC: CPT | Performed by: INTERNAL MEDICINE

## 2022-01-18 PROCEDURE — 80053 COMPREHEN METABOLIC PANEL: CPT

## 2022-01-18 PROCEDURE — 83735 ASSAY OF MAGNESIUM: CPT | Performed by: INTERNAL MEDICINE

## 2022-01-18 RX ADMIN — SODIUM CHLORIDE 1000 ML: 0.9 INJECTION, SOLUTION INTRAVENOUS at 09:11

## 2022-01-18 NOTE — PROGRESS NOTES
Central labs drawn per order  Port flushes freely with good blood return noted  IV hydration tolerated well  AVS declined  Patient ambulated off unit without incident  All personal belongings taken with patient

## 2022-01-19 ENCOUNTER — OFFICE VISIT (OUTPATIENT)
Dept: HEMATOLOGY ONCOLOGY | Facility: CLINIC | Age: 72
End: 2022-01-19
Payer: MEDICARE

## 2022-01-19 VITALS
RESPIRATION RATE: 18 BRPM | OXYGEN SATURATION: 97 % | TEMPERATURE: 97.4 F | WEIGHT: 200 LBS | DIASTOLIC BLOOD PRESSURE: 70 MMHG | HEART RATE: 64 BPM | HEIGHT: 68 IN | SYSTOLIC BLOOD PRESSURE: 118 MMHG | BODY MASS INDEX: 30.31 KG/M2

## 2022-01-19 DIAGNOSIS — Z51.5 PALLIATIVE CARE PATIENT: ICD-10-CM

## 2022-01-19 DIAGNOSIS — C79.10 METASTATIC UROTHELIAL CARCINOMA (HCC): Primary | ICD-10-CM

## 2022-01-19 DIAGNOSIS — C68.9 UROTHELIAL CANCER (HCC): ICD-10-CM

## 2022-01-19 DIAGNOSIS — G89.3 CANCER RELATED PAIN: ICD-10-CM

## 2022-01-19 PROCEDURE — 99214 OFFICE O/P EST MOD 30 MIN: CPT | Performed by: INTERNAL MEDICINE

## 2022-01-19 RX ORDER — PREDNISONE 10 MG/1
20 TABLET ORAL DAILY
Qty: 60 TABLET | Refills: 3 | Status: SHIPPED | OUTPATIENT
Start: 2022-01-19 | End: 2022-06-02

## 2022-01-19 NOTE — PROGRESS NOTES
Hematology/Oncology Outpatient Follow-up  Janna Rico 66 y o  male 1950 [id number]    Date:  1/19/2022        Assessment and Plan:  1  Metastatic urothelial carcinoma ([address] )  The patient will be continue on the enfortumab vedotin at the usual dose of 1 25 mg per kg on weekly basis 3 weeks on and 1 week off  He is due for cycle 5 day 15 on 01/21/2022  We will continue to provide IV hydration on as-needed basis  He will be back in 2 weeks from now for cycle 6 day 1  We did discuss pursuing another imaging study around the beginning of March for close monitoring of his metastatic urothelial carcinoma  - CBC and differential; Future  - Comprehensive metabolic panel; Future  - Magnesium; Future            HPI:  The patient came today for follow-up visit accompanied by his wife  He is tolerating the treatment relatively well  His most recent blood work from yesterday showed hemoglobin of 12 4 with normal white cells and platelets  Creatinine 1 5 with normal calcium liver enzymes  Oncology History Overview Note   Patient has a history of hypertension, hyperlipidemia, chronic lower back pain  He had an MRI of the lower spine for further evaluation of his chronic lower back pain  The MRI on 12/02/2019 revealed Multiple left renal masses to include a left lower pole cyst and a rounded structure in the left upper pole which may also represent cyst   Left upper pole renal masses approximately 3 cm in greatest linear dimension  A CT with renal protocol was done on 12/12/2019 which also showed the infiltrating lesion in the upper pole of the left kidney measuring about the 3 5 cm in greatest dimension without any hint of retroperitoneal lymphadenopathy  A CT scan of the abdomen pelvis on 01/14/2020 showed the same findings with the mild hydronephrosis on the left  The urine cytology on 01/03/2020 showed high-grade urothelial carcinoma    A cystoscopy was then done, a biopsy was taken from the left renal pelvic region which showed high-grade urothelial carcinoma without evidence of lamina propria invasion  The detrusor muscle/muscularis propria were not present for evaluation  The recommendation was to pursue left robotic assisted laparoscopic nephroureterectomy with bladder cuff excision which was done on 04/01/2020  The final pathology revealed;  - Invasive high grade urothelial carcinoma arising in renal pelvis  - Bladder cuff margin is negative for carcinoma and no evidence of high grade dysplasia  - Ureters with no significant pathologic abnormality  - Two benign simple cysts  - Adrenal gland is negative for malignancy  - One lymph node, negative for malignancy (0/1)  The tumor size was 4 5 cm with invasion beyond the muscularis into the periurethral fat or peripelvic fat or the renal parenchyma  The margins were uninvolved by carcinoma or carcinoma in situ  The final pathology was pT3 pN0  Patient barely tolerated 1 cycle of adjuvant chemotherapy with split dose cisplatin and gemcitabine with a lot of side effects  The treatment had to be discontinued due to significant worsening renal dysfunction 6/2020  Patient started to complain about significant abdominal/left inguinal pain around August/September 2020  Unfortunately repeat imaging revealed recurrent/metastatic disease  9/4/20 CT C/A/P- Status post left nephrectomy for resection of urothelial neoplasm  Lymphadenopathy in the left nephrectomy bed has increased since the prior examination, concerning for metastatic disease  Ill-defined hyperattenuating masslike focus in the left rectus muscle, not identified on the prior examination  This is suspicious for a metastatic lesion  A rectus hematoma is also considered  9/23/20 PET scan- 1  Multiple FDG avid lymph nodes in the retrocrural region, retroperitoneum and the left renal bed compatible with metastasis  These have progressed from recent CT    2   FDG avid mass involving the left rectus abdominal musculature compatible with metastasis which is larger from recent CT  3  Several small lymph nodes adjacent to the mid to distal esophagus with FDG uptake suspicious for metastasis  Small focus of FDG uptake also suggested in the right hilar region, metastasis is not excluded  This could be reassessed on follow-up  4   Subtle focus of FDG uptake at the T2 level on the left, nonspecific, could be related to early degenerative changes, developing metastasis is not entirely excluded  This could be reassessed on follow-up  5  Prostate is mildly prominent with heterogeneous FDG uptake  This could be inflammatory  Correlate for prostatitis  He completed palliative radiation to the left abdomen 12/3/20     His PET scan from 08/16/2021 showed progression of his disease with hypermetabolic soft tissue lesions at the level of the right shoulder and right axilla with interval progression of the retroperitoneal and mesenteric hypermetabolic metastasis  He did have the new lesion to his right upper back/shoulder which was causing significant localized pain  This excised by his surgeon 08/09/2021  Pathology confirmed metastatic high-grade carcinoma consistent with his no primary urothelial carcinoma  He received palliative radiation to his right shoulder/axilla region  Urothelial cancer ([address] )   1/3/2020 Biopsy    Final Diagnosis    A  Urine, Clean Catch, Thin Prep:  High grade urothelial carcinoma (HGUC) - see comment  1/3/2020 Initial Diagnosis    Urothelial cancer ([address] )  T3 N0 (stage IIIA)     1/17/2020 Biopsy    Final Diagnosis    A  Ureter, Right, left renal pelvis biopsy  -Fragments of high grade urothelial carcinoma   -No evidence of lamina propria invasion   -Detrusor muscle/ muscularis propria is not present for evaluation  B  Urinary Bladder, bladder biopsy:  -Fragments of low grade papillary lesion   See note  -No evidence of invasion seen  -Unremarkable fragment of detrusor muscle seen  4/1/2020 Surgery    left robotic assisted laparoscopic nephroureterectomy with bladder cuff excision     Final Diagnosis    A  Left kidney, ureter, and bladder cuff, nephroureterectomy:  - Invasive high grade urothelial carcinoma arising in renal pelvis  - Bladder cuff margin is negative for carcinoma and no evidence of high grade dysplasia  - Ureters with no significant pathologic abnormality  - Two benign simple cysts  - Adrenal gland is negative for malignancy  - One lymph node, negative for malignancy (0/1)          5/14/2020 - 6/3/2020 Chemotherapy    CISplatin (PLATINOL) split dose 35 mg/m2 = 75 3 mg (50 % of original dose 70 mg/m2), Intravenous,   Administration: 75 3 mg (5/14/2020), 75 3 mg (5/21/2020)  gemcitabine (GEMZAR) 2,200 mg in sodium chloride 0 9 % 250 mL infusion, 2,150 2 mg (80 % of original dose 1,250 mg/m2), Intravenous,   Administration: 2,200 mg (5/14/2020), 2,200 mg (5/21/2020)    (only completed 1 cycle- d/c d/t adverse effects and worsening renal dysfunction)     10/9/2020 - 9/9/2021 Chemotherapy    pembrolizumab (KEYTRUDA) IVPB, 200 mg, Intravenous, Once, 16 of 20 cycles  Administration: 200 mg (10/9/2020), 200 mg (10/30/2020), 200 mg (11/20/2020), 200 mg (12/11/2020), 200 mg (12/31/2020), 200 mg (1/22/2021), 200 mg (2/12/2021), 200 mg (3/5/2021), 200 mg (3/26/2021), 200 mg (4/16/2021), 200 mg (5/7/2021), 200 mg (5/28/2021), 200 mg (6/18/2021), 200 mg (7/9/2021), 200 mg (7/30/2021), 200 mg (8/20/2021)     11/19/2020 - 12/3/2020 Radiation    Treatment:  Course: C1    Plan ID Energy Fractions Dose per Fraction (cGy) Dose Correction (cGy) Total Dose Delivered (cGy) Elapsed Days   L Abdomen 6X 10 / 10 300 0 3,000 14        9/10/2021 -  Chemotherapy    enfortumab vedotin-ejfv (PADCEV) IVPB, 1 25 mg/kg = 114 mg, Intravenous, Once, 5 of 8 cycles  Administration: 114 mg (9/10/2021), 114 mg (9/17/2021), 110 mg (10/8/2021), 110 mg (9/24/2021), 110 mg (10/15/2021), 110 mg (11/12/2021), 110 mg (10/22/2021), 110 mg (11/19/2021), 110 mg (12/10/2021), 110 mg (11/26/2021), 110 mg (12/17/2021), 110 mg (1/7/2022), 110 mg (12/23/2021), 110 mg (1/14/2022)     Cancer of left renal pelvis ([address] )   4/23/2020 Initial Diagnosis    Cancer of left renal pelvis ([facility] [address] )     10/9/2020 - 9/9/2021 Chemotherapy    pembrolizumab (KEYTRUDA) IVPB, 200 mg, Intravenous, Once, 16 of 20 cycles  Administration: 200 mg (10/9/2020), 200 mg (10/30/2020), 200 mg (11/20/2020), 200 mg (12/11/2020), 200 mg (12/31/2020), 200 mg (1/22/2021), 200 mg (2/12/2021), 200 mg (3/5/2021), 200 mg (3/26/2021), 200 mg (4/16/2021), 200 mg (5/7/2021), 200 mg (5/28/2021), 200 mg (6/18/2021), 200 mg (7/9/2021), 200 mg (7/30/2021), 200 mg (8/20/2021)     9/10/2021 -  Chemotherapy    enfortumab vedotin-ejfv (PADCEV) IVPB, 1 25 mg/kg = 114 mg, Intravenous, Once, 5 of 8 cycles  Administration: 114 mg (9/10/2021), 114 mg (9/17/2021), 110 mg (10/8/2021), 110 mg (9/24/2021), 110 mg (10/15/2021), 110 mg (11/12/2021), 110 mg (10/22/2021), 110 mg (11/19/2021), 110 mg (12/10/2021), 110 mg (11/26/2021), 110 mg (12/17/2021), 110 mg (1/7/2022), 110 mg (12/23/2021), 110 mg (1/14/2022)      Surgery       Metastatic urothelial carcinoma ([facility] [address] )   8/9/2021 Initial Diagnosis    Metastatic urothelial carcinoma ([facility] [address] )     9/8/2021 - 9/21/2021 Radiation    Treatment:  Course: C2    Plan ID Energy Fractions Dose per Fraction (cGy) Dose Correction (cGy) Total Dose Delivered (cGy) Elapsed Days   R Axil_Shl # 6X 10 / 10 300 0 3,000 13      Treatment dates:  C2: 9/8/2021 - 9/21/2021     9/10/2021 -  Chemotherapy    enfortumab vedotin-ejfv (PADCEV) IVPB, 1 25 mg/kg = 114 mg, Intravenous, Once, 5 of 8 cycles  Administration: 114 mg (9/10/2021), 114 mg (9/17/2021), 110 mg (10/8/2021), 110 mg (9/24/2021), 110 mg (10/15/2021), 110 mg (11/12/2021), 110 mg (10/22/2021), 110 mg (11/19/2021), 110 mg (12/10/2021), 110 mg (11/26/2021), 110 mg (12/17/2021), 110 mg (1/7/2022), 110 mg (12/23/2021), 110 mg (1/14/2022)         Interval history:    ROS: Review of Systems   Constitutional: Positive for fatigue  Negative for chills and fever  HENT: Positive for hearing loss  Negative for ear pain and sore throat  Eyes: Negative for pain and visual disturbance  Respiratory: Positive for shortness of breath  Negative for cough  Cardiovascular: Negative for chest pain and palpitations  Gastrointestinal: Negative for abdominal pain and vomiting  Genitourinary: Negative for dysuria and hematuria  Musculoskeletal: Negative for arthralgias and back pain  Skin: Negative for color change and rash  Neurological: Positive for dizziness and numbness  Negative for seizures and syncope  All other systems reviewed and are negative        Past Medical History:   Diagnosis Date    Arthritis     Bladder cancer     Cancer ([facility] [address] )     skin melanoma;basal cell    Chronic pain disorder     from arthritis    Colon polyp     Does use hearing aid     bilat    Dry eyes, bilateral     GERD (gastroesophageal reflux disease)     History of kidney stones 10/2019    History of partial knee replacement     bilat    History of vertigo     Hyperlipidemia     Hypertension     Infusion extravasation of chemotherapy vesicant 11/2021    Kidney lesion     Lumbar disc disorder     compression of vertebrae L4-5-6    Muscle weakness     left hip area    Renal mass     left    Right ankle injury 03/05/2020    missed step of ladder     Right ankle pain     Shortness of breath     with activity    Tinnitus     Urothelial cancer     left    Wears glasses        Past Surgical History:   Procedure Laterality Date    COLONOSCOPY      CYSTOSCOPY Left 4/1/2020    Procedure: CYSTOSCOPY; URETERAL CATHETER PLACEMENT;  Surgeon: Vane Chan MD;  Location[facility];  Service: Urology    CYSTOSCOPY  05/11/2020    CYSTOSCOPY  06/04/2021    FL CYSTOGRAM 2020    FL RETROGRADE PYELOGRAM  2020    HERNIA REPAIR      umbilical with mesh    INGUINAL HERNIA REPAIR Right     with mesh    IR PORT PLACEMENT  2020    JOINT REPLACEMENT Bilateral     partials knee    LUMBAR EPIDURAL INJECTION      HI CYSTOURETHROSCOPY,URETER CATHETER Bilateral 2020    Procedure: CYSTOSCOPY; RIGHT RETROGRADE PYELOGRAM WITH RIGHT URETERAL CYTOLOGY SAMPLING; LEFT URETEROSCOPY WITH RENAL PELVIS BIOPSY AND LEFT STENT PLACEMENT;  Surgeon: Shavon Cedeño MD;  [facility];  Service: Urology    HI NEPHRECTOMY, W/PART  URETECTOMY Left 2020    Procedure: ROBOTIC LAPAROSCOPIC NEPHRO-URETERECTOMY;  Surgeon: Shavon Cedeño MD;  Location[facility];  Service: Urology    SKIN CANCER EXCISION      surface melanoma    TONSILLECTOMY      WISDOM TOOTH EXTRACTION         Social History     Socioeconomic History    Marital status: /Civil Union     Spouse name: None    Number of children: None    Years of education: None    Highest education level: None   Occupational History    None   Tobacco Use    Smoking status: Former Smoker     Types: Cigarettes     Quit date:      Years since quittin 0    Smokeless tobacco: Never Used   Vaping Use    Vaping Use: Never used   Substance and Sexual Activity    Alcohol use:  Yes     Alcohol/week: 17 0 standard drinks     Types: 7 Glasses of wine, 10 Cans of beer per week     Comment: socially    Drug use: Never    Sexual activity: Not Currently   Other Topics Concern    None   Social History Narrative    Daily caffeine use - 2 cups coffee      Social Determinants of Health     Financial Resource Strain: Not on file   Food Insecurity: Not on file   Transportation Needs: Not on file   Physical Activity: Not on file   Stress: Not on file   Social Connections: Not on file   Intimate Partner Violence: Not on file   Housing Stability: Not on file       Family History   Problem Relation Age of Onset  Liver cancer Father        Allergies   Allergen Reactions    Poison Ivy Extract Rash         Current Outpatient Medications:     acetaminophen (TYLENOL) 500 mg tablet, Take 2 tablets (1,000 mg total) by mouth every 8 (eight) hours, Disp: , Rfl:     allopurinol (ZYLOPRIM) 300 mg tablet, take 1 tablet by mouth once daily, Disp: 30 tablet, Rfl: 3    ALPRAZolam (XANAX) 0 25 mg tablet, Take 1 tablet (0 25 mg total) by mouth daily at bedtime as needed for anxiety (restless legs), Disp: 30 tablet, Rfl: 1    amLODIPine (NORVASC) 5 mg tablet, Take 5 mg by mouth daily  , Disp: , Rfl:     atorvastatin (LIPITOR) 80 mg tablet, Take 80 mg by mouth every other day evening, Disp: , Rfl:     docusate sodium (COLACE) 250 MG capsule, Take 1 capsule (250 mg total) by mouth daily, Disp: 60 capsule, Rfl: 6    famotidine (PEPCID) 20 mg tablet, take 1-2 tablets by mouth every evening, Disp: , Rfl:     folic acid (FOLVITE) 1 mg tablet, Take 1 tablet (1 mg total) by mouth daily, Disp: 90 tablet, Rfl: 4    hydrOXYzine HCL (ATARAX) 25 mg tablet, Take 1 tablet (25 mg total) by mouth every 6 (six) hours as needed for itching or anxiety, Disp: 60 tablet, Rfl: 2    Magnesium 250 MG TABS, 1 tablet 2 (two) times a day Taking 500mg in the morning and 500mg at night, Disp: , Rfl:     metoprolol tartrate (LOPRESSOR) 100 mg tablet, Take 50 mg by mouth daily, Disp: , Rfl:     morphine (MSIR) 15 mg tablet, Take 1/2 tablet 3 times a day for 5 days, then 1/2 tablet 2 times a day for 5 days, then 1/2 tablet once a day for 5 days, then stop, Disp: 15 tablet, Rfl: 0    naloxone (NARCAN) 4 mg/0 1 mL nasal spray, Administer 1 spray into a nostril  If breathing does not return to normal or if breathing difficulty resumes after 2-3 minutes, give another dose in the other nostril using a new spray  (Patient taking differently: Administer 1 spray into a nostril   If breathing does not return to normal or if breathing difficulty resumes after 2-3 minutes, give another dose in the other nostril using a new spray  As needed), Disp: 1 each, Rfl: 1    nystatin (MYCOSTATIN) cream, Apply topically 2 (two) times a day, Disp: 30 g, Rfl: 0    omeprazole (PriLOSEC) 20 mg delayed release capsule, Take 20 mg by mouth daily  , Disp: , Rfl:     predniSONE 10 mg tablet, Take 2 tablets (20 mg total) by mouth daily, Disp: 60 tablet, Rfl: 3    senna (SENOKOT) 8 6 mg, Take 1 tablet (8 6 mg total) by mouth daily at bedtime (Patient taking differently: Take 8 6 mg by mouth daily at bedtime As needed), Disp: 30 each, Rfl: 0    tadalafil (CIALIS) 20 MG tablet, Take 1 tablet by mouth one (1) hour prior to intercourse  Do not exceed more than two tablets per week , Disp: 12 tablet, Rfl: 3    terazosin (HYTRIN) 5 mg capsule, Take 2 capsules (10 mg total) by mouth daily at bedtime, Disp: 30 capsule, Rfl: 6    triamcinolone (KENALOG) 0 1 % lotion, Apply topically 3 (three) times a day, Disp: 60 mL, Rfl: 5    VITAMIN D PO, Take 1,000 Units by mouth daily , Disp: , Rfl:     zolpidem (AMBIEN) 5 mg tablet, Take 1 tablet (5 mg total) by mouth daily at bedtime as needed for sleep, Disp: 15 tablet, Rfl: 0    HYDROmorphone (DILAUDID) 2 mg tablet, Take 1 tablet (2 mg total) by mouth every 4 (four) hours as needed for moderate pain Max Daily Amount: 12 mg (Patient not taking: Reported on 11/24/2021 ), Disp: 90 tablet, Rfl: 0      Physical Exam:  /70 (BP Location: Left arm, Patient Position: Sitting, Cuff Size: Adult)   Pulse 64   Temp (!) 97 4 °F (36 3 °C)   Resp 18   Ht 5' 8" (1 727 m)   Wt 90 7 kg (200 lb)   SpO2 97%   BMI 30 41 kg/m²     Physical Exam  Constitutional:       Appearance: He is well-developed  HENT:      Head: Normocephalic and atraumatic  Eyes:      General: No scleral icterus  Right eye: No discharge  Left eye: No discharge  Conjunctiva/sclera: Conjunctivae normal       Pupils: Pupils are equal, round, and reactive to light  Neck:      Thyroid: No thyromegaly  Trachea: No tracheal deviation  Cardiovascular:      Rate and Rhythm: Normal rate and regular rhythm  Heart sounds: Normal heart sounds  No murmur heard  No friction rub  Pulmonary:      Effort: Pulmonary effort is normal  No respiratory distress  Breath sounds: Normal breath sounds  No wheezing or rales  Chest:      Chest wall: No tenderness  Abdominal:      General: There is no distension  Palpations: Abdomen is soft  There is no hepatomegaly or splenomegaly  Tenderness: There is no abdominal tenderness  There is no guarding or rebound  Musculoskeletal:         General: No tenderness or deformity  Normal range of motion  Cervical back: Normal range of motion and neck supple  Lymphadenopathy:      Cervical: No cervical adenopathy  Skin:     General: Skin is warm and dry  Coloration: Skin is not pale  Findings: No erythema or rash  Neurological:      Mental Status: He is alert and oriented to person, place, and time  Cranial Nerves: No cranial nerve deficit  Coordination: Coordination normal       Deep Tendon Reflexes: Reflexes are normal and symmetric  Psychiatric:         Behavior: Behavior normal          Thought Content: Thought content normal          Judgment: Judgment normal            Labs:  Lab Results   Component Value Date    WBC 7 20 01/18/2022    HGB 12 4 01/18/2022    HCT 38 5 01/18/2022    MCV 99 (H) 01/18/2022     01/18/2022     Lab Results   Component Value Date    K 3 9 01/18/2022     01/18/2022    CO2 30 01/18/2022    BUN 26 (H) 01/18/2022    CREATININE 1 51 (H) 01/18/2022    GLUF 113 (H) 12/16/2021    CALCIUM 9 2 01/18/2022    CORRECTEDCA 9 5 12/09/2021    AST 22 01/18/2022    ALT 40 01/18/2022    ALKPHOS 48 01/18/2022    EGFR 45 01/18/2022     No results found for: TSH    Patient voiced understanding and agreement in the above discussion   Aware to contact our office with questions/symptoms in the interim

## 2022-01-21 ENCOUNTER — HOSPITAL ENCOUNTER (OUTPATIENT)
Dept: INFUSION CENTER | Facility: HOSPITAL | Age: 72
Discharge: HOME | End: 2022-01-21
Attending: INTERNAL MEDICINE
Payer: MEDICARE

## 2022-01-21 VITALS
BODY MASS INDEX: 30.57 KG/M2 | DIASTOLIC BLOOD PRESSURE: 65 MMHG | TEMPERATURE: 97.8 F | HEIGHT: 68 IN | SYSTOLIC BLOOD PRESSURE: 110 MMHG | RESPIRATION RATE: 16 BRPM | WEIGHT: 201.72 LBS | HEART RATE: 88 BPM

## 2022-01-21 DIAGNOSIS — C68.9 UROTHELIAL CANCER (HCC): ICD-10-CM

## 2022-01-21 DIAGNOSIS — G89.3 CANCER ASSOCIATED PAIN: Primary | ICD-10-CM

## 2022-01-21 DIAGNOSIS — C65.2 CANCER OF LEFT RENAL PELVIS (HCC): ICD-10-CM

## 2022-01-21 DIAGNOSIS — E86.0 DEHYDRATION: ICD-10-CM

## 2022-01-21 DIAGNOSIS — C79.10 METASTATIC UROTHELIAL CARCINOMA (HCC): ICD-10-CM

## 2022-01-21 DIAGNOSIS — E83.42 HYPOMAGNESEMIA: ICD-10-CM

## 2022-01-21 PROCEDURE — 96413 CHEMO IV INFUSION 1 HR: CPT

## 2022-01-21 PROCEDURE — 96367 TX/PROPH/DG ADDL SEQ IV INF: CPT

## 2022-01-21 RX ORDER — ACETAMINOPHEN 325 MG/1
650 TABLET ORAL ONCE
Status: DISCONTINUED | OUTPATIENT
Start: 2022-01-21 | End: 2022-01-25 | Stop reason: HOSPADM

## 2022-01-21 RX ORDER — SODIUM CHLORIDE 9 MG/ML
20 INJECTION, SOLUTION INTRAVENOUS ONCE
Status: COMPLETED | OUTPATIENT
Start: 2022-01-21 | End: 2022-01-21

## 2022-01-21 RX ADMIN — MAGNESIUM SULFATE HEPTAHYDRATE: 500 INJECTION, SOLUTION INTRAMUSCULAR; INTRAVENOUS at 10:13

## 2022-01-21 RX ADMIN — DEXAMETHASONE SODIUM PHOSPHATE: 10 INJECTION, SOLUTION INTRAMUSCULAR; INTRAVENOUS at 09:39

## 2022-01-21 RX ADMIN — SODIUM CHLORIDE 20 ML/HR: 0.9 INJECTION, SOLUTION INTRAVENOUS at 09:17

## 2022-01-21 RX ADMIN — ENFORTUMAB VEDOTIN 110 MG: 30 INJECTION, POWDER, LYOPHILIZED, FOR SOLUTION INTRAVENOUS at 11:22

## 2022-01-21 RX ADMIN — DIPHENHYDRAMINE HYDROCHLORIDE 25 MG: 50 INJECTION INTRAMUSCULAR; INTRAVENOUS at 09:17

## 2022-01-21 NOTE — PROGRESS NOTES
Pt tolerated todays hydration, mag and padcev without issue  Port with excellent blood return  Discharged ambulatory with avs  Will return for hydration next week

## 2022-01-25 ENCOUNTER — HOSPITAL ENCOUNTER (OUTPATIENT)
Dept: INFUSION CENTER | Facility: HOSPITAL | Age: 72
Discharge: HOME | End: 2022-01-25
Payer: MEDICARE

## 2022-01-25 VITALS
SYSTOLIC BLOOD PRESSURE: 109 MMHG | OXYGEN SATURATION: 96 % | TEMPERATURE: 97.3 F | HEART RATE: 79 BPM | RESPIRATION RATE: 16 BRPM | DIASTOLIC BLOOD PRESSURE: 60 MMHG

## 2022-01-25 DIAGNOSIS — E86.0 DEHYDRATION: Primary | ICD-10-CM

## 2022-01-25 PROCEDURE — 96360 HYDRATION IV INFUSION INIT: CPT

## 2022-01-25 RX ADMIN — SODIUM CHLORIDE 1000 ML: 0.9 INJECTION, SOLUTION INTRAVENOUS at 08:47

## 2022-01-25 NOTE — PROGRESS NOTES
Patient tolerated IV hydration today  AVS given to patient  All personal belongings taken with patient  Patient ambulated off unit without incident

## 2022-01-28 ENCOUNTER — HOSPITAL ENCOUNTER (OUTPATIENT)
Dept: INFUSION CENTER | Facility: HOSPITAL | Age: 72
Discharge: HOME | End: 2022-01-28
Payer: MEDICARE

## 2022-01-28 VITALS
DIASTOLIC BLOOD PRESSURE: 75 MMHG | TEMPERATURE: 96.8 F | HEART RATE: 59 BPM | OXYGEN SATURATION: 97 % | SYSTOLIC BLOOD PRESSURE: 131 MMHG | RESPIRATION RATE: 16 BRPM

## 2022-01-28 DIAGNOSIS — E86.0 DEHYDRATION: Primary | ICD-10-CM

## 2022-01-28 PROCEDURE — 96360 HYDRATION IV INFUSION INIT: CPT

## 2022-01-28 RX ADMIN — SODIUM CHLORIDE 1000 ML: 0.9 INJECTION, SOLUTION INTRAVENOUS at 11:41

## 2022-01-28 NOTE — PROGRESS NOTES
Patient tolerated IV hydration without issue  Patient declined AVS   He is aware of next appointment  Patient ambulated off unit without incident  All personal belongings taken with patient

## 2022-01-31 ENCOUNTER — APPOINTMENT (OUTPATIENT)
Dept: LAB | Facility: HOSPITAL | Age: 72
End: 2022-01-31
Attending: INTERNAL MEDICINE
Payer: MEDICARE

## 2022-01-31 DIAGNOSIS — N18.31 STAGE 3A CHRONIC KIDNEY DISEASE (HCC): ICD-10-CM

## 2022-01-31 DIAGNOSIS — C79.10 METASTATIC UROTHELIAL CARCINOMA (HCC): ICD-10-CM

## 2022-01-31 LAB
ALBUMIN SERPL BCP-MCNC: 3.5 G/DL (ref 3.5–5)
ALP SERPL-CCNC: 64 U/L (ref 46–116)
ALT SERPL W P-5'-P-CCNC: 39 U/L (ref 12–78)
ANION GAP SERPL CALCULATED.3IONS-SCNC: 6 MMOL/L (ref 4–13)
AST SERPL W P-5'-P-CCNC: 18 U/L (ref 5–45)
BASOPHILS # BLD AUTO: 0.04 THOUSANDS/ΜL (ref 0–0.1)
BASOPHILS NFR BLD AUTO: 1 % (ref 0–1)
BILIRUB SERPL-MCNC: 0.88 MG/DL (ref 0.2–1)
BUN SERPL-MCNC: 18 MG/DL (ref 5–25)
CALCIUM SERPL-MCNC: 9.4 MG/DL (ref 8.3–10.1)
CHLORIDE SERPL-SCNC: 107 MMOL/L (ref 100–108)
CO2 SERPL-SCNC: 29 MMOL/L (ref 21–32)
CREAT SERPL-MCNC: 1.35 MG/DL (ref 0.6–1.3)
EOSINOPHIL # BLD AUTO: 0 THOUSAND/ΜL (ref 0–0.61)
EOSINOPHIL NFR BLD AUTO: 0 % (ref 0–6)
ERYTHROCYTE [DISTWIDTH] IN BLOOD BY AUTOMATED COUNT: 16.4 % (ref 11.6–15.1)
GFR SERPL CREATININE-BSD FRML MDRD: 52 ML/MIN/1.73SQ M
GLUCOSE SERPL-MCNC: 110 MG/DL (ref 65–140)
HCT VFR BLD AUTO: 40 % (ref 36.5–49.3)
HGB BLD-MCNC: 13.1 G/DL (ref 12–17)
IMM GRANULOCYTES # BLD AUTO: 0.07 THOUSAND/UL (ref 0–0.2)
IMM GRANULOCYTES NFR BLD AUTO: 1 % (ref 0–2)
LYMPHOCYTES # BLD AUTO: 1.6 THOUSANDS/ΜL (ref 0.6–4.47)
LYMPHOCYTES NFR BLD AUTO: 27 % (ref 14–44)
MAGNESIUM SERPL-MCNC: 2 MG/DL (ref 1.6–2.6)
MCH RBC QN AUTO: 32.3 PG (ref 26.8–34.3)
MCHC RBC AUTO-ENTMCNC: 32.8 G/DL (ref 31.4–37.4)
MCV RBC AUTO: 99 FL (ref 82–98)
MONOCYTES # BLD AUTO: 0.64 THOUSAND/ΜL (ref 0.17–1.22)
MONOCYTES NFR BLD AUTO: 11 % (ref 4–12)
NEUTROPHILS # BLD AUTO: 3.66 THOUSANDS/ΜL (ref 1.85–7.62)
NEUTS SEG NFR BLD AUTO: 60 % (ref 43–75)
NRBC BLD AUTO-RTO: 0 /100 WBCS
PLATELET # BLD AUTO: 189 THOUSANDS/UL (ref 149–390)
PMV BLD AUTO: 10.6 FL (ref 8.9–12.7)
POTASSIUM SERPL-SCNC: 3.8 MMOL/L (ref 3.5–5.3)
PROT SERPL-MCNC: 6.9 G/DL (ref 6.4–8.2)
RBC # BLD AUTO: 4.06 MILLION/UL (ref 3.88–5.62)
SODIUM SERPL-SCNC: 142 MMOL/L (ref 136–145)
WBC # BLD AUTO: 6.01 THOUSAND/UL (ref 4.31–10.16)

## 2022-01-31 PROCEDURE — 83735 ASSAY OF MAGNESIUM: CPT

## 2022-01-31 PROCEDURE — 80053 COMPREHEN METABOLIC PANEL: CPT

## 2022-01-31 PROCEDURE — 85025 COMPLETE CBC W/AUTO DIFF WBC: CPT

## 2022-01-31 PROCEDURE — 36415 COLL VENOUS BLD VENIPUNCTURE: CPT

## 2022-02-01 ENCOUNTER — OFFICE VISIT (OUTPATIENT)
Dept: HEMATOLOGY ONCOLOGY | Facility: CLINIC | Age: 72
End: 2022-02-01
Payer: MEDICARE

## 2022-02-01 ENCOUNTER — HOSPITAL ENCOUNTER (OUTPATIENT)
Dept: INFUSION CENTER | Facility: HOSPITAL | Age: 72
Discharge: HOME | End: 2022-02-01
Payer: MEDICARE

## 2022-02-01 VITALS
TEMPERATURE: 97.6 F | RESPIRATION RATE: 16 BRPM | WEIGHT: 201.6 LBS | DIASTOLIC BLOOD PRESSURE: 70 MMHG | SYSTOLIC BLOOD PRESSURE: 114 MMHG | HEIGHT: 68 IN | BODY MASS INDEX: 30.55 KG/M2 | HEART RATE: 60 BPM | OXYGEN SATURATION: 97 %

## 2022-02-01 VITALS
DIASTOLIC BLOOD PRESSURE: 74 MMHG | RESPIRATION RATE: 16 BRPM | SYSTOLIC BLOOD PRESSURE: 129 MMHG | TEMPERATURE: 97.3 F | HEART RATE: 58 BPM

## 2022-02-01 DIAGNOSIS — E86.0 DEHYDRATION: Primary | ICD-10-CM

## 2022-02-01 DIAGNOSIS — G47.09 OTHER INSOMNIA: ICD-10-CM

## 2022-02-01 DIAGNOSIS — C65.2 CANCER OF LEFT RENAL PELVIS (HCC): ICD-10-CM

## 2022-02-01 DIAGNOSIS — C79.10 METASTATIC UROTHELIAL CARCINOMA (HCC): Primary | ICD-10-CM

## 2022-02-01 DIAGNOSIS — C68.9 UROTHELIAL CANCER (HCC): ICD-10-CM

## 2022-02-01 DIAGNOSIS — E83.42 HYPOMAGNESEMIA: ICD-10-CM

## 2022-02-01 DIAGNOSIS — C79.10 METASTATIC UROTHELIAL CARCINOMA (HCC): ICD-10-CM

## 2022-02-01 PROCEDURE — 99214 OFFICE O/P EST MOD 30 MIN: CPT | Performed by: NURSE PRACTITIONER

## 2022-02-01 PROCEDURE — 96360 HYDRATION IV INFUSION INIT: CPT

## 2022-02-01 RX ORDER — ZOLPIDEM TARTRATE 5 MG/1
5 TABLET ORAL
Qty: 30 TABLET | Refills: 1 | Status: SHIPPED | OUTPATIENT
Start: 2022-02-01

## 2022-02-01 RX ADMIN — SODIUM CHLORIDE 1000 ML: 0.9 INJECTION, SOLUTION INTRAVENOUS at 11:11

## 2022-02-01 NOTE — PROGRESS NOTES
Pt tolerated todays hydration without issue  Good blood return noted from port  Flushed and deaccessed per routine  Discharged ambulatory with avs  Aware of his md appt this afternoon

## 2022-02-01 NOTE — PROGRESS NOTES
Time out with Frida Gonzalez  Pt is to have Padcev held this Friday the 4th due to neuropathy  Will resume next Friday if neuropathy is down to a grade one  Message relayed Kirsten Stoll in pharmacy

## 2022-02-01 NOTE — PROGRESS NOTES
Hematology/Oncology Outpatient Follow-up  Chely Gross 66 y o  male 1950 [id number]    Date:  2/1/2022      Assessment and Plan:  1  Metastatic urothelial carcinoma ([facility] [address] )  Patient is due to start cycle 6 D1 of his Padcev (Enfortumab Vedotin) this week however he seems to have significant worsening of his peripheral neuropathy which is now affecting his instrumentation (grade 2)  Per PI recommendation is to hold treatment for grade 2 until the neuropathy recovers to grade 1 or less than resume at same dose  If it recurs in the future can decrease the dose by 1 dose adjustment  The patient was told that we will hold his treatment this week and re-evaluate his symptoms next week with a virtual visit  Will make a decision at his next visit next week if we will resume treatment or hold for an additional week  The patient his wife were in agreement with the plan  He will continue to get the IV hydration as needed in the infusion center which he feels is helping him overall and improving his fatigue  Has been receiving twice weekly  The plan is to pursue another imaging study with a PET-CT scan around the beginning of March 2022  The patient and his wife are going on a trip to [facility] around the middle of March  They are very interested in knowing the results of the PET-CT scan before daily for their trip whether its negative her positive  Would like to schedule the test today to ensure that is done right before their vacation     - CBC and differential; Standing  - Comprehensive metabolic panel; Standing  - Magnesium; Standing  - CBC and differential  - Comprehensive metabolic panel  - Magnesium  - Infusion Calculated Appointment Request; Future  - CBC and differential; Future  - Comprehensive metabolic panel; Future  - NM PET CT skull base to mid thigh; Future    2   Other insomnia  Patient is asking for refill on his Ambien he is using it on occasion when he has difficulty sleeping only due to his restless legs  - zolpidem (AMBIEN) 5 mg tablet; Take 1 tablet (5 mg total) by mouth daily at bedtime as needed for sleep  Dispense: 30 tablet; Refill: 1      HPI:  Patient presents today for a follow-up visit accompanied by his wife  He reports that over the past 2-3 weeks he has noticed significant worsening of his peripheral neuropathy  Is now starting to affect his dexterity and instrumentation; is not able to write with a pen appropriately  Reports that his feet feel cool even though they are warm to the touch  Otherwise he has no new complaints  Continues to report fatigue but feels that the additional hydration twice a week is improving his fatigue significantly  His most recent laboratory studies from 01/31/2021 were stable; white cells and platelets were normal, he is not anemic H&H 13 1/40 continues to have mild elevation of his MCV 99  Stable slightly improved chronic renal dysfunction creatinine 1 35, GFR 52 remaining metabolic panel is normal     Oncology History Overview Note   Patient has a history of hypertension, hyperlipidemia, chronic lower back pain  He had an MRI of the lower spine for further evaluation of his chronic lower back pain  The MRI on 12/02/2019 revealed Multiple left renal masses to include a left lower pole cyst and a rounded structure in the left upper pole which may also represent cyst   Left upper pole renal masses approximately 3 cm in greatest linear dimension  A CT with renal protocol was done on 12/12/2019 which also showed the infiltrating lesion in the upper pole of the left kidney measuring about the 3 5 cm in greatest dimension without any hint of retroperitoneal lymphadenopathy  A CT scan of the abdomen pelvis on 01/14/2020 showed the same findings with the mild hydronephrosis on the left  The urine cytology on 01/03/2020 showed high-grade urothelial carcinoma    A cystoscopy was then done, a biopsy was taken from the left renal pelvic region which showed high-grade urothelial carcinoma without evidence of lamina propria invasion  The detrusor muscle/muscularis propria were not present for evaluation  The recommendation was to pursue left robotic assisted laparoscopic nephroureterectomy with bladder cuff excision which was done on 04/01/2020  The final pathology revealed;  - Invasive high grade urothelial carcinoma arising in renal pelvis  - Bladder cuff margin is negative for carcinoma and no evidence of high grade dysplasia  - Ureters with no significant pathologic abnormality  - Two benign simple cysts  - Adrenal gland is negative for malignancy  - One lymph node, negative for malignancy (0/1)  The tumor size was 4 5 cm with invasion beyond the muscularis into the periurethral fat or peripelvic fat or the renal parenchyma  The margins were uninvolved by carcinoma or carcinoma in situ  The final pathology was pT3 pN0  Patient barely tolerated 1 cycle of adjuvant chemotherapy with split dose cisplatin and gemcitabine with a lot of side effects  The treatment had to be discontinued due to significant worsening renal dysfunction 6/2020  Patient started to complain about significant abdominal/left inguinal pain around August/September 2020  Unfortunately repeat imaging revealed recurrent/metastatic disease  9/4/20 CT C/A/P- Status post left nephrectomy for resection of urothelial neoplasm  Lymphadenopathy in the left nephrectomy bed has increased since the prior examination, concerning for metastatic disease  Ill-defined hyperattenuating masslike focus in the left rectus muscle, not identified on the prior examination  This is suspicious for a metastatic lesion  A rectus hematoma is also considered  9/23/20 PET scan- 1  Multiple FDG avid lymph nodes in the retrocrural region, retroperitoneum and the left renal bed compatible with metastasis  These have progressed from recent CT    2   FDG avid mass involving the left rectus abdominal musculature compatible with metastasis which is larger from recent CT  3  Several small lymph nodes adjacent to the mid to distal esophagus with FDG uptake suspicious for metastasis  Small focus of FDG uptake also suggested in the right hilar region, metastasis is not excluded  This could be reassessed on follow-up  4   Subtle focus of FDG uptake at the T2 level on the left, nonspecific, could be related to early degenerative changes, developing metastasis is not entirely excluded  This could be reassessed on follow-up  5  Prostate is mildly prominent with heterogeneous FDG uptake  This could be inflammatory  Correlate for prostatitis  He completed palliative radiation to the left abdomen 12/3/20     His PET scan from 08/16/2021 showed progression of his disease with hypermetabolic soft tissue lesions at the level of the right shoulder and right axilla with interval progression of the retroperitoneal and mesenteric hypermetabolic metastasis  He did have the new lesion to his right upper back/shoulder which was causing significant localized pain  This excised by his surgeon 08/09/2021  Pathology confirmed metastatic high-grade carcinoma consistent with his no primary urothelial carcinoma  He received palliative radiation to his right shoulder/axilla region  Urothelial cancer ([address] )   1/3/2020 Biopsy    Final Diagnosis    A  Urine, Clean Catch, Thin Prep:  High grade urothelial carcinoma (HGUC) - see comment  1/3/2020 Initial Diagnosis    Urothelial cancer ([address] )  T3 N0 (stage IIIA)     1/17/2020 Biopsy    Final Diagnosis    A  Ureter, Right, left renal pelvis biopsy  -Fragments of high grade urothelial carcinoma   -No evidence of lamina propria invasion   -Detrusor muscle/ muscularis propria is not present for evaluation  B  Urinary Bladder, bladder biopsy:  -Fragments of low grade papillary lesion   See note  -No evidence of invasion seen  -Unremarkable fragment of detrusor muscle seen  4/1/2020 Surgery    left robotic assisted laparoscopic nephroureterectomy with bladder cuff excision     Final Diagnosis    A  Left kidney, ureter, and bladder cuff, nephroureterectomy:  - Invasive high grade urothelial carcinoma arising in renal pelvis  - Bladder cuff margin is negative for carcinoma and no evidence of high grade dysplasia  - Ureters with no significant pathologic abnormality  - Two benign simple cysts  - Adrenal gland is negative for malignancy  - One lymph node, negative for malignancy (0/1)          5/14/2020 - 6/3/2020 Chemotherapy    CISplatin (PLATINOL) split dose 35 mg/m2 = 75 3 mg (50 % of original dose 70 mg/m2), Intravenous,   Administration: 75 3 mg (5/14/2020), 75 3 mg (5/21/2020)  gemcitabine (GEMZAR) 2,200 mg in sodium chloride 0 9 % 250 mL infusion, 2,150 2 mg (80 % of original dose 1,250 mg/m2), Intravenous,   Administration: 2,200 mg (5/14/2020), 2,200 mg (5/21/2020)    (only completed 1 cycle- d/c d/t adverse effects and worsening renal dysfunction)     10/9/2020 - 9/9/2021 Chemotherapy    pembrolizumab (KEYTRUDA) IVPB, 200 mg, Intravenous, Once, 16 of 20 cycles  Administration: 200 mg (10/9/2020), 200 mg (10/30/2020), 200 mg (11/20/2020), 200 mg (12/11/2020), 200 mg (12/31/2020), 200 mg (1/22/2021), 200 mg (2/12/2021), 200 mg (3/5/2021), 200 mg (3/26/2021), 200 mg (4/16/2021), 200 mg (5/7/2021), 200 mg (5/28/2021), 200 mg (6/18/2021), 200 mg (7/9/2021), 200 mg (7/30/2021), 200 mg (8/20/2021)     11/19/2020 - 12/3/2020 Radiation    Treatment:  Course: C1    Plan ID Energy Fractions Dose per Fraction (cGy) Dose Correction (cGy) Total Dose Delivered (cGy) Elapsed Days   L Abdomen 6X 10 / 10 300 0 3,000 14        9/10/2021 -  Chemotherapy    enfortumab vedotin-ejfv (PADCEV) IVPB, 1 25 mg/kg = 114 mg, Intravenous, Once, 6 of 8 cycles  Administration: 114 mg (9/10/2021), 114 mg (9/17/2021), 110 mg (10/8/2021), 110 mg (9/24/2021), 110 mg (10/15/2021), 110 mg (11/12/2021), 110 mg (10/22/2021), 110 mg (11/19/2021), 110 mg (12/10/2021), 110 mg (11/26/2021), 110 mg (12/17/2021), 110 mg (1/7/2022), 110 mg (12/23/2021), 110 mg (1/14/2022), 110 mg (1/21/2022)     Cancer of left renal pelvis ([facility] [address] )   4/23/2020 Initial Diagnosis    Cancer of left renal pelvis ([facility] [address] )     10/9/2020 - 9/9/2021 Chemotherapy    pembrolizumab (KEYTRUDA) IVPB, 200 mg, Intravenous, Once, 16 of 20 cycles  Administration: 200 mg (10/9/2020), 200 mg (10/30/2020), 200 mg (11/20/2020), 200 mg (12/11/2020), 200 mg (12/31/2020), 200 mg (1/22/2021), 200 mg (2/12/2021), 200 mg (3/5/2021), 200 mg (3/26/2021), 200 mg (4/16/2021), 200 mg (5/7/2021), 200 mg (5/28/2021), 200 mg (6/18/2021), 200 mg (7/9/2021), 200 mg (7/30/2021), 200 mg (8/20/2021)     9/10/2021 -  Chemotherapy    enfortumab vedotin-ejfv (PADCEV) IVPB, 1 25 mg/kg = 114 mg, Intravenous, Once, 6 of 8 cycles  Administration: 114 mg (9/10/2021), 114 mg (9/17/2021), 110 mg (10/8/2021), 110 mg (9/24/2021), 110 mg (10/15/2021), 110 mg (11/12/2021), 110 mg (10/22/2021), 110 mg (11/19/2021), 110 mg (12/10/2021), 110 mg (11/26/2021), 110 mg (12/17/2021), 110 mg (1/7/2022), 110 mg (12/23/2021), 110 mg (1/14/2022), 110 mg (1/21/2022)      Surgery       Metastatic urothelial carcinoma ([facility] [address] )   8/9/2021 Initial Diagnosis    Metastatic urothelial carcinoma ([facility] [address] )     9/8/2021 - 9/21/2021 Radiation    Treatment:  Course: C2    Plan ID Energy Fractions Dose per Fraction (cGy) Dose Correction (cGy) Total Dose Delivered (cGy) Elapsed Days   R Axil_Shl # 6X 10 / 10 300 0 3,000 13      Treatment dates:  C2: 9/8/2021 - 9/21/2021     9/10/2021 -  Chemotherapy    enfortumab vedotin-ejfv (PADCEV) IVPB, 1 25 mg/kg = 114 mg, Intravenous, Once, 6 of 8 cycles  Administration: 114 mg (9/10/2021), 114 mg (9/17/2021), 110 mg (10/8/2021), 110 mg (9/24/2021), 110 mg (10/15/2021), 110 mg (11/12/2021), 110 mg (10/22/2021), 110 mg (11/19/2021), 110 mg (12/10/2021), 110 mg (11/26/2021), 110 mg (12/17/2021), 110 mg (1/7/2022), 110 mg (12/23/2021), 110 mg (1/14/2022), 110 mg (1/21/2022)         Interval history:    ROS: Review of Systems   Constitutional: Positive for fatigue  Negative for activity change, appetite change, chills, fever and unexpected weight change  HENT: Positive for hearing loss and mouth sores  Negative for congestion, nosebleeds, sore throat and trouble swallowing  Eyes: Negative  Respiratory: Positive for shortness of breath  Negative for cough and chest tightness  Cardiovascular: Negative for chest pain, palpitations and leg swelling  Gastrointestinal: Positive for diarrhea  Negative for abdominal distention, abdominal pain, blood in stool, constipation, nausea and vomiting  Genitourinary: Negative for difficulty urinating, dysuria, frequency, hematuria and urgency  Musculoskeletal: Positive for back pain  Negative for arthralgias, gait problem, joint swelling and myalgias  Skin: Positive for color change  Negative for pallor and rash  Neurological: Positive for dizziness and numbness  Negative for weakness, light-headedness and headaches  Hematological: Negative for adenopathy  Does not bruise/bleed easily  Psychiatric/Behavioral: Positive for sleep disturbance  Negative for dysphoric mood  The patient is not nervous/anxious          Past Medical History:   Diagnosis Date    Arthritis     Bladder cancer     Cancer ([facility] [address] )     skin melanoma;basal cell    Chronic pain disorder     from arthritis    Colon polyp     Does use hearing aid     bilat    Dry eyes, bilateral     GERD (gastroesophageal reflux disease)     History of kidney stones 10/2019    History of partial knee replacement     bilat    History of vertigo     Hyperlipidemia     Hypertension     Infusion extravasation of chemotherapy vesicant 11/2021    Kidney lesion     Lumbar disc disorder     compression of vertebrae L4-5-6    Muscle weakness     left hip area    Renal mass left    Right ankle injury 2020    missed step of ladder     Right ankle pain     Shortness of breath     with activity    Tinnitus     Urothelial cancer     left    Wears glasses        Past Surgical History:   Procedure Laterality Date    COLONOSCOPY      CYSTOSCOPY Left 2020    Procedure: CYSTOSCOPY; URETERAL CATHETER PLACEMENT;  Surgeon: Hannah Horta MD;  Location[facility];  Service: Urology    CYSTOSCOPY  2020    CYSTOSCOPY  2021    FL CYSTOGRAM  2020    FL RETROGRADE PYELOGRAM  2020    HERNIA REPAIR      umbilical with mesh    INGUINAL HERNIA REPAIR Right     with mesh    IR PORT PLACEMENT  2020    JOINT REPLACEMENT Bilateral     partials knee    LUMBAR EPIDURAL INJECTION      ND CYSTOURETHROSCOPY,URETER CATHETER Bilateral 2020    Procedure: CYSTOSCOPY; RIGHT RETROGRADE PYELOGRAM WITH RIGHT URETERAL CYTOLOGY SAMPLING; LEFT URETEROSCOPY WITH RENAL PELVIS BIOPSY AND LEFT STENT PLACEMENT;  Surgeon: Hannah Horta MD;  [facility];  Service: Urology    ND NEPHRECTOMY, W/PART  URETECTOMY Left 2020    Procedure: ROBOTIC LAPAROSCOPIC NEPHRO-URETERECTOMY;  Surgeon: Hannah Horta MD;  Location[facility];  Service: Urology    SKIN CANCER EXCISION      surface melanoma    TONSILLECTOMY      WISDOM TOOTH EXTRACTION         Social History     Socioeconomic History    Marital status: /Civil Union     Spouse name: None    Number of children: None    Years of education: None    Highest education level: None   Occupational History    None   Tobacco Use    Smoking status: Former Smoker     Types: Cigarettes     Quit date:      Years since quittin 1    Smokeless tobacco: Never Used   Vaping Use    Vaping Use: Never used   Substance and Sexual Activity    Alcohol use:  Yes     Alcohol/week: 17 0 standard drinks     Types: 7 Glasses of wine, 10 Cans of beer per week     Comment: socially    Drug use: Never    Sexual activity: Not Currently   Other Topics Concern    None   Social History Narrative    Daily caffeine use - 2 cups coffee      Social Determinants of Health     Financial Resource Strain: Not on file   Food Insecurity: Not on file   Transportation Needs: Not on file   Physical Activity: Not on file   Stress: Not on file   Social Connections: Not on file   Intimate Partner Violence: Not on file   Housing Stability: Not on file       Family History   Problem Relation Age of Onset    Liver cancer Father        Allergies   Allergen Reactions    Poison Ivy Extract Rash         Current Outpatient Medications:     acetaminophen (TYLENOL) 500 mg tablet, Take 2 tablets (1,000 mg total) by mouth every 8 (eight) hours, Disp: , Rfl:     allopurinol (ZYLOPRIM) 300 mg tablet, take 1 tablet by mouth once daily, Disp: 30 tablet, Rfl: 3    ALPRAZolam (XANAX) 0 25 mg tablet, Take 1 tablet (0 25 mg total) by mouth daily at bedtime as needed for anxiety (restless legs), Disp: 30 tablet, Rfl: 1    amLODIPine (NORVASC) 5 mg tablet, Take 5 mg by mouth daily  , Disp: , Rfl:     atorvastatin (LIPITOR) 80 mg tablet, Take 80 mg by mouth every other day evening, Disp: , Rfl:     docusate sodium (COLACE) 250 MG capsule, Take 1 capsule (250 mg total) by mouth daily, Disp: 60 capsule, Rfl: 6    famotidine (PEPCID) 20 mg tablet, take 1-2 tablets by mouth every evening, Disp: , Rfl:     folic acid (FOLVITE) 1 mg tablet, Take 1 tablet (1 mg total) by mouth daily, Disp: 90 tablet, Rfl: 4    hydrOXYzine HCL (ATARAX) 25 mg tablet, Take 1 tablet (25 mg total) by mouth every 6 (six) hours as needed for itching or anxiety, Disp: 60 tablet, Rfl: 2    Magnesium 250 MG TABS, 1 tablet 2 (two) times a day Taking 500mg in the morning and 500mg at night, Disp: , Rfl:     metoprolol tartrate (LOPRESSOR) 100 mg tablet, Take 50 mg by mouth daily, Disp: , Rfl:     morphine (MSIR) 15 mg tablet, Take 1/2 tablet 3 times a day for 5 days, then 1/2 tablet 2 times a day for 5 days, then 1/2 tablet once a day for 5 days, then stop, Disp: 15 tablet, Rfl: 0    naloxone (NARCAN) 4 mg/0 1 mL nasal spray, Administer 1 spray into a nostril  If breathing does not return to normal or if breathing difficulty resumes after 2-3 minutes, give another dose in the other nostril using a new spray  (Patient taking differently: Administer 1 spray into a nostril  If breathing does not return to normal or if breathing difficulty resumes after 2-3 minutes, give another dose in the other nostril using a new spray  As needed), Disp: 1 each, Rfl: 1    nystatin (MYCOSTATIN) cream, Apply topically 2 (two) times a day, Disp: 30 g, Rfl: 0    omeprazole (PriLOSEC) 20 mg delayed release capsule, Take 20 mg by mouth daily  , Disp: , Rfl:     predniSONE 10 mg tablet, Take 2 tablets (20 mg total) by mouth daily, Disp: 60 tablet, Rfl: 3    senna (SENOKOT) 8 6 mg, Take 1 tablet (8 6 mg total) by mouth daily at bedtime (Patient taking differently: Take 8 6 mg by mouth daily at bedtime As needed), Disp: 30 each, Rfl: 0    tadalafil (CIALIS) 20 MG tablet, Take 1 tablet by mouth one (1) hour prior to intercourse    Do not exceed more than two tablets per week , Disp: 12 tablet, Rfl: 3    terazosin (HYTRIN) 5 mg capsule, Take 2 capsules (10 mg total) by mouth daily at bedtime, Disp: 30 capsule, Rfl: 6    triamcinolone (KENALOG) 0 1 % lotion, Apply topically 3 (three) times a day, Disp: 60 mL, Rfl: 5    VITAMIN D PO, Take 1,000 Units by mouth daily , Disp: , Rfl:     zolpidem (AMBIEN) 5 mg tablet, Take 1 tablet (5 mg total) by mouth daily at bedtime as needed for sleep, Disp: 30 tablet, Rfl: 1    HYDROmorphone (DILAUDID) 2 mg tablet, Take 1 tablet (2 mg total) by mouth every 4 (four) hours as needed for moderate pain Max Daily Amount: 12 mg (Patient not taking: Reported on 11/24/2021 ), Disp: 90 tablet, Rfl: 0  No current facility-administered medications for this visit       Physical Exam:  /70 (BP Location: Left arm, Patient Position: Sitting, Cuff Size: Adult)   Pulse 60   Temp 97 6 °F (36 4 °C) (Tympanic)   Resp 16   Ht 5' 8" (1 727 m)   Wt 91 4 kg (201 lb 9 6 oz)   SpO2 97%   BMI 30 65 kg/m²     Physical Exam  Vitals reviewed  Constitutional:       General: He is not in acute distress  Appearance: He is well-developed  He is not diaphoretic  HENT:      Head: Normocephalic and atraumatic  Eyes:      General: Lids are normal  No scleral icterus  Conjunctiva/sclera: Conjunctivae normal       Pupils: Pupils are equal, round, and reactive to light  Neck:      Thyroid: No thyromegaly  Cardiovascular:      Rate and Rhythm: Regular rhythm  Heart sounds: Normal heart sounds  No murmur heard  Pulmonary:      Effort: Pulmonary effort is normal  No respiratory distress  Breath sounds: Normal breath sounds  Chest:   Breasts:      Right: No axillary adenopathy  Left: No axillary adenopathy  Abdominal:      General: There is no distension  Palpations: Abdomen is soft  There is no hepatomegaly or splenomegaly  Musculoskeletal:         General: No swelling  Normal range of motion  Cervical back: Normal range of motion and neck supple  Lymphadenopathy:      Cervical: No cervical adenopathy  Upper Body:      Right upper body: No axillary adenopathy  Left upper body: No axillary adenopathy  Skin:     General: Skin is warm  Findings: No erythema or rash  Neurological:      General: No focal deficit present  Mental Status: He is alert and oriented to person, place, and time  Psychiatric:         Mood and Affect: Mood is depressed  Affect is blunt  Behavior: Behavior normal  Behavior is cooperative  Thought Content:  Thought content normal          Judgment: Judgment normal            Labs:  Lab Results   Component Value Date    WBC 6 01 01/31/2022    HGB 13 1 01/31/2022    HCT 40 0 01/31/2022    MCV 99 (H) 01/31/2022     01/31/2022     Lab Results   Component Value Date    K 3 8 01/31/2022     01/31/2022    CO2 29 01/31/2022    BUN 18 01/31/2022    CREATININE 1 35 (H) 01/31/2022    GLUF 113 (H) 12/16/2021    CALCIUM 9 4 01/31/2022    CORRECTEDCA 9 5 12/09/2021    AST 18 01/31/2022    ALT 39 01/31/2022    ALKPHOS 64 01/31/2022    EGFR 52 01/31/2022       Patient voiced understanding and agreement in the above discussion  Aware to contact our office with questions/symptoms in the interim  This note has been generated by voice recognition software system  Therefore, there may be spelling, grammar, and or syntax errors  Please contact if questions arise

## 2022-02-02 ENCOUNTER — OFFICE VISIT (OUTPATIENT)
Dept: PAIN MEDICINE | Facility: CLINIC | Age: 72
End: 2022-02-02
Payer: MEDICARE

## 2022-02-02 VITALS — WEIGHT: 200 LBS | HEIGHT: 68 IN | BODY MASS INDEX: 30.31 KG/M2

## 2022-02-02 DIAGNOSIS — G89.29 CHRONIC BILATERAL LOW BACK PAIN WITHOUT SCIATICA: Primary | ICD-10-CM

## 2022-02-02 DIAGNOSIS — M47.816 LUMBAR SPONDYLOSIS: ICD-10-CM

## 2022-02-02 DIAGNOSIS — M54.50 CHRONIC BILATERAL LOW BACK PAIN WITHOUT SCIATICA: Primary | ICD-10-CM

## 2022-02-02 DIAGNOSIS — M48.061 SPINAL STENOSIS OF LUMBAR REGION, UNSPECIFIED WHETHER NEUROGENIC CLAUDICATION PRESENT: ICD-10-CM

## 2022-02-02 DIAGNOSIS — M47.816 LUMBAR SPONDYLOSIS: Primary | ICD-10-CM

## 2022-02-02 PROCEDURE — 99214 OFFICE O/P EST MOD 30 MIN: CPT | Performed by: NURSE PRACTITIONER

## 2022-02-02 NOTE — PROGRESS NOTES
Assessment:  1  Chronic bilateral low back pain without sciatica    2  Lumbar spondylosis    3  Spinal stenosis of lumbar region, unspecified whether neurogenic claudication present        Plan:  1  We will schedule the patient for bilateral L2-5 medial branch blocks with intention of moving forward towards radiofrequency ablation if there is an appropriate diagnostic response  Patient only needs 1 round of blocks  If response is appropriate, will then be scheduled for radiofrequency ablation for longer lasting relief   2  Patient will continue with his home exercise program  3  Patient will continue to follow with Hematology Oncology  4  Patient will follow-up pending results of his procedure or sooner if needed    M*Modal software was used to dictate this note  It may contain errors with dictating incorrect words or incorrect spelling  Please contact the provider directly with any questions  History of Present Illness: The patient is a 66 y o  male with a history of urothelial carcinoma status post left nephrectomy and ureterectomy last seen on 7/26/21 who presents for a follow up office visit in regards to chronic axial low back pain secondary to lumbar degenerative disc disease, lumbar spondylosis and chronic pain syndrome  The patient denies any radiating symptoms down into the lower extremities, bowel or bladder incontinence or saddle anesthesia  Patient has had bilateral L2-5 RFA the past, last being in February 2021 with 100% relief up until a few weeks ago  He is interested in repeating this procedure at this time  He takes Tylenol for pain  He has taken high-dose opioid medications in the past as prescribed by palliative Medicine, however has weaned off of these for quite some time now  The patient rates his pain an 8/10 on the numeric pain rating scale    He constantly has pain in the morning which is described as dull aching    I have personally reviewed and/or updated the patient's past medical history, past surgical history, family history, social history, current medications, allergies, and vital signs today  Review of Systems:    Review of Systems   Respiratory: Negative for shortness of breath  Cardiovascular: Negative for chest pain  Gastrointestinal: Negative for constipation, diarrhea, nausea and vomiting  Musculoskeletal: Negative for arthralgias, gait problem, joint swelling and myalgias  Skin: Negative for rash  Neurological: Negative for dizziness, seizures and weakness  All other systems reviewed and are negative          Past Medical History:   Diagnosis Date    Arthritis     Bladder cancer     Cancer ([address] )     skin melanoma;basal cell    Chronic pain disorder     from arthritis    Colon polyp     Does use hearing aid     bilat    Dry eyes, bilateral     GERD (gastroesophageal reflux disease)     History of kidney stones 10/2019    History of partial knee replacement     bilat    History of vertigo     Hyperlipidemia     Hypertension     Infusion extravasation of chemotherapy vesicant 11/2021    Kidney lesion     Lumbar disc disorder     compression of vertebrae L4-5-6    Muscle weakness     left hip area    Renal mass     left    Right ankle injury 03/05/2020    missed step of ladder     Right ankle pain     Shortness of breath     with activity    Tinnitus     Urothelial cancer     left    Wears glasses        Past Surgical History:   Procedure Laterality Date    COLONOSCOPY      CYSTOSCOPY Left 4/1/2020    Procedure: CYSTOSCOPY; URETERAL CATHETER PLACEMENT;  Surgeon: Brayden Potts MD;  Location[facility];  Service: Urology    CYSTOSCOPY  05/11/2020    CYSTOSCOPY  06/04/2021    FL CYSTOGRAM  4/13/2020    FL RETROGRADE PYELOGRAM  1/17/2020    HERNIA REPAIR      umbilical with mesh    INGUINAL HERNIA REPAIR Right     with mesh    IR PORT PLACEMENT  4/30/2020    JOINT REPLACEMENT Bilateral     partials knee    LUMBAR EPIDURAL INJECTION      NC CYSTOURETHROSCOPY,URETER CATHETER Bilateral 2020    Procedure: CYSTOSCOPY; RIGHT RETROGRADE PYELOGRAM WITH RIGHT URETERAL CYTOLOGY SAMPLING; LEFT URETEROSCOPY WITH RENAL PELVIS BIOPSY AND LEFT STENT PLACEMENT;  Surgeon: Kristina Dennis MD;  [facility];  Service: Urology    NC NEPHRECTOMY, W/PART  URETECTOMY Left 2020    Procedure: ROBOTIC LAPAROSCOPIC NEPHRO-URETERECTOMY;  Surgeon: Kristina Dennis MD;  [facility];  Service: Urology    SKIN CANCER EXCISION      surface melanoma    TONSILLECTOMY      WISDOM TOOTH EXTRACTION         Family History   Problem Relation Age of Onset    Liver cancer Father        Social History     Occupational History    Not on file   Tobacco Use    Smoking status: Former Smoker     Types: Cigarettes     Quit date:      Years since quittin 1    Smokeless tobacco: Never Used   Vaping Use    Vaping Use: Never used   Substance and Sexual Activity    Alcohol use:  Yes     Alcohol/week: 17 0 standard drinks     Types: 7 Glasses of wine, 10 Cans of beer per week     Comment: socially    Drug use: Never    Sexual activity: Not Currently         Current Outpatient Medications:     hydrOXYzine HCL (ATARAX) 25 mg tablet, Take 1 tablet (25 mg total) by mouth every 6 (six) hours as needed for itching or anxiety, Disp: 60 tablet, Rfl: 2    Magnesium 250 MG TABS, 1 tablet 2 (two) times a day Taking 500mg in the morning and 500mg at night, Disp: , Rfl:     metoprolol tartrate (LOPRESSOR) 100 mg tablet, Take 50 mg by mouth daily, Disp: , Rfl:     acetaminophen (TYLENOL) 500 mg tablet, Take 2 tablets (1,000 mg total) by mouth every 8 (eight) hours, Disp: , Rfl:     allopurinol (ZYLOPRIM) 300 mg tablet, take 1 tablet by mouth once daily, Disp: 30 tablet, Rfl: 3    ALPRAZolam (XANAX) 0 25 mg tablet, Take 1 tablet (0 25 mg total) by mouth daily at bedtime as needed for anxiety (restless legs), Disp: 30 tablet, Rfl: 1    amLODIPine (NORVASC) 5 mg tablet, Take 5 mg by mouth daily  , Disp: , Rfl:     atorvastatin (LIPITOR) 80 mg tablet, Take 80 mg by mouth every other day evening, Disp: , Rfl:     docusate sodium (COLACE) 250 MG capsule, Take 1 capsule (250 mg total) by mouth daily, Disp: 60 capsule, Rfl: 6    famotidine (PEPCID) 20 mg tablet, take 1-2 tablets by mouth every evening, Disp: , Rfl:     folic acid (FOLVITE) 1 mg tablet, Take 1 tablet (1 mg total) by mouth daily, Disp: 90 tablet, Rfl: 4    HYDROmorphone (DILAUDID) 2 mg tablet, Take 1 tablet (2 mg total) by mouth every 4 (four) hours as needed for moderate pain Max Daily Amount: 12 mg (Patient not taking: Reported on 11/24/2021 ), Disp: 90 tablet, Rfl: 0    morphine (MSIR) 15 mg tablet, Take 1/2 tablet 3 times a day for 5 days, then 1/2 tablet 2 times a day for 5 days, then 1/2 tablet once a day for 5 days, then stop (Patient not taking: Reported on 2/2/2022 ), Disp: 15 tablet, Rfl: 0    naloxone (NARCAN) 4 mg/0 1 mL nasal spray, Administer 1 spray into a nostril  If breathing does not return to normal or if breathing difficulty resumes after 2-3 minutes, give another dose in the other nostril using a new spray  (Patient not taking: Reported on 2/2/2022 ), Disp: 1 each, Rfl: 1    nystatin (MYCOSTATIN) cream, Apply topically 2 (two) times a day, Disp: 30 g, Rfl: 0    omeprazole (PriLOSEC) 20 mg delayed release capsule, Take 20 mg by mouth daily  , Disp: , Rfl:     predniSONE 10 mg tablet, Take 2 tablets (20 mg total) by mouth daily (Patient not taking: Reported on 2/2/2022 ), Disp: 60 tablet, Rfl: 3    senna (SENOKOT) 8 6 mg, Take 1 tablet (8 6 mg total) by mouth daily at bedtime (Patient taking differently: Take 8 6 mg by mouth daily at bedtime As needed), Disp: 30 each, Rfl: 0    tadalafil (CIALIS) 20 MG tablet, Take 1 tablet by mouth one (1) hour prior to intercourse    Do not exceed more than two tablets per week , Disp: 12 tablet, Rfl: 3   terazosin (HYTRIN) 5 mg capsule, Take 2 capsules (10 mg total) by mouth daily at bedtime, Disp: 30 capsule, Rfl: 6    triamcinolone (KENALOG) 0 1 % lotion, Apply topically 3 (three) times a day, Disp: 60 mL, Rfl: 5    VITAMIN D PO, Take 1,000 Units by mouth daily , Disp: , Rfl:     zolpidem (AMBIEN) 5 mg tablet, Take 1 tablet (5 mg total) by mouth daily at bedtime as needed for sleep, Disp: 30 tablet, Rfl: 1    Allergies   Allergen Reactions    Poison Ivy Extract Rash       Physical Exam:    Ht 5' 8" (1 727 m)   Wt 90 7 kg (200 lb)   BMI 30 41 kg/m²     Constitutional:normal, well developed, well nourished, alert, in no distress and non-toxic and no overt pain behavior  Eyes:anicteric  HEENT:grossly intact  Neck:supple, symmetric, trachea midline and no masses   Pulmonary:even and unlabored  Cardiovascular:No edema or pitting edema present  Skin:Normal without rashes or lesions and well hydrated  Psychiatric:Mood and affect appropriate  Neurologic:Cranial Nerves II-XII grossly intact  Musculoskeletal:Slightly antalgic gait but steady without the use of assistive devices  Positive facet loading maneuvers reproduce low back pain    Negative seated straight leg raise bilaterally      Imaging  FL spine and pain procedure    (Results Pending)         Orders Placed This Encounter   Procedures    FL spine and pain procedure

## 2022-02-04 ENCOUNTER — HOSPITAL ENCOUNTER (OUTPATIENT)
Dept: INFUSION CENTER | Facility: HOSPITAL | Age: 72
Discharge: HOME | End: 2022-02-04
Payer: MEDICARE

## 2022-02-04 ENCOUNTER — TELEPHONE (OUTPATIENT)
Dept: HEMATOLOGY ONCOLOGY | Facility: CLINIC | Age: 72
End: 2022-02-04

## 2022-02-04 ENCOUNTER — TELEPHONE (OUTPATIENT)
Dept: PAIN MEDICINE | Facility: CLINIC | Age: 72
End: 2022-02-04

## 2022-02-04 ENCOUNTER — HOSPITAL ENCOUNTER (OUTPATIENT)
Dept: INFUSION CENTER | Facility: HOSPITAL | Age: 72
Discharge: HOME | End: 2022-02-04
Attending: INTERNAL MEDICINE

## 2022-02-04 VITALS
TEMPERATURE: 97 F | RESPIRATION RATE: 18 BRPM | DIASTOLIC BLOOD PRESSURE: 76 MMHG | HEART RATE: 62 BPM | SYSTOLIC BLOOD PRESSURE: 122 MMHG

## 2022-02-04 DIAGNOSIS — E86.0 DEHYDRATION: Primary | ICD-10-CM

## 2022-02-04 PROCEDURE — 96360 HYDRATION IV INFUSION INIT: CPT

## 2022-02-04 RX ADMIN — SODIUM CHLORIDE 1000 ML: 0.9 INJECTION, SOLUTION INTRAVENOUS at 11:10

## 2022-02-04 NOTE — TELEPHONE ENCOUNTER
Patient spouse, Jose Rafael Angulo, calling to speak with Adelfo Bhatia  Patient  had an appt 2/1/22 at [facility]  office  He believes he left his wallet  She can be reached at [phone]

## 2022-02-04 NOTE — PROGRESS NOTES
Cleaned the scratches on patients Right hand with saline and alcohol and covered with bandaids  Peroxide was not available from storeroom  Patient denies pain in right knee and and right hand

## 2022-02-04 NOTE — PROGRESS NOTES
On arrival to infusion waiting area the patient states that he slipped on ice on a sidewalk area in front of the hospital where he parked his car  He did hit his right knee on the ground but there is no visible broken skin on knee  He does some small scattered cuts on the palm of hs right hand right hand  He denies any pain, denies hitting his head  Edmund Rodriguez was notified came ti the infusion center and spoke to patient about the location of the ice/fall

## 2022-02-04 NOTE — TELEPHONE ENCOUNTER
I spoke with Ollie Majano the patients wife in regards to concern of lost wallet  Informed Ollie Majano that I clean each room after every patient and I did not see any wallet that was left  Ollie Majano states patient just walked back in the house and states he found it in between the car seats

## 2022-02-04 NOTE — TELEPHONE ENCOUNTER
Pts wife called stating that patient may have most his wallet at his 2/2 visit- has anyone turned in a     [employer]  - Hospitals in [place] call pt either way, so they can made aware     Thank you         [phone]

## 2022-02-07 NOTE — TELEPHONE ENCOUNTER
Called and left a message for patient-   Checked with MA's they did not find a wallet and nothing was turned in by house keeping-  I looked around as well and did not find anything

## 2022-02-08 ENCOUNTER — HOSPITAL ENCOUNTER (OUTPATIENT)
Dept: INFUSION CENTER | Facility: HOSPITAL | Age: 72
Discharge: HOME | End: 2022-02-08
Payer: MEDICARE

## 2022-02-08 VITALS
OXYGEN SATURATION: 96 % | DIASTOLIC BLOOD PRESSURE: 81 MMHG | TEMPERATURE: 96.9 F | HEART RATE: 56 BPM | SYSTOLIC BLOOD PRESSURE: 127 MMHG | RESPIRATION RATE: 18 BRPM

## 2022-02-08 DIAGNOSIS — E86.0 DEHYDRATION: Primary | ICD-10-CM

## 2022-02-08 DIAGNOSIS — C79.10 METASTATIC UROTHELIAL CARCINOMA (HCC): ICD-10-CM

## 2022-02-08 LAB
ALBUMIN SERPL BCP-MCNC: 3.9 G/DL (ref 3.5–5)
ALP SERPL-CCNC: 50 U/L (ref 34–104)
ALT SERPL W P-5'-P-CCNC: 21 U/L (ref 7–52)
ANION GAP SERPL CALCULATED.3IONS-SCNC: 7 MMOL/L (ref 4–13)
AST SERPL W P-5'-P-CCNC: 21 U/L (ref 13–39)
BASOPHILS # BLD AUTO: 0 THOUSANDS/ΜL (ref 0–0.1)
BASOPHILS NFR BLD AUTO: 0 % (ref 0–1)
BILIRUB SERPL-MCNC: 0.7 MG/DL (ref 0.2–1)
BUN SERPL-MCNC: 22 MG/DL (ref 5–25)
CALCIUM SERPL-MCNC: 9.4 MG/DL (ref 8.4–10.2)
CHLORIDE SERPL-SCNC: 103 MMOL/L (ref 96–108)
CO2 SERPL-SCNC: 30 MMOL/L (ref 21–32)
CREAT SERPL-MCNC: 1.4 MG/DL (ref 0.6–1.3)
EOSINOPHIL # BLD AUTO: 0 THOUSAND/ΜL (ref 0–0.61)
EOSINOPHIL NFR BLD AUTO: 0 % (ref 0–6)
ERYTHROCYTE [DISTWIDTH] IN BLOOD BY AUTOMATED COUNT: 17 % (ref 11.6–15.1)
GFR SERPL CREATININE-BSD FRML MDRD: 49 ML/MIN/1.73SQ M
GLUCOSE SERPL-MCNC: 142 MG/DL (ref 65–140)
HCT VFR BLD AUTO: 40.8 % (ref 36.5–49.3)
HGB BLD-MCNC: 13.1 G/DL (ref 12–17)
IMM GRANULOCYTES # BLD AUTO: 0.02 THOUSAND/UL (ref 0–0.2)
IMM GRANULOCYTES NFR BLD AUTO: 0 % (ref 0–2)
LYMPHOCYTES # BLD AUTO: 0.99 THOUSANDS/ΜL (ref 0.6–4.47)
LYMPHOCYTES NFR BLD AUTO: 14 % (ref 14–44)
MAGNESIUM SERPL-MCNC: 1.7 MG/DL (ref 1.9–2.7)
MCH RBC QN AUTO: 31.8 PG (ref 26.8–34.3)
MCHC RBC AUTO-ENTMCNC: 32.1 G/DL (ref 31.4–37.4)
MCV RBC AUTO: 99 FL (ref 82–98)
MONOCYTES # BLD AUTO: 0.38 THOUSAND/ΜL (ref 0.17–1.22)
MONOCYTES NFR BLD AUTO: 5 % (ref 4–12)
NEUTROPHILS # BLD AUTO: 5.96 THOUSANDS/ΜL (ref 1.85–7.62)
NEUTS SEG NFR BLD AUTO: 81 % (ref 43–75)
NRBC BLD AUTO-RTO: 0 /100 WBCS
PLATELET # BLD AUTO: 193 THOUSANDS/UL (ref 149–390)
PMV BLD AUTO: 11.1 FL (ref 8.9–12.7)
POTASSIUM SERPL-SCNC: 4 MMOL/L (ref 3.5–5.3)
PROT SERPL-MCNC: 6.1 G/DL (ref 6.4–8.4)
RBC # BLD AUTO: 4.12 MILLION/UL (ref 3.88–5.62)
SODIUM SERPL-SCNC: 140 MMOL/L (ref 135–147)
WBC # BLD AUTO: 7.35 THOUSAND/UL (ref 4.31–10.16)

## 2022-02-08 PROCEDURE — 85025 COMPLETE CBC W/AUTO DIFF WBC: CPT

## 2022-02-08 PROCEDURE — 96360 HYDRATION IV INFUSION INIT: CPT

## 2022-02-08 PROCEDURE — 80053 COMPREHEN METABOLIC PANEL: CPT

## 2022-02-08 PROCEDURE — 83735 ASSAY OF MAGNESIUM: CPT

## 2022-02-08 RX ADMIN — SODIUM CHLORIDE 1000 ML: 0.9 INJECTION, SOLUTION INTRAVENOUS at 14:15

## 2022-02-08 NOTE — PROGRESS NOTES
Hydration given without incident  Central labs done on arrival Patient offers no complaints  AVS declined, He is aware of his next appointment

## 2022-02-09 ENCOUNTER — TELEMEDICINE (OUTPATIENT)
Dept: HEMATOLOGY ONCOLOGY | Facility: CLINIC | Age: 72
End: 2022-02-09
Payer: MEDICARE

## 2022-02-09 DIAGNOSIS — E83.42 HYPOMAGNESEMIA: ICD-10-CM

## 2022-02-09 DIAGNOSIS — C79.10 METASTATIC UROTHELIAL CARCINOMA (HCC): Primary | ICD-10-CM

## 2022-02-09 PROCEDURE — 99214 OFFICE O/P EST MOD 30 MIN: CPT | Performed by: NURSE PRACTITIONER

## 2022-02-09 NOTE — PROGRESS NOTES
Virtual Regular Visit    Verification of patient location:  Patient is located in the following state in which I hold an active license [place]      Assessment/Plan:  1  Metastatic urothelial carcinoma (HCC)  Patient's cycle 6 day 1 Padcev was deferred by 1 week due to significant worsening peripheral neuropathy affecting instrumentation grade 2  Per PI recommendation is to hold treatment for grade 2 until the neuropathy recovers to grade 1 or less- than resume at same dose  If it recurs in the future can decrease the dose by 1 dose adjustment  Today he states that he noted some slight improvement of his neuropathy in the feet last evening however the hands remain the same and still is having issues with instrumentation  He was told that we will defer his treatment by an additional week in hopes that he will have some more improvement of his neuropathy  If there is improvement to grade 1 will resume at the usual dose if no improvement perhaps consider dose adjustment  The patient already has a follow-up scheduled with Dr Anjel Larsen next Tuesday which he will keep  He has standing order for blood work  He is already scheduled for his next PET-CT scan 03/08/2022  The patient and his wife are going on a trip to [facility] around the middle of March  They are very interested in knowing the results of the PET-CT scan before their trip whether its negative or positive  Will update infusion center  2  Hypomagnesemia  Patient's magnesium is slightly decreased again  We will add magnesium to his hydration on Friday  Problem List Items Addressed This Visit     None          Reason for visit is No chief complaint on file       Encounter provider RAGHU Murphy    Provider located at [facility] [id number] [place] [id number]      Recent Visits  Date Type Provider Dept   02/04/22 Telephone [id number] Showing recent visits within past 7 days and meeting all other requirements  Today's Visits  Date Type Provider Dept   02/09/22 Telemedicine [address], [facility]   Showing today's visits and meeting all other requirements  Future Appointments  No visits were found meeting these conditions  Showing future appointments within next 150 days and meeting all other requirements       The patient was identified by name and date of birth  Jovita Purdy was informed that this is a telemedicine visit and that the visit is being conducted through [facility] and patient was informed this is a secure, HIPAA-complaint platform  He agrees to proceed     My office door was closed  The patient was notified the following individuals were present in the room Gloria Schneider  NP student  He acknowledged consent and understanding of privacy and security of the video platform  The patient has agreed to participate and understands they can discontinue the visit at any time  Patient is aware this is a billable service  Subjective  Jovita Purdy is a 66 y o  male was contacted today for virtual follow-up visit  He has no new complaints or significant changes in comparison to last week  His treatment was deferred by a week due to worsening neuropathy  He states that he started to notice some improvement last evening in the neuropathy of his feet but no change in his hands and is still having difficulty with instrumentation  He continues to get IV hydration twice a week and did receive some hydration yesterday  He did have repeat laboratory studies done yesterday 02/08/2022 which showed normal white cells and platelets, he is not anemic H&H 13 1/40 8, MCV slightly above average 99  Creatinine 1 4, GFR 49, nonfasting glucose 142, total protein decreased 6 1, LFTs are normal   His magnesium is again decreased 1 7      Oncology History Overview Note   Patient has a history of hypertension, hyperlipidemia, chronic lower back pain  He had an MRI of the lower spine for further evaluation of his chronic lower back pain  The MRI on 12/02/2019 revealed Multiple left renal masses to include a left lower pole cyst and a rounded structure in the left upper pole which may also represent cyst   Left upper pole renal masses approximately 3 cm in greatest linear dimension  A CT with renal protocol was done on 12/12/2019 which also showed the infiltrating lesion in the upper pole of the left kidney measuring about the 3 5 cm in greatest dimension without any hint of retroperitoneal lymphadenopathy  A CT scan of the abdomen pelvis on 01/14/2020 showed the same findings with the mild hydronephrosis on the left  The urine cytology on 01/03/2020 showed high-grade urothelial carcinoma  A cystoscopy was then done, a biopsy was taken from the left renal pelvic region which showed high-grade urothelial carcinoma without evidence of lamina propria invasion  The detrusor muscle/muscularis propria were not present for evaluation  The recommendation was to pursue left robotic assisted laparoscopic nephroureterectomy with bladder cuff excision which was done on 04/01/2020  The final pathology revealed;  - Invasive high grade urothelial carcinoma arising in renal pelvis  - Bladder cuff margin is negative for carcinoma and no evidence of high grade dysplasia  - Ureters with no significant pathologic abnormality  - Two benign simple cysts  - Adrenal gland is negative for malignancy  - One lymph node, negative for malignancy (0/1)  The tumor size was 4 5 cm with invasion beyond the muscularis into the periurethral fat or peripelvic fat or the renal parenchyma  The margins were uninvolved by carcinoma or carcinoma in situ  The final pathology was pT3 pN0  Patient barely tolerated 1 cycle of adjuvant chemotherapy with split dose cisplatin and gemcitabine with a lot of side effects    The treatment had to be discontinued due to significant worsening renal dysfunction 6/2020  Patient started to complain about significant abdominal/left inguinal pain around August/September 2020  Unfortunately repeat imaging revealed recurrent/metastatic disease  9/4/20 CT C/A/P- Status post left nephrectomy for resection of urothelial neoplasm  Lymphadenopathy in the left nephrectomy bed has increased since the prior examination, concerning for metastatic disease  Ill-defined hyperattenuating masslike focus in the left rectus muscle, not identified on the prior examination  This is suspicious for a metastatic lesion  A rectus hematoma is also considered  9/23/20 PET scan- 1  Multiple FDG avid lymph nodes in the retrocrural region, retroperitoneum and the left renal bed compatible with metastasis  These have progressed from recent CT  2   FDG avid mass involving the left rectus abdominal musculature compatible with metastasis which is larger from recent CT  3  Several small lymph nodes adjacent to the mid to distal esophagus with FDG uptake suspicious for metastasis  Small focus of FDG uptake also suggested in the right hilar region, metastasis is not excluded  This could be reassessed on follow-up  4   Subtle focus of FDG uptake at the T2 level on the left, nonspecific, could be related to early degenerative changes, developing metastasis is not entirely excluded  This could be reassessed on follow-up  5  Prostate is mildly prominent with heterogeneous FDG uptake  This could be inflammatory  Correlate for prostatitis  He completed palliative radiation to the left abdomen 12/3/20     His PET scan from 08/16/2021 showed progression of his disease with hypermetabolic soft tissue lesions at the level of the right shoulder and right axilla with interval progression of the retroperitoneal and mesenteric hypermetabolic metastasis         He did have the new lesion to his right upper back/shoulder which was causing significant localized pain  This excised by his surgeon 08/09/2021  Pathology confirmed metastatic high-grade carcinoma consistent with his no primary urothelial carcinoma  He received palliative radiation to his right shoulder/axilla region  Urothelial cancer ([address] )   1/3/2020 Biopsy    Final Diagnosis    A  Urine, Clean Catch, Thin Prep:  High grade urothelial carcinoma ([facility]UC) - see comment  1/3/2020 Initial Diagnosis    Urothelial cancer ([address] )  T3 N0 (stage IIIA)     1/17/2020 Biopsy    Final Diagnosis    A  Ureter, Right, left renal pelvis biopsy  -Fragments of high grade urothelial carcinoma   -No evidence of lamina propria invasion   -Detrusor muscle/ muscularis propria is not present for evaluation  B  Urinary Bladder, bladder biopsy:  -Fragments of low grade papillary lesion  See note  -No evidence of invasion seen  -Unremarkable fragment of detrusor muscle seen  4/1/2020 Surgery    left robotic assisted laparoscopic nephroureterectomy with bladder cuff excision     Final Diagnosis    A  Left kidney, ureter, and bladder cuff, nephroureterectomy:  - Invasive high grade urothelial carcinoma arising in renal pelvis  - Bladder cuff margin is negative for carcinoma and no evidence of high grade dysplasia  - Ureters with no significant pathologic abnormality  - Two benign simple cysts  - Adrenal gland is negative for malignancy  - One lymph node, negative for malignancy (0/1)          5/14/2020 - 6/3/2020 Chemotherapy    CISplatin (PLATINOL) split dose 35 mg/m2 = 75 3 mg (50 % of original dose 70 mg/m2), Intravenous,   Administration: 75 3 mg (5/14/2020), 75 3 mg (5/21/2020)  gemcitabine (GEMZAR) 2,200 mg in sodium chloride 0 9 % 250 mL infusion, 2,150 2 mg (80 % of original dose 1,250 mg/m2), Intravenous,   Administration: 2,200 mg (5/14/2020), 2,200 mg (5/21/2020)    (only completed 1 cycle- d/c d/t adverse effects and worsening renal dysfunction)     10/9/2020 - 9/9/2021 Chemotherapy    pembrolizumab (KEYTRUDA) IVPB, 200 mg, Intravenous, Once, 16 of 20 cycles  Administration: 200 mg (10/9/2020), 200 mg (10/30/2020), 200 mg (11/20/2020), 200 mg (12/11/2020), 200 mg (12/31/2020), 200 mg (1/22/2021), 200 mg (2/12/2021), 200 mg (3/5/2021), 200 mg (3/26/2021), 200 mg (4/16/2021), 200 mg (5/7/2021), 200 mg (5/28/2021), 200 mg (6/18/2021), 200 mg (7/9/2021), 200 mg (7/30/2021), 200 mg (8/20/2021)     11/19/2020 - 12/3/2020 Radiation    Treatment:  Course: C1    Plan ID Energy Fractions Dose per Fraction (cGy) Dose Correction (cGy) Total Dose Delivered (cGy) Elapsed Days   L Abdomen 6X 10 / 10 300 0 3,000 14        9/10/2021 -  Chemotherapy    enfortumab vedotin-ejfv (PADCEV) IVPB, 1 25 mg/kg = 114 mg, Intravenous, Once, 6 of 8 cycles  Administration: 114 mg (9/10/2021), 114 mg (9/17/2021), 110 mg (10/8/2021), 110 mg (9/24/2021), 110 mg (10/15/2021), 110 mg (11/12/2021), 110 mg (10/22/2021), 110 mg (11/19/2021), 110 mg (12/10/2021), 110 mg (11/26/2021), 110 mg (12/17/2021), 110 mg (1/7/2022), 110 mg (12/23/2021), 110 mg (1/14/2022), 110 mg (1/21/2022)     Cancer of left renal pelvis (HCC)   4/23/2020 Initial Diagnosis    Cancer of left renal pelvis (HCC)     10/9/2020 - 9/9/2021 Chemotherapy    pembrolizumab (KEYTRUDA) IVPB, 200 mg, Intravenous, Once, 16 of 20 cycles  Administration: 200 mg (10/9/2020), 200 mg (10/30/2020), 200 mg (11/20/2020), 200 mg (12/11/2020), 200 mg (12/31/2020), 200 mg (1/22/2021), 200 mg (2/12/2021), 200 mg (3/5/2021), 200 mg (3/26/2021), 200 mg (4/16/2021), 200 mg (5/7/2021), 200 mg (5/28/2021), 200 mg (6/18/2021), 200 mg (7/9/2021), 200 mg (7/30/2021), 200 mg (8/20/2021)     9/10/2021 -  Chemotherapy    enfortumab vedotin-ejfv (PADCEV) IVPB, 1 25 mg/kg = 114 mg, Intravenous, Once, 6 of 8 cycles  Administration: 114 mg (9/10/2021), 114 mg (9/17/2021), 110 mg (10/8/2021), 110 mg (9/24/2021), 110 mg (10/15/2021), 110 mg (11/12/2021), 110 mg (10/22/2021), 110 mg (11/19/2021), 110 mg (12/10/2021), 110 mg (11/26/2021), 110 mg (12/17/2021), 110 mg (1/7/2022), 110 mg (12/23/2021), 110 mg (1/14/2022), 110 mg (1/21/2022)      Surgery       [facility] )   8/9/2021 Initial Diagnosis    Metast[facility]thel[facility] )     9/8/2021 - 9/21/2021 Radiation    Treatment:  Course: C2    Plan ID Energy Fractions Dose per Fraction (cGy) Dose Correction (cGy) Total Dose Delivered (cGy) Elapsed Days   R Axil_Shl # 6X 10 / 10 300 0 3,000 13      Treatment dates:  C2: 9/8/2021 - 9/21/2021     9/10/2021 -  Chemotherapy    enfortumab vedotin-ejfv (PADCEV) IVPB, 1 25 mg/kg = 114 mg, Intravenous, Once, 6 of 8 cycles  Administration: 114 mg (9/10/2021), 114 mg (9/17/2021), 110 mg (10/8/2021), 110 mg (9/24/2021), 110 mg (10/15/2021), 110 mg (11/12/2021), 110 mg (10/22/2021), 110 mg (11/19/2021), 110 mg (12/10/2021), 110 mg (11/26/2021), 110 mg (12/17/2021), 110 mg (1/7/2022), 110 mg (12/23/2021), 110 mg (1/14/2022), 110 mg (1/21/2022)         Past Medical History:   Diagnosis Date    Arthritis     Bladder cancer     [facility] )     skin melanoma;basal cell    Chronic pain disorder     from arthritis    Colon polyp     Does use hearing aid     bilat    Dry eyes, bilateral     GERD (gastroesophageal reflux disease)     History of kidney stones 10/2019    History of partial knee replacement     bilat    History of vertigo     Hyperlipidemia     Hypertension     Infusion extravasation of chemotherapy vesicant 11/2021    Kidney lesion     Lumbar disc disorder     compression of vertebrae L4-5-6    Muscle weakness     left hip area    Renal mass     left    Right ankle injury 03/05/2020    missed step of ladder     Right ankle pain     Shortness of breath     with activity    Tinnitus     Urothelial cancer     left    Wears glasses        Past Surgical History:   Procedure Laterality Date    COLONOSCOPY      CYSTOSCOPY Left 4/1/2020    Procedure: CYSTOSCOPY; URETERAL CATHETER PLACEMENT;  Surgeon: Fausto Wick MD;  Location[facility];  Service: Urology    CYSTOSCOPY  05/11/2020    CYSTOSCOPY  06/04/2021    FL CYSTOGRAM  4/13/2020    FL RETROGRADE PYELOGRAM  1/17/2020    HERNIA REPAIR      umbilical with mesh    INGUINAL HERNIA REPAIR Right     with mesh    IR PORT PLACEMENT  4/30/2020    JOINT REPLACEMENT Bilateral     partials knee    LUMBAR EPIDURAL INJECTION      OR CYSTOURETHROSCOPY,URETER CATHETER Bilateral 1/17/2020    Procedure: CYSTOSCOPY; RIGHT RETROGRADE PYELOGRAM WITH RIGHT URETERAL CYTOLOGY SAMPLING; LEFT URETEROSCOPY WITH RENAL PELVIS BIOPSY AND LEFT STENT PLACEMENT;  Surgeon: Fausto Wick MD;  [facility];  Service: Urology    OR NEPHRECTOMY, W/PART   URETECTOMY Left 4/1/2020    Procedure: ROBOTIC LAPAROSCOPIC NEPHRO-URETERECTOMY;  Surgeon: Fausto Wick MD;  [facility];  Service: Urology    SKIN CANCER EXCISION      surface melanoma    TONSILLECTOMY      WISDOM TOOTH EXTRACTION         Current Outpatient Medications   Medication Sig Dispense Refill    acetaminophen (TYLENOL) 500 mg tablet Take 2 tablets (1,000 mg total) by mouth every 8 (eight) hours      allopurinol (ZYLOPRIM) 300 mg tablet take 1 tablet by mouth once daily 30 tablet 3    ALPRAZolam (XANAX) 0 25 mg tablet Take 1 tablet (0 25 mg total) by mouth daily at bedtime as needed for anxiety (restless legs) 30 tablet 1    amLODIPine (NORVASC) 5 mg tablet Take 5 mg by mouth daily        atorvastatin (LIPITOR) 80 mg tablet Take 80 mg by mouth every other day evening      docusate sodium (COLACE) 250 MG capsule Take 1 capsule (250 mg total) by mouth daily 60 capsule 6    famotidine (PEPCID) 20 mg tablet take 1-2 tablets by mouth every evening      folic acid (FOLVITE) 1 mg tablet Take 1 tablet (1 mg total) by mouth daily 90 tablet 4    HYDROmorphone (DILAUDID) 2 mg tablet Take 1 tablet (2 mg total) by mouth every 4 (four) hours as needed for moderate pain Max Daily Amount: 12 mg (Patient not taking: Reported on 11/24/2021 ) 90 tablet 0    hydrOXYzine HCL (ATARAX) 25 mg tablet Take 1 tablet (25 mg total) by mouth every 6 (six) hours as needed for itching or anxiety 60 tablet 2    Magnesium 250 MG TABS 1 tablet 2 (two) times a day Taking 500mg in the morning and 500mg at night      metoprolol tartrate (LOPRESSOR) 100 mg tablet Take 50 mg by mouth daily      morphine (MSIR) 15 mg tablet Take 1/2 tablet 3 times a day for 5 days, then 1/2 tablet 2 times a day for 5 days, then 1/2 tablet once a day for 5 days, then stop (Patient not taking: Reported on 2/2/2022 ) 15 tablet 0    naloxone (NARCAN) 4 mg/0 1 mL nasal spray Administer 1 spray into a nostril  If breathing does not return to normal or if breathing difficulty resumes after 2-3 minutes, give another dose in the other nostril using a new spray  (Patient not taking: Reported on 2/2/2022 ) 1 each 1    nystatin (MYCOSTATIN) cream Apply topically 2 (two) times a day 30 g 0    omeprazole (PriLOSEC) 20 mg delayed release capsule Take 20 mg by mouth daily        predniSONE 10 mg tablet Take 2 tablets (20 mg total) by mouth daily (Patient not taking: Reported on 2/2/2022 ) 60 tablet 3    senna (SENOKOT) 8 6 mg Take 1 tablet (8 6 mg total) by mouth daily at bedtime (Patient taking differently: Take 8 6 mg by mouth daily at bedtime As needed) 30 each 0    tadalafil (CIALIS) 20 MG tablet Take 1 tablet by mouth one (1) hour prior to intercourse  Do not exceed more than two tablets per week   12 tablet 3    terazosin (HYTRIN) 5 mg capsule Take 2 capsules (10 mg total) by mouth daily at bedtime 30 capsule 6    triamcinolone (KENALOG) 0 1 % lotion Apply topically 3 (three) times a day 60 mL 5    VITAMIN D PO Take 1,000 Units by mouth daily       zolpidem (AMBIEN) 5 mg tablet Take 1 tablet (5 mg total) by mouth daily at bedtime as needed for sleep 30 tablet 1     No current facility-administered medications for this visit  Allergies   Allergen Reactions    Poison Ivy Extract Rash       Review of Systems   Constitutional: Positive for fatigue  Negative for activity change, appetite change, chills, fever and unexpected weight change  HENT: Positive for hearing loss and mouth sores  Negative for congestion, nosebleeds, sore throat and trouble swallowing  Eyes: Negative  Respiratory: Positive for shortness of breath  Negative for cough and chest tightness  Cardiovascular: Negative for chest pain, palpitations and leg swelling  Gastrointestinal: Positive for diarrhea  Negative for abdominal distention, abdominal pain, blood in stool, constipation, nausea and vomiting  Genitourinary: Negative for difficulty urinating, dysuria, frequency, hematuria and urgency  Musculoskeletal: Positive for back pain  Negative for arthralgias, gait problem, joint swelling and myalgias  Skin: Positive for color change  Negative for pallor and rash  Neurological: Positive for dizziness and numbness  Negative for weakness, light-headedness and headaches  Hematological: Negative for adenopathy  Does not bruise/bleed easily  Psychiatric/Behavioral: Positive for sleep disturbance  Negative for dysphoric mood  The patient is not nervous/anxious  Video Exam    There were no vitals filed for this visit  Physical Exam  Constitutional:       General: He is not in acute distress  Appearance: He is well-developed  He is not diaphoretic  HENT:      Head: Normocephalic and atraumatic  Nose: Nose normal    Eyes:      General: No scleral icterus  Conjunctiva/sclera: Conjunctivae normal    Pulmonary:      Effort: Pulmonary effort is normal  No respiratory distress  Musculoskeletal:         General: Normal range of motion  Cervical back: Normal range of motion and neck supple  Skin:     Coloration: Skin is not pale  Findings: No erythema or rash  Neurological:      Mental Status: He is alert and oriented to person, place, and time  Psychiatric:         Mood and Affect: Affect is blunt  Behavior: Behavior normal          Thought Content: Thought content normal          Judgment: Judgment normal           I spent 10 minutes directly with the patient during this visit    VIRTUAL VISIT 310 [facility] verbally agrees to participate in [employer]  Pt is aware that [employer] could be limited without vital signs or the ability to perform a full hands-on physical [place] understands he or the provider may request at any time to terminate the video visit and request the patient to seek care or treatment in person

## 2022-02-09 NOTE — PROGRESS NOTES
Time out taken with Yazmin PresidentRAGHU  Patients Padcev treatment will be delayed for 1 week due to neuropathy  Patient will receive hydration and IV magnesium on 2/11/22  Next Padcev treatment day will be planned for 2/18/22

## 2022-02-11 ENCOUNTER — HOSPITAL ENCOUNTER (OUTPATIENT)
Dept: INFUSION CENTER | Facility: HOSPITAL | Age: 72
Discharge: HOME | End: 2022-02-11
Attending: INTERNAL MEDICINE
Payer: MEDICARE

## 2022-02-11 VITALS
DIASTOLIC BLOOD PRESSURE: 73 MMHG | RESPIRATION RATE: 18 BRPM | HEART RATE: 82 BPM | SYSTOLIC BLOOD PRESSURE: 142 MMHG | OXYGEN SATURATION: 98 % | TEMPERATURE: 97 F

## 2022-02-11 DIAGNOSIS — E86.0 DEHYDRATION: ICD-10-CM

## 2022-02-11 DIAGNOSIS — C68.9 UROTHELIAL CANCER (HCC): Primary | ICD-10-CM

## 2022-02-11 DIAGNOSIS — C79.10 METASTATIC UROTHELIAL CARCINOMA (HCC): ICD-10-CM

## 2022-02-11 DIAGNOSIS — C65.2 CANCER OF LEFT RENAL PELVIS (HCC): ICD-10-CM

## 2022-02-11 DIAGNOSIS — G89.3 CANCER ASSOCIATED PAIN: ICD-10-CM

## 2022-02-11 DIAGNOSIS — E83.42 HYPOMAGNESEMIA: ICD-10-CM

## 2022-02-11 PROCEDURE — 96365 THER/PROPH/DIAG IV INF INIT: CPT

## 2022-02-11 RX ADMIN — MAGNESIUM SULFATE HEPTAHYDRATE: 500 INJECTION, SOLUTION INTRAMUSCULAR; INTRAVENOUS at 08:17

## 2022-02-11 NOTE — PLAN OF CARE
Problem: INFECTION - ADULT  Goal: Absence or prevention of progression during hospitalization  Description: INTERVENTIONS:  - Assess and monitor for signs and symptoms of infection  - Monitor lab/diagnostic results  - Monitor all insertion sites, i e  indwelling lines, tubes, and drains  - Monitor endotracheal if appropriate and nasal secretions for changes in amount and color  - [place] appropriate cooling/warming therapies per order  - Administer medications as ordered  - Instruct and encourage patient and family to use good hand hygiene technique  - Identify and instruct in appropriate isolation precautions for identified infection/condition  Outcome: Progressing     Problem: Knowledge Deficit  Goal: Patient/family/caregiver demonstrates understanding of disease process, treatment plan, medications, and discharge instructions  Description: Complete learning assessment and assess knowledge base    Interventions:  - Provide teaching at level of understanding  - Provide teaching via preferred learning methods  Outcome: Progressing

## 2022-02-11 NOTE — PLAN OF CARE
Problem: INFECTION - ADULT  Goal: Absence or prevention of progression during hospitalization  Description: INTERVENTIONS:  - Assess and monitor for signs and symptoms of infection  - Monitor lab/diagnostic results  - Monitor all insertion sites, i e  indwelling lines, tubes, and drains  - Monitor endotracheal if appropriate and nasal secretions for changes in amount and color  - [place] appropriate cooling/warming therapies per order  - Administer medications as ordered  - Instruct and encourage patient and family to use good hand hygiene technique  - Identify and instruct in appropriate isolation precautions for identified infection/condition  6/94/[id number] by Jovani Huff RN  Outcome: Progressing  4/78/6727 [id number] by Jovani Huff RN  Outcome: Progressing     Problem: Knowledge Deficit  Goal: Patient/family/caregiver demonstrates understanding of disease process, treatment plan, medications, and discharge instructions  Description: Complete learning assessment and assess knowledge base    Interventions:  - Provide teaching at level of understanding  - Provide teaching via preferred learning methods  3/81/5911 [id number] by Jovani Huff RN  Outcome: Progressing  2/30/3631 0328 by Jovani Huff RN  Outcome: Progressing

## 2022-02-15 ENCOUNTER — HOSPITAL ENCOUNTER (OUTPATIENT)
Dept: INFUSION CENTER | Facility: HOSPITAL | Age: 72
Discharge: HOME | End: 2022-02-15
Payer: MEDICARE

## 2022-02-15 ENCOUNTER — OFFICE VISIT (OUTPATIENT)
Dept: HEMATOLOGY ONCOLOGY | Facility: CLINIC | Age: 72
End: 2022-02-15
Payer: MEDICARE

## 2022-02-15 VITALS
SYSTOLIC BLOOD PRESSURE: 116 MMHG | RESPIRATION RATE: 16 BRPM | DIASTOLIC BLOOD PRESSURE: 60 MMHG | HEIGHT: 68 IN | BODY MASS INDEX: 31.3 KG/M2 | OXYGEN SATURATION: 95 % | HEART RATE: 52 BPM | TEMPERATURE: 97.4 F | WEIGHT: 206.5 LBS

## 2022-02-15 VITALS
DIASTOLIC BLOOD PRESSURE: 80 MMHG | SYSTOLIC BLOOD PRESSURE: 145 MMHG | OXYGEN SATURATION: 97 % | HEART RATE: 60 BPM | TEMPERATURE: 96.9 F | RESPIRATION RATE: 18 BRPM

## 2022-02-15 DIAGNOSIS — E83.42 HYPOMAGNESEMIA: ICD-10-CM

## 2022-02-15 DIAGNOSIS — C65.2 CANCER OF LEFT RENAL PELVIS (HCC): ICD-10-CM

## 2022-02-15 DIAGNOSIS — C68.9 UROTHELIAL CANCER (HCC): Primary | ICD-10-CM

## 2022-02-15 DIAGNOSIS — C79.10 METASTATIC UROTHELIAL CARCINOMA (HCC): ICD-10-CM

## 2022-02-15 DIAGNOSIS — E86.0 DEHYDRATION: ICD-10-CM

## 2022-02-15 DIAGNOSIS — C79.10 METASTATIC UROTHELIAL CARCINOMA (HCC): Primary | ICD-10-CM

## 2022-02-15 LAB
ALBUMIN SERPL BCP-MCNC: 3.7 G/DL (ref 3.5–5)
ALP SERPL-CCNC: 51 U/L (ref 34–104)
ALT SERPL W P-5'-P-CCNC: 23 U/L (ref 7–52)
ANION GAP SERPL CALCULATED.3IONS-SCNC: 5 MMOL/L (ref 4–13)
AST SERPL W P-5'-P-CCNC: 19 U/L (ref 13–39)
BASOPHILS # BLD MANUAL: 0 THOUSAND/UL (ref 0–0.1)
BASOPHILS NFR MAR MANUAL: 0 % (ref 0–1)
BILIRUB SERPL-MCNC: 0.78 MG/DL (ref 0.2–1)
BUN SERPL-MCNC: 30 MG/DL (ref 5–25)
CALCIUM SERPL-MCNC: 9.1 MG/DL (ref 8.4–10.2)
CHLORIDE SERPL-SCNC: 104 MMOL/L (ref 96–108)
CO2 SERPL-SCNC: 32 MMOL/L (ref 21–32)
CREAT SERPL-MCNC: 1.54 MG/DL (ref 0.6–1.3)
EOSINOPHIL # BLD MANUAL: 0 THOUSAND/UL (ref 0–0.4)
EOSINOPHIL NFR BLD MANUAL: 0 % (ref 0–6)
ERYTHROCYTE [DISTWIDTH] IN BLOOD BY AUTOMATED COUNT: 17.1 % (ref 11.6–15.1)
GFR SERPL CREATININE-BSD FRML MDRD: 44 ML/MIN/1.73SQ M
GLUCOSE SERPL-MCNC: 112 MG/DL (ref 65–140)
HCT VFR BLD AUTO: 40.9 % (ref 36.5–49.3)
HGB BLD-MCNC: 12.8 G/DL (ref 12–17)
LYMPHOCYTES # BLD AUTO: 1.9 THOUSAND/UL (ref 0.6–4.47)
LYMPHOCYTES # BLD AUTO: 20 % (ref 14–44)
MAGNESIUM SERPL-MCNC: 1.6 MG/DL (ref 1.9–2.7)
MCH RBC QN AUTO: 31.6 PG (ref 26.8–34.3)
MCHC RBC AUTO-ENTMCNC: 31.3 G/DL (ref 31.4–37.4)
MCV RBC AUTO: 101 FL (ref 82–98)
MONOCYTES # BLD AUTO: 0.76 THOUSAND/UL (ref 0–1.22)
MONOCYTES NFR BLD: 8 % (ref 4–12)
NEUTROPHILS # BLD MANUAL: 6.83 THOUSAND/UL (ref 1.85–7.62)
NEUTS SEG NFR BLD AUTO: 72 % (ref 43–75)
PLATELET # BLD AUTO: 164 THOUSANDS/UL (ref 149–390)
PLATELET BLD QL SMEAR: ADEQUATE
PMV BLD AUTO: 9.6 FL (ref 8.9–12.7)
POTASSIUM SERPL-SCNC: 4.1 MMOL/L (ref 3.5–5.3)
PROT SERPL-MCNC: 6.1 G/DL (ref 6.4–8.4)
RBC # BLD AUTO: 4.05 MILLION/UL (ref 3.88–5.62)
RBC MORPH BLD: NORMAL
SODIUM SERPL-SCNC: 141 MMOL/L (ref 135–147)
WBC # BLD AUTO: 9.49 THOUSAND/UL (ref 4.31–10.16)

## 2022-02-15 PROCEDURE — 96360 HYDRATION IV INFUSION INIT: CPT

## 2022-02-15 PROCEDURE — 85027 COMPLETE CBC AUTOMATED: CPT | Performed by: INTERNAL MEDICINE

## 2022-02-15 PROCEDURE — 83735 ASSAY OF MAGNESIUM: CPT | Performed by: INTERNAL MEDICINE

## 2022-02-15 PROCEDURE — 99214 OFFICE O/P EST MOD 30 MIN: CPT | Performed by: INTERNAL MEDICINE

## 2022-02-15 PROCEDURE — 80053 COMPREHEN METABOLIC PANEL: CPT | Performed by: INTERNAL MEDICINE

## 2022-02-15 PROCEDURE — 85007 BL SMEAR W/DIFF WBC COUNT: CPT | Performed by: INTERNAL MEDICINE

## 2022-02-15 RX ADMIN — SODIUM CHLORIDE 1000 ML: 0.9 INJECTION, SOLUTION INTRAVENOUS at 11:13

## 2022-02-15 NOTE — PROGRESS NOTES
Hematology/Oncology Outpatient Follow-up  Mervin Henry 66 y o  male 1950 [id number]    Date:  2/15/2022        Assessment and Plan:  1  Metastatic urothelial carcinoma ([address] )  I had a lengthy discussion with the patient and his wife regarding the significant neuropathy which is most likely related to the enfortumab vedotin palliative treatment  The decision was made to get him restarted on adjusted dose of enfortumab vedotin down to 1 mg/kg with cycle 6 day 1 on 02/18/2022  Patient is already scheduled to get a PET-CT scan on 3/8  We will then make a decision if the current palliative treatment will be continued or change to the next line of palliative treatment  - CBC and differential; Future  - Comprehensive metabolic panel; Future  - Magnesium; Future    2  Hypomagnesemia  Mg IV will be given with each treatment  - Magnesium; Future        HPI:  The patient came today for follow-up visit accompanied by his wife  He did complain about significant neuropathy which required delaying the enfortumab vedotin treatment for 2 weeks  He stated that his neuropathy in his fingers and his feet did not improve much with the treatment delay  Blood work on 02/15/2022 showed stable creatinine of 1 5 with normal calcium liver enzymes  Hemoglobin 12 8 with normal white cells and platelets  Magnesium 1 6  Oncology History Overview Note   Patient has a history of hypertension, hyperlipidemia, chronic lower back pain  He had an MRI of the lower spine for further evaluation of his chronic lower back pain  The MRI on 12/02/2019 revealed Multiple left renal masses to include a left lower pole cyst and a rounded structure in the left upper pole which may also represent cyst   Left upper pole renal masses approximately 3 cm in greatest linear dimension       A CT with renal protocol was done on 12/12/2019 which also showed the infiltrating lesion in the upper pole of the left kidney measuring about the 3 5 cm in greatest dimension without any hint of retroperitoneal lymphadenopathy  A CT scan of the abdomen pelvis on 01/14/2020 showed the same findings with the mild hydronephrosis on the left  The urine cytology on 01/03/2020 showed high-grade urothelial carcinoma  A cystoscopy was then done, a biopsy was taken from the left renal pelvic region which showed high-grade urothelial carcinoma without evidence of lamina propria invasion  The detrusor muscle/muscularis propria were not present for evaluation  The recommendation was to pursue left robotic assisted laparoscopic nephroureterectomy with bladder cuff excision which was done on 04/01/2020  The final pathology revealed;  - Invasive high grade urothelial carcinoma arising in renal pelvis  - Bladder cuff margin is negative for carcinoma and no evidence of high grade dysplasia  - Ureters with no significant pathologic abnormality  - Two benign simple cysts  - Adrenal gland is negative for malignancy  - One lymph node, negative for malignancy (0/1)  The tumor size was 4 5 cm with invasion beyond the muscularis into the periurethral fat or peripelvic fat or the renal parenchyma  The margins were uninvolved by carcinoma or carcinoma in situ  The final pathology was pT3 pN0  Patient barely tolerated 1 cycle of adjuvant chemotherapy with split dose cisplatin and gemcitabine with a lot of side effects  The treatment had to be discontinued due to significant worsening renal dysfunction 6/2020  Patient started to complain about significant abdominal/left inguinal pain around August/September 2020  Unfortunately repeat imaging revealed recurrent/metastatic disease  9/4/20 CT C/A/P- Status post left nephrectomy for resection of urothelial neoplasm  Lymphadenopathy in the left nephrectomy bed has increased since the prior examination, concerning for metastatic disease       Ill-defined hyperattenuating masslike focus in the left rectus muscle, not identified on the prior examination  This is suspicious for a metastatic lesion  A rectus hematoma is also considered  9/23/20 PET scan- 1  Multiple FDG avid lymph nodes in the retrocrural region, retroperitoneum and the left renal bed compatible with metastasis  These have progressed from recent CT  2   FDG avid mass involving the left rectus abdominal musculature compatible with metastasis which is larger from recent CT  3  Several small lymph nodes adjacent to the mid to distal esophagus with FDG uptake suspicious for metastasis  Small focus of FDG uptake also suggested in the right hilar region, metastasis is not excluded  This could be reassessed on follow-up  4   Subtle focus of FDG uptake at the T2 level on the left, nonspecific, could be related to early degenerative changes, developing metastasis is not entirely excluded  This could be reassessed on follow-up  5  Prostate is mildly prominent with heterogeneous FDG uptake  This could be inflammatory  Correlate for prostatitis  He completed palliative radiation to the left abdomen 12/3/20     His PET scan from 08/16/2021 showed progression of his disease with hypermetabolic soft tissue lesions at the level of the right shoulder and right axilla with interval progression of the retroperitoneal and mesenteric hypermetabolic metastasis  He did have the new lesion to his right upper back/shoulder which was causing significant localized pain  This excised by his surgeon 08/09/2021  Pathology confirmed metastatic high-grade carcinoma consistent with his no primary urothelial carcinoma  He received palliative radiation to his right shoulder/axilla region  Urothelial cancer ([address] )   1/3/2020 Biopsy    Final Diagnosis    A  Urine, Clean Catch, Thin Prep:  High grade urothelial carcinoma (HGUC) - see comment  1/3/2020 Initial Diagnosis    Urothelial cancer ([address] )  T3 N0 (stage IIIA)     1/17/2020 Biopsy    Final Diagnosis    A  Ureter, Right, left renal pelvis biopsy  -Fragments of high grade urothelial carcinoma   -No evidence of lamina propria invasion   -Detrusor muscle/ muscularis propria is not present for evaluation  B  Urinary Bladder, bladder biopsy:  -Fragments of low grade papillary lesion  See note  -No evidence of invasion seen  -Unremarkable fragment of detrusor muscle seen  4/1/2020 Surgery    left robotic assisted laparoscopic nephroureterectomy with bladder cuff excision     Final Diagnosis    A  Left kidney, ureter, and bladder cuff, nephroureterectomy:  - Invasive high grade urothelial carcinoma arising in renal pelvis  - Bladder cuff margin is negative for carcinoma and no evidence of high grade dysplasia  - Ureters with no significant pathologic abnormality  - Two benign simple cysts  - Adrenal gland is negative for malignancy  - One lymph node, negative for malignancy (0/1)          5/14/2020 - 6/3/2020 Chemotherapy    CISplatin (PLATINOL) split dose 35 mg/m2 = 75 3 mg (50 % of original dose 70 mg/m2), Intravenous,   Administration: 75 3 mg (5/14/2020), 75 3 mg (5/21/2020)  gemcitabine (GEMZAR) 2,200 mg in sodium chloride 0 9 % 250 mL infusion, 2,150 2 mg (80 % of original dose 1,250 mg/m2), Intravenous,   Administration: 2,200 mg (5/14/2020), 2,200 mg (5/21/2020)    (only completed 1 cycle- d/c d/t adverse effects and worsening renal dysfunction)     10/9/2020 - 9/9/2021 Chemotherapy    pembrolizumab (KEYTRUDA) IVPB, 200 mg, Intravenous, Once, 16 of 20 cycles  Administration: 200 mg (10/9/2020), 200 mg (10/30/2020), 200 mg (11/20/2020), 200 mg (12/11/2020), 200 mg (12/31/2020), 200 mg (1/22/2021), 200 mg (2/12/2021), 200 mg (3/5/2021), 200 mg (3/26/2021), 200 mg (4/16/2021), 200 mg (5/7/2021), 200 mg (5/28/2021), 200 mg (6/18/2021), 200 mg (7/9/2021), 200 mg (7/30/2021), 200 mg (8/20/2021)     11/19/2020 - 12/3/2020 Radiation    Treatment:  Course: C1    Plan ID Energy Fractions Dose per Fraction (cGy) Dose Correction (cGy) Total Dose Delivered (cGy) Elapsed Days   L Abdomen 6X 10 / 10 300 0 3,000 14        9/10/2021 -  Chemotherapy    enfortumab vedotin-ejfv (PADCEV) IVPB, 1 25 mg/kg = 114 mg, Intravenous, Once, 6 of 8 cycles  Administration: 114 mg (9/10/2021), 114 mg (9/17/2021), 110 mg (10/8/2021), 110 mg (9/24/2021), 110 mg (10/15/2021), 110 mg (11/12/2021), 110 mg (10/22/2021), 110 mg (11/19/2021), 110 mg (12/10/2021), 110 mg (11/26/2021), 110 mg (12/17/2021), 110 mg (1/7/2022), 110 mg (12/23/2021), 110 mg (1/14/2022), 110 mg (1/21/2022)     Cancer of left renal pelvis (HCC)   4/23/2020 Initial Diagnosis    Cancer of left renal pelvis (HCC)     10/9/2020 - 9/9/2021 Chemotherapy    pembrolizumab (KEYTRUDA) IVPB, 200 mg, Intravenous, Once, 16 of 20 cycles  Administration: 200 mg (10/9/2020), 200 mg (10/30/2020), 200 mg (11/20/2020), 200 mg (12/11/2020), 200 mg (12/31/2020), 200 mg (1/22/2021), 200 mg (2/12/2021), 200 mg (3/5/2021), 200 mg (3/26/2021), 200 mg (4/16/2021), 200 mg (5/7/2021), 200 mg (5/28/2021), 200 mg (6/18/2021), 200 mg (7/9/2021), 200 mg (7/30/2021), 200 mg (8/20/2021)     9/10/2021 -  Chemotherapy    enfortumab vedotin-ejfv (PADCEV) IVPB, 1 25 mg/kg = 114 mg, Intravenous, Once, 6 of 8 cycles  Administration: 114 mg (9/10/2021), 114 mg (9/17/2021), 110 mg (10/8/2021), 110 mg (9/24/2021), 110 mg (10/15/2021), 110 mg (11/12/2021), 110 mg (10/22/2021), 110 mg (11/19/2021), 110 mg (12/10/2021), 110 mg (11/26/2021), 110 mg (12/17/2021), 110 mg (1/7/2022), 110 mg (12/23/2021), 110 mg (1/14/2022), 110 mg (1/21/2022)      Surgery       Metastatic [address]ro[address]ial [address] [address] )   8/9/2021 Initial Diagnosis    Metastatic urothelial carcinoma ([facility] [address] )     9/8/2021 - 9/21/2021 Radiation    Treatment:  Course: C2    Plan ID Energy Fractions Dose per Fraction (cGy) Dose Correction (cGy) Total Dose Delivered (cGy) Elapsed Days   R Axil_Shl # 6X 10 / 10 300 0 3,000 13      Treatment dates:  C2: 9/8/2021 - 9/21/2021     9/10/2021 -  Chemotherapy    enfortumab vedotin-ejfv (PADCEV) IVPB, 1 25 mg/kg = 114 mg, Intravenous, Once, 6 of 8 cycles  Administration: 114 mg (9/10/2021), 114 mg (9/17/2021), 110 mg (10/8/2021), 110 mg (9/24/2021), 110 mg (10/15/2021), 110 mg (11/12/2021), 110 mg (10/22/2021), 110 mg (11/19/2021), 110 mg (12/10/2021), 110 mg (11/26/2021), 110 mg (12/17/2021), 110 mg (1/7/2022), 110 mg (12/23/2021), 110 mg (1/14/2022), 110 mg (1/21/2022)         Interval history:    ROS: Review of Systems   Constitutional: Positive for fatigue  Negative for chills and fever  HENT: Positive for hearing loss  Negative for ear pain and sore throat  Eyes: Negative for pain and visual disturbance  Respiratory: Positive for shortness of breath  Negative for cough  Cardiovascular: Negative for chest pain and palpitations  Gastrointestinal: Negative for abdominal pain and vomiting  Genitourinary: Positive for flank pain (Right)  Negative for dysuria and hematuria  Musculoskeletal: Positive for back pain  Negative for arthralgias  Skin: Negative for color change and rash  Neurological: Positive for numbness  Negative for seizures and syncope  Psychiatric/Behavioral: Positive for sleep disturbance  All other systems reviewed and are negative        Past Medical History:   Diagnosis Date    Arthritis     Bladder cancer     Cancer ([facility] [address] )     skin melanoma;basal cell    Chronic pain disorder     from arthritis    Colon polyp     Does use hearing aid     bilat    Dry eyes, bilateral     GERD (gastroesophageal reflux disease)     History of kidney stones 10/2019    History of partial knee replacement     bilat    History of vertigo     Hyperlipidemia     Hypertension     Infusion extravasation of chemotherapy vesicant 11/2021    Kidney lesion     Lumbar disc disorder     compression of vertebrae L4-5-6    Muscle weakness     left hip area    Renal mass     left    Right ankle injury 2020    missed step of ladder     Right ankle pain     Shortness of breath     with activity    Tinnitus     Urothelial cancer     left    Wears glasses        Past Surgical History:   Procedure Laterality Date    COLONOSCOPY      CYSTOSCOPY Left 2020    Procedure: CYSTOSCOPY; URETERAL CATHETER PLACEMENT;  Surgeon: Claudene Bradford, MD;  Location[facility];  Service: Urology    CYSTOSCOPY  2020    CYSTOSCOPY  2021    FL CYSTOGRAM  2020    FL RETROGRADE PYELOGRAM  2020    HERNIA REPAIR      umbilical with mesh    INGUINAL HERNIA REPAIR Right     with mesh    IR PORT PLACEMENT  2020    JOINT REPLACEMENT Bilateral     partials knee    LUMBAR EPIDURAL INJECTION      NE CYSTOURETHROSCOPY,URETER CATHETER Bilateral 2020    Procedure: CYSTOSCOPY; RIGHT RETROGRADE PYELOGRAM WITH RIGHT URETERAL CYTOLOGY SAMPLING; LEFT URETEROSCOPY WITH RENAL PELVIS BIOPSY AND LEFT STENT PLACEMENT;  Surgeon: Claudene Bradford, MD;  [facility];  Service: Urology    NE NEPHRECTOMY, W/PART  URETECTOMY Left 2020    Procedure: ROBOTIC LAPAROSCOPIC NEPHRO-URETERECTOMY;  Surgeon: Claudene Bradford, MD;  Location[facility];  Service: Urology    SKIN CANCER EXCISION      surface melanoma    TONSILLECTOMY      WISDOM TOOTH EXTRACTION         Social History     Socioeconomic History    Marital status: /Civil Union     Spouse name: None    Number of children: None    Years of education: None    Highest education level: None   Occupational History    None   Tobacco Use    Smoking status: Former Smoker     Types: Cigarettes     Quit date:      Years since quittin 1    Smokeless tobacco: Never Used   Vaping Use    Vaping Use: Never used   Substance and Sexual Activity    Alcohol use:  Yes     Alcohol/week: 17 0 standard drinks     Types: 7 Glasses of wine, 10 Cans of beer per week     Comment: socially    Drug use: Never    Sexual activity: Not Currently   Other Topics Concern    None   Social History Narrative    Daily caffeine use - 2 cups coffee      Social Determinants of Health     Financial Resource Strain: Not on file   Food Insecurity: Not on file   Transportation Needs: Not on file   Physical Activity: Not on file   Stress: Not on file   Social Connections: Not on file   Intimate Partner Violence: Not on file   Housing Stability: Not on file       Family History   Problem Relation Age of Onset    Liver cancer Father        Allergies   Allergen Reactions    Poison Ivy Extract Rash         Current Outpatient Medications:     acetaminophen (TYLENOL) 500 mg tablet, Take 2 tablets (1,000 mg total) by mouth every 8 (eight) hours, Disp: , Rfl:     allopurinol (ZYLOPRIM) 300 mg tablet, take 1 tablet by mouth once daily, Disp: 30 tablet, Rfl: 3    ALPRAZolam (XANAX) 0 25 mg tablet, Take 1 tablet (0 25 mg total) by mouth daily at bedtime as needed for anxiety (restless legs), Disp: 30 tablet, Rfl: 1    amLODIPine (NORVASC) 5 mg tablet, Take 5 mg by mouth daily  , Disp: , Rfl:     atorvastatin (LIPITOR) 80 mg tablet, Take 80 mg by mouth every other day evening, Disp: , Rfl:     docusate sodium (COLACE) 250 MG capsule, Take 1 capsule (250 mg total) by mouth daily, Disp: 60 capsule, Rfl: 6    famotidine (PEPCID) 20 mg tablet, take 1-2 tablets by mouth every evening, Disp: , Rfl:     folic acid (FOLVITE) 1 mg tablet, Take 1 tablet (1 mg total) by mouth daily, Disp: 90 tablet, Rfl: 4    hydrOXYzine HCL (ATARAX) 25 mg tablet, Take 1 tablet (25 mg total) by mouth every 6 (six) hours as needed for itching or anxiety, Disp: 60 tablet, Rfl: 2    Magnesium 250 MG TABS, 1 tablet 2 (two) times a day Taking 500mg in the morning and 500mg at night, Disp: , Rfl:     metoprolol tartrate (LOPRESSOR) 100 mg tablet, Take 50 mg by mouth daily, Disp: , Rfl:     naloxone (NARCAN) 4 mg/0 1 mL nasal spray, Administer 1 spray into a nostril   If breathing does not return to normal or if breathing difficulty resumes after 2-3 minutes, give another dose in the other nostril using a new spray , Disp: 1 each, Rfl: 1    nystatin (MYCOSTATIN) cream, Apply topically 2 (two) times a day, Disp: 30 g, Rfl: 0    omeprazole (PriLOSEC) 20 mg delayed release capsule, Take 20 mg by mouth daily  , Disp: , Rfl:     predniSONE 10 mg tablet, Take 2 tablets (20 mg total) by mouth daily, Disp: 60 tablet, Rfl: 3    senna (SENOKOT) 8 6 mg, Take 1 tablet (8 6 mg total) by mouth daily at bedtime (Patient taking differently: Take 8 6 mg by mouth daily at bedtime As needed), Disp: 30 each, Rfl: 0    tadalafil (CIALIS) 20 MG tablet, Take 1 tablet by mouth one (1) hour prior to intercourse  Do not exceed more than two tablets per week , Disp: 12 tablet, Rfl: 3    terazosin (HYTRIN) 5 mg capsule, Take 2 capsules (10 mg total) by mouth daily at bedtime, Disp: 30 capsule, Rfl: 6    triamcinolone (KENALOG) 0 1 % lotion, Apply topically 3 (three) times a day, Disp: 60 mL, Rfl: 5    VITAMIN D PO, Take 1,000 Units by mouth daily , Disp: , Rfl:     zolpidem (AMBIEN) 5 mg tablet, Take 1 tablet (5 mg total) by mouth daily at bedtime as needed for sleep, Disp: 30 tablet, Rfl: 1    HYDROmorphone (DILAUDID) 2 mg tablet, Take 1 tablet (2 mg total) by mouth every 4 (four) hours as needed for moderate pain Max Daily Amount: 12 mg (Patient not taking: Reported on 11/24/2021 ), Disp: 90 tablet, Rfl: 0    morphine (MSIR) 15 mg tablet, Take 1/2 tablet 3 times a day for 5 days, then 1/2 tablet 2 times a day for 5 days, then 1/2 tablet once a day for 5 days, then stop (Patient not taking: Reported on 2/2/2022 ), Disp: 15 tablet, Rfl: 0  No current facility-administered medications for this visit        Physical Exam:  /60 (BP Location: Left arm, Patient Position: Sitting, Cuff Size: Adult)   Pulse (!) 52   Temp (!) 97 4 °F (36 3 °C) (Temporal)   Resp 16   Ht 5' 10" (1 778 m)   Wt 93 7 kg (206 lb 8 oz) SpO2 95%   BMI 29 63 kg/m²     Physical Exam  Constitutional:       Appearance: He is well-developed  He is obese  HENT:      Head: Normocephalic and atraumatic  Eyes:      General: No scleral icterus  Right eye: No discharge  Left eye: No discharge  Conjunctiva/sclera: Conjunctivae normal       Pupils: Pupils are equal, round, and reactive to light  Neck:      Thyroid: No thyromegaly  Trachea: No tracheal deviation  Cardiovascular:      Rate and Rhythm: Normal rate and regular rhythm  Heart sounds: Normal heart sounds  No murmur heard  No friction rub  Pulmonary:      Effort: Pulmonary effort is normal  No respiratory distress  Breath sounds: Normal breath sounds  No wheezing or rales  Chest:      Chest wall: No tenderness  Abdominal:      General: There is no distension  Palpations: Abdomen is soft  There is no hepatomegaly or splenomegaly  Tenderness: There is no abdominal tenderness  There is no guarding or rebound  Comments: Right flank pain on palpation   Musculoskeletal:         General: No tenderness or deformity  Normal range of motion  Cervical back: Normal range of motion and neck supple  Lymphadenopathy:      Cervical: No cervical adenopathy  Skin:     General: Skin is warm and dry  Coloration: Skin is not pale  Findings: No erythema or rash  Neurological:      Mental Status: He is alert and oriented to person, place, and time  Cranial Nerves: No cranial nerve deficit  Coordination: Coordination normal       Deep Tendon Reflexes: Reflexes are normal and symmetric  Psychiatric:         Behavior: Behavior normal          Thought Content:  Thought content normal          Judgment: Judgment normal            Labs:  Lab Results   Component Value Date    WBC 9 49 02/15/2022    HGB 12 8 02/15/2022    HCT 40 9 02/15/2022     (H) 02/15/2022     02/15/2022     Lab Results   Component Value Date    K 4 1 02/15/2022     02/15/2022    CO2 32 02/15/2022    BUN 30 (H) 02/15/2022    CREATININE 1 54 (H) 02/15/2022    GLUF 113 (H) 12/16/2021    CALCIUM 9 1 02/15/2022    CORRECTEDCA 9 5 12/09/2021    AST 19 02/15/2022    ALT 23 02/15/2022    ALKPHOS 51 02/15/2022    EGFR 44 02/15/2022     No results found for: TSH    Patient voiced understanding and agreement in the above discussion  Aware to contact our office with questions/symptoms in the interim

## 2022-02-15 NOTE — PROGRESS NOTES
Pt hydrated over 1 hour as per orders  Pre chemo labs also drawn on admit  Port flushed and deaccessed per routine   Discharged ambulatory with avs

## 2022-02-15 NOTE — PROGRESS NOTES
Time out from Raquel Chen RN:  Padcev dose will be reduced to 1mg/kg  Diego White in pharmacy notified of same

## 2022-02-16 ENCOUNTER — TELEPHONE (OUTPATIENT)
Dept: HEMATOLOGY ONCOLOGY | Facility: CLINIC | Age: 72
End: 2022-02-16

## 2022-02-16 NOTE — TELEPHONE ENCOUNTER
Pt phoned to report that he has been off his chemo treatment for 3 weeks with no improvement in his neuropathy  Pt agreed to dose reduction while in office yesterday however he has had time to think about this and decided he wants to go back to full dose starting this Friday  I let pt know that I will discuss with Dr Gildardo Santoyo and get back to him as well as notify [id number] infusion

## 2022-02-17 ENCOUNTER — TELEPHONE (OUTPATIENT)
Dept: HEMATOLOGY ONCOLOGY | Facility: CLINIC | Age: 72
End: 2022-02-17

## 2022-02-17 NOTE — TELEPHONE ENCOUNTER
Phoned [id number] infusion and spoke to Michelle Reeves RN to let staff know pt does not wish to have a dose reduction on his chemo

## 2022-02-17 NOTE — PROGRESS NOTES
Time out from Marisol Wolff RN:  Patient requested to receive full dose of Padcev  Dose will be changed back to 1 25 mg/kg  Pharmacist Rashel King notified of change

## 2022-02-18 ENCOUNTER — HOSPITAL ENCOUNTER (OUTPATIENT)
Dept: INFUSION CENTER | Facility: HOSPITAL | Age: 72
Discharge: HOME | End: 2022-02-18
Attending: INTERNAL MEDICINE
Payer: MEDICARE

## 2022-02-18 VITALS
HEART RATE: 58 BPM | TEMPERATURE: 96.8 F | BODY MASS INDEX: 31.01 KG/M2 | SYSTOLIC BLOOD PRESSURE: 127 MMHG | DIASTOLIC BLOOD PRESSURE: 62 MMHG | HEIGHT: 68 IN | RESPIRATION RATE: 16 BRPM | WEIGHT: 204.59 LBS | OXYGEN SATURATION: 96 %

## 2022-02-18 DIAGNOSIS — E86.0 DEHYDRATION: ICD-10-CM

## 2022-02-18 DIAGNOSIS — E83.42 HYPOMAGNESEMIA: ICD-10-CM

## 2022-02-18 DIAGNOSIS — C79.10 METASTATIC UROTHELIAL CARCINOMA (HCC): Primary | ICD-10-CM

## 2022-02-18 DIAGNOSIS — C65.2 CANCER OF LEFT RENAL PELVIS (HCC): ICD-10-CM

## 2022-02-18 DIAGNOSIS — C68.9 UROTHELIAL CANCER (HCC): ICD-10-CM

## 2022-02-18 DIAGNOSIS — C79.10 METASTATIC UROTHELIAL CARCINOMA (HCC): ICD-10-CM

## 2022-02-18 DIAGNOSIS — G89.3 CANCER ASSOCIATED PAIN: ICD-10-CM

## 2022-02-18 DIAGNOSIS — C68.9 UROTHELIAL CANCER (HCC): Primary | ICD-10-CM

## 2022-02-18 PROCEDURE — 96367 TX/PROPH/DG ADDL SEQ IV INF: CPT

## 2022-02-18 PROCEDURE — 96413 CHEMO IV INFUSION 1 HR: CPT

## 2022-02-18 RX ORDER — SODIUM CHLORIDE 9 MG/ML
20 INJECTION, SOLUTION INTRAVENOUS ONCE
Status: COMPLETED | OUTPATIENT
Start: 2022-02-18 | End: 2022-02-18

## 2022-02-18 RX ORDER — ACETAMINOPHEN 325 MG/1
650 TABLET ORAL ONCE
Status: DISCONTINUED | OUTPATIENT
Start: 2022-02-18 | End: 2022-02-22 | Stop reason: HOSPADM

## 2022-02-18 RX ADMIN — ENFORTUMAB VEDOTIN 110 MG: 30 INJECTION, POWDER, LYOPHILIZED, FOR SOLUTION INTRAVENOUS at 10:33

## 2022-02-18 RX ADMIN — DEXAMETHASONE SODIUM PHOSPHATE: 10 INJECTION, SOLUTION INTRAMUSCULAR; INTRAVENOUS at 09:57

## 2022-02-18 RX ADMIN — SODIUM CHLORIDE 20 ML/HR: 0.9 INJECTION, SOLUTION INTRAVENOUS at 09:28

## 2022-02-18 RX ADMIN — DIPHENHYDRAMINE HYDROCHLORIDE 25 MG: 50 INJECTION INTRAMUSCULAR; INTRAVENOUS at 09:35

## 2022-02-18 RX ADMIN — MAGNESIUM SULFATE HEPTAHYDRATE: 500 INJECTION, SOLUTION INTRAMUSCULAR; INTRAVENOUS at 08:28

## 2022-02-18 NOTE — PROGRESS NOTES
Patient tolerated padcev infusion without issue  IV magnesium also given   Patient discharged in stable condition with AVS

## 2022-02-21 ENCOUNTER — HOSPITAL ENCOUNTER (OUTPATIENT)
Dept: RADIOLOGY | Facility: CLINIC | Age: 72
Discharge: HOME | End: 2022-02-21
Attending: ANESTHESIOLOGY | Admitting: ANESTHESIOLOGY
Payer: MEDICARE

## 2022-02-21 VITALS
DIASTOLIC BLOOD PRESSURE: 76 MMHG | OXYGEN SATURATION: 96 % | HEART RATE: 51 BPM | TEMPERATURE: 97.2 F | RESPIRATION RATE: 20 BRPM | SYSTOLIC BLOOD PRESSURE: 126 MMHG

## 2022-02-21 DIAGNOSIS — M47.816 LUMBAR SPONDYLOSIS: ICD-10-CM

## 2022-02-21 PROCEDURE — 64494 INJ PARAVERT F JNT L/S 2 LEV: CPT | Performed by: ANESTHESIOLOGY

## 2022-02-21 PROCEDURE — 64493 INJ PARAVERT F JNT L/S 1 LEV: CPT | Performed by: ANESTHESIOLOGY

## 2022-02-21 RX ORDER — LIDOCAINE HYDROCHLORIDE 10 MG/ML
10 INJECTION, SOLUTION EPIDURAL; INFILTRATION; INTRACAUDAL; PERINEURAL ONCE
Status: COMPLETED | OUTPATIENT
Start: 2022-02-21 | End: 2022-02-21

## 2022-02-21 RX ADMIN — LIDOCAINE HYDROCHLORIDE 4 ML: 10 INJECTION, SOLUTION EPIDURAL; INFILTRATION; INTRACAUDAL; PERINEURAL at 15:31

## 2022-02-21 RX ADMIN — LIDOCAINE HYDROCHLORIDE 3 ML: 20 INJECTION, SOLUTION EPIDURAL; INFILTRATION; INTRACAUDAL; PERINEURAL at 15:32

## 2022-02-21 NOTE — H&P
History of Present Illness: The patient is a 66 y o  male who presents with complaints of low back pain      Patient Active Problem List   Diagnosis    Lumbar radiculopathy    Lumbar disc herniation    Lumbar spondylosis    [facility]ro[facility] ([facility] [address] [facility]    Essential hypertension    Sinus bradycardia    Hyperlipidemia    Cancer of left renal pelvis (HCC)    Drug-induced neutropenia (HCC)    Chronic pain disorder    Spinal stenosis of lumbar region    Encounter for central line care    Hyperuricemia    Encounter for long-term opiate analgesic use    Uncomplicated opioid dependence ([facility] [address] [facility]    Palliative care patient    Decreased appetite    Therapeutic opioid induced constipation    Medical marijuana use    Normocytic anemia    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)    Thrombophlebitis of superficial veins of right lower extremity    Renal dysfunction    Stage 3a chronic kidney disease (HCC)    Hypomagnesemia    Platelets decreased ([facility] [address] [facility]    H/O left nephrectomy    Chronic kidney disease-mineral and bone disorder    Vitamin D deficiency    Dehydration    Chronic right shoulder pain    Primary osteoarthritis of right shoulder    Metastatic urothelial carcinoma (HCC)    Drug induced constipation    Secondary malignant neoplasm of bone ([facility] [address] [facility]    Cancer associated pain    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)    Generalized abdominal pain    Early satiety    Dysgeusia       Past Medical History:   Diagnosis Date    Arthritis     Bladder cancer     Cancer ([facility] [address] [facility]     skin melanoma;basal cell    Chronic pain disorder     from arthritis    Colon polyp     Does use hearing aid     bilat    Dry eyes, bilateral     GERD (gastroesophageal reflux disease)     History of kidney stones 10/2019    History of partial knee replacement     bilat    History of vertigo     Hyperlipidemia     Hypertension     Infusion extravasation of chemotherapy vesicant 11/2021    Kidney lesion     Lumbar disc disorder     compression of vertebrae L4-5-6    Muscle weakness     left hip area    Renal mass     left    Right ankle injury 03/05/2020    missed step of ladder     Right ankle pain     Shortness of breath     with activity    Tinnitus     Urothelial cancer     left    Wears glasses        Past Surgical History:   Procedure Laterality Date    COLONOSCOPY      CYSTOSCOPY Left 4/1/2020    Procedure: CYSTOSCOPY; URETERAL CATHETER PLACEMENT;  Surgeon: Eddie Cox MD;  Location[facility];  Service: Urology    CYSTOSCOPY  05/11/2020    CYSTOSCOPY  06/04/2021    FL CYSTOGRAM  4/13/2020    FL RETROGRADE PYELOGRAM  1/17/2020    HERNIA REPAIR      umbilical with mesh    INGUINAL HERNIA REPAIR Right     with mesh    IR PORT PLACEMENT  4/30/2020    JOINT REPLACEMENT Bilateral     partials knee    LUMBAR EPIDURAL INJECTION      AK CYSTOURETHROSCOPY,URETER CATHETER Bilateral 1/17/2020    Procedure: CYSTOSCOPY; RIGHT RETROGRADE PYELOGRAM WITH RIGHT URETERAL CYTOLOGY SAMPLING; LEFT URETEROSCOPY WITH RENAL PELVIS BIOPSY AND LEFT STENT PLACEMENT;  Surgeon: Eddie Cox MD;  [facility];  Service: Urology    AK NEPHRECTOMY, W/PART   URETECTOMY Left 4/1/2020    Procedure: ROBOTIC LAPAROSCOPIC NEPHRO-URETERECTOMY;  Surgeon: Eddie Cox MD;  Location[facility];  Service: Urology    SKIN CANCER EXCISION      surface melanoma    TONSILLECTOMY      WISDOM TOOTH EXTRACTION           Current Outpatient Medications:     acetaminophen (TYLENOL) 500 mg tablet, Take 2 tablets (1,000 mg total) by mouth every 8 (eight) hours, Disp: , Rfl:     allopurinol (ZYLOPRIM) 300 mg tablet, take 1 tablet by mouth once daily, Disp: 30 tablet, Rfl: 3    ALPRAZolam (XANAX) 0 25 mg tablet, Take 1 tablet (0 25 mg total) by mouth daily at bedtime as needed for anxiety (restless legs), Disp: 30 tablet, Rfl: 1    amLODIPine (NORVASC) 5 mg tablet, Take 5 mg by mouth daily  , Disp: , Rfl:     atorvastatin (LIPITOR) 80 mg tablet, Take 80 mg by mouth every other day evening, Disp: , Rfl:     docusate sodium (COLACE) 250 MG capsule, Take 1 capsule (250 mg total) by mouth daily, Disp: 60 capsule, Rfl: 6    famotidine (PEPCID) 20 mg tablet, take 1-2 tablets by mouth every evening, Disp: , Rfl:     folic acid (FOLVITE) 1 mg tablet, Take 1 tablet (1 mg total) by mouth daily, Disp: 90 tablet, Rfl: 4    HYDROmorphone (DILAUDID) 2 mg tablet, Take 1 tablet (2 mg total) by mouth every 4 (four) hours as needed for moderate pain Max Daily Amount: 12 mg (Patient not taking: Reported on 11/24/2021 ), Disp: 90 tablet, Rfl: 0    hydrOXYzine HCL (ATARAX) 25 mg tablet, Take 1 tablet (25 mg total) by mouth every 6 (six) hours as needed for itching or anxiety, Disp: 60 tablet, Rfl: 2    Magnesium 250 MG TABS, 1 tablet 2 (two) times a day Taking 500mg in the morning and 500mg at night, Disp: , Rfl:     metoprolol tartrate (LOPRESSOR) 100 mg tablet, Take 50 mg by mouth daily, Disp: , Rfl:     morphine (MSIR) 15 mg tablet, Take 1/2 tablet 3 times a day for 5 days, then 1/2 tablet 2 times a day for 5 days, then 1/2 tablet once a day for 5 days, then stop (Patient not taking: Reported on 2/2/2022 ), Disp: 15 tablet, Rfl: 0    naloxone (NARCAN) 4 mg/0 1 mL nasal spray, Administer 1 spray into a nostril   If breathing does not return to normal or if breathing difficulty resumes after 2-3 minutes, give another dose in the other nostril using a new spray , Disp: 1 each, Rfl: 1    nystatin (MYCOSTATIN) cream, Apply topically 2 (two) times a day, Disp: 30 g, Rfl: 0    omeprazole (PriLOSEC) 20 mg delayed release capsule, Take 20 mg by mouth daily  , Disp: , Rfl:     predniSONE 10 mg tablet, Take 2 tablets (20 mg total) by mouth daily, Disp: 60 tablet, Rfl: 3    senna (SENOKOT) 8 6 mg, Take 1 tablet (8 6 mg total) by mouth daily at bedtime (Patient taking differently: Take 8 6 mg by mouth daily at bedtime As needed), Disp: 30 each, Rfl: 0    tadalafil (CIALIS) 20 MG tablet, Take 1 tablet by mouth one (1) hour prior to intercourse  Do not exceed more than two tablets per week , Disp: 12 tablet, Rfl: 3    terazosin (HYTRIN) 5 mg capsule, Take 2 capsules (10 mg total) by mouth daily at bedtime, Disp: 30 capsule, Rfl: 6    triamcinolone (KENALOG) 0 1 % lotion, Apply topically 3 (three) times a day, Disp: 60 mL, Rfl: 5    VITAMIN D PO, Take 1,000 Units by mouth daily , Disp: , Rfl:     zolpidem (AMBIEN) 5 mg tablet, Take 1 tablet (5 mg total) by mouth daily at bedtime as needed for sleep, Disp: 30 tablet, Rfl: 1    Current Facility-Administered Medications:     lidocaine (PF) (XYLOCAINE-MPF) 1 % injection 10 mL, 10 mL, Other, Once, Ro Obrien, DO    lidocaine (PF) (XYLOCAINE-MPF) 2 % injection 4 mL, 4 mL, Other, Once, Evelyne Jeffries,     Facility-Administered Medications Ordered in Other Encounters:     acetaminophen (TYLENOL) tablet 650 mg, 650 mg, Oral, Once, Christiane Feldman MD    Allergies   Allergen Reactions    Poison Ivy Extract Rash       Physical Exam:   Vitals:    02/21/22 1510   BP: 131/85   Pulse: (!) 48   Resp: 20   Temp: (!) 97 2 °F (36 2 °C)   SpO2: 96%     General: Awake, Alert, Oriented x 3, Mood and affect appropriate  Respiratory: Respirations even and unlabored  Cardiovascular: Peripheral pulses intact; no edema  Musculoskeletal Exam:  Bilateral lumbar paraspinals tender to palpation  ASA Score: 3         Assessment:   1   Lumbar spondylosis        Plan: REPEAT B/L L3-5 MBB

## 2022-02-21 NOTE — DISCHARGE INSTRUCTIONS
ACTIVITY  · Please do activities that will bring on the normal pain that we are rating  For example, if vacuuming or walking increases the pain, do that  This will give the most accurate response to the diary  · You may shower, but no tub baths today, or applied heat  CARE OF THE INJECTION SITE  · This area may be numb for several hours after the injection  · Notify [facility] if you have any of the following:  redness, drainage, swelling, or fever above 100°F     SPECIAL INSTRUCTIONS  · Please return the MBB diary to our office by mail, fax, or drop it off  MEDICATIONS  · Please do not take any break through or short acting pain medications for 8 hours after the block  · Continue to take all routine medications  · Our office may have instructed you to hold some medications  As no general anesthesia was used in today's procedure, you should not experience any side effects related to anesthesia  If you have a problem specifically related to your procedure, please call our office at [phone]  Problems not related to your procedure should be directed to your primary care physician

## 2022-02-22 ENCOUNTER — TELEPHONE (OUTPATIENT)
Dept: PAIN MEDICINE | Facility: MEDICAL CENTER | Age: 72
End: 2022-02-22

## 2022-02-22 ENCOUNTER — HOSPITAL ENCOUNTER (OUTPATIENT)
Dept: INFUSION CENTER | Facility: HOSPITAL | Age: 72
Discharge: HOME | End: 2022-02-22
Payer: MEDICARE

## 2022-02-22 VITALS
HEART RATE: 60 BPM | RESPIRATION RATE: 18 BRPM | SYSTOLIC BLOOD PRESSURE: 118 MMHG | DIASTOLIC BLOOD PRESSURE: 68 MMHG | TEMPERATURE: 97 F

## 2022-02-22 DIAGNOSIS — E86.0 DEHYDRATION: Primary | ICD-10-CM

## 2022-02-22 PROCEDURE — 96360 HYDRATION IV INFUSION INIT: CPT

## 2022-02-22 RX ADMIN — SODIUM CHLORIDE 1000 ML: 0.9 INJECTION, SOLUTION INTRAVENOUS at 08:35

## 2022-02-22 NOTE — PROGRESS NOTES
Hydration given without incident  Reviewed upcoming appointments he is aware of his next infusion appt

## 2022-02-22 NOTE — TELEPHONE ENCOUNTER
S/w the patient to follow up and inquired as to how well he felt after the blocks and he stated he seems to think that he got about 80% relief with them  He stated he did fax the diary over this morning for you to review  I did make him aware that you wanted to wait and see before going through the medication route  He appreciated the call

## 2022-02-22 NOTE — TELEPHONE ENCOUNTER
Aware   Will see how the patient does with medial branch blocks in follow-up after rhizotomy unless medication management is needed

## 2022-02-23 ENCOUNTER — APPOINTMENT (OUTPATIENT)
Dept: LAB | Facility: CLINIC | Age: 72
End: 2022-02-23
Payer: MEDICARE

## 2022-02-23 DIAGNOSIS — C68.9 UROTHELIAL CANCER (HCC): ICD-10-CM

## 2022-02-23 DIAGNOSIS — E83.42 HYPOMAGNESEMIA: ICD-10-CM

## 2022-02-23 DIAGNOSIS — M47.816 LUMBAR SPONDYLOSIS: Primary | ICD-10-CM

## 2022-02-23 DIAGNOSIS — C65.2 CANCER OF LEFT RENAL PELVIS (HCC): ICD-10-CM

## 2022-02-23 DIAGNOSIS — C79.10 METASTATIC UROTHELIAL CARCINOMA (HCC): ICD-10-CM

## 2022-02-23 LAB
ALBUMIN SERPL BCP-MCNC: 3.4 G/DL (ref 3.5–5)
ALP SERPL-CCNC: 66 U/L (ref 46–116)
ALT SERPL W P-5'-P-CCNC: 60 U/L (ref 12–78)
ANION GAP SERPL CALCULATED.3IONS-SCNC: 6 MMOL/L (ref 4–13)
AST SERPL W P-5'-P-CCNC: 28 U/L (ref 5–45)
BASOPHILS # BLD AUTO: 0.01 THOUSANDS/ΜL (ref 0–0.1)
BASOPHILS NFR BLD AUTO: 0 % (ref 0–1)
BILIRUB SERPL-MCNC: 1.25 MG/DL (ref 0.2–1)
BUN SERPL-MCNC: 24 MG/DL (ref 5–25)
CALCIUM ALBUM COR SERPL-MCNC: 9.8 MG/DL (ref 8.3–10.1)
CALCIUM SERPL-MCNC: 9.3 MG/DL (ref 8.3–10.1)
CHLORIDE SERPL-SCNC: 107 MMOL/L (ref 100–108)
CO2 SERPL-SCNC: 27 MMOL/L (ref 21–32)
CREAT SERPL-MCNC: 1.43 MG/DL (ref 0.6–1.3)
EOSINOPHIL # BLD AUTO: 0 THOUSAND/ΜL (ref 0–0.61)
EOSINOPHIL NFR BLD AUTO: 0 % (ref 0–6)
ERYTHROCYTE [DISTWIDTH] IN BLOOD BY AUTOMATED COUNT: 16.8 % (ref 11.6–15.1)
GFR SERPL CREATININE-BSD FRML MDRD: 48 ML/MIN/1.73SQ M
GLUCOSE SERPL-MCNC: 127 MG/DL (ref 65–140)
HCT VFR BLD AUTO: 41.4 % (ref 36.5–49.3)
HGB BLD-MCNC: 13.5 G/DL (ref 12–17)
IMM GRANULOCYTES # BLD AUTO: 0.03 THOUSAND/UL (ref 0–0.2)
IMM GRANULOCYTES NFR BLD AUTO: 0 % (ref 0–2)
LYMPHOCYTES # BLD AUTO: 0.34 THOUSANDS/ΜL (ref 0.6–4.47)
LYMPHOCYTES NFR BLD AUTO: 4 % (ref 14–44)
MAGNESIUM SERPL-MCNC: 1.9 MG/DL (ref 1.6–2.6)
MCH RBC QN AUTO: 32.5 PG (ref 26.8–34.3)
MCHC RBC AUTO-ENTMCNC: 32.6 G/DL (ref 31.4–37.4)
MCV RBC AUTO: 100 FL (ref 82–98)
MONOCYTES # BLD AUTO: 0.57 THOUSAND/ΜL (ref 0.17–1.22)
MONOCYTES NFR BLD AUTO: 7 % (ref 4–12)
NEUTROPHILS # BLD AUTO: 6.77 THOUSANDS/ΜL (ref 1.85–7.62)
NEUTS SEG NFR BLD AUTO: 89 % (ref 43–75)
NRBC BLD AUTO-RTO: 0 /100 WBCS
PLATELET # BLD AUTO: 121 THOUSANDS/UL (ref 149–390)
PMV BLD AUTO: 11.2 FL (ref 8.9–12.7)
POTASSIUM SERPL-SCNC: 4.1 MMOL/L (ref 3.5–5.3)
PROT SERPL-MCNC: 6.6 G/DL (ref 6.4–8.2)
RBC # BLD AUTO: 4.16 MILLION/UL (ref 3.88–5.62)
SODIUM SERPL-SCNC: 140 MMOL/L (ref 136–145)
WBC # BLD AUTO: 7.72 THOUSAND/UL (ref 4.31–10.16)

## 2022-02-23 PROCEDURE — 83735 ASSAY OF MAGNESIUM: CPT

## 2022-02-23 PROCEDURE — 85025 COMPLETE CBC W/AUTO DIFF WBC: CPT

## 2022-02-23 PROCEDURE — 36415 COLL VENOUS BLD VENIPUNCTURE: CPT | Performed by: NURSE PRACTITIONER

## 2022-02-23 PROCEDURE — 80053 COMPREHEN METABOLIC PANEL: CPT | Performed by: NURSE PRACTITIONER

## 2022-02-24 ENCOUNTER — OFFICE VISIT (OUTPATIENT)
Dept: HEMATOLOGY ONCOLOGY | Facility: CLINIC | Age: 72
End: 2022-02-24
Payer: MEDICARE

## 2022-02-24 VITALS
RESPIRATION RATE: 18 BRPM | WEIGHT: 202 LBS | SYSTOLIC BLOOD PRESSURE: 126 MMHG | DIASTOLIC BLOOD PRESSURE: 76 MMHG | TEMPERATURE: 97.7 F | HEART RATE: 63 BPM | BODY MASS INDEX: 30.62 KG/M2 | HEIGHT: 68 IN | OXYGEN SATURATION: 99 %

## 2022-02-24 DIAGNOSIS — D69.6 PLATELETS DECREASED (HCC): ICD-10-CM

## 2022-02-24 DIAGNOSIS — E83.42 HYPOMAGNESEMIA: ICD-10-CM

## 2022-02-24 DIAGNOSIS — G62.9 NEUROPATHY: ICD-10-CM

## 2022-02-24 DIAGNOSIS — C79.10 METASTATIC UROTHELIAL CARCINOMA (HCC): Primary | ICD-10-CM

## 2022-02-24 DIAGNOSIS — R30.0 DYSURIA: ICD-10-CM

## 2022-02-24 PROCEDURE — 99214 OFFICE O/P EST MOD 30 MIN: CPT | Performed by: INTERNAL MEDICINE

## 2022-02-24 RX ORDER — CIPROFLOXACIN 500 MG/1
500 TABLET, FILM COATED ORAL EVERY 12 HOURS SCHEDULED
Qty: 6 TABLET | Refills: 0 | Status: SHIPPED | OUTPATIENT
Start: 2022-02-24 | End: 2022-02-27

## 2022-02-24 RX ORDER — ALPRAZOLAM 0.25 MG/1
0.25 TABLET ORAL
Qty: 30 TABLET | Refills: 0 | Status: SHIPPED | OUTPATIENT
Start: 2022-02-24

## 2022-02-24 NOTE — PROGRESS NOTES
Hematology/Oncology Outpatient Follow-up  Jose Hernandez 66 y o  male [id number]    Date:  2/24/2022        Assessment and Plan:  1  Metastatic urothelial carcinoma ([facility] )  The patient will be receiving cycle 6 day 8 of the and 4 to map vedotin at the usual dose of 1 25 milligram/kilogram on 02/25/2022  We will see him next week for cycle 6 day 15  He is scheduled to get a PET-CT scan on 03/08   - CBC and differential; Future  - Comprehensive metabolic panel; Future  - Magnesium; Future  - ciprofloxacin (CIPRO) 500 mg tablet; Take 1 tablet (500 mg total) by mouth every 12 (twelve) hours for 3 days  Dispense: 6 tablet; Refill: 0  - ALPRAZolam (XANAX) 0 25 mg tablet; Take 1 tablet (0 25 mg total) by mouth daily at bedtime as needed for anxiety  Dispense: 30 tablet; Refill: 0  - UA (URINE) with reflex to Scope; Future    2  Hypomagnesemia  His magnesium seems to be within normal range  - Magnesium; Future    3  Neuropathy  He was asked to use the Xanax only on as-needed basis  - ALPRAZolam (XANAX) 0 25 mg tablet; Take 1 tablet (0 25 mg total) by mouth daily at bedtime as needed for anxiety  Dispense: 30 tablet; Refill: 0    4  Dysuria  He will be treated empirically with Cipro for 3 days  - ciprofloxacin (CIPRO) 500 mg tablet; Take 1 tablet (500 mg total) by mouth every 12 (twelve) hours for 3 days  Dispense: 6 tablet; Refill: 0  - UA (URINE) with reflex to Scope; Future    5  Platelets decreased ([facility] )  Above 120,000  HPI:  The patient came today for follow-up visit accompanied by his wife  He continues to complain about some neuropathy  He tried Xanax the other day which help with his neuropathy significantly  He Did complain today about right flank pain and some dysuria  His most recent blood work from yesterday showed hemoglobin 13 5 with the normal white cells per it platelet count 481  Magnesium 1 9  Creatinine 1 4 with normal calcium liver enzymes    Oncology History Overview Note Patient has a history of hypertension, hyperlipidemia, chronic lower back pain  He had an MRI of the lower spine for further evaluation of his chronic lower back pain  The MRI on 12/02/2019 revealed Multiple left renal masses to include a left lower pole cyst and a rounded structure in the left upper pole which may also represent cyst   Left upper pole renal masses approximately 3 cm in greatest linear dimension  A CT with renal protocol was done on 12/12/2019 which also showed the infiltrating lesion in the upper pole of the left kidney measuring about the 3 5 cm in greatest dimension without any hint of retroperitoneal lymphadenopathy  A CT scan of the abdomen pelvis on 01/14/2020 showed the same findings with the mild hydronephrosis on the left  The urine cytology on 01/03/2020 showed high-grade urothelial carcinoma  A cystoscopy was then done, a biopsy was taken from the left renal pelvic region which showed high-grade urothelial carcinoma without evidence of lamina propria invasion  The detrusor muscle/muscularis propria were not present for evaluation  The recommendation was to pursue left robotic assisted laparoscopic nephroureterectomy with bladder cuff excision which was done on 04/01/2020  The final pathology revealed;  - Invasive high grade urothelial carcinoma arising in renal pelvis  - Bladder cuff margin is negative for carcinoma and no evidence of high grade dysplasia  - Ureters with no significant pathologic abnormality  - Two benign simple cysts  - Adrenal gland is negative for malignancy  - One lymph node, negative for malignancy (0/1)  The tumor size was 4 5 cm with invasion beyond the muscularis into the periurethral fat or peripelvic fat or the renal parenchyma  The margins were uninvolved by carcinoma or carcinoma in situ  The final pathology was pT3 pN0       Patient barely tolerated 1 cycle of adjuvant chemotherapy with split dose cisplatin and gemcitabine with a lot of side effects  The treatment had to be discontinued due to significant worsening renal dysfunction 6/2020  Patient started to complain about significant abdominal/left inguinal pain around August/September 2020  Unfortunately repeat imaging revealed recurrent/metastatic disease  9/4/20 CT C/A/P- Status post left nephrectomy for resection of urothelial neoplasm  Lymphadenopathy in the left nephrectomy bed has increased since the prior examination, concerning for metastatic disease  Ill-defined hyperattenuating masslike focus in the left rectus muscle, not identified on the prior examination  This is suspicious for a metastatic lesion  A rectus hematoma is also considered  9/23/20 PET scan- 1  Multiple FDG avid lymph nodes in the retrocrural region, retroperitoneum and the left renal bed compatible with metastasis  These have progressed from recent CT  2   FDG avid mass involving the left rectus abdominal musculature compatible with metastasis which is larger from recent CT  3  Several small lymph nodes adjacent to the mid to distal esophagus with FDG uptake suspicious for metastasis  Small focus of FDG uptake also suggested in the right hilar region, metastasis is not excluded  This could be reassessed on follow-up  4   Subtle focus of FDG uptake at the T2 level on the left, nonspecific, could be related to early degenerative changes, developing metastasis is not entirely excluded  This could be reassessed on follow-up  5  Prostate is mildly prominent with heterogeneous FDG uptake  This could be inflammatory  Correlate for prostatitis  He completed palliative radiation to the left abdomen 12/3/20     His PET scan from 08/16/2021 showed progression of his disease with hypermetabolic soft tissue lesions at the level of the right shoulder and right axilla with interval progression of the retroperitoneal and mesenteric hypermetabolic metastasis         He did have the new lesion to his right upper back/shoulder which was causing significant localized pain  This excised by his surgeon 08/09/2021  Pathology confirmed metastatic high-grade carcinoma consistent with his no primary urothelial carcinoma  He received palliative radiation to his right shoulder/axilla region  Urothelial cancer ([address] )   1/3/2020 Biopsy    Final Diagnosis    A  Urine, Clean Catch, Thin Prep:  High grade urothelial carcinoma (HGUC) - see comment  1/3/2020 Initial Diagnosis    Urothelial cancer ([address] )  T3 N0 (stage IIIA)     1/17/2020 Biopsy    Final Diagnosis    A  Ureter, Right, left renal pelvis biopsy  -Fragments of high grade urothelial carcinoma   -No evidence of lamina propria invasion   -Detrusor muscle/ muscularis propria is not present for evaluation  B  Urinary Bladder, bladder biopsy:  -Fragments of low grade papillary lesion  See note  -No evidence of invasion seen  -Unremarkable fragment of detrusor muscle seen  4/1/2020 Surgery    left robotic assisted laparoscopic nephroureterectomy with bladder cuff excision     Final Diagnosis    A  Left kidney, ureter, and bladder cuff, nephroureterectomy:  - Invasive high grade urothelial carcinoma arising in renal pelvis  - Bladder cuff margin is negative for carcinoma and no evidence of high grade dysplasia  - Ureters with no significant pathologic abnormality  - Two benign simple cysts  - Adrenal gland is negative for malignancy  - One lymph node, negative for malignancy (0/1)          5/14/2020 - 6/3/2020 Chemotherapy    CISplatin (PLATINOL) split dose 35 mg/m2 = 75 3 mg (50 % of original dose 70 mg/m2), Intravenous,   Administration: 75 3 mg (5/14/2020), 75 3 mg (5/21/2020)  gemcitabine (GEMZAR) 2,200 mg in sodium chloride 0 9 % 250 mL infusion, 2,150 2 mg (80 % of original dose 1,250 mg/m2), Intravenous,   Administration: 2,200 mg (5/14/2020), 2,200 mg (5/21/2020)    (only completed 1 cycle- d/c d/t adverse effects and worsening renal dysfunction)     10/9/2020 - 9/9/2021 Chemotherapy    pembrolizumab (KEYTRUDA) IVPB, 200 mg, Intravenous, Once, 16 of 20 cycles  Administration: 200 mg (10/9/2020), 200 mg (10/30/2020), 200 mg (11/20/2020), 200 mg (12/11/2020), 200 mg (12/31/2020), 200 mg (1/22/2021), 200 mg (2/12/2021), 200 mg (3/5/2021), 200 mg (3/26/2021), 200 mg (4/16/2021), 200 mg (5/7/2021), 200 mg (5/28/2021), 200 mg (6/18/2021), 200 mg (7/9/2021), 200 mg (7/30/2021), 200 mg (8/20/2021)     11/19/2020 - 12/3/2020 Radiation    Treatment:  Course: C1    Plan ID Energy Fractions Dose per Fraction (cGy) Dose Correction (cGy) Total Dose Delivered (cGy) Elapsed Days   L Abdomen 6X 10 / 10 300 0 3,000 14        9/10/2021 -  Chemotherapy    enfortumab vedotin-ejfv (PADCEV) IVPB, 1 25 mg/kg = 114 mg, Intravenous, Once, 6 of 8 cycles  Dose modification: 1 mg/kg (original dose 1 25 mg/kg, Cycle 7, Reason: Other (Must fill in a comment), Comment: dose reduction due to side effects ), 1 25 mg/kg (original dose 1 25 mg/kg, Cycle 7, Reason: Other (Must fill in a comment), Comment: patient wants full dose )  Administration: 114 mg (9/10/2021), 114 mg (9/17/2021), 110 mg (10/8/2021), 110 mg (9/24/2021), 110 mg (10/15/2021), 110 mg (11/12/2021), 110 mg (10/22/2021), 110 mg (11/19/2021), 110 mg (12/10/2021), 110 mg (11/26/2021), 110 mg (12/17/2021), 110 mg (1/7/2022), 110 mg (12/23/2021), 110 mg (1/14/2022), 110 mg (1/21/2022), 110 mg (2/18/2022)     Cancer of left renal pelvis ([address] )   4/23/2020 Initial Diagnosis    Cancer of left renal pelvis ([facility] )     10/9/2020 - 9/9/2021 Chemotherapy    pembrolizumab (KEYTRUDA) IVPB, 200 mg, Intravenous, Once, 16 of 20 cycles  Administration: 200 mg (10/9/2020), 200 mg (10/30/2020), 200 mg (11/20/2020), 200 mg (12/11/2020), 200 mg (12/31/2020), 200 mg (1/22/2021), 200 mg (2/12/2021), 200 mg (3/5/2021), 200 mg (3/26/2021), 200 mg (4/16/2021), 200 mg (5/7/2021), 200 mg (5/28/2021), 200 mg (6/18/2021), 200 mg (7/9/2021), 200 mg (7/30/2021), 200 mg (8/20/2021)     9/10/2021 -  Chemotherapy    enfortumab vedotin-ejfv (PADCEV) IVPB, 1 25 mg/kg = 114 mg, Intravenous, Once, 6 of 8 cycles  Dose modification: 1 mg/kg (original dose 1 25 mg/kg, Cycle 7, Reason: Other (Must fill in a comment), Comment: dose reduction due to side effects ), 1 25 mg/kg (original dose 1 25 mg/kg, Cycle 7, Reason: Other (Must fill in a comment), Comment: patient wants full dose )  Administration: 114 mg (9/10/2021), 114 mg (9/17/2021), 110 mg (10/8/2021), 110 mg (9/24/2021), 110 mg (10/15/2021), 110 mg (11/12/2021), 110 mg (10/22/2021), 110 mg (11/19/2021), 110 mg (12/10/2021), 110 mg (11/26/2021), 110 mg (12/17/2021), 110 mg (1/7/2022), 110 mg (12/23/2021), 110 mg (1/14/2022), 110 mg (1/21/2022), 110 mg (2/18/2022)      Surgery       Metastatic urothelial carcinoma ([facility] )   8/9/2021 Initial Diagnosis    Metastatic urothelial carcinoma ([facility] )     9/8/2021 - 9/21/2021 Radiation    Treatment:  Course: C2    Plan ID Energy Fractions Dose per Fraction (cGy) Dose Correction (cGy) Total Dose Delivered (cGy) Elapsed Days   R Axil_Shl # 6X 10 / 10 300 0 3,000 13      Treatment dates:  C2: 9/8/2021 - 9/21/2021     9/10/2021 -  Chemotherapy    enfortumab vedotin-ejfv (PADCEV) IVPB, 1 25 mg/kg = 114 mg, Intravenous, Once, 6 of 8 cycles  Dose modification: 1 mg/kg (original dose 1 25 mg/kg, Cycle 7, Reason: Other (Must fill in a comment), Comment: dose reduction due to side effects ), 1 25 mg/kg (original dose 1 25 mg/kg, Cycle 7, Reason: Other (Must fill in a comment), Comment: patient wants full dose )  Administration: 114 mg (9/10/2021), 114 mg (9/17/2021), 110 mg (10/8/2021), 110 mg (9/24/2021), 110 mg (10/15/2021), 110 mg (11/12/2021), 110 mg (10/22/2021), 110 mg (11/19/2021), 110 mg (12/10/2021), 110 mg (11/26/2021), 110 mg (12/17/2021), 110 mg (1/7/2022), 110 mg (12/23/2021), 110 mg (1/14/2022), 110 mg (1/21/2022), 110 mg (2/18/2022)         Interval history:    ROS: Review of Systems   Constitutional: Positive for fatigue  Negative for chills and fever  HENT: Positive for hearing loss  Negative for ear pain and sore throat  Eyes: Negative for pain and visual disturbance  Respiratory: Positive for shortness of breath  Negative for cough  Cardiovascular: Negative for chest pain and palpitations  Gastrointestinal: Negative for abdominal pain and vomiting  Genitourinary: Positive for dysuria and flank pain (On the right)  Negative for hematuria  Musculoskeletal: Negative for arthralgias and back pain  Skin: Negative for color change and rash  Neurological: Positive for numbness  Negative for seizures and syncope  Psychiatric/Behavioral: Positive for sleep disturbance  All other systems reviewed and are negative        Past Medical History:   Diagnosis Date    Arthritis     Bladder cancer     Cancer ([address] )     skin melanoma;basal cell    Chronic pain disorder     from arthritis    Colon polyp     Does use hearing aid     bilat    Dry eyes, bilateral     GERD (gastroesophageal reflux disease)     History of kidney stones 10/2019    History of partial knee replacement     bilat    History of vertigo     Hyperlipidemia     Hypertension     Infusion extravasation of chemotherapy vesicant 11/2021    Kidney lesion     Lumbar disc disorder     compression of vertebrae L4-5-6    Muscle weakness     left hip area    Renal mass     left    Right ankle injury 03/05/2020    missed step of ladder     Right ankle pain     Shortness of breath     with activity    Tinnitus     Urothelial cancer     left    Wears glasses        Past Surgical History:   Procedure Laterality Date    COLONOSCOPY      CYSTOSCOPY Left 4/1/2020    Procedure: CYSTOSCOPY; URETERAL CATHETER PLACEMENT;  Surgeon: Peggyann Bosworth, MD;  Location[facility];  Service: Urology    CYSTOSCOPY  05/11/2020    CYSTOSCOPY  06/04/2021    FL CYSTOGRAM  4/13/2020    FL RETROGRADE PYELOGRAM  2020    HERNIA REPAIR      umbilical with mesh    INGUINAL HERNIA REPAIR Right     with mesh    IR PORT PLACEMENT  2020    JOINT REPLACEMENT Bilateral     partials knee    LUMBAR EPIDURAL INJECTION      MA CYSTOURETHROSCOPY,URETER CATHETER Bilateral 2020    Procedure: CYSTOSCOPY; RIGHT RETROGRADE PYELOGRAM WITH RIGHT URETERAL CYTOLOGY SAMPLING; LEFT URETEROSCOPY WITH RENAL PELVIS BIOPSY AND LEFT STENT PLACEMENT;  Surgeon: Kristel Ojeda MD;  [facility];  Service: Urology    MA NEPHRECTOMY, W/PART  URETECTOMY Left 2020    Procedure: ROBOTIC LAPAROSCOPIC NEPHRO-URETERECTOMY;  Surgeon: Kristel Ojeda MD;  Location[facility];  Service: Urology    SKIN CANCER EXCISION      surface melanoma    TONSILLECTOMY      WISDOM TOOTH EXTRACTION         Social History     Socioeconomic History    Marital status: /Civil Union     Spouse name: None    Number of children: None    Years of education: None    Highest education level: None   Occupational History    None   Tobacco Use    Smoking status: Former Smoker     Types: Cigarettes     Quit date:      Years since quittin 1    Smokeless tobacco: Never Used   Vaping Use    Vaping Use: Never used   Substance and Sexual Activity    Alcohol use:  Yes     Alcohol/week: 17 0 standard drinks     Types: 7 Glasses of wine, 10 Cans of beer per week     Comment: socially    Drug use: Never    Sexual activity: Not Currently   Other Topics Concern    None   Social History Narrative    Daily caffeine use - 2 cups coffee      Social Determinants of Health     Financial Resource Strain: Not on file   Food Insecurity: Not on file   Transportation Needs: Not on file   Physical Activity: Not on file   Stress: Not on file   Social Connections: Not on file   Intimate Partner Violence: Not on file   Housing Stability: Not on file       Family History   Problem Relation Age of Onset    Liver cancer Father Allergies   Allergen Reactions    Poison Ivy Extract Rash         Current Outpatient Medications:     acetaminophen (TYLENOL) 500 mg tablet, Take 2 tablets (1,000 mg total) by mouth every 8 (eight) hours, Disp: , Rfl:     allopurinol (ZYLOPRIM) 300 mg tablet, take 1 tablet by mouth once daily, Disp: 30 tablet, Rfl: 3    ALPRAZolam (XANAX) 0 25 mg tablet, Take 1 tablet (0 25 mg total) by mouth daily at bedtime as needed for anxiety (restless legs), Disp: 30 tablet, Rfl: 1    amLODIPine (NORVASC) 5 mg tablet, Take 5 mg by mouth daily  , Disp: , Rfl:     atorvastatin (LIPITOR) 80 mg tablet, Take 80 mg by mouth every other day evening, Disp: , Rfl:     docusate sodium (COLACE) 250 MG capsule, Take 1 capsule (250 mg total) by mouth daily, Disp: 60 capsule, Rfl: 6    famotidine (PEPCID) 20 mg tablet, take 1-2 tablets by mouth every evening, Disp: , Rfl:     folic acid (FOLVITE) 1 mg tablet, Take 1 tablet (1 mg total) by mouth daily, Disp: 90 tablet, Rfl: 4    hydrOXYzine HCL (ATARAX) 25 mg tablet, Take 1 tablet (25 mg total) by mouth every 6 (six) hours as needed for itching or anxiety, Disp: 60 tablet, Rfl: 2    Magnesium 250 MG TABS, 1 tablet 2 (two) times a day Taking 500mg in the morning and 500mg at night, Disp: , Rfl:     metoprolol tartrate (LOPRESSOR) 100 mg tablet, Take 50 mg by mouth daily, Disp: , Rfl:     naloxone (NARCAN) 4 mg/0 1 mL nasal spray, Administer 1 spray into a nostril   If breathing does not return to normal or if breathing difficulty resumes after 2-3 minutes, give another dose in the other nostril using a new spray , Disp: 1 each, Rfl: 1    nystatin (MYCOSTATIN) cream, Apply topically 2 (two) times a day, Disp: 30 g, Rfl: 0    omeprazole (PriLOSEC) 20 mg delayed release capsule, Take 20 mg by mouth daily  , Disp: , Rfl:     predniSONE 10 mg tablet, Take 2 tablets (20 mg total) by mouth daily, Disp: 60 tablet, Rfl: 3    senna (SENOKOT) 8 6 mg, Take 1 tablet (8 6 mg total) by mouth daily at bedtime (Patient taking differently: Take 8 6 mg by mouth daily at bedtime As needed), Disp: 30 each, Rfl: 0    tadalafil (CIALIS) 20 MG tablet, Take 1 tablet by mouth one (1) hour prior to intercourse  Do not exceed more than two tablets per week , Disp: 12 tablet, Rfl: 3    terazosin (HYTRIN) 5 mg capsule, Take 2 capsules (10 mg total) by mouth daily at bedtime, Disp: 30 capsule, Rfl: 6    triamcinolone (KENALOG) 0 1 % lotion, Apply topically 3 (three) times a day, Disp: 60 mL, Rfl: 5    VITAMIN D PO, Take 1,000 Units by mouth daily , Disp: , Rfl:     zolpidem (AMBIEN) 5 mg tablet, Take 1 tablet (5 mg total) by mouth daily at bedtime as needed for sleep, Disp: 30 tablet, Rfl: 1    ALPRAZolam (XANAX) 0 25 mg tablet, Take 1 tablet (0 25 mg total) by mouth daily at bedtime as needed for anxiety, Disp: 30 tablet, Rfl: 0    ciprofloxacin (CIPRO) 500 mg tablet, Take 1 tablet (500 mg total) by mouth every 12 (twelve) hours for 3 days, Disp: 6 tablet, Rfl: 0    HYDROmorphone (DILAUDID) 2 mg tablet, Take 1 tablet (2 mg total) by mouth every 4 (four) hours as needed for moderate pain Max Daily Amount: 12 mg (Patient not taking: Reported on 11/24/2021 ), Disp: 90 tablet, Rfl: 0    morphine (MSIR) 15 mg tablet, Take 1/2 tablet 3 times a day for 5 days, then 1/2 tablet 2 times a day for 5 days, then 1/2 tablet once a day for 5 days, then stop (Patient not taking: Reported on 2/2/2022 ), Disp: 15 tablet, Rfl: 0      Physical Exam:  /76 (BP Location: Left arm, Patient Position: Sitting, Cuff Size: Adult)   Pulse 63   Temp 97 7 °F (36 5 °C)   Resp 18   Ht 5' 8" (1 727 m)   Wt 91 6 kg (202 lb)   SpO2 99%   BMI 30 71 kg/m²     Physical Exam  Constitutional:       Appearance: He is well-developed  HENT:      Head: Normocephalic and atraumatic  Eyes:      General: No scleral icterus  Right eye: No discharge  Left eye: No discharge        Conjunctiva/sclera: Conjunctivae normal  Pupils: Pupils are equal, round, and reactive to light  Neck:      Thyroid: No thyromegaly  Trachea: No tracheal deviation  Cardiovascular:      Rate and Rhythm: Normal rate and regular rhythm  Heart sounds: Normal heart sounds  No murmur heard  No friction rub  Pulmonary:      Effort: Pulmonary effort is normal  No respiratory distress  Breath sounds: Normal breath sounds  No wheezing or rales  Chest:      Chest wall: No tenderness  Abdominal:      General: There is no distension  Palpations: Abdomen is soft  There is no hepatomegaly or splenomegaly  Tenderness: There is no abdominal tenderness  There is no guarding or rebound  Musculoskeletal:         General: No tenderness or deformity  Normal range of motion  Cervical back: Normal range of motion and neck supple  Lymphadenopathy:      Cervical: No cervical adenopathy  Skin:     General: Skin is warm and dry  Coloration: Skin is not pale  Findings: No erythema or rash  Neurological:      Mental Status: He is alert and oriented to person, place, and time  Cranial Nerves: No cranial nerve deficit  Coordination: Coordination normal       Deep Tendon Reflexes: Reflexes are normal and symmetric  Psychiatric:         Behavior: Behavior normal          Thought Content:  Thought content normal          Judgment: Judgment normal            Labs:  Lab Results   Component Value Date    WBC 7 72 02/23/2022    HGB 13 5 02/23/2022    HCT 41 4 02/23/2022     (H) 02/23/2022     (L) 02/23/2022     Lab Results   Component Value Date    K 4 1 02/23/2022     02/23/2022    CO2 27 02/23/2022    BUN 24 02/23/2022    CREATININE 1 43 (H) 02/23/2022    GLUF 113 (H) 12/16/2021    CALCIUM 9 3 02/23/2022    CORRECTEDCA 9 8 02/23/2022    AST 28 02/23/2022    ALT 60 02/23/2022    ALKPHOS 66 02/23/2022    EGFR 48 02/23/2022     No results found for: TSH    Patient voiced understanding and agreement in the above discussion  Aware to contact our office with questions/symptoms in the interim

## 2022-02-25 ENCOUNTER — HOSPITAL ENCOUNTER (OUTPATIENT)
Dept: INFUSION CENTER | Facility: HOSPITAL | Age: 72
Discharge: HOME | End: 2022-02-25
Attending: INTERNAL MEDICINE

## 2022-02-25 ENCOUNTER — HOSPITAL ENCOUNTER (OUTPATIENT)
Dept: INFUSION CENTER | Facility: HOSPITAL | Age: 72
Discharge: HOME | End: 2022-02-25
Attending: INTERNAL MEDICINE
Payer: MEDICARE

## 2022-02-25 VITALS — HEIGHT: 68 IN | WEIGHT: 199.96 LBS | BODY MASS INDEX: 30.31 KG/M2

## 2022-02-25 DIAGNOSIS — C68.9 UROTHELIAL CANCER (HCC): Primary | ICD-10-CM

## 2022-02-25 DIAGNOSIS — C65.2 CANCER OF LEFT RENAL PELVIS (HCC): ICD-10-CM

## 2022-02-25 DIAGNOSIS — E86.0 DEHYDRATION: ICD-10-CM

## 2022-02-25 DIAGNOSIS — C79.10 METASTATIC UROTHELIAL CARCINOMA (HCC): ICD-10-CM

## 2022-02-25 PROCEDURE — 96413 CHEMO IV INFUSION 1 HR: CPT

## 2022-02-25 PROCEDURE — 96367 TX/PROPH/DG ADDL SEQ IV INF: CPT

## 2022-02-25 RX ORDER — ACETAMINOPHEN 325 MG/1
650 TABLET ORAL ONCE
Status: DISCONTINUED | OUTPATIENT
Start: 2022-02-25 | End: 2022-03-01 | Stop reason: HOSPADM

## 2022-02-25 RX ORDER — SODIUM CHLORIDE 9 MG/ML
20 INJECTION, SOLUTION INTRAVENOUS ONCE
Status: COMPLETED | OUTPATIENT
Start: 2022-02-25 | End: 2022-02-25

## 2022-02-25 RX ADMIN — DEXAMETHASONE SODIUM PHOSPHATE: 10 INJECTION, SOLUTION INTRAMUSCULAR; INTRAVENOUS at 10:59

## 2022-02-25 RX ADMIN — ENFORTUMAB VEDOTIN 110 MG: 30 INJECTION, POWDER, LYOPHILIZED, FOR SOLUTION INTRAVENOUS at 11:36

## 2022-02-25 RX ADMIN — DIPHENHYDRAMINE HYDROCHLORIDE 25 MG: 50 INJECTION INTRAMUSCULAR; INTRAVENOUS at 10:30

## 2022-02-25 RX ADMIN — SODIUM CHLORIDE 20 ML/HR: 9 INJECTION, SOLUTION INTRAVENOUS at 10:30

## 2022-02-25 RX ADMIN — SODIUM CHLORIDE 1000 ML: 0.9 INJECTION, SOLUTION INTRAVENOUS at 10:10

## 2022-02-25 NOTE — PROGRESS NOTES
Confirmed with both pt and Vargas Ellis RN/Dr Jong Weir that pt is to have full 1 25 mg/kg dose of padcev rather than 1mg/kg indicated in dr lawrence note yesterday  Dr Jong Weir will addend yesterdays note  Ok to procede with 1 25mg/kg today

## 2022-02-25 NOTE — PROGRESS NOTES
Pt tolerated todays hydration and padcev without issue  Port flushed and deaccessed per routine   Discharged ambulatory with avs

## 2022-03-01 ENCOUNTER — TELEPHONE (OUTPATIENT)
Dept: HEMATOLOGY ONCOLOGY | Facility: CLINIC | Age: 72
End: 2022-03-01

## 2022-03-01 ENCOUNTER — OFFICE VISIT (OUTPATIENT)
Dept: HEMATOLOGY ONCOLOGY | Facility: CLINIC | Age: 72
End: 2022-03-01
Payer: MEDICARE

## 2022-03-01 ENCOUNTER — HOSPITAL ENCOUNTER (OUTPATIENT)
Dept: INFUSION CENTER | Facility: HOSPITAL | Age: 72
Discharge: HOME | End: 2022-03-01
Payer: MEDICARE

## 2022-03-01 VITALS
TEMPERATURE: 97.3 F | SYSTOLIC BLOOD PRESSURE: 120 MMHG | HEART RATE: 59 BPM | RESPIRATION RATE: 20 BRPM | DIASTOLIC BLOOD PRESSURE: 71 MMHG

## 2022-03-01 VITALS
DIASTOLIC BLOOD PRESSURE: 88 MMHG | TEMPERATURE: 97.5 F | RESPIRATION RATE: 18 BRPM | HEIGHT: 68 IN | OXYGEN SATURATION: 97 % | WEIGHT: 204.4 LBS | BODY MASS INDEX: 30.98 KG/M2 | SYSTOLIC BLOOD PRESSURE: 144 MMHG | HEART RATE: 52 BPM

## 2022-03-01 DIAGNOSIS — C79.10 METASTATIC UROTHELIAL CARCINOMA (HCC): ICD-10-CM

## 2022-03-01 DIAGNOSIS — G62.0 CHEMOTHERAPY-INDUCED NEUROPATHY (HCC): ICD-10-CM

## 2022-03-01 DIAGNOSIS — E83.42 HYPOMAGNESEMIA: ICD-10-CM

## 2022-03-01 DIAGNOSIS — C79.10 METASTATIC UROTHELIAL CARCINOMA (HCC): Primary | ICD-10-CM

## 2022-03-01 DIAGNOSIS — R30.0 DYSURIA: ICD-10-CM

## 2022-03-01 DIAGNOSIS — E86.0 DEHYDRATION: Primary | ICD-10-CM

## 2022-03-01 DIAGNOSIS — T45.1X5A CHEMOTHERAPY-INDUCED NEUROPATHY (HCC): ICD-10-CM

## 2022-03-01 LAB
ALBUMIN SERPL BCP-MCNC: 3.9 G/DL (ref 3.5–5)
ALP SERPL-CCNC: 57 U/L (ref 34–104)
ALT SERPL W P-5'-P-CCNC: 56 U/L (ref 7–52)
ANION GAP SERPL CALCULATED.3IONS-SCNC: 8 MMOL/L (ref 4–13)
AST SERPL W P-5'-P-CCNC: 35 U/L (ref 13–39)
BACTERIA UR QL AUTO: ABNORMAL /HPF
BASOPHILS # BLD AUTO: 0.02 THOUSANDS/ΜL (ref 0–0.1)
BASOPHILS NFR BLD AUTO: 0 % (ref 0–1)
BILIRUB SERPL-MCNC: 1.17 MG/DL (ref 0.2–1)
BILIRUB UR QL STRIP: NEGATIVE
BUN SERPL-MCNC: 28 MG/DL (ref 5–25)
CALCIUM SERPL-MCNC: 9.5 MG/DL (ref 8.4–10.2)
CHLORIDE SERPL-SCNC: 101 MMOL/L (ref 96–108)
CLARITY UR: CLEAR
CO2 SERPL-SCNC: 30 MMOL/L (ref 21–32)
COLOR UR: YELLOW
CREAT SERPL-MCNC: 1.37 MG/DL (ref 0.6–1.3)
EOSINOPHIL # BLD AUTO: 0.2 THOUSAND/ΜL (ref 0–0.61)
EOSINOPHIL NFR BLD AUTO: 3 % (ref 0–6)
ERYTHROCYTE [DISTWIDTH] IN BLOOD BY AUTOMATED COUNT: 16.5 % (ref 11.6–15.1)
GFR SERPL CREATININE-BSD FRML MDRD: 51 ML/MIN/1.73SQ M
GLUCOSE P FAST SERPL-MCNC: 111 MG/DL (ref 65–99)
GLUCOSE SERPL-MCNC: 111 MG/DL (ref 65–140)
GLUCOSE UR STRIP-MCNC: NEGATIVE MG/DL
HCT VFR BLD AUTO: 42.5 % (ref 36.5–49.3)
HGB BLD-MCNC: 13.6 G/DL (ref 12–17)
HGB UR QL STRIP.AUTO: NEGATIVE
IMM GRANULOCYTES # BLD AUTO: 0.03 THOUSAND/UL (ref 0–0.2)
IMM GRANULOCYTES NFR BLD AUTO: 0 % (ref 0–2)
KETONES UR STRIP-MCNC: NEGATIVE MG/DL
LEUKOCYTE ESTERASE UR QL STRIP: ABNORMAL
LYMPHOCYTES # BLD AUTO: 1.95 THOUSANDS/ΜL (ref 0.6–4.47)
LYMPHOCYTES NFR BLD AUTO: 25 % (ref 14–44)
MAGNESIUM SERPL-MCNC: 1.7 MG/DL (ref 1.9–2.7)
MCH RBC QN AUTO: 32.3 PG (ref 26.8–34.3)
MCHC RBC AUTO-ENTMCNC: 32 G/DL (ref 31.4–37.4)
MCV RBC AUTO: 101 FL (ref 82–98)
MONOCYTES # BLD AUTO: 0.76 THOUSAND/ΜL (ref 0.17–1.22)
MONOCYTES NFR BLD AUTO: 10 % (ref 4–12)
NEUTROPHILS # BLD AUTO: 4.8 THOUSANDS/ΜL (ref 1.85–7.62)
NEUTS SEG NFR BLD AUTO: 62 % (ref 43–75)
NITRITE UR QL STRIP: NEGATIVE
NON-SQ EPI CELLS URNS QL MICRO: ABNORMAL /HPF
NRBC BLD AUTO-RTO: 0 /100 WBCS
PH UR STRIP.AUTO: 6.5 [PH]
PLATELET # BLD AUTO: 176 THOUSANDS/UL (ref 149–390)
PMV BLD AUTO: 10.1 FL (ref 8.9–12.7)
POTASSIUM SERPL-SCNC: 3.6 MMOL/L (ref 3.5–5.3)
PROT SERPL-MCNC: 6.1 G/DL (ref 6.4–8.4)
PROT UR STRIP-MCNC: NEGATIVE MG/DL
RBC # BLD AUTO: 4.21 MILLION/UL (ref 3.88–5.62)
RBC #/AREA URNS AUTO: ABNORMAL /HPF
SODIUM SERPL-SCNC: 139 MMOL/L (ref 135–147)
SP GR UR STRIP.AUTO: 1.01 (ref 1–1.03)
UROBILINOGEN UR QL STRIP.AUTO: 0.2 E.U./DL
WBC # BLD AUTO: 7.76 THOUSAND/UL (ref 4.31–10.16)
WBC #/AREA URNS AUTO: ABNORMAL /HPF

## 2022-03-01 PROCEDURE — 83735 ASSAY OF MAGNESIUM: CPT

## 2022-03-01 PROCEDURE — 80053 COMPREHEN METABOLIC PANEL: CPT

## 2022-03-01 PROCEDURE — 99215 OFFICE O/P EST HI 40 MIN: CPT | Performed by: NURSE PRACTITIONER

## 2022-03-01 PROCEDURE — 96360 HYDRATION IV INFUSION INIT: CPT

## 2022-03-01 PROCEDURE — 81001 URINALYSIS AUTO W/SCOPE: CPT

## 2022-03-01 PROCEDURE — 85025 COMPLETE CBC W/AUTO DIFF WBC: CPT

## 2022-03-01 RX ORDER — DULOXETIN HYDROCHLORIDE 30 MG/1
30 CAPSULE, DELAYED RELEASE ORAL DAILY
Qty: 90 CAPSULE | Refills: 0 | Status: SHIPPED | OUTPATIENT
Start: 2022-03-01 | End: 2022-04-06

## 2022-03-01 RX ORDER — CIPROFLOXACIN 500 MG/1
500 TABLET, FILM COATED ORAL EVERY 12 HOURS SCHEDULED
Qty: 14 TABLET | Refills: 0 | Status: SHIPPED | OUTPATIENT
Start: 2022-03-01 | End: 2022-03-08

## 2022-03-01 RX ADMIN — SODIUM CHLORIDE 1000 ML: 9 INJECTION, SOLUTION INTRAVENOUS at 08:17

## 2022-03-01 NOTE — PROGRESS NOTES
Hematology/Oncology Outpatient Follow-up  Billie Concepcion 66 y o  male 1950 [id number]    Date:  3/1/2022      Assessment and Plan:  1  Metastatic urothelial carcinoma ([facility] )  The patient was given the option of taking an extra break from his treatment with his worsening neuropathy is due for cycle 6 day 15 on 03/04/2022; however he declined and is very interested in getting his treatment as scheduled since he will be getting several weeks of a break for vacation after this week's treatment  He is however now agreeable to dose adjustment of his Padcev dose to 1 mg/kg  Patient is already scheduled for her repeat imaging with a PET-CT scan next week 03/08/2022  Will be leaving for a trip to [facility] for 2 weeks 3/18/22 regardless of the outcome of his PET imaging  We will arrange for a follow-up appointment with repeat labs in 2 weeks to review the findings of his PET-CT scan before he leaves on his trip  Patient will continue to get his IV hydration as needed in the infusion center     - CBC and differential; Future  - Comprehensive metabolic panel; Future  - Magnesium; Future    2  Chemotherapy-induced neuropathy ([facility] )  We did discuss starting medication for his chemotherapy-induced neuropathy  The patient is interested in a trial of duloxetine which also may help his mood/depression and chronic pain  We will start him at 30 mg daily which can be titrated up to 60 mg if needed as soon as 1 week after starting  He was educated about the duloxetine and possible side effects including but not limited to fatigue, drowsiness, appetite changes, dry mouth, nausea and rare worsening depression/mood  Is asking for a 90 day supply so patient will have enough medication while away on his trip  I did also ask him to decrease his daily prednisone dose from 20 mg down to 10 mg daily if he can tolerate as chronic steroid use could also contribute to his worsening leg/muscular weakness      The patient did voice some interest in physical therapy but would rather wait till he returns from his trip to pursue  We did discuss having assistive device available to prevent falls with his worsening neuropathy at least a cane  He also was requesting handicap placard paperwork completed and provided to patient  - DULoxetine (CYMBALTA) 30 mg delayed release capsule; Take 1 capsule (30 mg total) by mouth daily  Dispense: 90 capsule; Refill: 0    3  Hypomagnesemia  The patient will continue his magnesium supplement daily  Did not do well with increased dose in the past with significant diarrhea  Will add additional IV magnesium with his next IV hydration this week  - Magnesium; Future    4  Dysuria  Patient reports improvement of his symptoms after he completed a 3 day course of Cipro last week however has noticed some urinary frequency this morning that may or may not be due to his additional IV hydration that he received this morning  His repeat urinalysis is pending  Will await results to see if he would require longer course of antibiotics  The patient and his wife are interested in having antibiotic script available should he need it while he is away on vacation out of the country  He is aware to hold his magnesium supplement or make sure at least 4-6 hours in between dosing when he is on antibiotic as it can decreased effectiveness  - ciprofloxacin (CIPRO) 500 mg tablet; Take 1 tablet (500 mg total) by mouth every 12 (twelve) hours for 7 days  Dispense: 14 tablet; Refill: 0    HPI:  Patient presents today for a follow-up visit accompanied by his wife  He reports fatigue and weakness  Believes that his symptoms are mainly due to his worsening neuropathy which is starting to affect his quality of life  His legs are weak and he believes he is losing muscle tone  He does get discomfort in his feet at night at times which improves after he takes his Xanax    Is considering physical therapy but would like to wait until after he returns from his trip  He states that his urinary symptoms improve after he completed 3 days of Cipro last week  Is noticing some urinary frequency today but believes it may be due to additional hydration he received this morning and drinking coffee; he did submit urinalysis today which is pending  His most recent laboratory studies from this morning showed normal white cells and platelets, he is not anemic H&H 13 6/42 5, MCV is again slightly above average 101  Stable chronic renal dysfunction creatinine 1 37, GFR 51, ALT is slightly above average 56 with normal AST and normal alk-phos  Total bili slightly above average 1  17  Magnesium is decreased 1 7  Oncology History Overview Note   Patient has a history of hypertension, hyperlipidemia, chronic lower back pain  He had an MRI of the lower spine for further evaluation of his chronic lower back pain  The MRI on 12/02/2019 revealed Multiple left renal masses to include a left lower pole cyst and a rounded structure in the left upper pole which may also represent cyst   Left upper pole renal masses approximately 3 cm in greatest linear dimension  A CT with renal protocol was done on 12/12/2019 which also showed the infiltrating lesion in the upper pole of the left kidney measuring about the 3 5 cm in greatest dimension without any hint of retroperitoneal lymphadenopathy  A CT scan of the abdomen pelvis on 01/14/2020 showed the same findings with the mild hydronephrosis on the left  The urine cytology on 01/03/2020 showed high-grade urothelial carcinoma  A cystoscopy was then done, a biopsy was taken from the left renal pelvic region which showed high-grade urothelial carcinoma without evidence of lamina propria invasion  The detrusor muscle/muscularis propria were not present for evaluation       The recommendation was to pursue left robotic assisted laparoscopic nephroureterectomy with bladder cuff excision which was done on 04/01/2020  The final pathology revealed;  - Invasive high grade urothelial carcinoma arising in renal pelvis  - Bladder cuff margin is negative for carcinoma and no evidence of high grade dysplasia  - Ureters with no significant pathologic abnormality  - Two benign simple cysts  - Adrenal gland is negative for malignancy  - One lymph node, negative for malignancy (0/1)  The tumor size was 4 5 cm with invasion beyond the muscularis into the periurethral fat or peripelvic fat or the renal parenchyma  The margins were uninvolved by carcinoma or carcinoma in situ  The final pathology was pT3 pN0  Patient barely tolerated 1 cycle of adjuvant chemotherapy with split dose cisplatin and gemcitabine with a lot of side effects  The treatment had to be discontinued due to significant worsening renal dysfunction 6/2020  Patient started to complain about significant abdominal/left inguinal pain around August/September 2020  Unfortunately repeat imaging revealed recurrent/metastatic disease  9/4/20 CT C/A/P- Status post left nephrectomy for resection of urothelial neoplasm  Lymphadenopathy in the left nephrectomy bed has increased since the prior examination, concerning for metastatic disease  Ill-defined hyperattenuating masslike focus in the left rectus muscle, not identified on the prior examination  This is suspicious for a metastatic lesion  A rectus hematoma is also considered  9/23/20 PET scan- 1  Multiple FDG avid lymph nodes in the retrocrural region, retroperitoneum and the left renal bed compatible with metastasis  These have progressed from recent CT  2   FDG avid mass involving the left rectus abdominal musculature compatible with metastasis which is larger from recent CT  3  Several small lymph nodes adjacent to the mid to distal esophagus with FDG uptake suspicious for metastasis    Small focus of FDG uptake also suggested in the right hilar region, metastasis is not excluded  This could be reassessed on follow-up  4   Subtle focus of FDG uptake at the T2 level on the left, nonspecific, could be related to early degenerative changes, developing metastasis is not entirely excluded  This could be reassessed on follow-up  5  Prostate is mildly prominent with heterogeneous FDG uptake  This could be inflammatory  Correlate for prostatitis  He completed palliative radiation to the left abdomen 12/3/20     His PET scan from 08/16/2021 showed progression of his disease with hypermetabolic soft tissue lesions at the level of the right shoulder and right axilla with interval progression of the retroperitoneal and mesenteric hypermetabolic metastasis  He did have the new lesion to his right upper back/shoulder which was causing significant localized pain  This excised by his surgeon 08/09/2021  Pathology confirmed metastatic high-grade carcinoma consistent with his no primary urothelial carcinoma  He received palliative radiation to his right shoulder/axilla region  Urothelial cancer ([address] )   1/3/2020 Biopsy    Final Diagnosis    A  Urine, Clean Catch, Thin Prep:  High grade urothelial carcinoma (HGUC) - see comment  1/3/2020 Initial Diagnosis    Urothelial cancer ([address] )  T3 N0 (stage IIIA)     1/17/2020 Biopsy    Final Diagnosis    A  Ureter, Right, left renal pelvis biopsy  -Fragments of high grade urothelial carcinoma   -No evidence of lamina propria invasion   -Detrusor muscle/ muscularis propria is not present for evaluation  B  Urinary Bladder, bladder biopsy:  -Fragments of low grade papillary lesion  See note  -No evidence of invasion seen  -Unremarkable fragment of detrusor muscle seen  4/1/2020 Surgery    left robotic assisted laparoscopic nephroureterectomy with bladder cuff excision     Final Diagnosis    A  Left kidney, ureter, and bladder cuff, nephroureterectomy:  - Invasive high grade urothelial carcinoma arising in renal pelvis     - Bladder cuff margin is negative for carcinoma and no evidence of high grade dysplasia  - Ureters with no significant pathologic abnormality  - Two benign simple cysts  - Adrenal gland is negative for malignancy  - One lymph node, negative for malignancy (0/1)          5/14/2020 - 6/3/2020 Chemotherapy    CISplatin (PLATINOL) split dose 35 mg/m2 = 75 3 mg (50 % of original dose 70 mg/m2), Intravenous,   Administration: 75 3 mg (5/14/2020), 75 3 mg (5/21/2020)  gemcitabine (GEMZAR) 2,200 mg in sodium chloride 0 9 % 250 mL infusion, 2,150 2 mg (80 % of original dose 1,250 mg/m2), Intravenous,   Administration: 2,200 mg (5/14/2020), 2,200 mg (5/21/2020)    (only completed 1 cycle- d/c d/t adverse effects and worsening renal dysfunction)     10/9/2020 - 9/9/2021 Chemotherapy    pembrolizumab (KEYTRUDA) IVPB, 200 mg, Intravenous, Once, 16 of 20 cycles  Administration: 200 mg (10/9/2020), 200 mg (10/30/2020), 200 mg (11/20/2020), 200 mg (12/11/2020), 200 mg (12/31/2020), 200 mg (1/22/2021), 200 mg (2/12/2021), 200 mg (3/5/2021), 200 mg (3/26/2021), 200 mg (4/16/2021), 200 mg (5/7/2021), 200 mg (5/28/2021), 200 mg (6/18/2021), 200 mg (7/9/2021), 200 mg (7/30/2021), 200 mg (8/20/2021)     11/19/2020 - 12/3/2020 Radiation    Treatment:  Course: C1    Plan ID Energy Fractions Dose per Fraction (cGy) Dose Correction (cGy) Total Dose Delivered (cGy) Elapsed Days   L Abdomen 6X 10 / 10 300 0 3,000 14        9/10/2021 -  Chemotherapy    enfortumab vedotin-ejfv (PADCEV) IVPB, 1 25 mg/kg = 114 mg, Intravenous, Once, 6 of 8 cycles  Dose modification: 1 mg/kg (original dose 1 25 mg/kg, Cycle 7, Reason: Other (Must fill in a comment), Comment: dose reduction due to side effects ), 1 25 mg/kg (original dose 1 25 mg/kg, Cycle 7, Reason: Other (Must fill in a comment), Comment: patient wants full dose ), 1 mg/kg (original dose 1 25 mg/kg, Cycle 7, Reason: Neuropathy)  Administration: 114 mg (9/10/2021), 114 mg (9/17/2021), 110 mg (10/8/2021), 110 mg (9/24/2021), 110 mg (10/15/2021), 110 mg (11/12/2021), 110 mg (10/22/2021), 110 mg (11/19/2021), 110 mg (12/10/2021), 110 mg (11/26/2021), 110 mg (12/17/2021), 110 mg (1/7/2022), 110 mg (12/23/2021), 110 mg (1/14/2022), 110 mg (1/21/2022), 110 mg (2/18/2022), 110 mg (2/25/2022)     Cancer of left renal pelvis ([address] )   4/23/2020 Initial Diagnosis    Cancer of left renal pelvis ([facility] )     10/9/2020 - 9/9/2021 Chemotherapy    pembrolizumab (KEYTRUDA) IVPB, 200 mg, Intravenous, Once, 16 of 20 cycles  Administration: 200 mg (10/9/2020), 200 mg (10/30/2020), 200 mg (11/20/2020), 200 mg (12/11/2020), 200 mg (12/31/2020), 200 mg (1/22/2021), 200 mg (2/12/2021), 200 mg (3/5/2021), 200 mg (3/26/2021), 200 mg (4/16/2021), 200 mg (5/7/2021), 200 mg (5/28/2021), 200 mg (6/18/2021), 200 mg (7/9/2021), 200 mg (7/30/2021), 200 mg (8/20/2021)     9/10/2021 -  Chemotherapy    enfortumab vedotin-ejfv (PADCEV) IVPB, 1 25 mg/kg = 114 mg, Intravenous, Once, 6 of 8 cycles  Dose modification: 1 mg/kg (original dose 1 25 mg/kg, Cycle 7, Reason: Other (Must fill in a comment), Comment: dose reduction due to side effects ), 1 25 mg/kg (original dose 1 25 mg/kg, Cycle 7, Reason: Other (Must fill in a comment), Comment: patient wants full dose ), 1 mg/kg (original dose 1 25 mg/kg, Cycle 7, Reason: Neuropathy)  Administration: 114 mg (9/10/2021), 114 mg (9/17/2021), 110 mg (10/8/2021), 110 mg (9/24/2021), 110 mg (10/15/2021), 110 mg (11/12/2021), 110 mg (10/22/2021), 110 mg (11/19/2021), 110 mg (12/10/2021), 110 mg (11/26/2021), 110 mg (12/17/2021), 110 mg (1/7/2022), 110 mg (12/23/2021), 110 mg (1/14/2022), 110 mg (1/21/2022), 110 mg (2/18/2022), 110 mg (2/25/2022)      Surgery       Metastatic urothelial carcinoma ([facility] )   8/9/2021 Initial Diagnosis    Metastatic urothelial carcinoma ([facility] )     9/8/2021 - 9/21/2021 Radiation    Treatment:  Course: C2    Plan ID Energy Fractions Dose per Fraction (cGy) Dose Correction (cGy) Total Dose Delivered (cGy) Elapsed Days   R Axil_Shl # 6X 10 / 10 300 0 3,000 13      Treatment dates:  C2: 9/8/2021 - 9/21/2021     9/10/2021 -  Chemotherapy    enfortumab vedotin-ejfv (PADCEV) IVPB, 1 25 mg/kg = 114 mg, Intravenous, Once, 6 of 8 cycles  Dose modification: 1 mg/kg (original dose 1 25 mg/kg, Cycle 7, Reason: Other (Must fill in a comment), Comment: dose reduction due to side effects ), 1 25 mg/kg (original dose 1 25 mg/kg, Cycle 7, Reason: Other (Must fill in a comment), Comment: patient wants full dose ), 1 mg/kg (original dose 1 25 mg/kg, Cycle 7, Reason: Neuropathy)  Administration: 114 mg (9/10/2021), 114 mg (9/17/2021), 110 mg (10/8/2021), 110 mg (9/24/2021), 110 mg (10/15/2021), 110 mg (11/12/2021), 110 mg (10/22/2021), 110 mg (11/19/2021), 110 mg (12/10/2021), 110 mg (11/26/2021), 110 mg (12/17/2021), 110 mg (1/7/2022), 110 mg (12/23/2021), 110 mg (1/14/2022), 110 mg (1/21/2022), 110 mg (2/18/2022), 110 mg (2/25/2022)         Interval history:    ROS: Review of Systems   Constitutional: Positive for fatigue  Negative for activity change, appetite change, chills, fever and unexpected weight change  HENT: Positive for hearing loss and mouth sores  Negative for congestion, nosebleeds, sore throat and trouble swallowing  Eyes: Negative  Respiratory: Positive for shortness of breath  Negative for cough and chest tightness  Cardiovascular: Negative for chest pain, palpitations and leg swelling  Gastrointestinal: Positive for diarrhea  Negative for abdominal distention, abdominal pain, blood in stool, constipation, nausea and vomiting  Genitourinary: Negative for difficulty urinating, dysuria, frequency, hematuria and urgency  Musculoskeletal: Positive for back pain, gait problem and myalgias (especially right thigh)  Negative for arthralgias and joint swelling  Skin: Positive for color change  Negative for pallor and rash     Neurological: Positive for dizziness, weakness (legs) and numbness  Negative for light-headedness and headaches  Hematological: Negative for adenopathy  Does not bruise/bleed easily  Psychiatric/Behavioral: Positive for sleep disturbance  Negative for dysphoric mood  The patient is not nervous/anxious          Past Medical History:   Diagnosis Date    Arthritis     Bladder cancer     Cancer ([address] )     skin melanoma;basal cell    Chronic pain disorder     from arthritis    Colon polyp     Does use hearing aid     bilat    Dry eyes, bilateral     GERD (gastroesophageal reflux disease)     History of kidney stones 10/2019    History of partial knee replacement     bilat    History of vertigo     Hyperlipidemia     Hypertension     Infusion extravasation of chemotherapy vesicant 11/2021    Kidney lesion     Lumbar disc disorder     compression of vertebrae L4-5-6    Muscle weakness     left hip area    Renal mass     left    Right ankle injury 03/05/2020    missed step of ladder     Right ankle pain     Shortness of breath     with activity    Tinnitus     Urothelial cancer     left    Wears glasses        Past Surgical History:   Procedure Laterality Date    COLONOSCOPY      CYSTOSCOPY Left 4/1/2020    Procedure: CYSTOSCOPY; URETERAL CATHETER PLACEMENT;  Surgeon: Charly Chavarria MD;  Location[facility];  Service: Urology    CYSTOSCOPY  05/11/2020    CYSTOSCOPY  06/04/2021    FL CYSTOGRAM  4/13/2020    FL RETROGRADE PYELOGRAM  1/17/2020    HERNIA REPAIR      umbilical with mesh    INGUINAL HERNIA REPAIR Right     with mesh    IR PORT PLACEMENT  4/30/2020    JOINT REPLACEMENT Bilateral     partials knee    LUMBAR EPIDURAL INJECTION      TX CYSTOURETHROSCOPY,URETER CATHETER Bilateral 1/17/2020    Procedure: CYSTOSCOPY; RIGHT RETROGRADE PYELOGRAM WITH RIGHT URETERAL CYTOLOGY SAMPLING; LEFT URETEROSCOPY WITH RENAL PELVIS BIOPSY AND LEFT STENT PLACEMENT;  Surgeon: Charly Chavarria MD;  [facility];  Service: Urology  LA NEPHRECTOMY, W/PART  URETECTOMY Left 2020    Procedure: ROBOTIC LAPAROSCOPIC NEPHRO-URETERECTOMY;  Surgeon: Fausto Wick MD;  Location[facility];  Service: Urology    SKIN CANCER EXCISION      surface melanoma    TONSILLECTOMY      WISDOM TOOTH EXTRACTION         Social History     Socioeconomic History    Marital status: /Civil Union     Spouse name: None    Number of children: None    Years of education: None    Highest education level: None   Occupational History    None   Tobacco Use    Smoking status: Former Smoker     Types: Cigarettes     Quit date:      Years since quittin 1    Smokeless tobacco: Never Used   Vaping Use    Vaping Use: Never used   Substance and Sexual Activity    Alcohol use:  Yes     Alcohol/week: 17 0 standard drinks     Types: 7 Glasses of wine, 10 Cans of beer per week     Comment: socially    Drug use: Never    Sexual activity: Not Currently   Other Topics Concern    None   Social History Narrative    Daily caffeine use - 2 cups coffee      Social Determinants of Health     Financial Resource Strain: Not on file   Food Insecurity: Not on file   Transportation Needs: Not on file   Physical Activity: Not on file   Stress: Not on file   Social Connections: Not on file   Intimate Partner Violence: Not on file   Housing Stability: Not on file       Family History   Problem Relation Age of Onset    Liver cancer Father        Allergies   Allergen Reactions    Poison Ivy Extract Rash         Current Outpatient Medications:     acetaminophen (TYLENOL) 500 mg tablet, Take 2 tablets (1,000 mg total) by mouth every 8 (eight) hours, Disp: , Rfl:     allopurinol (ZYLOPRIM) 300 mg tablet, take 1 tablet by mouth once daily, Disp: 30 tablet, Rfl: 3    ALPRAZolam (XANAX) 0 25 mg tablet, Take 1 tablet (0 25 mg total) by mouth daily at bedtime as needed for anxiety (restless legs), Disp: 30 tablet, Rfl: 1    ALPRAZolam (XANAX) 0 25 mg tablet, Take 1 tablet (0 25 mg total) by mouth daily at bedtime as needed for anxiety, Disp: 30 tablet, Rfl: 0    amLODIPine (NORVASC) 5 mg tablet, Take 5 mg by mouth daily  , Disp: , Rfl:     atorvastatin (LIPITOR) 80 mg tablet, Take 80 mg by mouth every other day evening, Disp: , Rfl:     docusate sodium (COLACE) 250 MG capsule, Take 1 capsule (250 mg total) by mouth daily, Disp: 60 capsule, Rfl: 6    famotidine (PEPCID) 20 mg tablet, take 1-2 tablets by mouth every evening, Disp: , Rfl:     folic acid (FOLVITE) 1 mg tablet, Take 1 tablet (1 mg total) by mouth daily, Disp: 90 tablet, Rfl: 4    hydrOXYzine HCL (ATARAX) 25 mg tablet, Take 1 tablet (25 mg total) by mouth every 6 (six) hours as needed for itching or anxiety, Disp: 60 tablet, Rfl: 2    Magnesium 250 MG TABS, 1 tablet 2 (two) times a day Taking 500mg in the morning and 500mg at night, Disp: , Rfl:     metoprolol tartrate (LOPRESSOR) 100 mg tablet, Take 50 mg by mouth daily, Disp: , Rfl:     naloxone (NARCAN) 4 mg/0 1 mL nasal spray, Administer 1 spray into a nostril  If breathing does not return to normal or if breathing difficulty resumes after 2-3 minutes, give another dose in the other nostril using a new spray , Disp: 1 each, Rfl: 1    nystatin (MYCOSTATIN) cream, Apply topically 2 (two) times a day, Disp: 30 g, Rfl: 0    omeprazole (PriLOSEC) 20 mg delayed release capsule, Take 20 mg by mouth daily  , Disp: , Rfl:     predniSONE 10 mg tablet, Take 2 tablets (20 mg total) by mouth daily, Disp: 60 tablet, Rfl: 3    senna (SENOKOT) 8 6 mg, Take 1 tablet (8 6 mg total) by mouth daily at bedtime (Patient taking differently: Take 8 6 mg by mouth daily at bedtime As needed), Disp: 30 each, Rfl: 0    tadalafil (CIALIS) 20 MG tablet, Take 1 tablet by mouth one (1) hour prior to intercourse    Do not exceed more than two tablets per week , Disp: 12 tablet, Rfl: 3    terazosin (HYTRIN) 5 mg capsule, Take 2 capsules (10 mg total) by mouth daily at bedtime, Disp: 30 capsule, Rfl: 6    triamcinolone (KENALOG) 0 1 % lotion, Apply topically 3 (three) times a day, Disp: 60 mL, Rfl: 5    VITAMIN D PO, Take 1,000 Units by mouth daily , Disp: , Rfl:     zolpidem (AMBIEN) 5 mg tablet, Take 1 tablet (5 mg total) by mouth daily at bedtime as needed for sleep, Disp: 30 tablet, Rfl: 1    ciprofloxacin (CIPRO) 500 mg tablet, Take 1 tablet (500 mg total) by mouth every 12 (twelve) hours for 7 days, Disp: 14 tablet, Rfl: 0    DULoxetine (CYMBALTA) 30 mg delayed release capsule, Take 1 capsule (30 mg total) by mouth daily, Disp: 90 capsule, Rfl: 0    HYDROmorphone (DILAUDID) 2 mg tablet, Take 1 tablet (2 mg total) by mouth every 4 (four) hours as needed for moderate pain Max Daily Amount: 12 mg (Patient not taking: Reported on 11/24/2021 ), Disp: 90 tablet, Rfl: 0    morphine (MSIR) 15 mg tablet, Take 1/2 tablet 3 times a day for 5 days, then 1/2 tablet 2 times a day for 5 days, then 1/2 tablet once a day for 5 days, then stop (Patient not taking: Reported on 2/2/2022 ), Disp: 15 tablet, Rfl: 0  No current facility-administered medications for this visit  Physical Exam:  /88 (BP Location: Left arm, Patient Position: Sitting, Cuff Size: Adult)   Pulse (!) 52   Temp 97 5 °F (36 4 °C) (Tympanic)   Resp 18   Ht 5' 8" (1 727 m)   Wt 92 7 kg (204 lb 6 4 oz)   SpO2 97%   BMI 31 08 kg/m²     Physical Exam  Vitals reviewed  Constitutional:       General: He is not in acute distress  Appearance: He is well-developed  He is not diaphoretic  Comments: Appears fatigued   HENT:      Head: Normocephalic and atraumatic  Eyes:      General: Lids are normal  No scleral icterus  Conjunctiva/sclera: Conjunctivae normal       Pupils: Pupils are equal, round, and reactive to light  Neck:      Thyroid: No thyromegaly  Cardiovascular:      Rate and Rhythm: Regular rhythm  Heart sounds: Normal heart sounds  No murmur heard        Pulmonary:      Effort: Pulmonary effort is normal  No respiratory distress  Breath sounds: Normal breath sounds  Chest:   Breasts:      Right: No axillary adenopathy  Left: No axillary adenopathy  Abdominal:      General: There is no distension  Palpations: Abdomen is soft  There is no hepatomegaly or splenomegaly  Musculoskeletal:         General: No swelling  Normal range of motion  Cervical back: Normal range of motion and neck supple  Lymphadenopathy:      Cervical: No cervical adenopathy  Upper Body:      Right upper body: No axillary adenopathy  Left upper body: No axillary adenopathy  Skin:     General: Skin is warm  Findings: No erythema or rash  Neurological:      General: No focal deficit present  Mental Status: He is alert and oriented to person, place, and time  Deep Tendon Reflexes:      Reflex Scores:       Patellar reflexes are 0 on the right side and 0 on the left side  Achilles reflexes are 1+ on the right side and 1+ on the left side  Psychiatric:         Mood and Affect: Mood is depressed  Affect is blunt  Behavior: Behavior normal  Behavior is cooperative  Thought Content: Thought content normal          Judgment: Judgment normal            Labs:  Lab Results   Component Value Date    WBC 7 76 03/01/2022    HGB 13 6 03/01/2022    HCT 42 5 03/01/2022     (H) 03/01/2022     03/01/2022     Lab Results   Component Value Date    K 3 6 03/01/2022     03/01/2022    CO2 30 03/01/2022    BUN 28 (H) 03/01/2022    CREATININE 1 37 (H) 03/01/2022    GLUF 111 (H) 03/01/2022    CALCIUM 9 5 03/01/2022    CORRECTEDCA 9 8 02/23/2022    AST 35 03/01/2022    ALT 56 (H) 03/01/2022    ALKPHOS 57 03/01/2022    EGFR 51 03/01/2022       Patient voiced understanding and agreement in the above discussion  Aware to contact our office with questions/symptoms in the interim  This note has been generated by voice recognition software system  Therefore, there may be spelling, grammar, and or syntax errors  Please contact if questions arise

## 2022-03-01 NOTE — TELEPHONE ENCOUNTER
Phoned pt's wife Silviano Goodwin and asked how Hanane Hanson was doing as infusion nurses expressed concern about him this Rama Easley said pt is very tired and his walking is difficult due to neuropathy but his legs feel tired now  She will discuss with Ann later today at [address]

## 2022-03-01 NOTE — PROGRESS NOTES
Timeout called by Jeane Julian RN Padcev will be dose reduced to 1mg/kg  on his next treatment day 3-4-22  He will continues with PRN hydration

## 2022-03-01 NOTE — PROGRESS NOTES
Pt admitted to infusion dept for routine hydration  Pt admits to severe weakening of his thighs  Gait slow and unsteady  This has been getting worse over the last two weeks  Message left with office as pt will see them later today  Pre chemo labs drawn  Port with excellent blood return  Flushed and deaccessed  Discharged ambulatory deferring avs as he has upcoming appt

## 2022-03-01 NOTE — TELEPHONE ENCOUNTER
[facility]  PHONED TIMEOUT TO ERIC ROCHA AS PT WILL HAVE DECREASED DOSE OF PADCEV 1 MG/KG TOMORROW AND HE WILL CONTINUE IV HYDRATION BUT NO MORE CHEMO TO BE SCHEDULED UNTIL PT COMES BACK FROM HIS TRIP

## 2022-03-04 ENCOUNTER — HOSPITAL ENCOUNTER (OUTPATIENT)
Dept: INFUSION CENTER | Facility: HOSPITAL | Age: 72
Discharge: HOME | End: 2022-03-04
Attending: INTERNAL MEDICINE
Payer: MEDICARE

## 2022-03-04 VITALS
HEIGHT: 68 IN | SYSTOLIC BLOOD PRESSURE: 131 MMHG | OXYGEN SATURATION: 98 % | TEMPERATURE: 95.8 F | DIASTOLIC BLOOD PRESSURE: 65 MMHG | RESPIRATION RATE: 16 BRPM | WEIGHT: 201.28 LBS | HEART RATE: 88 BPM | BODY MASS INDEX: 30.51 KG/M2

## 2022-03-04 DIAGNOSIS — C79.10 METASTATIC UROTHELIAL CARCINOMA (HCC): Primary | ICD-10-CM

## 2022-03-04 DIAGNOSIS — C68.9 UROTHELIAL CANCER (HCC): ICD-10-CM

## 2022-03-04 DIAGNOSIS — C65.2 CANCER OF LEFT RENAL PELVIS (HCC): ICD-10-CM

## 2022-03-04 DIAGNOSIS — G89.3 CANCER ASSOCIATED PAIN: ICD-10-CM

## 2022-03-04 DIAGNOSIS — E83.42 HYPOMAGNESEMIA: ICD-10-CM

## 2022-03-04 DIAGNOSIS — E86.0 DEHYDRATION: ICD-10-CM

## 2022-03-04 PROCEDURE — 96413 CHEMO IV INFUSION 1 HR: CPT

## 2022-03-04 PROCEDURE — 96367 TX/PROPH/DG ADDL SEQ IV INF: CPT

## 2022-03-04 RX ORDER — ACETAMINOPHEN 325 MG/1
650 TABLET ORAL ONCE
Status: DISCONTINUED | OUTPATIENT
Start: 2022-03-04 | End: 2022-03-08 | Stop reason: HOSPADM

## 2022-03-04 RX ORDER — SODIUM CHLORIDE 9 MG/ML
20 INJECTION, SOLUTION INTRAVENOUS ONCE
Status: COMPLETED | OUTPATIENT
Start: 2022-03-04 | End: 2022-03-04

## 2022-03-04 RX ADMIN — SODIUM CHLORIDE 20 ML/HR: 0.9 INJECTION, SOLUTION INTRAVENOUS at 08:19

## 2022-03-04 RX ADMIN — MAGNESIUM SULFATE HEPTAHYDRATE: 500 INJECTION, SOLUTION INTRAMUSCULAR; INTRAVENOUS at 08:53

## 2022-03-04 RX ADMIN — DEXAMETHASONE SODIUM PHOSPHATE: 10 INJECTION, SOLUTION INTRAMUSCULAR; INTRAVENOUS at 10:24

## 2022-03-04 RX ADMIN — ENFORTUMAB VEDOTIN 90 MG: 30 INJECTION, POWDER, LYOPHILIZED, FOR SOLUTION INTRAVENOUS at 11:08

## 2022-03-04 RX ADMIN — DIPHENHYDRAMINE HYDROCHLORIDE 25 MG: 50 INJECTION INTRAMUSCULAR; INTRAVENOUS at 10:00

## 2022-03-04 NOTE — PLAN OF CARE
Problem: Knowledge Deficit  Goal: Patient/family/caregiver demonstrates understanding of disease process, treatment plan, medications, and discharge instructions  Description: Complete learning assessment and assess knowledge base    Interventions:  - Provide teaching at level of understanding  - Provide teaching via preferred learning methods  Outcome: Progressing     Problem: Potential for Falls  Goal: Patient will remain free of falls  Description: INTERVENTIONS:  - Educate patient/family on patient safety including physical limitations  - Instruct patient to call for assistance with activity   - Consult OT/PT to assist with strengthening/mobility   - Keep Call bell within reach  - Keep bed low and locked with side rails adjusted as appropriate  - Keep care items and personal belongings within reach  - Initiate and maintain comfort rounds  - Make Fall Risk Sign visible to staff  - - Apply yellow socks and bracelet for high fall risk patients  - Consider moving patient to room near nurses station  Outcome: Progressing

## 2022-03-04 NOTE — PROGRESS NOTES
Patient tolerated IV PADCEV and magnesium bolus without reaction or issues  AVS given to patient  Patient ambulated off unit without incident  All personal belongings taken with patient

## 2022-03-08 ENCOUNTER — HOSPITAL ENCOUNTER (OUTPATIENT)
Dept: NUCLEAR MEDICINE | Facility: HOSPITAL | Age: 72
Discharge: HOME | End: 2022-03-08
Payer: MEDICARE

## 2022-03-08 ENCOUNTER — HOSPITAL ENCOUNTER (OUTPATIENT)
Dept: INFUSION CENTER | Facility: HOSPITAL | Age: 72
Discharge: HOME | End: 2022-03-08
Payer: MEDICARE

## 2022-03-08 VITALS
RESPIRATION RATE: 18 BRPM | SYSTOLIC BLOOD PRESSURE: 125 MMHG | OXYGEN SATURATION: 98 % | HEART RATE: 76 BPM | DIASTOLIC BLOOD PRESSURE: 77 MMHG | TEMPERATURE: 97 F

## 2022-03-08 DIAGNOSIS — C68.9 UROTHELIAL CANCER (HCC): ICD-10-CM

## 2022-03-08 DIAGNOSIS — C79.10 METASTATIC UROTHELIAL CARCINOMA (HCC): ICD-10-CM

## 2022-03-08 DIAGNOSIS — E86.0 DEHYDRATION: Primary | ICD-10-CM

## 2022-03-08 DIAGNOSIS — C65.2 CANCER OF LEFT RENAL PELVIS (HCC): ICD-10-CM

## 2022-03-08 LAB — GLUCOSE SERPL-MCNC: 105 MG/DL (ref 65–140)

## 2022-03-08 PROCEDURE — 96360 HYDRATION IV INFUSION INIT: CPT

## 2022-03-08 PROCEDURE — 78815 PET IMAGE W/CT SKULL-THIGH: CPT

## 2022-03-08 PROCEDURE — 82948 REAGENT STRIP/BLOOD GLUCOSE: CPT

## 2022-03-08 PROCEDURE — A9552 F18 FDG: HCPCS

## 2022-03-08 RX ADMIN — SODIUM CHLORIDE 1000 ML: 0.9 INJECTION, SOLUTION INTRAVENOUS at 14:14

## 2022-03-08 NOTE — PROGRESS NOTES
Patient tolerated IV hydration without issue  AVS declined  Patient ambulated off unit without incident  All personal belongings taken with patient

## 2022-03-08 NOTE — PATIENT INSTRUCTIONS
PRESCRIPTION REFILL REMINDER:  All medication refills should be requested prior to [facility] on Friday  Any refill requests after noon on Friday would be addressed the following Monday  Please protect yourself from COVID-19 and the delta and omicron variants! Even though we do not have good antiviral drugs for this infection, the following tools can help you stay healthy:    = Wash your hands! Soap and water, or hand  with at least 60% alcohol, are both effective at killing the virus  = Wear a mask! This will help protect others from any virus particles you might spread  Your mouth and nose BOTH need to be covered  = Keep the distance! Keep 6 feet of distance from other people, even if they seem healthy  Keeping distance protects you from the other person's virus spread     = Get a vaccine! Three vaccines are approved for use in the [place] for all adults  These vaccines do provide protection against the delta variant, and seem to reduce severity of omicron infection  + Pfizer is FDA approved for all persons age 11 and older   + Clydene Adan may be given to all person age 25 and older   + [place] Goodpasture may be given to all person age 25 and older  (Serious blood clot side effects have only been reported in reproductive-age women, and these are about 1-in-a-million  We do NOT recommend J&J for people who have had Guillan-Marlborough syndrome )  = You may get a shot from many other locations outside [facility]: [facility], [employer], [facility], [facility], and certain [facility] locations  + As of 11/29/21, the CDC recommends booster shots on ALL vaccines for ALL adults  https://[employer] com/      We are recommending that ALL our patients get a complete series of one of the three vaccines, and boost it ASAP  Call 4-998-YJQIWUC (Choose Option 7 for faster service!) to get scheduled for your shot  Informacion en espanol sobre vacunas, de nos companeros [facility] --  Kinza howard      Numbers of coronavirus cases (and COVID deaths) are worsening in many [place] States, among unvaccinated people, we think this is because vaccines are working, but only if you get one! As of 12/1/21, we do NOT advise travel OUTSIDE the [address], and we encourage you to be very careful when planning travel INSIDE the [address]  Check out [employer] for [place] data that are updated daily:    http://www Stockbet.com/     Global Epidemics  Org, from [facility] (OUTPAT[facility]), will give you Gefpzb-wz-Fummcc information on virus cases and vaccination rates:    Https://globalepidemics  org/    Frequently Asked Questions about COVID, answered by [facility]    [employer]

## 2022-03-09 ENCOUNTER — OFFICE VISIT (OUTPATIENT)
Dept: PALLIATIVE MEDICINE | Facility: CLINIC | Age: 72
End: 2022-03-09
Payer: MEDICARE

## 2022-03-09 ENCOUNTER — SOCIAL WORK (OUTPATIENT)
Dept: PALLIATIVE MEDICINE | Facility: CLINIC | Age: 72
End: 2022-03-09
Payer: MEDICARE

## 2022-03-09 VITALS
RESPIRATION RATE: 18 BRPM | WEIGHT: 199 LBS | DIASTOLIC BLOOD PRESSURE: 64 MMHG | HEART RATE: 62 BPM | BODY MASS INDEX: 30.26 KG/M2 | SYSTOLIC BLOOD PRESSURE: 118 MMHG | OXYGEN SATURATION: 95 % | TEMPERATURE: 96.8 F

## 2022-03-09 DIAGNOSIS — C79.51 SECONDARY MALIGNANT NEOPLASM OF BONE (HCC): ICD-10-CM

## 2022-03-09 DIAGNOSIS — G62.0 DRUG-INDUCED POLYNEUROPATHY (HCC): ICD-10-CM

## 2022-03-09 DIAGNOSIS — G89.29 CHRONIC BILATERAL LOW BACK PAIN WITHOUT SCIATICA: ICD-10-CM

## 2022-03-09 DIAGNOSIS — G89.3 CANCER ASSOCIATED PAIN: Primary | ICD-10-CM

## 2022-03-09 DIAGNOSIS — Z71.89 COUNSELING AND COORDINATION OF CARE: Primary | ICD-10-CM

## 2022-03-09 DIAGNOSIS — C77.2 METASTASIS TO RETROPERITONEAL LYMPH NODE (HCC): ICD-10-CM

## 2022-03-09 DIAGNOSIS — M54.50 CHRONIC BILATERAL LOW BACK PAIN WITHOUT SCIATICA: ICD-10-CM

## 2022-03-09 DIAGNOSIS — K59.03 DRUG INDUCED CONSTIPATION: ICD-10-CM

## 2022-03-09 DIAGNOSIS — C79.10 METASTATIC UROTHELIAL CARCINOMA (HCC): ICD-10-CM

## 2022-03-09 PROBLEM — R10.84 GENERALIZED ABDOMINAL PAIN: Status: RESOLVED | Noted: 2021-11-10 | Resolved: 2022-03-09

## 2022-03-09 PROCEDURE — 99214 OFFICE O/P EST MOD 30 MIN: CPT | Performed by: INTERNAL MEDICINE

## 2022-03-09 PROCEDURE — NC001 PR NO CHARGE

## 2022-03-09 NOTE — PROGRESS NOTES
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[facility] )    3  Metastatic urothelial carcinoma ([facility] )    4  Secondary malignant neoplasm of bone ([facility] )    5  Chronic bilateral low back pain without sciatica    6  Drug-induced polyneuropathy ([facility] )    7  Drug induced constipation       · Was doing well to the point that we were able to wean him off of opioids per his request  However, has been having more pain in his lower back as well as neuropathy on both hands and legs  Also with increased fatigue  · Started on duloxetine 30mg OD by oncology  He reports no untoward side effects with it, increase to 60mg OD after a week of 30mg (which would be tomorrow)   · May consider adding gabapentin or lyrica or amitriptyline in the future   · Resume MSIR 7 5mg PO q4H prn for his back pain  May help with his neuropathy as well  · Optimize bowel regimen  Wife will call us to tell us what they have at home  They could not recall what they have currently  · Recommend to try acupuncture for neuropathy/pain  · May have to re-trial MMJ as well in the future   · Has been on prednisone for a year for pain/energy/appetite  Appears to be non-beneficial at this time, will begin taper:  · Continue 10mg prednisone for now, recently decreased from 20mg  · We will have to wean slowly  Also will not wean while we are increasing other meds to avoid multiple med changes in this elderly gentleman  · Will see pain managment next week for possible nerve block to help with back pain  · RTO in 1 month, call sooner if needed    Controlled Substance Review    [place] PDMP or [place]  reviewed: No red flags were identified; safe to proceed with prescription       1  [id number] 03/02/2022 02/01/2022 Zolpidem Tartrate 30 0 30 5 MG NA [facility] [employer] [facility] / 82 [place]    1  [id number] 02/24/2022 02/24/2022 ALPRAZolam 30 0 30 0 25 MG NA ENIO YUNG  [employer] 00 / 00 [place]    1  [id number] 02/01/2022 02/01/2022 Zolpidem Tartrate 30 0 30 5 MG NA [facility] 00 / 84 [place]    1  [id number] 12/23/2021 12/23/2021 Zolpidem Tartrate 15 0 15 5 MG NA [facility] 00 / 00 [place]    1  [id number] 12/08/2021 12/08/2021 Morphine Sulfate 15 0 15 15 MG 15 0 Plynked 00 / 00 [place]    1  [id number] 11/16/2021  11/10/2021 Morphine Sulfate 60 0 30 30 MG 60 0 Plynked 00 / 00 [place]    1  [id number] 11/10/2021  11/10/2021 HYDROmorphone HCL 90 0 15 2 MG 48 0 [employer]  C          Requested Prescriptions      No prescriptions requested or ordered in this encounter     There are no discontinued medications  Representatives have queried the patient's controlled substance dispensing history in the Prescription Drug Monitoring Program in compliance with regulations before I have prescribed any controlled substances  The prescription history is consistent with prescribed therapy and our practice policies  30 minutes were spent face to face with his spouse with greater than 50% of the time spent in counseling or coordination of care including discussions of etiology of diagnosis, pathogenesis of diagnosis, diagnostic results, impression, and recommendations, risks and benefits of treatment, instructions for disease self management, treatment instructions, follow up requirements, risk factors and risk reduction of disease, patient and family counseling/involvement in care and compliance with treatment regimen   All of the patient's questions were answered during this discussion  No follow-ups on file      Subjective:   Chief Complaint  Follow up visit for:  symptom management, pain, neoplasm related, assessment of goals of care, disease process education and discussion of prognosis  HPI     Chely Gross is a 66 y o  male with metastatic urothelial cancer who recently showed disease progression on second line Jesse Benne  He was initially diagnosed in 1/2020 and has since underwent left robotic assisted laparoscopic nephroureterectomy with bladder cuff excision and chemotherapy  He tried cisplatin but did not tolerate well and so was switched to [facility] which he had been on since 10/2020  He also underwent RT to the L abdomen form 11-12/2020  He then started to develop new pain in his R shoulder  He went for excision of a soft tissue mass in the R shoulder/axilla which revealed metastatic high-grade carcinoma consistent with his known primary urothelial carcinoma in 8/9/2021  His PET-CT on 8/16/2021 showed disease progression with hypermetabolic soft tissue lesions at the level of the right shoulder and right axilla with interval progression of the retroperitoneal and mesenteric hypermetabolic metastasis  He used to see palliative care for symptoms but he said his pain got better/gone since Keytruda  Since around 8-9/2021, his shoulder pain escalated to the point where tramadol is no longer helpful  He was then switched to dilaudid prn  A few days later, morphine ER was added  He was started on RT to the R shoulder on 9/8  He also started on palliative enfortumab vedotin starting 9/10  The pain stabilized around 10/2021 with no further adjustments in medications  On his 11/10/2021 visit, he reported ongoing pain in his R shoulder as well as a new pain in his stomach  Overall, despite the continued pain, his pain was getting better  He voiced his wish to wean off of opioids and we plan on doing so after his next PET-CT  He had a PET-CT on 12/3 that showed significant decrease in activity in the R shoulder and R axillary lesions as well as resolution of upper abdominal hypermetabolic adenopathy       He was last seen on 12/8/2021 where he reported doing significantly better with his pain virtually nonexistent and so we started opioid wean for him as appropriate and per his request as well  Since his last visit, he unfortunately developed increasing pain in his lower back as well as peripheral neuropathy in his hands, legs and fatigue  He talked to oncology about this who ordered a PET CT on 3/8 that showed further response to therapy  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  He returns today for his routine follow up  They were disappointed that he is having lots more symptoms now when he as starting to get better before  Symptoms started about a month ago  He is no longer on opioids  He reports no untoward side effects from duloxetine so he was advised to increase to 60mg OD starting tomorrow  We spoke about having to go back to morphine IR to help with symptoms again  He was reluctant mainly due to side effect of constipation but we will optimize his bowel regimen for this reason  He will also see pain management for possible nerve block to help with his back pain  They question prednisone and I agree that he needs to stop this now  We will taper slowly  They are hoping they can still make it to [place] next week  The following portions of the medical history were reviewed: past medical history, problem list, medication list, and social history      Current Outpatient Medications:     acetaminophen (TYLENOL) 500 mg tablet, Take 2 tablets (1,000 mg total) by mouth every 8 (eight) hours, Disp: , Rfl:     allopurinol (ZYLOPRIM) 300 mg tablet, take 1 tablet by mouth once daily, Disp: 30 tablet, Rfl: 3    ALPRAZolam (XANAX) 0 25 mg tablet, Take 1 tablet (0 25 mg total) by mouth daily at bedtime as needed for anxiety (restless legs), Disp: 30 tablet, Rfl: 1    amLODIPine (NORVASC) 5 mg tablet, Take 5 mg by mouth daily  , Disp: , Rfl:     atorvastatin (LIPITOR) 80 mg tablet, Take 80 mg by mouth every other day evening, Disp: , Rfl:     docusate sodium (COLACE) 250 MG capsule, Take 1 capsule (250 mg total) by mouth daily, Disp: 60 capsule, Rfl: 6    DULoxetine (CYMBALTA) 30 mg delayed release capsule, Take 1 capsule (30 mg total) by mouth daily, Disp: 90 capsule, Rfl: 0    famotidine (PEPCID) 20 mg tablet, take 1-2 tablets by mouth every evening, Disp: , Rfl:     folic acid (FOLVITE) 1 mg tablet, Take 1 tablet (1 mg total) by mouth daily, Disp: 90 tablet, Rfl: 4    Magnesium 250 MG TABS, 1 tablet 2 (two) times a day Taking 500mg in the morning and 500mg at night, Disp: , Rfl:     metoprolol tartrate (LOPRESSOR) 100 mg tablet, Take 50 mg by mouth daily, Disp: , Rfl:     omeprazole (PriLOSEC) 20 mg delayed release capsule, Take 20 mg by mouth daily  , Disp: , Rfl:     predniSONE 10 mg tablet, Take 2 tablets (20 mg total) by mouth daily, Disp: 60 tablet, Rfl: 3    senna (SENOKOT) 8 6 mg, Take 1 tablet (8 6 mg total) by mouth daily at bedtime (Patient taking differently: Take 8 6 mg by mouth daily at bedtime As needed), Disp: 30 each, Rfl: 0    tadalafil (CIALIS) 20 MG tablet, Take 1 tablet by mouth one (1) hour prior to intercourse    Do not exceed more than two tablets per week , Disp: 12 tablet, Rfl: 3    triamcinolone (KENALOG) 0 1 % lotion, Apply topically 3 (three) times a day, Disp: 60 mL, Rfl: 5    VITAMIN D PO, Take 1,000 Units by mouth daily , Disp: , Rfl:     zolpidem (AMBIEN) 5 mg tablet, Take 1 tablet (5 mg total) by mouth daily at bedtime as needed for sleep, Disp: 30 tablet, Rfl: 1    ALPRAZolam (XANAX) 0 25 mg tablet, Take 1 tablet (0 25 mg total) by mouth daily at bedtime as needed for anxiety, Disp: 30 tablet, Rfl: 0    HYDROmorphone (DILAUDID) 2 mg tablet, Take 1 tablet (2 mg total) by mouth every 4 (four) hours as needed for moderate pain Max Daily Amount: 12 mg (Patient not taking: Reported on 11/24/2021 ), Disp: 90 tablet, Rfl: 0    hydrOXYzine HCL (ATARAX) 25 mg tablet, Take 1 tablet (25 mg total) by mouth every 6 (six) hours as needed for itching or anxiety (Patient not taking: Reported on 3/9/2022 ), Disp: 60 tablet, Rfl: 2    morphine (MSIR) 15 mg tablet, Take 1/2 tablet 3 times a day for 5 days, then 1/2 tablet 2 times a day for 5 days, then 1/2 tablet once a day for 5 days, then stop (Patient not taking: Reported on 2/2/2022 ), Disp: 15 tablet, Rfl: 0    naloxone (NARCAN) 4 mg/0 1 mL nasal spray, Administer 1 spray into a nostril  If breathing does not return to normal or if breathing difficulty resumes after 2-3 minutes, give another dose in the other nostril using a new spray  (Patient not taking: Reported on 3/9/2022 ), Disp: 1 each, Rfl: 1    nystatin (MYCOSTATIN) cream, Apply topically 2 (two) times a day, Disp: 30 g, Rfl: 0    terazosin (HYTRIN) 5 mg capsule, Take 2 capsules (10 mg total) by mouth daily at bedtime (Patient not taking: Reported on 3/9/2022 ), Disp: 30 capsule, Rfl: 6     Review of Systems   Constitutional: Positive for fatigue  Negative for activity change, appetite change, chills and fever  HENT: Negative for trouble swallowing  Respiratory: Negative for shortness of breath  Cardiovascular: Negative for chest pain and leg swelling  Gastrointestinal: Negative for abdominal pain, constipation, diarrhea, nausea and vomiting  Musculoskeletal: Positive for back pain  Neurological: Negative for weakness  Numbness, tingling in both hands and feet   Psychiatric/Behavioral: Negative for sleep disturbance  The patient is not nervous/anxious  All other systems reviewed and are negative  All other systems negative    Objective:  Vital Signs  /64 (BP Location: Left arm, Patient Position: Sitting, Cuff Size: Standard)   Pulse 62   Temp (!) 96 8 °F (36 °C) (Temporal)   Resp 18   Wt 90 3 kg (199 lb)   SpO2 95%   BMI 30 26 kg/m²    Physical Exam    Constitutional: Appears well-developed and well-nourished   Does  appear sicker than previous visit, but not toxic looking  Pleasant, jovial  In no acute physical or emotional distress  Head: Normocephalic and atraumatic  Eyes: EOM are normal  No ocular discharge  No scleral icterus  Neck: No visible adenopathy or masses  Respiratory: Effort normal  No stridor  No respiratory distress  Gastrointestinal: No abdominal distension  Musculoskeletal: No edema  Neurological: Alert, oriented and appropriately conversant  Hard of hearing  Skin: No diaphoresis, no rashes seen on exposed areas of skin  Psychiatric: Displays a normal mood and affect   Behavior, judgement and thought content appear normal      Cam Dunne MD  Palliative Medicine & Supportive Care  Internal Medicine  Available via [facility] Text  Office: [phone]  Fax: [phone]

## 2022-03-09 NOTE — PROGRESS NOTES
Palliative Supportive Care  met with patient and his wife to continue to provide emotional support and guidance  Updated biopsychosocial information relevant to support: Pt presents today for his follow up visit  He reports he has not been feeling well  He complains of neuropathy in his hands and feet, a great deal of fatigue, SOB and feeling weak  He spoke with Dr Nilton Higginbotham further and she made her recommendations  He also suffers from chronic back pain, he will be seeing Dr Ana Mccoy to see if he can receive a nerve block  He states that he has been receiving treatment for almost 2 years and that he feels it is catching up to him  His most recent PET scan was really great  He and his wife are planning on going to [facility] soon, they will be there for 2 weeks  He is hopeful the medications will alleviate his neuropathy so he can enjoy his trip  Pt has been off of his morphine, however Dr Nilton Higginbotham suggested he take it as needed  He was agreeable and they will check to see if they have an adequate supply and then will call the office to let Dr Nilton Higginbotham know     Identified areas of need include: Support  Resources provided: None  Areas that need future follow-up include: Continue to provide support as able

## 2022-03-10 ENCOUNTER — TELEPHONE (OUTPATIENT)
Dept: PALLIATIVE MEDICINE | Facility: CLINIC | Age: 72
End: 2022-03-10

## 2022-03-10 DIAGNOSIS — G89.3 CANCER ASSOCIATED PAIN: ICD-10-CM

## 2022-03-10 RX ORDER — MORPHINE SULFATE 15 MG/1
TABLET ORAL
Qty: 15 TABLET | Refills: 0 | Status: CANCELLED | OUTPATIENT
Start: 2022-03-10

## 2022-03-10 RX ORDER — MORPHINE SULFATE 15 MG/1
TABLET ORAL
Qty: 45 TABLET | Refills: 0 | Status: SHIPPED | OUTPATIENT
Start: 2022-03-10 | End: 2022-06-02

## 2022-03-10 NOTE — TELEPHONE ENCOUNTER
Wife Peyton Ramiro called to let Dr Cristhian Romano know her  does not have any Morphine 15 mg at the home  Shes asking if you could send to [facility]

## 2022-03-11 ENCOUNTER — HOSPITAL ENCOUNTER (OUTPATIENT)
Dept: INFUSION CENTER | Facility: HOSPITAL | Age: 72
Discharge: HOME | End: 2022-03-11
Payer: MEDICARE

## 2022-03-11 VITALS
DIASTOLIC BLOOD PRESSURE: 85 MMHG | TEMPERATURE: 96.7 F | RESPIRATION RATE: 16 BRPM | HEART RATE: 96 BPM | SYSTOLIC BLOOD PRESSURE: 140 MMHG

## 2022-03-11 DIAGNOSIS — E83.42 HYPOMAGNESEMIA: ICD-10-CM

## 2022-03-11 DIAGNOSIS — E86.0 DEHYDRATION: ICD-10-CM

## 2022-03-11 DIAGNOSIS — C65.2 CANCER OF LEFT RENAL PELVIS (HCC): ICD-10-CM

## 2022-03-11 DIAGNOSIS — C79.10 METASTATIC UROTHELIAL CARCINOMA (HCC): Primary | ICD-10-CM

## 2022-03-11 DIAGNOSIS — C68.9 UROTHELIAL CANCER (HCC): ICD-10-CM

## 2022-03-11 LAB
ALBUMIN SERPL BCP-MCNC: 3.9 G/DL (ref 3.5–5)
ALP SERPL-CCNC: 55 U/L (ref 34–104)
ALT SERPL W P-5'-P-CCNC: 39 U/L (ref 7–52)
ANION GAP SERPL CALCULATED.3IONS-SCNC: 9 MMOL/L (ref 4–13)
AST SERPL W P-5'-P-CCNC: 22 U/L (ref 13–39)
BASOPHILS # BLD AUTO: 0.03 THOUSANDS/ΜL (ref 0–0.1)
BASOPHILS NFR BLD AUTO: 1 % (ref 0–1)
BILIRUB SERPL-MCNC: 1.61 MG/DL (ref 0.2–1)
BUN SERPL-MCNC: 22 MG/DL (ref 5–25)
CALCIUM SERPL-MCNC: 9.4 MG/DL (ref 8.4–10.2)
CHLORIDE SERPL-SCNC: 100 MMOL/L (ref 96–108)
CO2 SERPL-SCNC: 28 MMOL/L (ref 21–32)
CREAT SERPL-MCNC: 1.39 MG/DL (ref 0.6–1.3)
EOSINOPHIL # BLD AUTO: 0.13 THOUSAND/ΜL (ref 0–0.61)
EOSINOPHIL NFR BLD AUTO: 2 % (ref 0–6)
ERYTHROCYTE [DISTWIDTH] IN BLOOD BY AUTOMATED COUNT: 15.7 % (ref 11.6–15.1)
GFR SERPL CREATININE-BSD FRML MDRD: 50 ML/MIN/1.73SQ M
GLUCOSE SERPL-MCNC: 94 MG/DL (ref 65–140)
HCT VFR BLD AUTO: 39.9 % (ref 36.5–49.3)
HGB BLD-MCNC: 13 G/DL (ref 12–17)
IMM GRANULOCYTES # BLD AUTO: 0.06 THOUSAND/UL (ref 0–0.2)
IMM GRANULOCYTES NFR BLD AUTO: 1 % (ref 0–2)
LYMPHOCYTES # BLD AUTO: 1.53 THOUSANDS/ΜL (ref 0.6–4.47)
LYMPHOCYTES NFR BLD AUTO: 25 % (ref 14–44)
MAGNESIUM SERPL-MCNC: 1.6 MG/DL (ref 1.9–2.7)
MCH RBC QN AUTO: 32.2 PG (ref 26.8–34.3)
MCHC RBC AUTO-ENTMCNC: 32.6 G/DL (ref 31.4–37.4)
MCV RBC AUTO: 99 FL (ref 82–98)
MONOCYTES # BLD AUTO: 0.67 THOUSAND/ΜL (ref 0.17–1.22)
MONOCYTES NFR BLD AUTO: 11 % (ref 4–12)
NEUTROPHILS # BLD AUTO: 3.72 THOUSANDS/ΜL (ref 1.85–7.62)
NEUTS SEG NFR BLD AUTO: 60 % (ref 43–75)
NRBC BLD AUTO-RTO: 0 /100 WBCS
PLATELET # BLD AUTO: 184 THOUSANDS/UL (ref 149–390)
PMV BLD AUTO: 9.7 FL (ref 8.9–12.7)
POTASSIUM SERPL-SCNC: 3.7 MMOL/L (ref 3.5–5.3)
PROT SERPL-MCNC: 6.3 G/DL (ref 6.4–8.4)
RBC # BLD AUTO: 4.04 MILLION/UL (ref 3.88–5.62)
SODIUM SERPL-SCNC: 137 MMOL/L (ref 135–147)
WBC # BLD AUTO: 6.14 THOUSAND/UL (ref 4.31–10.16)

## 2022-03-11 PROCEDURE — 83735 ASSAY OF MAGNESIUM: CPT

## 2022-03-11 PROCEDURE — 80053 COMPREHEN METABOLIC PANEL: CPT

## 2022-03-11 PROCEDURE — 96360 HYDRATION IV INFUSION INIT: CPT

## 2022-03-11 PROCEDURE — 85025 COMPLETE CBC W/AUTO DIFF WBC: CPT

## 2022-03-11 RX ORDER — ACETAMINOPHEN 325 MG/1
650 TABLET ORAL ONCE
Status: CANCELLED | OUTPATIENT
Start: 2022-04-08

## 2022-03-11 RX ORDER — ACETAMINOPHEN 325 MG/1
650 TABLET ORAL ONCE
Status: CANCELLED | OUTPATIENT
Start: 2022-04-22

## 2022-03-11 RX ORDER — ACETAMINOPHEN 325 MG/1
650 TABLET ORAL ONCE
Status: CANCELLED | OUTPATIENT
Start: 2022-04-15

## 2022-03-11 RX ORDER — SODIUM CHLORIDE 9 MG/ML
20 INJECTION, SOLUTION INTRAVENOUS ONCE
Status: CANCELLED | OUTPATIENT
Start: 2022-04-15

## 2022-03-11 RX ORDER — SODIUM CHLORIDE 9 MG/ML
20 INJECTION, SOLUTION INTRAVENOUS ONCE
Status: CANCELLED | OUTPATIENT
Start: 2022-04-08

## 2022-03-11 RX ORDER — SODIUM CHLORIDE 9 MG/ML
20 INJECTION, SOLUTION INTRAVENOUS ONCE
Status: CANCELLED | OUTPATIENT
Start: 2022-04-22

## 2022-03-11 RX ADMIN — SODIUM CHLORIDE 1000 ML: 0.9 INJECTION, SOLUTION INTRAVENOUS at 08:17

## 2022-03-11 NOTE — PROGRESS NOTES
Central labs drawn per patient's request   Patient tolerated IV Hydration without issue  AVS given to patient  Patient ambulated off unit without incident  All personal belongings taken with patient

## 2022-03-11 NOTE — PLAN OF CARE
Problem: Potential for Falls  Goal: Patient will remain free of falls  Description: INTERVENTIONS:  - Educate patient/family on patient safety including physical limitations  - Instruct patient to call for assistance with activity   - Consult OT/PT to assist with strengthening/mobility   - Keep Call bell within reach  - Keep bed low and locked with side rails adjusted as appropriate  - Keep care items and personal belongings within reach  - Initiate and maintain comfort rounds  - Make Fall Risk Sign visible to staff  -   - Consider moving patient to room near nurses station  Outcome: Progressing

## 2022-03-15 ENCOUNTER — TELEPHONE (OUTPATIENT)
Dept: HEMATOLOGY ONCOLOGY | Facility: CLINIC | Age: 72
End: 2022-03-15

## 2022-03-15 ENCOUNTER — HOSPITAL ENCOUNTER (OUTPATIENT)
Dept: INFUSION CENTER | Facility: HOSPITAL | Age: 72
Discharge: HOME | End: 2022-03-15
Payer: MEDICARE

## 2022-03-15 ENCOUNTER — OFFICE VISIT (OUTPATIENT)
Dept: HEMATOLOGY ONCOLOGY | Facility: CLINIC | Age: 72
End: 2022-03-15
Payer: MEDICARE

## 2022-03-15 VITALS
SYSTOLIC BLOOD PRESSURE: 141 MMHG | RESPIRATION RATE: 16 BRPM | OXYGEN SATURATION: 96 % | HEART RATE: 90 BPM | TEMPERATURE: 96.9 F | DIASTOLIC BLOOD PRESSURE: 95 MMHG

## 2022-03-15 VITALS
HEIGHT: 68 IN | WEIGHT: 201 LBS | DIASTOLIC BLOOD PRESSURE: 88 MMHG | BODY MASS INDEX: 30.46 KG/M2 | OXYGEN SATURATION: 95 % | SYSTOLIC BLOOD PRESSURE: 110 MMHG | RESPIRATION RATE: 16 BRPM | TEMPERATURE: 97.8 F | HEART RATE: 55 BPM

## 2022-03-15 DIAGNOSIS — E83.42 HYPOMAGNESEMIA: ICD-10-CM

## 2022-03-15 DIAGNOSIS — E86.0 DEHYDRATION: Primary | ICD-10-CM

## 2022-03-15 DIAGNOSIS — C79.10 METASTATIC UROTHELIAL CARCINOMA (HCC): Primary | ICD-10-CM

## 2022-03-15 PROCEDURE — 99214 OFFICE O/P EST MOD 30 MIN: CPT | Performed by: INTERNAL MEDICINE

## 2022-03-15 PROCEDURE — 96360 HYDRATION IV INFUSION INIT: CPT

## 2022-03-15 RX ADMIN — SODIUM CHLORIDE 1000 ML: 0.9 INJECTION, SOLUTION INTRAVENOUS at 08:36

## 2022-03-15 NOTE — PROGRESS NOTES
Patient tolerated IV hydration without issues  AVS given to patient  Patient ambulated off unit without incident  All personal belongings taken with patient

## 2022-03-15 NOTE — PROGRESS NOTES
Time out with Kinga Cruz RN  Pt will not get padcev on Friday 3/18 but rather just hydration  Pt will return for chemo in April after his trip  Clarissa Padron in pharmacy made aware

## 2022-03-15 NOTE — TELEPHONE ENCOUNTER
Phoned [facility] infusion to inform staff that next chemo will be 4/8/22 as pt is going to [facility] so date was changed on plan spoke to Josue Schaeffer RN

## 2022-03-15 NOTE — PROGRESS NOTES
Hematology/Oncology Outpatient Follow-up  Philip Hinton 66 y o  male 1950 [id number]    Date:  3/15/2022        Assessment and Plan:  1  Metastatic urothelial carcinoma [facility])  The patient and his wife were both educated about the recent PET-CT scan after the 6th cycle of enfortumab vedotin  The PET scan shows at least stable disease with some improvement of the activity in the right axilla  There is no obvious hint of significant progression on the current  line of treatment  The patient stated that he is about to leave for on a vacation for 2 weeks and will be back for cycle 7 to be given on 04/08/2022  We will continue with the adjusted dose of enfortumab vedotin 1 0 mg/m2   3 weeks on 1 week off   - CBC and differential; Future  - Comprehensive metabolic panel; Future  - Magnesium; Future    2  Hypomagnesemia  I did ask him to increase the oral magnesium supplements he is taking on a daily basis  - Magnesium; Future        HPI:  Patient came today for follow-up visit accompanied by his wife  He had PET-CT scan on 03/08/2022 after the 6th cycle of enfortumab vedotin  The PET scan showed:  IMPRESSION:     1  Overall findings suggest further response to therapy  2  Mild FDG uptake at the right posterior shoulder subcutaneous lesion, activity may be slightly increased but this lesion is noted to be smaller in size on CT  3  Interval improvement in cystic foci in the right shoulder musculature laterally with peripheral FDG uptake  4  Smaller right axillary lymph node now without significant FDG uptake  5  No findings for hypermetabolic metastasis in the neck, abdomen and pelvis  Blood work on 03/11/2022 showed hemoglobin of 13 0 with normal white cells and platelets  His creatinine was 1 39 with normal calcium liver enzymes  Magnesium 1 6  Oncology History Overview Note   Patient has a history of hypertension, hyperlipidemia, chronic lower back pain       He had an MRI of the lower spine for further evaluation of his chronic lower back pain  The MRI on 12/02/2019 revealed Multiple left renal masses to include a left lower pole cyst and a rounded structure in the left upper pole which may also represent cyst   Left upper pole renal masses approximately 3 cm in greatest linear dimension  A CT with renal protocol was done on 12/12/2019 which also showed the infiltrating lesion in the upper pole of the left kidney measuring about the 3 5 cm in greatest dimension without any hint of retroperitoneal lymphadenopathy  A CT scan of the abdomen pelvis on 01/14/2020 showed the same findings with the mild hydronephrosis on the left  The urine cytology on 01/03/2020 showed high-grade urothelial carcinoma  A cystoscopy was then done, a biopsy was taken from the left renal pelvic region which showed high-grade urothelial carcinoma without evidence of lamina propria invasion  The detrusor muscle/muscularis propria were not present for evaluation  The recommendation was to pursue left robotic assisted laparoscopic nephroureterectomy with bladder cuff excision which was done on 04/01/2020  The final pathology revealed;  - Invasive high grade urothelial carcinoma arising in renal pelvis  - Bladder cuff margin is negative for carcinoma and no evidence of high grade dysplasia  - Ureters with no significant pathologic abnormality  - Two benign simple cysts  - Adrenal gland is negative for malignancy  - One lymph node, negative for malignancy (0/1)  The tumor size was 4 5 cm with invasion beyond the muscularis into the periurethral fat or peripelvic fat or the renal parenchyma  The margins were uninvolved by carcinoma or carcinoma in situ  The final pathology was pT3 pN0  Patient barely tolerated 1 cycle of adjuvant chemotherapy with split dose cisplatin and gemcitabine with a lot of side effects  The treatment had to be discontinued due to significant worsening renal dysfunction 6/2020  Patient started to complain about significant abdominal/left inguinal pain around August/September 2020  Unfortunately repeat imaging revealed recurrent/metastatic disease  9/4/20 CT C/A/P- Status post left nephrectomy for resection of urothelial neoplasm  Lymphadenopathy in the left nephrectomy bed has increased since the prior examination, concerning for metastatic disease  Ill-defined hyperattenuating masslike focus in the left rectus muscle, not identified on the prior examination  This is suspicious for a metastatic lesion  A rectus hematoma is also considered  9/23/20 PET scan- 1  Multiple FDG avid lymph nodes in the retrocrural region, retroperitoneum and the left renal bed compatible with metastasis  These have progressed from recent CT  2   FDG avid mass involving the left rectus abdominal musculature compatible with metastasis which is larger from recent CT  3  Several small lymph nodes adjacent to the mid to distal esophagus with FDG uptake suspicious for metastasis  Small focus of FDG uptake also suggested in the right hilar region, metastasis is not excluded  This could be reassessed on follow-up  4   Subtle focus of FDG uptake at the T2 level on the left, nonspecific, could be related to early degenerative changes, developing metastasis is not entirely excluded  This could be reassessed on follow-up  5  Prostate is mildly prominent with heterogeneous FDG uptake  This could be inflammatory  Correlate for prostatitis  He completed palliative radiation to the left abdomen 12/3/20     His PET scan from 08/16/2021 showed progression of his disease with hypermetabolic soft tissue lesions at the level of the right shoulder and right axilla with interval progression of the retroperitoneal and mesenteric hypermetabolic metastasis  He did have the new lesion to his right upper back/shoulder which was causing significant localized pain  This excised by his surgeon 08/09/2021  Pathology confirmed metastatic high-grade carcinoma consistent with his no primary urothelial carcinoma  He received palliative radiation to his right shoulder/axilla region  Urothelial cancer ([facility] [address] )   1/3/2020 Biopsy    Final Diagnosis    A  Urine, Clean Catch, Thin Prep:  High grade urothelial carcinoma (HGUC) - see comment  1/3/2020 Initial Diagnosis    Urothelial cancer ([facility] [address] )  T3 N0 (stage IIIA)     1/17/2020 Biopsy    Final Diagnosis    A  Ureter, Right, left renal pelvis biopsy  -Fragments of high grade urothelial carcinoma   -No evidence of lamina propria invasion   -Detrusor muscle/ muscularis propria is not present for evaluation  B  Urinary Bladder, bladder biopsy:  -Fragments of low grade papillary lesion  See note  -No evidence of invasion seen  -Unremarkable fragment of detrusor muscle seen  4/1/2020 Surgery    left robotic assisted laparoscopic nephroureterectomy with bladder cuff excision     Final Diagnosis    A  Left kidney, ureter, and bladder cuff, nephroureterectomy:  - Invasive high grade urothelial carcinoma arising in renal pelvis  - Bladder cuff margin is negative for carcinoma and no evidence of high grade dysplasia  - Ureters with no significant pathologic abnormality  - Two benign simple cysts  - Adrenal gland is negative for malignancy  - One lymph node, negative for malignancy (0/1)          5/14/2020 - 6/3/2020 Chemotherapy    CISplatin (PLATINOL) split dose 35 mg/m2 = 75 3 mg (50 % of original dose 70 mg/m2), Intravenous,   Administration: 75 3 mg (5/14/2020), 75 3 mg (5/21/2020)  gemcitabine (GEMZAR) 2,200 mg in sodium chloride 0 9 % 250 mL infusion, 2,150 2 mg (80 % of original dose 1,250 mg/m2), Intravenous,   Administration: 2,200 mg (5/14/2020), 2,200 mg (5/21/2020)    (only completed 1 cycle- d/c d/t adverse effects and worsening renal dysfunction)     10/9/2020 - 9/9/2021 Chemotherapy    pembrolizumab (KEYTRUDA) IVPB, 200 mg, Intravenous, Once, 16 of 20 cycles  Administration: 200 mg (10/9/2020), 200 mg (10/30/2020), 200 mg (11/20/2020), 200 mg (12/11/2020), 200 mg (12/31/2020), 200 mg (1/22/2021), 200 mg (2/12/2021), 200 mg (3/5/2021), 200 mg (3/26/2021), 200 mg (4/16/2021), 200 mg (5/7/2021), 200 mg (5/28/2021), 200 mg (6/18/2021), 200 mg (7/9/2021), 200 mg (7/30/2021), 200 mg (8/20/2021)     11/19/2020 - 12/3/2020 Radiation    Treatment:  Course: C1    Plan ID Energy Fractions Dose per Fraction (cGy) Dose Correction (cGy) Total Dose Delivered (cGy) Elapsed Days   L Abdomen 6X 10 / 10 300 0 3,000 14        9/10/2021 -  Chemotherapy    enfortumab vedotin-ejfv (PADCEV) IVPB, 1 25 mg/kg = 114 mg, Intravenous, Once, 6 of 8 cycles  Dose modification: 1 mg/kg (original dose 1 25 mg/kg, Cycle 7, Reason: Other (Must fill in a comment), Comment: dose reduction due to side effects ), 1 25 mg/kg (original dose 1 25 mg/kg, Cycle 7, Reason: Other (Must fill in a comment), Comment: patient wants full dose ), 1 mg/kg (original dose 1 25 mg/kg, Cycle 7, Reason: Neuropathy)  Administration: 114 mg (9/10/2021), 114 mg (9/17/2021), 110 mg (10/8/2021), 110 mg (9/24/2021), 110 mg (10/15/2021), 110 mg (11/12/2021), 110 mg (10/22/2021), 110 mg (11/19/2021), 110 mg (12/10/2021), 110 mg (11/26/2021), 110 mg (12/17/2021), 110 mg (1/7/2022), 110 mg (12/23/2021), 110 mg (1/14/2022), 110 mg (1/21/2022), 110 mg (2/18/2022), 110 mg (2/25/2022), 90 mg (3/4/2022)     Cancer of left renal pelvis ([facility] [address] )   4/23/2020 Initial Diagnosis    Cancer of left renal pelvis ([facility] [address] )     10/9/2020 - 9/9/2021 Chemotherapy    pembrolizumab (KEYTRUDA) IVPB, 200 mg, Intravenous, Once, 16 of 20 cycles  Administration: 200 mg (10/9/2020), 200 mg (10/30/2020), 200 mg (11/20/2020), 200 mg (12/11/2020), 200 mg (12/31/2020), 200 mg (1/22/2021), 200 mg (2/12/2021), 200 mg (3/5/2021), 200 mg (3/26/2021), 200 mg (4/16/2021), 200 mg (5/7/2021), 200 mg (5/28/2021), 200 mg (6/18/2021), 200 mg (7/9/2021), 200 mg (7/30/2021), 200 mg (8/20/2021)     9/10/2021 -  Chemotherapy    enfortumab vedotin-ejfv (PADCEV) IVPB, 1 25 mg/kg = 114 mg, Intravenous, Once, 6 of 8 cycles  Dose modification: 1 mg/kg (original dose 1 25 mg/kg, Cycle 7, Reason: Other (Must fill in a comment), Comment: dose reduction due to side effects ), 1 25 mg/kg (original dose 1 25 mg/kg, Cycle 7, Reason: Other (Must fill in a comment), Comment: patient wants full dose ), 1 mg/kg (original dose 1 25 mg/kg, Cycle 7, Reason: Neuropathy)  Administration: 114 mg (9/10/2021), 114 mg (9/17/2021), 110 mg (10/8/2021), 110 mg (9/24/2021), 110 mg (10/15/2021), 110 mg (11/12/2021), 110 mg (10/22/2021), 110 mg (11/19/2021), 110 mg (12/10/2021), 110 mg (11/26/2021), 110 mg (12/17/2021), 110 mg (1/7/2022), 110 mg (12/23/2021), 110 mg (1/14/2022), 110 mg (1/21/2022), 110 mg (2/18/2022), 110 mg (2/25/2022), 90 mg (3/4/2022)      Surgery       Metastatic urothelial carcinoma ([facility] [address] )   8/9/2021 Initial Diagnosis    Metastatic urothelial carcinoma ([facility] [address] )     9/8/2021 - 9/21/2021 Radiation    Treatment:  Course: C2    Plan ID Energy Fractions Dose per Fraction (cGy) Dose Correction (cGy) Total Dose Delivered (cGy) Elapsed Days   R Axil_Shl # 6X 10 / 10 300 0 3,000 13      Treatment dates:  C2: 9/8/2021 - 9/21/2021     9/10/2021 -  Chemotherapy    enfortumab vedotin-ejfv (PADCEV) IVPB, 1 25 mg/kg = 114 mg, Intravenous, Once, 6 of 8 cycles  Dose modification: 1 mg/kg (original dose 1 25 mg/kg, Cycle 7, Reason: Other (Must fill in a comment), Comment: dose reduction due to side effects ), 1 25 mg/kg (original dose 1 25 mg/kg, Cycle 7, Reason: Other (Must fill in a comment), Comment: patient wants full dose ), 1 mg/kg (original dose 1 25 mg/kg, Cycle 7, Reason: Neuropathy)  Administration: 114 mg (9/10/2021), 114 mg (9/17/2021), 110 mg (10/8/2021), 110 mg (9/24/2021), 110 mg (10/15/2021), 110 mg (11/12/2021), 110 mg (10/22/2021), 110 mg (11/19/2021), 110 mg (12/10/2021), 110 mg (11/26/2021), 110 mg (12/17/2021), 110 mg (1/7/2022), 110 mg (12/23/2021), 110 mg (1/14/2022), 110 mg (1/21/2022), 110 mg (2/18/2022), 110 mg (2/25/2022), 90 mg (3/4/2022)         Interval history:    ROS: Review of Systems   Constitutional: Positive for fatigue  Negative for chills and fever  HENT: Positive for hearing loss  Negative for ear pain and sore throat  Eyes: Negative for pain and visual disturbance  Respiratory: Positive for shortness of breath  Negative for cough  Cardiovascular: Negative for chest pain and palpitations  Gastrointestinal: Negative for abdominal pain and vomiting  Genitourinary: Negative for dysuria and hematuria  Musculoskeletal: Negative for arthralgias and back pain  Skin: Negative for color change and rash  Neurological: Positive for numbness  Negative for seizures and syncope  Psychiatric/Behavioral: Positive for sleep disturbance  All other systems reviewed and are negative        Past Medical History:   Diagnosis Date    Arthritis     Bladder cancer     Cancer ([facility] [address] )     skin melanoma;basal cell    Chronic pain disorder     from arthritis    Colon polyp     Does use hearing aid     bilat    Dry eyes, bilateral     GERD (gastroesophageal reflux disease)     History of kidney stones 10/2019    History of partial knee replacement     bilat    History of vertigo     Hyperlipidemia     Hypertension     Infusion extravasation of chemotherapy vesicant 11/2021    Kidney lesion     Lumbar disc disorder     compression of vertebrae L4-5-6    Muscle weakness     left hip area    Renal mass     left    Right ankle injury 03/05/2020    missed step of ladder     Right ankle pain     Shortness of breath     with activity    Tinnitus     Urothelial cancer     left    Wears glasses        Past Surgical History:   Procedure Laterality Date    COLONOSCOPY      CYSTOSCOPY Left 4/1/2020    Procedure: CYSTOSCOPY; URETERAL CATHETER PLACEMENT;  Surgeon: Sanya Contreras MD;  Location[facility];  Service: Urology    CYSTOSCOPY  2020    CYSTOSCOPY  2021    FL CYSTOGRAM  2020    FL RETROGRADE PYELOGRAM  2020    HERNIA REPAIR      umbilical with mesh    INGUINAL HERNIA REPAIR Right     with mesh    IR PORT PLACEMENT  2020    JOINT REPLACEMENT Bilateral     partials knee    LUMBAR EPIDURAL INJECTION      TN CYSTOURETHROSCOPY,URETER CATHETER Bilateral 2020    Procedure: CYSTOSCOPY; RIGHT RETROGRADE PYELOGRAM WITH RIGHT URETERAL CYTOLOGY SAMPLING; LEFT URETEROSCOPY WITH RENAL PELVIS BIOPSY AND LEFT STENT PLACEMENT;  Surgeon: Jose F Hu MD;  [facility];  Service: Urology    TN NEPHRECTOMY, W/PART  URETECTOMY Left 2020    Procedure: ROBOTIC LAPAROSCOPIC NEPHRO-URETERECTOMY;  Surgeon: Jose F Hu MD;  Location[facility];  Service: Urology    SKIN CANCER EXCISION      surface melanoma    TONSILLECTOMY      WISDOM TOOTH EXTRACTION         Social History     Socioeconomic History    Marital status: /Civil Union     Spouse name: None    Number of children: None    Years of education: None    Highest education level: None   Occupational History    None   Tobacco Use    Smoking status: Former Smoker     Types: Cigarettes     Quit date:      Years since quittin 2    Smokeless tobacco: Never Used   Vaping Use    Vaping Use: Never used   Substance and Sexual Activity    Alcohol use:  Yes     Alcohol/week: 17 0 standard drinks     Types: 7 Glasses of wine, 10 Cans of beer per week     Comment: socially    Drug use: Never    Sexual activity: Not Currently   Other Topics Concern    None   Social History Narrative    Daily caffeine use - 2 cups coffee      Social Determinants of Health     Financial Resource Strain: Not on file   Food Insecurity: Not on file   Transportation Needs: Not on file   Physical Activity: Not on file   Stress: Not on file   Social Connections: Not on file   Intimate Partner Violence: Not on file   Housing Stability: Not on file       Family History   Problem Relation Age of Onset    Liver cancer Father        Allergies   Allergen Reactions    Poison Ivy Extract Rash         Current Outpatient Medications:     acetaminophen (TYLENOL) 500 mg tablet, Take 2 tablets (1,000 mg total) by mouth every 8 (eight) hours, Disp: , Rfl:     allopurinol (ZYLOPRIM) 300 mg tablet, take 1 tablet by mouth once daily, Disp: 30 tablet, Rfl: 3    ALPRAZolam (XANAX) 0 25 mg tablet, Take 1 tablet (0 25 mg total) by mouth daily at bedtime as needed for anxiety (restless legs), Disp: 30 tablet, Rfl: 1    ALPRAZolam (XANAX) 0 25 mg tablet, Take 1 tablet (0 25 mg total) by mouth daily at bedtime as needed for anxiety, Disp: 30 tablet, Rfl: 0    amLODIPine (NORVASC) 5 mg tablet, Take 5 mg by mouth daily  , Disp: , Rfl:     atorvastatin (LIPITOR) 80 mg tablet, Take 80 mg by mouth every other day evening, Disp: , Rfl:     docusate sodium (COLACE) 250 MG capsule, Take 1 capsule (250 mg total) by mouth daily, Disp: 60 capsule, Rfl: 6    DULoxetine (CYMBALTA) 30 mg delayed release capsule, Take 1 capsule (30 mg total) by mouth daily, Disp: 90 capsule, Rfl: 0    famotidine (PEPCID) 20 mg tablet, take 1-2 tablets by mouth every evening, Disp: , Rfl:     folic acid (FOLVITE) 1 mg tablet, Take 1 tablet (1 mg total) by mouth daily, Disp: 90 tablet, Rfl: 4    hydrOXYzine HCL (ATARAX) 25 mg tablet, Take 1 tablet (25 mg total) by mouth every 6 (six) hours as needed for itching or anxiety, Disp: 60 tablet, Rfl: 2    Magnesium 250 MG TABS, 1 tablet 2 (two) times a day Taking 500mg in the morning and 500mg at night, Disp: , Rfl:     metoprolol tartrate (LOPRESSOR) 100 mg tablet, Take 50 mg by mouth daily, Disp: , Rfl:     morphine (MSIR) 15 mg tablet, Take 1/2 tablet every 4 hours as needed for severe pain, Disp: 45 tablet, Rfl: 0    naloxone (NARCAN) 4 mg/0 1 mL nasal spray, Administer 1 spray into a nostril  If breathing does not return to normal or if breathing difficulty resumes after 2-3 minutes, give another dose in the other nostril using a new spray , Disp: 1 each, Rfl: 1    nystatin (MYCOSTATIN) cream, Apply topically 2 (two) times a day, Disp: 30 g, Rfl: 0    omeprazole (PriLOSEC) 20 mg delayed release capsule, Take 20 mg by mouth daily  , Disp: , Rfl:     predniSONE 10 mg tablet, Take 2 tablets (20 mg total) by mouth daily, Disp: 60 tablet, Rfl: 3    senna (SENOKOT) 8 6 mg, Take 1 tablet (8 6 mg total) by mouth daily at bedtime (Patient taking differently: Take 8 6 mg by mouth daily at bedtime As needed), Disp: 30 each, Rfl: 0    tadalafil (CIALIS) 20 MG tablet, Take 1 tablet by mouth one (1) hour prior to intercourse  Do not exceed more than two tablets per week , Disp: 12 tablet, Rfl: 3    triamcinolone (KENALOG) 0 1 % lotion, Apply topically 3 (three) times a day, Disp: 60 mL, Rfl: 5    VITAMIN D PO, Take 1,000 Units by mouth daily , Disp: , Rfl:     zolpidem (AMBIEN) 5 mg tablet, Take 1 tablet (5 mg total) by mouth daily at bedtime as needed for sleep, Disp: 30 tablet, Rfl: 1    terazosin (HYTRIN) 5 mg capsule, Take 2 capsules (10 mg total) by mouth daily at bedtime (Patient not taking: Reported on 3/9/2022 ), Disp: 30 capsule, Rfl: 6  No current facility-administered medications for this visit  Physical Exam:  /88 (BP Location: Left arm, Patient Position: Sitting, Cuff Size: Adult)   Pulse 55   Temp 97 8 °F (36 6 °C) (Tympanic)   Resp 16   Ht 5' 8" (1 727 m)   Wt 91 2 kg (201 lb)   SpO2 95%   BMI 30 56 kg/m²     Physical Exam  Constitutional:       Appearance: He is well-developed  HENT:      Head: Normocephalic and atraumatic  Eyes:      General: No scleral icterus  Right eye: No discharge  Left eye: No discharge  Conjunctiva/sclera: Conjunctivae normal       Pupils: Pupils are equal, round, and reactive to light     Neck:      Thyroid: No thyromegaly  Trachea: No tracheal deviation  Cardiovascular:      Rate and Rhythm: Normal rate and regular rhythm  Heart sounds: Normal heart sounds  No murmur heard  No friction rub  Pulmonary:      Effort: Pulmonary effort is normal  No respiratory distress  Breath sounds: Normal breath sounds  No wheezing or rales  Chest:      Chest wall: No tenderness  Abdominal:      General: There is no distension  Palpations: Abdomen is soft  There is no hepatomegaly or splenomegaly  Tenderness: There is no abdominal tenderness  There is no guarding or rebound  Musculoskeletal:         General: No tenderness or deformity  Normal range of motion  Cervical back: Normal range of motion and neck supple  Lymphadenopathy:      Cervical: No cervical adenopathy  Skin:     General: Skin is warm and dry  Coloration: Skin is not pale  Findings: No erythema or rash  Neurological:      Mental Status: He is alert and oriented to person, place, and time  Cranial Nerves: No cranial nerve deficit  Coordination: Coordination normal       Deep Tendon Reflexes: Reflexes are normal and symmetric  Psychiatric:         Behavior: Behavior normal          Thought Content: Thought content normal          Judgment: Judgment normal            Labs:  Lab Results   Component Value Date    WBC 6 14 03/11/2022    HGB 13 0 03/11/2022    HCT 39 9 03/11/2022    MCV 99 (H) 03/11/2022     03/11/2022     Lab Results   Component Value Date    K 3 7 03/11/2022     03/11/2022    CO2 28 03/11/2022    BUN 22 03/11/2022    CREATININE 1 39 (H) 03/11/2022    GLUF 111 (H) 03/01/2022    CALCIUM 9 4 03/11/2022    CORRECTEDCA 9 8 02/23/2022    AST 22 03/11/2022    ALT 39 03/11/2022    ALKPHOS 55 03/11/2022    EGFR 50 03/11/2022     No results found for: TSH    Patient voiced understanding and agreement in the above discussion  Aware to contact our office with questions/symptoms in the interim

## 2022-03-16 ENCOUNTER — TELEPHONE (OUTPATIENT)
Dept: PAIN MEDICINE | Facility: CLINIC | Age: 72
End: 2022-03-16

## 2022-03-16 ENCOUNTER — HOSPITAL ENCOUNTER (OUTPATIENT)
Dept: RADIOLOGY | Facility: CLINIC | Age: 72
Discharge: HOME | End: 2022-03-16
Attending: ANESTHESIOLOGY | Admitting: PSYCHIATRY & NEUROLOGY
Payer: MEDICARE

## 2022-03-16 VITALS
OXYGEN SATURATION: 93 % | TEMPERATURE: 97.5 F | HEART RATE: 57 BPM | SYSTOLIC BLOOD PRESSURE: 125 MMHG | DIASTOLIC BLOOD PRESSURE: 74 MMHG | RESPIRATION RATE: 20 BRPM

## 2022-03-16 DIAGNOSIS — M47.816 LUMBAR SPONDYLOSIS: ICD-10-CM

## 2022-03-16 PROCEDURE — 64635 DESTROY LUMB/SAC FACET JNT: CPT | Performed by: ANESTHESIOLOGY

## 2022-03-16 PROCEDURE — 64636 DESTROY L/S FACET JNT ADDL: CPT | Performed by: ANESTHESIOLOGY

## 2022-03-16 RX ORDER — BUPIVACAINE HYDROCHLORIDE 5 MG/ML
30 INJECTION, SOLUTION EPIDURAL; INTRACAUDAL ONCE
Status: COMPLETED | OUTPATIENT
Start: 2022-03-16 | End: 2022-03-16

## 2022-03-16 RX ORDER — LIDOCAINE HYDROCHLORIDE 10 MG/ML
10 INJECTION, SOLUTION EPIDURAL; INFILTRATION; INTRACAUDAL; PERINEURAL ONCE
Status: COMPLETED | OUTPATIENT
Start: 2022-03-16 | End: 2022-03-16

## 2022-03-16 RX ADMIN — LIDOCAINE HYDROCHLORIDE 8 ML: 10 INJECTION, SOLUTION EPIDURAL; INFILTRATION; INTRACAUDAL; PERINEURAL at 13:40

## 2022-03-16 RX ADMIN — BUPIVACAINE HYDROCHLORIDE 3 ML: 5 INJECTION, SOLUTION EPIDURAL; INTRACAUDAL at 13:41

## 2022-03-16 RX ADMIN — LIDOCAINE HYDROCHLORIDE 3 ML: 20 INJECTION, SOLUTION EPIDURAL; INFILTRATION; INTRACAUDAL; PERINEURAL at 13:41

## 2022-03-16 NOTE — DISCHARGE INSTRUCTIONS

## 2022-03-16 NOTE — H&P
History of Present Illness: The patient is a 66 y o  male who presents with complaints of low back pain      Patient Active Problem List   Diagnosis    Lumbar radiculopathy    Lumbar disc herniation    Lumbar spondylosis    Urothelial cancer ([address] )    Essential hypertension    Sinus bradycardia    Hyperlipidemia    Cancer of left renal pelvis (HCC)    Drug-induced neutropenia (HCC)    Chronic pain disorder    Spinal stenosis of lumbar region    Encounter for central line care    Hyperuricemia    Encounter for long-term opiate analgesic use    Uncomplicated opioid dependence ([address] )    Palliative care patient    Decreased appetite    Therapeutic opioid induced constipation    Medical marijuana use    Normocytic anemia    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)    Thrombophlebitis of superficial veins of right lower extremity    Renal dysfunction    Stage 3a chronic kidney disease (HCC)    Hypomagnesemia    Platelets decreased ([address] )    H/O left nephrectomy    Chronic kidney disease-mineral and bone disorder    Vitamin D deficiency    Dehydration    Chronic right shoulder pain    Primary osteoarthritis of right shoulder    Metastatic urothelial carcinoma (HCC)    Drug induced constipation    Secondary malignant neoplasm of bone (HCC)    Cancer associated pain    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)    Early satiety    Dysgeusia    Neuropathy    Chronic bilateral low back pain without sciatica    Drug-induced polyneuropathy (HCC)       Past Medical History:   Diagnosis Date    Arthritis     Bladder cancer     Cancer (HCC)     skin melanoma;basal cell    Chronic pain disorder     from arthritis    Colon polyp     Does use hearing aid     bilat    Dry eyes, bilateral     GERD (gastroesophageal reflux disease)     History of kidney stones 10/2019    History of partial knee replacement     bilat    History of vertigo     Hyperlipidemia     Hypertension     Infusion extravasation of chemotherapy vesicant 11/2021    Kidney lesion     Lumbar disc disorder     compression of vertebrae L4-5-6    Muscle weakness     left hip area    Renal mass     left    Right ankle injury 03/05/2020    missed step of ladder     Right ankle pain     Shortness of breath     with activity    Tinnitus     Urothelial cancer     left    Wears glasses        Past Surgical History:   Procedure Laterality Date    COLONOSCOPY      CYSTOSCOPY Left 4/1/2020    Procedure: CYSTOSCOPY; URETERAL CATHETER PLACEMENT;  Surgeon: Tavo Chaney MD;  Location[facility];  Service: Urology    CYSTOSCOPY  05/11/2020    CYSTOSCOPY  06/04/2021    FL CYSTOGRAM  4/13/2020    FL RETROGRADE PYELOGRAM  1/17/2020    HERNIA REPAIR      umbilical with mesh    INGUINAL HERNIA REPAIR Right     with mesh    IR PORT PLACEMENT  4/30/2020    JOINT REPLACEMENT Bilateral     partials knee    LUMBAR EPIDURAL INJECTION      DC CYSTOURETHROSCOPY,URETER CATHETER Bilateral 1/17/2020    Procedure: CYSTOSCOPY; RIGHT RETROGRADE PYELOGRAM WITH RIGHT URETERAL CYTOLOGY SAMPLING; LEFT URETEROSCOPY WITH RENAL PELVIS BIOPSY AND LEFT STENT PLACEMENT;  Surgeon: Tavo Chaney MD;  Location: [facility];  Service: Urology    DC NEPHRECTOMY, W/PART   URETECTOMY Left 4/1/2020    Procedure: ROBOTIC LAPAROSCOPIC NEPHRO-URETERECTOMY;  Surgeon: Tavo Chaney MD;  Location[facility];  Service: Urology    SKIN CANCER EXCISION      surface melanoma    TONSILLECTOMY      WISDOM TOOTH EXTRACTION           Current Outpatient Medications:     acetaminophen (TYLENOL) 500 mg tablet, Take 2 tablets (1,000 mg total) by mouth every 8 (eight) hours, Disp: , Rfl:     allopurinol (ZYLOPRIM) 300 mg tablet, take 1 tablet by mouth once daily, Disp: 30 tablet, Rfl: 3    ALPRAZolam (XANAX) 0 25 mg tablet, Take 1 tablet (0 25 mg total) by mouth daily at bedtime as needed for anxiety (restless legs), Disp: 30 tablet, Rfl: 1    ALPRAZolam (XANAX) 0 25 mg tablet, Take 1 tablet (0 25 mg total) by mouth daily at bedtime as needed for anxiety, Disp: 30 tablet, Rfl: 0    amLODIPine (NORVASC) 5 mg tablet, Take 5 mg by mouth daily  , Disp: , Rfl:     atorvastatin (LIPITOR) 80 mg tablet, Take 80 mg by mouth every other day evening, Disp: , Rfl:     docusate sodium (COLACE) 250 MG capsule, Take 1 capsule (250 mg total) by mouth daily, Disp: 60 capsule, Rfl: 6    DULoxetine (CYMBALTA) 30 mg delayed release capsule, Take 1 capsule (30 mg total) by mouth daily, Disp: 90 capsule, Rfl: 0    famotidine (PEPCID) 20 mg tablet, take 1-2 tablets by mouth every evening, Disp: , Rfl:     folic acid (FOLVITE) 1 mg tablet, Take 1 tablet (1 mg total) by mouth daily, Disp: 90 tablet, Rfl: 4    hydrOXYzine HCL (ATARAX) 25 mg tablet, Take 1 tablet (25 mg total) by mouth every 6 (six) hours as needed for itching or anxiety, Disp: 60 tablet, Rfl: 2    Magnesium 250 MG TABS, 1 tablet 2 (two) times a day Taking 500mg in the morning and 500mg at night, Disp: , Rfl:     metoprolol tartrate (LOPRESSOR) 100 mg tablet, Take 50 mg by mouth daily, Disp: , Rfl:     morphine (MSIR) 15 mg tablet, Take 1/2 tablet every 4 hours as needed for severe pain, Disp: 45 tablet, Rfl: 0    naloxone (NARCAN) 4 mg/0 1 mL nasal spray, Administer 1 spray into a nostril   If breathing does not return to normal or if breathing difficulty resumes after 2-3 minutes, give another dose in the other nostril using a new spray , Disp: 1 each, Rfl: 1    nystatin (MYCOSTATIN) cream, Apply topically 2 (two) times a day, Disp: 30 g, Rfl: 0    omeprazole (PriLOSEC) 20 mg delayed release capsule, Take 20 mg by mouth daily  , Disp: , Rfl:     predniSONE 10 mg tablet, Take 2 tablets (20 mg total) by mouth daily, Disp: 60 tablet, Rfl: 3    senna (SENOKOT) 8 6 mg, Take 1 tablet (8 6 mg total) by mouth daily at bedtime (Patient taking differently: Take 8 6 mg by mouth daily at bedtime As needed), Disp: 30 each, Rfl: 0    tadalafil (CIALIS) 20 MG tablet, Take 1 tablet by mouth one (1) hour prior to intercourse  Do not exceed more than two tablets per week , Disp: 12 tablet, Rfl: 3    terazosin (HYTRIN) 5 mg capsule, Take 2 capsules (10 mg total) by mouth daily at bedtime (Patient not taking: Reported on 3/9/2022 ), Disp: 30 capsule, Rfl: 6    triamcinolone (KENALOG) 0 1 % lotion, Apply topically 3 (three) times a day, Disp: 60 mL, Rfl: 5    VITAMIN D PO, Take 1,000 Units by mouth daily , Disp: , Rfl:     zolpidem (AMBIEN) 5 mg tablet, Take 1 tablet (5 mg total) by mouth daily at bedtime as needed for sleep, Disp: 30 tablet, Rfl: 1    Current Facility-Administered Medications:     bupivacaine (PF) (MARCAINE) 0 5 % injection 30 mL, 30 mL, Injection, Once, [place] Obrien, DO    lidocaine (PF) (XYLOCAINE-MPF) 1 % injection 10 mL, 10 mL, Other, Once, Milton Obrien, DO    lidocaine (PF) (XYLOCAINE-MPF) 2 % injection 4 mL, 4 mL, Other, Once, [place] Obrien, DO    Allergies   Allergen Reactions    Poison Ivy Extract Rash       Physical Exam:   Vitals:    03/16/22 1324   BP: 122/75   Pulse: 56   Resp: 20   Temp: 97 5 °F (36 4 °C)   SpO2: 98%     General: Awake, Alert, Oriented x 3, Mood and affect appropriate  Respiratory: Respirations even and unlabored  Cardiovascular: Peripheral pulses intact; no edema  Musculoskeletal Exam:  Right lumbar paraspinals tender to palpation  ASA Score: 3         Assessment:   1   Lumbar spondylosis        Plan: RIGHT L3-5 RFA

## 2022-03-17 NOTE — TELEPHONE ENCOUNTER
S/w pt  Pt denies any pain, fevers, sunburn like sensation or signs of infection  Pt aware it may take 2 weeks to notice a difference and 4-6 weeks for the full effect  Confirmed next procedure date

## 2022-03-18 ENCOUNTER — HOSPITAL ENCOUNTER (OUTPATIENT)
Dept: INFUSION CENTER | Facility: HOSPITAL | Age: 72
Discharge: HOME | End: 2022-03-18
Payer: MEDICARE

## 2022-03-18 VITALS
TEMPERATURE: 96.9 F | DIASTOLIC BLOOD PRESSURE: 82 MMHG | SYSTOLIC BLOOD PRESSURE: 146 MMHG | HEART RATE: 78 BPM | RESPIRATION RATE: 18 BRPM | OXYGEN SATURATION: 97 %

## 2022-03-18 DIAGNOSIS — E86.0 DEHYDRATION: Primary | ICD-10-CM

## 2022-03-18 PROCEDURE — 96360 HYDRATION IV INFUSION INIT: CPT

## 2022-03-18 RX ADMIN — SODIUM CHLORIDE 1000 ML: 0.9 INJECTION, SOLUTION INTRAVENOUS at 08:35

## 2022-03-18 NOTE — PROGRESS NOTES
Hydration given without incident  Upcoming appointments made  Patient offers no complaints on D/C  AVS given

## 2022-04-04 ENCOUNTER — HOSPITAL ENCOUNTER (OUTPATIENT)
Dept: INFUSION CENTER | Facility: HOSPITAL | Age: 72
Discharge: HOME | End: 2022-04-04
Payer: MEDICARE

## 2022-04-04 VITALS
HEART RATE: 69 BPM | OXYGEN SATURATION: 95 % | DIASTOLIC BLOOD PRESSURE: 83 MMHG | TEMPERATURE: 96.6 F | SYSTOLIC BLOOD PRESSURE: 128 MMHG | RESPIRATION RATE: 18 BRPM

## 2022-04-04 DIAGNOSIS — C79.10 METASTATIC UROTHELIAL CARCINOMA (HCC): Primary | ICD-10-CM

## 2022-04-04 DIAGNOSIS — E86.0 DEHYDRATION: ICD-10-CM

## 2022-04-04 DIAGNOSIS — E83.42 HYPOMAGNESEMIA: ICD-10-CM

## 2022-04-04 LAB
ALBUMIN SERPL BCP-MCNC: 3.7 G/DL (ref 3.5–5)
ALP SERPL-CCNC: 64 U/L (ref 34–104)
ALT SERPL W P-5'-P-CCNC: 28 U/L (ref 7–52)
ANION GAP SERPL CALCULATED.3IONS-SCNC: 7 MMOL/L (ref 4–13)
AST SERPL W P-5'-P-CCNC: 18 U/L (ref 13–39)
BASOPHILS # BLD AUTO: 0.03 THOUSANDS/ΜL (ref 0–0.1)
BASOPHILS NFR BLD AUTO: 1 % (ref 0–1)
BILIRUB SERPL-MCNC: 1.03 MG/DL (ref 0.2–1)
BUN SERPL-MCNC: 26 MG/DL (ref 5–25)
CALCIUM SERPL-MCNC: 9.3 MG/DL (ref 8.4–10.2)
CHLORIDE SERPL-SCNC: 102 MMOL/L (ref 96–108)
CO2 SERPL-SCNC: 29 MMOL/L (ref 21–32)
CREAT SERPL-MCNC: 1.38 MG/DL (ref 0.6–1.3)
EOSINOPHIL # BLD AUTO: 0.01 THOUSAND/ΜL (ref 0–0.61)
EOSINOPHIL NFR BLD AUTO: 0 % (ref 0–6)
ERYTHROCYTE [DISTWIDTH] IN BLOOD BY AUTOMATED COUNT: 16.4 % (ref 11.6–15.1)
GFR SERPL CREATININE-BSD FRML MDRD: 50 ML/MIN/1.73SQ M
GLUCOSE SERPL-MCNC: 91 MG/DL (ref 65–140)
HCT VFR BLD AUTO: 37.9 % (ref 36.5–49.3)
HGB BLD-MCNC: 12.2 G/DL (ref 12–17)
IMM GRANULOCYTES # BLD AUTO: 0.02 THOUSAND/UL (ref 0–0.2)
IMM GRANULOCYTES NFR BLD AUTO: 0 % (ref 0–2)
LYMPHOCYTES # BLD AUTO: 1.75 THOUSANDS/ΜL (ref 0.6–4.47)
LYMPHOCYTES NFR BLD AUTO: 30 % (ref 14–44)
MAGNESIUM SERPL-MCNC: 1.7 MG/DL (ref 1.9–2.7)
MCH RBC QN AUTO: 32.5 PG (ref 26.8–34.3)
MCHC RBC AUTO-ENTMCNC: 32.2 G/DL (ref 31.4–37.4)
MCV RBC AUTO: 101 FL (ref 82–98)
MONOCYTES # BLD AUTO: 0.7 THOUSAND/ΜL (ref 0.17–1.22)
MONOCYTES NFR BLD AUTO: 12 % (ref 4–12)
NEUTROPHILS # BLD AUTO: 3.35 THOUSANDS/ΜL (ref 1.85–7.62)
NEUTS SEG NFR BLD AUTO: 57 % (ref 43–75)
NRBC BLD AUTO-RTO: 0 /100 WBCS
PLATELET # BLD AUTO: 234 THOUSANDS/UL (ref 149–390)
PMV BLD AUTO: 9.8 FL (ref 8.9–12.7)
POTASSIUM SERPL-SCNC: 4 MMOL/L (ref 3.5–5.3)
PROT SERPL-MCNC: 6.1 G/DL (ref 6.4–8.4)
RBC # BLD AUTO: 3.75 MILLION/UL (ref 3.88–5.62)
SODIUM SERPL-SCNC: 138 MMOL/L (ref 135–147)
WBC # BLD AUTO: 5.86 THOUSAND/UL (ref 4.31–10.16)

## 2022-04-04 PROCEDURE — 83735 ASSAY OF MAGNESIUM: CPT

## 2022-04-04 PROCEDURE — 80053 COMPREHEN METABOLIC PANEL: CPT

## 2022-04-04 PROCEDURE — 96360 HYDRATION IV INFUSION INIT: CPT

## 2022-04-04 PROCEDURE — 85025 COMPLETE CBC W/AUTO DIFF WBC: CPT

## 2022-04-04 RX ADMIN — SODIUM CHLORIDE 1000 ML: 0.9 INJECTION, SOLUTION INTRAVENOUS at 10:12

## 2022-04-05 ENCOUNTER — OFFICE VISIT (OUTPATIENT)
Dept: HEMATOLOGY ONCOLOGY | Facility: CLINIC | Age: 72
End: 2022-04-05
Payer: MEDICARE

## 2022-04-05 VITALS
OXYGEN SATURATION: 96 % | BODY MASS INDEX: 30.07 KG/M2 | HEIGHT: 69 IN | DIASTOLIC BLOOD PRESSURE: 78 MMHG | RESPIRATION RATE: 18 BRPM | WEIGHT: 203 LBS | HEART RATE: 58 BPM | SYSTOLIC BLOOD PRESSURE: 122 MMHG | TEMPERATURE: 98.6 F

## 2022-04-05 DIAGNOSIS — E53.8 FOLIC ACID DEFICIENCY: ICD-10-CM

## 2022-04-05 DIAGNOSIS — C79.10 METASTATIC UROTHELIAL CARCINOMA (HCC): Primary | ICD-10-CM

## 2022-04-05 DIAGNOSIS — R06.09 OTHER FORM OF DYSPNEA: ICD-10-CM

## 2022-04-05 DIAGNOSIS — E83.42 HYPOMAGNESEMIA: ICD-10-CM

## 2022-04-05 DIAGNOSIS — G62.0 CHEMOTHERAPY-INDUCED NEUROPATHY (HCC): ICD-10-CM

## 2022-04-05 DIAGNOSIS — T45.1X5A CHEMOTHERAPY-INDUCED NEUROPATHY (HCC): ICD-10-CM

## 2022-04-05 PROCEDURE — 99214 OFFICE O/P EST MOD 30 MIN: CPT | Performed by: NURSE PRACTITIONER

## 2022-04-05 RX ORDER — FOLIC ACID 1 MG/1
TABLET ORAL
Qty: 90 TABLET | Refills: 4 | Status: SHIPPED | OUTPATIENT
Start: 2022-04-05

## 2022-04-05 RX ORDER — ALBUTEROL SULFATE 90 UG/1
2 AEROSOL, METERED RESPIRATORY (INHALATION) EVERY 6 HOURS PRN
Qty: 8.5 G | Refills: 3 | Status: SHIPPED | OUTPATIENT
Start: 2022-04-05 | End: 2022-06-02

## 2022-04-05 NOTE — PROGRESS NOTES
Hematology/Oncology Outpatient Follow-up  Kay Carranza 67 y o  male 1950 [id number]    Date:  4/5/2022      Assessment and Plan:  1  Metastatic urothelial carcinoma [facility])  Patient will resume his treatment with Enfortumab Vedotin this Friday 04/08/2022 after additional break for vacation to [facility] that he enjoyed  He will be due for cycle 7 this Friday  Dose was recently decreased to 1 milligram/kilogram due to worsening neuropathy  He will continue to get his labs before each treatment  We will see him again in 2 weeks before day 15  He will continue to follow-up with palliative Care on a regular basis  Patient and his wife mentions that he may need to defer cycle 8 by a week or so as they may need to travel to be with his daughter in [place] who unfortunately was recently found to have metastatic cancer of unknown primary     - CBC and differential; Standing  - Comprehensive metabolic panel; Standing  - Magnesium; Standing  - CBC and differential  - Comprehensive metabolic panel  - Magnesium    2  Hypomagnesemia  Patient will continue his magnesium supplements is taking 3 tablets per day  Is leery to increase any further as it caused diarrhea in the past   He also has p r n  order for IV magnesium as needed which he will receive on Friday  He will also continue to get his additional IV hydration as needed  - Magnesium; Standing  - Magnesium    3  Chemotherapy-induced neuropathy ([address] )  Patient will continue his Cymbalta 60 mg daily  Has not noticed significant change in his neuropathy thus far but does mention that he is not noticing temperature changes in his feet as he did prior  4  Other form of dyspnea  Patient denies dyspnea at rest   Noted while on vacation that he does get dyspneic with exertion which is concerning to him  Lung sound clear on physical exam today he is satting at 96% on room air    We did discuss trialing albuterol inhaler as needed for dyspnea see if this would improve his symptom  Will also check echocardiogram for completeness sake  He does mention that he gets occasional lower extremity edema which typically happens after his additional IV hydration     - Echo complete w/ contrast if indicated; Future  - albuterol (ProAir HFA) 90 mcg/act inhaler; Inhale 2 puffs every 6 (six) hours as needed for wheezing or shortness of breath  Dispense: 8 5 g; Refill: 3    HPI:  Patient presents today for a follow-up visit accompanied by his wife  They recently returned from their trip to a [place] which they enjoyed  He did have some issues with exertional dyspnea while on his trip due to the far walking; was eventually able to get a scooter which helped  Otherwise he has no new complaints  No change in his neuropathy of the fingers and feet however he did note that he is not having temperature changes of the feet anymore since starting the Cymbalta; Cymbalta was increased to 60 mg as advised by palliative care  He does report occasional swelling to his lower extremities which seems to be worse after the IV hydration  Mentions that his taste on back to normal after the extra break from treatment  He also noticed that he is no longer experiencing restless legs  His most recent laboratory studies from 04/04/2022 showed normal white cells and platelets, he is not anemic H&H 12 2/37 9, MCV again elevated 101  Creatinine 1 38, GFR 50, total bili 1 03, total protein 6 1 remaining metabolic panel is appropriate  His magnesium continues to be decreased 1 7  Reports that he is taking magnesium 3 pills per day  Oncology History Overview Note   Patient has a history of hypertension, hyperlipidemia, chronic lower back pain  He had an MRI of the lower spine for further evaluation of his chronic lower back pain    The MRI on 12/02/2019 revealed Multiple left renal masses to include a left lower pole cyst and a rounded structure in the left upper pole which may also represent cyst   Left upper pole renal masses approximately 3 cm in greatest linear dimension  A CT with renal protocol was done on 12/12/2019 which also showed the infiltrating lesion in the upper pole of the left kidney measuring about the 3 5 cm in greatest dimension without any hint of retroperitoneal lymphadenopathy  A CT scan of the abdomen pelvis on 01/14/2020 showed the same findings with the mild hydronephrosis on the left  The urine cytology on 01/03/2020 showed high-grade urothelial carcinoma  A cystoscopy was then done, a biopsy was taken from the left renal pelvic region which showed high-grade urothelial carcinoma without evidence of lamina propria invasion  The detrusor muscle/muscularis propria were not present for evaluation  The recommendation was to pursue left robotic assisted laparoscopic nephroureterectomy with bladder cuff excision which was done on 04/01/2020  The final pathology revealed;  - Invasive high grade urothelial carcinoma arising in renal pelvis  - Bladder cuff margin is negative for carcinoma and no evidence of high grade dysplasia  - Ureters with no significant pathologic abnormality  - Two benign simple cysts  - Adrenal gland is negative for malignancy  - One lymph node, negative for malignancy (0/1)  The tumor size was 4 5 cm with invasion beyond the muscularis into the periurethral fat or peripelvic fat or the renal parenchyma  The margins were uninvolved by carcinoma or carcinoma in situ  The final pathology was pT3 pN0  Patient barely tolerated 1 cycle of adjuvant chemotherapy with split dose cisplatin and gemcitabine with a lot of side effects  The treatment had to be discontinued due to significant worsening renal dysfunction 6/2020  Patient started to complain about significant abdominal/left inguinal pain around August/September 2020  Unfortunately repeat imaging revealed recurrent/metastatic disease    9/4/20 CT C/A/P- Status post left nephrectomy for resection of urothelial neoplasm  Lymphadenopathy in the left nephrectomy bed has increased since the prior examination, concerning for metastatic disease  Ill-defined hyperattenuating masslike focus in the left rectus muscle, not identified on the prior examination  This is suspicious for a metastatic lesion  A rectus hematoma is also considered  9/23/20 PET scan- 1  Multiple FDG avid lymph nodes in the retrocrural region, retroperitoneum and the left renal bed compatible with metastasis  These have progressed from recent CT  2   FDG avid mass involving the left rectus abdominal musculature compatible with metastasis which is larger from recent CT  3  Several small lymph nodes adjacent to the mid to distal esophagus with FDG uptake suspicious for metastasis  Small focus of FDG uptake also suggested in the right hilar region, metastasis is not excluded  This could be reassessed on follow-up  4   Subtle focus of FDG uptake at the T2 level on the left, nonspecific, could be related to early degenerative changes, developing metastasis is not entirely excluded  This could be reassessed on follow-up  5  Prostate is mildly prominent with heterogeneous FDG uptake  This could be inflammatory  Correlate for prostatitis  He completed palliative radiation to the left abdomen 12/3/20     His PET scan from 08/16/2021 showed progression of his disease with hypermetabolic soft tissue lesions at the level of the right shoulder and right axilla with interval progression of the retroperitoneal and mesenteric hypermetabolic metastasis  He did have the new lesion to his right upper back/shoulder which was causing significant localized pain  This excised by his surgeon 08/09/2021  Pathology confirmed metastatic high-grade carcinoma consistent with his no primary urothelial carcinoma  He received palliative radiation to his right shoulder/axilla region       Urothelial cancer ([facility] [address] ) 1/3/2020 Biopsy    Final Diagnosis    A  Urine, Clean Catch, Thin Prep:  High grade urothelial carcinoma (HGUC) - see comment  1/3/2020 Initial Diagnosis    Urothelial cancer ([facility] [address] )  T3 N0 (stage IIIA)     1/17/2020 Biopsy    Final Diagnosis    A  Ureter, Right, left renal pelvis biopsy  -Fragments of high grade urothelial carcinoma   -No evidence of lamina propria invasion   -Detrusor muscle/ muscularis propria is not present for evaluation  B  Urinary Bladder, bladder biopsy:  -Fragments of low grade papillary lesion  See note  -No evidence of invasion seen  -Unremarkable fragment of detrusor muscle seen  4/1/2020 Surgery    left robotic assisted laparoscopic nephroureterectomy with bladder cuff excision     Final Diagnosis    A  Left kidney, ureter, and bladder cuff, nephroureterectomy:  - Invasive high grade urothelial carcinoma arising in renal pelvis  - Bladder cuff margin is negative for carcinoma and no evidence of high grade dysplasia  - Ureters with no significant pathologic abnormality  - Two benign simple cysts  - Adrenal gland is negative for malignancy  - One lymph node, negative for malignancy (0/1)          5/14/2020 - 6/3/2020 Chemotherapy    CISplatin (PLATINOL) split dose 35 mg/m2 = 75 3 mg (50 % of original dose 70 mg/m2), Intravenous,   Administration: 75 3 mg (5/14/2020), 75 3 mg (5/21/2020)  gemcitabine (GEMZAR) 2,200 mg in sodium chloride 0 9 % 250 mL infusion, 2,150 2 mg (80 % of original dose 1,250 mg/m2), Intravenous,   Administration: 2,200 mg (5/14/2020), 2,200 mg (5/21/2020)    (only completed 1 cycle- d/c d/t adverse effects and worsening renal dysfunction)     10/9/2020 - 9/9/2021 Chemotherapy    pembrolizumab (KEYTRUDA) IVPB, 200 mg, Intravenous, Once, 16 of 20 cycles  Administration: 200 mg (10/9/2020), 200 mg (10/30/2020), 200 mg (11/20/2020), 200 mg (12/11/2020), 200 mg (12/31/2020), 200 mg (1/22/2021), 200 mg (2/12/2021), 200 mg (3/5/2021), 200 mg (3/26/2021), 200 mg (4/16/2021), 200 mg (5/7/2021), 200 mg (5/28/2021), 200 mg (6/18/2021), 200 mg (7/9/2021), 200 mg (7/30/2021), 200 mg (8/20/2021)     11/19/2020 - 12/3/2020 Radiation    Treatment:  Course: C1    Plan ID Energy Fractions Dose per Fraction (cGy) Dose Correction (cGy) Total Dose Delivered (cGy) Elapsed Days   L Abdomen 6X 10 / 10 300 0 3,000 14        9/10/2021 -  Chemotherapy    enfortumab vedotin-ejfv (PADCEV) IVPB, 1 25 mg/kg = 114 mg, Intravenous, Once, 6 of 8 cycles  Dose modification: 1 mg/kg (original dose 1 25 mg/kg, Cycle 7, Reason: Other (Must fill in a comment), Comment: dose reduction due to side effects ), 1 25 mg/kg (original dose 1 25 mg/kg, Cycle 7, Reason: Other (Must fill in a comment), Comment: patient wants full dose ), 1 mg/kg (original dose 1 25 mg/kg, Cycle 7, Reason: Neuropathy)  Administration: 114 mg (9/10/2021), 114 mg (9/17/2021), 110 mg (10/8/2021), 110 mg (9/24/2021), 110 mg (10/15/2021), 110 mg (11/12/2021), 110 mg (10/22/2021), 110 mg (11/19/2021), 110 mg (12/10/2021), 110 mg (11/26/2021), 110 mg (12/17/2021), 110 mg (1/7/2022), 110 mg (12/23/2021), 110 mg (1/14/2022), 110 mg (1/21/2022), 110 mg (2/18/2022), 110 mg (2/25/2022), 90 mg (3/4/2022)     Cancer of left renal pelvis ([address] )   4/23/2020 Initial Diagnosis    Cancer of left renal pelvis ([address] )     10/9/2020 - 9/9/2021 Chemotherapy    pembrolizumab (KEYTRUDA) IVPB, 200 mg, Intravenous, Once, 16 of 20 cycles  Administration: 200 mg (10/9/2020), 200 mg (10/30/2020), 200 mg (11/20/2020), 200 mg (12/11/2020), 200 mg (12/31/2020), 200 mg (1/22/2021), 200 mg (2/12/2021), 200 mg (3/5/2021), 200 mg (3/26/2021), 200 mg (4/16/2021), 200 mg (5/7/2021), 200 mg (5/28/2021), 200 mg (6/18/2021), 200 mg (7/9/2021), 200 mg (7/30/2021), 200 mg (8/20/2021)     9/10/2021 -  Chemotherapy    enfortumab vedotin-hamiltonfv (PADCEV) IVPB, 1 25 mg/kg = 114 mg, Intravenous, Once, 6 of 8 cycles  Dose modification: 1 mg/kg (original dose 1 25 mg/kg, Cycle 7, Reason: Other (Must fill in a comment), Comment: dose reduction due to side effects ), 1 25 mg/kg (original dose 1 25 mg/kg, Cycle 7, Reason: Other (Must fill in a comment), Comment: patient wants full dose ), 1 mg/kg (original dose 1 25 mg/kg, Cycle 7, Reason: Neuropathy)  Administration: 114 mg (9/10/2021), 114 mg (9/17/2021), 110 mg (10/8/2021), 110 mg (9/24/2021), 110 mg (10/15/2021), 110 mg (11/12/2021), 110 mg (10/22/2021), 110 mg (11/19/2021), 110 mg (12/10/2021), 110 mg (11/26/2021), 110 mg (12/17/2021), 110 mg (1/7/2022), 110 mg (12/23/2021), 110 mg (1/14/2022), 110 mg (1/21/2022), 110 mg (2/18/2022), 110 mg (2/25/2022), 90 mg (3/4/2022)      Surgery       Metastatic urothelial carcinoma ([facility] [address] )   8/9/2021 Initial Diagnosis    Metastatic urothelial carcinoma ([facility] [address] )     9/8/2021 - 9/21/2021 Radiation    Treatment:  Course: C2    Plan ID Energy Fractions Dose per Fraction (cGy) Dose Correction (cGy) Total Dose Delivered (cGy) Elapsed Days   R Axil_Shl # 6X 10 / 10 300 0 3,000 13      Treatment dates:  C2: 9/8/2021 - 9/21/2021     9/10/2021 -  Chemotherapy    enfortumab vedotin-ejfv (PADCEV) IVPB, 1 25 mg/kg = 114 mg, Intravenous, Once, 6 of 8 cycles  Dose modification: 1 mg/kg (original dose 1 25 mg/kg, Cycle 7, Reason: Other (Must fill in a comment), Comment: dose reduction due to side effects ), 1 25 mg/kg (original dose 1 25 mg/kg, Cycle 7, Reason: Other (Must fill in a comment), Comment: patient wants full dose ), 1 mg/kg (original dose 1 25 mg/kg, Cycle 7, Reason: Neuropathy)  Administration: 114 mg (9/10/2021), 114 mg (9/17/2021), 110 mg (10/8/2021), 110 mg (9/24/2021), 110 mg (10/15/2021), 110 mg (11/12/2021), 110 mg (10/22/2021), 110 mg (11/19/2021), 110 mg (12/10/2021), 110 mg (11/26/2021), 110 mg (12/17/2021), 110 mg (1/7/2022), 110 mg (12/23/2021), 110 mg (1/14/2022), 110 mg (1/21/2022), 110 mg (2/18/2022), 110 mg (2/25/2022), 90 mg (3/4/2022)         Interval history:    ROS: Review of Systems   Constitutional: Positive for activity change and fatigue  Negative for appetite change, chills, fever and unexpected weight change  HENT: Positive for hearing loss and mouth sores  Negative for congestion, nosebleeds, sore throat and trouble swallowing  Eyes: Negative  Respiratory: Positive for shortness of breath (exertional)  Negative for cough and chest tightness  Cardiovascular: Positive for leg swelling (at times)  Negative for chest pain and palpitations  Gastrointestinal: Positive for constipation  Negative for abdominal distention, abdominal pain, blood in stool, diarrhea, nausea and vomiting  Genitourinary: Positive for dysuria  Negative for difficulty urinating, frequency, hematuria and urgency  Musculoskeletal: Positive for arthralgias, back pain, gait problem and myalgias  Negative for joint swelling  Skin: Positive for color change  Negative for pallor and rash  Neurological: Positive for dizziness and numbness  Negative for light-headedness and headaches  Hematological: Negative for adenopathy  Does not bruise/bleed easily  Psychiatric/Behavioral: Positive for sleep disturbance  Negative for dysphoric mood  The patient is not nervous/anxious          Past Medical History:   Diagnosis Date    Arthritis     Bladder cancer     Cancer ([facility] [address] )     skin melanoma;basal cell    Chronic pain disorder     from arthritis    Colon polyp     Does use hearing aid     bilat    Dry eyes, bilateral     GERD (gastroesophageal reflux disease)     History of kidney stones 10/2019    History of partial knee replacement     bilat    History of vertigo     Hyperlipidemia     Hypertension     Infusion extravasation of chemotherapy vesicant 11/2021    Kidney lesion     Lumbar disc disorder     compression of vertebrae L4-5-6    Muscle weakness     left hip area    Renal mass     left    Right ankle injury 03/05/2020    missed step of ladder     Right ankle pain     Shortness of breath     with activity    Tinnitus     Urothelial cancer     left    Wears glasses        Past Surgical History:   Procedure Laterality Date    COLONOSCOPY      CYSTOSCOPY Left 2020    Procedure: CYSTOSCOPY; URETERAL CATHETER PLACEMENT;  Surgeon: Emilee Kincaid MD;  Location[facility];  Service: Urology    CYSTOSCOPY  2020    CYSTOSCOPY  2021    FL CYSTOGRAM  2020    FL RETROGRADE PYELOGRAM  2020    HERNIA REPAIR      umbilical with mesh    INGUINAL HERNIA REPAIR Right     with mesh    IR PORT PLACEMENT  2020    JOINT REPLACEMENT Bilateral     partials knee    LUMBAR EPIDURAL INJECTION      CO CYSTOURETHROSCOPY,URETER CATHETER Bilateral 2020    Procedure: CYSTOSCOPY; RIGHT RETROGRADE PYELOGRAM WITH RIGHT URETERAL CYTOLOGY SAMPLING; LEFT URETEROSCOPY WITH RENAL PELVIS BIOPSY AND LEFT STENT PLACEMENT;  Surgeon: Emilee Kincaid MD;  [facility];  Service: Urology    CO NEPHRECTOMY, W/PART  URETECTOMY Left 2020    Procedure: ROBOTIC LAPAROSCOPIC NEPHRO-URETERECTOMY;  Surgeon: Emilee Kincaid MD;  [facility];  Service: Urology    SKIN CANCER EXCISION      surface melanoma    TONSILLECTOMY      WISDOM TOOTH EXTRACTION         Social History     Socioeconomic History    Marital status: /Civil Union     Spouse name: None    Number of children: None    Years of education: None    Highest education level: None   Occupational History    None   Tobacco Use    Smoking status: Former Smoker     Types: Cigarettes     Quit date:      Years since quittin 2    Smokeless tobacco: Never Used   Vaping Use    Vaping Use: Never used   Substance and Sexual Activity    Alcohol use:  Yes     Alcohol/week: 17 0 standard drinks     Types: 7 Glasses of wine, 10 Cans of beer per week     Comment: socially    Drug use: Never    Sexual activity: Not Currently   Other Topics Concern    None   Social History Narrative    Daily caffeine use - 2 cups coffee      Social Determinants of Health     Financial Resource Strain: Not on file   Food Insecurity: Not on file   Transportation Needs: Not on file   Physical Activity: Not on file   Stress: Not on file   Social Connections: Not on file   Intimate Partner Violence: Not on file   Housing Stability: Not on file       Family History   Problem Relation Age of Onset    Liver cancer Father        Allergies   Allergen Reactions    Poison Ivy Extract Rash         Current Outpatient Medications:     acetaminophen (TYLENOL) 500 mg tablet, Take 2 tablets (1,000 mg total) by mouth every 8 (eight) hours, Disp: , Rfl:     allopurinol (ZYLOPRIM) 300 mg tablet, take 1 tablet by mouth once daily, Disp: 30 tablet, Rfl: 3    ALPRAZolam (XANAX) 0 25 mg tablet, Take 1 tablet (0 25 mg total) by mouth daily at bedtime as needed for anxiety (restless legs), Disp: 30 tablet, Rfl: 1    ALPRAZolam (XANAX) 0 25 mg tablet, Take 1 tablet (0 25 mg total) by mouth daily at bedtime as needed for anxiety, Disp: 30 tablet, Rfl: 0    amLODIPine (NORVASC) 5 mg tablet, Take 5 mg by mouth daily  , Disp: , Rfl:     atorvastatin (LIPITOR) 80 mg tablet, Take 80 mg by mouth every other day evening, Disp: , Rfl:     docusate sodium (COLACE) 250 MG capsule, Take 1 capsule (250 mg total) by mouth daily, Disp: 60 capsule, Rfl: 6    DULoxetine (CYMBALTA) 30 mg delayed release capsule, Take 1 capsule (30 mg total) by mouth daily, Disp: 90 capsule, Rfl: 0    famotidine (PEPCID) 20 mg tablet, take 1-2 tablets by mouth every evening, Disp: , Rfl:     folic acid (FOLVITE) 1 mg tablet, take 1 tablet by mouth once daily, Disp: 90 tablet, Rfl: 4    hydrOXYzine HCL (ATARAX) 25 mg tablet, Take 1 tablet (25 mg total) by mouth every 6 (six) hours as needed for itching or anxiety, Disp: 60 tablet, Rfl: 2    Magnesium 250 MG TABS, 1 tablet 2 (two) times a day Taking 500mg in the morning and 500mg at night, Disp: , Rfl:   metoprolol tartrate (LOPRESSOR) 100 mg tablet, Take 50 mg by mouth daily, Disp: , Rfl:     morphine (MSIR) 15 mg tablet, Take 1/2 tablet every 4 hours as needed for severe pain, Disp: 45 tablet, Rfl: 0    naloxone (NARCAN) 4 mg/0 1 mL nasal spray, Administer 1 spray into a nostril  If breathing does not return to normal or if breathing difficulty resumes after 2-3 minutes, give another dose in the other nostril using a new spray , Disp: 1 each, Rfl: 1    nystatin (MYCOSTATIN) cream, Apply topically 2 (two) times a day, Disp: 30 g, Rfl: 0    omeprazole (PriLOSEC) 20 mg delayed release capsule, Take 20 mg by mouth daily  , Disp: , Rfl:     predniSONE 10 mg tablet, Take 2 tablets (20 mg total) by mouth daily, Disp: 60 tablet, Rfl: 3    senna (SENOKOT) 8 6 mg, Take 1 tablet (8 6 mg total) by mouth daily at bedtime (Patient taking differently: Take 8 6 mg by mouth daily at bedtime As needed), Disp: 30 each, Rfl: 0    tadalafil (CIALIS) 20 MG tablet, Take 1 tablet by mouth one (1) hour prior to intercourse    Do not exceed more than two tablets per week , Disp: 12 tablet, Rfl: 3    triamcinolone (KENALOG) 0 1 % lotion, Apply topically 3 (three) times a day, Disp: 60 mL, Rfl: 5    VITAMIN D PO, Take 1,000 Units by mouth daily , Disp: , Rfl:     zolpidem (AMBIEN) 5 mg tablet, Take 1 tablet (5 mg total) by mouth daily at bedtime as needed for sleep, Disp: 30 tablet, Rfl: 1    albuterol (ProAir HFA) 90 mcg/act inhaler, Inhale 2 puffs every 6 (six) hours as needed for wheezing or shortness of breath, Disp: 8 5 g, Rfl: 3    terazosin (HYTRIN) 5 mg capsule, Take 2 capsules (10 mg total) by mouth daily at bedtime (Patient not taking: Reported on 3/9/2022 ), Disp: 30 capsule, Rfl: 6      Physical Exam:  /78 (BP Location: Right arm, Patient Position: Sitting, Cuff Size: Adult)   Pulse 58   Temp 98 6 °F (37 °C)   Resp 18   Ht 5' 8 75" (1 746 m)   Wt 92 1 kg (203 lb)   SpO2 96%   BMI 30 20 kg/m² Physical Exam  Vitals reviewed  Constitutional:       General: He is not in acute distress  Appearance: He is well-developed  He is not diaphoretic  HENT:      Head: Normocephalic and atraumatic  Eyes:      General: Lids are normal  No scleral icterus  Conjunctiva/sclera: Conjunctivae normal       Pupils: Pupils are equal, round, and reactive to light  Neck:      Thyroid: No thyromegaly  Cardiovascular:      Rate and Rhythm: Normal rate and regular rhythm  Heart sounds: Normal heart sounds  No murmur heard  Pulmonary:      Effort: Pulmonary effort is normal  No respiratory distress  Breath sounds: Normal breath sounds  Chest:   Breasts:      Right: No axillary adenopathy  Left: No axillary adenopathy  Abdominal:      General: There is no distension  Palpations: Abdomen is soft  There is no hepatomegaly or splenomegaly  Tenderness: There is no abdominal tenderness  Musculoskeletal:         General: Normal range of motion  Cervical back: Normal range of motion and neck supple  Right lower leg: Edema present  Left lower leg: Edema present  Lymphadenopathy:      Cervical: No cervical adenopathy  Upper Body:      Right upper body: No axillary adenopathy  Left upper body: No axillary adenopathy  Skin:     General: Skin is warm and dry  Findings: No erythema or rash  Neurological:      General: No focal deficit present  Mental Status: He is alert and oriented to person, place, and time  Psychiatric:         Mood and Affect: Mood normal  Affect is blunt  Behavior: Behavior normal  Behavior is cooperative  Thought Content:  Thought content normal          Judgment: Judgment normal            Labs:  Lab Results   Component Value Date    WBC 5 86 04/04/2022    HGB 12 2 04/04/2022    HCT 37 9 04/04/2022     (H) 04/04/2022     04/04/2022     Lab Results   Component Value Date    K 4 0 04/04/2022     04/04/2022    CO2 29 04/04/2022    BUN 26 (H) 04/04/2022    CREATININE 1 38 (H) 04/04/2022    GLUF 111 (H) 03/01/2022    CALCIUM 9 3 04/04/2022    CORRECTEDCA 9 8 02/23/2022    AST 18 04/04/2022    ALT 28 04/04/2022    ALKPHOS 64 04/04/2022    EGFR 50 04/04/2022       Patient voiced understanding and agreement in the above discussion  Aware to contact our office with questions/symptoms in the interim  This note has been generated by voice recognition software system  Therefore, there may be spelling, grammar, and or syntax errors  Please contact if questions arise

## 2022-04-05 NOTE — PATIENT INSTRUCTIONS
PRESCRIPTION REFILL REMINDER:  All medication refills should be requested prior to [facility] on Friday  Any refill requests after noon on Friday would be addressed the following Monday  Please protect yourself from COVID-19 and the delta and omicron variants! Even though we do not have good antiviral drugs for this infection, the following tools can help you stay healthy:    = Wash your hands! Soap and water, or hand  with at least 60% alcohol, are both effective at killing the virus  = Wear a mask! This will help protect others from any virus particles you might spread  Your mouth and nose BOTH need to be covered  = Keep the distance! Keep 6 feet of distance from other people, even if they seem healthy  Keeping distance protects you from the other person's virus spread     = Get a vaccine! Three vaccines are approved for use in the [place] for all adults  These vaccines do provide protection against the delta variant, and seem to reduce severity of omicron infection  + Pfizer is FDA approved for all persons age 11 and older   + Jonita Rebeca may be given to all person age 25 and older   + Shila Gayle may be given to all person age 25 and older  (Serious blood clot side effects have only been reported in reproductive-age women, and these are about 1-in-a-million  We do NOT recommend J&J for people who have had Gu[place]an[place] syndrome )  = You may get a shot from many other locations outside [facility]: [facility], [employer], [facility], [facility], and certain [facility] locations  + As of 11/29/21, the CDC recommends booster shots on ALL vaccines for ALL adults  https://[employer] com/      We are recommending that ALL our patients get a complete series of one of the three vaccines, and boost it ASAP  Call 4-097-UENRLIZ (Choose Option 7 for faster service!) to get scheduled for your shot  Informacion en espanol sobre vacunas, de nos companeros [facility] --  Kinza howard      Numbers of coronavirus cases (and COVID deaths) are worsening in many [place] States, among unvaccinated people, we think this is because vaccines are working, but only if you get one! As of 12/1/21, we do NOT advise travel OUTSIDE the [address], and we encourage you to be very careful when planning travel INSIDE the [address]  Check out [employer] for [place] data that are updated daily:    http://www [employer]/     Global Epidemics  Org, from [facility] (OUTPATIENT CAMPUS), will give you Ldykme-cu-Hcxvtg information on virus cases and vaccination rates:    Https://globalepidemics  org/    Frequently Asked Questions about COVID, answered by [facility]    [employer]

## 2022-04-06 ENCOUNTER — OFFICE VISIT (OUTPATIENT)
Dept: PALLIATIVE MEDICINE | Facility: CLINIC | Age: 72
End: 2022-04-06
Payer: MEDICARE

## 2022-04-06 ENCOUNTER — HOSPITAL ENCOUNTER (OUTPATIENT)
Dept: NON INVASIVE DIAGNOSTICS | Facility: HOSPITAL | Age: 72
Discharge: HOME | End: 2022-04-06
Payer: MEDICARE

## 2022-04-06 ENCOUNTER — SOCIAL WORK (OUTPATIENT)
Dept: PALLIATIVE MEDICINE | Facility: CLINIC | Age: 72
End: 2022-04-06
Payer: MEDICARE

## 2022-04-06 VITALS
HEART RATE: 85 BPM | BODY MASS INDEX: 30.62 KG/M2 | DIASTOLIC BLOOD PRESSURE: 64 MMHG | SYSTOLIC BLOOD PRESSURE: 120 MMHG | WEIGHT: 202 LBS | HEIGHT: 68 IN

## 2022-04-06 VITALS
SYSTOLIC BLOOD PRESSURE: 120 MMHG | OXYGEN SATURATION: 97 % | DIASTOLIC BLOOD PRESSURE: 64 MMHG | RESPIRATION RATE: 16 BRPM | TEMPERATURE: 97.1 F | BODY MASS INDEX: 30.17 KG/M2 | HEART RATE: 78 BPM | WEIGHT: 202.8 LBS

## 2022-04-06 DIAGNOSIS — R06.09 OTHER FORM OF DYSPNEA: ICD-10-CM

## 2022-04-06 DIAGNOSIS — G89.3 CANCER ASSOCIATED PAIN: ICD-10-CM

## 2022-04-06 DIAGNOSIS — G89.29 CHRONIC BILATERAL LOW BACK PAIN WITHOUT SCIATICA: ICD-10-CM

## 2022-04-06 DIAGNOSIS — R63.0 DECREASED APPETITE: ICD-10-CM

## 2022-04-06 DIAGNOSIS — C77.2 METASTASIS TO RETROPERITONEAL LYMPH NODE (HCC): ICD-10-CM

## 2022-04-06 DIAGNOSIS — Z71.89 COUNSELING AND COORDINATION OF CARE: Primary | ICD-10-CM

## 2022-04-06 DIAGNOSIS — M54.50 CHRONIC BILATERAL LOW BACK PAIN WITHOUT SCIATICA: ICD-10-CM

## 2022-04-06 DIAGNOSIS — C79.51 SECONDARY MALIGNANT NEOPLASM OF BONE (HCC): ICD-10-CM

## 2022-04-06 DIAGNOSIS — C79.10 METASTATIC UROTHELIAL CARCINOMA (HCC): ICD-10-CM

## 2022-04-06 DIAGNOSIS — G62.0 DRUG-INDUCED POLYNEUROPATHY (HCC): Primary | ICD-10-CM

## 2022-04-06 PROBLEM — T45.1X5A CHEMOTHERAPY-INDUCED NEUROPATHY (HCC): Status: ACTIVE | Noted: 2022-03-09

## 2022-04-06 LAB
AORTIC ROOT: 3.6 CM
APICAL FOUR CHAMBER EJECTION FRACTION: 71 %
ASCENDING AORTA: 3.3 CM (ref 2.09–3.13)
E WAVE DECELERATION TIME: 218 MS
FRACTIONAL SHORTENING: 44 % (ref 28–44)
INTERVENTRICULAR SEPTUM IN DIASTOLE (PARASTERNAL SHORT AXIS VIEW): 1.2 CM
INTERVENTRICULAR SEPTUM: 1.2 CM (ref 0.55–1.02)
LAAS-AP2: 18.9 CM2
LAAS-AP4: 12.1 CM2
LEFT ATRIUM SIZE: 4 CM
LEFT INTERNAL DIMENSION IN SYSTOLE: 2.7 CM (ref 3.32–5.03)
LEFT VENTRICULAR INTERNAL DIMENSION IN DIASTOLE: 4.8 CM (ref 5.49–8.18)
LEFT VENTRICULAR POSTERIOR WALL IN END DIASTOLE: 1.2 CM (ref 0.53–1.01)
LEFT VENTRICULAR STROKE VOLUME: 82 ML
LVSV (TEICH): 82 ML
MV E'TISSUE VEL-SEP: 7 CM/S
MV PEAK A VEL: 0.83 M/S
MV PEAK E VEL: 59 CM/S
MV STENOSIS PRESSURE HALF TIME: 63 MS
MV VALVE AREA P 1/2 METHOD: 3.49 CM2
RIGHT ATRIUM AREA SYSTOLE A4C: 12.6 CM2
RIGHT VENTRICLE ID DIMENSION: 3.1 CM
SL CV LEFT ATRIUM LENGTH A2C: 4.8 CM
SL CV LV EF: 75
SL CV PED ECHO LEFT VENTRICLE DIASTOLIC VOLUME (MOD BIPLANE) 2D: 109 ML
SL CV PED ECHO LEFT VENTRICLE SYSTOLIC VOLUME (MOD BIPLANE) 2D: 28 ML
TR MAX PG: 5 MMHG
TR PEAK VELOCITY: 1.2 M/S
TRICUSPID VALVE PEAK REGURGITATION VELOCITY: 1.17 M/S
Z-SCORE OF ASCENDING AORTA: 2.65
Z-SCORE OF INTERVENTRICULAR SEPTUM IN END DIASTOLE: 3.43
Z-SCORE OF LEFT VENTRICULAR DIMENSION IN END DIASTOLE: -3.33
Z-SCORE OF LEFT VENTRICULAR DIMENSION IN END SYSTOLE: -3.28
Z-SCORE OF LEFT VENTRICULAR POSTERIOR WALL IN END DIASTOLE: 3.54

## 2022-04-06 PROCEDURE — 93306 TTE W/DOPPLER COMPLETE: CPT | Performed by: INTERNAL MEDICINE

## 2022-04-06 PROCEDURE — 99214 OFFICE O/P EST MOD 30 MIN: CPT | Performed by: INTERNAL MEDICINE

## 2022-04-06 PROCEDURE — NC001 PR NO CHARGE

## 2022-04-06 PROCEDURE — 93306 TTE W/DOPPLER COMPLETE: CPT

## 2022-04-06 RX ORDER — DULOXETIN HYDROCHLORIDE 60 MG/1
60 CAPSULE, DELAYED RELEASE ORAL DAILY
Qty: 30 CAPSULE | Refills: 1 | Status: SHIPPED | OUTPATIENT
Start: 2022-04-06 | End: 2022-06-01

## 2022-04-06 RX ORDER — PREDNISONE 1 MG/1
5 TABLET ORAL DAILY
Qty: 14 TABLET | Refills: 0 | Status: SHIPPED | OUTPATIENT
Start: 2022-04-06 | End: 2022-06-02

## 2022-04-06 RX ORDER — PREDNISONE 2.5 MG
2.5 TABLET ORAL DAILY
Qty: 14 TABLET | Refills: 0 | Status: SHIPPED | OUTPATIENT
Start: 2022-04-06 | End: 2022-06-02

## 2022-04-06 NOTE — PROGRESS NOTES
Palliative Supportive Care  met with patient and his wife to continue to provide emotional support and guidance  Updated biopsychosocial information relevant to support: Pt presents for his follow up visit  He is in better spirits today  He reports that he did get an injection in his side and will be getting one on the other side on the 16th  He states that the Cymbalta has helped with neuropathy  They went to [facility] and had a great time! Unfortunately they were informed that their daughter was diagnosed with a Stage IV cancer, and she is unsure where the primary cancer is  Emotional support was provided and we encouraged them to call if they feel they need extra support  Pt has been off of treatment for 3-4 weeks and is scheduled for treatment on Friday  Dr Dorteha Naylor continued his titration of his steroids     Identified areas of need include: Emotional Support  Resources provided: None  Areas that need future follow-up include: Continue to provide ongoing support

## 2022-04-06 NOTE — PROGRESS NOTES
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 ([facility] [address] )    5  Secondary malignant neoplasm of bone ([facility] [address] )    6  Cancer associated pain    7  Chronic bilateral low back pain without sciatica       · No longer using opioids  MS IR 15mg made him very sleepy  No longer in pain (was previously having back pain)  · Duloxetine 60mg ODHS had been significantly helpful in his neuropathy  But wife also pointed out he hasn't been back to treatments for 3-4 weeks now, which can also explain improvement  · May consider adding gabapentin or lyrica or amitriptyline in the future  Can also try acupuncture for neuropathy/pain  · May have to re-trial MMJ as well in the future   · Wants to wean off of prednisone  Currently taking 10mg OD  · Start 5m gOD x 2 weeks, then 2 5mg OD x 2 weeks then stop   · He will resume chemo 2 days from now  · Will see pain managment next week for possible nerve block to help with back pain  · RTO in 2 month, call sooner if needed    Controlled Substance Review    [place]MP or [place]  reviewed: No red flags were identified; safe to proceed with prescription       1  [id number] 03/02/2022 02/01/2022 Zolpidem Tartrate 30 0 30 5 MG NA [facility] / 06 [place]    1  [id number] 02/24/2022 02/24/2022 ALPRAZolam 30 0 30 0 25 MG NA [facility] 00 / 00 [place]    1  [id number] 02/01/2022 02/01/2022 Zolpidem Tartrate 30 0 30 5 MG NA [facility] / 01 [place]    1  [id number] 12/23/2021 12/23/2021 Zolpidem Tartrate 15 0 15 5 MG NA [facility] / 30 [place]    1  [id number] 12/08/2021 12/08/2021 Morphine Sulfate 15 0 15 15 MG 15 0 [facility]ONTY[facility] MONTERROSO  [facility] 00 / 56 [place]    1  [id number] 11/16/2021  11/10/2021 Morphine Sulfate 60 0 30 30 MG 60 0 [facility] 00 / 00 [place]    1  [id number] 11/10/2021  11/10/2021 HYDROmorphone HCL 90 0 15 2 MG 48 0 [employer]  C          Requested Prescriptions     Signed Prescriptions Disp Refills    predniSONE 5 mg tablet 14 tablet 0     Sig: Take 1 tablet (5 mg total) by mouth daily Take 5mg daily in the am for 14 days then 2 5mg daily in the am thereafter    predniSONE 2 5 mg tablet 14 tablet 0     Sig: Take 1 tablet (2 5 mg total) by mouth daily Take 5mg daily in the am for 14 days then 2 5mg daily in the am thereafter    DULoxetine (CYMBALTA) 60 mg delayed release capsule 30 capsule 1     Sig: Take 1 capsule (60 mg total) by mouth daily     Medications Discontinued During This Encounter   Medication Reason    DULoxetine (CYMBALTA) 30 mg delayed release capsule        Representatives have queried the patient's controlled substance dispensing history in the Prescription Drug Monitoring Program in compliance with regulations before I have prescribed any controlled substances  The prescription history is consistent with prescribed therapy and our practice policies  30 minutes were spent face to face with his spouse with greater than 50% of the time spent in counseling or coordination of care including discussions of etiology of diagnosis, pathogenesis of diagnosis, diagnostic results, impression, and recommendations, risks and benefits of treatment, instructions for disease self management, treatment instructions, follow up requirements, risk factors and risk reduction of disease, patient and family counseling/involvement in care and compliance with treatment regimen   All of the patient's questions were answered during this discussion  No follow-ups on file      Subjective:   Chief Complaint  Follow up visit for:  symptom management, pain, neoplasm related, assessment of goals of care, disease process education and discussion of prognosis  Depression  Pertinent negatives include no abdominal pain, chest pain, fatigue, nausea or vomiting  Bere Nugent is a 67 y o  male with metastatic urothelial cancer who recently showed disease progression on second line [place]  He was initially diagnosed in 1/2020 and has since underwent left robotic assisted laparoscopic nephroureterectomy with bladder cuff excision and chemotherapy  He tried cisplatin but did not tolerate well and so was switched to [facility] which he had been on since 10/2020  He also underwent RT to the L abdomen form 11-12/2020  He then started to develop new pain in his R shoulder  He went for excision of a soft tissue mass in the R shoulder/axilla which revealed metastatic high-grade carcinoma consistent with his known primary urothelial carcinoma in 8/9/2021  His PET-CT on 8/16/2021 showed disease progression with hypermetabolic soft tissue lesions at the level of the right shoulder and right axilla with interval progression of the retroperitoneal and mesenteric hypermetabolic metastasis  He used to see palliative care for symptoms but he said his pain got better/gone since Keytruda  Since around 8-9/2021, his shoulder pain escalated to the point where tramadol is no longer helpful  He was then switched to dilaudid prn  A few days later, morphine ER was added  He was started on RT to the R shoulder on 9/8  He also started on palliative enfortumab vedotin starting 9/10  The pain stabilized around 10/2021 with no further adjustments in medications  On his 11/10/2021 visit, he reported ongoing pain in his R shoulder as well as a new pain in his stomach  Overall, despite the continued pain, his pain was getting better  He voiced his wish to wean off of opioids and we plan on doing so after his next PET-CT   He had a PET-CT on 12/3 that showed significant decrease in activity in the R shoulder and R axillary lesions as well as resolution of upper abdominal hypermetabolic adenopathy  He virtually had no pain since then, until March 2022  He was last seen on 3/9/2022 where he reported increasing pain in his lower back as well as peripheral neuropathy in his hands, legs and fatigue  He talked to oncology about this who ordered a PET CT on 3/8 that showed further response to therapy  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  We increased his duloxetine to 60mg ODHS  Since his last visit, he was able to go to [facility] for 2 weeks  He said his back pain is better  He did get injections from interventional pain  His neuropathy has improved significantly with increase in duloxetine that he appears to be tolerating well  His wife pointed out that he has not been back to chemo again so unsure if his symptoms will remain well-controlled  He will be back on treatments again 2 days from now  He hasn't been back to using morphine, it really made him drowsy  Apart from above, he is doing relatively well  They are unfortunately dealing with their daughter's recent diagnosis of Stage 4 metastatic cancer, probably breast primary  She lives in [place] and is currently undergoing more work ups  They struggle with providing support to her being far away from her  The following portions of the medical history were reviewed: past medical history, problem list, medication list, and social history      Current Outpatient Medications:     acetaminophen (TYLENOL) 500 mg tablet, Take 2 tablets (1,000 mg total) by mouth every 8 (eight) hours, Disp: , Rfl:     albuterol (ProAir HFA) 90 mcg/act inhaler, Inhale 2 puffs every 6 (six) hours as needed for wheezing or shortness of breath, Disp: 8 5 g, Rfl: 3    allopurinol (ZYLOPRIM) 300 mg tablet, take 1 tablet by mouth once daily, Disp: 30 tablet, Rfl: 3    ALPRAZolam (XANAX) 0 25 mg tablet, Take 1 tablet (0 25 mg total) by mouth daily at bedtime as needed for anxiety (restless legs), Disp: 30 tablet, Rfl: 1    ALPRAZolam (XANAX) 0 25 mg tablet, Take 1 tablet (0 25 mg total) by mouth daily at bedtime as needed for anxiety, Disp: 30 tablet, Rfl: 0    amLODIPine (NORVASC) 5 mg tablet, Take 5 mg by mouth daily  , Disp: , Rfl:     atorvastatin (LIPITOR) 80 mg tablet, Take 80 mg by mouth every other day evening, Disp: , Rfl:     docusate sodium (COLACE) 250 MG capsule, Take 1 capsule (250 mg total) by mouth daily, Disp: 60 capsule, Rfl: 6    famotidine (PEPCID) 20 mg tablet, take 1-2 tablets by mouth every evening, Disp: , Rfl:     folic acid (FOLVITE) 1 mg tablet, take 1 tablet by mouth once daily, Disp: 90 tablet, Rfl: 4    hydrOXYzine HCL (ATARAX) 25 mg tablet, Take 1 tablet (25 mg total) by mouth every 6 (six) hours as needed for itching or anxiety, Disp: 60 tablet, Rfl: 2    Magnesium 250 MG TABS, 1 tablet 2 (two) times a day Taking 500mg in the morning and 500mg at night, Disp: , Rfl:     metoprolol tartrate (LOPRESSOR) 100 mg tablet, Take 50 mg by mouth daily, Disp: , Rfl:     nystatin (MYCOSTATIN) cream, Apply topically 2 (two) times a day, Disp: 30 g, Rfl: 0    omeprazole (PriLOSEC) 20 mg delayed release capsule, Take 20 mg by mouth daily  , Disp: , Rfl:     predniSONE 10 mg tablet, Take 2 tablets (20 mg total) by mouth daily, Disp: 60 tablet, Rfl: 3    terazosin (HYTRIN) 5 mg capsule, Take 2 capsules (10 mg total) by mouth daily at bedtime, Disp: 30 capsule, Rfl: 6    triamcinolone (KENALOG) 0 1 % lotion, Apply topically 3 (three) times a day, Disp: 60 mL, Rfl: 5    VITAMIN D PO, Take 1,000 Units by mouth daily , Disp: , Rfl:     zolpidem (AMBIEN) 5 mg tablet, Take 1 tablet (5 mg total) by mouth daily at bedtime as needed for sleep, Disp: 30 tablet, Rfl: 1    DULoxetine (CYMBALTA) 60 mg delayed release capsule, Take 1 capsule (60 mg total) by mouth daily, Disp: 30 capsule, Rfl: 1    morphine (MSIR) 15 mg tablet, Take 1/2 tablet every 4 hours as needed for severe pain (Patient not taking: Reported on 4/6/2022 ), Disp: 45 tablet, Rfl: 0    naloxone (NARCAN) 4 mg/0 1 mL nasal spray, Administer 1 spray into a nostril  If breathing does not return to normal or if breathing difficulty resumes after 2-3 minutes, give another dose in the other nostril using a new spray  (Patient not taking: Reported on 4/6/2022 ), Disp: 1 each, Rfl: 1    predniSONE 2 5 mg tablet, Take 1 tablet (2 5 mg total) by mouth daily Take 5mg daily in the am for 14 days then 2 5mg daily in the am thereafter, Disp: 14 tablet, Rfl: 0    predniSONE 5 mg tablet, Take 1 tablet (5 mg total) by mouth daily Take 5mg daily in the am for 14 days then 2 5mg daily in the am thereafter, Disp: 14 tablet, Rfl: 0    senna (SENOKOT) 8 6 mg, Take 1 tablet (8 6 mg total) by mouth daily at bedtime (Patient not taking: Reported on 4/6/2022 ), Disp: 30 each, Rfl: 0    tadalafil (CIALIS) 20 MG tablet, Take 1 tablet by mouth one (1) hour prior to intercourse  Do not exceed more than two tablets per week , Disp: 12 tablet, Rfl: 3     Review of Systems   Constitutional: Negative for activity change, appetite change and fatigue  HENT: Negative for trouble swallowing  Respiratory: Negative for shortness of breath  Cardiovascular: Negative for chest pain  Gastrointestinal: Negative for abdominal pain, constipation, diarrhea, nausea and vomiting  Musculoskeletal: Positive for back pain  Neurological:        Numbness/tinging both legs, significantly better   Psychiatric/Behavioral: Positive for depression  Negative for sleep disturbance  The patient is not nervous/anxious          All other systems negative    Objective:  Vital Signs  /64 (BP Location: Right arm, Patient Position: Sitting, Cuff Size: Standard)   Pulse 78   Temp (!) 97 1 °F (36 2 °C) (Temporal)   Resp 16   Wt 92 kg (202 lb 12 8 oz)   SpO2 97%   BMI 30 17 kg/m²    Physical Exam    Constitutional: Appears well-developed and well-nourished  Does  appear healthier than on previous visit, not toxic looking  Pleasant, jovial  In no acute physical or emotional distress  Head: Normocephalic and atraumatic  Eyes: EOM are normal  No ocular discharge  No scleral icterus  Neck: No visible adenopathy or masses  Respiratory: Effort normal  No stridor  No respiratory distress  Gastrointestinal: No abdominal distension  Musculoskeletal: No edema  Neurological: Alert, oriented and appropriately conversant  Hard of hearing  Skin: No diaphoresis, no rashes seen on exposed areas of skin  Psychiatric: Displays a normal mood and affect   Behavior, judgement and thought content appear normal      Dulce Maria Bentley MD  Palliative Medicine & Supportive Care  Internal Medicine  Available via [address] [place] [address] Text  Office: [phone]  Fax: [phone]

## 2022-04-07 ENCOUNTER — TELEPHONE (OUTPATIENT)
Dept: HEMATOLOGY ONCOLOGY | Facility: CLINIC | Age: 72
End: 2022-04-07

## 2022-04-07 DIAGNOSIS — R09.89 HYPERDYNAMIC CIRCULATION: ICD-10-CM

## 2022-04-07 DIAGNOSIS — R06.00 DOE (DYSPNEA ON EXERTION): Primary | ICD-10-CM

## 2022-04-07 NOTE — TELEPHONE ENCOUNTER
Spoke to patient's wife  We reviewed the echocardiogram results which showed LVEF 75% with hyperdynamic systolic function mild concentric hypertrophy  Unclear if this finding is contributory to his dyspnea with exertion which has been more bothersome to him recently  He has also been having edema to his lower extremities at times  Recommended referral to cardiology for further evaluation which they agreed to

## 2022-04-08 ENCOUNTER — HOSPITAL ENCOUNTER (OUTPATIENT)
Dept: INFUSION CENTER | Facility: HOSPITAL | Age: 72
Discharge: HOME | End: 2022-04-08
Attending: INTERNAL MEDICINE
Payer: MEDICARE

## 2022-04-08 VITALS
TEMPERATURE: 96.7 F | DIASTOLIC BLOOD PRESSURE: 90 MMHG | WEIGHT: 203.71 LBS | BODY MASS INDEX: 30.87 KG/M2 | HEIGHT: 68 IN | SYSTOLIC BLOOD PRESSURE: 142 MMHG | OXYGEN SATURATION: 98 % | HEART RATE: 73 BPM | RESPIRATION RATE: 18 BRPM

## 2022-04-08 DIAGNOSIS — E86.0 DEHYDRATION: ICD-10-CM

## 2022-04-08 DIAGNOSIS — C79.10 METASTATIC UROTHELIAL CARCINOMA (HCC): ICD-10-CM

## 2022-04-08 DIAGNOSIS — E83.42 HYPOMAGNESEMIA: ICD-10-CM

## 2022-04-08 DIAGNOSIS — C65.2 CANCER OF LEFT RENAL PELVIS (HCC): ICD-10-CM

## 2022-04-08 DIAGNOSIS — C68.9 UROTHELIAL CANCER (HCC): Primary | ICD-10-CM

## 2022-04-08 DIAGNOSIS — G89.3 CANCER ASSOCIATED PAIN: ICD-10-CM

## 2022-04-08 PROCEDURE — 96367 TX/PROPH/DG ADDL SEQ IV INF: CPT

## 2022-04-08 PROCEDURE — 96413 CHEMO IV INFUSION 1 HR: CPT

## 2022-04-08 RX ORDER — ACETAMINOPHEN 325 MG/1
650 TABLET ORAL ONCE
Status: DISCONTINUED | OUTPATIENT
Start: 2022-04-08 | End: 2022-04-12 | Stop reason: HOSPADM

## 2022-04-08 RX ORDER — SODIUM CHLORIDE 9 MG/ML
20 INJECTION, SOLUTION INTRAVENOUS ONCE
Status: COMPLETED | OUTPATIENT
Start: 2022-04-08 | End: 2022-04-08

## 2022-04-08 RX ADMIN — DIPHENHYDRAMINE HYDROCHLORIDE 25 MG: 50 INJECTION INTRAMUSCULAR; INTRAVENOUS at 10:18

## 2022-04-08 RX ADMIN — MAGNESIUM SULFATE HEPTAHYDRATE: 500 INJECTION, SOLUTION INTRAMUSCULAR; INTRAVENOUS at 12:10

## 2022-04-08 RX ADMIN — DEXAMETHASONE SODIUM PHOSPHATE: 10 INJECTION, SOLUTION INTRAMUSCULAR; INTRAVENOUS at 10:41

## 2022-04-08 RX ADMIN — ENFORTUMAB VEDOTIN 90 MG: 30 INJECTION, POWDER, LYOPHILIZED, FOR SOLUTION INTRAVENOUS at 11:23

## 2022-04-08 RX ADMIN — SODIUM CHLORIDE 20 ML/HR: 0.9 INJECTION, SOLUTION INTRAVENOUS at 10:15

## 2022-04-12 ENCOUNTER — HOSPITAL ENCOUNTER (OUTPATIENT)
Dept: RADIOLOGY | Facility: CLINIC | Age: 72
Discharge: HOME | End: 2022-04-12
Attending: ANESTHESIOLOGY | Admitting: ANESTHESIOLOGY
Payer: MEDICARE

## 2022-04-12 ENCOUNTER — HOSPITAL ENCOUNTER (OUTPATIENT)
Dept: INFUSION CENTER | Facility: HOSPITAL | Age: 72
Discharge: HOME | End: 2022-04-12
Attending: INTERNAL MEDICINE
Payer: MEDICARE

## 2022-04-12 ENCOUNTER — TELEPHONE (OUTPATIENT)
Dept: PAIN MEDICINE | Facility: CLINIC | Age: 72
End: 2022-04-12

## 2022-04-12 ENCOUNTER — TELEPHONE (OUTPATIENT)
Dept: OTHER | Facility: OTHER | Age: 72
End: 2022-04-12

## 2022-04-12 VITALS
SYSTOLIC BLOOD PRESSURE: 100 MMHG | HEART RATE: 53 BPM | RESPIRATION RATE: 18 BRPM | TEMPERATURE: 97.8 F | OXYGEN SATURATION: 94 % | DIASTOLIC BLOOD PRESSURE: 64 MMHG

## 2022-04-12 VITALS
OXYGEN SATURATION: 97 % | HEART RATE: 62 BPM | DIASTOLIC BLOOD PRESSURE: 66 MMHG | TEMPERATURE: 98.7 F | SYSTOLIC BLOOD PRESSURE: 102 MMHG | RESPIRATION RATE: 20 BRPM

## 2022-04-12 DIAGNOSIS — N40.1 BENIGN PROSTATIC HYPERPLASIA WITH URINARY FREQUENCY: Primary | ICD-10-CM

## 2022-04-12 DIAGNOSIS — R35.0 BENIGN PROSTATIC HYPERPLASIA WITH URINARY FREQUENCY: Primary | ICD-10-CM

## 2022-04-12 DIAGNOSIS — C79.10 METASTATIC UROTHELIAL CARCINOMA (HCC): ICD-10-CM

## 2022-04-12 DIAGNOSIS — M47.816 LUMBAR SPONDYLOSIS: ICD-10-CM

## 2022-04-12 DIAGNOSIS — E86.0 DEHYDRATION: ICD-10-CM

## 2022-04-12 DIAGNOSIS — E83.42 HYPOMAGNESEMIA: ICD-10-CM

## 2022-04-12 DIAGNOSIS — G89.3 CANCER ASSOCIATED PAIN: Primary | ICD-10-CM

## 2022-04-12 LAB
ALBUMIN SERPL BCP-MCNC: 3.7 G/DL (ref 3.5–5)
ALP SERPL-CCNC: 57 U/L (ref 34–104)
ALT SERPL W P-5'-P-CCNC: 21 U/L (ref 7–52)
ANION GAP SERPL CALCULATED.3IONS-SCNC: 5 MMOL/L (ref 4–13)
AST SERPL W P-5'-P-CCNC: 17 U/L (ref 13–39)
BASOPHILS # BLD AUTO: 0.02 THOUSANDS/ΜL (ref 0–0.1)
BASOPHILS NFR BLD AUTO: 0 % (ref 0–1)
BILIRUB SERPL-MCNC: 0.86 MG/DL (ref 0.2–1)
BUN SERPL-MCNC: 19 MG/DL (ref 5–25)
CALCIUM SERPL-MCNC: 9.1 MG/DL (ref 8.4–10.2)
CHLORIDE SERPL-SCNC: 102 MMOL/L (ref 96–108)
CO2 SERPL-SCNC: 31 MMOL/L (ref 21–32)
CREAT SERPL-MCNC: 1.46 MG/DL (ref 0.6–1.3)
EOSINOPHIL # BLD AUTO: 0 THOUSAND/ΜL (ref 0–0.61)
EOSINOPHIL NFR BLD AUTO: 0 % (ref 0–6)
ERYTHROCYTE [DISTWIDTH] IN BLOOD BY AUTOMATED COUNT: 15.4 % (ref 11.6–15.1)
GFR SERPL CREATININE-BSD FRML MDRD: 47 ML/MIN/1.73SQ M
GLUCOSE SERPL-MCNC: 112 MG/DL (ref 65–140)
HCT VFR BLD AUTO: 36.7 % (ref 36.5–49.3)
HGB BLD-MCNC: 11.9 G/DL (ref 12–17)
IMM GRANULOCYTES # BLD AUTO: 0.01 THOUSAND/UL (ref 0–0.2)
IMM GRANULOCYTES NFR BLD AUTO: 0 % (ref 0–2)
LYMPHOCYTES # BLD AUTO: 1.13 THOUSANDS/ΜL (ref 0.6–4.47)
LYMPHOCYTES NFR BLD AUTO: 21 % (ref 14–44)
MAGNESIUM SERPL-MCNC: 1.8 MG/DL (ref 1.9–2.7)
MCH RBC QN AUTO: 33.2 PG (ref 26.8–34.3)
MCHC RBC AUTO-ENTMCNC: 32.4 G/DL (ref 31.4–37.4)
MCV RBC AUTO: 103 FL (ref 82–98)
MONOCYTES # BLD AUTO: 0.74 THOUSAND/ΜL (ref 0.17–1.22)
MONOCYTES NFR BLD AUTO: 14 % (ref 4–12)
NEUTROPHILS # BLD AUTO: 3.48 THOUSANDS/ΜL (ref 1.85–7.62)
NEUTS SEG NFR BLD AUTO: 65 % (ref 43–75)
NRBC BLD AUTO-RTO: 0 /100 WBCS
PLATELET # BLD AUTO: 148 THOUSANDS/UL (ref 149–390)
PMV BLD AUTO: 9.8 FL (ref 8.9–12.7)
POTASSIUM SERPL-SCNC: 4.2 MMOL/L (ref 3.5–5.3)
PROT SERPL-MCNC: 5.9 G/DL (ref 6.4–8.4)
RBC # BLD AUTO: 3.58 MILLION/UL (ref 3.88–5.62)
SODIUM SERPL-SCNC: 138 MMOL/L (ref 135–147)
WBC # BLD AUTO: 5.38 THOUSAND/UL (ref 4.31–10.16)

## 2022-04-12 PROCEDURE — 85025 COMPLETE CBC W/AUTO DIFF WBC: CPT

## 2022-04-12 PROCEDURE — 83735 ASSAY OF MAGNESIUM: CPT

## 2022-04-12 PROCEDURE — 80053 COMPREHEN METABOLIC PANEL: CPT

## 2022-04-12 PROCEDURE — 64635 DESTROY LUMB/SAC FACET JNT: CPT | Performed by: ANESTHESIOLOGY

## 2022-04-12 PROCEDURE — 96360 HYDRATION IV INFUSION INIT: CPT

## 2022-04-12 PROCEDURE — 64636 DESTROY L/S FACET JNT ADDL: CPT | Performed by: ANESTHESIOLOGY

## 2022-04-12 RX ORDER — BUPIVACAINE HYDROCHLORIDE 5 MG/ML
30 INJECTION, SOLUTION EPIDURAL; INTRACAUDAL ONCE
Status: COMPLETED | OUTPATIENT
Start: 2022-04-12 | End: 2022-04-12

## 2022-04-12 RX ORDER — LIDOCAINE HYDROCHLORIDE 10 MG/ML
10 INJECTION, SOLUTION EPIDURAL; INFILTRATION; INTRACAUDAL; PERINEURAL ONCE
Status: COMPLETED | OUTPATIENT
Start: 2022-04-12 | End: 2022-04-12

## 2022-04-12 RX ADMIN — LIDOCAINE HYDROCHLORIDE 8 ML: 10 INJECTION, SOLUTION EPIDURAL; INFILTRATION; INTRACAUDAL; PERINEURAL at 11:30

## 2022-04-12 RX ADMIN — LIDOCAINE HYDROCHLORIDE 3 ML: 20 INJECTION, SOLUTION EPIDURAL; INFILTRATION; INTRACAUDAL; PERINEURAL at 11:32

## 2022-04-12 RX ADMIN — SODIUM CHLORIDE 1000 ML: 0.9 INJECTION, SOLUTION INTRAVENOUS at 13:20

## 2022-04-12 RX ADMIN — BUPIVACAINE HYDROCHLORIDE 3 ML: 5 INJECTION, SOLUTION EPIDURAL; INTRACAUDAL at 11:30

## 2022-04-12 NOTE — H&P
History of Present Illness: The patient is a 67 y o  male who presents with complaints of low back pain      Patient Active Problem List   Diagnosis    Lumbar radiculopathy    Lumbar disc herniation    Lumbar spondylosis    Urothelial cancer ([address] )    Essential hypertension    Sinus bradycardia    Hyperlipidemia    Cancer of left renal pelvis (HCC)    Drug-induced neutropenia (HCC)    Chronic pain disorder    Spinal stenosis of lumbar region    Encounter for central line care    Hyperuricemia    Encounter for long-term opiate analgesic use    Uncomplicated opioid dependence ([address] )    Palliative care patient    Decreased appetite    Therapeutic opioid induced constipation    Medical marijuana use    Normocytic anemia    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)    Thrombophlebitis of superficial veins of right lower extremity    Renal dysfunction    Stage 3a chronic kidney disease (HCC)    Hypomagnesemia    Platelets decreased ([address] )    H/O left nephrectomy    Chronic kidney disease-mineral and bone disorder    Vitamin D deficiency    Dehydration    Chronic right shoulder pain    Primary osteoarthritis of right shoulder    Metastatic urothelial carcinoma (HCC)    Drug induced constipation    Secondary malignant neoplasm of bone (HCC)    Cancer associated pain    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)    Early satiety    Dysgeusia    Neuropathy    Chronic bilateral low back pain without sciatica    Chemotherapy-induced neuropathy (HCC)       Past Medical History:   Diagnosis Date    Arthritis     Bladder cancer     Cancer (HCC)     skin melanoma;basal cell    Chronic pain disorder     from arthritis    Colon polyp     Does use hearing aid     bilat    Dry eyes, bilateral     GERD (gastroesophageal reflux disease)     History of kidney stones 10/2019    History of partial knee replacement     bilat    History of vertigo     Hyperlipidemia     Hypertension     Infusion extravasation of chemotherapy vesicant 11/2021    Kidney lesion     Lumbar disc disorder     compression of vertebrae L4-5-6    Muscle weakness     left hip area    Renal mass     left    Right ankle injury 03/05/2020    missed step of ladder     Right ankle pain     Shortness of breath     with activity    Tinnitus     Urothelial cancer     left    Wears glasses        Past Surgical History:   Procedure Laterality Date    COLONOSCOPY      CYSTOSCOPY Left 4/1/2020    Procedure: CYSTOSCOPY; URETERAL CATHETER PLACEMENT;  Surgeon: Emilee Kincaid MD;  Location[facility];  Service: Urology    CYSTOSCOPY  05/11/2020    CYSTOSCOPY  06/04/2021    FL CYSTOGRAM  4/13/2020    FL RETROGRADE PYELOGRAM  1/17/2020    HERNIA REPAIR      umbilical with mesh    INGUINAL HERNIA REPAIR Right     with mesh    IR PORT PLACEMENT  4/30/2020    JOINT REPLACEMENT Bilateral     partials knee    LUMBAR EPIDURAL INJECTION      RI CYSTOURETHROSCOPY,URETER CATHETER Bilateral 1/17/2020    Procedure: CYSTOSCOPY; RIGHT RETROGRADE PYELOGRAM WITH RIGHT URETERAL CYTOLOGY SAMPLING; LEFT URETEROSCOPY WITH RENAL PELVIS BIOPSY AND LEFT STENT PLACEMENT;  Surgeon: Emilee Kincaid MD;  Location[facility];  Service: Urology    RI NEPHRECTOMY, W/PART   URETECTOMY Left 4/1/2020    Procedure: ROBOTIC LAPAROSCOPIC NEPHRO-URETERECTOMY;  Surgeon: Emilee Kincaid MD;  Location[facility];  Service: Urology    SKIN CANCER EXCISION      surface melanoma    TONSILLECTOMY      WISDOM TOOTH EXTRACTION           Current Outpatient Medications:     acetaminophen (TYLENOL) 500 mg tablet, Take 2 tablets (1,000 mg total) by mouth every 8 (eight) hours, Disp: , Rfl:     albuterol (ProAir HFA) 90 mcg/act inhaler, Inhale 2 puffs every 6 (six) hours as needed for wheezing or shortness of breath, Disp: 8 5 g, Rfl: 3    allopurinol (ZYLOPRIM) 300 mg tablet, take 1 tablet by mouth once daily, Disp: 30 tablet, Rfl: 3    ALPRAZolam (XANAX) 0 25 mg tablet, Take 1 tablet (0 25 mg total) by mouth daily at bedtime as needed for anxiety (restless legs), Disp: 30 tablet, Rfl: 1    ALPRAZolam (XANAX) 0 25 mg tablet, Take 1 tablet (0 25 mg total) by mouth daily at bedtime as needed for anxiety, Disp: 30 tablet, Rfl: 0    amLODIPine (NORVASC) 5 mg tablet, Take 5 mg by mouth daily  , Disp: , Rfl:     atorvastatin (LIPITOR) 80 mg tablet, Take 80 mg by mouth every other day evening, Disp: , Rfl:     docusate sodium (COLACE) 250 MG capsule, Take 1 capsule (250 mg total) by mouth daily, Disp: 60 capsule, Rfl: 6    DULoxetine (CYMBALTA) 60 mg delayed release capsule, Take 1 capsule (60 mg total) by mouth daily, Disp: 30 capsule, Rfl: 1    famotidine (PEPCID) 20 mg tablet, take 1-2 tablets by mouth every evening, Disp: , Rfl:     folic acid (FOLVITE) 1 mg tablet, take 1 tablet by mouth once daily, Disp: 90 tablet, Rfl: 4    hydrOXYzine HCL (ATARAX) 25 mg tablet, Take 1 tablet (25 mg total) by mouth every 6 (six) hours as needed for itching or anxiety, Disp: 60 tablet, Rfl: 2    Magnesium 250 MG TABS, 1 tablet 2 (two) times a day Taking 500mg in the morning and 500mg at night, Disp: , Rfl:     metoprolol tartrate (LOPRESSOR) 100 mg tablet, Take 50 mg by mouth daily, Disp: , Rfl:     morphine (MSIR) 15 mg tablet, Take 1/2 tablet every 4 hours as needed for severe pain (Patient not taking: Reported on 4/6/2022 ), Disp: 45 tablet, Rfl: 0    naloxone (NARCAN) 4 mg/0 1 mL nasal spray, Administer 1 spray into a nostril  If breathing does not return to normal or if breathing difficulty resumes after 2-3 minutes, give another dose in the other nostril using a new spray   (Patient not taking: Reported on 4/6/2022 ), Disp: 1 each, Rfl: 1    nystatin (MYCOSTATIN) cream, Apply topically 2 (two) times a day, Disp: 30 g, Rfl: 0    omeprazole (PriLOSEC) 20 mg delayed release capsule, Take 20 mg by mouth daily  , Disp: , Rfl:    predniSONE 10 mg tablet, Take 2 tablets (20 mg total) by mouth daily, Disp: 60 tablet, Rfl: 3    predniSONE 2 5 mg tablet, Take 1 tablet (2 5 mg total) by mouth daily Take 5mg daily in the am for 14 days then 2 5mg daily in the am thereafter, Disp: 14 tablet, Rfl: 0    predniSONE 5 mg tablet, Take 1 tablet (5 mg total) by mouth daily Take 5mg daily in the am for 14 days then 2 5mg daily in the am thereafter, Disp: 14 tablet, Rfl: 0    senna (SENOKOT) 8 6 mg, Take 1 tablet (8 6 mg total) by mouth daily at bedtime (Patient not taking: Reported on 4/6/2022 ), Disp: 30 each, Rfl: 0    tadalafil (CIALIS) 20 MG tablet, Take 1 tablet by mouth one (1) hour prior to intercourse    Do not exceed more than two tablets per week , Disp: 12 tablet, Rfl: 3    terazosin (HYTRIN) 5 mg capsule, Take 2 capsules (10 mg total) by mouth daily at bedtime, Disp: 30 capsule, Rfl: 6    triamcinolone (KENALOG) 0 1 % lotion, Apply topically 3 (three) times a day, Disp: 60 mL, Rfl: 5    VITAMIN D PO, Take 1,000 Units by mouth daily , Disp: , Rfl:     zolpidem (AMBIEN) 5 mg tablet, Take 1 tablet (5 mg total) by mouth daily at bedtime as needed for sleep, Disp: 30 tablet, Rfl: 1    Current Facility-Administered Medications:     bupivacaine (PF) (MARCAINE) 0 5 % injection 30 mL, 30 mL, Injection, Once, Milton Obrien, DO    lidocaine (PF) (XYLOCAINE-MPF) 1 % injection 10 mL, 10 mL, Other, Once, Milton Obrien, DO    lidocaine (PF) (XYLOCAINE-MPF) 2 % injection 4 mL, 4 mL, Other, Once, Milton Obrien, DO    Allergies   Allergen Reactions    Poison Ivy Extract Rash       Physical Exam:   Vitals:    04/12/22 1117   BP: 90/58   Pulse: 61   Resp: 20   Temp: 98 7 °F (37 1 °C)   SpO2: 98%     General: Awake, Alert, Oriented x 3, Mood and affect appropriate  Respiratory: Respirations even and unlabored  Cardiovascular: Peripheral pulses intact; no edema  Musculoskeletal Exam:  Left lumbar paraspinals tender to palpation    ASA Score: 3 Assessment:  Lumbar spondylosis    Plan: LEFT L3-5 RFA

## 2022-04-12 NOTE — DISCHARGE INSTRUCTIONS

## 2022-04-12 NOTE — TELEPHONE ENCOUNTER
Patient called saying that he is having bladder problem he is leaking, have a hard time getting it started, he does not empty out and at night he have to go every half an hour  Patient is asking if the office can give him a call regarding this matter

## 2022-04-12 NOTE — PROGRESS NOTES
Patient presented to unit for central labs/hydration  Patient stated that when he got out of the car for an earlier appointment, he took a couple steps and his legs started to feel weak and he had to have a wheelchair brought out to take him into the office  Patient stated that when he got out of the car for this appointment, he took a few steps and his legs got weak again and he went down slowly onto his knees  Patient denies any pain after fall and has full range of motion  Patient stated that while he was on his chemo break his neuropathy improved, but after his treatment last week it came back  Patient feels that his neuropathy is contributing to the weakness he is feeling in his legs  Ruddy Arriola RN made aware  Patient tolerated hydration without issue   Discharged in stable condition with AVS

## 2022-04-13 NOTE — TELEPHONE ENCOUNTER
FYI-    S/w pt  Pt when asked how he did overnight after his procedure yesterday " I am doing fine " Pt denies s/s of infection and or sunburn like sensation  Pt reminded it takes 2 weeks to notice a difference and 4-6 weeks for the full pain relieving effects of the procedure  Pt verbalized understanding  Pt scheduled for a f/u appt with [address] on 5/25 with a 1:15 arrival time

## 2022-04-14 RX ORDER — TAMSULOSIN HYDROCHLORIDE 0.4 MG/1
0.4 CAPSULE ORAL
Qty: 30 CAPSULE | Refills: 1 | Status: SHIPPED | OUTPATIENT
Start: 2022-04-14 | End: 2022-06-06

## 2022-04-14 NOTE — TELEPHONE ENCOUNTER
Patient s/p left nephroureterectomy 4/1/2020 by Dr Linden Joyce  September 2020 patient had signs of metastatic disease undergoing palliative radiation and immunotherapy  Last seen 12/7/2021 for his surveillance cystoscopy  Called and spoke with patient  Gunjan Gonzalez reports for about the past 2 weeks he has noted decreased urinary stream, urinary hesitancy and feeling of incomplete emptying  Denies any abdominal pain/distention  Told patient will send encounter to [facility] for advice then call him back

## 2022-04-14 NOTE — TELEPHONE ENCOUNTER
Called and spoke with patient  Patient made aware Liam Kenyon recommending to begin Flomax 0 4 mg nightly  Instructed patient to take medication at dinnertime or bedtime secondary to one of the side effects being lightheadedness  Patient verbalized understanding  Please send prescription [facility] in [place]

## 2022-04-14 NOTE — TELEPHONE ENCOUNTER
Symptoms reviewed  Previous cystoscopy reviewed    Have him start Flomax 0 4 mg nightly and see me on April 28th as scheduled

## 2022-04-15 ENCOUNTER — HOSPITAL ENCOUNTER (OUTPATIENT)
Dept: INFUSION CENTER | Facility: HOSPITAL | Age: 72
Discharge: HOME | End: 2022-04-15
Attending: INTERNAL MEDICINE
Payer: MEDICARE

## 2022-04-15 VITALS
TEMPERATURE: 97.6 F | DIASTOLIC BLOOD PRESSURE: 68 MMHG | OXYGEN SATURATION: 96 % | SYSTOLIC BLOOD PRESSURE: 109 MMHG | RESPIRATION RATE: 16 BRPM | HEIGHT: 68 IN | BODY MASS INDEX: 30.81 KG/M2 | WEIGHT: 203.26 LBS | HEART RATE: 101 BPM

## 2022-04-15 DIAGNOSIS — C68.9 UROTHELIAL CANCER (HCC): Primary | ICD-10-CM

## 2022-04-15 DIAGNOSIS — E86.0 DEHYDRATION: ICD-10-CM

## 2022-04-15 DIAGNOSIS — E83.42 HYPOMAGNESEMIA: ICD-10-CM

## 2022-04-15 DIAGNOSIS — C79.10 METASTATIC UROTHELIAL CARCINOMA (HCC): ICD-10-CM

## 2022-04-15 DIAGNOSIS — C65.2 CANCER OF LEFT RENAL PELVIS (HCC): ICD-10-CM

## 2022-04-15 DIAGNOSIS — G89.3 CANCER ASSOCIATED PAIN: ICD-10-CM

## 2022-04-15 PROCEDURE — 96367 TX/PROPH/DG ADDL SEQ IV INF: CPT

## 2022-04-15 PROCEDURE — 96413 CHEMO IV INFUSION 1 HR: CPT

## 2022-04-15 RX ORDER — ACETAMINOPHEN 325 MG/1
650 TABLET ORAL ONCE
Status: DISCONTINUED | OUTPATIENT
Start: 2022-04-15 | End: 2022-04-19 | Stop reason: HOSPADM

## 2022-04-15 RX ORDER — SODIUM CHLORIDE 9 MG/ML
20 INJECTION, SOLUTION INTRAVENOUS ONCE
Status: COMPLETED | OUTPATIENT
Start: 2022-04-15 | End: 2022-04-15

## 2022-04-15 RX ADMIN — SODIUM CHLORIDE 20 ML/HR: 0.9 INJECTION, SOLUTION INTRAVENOUS at 10:17

## 2022-04-15 RX ADMIN — DIPHENHYDRAMINE HYDROCHLORIDE 25 MG: 50 INJECTION INTRAMUSCULAR; INTRAVENOUS at 10:19

## 2022-04-15 RX ADMIN — DEXAMETHASONE SODIUM PHOSPHATE: 10 INJECTION, SOLUTION INTRAMUSCULAR; INTRAVENOUS at 10:42

## 2022-04-15 RX ADMIN — MAGNESIUM SULFATE HEPTAHYDRATE: 500 INJECTION, SOLUTION INTRAMUSCULAR; INTRAVENOUS at 09:11

## 2022-04-15 RX ADMIN — ENFORTUMAB VEDOTIN 90 MG: 30 INJECTION, POWDER, LYOPHILIZED, FOR SOLUTION INTRAVENOUS at 11:21

## 2022-04-15 NOTE — PROGRESS NOTES
Pt tolerated chemotherapy and IV magnesium well without incident  States that his b/l hand neuropathy is always present, but not worsening  B/l feet neuropathy "comes and goes, but is better at night"  Pt discharged in stable condition and is aware of next infusion appointment  AVS provided

## 2022-04-19 ENCOUNTER — OFFICE VISIT (OUTPATIENT)
Dept: HEMATOLOGY ONCOLOGY | Facility: CLINIC | Age: 72
End: 2022-04-19
Payer: MEDICARE

## 2022-04-19 ENCOUNTER — HOSPITAL ENCOUNTER (OUTPATIENT)
Dept: INFUSION CENTER | Facility: HOSPITAL | Age: 72
Discharge: HOME | End: 2022-04-19
Payer: MEDICARE

## 2022-04-19 ENCOUNTER — TELEPHONE (OUTPATIENT)
Dept: HEMATOLOGY ONCOLOGY | Facility: CLINIC | Age: 72
End: 2022-04-19

## 2022-04-19 VITALS
OXYGEN SATURATION: 99 % | RESPIRATION RATE: 18 BRPM | TEMPERATURE: 98 F | WEIGHT: 202 LBS | SYSTOLIC BLOOD PRESSURE: 110 MMHG | HEART RATE: 53 BPM | HEIGHT: 68 IN | DIASTOLIC BLOOD PRESSURE: 66 MMHG | BODY MASS INDEX: 30.62 KG/M2

## 2022-04-19 VITALS
RESPIRATION RATE: 18 BRPM | DIASTOLIC BLOOD PRESSURE: 74 MMHG | SYSTOLIC BLOOD PRESSURE: 131 MMHG | OXYGEN SATURATION: 95 % | HEART RATE: 53 BPM | TEMPERATURE: 96.6 F

## 2022-04-19 DIAGNOSIS — C68.9 UROTHELIAL CANCER (HCC): ICD-10-CM

## 2022-04-19 DIAGNOSIS — T45.1X5A CHEMOTHERAPY-INDUCED NEUROPATHY (HCC): ICD-10-CM

## 2022-04-19 DIAGNOSIS — G62.9 NEUROPATHY: Primary | ICD-10-CM

## 2022-04-19 DIAGNOSIS — G62.0 CHEMOTHERAPY-INDUCED NEUROPATHY (HCC): ICD-10-CM

## 2022-04-19 DIAGNOSIS — C79.10 METASTATIC UROTHELIAL CARCINOMA (HCC): Primary | ICD-10-CM

## 2022-04-19 DIAGNOSIS — C68.9 UROTHELIAL CANCER (HCC): Primary | ICD-10-CM

## 2022-04-19 DIAGNOSIS — E83.42 HYPOMAGNESEMIA: ICD-10-CM

## 2022-04-19 DIAGNOSIS — C65.2 CANCER OF LEFT RENAL PELVIS (HCC): ICD-10-CM

## 2022-04-19 DIAGNOSIS — C79.10 METASTATIC UROTHELIAL CARCINOMA (HCC): ICD-10-CM

## 2022-04-19 DIAGNOSIS — E86.0 DEHYDRATION: ICD-10-CM

## 2022-04-19 LAB
ALBUMIN SERPL BCP-MCNC: 4 G/DL (ref 3.5–5)
ALP SERPL-CCNC: 60 U/L (ref 34–104)
ALT SERPL W P-5'-P-CCNC: 52 U/L (ref 7–52)
ANION GAP SERPL CALCULATED.3IONS-SCNC: 7 MMOL/L (ref 4–13)
AST SERPL W P-5'-P-CCNC: 30 U/L (ref 13–39)
BASOPHILS # BLD AUTO: 0.01 THOUSANDS/ΜL (ref 0–0.1)
BASOPHILS NFR BLD AUTO: 0 % (ref 0–1)
BILIRUB SERPL-MCNC: 1.11 MG/DL (ref 0.2–1)
BUN SERPL-MCNC: 20 MG/DL (ref 5–25)
CALCIUM SERPL-MCNC: 9.3 MG/DL (ref 8.4–10.2)
CHLORIDE SERPL-SCNC: 102 MMOL/L (ref 96–108)
CO2 SERPL-SCNC: 29 MMOL/L (ref 21–32)
CREAT SERPL-MCNC: 1.37 MG/DL (ref 0.6–1.3)
EOSINOPHIL # BLD AUTO: 0 THOUSAND/ΜL (ref 0–0.61)
EOSINOPHIL NFR BLD AUTO: 0 % (ref 0–6)
ERYTHROCYTE [DISTWIDTH] IN BLOOD BY AUTOMATED COUNT: 14.9 % (ref 11.6–15.1)
GFR SERPL CREATININE-BSD FRML MDRD: 50 ML/MIN/1.73SQ M
GLUCOSE SERPL-MCNC: 119 MG/DL (ref 65–140)
HCT VFR BLD AUTO: 39.5 % (ref 36.5–49.3)
HGB BLD-MCNC: 12.8 G/DL (ref 12–17)
IMM GRANULOCYTES # BLD AUTO: 0.01 THOUSAND/UL (ref 0–0.2)
IMM GRANULOCYTES NFR BLD AUTO: 0 % (ref 0–2)
LYMPHOCYTES # BLD AUTO: 0.93 THOUSANDS/ΜL (ref 0.6–4.47)
LYMPHOCYTES NFR BLD AUTO: 13 % (ref 14–44)
MAGNESIUM SERPL-MCNC: 1.8 MG/DL (ref 1.9–2.7)
MCH RBC QN AUTO: 33 PG (ref 26.8–34.3)
MCHC RBC AUTO-ENTMCNC: 32.4 G/DL (ref 31.4–37.4)
MCV RBC AUTO: 102 FL (ref 82–98)
MONOCYTES # BLD AUTO: 0.39 THOUSAND/ΜL (ref 0.17–1.22)
MONOCYTES NFR BLD AUTO: 5 % (ref 4–12)
NEUTROPHILS # BLD AUTO: 6.07 THOUSANDS/ΜL (ref 1.85–7.62)
NEUTS SEG NFR BLD AUTO: 82 % (ref 43–75)
NRBC BLD AUTO-RTO: 0 /100 WBCS
PLATELET # BLD AUTO: 178 THOUSANDS/UL (ref 149–390)
PMV BLD AUTO: 10.4 FL (ref 8.9–12.7)
POTASSIUM SERPL-SCNC: 4.2 MMOL/L (ref 3.5–5.3)
PROT SERPL-MCNC: 6.3 G/DL (ref 6.4–8.4)
RBC # BLD AUTO: 3.88 MILLION/UL (ref 3.88–5.62)
SODIUM SERPL-SCNC: 138 MMOL/L (ref 135–147)
WBC # BLD AUTO: 7.41 THOUSAND/UL (ref 4.31–10.16)

## 2022-04-19 PROCEDURE — 83735 ASSAY OF MAGNESIUM: CPT | Performed by: INTERNAL MEDICINE

## 2022-04-19 PROCEDURE — 96360 HYDRATION IV INFUSION INIT: CPT

## 2022-04-19 PROCEDURE — 85025 COMPLETE CBC W/AUTO DIFF WBC: CPT | Performed by: INTERNAL MEDICINE

## 2022-04-19 PROCEDURE — 80053 COMPREHEN METABOLIC PANEL: CPT | Performed by: INTERNAL MEDICINE

## 2022-04-19 PROCEDURE — 99214 OFFICE O/P EST MOD 30 MIN: CPT | Performed by: INTERNAL MEDICINE

## 2022-04-19 RX ADMIN — SODIUM CHLORIDE 1000 ML: 0.9 INJECTION, SOLUTION INTRAVENOUS at 14:12

## 2022-04-19 NOTE — PROGRESS NOTES
Central labs drawn, port flushed easily with good blood return noted  Patient tolerated hydration without issue   Discharged in stable condition with AVS

## 2022-04-19 NOTE — TELEPHONE ENCOUNTER
Phoned [facility] infusion and spoke to [facility] to let her know that pt to have tx on Fri 4/22/22 but then next cycle will be 5/13/22 due to pt going out of town

## 2022-04-19 NOTE — PROGRESS NOTES
Hematology/Oncology Outpatient Follow-up  Fariba Sawant 67 y o  male 1950 [id number]    Date:  4/19/2022        Assessment and Plan:  1  Metastatic urothelial carcinoma ([facility] [address] )  The patient will be continued on the current palliative treatment with enfortumab vedotin cycle 7 day 15 on 04/22  He asked for an extra week before the start of cycle 8 since he will be outside the area which is appropriate  He was asked to follow-up with the Cardiology team regarding his shortness of breath and high ejection fraction of 75%  - CBC and differential; Future  - Comprehensive metabolic panel; Future  - Magnesium; Future    2  Hypomagnesemia  Magnesium IV will be given  He will be getting IV hydration on as-needed basis as well     - Magnesium; Future    3  Chemotherapy-induced neuropathy ([facility] [address] )  I did ask him to consider using the walker since he has difficulties with balance  The Cymbalta did help the restless leg symptoms  However it did not improve his other type of neuropathy  HPI:  Patient came today for follow-up visit accompanied by his wife  He stated that he fell down twice and landed on his knees which is most likely related to his neuropathy  He denied significant trauma  Recent blood work on 04/12/2022 showed hemoglobin of 11 9 with normal white cells  ANC of 3 4 with a platelet count of 144  Creatinine was 1 4 with normal calcium liver enzymes  Magnesium 1 8  Oncology History Overview Note   Patient has a history of hypertension, hyperlipidemia, chronic lower back pain  He had an MRI of the lower spine for further evaluation of his chronic lower back pain  The MRI on 12/02/2019 revealed Multiple left renal masses to include a left lower pole cyst and a rounded structure in the left upper pole which may also represent cyst   Left upper pole renal masses approximately 3 cm in greatest linear dimension       A CT with renal protocol was done on 12/12/2019 which also showed the infiltrating lesion in the upper pole of the left kidney measuring about the 3 5 cm in greatest dimension without any hint of retroperitoneal lymphadenopathy  A CT scan of the abdomen pelvis on 01/14/2020 showed the same findings with the mild hydronephrosis on the left  The urine cytology on 01/03/2020 showed high-grade urothelial carcinoma  A cystoscopy was then done, a biopsy was taken from the left renal pelvic region which showed high-grade urothelial carcinoma without evidence of lamina propria invasion  The detrusor muscle/muscularis propria were not present for evaluation  The recommendation was to pursue left robotic assisted laparoscopic nephroureterectomy with bladder cuff excision which was done on 04/01/2020  The final pathology revealed;  - Invasive high grade urothelial carcinoma arising in renal pelvis  - Bladder cuff margin is negative for carcinoma and no evidence of high grade dysplasia  - Ureters with no significant pathologic abnormality  - Two benign simple cysts  - Adrenal gland is negative for malignancy  - One lymph node, negative for malignancy (0/1)  The tumor size was 4 5 cm with invasion beyond the muscularis into the periurethral fat or peripelvic fat or the renal parenchyma  The margins were uninvolved by carcinoma or carcinoma in situ  The final pathology was pT3 pN0  Patient barely tolerated 1 cycle of adjuvant chemotherapy with split dose cisplatin and gemcitabine with a lot of side effects  The treatment had to be discontinued due to significant worsening renal dysfunction 6/2020  Patient started to complain about significant abdominal/left inguinal pain around August/September 2020  Unfortunately repeat imaging revealed recurrent/metastatic disease  9/4/20 CT C/A/P- Status post left nephrectomy for resection of urothelial neoplasm    Lymphadenopathy in the left nephrectomy bed has increased since the prior examination, concerning for metastatic disease  Ill-defined hyperattenuating masslike focus in the left rectus muscle, not identified on the prior examination  This is suspicious for a metastatic lesion  A rectus hematoma is also considered  9/23/20 PET scan- 1  Multiple FDG avid lymph nodes in the retrocrural region, retroperitoneum and the left renal bed compatible with metastasis  These have progressed from recent CT  2   FDG avid mass involving the left rectus abdominal musculature compatible with metastasis which is larger from recent CT  3  Several small lymph nodes adjacent to the mid to distal esophagus with FDG uptake suspicious for metastasis  Small focus of FDG uptake also suggested in the right hilar region, metastasis is not excluded  This could be reassessed on follow-up  4   Subtle focus of FDG uptake at the T2 level on the left, nonspecific, could be related to early degenerative changes, developing metastasis is not entirely excluded  This could be reassessed on follow-up  5  Prostate is mildly prominent with heterogeneous FDG uptake  This could be inflammatory  Correlate for prostatitis  He completed palliative radiation to the left abdomen 12/3/20     His PET scan from 08/16/2021 showed progression of his disease with hypermetabolic soft tissue lesions at the level of the right shoulder and right axilla with interval progression of the retroperitoneal and mesenteric hypermetabolic metastasis  He did have the new lesion to his right upper back/shoulder which was causing significant localized pain  This excised by his surgeon 08/09/2021  Pathology confirmed metastatic high-grade carcinoma consistent with his no primary urothelial carcinoma  He received palliative radiation to his right shoulder/axilla region  Urothelial cancer ([facility] [address] )   1/3/2020 Biopsy    Final Diagnosis    A  Urine, Clean Catch, Thin Prep:  High grade urothelial carcinoma (HGUC) - see comment          1/3/2020 Initial Diagnosis Urothelial cancer ([facility] [address] )  T3 N0 (stage IIIA)     1/17/2020 Biopsy    Final Diagnosis    A  Ureter, Right, left renal pelvis biopsy  -Fragments of high grade urothelial carcinoma   -No evidence of lamina propria invasion   -Detrusor muscle/ muscularis propria is not present for evaluation  B  Urinary Bladder, bladder biopsy:  -Fragments of low grade papillary lesion  See note  -No evidence of invasion seen  -Unremarkable fragment of detrusor muscle seen  4/1/2020 Surgery    left robotic assisted laparoscopic nephroureterectomy with bladder cuff excision     Final Diagnosis    A  Left kidney, ureter, and bladder cuff, nephroureterectomy:  - Invasive high grade urothelial carcinoma arising in renal pelvis  - Bladder cuff margin is negative for carcinoma and no evidence of high grade dysplasia  - Ureters with no significant pathologic abnormality  - Two benign simple cysts  - Adrenal gland is negative for malignancy  - One lymph node, negative for malignancy (0/1)          5/14/2020 - 6/3/2020 Chemotherapy    CISplatin (PLATINOL) split dose 35 mg/m2 = 75 3 mg (50 % of original dose 70 mg/m2), Intravenous,   Administration: 75 3 mg (5/14/2020), 75 3 mg (5/21/2020)  gemcitabine (GEMZAR) 2,200 mg in sodium chloride 0 9 % 250 mL infusion, 2,150 2 mg (80 % of original dose 1,250 mg/m2), Intravenous,   Administration: 2,200 mg (5/14/2020), 2,200 mg (5/21/2020)    (only completed 1 cycle- d/c d/t adverse effects and worsening renal dysfunction)     10/9/2020 - 9/9/2021 Chemotherapy    pembrolizumab (KEYTRUDA) IVPB, 200 mg, Intravenous, Once, 16 of 20 cycles  Administration: 200 mg (10/9/2020), 200 mg (10/30/2020), 200 mg (11/20/2020), 200 mg (12/11/2020), 200 mg (12/31/2020), 200 mg (1/22/2021), 200 mg (2/12/2021), 200 mg (3/5/2021), 200 mg (3/26/2021), 200 mg (4/16/2021), 200 mg (5/7/2021), 200 mg (5/28/2021), 200 mg (6/18/2021), 200 mg (7/9/2021), 200 mg (7/30/2021), 200 mg (8/20/2021)     11/19/2020 - 12/3/2020 Radiation    Treatment:  Course: C1    Plan ID Energy Fractions Dose per Fraction (cGy) Dose Correction (cGy) Total Dose Delivered (cGy) Elapsed Days   L Abdomen 6X 10 / 10 300 0 3,000 14        9/10/2021 -  Chemotherapy    enfortumab vedotin-ejfv (PADCEV) IVPB, 1 25 mg/kg = 114 mg, Intravenous, Once, 7 of 13 cycles  Dose modification: 1 mg/kg (original dose 1 25 mg/kg, Cycle 7, Reason: Other (Must fill in a comment), Comment: dose reduction due to side effects ), 1 25 mg/kg (original dose 1 25 mg/kg, Cycle 7, Reason: Other (Must fill in a comment), Comment: patient wants full dose ), 1 mg/kg (original dose 1 25 mg/kg, Cycle 7, Reason: Neuropathy)  Administration: 114 mg (9/10/2021), 114 mg (9/17/2021), 110 mg (10/8/2021), 110 mg (9/24/2021), 110 mg (10/15/2021), 110 mg (11/12/2021), 110 mg (10/22/2021), 110 mg (11/19/2021), 110 mg (12/10/2021), 110 mg (11/26/2021), 110 mg (12/17/2021), 110 mg (1/7/2022), 110 mg (12/23/2021), 110 mg (1/14/2022), 90 mg (4/8/2022), 90 mg (4/15/2022), 110 mg (1/21/2022), 110 mg (2/18/2022), 110 mg (2/25/2022), 90 mg (3/4/2022)     Cancer of left renal pelvis ([address] )   4/23/2020 Initial Diagnosis    Cancer of left renal pelvis ([address] )     10/9/2020 - 9/9/2021 Chemotherapy    pembrolizumab (KEYTRUDA) IVPB, 200 mg, Intravenous, Once, 16 of 20 cycles  Administration: 200 mg (10/9/2020), 200 mg (10/30/2020), 200 mg (11/20/2020), 200 mg (12/11/2020), 200 mg (12/31/2020), 200 mg (1/22/2021), 200 mg (2/12/2021), 200 mg (3/5/2021), 200 mg (3/26/2021), 200 mg (4/16/2021), 200 mg (5/7/2021), 200 mg (5/28/2021), 200 mg (6/18/2021), 200 mg (7/9/2021), 200 mg (7/30/2021), 200 mg (8/20/2021)     9/10/2021 -  Chemotherapy    enfortumab vedotin-ejfv (PADCEV) IVPB, 1 25 mg/kg = 114 mg, Intravenous, Once, 7 of 13 cycles  Dose modification: 1 mg/kg (original dose 1 25 mg/kg, Cycle 7, Reason: Other (Must fill in a comment), Comment: dose reduction due to side effects ), 1 25 mg/kg (original dose 1 25 mg/kg, Cycle 7, Reason: Other (Must fill in a comment), Comment: patient wants full dose ), 1 mg/kg (original dose 1 25 mg/kg, Cycle 7, Reason: Neuropathy)  Administration: 114 mg (9/10/2021), 114 mg (9/17/2021), 110 mg (10/8/2021), 110 mg (9/24/2021), 110 mg (10/15/2021), 110 mg (11/12/2021), 110 mg (10/22/2021), 110 mg (11/19/2021), 110 mg (12/10/2021), 110 mg (11/26/2021), 110 mg (12/17/2021), 110 mg (1/7/2022), 110 mg (12/23/2021), 110 mg (1/14/2022), 90 mg (4/8/2022), 90 mg (4/15/2022), 110 mg (1/21/2022), 110 mg (2/18/2022), 110 mg (2/25/2022), 90 mg (3/4/2022)      Surgery       Metastatic urothelial carcinoma ([facility] [address] )   8/9/2021 Initial Diagnosis    Metastatic urothelial carcinoma ([facility] [address] )     9/8/2021 - 9/21/2021 Radiation    Treatment:  Course: C2    Plan ID Energy Fractions Dose per Fraction (cGy) Dose Correction (cGy) Total Dose Delivered (cGy) Elapsed Days   R Axil_Shl # 6X 10 / 10 300 0 3,000 13      Treatment dates:  C2: 9/8/2021 - 9/21/2021     9/10/2021 -  Chemotherapy    enfortumab vedotin-ejfv (PADCEV) IVPB, 1 25 mg/kg = 114 mg, Intravenous, Once, 7 of 13 cycles  Dose modification: 1 mg/kg (original dose 1 25 mg/kg, Cycle 7, Reason: Other (Must fill in a comment), Comment: dose reduction due to side effects ), 1 25 mg/kg (original dose 1 25 mg/kg, Cycle 7, Reason: Other (Must fill in a comment), Comment: patient wants full dose ), 1 mg/kg (original dose 1 25 mg/kg, Cycle 7, Reason: Neuropathy)  Administration: 114 mg (9/10/2021), 114 mg (9/17/2021), 110 mg (10/8/2021), 110 mg (9/24/2021), 110 mg (10/15/2021), 110 mg (11/12/2021), 110 mg (10/22/2021), 110 mg (11/19/2021), 110 mg (12/10/2021), 110 mg (11/26/2021), 110 mg (12/17/2021), 110 mg (1/7/2022), 110 mg (12/23/2021), 110 mg (1/14/2022), 90 mg (4/8/2022), 90 mg (4/15/2022), 110 mg (1/21/2022), 110 mg (2/18/2022), 110 mg (2/25/2022), 90 mg (3/4/2022)         Interval history:    ROS: Review of Systems   Constitutional: Negative for chills and fever  HENT: Positive for hearing loss  Negative for ear pain and sore throat  Eyes: Negative for pain and visual disturbance  Respiratory: Positive for shortness of breath  Negative for cough  Cardiovascular: Negative for chest pain and palpitations  Gastrointestinal: Negative for abdominal pain and vomiting  Genitourinary: Negative for dysuria and hematuria  Musculoskeletal: Negative for arthralgias and back pain  Skin: Negative for color change and rash  Neurological: Positive for numbness  Negative for seizures and syncope  All other systems reviewed and are negative        Past Medical History:   Diagnosis Date    Arthritis     Bladder cancer     Cancer ([facility] [address] )     skin melanoma;basal cell    Chronic pain disorder     from arthritis    Colon polyp     Does use hearing aid     bilat    Dry eyes, bilateral     GERD (gastroesophageal reflux disease)     History of kidney stones 10/2019    History of partial knee replacement     bilat    History of vertigo     Hyperlipidemia     Hypertension     Infusion extravasation of chemotherapy vesicant 11/2021    Kidney lesion     Lumbar disc disorder     compression of vertebrae L4-5-6    Muscle weakness     left hip area    Renal mass     left    Right ankle injury 03/05/2020    missed step of ladder     Right ankle pain     Shortness of breath     with activity    Tinnitus     Urothelial cancer     left    Wears glasses        Past Surgical History:   Procedure Laterality Date    COLONOSCOPY      CYSTOSCOPY Left 4/1/2020    Procedure: CYSTOSCOPY; URETERAL CATHETER PLACEMENT;  Surgeon: Susan Smalls MD;  Location[facility];  Service: Urology    CYSTOSCOPY  05/11/2020    CYSTOSCOPY  06/04/2021    FL CYSTOGRAM  4/13/2020    FL RETROGRADE PYELOGRAM  1/17/2020    HERNIA REPAIR      umbilical with mesh    INGUINAL HERNIA REPAIR Right     with mesh    IR PORT PLACEMENT  4/30/2020    JOINT REPLACEMENT Bilateral     partials knee    LUMBAR EPIDURAL INJECTION      NH CYSTOURETHROSCOPY,URETER CATHETER Bilateral 2020    Procedure: CYSTOSCOPY; RIGHT RETROGRADE PYELOGRAM WITH RIGHT URETERAL CYTOLOGY SAMPLING; LEFT URETEROSCOPY WITH RENAL PELVIS BIOPSY AND LEFT STENT PLACEMENT;  Surgeon: Karly Garnett MD;  [facility];  Service: Urology    NH NEPHRECTOMY, W/PART  URETECTOMY Left 2020    Procedure: ROBOTIC LAPAROSCOPIC NEPHRO-URETERECTOMY;  Surgeon: Karly Garnett MD;  Location[facility];  Service: Urology    SKIN CANCER EXCISION      surface melanoma    TONSILLECTOMY      WISDOM TOOTH EXTRACTION         Social History     Socioeconomic History    Marital status: /Civil Union     Spouse name: None    Number of children: None    Years of education: None    Highest education level: None   Occupational History    None   Tobacco Use    Smoking status: Former Smoker     Types: Cigarettes     Quit date:      Years since quittin 3    Smokeless tobacco: Never Used   Vaping Use    Vaping Use: Never used   Substance and Sexual Activity    Alcohol use:  Yes     Alcohol/week: 17 0 standard drinks     Types: 7 Glasses of wine, 10 Cans of beer per week     Comment: socially    Drug use: Never    Sexual activity: Not Currently   Other Topics Concern    None   Social History Narrative    Daily caffeine use - 2 cups coffee      Social Determinants of Health     Financial Resource Strain: Not on file   Food Insecurity: Not on file   Transportation Needs: Not on file   Physical Activity: Not on file   Stress: Not on file   Social Connections: Not on file   Intimate Partner Violence: Not on file   Housing Stability: Not on file       Family History   Problem Relation Age of Onset    Liver cancer Father        Allergies   Allergen Reactions    Poison Ivy Extract Rash         Current Outpatient Medications:     acetaminophen (TYLENOL) 500 mg tablet, Take 2 tablets (1,000 mg total) by mouth every 8 (eight) hours, Disp: , Rfl:     albuterol (ProAir HFA) 90 mcg/act inhaler, Inhale 2 puffs every 6 (six) hours as needed for wheezing or shortness of breath, Disp: 8 5 g, Rfl: 3    allopurinol (ZYLOPRIM) 300 mg tablet, take 1 tablet by mouth once daily, Disp: 30 tablet, Rfl: 3    ALPRAZolam (XANAX) 0 25 mg tablet, Take 1 tablet (0 25 mg total) by mouth daily at bedtime as needed for anxiety (restless legs), Disp: 30 tablet, Rfl: 1    ALPRAZolam (XANAX) 0 25 mg tablet, Take 1 tablet (0 25 mg total) by mouth daily at bedtime as needed for anxiety, Disp: 30 tablet, Rfl: 0    amLODIPine (NORVASC) 5 mg tablet, Take 5 mg by mouth daily  , Disp: , Rfl:     atorvastatin (LIPITOR) 80 mg tablet, Take 80 mg by mouth every other day evening, Disp: , Rfl:     docusate sodium (COLACE) 250 MG capsule, Take 1 capsule (250 mg total) by mouth daily, Disp: 60 capsule, Rfl: 6    DULoxetine (CYMBALTA) 60 mg delayed release capsule, Take 1 capsule (60 mg total) by mouth daily, Disp: 30 capsule, Rfl: 1    famotidine (PEPCID) 20 mg tablet, take 1-2 tablets by mouth every evening, Disp: , Rfl:     folic acid (FOLVITE) 1 mg tablet, take 1 tablet by mouth once daily, Disp: 90 tablet, Rfl: 4    hydrOXYzine HCL (ATARAX) 25 mg tablet, Take 1 tablet (25 mg total) by mouth every 6 (six) hours as needed for itching or anxiety, Disp: 60 tablet, Rfl: 2    Magnesium 250 MG TABS, 1 tablet 2 (two) times a day Taking 500mg in the morning and 500mg at night, Disp: , Rfl:     metoprolol tartrate (LOPRESSOR) 100 mg tablet, Take 50 mg by mouth daily, Disp: , Rfl:     nystatin (MYCOSTATIN) cream, Apply topically 2 (two) times a day, Disp: 30 g, Rfl: 0    omeprazole (PriLOSEC) 20 mg delayed release capsule, Take 20 mg by mouth daily  , Disp: , Rfl:     predniSONE 10 mg tablet, Take 2 tablets (20 mg total) by mouth daily, Disp: 60 tablet, Rfl: 3    predniSONE 2 5 mg tablet, Take 1 tablet (2 5 mg total) by mouth daily Take 5mg daily in the am for 14 days then 2 5mg daily in the am thereafter, Disp: 14 tablet, Rfl: 0    predniSONE 5 mg tablet, Take 1 tablet (5 mg total) by mouth daily Take 5mg daily in the am for 14 days then 2 5mg daily in the am thereafter, Disp: 14 tablet, Rfl: 0    tadalafil (CIALIS) 20 MG tablet, Take 1 tablet by mouth one (1) hour prior to intercourse  Do not exceed more than two tablets per week , Disp: 12 tablet, Rfl: 3    tamsulosin (FLOMAX) 0 4 mg, Take 1 capsule (0 4 mg total) by mouth daily with dinner, Disp: 30 capsule, Rfl: 1    terazosin (HYTRIN) 5 mg capsule, Take 2 capsules (10 mg total) by mouth daily at bedtime, Disp: 30 capsule, Rfl: 6    triamcinolone (KENALOG) 0 1 % lotion, Apply topically 3 (three) times a day, Disp: 60 mL, Rfl: 5    VITAMIN D PO, Take 1,000 Units by mouth daily , Disp: , Rfl:     zolpidem (AMBIEN) 5 mg tablet, Take 1 tablet (5 mg total) by mouth daily at bedtime as needed for sleep, Disp: 30 tablet, Rfl: 1    morphine (MSIR) 15 mg tablet, Take 1/2 tablet every 4 hours as needed for severe pain (Patient not taking: Reported on 4/6/2022 ), Disp: 45 tablet, Rfl: 0    naloxone (NARCAN) 4 mg/0 1 mL nasal spray, Administer 1 spray into a nostril  If breathing does not return to normal or if breathing difficulty resumes after 2-3 minutes, give another dose in the other nostril using a new spray  (Patient not taking: Reported on 4/6/2022 ), Disp: 1 each, Rfl: 1    senna (SENOKOT) 8 6 mg, Take 1 tablet (8 6 mg total) by mouth daily at bedtime (Patient not taking: Reported on 4/6/2022 ), Disp: 30 each, Rfl: 0  No current facility-administered medications for this visit  Physical Exam:  /66 (BP Location: Right arm, Patient Position: Sitting, Cuff Size: Adult)   Pulse (!) 53   Temp 98 °F (36 7 °C)   Resp 18   Ht 5' 7 99" (1 727 m)   Wt 91 6 kg (202 lb)   SpO2 99%   BMI 30 72 kg/m²     Physical Exam  Constitutional:       Appearance: He is well-developed     HENT:      Head: Normocephalic and atraumatic  Eyes:      General: No scleral icterus  Right eye: No discharge  Left eye: No discharge  Conjunctiva/sclera: Conjunctivae normal       Pupils: Pupils are equal, round, and reactive to light  Neck:      Thyroid: No thyromegaly  Trachea: No tracheal deviation  Cardiovascular:      Rate and Rhythm: Normal rate and regular rhythm  Heart sounds: Normal heart sounds  No murmur heard  No friction rub  Pulmonary:      Effort: Pulmonary effort is normal  No respiratory distress  Breath sounds: Normal breath sounds  No wheezing or rales  Chest:      Chest wall: No tenderness  Abdominal:      General: There is no distension  Palpations: Abdomen is soft  There is no hepatomegaly or splenomegaly  Tenderness: There is no abdominal tenderness  There is no guarding or rebound  Musculoskeletal:         General: No tenderness or deformity  Normal range of motion  Cervical back: Normal range of motion and neck supple  Lymphadenopathy:      Cervical: No cervical adenopathy  Skin:     General: Skin is warm and dry  Coloration: Skin is not pale  Findings: No erythema or rash  Neurological:      Mental Status: He is alert and oriented to person, place, and time  Cranial Nerves: No cranial nerve deficit  Coordination: Coordination normal       Deep Tendon Reflexes: Reflexes are normal and symmetric  Psychiatric:         Behavior: Behavior normal          Thought Content:  Thought content normal          Judgment: Judgment normal            Labs:  Lab Results   Component Value Date    WBC 5 38 04/12/2022    HGB 11 9 (L) 04/12/2022    HCT 36 7 04/12/2022     (H) 04/12/2022     (L) 04/12/2022     Lab Results   Component Value Date    K 4 2 04/12/2022     04/12/2022    CO2 31 04/12/2022    BUN 19 04/12/2022    CREATININE 1 46 (H) 04/12/2022    GLUF 111 (H) 03/01/2022    CALCIUM 9 1 04/12/2022 CORRECTEDCA 9 8 02/23/2022    AST 17 04/12/2022    ALT 21 04/12/2022    ALKPHOS 57 04/12/2022    EGFR 47 04/12/2022     No results found for: TSH    Patient voiced understanding and agreement in the above discussion  Aware to contact our office with questions/symptoms in the interim

## 2022-04-22 ENCOUNTER — HOSPITAL ENCOUNTER (OUTPATIENT)
Dept: INFUSION CENTER | Facility: HOSPITAL | Age: 72
Discharge: HOME | End: 2022-04-22
Attending: INTERNAL MEDICINE
Payer: MEDICARE

## 2022-04-22 ENCOUNTER — CONSULT (OUTPATIENT)
Dept: CARDIOLOGY CLINIC | Facility: CLINIC | Age: 72
End: 2022-04-22
Payer: MEDICARE

## 2022-04-22 VITALS
DIASTOLIC BLOOD PRESSURE: 78 MMHG | BODY MASS INDEX: 30.54 KG/M2 | HEART RATE: 77 BPM | HEIGHT: 68 IN | RESPIRATION RATE: 18 BRPM | OXYGEN SATURATION: 98 % | SYSTOLIC BLOOD PRESSURE: 128 MMHG | WEIGHT: 201.5 LBS | TEMPERATURE: 96.8 F

## 2022-04-22 VITALS
BODY MASS INDEX: 30.92 KG/M2 | HEIGHT: 68 IN | DIASTOLIC BLOOD PRESSURE: 72 MMHG | WEIGHT: 204 LBS | SYSTOLIC BLOOD PRESSURE: 122 MMHG | HEART RATE: 55 BPM

## 2022-04-22 DIAGNOSIS — R06.00 DOE (DYSPNEA ON EXERTION): Primary | ICD-10-CM

## 2022-04-22 DIAGNOSIS — I50.89 OTHER HEART FAILURE (HCC): ICD-10-CM

## 2022-04-22 DIAGNOSIS — C65.2 CANCER OF LEFT RENAL PELVIS (HCC): ICD-10-CM

## 2022-04-22 DIAGNOSIS — R09.89 HYPERDYNAMIC CIRCULATION: ICD-10-CM

## 2022-04-22 DIAGNOSIS — E83.42 HYPOMAGNESEMIA: ICD-10-CM

## 2022-04-22 DIAGNOSIS — G89.3 CANCER ASSOCIATED PAIN: ICD-10-CM

## 2022-04-22 DIAGNOSIS — C68.9 UROTHELIAL CANCER (HCC): Primary | ICD-10-CM

## 2022-04-22 DIAGNOSIS — I10 ESSENTIAL HYPERTENSION: ICD-10-CM

## 2022-04-22 DIAGNOSIS — C79.10 METASTATIC UROTHELIAL CARCINOMA (HCC): ICD-10-CM

## 2022-04-22 DIAGNOSIS — R00.1 SINUS BRADYCARDIA: ICD-10-CM

## 2022-04-22 DIAGNOSIS — E86.0 DEHYDRATION: ICD-10-CM

## 2022-04-22 PROBLEM — R06.09 DOE (DYSPNEA ON EXERTION): Status: ACTIVE | Noted: 2022-04-22

## 2022-04-22 PROCEDURE — 96367 TX/PROPH/DG ADDL SEQ IV INF: CPT

## 2022-04-22 PROCEDURE — 96413 CHEMO IV INFUSION 1 HR: CPT

## 2022-04-22 PROCEDURE — 93000 ELECTROCARDIOGRAM COMPLETE: CPT | Performed by: NURSE PRACTITIONER

## 2022-04-22 PROCEDURE — 99214 OFFICE O/P EST MOD 30 MIN: CPT | Performed by: NURSE PRACTITIONER

## 2022-04-22 RX ORDER — ACETAMINOPHEN 325 MG/1
650 TABLET ORAL ONCE
Status: DISCONTINUED | OUTPATIENT
Start: 2022-04-22 | End: 2022-04-26 | Stop reason: HOSPADM

## 2022-04-22 RX ORDER — SODIUM CHLORIDE 9 MG/ML
20 INJECTION, SOLUTION INTRAVENOUS ONCE
Status: COMPLETED | OUTPATIENT
Start: 2022-04-22 | End: 2022-04-22

## 2022-04-22 RX ADMIN — ENFORTUMAB VEDOTIN 90 MG: 30 INJECTION, POWDER, LYOPHILIZED, FOR SOLUTION INTRAVENOUS at 12:34

## 2022-04-22 RX ADMIN — MAGNESIUM SULFATE HEPTAHYDRATE: 500 INJECTION, SOLUTION INTRAMUSCULAR; INTRAVENOUS at 10:25

## 2022-04-22 RX ADMIN — DEXAMETHASONE SODIUM PHOSPHATE: 10 INJECTION, SOLUTION INTRAMUSCULAR; INTRAVENOUS at 11:59

## 2022-04-22 RX ADMIN — SODIUM CHLORIDE 20 ML/HR: 9 INJECTION, SOLUTION INTRAVENOUS at 11:26

## 2022-04-22 RX ADMIN — DIPHENHYDRAMINE HYDROCHLORIDE 25 MG: 50 INJECTION INTRAMUSCULAR; INTRAVENOUS at 11:27

## 2022-04-22 NOTE — PROGRESS NOTES
Patient ID: Luis Antonio Suarez is a 67 y o  male  Plan:      Essential hypertension   Blood pressure well controlled  Continue current regimen    Sinus bradycardia  Chronic and stable according to patient and his wife  Asymptomatic    HERRERA (dyspnea on exertion)   Check BNP   Check nuclear stress test to rule out ischemic cause       Follow up Plan/Summary Comments: There is evidence of a hyperdynamic left ventricle on echocardiogram, though unclear if this is the cause of his symptoms  Other than mild lower extremity edema, he does not examine volume overloaded  He notes that he had IV fluid today as part of his chemo treatment and this is not an unusual occurrence  Will continue current medications for now  Bradycardia prohibits the up titration of his beta-blocker  I asked him to please have a BNP drawn to assess for heart failure  I have also ordered a nuclear stress test for further evaluation and to rule out ischemia as a cause of his symptoms  He is unable to walk on a treadmill due to his lower extremity neuropathy and weakness  HPI:  I had the pleasure of meeting pat in the office today accompanied by his wife  He was referred by hematology for evaluation of recent onset HERRERA  An echocardiogram was performed earlier this month for further evaluation of these symptoms  See results below  Pt denies SOB at rest, orthopnea, nocturnal dyspnea  He is currently being treated for urothelial cancer with immunotherapy and chemo  He has chemo induced neuropathy and leg weakness  He denies chest pain, pressure, tightness, burning  Occasional feelings of a rapid heartbeat  Occasional lightheadedness with position changes  Occasional ankle edema but he notes this more often after getting IVF as part of his treatments  The edema usually resolves the day following his treatment without intervention  Denies known cardiac history  FH significant for CHF in his mother        Review of Systems   10  point ROS  was otherwise non pertinent or negative except as per HPI or as below  Gait: Normal-uses a walker at times      Most recent or relevant cardiac/vascular testing:     echocardiogram 04/06/2022 EF 75%, hyperdynamic  Mild LVH, mild diastolic dysfunction    Results for orders placed or performed in visit on 04/22/22   POCT ECG    Impression    SB, 55         Objective:     /72   Pulse 55   Ht 5' 8" (1 727 m)   Wt 92 5 kg (204 lb)   BMI 31 02 kg/m²     PHYSICAL EXAM:    General:  Normal appearance, no acute distress  Eyes:  Anicteric  Oral mucosa:   Wearing a mask  Neck:  No JVD  Carotid upstrokes are brisk without bruits  No masses  Chest:  Clear to auscultation   Cardiac:  No palpable PMI  Normal S1 and S2  No murmur gallop or rub  Abdomen:  Soft and nontender  No palpable organomegaly or aortic enlargement  Extremities:   +1 pitting ankle edema bilaterally  Musculoskeletal:  Symmetric  Vascular:  Pedal pulses are intact  Neuro:  Grossly symmetric  Psych:  Alert and oriented x3      Allergies   Allergen Reactions    Poison Ivy Extract Rash       Current Outpatient Medications:     acetaminophen (TYLENOL) 500 mg tablet, Take 2 tablets (1,000 mg total) by mouth every 8 (eight) hours, Disp: , Rfl:     albuterol (ProAir HFA) 90 mcg/act inhaler, Inhale 2 puffs every 6 (six) hours as needed for wheezing or shortness of breath, Disp: 8 5 g, Rfl: 3    allopurinol (ZYLOPRIM) 300 mg tablet, take 1 tablet by mouth once daily, Disp: 30 tablet, Rfl: 3    ALPRAZolam (XANAX) 0 25 mg tablet, Take 1 tablet (0 25 mg total) by mouth daily at bedtime as needed for anxiety (restless legs), Disp: 30 tablet, Rfl: 1    ALPRAZolam (XANAX) 0 25 mg tablet, Take 1 tablet (0 25 mg total) by mouth daily at bedtime as needed for anxiety, Disp: 30 tablet, Rfl: 0    amLODIPine (NORVASC) 5 mg tablet, Take 5 mg by mouth daily  , Disp: , Rfl:     atorvastatin (LIPITOR) 80 mg tablet, Take 80 mg by mouth every other day evening, Disp: , Rfl:     docusate sodium (COLACE) 250 MG capsule, Take 1 capsule (250 mg total) by mouth daily, Disp: 60 capsule, Rfl: 6    DULoxetine (CYMBALTA) 60 mg delayed release capsule, Take 1 capsule (60 mg total) by mouth daily, Disp: 30 capsule, Rfl: 1    famotidine (PEPCID) 20 mg tablet, take 1-2 tablets by mouth every evening, Disp: , Rfl:     folic acid (FOLVITE) 1 mg tablet, take 1 tablet by mouth once daily, Disp: 90 tablet, Rfl: 4    hydrOXYzine HCL (ATARAX) 25 mg tablet, Take 1 tablet (25 mg total) by mouth every 6 (six) hours as needed for itching or anxiety, Disp: 60 tablet, Rfl: 2    Magnesium 250 MG TABS, 1 tablet 2 (two) times a day Taking 500mg in the morning and 500mg at night, Disp: , Rfl:     metoprolol tartrate (LOPRESSOR) 100 mg tablet, Take 50 mg by mouth daily, Disp: , Rfl:     nystatin (MYCOSTATIN) cream, Apply topically 2 (two) times a day, Disp: 30 g, Rfl: 0    omeprazole (PriLOSEC) 20 mg delayed release capsule, Take 20 mg by mouth daily  , Disp: , Rfl:     predniSONE 10 mg tablet, Take 2 tablets (20 mg total) by mouth daily, Disp: 60 tablet, Rfl: 3    predniSONE 2 5 mg tablet, Take 1 tablet (2 5 mg total) by mouth daily Take 5mg daily in the am for 14 days then 2 5mg daily in the am thereafter, Disp: 14 tablet, Rfl: 0    predniSONE 5 mg tablet, Take 1 tablet (5 mg total) by mouth daily Take 5mg daily in the am for 14 days then 2 5mg daily in the am thereafter, Disp: 14 tablet, Rfl: 0    tadalafil (CIALIS) 20 MG tablet, Take 1 tablet by mouth one (1) hour prior to intercourse    Do not exceed more than two tablets per week , Disp: 12 tablet, Rfl: 3    tamsulosin (FLOMAX) 0 4 mg, Take 1 capsule (0 4 mg total) by mouth daily with dinner, Disp: 30 capsule, Rfl: 1    terazosin (HYTRIN) 5 mg capsule, Take 2 capsules (10 mg total) by mouth daily at bedtime, Disp: 30 capsule, Rfl: 6    triamcinolone (KENALOG) 0 1 % lotion, Apply topically 3 (three) times a day, Disp: 60 mL, Rfl: 5    VITAMIN D PO, Take 1,000 Units by mouth daily , Disp: , Rfl:     zolpidem (AMBIEN) 5 mg tablet, Take 1 tablet (5 mg total) by mouth daily at bedtime as needed for sleep, Disp: 30 tablet, Rfl: 1    morphine (MSIR) 15 mg tablet, Take 1/2 tablet every 4 hours as needed for severe pain (Patient not taking: Reported on 4/6/2022 ), Disp: 45 tablet, Rfl: 0    naloxone (NARCAN) 4 mg/0 1 mL nasal spray, Administer 1 spray into a nostril  If breathing does not return to normal or if breathing difficulty resumes after 2-3 minutes, give another dose in the other nostril using a new spray  (Patient not taking: Reported on 4/6/2022 ), Disp: 1 each, Rfl: 1    senna (SENOKOT) 8 6 mg, Take 1 tablet (8 6 mg total) by mouth daily at bedtime (Patient not taking: Reported on 4/6/2022 ), Disp: 30 each, Rfl: 0  No current facility-administered medications for this visit      Facility-Administered Medications Ordered in Other Visits:     acetaminophen (TYLENOL) tablet 650 mg, 650 mg, Oral, Once, Celine Modi MD  Past Medical History:   Diagnosis Date    Arthritis     Bladder cancer     Cancer ([facility] [address] )     skin melanoma;basal cell    Chronic pain disorder     from arthritis    Colon polyp     Does use hearing aid     bilat    Dry eyes, bilateral     GERD (gastroesophageal reflux disease)     History of kidney stones 10/2019    History of partial knee replacement     bilat    History of vertigo     Hyperlipidemia     Hypertension     Infusion extravasation of chemotherapy vesicant 11/2021    Kidney lesion     Lumbar disc disorder     compression of vertebrae L4-5-6    Muscle weakness     left hip area    Renal mass     left    Right ankle injury 03/05/2020    missed step of ladder     Right ankle pain     Shortness of breath     with activity    Tinnitus     Urothelial cancer     left    Wears glasses      Past Surgical History:   Procedure Laterality Date    COLONOSCOPY      CYSTOSCOPY Left 2020    Procedure: CYSTOSCOPY; URETERAL CATHETER PLACEMENT;  Surgeon: Keven Walker MD;  [facility];  Service: Urology    CYSTOSCOPY  2020    CYSTOSCOPY  2021    FL CYSTOGRAM  2020    FL RETROGRADE PYELOGRAM  2020    HERNIA REPAIR      umbilical with mesh    INGUINAL HERNIA REPAIR Right     with mesh    IR PORT PLACEMENT  2020    JOINT REPLACEMENT Bilateral     partials knee    LUMBAR EPIDURAL INJECTION      WI CYSTOURETHROSCOPY,URETER CATHETER Bilateral 2020    Procedure: CYSTOSCOPY; RIGHT RETROGRADE PYELOGRAM WITH RIGHT URETERAL CYTOLOGY SAMPLING; LEFT URETEROSCOPY WITH RENAL PELVIS BIOPSY AND LEFT STENT PLACEMENT;  Surgeon: Keven Walker MD;  [facility];  Service: Urology    WI NEPHRECTOMY, W/PART   URETECTOMY Left 2020    Procedure: ROBOTIC LAPAROSCOPIC NEPHRO-URETERECTOMY;  Surgeon: Keven Walker MD;  [facility];  Service: Urology    SKIN CANCER EXCISION      surface melanoma    TONSILLECTOMY      WISDOM TOOTH EXTRACTION         CMP:   Lab Results   Component Value Date    K 4 2 2022     2022    CO2 29 2022    BUN 20 2022    CREATININE 1 37 (H) 2022    EGFR 50 2022     Lipid Profile:    Lab Results   Component Value Date    TRIG 83 2021    HDL 57 2021         Social History     Tobacco Use   Smoking Status Former Smoker    Types: Cigarettes    Quit date: 0    Years since quittin 3   Smokeless Tobacco Never Used

## 2022-04-26 ENCOUNTER — HOSPITAL ENCOUNTER (OUTPATIENT)
Dept: INFUSION CENTER | Facility: HOSPITAL | Age: 72
Discharge: HOME | End: 2022-04-26
Payer: MEDICARE

## 2022-04-26 ENCOUNTER — TELEPHONE (OUTPATIENT)
Dept: NEPHROLOGY | Facility: CLINIC | Age: 72
End: 2022-04-26

## 2022-04-26 VITALS
TEMPERATURE: 97.1 F | DIASTOLIC BLOOD PRESSURE: 71 MMHG | HEART RATE: 97 BPM | OXYGEN SATURATION: 97 % | SYSTOLIC BLOOD PRESSURE: 118 MMHG | RESPIRATION RATE: 18 BRPM

## 2022-04-26 DIAGNOSIS — E86.0 DEHYDRATION: Primary | ICD-10-CM

## 2022-04-26 PROCEDURE — 96360 HYDRATION IV INFUSION INIT: CPT

## 2022-04-26 RX ADMIN — SODIUM CHLORIDE 1000 ML: 0.9 INJECTION, SOLUTION INTRAVENOUS at 10:12

## 2022-04-26 NOTE — PROGRESS NOTES
Patient tolerated hydration without issue  Discharged in stable condition, declined AVS but aware of next appointment

## 2022-04-26 NOTE — TELEPHONE ENCOUNTER
Appointment Confirmation   Person confirmed appointment with  If not patient, name of the person Spouse    Date and time of appointment 04/27/22 @ 10:30     Patient acknowledged and will be at appointment? yes    Did you advise the patient that they will need a urine sample if they are a new patient?  N/A    Did you advise the patient to bring their current medications for verification? (including any OTC) Yes    Additional Information

## 2022-04-27 ENCOUNTER — OFFICE VISIT (OUTPATIENT)
Dept: NEPHROLOGY | Facility: CLINIC | Age: 72
End: 2022-04-27
Payer: MEDICARE

## 2022-04-27 VITALS
OXYGEN SATURATION: 97 % | HEART RATE: 76 BPM | HEIGHT: 68 IN | WEIGHT: 200.8 LBS | BODY MASS INDEX: 30.43 KG/M2 | DIASTOLIC BLOOD PRESSURE: 72 MMHG | SYSTOLIC BLOOD PRESSURE: 124 MMHG

## 2022-04-27 DIAGNOSIS — G62.9 NEUROPATHY: ICD-10-CM

## 2022-04-27 DIAGNOSIS — N18.31 STAGE 3A CHRONIC KIDNEY DISEASE (HCC): Primary | ICD-10-CM

## 2022-04-27 DIAGNOSIS — I10 ESSENTIAL HYPERTENSION: ICD-10-CM

## 2022-04-27 DIAGNOSIS — C79.89 SECONDARY MALIGNANT NEOPLASM OF MUSCLE OF ABDOMEN (HCC): ICD-10-CM

## 2022-04-27 PROCEDURE — 99214 OFFICE O/P EST MOD 30 MIN: CPT | Performed by: INTERNAL MEDICINE

## 2022-04-27 NOTE — ASSESSMENT & PLAN NOTE
Blood pressures occasionally low  BP at home 117-122/65  Decrease metoprolol to 50mg at night to avoid underperfusion of remaining kidney  He has a 100mg tablet listed and it is listed to take 50mg  He will check the size of the tablet and then reduce by 50%

## 2022-04-27 NOTE — PROGRESS NOTES
[facility], [place]    Name: Hortencia Rodriguez  YOB: 1950      Assessment/Plan:           Problem List Items Addressed This Visit        Cardiovascular and Mediastinum    Essential hypertension     Blood pressures occasionally low  BP at home 117-122/65  Decrease metoprolol to 50mg at night to avoid underperfusion of remaining kidney  He has a 100mg tablet listed and it is listed to take 50mg  He will check the size of the tablet and then reduce by 50%                Nervous and Auditory    Neuropathy     He has difficulty walking unfortunately            Musculoskeletal and Integument    Secondary malignant neoplasm of muscle of abdomen ([address] )     Under Dr Donnie Arora care            Genitourinary    Stage 3a chronic kidney disease [facility]) - Primary     Lab Results   Component Value Date    EGFR 50 04/19/2022    EGFR 47 04/12/2022    EGFR 50 04/04/2022    CREATININE 1 37 (H) 04/19/2022    CREATININE 1 46 (H) 04/12/2022    CREATININE 1 38 (H) 04/04/2022   He has chronic stable stage 3A kidney function in the setting of a single kidney system  The right  Kidney is normal in size and configuration  Will check urine studies to ensure that he is not excreting protein in  the urine    He avoids NSAIDs  Has a sheet on safe OTC meds to take         Relevant Orders    Urinalysis with microscopic    Protein / creatinine ratio, urine    Microalbumin / creatinine urine ratio            Subjective:      Patient ID: Hortencia Rodriguez is a 67 y o  male  referred by Dr Tiffany Cardenas    HPI Has a history of metastatic urothelial CA on palliative tx with enfortumab vedolin, hypomagnesemia and chemo induced neuropathy  He is followed by Dr Kris Bustos  He is tolerating treatment well  Neuropathy has not changed  He was seen 6 months ago with stage 3 A chronic kidney disease with a baseline creatinine of 1 5 mg/dl  He had a left nephrectomy May 2020 due to rurothelial cancer    He has essential hypertension and vitamin D deficiency        The following portions of the patient's history were reviewed and updated as appropriate: allergies, current medications, past family history, past medical history, past social history, past surgical history and problem list     Review of Systems   Constitutional: Positive for fatigue  HENT: Negative for hearing loss  Respiratory: Negative for cough, chest tightness, shortness of breath and wheezing  Cardiovascular: Positive for leg swelling  Negative for chest pain and palpitations  Has dependent edema resolves over night   Gastrointestinal: Negative for abdominal pain, blood in stool, constipation and diarrhea  Genitourinary: Negative for difficulty urinating, dysuria, hematuria and urgency  On Flomax with improvement in urination without hesitancy with a good flow  Urine clear yellow  Occasional urgency due to leg weakness  Had leakage improved with Flomax   When he has an urge - he goes to the bathroom   Musculoskeletal: Positive for arthralgias and gait problem  He fell twice and has poor balance due to neuropathy   Neurological: Positive for weakness  Negative for dizziness and light-headedness  Hematological: Bruises/bleeds easily  Psychiatric/Behavioral: Negative for dysphoric mood  Social History     Socioeconomic History    Marital status: /Civil Union     Spouse name: None    Number of children: None    Years of education: None    Highest education level: None   Occupational History    None   Tobacco Use    Smoking status: Former Smoker     Types: Cigarettes     Quit date:      Years since quittin 3    Smokeless tobacco: Never Used   Vaping Use    Vaping Use: Never used   Substance and Sexual Activity    Alcohol use:  Yes     Alcohol/week: 17 0 standard drinks     Types: 7 Glasses of wine, 10 Cans of beer per week     Comment: socially    Drug use: Never    Sexual activity: Not Currently   Other Topics Concern    None   Social History Narrative    Daily caffeine use - 2 cups coffee      Social Determinants of Health     Financial Resource Strain: Not on file   Food Insecurity: Not on file   Transportation Needs: Not on file   Physical Activity: Not on file   Stress: Not on file   Social Connections: Not on file   Intimate Partner Violence: Not on file   Housing Stability: Not on file     Past Medical History:   Diagnosis Date    Arthritis     Bladder cancer     Cancer ([address] )     skin melanoma;basal cell    Chronic pain disorder     from arthritis    Colon polyp     Does use hearing aid     bilat    Dry eyes, bilateral     GERD (gastroesophageal reflux disease)     History of kidney stones 10/2019    History of partial knee replacement     bilat    History of vertigo     Hyperlipidemia     Hypertension     Infusion extravasation of chemotherapy vesicant 11/2021    Kidney lesion     Lumbar disc disorder     compression of vertebrae L4-5-6    Muscle weakness     left hip area    Renal mass     left    Right ankle injury 03/05/2020    missed step of ladder     Right ankle pain     Shortness of breath     with activity    Tinnitus     Urothelial cancer     left    Wears glasses      Past Surgical History:   Procedure Laterality Date    COLONOSCOPY      CYSTOSCOPY Left 4/1/2020    Procedure: CYSTOSCOPY; URETERAL CATHETER PLACEMENT;  Surgeon: Susan Smalls MD;  [facility];  Service: Urology    CYSTOSCOPY  05/11/2020    CYSTOSCOPY  06/04/2021    FL CYSTOGRAM  4/13/2020    FL RETROGRADE PYELOGRAM  1/17/2020    HERNIA REPAIR      umbilical with mesh    INGUINAL HERNIA REPAIR Right     with mesh    IR PORT PLACEMENT  4/30/2020    JOINT REPLACEMENT Bilateral     partials knee    LUMBAR EPIDURAL INJECTION      [place] CYSTOURETHROSCOPY,URETER CATHETER Bilateral 1/17/2020    Procedure: CYSTOSCOPY; RIGHT RETROGRADE PYELOGRAM WITH RIGHT URETERAL CYTOLOGY SAMPLING; LEFT URETEROSCOPY WITH RENAL PELVIS BIOPSY AND LEFT STENT PLACEMENT;  Surgeon: Susan Smalls MD;  [facility];  Service: Urology    NM NEPHRECTOMY, W/PART   URETECTOMY Left 4/1/2020    Procedure: ROBOTIC LAPAROSCOPIC NEPHRO-URETERECTOMY;  Surgeon: Susan Smalls MD;  [facility];  Service: Urology    SKIN CANCER EXCISION      surface melanoma    TONSILLECTOMY      WISDOM TOOTH EXTRACTION         Current Outpatient Medications:     acetaminophen (TYLENOL) 500 mg tablet, Take 2 tablets (1,000 mg total) by mouth every 8 (eight) hours, Disp: , Rfl:     allopurinol (ZYLOPRIM) 300 mg tablet, take 1 tablet by mouth once daily, Disp: 30 tablet, Rfl: 3    ALPRAZolam (XANAX) 0 25 mg tablet, Take 1 tablet (0 25 mg total) by mouth daily at bedtime as needed for anxiety (restless legs), Disp: 30 tablet, Rfl: 1    ALPRAZolam (XANAX) 0 25 mg tablet, Take 1 tablet (0 25 mg total) by mouth daily at bedtime as needed for anxiety, Disp: 30 tablet, Rfl: 0    amLODIPine (NORVASC) 5 mg tablet, Take 5 mg by mouth daily  , Disp: , Rfl:     atorvastatin (LIPITOR) 80 mg tablet, Take 80 mg by mouth every other day evening, Disp: , Rfl:     docusate sodium (COLACE) 250 MG capsule, Take 1 capsule (250 mg total) by mouth daily, Disp: 60 capsule, Rfl: 6    DULoxetine (CYMBALTA) 60 mg delayed release capsule, Take 1 capsule (60 mg total) by mouth daily, Disp: 30 capsule, Rfl: 1    folic acid (FOLVITE) 1 mg tablet, take 1 tablet by mouth once daily, Disp: 90 tablet, Rfl: 4    hydrOXYzine HCL (ATARAX) 25 mg tablet, Take 1 tablet (25 mg total) by mouth every 6 (six) hours as needed for itching or anxiety, Disp: 60 tablet, Rfl: 2    Magnesium 250 MG TABS, 1 tablet 2 (two) times a day Taking 500mg in the morning and 500mg at night, Disp: , Rfl:     metoprolol tartrate (LOPRESSOR) 100 mg tablet, Take 50 mg by mouth daily, Disp: , Rfl:     nystatin (MYCOSTATIN) cream, Apply topically 2 (two) times a day, Disp: 30 g, Rfl: 0    omeprazole (PriLOSEC) 20 mg delayed release capsule, Take 20 mg by mouth daily  , Disp: , Rfl:     predniSONE 2 5 mg tablet, Take 1 tablet (2 5 mg total) by mouth daily Take 5mg daily in the am for 14 days then 2 5mg daily in the am thereafter, Disp: 14 tablet, Rfl: 0    tadalafil (CIALIS) 20 MG tablet, Take 1 tablet by mouth one (1) hour prior to intercourse  Do not exceed more than two tablets per week , Disp: 12 tablet, Rfl: 3    tamsulosin (FLOMAX) 0 4 mg, Take 1 capsule (0 4 mg total) by mouth daily with dinner, Disp: 30 capsule, Rfl: 1    terazosin (HYTRIN) 5 mg capsule, Take 2 capsules (10 mg total) by mouth daily at bedtime, Disp: 30 capsule, Rfl: 6    triamcinolone (KENALOG) 0 1 % lotion, Apply topically 3 (three) times a day, Disp: 60 mL, Rfl: 5    VITAMIN D PO, Take 1,000 Units by mouth daily , Disp: , Rfl:     zolpidem (AMBIEN) 5 mg tablet, Take 1 tablet (5 mg total) by mouth daily at bedtime as needed for sleep, Disp: 30 tablet, Rfl: 1    albuterol (ProAir HFA) 90 mcg/act inhaler, Inhale 2 puffs every 6 (six) hours as needed for wheezing or shortness of breath (Patient not taking: Reported on 4/27/2022 ), Disp: 8 5 g, Rfl: 3    famotidine (PEPCID) 20 mg tablet, take 1-2 tablets by mouth every evening, Disp: , Rfl:     morphine (MSIR) 15 mg tablet, Take 1/2 tablet every 4 hours as needed for severe pain (Patient not taking: Reported on 4/6/2022 ), Disp: 45 tablet, Rfl: 0    naloxone (NARCAN) 4 mg/0 1 mL nasal spray, Administer 1 spray into a nostril  If breathing does not return to normal or if breathing difficulty resumes after 2-3 minutes, give another dose in the other nostril using a new spray   (Patient not taking: Reported on 4/6/2022 ), Disp: 1 each, Rfl: 1    predniSONE 10 mg tablet, Take 2 tablets (20 mg total) by mouth daily (Patient not taking: Reported on 4/27/2022 ), Disp: 60 tablet, Rfl: 3    predniSONE 5 mg tablet, Take 1 tablet (5 mg total) by mouth daily Take 5mg daily in the am for 14 days then 2 5mg daily in the am thereafter (Patient not taking: Reported on 4/27/2022 ), Disp: 14 tablet, Rfl: 0    senna (SENOKOT) 8 6 mg, Take 1 tablet (8 6 mg total) by mouth daily at bedtime (Patient not taking: Reported on 4/6/2022 ), Disp: 30 each, Rfl: 0    Lab Results   Component Value Date    SODIUM 138 04/19/2022    K 4 2 04/19/2022     04/19/2022    CO2 29 04/19/2022    AGAP 7 04/19/2022    BUN 20 04/19/2022    CREATININE 1 37 (H) 04/19/2022    GLUC 119 04/19/2022    GLUF 111 (H) 03/01/2022    CALCIUM 9 3 04/19/2022    AST 30 04/19/2022    ALT 52 04/19/2022    ALKPHOS 60 04/19/2022    TP 6 3 (L) 04/19/2022    TBILI 1 11 (H) 04/19/2022    EGFR 50 04/19/2022     Lab Results   Component Value Date    WBC 7 41 04/19/2022    HGB 12 8 04/19/2022    HCT 39 5 04/19/2022     (H) 04/19/2022     04/19/2022     Lab Results   Component Value Date    CHOLESTEROL 126 11/30/2021     Lab Results   Component Value Date    HDL 57 11/30/2021     Lab Results   Component Value Date    LDLCALC 52 11/30/2021     Lab Results   Component Value Date    TRIG 83 11/30/2021     No results found for: [place], [place]  Lab Results   Component Value Date    EDP9LYKZMYPR 1 590 01/12/2022     Lab Results   Component Value Date    PTH 47 1 09/08/2021    CALCIUM 9 3 04/19/2022    PHOS 4 1 09/08/2021     No results found for: SPEP, UPEP  No results found for: JOHN BEATTY        Objective:      /72   Pulse 76   Ht 5' 8" (1 727 m)   Wt 91 1 kg (200 lb 12 8 oz)   SpO2 97%   BMI 30 53 kg/m²          Physical Exam  Vitals reviewed  Constitutional:       General: He is not in acute distress  Appearance: He is normal weight  He is not toxic-appearing  HENT:      Head: Normocephalic and atraumatic  Right Ear: External ear normal       Left Ear: External ear normal    Eyes:      Extraocular Movements: Extraocular movements intact        Conjunctiva/sclera: Conjunctivae normal       Pupils: Pupils are equal, round, and reactive to light  Neck:      Vascular: No carotid bruit  Cardiovascular:      Rate and Rhythm: Normal rate and regular rhythm  Pulmonary:      Effort: Pulmonary effort is normal  No respiratory distress  Breath sounds: Normal breath sounds  Abdominal:      General: Bowel sounds are normal  There is no distension  Palpations: Abdomen is soft  Tenderness: There is no abdominal tenderness  Musculoskeletal:         General: Normal range of motion  Cervical back: No rigidity  Right lower leg: No edema  Left lower leg: No edema  Lymphadenopathy:      Cervical: No cervical adenopathy  Skin:     General: Skin is warm and dry  Neurological:      General: No focal deficit present  Mental Status: He is alert and oriented to person, place, and time  Mental status is at baseline  Motor: No weakness  Psychiatric:         Mood and Affect: Mood normal          Behavior: Behavior normal          Thought Content:  Thought content normal          Judgment: Judgment normal

## 2022-04-27 NOTE — ASSESSMENT & PLAN NOTE
Lab Results   Component Value Date    EGFR 50 04/19/2022    EGFR 47 04/12/2022    EGFR 50 04/04/2022    CREATININE 1 37 (H) 04/19/2022    CREATININE 1 46 (H) 04/12/2022    CREATININE 1 38 (H) 04/04/2022   He has chronic stable stage 3A kidney function in the setting of a single kidney system  The right  Kidney is normal in size and configuration  Will check urine studies to ensure that he is not excreting protein in  the urine    He avoids NSAIDs   Has a sheet on safe OTC meds to take

## 2022-04-28 ENCOUNTER — OFFICE VISIT (OUTPATIENT)
Dept: UROLOGY | Facility: CLINIC | Age: 72
End: 2022-04-28
Payer: MEDICARE

## 2022-04-28 VITALS
HEART RATE: 70 BPM | WEIGHT: 197 LBS | BODY MASS INDEX: 29.86 KG/M2 | DIASTOLIC BLOOD PRESSURE: 62 MMHG | HEIGHT: 68 IN | SYSTOLIC BLOOD PRESSURE: 120 MMHG

## 2022-04-28 DIAGNOSIS — N52.9 ERECTILE DYSFUNCTION, UNSPECIFIED ERECTILE DYSFUNCTION TYPE: Primary | ICD-10-CM

## 2022-04-28 PROCEDURE — 99214 OFFICE O/P EST MOD 30 MIN: CPT | Performed by: PHYSICIAN ASSISTANT

## 2022-04-28 RX ORDER — TADALAFIL 20 MG/1
TABLET ORAL
Qty: 15 TABLET | Refills: 3 | Status: SHIPPED | OUTPATIENT
Start: 2022-04-28

## 2022-04-28 NOTE — PROGRESS NOTES
4/28/2022      Chief Complaint   Patient presents with    Follow-up         Assessment and Plan    67 y o  male managed by Dr Peters Neigh    1  pT3b Left upper tract urothelial carcinoma  - status post left nephroureterectomy April 2020  - metastatic lymphadenopathy received palliative radiation and immunotherapy  - continue medical oncology systemic therapy enfortumab (padcev)  - current creatinine 1 3    2  BPH  - continue flomax 0 4mg qhs    3  ED  - begin tadalafil 20mg prn- new written rx with goodrx provided today    Followup in 3 months for symptom reassessment with rx; [facility] [place] systemic therapy directed by medical oncology, most recent PET March 2022 with good response to therapy  History of Present Illness  Chivo Hughes is a 67 y o  male here for evaluation of three month follow-up  Patient has left urothelial carcinoma treated with robot assisted left nephroureterectomy April of 2020  Final pathology was a T3 disease with invasion to peripelvic fat, has been seeing Medical Oncology postoperatively for adjuvant chemotherapy which was discontinued due to side effects NIKUNJ, neuropathy, and hearing loss after one cycle  A PET scan showed progressive lymphadenopathy he was treated with Keytruda and then more recently adjusted to [facility] which he remains on currently  Has completed 7 of 13 cycles for the year  Received radiation to abdominal rectus/wall lesion as well  PET scans show improved response  Cystoscopy performed in December 2021 showed a normal bladder with no mucosal lesions or drop lesions  At last visit discussed his erectile dysfunction which has progressed, viagra was no longer providing relief  He was prescribed an alternate prescription tadalafil 20 mg on a p r n  Basis presents today to discuss this  He was unable to fill the prescription at [facility] or the [place] due to cost/coverage  Has not had any erections in the last six months  Last good erection was just about 1 year ago   He used viagra back then  He and his wife are traveling to [facility] for the week next week just the two of them to relax  Review of Systems   Constitutional: Positive for fatigue  Negative for activity change, appetite change, chills, fever and unexpected weight change  HENT: Negative  Respiratory: Positive for shortness of breath (HERRERA)  Negative for cough  Cardiovascular: Negative  Negative for chest pain and leg swelling  Gastrointestinal: Negative for abdominal distention, abdominal pain, diarrhea, nausea and vomiting  Endocrine: Negative  Genitourinary: Positive for frequency and urgency  Negative for decreased urine volume, difficulty urinating, dysuria, flank pain, hematuria and testicular pain  Musculoskeletal: Negative for back pain and gait problem  Skin: Negative  Allergic/Immunologic: Negative  Neurological: Positive for numbness (peripheral neuropathy)  Hematological: Negative for adenopathy  Does not bruise/bleed easily  AUA SYMPTOM SCORE      Most Recent Value   AUA SYMPTOM SCORE    How often have you had a sensation of not emptying your bladder completely after you finished urinating? 5   How often have you had to urinate again less than two hours after you finished urinating? 5   How often have you found you stopped and started again several times when you urinate? 4   How often have you found it difficult to postpone urination? 4   How often have you had a weak urinary stream? 3   How often have you had to push or strain to begin urination? 4   How many times did you most typically get up to urinate from the time you went to bed at night until the time you got up in the morning?  4   Quality of Life: If you were to spend the rest of your life with your urinary condition just the way it is now, how would you feel about that? 3   AUA SYMPTOM SCORE 29           Vitals  Vitals:    04/28/22 1001   BP: 120/62   BP Location: Left arm   Patient Position: Sitting   Cuff Size: Adult   Pulse: 70   Weight: 89 4 kg (197 lb)   Height: 5' 8" (1 727 m)       Physical Exam      Past History  Past Medical History:   Diagnosis Date    Arthritis     Bladder cancer     Cancer ([facility] [address] )     skin melanoma;basal cell    Chronic pain disorder     from arthritis    Colon polyp     Does use hearing aid     bilat    Dry eyes, bilateral     GERD (gastroesophageal reflux disease)     History of kidney stones 10/2019    History of partial knee replacement     bilat    History of vertigo     Hyperlipidemia     Hypertension     Infusion extravasation of chemotherapy vesicant 2021    Kidney lesion     Lumbar disc disorder     compression of vertebrae L4-5-6    Muscle weakness     left hip area    Renal mass     left    Right ankle injury 2020    missed step of ladder     Right ankle pain     Shortness of breath     with activity    Tinnitus     Urothelial cancer     left    Wears glasses      Social History     Socioeconomic History    Marital status: /Civil Union     Spouse name: None    Number of children: None    Years of education: None    Highest education level: None   Occupational History    None   Tobacco Use    Smoking status: Former Smoker     Types: Cigarettes     Quit date:      Years since quittin 3    Smokeless tobacco: Never Used   Vaping Use    Vaping Use: Never used   Substance and Sexual Activity    Alcohol use:  Yes     Alcohol/week: 17 0 standard drinks     Types: 7 Glasses of wine, 10 Cans of beer per week     Comment: socially    Drug use: Never    Sexual activity: Not Currently   Other Topics Concern    None   Social History Narrative    Daily caffeine use - 2 cups coffee      Social Determinants of Health     Financial Resource Strain: Not on file   Food Insecurity: Not on file   Transportation Needs: Not on file   Physical Activity: Not on file   Stress: Not on file   Social Connections: Not on file   Intimate Partner Violence: Not on file   Housing Stability: Not on file     Social History     Tobacco Use   Smoking Status Former Smoker    Types: Cigarettes    Quit date: 0    Years since quittin 3   Smokeless Tobacco Never Used     Family History   Problem Relation Age of Onset    Liver cancer Father        The following portions of the patient's history were reviewed and updated as appropriate: allergies, current medications, past medical history, past social history, past surgical history and problem list     Results  No results found for this or any previous visit (from the past 1 hour(s))  ]  No results found for: PSA  Lab Results   Component Value Date    CALCIUM 9 3 2022    K 4 2 2022    CO2 29 2022     2022    BUN 20 2022    CREATININE 1 37 (H) 2022     Lab Results   Component Value Date    WBC 7 41 2022    HGB 12 8 2022    HCT 39 5 2022     (H) 2022     2022

## 2022-04-29 ENCOUNTER — HOSPITAL ENCOUNTER (OUTPATIENT)
Dept: INFUSION CENTER | Facility: HOSPITAL | Age: 72
Discharge: HOME | End: 2022-04-29
Attending: INTERNAL MEDICINE
Payer: MEDICARE

## 2022-04-29 VITALS
HEART RATE: 86 BPM | OXYGEN SATURATION: 95 % | RESPIRATION RATE: 18 BRPM | SYSTOLIC BLOOD PRESSURE: 149 MMHG | TEMPERATURE: 96.2 F | DIASTOLIC BLOOD PRESSURE: 85 MMHG

## 2022-04-29 DIAGNOSIS — E86.0 DEHYDRATION: Primary | ICD-10-CM

## 2022-04-29 LAB
BACTERIA UR QL AUTO: NORMAL /HPF
BILIRUB UR QL STRIP: NEGATIVE
CLARITY UR: CLEAR
COLOR UR: YELLOW
CREAT UR-MCNC: 135 MG/DL
CREAT UR-MCNC: 135 MG/DL
GLUCOSE UR STRIP-MCNC: NEGATIVE MG/DL
HGB UR QL STRIP.AUTO: NEGATIVE
KETONES UR STRIP-MCNC: NEGATIVE MG/DL
LEUKOCYTE ESTERASE UR QL STRIP: NEGATIVE
MICROALBUMIN UR-MCNC: 8.9 MG/L (ref 0–20)
MICROALBUMIN/CREAT 24H UR: 7 MG/G CREATININE (ref 0–30)
NITRITE UR QL STRIP: NEGATIVE
NON-SQ EPI CELLS URNS QL MICRO: NORMAL /HPF
PH UR STRIP.AUTO: 6 [PH]
PROT UR STRIP-MCNC: NEGATIVE MG/DL
PROT UR-MCNC: 16 MG/DL
PROT/CREAT UR: 0.12 MG/G{CREAT} (ref 0–0.1)
RBC #/AREA URNS AUTO: NORMAL /HPF
SP GR UR STRIP.AUTO: 1.02 (ref 1–1.03)
UROBILINOGEN UR QL STRIP.AUTO: 0.2 E.U./DL
WBC #/AREA URNS AUTO: NORMAL /HPF

## 2022-04-29 PROCEDURE — 81001 URINALYSIS AUTO W/SCOPE: CPT | Performed by: INTERNAL MEDICINE

## 2022-04-29 PROCEDURE — 82043 UR ALBUMIN QUANTITATIVE: CPT | Performed by: INTERNAL MEDICINE

## 2022-04-29 PROCEDURE — 96360 HYDRATION IV INFUSION INIT: CPT

## 2022-04-29 PROCEDURE — 84156 ASSAY OF PROTEIN URINE: CPT | Performed by: INTERNAL MEDICINE

## 2022-04-29 PROCEDURE — 82570 ASSAY OF URINE CREATININE: CPT | Performed by: INTERNAL MEDICINE

## 2022-04-29 RX ADMIN — SODIUM CHLORIDE 1000 ML: 0.9 INJECTION, SOLUTION INTRAVENOUS at 10:08

## 2022-04-29 NOTE — PROGRESS NOTES
Pt tolerated routine hydration  Urine specimens sent to lab per dr Caroline Martin office request  Nelly Barnes flushed and deaccessed per routine   Discharged ambulatory with cane

## 2022-04-30 DIAGNOSIS — E79.0 HYPERURICEMIA: ICD-10-CM

## 2022-05-02 RX ORDER — ALLOPURINOL 300 MG/1
TABLET ORAL
Qty: 30 TABLET | Refills: 3 | Status: SHIPPED | OUTPATIENT
Start: 2022-05-02

## 2022-05-06 DIAGNOSIS — C68.9 UROTHELIAL CANCER (HCC): ICD-10-CM

## 2022-05-06 DIAGNOSIS — C65.2 CANCER OF LEFT RENAL PELVIS (HCC): ICD-10-CM

## 2022-05-06 DIAGNOSIS — C79.10 METASTATIC UROTHELIAL CARCINOMA (HCC): Primary | ICD-10-CM

## 2022-05-06 RX ORDER — SODIUM CHLORIDE 9 MG/ML
20 INJECTION, SOLUTION INTRAVENOUS ONCE
Status: CANCELLED | OUTPATIENT
Start: 2022-05-27

## 2022-05-06 RX ORDER — ACETAMINOPHEN 325 MG/1
650 TABLET ORAL ONCE
Status: CANCELLED | OUTPATIENT
Start: 2022-05-20

## 2022-05-06 RX ORDER — SODIUM CHLORIDE 9 MG/ML
20 INJECTION, SOLUTION INTRAVENOUS ONCE
Status: CANCELLED | OUTPATIENT
Start: 2022-05-20

## 2022-05-06 RX ORDER — SODIUM CHLORIDE 9 MG/ML
20 INJECTION, SOLUTION INTRAVENOUS ONCE
Status: CANCELLED | OUTPATIENT
Start: 2022-05-13

## 2022-05-06 RX ORDER — ACETAMINOPHEN 325 MG/1
650 TABLET ORAL ONCE
Status: CANCELLED | OUTPATIENT
Start: 2022-05-27

## 2022-05-06 RX ORDER — ACETAMINOPHEN 325 MG/1
650 TABLET ORAL ONCE
Status: CANCELLED | OUTPATIENT
Start: 2022-05-13

## 2022-05-09 DIAGNOSIS — E83.42 HYPOMAGNESEMIA: Primary | ICD-10-CM

## 2022-05-10 ENCOUNTER — HOSPITAL ENCOUNTER (OUTPATIENT)
Dept: INFUSION CENTER | Facility: HOSPITAL | Age: 72
Discharge: HOME | End: 2022-05-10
Attending: INTERNAL MEDICINE
Payer: MEDICARE

## 2022-05-10 VITALS
TEMPERATURE: 96.7 F | HEART RATE: 63 BPM | RESPIRATION RATE: 18 BRPM | OXYGEN SATURATION: 97 % | DIASTOLIC BLOOD PRESSURE: 59 MMHG | SYSTOLIC BLOOD PRESSURE: 116 MMHG

## 2022-05-10 DIAGNOSIS — C79.10 METASTATIC UROTHELIAL CARCINOMA (HCC): ICD-10-CM

## 2022-05-10 DIAGNOSIS — E83.42 HYPOMAGNESEMIA: ICD-10-CM

## 2022-05-10 DIAGNOSIS — C65.2 CANCER OF LEFT RENAL PELVIS (HCC): ICD-10-CM

## 2022-05-10 DIAGNOSIS — C68.9 UROTHELIAL CANCER (HCC): ICD-10-CM

## 2022-05-10 DIAGNOSIS — E86.0 DEHYDRATION: Primary | ICD-10-CM

## 2022-05-10 LAB
ALBUMIN SERPL BCP-MCNC: 3.8 G/DL (ref 3.5–5)
ALP SERPL-CCNC: 70 U/L (ref 34–104)
ALT SERPL W P-5'-P-CCNC: 16 U/L (ref 7–52)
ANION GAP SERPL CALCULATED.3IONS-SCNC: 8 MMOL/L (ref 4–13)
AST SERPL W P-5'-P-CCNC: 16 U/L (ref 13–39)
BASOPHILS # BLD AUTO: 0.02 THOUSANDS/ΜL (ref 0–0.1)
BASOPHILS NFR BLD AUTO: 0 % (ref 0–1)
BILIRUB SERPL-MCNC: 0.7 MG/DL (ref 0.2–1)
BUN SERPL-MCNC: 17 MG/DL (ref 5–25)
CALCIUM SERPL-MCNC: 9.5 MG/DL (ref 8.4–10.2)
CHLORIDE SERPL-SCNC: 105 MMOL/L (ref 96–108)
CO2 SERPL-SCNC: 28 MMOL/L (ref 21–32)
CREAT SERPL-MCNC: 1.39 MG/DL (ref 0.6–1.3)
EOSINOPHIL # BLD AUTO: 0 THOUSAND/ΜL (ref 0–0.61)
EOSINOPHIL NFR BLD AUTO: 0 % (ref 0–6)
ERYTHROCYTE [DISTWIDTH] IN BLOOD BY AUTOMATED COUNT: 14.4 % (ref 11.6–15.1)
GFR SERPL CREATININE-BSD FRML MDRD: 49 ML/MIN/1.73SQ M
GLUCOSE SERPL-MCNC: 106 MG/DL (ref 65–140)
HCT VFR BLD AUTO: 38.3 % (ref 36.5–49.3)
HGB BLD-MCNC: 12.1 G/DL (ref 12–17)
IMM GRANULOCYTES # BLD AUTO: 0.01 THOUSAND/UL (ref 0–0.2)
IMM GRANULOCYTES NFR BLD AUTO: 0 % (ref 0–2)
LYMPHOCYTES # BLD AUTO: 1.13 THOUSANDS/ΜL (ref 0.6–4.47)
LYMPHOCYTES NFR BLD AUTO: 24 % (ref 14–44)
MAGNESIUM SERPL-MCNC: 1.8 MG/DL (ref 1.9–2.7)
MCH RBC QN AUTO: 32.2 PG (ref 26.8–34.3)
MCHC RBC AUTO-ENTMCNC: 31.6 G/DL (ref 31.4–37.4)
MCV RBC AUTO: 102 FL (ref 82–98)
MONOCYTES # BLD AUTO: 0.66 THOUSAND/ΜL (ref 0.17–1.22)
MONOCYTES NFR BLD AUTO: 14 % (ref 4–12)
NEUTROPHILS # BLD AUTO: 2.95 THOUSANDS/ΜL (ref 1.85–7.62)
NEUTS SEG NFR BLD AUTO: 62 % (ref 43–75)
NRBC BLD AUTO-RTO: 0 /100 WBCS
PLATELET # BLD AUTO: 179 THOUSANDS/UL (ref 149–390)
PMV BLD AUTO: 10.1 FL (ref 8.9–12.7)
POTASSIUM SERPL-SCNC: 4.1 MMOL/L (ref 3.5–5.3)
PROT SERPL-MCNC: 6.3 G/DL (ref 6.4–8.4)
RBC # BLD AUTO: 3.76 MILLION/UL (ref 3.88–5.62)
SODIUM SERPL-SCNC: 141 MMOL/L (ref 135–147)
WBC # BLD AUTO: 4.77 THOUSAND/UL (ref 4.31–10.16)

## 2022-05-10 PROCEDURE — 83735 ASSAY OF MAGNESIUM: CPT | Performed by: INTERNAL MEDICINE

## 2022-05-10 PROCEDURE — 80053 COMPREHEN METABOLIC PANEL: CPT | Performed by: INTERNAL MEDICINE

## 2022-05-10 PROCEDURE — 85025 COMPLETE CBC W/AUTO DIFF WBC: CPT | Performed by: INTERNAL MEDICINE

## 2022-05-10 PROCEDURE — 96360 HYDRATION IV INFUSION INIT: CPT

## 2022-05-10 RX ADMIN — SODIUM CHLORIDE 1000 ML: 0.9 INJECTION, SOLUTION INTRAVENOUS at 11:36

## 2022-05-12 ENCOUNTER — HOSPITAL ENCOUNTER (OUTPATIENT)
Dept: NUCLEAR MEDICINE | Facility: HOSPITAL | Age: 72
Discharge: HOME | End: 2022-05-12
Payer: MEDICARE

## 2022-05-12 ENCOUNTER — HOSPITAL ENCOUNTER (OUTPATIENT)
Dept: NON INVASIVE DIAGNOSTICS | Facility: HOSPITAL | Age: 72
Discharge: HOME | End: 2022-05-12
Payer: MEDICARE

## 2022-05-12 ENCOUNTER — OFFICE VISIT (OUTPATIENT)
Dept: HEMATOLOGY ONCOLOGY | Facility: CLINIC | Age: 72
End: 2022-05-12
Payer: MEDICARE

## 2022-05-12 VITALS
BODY MASS INDEX: 29.86 KG/M2 | HEIGHT: 68 IN | OXYGEN SATURATION: 95 % | DIASTOLIC BLOOD PRESSURE: 70 MMHG | WEIGHT: 197 LBS | SYSTOLIC BLOOD PRESSURE: 126 MMHG | HEART RATE: 61 BPM | RESPIRATION RATE: 16 BRPM

## 2022-05-12 VITALS
HEART RATE: 67 BPM | HEIGHT: 68 IN | DIASTOLIC BLOOD PRESSURE: 70 MMHG | WEIGHT: 206 LBS | RESPIRATION RATE: 18 BRPM | BODY MASS INDEX: 31.22 KG/M2 | TEMPERATURE: 97.8 F | OXYGEN SATURATION: 96 % | SYSTOLIC BLOOD PRESSURE: 120 MMHG

## 2022-05-12 DIAGNOSIS — I50.89 OTHER HEART FAILURE (HCC): ICD-10-CM

## 2022-05-12 DIAGNOSIS — R06.00 DOE (DYSPNEA ON EXERTION): ICD-10-CM

## 2022-05-12 DIAGNOSIS — T45.1X5A CHEMOTHERAPY-INDUCED NEUROPATHY (HCC): ICD-10-CM

## 2022-05-12 DIAGNOSIS — L70.8 ACNEIFORM RASH: ICD-10-CM

## 2022-05-12 DIAGNOSIS — E83.42 HYPOMAGNESEMIA: ICD-10-CM

## 2022-05-12 DIAGNOSIS — C79.10 METASTATIC UROTHELIAL CARCINOMA (HCC): Primary | ICD-10-CM

## 2022-05-12 DIAGNOSIS — G62.0 CHEMOTHERAPY-INDUCED NEUROPATHY (HCC): ICD-10-CM

## 2022-05-12 LAB
NUC STRESS EJECTION FRACTION: 71 %
RATE PRESSURE PRODUCT: NORMAL
SL CV REST NUCLEAR ISOTOPE DOSE: 10.7 MCI
SL CV STRESS NUCLEAR ISOTOPE DOSE: 32.3 MCI
SL CV STRESS RECOVERY BP: NORMAL MMHG
SL CV STRESS RECOVERY HR: 67 BPM
SL CV STRESS RECOVERY O2 SAT: 100 %
STRESS ANGINA INDEX: 0
STRESS BASELINE BP: NORMAL MMHG
STRESS BASELINE HR: 61 BPM
STRESS O2 SAT REST: 95 %
STRESS PEAK HR: 86 BPM
STRESS POST O2 SAT PEAK: 100 %
STRESS POST PEAK BP: 120 MMHG

## 2022-05-12 PROCEDURE — 93018 CV STRESS TEST I&R ONLY: CPT | Performed by: INTERNAL MEDICINE

## 2022-05-12 PROCEDURE — 93017 CV STRESS TEST TRACING ONLY: CPT

## 2022-05-12 PROCEDURE — 78452 HT MUSCLE IMAGE SPECT MULT: CPT

## 2022-05-12 PROCEDURE — A9502 TC99M TETROFOSMIN: HCPCS

## 2022-05-12 PROCEDURE — 93016 CV STRESS TEST SUPVJ ONLY: CPT | Performed by: INTERNAL MEDICINE

## 2022-05-12 PROCEDURE — 78452 HT MUSCLE IMAGE SPECT MULT: CPT | Performed by: INTERNAL MEDICINE

## 2022-05-12 PROCEDURE — 99214 OFFICE O/P EST MOD 30 MIN: CPT | Performed by: NURSE PRACTITIONER

## 2022-05-12 RX ORDER — CLINDAMYCIN PHOSPHATE 10 MG/G
GEL TOPICAL 2 TIMES DAILY
Qty: 30 G | Refills: 1 | Status: SHIPPED | OUTPATIENT
Start: 2022-05-12 | End: 2022-06-02

## 2022-05-12 RX ADMIN — REGADENOSON 0.4 MG: 0.08 INJECTION, SOLUTION INTRAVENOUS at 08:52

## 2022-05-12 NOTE — PROGRESS NOTES
Hematology/Oncology Outpatient Follow-up  Maria Esther Funk 67 y o  male 1950 [id number]    Date:  5/12/2022      Assessment and Plan:  1  Metastatic urothelial carcinoma ([facility] [address] )  Patient will resume his current treatment with Enfortumab Vedotin 3 weeks on with 1 week of break after receiving an additional week of a break  He will be starting cycle 8 day 1 tomorrow  We will continue to monitor his labs before each treatment  He will continue to receive his additional IV hydration in the infusion center as needed  Request he follow-up again in 2 weeks  He does follow-up with palliative care on a regular basis  - CBC and differential; Standing  - Comprehensive metabolic panel; Standing  - Magnesium; Standing  - CBC and differential  - Comprehensive metabolic panel  - Magnesium    2  [facility] ([facility]  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 without any worsening  He is taking Cymbalta 60 mg daily  Has not had any further falls since he started using a cane  Will continue to monitor closely  3  Hypomagnesemia  Patient will continue his oral magnesium supplements  Is also getting additional cesium in her p r n  IV hydration when indicated-has standing order     - Magnesium; Standing  - Magnesium    4  Acneiform rash  Patient with 2 rounds pustular lesions on his back which are fairly new  Encourage patient's wife to monitor closely  Will try clindamycin gel to see if it will improve the area  If they do not improve or increase in size consider sending him back to surgery for biopsy to rule out malignant nodules  - clindamycin (CLINDAGEL) 1 % gel; Apply topically 2 (two) times a day  Dispense: 30 g; Refill: 1    HPI:  Patient presents today for a follow-up visit accompanied by his wife  He had an extra week off from treatment will be resuming cycle 8 this week    Mentions that he is having somewhat more back pain than usual but attributes this to doing gardening/yd work yesterday  He has been walking with a cane on a consistent basis and has not had any further falls since using  His chemo induced neuropathy remains unchanged in the fingers and feet  His wife has also noticed that his exertional dyspnea has improved somewhat since he is using the cane  He does seem to have to pustular lesions on his back that he and his wife brought to my attention are concerned understandably about malignant nodules  His most recent laboratory studies from 05/10/2022 showed normal WBC 4 7 with essentially normal white cell differential, he is not anemic H&H 12 1/38 3, MCV continues to be slightly elevated 102 with normal platelet count 365  Stable chronic renal dysfunction creatinine 1 39, GFR 49, total protein 6 3 remaining metabolic panel is normal   His magnesium continues to be slightly lower than average 1 8; does report compliance with his magnesium supplements is taking 1000 mg daily total     Oncology History Overview Note   Patient has a history of hypertension, hyperlipidemia, chronic lower back pain  He had an MRI of the lower spine for further evaluation of his chronic lower back pain  The MRI on 12/02/2019 revealed Multiple left renal masses to include a left lower pole cyst and a rounded structure in the left upper pole which may also represent cyst   Left upper pole renal masses approximately 3 cm in greatest linear dimension  A CT with renal protocol was done on 12/12/2019 which also showed the infiltrating lesion in the upper pole of the left kidney measuring about the 3 5 cm in greatest dimension without any hint of retroperitoneal lymphadenopathy  A CT scan of the abdomen pelvis on 01/14/2020 showed the same findings with the mild hydronephrosis on the left  The urine cytology on 01/03/2020 showed high-grade urothelial carcinoma    A cystoscopy was then done, a biopsy was taken from the left renal pelvic region which showed high-grade urothelial carcinoma without evidence of lamina propria invasion  The detrusor muscle/muscularis propria were not present for evaluation  The recommendation was to pursue left robotic assisted laparoscopic nephroureterectomy with bladder cuff excision which was done on 04/01/2020  The final pathology revealed;  - Invasive high grade urothelial carcinoma arising in renal pelvis  - Bladder cuff margin is negative for carcinoma and no evidence of high grade dysplasia  - Ureters with no significant pathologic abnormality  - Two benign simple cysts  - Adrenal gland is negative for malignancy  - One lymph node, negative for malignancy (0/1)  The tumor size was 4 5 cm with invasion beyond the muscularis into the periurethral fat or peripelvic fat or the renal parenchyma  The margins were uninvolved by carcinoma or carcinoma in situ  The final pathology was pT3 pN0  Patient barely tolerated 1 cycle of adjuvant chemotherapy with split dose cisplatin and gemcitabine with a lot of side effects  The treatment had to be discontinued due to significant worsening renal dysfunction 6/2020  Patient started to complain about significant abdominal/left inguinal pain around August/September 2020  Unfortunately repeat imaging revealed recurrent/metastatic disease  9/4/20 CT C/A/P- Status post left nephrectomy for resection of urothelial neoplasm  Lymphadenopathy in the left nephrectomy bed has increased since the prior examination, concerning for metastatic disease  Ill-defined hyperattenuating masslike focus in the left rectus muscle, not identified on the prior examination  This is suspicious for a metastatic lesion  A rectus hematoma is also considered  9/23/20 PET scan- 1  Multiple FDG avid lymph nodes in the retrocrural region, retroperitoneum and the left renal bed compatible with metastasis  These have progressed from recent CT    2   FDG avid mass involving the left rectus abdominal musculature compatible with metastasis which is larger from recent CT  3  Several small lymph nodes adjacent to the mid to distal esophagus with FDG uptake suspicious for metastasis  Small focus of FDG uptake also suggested in the right hilar region, metastasis is not excluded  This could be reassessed on follow-up  4   Subtle focus of FDG uptake at the T2 level on the left, nonspecific, could be related to early degenerative changes, developing metastasis is not entirely excluded  This could be reassessed on follow-up  5  Prostate is mildly prominent with heterogeneous FDG uptake  This could be inflammatory  Correlate for prostatitis  He completed palliative radiation to the left abdomen 12/3/20     His PET scan from 08/16/2021 showed progression of his disease with hypermetabolic soft tissue lesions at the level of the right shoulder and right axilla with interval progression of the retroperitoneal and mesenteric hypermetabolic metastasis  He did have the new lesion to his right upper back/shoulder which was causing significant localized pain  This excised by his surgeon 08/09/2021  Pathology confirmed metastatic high-grade carcinoma consistent with his no primary urothelial carcinoma  He received palliative radiation to his right shoulder/axilla region  Urothelial cancer ([address] )   1/3/2020 Biopsy    Final Diagnosis    A  Urine, Clean Catch, Thin Prep:  High grade urothelial carcinoma (HGUC) - see comment  1/3/2020 Initial Diagnosis    Urothelial cancer ([address] )  T3 N0 (stage IIIA)     1/17/2020 Biopsy    Final Diagnosis    A  Ureter, Right, left renal pelvis biopsy  -Fragments of high grade urothelial carcinoma   -No evidence of lamina propria invasion   -Detrusor muscle/ muscularis propria is not present for evaluation  B  Urinary Bladder, bladder biopsy:  -Fragments of low grade papillary lesion   See note  -No evidence of invasion seen  -Unremarkable fragment of detrusor muscle seen  4/1/2020 Surgery    left robotic assisted laparoscopic nephroureterectomy with bladder cuff excision     Final Diagnosis    A  Left kidney, ureter, and bladder cuff, nephroureterectomy:  - Invasive high grade urothelial carcinoma arising in renal pelvis  - Bladder cuff margin is negative for carcinoma and no evidence of high grade dysplasia  - Ureters with no significant pathologic abnormality  - Two benign simple cysts  - Adrenal gland is negative for malignancy  - One lymph node, negative for malignancy (0/1)          5/14/2020 - 6/3/2020 Chemotherapy    CISplatin (PLATINOL) split dose 35 mg/m2 = 75 3 mg (50 % of original dose 70 mg/m2), Intravenous,   Administration: 75 3 mg (5/14/2020), 75 3 mg (5/21/2020)  gemcitabine (GEMZAR) 2,200 mg in sodium chloride 0 9 % 250 mL infusion, 2,150 2 mg (80 % of original dose 1,250 mg/m2), Intravenous,   Administration: 2,200 mg (5/14/2020), 2,200 mg (5/21/2020)    (only completed 1 cycle- d/c d/t adverse effects and worsening renal dysfunction)     10/9/2020 - 9/9/2021 Chemotherapy    pembrolizumab (KEYTRUDA) IVPB, 200 mg, Intravenous, Once, 16 of 20 cycles  Administration: 200 mg (10/9/2020), 200 mg (10/30/2020), 200 mg (11/20/2020), 200 mg (12/11/2020), 200 mg (12/31/2020), 200 mg (1/22/2021), 200 mg (2/12/2021), 200 mg (3/5/2021), 200 mg (3/26/2021), 200 mg (4/16/2021), 200 mg (5/7/2021), 200 mg (5/28/2021), 200 mg (6/18/2021), 200 mg (7/9/2021), 200 mg (7/30/2021), 200 mg (8/20/2021)     11/19/2020 - 12/3/2020 Radiation    Treatment:  Course: C1    Plan ID Energy Fractions Dose per Fraction (cGy) Dose Correction (cGy) Total Dose Delivered (cGy) Elapsed Days   L Abdomen 6X 10 / 10 300 0 3,000 14        9/10/2021 -  Chemotherapy    enfortumab vedotin-ejfv (PADCEV) IVPB, 1 25 mg/kg = 114 mg, Intravenous, Once, 7 of 13 cycles  Dose modification: 1 mg/kg (original dose 1 25 mg/kg, Cycle 7, Reason: Other (Must fill in a comment), Comment: dose reduction due to side effects ), 1 25 mg/kg (original dose 1 25 mg/kg, Cycle 7, Reason: Other (Must fill in a comment), Comment: patient wants full dose ), 1 mg/kg (original dose 1 25 mg/kg, Cycle 7, Reason: Neuropathy)  Administration: 114 mg (9/10/2021), 114 mg (9/17/2021), 110 mg (10/8/2021), 110 mg (9/24/2021), 110 mg (10/15/2021), 110 mg (11/12/2021), 110 mg (10/22/2021), 110 mg (11/19/2021), 110 mg (12/10/2021), 110 mg (11/26/2021), 110 mg (12/17/2021), 110 mg (1/7/2022), 110 mg (12/23/2021), 110 mg (1/14/2022), 90 mg (4/8/2022), 90 mg (4/15/2022), 90 mg (4/22/2022), 110 mg (1/21/2022), 110 mg (2/18/2022), 110 mg (2/25/2022), 90 mg (3/4/2022)     Cancer of left renal pelvis ([facility] [address] )   4/23/2020 Initial Diagnosis    Cancer of left renal pelvis ([facility] [address] )     10/9/2020 - 9/9/2021 Chemotherapy    pembrolizumab (KEYTRUDA) IVPB, 200 mg, Intravenous, Once, 16 of 20 cycles  Administration: 200 mg (10/9/2020), 200 mg (10/30/2020), 200 mg (11/20/2020), 200 mg (12/11/2020), 200 mg (12/31/2020), 200 mg (1/22/2021), 200 mg (2/12/2021), 200 mg (3/5/2021), 200 mg (3/26/2021), 200 mg (4/16/2021), 200 mg (5/7/2021), 200 mg (5/28/2021), 200 mg (6/18/2021), 200 mg (7/9/2021), 200 mg (7/30/2021), 200 mg (8/20/2021)     9/10/2021 -  Chemotherapy    enfortumab vedotin-ejfv (PADCEV) IVPB, 1 25 mg/kg = 114 mg, Intravenous, Once, 7 of 13 cycles  Dose modification: 1 mg/kg (original dose 1 25 mg/kg, Cycle 7, Reason: Other (Must fill in a comment), Comment: dose reduction due to side effects ), 1 25 mg/kg (original dose 1 25 mg/kg, Cycle 7, Reason: Other (Must fill in a comment), Comment: patient wants full dose ), 1 mg/kg (original dose 1 25 mg/kg, Cycle 7, Reason: Neuropathy)  Administration: 114 mg (9/10/2021), 114 mg (9/17/2021), 110 mg (10/8/2021), 110 mg (9/24/2021), 110 mg (10/15/2021), 110 mg (11/12/2021), 110 mg (10/22/2021), 110 mg (11/19/2021), 110 mg (12/10/2021), 110 mg (11/26/2021), 110 mg (12/17/2021), 110 mg (1/7/2022), 110 mg (12/23/2021), 110 mg (1/14/2022), 90 mg (4/8/2022), 90 mg (4/15/2022), 90 mg (4/22/2022), 110 mg (1/21/2022), 110 mg (2/18/2022), 110 mg (2/25/2022), 90 mg (3/4/2022)      Surgery       Metastatic urothelial carcinoma ([facility] [address] )   8/9/2021 Initial Diagnosis    Metastatic urothelial carcinoma ([facility] [address] )     9/8/2021 - 9/21/2021 Radiation    Treatment:  Course: C2    Plan ID Energy Fractions Dose per Fraction (cGy) Dose Correction (cGy) Total Dose Delivered (cGy) Elapsed Days   R Axil_Shl # 6X 10 / 10 300 0 3,000 13      Treatment dates:  C2: 9/8/2021 - 9/21/2021     9/10/2021 -  Chemotherapy    enfortumab vedotin-ejfv (PADCEV) IVPB, 1 25 mg/kg = 114 mg, Intravenous, Once, 7 of 13 cycles  Dose modification: 1 mg/kg (original dose 1 25 mg/kg, Cycle 7, Reason: Other (Must fill in a comment), Comment: dose reduction due to side effects ), 1 25 mg/kg (original dose 1 25 mg/kg, Cycle 7, Reason: Other (Must fill in a comment), Comment: patient wants full dose ), 1 mg/kg (original dose 1 25 mg/kg, Cycle 7, Reason: Neuropathy)  Administration: 114 mg (9/10/2021), 114 mg (9/17/2021), 110 mg (10/8/2021), 110 mg (9/24/2021), 110 mg (10/15/2021), 110 mg (11/12/2021), 110 mg (10/22/2021), 110 mg (11/19/2021), 110 mg (12/10/2021), 110 mg (11/26/2021), 110 mg (12/17/2021), 110 mg (1/7/2022), 110 mg (12/23/2021), 110 mg (1/14/2022), 90 mg (4/8/2022), 90 mg (4/15/2022), 90 mg (4/22/2022), 110 mg (1/21/2022), 110 mg (2/18/2022), 110 mg (2/25/2022), 90 mg (3/4/2022)         Interval history:    ROS: Review of Systems   Constitutional: Positive for activity change  Negative for appetite change, chills, fatigue, fever and unexpected weight change  HENT: Negative for congestion, hearing loss, mouth sores, nosebleeds, sore throat and trouble swallowing  Eyes: Negative  Respiratory: Positive for shortness of breath (exertional)  Negative for cough and chest tightness  Cardiovascular: Positive for leg swelling (at times)   Negative for chest pain and palpitations  Gastrointestinal: Negative for abdominal distention, abdominal pain, blood in stool, constipation, diarrhea, nausea and vomiting  Genitourinary: Positive for dysuria  Negative for difficulty urinating, frequency, hematuria and urgency  Musculoskeletal: Positive for arthralgias, back pain, gait problem and myalgias  Negative for joint swelling  Skin: Positive for rash  Negative for color change and pallor  Neurological: Positive for numbness  Negative for dizziness, light-headedness and headaches  Hematological: Negative for adenopathy  Does not bruise/bleed easily  Psychiatric/Behavioral: Negative for dysphoric mood and sleep disturbance  The patient is not nervous/anxious          Past Medical History:   Diagnosis Date    Arthritis     Bladder cancer     Cancer ([facility] [address] )     skin melanoma;basal cell    Chronic pain disorder     from arthritis    Colon polyp     Does use hearing aid     bilat    Dry eyes, bilateral     GERD (gastroesophageal reflux disease)     History of kidney stones 10/2019    History of partial knee replacement     bilat    History of vertigo     Hyperlipidemia     Hypertension     Infusion extravasation of chemotherapy vesicant 11/2021    Kidney lesion     Lumbar disc disorder     compression of vertebrae L4-5-6    Muscle weakness     left hip area    Renal mass     left    Right ankle injury 03/05/2020    missed step of ladder     Right ankle pain     Shortness of breath     with activity    Tinnitus     Urothelial cancer     left    Wears glasses        Past Surgical History:   Procedure Laterality Date    COLONOSCOPY      CYSTOSCOPY Left 4/1/2020    Procedure: CYSTOSCOPY; URETERAL CATHETER PLACEMENT;  Surgeon: Nubia Troncoso MD;  Location[facility];  Service: Urology    CYSTOSCOPY  05/11/2020    CYSTOSCOPY  06/04/2021    FL CYSTOGRAM  4/13/2020    FL RETROGRADE PYELOGRAM  1/17/2020    HERNIA REPAIR      umbilical with mesh    INGUINAL HERNIA REPAIR Right     with mesh    IR PORT PLACEMENT  2020    JOINT REPLACEMENT Bilateral     partials knee    LUMBAR EPIDURAL INJECTION      SC CYSTOURETHROSCOPY,URETER CATHETER Bilateral 2020    Procedure: CYSTOSCOPY; RIGHT RETROGRADE PYELOGRAM WITH RIGHT URETERAL CYTOLOGY SAMPLING; LEFT URETEROSCOPY WITH RENAL PELVIS BIOPSY AND LEFT STENT PLACEMENT;  Surgeon: Loyda Angel MD;  [facility];  Service: Urology    SC NEPHRECTOMY, W/PART  URETECTOMY Left 2020    Procedure: ROBOTIC LAPAROSCOPIC NEPHRO-URETERECTOMY;  Surgeon: Loyda Angel MD;  Location[facility];  Service: Urology    SKIN CANCER EXCISION      surface melanoma    TONSILLECTOMY      WISDOM TOOTH EXTRACTION         Social History     Socioeconomic History    Marital status: /Civil Union     Spouse name: None    Number of children: None    Years of education: None    Highest education level: None   Occupational History    None   Tobacco Use    Smoking status: Former Smoker     Types: Cigarettes     Quit date:      Years since quittin 3    Smokeless tobacco: Never Used   Vaping Use    Vaping Use: Never used   Substance and Sexual Activity    Alcohol use:  Yes     Alcohol/week: 17 0 standard drinks     Types: 7 Glasses of wine, 10 Cans of beer per week     Comment: socially    Drug use: Never    Sexual activity: Not Currently   Other Topics Concern    None   Social History Narrative    Daily caffeine use - 2 cups coffee      Social Determinants of Health     Financial Resource Strain: Not on file   Food Insecurity: Not on file   Transportation Needs: Not on file   Physical Activity: Not on file   Stress: Not on file   Social Connections: Not on file   Intimate Partner Violence: Not on file   Housing Stability: Not on file       Family History   Problem Relation Age of Onset    Liver cancer Father        Allergies   Allergen Reactions    Poison Ivy Extract Rash         Current Outpatient Medications:     acetaminophen (TYLENOL) 500 mg tablet, Take 2 tablets (1,000 mg total) by mouth every 8 (eight) hours, Disp: , Rfl:     allopurinol (ZYLOPRIM) 300 mg tablet, take 1 tablet by mouth once daily, Disp: 30 tablet, Rfl: 3    ALPRAZolam (XANAX) 0 25 mg tablet, Take 1 tablet (0 25 mg total) by mouth daily at bedtime as needed for anxiety (restless legs), Disp: 30 tablet, Rfl: 1    amLODIPine (NORVASC) 5 mg tablet, Take 5 mg by mouth daily  , Disp: , Rfl:     atorvastatin (LIPITOR) 80 mg tablet, Take 80 mg by mouth every other day evening, Disp: , Rfl:     clindamycin (CLINDAGEL) 1 % gel, Apply topically 2 (two) times a day, Disp: 30 g, Rfl: 1    docusate sodium (COLACE) 250 MG capsule, Take 1 capsule (250 mg total) by mouth daily, Disp: 60 capsule, Rfl: 6    DULoxetine (CYMBALTA) 60 mg delayed release capsule, Take 1 capsule (60 mg total) by mouth daily, Disp: 30 capsule, Rfl: 1    famotidine (PEPCID) 20 mg tablet, take 1-2 tablets by mouth every evening, Disp: , Rfl:     folic acid (FOLVITE) 1 mg tablet, take 1 tablet by mouth once daily, Disp: 90 tablet, Rfl: 4    hydrOXYzine HCL (ATARAX) 25 mg tablet, Take 1 tablet (25 mg total) by mouth every 6 (six) hours as needed for itching or anxiety, Disp: 60 tablet, Rfl: 2    Magnesium 250 MG TABS, 1 tablet 2 (two) times a day Taking 500mg in the morning and 500mg at night, Disp: , Rfl:     metoprolol tartrate (LOPRESSOR) 100 mg tablet, Take 50 mg by mouth daily, Disp: , Rfl:     nystatin (MYCOSTATIN) cream, Apply topically 2 (two) times a day, Disp: 30 g, Rfl: 0    omeprazole (PriLOSEC) 20 mg delayed release capsule, Take 20 mg by mouth daily  , Disp: , Rfl:     predniSONE 2 5 mg tablet, Take 1 tablet (2 5 mg total) by mouth daily Take 5mg daily in the am for 14 days then 2 5mg daily in the am thereafter, Disp: 14 tablet, Rfl: 0    senna (SENOKOT) 8 6 mg, Take 1 tablet (8 6 mg total) by mouth daily at bedtime, Disp: 30 each, Rfl: 0    tadalafil (CIALIS) 20 MG tablet, Take one tablet by mouth one hour before sexual activity, Disp: 15 tablet, Rfl: 3    tamsulosin (FLOMAX) 0 4 mg, Take 1 capsule (0 4 mg total) by mouth daily with dinner, Disp: 30 capsule, Rfl: 1    triamcinolone (KENALOG) 0 1 % lotion, Apply topically 3 (three) times a day, Disp: 60 mL, Rfl: 5    VITAMIN D PO, Take 1,000 Units by mouth daily , Disp: , Rfl:     zolpidem (AMBIEN) 5 mg tablet, Take 1 tablet (5 mg total) by mouth daily at bedtime as needed for sleep, Disp: 30 tablet, Rfl: 1    albuterol (ProAir HFA) 90 mcg/act inhaler, Inhale 2 puffs every 6 (six) hours as needed for wheezing or shortness of breath (Patient not taking: No sig reported), Disp: 8 5 g, Rfl: 3    ALPRAZolam (XANAX) 0 25 mg tablet, Take 1 tablet (0 25 mg total) by mouth daily at bedtime as needed for anxiety (Patient not taking: No sig reported), Disp: 30 tablet, Rfl: 0    morphine (MSIR) 15 mg tablet, Take 1/2 tablet every 4 hours as needed for severe pain (Patient not taking: No sig reported), Disp: 45 tablet, Rfl: 0    naloxone (NARCAN) 4 mg/0 1 mL nasal spray, Administer 1 spray into a nostril  If breathing does not return to normal or if breathing difficulty resumes after 2-3 minutes, give another dose in the other nostril using a new spray  (Patient not taking: No sig reported), Disp: 1 each, Rfl: 1    predniSONE 10 mg tablet, Take 2 tablets (20 mg total) by mouth daily (Patient not taking: No sig reported), Disp: 60 tablet, Rfl: 3    predniSONE 5 mg tablet, Take 1 tablet (5 mg total) by mouth daily Take 5mg daily in the am for 14 days then 2 5mg daily in the am thereafter (Patient not taking: No sig reported), Disp: 14 tablet, Rfl: 0  No current facility-administered medications for this visit        Physical Exam:  /70 (BP Location: Left arm, Patient Position: Sitting, Cuff Size: Adult)   Pulse 67   Temp 97 8 °F (36 6 °C)   Resp 18   Ht 5' 8" (1 727 m) Wt 93 4 kg (206 lb)   SpO2 96%   BMI 31 32 kg/m²     Physical Exam  Vitals reviewed  Constitutional:       General: He is not in acute distress  Appearance: He is well-developed  He is not diaphoretic  HENT:      Head: Normocephalic and atraumatic  Eyes:      General: Lids are normal  No scleral icterus  Conjunctiva/sclera: Conjunctivae normal       Pupils: Pupils are equal, round, and reactive to light  Neck:      Thyroid: No thyromegaly  Cardiovascular:      Rate and Rhythm: Normal rate and regular rhythm  Heart sounds: Normal heart sounds  No murmur heard  Pulmonary:      Effort: Pulmonary effort is normal  No respiratory distress  Breath sounds: Normal breath sounds  Chest:   Breasts:      Right: No axillary adenopathy  Left: No axillary adenopathy  Abdominal:      General: There is no distension  Palpations: Abdomen is soft  There is no hepatomegaly or splenomegaly  Tenderness: There is no abdominal tenderness  Musculoskeletal:         General: Normal range of motion  Cervical back: Normal range of motion and neck supple  Right lower leg: No edema  Left lower leg: No edema  Lymphadenopathy:      Cervical: No cervical adenopathy  Upper Body:      Right upper body: No axillary adenopathy  Left upper body: No axillary adenopathy  Skin:     General: Skin is warm and dry  Findings: No erythema or rash  Neurological:      General: No focal deficit present  Mental Status: He is alert and oriented to person, place, and time  Psychiatric:         Mood and Affect: Mood normal  Affect is blunt  Behavior: Behavior normal  Behavior is cooperative  Thought Content:  Thought content normal          Judgment: Judgment normal            Labs:  Lab Results   Component Value Date    WBC 4 77 05/10/2022    HGB 12 1 05/10/2022    HCT 38 3 05/10/2022     (H) 05/10/2022     05/10/2022     Lab Results Component Value Date    K 4 1 05/10/2022     05/10/2022    CO2 28 05/10/2022    BUN 17 05/10/2022    CREATININE 1 39 (H) 05/10/2022    GLUF 111 (H) 03/01/2022    CALCIUM 9 5 05/10/2022    CORRECTEDCA 9 8 02/23/2022    AST 16 05/10/2022    ALT 16 05/10/2022    ALKPHOS 70 05/10/2022    EGFR 49 05/10/2022       Patient voiced understanding and agreement in the above discussion  Aware to contact our office with questions/symptoms in the interim  This note has been generated by voice recognition software system  Therefore, there may be spelling, grammar, and or syntax errors  Please contact if questions arise

## 2022-05-13 ENCOUNTER — HOSPITAL ENCOUNTER (OUTPATIENT)
Dept: INFUSION CENTER | Facility: HOSPITAL | Age: 72
Discharge: HOME | End: 2022-05-13
Attending: INTERNAL MEDICINE
Payer: MEDICARE

## 2022-05-13 VITALS
WEIGHT: 205.25 LBS | TEMPERATURE: 97.8 F | DIASTOLIC BLOOD PRESSURE: 70 MMHG | OXYGEN SATURATION: 97 % | BODY MASS INDEX: 31.11 KG/M2 | HEART RATE: 96 BPM | SYSTOLIC BLOOD PRESSURE: 138 MMHG | RESPIRATION RATE: 18 BRPM | HEIGHT: 68 IN

## 2022-05-13 DIAGNOSIS — E86.0 DEHYDRATION: ICD-10-CM

## 2022-05-13 DIAGNOSIS — C68.9 UROTHELIAL CANCER (HCC): Primary | ICD-10-CM

## 2022-05-13 DIAGNOSIS — G89.3 CANCER ASSOCIATED PAIN: ICD-10-CM

## 2022-05-13 DIAGNOSIS — E83.42 HYPOMAGNESEMIA: ICD-10-CM

## 2022-05-13 DIAGNOSIS — C79.10 METASTATIC UROTHELIAL CARCINOMA (HCC): ICD-10-CM

## 2022-05-13 DIAGNOSIS — C65.2 CANCER OF LEFT RENAL PELVIS (HCC): ICD-10-CM

## 2022-05-13 LAB
ARRHY DURING EX: NORMAL
CHEST PAIN STATEMENT: NORMAL
MAX DIASTOLIC BP: 80 MMHG
MAX HEART RATE: 86 BPM
MAX PREDICTED HEART RATE: 147 BPM
MAX. SYSTOLIC BP: 128 MMHG
PROTOCOL NAME: NORMAL
REASON FOR TERMINATION: NORMAL
TARGET HR FORMULA: NORMAL
TEST INDICATION: NORMAL
TIME IN EXERCISE PHASE: NORMAL

## 2022-05-13 PROCEDURE — 96367 TX/PROPH/DG ADDL SEQ IV INF: CPT

## 2022-05-13 PROCEDURE — 96413 CHEMO IV INFUSION 1 HR: CPT

## 2022-05-13 RX ORDER — ACETAMINOPHEN 325 MG/1
650 TABLET ORAL ONCE
Status: COMPLETED | OUTPATIENT
Start: 2022-05-13 | End: 2022-05-13

## 2022-05-13 RX ORDER — SODIUM CHLORIDE 9 MG/ML
20 INJECTION, SOLUTION INTRAVENOUS ONCE
Status: COMPLETED | OUTPATIENT
Start: 2022-05-13 | End: 2022-05-13

## 2022-05-13 RX ADMIN — ENFORTUMAB VEDOTIN 90 MG: 30 INJECTION, POWDER, LYOPHILIZED, FOR SOLUTION INTRAVENOUS at 11:54

## 2022-05-13 RX ADMIN — DIPHENHYDRAMINE HYDROCHLORIDE 25 MG: 50 INJECTION INTRAMUSCULAR; INTRAVENOUS at 11:06

## 2022-05-13 RX ADMIN — DEXAMETHASONE SODIUM PHOSPHATE: 10 INJECTION, SOLUTION INTRAMUSCULAR; INTRAVENOUS at 10:42

## 2022-05-13 RX ADMIN — MAGNESIUM SULFATE HEPTAHYDRATE: 500 INJECTION, SOLUTION INTRAMUSCULAR; INTRAVENOUS at 09:36

## 2022-05-13 RX ADMIN — SODIUM CHLORIDE 20 ML/HR: 0.9 INJECTION, SOLUTION INTRAVENOUS at 10:43

## 2022-05-13 RX ADMIN — ACETAMINOPHEN 650 MG: 325 TABLET ORAL at 10:44

## 2022-05-13 NOTE — PROGRESS NOTES
Padcev given without incident  Patient offers no complaints on D/C  AVS declined, he is aware of his next appt

## 2022-05-17 ENCOUNTER — HOSPITAL ENCOUNTER (OUTPATIENT)
Dept: INFUSION CENTER | Facility: HOSPITAL | Age: 72
Discharge: HOME | End: 2022-05-17
Attending: INTERNAL MEDICINE
Payer: MEDICARE

## 2022-05-17 VITALS
HEART RATE: 79 BPM | SYSTOLIC BLOOD PRESSURE: 127 MMHG | TEMPERATURE: 97.5 F | DIASTOLIC BLOOD PRESSURE: 77 MMHG | OXYGEN SATURATION: 96 % | RESPIRATION RATE: 18 BRPM

## 2022-05-17 DIAGNOSIS — E86.0 DEHYDRATION: Primary | ICD-10-CM

## 2022-05-17 DIAGNOSIS — C65.2 CANCER OF LEFT RENAL PELVIS (HCC): ICD-10-CM

## 2022-05-17 DIAGNOSIS — C68.9 UROTHELIAL CANCER (HCC): ICD-10-CM

## 2022-05-17 DIAGNOSIS — E83.42 HYPOMAGNESEMIA: ICD-10-CM

## 2022-05-17 DIAGNOSIS — C79.10 METASTATIC UROTHELIAL CARCINOMA (HCC): ICD-10-CM

## 2022-05-17 LAB
ALBUMIN SERPL BCP-MCNC: 3.8 G/DL (ref 3.5–5)
ALP SERPL-CCNC: 71 U/L (ref 34–104)
ALT SERPL W P-5'-P-CCNC: 31 U/L (ref 7–52)
ANION GAP SERPL CALCULATED.3IONS-SCNC: 6 MMOL/L (ref 4–13)
AST SERPL W P-5'-P-CCNC: 23 U/L (ref 13–39)
BASOPHILS # BLD AUTO: 0.02 THOUSANDS/ΜL (ref 0–0.1)
BASOPHILS NFR BLD AUTO: 0 % (ref 0–1)
BILIRUB SERPL-MCNC: 0.61 MG/DL (ref 0.2–1)
BUN SERPL-MCNC: 18 MG/DL (ref 5–25)
CALCIUM SERPL-MCNC: 9.4 MG/DL (ref 8.4–10.2)
CHLORIDE SERPL-SCNC: 103 MMOL/L (ref 96–108)
CO2 SERPL-SCNC: 30 MMOL/L (ref 21–32)
CREAT SERPL-MCNC: 1.28 MG/DL (ref 0.6–1.3)
EOSINOPHIL # BLD AUTO: 0 THOUSAND/ΜL (ref 0–0.61)
EOSINOPHIL NFR BLD AUTO: 0 % (ref 0–6)
ERYTHROCYTE [DISTWIDTH] IN BLOOD BY AUTOMATED COUNT: 14.6 % (ref 11.6–15.1)
GFR SERPL CREATININE-BSD FRML MDRD: 55 ML/MIN/1.73SQ M
GLUCOSE SERPL-MCNC: 126 MG/DL (ref 65–140)
HCT VFR BLD AUTO: 37.7 % (ref 36.5–49.3)
HGB BLD-MCNC: 12.2 G/DL (ref 12–17)
IMM GRANULOCYTES # BLD AUTO: 0.01 THOUSAND/UL (ref 0–0.2)
IMM GRANULOCYTES NFR BLD AUTO: 0 % (ref 0–2)
LYMPHOCYTES # BLD AUTO: 0.98 THOUSANDS/ΜL (ref 0.6–4.47)
LYMPHOCYTES NFR BLD AUTO: 22 % (ref 14–44)
MAGNESIUM SERPL-MCNC: 1.9 MG/DL (ref 1.9–2.7)
MCH RBC QN AUTO: 33 PG (ref 26.8–34.3)
MCHC RBC AUTO-ENTMCNC: 32.4 G/DL (ref 31.4–37.4)
MCV RBC AUTO: 102 FL (ref 82–98)
MONOCYTES # BLD AUTO: 0.58 THOUSAND/ΜL (ref 0.17–1.22)
MONOCYTES NFR BLD AUTO: 13 % (ref 4–12)
NEUTROPHILS # BLD AUTO: 2.88 THOUSANDS/ΜL (ref 1.85–7.62)
NEUTS SEG NFR BLD AUTO: 65 % (ref 43–75)
NRBC BLD AUTO-RTO: 0 /100 WBCS
PLATELET # BLD AUTO: 168 THOUSANDS/UL (ref 149–390)
PMV BLD AUTO: 10.1 FL (ref 8.9–12.7)
POTASSIUM SERPL-SCNC: 3.9 MMOL/L (ref 3.5–5.3)
PROT SERPL-MCNC: 6.1 G/DL (ref 6.4–8.4)
RBC # BLD AUTO: 3.7 MILLION/UL (ref 3.88–5.62)
SODIUM SERPL-SCNC: 139 MMOL/L (ref 135–147)
WBC # BLD AUTO: 4.47 THOUSAND/UL (ref 4.31–10.16)

## 2022-05-17 PROCEDURE — 96360 HYDRATION IV INFUSION INIT: CPT

## 2022-05-17 PROCEDURE — 83735 ASSAY OF MAGNESIUM: CPT

## 2022-05-17 PROCEDURE — 85025 COMPLETE CBC W/AUTO DIFF WBC: CPT | Performed by: INTERNAL MEDICINE

## 2022-05-17 PROCEDURE — 80053 COMPREHEN METABOLIC PANEL: CPT

## 2022-05-17 RX ADMIN — SODIUM CHLORIDE 1000 ML: 0.9 INJECTION, SOLUTION INTRAVENOUS at 12:03

## 2022-05-17 NOTE — PROGRESS NOTES
Central labs drawn per orders  Patient tolerated IV hydration without issues  Verified with Chana Talbert RN that patient does not need to be seen prior to 5/20 treatment  AVS given to patient  Patient ambulated off unit without incident  All personal belongings taken with patient

## 2022-05-20 ENCOUNTER — HOSPITAL ENCOUNTER (OUTPATIENT)
Dept: INFUSION CENTER | Facility: HOSPITAL | Age: 72
Discharge: HOME | End: 2022-05-20
Attending: INTERNAL MEDICINE
Payer: MEDICARE

## 2022-05-20 VITALS
SYSTOLIC BLOOD PRESSURE: 150 MMHG | OXYGEN SATURATION: 95 % | WEIGHT: 203.71 LBS | HEIGHT: 68 IN | RESPIRATION RATE: 18 BRPM | BODY MASS INDEX: 30.87 KG/M2 | TEMPERATURE: 97.8 F | DIASTOLIC BLOOD PRESSURE: 87 MMHG | HEART RATE: 88 BPM

## 2022-05-20 DIAGNOSIS — C68.9 UROTHELIAL CANCER (HCC): Primary | ICD-10-CM

## 2022-05-20 DIAGNOSIS — C79.10 METASTATIC UROTHELIAL CARCINOMA (HCC): ICD-10-CM

## 2022-05-20 DIAGNOSIS — C65.2 CANCER OF LEFT RENAL PELVIS (HCC): ICD-10-CM

## 2022-05-20 PROCEDURE — 96367 TX/PROPH/DG ADDL SEQ IV INF: CPT

## 2022-05-20 PROCEDURE — 96413 CHEMO IV INFUSION 1 HR: CPT

## 2022-05-20 RX ORDER — SODIUM CHLORIDE 9 MG/ML
20 INJECTION, SOLUTION INTRAVENOUS ONCE
Status: COMPLETED | OUTPATIENT
Start: 2022-05-20 | End: 2022-05-20

## 2022-05-20 RX ORDER — ACETAMINOPHEN 325 MG/1
650 TABLET ORAL ONCE
Status: DISCONTINUED | OUTPATIENT
Start: 2022-05-20 | End: 2022-05-24 | Stop reason: HOSPADM

## 2022-05-20 RX ADMIN — SODIUM CHLORIDE 20 ML/HR: 0.9 INJECTION, SOLUTION INTRAVENOUS at 10:59

## 2022-05-20 RX ADMIN — DEXAMETHASONE SODIUM PHOSPHATE: 10 INJECTION, SOLUTION INTRAMUSCULAR; INTRAVENOUS at 11:24

## 2022-05-20 RX ADMIN — DIPHENHYDRAMINE HYDROCHLORIDE 25 MG: 50 INJECTION INTRAMUSCULAR; INTRAVENOUS at 11:01

## 2022-05-20 RX ADMIN — ENFORTUMAB VEDOTIN 90 MG: 30 INJECTION, POWDER, LYOPHILIZED, FOR SOLUTION INTRAVENOUS at 12:11

## 2022-05-24 ENCOUNTER — HOSPITAL ENCOUNTER (OUTPATIENT)
Dept: INFUSION CENTER | Facility: HOSPITAL | Age: 72
Discharge: HOME | End: 2022-05-24
Attending: INTERNAL MEDICINE
Payer: MEDICARE

## 2022-05-24 VITALS
OXYGEN SATURATION: 97 % | HEART RATE: 59 BPM | TEMPERATURE: 96.7 F | SYSTOLIC BLOOD PRESSURE: 124 MMHG | RESPIRATION RATE: 18 BRPM | DIASTOLIC BLOOD PRESSURE: 76 MMHG

## 2022-05-24 DIAGNOSIS — E86.0 DEHYDRATION: Primary | ICD-10-CM

## 2022-05-24 DIAGNOSIS — C65.2 CANCER OF LEFT RENAL PELVIS (HCC): ICD-10-CM

## 2022-05-24 DIAGNOSIS — C79.10 METASTATIC UROTHELIAL CARCINOMA (HCC): ICD-10-CM

## 2022-05-24 DIAGNOSIS — C79.89 SECONDARY MALIGNANT NEOPLASM OF MUSCLE OF ABDOMEN (HCC): Primary | ICD-10-CM

## 2022-05-24 DIAGNOSIS — C68.9 UROTHELIAL CANCER (HCC): ICD-10-CM

## 2022-05-24 DIAGNOSIS — C79.89 SECONDARY MALIGNANT NEOPLASM OF MUSCLE OF ABDOMEN (HCC): ICD-10-CM

## 2022-05-24 LAB
ALBUMIN SERPL BCP-MCNC: 3.7 G/DL (ref 3.5–5)
ALP SERPL-CCNC: 72 U/L (ref 34–104)
ALT SERPL W P-5'-P-CCNC: 21 U/L (ref 7–52)
ANION GAP SERPL CALCULATED.3IONS-SCNC: 5 MMOL/L (ref 4–13)
AST SERPL W P-5'-P-CCNC: 18 U/L (ref 13–39)
BASOPHILS # BLD AUTO: 0.01 THOUSANDS/ΜL (ref 0–0.1)
BASOPHILS NFR BLD AUTO: 0 % (ref 0–1)
BILIRUB SERPL-MCNC: 0.73 MG/DL (ref 0.2–1)
BUN SERPL-MCNC: 22 MG/DL (ref 5–25)
CALCIUM SERPL-MCNC: 9.3 MG/DL (ref 8.4–10.2)
CHLORIDE SERPL-SCNC: 103 MMOL/L (ref 96–108)
CO2 SERPL-SCNC: 30 MMOL/L (ref 21–32)
CREAT SERPL-MCNC: 1.25 MG/DL (ref 0.6–1.3)
EOSINOPHIL # BLD AUTO: 0 THOUSAND/ΜL (ref 0–0.61)
EOSINOPHIL NFR BLD AUTO: 0 % (ref 0–6)
ERYTHROCYTE [DISTWIDTH] IN BLOOD BY AUTOMATED COUNT: 14.6 % (ref 11.6–15.1)
GFR SERPL CREATININE-BSD FRML MDRD: 56 ML/MIN/1.73SQ M
GLUCOSE SERPL-MCNC: 103 MG/DL (ref 65–140)
HCT VFR BLD AUTO: 38.5 % (ref 36.5–49.3)
HGB BLD-MCNC: 12.4 G/DL (ref 12–17)
IMM GRANULOCYTES # BLD AUTO: 0.01 THOUSAND/UL (ref 0–0.2)
IMM GRANULOCYTES NFR BLD AUTO: 0 % (ref 0–2)
LYMPHOCYTES # BLD AUTO: 1.09 THOUSANDS/ΜL (ref 0.6–4.47)
LYMPHOCYTES NFR BLD AUTO: 21 % (ref 14–44)
MAGNESIUM SERPL-MCNC: 1.8 MG/DL (ref 1.9–2.7)
MCH RBC QN AUTO: 32.5 PG (ref 26.8–34.3)
MCHC RBC AUTO-ENTMCNC: 32.2 G/DL (ref 31.4–37.4)
MCV RBC AUTO: 101 FL (ref 82–98)
MONOCYTES # BLD AUTO: 0.69 THOUSAND/ΜL (ref 0.17–1.22)
MONOCYTES NFR BLD AUTO: 13 % (ref 4–12)
NEUTROPHILS # BLD AUTO: 3.42 THOUSANDS/ΜL (ref 1.85–7.62)
NEUTS SEG NFR BLD AUTO: 66 % (ref 43–75)
NRBC BLD AUTO-RTO: 0 /100 WBCS
PLATELET # BLD AUTO: 193 THOUSANDS/UL (ref 149–390)
PMV BLD AUTO: 9.9 FL (ref 8.9–12.7)
POTASSIUM SERPL-SCNC: 4.1 MMOL/L (ref 3.5–5.3)
PROT SERPL-MCNC: 6.1 G/DL (ref 6.4–8.4)
RBC # BLD AUTO: 3.82 MILLION/UL (ref 3.88–5.62)
SODIUM SERPL-SCNC: 138 MMOL/L (ref 135–147)
WBC # BLD AUTO: 5.22 THOUSAND/UL (ref 4.31–10.16)

## 2022-05-24 PROCEDURE — 96360 HYDRATION IV INFUSION INIT: CPT

## 2022-05-24 PROCEDURE — 85025 COMPLETE CBC W/AUTO DIFF WBC: CPT | Performed by: INTERNAL MEDICINE

## 2022-05-24 PROCEDURE — 83735 ASSAY OF MAGNESIUM: CPT | Performed by: INTERNAL MEDICINE

## 2022-05-24 PROCEDURE — 80053 COMPREHEN METABOLIC PANEL: CPT | Performed by: INTERNAL MEDICINE

## 2022-05-24 RX ADMIN — SODIUM CHLORIDE 1000 ML: 0.9 INJECTION, SOLUTION INTRAVENOUS at 10:44

## 2022-05-24 NOTE — PROGRESS NOTES
Pt here for IV hydration  Pt offers no complaints today  Port accessed and brisk blood return noted  BMP, CBC and Mg drawn

## 2022-05-25 ENCOUNTER — OFFICE VISIT (OUTPATIENT)
Dept: PAIN MEDICINE | Facility: CLINIC | Age: 72
End: 2022-05-25
Payer: MEDICARE

## 2022-05-25 VITALS
HEIGHT: 68 IN | BODY MASS INDEX: 30.62 KG/M2 | SYSTOLIC BLOOD PRESSURE: 137 MMHG | WEIGHT: 202 LBS | DIASTOLIC BLOOD PRESSURE: 79 MMHG | HEART RATE: 65 BPM

## 2022-05-25 DIAGNOSIS — M54.50 CHRONIC BILATERAL LOW BACK PAIN WITHOUT SCIATICA: ICD-10-CM

## 2022-05-25 DIAGNOSIS — M48.061 SPINAL STENOSIS OF LUMBAR REGION, UNSPECIFIED WHETHER NEUROGENIC CLAUDICATION PRESENT: ICD-10-CM

## 2022-05-25 DIAGNOSIS — M47.816 LUMBAR SPONDYLOSIS: ICD-10-CM

## 2022-05-25 DIAGNOSIS — G89.29 CHRONIC BILATERAL LOW BACK PAIN WITHOUT SCIATICA: ICD-10-CM

## 2022-05-25 DIAGNOSIS — M51.26 LUMBAR DISC HERNIATION: ICD-10-CM

## 2022-05-25 DIAGNOSIS — G89.4 CHRONIC PAIN DISORDER: Primary | ICD-10-CM

## 2022-05-25 DIAGNOSIS — M54.16 LUMBAR RADICULOPATHY: ICD-10-CM

## 2022-05-25 PROCEDURE — 99214 OFFICE O/P EST MOD 30 MIN: CPT | Performed by: NURSE PRACTITIONER

## 2022-05-25 NOTE — PROGRESS NOTES
Assessment:  1  Chronic pain disorder    2  Lumbar radiculopathy    3  Lumbar disc herniation    4  Lumbar spondylosis    5  Spinal stenosis of lumbar region, unspecified whether neurogenic claudication present    6  Chronic bilateral low back pain without sciatica        Plan:  1  I will order an updated MRI of the lumbar with and without contrast as patient has a cancer history, currently undergoing chemotherapy, and his back pain has not responded to recent lumbar radiofrequency ablation  2  While the patient was in the office today, I discussed with the patient that as per our medication management office protocol, we do not prescribe any opioid medications before we obtain a baseline drug screen from every patient  The patient was agreeable and a baseline drug screen was collected today  We will discuss the official results at the patient's next office visit  [place] Prescription Drug Monitoring Program report was reviewed and was appropriate     3  Patient can continue duloxetine as prescribed   4  Patient will continue to follow with hematology oncology  5  Patient will continue with his home exercise program  6  The patient will follow up in 4 weeks or sooner if needed    M*Modal software was used to dictate this note  It may contain errors with dictating incorrect words or incorrect spelling  Please contact the provider directly with any questions  History of Present Illness: The patient is a 67 y o  male with a history of urothelial carcinoma status post left nephrectomy and ureterectomy currently undergoing chemotherapy last seen on 2/2/22 who presents for a follow up office visit in regards to chronic axial low back pain that is nonradiating into his lower extremities  Patient denies bowel or bladder incontinence or saddle anesthesia  Patient has undergone bilateral L2-5 RFA completed on April 12, 2022 without any improvement in his low back pain  He is only taking Tylenol for pain  He was previously on high-dose opioid medications in the past as prescribed by palliative Medicine, however self weaned off of these medications  He was taking morphine but he states it caused significant constipation  He states he was told to avoid NSAIDs  He does take duloxetine 60 milligrams daily  The patient rates his back pain an 8/10 on the numeric pain rating scale  He constantly has pain in the morning which is described as dull aching    I have personally reviewed and/or updated the patient's past medical history, past surgical history, family history, social history, current medications, allergies, and vital signs today  Review of Systems:    Review of Systems   Respiratory: Negative for shortness of breath  Cardiovascular: Negative for chest pain  Gastrointestinal: Negative for constipation, diarrhea, nausea and vomiting  Musculoskeletal: Negative for arthralgias, gait problem, joint swelling and myalgias  Skin: Negative for rash  Neurological: Negative for dizziness, seizures and weakness  All other systems reviewed and are negative          Past Medical History:   Diagnosis Date    Arthritis     Bladder cancer     Cancer ([facility] [address] )     skin melanoma;basal cell    Chronic pain disorder     from arthritis    Colon polyp     Does use hearing aid     bilat    Dry eyes, bilateral     GERD (gastroesophageal reflux disease)     History of kidney stones 10/2019    History of partial knee replacement     bilat    History of vertigo     Hyperlipidemia     Hypertension     Infusion extravasation of chemotherapy vesicant 11/2021    Kidney lesion     Lumbar disc disorder     compression of vertebrae L4-5-6    Muscle weakness     left hip area    Renal mass     left    Right ankle injury 03/05/2020    missed step of ladder     Right ankle pain     Shortness of breath     with activity    Tinnitus     Urothelial cancer     left    Wears glasses        Past Surgical History: Procedure Laterality Date    COLONOSCOPY      CYSTOSCOPY Left 2020    Procedure: CYSTOSCOPY; URETERAL CATHETER PLACEMENT;  Surgeon: Mendoza Rogers MD;  Location[facility];  Service: Urology    CYSTOSCOPY  2020    CYSTOSCOPY  2021    FL CYSTOGRAM  2020    FL RETROGRADE PYELOGRAM  2020    HERNIA REPAIR      umbilical with mesh    INGUINAL HERNIA REPAIR Right     with mesh    IR PORT PLACEMENT  2020    JOINT REPLACEMENT Bilateral     partials knee    LUMBAR EPIDURAL INJECTION      [place] CYSTOURETHROSCOPY,URETER CATHETER Bilateral 2020    Procedure: CYSTOSCOPY; RIGHT RETROGRADE PYELOGRAM WITH RIGHT URETERAL CYTOLOGY SAMPLING; LEFT URETEROSCOPY WITH RENAL PELVIS BIOPSY AND LEFT STENT PLACEMENT;  Surgeon: Mendoza Rogers MD;  [facility];  Service: Urology    [place] NEPHRECTOMY, W/PART  URETECTOMY Left 2020    Procedure: ROBOTIC LAPAROSCOPIC NEPHRO-URETERECTOMY;  Surgeon: Mendoza Rogers MD;  [facility];  Service: Urology    SKIN CANCER EXCISION      surface melanoma    TONSILLECTOMY      WISDOM TOOTH EXTRACTION         Family History   Problem Relation Age of Onset    Liver cancer Father        Social History     Occupational History    Not on file   Tobacco Use    Smoking status: Former Smoker     Types: Cigarettes     Quit date:      Years since quittin 4    Smokeless tobacco: Never Used   Vaping Use    Vaping Use: Never used   Substance and Sexual Activity    Alcohol use:  Yes     Alcohol/week: 17 0 standard drinks     Types: 7 Glasses of wine, 10 Cans of beer per week     Comment: socially    Drug use: Never    Sexual activity: Not Currently         Current Outpatient Medications:     acetaminophen (TYLENOL) 500 mg tablet, Take 2 tablets (1,000 mg total) by mouth every 8 (eight) hours, Disp: , Rfl:     albuterol (ProAir HFA) 90 mcg/act inhaler, Inhale 2 puffs every 6 (six) hours as needed for wheezing or shortness of breath (Patient not taking: No sig reported), Disp: 8 5 g, Rfl: 3    allopurinol (ZYLOPRIM) 300 mg tablet, take 1 tablet by mouth once daily, Disp: 30 tablet, Rfl: 3    ALPRAZolam (XANAX) 0 25 mg tablet, Take 1 tablet (0 25 mg total) by mouth daily at bedtime as needed for anxiety (restless legs), Disp: 30 tablet, Rfl: 1    ALPRAZolam (XANAX) 0 25 mg tablet, Take 1 tablet (0 25 mg total) by mouth daily at bedtime as needed for anxiety (Patient not taking: No sig reported), Disp: 30 tablet, Rfl: 0    amLODIPine (NORVASC) 5 mg tablet, Take 5 mg by mouth daily  , Disp: , Rfl:     atorvastatin (LIPITOR) 80 mg tablet, Take 80 mg by mouth every other day evening, Disp: , Rfl:     clindamycin (CLINDAGEL) 1 % gel, Apply topically 2 (two) times a day, Disp: 30 g, Rfl: 1    docusate sodium (COLACE) 250 MG capsule, Take 1 capsule (250 mg total) by mouth daily, Disp: 60 capsule, Rfl: 6    DULoxetine (CYMBALTA) 60 mg delayed release capsule, Take 1 capsule (60 mg total) by mouth daily, Disp: 30 capsule, Rfl: 1    famotidine (PEPCID) 20 mg tablet, take 1-2 tablets by mouth every evening, Disp: , Rfl:     folic acid (FOLVITE) 1 mg tablet, take 1 tablet by mouth once daily, Disp: 90 tablet, Rfl: 4    hydrOXYzine HCL (ATARAX) 25 mg tablet, Take 1 tablet (25 mg total) by mouth every 6 (six) hours as needed for itching or anxiety, Disp: 60 tablet, Rfl: 2    Magnesium 250 MG TABS, 1 tablet 2 (two) times a day Taking 500mg in the morning and 500mg at night, Disp: , Rfl:     metoprolol tartrate (LOPRESSOR) 100 mg tablet, Take 50 mg by mouth daily, Disp: , Rfl:     morphine (MSIR) 15 mg tablet, Take 1/2 tablet every 4 hours as needed for severe pain (Patient not taking: No sig reported), Disp: 45 tablet, Rfl: 0    naloxone (NARCAN) 4 mg/0 1 mL nasal spray, Administer 1 spray into a nostril   If breathing does not return to normal or if breathing difficulty resumes after 2-3 minutes, give another dose in the other nostril using a new spray  (Patient not taking: No sig reported), Disp: 1 each, Rfl: 1    nystatin (MYCOSTATIN) cream, Apply topically 2 (two) times a day, Disp: 30 g, Rfl: 0    omeprazole (PriLOSEC) 20 mg delayed release capsule, Take 20 mg by mouth daily  , Disp: , Rfl:     predniSONE 10 mg tablet, Take 2 tablets (20 mg total) by mouth daily (Patient not taking: No sig reported), Disp: 60 tablet, Rfl: 3    predniSONE 2 5 mg tablet, Take 1 tablet (2 5 mg total) by mouth daily Take 5mg daily in the am for 14 days then 2 5mg daily in the am thereafter, Disp: 14 tablet, Rfl: 0    predniSONE 5 mg tablet, Take 1 tablet (5 mg total) by mouth daily Take 5mg daily in the am for 14 days then 2 5mg daily in the am thereafter (Patient not taking: No sig reported), Disp: 14 tablet, Rfl: 0    senna (SENOKOT) 8 6 mg, Take 1 tablet (8 6 mg total) by mouth daily at bedtime, Disp: 30 each, Rfl: 0    tadalafil (CIALIS) 20 MG tablet, Take one tablet by mouth one hour before sexual activity, Disp: 15 tablet, Rfl: 3    tamsulosin (FLOMAX) 0 4 mg, Take 1 capsule (0 4 mg total) by mouth daily with dinner, Disp: 30 capsule, Rfl: 1    triamcinolone (KENALOG) 0 1 % lotion, Apply topically 3 (three) times a day, Disp: 60 mL, Rfl: 5    VITAMIN D PO, Take 1,000 Units by mouth daily , Disp: , Rfl:     zolpidem (AMBIEN) 5 mg tablet, Take 1 tablet (5 mg total) by mouth daily at bedtime as needed for sleep, Disp: 30 tablet, Rfl: 1    Allergies   Allergen Reactions    Poison Ivy Extract Rash       Physical Exam:    /79   Pulse 65   Ht 5' 8" (1 727 m)   Wt 91 6 kg (202 lb)   BMI 30 71 kg/m²     Constitutional:normal, well developed, well nourished, alert, in no distress and non-toxic and no overt pain behavior    Eyes:anicteric  HEENT:grossly intact  Neck:supple, symmetric, trachea midline and no masses   Pulmonary:even and unlabored  Cardiovascular:No edema or pitting edema present  Skin:Normal without rashes or lesions and well hydrated  Psychiatric:Mood and affect appropriate  Neurologic:Cranial Nerves II-XII grossly intact  Musculoskeletal:antalgic gait, ambulates with a cane    Bilateral SI joints nontender to palpation      Imaging  MRI lumbar spine w wo contrast    (Results Pending)         Orders Placed This Encounter   Procedures    MRI lumbar spine w wo contrast    MM ALL_Prescribed Meds and Special Instructions    MM DT_Alprazolam Definitive Test    MM DT_Amphetamine Definitive Test    MM DT_Aripiprazole Definitive Test    MM DT_Bath Salts Definitive Test    MM DT_Buprenorphine Definitive Test    MM DT_Butalbital Definitive Test    MM DT_Clonazepam Definitive Test    MM DT_Clozapine Definitive Test    MM DT_Cocaine Definitive Test    MM DT_Codeine Definitive Test    MM DT_Desipramine Definitive Test    MM DT_Dextromethorphan Definitive Test    MM Diazepam Definitive Test    MM DT_Ethyl Glucuronide/Ethyl Sulfate Definitive Test    MM DT_Fentanyl Definitive Test    MM DT_Haloperidol Definitive Test    MM DT_Heroin Definitive Test    MM DT_Hydrocodone Definitive Test    MM DT_Hydromorphone Definitive Test    MM DT_Imipramine Definitive Test    MM DT_Kratom Definitive Test    MM DT_Levorphanol Definitive Test    MM Lorazepam Definitive Test    MM DT_MDMA Definitive Test    MM DT_Meperidine Definitive Test    MM DT_Methadone Definitive Test    MM DT_Methamphetamine Definitive Test    MM DT_Morphine Definitive Test    MM DT_Olanzapine Definitive Test    MM DT_Oxazepam Definitive Test    MM DT_Oxycodone Definitive Test    MM DT_Oxymorphone Definitive Test    MM DT_Phencyclidine Definitive Test    MM DT_Phenobarbital Definitive Test    MM DT_Phentermine Definitive Test    MM DT_Quetiapine Definitive Test    MM DT_Risperidone Definitive Test    MM DT_Secobarbital Definitive Test    MM DT_Spice Definitive Test    MM DT_Tapentadol Definitive Test    MM DT_Temazapam Definitive Test    MM DT_THC Definitive Test    MM DT_Tramadol Definitive Test    MM DT_Methylphenidate Definitive Test

## 2022-05-26 ENCOUNTER — OFFICE VISIT (OUTPATIENT)
Dept: HEMATOLOGY ONCOLOGY | Facility: CLINIC | Age: 72
End: 2022-05-26
Payer: MEDICARE

## 2022-05-26 VITALS
HEIGHT: 68 IN | TEMPERATURE: 98.4 F | DIASTOLIC BLOOD PRESSURE: 70 MMHG | SYSTOLIC BLOOD PRESSURE: 124 MMHG | HEART RATE: 61 BPM | OXYGEN SATURATION: 95 % | RESPIRATION RATE: 16 BRPM | WEIGHT: 201 LBS | BODY MASS INDEX: 30.46 KG/M2

## 2022-05-26 DIAGNOSIS — E83.42 HYPOMAGNESEMIA: ICD-10-CM

## 2022-05-26 DIAGNOSIS — C79.10 METASTATIC UROTHELIAL CARCINOMA (HCC): Primary | ICD-10-CM

## 2022-05-26 PROCEDURE — 99214 OFFICE O/P EST MOD 30 MIN: CPT | Performed by: INTERNAL MEDICINE

## 2022-05-26 RX ORDER — PANTOPRAZOLE SODIUM 40 MG/1
TABLET, DELAYED RELEASE ORAL
COMMUNITY
Start: 2022-05-24 | End: 2022-06-02

## 2022-05-26 NOTE — PROGRESS NOTES
Hematology/Oncology Outpatient Follow-up  Delmis Burrell 67 y o  male 1950 [id number]    Date:  5/26/2022        Assessment and Plan:  1  Metastatic urothelial carcinoma ([address] )  Her the patient will be continue on the enfortumab vedotin at the current dose of 1 milligram/kilogram 3 weeks on 1 week off  He is due for cycle 8 day 15 tomorrow  We did discuss pursuing a PET-CT scan for close monitoring of his metastatic urothelial carcinoma around the middle of June  The order for the PET scan will be placed during next visit  - CBC and differential; Future  - Comprehensive metabolic panel; Future  - Magnesium; Future    2  Hypomagnesemia  IV magnesium will be used as needed  - Magnesium; Future        HPI:  The patient came today for follow-up visit accompanied by his wife  He did complain about his chronic lower back pain otherwise he seems to be in his usual state of health  Blood work on 05/24/2022 showed hemoglobin of 12 4 with normal white cells and platelets  Creatinine 1 2 with normal calcium liver enzymes  Magnesium 1 8  Oncology History Overview Note   Patient has a history of hypertension, hyperlipidemia, chronic lower back pain  He had an MRI of the lower spine for further evaluation of his chronic lower back pain  The MRI on 12/02/2019 revealed Multiple left renal masses to include a left lower pole cyst and a rounded structure in the left upper pole which may also represent cyst   Left upper pole renal masses approximately 3 cm in greatest linear dimension  A CT with renal protocol was done on 12/12/2019 which also showed the infiltrating lesion in the upper pole of the left kidney measuring about the 3 5 cm in greatest dimension without any hint of retroperitoneal lymphadenopathy  A CT scan of the abdomen pelvis on 01/14/2020 showed the same findings with the mild hydronephrosis on the left  The urine cytology on 01/03/2020 showed high-grade urothelial carcinoma    A cystoscopy was then done, a biopsy was taken from the left renal pelvic region which showed high-grade urothelial carcinoma without evidence of lamina propria invasion  The detrusor muscle/muscularis propria were not present for evaluation  The recommendation was to pursue left robotic assisted laparoscopic nephroureterectomy with bladder cuff excision which was done on 04/01/2020  The final pathology revealed;  - Invasive high grade urothelial carcinoma arising in renal pelvis  - Bladder cuff margin is negative for carcinoma and no evidence of high grade dysplasia  - Ureters with no significant pathologic abnormality  - Two benign simple cysts  - Adrenal gland is negative for malignancy  - One lymph node, negative for malignancy (0/1)  The tumor size was 4 5 cm with invasion beyond the muscularis into the periurethral fat or peripelvic fat or the renal parenchyma  The margins were uninvolved by carcinoma or carcinoma in situ  The final pathology was pT3 pN0  Patient barely tolerated 1 cycle of adjuvant chemotherapy with split dose cisplatin and gemcitabine with a lot of side effects  The treatment had to be discontinued due to significant worsening renal dysfunction 6/2020  Patient started to complain about significant abdominal/left inguinal pain around August/September 2020  Unfortunately repeat imaging revealed recurrent/metastatic disease  9/4/20 CT C/A/P- Status post left nephrectomy for resection of urothelial neoplasm  Lymphadenopathy in the left nephrectomy bed has increased since the prior examination, concerning for metastatic disease  Ill-defined hyperattenuating masslike focus in the left rectus muscle, not identified on the prior examination  This is suspicious for a metastatic lesion  A rectus hematoma is also considered  9/23/20 PET scan- 1    Multiple FDG avid lymph nodes in the retrocrural region, retroperitoneum and the left renal bed compatible with metastasis  These have progressed from recent CT  2   FDG avid mass involving the left rectus abdominal musculature compatible with metastasis which is larger from recent CT  3  Several small lymph nodes adjacent to the mid to distal esophagus with FDG uptake suspicious for metastasis  Small focus of FDG uptake also suggested in the right hilar region, metastasis is not excluded  This could be reassessed on follow-up  4   Subtle focus of FDG uptake at the T2 level on the left, nonspecific, could be related to early degenerative changes, developing metastasis is not entirely excluded  This could be reassessed on follow-up  5  Prostate is mildly prominent with heterogeneous FDG uptake  This could be inflammatory  Correlate for prostatitis  He completed palliative radiation to the left abdomen 12/3/20     His PET scan from 08/16/2021 showed progression of his disease with hypermetabolic soft tissue lesions at the level of the right shoulder and right axilla with interval progression of the retroperitoneal and mesenteric hypermetabolic metastasis  He did have the new lesion to his right upper back/shoulder which was causing significant localized pain  This excised by his surgeon 08/09/2021  Pathology confirmed metastatic high-grade carcinoma consistent with his no primary urothelial carcinoma  He received palliative radiation to his right shoulder/axilla region  Urothelial cancer ([address] )   1/3/2020 Biopsy    Final Diagnosis    A  Urine, Clean Catch, Thin Prep:  High grade urothelial carcinoma (HGUC) - see comment  1/3/2020 Initial Diagnosis    Urothelial cancer ([address] )  T3 N0 (stage IIIA)     1/17/2020 Biopsy    Final Diagnosis    A  Ureter, Right, left renal pelvis biopsy  -Fragments of high grade urothelial carcinoma   -No evidence of lamina propria invasion   -Detrusor muscle/ muscularis propria is not present for evaluation       B  Urinary Bladder, bladder biopsy:  -Fragments of low grade papillary lesion  See note  -No evidence of invasion seen  -Unremarkable fragment of detrusor muscle seen  4/1/2020 Surgery    left robotic assisted laparoscopic nephroureterectomy with bladder cuff excision     Final Diagnosis    A  Left kidney, ureter, and bladder cuff, nephroureterectomy:  - Invasive high grade urothelial carcinoma arising in renal pelvis  - Bladder cuff margin is negative for carcinoma and no evidence of high grade dysplasia  - Ureters with no significant pathologic abnormality  - Two benign simple cysts  - Adrenal gland is negative for malignancy  - One lymph node, negative for malignancy (0/1)          5/14/2020 - 6/3/2020 Chemotherapy    CISplatin (PLATINOL) split dose 35 mg/m2 = 75 3 mg (50 % of original dose 70 mg/m2), Intravenous,   Administration: 75 3 mg (5/14/2020), 75 3 mg (5/21/2020)  gemcitabine (GEMZAR) 2,200 mg in sodium chloride 0 9 % 250 mL infusion, 2,150 2 mg (80 % of original dose 1,250 mg/m2), Intravenous,   Administration: 2,200 mg (5/14/2020), 2,200 mg (5/21/2020)    (only completed 1 cycle- d/c d/t adverse effects and worsening renal dysfunction)     10/9/2020 - 9/9/2021 Chemotherapy    pembrolizumab (KEYTRUDA) IVPB, 200 mg, Intravenous, Once, 16 of 20 cycles  Administration: 200 mg (10/9/2020), 200 mg (10/30/2020), 200 mg (11/20/2020), 200 mg (12/11/2020), 200 mg (12/31/2020), 200 mg (1/22/2021), 200 mg (2/12/2021), 200 mg (3/5/2021), 200 mg (3/26/2021), 200 mg (4/16/2021), 200 mg (5/7/2021), 200 mg (5/28/2021), 200 mg (6/18/2021), 200 mg (7/9/2021), 200 mg (7/30/2021), 200 mg (8/20/2021)     11/19/2020 - 12/3/2020 Radiation    Treatment:  Course: C1    Plan ID Energy Fractions Dose per Fraction (cGy) Dose Correction (cGy) Total Dose Delivered (cGy) Elapsed Days   L Abdomen 6X 10 / 10 300 0 3,000 14        9/10/2021 -  Chemotherapy    enfortumab vedotin-ejfv (PADCEV) IVPB, 1 25 mg/kg = 114 mg, Intravenous, Once, 8 of 13 cycles  Dose modification: 1 mg/kg (original dose 1 25 mg/kg, Cycle 7, Reason: Other (Must fill in a comment), Comment: dose reduction due to side effects ), 1 25 mg/kg (original dose 1 25 mg/kg, Cycle 7, Reason: Other (Must fill in a comment), Comment: patient wants full dose ), 1 mg/kg (original dose 1 25 mg/kg, Cycle 7, Reason: Neuropathy)  Administration: 114 mg (9/10/2021), 114 mg (9/17/2021), 110 mg (10/8/2021), 110 mg (9/24/2021), 110 mg (10/15/2021), 110 mg (11/12/2021), 110 mg (10/22/2021), 110 mg (11/19/2021), 110 mg (12/10/2021), 110 mg (11/26/2021), 110 mg (12/17/2021), 110 mg (1/7/2022), 110 mg (12/23/2021), 110 mg (1/14/2022), 90 mg (4/8/2022), 90 mg (4/15/2022), 90 mg (4/22/2022), 110 mg (1/21/2022), 110 mg (2/18/2022), 110 mg (2/25/2022), 90 mg (5/13/2022), 90 mg (5/20/2022), 90 mg (3/4/2022)     Cancer of left renal pelvis ([facility] [address] )   4/23/2020 Initial Diagnosis    Cancer of left renal pelvis ([facility] [address] )     10/9/2020 - 9/9/2021 Chemotherapy    pembrolizumab (KEYTRUDA) IVPB, 200 mg, Intravenous, Once, 16 of 20 cycles  Administration: 200 mg (10/9/2020), 200 mg (10/30/2020), 200 mg (11/20/2020), 200 mg (12/11/2020), 200 mg (12/31/2020), 200 mg (1/22/2021), 200 mg (2/12/2021), 200 mg (3/5/2021), 200 mg (3/26/2021), 200 mg (4/16/2021), 200 mg (5/7/2021), 200 mg (5/28/2021), 200 mg (6/18/2021), 200 mg (7/9/2021), 200 mg (7/30/2021), 200 mg (8/20/2021)     9/10/2021 -  Chemotherapy    enfortumab vedotin-ejfv (PADCEV) IVPB, 1 25 mg/kg = 114 mg, Intravenous, Once, 8 of 13 cycles  Dose modification: 1 mg/kg (original dose 1 25 mg/kg, Cycle 7, Reason: Other (Must fill in a comment), Comment: dose reduction due to side effects ), 1 25 mg/kg (original dose 1 25 mg/kg, Cycle 7, Reason: Other (Must fill in a comment), Comment: patient wants full dose ), 1 mg/kg (original dose 1 25 mg/kg, Cycle 7, Reason: Neuropathy)  Administration: 114 mg (9/10/2021), 114 mg (9/17/2021), 110 mg (10/8/2021), 110 mg (9/24/2021), 110 mg (10/15/2021), 110 mg (11/12/2021), 110 mg (10/22/2021), 110 mg (11/19/2021), 110 mg (12/10/2021), 110 mg (11/26/2021), 110 mg (12/17/2021), 110 mg (1/7/2022), 110 mg (12/23/2021), 110 mg (1/14/2022), 90 mg (4/8/2022), 90 mg (4/15/2022), 90 mg (4/22/2022), 110 mg (1/21/2022), 110 mg (2/18/2022), 110 mg (2/25/2022), 90 mg (5/13/2022), 90 mg (5/20/2022), 90 mg (3/4/2022)      Surgery       Metastatic urothelial carcinoma ([facility] [address] )   8/9/2021 Initial Diagnosis    Metastatic urothelial carcinoma ([facility] [address] )     9/8/2021 - 9/21/2021 Radiation    Treatment:  Course: C2    Plan ID Energy Fractions Dose per Fraction (cGy) Dose Correction (cGy) Total Dose Delivered (cGy) Elapsed Days   R Axil_Shl # 6X 10 / 10 300 0 3,000 13      Treatment dates:  C2: 9/8/2021 - 9/21/2021     9/10/2021 -  Chemotherapy    enfortumab vedotin-ejfv (PADCEV) IVPB, 1 25 mg/kg = 114 mg, Intravenous, Once, 8 of 13 cycles  Dose modification: 1 mg/kg (original dose 1 25 mg/kg, Cycle 7, Reason: Other (Must fill in a comment), Comment: dose reduction due to side effects ), 1 25 mg/kg (original dose 1 25 mg/kg, Cycle 7, Reason: Other (Must fill in a comment), Comment: patient wants full dose ), 1 mg/kg (original dose 1 25 mg/kg, Cycle 7, Reason: Neuropathy)  Administration: 114 mg (9/10/2021), 114 mg (9/17/2021), 110 mg (10/8/2021), 110 mg (9/24/2021), 110 mg (10/15/2021), 110 mg (11/12/2021), 110 mg (10/22/2021), 110 mg (11/19/2021), 110 mg (12/10/2021), 110 mg (11/26/2021), 110 mg (12/17/2021), 110 mg (1/7/2022), 110 mg (12/23/2021), 110 mg (1/14/2022), 90 mg (4/8/2022), 90 mg (4/15/2022), 90 mg (4/22/2022), 110 mg (1/21/2022), 110 mg (2/18/2022), 110 mg (2/25/2022), 90 mg (5/13/2022), 90 mg (5/20/2022), 90 mg (3/4/2022)         Interval history:    ROS: Review of Systems   Constitutional: Positive for fatigue  Negative for chills and fever  HENT: Positive for hearing loss  Negative for ear pain and sore throat  Eyes: Negative for pain and visual disturbance     Respiratory: Positive for shortness of breath  Negative for cough  Cardiovascular: Negative for chest pain and palpitations  Gastrointestinal: Negative for abdominal pain and vomiting  Genitourinary: Negative for dysuria and hematuria  Musculoskeletal: Positive for back pain  Negative for arthralgias  Skin: Negative for color change and rash  Neurological: Positive for dizziness, numbness and headaches  Negative for seizures and syncope  All other systems reviewed and are negative        Past Medical History:   Diagnosis Date    Arthritis     Bladder cancer     Cancer ([address] )     skin melanoma;basal cell    Chronic pain disorder     from arthritis    Colon polyp     Does use hearing aid     bilat    Dry eyes, bilateral     GERD (gastroesophageal reflux disease)     History of kidney stones 10/2019    History of partial knee replacement     bilat    History of vertigo     Hyperlipidemia     Hypertension     Infusion extravasation of chemotherapy vesicant 11/2021    Kidney lesion     Lumbar disc disorder     compression of vertebrae L4-5-6    Muscle weakness     left hip area    Renal mass     left    Right ankle injury 03/05/2020    missed step of ladder     Right ankle pain     Shortness of breath     with activity    Tinnitus     Urothelial cancer     left    Wears glasses        Past Surgical History:   Procedure Laterality Date    COLONOSCOPY      CYSTOSCOPY Left 4/1/2020    Procedure: CYSTOSCOPY; URETERAL CATHETER PLACEMENT;  Surgeon: Bety Rivas MD;  Location[facility];  Service: Urology    CYSTOSCOPY  05/11/2020    CYSTOSCOPY  06/04/2021    FL CYSTOGRAM  4/13/2020    FL RETROGRADE PYELOGRAM  1/17/2020    HERNIA REPAIR      umbilical with mesh    INGUINAL HERNIA REPAIR Right     with mesh    IR PORT PLACEMENT  4/30/2020    JOINT REPLACEMENT Bilateral     partials knee    LUMBAR EPIDURAL INJECTION      CT CYSTOURETHROSCOPY,URETER CATHETER Bilateral 1/17/2020    Procedure: Katie Cartwright; RIGHT RETROGRADE PYELOGRAM WITH RIGHT URETERAL CYTOLOGY SAMPLING; LEFT URETEROSCOPY WITH RENAL PELVIS BIOPSY AND LEFT STENT PLACEMENT;  Surgeon: Mary Morse MD;  [facility];  Service: Urology    OH NEPHRECTOMY, W/PART  URETECTOMY Left 2020    Procedure: ROBOTIC LAPAROSCOPIC NEPHRO-URETERECTOMY;  Surgeon: Mary Morse MD;  Location[facility];  Service: Urology    SKIN CANCER EXCISION      surface melanoma    TONSILLECTOMY      WISDOM TOOTH EXTRACTION         Social History     Socioeconomic History    Marital status: /Civil Union     Spouse name: None    Number of children: None    Years of education: None    Highest education level: None   Occupational History    None   Tobacco Use    Smoking status: Former Smoker     Types: Cigarettes     Quit date:      Years since quittin 4    Smokeless tobacco: Never Used   Vaping Use    Vaping Use: Never used   Substance and Sexual Activity    Alcohol use:  Yes     Alcohol/week: 17 0 standard drinks     Types: 7 Glasses of wine, 10 Cans of beer per week     Comment: socially    Drug use: Never    Sexual activity: Not Currently   Other Topics Concern    None   Social History Narrative    Daily caffeine use - 2 cups coffee      Social Determinants of Health     Financial Resource Strain: Not on file   Food Insecurity: Not on file   Transportation Needs: Not on file   Physical Activity: Not on file   Stress: Not on file   Social Connections: Not on file   Intimate Partner Violence: Not on file   Housing Stability: Not on file       Family History   Problem Relation Age of Onset    Liver cancer Father        Allergies   Allergen Reactions    Poison Ivy Extract Rash         Current Outpatient Medications:     acetaminophen (TYLENOL) 500 mg tablet, Take 2 tablets (1,000 mg total) by mouth every 8 (eight) hours, Disp: , Rfl:     allopurinol (ZYLOPRIM) 300 mg tablet, take 1 tablet by mouth once daily, Disp: 30 tablet, Rfl: 3    ALPRAZolam (XANAX) 0 25 mg tablet, Take 1 tablet (0 25 mg total) by mouth daily at bedtime as needed for anxiety (restless legs), Disp: 30 tablet, Rfl: 1    amLODIPine (NORVASC) 5 mg tablet, Take 5 mg by mouth daily  , Disp: , Rfl:     atorvastatin (LIPITOR) 80 mg tablet, Take 80 mg by mouth every other day evening, Disp: , Rfl:     clindamycin (CLINDAGEL) 1 % gel, Apply topically 2 (two) times a day, Disp: 30 g, Rfl: 1    docusate sodium (COLACE) 250 MG capsule, Take 1 capsule (250 mg total) by mouth daily, Disp: 60 capsule, Rfl: 6    DULoxetine (CYMBALTA) 60 mg delayed release capsule, Take 1 capsule (60 mg total) by mouth daily, Disp: 30 capsule, Rfl: 1    famotidine (PEPCID) 20 mg tablet, take 1-2 tablets by mouth every evening, Disp: , Rfl:     folic acid (FOLVITE) 1 mg tablet, take 1 tablet by mouth once daily, Disp: 90 tablet, Rfl: 4    hydrOXYzine HCL (ATARAX) 25 mg tablet, Take 1 tablet (25 mg total) by mouth every 6 (six) hours as needed for itching or anxiety, Disp: 60 tablet, Rfl: 2    Magnesium 250 MG TABS, 1 tablet 2 (two) times a day Taking 500mg in the morning and 500mg at night, Disp: , Rfl:     metoprolol tartrate (LOPRESSOR) 100 mg tablet, Take 50 mg by mouth daily, Disp: , Rfl:     nystatin (MYCOSTATIN) cream, Apply topically 2 (two) times a day, Disp: 30 g, Rfl: 0    omeprazole (PriLOSEC) 20 mg delayed release capsule, Take 20 mg by mouth daily  , Disp: , Rfl:     pantoprazole (PROTONIX) 40 mg tablet, , Disp: , Rfl:     predniSONE 2 5 mg tablet, Take 1 tablet (2 5 mg total) by mouth daily Take 5mg daily in the am for 14 days then 2 5mg daily in the am thereafter, Disp: 14 tablet, Rfl: 0    senna (SENOKOT) 8 6 mg, Take 1 tablet (8 6 mg total) by mouth daily at bedtime, Disp: 30 each, Rfl: 0    tadalafil (CIALIS) 20 MG tablet, Take one tablet by mouth one hour before sexual activity, Disp: 15 tablet, Rfl: 3    triamcinolone (KENALOG) 0 1 % lotion, Apply topically 3 (three) times a day, Disp: 60 mL, Rfl: 5    VITAMIN D PO, Take 1,000 Units by mouth daily , Disp: , Rfl:     zolpidem (AMBIEN) 5 mg tablet, Take 1 tablet (5 mg total) by mouth daily at bedtime as needed for sleep, Disp: 30 tablet, Rfl: 1    albuterol (ProAir HFA) 90 mcg/act inhaler, Inhale 2 puffs every 6 (six) hours as needed for wheezing or shortness of breath (Patient not taking: No sig reported), Disp: 8 5 g, Rfl: 3    ALPRAZolam (XANAX) 0 25 mg tablet, Take 1 tablet (0 25 mg total) by mouth daily at bedtime as needed for anxiety (Patient not taking: No sig reported), Disp: 30 tablet, Rfl: 0    morphine (MSIR) 15 mg tablet, Take 1/2 tablet every 4 hours as needed for severe pain (Patient not taking: No sig reported), Disp: 45 tablet, Rfl: 0    naloxone (NARCAN) 4 mg/0 1 mL nasal spray, Administer 1 spray into a nostril  If breathing does not return to normal or if breathing difficulty resumes after 2-3 minutes, give another dose in the other nostril using a new spray  (Patient not taking: No sig reported), Disp: 1 each, Rfl: 1    predniSONE 10 mg tablet, Take 2 tablets (20 mg total) by mouth daily (Patient not taking: No sig reported), Disp: 60 tablet, Rfl: 3    predniSONE 5 mg tablet, Take 1 tablet (5 mg total) by mouth daily Take 5mg daily in the am for 14 days then 2 5mg daily in the am thereafter (Patient not taking: No sig reported), Disp: 14 tablet, Rfl: 0    tamsulosin (FLOMAX) 0 4 mg, Take 1 capsule (0 4 mg total) by mouth daily with dinner, Disp: 30 capsule, Rfl: 1      Physical Exam:  /70 (BP Location: Left arm, Patient Position: Sitting, Cuff Size: Adult)   Pulse 61   Temp 98 4 °F (36 9 °C)   Resp 16   Ht 5' 8" (1 727 m)   Wt 91 2 kg (201 lb)   SpO2 95%   BMI 30 56 kg/m²     Physical Exam  Constitutional:       Appearance: He is well-developed  HENT:      Head: Normocephalic and atraumatic  Eyes:      General: No scleral icterus  Right eye: No discharge  Left eye: No discharge  Conjunctiva/sclera: Conjunctivae normal       Pupils: Pupils are equal, round, and reactive to light  Neck:      Thyroid: No thyromegaly  Trachea: No tracheal deviation  Cardiovascular:      Rate and Rhythm: Normal rate and regular rhythm  Heart sounds: Normal heart sounds  No murmur heard  No friction rub  Pulmonary:      Effort: Pulmonary effort is normal  No respiratory distress  Breath sounds: Normal breath sounds  No wheezing or rales  Chest:      Chest wall: No tenderness  Abdominal:      General: There is no distension  Palpations: Abdomen is soft  There is no hepatomegaly or splenomegaly  Tenderness: There is no abdominal tenderness  There is no guarding or rebound  Musculoskeletal:         General: No tenderness or deformity  Normal range of motion  Cervical back: Normal range of motion and neck supple  Lymphadenopathy:      Cervical: No cervical adenopathy  Skin:     General: Skin is warm and dry  Coloration: Skin is not pale  Findings: No erythema or rash  Neurological:      Mental Status: He is alert and oriented to person, place, and time  Cranial Nerves: No cranial nerve deficit  Coordination: Coordination normal       Deep Tendon Reflexes: Reflexes are normal and symmetric  Psychiatric:         Behavior: Behavior normal          Thought Content:  Thought content normal          Judgment: Judgment normal            Labs:  Lab Results   Component Value Date    WBC 5 22 05/24/2022    HGB 12 4 05/24/2022    HCT 38 5 05/24/2022     (H) 05/24/2022     05/24/2022     Lab Results   Component Value Date    K 4 1 05/24/2022     05/24/2022    CO2 30 05/24/2022    BUN 22 05/24/2022    CREATININE 1 25 05/24/2022    GLUF 111 (H) 03/01/2022    CALCIUM 9 3 05/24/2022    CORRECTEDCA 9 8 02/23/2022    AST 18 05/24/2022    ALT 21 05/24/2022    ALKPHOS 72 05/24/2022    EGFR 56 05/24/2022     No results found for: TSH    Patient voiced understanding and agreement in the above discussion  Aware to contact our office with questions/symptoms in the interim

## 2022-05-27 ENCOUNTER — HOSPITAL ENCOUNTER (OUTPATIENT)
Dept: INFUSION CENTER | Facility: HOSPITAL | Age: 72
Discharge: HOME | End: 2022-05-27
Attending: INTERNAL MEDICINE
Payer: MEDICARE

## 2022-05-27 VITALS
SYSTOLIC BLOOD PRESSURE: 138 MMHG | HEART RATE: 65 BPM | WEIGHT: 200.18 LBS | BODY MASS INDEX: 30.44 KG/M2 | TEMPERATURE: 96.4 F | DIASTOLIC BLOOD PRESSURE: 79 MMHG | RESPIRATION RATE: 16 BRPM

## 2022-05-27 DIAGNOSIS — C65.2 CANCER OF LEFT RENAL PELVIS (HCC): ICD-10-CM

## 2022-05-27 DIAGNOSIS — E83.42 HYPOMAGNESEMIA: ICD-10-CM

## 2022-05-27 DIAGNOSIS — C79.10 METASTATIC UROTHELIAL CARCINOMA (HCC): ICD-10-CM

## 2022-05-27 DIAGNOSIS — G89.3 CANCER ASSOCIATED PAIN: ICD-10-CM

## 2022-05-27 DIAGNOSIS — C68.9 UROTHELIAL CANCER (HCC): Primary | ICD-10-CM

## 2022-05-27 DIAGNOSIS — E86.0 DEHYDRATION: ICD-10-CM

## 2022-05-27 PROCEDURE — 96368 THER/DIAG CONCURRENT INF: CPT

## 2022-05-27 PROCEDURE — 96367 TX/PROPH/DG ADDL SEQ IV INF: CPT

## 2022-05-27 PROCEDURE — 96413 CHEMO IV INFUSION 1 HR: CPT

## 2022-05-27 RX ORDER — SODIUM CHLORIDE 9 MG/ML
20 INJECTION, SOLUTION INTRAVENOUS ONCE
Status: COMPLETED | OUTPATIENT
Start: 2022-05-27 | End: 2022-05-27

## 2022-05-27 RX ORDER — ACETAMINOPHEN 325 MG/1
650 TABLET ORAL ONCE
Status: DISCONTINUED | OUTPATIENT
Start: 2022-05-27 | End: 2022-05-31 | Stop reason: HOSPADM

## 2022-05-27 RX ADMIN — SODIUM CHLORIDE 20 ML/HR: 0.9 INJECTION, SOLUTION INTRAVENOUS at 14:11

## 2022-05-27 RX ADMIN — DIPHENHYDRAMINE HYDROCHLORIDE 25 MG: 50 INJECTION INTRAMUSCULAR; INTRAVENOUS at 14:13

## 2022-05-27 RX ADMIN — DEXAMETHASONE SODIUM PHOSPHATE: 10 INJECTION, SOLUTION INTRAMUSCULAR; INTRAVENOUS at 14:41

## 2022-05-27 RX ADMIN — ENFORTUMAB VEDOTIN 90 MG: 30 INJECTION, POWDER, LYOPHILIZED, FOR SOLUTION INTRAVENOUS at 15:19

## 2022-05-27 RX ADMIN — MAGNESIUM SULFATE HEPTAHYDRATE: 500 INJECTION, SOLUTION INTRAMUSCULAR; INTRAVENOUS at 13:38

## 2022-05-27 NOTE — PROGRESS NOTES
Pt offers no complaints  Tolerated chemotherapy and IV magnesium well without incident  Discharged in stable condition and pt aware of next infusion appointment  AVS provided

## 2022-05-28 LAB
6MAM UR QL CFM: NEGATIVE NG/ML
7AMINOCLONAZEPAM UR QL CFM: NEGATIVE NG/ML
A-OH ALPRAZ UR QL CFM: ABNORMAL NG/ML
AMPHET UR QL CFM: NEGATIVE NG/ML
AMPHET UR QL CFM: NEGATIVE NG/ML
BUPRENORPHINE UR QL CFM: NEGATIVE NG/ML
BUTALBITAL UR QL CFM: NEGATIVE NG/ML
BZE UR QL CFM: NEGATIVE NG/ML
CODEINE UR QL CFM: NEGATIVE NG/ML
DESIPRAMINE UR QL CFM: NEGATIVE NG/ML
DESIPRAMINE UR QL CFM: NEGATIVE NG/ML
EDDP UR QL CFM: NEGATIVE NG/ML
ETHYL GLUCURONIDE UR QL CFM: ABNORMAL NG/ML
ETHYL SULFATE UR QL SCN: NEGATIVE NG/ML
EUTYLONE UR QL: NEGATIVE NG/ML
FENTANYL UR QL CFM: NEGATIVE NG/ML
GLIADIN IGG SER IA-ACNC: NEGATIVE NG/ML
GLUCOSE 30M P 50 G LAC PO SERPL-MCNC: NEGATIVE NG/ML
HYDROCODONE UR QL CFM: NEGATIVE NG/ML
HYDROCODONE UR QL CFM: NEGATIVE NG/ML
HYDROMORPHONE UR QL CFM: ABNORMAL NG/ML
IMIPRAMINE UR QL CFM: NEGATIVE NG/ML
LORAZEPAM UR QL CFM: NEGATIVE NG/ML
MDMA UR QL CFM: NEGATIVE NG/ML
ME-PHENIDATE UR QL CFM: NEGATIVE NG/ML
MEPERIDINE UR QL CFM: NEGATIVE NG/ML
METHADONE UR QL CFM: NEGATIVE NG/ML
METHAMPHET UR QL CFM: NEGATIVE NG/ML
MORPHINE UR QL CFM: ABNORMAL NG/ML
MORPHINE UR QL CFM: ABNORMAL NG/ML
NORBUPRENORPHINE UR QL CFM: NEGATIVE NG/ML
NORDIAZEPAM UR QL CFM: NEGATIVE NG/ML
NORFENTANYL UR QL CFM: NEGATIVE NG/ML
NORHYDROCODONE UR QL CFM: NEGATIVE NG/ML
NORHYDROCODONE UR QL CFM: NEGATIVE NG/ML
NORMEPERIDINE UR QL CFM: NEGATIVE NG/ML
NOROXYCODONE UR QL CFM: NEGATIVE NG/ML
OLANZAPINE QUANTIFICATION: NEGATIVE NG/ML
OPC-3373 QUANTIFICATION: NEGATIVE
OXAZEPAM UR QL CFM: NEGATIVE NG/ML
OXYCODONE UR QL CFM: NEGATIVE NG/ML
OXYMORPHONE UR QL CFM: NEGATIVE NG/ML
OXYMORPHONE UR QL CFM: NEGATIVE NG/ML
PCP UR QL CFM: NEGATIVE NG/ML
PHENOBARB UR QL CFM: NEGATIVE NG/ML
SECOBARBITAL UR QL CFM: NEGATIVE NG/ML
SL AMB 3-METHYL-FENTANYL QUANTIFICATION: NORMAL NG/ML
SL AMB 4-ANPP QUANTIFICATION: NORMAL NG/ML
SL AMB 4-FIBF QUANTIFICATION: NORMAL NG/ML
SL AMB 5F-ADB-M7 METABOLITE QUANTIFICATION: NEGATIVE NG/ML
SL AMB 7-OH-MITRAGYNINE (KRATOM ALKALOID) QUANTIFICATION: NEGATIVE NG/ML
SL AMB AB-FUBINACA-M3 METABOLITE QUANTIFICATION: NEGATIVE NG/ML
SL AMB ACETYL FENTANYL QUANTIFICATION: NORMAL NG/ML
SL AMB ACETYL NORFENTANYL QUANTIFICATION: NORMAL NG/ML
SL AMB ACRYL FENTANYL QUANTIFICATION: NORMAL NG/ML
SL AMB BUTRYL FENTANYL QUANTIFICATION: NORMAL NG/ML
SL AMB CARFENTANIL QUANTIFICATION: NORMAL NG/ML
SL AMB CLOZAPINE QUANTIFICATION: NEGATIVE NG/ML
SL AMB CTHC (MARIJUANA METABOLITE) QUANTIFICATION: NEGATIVE NG/ML
SL AMB CYCLOPROPYL FENTANYL QUANTIFICATION: NORMAL NG/ML
SL AMB DEXTROMETHORPHAN QUANTIFICATION: NEGATIVE NG/ML
SL AMB DEXTRORPHAN (DEXTROMETHORPHAN METABOLITE) QUANT: NEGATIVE NG/ML
SL AMB DEXTRORPHAN (DEXTROMETHORPHAN METABOLITE) QUANT: NEGATIVE NG/ML
SL AMB FURANYL FENTANYL QUANTIFICATION: NORMAL NG/ML
SL AMB HALOPERIDOL  QUANTIFICATION: NEGATIVE NG/ML
SL AMB HALOPERIDOL METABOLITE QUANTIFICATION: NEGATIVE NG/ML
SL AMB HYDROXYRISPERIDONE QUANTIFICATION: NEGATIVE NG/ML
SL AMB JWH018 METABOLITE QUANTIFICATION: NEGATIVE NG/ML
SL AMB JWH073 METABOLITE QUANTIFICATION: NEGATIVE NG/ML
SL AMB MDMB-FUBINACA-M1 METABOLITE QUANTIFICATION: NEGATIVE NG/ML
SL AMB METHOXYACETYL FENTANYL QUANTIFICATION: NORMAL NG/ML
SL AMB METHYLONE QUANTIFICATION: NEGATIVE NG/ML
SL AMB N-DESMETHYL U-47700 QUANTIFICATION: NORMAL NG/ML
SL AMB N-DESMETHYL-TRAMADOL QUANTIFICATION: NEGATIVE NG/ML
SL AMB N-DESMETHYLCLOZAPINE QUANTIFICATION: NEGATIVE NG/ML
SL AMB NORQUETIAPINE QUANTIFICATION: NEGATIVE NG/ML
SL AMB PHENTERMINE QUANTIFICATION: NEGATIVE NG/ML
SL AMB QUETIAPINE QUANTIFICATION: NEGATIVE NG/ML
SL AMB RCS4 METABOLITE QUANTIFICATION: NEGATIVE NG/ML
SL AMB RISPERIDONE QUANTIFICATION: NEGATIVE NG/ML
SL AMB RITALINIC ACID QUANTIFICATION: NEGATIVE NG/ML
SL AMB U-47700 QUANTIFICATION: NORMAL NG/ML
SPECIMEN DRAWN SERPL: NEGATIVE NG/ML
TAPENTADOL UR QL CFM: NEGATIVE NG/ML
TEMAZEPAM UR QL CFM: NEGATIVE NG/ML
TEMAZEPAM UR QL CFM: NEGATIVE NG/ML
TRAMADOL UR QL CFM: NEGATIVE NG/ML
URATE/CREAT 24H UR: NEGATIVE NG/ML

## 2022-05-31 ENCOUNTER — HOSPITAL ENCOUNTER (OUTPATIENT)
Dept: INFUSION CENTER | Facility: HOSPITAL | Age: 72
Discharge: HOME | End: 2022-05-31
Payer: MEDICARE

## 2022-05-31 VITALS
RESPIRATION RATE: 16 BRPM | HEART RATE: 59 BPM | TEMPERATURE: 98.4 F | DIASTOLIC BLOOD PRESSURE: 82 MMHG | OXYGEN SATURATION: 96 % | SYSTOLIC BLOOD PRESSURE: 137 MMHG

## 2022-05-31 DIAGNOSIS — E86.0 DEHYDRATION: Primary | ICD-10-CM

## 2022-05-31 PROCEDURE — 96360 HYDRATION IV INFUSION INIT: CPT

## 2022-05-31 RX ADMIN — SODIUM CHLORIDE 1000 ML: 0.9 INJECTION, SOLUTION INTRAVENOUS at 14:40

## 2022-06-01 ENCOUNTER — TELEPHONE (OUTPATIENT)
Dept: CARDIOLOGY CLINIC | Facility: CLINIC | Age: 72
End: 2022-06-01

## 2022-06-01 ENCOUNTER — OFFICE VISIT (OUTPATIENT)
Dept: LAB | Facility: HOSPITAL | Age: 72
End: 2022-06-01
Attending: INTERNAL MEDICINE
Payer: MEDICARE

## 2022-06-01 ENCOUNTER — SOCIAL WORK (OUTPATIENT)
Dept: PALLIATIVE MEDICINE | Facility: CLINIC | Age: 72
End: 2022-06-01
Payer: MEDICARE

## 2022-06-01 ENCOUNTER — OFFICE VISIT (OUTPATIENT)
Dept: PALLIATIVE MEDICINE | Facility: CLINIC | Age: 72
End: 2022-06-01
Payer: MEDICARE

## 2022-06-01 VITALS
WEIGHT: 202 LBS | DIASTOLIC BLOOD PRESSURE: 60 MMHG | OXYGEN SATURATION: 94 % | SYSTOLIC BLOOD PRESSURE: 98 MMHG | TEMPERATURE: 96.7 F | HEIGHT: 69 IN | BODY MASS INDEX: 29.92 KG/M2 | HEART RATE: 60 BPM | RESPIRATION RATE: 16 BRPM

## 2022-06-01 DIAGNOSIS — I49.9 IRREGULAR HEART BEAT: Primary | ICD-10-CM

## 2022-06-01 DIAGNOSIS — G89.3 CANCER ASSOCIATED PAIN: ICD-10-CM

## 2022-06-01 DIAGNOSIS — G62.0 CHEMOTHERAPY-INDUCED NEUROPATHY (HCC): ICD-10-CM

## 2022-06-01 DIAGNOSIS — I49.9 IRREGULAR HEARTBEAT: ICD-10-CM

## 2022-06-01 DIAGNOSIS — T45.1X5A CHEMOTHERAPY-INDUCED NEUROPATHY (HCC): ICD-10-CM

## 2022-06-01 DIAGNOSIS — C79.51 SECONDARY MALIGNANT NEOPLASM OF BONE (HCC): ICD-10-CM

## 2022-06-01 DIAGNOSIS — G89.29 CHRONIC BILATERAL LOW BACK PAIN WITHOUT SCIATICA: ICD-10-CM

## 2022-06-01 DIAGNOSIS — Z71.89 COUNSELING AND COORDINATION OF CARE: Primary | ICD-10-CM

## 2022-06-01 DIAGNOSIS — C79.10 METASTATIC UROTHELIAL CARCINOMA (HCC): ICD-10-CM

## 2022-06-01 DIAGNOSIS — M54.50 CHRONIC BILATERAL LOW BACK PAIN WITHOUT SCIATICA: ICD-10-CM

## 2022-06-01 DIAGNOSIS — C77.2 METASTASIS TO RETROPERITONEAL LYMPH NODE (HCC): ICD-10-CM

## 2022-06-01 DIAGNOSIS — G89.4 CHRONIC PAIN DISORDER: ICD-10-CM

## 2022-06-01 PROBLEM — K59.03 THERAPEUTIC OPIOID INDUCED CONSTIPATION: Status: RESOLVED | Noted: 2020-11-15 | Resolved: 2022-06-01

## 2022-06-01 PROBLEM — T40.2X5A THERAPEUTIC OPIOID INDUCED CONSTIPATION: Status: RESOLVED | Noted: 2020-11-15 | Resolved: 2022-06-01

## 2022-06-01 LAB
ATRIAL RATE: 234 BPM
QRS AXIS: 61 DEGREES
QRSD INTERVAL: 84 MS
QT INTERVAL: 352 MS
QTC INTERVAL: 411 MS
T WAVE AXIS: 42 DEGREES
VENTRICULAR RATE: 82 BPM

## 2022-06-01 PROCEDURE — 93005 ELECTROCARDIOGRAM TRACING: CPT

## 2022-06-01 PROCEDURE — 93010 ELECTROCARDIOGRAM REPORT: CPT | Performed by: INTERNAL MEDICINE

## 2022-06-01 PROCEDURE — 99215 OFFICE O/P EST HI 40 MIN: CPT | Performed by: INTERNAL MEDICINE

## 2022-06-01 PROCEDURE — NC001 PR NO CHARGE

## 2022-06-01 NOTE — PROGRESS NOTES
Palliative Supportive Care  met with patient and his wife, Ellen Aly, to continue to provide emotional support and guidance  Updated biopsychosocial information relevant to support: pt presents for his follow up visit today  He is feeling quite dizzy upon arrival, as well as at home when they were leaving  Dr Felicia Dominguez examined him and reached out to his cardiologist  Dr Felicia Dominguez placed an order for a EKG  They are going to do this as soon as possible  He continues to have bad back pain, but is only taking Tylenol at this time, he does not want any opioids at this time  Ellen Aly states that their daughter, who was recently diagnosed with cancer, is not tolerating her treatment very well  Wandy's brother also has lung cancer and she is helping out with him  I educated her on the importance of self care, as she is expectedly feeling overwhelmed  Pt will return in 2 weeks for his follow up, but was encouraged to call if he has any questions/concerns    Identified areas of need include: Support  Resources provided: None  Areas that need future follow-up include: Continue to provide ongoing support as able

## 2022-06-01 NOTE — TELEPHONE ENCOUNTER
Palliative care reached out to our nurse practitioner with cardiology today regarding Pat's lightheaded and dizziness  Possible a-fib/ irregular heart beat     The nurse practitioner who is on call who usually see's this patient Mike Mcnamara  called and gave us a heads up on what's going on with the patient  Palliative ordered an ekg for the patient so that we can assess what's going on  Just spoke with patient, he is currently going to get the ekg done and I did put him on our schedule for next Tuesday  Patient is ok with the apt  I also reiterated with the patient that if symptoms worsen to go straight to the nearest ER  Patient understands instructions  Will keep an eye on ekg results

## 2022-06-01 NOTE — PROGRESS NOTES
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 ([email or web address] )    3  Metastatic urothelial carcinoma ([facility] )    4  Secondary malignant neoplasm of bone ([facility] )    5  Cancer associated pain    6  Chemotherapy-induced neuropathy ([facility] )    7  Chronic bilateral low back pain without sciatica    8  Chronic pain disorder       Cancer related pain  · Does not want to take opioids for now, despite of ongoing back pain  Wants to avoid as much as possible  · Back pain responding well to tylenol  CMP is unremarkable  Advise not to exceed 3000mg in 24H  · Continue Duloxetine 60mg ODHS had been significantly helpful in his neuropathy  · May consider adding gabapentin or lyrica or amitriptyline in the future  Can also try acupuncture for neuropathy/pain  · May have to re-trial MMJ as well in the future   · No longer on steroids, weaned off  · Tolerating chemo well  · RTO in 2 month, call sooner if needed    Irregularly irregular rhythm, rate 70-80s, no chest pain or HERRERA or palpitations, some lightheadedness that resolved on its own  · Discussed case with Cardio CRNP/Vaishnavi Stover - order EKG, then they will follow up OP in their office  · Advise patient and wife to proceed to the ED if with chest pain/worsening shortness of breath/worsening lightheadedness/palpitations  · If EKG shows rate controlled Afib, manage Afib as an OP  · If EKG shows Afib with rate above >100bpm, ED    Controlled Substance Review    [place]MP or [place]  reviewed: No red flags were identified; safe to proceed with prescription       1  [id number] 03/02/2022 02/01/2022 Zolpidem Tartrate 30 0 30 5 MG NA [employer] 11 / 17 [place]    1  [id number] 02/24/2022 02/24/2022 ALPRAZolam 30 0 30 0 25 MG NA [employer] 00 / 00 [place]    1  [id number] 02/01/2022 02/01/2022 Zolpidem Tartrate 30 0 30 5 MG NA [employer] 34 / 93 X[place]    6  [id number] 12/23/2021 12/23/2021 Zolpidem Tartrate 15 0 15 5 MG NA [employer] 00 / 32 [id number]    1  [id number] 12/08/2021 12/08/2021 Morphine Sulfate 15 0 15 15 MG 15 0 [employer] 00 / 00 [place]    1  [id number] 11/16/2021  11/10/2021 Morphine Sulfate 60 0 30 30 MG 60 0 [employer] 00 / 00 [place]    1  [id number] 11/10/2021  11/10/2021 HYDROmorphone HCL 90 0 15 2 MG 48 0 [employer]  C          Requested Prescriptions      No prescriptions requested or ordered in this encounter     There are no discontinued medications  Representatives have queried the patient's controlled substance dispensing history in the Prescription Drug Monitoring Program in compliance with regulations before I have prescribed any controlled substances  The prescription history is consistent with prescribed therapy and our practice policies  35 minutes were spent face to face with his spouse with greater than 50% of the time spent in counseling or coordination of care including discussions of etiology of diagnosis, pathogenesis of diagnosis, diagnostic results, impression, and recommendations, risks and benefits of treatment, instructions for disease self management, treatment instructions, follow up requirements, risk factors and risk reduction of disease, patient and family counseling/involvement in care and compliance with treatment regimen   All of the patient's questions were answered during this discussion  No follow-ups on file  Subjective:   Chief Complaint  Follow up visit for:  symptom management, pain, neoplasm related, assessment of goals of care, disease process education and discussion of prognosis  Depression  Associated symptoms include fatigue   Pertinent negatives include no abdominal pain, chest pain, nausea, vomiting or weakness  Kylie Morales is a 67 y o  male with metastatic urothelial cancer who recently showed disease progression on second line [place]  He was initially diagnosed in 1/2020 and has since underwent left robotic assisted laparoscopic nephroureterectomy with bladder cuff excision and chemotherapy  He tried cisplatin but did not tolerate well and so was switched to [facility] which he had been on since 10/2020  He also underwent RT to the L abdomen form 11-12/2020  He then started to develop new pain in his R shoulder  He went for excision of a soft tissue mass in the R shoulder/axilla which revealed metastatic high-grade carcinoma consistent with his known primary urothelial carcinoma in 8/9/2021  His PET-CT on 8/16/2021 showed disease progression with hypermetabolic soft tissue lesions at the level of the right shoulder and right axilla with interval progression of the retroperitoneal and mesenteric hypermetabolic metastasis  He used to see palliative care for symptoms but he said his pain got better/gone since Keytruda  Since around 8-9/2021, his shoulder pain escalated to the point where tramadol is no longer helpful  He was then switched to dilaudid prn  A few days later, morphine ER was added  He was started on RT to the R shoulder on 9/8  He also started on palliative enfortumab vedotin starting 9/10/2021  The pain stabilized around 10/2021 with no further adjustments in medications  On his 11/10/2021 visit, he reported ongoing pain in his R shoulder as well as a new pain in his stomach  Overall, despite the continued pain, his pain was getting better  He voiced his wish to wean off of opioids and we plan on doing so after his next PET-CT  He had a PET-CT on 12/3 that showed significant decrease in activity in the R shoulder and R axillary lesions as well as resolution of upper abdominal hypermetabolic adenopathy   He virtually had no pain since then, until March 2022  On 3/9/2022, he reported increasing pain in his lower back as well as peripheral neuropathy in his hands, legs and fatigue  He talked to oncology about this who ordered a PET CT on 3/8 that showed further response to therapy  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  We increased his duloxetine to 60mg ODHS  He was last seen on 4/6/2022 where he reports improvement of back pain after receiving injections/block  His neuropathy has improved significantly with increase in duloxetine that he appears to be tolerating well  His wife pointed out that he has not been back to chemo again so unsure if his symptoms will remain well-controlled  He was supposed to begin chemo again soon  He hasn't been back to using morphine, it really made him drowsy  Apart from above, he is doing relatively well  Since his last visit, he went to spine and pain for radiofrequency denervation of lumbar facet joints (L4-5, L5-S1) on 4/12  He was referred to cardiology on 4/22 because of new onset HERRERA  ECHO is unremarkable, EKG shows sinus moi, NM perfusion shows normal findings (LV systolic function, no prior MI or ischemia)  He saw oncology last on 5/26 and is continued on enfortumab vedotin  He arrived today with his wife  He complained of lightheadedness to our palliative RN, Darryl Barbosa, that happened as soon as he got out of their car  Rhythm was irregular and thready on her exam  BP was low 90s/60s  He was given a cup of water and his lightheadedness resolved after a while  I listed to his heart multiple times and they sounded irregularly irregular, rate was in the 70-80s bpm  He denies any symptoms of chest pain, dizziness, shortness of breath, palpitations   I discussed case with cardio via TT and over the phone --> we will obtain an EKG and they will follow up as an OP, the patient and his wife should proceed to the ED if with symptoms or if EKG is above 100bpm       His back pain is present but is controlled with prn tylenol  He takes about 2000mg in 24H  He does not want to return to opioids if possible because of constipation  But appears willing to try in the future if we have to  They are unfortunately dealing with their daughter's recent diagnosis of Stage 4 metastatic cancer, probably breast primary  She lives in [place] and is currently undergoing more work ups  They struggle with providing support to her being far away from her  The following portions of the medical history were reviewed: past medical history, problem list, medication list, and social history      Current Outpatient Medications:     acetaminophen (TYLENOL) 500 mg tablet, Take 2 tablets (1,000 mg total) by mouth every 8 (eight) hours, Disp: , Rfl:     allopurinol (ZYLOPRIM) 300 mg tablet, take 1 tablet by mouth once daily, Disp: 30 tablet, Rfl: 3    ALPRAZolam (XANAX) 0 25 mg tablet, Take 1 tablet (0 25 mg total) by mouth daily at bedtime as needed for anxiety (restless legs), Disp: 30 tablet, Rfl: 1    amLODIPine (NORVASC) 5 mg tablet, Take 5 mg by mouth daily  , Disp: , Rfl:     atorvastatin (LIPITOR) 80 mg tablet, Take 80 mg by mouth every other day evening, Disp: , Rfl:     clindamycin (CLINDAGEL) 1 % gel, Apply topically 2 (two) times a day, Disp: 30 g, Rfl: 1    docusate sodium (COLACE) 250 MG capsule, Take 1 capsule (250 mg total) by mouth daily, Disp: 60 capsule, Rfl: 6    DULoxetine (CYMBALTA) 60 mg delayed release capsule, take 1 capsule by mouth once daily, Disp: 30 capsule, Rfl: 1    famotidine (PEPCID) 20 mg tablet, take 1-2 tablets by mouth every evening, Disp: , Rfl:     folic acid (FOLVITE) 1 mg tablet, take 1 tablet by mouth once daily, Disp: 90 tablet, Rfl: 4    omeprazole (PriLOSEC) 20 mg delayed release capsule, Take 20 mg by mouth daily  , Disp: , Rfl:     VITAMIN D PO, Take 1,000 Units by mouth daily , Disp: , Rfl:     zolpidem (AMBIEN) 5 mg tablet, Take 1 tablet (5 mg total) by mouth daily at bedtime as needed for sleep, Disp: 30 tablet, Rfl: 1    albuterol (ProAir HFA) 90 mcg/act inhaler, Inhale 2 puffs every 6 (six) hours as needed for wheezing or shortness of breath (Patient not taking: No sig reported), Disp: 8 5 g, Rfl: 3    ALPRAZolam (XANAX) 0 25 mg tablet, Take 1 tablet (0 25 mg total) by mouth daily at bedtime as needed for anxiety (Patient not taking: No sig reported), Disp: 30 tablet, Rfl: 0    hydrOXYzine HCL (ATARAX) 25 mg tablet, Take 1 tablet (25 mg total) by mouth every 6 (six) hours as needed for itching or anxiety (Patient not taking: Reported on 6/1/2022), Disp: 60 tablet, Rfl: 2    Magnesium 250 MG TABS, 1 tablet 2 (two) times a day Taking 500mg in the morning and 500mg at night, Disp: , Rfl:     metoprolol tartrate (LOPRESSOR) 100 mg tablet, Take 50 mg by mouth daily, Disp: , Rfl:     morphine (MSIR) 15 mg tablet, Take 1/2 tablet every 4 hours as needed for severe pain (Patient not taking: No sig reported), Disp: 45 tablet, Rfl: 0    naloxone (NARCAN) 4 mg/0 1 mL nasal spray, Administer 1 spray into a nostril  If breathing does not return to normal or if breathing difficulty resumes after 2-3 minutes, give another dose in the other nostril using a new spray   (Patient not taking: No sig reported), Disp: 1 each, Rfl: 1    nystatin (MYCOSTATIN) cream, Apply topically 2 (two) times a day, Disp: 30 g, Rfl: 0    pantoprazole (PROTONIX) 40 mg tablet, , Disp: , Rfl:     predniSONE 10 mg tablet, Take 2 tablets (20 mg total) by mouth daily (Patient not taking: No sig reported), Disp: 60 tablet, Rfl: 3    predniSONE 2 5 mg tablet, Take 1 tablet (2 5 mg total) by mouth daily Take 5mg daily in the am for 14 days then 2 5mg daily in the am thereafter, Disp: 14 tablet, Rfl: 0    predniSONE 5 mg tablet, Take 1 tablet (5 mg total) by mouth daily Take 5mg daily in the am for 14 days then 2 5mg daily in the am thereafter, Disp: 14 tablet, Rfl: 0    senna (SENOKOT) 8 6 mg, Take 1 tablet (8 6 mg total) by mouth daily at bedtime, Disp: 30 each, Rfl: 0    tadalafil (CIALIS) 20 MG tablet, Take one tablet by mouth one hour before sexual activity, Disp: 15 tablet, Rfl: 3    tamsulosin (FLOMAX) 0 4 mg, Take 1 capsule (0 4 mg total) by mouth daily with dinner, Disp: 30 capsule, Rfl: 1    triamcinolone (KENALOG) 0 1 % lotion, Apply topically 3 (three) times a day, Disp: 60 mL, Rfl: 5  No current facility-administered medications for this visit  Review of Systems   Constitutional: Positive for fatigue  Negative for activity change and appetite change  HENT: Negative for trouble swallowing  Respiratory: Negative for shortness of breath  Cardiovascular: Negative for chest pain  Gastrointestinal: Negative for abdominal pain, constipation, diarrhea, nausea and vomiting  Musculoskeletal: Positive for back pain  Neurological: Positive for light-headedness  Negative for weakness  Lightheadedness initially then this got better and has resolved before he left the office     Bilateral LE neuropathy, better with medication   Psychiatric/Behavioral: Positive for depression  Negative for sleep disturbance  The patient is not nervous/anxious  All other systems negative    Objective:  Vital Signs  BP 98/60 (BP Location: Left arm, Patient Position: Sitting, Cuff Size: Standard)   Pulse 60   Temp (!) 96 7 °F (35 9 °C) (Temporal)   Resp 16   Ht 5' 9" (1 753 m)   Wt 91 6 kg (202 lb)   SpO2 94%   BMI 29 83 kg/m²    Physical Exam    Constitutional: Appears well-developed and well-nourished  Appears healthy and stable, not toxic looking  Appears chronically ill  Pleasant, jovial  In no acute physical or emotional distress  Head: Normocephalic and atraumatic  Eyes: EOM are normal  No ocular discharge  No scleral icterus   R subconjunctival hemorrhage, but getting better per patient and wife  Neck: No visible adenopathy or masses  Cardio: Irregularly irregular rhythm, 70-80 bpm  Respiratory: Effort normal  No stridor  No respiratory distress  Gastrointestinal: No abdominal distension  Musculoskeletal: No edema  Neurological: Alert, oriented and appropriately conversant  Hard of hearing  Arrived using a cane  Able to hoist himself up  Needed no additional assist with ambulation  Skin: No diaphoresis, no rashes seen on exposed areas of skin  Psychiatric: Displays a normal mood and affect   Behavior, judgement and thought content appear normal      Luis Alberto Degroot MD  Palliative Medicine & Supportive Care  Internal Medicine  Available via [facility] Text  Office: [phone]  Fax: [phone]

## 2022-06-02 ENCOUNTER — TREATMENT (OUTPATIENT)
Dept: PALLIATIVE MEDICINE | Facility: CLINIC | Age: 72
End: 2022-06-02

## 2022-06-02 ENCOUNTER — OFFICE VISIT (OUTPATIENT)
Dept: CARDIOLOGY CLINIC | Facility: CLINIC | Age: 72
End: 2022-06-02
Payer: MEDICARE

## 2022-06-02 ENCOUNTER — CLINICAL SUPPORT (OUTPATIENT)
Dept: CARDIOLOGY CLINIC | Facility: CLINIC | Age: 72
End: 2022-06-02
Payer: MEDICARE

## 2022-06-02 VITALS
WEIGHT: 199.8 LBS | SYSTOLIC BLOOD PRESSURE: 96 MMHG | DIASTOLIC BLOOD PRESSURE: 68 MMHG | OXYGEN SATURATION: 96 % | HEIGHT: 69 IN | BODY MASS INDEX: 29.59 KG/M2 | HEART RATE: 62 BPM

## 2022-06-02 DIAGNOSIS — I10 ESSENTIAL HYPERTENSION: ICD-10-CM

## 2022-06-02 DIAGNOSIS — I48.91 ATRIAL FIBRILLATION, UNSPECIFIED TYPE (HCC): ICD-10-CM

## 2022-06-02 DIAGNOSIS — I48.0 PAROXYSMAL ATRIAL FIBRILLATION (HCC): Primary | ICD-10-CM

## 2022-06-02 PROCEDURE — 99214 OFFICE O/P EST MOD 30 MIN: CPT | Performed by: NURSE PRACTITIONER

## 2022-06-02 PROCEDURE — 93242 EXT ECG>48HR<7D RECORDING: CPT | Performed by: INTERNAL MEDICINE

## 2022-06-02 RX ORDER — COLCHICINE 0.6 MG/1
TABLET ORAL
COMMUNITY
Start: 2022-06-01

## 2022-06-02 NOTE — ASSESSMENT & PLAN NOTE
Blood pressure is too well controlled  Discontinue amlodipine  Continue to monitor blood pressures at home

## 2022-06-02 NOTE — PROGRESS NOTES
Patient ID: Kylie Morales is a 67 y o  male  Plan:      Essential hypertension  Blood pressure is too well controlled  Discontinue amlodipine  Continue to monitor blood pressures at home    Paroxysmal atrial fibrillation (HCC)  Newly discovered PAF  Rate controlled  Continue metoprolol 50 mg daily  Discussed anticoagulation  See notes below       Follow up Plan/Summary Comments:  Lotus Lockhart was discovered to be in atrial fibrillation yesterday at a palliative care appointment  He is in a normal sinus rhythm at his visit today  Heart rate was controlled when he was in AFib  He previously had episodes of bradycardia and metoprolol was reduced to 50 mg daily  Will continue at this dose for now  One week ambulatory monitor for assessment of rate control and AFib burden  Recent labs including TSH from Jan reviewed and all wnl  He just had an echo and stress test   Consideration should be given to sleep study to evaluate for sleep apnea  We had a discussion regarding anticoagulation  He does have neuropathy and had a minor fall approximately 1 month ago  His Chads Vasc score is 3; Has Bled score of 1  I am going to reach out to his oncologist to make sure there is no contraindication for anticoagulation from their perspective  Would recommend Eliquis 5 mg b i d  for stroke risk reduction if this is acceptable  The patient will price check the Eliquis to make sure it is affordable  Blood pressure is noted to be on the low side and has been at home as well  Will discontinue amlodipine  I advised the patient and his wife to continue to monitor blood pressure at home and call us in 2 weeks with an update  Follow-up in 3 months, sooner if needed  HPI: I had the pleasure of seeing Lotus Lockhart in the office today for evaluation of suspected afib  He was seen yesterday by Palliative Care and noted to have an irregular heart rhythm    An EKG performed later in the day showed afib with controlled ventricular response  Vesna Lynn reports that he was a little lightheaded yesterday, however this quickly resolved  He notes that he becomes lightheaded when getting up too fast, but this is an ongoing issue  He notes that during his exam yesterday, he was asymptomatic when he was noted to be in Afib-no reports of chest discomfort or palpitations were noted  He does experience occasional chest pressure that occurs in random areas of his chest   He associates this with gas and indigestion and recently underwent a nuclear stress test which was unremarkable  He continues to have exertional dyspnea, unchanged since last visit  Review of Systems   10  point ROS  was otherwise non pertinent or negative except as per HPI or as below  Gait:  Mya oM    Most recent or relevant cardiac/vascular testing:    Nuclear stress test 05/12/2022 normal LV function, no ischemia by perfusion imaging    Echocardiogram 04/06/2022  EF 75%, hyperdynamic  Mild LVH, mild diastolic dysfunction    No results found for this visit on 06/02/22  Objective:     BP 96/68 (BP Location: Left arm, Patient Position: Sitting, Cuff Size: Standard)   Pulse 62   Ht 5' 9" (1 753 m)   Wt 90 6 kg (199 lb 12 8 oz)   SpO2 96%   BMI 29 51 kg/m²     PHYSICAL EXAM:    General:  Normal appearance, no acute distress  Eyes:  Anicteric  Oral mucosa:  Moist   Neck:  No JVD  Carotid upstrokes are brisk without bruits  No masses  Chest:  Clear to auscultation   Cardiac:  No palpable PMI  Normal S1 and S2  No murmur gallop or rub  Abdomen:  Soft and nontender  No palpable organomegaly or aortic enlargement  Extremities:  No peripheral edema  Musculoskeletal:  Symmetric  Vascular:  Pedal pulses are intact  Neuro:  Grossly symmetric  Psych:  Alert and oriented x3      Allergies   Allergen Reactions    Poison Ivy Extract Rash       Current Outpatient Medications:     acetaminophen (TYLENOL) 500 mg tablet, Take 2 tablets (1,000 mg total) by mouth every 8 (eight) hours, Disp: , Rfl:     allopurinol (ZYLOPRIM) 300 mg tablet, take 1 tablet by mouth once daily, Disp: 30 tablet, Rfl: 3    amLODIPine (NORVASC) 5 mg tablet, Take 5 mg by mouth daily  , Disp: , Rfl:     atorvastatin (LIPITOR) 80 mg tablet, Take 80 mg by mouth every other day evening, Disp: , Rfl:     colchicine (COLCRYS) 0 6 mg tablet, take 1 tablet by mouth twice a day if needed for FOOT PAIN, Disp: , Rfl:     docusate sodium (COLACE) 250 MG capsule, Take 1 capsule (250 mg total) by mouth daily, Disp: 60 capsule, Rfl: 6    DULoxetine (CYMBALTA) 60 mg delayed release capsule, take 1 capsule by mouth once daily, Disp: 30 capsule, Rfl: 1    folic acid (FOLVITE) 1 mg tablet, take 1 tablet by mouth once daily, Disp: 90 tablet, Rfl: 4    Magnesium 250 MG TABS, 1 tablet 2 (two) times a day Taking 500mg in the morning and 500mg at night, Disp: , Rfl:     metoprolol tartrate (LOPRESSOR) 100 mg tablet, Take 50 mg by mouth daily, Disp: , Rfl:     nystatin (MYCOSTATIN) cream, Apply topically 2 (two) times a day, Disp: 30 g, Rfl: 0    senna (SENOKOT) 8 6 mg, Take 1 tablet (8 6 mg total) by mouth daily at bedtime, Disp: 30 each, Rfl: 0    tadalafil (CIALIS) 20 MG tablet, Take one tablet by mouth one hour before sexual activity, Disp: 15 tablet, Rfl: 3    tamsulosin (FLOMAX) 0 4 mg, Take 1 capsule (0 4 mg total) by mouth daily with dinner, Disp: 30 capsule, Rfl: 1    VITAMIN D PO, Take 1,000 Units by mouth daily , Disp: , Rfl:     zolpidem (AMBIEN) 5 mg tablet, Take 1 tablet (5 mg total) by mouth daily at bedtime as needed for sleep, Disp: 30 tablet, Rfl: 1    ALPRAZolam (XANAX) 0 25 mg tablet, Take 1 tablet (0 25 mg total) by mouth daily at bedtime as needed for anxiety (Patient not taking: No sig reported), Disp: 30 tablet, Rfl: 0    naloxone (NARCAN) 4 mg/0 1 mL nasal spray, Administer 1 spray into a nostril   If breathing does not return to normal or if breathing difficulty resumes after 2-3 minutes, give another dose in the other nostril using a new spray  (Patient not taking: No sig reported), Disp: 1 each, Rfl: 1  Past Medical History:   Diagnosis Date    Arthritis     Bladder cancer     Cancer ([address] )     skin melanoma;basal cell    Chronic pain disorder     from arthritis    Colon polyp     Does use hearing aid     bilat    Dry eyes, bilateral     GERD (gastroesophageal reflux disease)     History of kidney stones 10/2019    History of partial knee replacement     bilat    History of vertigo     Hyperlipidemia     Hypertension     Infusion extravasation of chemotherapy vesicant 11/2021    Kidney lesion     Lumbar disc disorder     compression of vertebrae L4-5-6    Muscle weakness     left hip area    Renal mass     left    Right ankle injury 03/05/2020    missed step of ladder     Right ankle pain     Shortness of breath     with activity    Tinnitus     Urothelial cancer     left    Wears glasses      Past Surgical History:   Procedure Laterality Date    COLONOSCOPY      CYSTOSCOPY Left 4/1/2020    Procedure: CYSTOSCOPY; URETERAL CATHETER PLACEMENT;  Surgeon: Caryl Johnson MD;  Location[facility];  Service: Urology    CYSTOSCOPY  05/11/2020    CYSTOSCOPY  06/04/2021    FL CYSTOGRAM  4/13/2020    FL RETROGRADE PYELOGRAM  1/17/2020    HERNIA REPAIR      umbilical with mesh    INGUINAL HERNIA REPAIR Right     with mesh    IR PORT PLACEMENT  4/30/2020    JOINT REPLACEMENT Bilateral     partials knee    LUMBAR EPIDURAL INJECTION      ND CYSTOURETHROSCOPY,URETER CATHETER Bilateral 1/17/2020    Procedure: CYSTOSCOPY; RIGHT RETROGRADE PYELOGRAM WITH RIGHT URETERAL CYTOLOGY SAMPLING; LEFT URETEROSCOPY WITH RENAL PELVIS BIOPSY AND LEFT STENT PLACEMENT;  Surgeon: Caryl Johnson MD;  [facility];  Service: Urology    ND NEPHRECTOMY, W/PART   URETECTOMY Left 4/1/2020    Procedure: ROBOTIC LAPAROSCOPIC NEPHRO-URETERECTOMY;  Surgeon: Kameron Agarwal MD;  Location: [facility] OR;  Service: Urology    SKIN CANCER EXCISION      surface melanoma    TONSILLECTOMY      WISDOM TOOTH EXTRACTION         CMP:   Lab Results   Component Value Date    K 4 1 2022     2022    CO2 30 2022    BUN 22 2022    CREATININE 1 25 2022    EGFR 56 2022     Lipid Profile:    Lab Results   Component Value Date    TRIG 83 2021    HDL 57 2021         Social History     Tobacco Use   Smoking Status Former Smoker    Types: Cigarettes    Quit date: 0    Years since quittin 4   Smokeless Tobacco Never Used

## 2022-06-02 NOTE — PATIENT INSTRUCTIONS
Eliquis 5 mg twice daily--call for pricing    Stop amlodipine-monitor blood pressures-call with an update in 2 weeks

## 2022-06-02 NOTE — ASSESSMENT & PLAN NOTE
Newly discovered PAF  Rate controlled  Continue metoprolol 50 mg daily  Discussed anticoagulation    See notes below

## 2022-06-02 NOTE — PROGRESS NOTES
Reviewed EKG ordered yesterday, 6/1, showing Afib with controlled ventricular rate  Reached out to cardio this am via TT, patient is scheduled to see them today in the office       Adalberto Coleman MD  Palliative Medicine & Supportive Care  Internal Medicine  Available via [facility] Text  Office: [phone]  Fax: [phone]

## 2022-06-03 ENCOUNTER — TELEPHONE (OUTPATIENT)
Dept: CARDIOLOGY CLINIC | Facility: CLINIC | Age: 72
End: 2022-06-03

## 2022-06-03 DIAGNOSIS — C68.9 UROTHELIAL CANCER (HCC): ICD-10-CM

## 2022-06-03 DIAGNOSIS — C79.10 METASTATIC UROTHELIAL CARCINOMA (HCC): Primary | ICD-10-CM

## 2022-06-03 DIAGNOSIS — I48.0 PAROXYSMAL ATRIAL FIBRILLATION (HCC): Primary | ICD-10-CM

## 2022-06-03 DIAGNOSIS — C65.2 CANCER OF LEFT RENAL PELVIS (HCC): ICD-10-CM

## 2022-06-03 RX ORDER — ACETAMINOPHEN 325 MG/1
650 TABLET ORAL ONCE
Status: CANCELLED | OUTPATIENT
Start: 2022-06-17

## 2022-06-03 RX ORDER — SODIUM CHLORIDE 9 MG/ML
20 INJECTION, SOLUTION INTRAVENOUS ONCE
Status: CANCELLED | OUTPATIENT
Start: 2022-06-24

## 2022-06-03 RX ORDER — SODIUM CHLORIDE 9 MG/ML
20 INJECTION, SOLUTION INTRAVENOUS ONCE
Status: CANCELLED | OUTPATIENT
Start: 2022-06-17

## 2022-06-03 RX ORDER — ACETAMINOPHEN 325 MG/1
650 TABLET ORAL ONCE
Status: CANCELLED | OUTPATIENT
Start: 2022-06-10

## 2022-06-03 RX ORDER — ACETAMINOPHEN 325 MG/1
650 TABLET ORAL ONCE
Status: CANCELLED | OUTPATIENT
Start: 2022-06-24

## 2022-06-03 RX ORDER — SODIUM CHLORIDE 9 MG/ML
20 INJECTION, SOLUTION INTRAVENOUS ONCE
Status: CANCELLED | OUTPATIENT
Start: 2022-06-10

## 2022-06-03 NOTE — TELEPHONE ENCOUNTER
Spoke to patient regarding anticoagulation  I received follow-up message from Dr Kris Bustos that there is no contraindication from an oncology perspective to initiating anticoagulation  Patient has not yet looked into pricing and would like time over the weekend to do so  I offered to send a prescription in and that he could refuse if too expensive, but he would rather wait and look into things over the weekend    He will call us Monday with an update

## 2022-06-05 DIAGNOSIS — R35.0 BENIGN PROSTATIC HYPERPLASIA WITH URINARY FREQUENCY: ICD-10-CM

## 2022-06-05 DIAGNOSIS — N40.1 BENIGN PROSTATIC HYPERPLASIA WITH URINARY FREQUENCY: ICD-10-CM

## 2022-06-06 ENCOUNTER — TELEPHONE (OUTPATIENT)
Dept: CARDIOLOGY CLINIC | Facility: CLINIC | Age: 72
End: 2022-06-06

## 2022-06-06 DIAGNOSIS — I48.0 PAROXYSMAL ATRIAL FIBRILLATION (HCC): Primary | ICD-10-CM

## 2022-06-06 RX ORDER — TAMSULOSIN HYDROCHLORIDE 0.4 MG/1
CAPSULE ORAL
Qty: 30 CAPSULE | Refills: 1 | Status: SHIPPED | OUTPATIENT
Start: 2022-06-06 | End: 2022-08-01

## 2022-06-06 NOTE — TELEPHONE ENCOUNTER
Needs a script sent to the [place] for his Eliquis  It must have a reason for prescribing for them to accept it  Fax: [phone]      He also said his Zio monitor fell off  He contacted the company and they told him to send it back and they will determine if he needs another one or not

## 2022-06-06 NOTE — TELEPHONE ENCOUNTER
Eliquis script faxed to [facility]    Will await information regarding Zio monitor

## 2022-06-07 ENCOUNTER — OFFICE VISIT (OUTPATIENT)
Dept: HEMATOLOGY ONCOLOGY | Facility: CLINIC | Age: 72
End: 2022-06-07
Payer: MEDICARE

## 2022-06-07 ENCOUNTER — HOSPITAL ENCOUNTER (OUTPATIENT)
Dept: INFUSION CENTER | Facility: HOSPITAL | Age: 72
Discharge: HOME | End: 2022-06-07
Payer: MEDICARE

## 2022-06-07 VITALS
HEIGHT: 69 IN | BODY MASS INDEX: 29.62 KG/M2 | DIASTOLIC BLOOD PRESSURE: 70 MMHG | OXYGEN SATURATION: 96 % | WEIGHT: 200 LBS | RESPIRATION RATE: 18 BRPM | HEART RATE: 103 BPM | TEMPERATURE: 98.3 F | SYSTOLIC BLOOD PRESSURE: 110 MMHG

## 2022-06-07 VITALS
HEART RATE: 99 BPM | TEMPERATURE: 96.9 F | DIASTOLIC BLOOD PRESSURE: 89 MMHG | SYSTOLIC BLOOD PRESSURE: 145 MMHG | OXYGEN SATURATION: 99 % | RESPIRATION RATE: 18 BRPM

## 2022-06-07 DIAGNOSIS — C79.10 METASTATIC UROTHELIAL CARCINOMA (HCC): Primary | ICD-10-CM

## 2022-06-07 DIAGNOSIS — I48.91 ATRIAL FIBRILLATION, UNSPECIFIED TYPE (HCC): ICD-10-CM

## 2022-06-07 DIAGNOSIS — E83.42 HYPOMAGNESEMIA: ICD-10-CM

## 2022-06-07 DIAGNOSIS — G62.0 CHEMOTHERAPY-INDUCED NEUROPATHY (HCC): ICD-10-CM

## 2022-06-07 DIAGNOSIS — C65.2 CANCER OF LEFT RENAL PELVIS (HCC): ICD-10-CM

## 2022-06-07 DIAGNOSIS — C79.10 METASTATIC UROTHELIAL CARCINOMA (HCC): ICD-10-CM

## 2022-06-07 DIAGNOSIS — T45.1X5A CHEMOTHERAPY-INDUCED NEUROPATHY (HCC): ICD-10-CM

## 2022-06-07 DIAGNOSIS — E86.0 DEHYDRATION: ICD-10-CM

## 2022-06-07 DIAGNOSIS — G89.3 CANCER ASSOCIATED PAIN: Primary | ICD-10-CM

## 2022-06-07 DIAGNOSIS — C68.9 UROTHELIAL CANCER (HCC): ICD-10-CM

## 2022-06-07 LAB
ALBUMIN SERPL BCP-MCNC: 3.9 G/DL (ref 3.5–5)
ALP SERPL-CCNC: 86 U/L (ref 34–104)
ALT SERPL W P-5'-P-CCNC: 17 U/L (ref 7–52)
ANION GAP SERPL CALCULATED.3IONS-SCNC: 8 MMOL/L (ref 4–13)
AST SERPL W P-5'-P-CCNC: 16 U/L (ref 13–39)
BASOPHILS # BLD AUTO: 0.03 THOUSANDS/ΜL (ref 0–0.1)
BASOPHILS NFR BLD AUTO: 1 % (ref 0–1)
BILIRUB SERPL-MCNC: 0.69 MG/DL (ref 0.2–1)
BUN SERPL-MCNC: 14 MG/DL (ref 5–25)
CALCIUM SERPL-MCNC: 9.5 MG/DL (ref 8.4–10.2)
CHLORIDE SERPL-SCNC: 103 MMOL/L (ref 96–108)
CO2 SERPL-SCNC: 30 MMOL/L (ref 21–32)
CREAT SERPL-MCNC: 1.4 MG/DL (ref 0.6–1.3)
EOSINOPHIL # BLD AUTO: 0.01 THOUSAND/ΜL (ref 0–0.61)
EOSINOPHIL NFR BLD AUTO: 0 % (ref 0–6)
ERYTHROCYTE [DISTWIDTH] IN BLOOD BY AUTOMATED COUNT: 14.4 % (ref 11.6–15.1)
GFR SERPL CREATININE-BSD FRML MDRD: 49 ML/MIN/1.73SQ M
GLUCOSE SERPL-MCNC: 151 MG/DL (ref 65–140)
HCT VFR BLD AUTO: 38.9 % (ref 36.5–49.3)
HGB BLD-MCNC: 12.3 G/DL (ref 12–17)
IMM GRANULOCYTES # BLD AUTO: 0.02 THOUSAND/UL (ref 0–0.2)
IMM GRANULOCYTES NFR BLD AUTO: 0 % (ref 0–2)
LYMPHOCYTES # BLD AUTO: 1.25 THOUSANDS/ΜL (ref 0.6–4.47)
LYMPHOCYTES NFR BLD AUTO: 26 % (ref 14–44)
MAGNESIUM SERPL-MCNC: 1.9 MG/DL (ref 1.9–2.7)
MCH RBC QN AUTO: 31.5 PG (ref 26.8–34.3)
MCHC RBC AUTO-ENTMCNC: 31.6 G/DL (ref 31.4–37.4)
MCV RBC AUTO: 100 FL (ref 82–98)
MONOCYTES # BLD AUTO: 0.63 THOUSAND/ΜL (ref 0.17–1.22)
MONOCYTES NFR BLD AUTO: 13 % (ref 4–12)
NEUTROPHILS # BLD AUTO: 2.89 THOUSANDS/ΜL (ref 1.85–7.62)
NEUTS SEG NFR BLD AUTO: 60 % (ref 43–75)
NRBC BLD AUTO-RTO: 0 /100 WBCS
PLATELET # BLD AUTO: 181 THOUSANDS/UL (ref 149–390)
PMV BLD AUTO: 9.8 FL (ref 8.9–12.7)
POTASSIUM SERPL-SCNC: 3.8 MMOL/L (ref 3.5–5.3)
PROT SERPL-MCNC: 6.6 G/DL (ref 6.4–8.4)
RBC # BLD AUTO: 3.9 MILLION/UL (ref 3.88–5.62)
SODIUM SERPL-SCNC: 141 MMOL/L (ref 135–147)
WBC # BLD AUTO: 4.83 THOUSAND/UL (ref 4.31–10.16)

## 2022-06-07 PROCEDURE — 80053 COMPREHEN METABOLIC PANEL: CPT

## 2022-06-07 PROCEDURE — 96365 THER/PROPH/DIAG IV INF INIT: CPT

## 2022-06-07 PROCEDURE — 85025 COMPLETE CBC W/AUTO DIFF WBC: CPT

## 2022-06-07 PROCEDURE — 83735 ASSAY OF MAGNESIUM: CPT

## 2022-06-07 PROCEDURE — 99214 OFFICE O/P EST MOD 30 MIN: CPT | Performed by: NURSE PRACTITIONER

## 2022-06-07 RX ADMIN — MAGNESIUM SULFATE HEPTAHYDRATE: 500 INJECTION, SOLUTION INTRAMUSCULAR; INTRAVENOUS at 14:16

## 2022-06-07 NOTE — PROGRESS NOTES
Hematology/Oncology Outpatient Follow-up  Maria Esther Funk 67 y o  male 1950 [id number]    Date:  6/7/2022      Assessment and Plan:  1  Metastatic urothelial carcinoma [facility])  Patient will continue his current treatment with Enfortumab Vedotin 3 weeks on with 1 week of on Friday 06/10/2022  He will be starting cycle 9 day 1 tomorrow pending his repeat labs from today  We will continue to monitor his labs before each treatment  He will continue to receive his additional IV hydration in the infusion center as needed  Request he follow-up again in 2 weeks  Patient will also be due for repeat imaging with PET-CT scan before his next office visit which we will order and schedule today  He seems to have another pustular skin lesions/boil to his lower back region  He had similar lesion several weeks ago which responded/resolved to clindamycin gel  Advised him to resume the clindamycin gel  There is no improvement of the lesion or worsening will start him on antibiotics      He does follow-up with palliative care on a regular basis  - CBC and differential; Standing  - Comprehensive metabolic panel; Standing  - Magnesium; Standing  - NM PET CT skull base to mid thigh; Future  - CBC and differential  - Comprehensive metabolic panel  - Magnesium    2  Hypomagnesemia  Patient will continue his oral magnesium supplements  Is also getting additional IV magnesium p r n  With IV hydration-has standing order     - Magnesium; Standing  - Magnesium    3  Chemotherapy-induced neuropathy ([address] )  Patient continues to have chemo induced neuropathy to his hands and feet without any worsening  He states that he has good days and bad days  Has had moments where he does not even notice the neuropathy in the feet  He will continue the Cymbalta 60 mg daily which he feels is helping his symptoms  4  Cancer of left renal pelvis ([address] )  - NM PET CT skull base to mid thigh; Future    5   Atrial fibrillation, unspecified type [facility])  Was recently found to have AFib on his EKG from earlier this month  Was seen by Cardiology who is planning to start him on Eliquis however is having some difficulty with co-payment  They are looking in to obtaining from the [facility]  We will provide patient with professional samples today (2 week supply) to prevent delay in treatment  HPI:  Patient presents today for a follow-up visit accompanied by his wife  He was recently found to be in Afib  Was seen by his cardiology team who ordered ZIO patch monitor x3 days and will be starting anticoagulation with Eliquis  Has been having some difficulty obtaining Eliquis due to cost; cardiology is looking into getting from [facility]  He also states that his ZIO patch fell off after 2 days  He has another skin lesion/boil on his lower back; did get a little smaller after his wife expressed some yellow/white drainage  He had a similar lesion few weeks ago which resolved after applying clindamycin gel  He continues to have neuropathy in his hands and feet without any worsening; has good days and bad days  Believes the Cymbalta 60 mg has been helping  He will be getting repeat laboratory studies today  His most recent laboratory studies from 05/24/2022 showed normal white cells and platelets, he is not anemic H&H 12 4/38 5, MCV was slightly above average 101  Total protein decreased 6 1, creatinine 1 25, GFR 56 remaining metabolic panel is normal   Magnesium continues to be lower than average 1 8  Oncology History Overview Note   Patient has a history of hypertension, hyperlipidemia, chronic lower back pain  He had an MRI of the lower spine for further evaluation of his chronic lower back pain  The MRI on 12/02/2019 revealed Multiple left renal masses to include a left lower pole cyst and a rounded structure in the left upper pole which may also represent cyst   Left upper pole renal masses approximately 3 cm in greatest linear dimension       A CT with renal protocol was done on 12/12/2019 which also showed the infiltrating lesion in the upper pole of the left kidney measuring about the 3 5 cm in greatest dimension without any hint of retroperitoneal lymphadenopathy  A CT scan of the abdomen pelvis on 01/14/2020 showed the same findings with the mild hydronephrosis on the left  The urine cytology on 01/03/2020 showed high-grade urothelial carcinoma  A cystoscopy was then done, a biopsy was taken from the left renal pelvic region which showed high-grade urothelial carcinoma without evidence of lamina propria invasion  The detrusor muscle/muscularis propria were not present for evaluation  The recommendation was to pursue left robotic assisted laparoscopic nephroureterectomy with bladder cuff excision which was done on 04/01/2020  The final pathology revealed;  - Invasive high grade urothelial carcinoma arising in renal pelvis  - Bladder cuff margin is negative for carcinoma and no evidence of high grade dysplasia  - Ureters with no significant pathologic abnormality  - Two benign simple cysts  - Adrenal gland is negative for malignancy  - One lymph node, negative for malignancy (0/1)  The tumor size was 4 5 cm with invasion beyond the muscularis into the periurethral fat or peripelvic fat or the renal parenchyma  The margins were uninvolved by carcinoma or carcinoma in situ  The final pathology was pT3 pN0  Patient barely tolerated 1 cycle of adjuvant chemotherapy with split dose cisplatin and gemcitabine with a lot of side effects  The treatment had to be discontinued due to significant worsening renal dysfunction 6/2020  Patient started to complain about significant abdominal/left inguinal pain around August/September 2020  Unfortunately repeat imaging revealed recurrent/metastatic disease  9/4/20 CT C/A/P- Status post left nephrectomy for resection of urothelial neoplasm    Lymphadenopathy in the left nephrectomy bed has increased since the prior examination, concerning for metastatic disease  Ill-defined hyperattenuating masslike focus in the left rectus muscle, not identified on the prior examination  This is suspicious for a metastatic lesion  A rectus hematoma is also considered  9/23/20 PET scan- 1  Multiple FDG avid lymph nodes in the retrocrural region, retroperitoneum and the left renal bed compatible with metastasis  These have progressed from recent CT  2   FDG avid mass involving the left rectus abdominal musculature compatible with metastasis which is larger from recent CT  3  Several small lymph nodes adjacent to the mid to distal esophagus with FDG uptake suspicious for metastasis  Small focus of FDG uptake also suggested in the right hilar region, metastasis is not excluded  This could be reassessed on follow-up  4   Subtle focus of FDG uptake at the T2 level on the left, nonspecific, could be related to early degenerative changes, developing metastasis is not entirely excluded  This could be reassessed on follow-up  5  Prostate is mildly prominent with heterogeneous FDG uptake  This could be inflammatory  Correlate for prostatitis  He completed palliative radiation to the left abdomen 12/3/20     His PET scan from 08/16/2021 showed progression of his disease with hypermetabolic soft tissue lesions at the level of the right shoulder and right axilla with interval progression of the retroperitoneal and mesenteric hypermetabolic metastasis  He did have the new lesion to his right upper back/shoulder which was causing significant localized pain  This excised by his surgeon 08/09/2021  Pathology confirmed metastatic high-grade carcinoma consistent with his no primary urothelial carcinoma  He received palliative radiation to his right shoulder/axilla region  Urothelial cancer ([facility] [address] )   1/3/2020 Biopsy    Final Diagnosis    A   Urine, Clean Catch, Thin Prep:  High grade urothelial carcinoma (HGUC) - see comment  1/3/2020 Initial Diagnosis    Urothelial cancer ([facility] [address] )  T3 N0 (stage IIIA)     1/17/2020 Biopsy    Final Diagnosis    A  Ureter, Right, left renal pelvis biopsy  -Fragments of high grade urothelial carcinoma   -No evidence of lamina propria invasion   -Detrusor muscle/ muscularis propria is not present for evaluation  B  Urinary Bladder, bladder biopsy:  -Fragments of low grade papillary lesion  See note  -No evidence of invasion seen  -Unremarkable fragment of detrusor muscle seen  4/1/2020 Surgery    left robotic assisted laparoscopic nephroureterectomy with bladder cuff excision     Final Diagnosis    A  Left kidney, ureter, and bladder cuff, nephroureterectomy:  - Invasive high grade urothelial carcinoma arising in renal pelvis  - Bladder cuff margin is negative for carcinoma and no evidence of high grade dysplasia  - Ureters with no significant pathologic abnormality  - Two benign simple cysts  - Adrenal gland is negative for malignancy  - One lymph node, negative for malignancy (0/1)          5/14/2020 - 6/3/2020 Chemotherapy    CISplatin (PLATINOL) split dose 35 mg/m2 = 75 3 mg (50 % of original dose 70 mg/m2), Intravenous,   Administration: 75 3 mg (5/14/2020), 75 3 mg (5/21/2020)  gemcitabine (GEMZAR) 2,200 mg in sodium chloride 0 9 % 250 mL infusion, 2,150 2 mg (80 % of original dose 1,250 mg/m2), Intravenous,   Administration: 2,200 mg (5/14/2020), 2,200 mg (5/21/2020)    (only completed 1 cycle- d/c d/t adverse effects and worsening renal dysfunction)     10/9/2020 - 9/9/2021 Chemotherapy    pembrolizumab (KEYTRUDA) IVPB, 200 mg, Intravenous, Once, 16 of 20 cycles  Administration: 200 mg (10/9/2020), 200 mg (10/30/2020), 200 mg (11/20/2020), 200 mg (12/11/2020), 200 mg (12/31/2020), 200 mg (1/22/2021), 200 mg (2/12/2021), 200 mg (3/5/2021), 200 mg (3/26/2021), 200 mg (4/16/2021), 200 mg (5/7/2021), 200 mg (5/28/2021), 200 mg (6/18/2021), 200 mg (7/9/2021), 200 mg (7/30/2021), 200 mg (8/20/2021)     11/19/2020 - 12/3/2020 Radiation    Treatment:  Course: C1    Plan ID Energy Fractions Dose per Fraction (cGy) Dose Correction (cGy) Total Dose Delivered (cGy) Elapsed Days   L Abdomen 6X 10 / 10 300 0 3,000 14        9/10/2021 -  Chemotherapy    enfortumab vedotin-ejfv (PADCEV) IVPB, 1 25 mg/kg = 114 mg, Intravenous, Once, 8 of 13 cycles  Dose modification: 1 mg/kg (original dose 1 25 mg/kg, Cycle 7, Reason: Other (Must fill in a comment), Comment: dose reduction due to side effects ), 1 25 mg/kg (original dose 1 25 mg/kg, Cycle 7, Reason: Other (Must fill in a comment), Comment: patient wants full dose ), 1 mg/kg (original dose 1 25 mg/kg, Cycle 7, Reason: Neuropathy)  Administration: 114 mg (9/10/2021), 114 mg (9/17/2021), 110 mg (10/8/2021), 110 mg (9/24/2021), 110 mg (10/15/2021), 110 mg (11/12/2021), 110 mg (10/22/2021), 110 mg (11/19/2021), 110 mg (12/10/2021), 110 mg (11/26/2021), 110 mg (12/17/2021), 110 mg (1/7/2022), 110 mg (12/23/2021), 110 mg (1/14/2022), 90 mg (4/8/2022), 90 mg (4/15/2022), 90 mg (4/22/2022), 110 mg (1/21/2022), 110 mg (2/18/2022), 110 mg (2/25/2022), 90 mg (5/13/2022), 90 mg (5/20/2022), 90 mg (5/27/2022), 90 mg (3/4/2022)     Cancer of left renal pelvis ([address] )   4/23/2020 Initial Diagnosis    Cancer of left renal pelvis ([address] )     10/9/2020 - 9/9/2021 Chemotherapy    pembrolizumab (KEYTRUDA) IVPB, 200 mg, Intravenous, Once, 16 of 20 cycles  Administration: 200 mg (10/9/2020), 200 mg (10/30/2020), 200 mg (11/20/2020), 200 mg (12/11/2020), 200 mg (12/31/2020), 200 mg (1/22/2021), 200 mg (2/12/2021), 200 mg (3/5/2021), 200 mg (3/26/2021), 200 mg (4/16/2021), 200 mg (5/7/2021), 200 mg (5/28/2021), 200 mg (6/18/2021), 200 mg (7/9/2021), 200 mg (7/30/2021), 200 mg (8/20/2021)     9/10/2021 -  Chemotherapy    enfortumab vedotin-hamiltonfv (PADCEV) IVPB, 1 25 mg/kg = 114 mg, Intravenous, Once, 8 of 13 cycles  Dose modification: 1 mg/kg (original dose 1 25 mg/kg, Cycle 7, Reason: Other (Must fill in a comment), Comment: dose reduction due to side effects ), 1 25 mg/kg (original dose 1 25 mg/kg, Cycle 7, Reason: Other (Must fill in a comment), Comment: patient wants full dose ), 1 mg/kg (original dose 1 25 mg/kg, Cycle 7, Reason: Neuropathy)  Administration: 114 mg (9/10/2021), 114 mg (9/17/2021), 110 mg (10/8/2021), 110 mg (9/24/2021), 110 mg (10/15/2021), 110 mg (11/12/2021), 110 mg (10/22/2021), 110 mg (11/19/2021), 110 mg (12/10/2021), 110 mg (11/26/2021), 110 mg (12/17/2021), 110 mg (1/7/2022), 110 mg (12/23/2021), 110 mg (1/14/2022), 90 mg (4/8/2022), 90 mg (4/15/2022), 90 mg (4/22/2022), 110 mg (1/21/2022), 110 mg (2/18/2022), 110 mg (2/25/2022), 90 mg (5/13/2022), 90 mg (5/20/2022), 90 mg (5/27/2022), 90 mg (3/4/2022)      Surgery       Metastatic urothelial carcinoma ([facility] [address] )   8/9/2021 Initial Diagnosis    Metastatic urothelial carcinoma ([facility] [address] )     9/8/2021 - 9/21/2021 Radiation    Treatment:  Course: C2    Plan ID Energy Fractions Dose per Fraction (cGy) Dose Correction (cGy) Total Dose Delivered (cGy) Elapsed Days   R Axil_Shl # 6X 10 / 10 300 0 3,000 13      Treatment dates:  C2: 9/8/2021 - 9/21/2021     9/10/2021 -  Chemotherapy    enfortumab vedotin-ejfv (PADCEV) IVPB, 1 25 mg/kg = 114 mg, Intravenous, Once, 8 of 13 cycles  Dose modification: 1 mg/kg (original dose 1 25 mg/kg, Cycle 7, Reason: Other (Must fill in a comment), Comment: dose reduction due to side effects ), 1 25 mg/kg (original dose 1 25 mg/kg, Cycle 7, Reason: Other (Must fill in a comment), Comment: patient wants full dose ), 1 mg/kg (original dose 1 25 mg/kg, Cycle 7, Reason: Neuropathy)  Administration: 114 mg (9/10/2021), 114 mg (9/17/2021), 110 mg (10/8/2021), 110 mg (9/24/2021), 110 mg (10/15/2021), 110 mg (11/12/2021), 110 mg (10/22/2021), 110 mg (11/19/2021), 110 mg (12/10/2021), 110 mg (11/26/2021), 110 mg (12/17/2021), 110 mg (1/7/2022), 110 mg (12/23/2021), 110 mg (1/14/2022), 90 mg (4/8/2022), 90 mg (4/15/2022), 90 mg (4/22/2022), 110 mg (1/21/2022), 110 mg (2/18/2022), 110 mg (2/25/2022), 90 mg (5/13/2022), 90 mg (5/20/2022), 90 mg (5/27/2022), 90 mg (3/4/2022)         Interval history:    ROS: Review of Systems   Constitutional: Positive for activity change and fatigue  Negative for appetite change, chills, fever and unexpected weight change  HENT: Negative for congestion, hearing loss, mouth sores, nosebleeds, sore throat and trouble swallowing  Eyes: Negative  Respiratory: Positive for shortness of breath (exertional)  Negative for cough and chest tightness  Cardiovascular: Negative for chest pain, palpitations and leg swelling (at times)  Gastrointestinal: Negative for abdominal distention, abdominal pain, blood in stool, constipation, diarrhea, nausea and vomiting  Genitourinary: Negative for difficulty urinating, dysuria, frequency, hematuria and urgency  Musculoskeletal: Positive for arthralgias, back pain, gait problem and myalgias  Negative for joint swelling  Skin: Positive for rash  Negative for color change and pallor  Neurological: Positive for dizziness (mild), numbness and headaches (mild)  Negative for light-headedness  Hematological: Negative for adenopathy  Psychiatric/Behavioral: Negative for dysphoric mood and sleep disturbance  The patient is not nervous/anxious          Past Medical History:   Diagnosis Date    Arthritis     Bladder cancer     Cancer ([facility] [address] )     skin melanoma;basal cell    Chronic pain disorder     from arthritis    Colon polyp     Does use hearing aid     bilat    Dry eyes, bilateral     GERD (gastroesophageal reflux disease)     History of kidney stones 10/2019    History of partial knee replacement     bilat    History of vertigo     Hyperlipidemia     Hypertension     Infusion extravasation of chemotherapy vesicant 11/2021    Kidney lesion     Lumbar disc disorder     compression of vertebrae L4-5-6    Muscle weakness     left hip area    Renal mass     left    Right ankle injury 2020    missed step of ladder     Right ankle pain     Shortness of breath     with activity    Tinnitus     Urothelial cancer     left    Wears glasses        Past Surgical History:   Procedure Laterality Date    COLONOSCOPY      CYSTOSCOPY Left 2020    Procedure: CYSTOSCOPY; URETERAL CATHETER PLACEMENT;  Surgeon: Karly Garnett MD;  Location[facility];  Service: Urology    CYSTOSCOPY  2020    CYSTOSCOPY  2021    FL CYSTOGRAM  2020    FL RETROGRADE PYELOGRAM  2020    HERNIA REPAIR      umbilical with mesh    INGUINAL HERNIA REPAIR Right     with mesh    IR PORT PLACEMENT  2020    JOINT REPLACEMENT Bilateral     partials knee    LUMBAR EPIDURAL INJECTION      VA CYSTOURETHROSCOPY,URETER CATHETER Bilateral 2020    Procedure: CYSTOSCOPY; RIGHT RETROGRADE PYELOGRAM WITH RIGHT URETERAL CYTOLOGY SAMPLING; LEFT URETEROSCOPY WITH RENAL PELVIS BIOPSY AND LEFT STENT PLACEMENT;  Surgeon: Karly Garnett MD;  [facility];  Service: Urology    VA NEPHRECTOMY, W/PART  URETECTOMY Left 2020    Procedure: ROBOTIC LAPAROSCOPIC NEPHRO-URETERECTOMY;  Surgeon: Karly Garnett MD;  Location[facility];  Service: Urology    SKIN CANCER EXCISION      surface melanoma    TONSILLECTOMY      WISDOM TOOTH EXTRACTION         Social History     Socioeconomic History    Marital status: /Civil Union     Spouse name: None    Number of children: None    Years of education: None    Highest education level: None   Occupational History    None   Tobacco Use    Smoking status: Former Smoker     Types: Cigarettes     Quit date:      Years since quittin 4    Smokeless tobacco: Never Used   Vaping Use    Vaping Use: Never used   Substance and Sexual Activity    Alcohol use:  Yes     Alcohol/week: 17 0 standard drinks     Types: 7 Glasses of wine, 10 Cans of beer per week     Comment: socially    Drug use: Never    Sexual activity: Not Currently   Other Topics Concern    None   Social History Narrative    Daily caffeine use - 2 cups coffee      Social Determinants of Health     Financial Resource Strain: Not on file   Food Insecurity: Not on file   Transportation Needs: Not on file   Physical Activity: Not on file   Stress: Not on file   Social Connections: Not on file   Intimate Partner Violence: Not on file   Housing Stability: Not on file       Family History   Problem Relation Age of Onset    Liver cancer Father        Allergies   Allergen Reactions    Poison Ivy Extract Rash         Current Outpatient Medications:     acetaminophen (TYLENOL) 500 mg tablet, Take 2 tablets (1,000 mg total) by mouth every 8 (eight) hours, Disp: , Rfl:     allopurinol (ZYLOPRIM) 300 mg tablet, take 1 tablet by mouth once daily, Disp: 30 tablet, Rfl: 3    apixaban (Eliquis) 5 mg, Take 1 tablet (5 mg total) by mouth 2 (two) times a day For afib, Disp: 60 tablet, Rfl: 11    atorvastatin (LIPITOR) 80 mg tablet, Take 80 mg by mouth every other day evening, Disp: , Rfl:     colchicine (COLCRYS) 0 6 mg tablet, take 1 tablet by mouth twice a day if needed for FOOT PAIN, Disp: , Rfl:     docusate sodium (COLACE) 250 MG capsule, Take 1 capsule (250 mg total) by mouth daily, Disp: 60 capsule, Rfl: 6    DULoxetine (CYMBALTA) 60 mg delayed release capsule, take 1 capsule by mouth once daily, Disp: 30 capsule, Rfl: 1    folic acid (FOLVITE) 1 mg tablet, take 1 tablet by mouth once daily, Disp: 90 tablet, Rfl: 4    Magnesium 250 MG TABS, 1 tablet 2 (two) times a day Taking 500mg in the morning and 500mg at night, Disp: , Rfl:     metoprolol tartrate (LOPRESSOR) 100 mg tablet, Take 50 mg by mouth daily, Disp: , Rfl:     nystatin (MYCOSTATIN) cream, Apply topically 2 (two) times a day, Disp: 30 g, Rfl: 0    senna (SENOKOT) 8 6 mg, Take 1 tablet (8 6 mg total) by mouth daily at bedtime, Disp: 30 each, Rfl: 0    tadalafil (CIALIS) 20 MG tablet, Take one tablet by mouth one hour before sexual activity, Disp: 15 tablet, Rfl: 3    tamsulosin (FLOMAX) 0 4 mg, take 1 capsule by mouth once daily with dinner, Disp: 30 capsule, Rfl: 1    VITAMIN D PO, Take 1,000 Units by mouth daily , Disp: , Rfl:     zolpidem (AMBIEN) 5 mg tablet, Take 1 tablet (5 mg total) by mouth daily at bedtime as needed for sleep, Disp: 30 tablet, Rfl: 1    ALPRAZolam (XANAX) 0 25 mg tablet, Take 1 tablet (0 25 mg total) by mouth daily at bedtime as needed for anxiety (Patient not taking: No sig reported), Disp: 30 tablet, Rfl: 0    amLODIPine (NORVASC) 5 mg tablet, Take 5 mg by mouth daily   (Patient not taking: Reported on 6/7/2022), Disp: , Rfl:     naloxone (NARCAN) 4 mg/0 1 mL nasal spray, Administer 1 spray into a nostril  If breathing does not return to normal or if breathing difficulty resumes after 2-3 minutes, give another dose in the other nostril using a new spray  (Patient not taking: No sig reported), Disp: 1 each, Rfl: 1      Physical Exam:  /70 (BP Location: Right arm, Patient Position: Sitting, Cuff Size: Adult)   Pulse 103   Temp 98 3 °F (36 8 °C)   Resp 18   Ht 5' 9" (1 753 m)   Wt 90 7 kg (200 lb)   SpO2 96%   BMI 29 53 kg/m²     Physical Exam  Vitals reviewed  Constitutional:       General: He is not in acute distress  Appearance: He is well-developed  He is not diaphoretic  HENT:      Head: Normocephalic and atraumatic  Eyes:      General: Lids are normal  No scleral icterus  Conjunctiva/sclera: Conjunctivae normal       Pupils: Pupils are equal, round, and reactive to light  Neck:      Thyroid: No thyromegaly  Cardiovascular:      Rate and Rhythm: Regular rhythm  Tachycardia present  Heart sounds: Normal heart sounds  No murmur heard  Pulmonary:      Effort: Pulmonary effort is normal  No respiratory distress  Breath sounds: Normal breath sounds     Chest: Breasts:      Right: No axillary adenopathy  Left: No axillary adenopathy  Abdominal:      General: There is no distension  Palpations: Abdomen is soft  There is no hepatomegaly or splenomegaly  Tenderness: There is no abdominal tenderness  Musculoskeletal:         General: Normal range of motion  Cervical back: Normal range of motion and neck supple  Right lower leg: No edema  Left lower leg: No edema  Lymphadenopathy:      Cervical: No cervical adenopathy  Upper Body:      Right upper body: No axillary adenopathy  Left upper body: No axillary adenopathy  Skin:     General: Skin is warm and dry  Findings: No erythema or rash  Neurological:      General: No focal deficit present  Mental Status: He is alert and oriented to person, place, and time  Psychiatric:         Mood and Affect: Mood and affect normal          Behavior: Behavior normal  Behavior is cooperative  Thought Content: Thought content normal          Judgment: Judgment normal            Labs:  Lab Results   Component Value Date    WBC 5 22 05/24/2022    HGB 12 4 05/24/2022    HCT 38 5 05/24/2022     (H) 05/24/2022     05/24/2022     Lab Results   Component Value Date    K 4 1 05/24/2022     05/24/2022    CO2 30 05/24/2022    BUN 22 05/24/2022    CREATININE 1 25 05/24/2022    GLUF 111 (H) 03/01/2022    CALCIUM 9 3 05/24/2022    CORRECTEDCA 9 8 02/23/2022    AST 18 05/24/2022    ALT 21 05/24/2022    ALKPHOS 72 05/24/2022    EGFR 56 05/24/2022       Patient voiced understanding and agreement in the above discussion  Aware to contact our office with questions/symptoms in the interim  This note has been generated by voice recognition software system  Therefore, there may be spelling, grammar, and or syntax errors  Please contact if questions arise

## 2022-06-07 NOTE — Clinical Note
SISSY and provided patient with 2 week supply of Eliquis 5mg samples today to get him started while he is waiting to obtain from [address]   If he continues to have difficulties obtaining we could get our finance team involved    Thanks

## 2022-06-08 ENCOUNTER — HOSPITAL ENCOUNTER (OUTPATIENT)
Dept: MRI IMAGING | Facility: HOSPITAL | Age: 72
Discharge: HOME | End: 2022-06-08
Payer: MEDICARE

## 2022-06-08 DIAGNOSIS — M54.50 CHRONIC BILATERAL LOW BACK PAIN WITHOUT SCIATICA: ICD-10-CM

## 2022-06-08 DIAGNOSIS — G89.29 CHRONIC BILATERAL LOW BACK PAIN WITHOUT SCIATICA: ICD-10-CM

## 2022-06-08 PROCEDURE — A9585 GADOBUTROL INJECTION: HCPCS | Performed by: NURSE PRACTITIONER

## 2022-06-08 PROCEDURE — 72158 MRI LUMBAR SPINE W/O & W/DYE: CPT

## 2022-06-08 RX ADMIN — GADOBUTROL 9 ML: 604.72 INJECTION INTRAVENOUS at 10:30

## 2022-06-09 ENCOUNTER — TELEPHONE (OUTPATIENT)
Dept: HEMATOLOGY ONCOLOGY | Facility: CLINIC | Age: 72
End: 2022-06-09

## 2022-06-09 NOTE — TELEPHONE ENCOUNTER
Phoned [address] infusion and spoke to Masood Arcos to instruct staff to administer 2 grams of Magnesium Sulfate IV over one hr when pt arrived at infusion even though we did not have magnesium results

## 2022-06-10 ENCOUNTER — HOSPITAL ENCOUNTER (OUTPATIENT)
Dept: INFUSION CENTER | Facility: HOSPITAL | Age: 72
Discharge: HOME | End: 2022-06-10
Attending: INTERNAL MEDICINE
Payer: MEDICARE

## 2022-06-10 VITALS
OXYGEN SATURATION: 100 % | RESPIRATION RATE: 18 BRPM | SYSTOLIC BLOOD PRESSURE: 140 MMHG | HEIGHT: 69 IN | WEIGHT: 203.04 LBS | DIASTOLIC BLOOD PRESSURE: 92 MMHG | HEART RATE: 86 BPM | TEMPERATURE: 96.9 F | BODY MASS INDEX: 30.07 KG/M2

## 2022-06-10 DIAGNOSIS — C65.2 CANCER OF LEFT RENAL PELVIS (HCC): ICD-10-CM

## 2022-06-10 DIAGNOSIS — C79.10 METASTATIC UROTHELIAL CARCINOMA (HCC): ICD-10-CM

## 2022-06-10 DIAGNOSIS — C68.9 UROTHELIAL CANCER (HCC): Primary | ICD-10-CM

## 2022-06-10 PROCEDURE — 96413 CHEMO IV INFUSION 1 HR: CPT

## 2022-06-10 PROCEDURE — 96367 TX/PROPH/DG ADDL SEQ IV INF: CPT

## 2022-06-10 RX ORDER — SODIUM CHLORIDE 9 MG/ML
20 INJECTION, SOLUTION INTRAVENOUS ONCE
Status: COMPLETED | OUTPATIENT
Start: 2022-06-10 | End: 2022-06-10

## 2022-06-10 RX ORDER — ACETAMINOPHEN 325 MG/1
650 TABLET ORAL ONCE
Status: DISCONTINUED | OUTPATIENT
Start: 2022-06-10 | End: 2022-06-14 | Stop reason: HOSPADM

## 2022-06-10 RX ADMIN — DIPHENHYDRAMINE HYDROCHLORIDE 25 MG: 50 INJECTION INTRAMUSCULAR; INTRAVENOUS at 11:13

## 2022-06-10 RX ADMIN — ENFORTUMAB VEDOTIN 90 MG: 30 INJECTION, POWDER, LYOPHILIZED, FOR SOLUTION INTRAVENOUS at 12:03

## 2022-06-10 RX ADMIN — SODIUM CHLORIDE 20 ML/HR: 9 INJECTION, SOLUTION INTRAVENOUS at 11:13

## 2022-06-10 RX ADMIN — DEXAMETHASONE SODIUM PHOSPHATE: 10 INJECTION, SOLUTION INTRAMUSCULAR; INTRAVENOUS at 11:36

## 2022-06-10 NOTE — PROGRESS NOTES
Most recent labs and note reviewed  Pt is not to get magnesium today  Only padcev  Pt tolerated todays padcev well  No adverse reaction noted  Port flushed and deaccessed per routine

## 2022-06-10 NOTE — PLAN OF CARE
Problem: Potential for Falls  Goal: Patient will remain free of falls  Description: INTERVENTIONS:  - Educate patient/family on patient safety including physical limitations  - Instruct patient to call for assistance with activity   - Consult OT/PT to assist with strengthening/mobility   - Keep Call bell within reach  - Keep bed low and locked with side rails adjusted as appropriate  - Keep care items and personal belongings within reach  - Initiate and maintain comfort rounds  - Make Fall Risk Sign visible to staff    - Apply yellow socks and bracelet for high fall risk patients  - Consider moving patient to room near nurses station  6/10/2022 1118 by Katarina Miranda, CYRUS  Outcome: Progressing  6/10/2022 1104 by Katarina Miranda, RN  Outcome: Progressing

## 2022-06-14 ENCOUNTER — TELEPHONE (OUTPATIENT)
Dept: CARDIOLOGY CLINIC | Facility: CLINIC | Age: 72
End: 2022-06-14

## 2022-06-14 ENCOUNTER — HOSPITAL ENCOUNTER (OUTPATIENT)
Dept: INFUSION CENTER | Facility: HOSPITAL | Age: 72
Discharge: HOME | End: 2022-06-14
Payer: MEDICARE

## 2022-06-14 VITALS
TEMPERATURE: 97.4 F | RESPIRATION RATE: 18 BRPM | OXYGEN SATURATION: 95 % | DIASTOLIC BLOOD PRESSURE: 80 MMHG | HEART RATE: 75 BPM | SYSTOLIC BLOOD PRESSURE: 123 MMHG

## 2022-06-14 DIAGNOSIS — E83.42 HYPOMAGNESEMIA: ICD-10-CM

## 2022-06-14 DIAGNOSIS — E86.0 DEHYDRATION: ICD-10-CM

## 2022-06-14 DIAGNOSIS — C79.10 METASTATIC UROTHELIAL CARCINOMA (HCC): Primary | ICD-10-CM

## 2022-06-14 DIAGNOSIS — I48.0 PAROXYSMAL ATRIAL FIBRILLATION (HCC): ICD-10-CM

## 2022-06-14 LAB
ALBUMIN SERPL BCP-MCNC: 3.8 G/DL (ref 3.5–5)
ALP SERPL-CCNC: 72 U/L (ref 34–104)
ALT SERPL W P-5'-P-CCNC: 23 U/L (ref 7–52)
ANION GAP SERPL CALCULATED.3IONS-SCNC: 8 MMOL/L (ref 4–13)
AST SERPL W P-5'-P-CCNC: 19 U/L (ref 13–39)
BASOPHILS # BLD AUTO: 0.01 THOUSANDS/ΜL (ref 0–0.1)
BASOPHILS NFR BLD AUTO: 0 % (ref 0–1)
BILIRUB SERPL-MCNC: 0.76 MG/DL (ref 0.2–1)
BUN SERPL-MCNC: 17 MG/DL (ref 5–25)
CALCIUM SERPL-MCNC: 9.1 MG/DL (ref 8.4–10.2)
CHLORIDE SERPL-SCNC: 102 MMOL/L (ref 96–108)
CO2 SERPL-SCNC: 29 MMOL/L (ref 21–32)
CREAT SERPL-MCNC: 1.4 MG/DL (ref 0.6–1.3)
EOSINOPHIL # BLD AUTO: 0.01 THOUSAND/ΜL (ref 0–0.61)
EOSINOPHIL NFR BLD AUTO: 0 % (ref 0–6)
ERYTHROCYTE [DISTWIDTH] IN BLOOD BY AUTOMATED COUNT: 14.4 % (ref 11.6–15.1)
GFR SERPL CREATININE-BSD FRML MDRD: 49 ML/MIN/1.73SQ M
GLUCOSE SERPL-MCNC: 133 MG/DL (ref 65–140)
HCT VFR BLD AUTO: 38.4 % (ref 36.5–49.3)
HGB BLD-MCNC: 12.4 G/DL (ref 12–17)
IMM GRANULOCYTES # BLD AUTO: 0.02 THOUSAND/UL (ref 0–0.2)
IMM GRANULOCYTES NFR BLD AUTO: 0 % (ref 0–2)
LYMPHOCYTES # BLD AUTO: 1.3 THOUSANDS/ΜL (ref 0.6–4.47)
LYMPHOCYTES NFR BLD AUTO: 26 % (ref 14–44)
MAGNESIUM SERPL-MCNC: 1.8 MG/DL (ref 1.9–2.7)
MCH RBC QN AUTO: 32 PG (ref 26.8–34.3)
MCHC RBC AUTO-ENTMCNC: 32.3 G/DL (ref 31.4–37.4)
MCV RBC AUTO: 99 FL (ref 82–98)
MONOCYTES # BLD AUTO: 0.63 THOUSAND/ΜL (ref 0.17–1.22)
MONOCYTES NFR BLD AUTO: 13 % (ref 4–12)
NEUTROPHILS # BLD AUTO: 3.07 THOUSANDS/ΜL (ref 1.85–7.62)
NEUTS SEG NFR BLD AUTO: 61 % (ref 43–75)
NRBC BLD AUTO-RTO: 0 /100 WBCS
PLATELET # BLD AUTO: 190 THOUSANDS/UL (ref 149–390)
PMV BLD AUTO: 9.9 FL (ref 8.9–12.7)
POTASSIUM SERPL-SCNC: 3.8 MMOL/L (ref 3.5–5.3)
PROT SERPL-MCNC: 6.2 G/DL (ref 6.4–8.4)
RBC # BLD AUTO: 3.88 MILLION/UL (ref 3.88–5.62)
SODIUM SERPL-SCNC: 139 MMOL/L (ref 135–147)
WBC # BLD AUTO: 5.04 THOUSAND/UL (ref 4.31–10.16)

## 2022-06-14 PROCEDURE — 80053 COMPREHEN METABOLIC PANEL: CPT

## 2022-06-14 PROCEDURE — 96360 HYDRATION IV INFUSION INIT: CPT

## 2022-06-14 PROCEDURE — 83735 ASSAY OF MAGNESIUM: CPT

## 2022-06-14 PROCEDURE — 85025 COMPLETE CBC W/AUTO DIFF WBC: CPT

## 2022-06-14 RX ADMIN — SODIUM CHLORIDE 1000 ML: 0.9 INJECTION, SOLUTION INTRAVENOUS at 11:25

## 2022-06-14 NOTE — TELEPHONE ENCOUNTER
Called pt to follow up since amlodipine discontinued  Feeling well  No further lightheadedness  Monitoring BP's at home--130/70 this AM, HR running in the 60's      Continue current regimen

## 2022-06-14 NOTE — PROGRESS NOTES
Central labs drawn per orders  Patient tolerated IV hydration without issues  AVS given to patient  Patient ambulated off unit without incident  All personal belongings taken with patient

## 2022-06-15 ENCOUNTER — CLINICAL SUPPORT (OUTPATIENT)
Dept: CARDIOLOGY CLINIC | Facility: CLINIC | Age: 72
End: 2022-06-15
Payer: MEDICARE

## 2022-06-15 DIAGNOSIS — I48.0 PAROXYSMAL ATRIAL FIBRILLATION (HCC): ICD-10-CM

## 2022-06-15 PROCEDURE — 93244 EXT ECG>48HR<7D REV&INTERPJ: CPT | Performed by: INTERNAL MEDICINE

## 2022-06-17 ENCOUNTER — HOSPITAL ENCOUNTER (OUTPATIENT)
Dept: INFUSION CENTER | Facility: HOSPITAL | Age: 72
Discharge: HOME | End: 2022-06-17
Attending: INTERNAL MEDICINE
Payer: MEDICARE

## 2022-06-17 VITALS
OXYGEN SATURATION: 97 % | HEART RATE: 80 BPM | DIASTOLIC BLOOD PRESSURE: 81 MMHG | TEMPERATURE: 97.9 F | BODY MASS INDEX: 29.75 KG/M2 | RESPIRATION RATE: 18 BRPM | WEIGHT: 200.84 LBS | HEIGHT: 69 IN | SYSTOLIC BLOOD PRESSURE: 120 MMHG

## 2022-06-17 DIAGNOSIS — C68.9 UROTHELIAL CANCER (HCC): Primary | ICD-10-CM

## 2022-06-17 DIAGNOSIS — E86.0 DEHYDRATION: ICD-10-CM

## 2022-06-17 DIAGNOSIS — C65.2 CANCER OF LEFT RENAL PELVIS (HCC): ICD-10-CM

## 2022-06-17 DIAGNOSIS — E83.42 HYPOMAGNESEMIA: ICD-10-CM

## 2022-06-17 DIAGNOSIS — G89.3 CANCER ASSOCIATED PAIN: ICD-10-CM

## 2022-06-17 DIAGNOSIS — C79.10 METASTATIC UROTHELIAL CARCINOMA (HCC): ICD-10-CM

## 2022-06-17 PROCEDURE — 96368 THER/DIAG CONCURRENT INF: CPT

## 2022-06-17 PROCEDURE — 96367 TX/PROPH/DG ADDL SEQ IV INF: CPT

## 2022-06-17 PROCEDURE — 96413 CHEMO IV INFUSION 1 HR: CPT

## 2022-06-17 RX ORDER — ACETAMINOPHEN 325 MG/1
650 TABLET ORAL ONCE
Status: DISCONTINUED | OUTPATIENT
Start: 2022-06-17 | End: 2022-06-21 | Stop reason: HOSPADM

## 2022-06-17 RX ORDER — SODIUM CHLORIDE 9 MG/ML
20 INJECTION, SOLUTION INTRAVENOUS ONCE
Status: COMPLETED | OUTPATIENT
Start: 2022-06-17 | End: 2022-06-17

## 2022-06-17 RX ADMIN — DIPHENHYDRAMINE HYDROCHLORIDE 25 MG: 50 INJECTION INTRAMUSCULAR; INTRAVENOUS at 10:45

## 2022-06-17 RX ADMIN — DEXAMETHASONE SODIUM PHOSPHATE: 10 INJECTION, SOLUTION INTRAMUSCULAR; INTRAVENOUS at 11:35

## 2022-06-17 RX ADMIN — SODIUM CHLORIDE 20 ML/HR: 0.9 INJECTION, SOLUTION INTRAVENOUS at 10:28

## 2022-06-17 RX ADMIN — ENFORTUMAB VEDOTIN 90 MG: 30 INJECTION, POWDER, LYOPHILIZED, FOR SOLUTION INTRAVENOUS at 12:20

## 2022-06-17 RX ADMIN — MAGNESIUM SULFATE HEPTAHYDRATE: 500 INJECTION, SOLUTION INTRAMUSCULAR; INTRAVENOUS at 10:28

## 2022-06-20 ENCOUNTER — OFFICE VISIT (OUTPATIENT)
Dept: PAIN MEDICINE | Facility: CLINIC | Age: 72
End: 2022-06-20
Payer: MEDICARE

## 2022-06-20 VITALS
HEIGHT: 69 IN | BODY MASS INDEX: 30.21 KG/M2 | SYSTOLIC BLOOD PRESSURE: 144 MMHG | WEIGHT: 204 LBS | DIASTOLIC BLOOD PRESSURE: 88 MMHG | HEART RATE: 50 BPM

## 2022-06-20 DIAGNOSIS — G62.0 CHEMOTHERAPY-INDUCED NEUROPATHY (HCC): ICD-10-CM

## 2022-06-20 DIAGNOSIS — G62.9 NEUROPATHY: ICD-10-CM

## 2022-06-20 DIAGNOSIS — M54.16 LUMBAR RADICULOPATHY: ICD-10-CM

## 2022-06-20 DIAGNOSIS — G89.29 CHRONIC BILATERAL LOW BACK PAIN WITHOUT SCIATICA: ICD-10-CM

## 2022-06-20 DIAGNOSIS — M46.1 SACROILIITIS (HCC): ICD-10-CM

## 2022-06-20 DIAGNOSIS — Z79.891 ENCOUNTER FOR LONG-TERM OPIATE ANALGESIC USE: ICD-10-CM

## 2022-06-20 DIAGNOSIS — F11.20 UNCOMPLICATED OPIOID DEPENDENCE (HCC): ICD-10-CM

## 2022-06-20 DIAGNOSIS — T45.1X5A CHEMOTHERAPY-INDUCED NEUROPATHY (HCC): ICD-10-CM

## 2022-06-20 DIAGNOSIS — G89.4 CHRONIC PAIN DISORDER: Primary | ICD-10-CM

## 2022-06-20 DIAGNOSIS — M51.26 LUMBAR DISC HERNIATION: ICD-10-CM

## 2022-06-20 DIAGNOSIS — M48.061 SPINAL STENOSIS OF LUMBAR REGION, UNSPECIFIED WHETHER NEUROGENIC CLAUDICATION PRESENT: ICD-10-CM

## 2022-06-20 DIAGNOSIS — M54.50 CHRONIC BILATERAL LOW BACK PAIN WITHOUT SCIATICA: ICD-10-CM

## 2022-06-20 PROCEDURE — 99214 OFFICE O/P EST MOD 30 MIN: CPT | Performed by: NURSE PRACTITIONER

## 2022-06-20 RX ORDER — TRAMADOL HYDROCHLORIDE 50 MG/1
TABLET ORAL
Qty: 75 TABLET | Refills: 0 | Status: CANCELLED | OUTPATIENT
Start: 2022-06-20

## 2022-06-20 RX ORDER — TRAMADOL HYDROCHLORIDE 50 MG/1
TABLET ORAL
Qty: 75 TABLET | Refills: 0 | Status: SHIPPED | OUTPATIENT
Start: 2022-06-20 | End: 2022-07-18 | Stop reason: SDUPTHER

## 2022-06-20 NOTE — PROGRESS NOTES
Assessment:  1  Chronic pain disorder    2  Lumbar radiculopathy    3  Neuropathy    4  Chemotherapy-induced neuropathy ([address] )    5  Lumbar disc herniation    6  Encounter for long-term opiate analgesic use    7  Uncomplicated opioid dependence ([address] )    8  Chronic bilateral low back pain without sciatica    9  Spinal stenosis of lumbar region, unspecified whether neurogenic claudication present    10  Sacroiliitis ([address] )        Plan:  1  Based on patient report and physical exam, the patient's symptomatology does seem to be consistent with sacroiliac mediated pain from sacroiliitis  We will schedule the patient for a bilateral SIJ injection to decrease any inflammatory component of the patient's pain symptoms  2  I will initiate the patient on tramadol 50 mg 1 tablet 2 to 3 times a day p r n  pain #75 tablets for 30 days  The patient was provided with a 1 month RX today  While the patient was in the office today an opioid contract was thoroughly reviewed and signed by the patient  The patient was given adequate time to ask questions in regards to the contract and a signed copy was sent home for their records  The patient also completed a BECKS depression inventory and SOAPP-R today, as part of our yearly opioid management program     [place] Prescription Drug Monitoring Program report was reviewed and was appropriate      Patient is no longer using zolpidem or aprazolam    There are risks associated with opioid medications, including dependence, addiction and tolerance  The patient understands and agrees to use these medications only as prescribed  Potential side effects of the medications include, but are not limited to, constipation, drowsiness, addiction, impaired judgment and risk of fatal overdose if not taken as prescribed  The patient was warned against driving while taking sedation medications  Sharing medications is a felony   At this point in time, the patient is showing no signs of addiction, abuse, diversion or suicidal ideation  3  Patient may continue duloxetine as prescribed   4  Patient will continue to follow with hematology oncology as scheduled  5  Patient will continue with his home exercise program  6  The patient will follow-up in 4 weeks or sooner if needed    M*Modal software was used to dictate this note  It may contain errors with dictating incorrect words or incorrect spelling  Please contact the provider directly with any questions  History of Present Illness: The patient is a 67 y o  male with a history of urothelial carcinoma status post left nephrectomy and ureterectomy currently undergoing chemotherapy and recent AFib placed on anticoagulation 2 weeks ago last seen on 5/25/22 who presents for a follow up office visit in regards to chronic axial low back pain that is nonradiating into the lower extremities   Patient is status post bilateral L2-5 RFA completed in April 2022 without any relief  He is taking Tylenol for pain with mild relief  He was taking morphine prescribed by palliative Medicine in the past with significant constipation  He tries to avoid NSAIDs as he only has 1 kidney and takes duloxetine 60 mg daily  The patient rates his pain a 4/10 on the numeric pain scale  Intermittently has pain in the morning which is described as dull aching and sharp    Pain Contract Signed: 6/20/22  Last Urine Drug Screen: 5/25/22  DALLAS/YESSENIA 6/20/22    I have personally reviewed and/or updated the patient's past medical history, past surgical history, family history, social history, current medications, allergies, and vital signs today  Review of Systems:    Review of Systems   Respiratory: Negative for shortness of breath  Cardiovascular: Negative for chest pain  Gastrointestinal: Negative for constipation, diarrhea, nausea and vomiting  Musculoskeletal: Positive for gait problem  Negative for arthralgias, joint swelling and myalgias     Skin: Negative for rash    Neurological: Negative for dizziness, seizures and weakness  All other systems reviewed and are negative  Past Medical History:   Diagnosis Date    Arthritis     Bladder cancer     Cancer ([facility] [facility] )     skin melanoma;basal cell    Chronic pain disorder     from arthritis    Colon polyp     Does use hearing aid     bilat    Dry eyes, bilateral     GERD (gastroesophageal reflux disease)     History of kidney stones 10/2019    History of partial knee replacement     bilat    History of vertigo     Hyperlipidemia     Hypertension     Infusion extravasation of chemotherapy vesicant 11/2021    Kidney lesion     Lumbar disc disorder     compression of vertebrae L4-5-6    Muscle weakness     left hip area    Renal mass     left    Right ankle injury 03/05/2020    missed step of ladder     Right ankle pain     Shortness of breath     with activity    Tinnitus     Urothelial cancer     left    Wears glasses        Past Surgical History:   Procedure Laterality Date    COLONOSCOPY      CYSTOSCOPY Left 4/1/2020    Procedure: CYSTOSCOPY; URETERAL CATHETER PLACEMENT;  Surgeon: Kerri Salinas MD;  Location[facility];  Service: Urology    CYSTOSCOPY  05/11/2020    CYSTOSCOPY  06/04/2021    FL CYSTOGRAM  4/13/2020    FL RETROGRADE PYELOGRAM  1/17/2020    HERNIA REPAIR      umbilical with mesh    INGUINAL HERNIA REPAIR Right     with mesh    IR PORT PLACEMENT  4/30/2020    JOINT REPLACEMENT Bilateral     partials knee    LUMBAR EPIDURAL INJECTION      SD CYSTOURETHROSCOPY,URETER CATHETER Bilateral 1/17/2020    Procedure: CYSTOSCOPY; RIGHT RETROGRADE PYELOGRAM WITH RIGHT URETERAL CYTOLOGY SAMPLING; LEFT URETEROSCOPY WITH RENAL PELVIS BIOPSY AND LEFT STENT PLACEMENT;  Surgeon: Kerri Salinas MD;  Location[facility];  Service: Urology    SD NEPHRECTOMY, W/PART   URETECTOMY Left 4/1/2020    Procedure: ROBOTIC LAPAROSCOPIC NEPHRO-URETERECTOMY;  Surgeon: Richard Brewster Sanjiv Heard MD;  Location[facility];  Service: Urology    SKIN CANCER EXCISION      surface melanoma    TONSILLECTOMY      WISDOM TOOTH EXTRACTION         Family History   Problem Relation Age of Onset    Liver cancer Father        Social History     Occupational History    Not on file   Tobacco Use    Smoking status: Former Smoker     Types: Cigarettes     Quit date:      Years since quittin 5    Smokeless tobacco: Never Used   Vaping Use    Vaping Use: Never used   Substance and Sexual Activity    Alcohol use:  Yes     Alcohol/week: 17 0 standard drinks     Types: 7 Glasses of wine, 10 Cans of beer per week     Comment: socially    Drug use: Never    Sexual activity: Not Currently         Current Outpatient Medications:     apixaban (Eliquis) 5 mg, Take 1 tablet (5 mg total) by mouth 2 (two) times a day For afib, Disp: 60 tablet, Rfl: 11    traMADol (Ultram) 50 mg tablet, Take 1 PO Q8-12 hours PRN pain, Disp: 75 tablet, Rfl: 0    acetaminophen (TYLENOL) 500 mg tablet, Take 2 tablets (1,000 mg total) by mouth every 8 (eight) hours, Disp: , Rfl:     allopurinol (ZYLOPRIM) 300 mg tablet, take 1 tablet by mouth once daily, Disp: 30 tablet, Rfl: 3    ALPRAZolam (XANAX) 0 25 mg tablet, Take 1 tablet (0 25 mg total) by mouth daily at bedtime as needed for anxiety (Patient not taking: No sig reported), Disp: 30 tablet, Rfl: 0    atorvastatin (LIPITOR) 80 mg tablet, Take 80 mg by mouth every other day evening, Disp: , Rfl:     colchicine (COLCRYS) 0 6 mg tablet, take 1 tablet by mouth twice a day if needed for FOOT PAIN, Disp: , Rfl:     docusate sodium (COLACE) 250 MG capsule, Take 1 capsule (250 mg total) by mouth daily, Disp: 60 capsule, Rfl: 6    DULoxetine (CYMBALTA) 60 mg delayed release capsule, take 1 capsule by mouth once daily, Disp: 30 capsule, Rfl: 1    folic acid (FOLVITE) 1 mg tablet, take 1 tablet by mouth once daily, Disp: 90 tablet, Rfl: 4    Magnesium 250 MG TABS, 1 tablet 2 (two) times a day Taking 500mg in the morning and 500mg at night, Disp: , Rfl:     metoprolol tartrate (LOPRESSOR) 100 mg tablet, Take 50 mg by mouth daily, Disp: , Rfl:     naloxone (NARCAN) 4 mg/0 1 mL nasal spray, Administer 1 spray into a nostril  If breathing does not return to normal or if breathing difficulty resumes after 2-3 minutes, give another dose in the other nostril using a new spray  (Patient not taking: No sig reported), Disp: 1 each, Rfl: 1    nystatin (MYCOSTATIN) cream, Apply topically 2 (two) times a day, Disp: 30 g, Rfl: 0    senna (SENOKOT) 8 6 mg, Take 1 tablet (8 6 mg total) by mouth daily at bedtime, Disp: 30 each, Rfl: 0    tadalafil (CIALIS) 20 MG tablet, Take one tablet by mouth one hour before sexual activity, Disp: 15 tablet, Rfl: 3    tamsulosin (FLOMAX) 0 4 mg, take 1 capsule by mouth once daily with dinner, Disp: 30 capsule, Rfl: 1    VITAMIN D PO, Take 1,000 Units by mouth daily , Disp: , Rfl:     zolpidem (AMBIEN) 5 mg tablet, Take 1 tablet (5 mg total) by mouth daily at bedtime as needed for sleep, Disp: 30 tablet, Rfl: 1  No current facility-administered medications for this visit  Facility-Administered Medications Ordered in Other Visits:     acetaminophen (TYLENOL) tablet 650 mg, 650 mg, Oral, Once, Jamaal Cartwright MD    Allergies   Allergen Reactions    Poison Ivy Extract Rash       Physical Exam:    /88   Pulse (!) 50   Ht 5' 9" (1 753 m)   Wt 92 5 kg (204 lb)   BMI 30 13 kg/m²     Constitutional:normal, well developed, well nourished, alert, in no distress and non-toxic and no overt pain behavior    Eyes:anicteric  HEENT:grossly intact  Neck:supple, symmetric, trachea midline and no masses   Pulmonary:even and unlabored  Cardiovascular:No edema or pitting edema present  Skin:Normal without rashes or lesions and well hydrated  Psychiatric:Mood and affect appropriate  Neurologic:Cranial Nerves II-XII grossly intact  Musculoskeletal:antalgic gait, ambulates with a cane  Bilateral SI joints tender to palpation  Bilateral lower extremity strength 5/5 in all muscle groups  Negative straight leg raise bilaterally  Positive Juan's, Darcie finger and Gaenslen test bilaterally      Imaging  FL spine and pain procedure    (Results Pending)     MRI LUMBAR SPINE WITH AND WITHOUT CONTRAST   INDICATION: M54 50: Low back pain, unspecified   G89 29: Other chronic pain  COMPARISON: PET/CT dated 3/8/2022 and prior MR dated 12/2/2019  TECHNIQUE: Sagittal T1, sagittal T2, sagittal inversion recovery, axial T1 and axial T2, coronal T2  Sagittal and axial T1 postcontrast    IV Contrast: 9 mL of Gadobutrol injection (SINGLE-DOSE)   IMAGE QUALITY: Diagnostic   FINDINGS:   VERTEBRAL BODIES: There are 5 lumbar type vertebral bodies  Normal alignment of the lumbar spine  No spondylolysis or spondylolisthesis  No scoliosis  No compression fracture  T12 vertebral body hemangioma noted as well as an L5 vertebral body   hemangioma  No worrisome marrow lesion  SACRUM: Normal signal within the sacrum  No evidence of insufficiency or stress fracture  DISTAL CORD AND CONUS: Normal size and signal within the distal cord and conus  Thin fatty filum terminale noted  PARASPINAL SOFT TISSUES: Left kidney is surgically absent  LOWER THORACIC DISC SPACES: Normal disc height and signal  No disc herniation, canal stenosis or foraminal narrowing  LUMBAR DISC SPACES:   L1-L2: Opposed Schmorl's nodes  Minimal bulge without significant stenosis  L2-L3: Disc desiccation with loss of disc height  Minor bulge and facet hypertrophy without stenosis  L3-L4: Disc desiccation with loss of disc height  Diffuse disc bulge with facet hypertrophy resulting in severe central canal stenosis and inducing redundant cauda equina roots  Mild bilateral foraminal stenosis demonstrated  CSF effacement within   the sac as well as lipomatous narrowing of the sac     L4-L5: Disc desiccation with prominent loss of disc height  Disc osteophyte complex and facet hypertrophy with severe central canal and subarticular recess stenosis  Moderate bilateral foraminal narrowing evident  CSF effacement within the sac as   well as lipomatous narrowing of the sac  L5-S1: Disc desiccation with mild loss of disc height  Disc osteophyte complex and facet hypertrophy resulting in mild bilateral foraminal narrowing  Central canal remains patent  POSTCONTRAST IMAGING: No abnormal enhancement  IMPRESSION:   1  Multifactorial spondylotic disease most pronounced at the L3-4 and L4-5 levels with severe central canal encroachment noted  Overall findings are similar to prior examination  2  No evidence of metastatic disease in a patient status post left nephrectomy     Workstation performed: [id number]      Orders Placed This Encounter   Procedures    FL spine and pain procedure

## 2022-06-21 ENCOUNTER — HOSPITAL ENCOUNTER (OUTPATIENT)
Dept: INFUSION CENTER | Facility: HOSPITAL | Age: 72
Discharge: HOME | End: 2022-06-21
Payer: MEDICARE

## 2022-06-21 VITALS
HEART RATE: 70 BPM | SYSTOLIC BLOOD PRESSURE: 130 MMHG | RESPIRATION RATE: 18 BRPM | DIASTOLIC BLOOD PRESSURE: 79 MMHG | TEMPERATURE: 96.5 F

## 2022-06-21 DIAGNOSIS — E83.42 HYPOMAGNESEMIA: ICD-10-CM

## 2022-06-21 DIAGNOSIS — E86.0 DEHYDRATION: Primary | ICD-10-CM

## 2022-06-21 DIAGNOSIS — E83.42 HYPOMAGNESEMIA: Primary | ICD-10-CM

## 2022-06-21 DIAGNOSIS — C79.89 SECONDARY MALIGNANT NEOPLASM OF MUSCLE OF ABDOMEN (HCC): ICD-10-CM

## 2022-06-21 LAB
ALBUMIN SERPL BCP-MCNC: 3.4 G/DL (ref 3.5–5)
ALP SERPL-CCNC: 69 U/L (ref 34–104)
ALT SERPL W P-5'-P-CCNC: 31 U/L (ref 7–52)
ANION GAP SERPL CALCULATED.3IONS-SCNC: 5 MMOL/L (ref 4–13)
AST SERPL W P-5'-P-CCNC: 20 U/L (ref 13–39)
BASOPHILS # BLD AUTO: 0.01 THOUSANDS/ΜL (ref 0–0.1)
BASOPHILS NFR BLD AUTO: 0 % (ref 0–1)
BILIRUB SERPL-MCNC: 0.73 MG/DL (ref 0.2–1)
BUN SERPL-MCNC: 18 MG/DL (ref 5–25)
CALCIUM ALBUM COR SERPL-MCNC: 9 MG/DL (ref 8.3–10.1)
CALCIUM SERPL-MCNC: 8.5 MG/DL (ref 8.4–10.2)
CHLORIDE SERPL-SCNC: 104 MMOL/L (ref 96–108)
CO2 SERPL-SCNC: 29 MMOL/L (ref 21–32)
CREAT SERPL-MCNC: 1.08 MG/DL (ref 0.6–1.3)
EOSINOPHIL # BLD AUTO: 0.02 THOUSAND/ΜL (ref 0–0.61)
EOSINOPHIL NFR BLD AUTO: 0 % (ref 0–6)
ERYTHROCYTE [DISTWIDTH] IN BLOOD BY AUTOMATED COUNT: 14.6 % (ref 11.6–15.1)
GFR SERPL CREATININE-BSD FRML MDRD: 67 ML/MIN/1.73SQ M
GLUCOSE SERPL-MCNC: 95 MG/DL (ref 65–140)
HCT VFR BLD AUTO: 34.9 % (ref 36.5–49.3)
HGB BLD-MCNC: 11.3 G/DL (ref 12–17)
IMM GRANULOCYTES # BLD AUTO: 0.01 THOUSAND/UL (ref 0–0.2)
IMM GRANULOCYTES NFR BLD AUTO: 0 % (ref 0–2)
LYMPHOCYTES # BLD AUTO: 1.09 THOUSANDS/ΜL (ref 0.6–4.47)
LYMPHOCYTES NFR BLD AUTO: 24 % (ref 14–44)
MAGNESIUM SERPL-MCNC: 1.7 MG/DL (ref 1.9–2.7)
MCH RBC QN AUTO: 32 PG (ref 26.8–34.3)
MCHC RBC AUTO-ENTMCNC: 32.4 G/DL (ref 31.4–37.4)
MCV RBC AUTO: 99 FL (ref 82–98)
MONOCYTES # BLD AUTO: 0.73 THOUSAND/ΜL (ref 0.17–1.22)
MONOCYTES NFR BLD AUTO: 16 % (ref 4–12)
NEUTROPHILS # BLD AUTO: 2.67 THOUSANDS/ΜL (ref 1.85–7.62)
NEUTS SEG NFR BLD AUTO: 60 % (ref 43–75)
NRBC BLD AUTO-RTO: 0 /100 WBCS
PLATELET # BLD AUTO: 161 THOUSANDS/UL (ref 149–390)
PMV BLD AUTO: 10.6 FL (ref 8.9–12.7)
POTASSIUM SERPL-SCNC: 3.8 MMOL/L (ref 3.5–5.3)
PROT SERPL-MCNC: 5.4 G/DL (ref 6.4–8.4)
RBC # BLD AUTO: 3.53 MILLION/UL (ref 3.88–5.62)
SODIUM SERPL-SCNC: 138 MMOL/L (ref 135–147)
WBC # BLD AUTO: 4.53 THOUSAND/UL (ref 4.31–10.16)

## 2022-06-21 PROCEDURE — 96360 HYDRATION IV INFUSION INIT: CPT

## 2022-06-21 PROCEDURE — 85025 COMPLETE CBC W/AUTO DIFF WBC: CPT | Performed by: INTERNAL MEDICINE

## 2022-06-21 PROCEDURE — 83735 ASSAY OF MAGNESIUM: CPT | Performed by: INTERNAL MEDICINE

## 2022-06-21 PROCEDURE — 80053 COMPREHEN METABOLIC PANEL: CPT | Performed by: INTERNAL MEDICINE

## 2022-06-21 RX ADMIN — SODIUM CHLORIDE 1000 ML: 0.9 INJECTION, SOLUTION INTRAVENOUS at 13:26

## 2022-06-21 NOTE — RESULT ENCOUNTER NOTE
The basic mechanism was sinus with rates  beats per minute while in sinus rhythm  The average heart rate was 75 beats per minute  There was at least 1 short run of atrial fibrillation  There was an 11 beat run of ventricular tachycardia with an average rate of 182 beats per minute  This occurred in the overnight phase  There were no significant pauses  During complaint of shortness of breath there was no change in rate or rhythm          Lennie Pavon MD

## 2022-06-22 ENCOUNTER — HOSPITAL ENCOUNTER (OUTPATIENT)
Dept: NUCLEAR MEDICINE | Facility: HOSPITAL | Age: 72
Discharge: HOME | End: 2022-06-22
Payer: MEDICARE

## 2022-06-22 DIAGNOSIS — C79.10 METASTATIC UROTHELIAL CARCINOMA (HCC): ICD-10-CM

## 2022-06-22 DIAGNOSIS — C65.2 CANCER OF LEFT RENAL PELVIS (HCC): ICD-10-CM

## 2022-06-22 LAB — GLUCOSE SERPL-MCNC: 97 MG/DL (ref 65–140)

## 2022-06-22 PROCEDURE — 82948 REAGENT STRIP/BLOOD GLUCOSE: CPT

## 2022-06-22 PROCEDURE — 78815 PET IMAGE W/CT SKULL-THIGH: CPT

## 2022-06-22 PROCEDURE — G1004 CDSM NDSC: HCPCS

## 2022-06-22 PROCEDURE — A9552 F18 FDG: HCPCS

## 2022-06-23 ENCOUNTER — OFFICE VISIT (OUTPATIENT)
Dept: HEMATOLOGY ONCOLOGY | Facility: CLINIC | Age: 72
End: 2022-06-23
Payer: MEDICARE

## 2022-06-23 VITALS
TEMPERATURE: 97.6 F | BODY MASS INDEX: 28.58 KG/M2 | HEART RATE: 61 BPM | SYSTOLIC BLOOD PRESSURE: 134 MMHG | RESPIRATION RATE: 18 BRPM | DIASTOLIC BLOOD PRESSURE: 70 MMHG | OXYGEN SATURATION: 97 % | HEIGHT: 69 IN | WEIGHT: 193 LBS

## 2022-06-23 DIAGNOSIS — C79.10 METASTATIC UROTHELIAL CARCINOMA (HCC): Primary | ICD-10-CM

## 2022-06-23 DIAGNOSIS — T45.1X5A CHEMOTHERAPY-INDUCED NEUROPATHY (HCC): ICD-10-CM

## 2022-06-23 DIAGNOSIS — G62.0 CHEMOTHERAPY-INDUCED NEUROPATHY (HCC): ICD-10-CM

## 2022-06-23 DIAGNOSIS — E83.42 HYPOMAGNESEMIA: ICD-10-CM

## 2022-06-23 PROCEDURE — 99214 OFFICE O/P EST MOD 30 MIN: CPT | Performed by: NURSE PRACTITIONER

## 2022-06-23 RX ORDER — SODIUM CHLORIDE 9 MG/ML
20 INJECTION, SOLUTION INTRAVENOUS ONCE
Status: CANCELLED | OUTPATIENT
Start: 2022-07-15

## 2022-06-23 RX ORDER — SODIUM CHLORIDE 9 MG/ML
20 INJECTION, SOLUTION INTRAVENOUS ONCE
Status: CANCELLED | OUTPATIENT
Start: 2022-07-08

## 2022-06-23 RX ORDER — SODIUM CHLORIDE 9 MG/ML
20 INJECTION, SOLUTION INTRAVENOUS ONCE
Status: CANCELLED | OUTPATIENT
Start: 2022-07-22

## 2022-06-23 RX ORDER — ACETAMINOPHEN 325 MG/1
650 TABLET ORAL ONCE
Status: CANCELLED | OUTPATIENT
Start: 2022-07-22

## 2022-06-23 RX ORDER — ACETAMINOPHEN 325 MG/1
650 TABLET ORAL ONCE
Status: CANCELLED | OUTPATIENT
Start: 2022-07-15

## 2022-06-23 RX ORDER — ACETAMINOPHEN 325 MG/1
650 TABLET ORAL ONCE
Status: CANCELLED | OUTPATIENT
Start: 2022-07-08

## 2022-06-23 NOTE — PROGRESS NOTES
Hematology/Oncology Outpatient Follow-up  Delmis Burrell 67 y o  male 1950 [id number]    Date:  6/23/2022      Assessment and Plan:  1  Metastatic urothelial carcinoma [facility])  Patient will continue his current treatment with adjusted dose Enfortumab Vedotin (Padcev) 3 weeks on with 1 week of on Friday 06/10/2022   He will be due for cycle 9 day 15 tomorrow  Will continue to check his laboratory studies before each treatment   He will continue to receive his additional IV hydration in the infusion center as needed  Patient did have repeat imaging with a PET-CT scan recently which seems to show complete response without any obvious metabolically active disease   Request he follow-up again in 2 weeks        - CBC and differential; Standing  - Comprehensive metabolic panel; Standing  - Magnesium; Standing  - CBC and differential  - Comprehensive metabolic panel  - Magnesium    2  Hypomagnesemia  Patient will continue his oral supplements as much as he can tolerate without causing significant diarrhea  Is also getting IV replacement as needed as well  - Magnesium; Standing  - Magnesium    3  Chemotherapy-induced neuropathy ([facility] [address] )  Patient will continue his Cymbalta 60 mg daily  He reports no change/worsening of his chemo induced neuropathy  HPI:  Patient presents today for a follow-up visit accompanied by his wife  He continues to tolerate his current treatment well  Is being supported with additional IV fluids/IV magnesium as needed  Continues to be on the Eliquis for Afib  Is seeing pain management for his chronic low back pain/degenerative changes  Continues to have peripheral neuropathy but no change/stable  His most recent laboratory studies from 06/21/2022 showed normal white cells and platelets, he has mild macrocytic anemia H&H 11 3/34 9, MCV 99  Creatinine is better than usual 1 08, GFR 67, total protein and albumin are decreased otherwise normal CMP    His magnesium continues to be slightly lower than average 1 7  He mentions that his laboratory studies were drawn daily after his hydration this time  Patient had repeat imaging with a PET-CT scan 06/22/2022 which showed:  IMPRESSION:  1  There remains no evidence of glucose avid metastatic disease within the neck, abdomen, pelvis, or skeleton  2  Overall, continued improvement of previously seen right shoulder/right axillary megan disease  There is currently a focus of nonspecific activity within the posterior right shoulder musculature with SUV max 3 0 but without correlative finding on CT   imaging  This may be physiologic skeletal muscle activity although should be carefully reassessed during the course of follow-up  3  Mild prostatomegaly  4  Enlargement of left spigelian hernia containing bowel but without obstruction or inflammation    Oncology History Overview Note   Patient has a history of hypertension, hyperlipidemia, chronic lower back pain  He had an MRI of the lower spine for further evaluation of his chronic lower back pain  The MRI on 12/02/2019 revealed Multiple left renal masses to include a left lower pole cyst and a rounded structure in the left upper pole which may also represent cyst   Left upper pole renal masses approximately 3 cm in greatest linear dimension  A CT with renal protocol was done on 12/12/2019 which also showed the infiltrating lesion in the upper pole of the left kidney measuring about the 3 5 cm in greatest dimension without any hint of retroperitoneal lymphadenopathy  A CT scan of the abdomen pelvis on 01/14/2020 showed the same findings with the mild hydronephrosis on the left  The urine cytology on 01/03/2020 showed high-grade urothelial carcinoma  A cystoscopy was then done, a biopsy was taken from the left renal pelvic region which showed high-grade urothelial carcinoma without evidence of lamina propria invasion    The detrusor muscle/muscularis propria were not present for evaluation  The recommendation was to pursue left robotic assisted laparoscopic nephroureterectomy with bladder cuff excision which was done on 04/01/2020  The final pathology revealed;  - Invasive high grade urothelial carcinoma arising in renal pelvis  - Bladder cuff margin is negative for carcinoma and no evidence of high grade dysplasia  - Ureters with no significant pathologic abnormality  - Two benign simple cysts  - Adrenal gland is negative for malignancy  - One lymph node, negative for malignancy (0/1)  The tumor size was 4 5 cm with invasion beyond the muscularis into the periurethral fat or peripelvic fat or the renal parenchyma  The margins were uninvolved by carcinoma or carcinoma in situ  The final pathology was pT3 pN0  Patient barely tolerated 1 cycle of adjuvant chemotherapy with split dose cisplatin and gemcitabine with a lot of side effects  The treatment had to be discontinued due to significant worsening renal dysfunction 6/2020  Patient started to complain about significant abdominal/left inguinal pain around August/September 2020  Unfortunately repeat imaging revealed recurrent/metastatic disease  9/4/20 CT C/A/P- Status post left nephrectomy for resection of urothelial neoplasm  Lymphadenopathy in the left nephrectomy bed has increased since the prior examination, concerning for metastatic disease  Ill-defined hyperattenuating masslike focus in the left rectus muscle, not identified on the prior examination  This is suspicious for a metastatic lesion  A rectus hematoma is also considered  9/23/20 PET scan- 1  Multiple FDG avid lymph nodes in the retrocrural region, retroperitoneum and the left renal bed compatible with metastasis  These have progressed from recent CT  2   FDG avid mass involving the left rectus abdominal musculature compatible with metastasis which is larger from recent CT    3  Several small lymph nodes adjacent to the mid to distal esophagus with FDG uptake suspicious for metastasis  Small focus of FDG uptake also suggested in the right hilar region, metastasis is not excluded  This could be reassessed on follow-up  4   Subtle focus of FDG uptake at the T2 level on the left, nonspecific, could be related to early degenerative changes, developing metastasis is not entirely excluded  This could be reassessed on follow-up  5  Prostate is mildly prominent with heterogeneous FDG uptake  This could be inflammatory  Correlate for prostatitis  He completed palliative radiation to the left abdomen 12/3/20     His PET scan from 08/16/2021 showed progression of his disease with hypermetabolic soft tissue lesions at the level of the right shoulder and right axilla with interval progression of the retroperitoneal and mesenteric hypermetabolic metastasis  He did have the new lesion to his right upper back/shoulder which was causing significant localized pain  This excised by his surgeon 08/09/2021  Pathology confirmed metastatic high-grade carcinoma consistent with his no primary urothelial carcinoma  He received palliative radiation to his right shoulder/axilla region  Urothelial cancer ([address] )   1/3/2020 Biopsy    Final Diagnosis    A  Urine, Clean Catch, Thin Prep:  High grade urothelial carcinoma (HGUC) - see comment  1/3/2020 Initial Diagnosis    Urothelial cancer ([address] )  T3 N0 (stage IIIA)     1/17/2020 Biopsy    Final Diagnosis    A  Ureter, Right, left renal pelvis biopsy  -Fragments of high grade urothelial carcinoma   -No evidence of lamina propria invasion   -Detrusor muscle/ muscularis propria is not present for evaluation  B  Urinary Bladder, bladder biopsy:  -Fragments of low grade papillary lesion  See note  -No evidence of invasion seen  -Unremarkable fragment of detrusor muscle seen          4/1/2020 Surgery    left robotic assisted laparoscopic nephroureterectomy with bladder cuff excision     Final Diagnosis A  Left kidney, ureter, and bladder cuff, nephroureterectomy:  - Invasive high grade urothelial carcinoma arising in renal pelvis  - Bladder cuff margin is negative for carcinoma and no evidence of high grade dysplasia  - Ureters with no significant pathologic abnormality  - Two benign simple cysts  - Adrenal gland is negative for malignancy  - One lymph node, negative for malignancy (0/1)          5/14/2020 - 6/3/2020 Chemotherapy    CISplatin (PLATINOL) split dose 35 mg/m2 = 75 3 mg (50 % of original dose 70 mg/m2), Intravenous,   Administration: 75 3 mg (5/14/2020), 75 3 mg (5/21/2020)  gemcitabine (GEMZAR) 2,200 mg in sodium chloride 0 9 % 250 mL infusion, 2,150 2 mg (80 % of original dose 1,250 mg/m2), Intravenous,   Administration: 2,200 mg (5/14/2020), 2,200 mg (5/21/2020)    (only completed 1 cycle- d/c d/t adverse effects and worsening renal dysfunction)     10/9/2020 - 9/9/2021 Chemotherapy    pembrolizumab (KEYTRUDA) IVPB, 200 mg, Intravenous, Once, 16 of 20 cycles  Administration: 200 mg (10/9/2020), 200 mg (10/30/2020), 200 mg (11/20/2020), 200 mg (12/11/2020), 200 mg (12/31/2020), 200 mg (1/22/2021), 200 mg (2/12/2021), 200 mg (3/5/2021), 200 mg (3/26/2021), 200 mg (4/16/2021), 200 mg (5/7/2021), 200 mg (5/28/2021), 200 mg (6/18/2021), 200 mg (7/9/2021), 200 mg (7/30/2021), 200 mg (8/20/2021)     11/19/2020 - 12/3/2020 Radiation    Treatment:  Course: C1    Plan ID Energy Fractions Dose per Fraction (cGy) Dose Correction (cGy) Total Dose Delivered (cGy) Elapsed Days   L Abdomen 6X 10 / 10 300 0 3,000 14        9/10/2021 -  Chemotherapy    enfortumab vedotin-ejfv (PADCEV) IVPB, 1 25 mg/kg = 114 mg, Intravenous, Once, 9 of 13 cycles  Dose modification: 1 mg/kg (original dose 1 25 mg/kg, Cycle 7, Reason: Other (Must fill in a comment), Comment: dose reduction due to side effects ), 1 25 mg/kg (original dose 1 25 mg/kg, Cycle 7, Reason: Other (Must fill in a comment), Comment: patient wants full dose ), 1 mg/kg (original dose 1 25 mg/kg, Cycle 7, Reason: Neuropathy)  Administration: 114 mg (9/10/2021), 114 mg (9/17/2021), 110 mg (10/8/2021), 110 mg (9/24/2021), 110 mg (10/15/2021), 110 mg (11/12/2021), 110 mg (10/22/2021), 110 mg (11/19/2021), 110 mg (12/10/2021), 110 mg (11/26/2021), 110 mg (12/17/2021), 110 mg (1/7/2022), 110 mg (12/23/2021), 110 mg (1/14/2022), 90 mg (4/8/2022), 90 mg (4/15/2022), 90 mg (4/22/2022), 110 mg (1/21/2022), 110 mg (2/18/2022), 110 mg (2/25/2022), 90 mg (5/13/2022), 90 mg (5/20/2022), 90 mg (5/27/2022), 90 mg (3/4/2022), 90 mg (6/10/2022), 90 mg (6/17/2022)     Cancer of left renal pelvis ([facility] [address] )   4/23/2020 Initial Diagnosis    Cancer of left renal pelvis ([facility] [address] )     10/9/2020 - 9/9/2021 Chemotherapy    pembrolizumab (KEYTRUDA) IVPB, 200 mg, Intravenous, Once, 16 of 20 cycles  Administration: 200 mg (10/9/2020), 200 mg (10/30/2020), 200 mg (11/20/2020), 200 mg (12/11/2020), 200 mg (12/31/2020), 200 mg (1/22/2021), 200 mg (2/12/2021), 200 mg (3/5/2021), 200 mg (3/26/2021), 200 mg (4/16/2021), 200 mg (5/7/2021), 200 mg (5/28/2021), 200 mg (6/18/2021), 200 mg (7/9/2021), 200 mg (7/30/2021), 200 mg (8/20/2021)     9/10/2021 -  Chemotherapy    enfortumab vedotin-ejfv (PADCEV) IVPB, 1 25 mg/kg = 114 mg, Intravenous, Once, 9 of 13 cycles  Dose modification: 1 mg/kg (original dose 1 25 mg/kg, Cycle 7, Reason: Other (Must fill in a comment), Comment: dose reduction due to side effects ), 1 25 mg/kg (original dose 1 25 mg/kg, Cycle 7, Reason: Other (Must fill in a comment), Comment: patient wants full dose ), 1 mg/kg (original dose 1 25 mg/kg, Cycle 7, Reason: Neuropathy)  Administration: 114 mg (9/10/2021), 114 mg (9/17/2021), 110 mg (10/8/2021), 110 mg (9/24/2021), 110 mg (10/15/2021), 110 mg (11/12/2021), 110 mg (10/22/2021), 110 mg (11/19/2021), 110 mg (12/10/2021), 110 mg (11/26/2021), 110 mg (12/17/2021), 110 mg (1/7/2022), 110 mg (12/23/2021), 110 mg (1/14/2022), 90 mg (4/8/2022), 90 mg (4/15/2022), 90 mg (4/22/2022), 110 mg (1/21/2022), 110 mg (2/18/2022), 110 mg (2/25/2022), 90 mg (5/13/2022), 90 mg (5/20/2022), 90 mg (5/27/2022), 90 mg (3/4/2022), 90 mg (6/10/2022), 90 mg (6/17/2022)      Surgery       Metastatic urothelial carcinoma ([facility] [address] )   8/9/2021 Initial Diagnosis    Metastatic urothelial carcinoma ([facility] [address] )     9/8/2021 - 9/21/2021 Radiation    Treatment:  Course: C2    Plan ID Energy Fractions Dose per Fraction (cGy) Dose Correction (cGy) Total Dose Delivered (cGy) Elapsed Days   R Axil_Shl # 6X 10 / 10 300 0 3,000 13      Treatment dates:  C2: 9/8/2021 - 9/21/2021     9/10/2021 -  Chemotherapy    enfortumab vedotin-ejfv (PADCEV) IVPB, 1 25 mg/kg = 114 mg, Intravenous, Once, 9 of 13 cycles  Dose modification: 1 mg/kg (original dose 1 25 mg/kg, Cycle 7, Reason: Other (Must fill in a comment), Comment: dose reduction due to side effects ), 1 25 mg/kg (original dose 1 25 mg/kg, Cycle 7, Reason: Other (Must fill in a comment), Comment: patient wants full dose ), 1 mg/kg (original dose 1 25 mg/kg, Cycle 7, Reason: Neuropathy)  Administration: 114 mg (9/10/2021), 114 mg (9/17/2021), 110 mg (10/8/2021), 110 mg (9/24/2021), 110 mg (10/15/2021), 110 mg (11/12/2021), 110 mg (10/22/2021), 110 mg (11/19/2021), 110 mg (12/10/2021), 110 mg (11/26/2021), 110 mg (12/17/2021), 110 mg (1/7/2022), 110 mg (12/23/2021), 110 mg (1/14/2022), 90 mg (4/8/2022), 90 mg (4/15/2022), 90 mg (4/22/2022), 110 mg (1/21/2022), 110 mg (2/18/2022), 110 mg (2/25/2022), 90 mg (5/13/2022), 90 mg (5/20/2022), 90 mg (5/27/2022), 90 mg (3/4/2022), 90 mg (6/10/2022), 90 mg (6/17/2022)         Interval history:    ROS: Review of Systems   Constitutional: Positive for activity change  Negative for appetite change, chills, fatigue, fever and unexpected weight change  HENT: Negative for congestion, hearing loss, mouth sores, nosebleeds, sore throat and trouble swallowing  Eyes: Negative      Respiratory: Positive for shortness of breath (exertional)  Negative for cough and chest tightness  Cardiovascular: Negative for chest pain, palpitations and leg swelling  Gastrointestinal: Negative for abdominal distention, abdominal pain, blood in stool, constipation, diarrhea, nausea and vomiting  Genitourinary: Negative for difficulty urinating, dysuria, frequency, hematuria and urgency  Musculoskeletal: Positive for arthralgias, back pain, gait problem and myalgias  Negative for joint swelling  Skin: Positive for rash  Negative for color change and pallor  Neurological: Positive for numbness and headaches (mild)  Negative for dizziness and light-headedness  Hematological: Negative for adenopathy  Psychiatric/Behavioral: Negative for dysphoric mood and sleep disturbance  The patient is not nervous/anxious          Past Medical History:   Diagnosis Date    Arthritis     Bladder cancer     Cancer ([facility] [address] )     skin melanoma;basal cell    Chronic pain disorder     from arthritis    Colon polyp     Does use hearing aid     bilat    Dry eyes, bilateral     GERD (gastroesophageal reflux disease)     History of kidney stones 10/2019    History of partial knee replacement     bilat    History of vertigo     Hyperlipidemia     Hypertension     Infusion extravasation of chemotherapy vesicant 11/2021    Kidney lesion     Lumbar disc disorder     compression of vertebrae L4-5-6    Muscle weakness     left hip area    Renal mass     left    Right ankle injury 03/05/2020    missed step of ladder     Right ankle pain     Shortness of breath     with activity    Tinnitus     Urothelial cancer     left    Wears glasses        Past Surgical History:   Procedure Laterality Date    COLONOSCOPY      CYSTOSCOPY Left 4/1/2020    Procedure: CYSTOSCOPY; URETERAL CATHETER PLACEMENT;  Surgeon: Khai Varner MD;  Location[facility];  Service: Urology    CYSTOSCOPY  05/11/2020    CYSTOSCOPY  06/04/2021    FL CYSTOGRAM  4/13/2020  FL RETROGRADE PYELOGRAM  2020    HERNIA REPAIR      umbilical with mesh    INGUINAL HERNIA REPAIR Right     with mesh    IR PORT PLACEMENT  2020    JOINT REPLACEMENT Bilateral     partials knee    LUMBAR EPIDURAL INJECTION      CA CYSTOURETHROSCOPY,URETER CATHETER Bilateral 2020    Procedure: CYSTOSCOPY; RIGHT RETROGRADE PYELOGRAM WITH RIGHT URETERAL CYTOLOGY SAMPLING; LEFT URETEROSCOPY WITH RENAL PELVIS BIOPSY AND LEFT STENT PLACEMENT;  Surgeon: Sindy Ribera MD;  [facility];  Service: Urology    CA NEPHRECTOMY, W/PART  URETECTOMY Left 2020    Procedure: ROBOTIC LAPAROSCOPIC NEPHRO-URETERECTOMY;  Surgeon: Sindy Ribera MD;  Location[facility];  Service: Urology    SKIN CANCER EXCISION      surface melanoma    TONSILLECTOMY      WISDOM TOOTH EXTRACTION         Social History     Socioeconomic History    Marital status: /Civil Union     Spouse name: Not on file    Number of children: Not on file    Years of education: Not on file    Highest education level: Not on file   Occupational History    Not on file   Tobacco Use    Smoking status: Former Smoker     Types: Cigarettes     Quit date:      Years since quittin 5    Smokeless tobacco: Never Used   Vaping Use    Vaping Use: Never used   Substance and Sexual Activity    Alcohol use:  Yes     Alcohol/week: 17 0 standard drinks     Types: 7 Glasses of wine, 10 Cans of beer per week     Comment: socially    Drug use: Never    Sexual activity: Not Currently   Other Topics Concern    Not on file   Social History Narrative    Daily caffeine use - 2 cups coffee      Social Determinants of Health     Financial Resource Strain: Not on file   Food Insecurity: Not on file   Transportation Needs: Not on file   Physical Activity: Not on file   Stress: Not on file   Social Connections: Not on file   Intimate Partner Violence: Not on file   Housing Stability: Not on file       Family History Problem Relation Age of Onset    Liver cancer Father        Allergies   Allergen Reactions    Poison Ivy Extract Rash         Current Outpatient Medications:     acetaminophen (TYLENOL) 500 mg tablet, Take 2 tablets (1,000 mg total) by mouth every 8 (eight) hours, Disp: , Rfl:     allopurinol (ZYLOPRIM) 300 mg tablet, take 1 tablet by mouth once daily, Disp: 30 tablet, Rfl: 3    apixaban (Eliquis) 5 mg, Take 1 tablet (5 mg total) by mouth 2 (two) times a day For afib, Disp: 60 tablet, Rfl: 11    atorvastatin (LIPITOR) 80 mg tablet, Take 80 mg by mouth every other day evening, Disp: , Rfl:     colchicine (COLCRYS) 0 6 mg tablet, take 1 tablet by mouth twice a day if needed for FOOT PAIN, Disp: , Rfl:     docusate sodium (COLACE) 250 MG capsule, Take 1 capsule (250 mg total) by mouth daily, Disp: 60 capsule, Rfl: 6    DULoxetine (CYMBALTA) 60 mg delayed release capsule, take 1 capsule by mouth once daily, Disp: 30 capsule, Rfl: 1    folic acid (FOLVITE) 1 mg tablet, take 1 tablet by mouth once daily, Disp: 90 tablet, Rfl: 4    Magnesium 250 MG TABS, 1 tablet 2 (two) times a day Taking 500mg in the morning and 500mg at night, Disp: , Rfl:     metoprolol tartrate (LOPRESSOR) 100 mg tablet, Take 50 mg by mouth daily, Disp: , Rfl:     nystatin (MYCOSTATIN) cream, Apply topically 2 (two) times a day, Disp: 30 g, Rfl: 0    senna (SENOKOT) 8 6 mg, Take 1 tablet (8 6 mg total) by mouth daily at bedtime, Disp: 30 each, Rfl: 0    tadalafil (CIALIS) 20 MG tablet, Take one tablet by mouth one hour before sexual activity, Disp: 15 tablet, Rfl: 3    tamsulosin (FLOMAX) 0 4 mg, take 1 capsule by mouth once daily with dinner, Disp: 30 capsule, Rfl: 1    traMADol (Ultram) 50 mg tablet, Take 1 PO Q8-12 hours PRN pain, Disp: 75 tablet, Rfl: 0    VITAMIN D PO, Take 1,000 Units by mouth daily , Disp: , Rfl:     zolpidem (AMBIEN) 5 mg tablet, Take 1 tablet (5 mg total) by mouth daily at bedtime as needed for sleep, Disp: 30 tablet, Rfl: 1    ALPRAZolam (XANAX) 0 25 mg tablet, Take 1 tablet (0 25 mg total) by mouth daily at bedtime as needed for anxiety (Patient not taking: No sig reported), Disp: 30 tablet, Rfl: 0    naloxone (NARCAN) 4 mg/0 1 mL nasal spray, Administer 1 spray into a nostril  If breathing does not return to normal or if breathing difficulty resumes after 2-3 minutes, give another dose in the other nostril using a new spray  (Patient not taking: No sig reported), Disp: 1 each, Rfl: 1      Physical Exam:  /70 (BP Location: Left arm, Patient Position: Sitting, Cuff Size: Adult)   Pulse 61   Temp 97 6 °F (36 4 °C)   Resp 18   Ht 5' 9" (1 753 m)   Wt 87 5 kg (193 lb)   SpO2 97%   BMI 28 50 kg/m²     Physical Exam  Vitals reviewed  Constitutional:       General: He is not in acute distress  Appearance: He is well-developed  He is not diaphoretic  HENT:      Head: Normocephalic and atraumatic  Eyes:      General: Lids are normal  No scleral icterus  Conjunctiva/sclera: Conjunctivae normal       Pupils: Pupils are equal, round, and reactive to light  Neck:      Thyroid: No thyromegaly  Cardiovascular:      Rate and Rhythm: Normal rate and regular rhythm  Heart sounds: Normal heart sounds  No murmur heard  Pulmonary:      Effort: Pulmonary effort is normal  No respiratory distress  Breath sounds: Normal breath sounds  Chest:   Breasts:      Right: No axillary adenopathy  Left: No axillary adenopathy  Abdominal:      General: There is no distension  Palpations: Abdomen is soft  There is no hepatomegaly or splenomegaly  Tenderness: There is no abdominal tenderness  Musculoskeletal:         General: Normal range of motion  Cervical back: Normal range of motion and neck supple  Right lower leg: No edema  Left lower leg: No edema  Lymphadenopathy:      Cervical: No cervical adenopathy        Upper Body:      Right upper body: No axillary adenopathy  Left upper body: No axillary adenopathy  Skin:     General: Skin is warm and dry  Findings: No erythema or rash  Neurological:      General: No focal deficit present  Mental Status: He is alert and oriented to person, place, and time  Psychiatric:         Mood and Affect: Mood and affect normal          Behavior: Behavior normal  Behavior is cooperative  Thought Content: Thought content normal          Judgment: Judgment normal            Labs:  Lab Results   Component Value Date    WBC 4 53 06/21/2022    HGB 11 3 (L) 06/21/2022    HCT 34 9 (L) 06/21/2022    MCV 99 (H) 06/21/2022     06/21/2022     Lab Results   Component Value Date    K 3 8 06/21/2022     06/21/2022    CO2 29 06/21/2022    BUN 18 06/21/2022    CREATININE 1 08 06/21/2022    GLUF 111 (H) 03/01/2022    CALCIUM 8 5 06/21/2022    CORRECTEDCA 9 0 06/21/2022    AST 20 06/21/2022    ALT 31 06/21/2022    ALKPHOS 69 06/21/2022    EGFR 67 06/21/2022       Patient voiced understanding and agreement in the above discussion  Aware to contact our office with questions/symptoms in the interim  This note has been generated by voice recognition software system  Therefore, there may be spelling, grammar, and or syntax errors  Please contact if questions arise

## 2022-06-24 ENCOUNTER — HOSPITAL ENCOUNTER (OUTPATIENT)
Dept: INFUSION CENTER | Facility: HOSPITAL | Age: 72
Discharge: HOME | End: 2022-06-24
Attending: INTERNAL MEDICINE
Payer: MEDICARE

## 2022-06-24 VITALS
TEMPERATURE: 96.8 F | BODY MASS INDEX: 29.83 KG/M2 | DIASTOLIC BLOOD PRESSURE: 81 MMHG | WEIGHT: 201.4 LBS | HEIGHT: 69 IN | RESPIRATION RATE: 18 BRPM | SYSTOLIC BLOOD PRESSURE: 143 MMHG | HEART RATE: 61 BPM

## 2022-06-24 DIAGNOSIS — C68.9 UROTHELIAL CANCER (HCC): Primary | ICD-10-CM

## 2022-06-24 DIAGNOSIS — E83.42 HYPOMAGNESEMIA: ICD-10-CM

## 2022-06-24 DIAGNOSIS — C65.2 CANCER OF LEFT RENAL PELVIS (HCC): ICD-10-CM

## 2022-06-24 DIAGNOSIS — G89.3 CANCER ASSOCIATED PAIN: ICD-10-CM

## 2022-06-24 DIAGNOSIS — E86.0 DEHYDRATION: ICD-10-CM

## 2022-06-24 DIAGNOSIS — C79.10 METASTATIC UROTHELIAL CARCINOMA (HCC): ICD-10-CM

## 2022-06-24 PROCEDURE — 96413 CHEMO IV INFUSION 1 HR: CPT

## 2022-06-24 PROCEDURE — 96367 TX/PROPH/DG ADDL SEQ IV INF: CPT

## 2022-06-24 RX ORDER — ACETAMINOPHEN 325 MG/1
650 TABLET ORAL ONCE
Status: COMPLETED | OUTPATIENT
Start: 2022-06-24 | End: 2022-06-24

## 2022-06-24 RX ORDER — SODIUM CHLORIDE 9 MG/ML
20 INJECTION, SOLUTION INTRAVENOUS ONCE
Status: COMPLETED | OUTPATIENT
Start: 2022-06-24 | End: 2022-06-24

## 2022-06-24 RX ADMIN — MAGNESIUM SULFATE HEPTAHYDRATE: 500 INJECTION, SOLUTION INTRAMUSCULAR; INTRAVENOUS at 11:09

## 2022-06-24 RX ADMIN — ENFORTUMAB VEDOTIN 90 MG: 30 INJECTION, POWDER, LYOPHILIZED, FOR SOLUTION INTRAVENOUS at 13:35

## 2022-06-24 RX ADMIN — SODIUM CHLORIDE 20 ML/HR: 0.9 INJECTION, SOLUTION INTRAVENOUS at 12:11

## 2022-06-24 RX ADMIN — DIPHENHYDRAMINE HYDROCHLORIDE 25 MG: 50 INJECTION INTRAMUSCULAR; INTRAVENOUS at 12:12

## 2022-06-24 RX ADMIN — DEXAMETHASONE SODIUM PHOSPHATE: 10 INJECTION, SOLUTION INTRAMUSCULAR; INTRAVENOUS at 12:48

## 2022-06-24 RX ADMIN — ACETAMINOPHEN 650 MG: 325 TABLET ORAL at 12:13

## 2022-06-28 ENCOUNTER — HOSPITAL ENCOUNTER (OUTPATIENT)
Dept: INFUSION CENTER | Facility: HOSPITAL | Age: 72
Discharge: HOME | End: 2022-06-28
Payer: MEDICARE

## 2022-06-28 VITALS
OXYGEN SATURATION: 98 % | TEMPERATURE: 97.3 F | RESPIRATION RATE: 18 BRPM | DIASTOLIC BLOOD PRESSURE: 89 MMHG | HEART RATE: 61 BPM | SYSTOLIC BLOOD PRESSURE: 167 MMHG

## 2022-06-28 DIAGNOSIS — C79.10 METASTATIC UROTHELIAL CARCINOMA (HCC): Primary | ICD-10-CM

## 2022-06-28 DIAGNOSIS — E83.42 HYPOMAGNESEMIA: ICD-10-CM

## 2022-06-28 DIAGNOSIS — E86.0 DEHYDRATION: ICD-10-CM

## 2022-06-28 LAB
ALBUMIN SERPL BCP-MCNC: 4 G/DL (ref 3.5–5)
ALP SERPL-CCNC: 78 U/L (ref 34–104)
ALT SERPL W P-5'-P-CCNC: 26 U/L (ref 7–52)
ANION GAP SERPL CALCULATED.3IONS-SCNC: 5 MMOL/L (ref 4–13)
AST SERPL W P-5'-P-CCNC: 25 U/L (ref 13–39)
BASOPHILS # BLD AUTO: 0.02 THOUSANDS/ΜL (ref 0–0.1)
BASOPHILS NFR BLD AUTO: 0 % (ref 0–1)
BILIRUB SERPL-MCNC: 1.03 MG/DL (ref 0.2–1)
BUN SERPL-MCNC: 14 MG/DL (ref 5–25)
CALCIUM SERPL-MCNC: 9.5 MG/DL (ref 8.4–10.2)
CHLORIDE SERPL-SCNC: 99 MMOL/L (ref 96–108)
CO2 SERPL-SCNC: 32 MMOL/L (ref 21–32)
CREAT SERPL-MCNC: 1.21 MG/DL (ref 0.6–1.3)
EOSINOPHIL # BLD AUTO: 0.02 THOUSAND/ΜL (ref 0–0.61)
EOSINOPHIL NFR BLD AUTO: 0 % (ref 0–6)
ERYTHROCYTE [DISTWIDTH] IN BLOOD BY AUTOMATED COUNT: 14.5 % (ref 11.6–15.1)
GFR SERPL CREATININE-BSD FRML MDRD: 59 ML/MIN/1.73SQ M
GLUCOSE SERPL-MCNC: 98 MG/DL (ref 65–140)
HCT VFR BLD AUTO: 38.9 % (ref 36.5–49.3)
HGB BLD-MCNC: 12.7 G/DL (ref 12–17)
IMM GRANULOCYTES # BLD AUTO: 0.01 THOUSAND/UL (ref 0–0.2)
IMM GRANULOCYTES NFR BLD AUTO: 0 % (ref 0–2)
LYMPHOCYTES # BLD AUTO: 1.14 THOUSANDS/ΜL (ref 0.6–4.47)
LYMPHOCYTES NFR BLD AUTO: 25 % (ref 14–44)
MAGNESIUM SERPL-MCNC: 1.8 MG/DL (ref 1.9–2.7)
MCH RBC QN AUTO: 32 PG (ref 26.8–34.3)
MCHC RBC AUTO-ENTMCNC: 32.6 G/DL (ref 31.4–37.4)
MCV RBC AUTO: 98 FL (ref 82–98)
MONOCYTES # BLD AUTO: 0.72 THOUSAND/ΜL (ref 0.17–1.22)
MONOCYTES NFR BLD AUTO: 16 % (ref 4–12)
NEUTROPHILS # BLD AUTO: 2.72 THOUSANDS/ΜL (ref 1.85–7.62)
NEUTS SEG NFR BLD AUTO: 59 % (ref 43–75)
NRBC BLD AUTO-RTO: 0 /100 WBCS
PLATELET # BLD AUTO: 177 THOUSANDS/UL (ref 149–390)
PMV BLD AUTO: 10.5 FL (ref 8.9–12.7)
POTASSIUM SERPL-SCNC: 4.1 MMOL/L (ref 3.5–5.3)
PROT SERPL-MCNC: 6.6 G/DL (ref 6.4–8.4)
RBC # BLD AUTO: 3.97 MILLION/UL (ref 3.88–5.62)
SODIUM SERPL-SCNC: 136 MMOL/L (ref 135–147)
WBC # BLD AUTO: 4.63 THOUSAND/UL (ref 4.31–10.16)

## 2022-06-28 PROCEDURE — 83735 ASSAY OF MAGNESIUM: CPT

## 2022-06-28 PROCEDURE — 85025 COMPLETE CBC W/AUTO DIFF WBC: CPT

## 2022-06-28 PROCEDURE — 80053 COMPREHEN METABOLIC PANEL: CPT

## 2022-06-28 PROCEDURE — 96360 HYDRATION IV INFUSION INIT: CPT

## 2022-06-28 RX ADMIN — SODIUM CHLORIDE 1000 ML: 0.9 INJECTION, SOLUTION INTRAVENOUS at 13:15

## 2022-07-05 ENCOUNTER — HOSPITAL ENCOUNTER (OUTPATIENT)
Dept: INFUSION CENTER | Facility: HOSPITAL | Age: 72
Discharge: HOME | End: 2022-07-05
Attending: INTERNAL MEDICINE
Payer: MEDICARE

## 2022-07-05 VITALS
SYSTOLIC BLOOD PRESSURE: 144 MMHG | OXYGEN SATURATION: 94 % | DIASTOLIC BLOOD PRESSURE: 80 MMHG | HEART RATE: 69 BPM | RESPIRATION RATE: 18 BRPM | TEMPERATURE: 97.8 F

## 2022-07-05 DIAGNOSIS — G89.3 CANCER ASSOCIATED PAIN: Primary | ICD-10-CM

## 2022-07-05 DIAGNOSIS — C79.89 SECONDARY MALIGNANT NEOPLASM OF MUSCLE OF ABDOMEN (HCC): ICD-10-CM

## 2022-07-05 DIAGNOSIS — E83.42 HYPOMAGNESEMIA: ICD-10-CM

## 2022-07-05 DIAGNOSIS — E86.0 DEHYDRATION: ICD-10-CM

## 2022-07-05 LAB
ALBUMIN SERPL BCP-MCNC: 3.9 G/DL (ref 3.5–5)
ALP SERPL-CCNC: 88 U/L (ref 34–104)
ALT SERPL W P-5'-P-CCNC: 34 U/L (ref 7–52)
ANION GAP SERPL CALCULATED.3IONS-SCNC: 6 MMOL/L (ref 4–13)
AST SERPL W P-5'-P-CCNC: 27 U/L (ref 13–39)
BASOPHILS # BLD AUTO: 0.04 THOUSANDS/ΜL (ref 0–0.1)
BASOPHILS NFR BLD AUTO: 1 % (ref 0–1)
BILIRUB SERPL-MCNC: 0.65 MG/DL (ref 0.2–1)
BUN SERPL-MCNC: 16 MG/DL (ref 5–25)
CALCIUM SERPL-MCNC: 9.6 MG/DL (ref 8.4–10.2)
CHLORIDE SERPL-SCNC: 101 MMOL/L (ref 96–108)
CO2 SERPL-SCNC: 31 MMOL/L (ref 21–32)
CREAT SERPL-MCNC: 1.25 MG/DL (ref 0.6–1.3)
EOSINOPHIL # BLD AUTO: 0.02 THOUSAND/ΜL (ref 0–0.61)
EOSINOPHIL NFR BLD AUTO: 1 % (ref 0–6)
ERYTHROCYTE [DISTWIDTH] IN BLOOD BY AUTOMATED COUNT: 14.9 % (ref 11.6–15.1)
GFR SERPL CREATININE-BSD FRML MDRD: 56 ML/MIN/1.73SQ M
GLUCOSE SERPL-MCNC: 109 MG/DL (ref 65–140)
HCT VFR BLD AUTO: 40.4 % (ref 36.5–49.3)
HGB BLD-MCNC: 12.9 G/DL (ref 12–17)
IMM GRANULOCYTES # BLD AUTO: 0.03 THOUSAND/UL (ref 0–0.2)
IMM GRANULOCYTES NFR BLD AUTO: 1 % (ref 0–2)
LYMPHOCYTES # BLD AUTO: 1.2 THOUSANDS/ΜL (ref 0.6–4.47)
LYMPHOCYTES NFR BLD AUTO: 28 % (ref 14–44)
MAGNESIUM SERPL-MCNC: 1.8 MG/DL (ref 1.9–2.7)
MCH RBC QN AUTO: 31.1 PG (ref 26.8–34.3)
MCHC RBC AUTO-ENTMCNC: 31.9 G/DL (ref 31.4–37.4)
MCV RBC AUTO: 97 FL (ref 82–98)
MONOCYTES # BLD AUTO: 0.78 THOUSAND/ΜL (ref 0.17–1.22)
MONOCYTES NFR BLD AUTO: 18 % (ref 4–12)
NEUTROPHILS # BLD AUTO: 2.26 THOUSANDS/ΜL (ref 1.85–7.62)
NEUTS SEG NFR BLD AUTO: 51 % (ref 43–75)
NRBC BLD AUTO-RTO: 0 /100 WBCS
PLATELET # BLD AUTO: 240 THOUSANDS/UL (ref 149–390)
PMV BLD AUTO: 10.1 FL (ref 8.9–12.7)
POTASSIUM SERPL-SCNC: 3.8 MMOL/L (ref 3.5–5.3)
PROT SERPL-MCNC: 6.7 G/DL (ref 6.4–8.4)
RBC # BLD AUTO: 4.15 MILLION/UL (ref 3.88–5.62)
SODIUM SERPL-SCNC: 138 MMOL/L (ref 135–147)
WBC # BLD AUTO: 4.33 THOUSAND/UL (ref 4.31–10.16)

## 2022-07-05 PROCEDURE — 96360 HYDRATION IV INFUSION INIT: CPT

## 2022-07-05 PROCEDURE — 85025 COMPLETE CBC W/AUTO DIFF WBC: CPT | Performed by: INTERNAL MEDICINE

## 2022-07-05 PROCEDURE — 80053 COMPREHEN METABOLIC PANEL: CPT | Performed by: INTERNAL MEDICINE

## 2022-07-05 PROCEDURE — 83735 ASSAY OF MAGNESIUM: CPT | Performed by: INTERNAL MEDICINE

## 2022-07-05 RX ADMIN — SODIUM CHLORIDE 1000 ML: 0.9 INJECTION, SOLUTION INTRAVENOUS at 13:46

## 2022-07-05 NOTE — PLAN OF CARE
Problem: Knowledge Deficit  Goal: Patient/family/caregiver demonstrates understanding of disease process, treatment plan, medications, and discharge instructions  Description: Complete learning assessment and assess knowledge base    Interventions:  - Provide teaching at level of understanding  - Provide teaching via preferred learning methods  Outcome: Progressing Left arm;

## 2022-07-06 ENCOUNTER — OFFICE VISIT (OUTPATIENT)
Dept: HEMATOLOGY ONCOLOGY | Facility: CLINIC | Age: 72
End: 2022-07-06
Payer: MEDICARE

## 2022-07-06 VITALS
WEIGHT: 200.2 LBS | HEIGHT: 69 IN | DIASTOLIC BLOOD PRESSURE: 88 MMHG | BODY MASS INDEX: 29.65 KG/M2 | TEMPERATURE: 98 F | RESPIRATION RATE: 16 BRPM | OXYGEN SATURATION: 94 % | SYSTOLIC BLOOD PRESSURE: 146 MMHG | HEART RATE: 87 BPM

## 2022-07-06 DIAGNOSIS — T45.1X5A CHEMOTHERAPY-INDUCED NEUROPATHY (HCC): ICD-10-CM

## 2022-07-06 DIAGNOSIS — E83.42 HYPOMAGNESEMIA: ICD-10-CM

## 2022-07-06 DIAGNOSIS — G62.0 CHEMOTHERAPY-INDUCED NEUROPATHY (HCC): ICD-10-CM

## 2022-07-06 DIAGNOSIS — C79.10 METASTATIC UROTHELIAL CARCINOMA (HCC): Primary | ICD-10-CM

## 2022-07-06 PROCEDURE — 99214 OFFICE O/P EST MOD 30 MIN: CPT | Performed by: INTERNAL MEDICINE

## 2022-07-06 NOTE — PROGRESS NOTES
Hematology/Oncology Outpatient Follow-up  Pablo Jarvis 67 y o  male 1950 [id number]    Date:  7/6/2022        Assessment and Plan:  1  Metastatic urothelial carcinoma ([address] )  The patient is tolerating and responding nicely to the adjusted dose of enfortumab vedotin which is being given weekly, 3 weeks on 1 week off  He will be due for cycle 10 starting this Friday  We will see him on every other week basis  He was encouraged to hydrate himself and consider IV hydration as needed  - CBC and differential; Future  - Comprehensive metabolic panel; Future  - Magnesium; Future    2  Hypomagnesemia  Magnesium level is borderline low normal   - Magnesium; Future    3  Chemotherapy-induced neuropathy (HCC)  Neuropathy seems to be stable on Cymbalta  HPI:  The patient came today for a follow-up visit accompanied by his wife  He had a great response documented on the recent PET scan on the current palliative treatment  He did complain about chronic neuropathy which seems to be stable while on Cymbalta  Most recent blood work on 07/05/2022 showed hemoglobin of 12 9 with normal white cells and platelets  Creatinine 1 2 with normal calcium and liver enzymes  Magnesium 1 8  He denied bleeding from any sites  Oncology History Overview Note   Patient has a history of hypertension, hyperlipidemia, chronic lower back pain  He had an MRI of the lower spine for further evaluation of his chronic lower back pain  The MRI on 12/02/2019 revealed Multiple left renal masses to include a left lower pole cyst and a rounded structure in the left upper pole which may also represent cyst   Left upper pole renal masses approximately 3 cm in greatest linear dimension  A CT with renal protocol was done on 12/12/2019 which also showed the infiltrating lesion in the upper pole of the left kidney measuring about the 3 5 cm in greatest dimension without any hint of retroperitoneal lymphadenopathy         A CT scan of the abdomen pelvis on 01/14/2020 showed the same findings with the mild hydronephrosis on the left  The urine cytology on 01/03/2020 showed high-grade urothelial carcinoma  A cystoscopy was then done, a biopsy was taken from the left renal pelvic region which showed high-grade urothelial carcinoma without evidence of lamina propria invasion  The detrusor muscle/muscularis propria were not present for evaluation  The recommendation was to pursue left robotic assisted laparoscopic nephroureterectomy with bladder cuff excision which was done on 04/01/2020  The final pathology revealed;  - Invasive high grade urothelial carcinoma arising in renal pelvis  - Bladder cuff margin is negative for carcinoma and no evidence of high grade dysplasia  - Ureters with no significant pathologic abnormality  - Two benign simple cysts  - Adrenal gland is negative for malignancy  - One lymph node, negative for malignancy (0/1)  The tumor size was 4 5 cm with invasion beyond the muscularis into the periurethral fat or peripelvic fat or the renal parenchyma  The margins were uninvolved by carcinoma or carcinoma in situ  The final pathology was pT3 pN0  Patient barely tolerated 1 cycle of adjuvant chemotherapy with split dose cisplatin and gemcitabine with a lot of side effects  The treatment had to be discontinued due to significant worsening renal dysfunction 6/2020  Patient started to complain about significant abdominal/left inguinal pain around August/September 2020  Unfortunately repeat imaging revealed recurrent/metastatic disease  9/4/20 CT C/A/P- Status post left nephrectomy for resection of urothelial neoplasm  Lymphadenopathy in the left nephrectomy bed has increased since the prior examination, concerning for metastatic disease  Ill-defined hyperattenuating masslike focus in the left rectus muscle, not identified on the prior examination  This is suspicious for a metastatic lesion    A rectus hematoma is also considered  9/23/20 PET scan- 1  Multiple FDG avid lymph nodes in the retrocrural region, retroperitoneum and the left renal bed compatible with metastasis  These have progressed from recent CT  2   FDG avid mass involving the left rectus abdominal musculature compatible with metastasis which is larger from recent CT  3  Several small lymph nodes adjacent to the mid to distal esophagus with FDG uptake suspicious for metastasis  Small focus of FDG uptake also suggested in the right hilar region, metastasis is not excluded  This could be reassessed on follow-up  4   Subtle focus of FDG uptake at the T2 level on the left, nonspecific, could be related to early degenerative changes, developing metastasis is not entirely excluded  This could be reassessed on follow-up  5  Prostate is mildly prominent with heterogeneous FDG uptake  This could be inflammatory  Correlate for prostatitis  He completed palliative radiation to the left abdomen 12/3/20     His PET scan from 08/16/2021 showed progression of his disease with hypermetabolic soft tissue lesions at the level of the right shoulder and right axilla with interval progression of the retroperitoneal and mesenteric hypermetabolic metastasis  He did have the new lesion to his right upper back/shoulder which was causing significant localized pain  This excised by his surgeon 08/09/2021  Pathology confirmed metastatic high-grade carcinoma consistent with his no primary urothelial carcinoma  He received palliative radiation to his right shoulder/axilla region  Urothelial cancer ([address] )   1/3/2020 Biopsy    Final Diagnosis    A  Urine, Clean Catch, Thin Prep:  High grade urothelial carcinoma (HGUC) - see comment  1/3/2020 Initial Diagnosis    Urothelial cancer ([address] )  T3 N0 (stage IIIA)     1/17/2020 Biopsy    Final Diagnosis    A   Ureter, Right, left renal pelvis biopsy  -Fragments of high grade urothelial carcinoma -No evidence of lamina propria invasion   -Detrusor muscle/ muscularis propria is not present for evaluation  B  Urinary Bladder, bladder biopsy:  -Fragments of low grade papillary lesion  See note  -No evidence of invasion seen  -Unremarkable fragment of detrusor muscle seen  4/1/2020 Surgery    left robotic assisted laparoscopic nephroureterectomy with bladder cuff excision     Final Diagnosis    A  Left kidney, ureter, and bladder cuff, nephroureterectomy:  - Invasive high grade urothelial carcinoma arising in renal pelvis  - Bladder cuff margin is negative for carcinoma and no evidence of high grade dysplasia  - Ureters with no significant pathologic abnormality  - Two benign simple cysts  - Adrenal gland is negative for malignancy  - One lymph node, negative for malignancy (0/1)          5/14/2020 - 6/3/2020 Chemotherapy    CISplatin (PLATINOL) split dose 35 mg/m2 = 75 3 mg (50 % of original dose 70 mg/m2), Intravenous,   Administration: 75 3 mg (5/14/2020), 75 3 mg (5/21/2020)  gemcitabine (GEMZAR) 2,200 mg in sodium chloride 0 9 % 250 mL infusion, 2,150 2 mg (80 % of original dose 1,250 mg/m2), Intravenous,   Administration: 2,200 mg (5/14/2020), 2,200 mg (5/21/2020)    (only completed 1 cycle- d/c d/t adverse effects and worsening renal dysfunction)     10/9/2020 - 9/9/2021 Chemotherapy    pembrolizumab (KEYTRUDA) IVPB, 200 mg, Intravenous, Once, 16 of 20 cycles  Administration: 200 mg (10/9/2020), 200 mg (10/30/2020), 200 mg (11/20/2020), 200 mg (12/11/2020), 200 mg (12/31/2020), 200 mg (1/22/2021), 200 mg (2/12/2021), 200 mg (3/5/2021), 200 mg (3/26/2021), 200 mg (4/16/2021), 200 mg (5/7/2021), 200 mg (5/28/2021), 200 mg (6/18/2021), 200 mg (7/9/2021), 200 mg (7/30/2021), 200 mg (8/20/2021)     11/19/2020 - 12/3/2020 Radiation    Treatment:  Course: C1    Plan ID Energy Fractions Dose per Fraction (cGy) Dose Correction (cGy) Total Dose Delivered (cGy) Elapsed Days   L Abdomen 6X 10 / 10 300 0 3,000 14        9/10/2021 -  Chemotherapy    enfortumab vedotin-ejfv (PADCEV) IVPB, 1 25 mg/kg = 114 mg, Intravenous, Once, 9 of 13 cycles  Dose modification: 1 mg/kg (original dose 1 25 mg/kg, Cycle 7, Reason: Other (Must fill in a comment), Comment: dose reduction due to side effects ), 1 25 mg/kg (original dose 1 25 mg/kg, Cycle 7, Reason: Other (Must fill in a comment), Comment: patient wants full dose ), 1 mg/kg (original dose 1 25 mg/kg, Cycle 7, Reason: Neuropathy)  Administration: 114 mg (9/10/2021), 114 mg (9/17/2021), 110 mg (10/8/2021), 110 mg (9/24/2021), 110 mg (10/15/2021), 110 mg (11/12/2021), 110 mg (10/22/2021), 110 mg (11/19/2021), 110 mg (12/10/2021), 110 mg (11/26/2021), 110 mg (12/17/2021), 110 mg (1/7/2022), 110 mg (12/23/2021), 110 mg (1/14/2022), 90 mg (4/8/2022), 90 mg (4/15/2022), 90 mg (4/22/2022), 110 mg (1/21/2022), 110 mg (2/18/2022), 110 mg (2/25/2022), 90 mg (5/13/2022), 90 mg (5/20/2022), 90 mg (5/27/2022), 90 mg (3/4/2022), 90 mg (6/10/2022), 90 mg (6/17/2022), 90 mg (6/24/2022)     Cancer of left renal pelvis ([address] )   4/23/2020 Initial Diagnosis    Cancer of left renal pelvis (HCC)     10/9/2020 - 9/9/2021 Chemotherapy    pembrolizumab (KEYTRUDA) IVPB, 200 mg, Intravenous, Once, 16 of 20 cycles  Administration: 200 mg (10/9/2020), 200 mg (10/30/2020), 200 mg (11/20/2020), 200 mg (12/11/2020), 200 mg (12/31/2020), 200 mg (1/22/2021), 200 mg (2/12/2021), 200 mg (3/5/2021), 200 mg (3/26/2021), 200 mg (4/16/2021), 200 mg (5/7/2021), 200 mg (5/28/2021), 200 mg (6/18/2021), 200 mg (7/9/2021), 200 mg (7/30/2021), 200 mg (8/20/2021)     9/10/2021 -  Chemotherapy    enfortumab vedotin-ejfv (PADCEV) IVPB, 1 25 mg/kg = 114 mg, Intravenous, Once, 9 of 13 cycles  Dose modification: 1 mg/kg (original dose 1 25 mg/kg, Cycle 7, Reason: Other (Must fill in a comment), Comment: dose reduction due to side effects ), 1 25 mg/kg (original dose 1 25 mg/kg, Cycle 7, Reason: Other (Must fill in a comment), Comment: patient wants full dose ), 1 mg/kg (original dose 1 25 mg/kg, Cycle 7, Reason: Neuropathy)  Administration: 114 mg (9/10/2021), 114 mg (9/17/2021), 110 mg (10/8/2021), 110 mg (9/24/2021), 110 mg (10/15/2021), 110 mg (11/12/2021), 110 mg (10/22/2021), 110 mg (11/19/2021), 110 mg (12/10/2021), 110 mg (11/26/2021), 110 mg (12/17/2021), 110 mg (1/7/2022), 110 mg (12/23/2021), 110 mg (1/14/2022), 90 mg (4/8/2022), 90 mg (4/15/2022), 90 mg (4/22/2022), 110 mg (1/21/2022), 110 mg (2/18/2022), 110 mg (2/25/2022), 90 mg (5/13/2022), 90 mg (5/20/2022), 90 mg (5/27/2022), 90 mg (3/4/2022), 90 mg (6/10/2022), 90 mg (6/17/2022), 90 mg (6/24/2022)      Surgery       Metastatic urothelial carcinoma ([facility] )   8/9/2021 Initial Diagnosis    Metastatic urothelial carcinoma ([facility] )     9/8/2021 - 9/21/2021 Radiation    Treatment:  Course: C2    Plan ID Energy Fractions Dose per Fraction (cGy) Dose Correction (cGy) Total Dose Delivered (cGy) Elapsed Days   R Axil_Shl # 6X 10 / 10 300 0 3,000 13      Treatment dates:  C2: 9/8/2021 - 9/21/2021     9/10/2021 -  Chemotherapy    enfortumab vedotin-ejfv (PADCEV) IVPB, 1 25 mg/kg = 114 mg, Intravenous, Once, 9 of 13 cycles  Dose modification: 1 mg/kg (original dose 1 25 mg/kg, Cycle 7, Reason: Other (Must fill in a comment), Comment: dose reduction due to side effects ), 1 25 mg/kg (original dose 1 25 mg/kg, Cycle 7, Reason: Other (Must fill in a comment), Comment: patient wants full dose ), 1 mg/kg (original dose 1 25 mg/kg, Cycle 7, Reason: Neuropathy)  Administration: 114 mg (9/10/2021), 114 mg (9/17/2021), 110 mg (10/8/2021), 110 mg (9/24/2021), 110 mg (10/15/2021), 110 mg (11/12/2021), 110 mg (10/22/2021), 110 mg (11/19/2021), 110 mg (12/10/2021), 110 mg (11/26/2021), 110 mg (12/17/2021), 110 mg (1/7/2022), 110 mg (12/23/2021), 110 mg (1/14/2022), 90 mg (4/8/2022), 90 mg (4/15/2022), 90 mg (4/22/2022), 110 mg (1/21/2022), 110 mg (2/18/2022), 110 mg (2/25/2022), 90 mg (5/13/2022), 90 mg (5/20/2022), 90 mg (5/27/2022), 90 mg (3/4/2022), 90 mg (6/10/2022), 90 mg (6/17/2022), 90 mg (6/24/2022)         Interval history:    ROS: Review of Systems   Constitutional: Negative for chills and fever  HENT: Positive for hearing loss  Negative for ear pain and sore throat  Eyes: Negative for pain and visual disturbance  Respiratory: Positive for shortness of breath  Negative for cough  Cardiovascular: Negative for chest pain and palpitations  Gastrointestinal: Negative for abdominal pain and vomiting  Genitourinary: Negative for dysuria and hematuria  Musculoskeletal: Negative for arthralgias and back pain  Skin: Negative for color change and rash  Neurological: Positive for numbness and headaches  Negative for seizures and syncope  All other systems reviewed and are negative        Past Medical History:   Diagnosis Date    Arthritis     Bladder cancer     Cancer ([address] )     skin melanoma;basal cell    Chronic pain disorder     from arthritis    Colon polyp     Does use hearing aid     bilat    Dry eyes, bilateral     GERD (gastroesophageal reflux disease)     History of kidney stones 10/2019    History of partial knee replacement     bilat    History of vertigo     Hyperlipidemia     Hypertension     Infusion extravasation of chemotherapy vesicant 11/2021    Kidney lesion     Lumbar disc disorder     compression of vertebrae L4-5-6    Muscle weakness     left hip area    Renal mass     left    Right ankle injury 03/05/2020    missed step of ladder     Right ankle pain     Shortness of breath     with activity    Tinnitus     Urothelial cancer     left    Wears glasses        Past Surgical History:   Procedure Laterality Date    COLONOSCOPY      CYSTOSCOPY Left 4/1/2020    Procedure: CYSTOSCOPY; URETERAL CATHETER PLACEMENT;  Surgeon: Dominga Ferrara MD;  Location[facility];  Service: Urology    CYSTOSCOPY  05/11/2020    CYSTOSCOPY 2021    FL CYSTOGRAM  2020    FL RETROGRADE PYELOGRAM  2020    HERNIA REPAIR      umbilical with mesh    INGUINAL HERNIA REPAIR Right     with mesh    IR PORT PLACEMENT  2020    JOINT REPLACEMENT Bilateral     partials knee    LUMBAR EPIDURAL INJECTION      WI CYSTOURETHROSCOPY,URETER CATHETER Bilateral 2020    Procedure: CYSTOSCOPY; RIGHT RETROGRADE PYELOGRAM WITH RIGHT URETERAL CYTOLOGY SAMPLING; LEFT URETEROSCOPY WITH RENAL PELVIS BIOPSY AND LEFT STENT PLACEMENT;  Surgeon: Carol Franco MD;  Location: [facility];  Service: Urology    WI NEPHRECTOMY, W/PART  URETECTOMY Left 2020    Procedure: ROBOTIC LAPAROSCOPIC NEPHRO-URETERECTOMY;  Surgeon: Carol Franco MD;  Location[facility];  Service: Urology    SKIN CANCER EXCISION      surface melanoma    TONSILLECTOMY      WISDOM TOOTH EXTRACTION         Social History     Socioeconomic History    Marital status: /Civil Union     Spouse name: None    Number of children: None    Years of education: None    Highest education level: None   Occupational History    None   Tobacco Use    Smoking status: Former Smoker     Types: Cigarettes     Quit date:      Years since quittin 5    Smokeless tobacco: Never Used   Vaping Use    Vaping Use: Never used   Substance and Sexual Activity    Alcohol use:  Yes     Alcohol/week: 17 0 standard drinks     Types: 7 Glasses of wine, 10 Cans of beer per week     Comment: socially    Drug use: Never    Sexual activity: Not Currently   Other Topics Concern    None   Social History Narrative    Daily caffeine use - 2 cups coffee      Social Determinants of Health     Financial Resource Strain: Not on file   Food Insecurity: Not on file   Transportation Needs: Not on file   Physical Activity: Not on file   Stress: Not on file   Social Connections: Not on file   Intimate Partner Violence: Not on file   Housing Stability: Not on file       Family History Problem Relation Age of Onset    Liver cancer Father        Allergies   Allergen Reactions    Poison Ivy Extract Rash         Current Outpatient Medications:     acetaminophen (TYLENOL) 500 mg tablet, Take 2 tablets (1,000 mg total) by mouth every 8 (eight) hours, Disp: , Rfl:     allopurinol (ZYLOPRIM) 300 mg tablet, take 1 tablet by mouth once daily, Disp: 30 tablet, Rfl: 3    apixaban (Eliquis) 5 mg, Take 1 tablet (5 mg total) by mouth 2 (two) times a day For afib, Disp: 60 tablet, Rfl: 11    atorvastatin (LIPITOR) 80 mg tablet, Take 80 mg by mouth every other day evening, Disp: , Rfl:     colchicine (COLCRYS) 0 6 mg tablet, take 1 tablet by mouth twice a day if needed for FOOT PAIN, Disp: , Rfl:     docusate sodium (COLACE) 250 MG capsule, Take 1 capsule (250 mg total) by mouth daily, Disp: 60 capsule, Rfl: 6    DULoxetine (CYMBALTA) 60 mg delayed release capsule, take 1 capsule by mouth once daily, Disp: 30 capsule, Rfl: 1    folic acid (FOLVITE) 1 mg tablet, take 1 tablet by mouth once daily, Disp: 90 tablet, Rfl: 4    Magnesium 250 MG TABS, 1 tablet 2 (two) times a day Taking 500mg in the morning and 500mg at night, Disp: , Rfl:     metoprolol tartrate (LOPRESSOR) 100 mg tablet, Take 50 mg by mouth daily, Disp: , Rfl:     naloxone (NARCAN) 4 mg/0 1 mL nasal spray, Administer 1 spray into a nostril   If breathing does not return to normal or if breathing difficulty resumes after 2-3 minutes, give another dose in the other nostril using a new spray , Disp: 1 each, Rfl: 1    nystatin (MYCOSTATIN) cream, Apply topically 2 (two) times a day, Disp: 30 g, Rfl: 0    senna (SENOKOT) 8 6 mg, Take 1 tablet (8 6 mg total) by mouth daily at bedtime, Disp: 30 each, Rfl: 0    tadalafil (CIALIS) 20 MG tablet, Take one tablet by mouth one hour before sexual activity, Disp: 15 tablet, Rfl: 3    tamsulosin (FLOMAX) 0 4 mg, take 1 capsule by mouth once daily with dinner, Disp: 30 capsule, Rfl: 1    traMADol (Ultram) 50 mg tablet, Take 1 PO Q8-12 hours PRN pain, Disp: 75 tablet, Rfl: 0    VITAMIN D PO, Take 1,000 Units by mouth daily , Disp: , Rfl:     zolpidem (AMBIEN) 5 mg tablet, Take 1 tablet (5 mg total) by mouth daily at bedtime as needed for sleep, Disp: 30 tablet, Rfl: 1    ALPRAZolam (XANAX) 0 25 mg tablet, Take 1 tablet (0 25 mg total) by mouth daily at bedtime as needed for anxiety (Patient not taking: No sig reported), Disp: 30 tablet, Rfl: 0  No current facility-administered medications for this visit  Physical Exam:  /88 (BP Location: Left arm, Patient Position: Sitting, Cuff Size: Adult)   Pulse 87   Temp 98 °F (36 7 °C) (Tympanic)   Resp 16   Ht 5' 9" (1 753 m)   Wt 90 8 kg (200 lb 3 2 oz)   SpO2 94%   BMI 29 56 kg/m²     Physical Exam  Constitutional:       Appearance: He is well-developed  HENT:      Head: Normocephalic and atraumatic  Eyes:      General: No scleral icterus  Right eye: No discharge  Left eye: No discharge  Conjunctiva/sclera: Conjunctivae normal       Pupils: Pupils are equal, round, and reactive to light  Neck:      Thyroid: No thyromegaly  Trachea: No tracheal deviation  Cardiovascular:      Rate and Rhythm: Normal rate and regular rhythm  Heart sounds: Normal heart sounds  No murmur heard  No friction rub  Pulmonary:      Effort: Pulmonary effort is normal  No respiratory distress  Breath sounds: Normal breath sounds  No wheezing or rales  Chest:      Chest wall: No tenderness  Abdominal:      General: There is no distension  Palpations: Abdomen is soft  There is no hepatomegaly or splenomegaly  Tenderness: There is no abdominal tenderness  There is no guarding or rebound  Musculoskeletal:         General: No tenderness or deformity  Cervical back: Normal range of motion and neck supple  Comments: Ambulatory dysfunction uses the cane     Lymphadenopathy:      Cervical: No cervical adenopathy  Skin:     General: Skin is warm and dry  Coloration: Skin is not pale  Findings: No erythema or rash  Neurological:      Mental Status: He is alert and oriented to person, place, and time  Cranial Nerves: No cranial nerve deficit  Coordination: Coordination normal       Deep Tendon Reflexes: Reflexes are normal and symmetric  Psychiatric:         Behavior: Behavior normal          Thought Content: Thought content normal          Judgment: Judgment normal            Labs:  Lab Results   Component Value Date    WBC 4 33 07/05/2022    HGB 12 9 07/05/2022    HCT 40 4 07/05/2022    MCV 97 07/05/2022     07/05/2022     Lab Results   Component Value Date    K 3 8 07/05/2022     07/05/2022    CO2 31 07/05/2022    BUN 16 07/05/2022    CREATININE 1 25 07/05/2022    GLUF 111 (H) 03/01/2022    CALCIUM 9 6 07/05/2022    CORRECTEDCA 9 0 06/21/2022    AST 27 07/05/2022    ALT 34 07/05/2022    ALKPHOS 88 07/05/2022    EGFR 56 07/05/2022     No results found for: TSH    Patient voiced understanding and agreement in the above discussion  Aware to contact our office with questions/symptoms in the interim

## 2022-07-08 ENCOUNTER — HOSPITAL ENCOUNTER (OUTPATIENT)
Dept: INFUSION CENTER | Facility: HOSPITAL | Age: 72
Discharge: HOME | End: 2022-07-08
Attending: INTERNAL MEDICINE
Payer: MEDICARE

## 2022-07-08 VITALS
HEIGHT: 69 IN | DIASTOLIC BLOOD PRESSURE: 73 MMHG | OXYGEN SATURATION: 93 % | RESPIRATION RATE: 16 BRPM | TEMPERATURE: 97.1 F | HEART RATE: 103 BPM | WEIGHT: 199.74 LBS | BODY MASS INDEX: 29.58 KG/M2 | SYSTOLIC BLOOD PRESSURE: 126 MMHG

## 2022-07-08 DIAGNOSIS — C68.9 UROTHELIAL CANCER (HCC): Primary | ICD-10-CM

## 2022-07-08 DIAGNOSIS — C65.2 CANCER OF LEFT RENAL PELVIS (HCC): ICD-10-CM

## 2022-07-08 DIAGNOSIS — G89.3 CANCER ASSOCIATED PAIN: ICD-10-CM

## 2022-07-08 DIAGNOSIS — E83.42 HYPOMAGNESEMIA: ICD-10-CM

## 2022-07-08 DIAGNOSIS — C79.10 METASTATIC UROTHELIAL CARCINOMA (HCC): ICD-10-CM

## 2022-07-08 DIAGNOSIS — E86.0 DEHYDRATION: ICD-10-CM

## 2022-07-08 PROCEDURE — 96413 CHEMO IV INFUSION 1 HR: CPT

## 2022-07-08 PROCEDURE — 96368 THER/DIAG CONCURRENT INF: CPT

## 2022-07-08 PROCEDURE — 96367 TX/PROPH/DG ADDL SEQ IV INF: CPT

## 2022-07-08 RX ORDER — ACETAMINOPHEN 325 MG/1
650 TABLET ORAL ONCE
Status: DISCONTINUED | OUTPATIENT
Start: 2022-07-08 | End: 2022-07-12 | Stop reason: HOSPADM

## 2022-07-08 RX ORDER — SODIUM CHLORIDE 9 MG/ML
20 INJECTION, SOLUTION INTRAVENOUS ONCE
Status: COMPLETED | OUTPATIENT
Start: 2022-07-08 | End: 2022-07-08

## 2022-07-08 RX ADMIN — MAGNESIUM SULFATE HEPTAHYDRATE: 500 INJECTION, SOLUTION INTRAMUSCULAR; INTRAVENOUS at 11:15

## 2022-07-08 RX ADMIN — ENFORTUMAB VEDOTIN 90 MG: 30 INJECTION, POWDER, LYOPHILIZED, FOR SOLUTION INTRAVENOUS at 13:05

## 2022-07-08 RX ADMIN — DIPHENHYDRAMINE HYDROCHLORIDE 25 MG: 50 INJECTION INTRAMUSCULAR; INTRAVENOUS at 11:44

## 2022-07-08 RX ADMIN — DEXAMETHASONE SODIUM PHOSPHATE: 10 INJECTION, SOLUTION INTRAMUSCULAR; INTRAVENOUS at 12:20

## 2022-07-08 RX ADMIN — SODIUM CHLORIDE 20 ML/HR: 0.9 INJECTION, SOLUTION INTRAVENOUS at 11:11

## 2022-07-08 NOTE — PROGRESS NOTES
Recent labs reviewed  Patient tolerated magnesium bolus and Padcev chemotherapy treatment without reaction or issues  AVS given to patient  Patient ambulated off unit without incident  All personal belongings taken with patient

## 2022-07-12 ENCOUNTER — HOSPITAL ENCOUNTER (OUTPATIENT)
Dept: INFUSION CENTER | Facility: HOSPITAL | Age: 72
Discharge: HOME | End: 2022-07-12
Attending: INTERNAL MEDICINE
Payer: MEDICARE

## 2022-07-12 VITALS
OXYGEN SATURATION: 99 % | HEART RATE: 68 BPM | RESPIRATION RATE: 16 BRPM | TEMPERATURE: 97 F | DIASTOLIC BLOOD PRESSURE: 85 MMHG | SYSTOLIC BLOOD PRESSURE: 145 MMHG

## 2022-07-12 DIAGNOSIS — E83.42 HYPOMAGNESEMIA: ICD-10-CM

## 2022-07-12 DIAGNOSIS — C79.89 SECONDARY MALIGNANT NEOPLASM OF MUSCLE OF ABDOMEN (HCC): ICD-10-CM

## 2022-07-12 DIAGNOSIS — E86.0 DEHYDRATION: Primary | ICD-10-CM

## 2022-07-12 LAB
ALBUMIN SERPL BCP-MCNC: 3.7 G/DL (ref 3.5–5)
ALP SERPL-CCNC: 76 U/L (ref 34–104)
ALT SERPL W P-5'-P-CCNC: 24 U/L (ref 7–52)
ANION GAP SERPL CALCULATED.3IONS-SCNC: 6 MMOL/L (ref 4–13)
AST SERPL W P-5'-P-CCNC: 21 U/L (ref 13–39)
BASOPHILS # BLD AUTO: 0.02 THOUSANDS/ΜL (ref 0–0.1)
BASOPHILS NFR BLD AUTO: 0 % (ref 0–1)
BILIRUB SERPL-MCNC: 0.65 MG/DL (ref 0.2–1)
BUN SERPL-MCNC: 16 MG/DL (ref 5–25)
CALCIUM SERPL-MCNC: 9.3 MG/DL (ref 8.4–10.2)
CHLORIDE SERPL-SCNC: 101 MMOL/L (ref 96–108)
CO2 SERPL-SCNC: 31 MMOL/L (ref 21–32)
CREAT SERPL-MCNC: 1.23 MG/DL (ref 0.6–1.3)
EOSINOPHIL # BLD AUTO: 0.02 THOUSAND/ΜL (ref 0–0.61)
EOSINOPHIL NFR BLD AUTO: 0 % (ref 0–6)
ERYTHROCYTE [DISTWIDTH] IN BLOOD BY AUTOMATED COUNT: 14.7 % (ref 11.6–15.1)
GFR SERPL CREATININE-BSD FRML MDRD: 57 ML/MIN/1.73SQ M
GLUCOSE SERPL-MCNC: 110 MG/DL (ref 65–140)
HCT VFR BLD AUTO: 39.3 % (ref 36.5–49.3)
HGB BLD-MCNC: 12.4 G/DL (ref 12–17)
IMM GRANULOCYTES # BLD AUTO: 0.01 THOUSAND/UL (ref 0–0.2)
IMM GRANULOCYTES NFR BLD AUTO: 0 % (ref 0–2)
LYMPHOCYTES # BLD AUTO: 1.14 THOUSANDS/ΜL (ref 0.6–4.47)
LYMPHOCYTES NFR BLD AUTO: 24 % (ref 14–44)
MAGNESIUM SERPL-MCNC: 1.9 MG/DL (ref 1.9–2.7)
MCH RBC QN AUTO: 30.6 PG (ref 26.8–34.3)
MCHC RBC AUTO-ENTMCNC: 31.6 G/DL (ref 31.4–37.4)
MCV RBC AUTO: 97 FL (ref 82–98)
MONOCYTES # BLD AUTO: 0.69 THOUSAND/ΜL (ref 0.17–1.22)
MONOCYTES NFR BLD AUTO: 14 % (ref 4–12)
NEUTROPHILS # BLD AUTO: 2.95 THOUSANDS/ΜL (ref 1.85–7.62)
NEUTS SEG NFR BLD AUTO: 62 % (ref 43–75)
NRBC BLD AUTO-RTO: 0 /100 WBCS
PLATELET # BLD AUTO: 213 THOUSANDS/UL (ref 149–390)
PMV BLD AUTO: 10.1 FL (ref 8.9–12.7)
POTASSIUM SERPL-SCNC: 3.8 MMOL/L (ref 3.5–5.3)
PROT SERPL-MCNC: 6.1 G/DL (ref 6.4–8.4)
RBC # BLD AUTO: 4.05 MILLION/UL (ref 3.88–5.62)
SODIUM SERPL-SCNC: 138 MMOL/L (ref 135–147)
WBC # BLD AUTO: 4.83 THOUSAND/UL (ref 4.31–10.16)

## 2022-07-12 PROCEDURE — 83735 ASSAY OF MAGNESIUM: CPT | Performed by: INTERNAL MEDICINE

## 2022-07-12 PROCEDURE — 80053 COMPREHEN METABOLIC PANEL: CPT | Performed by: INTERNAL MEDICINE

## 2022-07-12 PROCEDURE — 96360 HYDRATION IV INFUSION INIT: CPT

## 2022-07-12 PROCEDURE — 85025 COMPLETE CBC W/AUTO DIFF WBC: CPT | Performed by: INTERNAL MEDICINE

## 2022-07-12 RX ADMIN — SODIUM CHLORIDE 1000 ML: 0.9 INJECTION, SOLUTION INTRAVENOUS at 11:09

## 2022-07-13 ENCOUNTER — TELEPHONE (OUTPATIENT)
Dept: PAIN MEDICINE | Facility: CLINIC | Age: 72
End: 2022-07-13

## 2022-07-13 ENCOUNTER — HOSPITAL ENCOUNTER (OUTPATIENT)
Dept: RADIOLOGY | Facility: CLINIC | Age: 72
Discharge: HOME | End: 2022-07-13
Payer: MEDICARE

## 2022-07-13 VITALS
OXYGEN SATURATION: 98 % | DIASTOLIC BLOOD PRESSURE: 95 MMHG | TEMPERATURE: 97.5 F | SYSTOLIC BLOOD PRESSURE: 162 MMHG | HEART RATE: 72 BPM | RESPIRATION RATE: 20 BRPM

## 2022-07-13 DIAGNOSIS — M46.1 SACROILIITIS (HCC): ICD-10-CM

## 2022-07-13 PROCEDURE — 27096 INJECT SACROILIAC JOINT: CPT | Performed by: ANESTHESIOLOGY

## 2022-07-13 PROCEDURE — A9585 GADOBUTROL INJECTION: HCPCS | Performed by: ANESTHESIOLOGY

## 2022-07-13 RX ORDER — METHYLPREDNISOLONE ACETATE 40 MG/ML
80 INJECTION, SUSPENSION INTRA-ARTICULAR; INTRALESIONAL; INTRAMUSCULAR; PARENTERAL; SOFT TISSUE ONCE
Status: COMPLETED | OUTPATIENT
Start: 2022-07-13 | End: 2022-07-13

## 2022-07-13 RX ORDER — LIDOCAINE HYDROCHLORIDE 10 MG/ML
5 INJECTION, SOLUTION EPIDURAL; INFILTRATION; INTRACAUDAL; PERINEURAL ONCE
Status: COMPLETED | OUTPATIENT
Start: 2022-07-13 | End: 2022-07-13

## 2022-07-13 RX ORDER — BUPIVACAINE HCL/PF 2.5 MG/ML
30 VIAL (ML) INJECTION ONCE
Status: COMPLETED | OUTPATIENT
Start: 2022-07-13 | End: 2022-07-13

## 2022-07-13 RX ADMIN — METHYLPREDNISOLONE ACETATE 80 MG: 40 INJECTION, SUSPENSION INTRA-ARTICULAR; INTRALESIONAL; INTRAMUSCULAR; SOFT TISSUE at 11:38

## 2022-07-13 RX ADMIN — LIDOCAINE HYDROCHLORIDE 3 ML: 10 INJECTION, SOLUTION EPIDURAL; INFILTRATION; INTRACAUDAL; PERINEURAL at 11:38

## 2022-07-13 RX ADMIN — GADOBUTROL 1 ML: 604.72 INJECTION INTRAVENOUS at 11:38

## 2022-07-13 RX ADMIN — BUPIVACAINE HYDROCHLORIDE 3 ML: 2.5 INJECTION, SOLUTION EPIDURAL; INFILTRATION; INTRACAUDAL at 11:38

## 2022-07-13 NOTE — TELEPHONE ENCOUNTER
Patient came in for his procedure and wanted to let you know that his gabapentin is not strong enough

## 2022-07-13 NOTE — NURSING NOTE
Pt held Eliquis x 2days prior to injection on own  Pt advised to resume when he goes home, not needed to be held for this injection  Bp /95 post procedure, pt states very slight HA and wife states this is high for him  Pt took drink water  Pt was at ortho today for left knee injection  Pt advised to check BP at home and call PCP if not decreasing  Pt took BP meds today  JW aware

## 2022-07-13 NOTE — TELEPHONE ENCOUNTER
S/w Mrs Vinson Come and reviewed and she wanted to make you aware of his current BP of 157/109  She just took it 10 minutes ago  He denies symptoms currently  She stated he also stopped his eliquis because he thought he had too but he just took it now  Encouraged her to notify his cardiologist and inform them of his BP  If he becomes symptomatic then seek treatment at the ED

## 2022-07-13 NOTE — H&P
History of Present Illness: The patient is a 67 y o  male who presents with complaints of low back pain      Patient Active Problem List   Diagnosis    Lumbar radiculopathy    Lumbar disc herniation    Lumbar spondylosis    Urothelial cancer ([address] )    Essential hypertension    Sinus bradycardia    Hyperlipidemia    Cancer of left renal pelvis (HCC)    Drug-induced neutropenia (HCC)    Chronic pain disorder    Spinal stenosis of lumbar region    Encounter for central line care    Hyperuricemia    Encounter for long-term opiate analgesic use    Uncomplicated opioid dependence ([address] )    Palliative care patient    Decreased appetite    Medical marijuana use    Normocytic anemia    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)    Thrombophlebitis of superficial veins of right lower extremity    Renal dysfunction    Stage 3a chronic kidney disease (HCC)    Hypomagnesemia    Platelets decreased ([address] )    H/O left nephrectomy    Chronic kidney disease-mineral and bone disorder    Vitamin D deficiency    Dehydration    Chronic right shoulder pain    Primary osteoarthritis of right shoulder    Metastatic urothelial carcinoma (HCC)    Drug induced constipation    Secondary malignant neoplasm of bone (HCC)    Cancer associated pain    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)    Early satiety    Dysgeusia    Neuropathy    Chronic bilateral low back pain without sciatica    Chemotherapy-induced neuropathy (HCC)    HERRERA (dyspnea on exertion)    Paroxysmal atrial fibrillation (HCC)       Past Medical History:   Diagnosis Date    Arthritis     Bladder cancer     Cancer (HCC)     skin melanoma;basal cell    Chronic pain disorder     from arthritis    Colon polyp     Does use hearing aid     bilat    Dry eyes, bilateral     GERD (gastroesophageal reflux disease)     History of kidney stones 10/2019    History of partial knee replacement     bilat    History of vertigo  Hyperlipidemia     Hypertension     Infusion extravasation of chemotherapy vesicant 11/2021    Kidney lesion     Lumbar disc disorder     compression of vertebrae L4-5-6    Muscle weakness     left hip area    Renal mass     left    Right ankle injury 03/05/2020    missed step of ladder     Right ankle pain     Shortness of breath     with activity    Tinnitus     Urothelial cancer     left    Wears glasses        Past Surgical History:   Procedure Laterality Date    COLONOSCOPY      CYSTOSCOPY Left 4/1/2020    Procedure: CYSTOSCOPY; URETERAL CATHETER PLACEMENT;  Surgeon: Dominga Ferrara MD;  [facility];  Service: Urology    CYSTOSCOPY  05/11/2020    CYSTOSCOPY  06/04/2021    FL CYSTOGRAM  4/13/2020    FL RETROGRADE PYELOGRAM  1/17/2020    HERNIA REPAIR      umbilical with mesh    INGUINAL HERNIA REPAIR Right     with mesh    IR PORT PLACEMENT  4/30/2020    JOINT REPLACEMENT Bilateral     partials knee    LUMBAR EPIDURAL INJECTION      MO CYSTOURETHROSCOPY,URETER CATHETER Bilateral 1/17/2020    Procedure: CYSTOSCOPY; RIGHT RETROGRADE PYELOGRAM WITH RIGHT URETERAL CYTOLOGY SAMPLING; LEFT URETEROSCOPY WITH RENAL PELVIS BIOPSY AND LEFT STENT PLACEMENT;  Surgeon: Dominga Ferrara MD;  [facility];  Service: Urology    MO NEPHRECTOMY, W/PART   URETECTOMY Left 4/1/2020    Procedure: ROBOTIC LAPAROSCOPIC NEPHRO-URETERECTOMY;  Surgeon: Dominga Ferrara MD;  [facility];  Service: Urology    SKIN CANCER EXCISION      surface melanoma    TONSILLECTOMY      WISDOM TOOTH EXTRACTION           Current Outpatient Medications:     acetaminophen (TYLENOL) 500 mg tablet, Take 2 tablets (1,000 mg total) by mouth every 8 (eight) hours, Disp: , Rfl:     allopurinol (ZYLOPRIM) 300 mg tablet, take 1 tablet by mouth once daily, Disp: 30 tablet, Rfl: 3    ALPRAZolam (XANAX) 0 25 mg tablet, Take 1 tablet (0 25 mg total) by mouth daily at bedtime as needed for anxiety (Patient not taking: No sig reported), Disp: 30 tablet, Rfl: 0    apixaban (Eliquis) 5 mg, Take 1 tablet (5 mg total) by mouth 2 (two) times a day For afib, Disp: 60 tablet, Rfl: 11    atorvastatin (LIPITOR) 80 mg tablet, Take 80 mg by mouth every other day evening, Disp: , Rfl:     colchicine (COLCRYS) 0 6 mg tablet, take 1 tablet by mouth twice a day if needed for FOOT PAIN, Disp: , Rfl:     docusate sodium (COLACE) 250 MG capsule, Take 1 capsule (250 mg total) by mouth daily, Disp: 60 capsule, Rfl: 6    DULoxetine (CYMBALTA) 60 mg delayed release capsule, take 1 capsule by mouth once daily, Disp: 30 capsule, Rfl: 1    folic acid (FOLVITE) 1 mg tablet, take 1 tablet by mouth once daily, Disp: 90 tablet, Rfl: 4    Magnesium 250 MG TABS, 1 tablet 2 (two) times a day Taking 500mg in the morning and 500mg at night, Disp: , Rfl:     metoprolol tartrate (LOPRESSOR) 100 mg tablet, Take 50 mg by mouth daily, Disp: , Rfl:     naloxone (NARCAN) 4 mg/0 1 mL nasal spray, Administer 1 spray into a nostril   If breathing does not return to normal or if breathing difficulty resumes after 2-3 minutes, give another dose in the other nostril using a new spray , Disp: 1 each, Rfl: 1    nystatin (MYCOSTATIN) cream, Apply topically 2 (two) times a day, Disp: 30 g, Rfl: 0    senna (SENOKOT) 8 6 mg, Take 1 tablet (8 6 mg total) by mouth daily at bedtime, Disp: 30 each, Rfl: 0    tadalafil (CIALIS) 20 MG tablet, Take one tablet by mouth one hour before sexual activity, Disp: 15 tablet, Rfl: 3    tamsulosin (FLOMAX) 0 4 mg, take 1 capsule by mouth once daily with dinner, Disp: 30 capsule, Rfl: 1    traMADol (Ultram) 50 mg tablet, Take 1 PO Q8-12 hours PRN pain, Disp: 75 tablet, Rfl: 0    VITAMIN D PO, Take 1,000 Units by mouth daily , Disp: , Rfl:     zolpidem (AMBIEN) 5 mg tablet, Take 1 tablet (5 mg total) by mouth daily at bedtime as needed for sleep, Disp: 30 tablet, Rfl: 1  No current facility-administered medications for this encounter  Allergies   Allergen Reactions    Poison Ivy Extract Rash       Physical Exam:   Vitals:    07/13/22 1117   BP: 156/89   Pulse: 67   Resp: 20   Temp: 97 5 °F (36 4 °C)   SpO2: 94%     General: Awake, Alert, Oriented x 3, Mood and affect appropriate  Respiratory: Respirations even and unlabored  Cardiovascular: Peripheral pulses intact; no edema  Musculoskeletal Exam:  Tenderness to palpation over bilateral SI joints    ASA Score: 3         Assessment:   1   Sacroiliitis (HCC)        Plan: B/L SIJ injections

## 2022-07-13 NOTE — DISCHARGE INSTRUCTIONS
Steroid Joint Injection   WHAT YOU NEED TO KNOW:   A steroid joint injection is a procedure to inject steroid medicine into a joint  Steroid medicine decreases pain and inflammation  The injection may also contain an anesthetic (numbing medicine) to decrease pain  It may be done to treat conditions such as arthritis, gout, or carpal tunnel syndrome  The injections may be given in your knee, ankle, shoulder, elbow, wrist, ankle or sacroiliac joint  Do not apply heat to any area that is numb  If you have discomfort or soreness at the injection site, you may apply ice today, 20 minutes on and 20 minutes off  Tomorrow you may use ice or warm, moist heat  Do not apply ice or heat directly to the skin  You may have an increase or change in the discomfort for 36-48 hours after your treatment  Apply ice and continue with any pain medicine you have been prescribed  Do not do anything strenuous today  You may shower, but no tub baths or hot tubs today  You may resume your normal activities tomorrow, but do not overdo it  Resume normal activities slowly when you are feeling better  If you experience redness, drainage or swelling at the injection site, or if you develop a fever above 100 degrees, please call [facility] at [phone] or go to the Emergency Room  Continue to take all routine medicines prescribed by your primary care physician unless otherwise instructed by our staff  Most blood thinners should be started again according to your regularly scheduled dosing  If you have any questions, please give our office a call  As no general anesthesia was used in today's procedure, you should not experience any side effects related to anesthesia  If you have a problem specifically related to your procedure, please call our office at [phone]  Problems not related to your procedure should be directed to your primary care physician

## 2022-07-13 NOTE — TELEPHONE ENCOUNTER
S/w the patient and he stated it was tramadol  He stated the pain was so bad yesterday that he took two of the tramadol  Looks like it was ordered on 6/20/22 and his next ovs is on 7/18 and he just had his procedure today  Please advise   thanks

## 2022-07-14 NOTE — TELEPHONE ENCOUNTER
S/w pt's wife Daphney Price as per release of information document  Per wife pt is taking his Eliquis and his BP today was 131/77

## 2022-07-15 ENCOUNTER — HOSPITAL ENCOUNTER (OUTPATIENT)
Dept: INFUSION CENTER | Facility: HOSPITAL | Age: 72
Discharge: HOME | End: 2022-07-15
Attending: INTERNAL MEDICINE
Payer: MEDICARE

## 2022-07-15 VITALS
HEIGHT: 69 IN | BODY MASS INDEX: 28.67 KG/M2 | OXYGEN SATURATION: 98 % | TEMPERATURE: 97.8 F | SYSTOLIC BLOOD PRESSURE: 138 MMHG | DIASTOLIC BLOOD PRESSURE: 90 MMHG | RESPIRATION RATE: 18 BRPM | HEART RATE: 59 BPM | WEIGHT: 193.56 LBS

## 2022-07-15 DIAGNOSIS — C68.9 UROTHELIAL CANCER (HCC): Primary | ICD-10-CM

## 2022-07-15 DIAGNOSIS — C79.10 METASTATIC UROTHELIAL CARCINOMA (HCC): Primary | ICD-10-CM

## 2022-07-15 DIAGNOSIS — C68.9 UROTHELIAL CANCER (HCC): ICD-10-CM

## 2022-07-15 DIAGNOSIS — C65.2 CANCER OF LEFT RENAL PELVIS (HCC): ICD-10-CM

## 2022-07-15 DIAGNOSIS — C79.10 METASTATIC UROTHELIAL CARCINOMA (HCC): ICD-10-CM

## 2022-07-15 PROCEDURE — 96367 TX/PROPH/DG ADDL SEQ IV INF: CPT

## 2022-07-15 PROCEDURE — 96413 CHEMO IV INFUSION 1 HR: CPT

## 2022-07-15 RX ORDER — ACETAMINOPHEN 325 MG/1
650 TABLET ORAL ONCE
Status: DISCONTINUED | OUTPATIENT
Start: 2022-07-15 | End: 2022-07-19 | Stop reason: HOSPADM

## 2022-07-15 RX ORDER — SODIUM CHLORIDE 9 MG/ML
20 INJECTION, SOLUTION INTRAVENOUS ONCE
Status: COMPLETED | OUTPATIENT
Start: 2022-07-15 | End: 2022-07-15

## 2022-07-15 RX ADMIN — SODIUM CHLORIDE 20 ML/HR: 0.9 INJECTION, SOLUTION INTRAVENOUS at 11:19

## 2022-07-15 RX ADMIN — DIPHENHYDRAMINE HYDROCHLORIDE 25 MG: 50 INJECTION INTRAMUSCULAR; INTRAVENOUS at 11:21

## 2022-07-15 RX ADMIN — ENFORTUMAB VEDOTIN 90 MG: 30 INJECTION, POWDER, LYOPHILIZED, FOR SOLUTION INTRAVENOUS at 12:23

## 2022-07-15 RX ADMIN — DEXAMETHASONE SODIUM PHOSPHATE: 10 INJECTION, SOLUTION INTRAMUSCULAR; INTRAVENOUS at 11:43

## 2022-07-18 ENCOUNTER — OFFICE VISIT (OUTPATIENT)
Dept: PAIN MEDICINE | Facility: CLINIC | Age: 72
End: 2022-07-18
Payer: MEDICARE

## 2022-07-18 ENCOUNTER — APPOINTMENT (OUTPATIENT)
Dept: LAB | Facility: CLINIC | Age: 72
End: 2022-07-18
Payer: MEDICARE

## 2022-07-18 VITALS
HEART RATE: 50 BPM | SYSTOLIC BLOOD PRESSURE: 149 MMHG | HEIGHT: 69 IN | BODY MASS INDEX: 28.73 KG/M2 | WEIGHT: 194 LBS | DIASTOLIC BLOOD PRESSURE: 91 MMHG

## 2022-07-18 DIAGNOSIS — C65.2 CANCER OF LEFT RENAL PELVIS (HCC): ICD-10-CM

## 2022-07-18 DIAGNOSIS — G89.29 CHRONIC BILATERAL LOW BACK PAIN WITHOUT SCIATICA: ICD-10-CM

## 2022-07-18 DIAGNOSIS — M54.16 LUMBAR RADICULOPATHY: ICD-10-CM

## 2022-07-18 DIAGNOSIS — M46.1 SACROILIITIS (HCC): ICD-10-CM

## 2022-07-18 DIAGNOSIS — M54.50 CHRONIC BILATERAL LOW BACK PAIN WITHOUT SCIATICA: ICD-10-CM

## 2022-07-18 DIAGNOSIS — G62.0 CHEMOTHERAPY-INDUCED NEUROPATHY (HCC): ICD-10-CM

## 2022-07-18 DIAGNOSIS — G62.9 NEUROPATHY: ICD-10-CM

## 2022-07-18 DIAGNOSIS — G89.4 CHRONIC PAIN DISORDER: Primary | ICD-10-CM

## 2022-07-18 DIAGNOSIS — C68.9 UROTHELIAL CANCER (HCC): ICD-10-CM

## 2022-07-18 DIAGNOSIS — C79.10 METASTATIC UROTHELIAL CARCINOMA (HCC): ICD-10-CM

## 2022-07-18 DIAGNOSIS — F11.20 UNCOMPLICATED OPIOID DEPENDENCE (HCC): ICD-10-CM

## 2022-07-18 DIAGNOSIS — T45.1X5A CHEMOTHERAPY-INDUCED NEUROPATHY (HCC): ICD-10-CM

## 2022-07-18 DIAGNOSIS — Z79.891 ENCOUNTER FOR LONG-TERM OPIATE ANALGESIC USE: ICD-10-CM

## 2022-07-18 DIAGNOSIS — R19.7 DIARRHEA OF PRESUMED INFECTIOUS ORIGIN: ICD-10-CM

## 2022-07-18 DIAGNOSIS — M51.26 LUMBAR DISC HERNIATION: ICD-10-CM

## 2022-07-18 DIAGNOSIS — M48.061 SPINAL STENOSIS OF LUMBAR REGION, UNSPECIFIED WHETHER NEUROGENIC CLAUDICATION PRESENT: ICD-10-CM

## 2022-07-18 PROBLEM — Z79.899 MEDICAL MARIJUANA USE: Status: RESOLVED | Noted: 2020-11-16 | Resolved: 2022-07-18

## 2022-07-18 LAB — HEMOCCULT STL QL IA: NEGATIVE

## 2022-07-18 PROCEDURE — 99214 OFFICE O/P EST MOD 30 MIN: CPT | Performed by: NURSE PRACTITIONER

## 2022-07-18 PROCEDURE — 87505 NFCT AGENT DETECTION GI: CPT

## 2022-07-18 PROCEDURE — 89055 LEUKOCYTE ASSESSMENT FECAL: CPT

## 2022-07-18 PROCEDURE — 87209 SMEAR COMPLEX STAIN: CPT

## 2022-07-18 PROCEDURE — G0328 FECAL BLOOD SCRN IMMUNOASSAY: HCPCS

## 2022-07-18 PROCEDURE — 87177 OVA AND PARASITES SMEARS: CPT

## 2022-07-18 RX ORDER — TRAMADOL HYDROCHLORIDE 50 MG/1
TABLET ORAL
Qty: 100 TABLET | Refills: 1 | Status: SHIPPED | OUTPATIENT
Start: 2022-07-18 | End: 2022-09-16 | Stop reason: SDUPTHER

## 2022-07-18 NOTE — PROGRESS NOTES
Assessment:  1  Chronic pain disorder    2  Chronic bilateral low back pain without sciatica    3  Spinal stenosis of lumbar region, unspecified whether neurogenic claudication present    4  Sacroiliitis ([facility] [address] )    5  Lumbar radiculopathy    6  Chemotherapy-induced neuropathy ([facility] [address] )    7  Neuropathy    8  Lumbar disc herniation    9  Encounter for long-term opiate analgesic use    10  Uncomplicated opioid dependence ([facility] [address] )        Plan:  1  I will increase Tramadol 50 mg to q 8 hours p r n  pain, with patient being able to take a 4th tablet on severe pain days #100 tablets for 30 days  Patient was provided with 2 months' worth of prescriptions with a do not fill date of today with 1 refill  [place] Prescription Drug Monitoring Program report was reviewed and was appropriate     There are risks associated with opioid medications, including dependence, addiction and tolerance  The patient understands and agrees to use these medications only as prescribed  Potential side effects of the medications include, but are not limited to, constipation, drowsiness, addiction, impaired judgment and risk of fatal overdose if not taken as prescribed  The patient was warned against driving while taking sedation medications  Sharing medications is a felony  At this point in time, the patient is showing no signs of addiction, abuse, diversion or suicidal ideation  2  Patient may continue duloxetine as prescribed  3  Patient will continue to follow with Hematology Oncology   4  Patient will continue with his home exercise program  5  Patient may continue Tylenol p r n  Logan Manifold I will avoid NSAIDs secondary to anticoagulation  6  Patient will follow up in 4 weeks or sooner if needed    History of Present Illness:     The patient is a 67 y o  male with a history of AFib on anticoagulation, urothelial carcinoma status post left nephrectomy and ureterectomy last seen on 6/20/22 who presents for a follow up office visit in regards to chronic axial low back pain that is nonradiating into the lower extremities  Patient is status post bilateral L2-5 RFA completed in April 2022 without any relief  He is now status post bilateral SI joint injections on July 13, 2022  He is not had any relief at this point however he is only 5 days post injection  He is taking tramadol 50 mg 1 tablet Q 8-12 hours p r n  pain and duloxetine 60 mg daily with a 50% improvement of his pain without side effects  He does notice that there is days where he mostly has to take 3 pills a day of Tramadol, and there has even been days where he wishes he had a 4th pill  He does find some relief as well as Tylenol  He is unable to take NSAIDs secondary to anticoagulation    The patient rates his pain a 4/10 on the numerical pain rating scale  He states his pain is constant in nature and bothersome throughout the day  He characterizes the pain as dull aching    Pain Contract Signed: 6/20/22  Last Urine Drug Screen: 5/25/22  D[place]/SOSARAH 6/20/22  Last tramadol per pt  7/18/22    I have personally reviewed and/or updated the patient's past medical history, past surgical history, family history, social history, current medications, allergies, and vital signs today  Review of Systems:    Review of Systems   Respiratory: Negative for shortness of breath  Cardiovascular: Negative for chest pain  Gastrointestinal: Negative for constipation, diarrhea, nausea and vomiting  Musculoskeletal: Negative for arthralgias, gait problem, joint swelling and myalgias  Skin: Negative for rash  Neurological: Negative for dizziness, seizures and weakness  All other systems reviewed and are negative          Past Medical History:   Diagnosis Date    Arthritis     Bladder cancer     Cancer ([facility] [address] )     skin melanoma;basal cell    Chronic pain disorder     from arthritis    Colon polyp     Does use hearing aid     bilat    Dry eyes, bilateral     GERD (gastroesophageal reflux disease)     History of kidney stones 10/2019    History of partial knee replacement     bilat    History of vertigo     Hyperlipidemia     Hypertension     Infusion extravasation of chemotherapy vesicant 11/2021    Kidney lesion     Lumbar disc disorder     compression of vertebrae L4-5-6    Muscle weakness     left hip area    Renal mass     left    Right ankle injury 03/05/2020    missed step of ladder     Right ankle pain     Shortness of breath     with activity    Tinnitus     Urothelial cancer     left    Wears glasses        Past Surgical History:   Procedure Laterality Date    COLONOSCOPY      CYSTOSCOPY Left 4/1/2020    Procedure: CYSTOSCOPY; URETERAL CATHETER PLACEMENT;  Surgeon: Dimitri Denney MD;  [facility];  Service: Urology    CYSTOSCOPY  05/11/2020    CYSTOSCOPY  06/04/2021    FL CYSTOGRAM  4/13/2020    FL RETROGRADE PYELOGRAM  1/17/2020    HERNIA REPAIR      umbilical with mesh    INGUINAL HERNIA REPAIR Right     with mesh    IR PORT PLACEMENT  4/30/2020    JOINT REPLACEMENT Bilateral     partials knee    LUMBAR EPIDURAL INJECTION      MT CYSTOURETHROSCOPY,URETER CATHETER Bilateral 1/17/2020    Procedure: CYSTOSCOPY; RIGHT RETROGRADE PYELOGRAM WITH RIGHT URETERAL CYTOLOGY SAMPLING; LEFT URETEROSCOPY WITH RENAL PELVIS BIOPSY AND LEFT STENT PLACEMENT;  Surgeon: Dimitri Denney MD;  [facility];  Service: Urology    MT NEPHRECTOMY, W/PART   URETECTOMY Left 4/1/2020    Procedure: ROBOTIC LAPAROSCOPIC NEPHRO-URETERECTOMY;  Surgeon: Dimitri Denney MD;  [facility];  Service: Urology    SKIN CANCER EXCISION      surface melanoma    TONSILLECTOMY      WISDOM TOOTH EXTRACTION         Family History   Problem Relation Age of Onset    Liver cancer Father        Social History     Occupational History    Not on file   Tobacco Use    Smoking status: Former Smoker     Types: Cigarettes     Quit date: 1972     Years since quittin 5    Smokeless tobacco: Never Used   Vaping Use    Vaping Use: Never used   Substance and Sexual Activity    Alcohol use: Yes     Alcohol/week: 17 0 standard drinks     Types: 7 Glasses of wine, 10 Cans of beer per week     Comment: socially    Drug use: Never    Sexual activity: Not Currently         Current Outpatient Medications:     traMADol (Ultram) 50 mg tablet, Take 1 tab PO Q8 hours PRN pain, on severe days can take a 4th tablet , Disp: 100 tablet, Rfl: 1    acetaminophen (TYLENOL) 500 mg tablet, Take 2 tablets (1,000 mg total) by mouth every 8 (eight) hours, Disp: , Rfl:     allopurinol (ZYLOPRIM) 300 mg tablet, take 1 tablet by mouth once daily, Disp: 30 tablet, Rfl: 3    ALPRAZolam (XANAX) 0 25 mg tablet, Take 1 tablet (0 25 mg total) by mouth daily at bedtime as needed for anxiety (Patient not taking: No sig reported), Disp: 30 tablet, Rfl: 0    apixaban (Eliquis) 5 mg, Take 1 tablet (5 mg total) by mouth 2 (two) times a day For afib, Disp: 60 tablet, Rfl: 11    atorvastatin (LIPITOR) 80 mg tablet, Take 80 mg by mouth every other day evening, Disp: , Rfl:     colchicine (COLCRYS) 0 6 mg tablet, take 1 tablet by mouth twice a day if needed for FOOT PAIN, Disp: , Rfl:     docusate sodium (COLACE) 250 MG capsule, Take 1 capsule (250 mg total) by mouth daily, Disp: 60 capsule, Rfl: 6    DULoxetine (CYMBALTA) 60 mg delayed release capsule, take 1 capsule by mouth once daily, Disp: 30 capsule, Rfl: 1    folic acid (FOLVITE) 1 mg tablet, take 1 tablet by mouth once daily, Disp: 90 tablet, Rfl: 4    Magnesium 250 MG TABS, 1 tablet 2 (two) times a day Taking 500mg in the morning and 500mg at night, Disp: , Rfl:     metoprolol tartrate (LOPRESSOR) 100 mg tablet, Take 50 mg by mouth daily, Disp: , Rfl:     naloxone (NARCAN) 4 mg/0 1 mL nasal spray, Administer 1 spray into a nostril   If breathing does not return to normal or if breathing difficulty resumes after 2-3 minutes, give another dose in the other nostril using a new spray , Disp: 1 each, Rfl: 1    nystatin (MYCOSTATIN) cream, Apply topically 2 (two) times a day, Disp: 30 g, Rfl: 0    senna (SENOKOT) 8 6 mg, Take 1 tablet (8 6 mg total) by mouth daily at bedtime, Disp: 30 each, Rfl: 0    tadalafil (CIALIS) 20 MG tablet, Take one tablet by mouth one hour before sexual activity, Disp: 15 tablet, Rfl: 3    tamsulosin (FLOMAX) 0 4 mg, take 1 capsule by mouth once daily with dinner, Disp: 30 capsule, Rfl: 1    VITAMIN D PO, Take 1,000 Units by mouth daily , Disp: , Rfl:     zolpidem (AMBIEN) 5 mg tablet, Take 1 tablet (5 mg total) by mouth daily at bedtime as needed for sleep, Disp: 30 tablet, Rfl: 1  No current facility-administered medications for this visit  Facility-Administered Medications Ordered in Other Visits:     acetaminophen (TYLENOL) tablet 650 mg, 650 mg, Oral, Once, Adan Lucas MD    Allergies   Allergen Reactions    Poison Ivy Extract Rash       Physical Exam:    /91   Pulse (!) 50   Ht 5' 9" (1 753 m)   Wt 88 kg (194 lb)   BMI 28 65 kg/m²     Constitutional:normal, well developed, well nourished, alert, in no distress and non-toxic and no overt pain behavior  Eyes:anicteric  HEENT:grossly intact  Neck:supple, symmetric, trachea midline and no masses   Pulmonary:even and unlabored  Cardiovascular:No edema or pitting edema present  Skin:Normal without rashes or lesions and well hydrated  Psychiatric:Mood and affect appropriate  Neurologic:Cranial Nerves II-XII grossly intact  Musculoskeletal:antalgic gait, ambulates with a cane      Imaging  No orders to display   MRI LUMBAR SPINE WITH AND WITHOUT CONTRAST   INDICATION: M54 50: Low back pain, unspecified   G89 29: Other chronic pain  COMPARISON: PET/CT dated 3/8/2022 and prior MR dated 12/2/2019  TECHNIQUE: Sagittal T1, sagittal T2, sagittal inversion recovery, axial T1 and axial T2, coronal T2   Sagittal and axial T1 postcontrast    IV Contrast: 9 mL of Gadobutrol injection (SINGLE-DOSE)   IMAGE QUALITY: Diagnostic   FINDINGS:   VERTEBRAL BODIES: There are 5 lumbar type vertebral bodies  Normal alignment of the lumbar spine  No spondylolysis or spondylolisthesis  No scoliosis  No compression fracture  T12 vertebral body hemangioma noted as well as an L5 vertebral body   hemangioma  No worrisome marrow lesion  SACRUM: Normal signal within the sacrum  No evidence of insufficiency or stress fracture  DISTAL CORD AND CONUS: Normal size and signal within the distal cord and conus  Thin fatty filum terminale noted  PARASPINAL SOFT TISSUES: Left kidney is surgically absent  LOWER THORACIC DISC SPACES: Normal disc height and signal  No disc herniation, canal stenosis or foraminal narrowing  LUMBAR DISC SPACES:   L1-L2: Opposed Schmorl's nodes  Minimal bulge without significant stenosis  L2-L3: Disc desiccation with loss of disc height  Minor bulge and facet hypertrophy without stenosis  L3-L4: Disc desiccation with loss of disc height  Diffuse disc bulge with facet hypertrophy resulting in severe central canal stenosis and inducing redundant cauda equina roots  Mild bilateral foraminal stenosis demonstrated  CSF effacement within   the sac as well as lipomatous narrowing of the sac  L4-L5: Disc desiccation with prominent loss of disc height  Disc osteophyte complex and facet hypertrophy with severe central canal and subarticular recess stenosis  Moderate bilateral foraminal narrowing evident  CSF effacement within the sac as   well as lipomatous narrowing of the sac  L5-S1: Disc desiccation with mild loss of disc height  Disc osteophyte complex and facet hypertrophy resulting in mild bilateral foraminal narrowing  Central canal remains patent  POSTCONTRAST IMAGING: No abnormal enhancement  IMPRESSION:   1  Multifactorial spondylotic disease most pronounced at the L3-4 and L4-5 levels with severe central canal encroachment noted   Overall findings are similar to prior examination  2  No evidence of metastatic disease in a patient status post left nephrectomy  No orders of the defined types were placed in this encounter

## 2022-07-19 ENCOUNTER — HOSPITAL ENCOUNTER (OUTPATIENT)
Dept: INFUSION CENTER | Facility: HOSPITAL | Age: 72
Discharge: HOME | End: 2022-07-19
Payer: MEDICARE

## 2022-07-19 DIAGNOSIS — M25.562 LEFT KNEE PAIN, UNSPECIFIED CHRONICITY: ICD-10-CM

## 2022-07-19 DIAGNOSIS — E83.42 HYPOMAGNESEMIA: ICD-10-CM

## 2022-07-19 DIAGNOSIS — E86.0 DEHYDRATION: ICD-10-CM

## 2022-07-19 DIAGNOSIS — C79.89 SECONDARY MALIGNANT NEOPLASM OF MUSCLE OF ABDOMEN (HCC): Primary | ICD-10-CM

## 2022-07-19 LAB
ALBUMIN SERPL BCP-MCNC: 4.1 G/DL (ref 3.5–5)
ALP SERPL-CCNC: 84 U/L (ref 34–104)
ALT SERPL W P-5'-P-CCNC: 55 U/L (ref 7–52)
ANION GAP SERPL CALCULATED.3IONS-SCNC: 8 MMOL/L (ref 4–13)
AST SERPL W P-5'-P-CCNC: 29 U/L (ref 13–39)
BASOPHILS # BLD AUTO: 0.02 THOUSANDS/ΜL (ref 0–0.1)
BASOPHILS NFR BLD AUTO: 0 % (ref 0–1)
BILIRUB SERPL-MCNC: 0.98 MG/DL (ref 0.2–1)
BUN SERPL-MCNC: 20 MG/DL (ref 5–25)
C DIFF TOX GENS STL QL NAA+PROBE: NEGATIVE
CALCIUM SERPL-MCNC: 9.6 MG/DL (ref 8.4–10.2)
CAMPYLOBACTER DNA SPEC NAA+PROBE: NORMAL
CHLORIDE SERPL-SCNC: 100 MMOL/L (ref 96–108)
CO2 SERPL-SCNC: 31 MMOL/L (ref 21–32)
CREAT SERPL-MCNC: 1.18 MG/DL (ref 0.6–1.3)
EOSINOPHIL # BLD AUTO: 0.01 THOUSAND/ΜL (ref 0–0.61)
EOSINOPHIL NFR BLD AUTO: 0 % (ref 0–6)
ERYTHROCYTE [DISTWIDTH] IN BLOOD BY AUTOMATED COUNT: 15 % (ref 11.6–15.1)
GFR SERPL CREATININE-BSD FRML MDRD: 60 ML/MIN/1.73SQ M
GLUCOSE SERPL-MCNC: 99 MG/DL (ref 65–140)
HCT VFR BLD AUTO: 43.3 % (ref 36.5–49.3)
HGB BLD-MCNC: 14.1 G/DL (ref 12–17)
IMM GRANULOCYTES # BLD AUTO: 0.03 THOUSAND/UL (ref 0–0.2)
IMM GRANULOCYTES NFR BLD AUTO: 0 % (ref 0–2)
LYMPHOCYTES # BLD AUTO: 1.71 THOUSANDS/ΜL (ref 0.6–4.47)
LYMPHOCYTES NFR BLD AUTO: 25 % (ref 14–44)
MAGNESIUM SERPL-MCNC: 2.1 MG/DL (ref 1.9–2.7)
MCH RBC QN AUTO: 31.4 PG (ref 26.8–34.3)
MCHC RBC AUTO-ENTMCNC: 32.6 G/DL (ref 31.4–37.4)
MCV RBC AUTO: 96 FL (ref 82–98)
MONOCYTES # BLD AUTO: 0.86 THOUSAND/ΜL (ref 0.17–1.22)
MONOCYTES NFR BLD AUTO: 12 % (ref 4–12)
NEUTROPHILS # BLD AUTO: 4.29 THOUSANDS/ΜL (ref 1.85–7.62)
NEUTS SEG NFR BLD AUTO: 63 % (ref 43–75)
NRBC BLD AUTO-RTO: 0 /100 WBCS
PLATELET # BLD AUTO: 221 THOUSANDS/UL (ref 149–390)
PMV BLD AUTO: 10.1 FL (ref 8.9–12.7)
POTASSIUM SERPL-SCNC: 4.1 MMOL/L (ref 3.5–5.3)
PROT SERPL-MCNC: 6.8 G/DL (ref 6.4–8.4)
RBC # BLD AUTO: 4.49 MILLION/UL (ref 3.88–5.62)
SALMONELLA DNA SPEC QL NAA+PROBE: NORMAL
SHIGA TOXIN STX GENE SPEC NAA+PROBE: NORMAL
SHIGELLA DNA SPEC QL NAA+PROBE: NORMAL
SODIUM SERPL-SCNC: 139 MMOL/L (ref 135–147)
WBC # BLD AUTO: 6.92 THOUSAND/UL (ref 4.31–10.16)
WBC SPEC QL GRAM STN: NORMAL

## 2022-07-19 PROCEDURE — 83735 ASSAY OF MAGNESIUM: CPT | Performed by: INTERNAL MEDICINE

## 2022-07-19 PROCEDURE — 96360 HYDRATION IV INFUSION INIT: CPT

## 2022-07-19 PROCEDURE — 80053 COMPREHEN METABOLIC PANEL: CPT | Performed by: INTERNAL MEDICINE

## 2022-07-19 PROCEDURE — 85025 COMPLETE CBC W/AUTO DIFF WBC: CPT | Performed by: INTERNAL MEDICINE

## 2022-07-19 PROCEDURE — 84550 ASSAY OF BLOOD/URIC ACID: CPT

## 2022-07-19 RX ADMIN — SODIUM CHLORIDE 1000 ML: 0.9 INJECTION, SOLUTION INTRAVENOUS at 10:04

## 2022-07-19 NOTE — PROGRESS NOTES
Pt tolerated todays hydration and central lab draw without incident  Port flushed and deaccessed per [employer]  Discharged ambulatory with cane

## 2022-07-21 ENCOUNTER — OFFICE VISIT (OUTPATIENT)
Dept: HEMATOLOGY ONCOLOGY | Facility: CLINIC | Age: 72
End: 2022-07-21
Payer: MEDICARE

## 2022-07-21 VITALS
TEMPERATURE: 97.8 F | OXYGEN SATURATION: 99 % | HEART RATE: 97 BPM | WEIGHT: 191 LBS | HEIGHT: 69 IN | SYSTOLIC BLOOD PRESSURE: 140 MMHG | DIASTOLIC BLOOD PRESSURE: 80 MMHG | BODY MASS INDEX: 28.29 KG/M2 | RESPIRATION RATE: 18 BRPM

## 2022-07-21 DIAGNOSIS — M25.562 LEFT KNEE PAIN, UNSPECIFIED CHRONICITY: ICD-10-CM

## 2022-07-21 DIAGNOSIS — R63.4 WEIGHT LOSS: ICD-10-CM

## 2022-07-21 DIAGNOSIS — B37.0 THRUSH, ORAL: ICD-10-CM

## 2022-07-21 DIAGNOSIS — C79.10 METASTATIC UROTHELIAL CARCINOMA (HCC): Primary | ICD-10-CM

## 2022-07-21 DIAGNOSIS — E83.42 HYPOMAGNESEMIA: ICD-10-CM

## 2022-07-21 DIAGNOSIS — R53.1 WEAKNESS GENERALIZED: ICD-10-CM

## 2022-07-21 DIAGNOSIS — T45.1X5A CHEMOTHERAPY-INDUCED NEUROPATHY (HCC): ICD-10-CM

## 2022-07-21 DIAGNOSIS — G62.0 CHEMOTHERAPY-INDUCED NEUROPATHY (HCC): ICD-10-CM

## 2022-07-21 LAB — URATE SERPL-MCNC: 6.3 MG/DL (ref 3.5–8.5)

## 2022-07-21 PROCEDURE — 99215 OFFICE O/P EST HI 40 MIN: CPT | Performed by: NURSE PRACTITIONER

## 2022-07-21 RX ORDER — CLOTRIMAZOLE 10 MG/1
10 LOZENGE ORAL; TOPICAL
Qty: 70 TABLET | Refills: 2 | Status: SHIPPED | OUTPATIENT
Start: 2022-07-21 | End: 2022-09-13 | Stop reason: ALTCHOICE

## 2022-07-21 RX ORDER — ACETAMINOPHEN 325 MG/1
650 TABLET ORAL ONCE
Status: CANCELLED | OUTPATIENT
Start: 2022-08-05

## 2022-07-21 RX ORDER — SODIUM CHLORIDE 9 MG/ML
20 INJECTION, SOLUTION INTRAVENOUS ONCE
Status: CANCELLED | OUTPATIENT
Start: 2022-08-12

## 2022-07-21 RX ORDER — SODIUM CHLORIDE 9 MG/ML
20 INJECTION, SOLUTION INTRAVENOUS ONCE
Status: CANCELLED | OUTPATIENT
Start: 2022-08-19

## 2022-07-21 RX ORDER — SODIUM CHLORIDE 9 MG/ML
20 INJECTION, SOLUTION INTRAVENOUS ONCE
Status: CANCELLED | OUTPATIENT
Start: 2022-08-05

## 2022-07-21 RX ORDER — ACETAMINOPHEN 325 MG/1
650 TABLET ORAL ONCE
Status: CANCELLED | OUTPATIENT
Start: 2022-08-19

## 2022-07-21 RX ORDER — ACETAMINOPHEN 325 MG/1
650 TABLET ORAL ONCE
Status: CANCELLED | OUTPATIENT
Start: 2022-08-12

## 2022-07-21 NOTE — PROGRESS NOTES
Hematology/Oncology Outpatient Follow-up  Tracy Jackson 67 y o  male 1950 [id number]    Date:  7/21/2022      Assessment and Plan:  1  Metastatic urothelial carcinoma ([facility] )  Patient will be continued on his current treatment with adjusted dose Enfortumab Vedotin (Padcev) 3 weeks on with 1 week; he is due for cycle 10 day 15 tomorrow  We will continue to check his labs before each treatment  Requesting follow up again in 2 weeks or sooner should the need arise  He will continue to get additional IV hydration in the infusion center as needed  - CBC and differential; Future  - Comprehensive metabolic panel; Future  - CBC and differential; Future  - CBC and differential; Future  - CBC and differential; Standing  - Comprehensive metabolic panel; Standing  - Magnesium; Standing  - CBC and differential  - Comprehensive metabolic panel  - Magnesium  - Ambulatory referral to Physical Therapy; Future    2  Hypomagnesemia  Magnesium from this week was normal     - Magnesium; Standing  - Magnesium    3  Thrush, oral  Start patient on Mycelex troches  Encouraged him to use 4 to 5 times a day for at least 2 weeks  Be contributing factor to his taste changes and weight loss  - clotrimazole (MYCELEX) 10 mg rizwan; Take 1 tablet (10 mg total) by mouth 5 (five) times a day  Dispense: 70 tablet; Refill: 2    4  Weight loss  Patient has lost about 8-9 lb since the beginning of the month  He admits that he is not eating much at dinner as he was in the past   Mainly due to taste changes and food not tasting well  His oral thrush may be contributing factor which will be treated  Encouraged him to drink Ensure/boost drink at dinner if he is not getting good nutrition prevent further weight loss  5  Chemotherapy-induced neuropathy ([facility] )  Patient continues to have chemo induced peripheral neuropathy  Is significant but is not getting any worse  He will continue with Cymbalta 60 mg daily    Did discuss considering physical therapy due to his ongoing fatigue, weakness with neuropathy and back/knee pain  Is very interested in this  Referral placed  - Ambulatory referral to Physical Therapy; Future    6  Weakness generalized  - Ambulatory referral to Physical Therapy; Future    7  Left knee pain, unspecified chronicity  Patient started to have left knee pain after his recent fall  Was evaluated by Orthopedics who did an x-ray and did not show any significant findings  He was given an injection to his left knee last week with slight improvement; will add uric acid onto his labs however gouty attack is less likely as he has been taking allopurinol and colchicine     - Uric acid; Future  - Ambulatory referral to Physical Therapy; Future    HPI:  Patient presents today for a follow-up visit accompanied by his wife  He states that he is started to have pain again  Seen by his pain management team and had another injection last week  He is starting to notice some improvement on the left side still with pain on the right  He has a large abrasion on his left knee but she sustained recently with a fall  The fall he has been having pain in the left knee  Has a history of bilateral knee replacement  Seen by his orthopedic specialist who did an x-ray without any issues with his implant or any significant acute findings  He also received an injection of the left knee which slightly improved with the pain  He does report that he has been having loose stools at times with a lot of gas; had an extensive workup done/stool tests which came back negative  Also reports study is somewhat concerned about losing weight; has lost about 8-9 lb since the beginning of the month  His wife feels that his be a contributing factor  He stopped drinking beer which may or may not be contributing factor to his weight loss  Patient states that he is eating his usual breakfast, light lunch but is not eating dinner as he usually does  He feels that his appetite and decreased nutrition is due to taste changes he has been experiencing  He continues to have neuropathy in his hands and feet but no change; is taking Cymbalta 60 mg daily for this  His most recent laboratory studies from 07/19/2022 showed normal CBC hemoglobin 14 1  Creatinine 1 18, GFR 60, ALT slightly above average 55 remaining metabolic panel is appropriate  Magnesium is normal 2 1  Oncology History Overview Note   Patient has a history of hypertension, hyperlipidemia, chronic lower back pain  He had an MRI of the lower spine for further evaluation of his chronic lower back pain  The MRI on 12/02/2019 revealed Multiple left renal masses to include a left lower pole cyst and a rounded structure in the left upper pole which may also represent cyst   Left upper pole renal masses approximately 3 cm in greatest linear dimension  A CT with renal protocol was done on 12/12/2019 which also showed the infiltrating lesion in the upper pole of the left kidney measuring about the 3 5 cm in greatest dimension without any hint of retroperitoneal lymphadenopathy  A CT scan of the abdomen pelvis on 01/14/2020 showed the same findings with the mild hydronephrosis on the left  The urine cytology on 01/03/2020 showed high-grade urothelial carcinoma  A cystoscopy was then done, a biopsy was taken from the left renal pelvic region which showed high-grade urothelial carcinoma without evidence of lamina propria invasion  The detrusor muscle/muscularis propria were not present for evaluation  The recommendation was to pursue left robotic assisted laparoscopic nephroureterectomy with bladder cuff excision which was done on 04/01/2020  The final pathology revealed;  - Invasive high grade urothelial carcinoma arising in renal pelvis  - Bladder cuff margin is negative for carcinoma and no evidence of high grade dysplasia    - Ureters with no significant pathologic abnormality  - Two benign simple cysts  - Adrenal gland is negative for malignancy  - One lymph node, negative for malignancy (0/1)  The tumor size was 4 5 cm with invasion beyond the muscularis into the periurethral fat or peripelvic fat or the renal parenchyma  The margins were uninvolved by carcinoma or carcinoma in situ  The final pathology was pT3 pN0  Patient barely tolerated 1 cycle of adjuvant chemotherapy with split dose cisplatin and gemcitabine with a lot of side effects  The treatment had to be discontinued due to significant worsening renal dysfunction 6/2020  Patient started to complain about significant abdominal/left inguinal pain around August/September 2020  Unfortunately repeat imaging revealed recurrent/metastatic disease  9/4/20 CT C/A/P- Status post left nephrectomy for resection of urothelial neoplasm  Lymphadenopathy in the left nephrectomy bed has increased since the prior examination, concerning for metastatic disease  Ill-defined hyperattenuating masslike focus in the left rectus muscle, not identified on the prior examination  This is suspicious for a metastatic lesion  A rectus hematoma is also considered  9/23/20 PET scan- 1  Multiple FDG avid lymph nodes in the retrocrural region, retroperitoneum and the left renal bed compatible with metastasis  These have progressed from recent CT  2   FDG avid mass involving the left rectus abdominal musculature compatible with metastasis which is larger from recent CT  3  Several small lymph nodes adjacent to the mid to distal esophagus with FDG uptake suspicious for metastasis  Small focus of FDG uptake also suggested in the right hilar region, metastasis is not excluded  This could be reassessed on follow-up  4   Subtle focus of FDG uptake at the T2 level on the left, nonspecific, could be related to early degenerative changes, developing metastasis is not entirely excluded    This could be reassessed on follow-up  5  Prostate is mildly prominent with heterogeneous FDG uptake  This could be inflammatory  Correlate for prostatitis  He completed palliative radiation to the left abdomen 12/3/20     His PET scan from 08/16/2021 showed progression of his disease with hypermetabolic soft tissue lesions at the level of the right shoulder and right axilla with interval progression of the retroperitoneal and mesenteric hypermetabolic metastasis  He did have the new lesion to his right upper back/shoulder which was causing significant localized pain  This excised by his surgeon 08/09/2021  Pathology confirmed metastatic high-grade carcinoma consistent with his no primary urothelial carcinoma  He received palliative radiation to his right shoulder/axilla region  Urothelial cancer ([facility] [facility] )   1/3/2020 Biopsy    Final Diagnosis    A  Urine, Clean Catch, Thin Prep:  High grade urothelial carcinoma (HGUC) - see comment  1/3/2020 Initial Diagnosis    Urothelial cancer ([address] )  T3 N0 (stage IIIA)     1/17/2020 Biopsy    Final Diagnosis    A  Ureter, Right, left renal pelvis biopsy  -Fragments of high grade urothelial carcinoma   -No evidence of lamina propria invasion   -Detrusor muscle/ muscularis propria is not present for evaluation  B  Urinary Bladder, bladder biopsy:  -Fragments of low grade papillary lesion  See note  -No evidence of invasion seen  -Unremarkable fragment of detrusor muscle seen  4/1/2020 Surgery    left robotic assisted laparoscopic nephroureterectomy with bladder cuff excision     Final Diagnosis    A  Left kidney, ureter, and bladder cuff, nephroureterectomy:  - Invasive high grade urothelial carcinoma arising in renal pelvis  - Bladder cuff margin is negative for carcinoma and no evidence of high grade dysplasia  - Ureters with no significant pathologic abnormality  - Two benign simple cysts  - Adrenal gland is negative for malignancy    - One lymph node, negative for malignancy (0/1)          5/14/2020 - 6/3/2020 Chemotherapy    CISplatin (PLATINOL) split dose 35 mg/m2 = 75 3 mg (50 % of original dose 70 mg/m2), Intravenous,   Administration: 75 3 mg (5/14/2020), 75 3 mg (5/21/2020)  gemcitabine (GEMZAR) 2,200 mg in sodium chloride 0 9 % 250 mL infusion, 2,150 2 mg (80 % of original dose 1,250 mg/m2), Intravenous,   Administration: 2,200 mg (5/14/2020), 2,200 mg (5/21/2020)    (only completed 1 cycle- d/c d/t adverse effects and worsening renal dysfunction)     10/9/2020 - 9/9/2021 Chemotherapy    pembrolizumab (KEYTRUDA) IVPB, 200 mg, Intravenous, Once, 16 of 20 cycles  Administration: 200 mg (10/9/2020), 200 mg (10/30/2020), 200 mg (11/20/2020), 200 mg (12/11/2020), 200 mg (12/31/2020), 200 mg (1/22/2021), 200 mg (2/12/2021), 200 mg (3/5/2021), 200 mg (3/26/2021), 200 mg (4/16/2021), 200 mg (5/7/2021), 200 mg (5/28/2021), 200 mg (6/18/2021), 200 mg (7/9/2021), 200 mg (7/30/2021), 200 mg (8/20/2021)     11/19/2020 - 12/3/2020 Radiation    Treatment:  Course: C1    Plan ID Energy Fractions Dose per Fraction (cGy) Dose Correction (cGy) Total Dose Delivered (cGy) Elapsed Days   L Abdomen 6X 10 / 10 300 0 3,000 14        9/10/2021 -  Chemotherapy    enfortumab vedotin-ejfv (PADCEV) IVPB, 1 25 mg/kg = 114 mg, Intravenous, Once, 10 of 16 cycles  Dose modification: 1 mg/kg (original dose 1 25 mg/kg, Cycle 7, Reason: Other (Must fill in a comment), Comment: dose reduction due to side effects ), 1 25 mg/kg (original dose 1 25 mg/kg, Cycle 7, Reason: Other (Must fill in a comment), Comment: patient wants full dose ), 1 mg/kg (original dose 1 25 mg/kg, Cycle 7, Reason: Neuropathy)  Administration: 114 mg (9/10/2021), 114 mg (9/17/2021), 110 mg (10/8/2021), 110 mg (9/24/2021), 110 mg (10/15/2021), 110 mg (11/12/2021), 110 mg (10/22/2021), 110 mg (11/19/2021), 110 mg (12/10/2021), 110 mg (11/26/2021), 110 mg (12/17/2021), 110 mg (1/7/2022), 110 mg (12/23/2021), 110 mg (1/14/2022), 90 mg (4/8/2022), 90 mg (4/15/2022), 90 mg (4/22/2022), 110 mg (1/21/2022), 110 mg (2/18/2022), 110 mg (2/25/2022), 90 mg (5/13/2022), 90 mg (5/20/2022), 90 mg (5/27/2022), 90 mg (3/4/2022), 90 mg (6/10/2022), 90 mg (6/17/2022), 90 mg (6/24/2022), 90 mg (7/8/2022), 90 mg (7/15/2022)     Cancer of left renal pelvis ([address] )   4/23/2020 Initial Diagnosis    Cancer of left renal pelvis ([facility] [facility] )     10/9/2020 - 9/9/2021 Chemotherapy    pembrolizumab (KEYTRUDA) IVPB, 200 mg, Intravenous, Once, 16 of 20 cycles  Administration: 200 mg (10/9/2020), 200 mg (10/30/2020), 200 mg (11/20/2020), 200 mg (12/11/2020), 200 mg (12/31/2020), 200 mg (1/22/2021), 200 mg (2/12/2021), 200 mg (3/5/2021), 200 mg (3/26/2021), 200 mg (4/16/2021), 200 mg (5/7/2021), 200 mg (5/28/2021), 200 mg (6/18/2021), 200 mg (7/9/2021), 200 mg (7/30/2021), 200 mg (8/20/2021)     9/10/2021 -  Chemotherapy    enfortumab vedotin-ejfv (PADCEV) IVPB, 1 25 mg/kg = 114 mg, Intravenous, Once, 10 of 16 cycles  Dose modification: 1 mg/kg (original dose 1 25 mg/kg, Cycle 7, Reason: Other (Must fill in a comment), Comment: dose reduction due to side effects ), 1 25 mg/kg (original dose 1 25 mg/kg, Cycle 7, Reason: Other (Must fill in a comment), Comment: patient wants full dose ), 1 mg/kg (original dose 1 25 mg/kg, Cycle 7, Reason: Neuropathy)  Administration: 114 mg (9/10/2021), 114 mg (9/17/2021), 110 mg (10/8/2021), 110 mg (9/24/2021), 110 mg (10/15/2021), 110 mg (11/12/2021), 110 mg (10/22/2021), 110 mg (11/19/2021), 110 mg (12/10/2021), 110 mg (11/26/2021), 110 mg (12/17/2021), 110 mg (1/7/2022), 110 mg (12/23/2021), 110 mg (1/14/2022), 90 mg (4/8/2022), 90 mg (4/15/2022), 90 mg (4/22/2022), 110 mg (1/21/2022), 110 mg (2/18/2022), 110 mg (2/25/2022), 90 mg (5/13/2022), 90 mg (5/20/2022), 90 mg (5/27/2022), 90 mg (3/4/2022), 90 mg (6/10/2022), 90 mg (6/17/2022), 90 mg (6/24/2022), 90 mg (7/8/2022), 90 mg (7/15/2022)      Surgery       Metastatic urothelial carcinoma ([facility] ) 8/9/2021 Initial Diagnosis    Metastatic urothelial carcinoma ([facility] [facility] )     9/8/2021 - 9/21/2021 Radiation    Treatment:  Course: C2    Plan ID Energy Fractions Dose per Fraction (cGy) Dose Correction (cGy) Total Dose Delivered (cGy) Elapsed Days   R Axil_Shl # 6X 10 / 10 300 0 3,000 13      Treatment dates:  C2: 9/8/2021 - 9/21/2021     9/10/2021 -  Chemotherapy    enfortumab vedotin-ejfv (PADCEV) IVPB, 1 25 mg/kg = 114 mg, Intravenous, Once, 10 of 16 cycles  Dose modification: 1 mg/kg (original dose 1 25 mg/kg, Cycle 7, Reason: Other (Must fill in a comment), Comment: dose reduction due to side effects ), 1 25 mg/kg (original dose 1 25 mg/kg, Cycle 7, Reason: Other (Must fill in a comment), Comment: patient wants full dose ), 1 mg/kg (original dose 1 25 mg/kg, Cycle 7, Reason: Neuropathy)  Administration: 114 mg (9/10/2021), 114 mg (9/17/2021), 110 mg (10/8/2021), 110 mg (9/24/2021), 110 mg (10/15/2021), 110 mg (11/12/2021), 110 mg (10/22/2021), 110 mg (11/19/2021), 110 mg (12/10/2021), 110 mg (11/26/2021), 110 mg (12/17/2021), 110 mg (1/7/2022), 110 mg (12/23/2021), 110 mg (1/14/2022), 90 mg (4/8/2022), 90 mg (4/15/2022), 90 mg (4/22/2022), 110 mg (1/21/2022), 110 mg (2/18/2022), 110 mg (2/25/2022), 90 mg (5/13/2022), 90 mg (5/20/2022), 90 mg (5/27/2022), 90 mg (3/4/2022), 90 mg (6/10/2022), 90 mg (6/17/2022), 90 mg (6/24/2022), 90 mg (7/8/2022), 90 mg (7/15/2022)         Interval history:    ROS: Review of Systems   Constitutional: Positive for activity change, appetite change and fatigue  Negative for chills, fever and unexpected weight change  HENT: Positive for hearing loss  Negative for congestion, mouth sores, nosebleeds, sore throat and trouble swallowing  Eyes: Negative  Respiratory: Positive for shortness of breath (exertional)  Negative for cough and chest tightness  Cardiovascular: Negative for chest pain, palpitations and leg swelling     Gastrointestinal: Positive for diarrhea (at times- loose not watery)  Negative for abdominal distention, abdominal pain, blood in stool, constipation, nausea and vomiting  Genitourinary: Negative for difficulty urinating, dysuria, frequency, hematuria and urgency  Musculoskeletal: Positive for arthralgias, back pain, gait problem and myalgias  Negative for joint swelling  Skin: Negative for color change, pallor and rash  Neurological: Positive for numbness and headaches (mild)  Negative for dizziness and light-headedness  Hematological: Negative for adenopathy  Psychiatric/Behavioral: Negative for dysphoric mood and sleep disturbance  The patient is not nervous/anxious          Past Medical History:   Diagnosis Date    Arthritis     Bladder cancer     Cancer ([address] )     skin melanoma;basal cell    Chronic pain disorder     from arthritis    Colon polyp     Does use hearing aid     bilat    Dry eyes, bilateral     GERD (gastroesophageal reflux disease)     History of kidney stones 10/2019    History of partial knee replacement     bilat    History of vertigo     Hyperlipidemia     Hypertension     Infusion extravasation of chemotherapy vesicant 11/2021    Kidney lesion     Lumbar disc disorder     compression of vertebrae L4-5-6    Muscle weakness     left hip area    Renal mass     left    Right ankle injury 03/05/2020    missed step of ladder     Right ankle pain     Shortness of breath     with activity    Tinnitus     Urothelial cancer     left    Wears glasses        Past Surgical History:   Procedure Laterality Date    COLONOSCOPY      CYSTOSCOPY Left 4/1/2020    Procedure: CYSTOSCOPY; URETERAL CATHETER PLACEMENT;  Surgeon: Charly Chavarria MD;  Location[facility];  Service: Urology    CYSTOSCOPY  05/11/2020    CYSTOSCOPY  06/04/2021    FL CYSTOGRAM  4/13/2020    FL RETROGRADE PYELOGRAM  1/17/2020    HERNIA REPAIR      umbilical with mesh    INGUINAL HERNIA REPAIR Right     with mesh    IR PORT PLACEMENT 2020    JOINT REPLACEMENT Bilateral     partials knee    LUMBAR EPIDURAL INJECTION      UT CYSTOURETHROSCOPY,URETER CATHETER Bilateral 2020    Procedure: CYSTOSCOPY; RIGHT RETROGRADE PYELOGRAM WITH RIGHT URETERAL CYTOLOGY SAMPLING; LEFT URETEROSCOPY WITH RENAL PELVIS BIOPSY AND LEFT STENT PLACEMENT;  Surgeon: Fausto Wick MD;  [facility];  Service: Urology    UT NEPHRECTOMY, W/PART  URETECTOMY Left 2020    Procedure: ROBOTIC LAPAROSCOPIC NEPHRO-URETERECTOMY;  Surgeon: Fausto Wick MD;  Location[facility];  Service: Urology    SKIN CANCER EXCISION      surface melanoma    TONSILLECTOMY      WISDOM TOOTH EXTRACTION         Social History     Socioeconomic History    Marital status: /Civil Union     Spouse name: None    Number of children: None    Years of education: None    Highest education level: None   Occupational History    None   Tobacco Use    Smoking status: Former Smoker     Types: Cigarettes     Quit date:      Years since quittin 5    Smokeless tobacco: Never Used   Vaping Use    Vaping Use: Never used   Substance and Sexual Activity    Alcohol use:  Yes     Alcohol/week: 17 0 standard drinks     Types: 7 Glasses of wine, 10 Cans of beer per week     Comment: socially    Drug use: Never    Sexual activity: Not Currently   Other Topics Concern    None   Social History Narrative    Daily caffeine use - 2 cups coffee      Social Determinants of Health     Financial Resource Strain: Not on file   Food Insecurity: Not on file   Transportation Needs: Not on file   Physical Activity: Not on file   Stress: Not on file   Social Connections: Not on file   Intimate Partner Violence: Not on file   Housing Stability: Not on file       Family History   Problem Relation Age of Onset    Liver cancer Father        Allergies   Allergen Reactions    Poison Ivy Extract Rash         Current Outpatient Medications:     acetaminophen (TYLENOL) 500 mg tablet, Take 2 tablets (1,000 mg total) by mouth every 8 (eight) hours, Disp: , Rfl:     allopurinol (ZYLOPRIM) 300 mg tablet, take 1 tablet by mouth once daily, Disp: 30 tablet, Rfl: 3    apixaban (Eliquis) 5 mg, Take 1 tablet (5 mg total) by mouth 2 (two) times a day For afib, Disp: 60 tablet, Rfl: 11    atorvastatin (LIPITOR) 80 mg tablet, Take 80 mg by mouth every other day evening, Disp: , Rfl:     clotrimazole (MYCELEX) 10 mg rizwan, Take 1 tablet (10 mg total) by mouth 5 (five) times a day, Disp: 70 tablet, Rfl: 2    colchicine (COLCRYS) 0 6 mg tablet, take 1 tablet by mouth twice a day if needed for FOOT PAIN, Disp: , Rfl:     docusate sodium (COLACE) 250 MG capsule, Take 1 capsule (250 mg total) by mouth daily, Disp: 60 capsule, Rfl: 6    DULoxetine (CYMBALTA) 60 mg delayed release capsule, take 1 capsule by mouth once daily, Disp: 30 capsule, Rfl: 1    folic acid (FOLVITE) 1 mg tablet, take 1 tablet by mouth once daily, Disp: 90 tablet, Rfl: 4    Magnesium 250 MG TABS, 1 tablet 2 (two) times a day Taking 500mg in the morning and 500mg at night, Disp: , Rfl:     metoprolol tartrate (LOPRESSOR) 100 mg tablet, Take 50 mg by mouth daily, Disp: , Rfl:     naloxone (NARCAN) 4 mg/0 1 mL nasal spray, Administer 1 spray into a nostril   If breathing does not return to normal or if breathing difficulty resumes after 2-3 minutes, give another dose in the other nostril using a new spray , Disp: 1 each, Rfl: 1    nystatin (MYCOSTATIN) cream, Apply topically 2 (two) times a day, Disp: 30 g, Rfl: 0    senna (SENOKOT) 8 6 mg, Take 1 tablet (8 6 mg total) by mouth daily at bedtime, Disp: 30 each, Rfl: 0    tadalafil (CIALIS) 20 MG tablet, Take one tablet by mouth one hour before sexual activity, Disp: 15 tablet, Rfl: 3    tamsulosin (FLOMAX) 0 4 mg, take 1 capsule by mouth once daily with dinner, Disp: 30 capsule, Rfl: 1    traMADol (Ultram) 50 mg tablet, Take 1 tab PO Q8 hours PRN pain, on severe days can take a 4th tablet , Disp: 100 tablet, Rfl: 1    VITAMIN D PO, Take 1,000 Units by mouth daily , Disp: , Rfl:     zolpidem (AMBIEN) 5 mg tablet, Take 1 tablet (5 mg total) by mouth daily at bedtime as needed for sleep, Disp: 30 tablet, Rfl: 1    ALPRAZolam (XANAX) 0 25 mg tablet, Take 1 tablet (0 25 mg total) by mouth daily at bedtime as needed for anxiety (Patient not taking: No sig reported), Disp: 30 tablet, Rfl: 0      Physical Exam:  /80 (BP Location: Left arm, Patient Position: Sitting, Cuff Size: Adult)   Pulse 97   Temp 97 8 °F (36 6 °C)   Resp 18   Ht 5' 9" (1 753 m)   Wt 86 6 kg (191 lb)   SpO2 99%   BMI 28 21 kg/m²     Physical Exam  Vitals reviewed  Constitutional:       General: He is not in acute distress  Appearance: He is well-developed  He is not diaphoretic  HENT:      Head: Normocephalic and atraumatic  Comments: White coating noted on tongue  Eyes:      General: Lids are normal  No scleral icterus  Conjunctiva/sclera: Conjunctivae normal       Pupils: Pupils are equal, round, and reactive to light  Neck:      Thyroid: No thyromegaly  Cardiovascular:      Rate and Rhythm: Normal rate and regular rhythm  Heart sounds: Normal heart sounds  No murmur heard  Pulmonary:      Effort: Pulmonary effort is normal  No respiratory distress  Breath sounds: Normal breath sounds  Chest:   Breasts:      Right: No axillary adenopathy  Left: No axillary adenopathy  Abdominal:      General: There is no distension  Palpations: Abdomen is soft  There is no hepatomegaly or splenomegaly  Tenderness: There is no abdominal tenderness  Musculoskeletal:         General: Normal range of motion  Cervical back: Normal range of motion and neck supple  Right lower leg: No edema  Left lower leg: No edema  Lymphadenopathy:      Cervical: No cervical adenopathy  Upper Body:      Right upper body: No axillary adenopathy        Left upper body: No axillary adenopathy  Skin:     General: Skin is warm and dry  Findings: No erythema or rash  Neurological:      General: No focal deficit present  Mental Status: He is alert and oriented to person, place, and time  Psychiatric:         Mood and Affect: Mood and affect normal          Behavior: Behavior normal  Behavior is cooperative  Thought Content: Thought content normal          Judgment: Judgment normal          Labs:  Lab Results   Component Value Date    WBC 6 92 07/19/2022    HGB 14 1 07/19/2022    HCT 43 3 07/19/2022    MCV 96 07/19/2022     07/19/2022     Lab Results   Component Value Date    K 4 1 07/19/2022     07/19/2022    CO2 31 07/19/2022    BUN 20 07/19/2022    CREATININE 1 18 07/19/2022    GLUF 111 (H) 03/01/2022    CALCIUM 9 6 07/19/2022    CORRECTEDCA 9 0 06/21/2022    AST 29 07/19/2022    ALT 55 (H) 07/19/2022    ALKPHOS 84 07/19/2022    EGFR 60 07/19/2022       Patient voiced understanding and agreement in the above discussion  Aware to contact our office with questions/symptoms in the interim  This note has been generated by voice recognition software system  Therefore, there may be spelling, grammar, and or syntax errors  Please contact if questions arise

## 2022-07-22 ENCOUNTER — HOSPITAL ENCOUNTER (OUTPATIENT)
Dept: INFUSION CENTER | Facility: HOSPITAL | Age: 72
Discharge: HOME | End: 2022-07-22
Attending: INTERNAL MEDICINE
Payer: MEDICARE

## 2022-07-22 VITALS
BODY MASS INDEX: 28.47 KG/M2 | TEMPERATURE: 96.9 F | RESPIRATION RATE: 18 BRPM | HEIGHT: 69 IN | WEIGHT: 192.24 LBS | SYSTOLIC BLOOD PRESSURE: 143 MMHG | DIASTOLIC BLOOD PRESSURE: 93 MMHG | HEART RATE: 55 BPM | OXYGEN SATURATION: 98 %

## 2022-07-22 DIAGNOSIS — C79.10 METASTATIC UROTHELIAL CARCINOMA (HCC): ICD-10-CM

## 2022-07-22 DIAGNOSIS — C65.2 CANCER OF LEFT RENAL PELVIS (HCC): ICD-10-CM

## 2022-07-22 DIAGNOSIS — C68.9 UROTHELIAL CANCER (HCC): Primary | ICD-10-CM

## 2022-07-22 PROCEDURE — 96413 CHEMO IV INFUSION 1 HR: CPT

## 2022-07-22 PROCEDURE — 96367 TX/PROPH/DG ADDL SEQ IV INF: CPT

## 2022-07-22 RX ORDER — SODIUM CHLORIDE 9 MG/ML
20 INJECTION, SOLUTION INTRAVENOUS ONCE
Status: COMPLETED | OUTPATIENT
Start: 2022-07-22 | End: 2022-07-22

## 2022-07-22 RX ORDER — ACETAMINOPHEN 325 MG/1
650 TABLET ORAL ONCE
Status: DISCONTINUED | OUTPATIENT
Start: 2022-07-22 | End: 2022-07-26 | Stop reason: HOSPADM

## 2022-07-22 RX ADMIN — ENFORTUMAB VEDOTIN 90 MG: 30 INJECTION, POWDER, LYOPHILIZED, FOR SOLUTION INTRAVENOUS at 11:22

## 2022-07-22 RX ADMIN — SODIUM CHLORIDE 20 ML/HR: 0.9 INJECTION, SOLUTION INTRAVENOUS at 10:20

## 2022-07-22 RX ADMIN — DEXAMETHASONE SODIUM PHOSPHATE: 10 INJECTION, SOLUTION INTRAMUSCULAR; INTRAVENOUS at 10:43

## 2022-07-22 RX ADMIN — DIPHENHYDRAMINE HYDROCHLORIDE 25 MG: 50 INJECTION INTRAMUSCULAR; INTRAVENOUS at 10:21

## 2022-07-26 ENCOUNTER — HOSPITAL ENCOUNTER (OUTPATIENT)
Dept: INFUSION CENTER | Facility: HOSPITAL | Age: 72
Discharge: HOME | End: 2022-07-26
Attending: INTERNAL MEDICINE
Payer: MEDICARE

## 2022-07-26 VITALS
HEART RATE: 67 BPM | DIASTOLIC BLOOD PRESSURE: 79 MMHG | RESPIRATION RATE: 16 BRPM | OXYGEN SATURATION: 96 % | SYSTOLIC BLOOD PRESSURE: 131 MMHG | TEMPERATURE: 96.9 F

## 2022-07-26 DIAGNOSIS — E86.0 DEHYDRATION: Primary | ICD-10-CM

## 2022-07-26 PROCEDURE — 96360 HYDRATION IV INFUSION INIT: CPT

## 2022-07-26 RX ADMIN — SODIUM CHLORIDE 1000 ML: 0.9 INJECTION, SOLUTION INTRAVENOUS at 12:57

## 2022-07-30 DIAGNOSIS — N40.1 BENIGN PROSTATIC HYPERPLASIA WITH URINARY FREQUENCY: ICD-10-CM

## 2022-07-30 DIAGNOSIS — R35.0 BENIGN PROSTATIC HYPERPLASIA WITH URINARY FREQUENCY: ICD-10-CM

## 2022-08-01 RX ORDER — TAMSULOSIN HYDROCHLORIDE 0.4 MG/1
CAPSULE ORAL
Qty: 30 CAPSULE | Refills: 1 | Status: SHIPPED | OUTPATIENT
Start: 2022-08-01 | End: 2022-09-29

## 2022-08-02 ENCOUNTER — HOSPITAL ENCOUNTER (OUTPATIENT)
Dept: INFUSION CENTER | Facility: HOSPITAL | Age: 72
Discharge: HOME | End: 2022-08-02
Attending: INTERNAL MEDICINE
Payer: MEDICARE

## 2022-08-02 VITALS
RESPIRATION RATE: 18 BRPM | DIASTOLIC BLOOD PRESSURE: 63 MMHG | HEART RATE: 67 BPM | TEMPERATURE: 96.7 F | SYSTOLIC BLOOD PRESSURE: 138 MMHG

## 2022-08-02 DIAGNOSIS — C79.89 SECONDARY MALIGNANT NEOPLASM OF MUSCLE OF ABDOMEN (HCC): Primary | ICD-10-CM

## 2022-08-02 DIAGNOSIS — E86.0 DEHYDRATION: ICD-10-CM

## 2022-08-02 DIAGNOSIS — E83.42 HYPOMAGNESEMIA: ICD-10-CM

## 2022-08-02 LAB
ALBUMIN SERPL BCP-MCNC: 4 G/DL (ref 3.5–5)
ALP SERPL-CCNC: 89 U/L (ref 34–104)
ALT SERPL W P-5'-P-CCNC: 34 U/L (ref 7–52)
ANION GAP SERPL CALCULATED.3IONS-SCNC: 8 MMOL/L (ref 4–13)
AST SERPL W P-5'-P-CCNC: 21 U/L (ref 13–39)
BASOPHILS # BLD AUTO: 0.02 THOUSANDS/ΜL (ref 0–0.1)
BASOPHILS NFR BLD AUTO: 1 % (ref 0–1)
BILIRUB SERPL-MCNC: 0.77 MG/DL (ref 0.2–1)
BUN SERPL-MCNC: 15 MG/DL (ref 5–25)
CALCIUM SERPL-MCNC: 9.4 MG/DL (ref 8.4–10.2)
CHLORIDE SERPL-SCNC: 103 MMOL/L (ref 96–108)
CO2 SERPL-SCNC: 29 MMOL/L (ref 21–32)
CREAT SERPL-MCNC: 1.18 MG/DL (ref 0.6–1.3)
EOSINOPHIL # BLD AUTO: 0 THOUSAND/ΜL (ref 0–0.61)
EOSINOPHIL NFR BLD AUTO: 0 % (ref 0–6)
ERYTHROCYTE [DISTWIDTH] IN BLOOD BY AUTOMATED COUNT: 15.4 % (ref 11.6–15.1)
GFR SERPL CREATININE-BSD FRML MDRD: 60 ML/MIN/1.73SQ M
GLUCOSE SERPL-MCNC: 118 MG/DL (ref 65–140)
HCT VFR BLD AUTO: 37.9 % (ref 36.5–49.3)
HGB BLD-MCNC: 12.1 G/DL (ref 12–17)
IMM GRANULOCYTES # BLD AUTO: 0.02 THOUSAND/UL (ref 0–0.2)
IMM GRANULOCYTES NFR BLD AUTO: 1 % (ref 0–2)
LYMPHOCYTES # BLD AUTO: 1.05 THOUSANDS/ΜL (ref 0.6–4.47)
LYMPHOCYTES NFR BLD AUTO: 41 % (ref 14–44)
MAGNESIUM SERPL-MCNC: 1.5 MG/DL (ref 1.9–2.7)
MCH RBC QN AUTO: 30.9 PG (ref 26.8–34.3)
MCHC RBC AUTO-ENTMCNC: 31.9 G/DL (ref 31.4–37.4)
MCV RBC AUTO: 97 FL (ref 82–98)
MONOCYTES # BLD AUTO: 0.54 THOUSAND/ΜL (ref 0.17–1.22)
MONOCYTES NFR BLD AUTO: 21 % (ref 4–12)
NEUTROPHILS # BLD AUTO: 0.9 THOUSANDS/ΜL (ref 1.85–7.62)
NEUTS SEG NFR BLD AUTO: 36 % (ref 43–75)
NRBC BLD AUTO-RTO: 0 /100 WBCS
PLATELET # BLD AUTO: 200 THOUSANDS/UL (ref 149–390)
PMV BLD AUTO: 10 FL (ref 8.9–12.7)
POTASSIUM SERPL-SCNC: 3.9 MMOL/L (ref 3.5–5.3)
PROT SERPL-MCNC: 6.4 G/DL (ref 6.4–8.4)
RBC # BLD AUTO: 3.92 MILLION/UL (ref 3.88–5.62)
SODIUM SERPL-SCNC: 140 MMOL/L (ref 135–147)
WBC # BLD AUTO: 2.53 THOUSAND/UL (ref 4.31–10.16)

## 2022-08-02 PROCEDURE — 80053 COMPREHEN METABOLIC PANEL: CPT | Performed by: INTERNAL MEDICINE

## 2022-08-02 PROCEDURE — 96360 HYDRATION IV INFUSION INIT: CPT

## 2022-08-02 PROCEDURE — 85025 COMPLETE CBC W/AUTO DIFF WBC: CPT | Performed by: INTERNAL MEDICINE

## 2022-08-02 PROCEDURE — 83735 ASSAY OF MAGNESIUM: CPT | Performed by: INTERNAL MEDICINE

## 2022-08-02 RX ADMIN — SODIUM CHLORIDE 1000 ML: 0.9 INJECTION, SOLUTION INTRAVENOUS at 11:24

## 2022-08-03 ENCOUNTER — EVALUATION (OUTPATIENT)
Dept: PHYSICAL THERAPY | Facility: CLINIC | Age: 72
End: 2022-08-03
Payer: MEDICARE

## 2022-08-03 DIAGNOSIS — C79.10 METASTATIC UROTHELIAL CARCINOMA (HCC): ICD-10-CM

## 2022-08-03 DIAGNOSIS — T45.1X5A CHEMOTHERAPY-INDUCED NEUROPATHY (HCC): ICD-10-CM

## 2022-08-03 DIAGNOSIS — R53.1 WEAKNESS GENERALIZED: Primary | ICD-10-CM

## 2022-08-03 DIAGNOSIS — M54.50 CHRONIC LEFT-SIDED LOW BACK PAIN WITHOUT SCIATICA: ICD-10-CM

## 2022-08-03 DIAGNOSIS — G89.29 CHRONIC LEFT-SIDED LOW BACK PAIN WITHOUT SCIATICA: ICD-10-CM

## 2022-08-03 DIAGNOSIS — G62.0 CHEMOTHERAPY-INDUCED NEUROPATHY (HCC): ICD-10-CM

## 2022-08-03 DIAGNOSIS — M25.562 LEFT KNEE PAIN, UNSPECIFIED CHRONICITY: ICD-10-CM

## 2022-08-03 PROCEDURE — 97163 PT EVAL HIGH COMPLEX 45 MIN: CPT | Performed by: PHYSICAL THERAPIST

## 2022-08-03 PROCEDURE — 97110 THERAPEUTIC EXERCISES: CPT | Performed by: PHYSICAL THERAPIST

## 2022-08-03 NOTE — PROGRESS NOTES
PT Evaluation     Today's date: 8/3/2022  Patient name: Cassandra Win  : 1950  MRN: [id number]  Referring provider: RAGHU Reaves  Dx:   Encounter Diagnosis     ICD-10-CM    1  Weakness generalized  R53 1 Ambulatory referral to Physical Therapy   2  Metastatic urothelial carcinoma ([facility] [address] )  C79 10 Ambulatory referral to Physical Therapy   3  [facility]  G62 0 Ambulatory referral to Physical Therapy    T45 [id number]    4  Left knee pain, unspecified chronicity  M25 562 Ambulatory referral to Physical Therapy   5  Chronic left-sided low back pain without sciatica  M54 50     G89 29        Start Time: 1000  Stop Time: 1100  Total time in clinic (min): 60 minutes    Assessment  Assessment details: Pt is a 67 YOM referred to OPPT for neuropathy, L knee pain, and chronic LBP  Has generalized weakness and fatigue secondary to cancer treatments  He demo limited lumbar AROM, decreased mobility, L LE weakness, impaired balance, decreased L knee flexion ROM, and decreased activity tolerance per pt's report  He is considered a high fall risk and has hx of multiple falls  He would benefit from skilled PT to improve strength, endurance, balance and decrease fall risk in order to improve QOL and maximize functional mobility  Goals  ST weeks  1  L knee flexion AROM improve by 5-10 deg  2  L LE strength improve by 1/2 MMT grade  3  L Knee pain < 5/10  4  LBP <6/10    LT weeks   1  TUG < 13 sec to demo improved balance   2  Report no falls  3  L knee pain < 3/10  4  L LE strength improve by 1 MMT grade  5   LBP < 4/10     Plan  Planned therapy interventions: neuromuscular re-education, patient education, balance, balance/weight bearing training, coordination, home exercise program, gait training, functional ROM exercises, stretching, strengthening and therapeutic exercise  Frequency: 2x week  Duration in weeks: 8  Plan of Care beginning date: 8/3/2022  Plan of Care expiration date: 11/3/2022  Treatment plan discussed with: patient        Subjective Evaluation    History of Present Illness  Mechanism of injury: Pt is a 67 YOM referred to OPPT for neuropathy, chronic LBP, and L knee pain  States that he has bladder cancer that has metastasized  Has neuropathy in legs, hands, and feet from cancer treatment  Has seen pain management for LBP and orthopedist for L knee pain and received injections for the back and knee about 2 weeks ago around the same time  States injections helped with R sided back pain but continues to have L sided back pain and L knee  Is currently on immunotherapy and has been receiving treatments for the cancer over 2 years  Also had radiation to back, R shoulder, and abdominal and R shoulder is limited due to that  Back pain is chronic and he had PT in past for back which did not help  L knee started a month ago when he fell 3-4 times onto his knee  Two times he fell was from his L knee giving out  Has intermittent L quad pain since the falls but not constant  Has partial knee replacements in both knees but xrays showed that they were good  Also has SOB from exertion which is ongoing since the cancer  He is fatigued all the time  States that he has no cancer in the back and PET scan did not show any cancer  Since onset of back pain, he walked with the cane  Did not fall in last 3 weeks but once in a while it gives out  Has balance issue and has no feeling in fingers and feet burns  Legs are very weak - has no strength  Has infusions on  and        Back pain - chronic and constant, throbbing pain, does not radiate   Aggravating: nothing   Easing: medications  Average -8/10  Progression: staying same     L knee pain - constant, burn  Location: around the knee, sometimes a little up the leg   Aggravating: standing up, walking   Easing: sitting, rest, medications  Average 6/10  Progression: staying same       Pain  Current pain ratin  Location: L knee 2/10, back 2/10     Patient Goals  Patient goal: get rid of the pain and get back to normal but does not sure what normal is because he has cancer and will be on treatment rest of his life         Objective     Tests     Lumbar     Left   Negative crossed SLR, passive SLR and slump test      Right   Negative crossed SLR, passive SLR and slump test      Left Pelvic Girdle/Sacrum   Negative: active SLR test      Right Pelvic Girdle/Sacrum   Negative: active SLR test      Left Hip   Negative YEFRI and FADIR  Right Hip   Negative YEFRI and FADIR  Left Knee   Negative anterior drawer, lateral Contreras, posterior drawer, posterior sag, valgus stress test at 0 degrees, valgus stress test at 30 degrees, varus stress test at 0 degrees and varus stress test at 30 degrees            Balance Test 8/3   6 Minute Walk Test (ft):    2 Minute Walk Test (ft):    Gait Speed (ft/s): 0 6 m/s    5x Sit To Stand (s):    TU 44 with SPC        Coordination Left Right   Heel To Odom WNL  WNL   Finger To Nose     Rapid Alternating Movement     UE     LE WNL WNL       Sensation Left Right   Kinesthesia abnormal Abnormal    Light Touch abnormal Abnormal    Sharp/Dull     2 Point Discrimination         Manual Muscle Testing - Hip Left Right   Flexion 4-, pain in quad  4+   Extension     Abduction     External Rotation       Manual Muscle Testing - Knee Left Right   Flexion 4 5   Extension 4 5     Manual Muscle Testing - Ankle Left Right   Doriflexion 4 5   Plantarflexion 4 5     Knee AROM   Flexion L 121 with pain in quad R 132  Extension L 0, R 0     L knee crepitus  Decreased HS flexibility B, decrease hip IR and ER mike IR B     Lumbar mobility: hypomobile L3-L5 and pain with central Pas  Palpation: no ttp to lumbar spine or knee       Lumbar AROM Left Right   Flexion Unable, due to balance    Extension Severely limited    Lateral Flexion  Mod limited* Mod limited*    Rotation     * L sided LBP        Re-eval Date: 9/3/22    Date 8/3 Visit Count 1       FOTO        Pain In        Pain Out             Precautions fall risk, cancer       Manuals                                        Neuro Re-Ed         HKM         Sidestepping         BW amb         Static balance         Hurdles         Bw amb                 Ther Ex        Nustep        HS  Reviewed for HEP seated        TAC        SLR - supine, sidelying         SKC         Prone quad flex S         LTR         Leg press        Knee ext/flexion machine        Ther Activity                        Gait Training                        Modalities

## 2022-08-04 ENCOUNTER — OFFICE VISIT (OUTPATIENT)
Dept: HEMATOLOGY ONCOLOGY | Facility: CLINIC | Age: 72
End: 2022-08-04
Payer: MEDICARE

## 2022-08-04 VITALS
HEART RATE: 83 BPM | DIASTOLIC BLOOD PRESSURE: 82 MMHG | OXYGEN SATURATION: 99 % | RESPIRATION RATE: 18 BRPM | SYSTOLIC BLOOD PRESSURE: 140 MMHG | TEMPERATURE: 97 F | HEIGHT: 69 IN | WEIGHT: 192 LBS | BODY MASS INDEX: 28.44 KG/M2

## 2022-08-04 DIAGNOSIS — C79.10 METASTATIC UROTHELIAL CARCINOMA (HCC): Primary | ICD-10-CM

## 2022-08-04 DIAGNOSIS — T45.1X5A CHEMOTHERAPY-INDUCED NEUROPATHY (HCC): ICD-10-CM

## 2022-08-04 DIAGNOSIS — C65.2 CANCER OF LEFT RENAL PELVIS (HCC): ICD-10-CM

## 2022-08-04 DIAGNOSIS — C68.9 UROTHELIAL CANCER (HCC): ICD-10-CM

## 2022-08-04 DIAGNOSIS — D70.1 CHEMOTHERAPY-INDUCED NEUTROPENIA (HCC): ICD-10-CM

## 2022-08-04 DIAGNOSIS — R63.0 DECREASED APPETITE: ICD-10-CM

## 2022-08-04 DIAGNOSIS — T45.1X5A CHEMOTHERAPY-INDUCED NEUTROPENIA (HCC): ICD-10-CM

## 2022-08-04 DIAGNOSIS — G62.0 CHEMOTHERAPY-INDUCED NEUROPATHY (HCC): ICD-10-CM

## 2022-08-04 DIAGNOSIS — E83.42 HYPOMAGNESEMIA: ICD-10-CM

## 2022-08-04 DIAGNOSIS — R53.83 OTHER FATIGUE: ICD-10-CM

## 2022-08-04 PROCEDURE — 99215 OFFICE O/P EST HI 40 MIN: CPT | Performed by: NURSE PRACTITIONER

## 2022-08-04 RX ORDER — PREDNISONE 10 MG/1
10 TABLET ORAL DAILY
Qty: 30 TABLET | Refills: 3 | Status: SHIPPED | OUTPATIENT
Start: 2022-08-04 | End: 2022-08-18 | Stop reason: SDUPTHER

## 2022-08-04 NOTE — PROGRESS NOTES
[facility]  Padcev to be held for 2 weeks, continue with hydration  CBC to be drawn on arrival on Friday 8-5-22  Parag Meyers our pharmacist made aware

## 2022-08-04 NOTE — PROGRESS NOTES
Hematology/Oncology Outpatient Follow-up  Carley Mccartney 67 y o  male 1950 [id number]    Date:  8/4/2022      Assessment and Plan:  1  Metastatic urothelial carcinoma ([facility] [address] )  Patient seems to be experiencing multiple worsening symptoms including worsening fatigue, decreased appetite, neutropenia, worsening neuropathy with falls/etc   He will be starting some physical therapy this coming Monday  He is due to start Padcev cycle 11 this Friday; however recommend giving him 2 addiational weeks of a break from treatment to see if his symptoms would improve  Consider further dose reduction of his Padcev when he resumes due to neuropathy which is now resulting in falls (will discuss with Dr Johnathan Bragg)  Request he follow-up again with repeat labs in 2 weeks or sooner should the need arise  Will continue to get his additional IV hydration/IV magnesium in the infusion center as needed  He does follow-up with palliative care on a regular basis  We did discuss starting/resuming low-dose prednisone 10 mg daily in hopes that this will improve his fatigue and decreased appetite  Script sent to his local pharmacy  Patient's wife does mention that he has been having easy bruising and bleeding more with minimal trauma since he started the Eliquis 5 mg for Afib  Will reach out to his cardiology team to see if could be decreased to lower dose 2 5 mg     - CBC and differential; Standing  - Comprehensive metabolic panel; Standing  - Magnesium; Standing  - CBC and differential; Future  - CBC and differential  - Comprehensive metabolic panel  - Magnesium    2  Hypomagnesemia  Patient's magnesium is low again since he stopped his magnesium supplements per PCP recommendation  He has not noticed any change of the looser than average stool since he stopped the magnesium supplements    Advised him to resume was taking 400 mg daily alternating with 800 mg on an every-other-day basis in the past which was maintaining his levels  He also gets additional IV magnesium as needed with his additional IV hydration  3  Chemotherapy-induced neuropathy ([address] )  As above #1  He is taking Cymbalta 60 mg daily for the chemo induced neuropathy  4  Chemotherapy-induced neutropenia (HCC)  Patient was noted to have neutropenia ANC 0 9 on his laboratory studies from this week which is new for him  He denies any signs of infection  Likely treatment related  Will repeat his CBC for completeness sake  Will be going for additional IV hydration tomorrow will ask infusion center to draw if feasible  - CBC and differential; Future    5  Other fatigue  - predniSONE 10 mg tablet; Take 1 tablet (10 mg total) by mouth daily  Dispense: 30 tablet; Refill: 3    6  Decreased appetite  - predniSONE 10 mg tablet; Take 1 tablet (10 mg total) by mouth daily  Dispense: 30 tablet; Refill: 3    HPI:  Patient presents today for a follow-up visit accompanied by his wife  Seen by physical therapy recently and will be starting physical therapy this coming Monday  He continues to have some difficulty ambulating due to hit the neuropathy in his feet  He twisted his left ankle recently when he lost his balance which resulted in some bruising and edema to the left ankle  He been treating with conservative measures, has no pain and declined x-ray  His appetite has been decreased but is eating; has lost a few lb over the past 2 months or so but weight has been stable around 192 recently  He also reports that he has been more exhausted and fatigued for the past few weeks  He states that he is not having diarrhea per se but has been having looser stools  His primary care physician recommended holding his magnesium supplements which did not improve his looser stools  He has been having easy bruising and bleeding with minimal trauma more frequently since he started the blood thinner for his Afib      His most recent laboratory studies from 2 days ago 08/02/2022 showed leukopenia WBC 2 53 with neutropenia ANC 0 90 which is a new finding for him, he is not anemic H&H 12 1/37 9, MCV 97 with normal platelet count 597  CMP is normal   His magnesium is and low again 1 5  Oncology History Overview Note   Patient has a history of hypertension, hyperlipidemia, chronic lower back pain  He had an MRI of the lower spine for further evaluation of his chronic lower back pain  The MRI on 12/02/2019 revealed Multiple left renal masses to include a left lower pole cyst and a rounded structure in the left upper pole which may also represent cyst   Left upper pole renal masses approximately 3 cm in greatest linear dimension  A CT with renal protocol was done on 12/12/2019 which also showed the infiltrating lesion in the upper pole of the left kidney measuring about the 3 5 cm in greatest dimension without any hint of retroperitoneal lymphadenopathy  A CT scan of the abdomen pelvis on 01/14/2020 showed the same findings with the mild hydronephrosis on the left  The urine cytology on 01/03/2020 showed high-grade urothelial carcinoma  A cystoscopy was then done, a biopsy was taken from the left renal pelvic region which showed high-grade urothelial carcinoma without evidence of lamina propria invasion  The detrusor muscle/muscularis propria were not present for evaluation  The recommendation was to pursue left robotic assisted laparoscopic nephroureterectomy with bladder cuff excision which was done on 04/01/2020  The final pathology revealed;  - Invasive high grade urothelial carcinoma arising in renal pelvis  - Bladder cuff margin is negative for carcinoma and no evidence of high grade dysplasia  - Ureters with no significant pathologic abnormality  - Two benign simple cysts  - Adrenal gland is negative for malignancy  - One lymph node, negative for malignancy (0/1)    The tumor size was 4 5 cm with invasion beyond the muscularis into the periurethral fat or peripelvic fat or the renal parenchyma  The margins were uninvolved by carcinoma or carcinoma in situ  The final pathology was pT3 pN0  Patient barely tolerated 1 cycle of adjuvant chemotherapy with split dose cisplatin and gemcitabine with a lot of side effects  The treatment had to be discontinued due to significant worsening renal dysfunction 6/2020  Patient started to complain about significant abdominal/left inguinal pain around August/September 2020  Unfortunately repeat imaging revealed recurrent/metastatic disease  9/4/20 CT C/A/P- Status post left nephrectomy for resection of urothelial neoplasm  Lymphadenopathy in the left nephrectomy bed has increased since the prior examination, concerning for metastatic disease  Ill-defined hyperattenuating masslike focus in the left rectus muscle, not identified on the prior examination  This is suspicious for a metastatic lesion  A rectus hematoma is also considered  9/23/20 PET scan- 1  Multiple FDG avid lymph nodes in the retrocrural region, retroperitoneum and the left renal bed compatible with metastasis  These have progressed from recent CT  2   FDG avid mass involving the left rectus abdominal musculature compatible with metastasis which is larger from recent CT  3  Several small lymph nodes adjacent to the mid to distal esophagus with FDG uptake suspicious for metastasis  Small focus of FDG uptake also suggested in the right hilar region, metastasis is not excluded  This could be reassessed on follow-up  4   Subtle focus of FDG uptake at the T2 level on the left, nonspecific, could be related to early degenerative changes, developing metastasis is not entirely excluded  This could be reassessed on follow-up  5  Prostate is mildly prominent with heterogeneous FDG uptake  This could be inflammatory  Correlate for prostatitis       He completed palliative radiation to the left abdomen 12/3/20     His PET scan from 08/16/2021 showed progression of his disease with hypermetabolic soft tissue lesions at the level of the right shoulder and right axilla with interval progression of the retroperitoneal and mesenteric hypermetabolic metastasis  He did have the new lesion to his right upper back/shoulder which was causing significant localized pain  This excised by his surgeon 08/09/2021  Pathology confirmed metastatic high-grade carcinoma consistent with his no primary urothelial carcinoma  He received palliative radiation to his right shoulder/axilla region  Urothelial cancer ([facility] [address] )   1/3/2020 Biopsy    Final Diagnosis    A  Urine, Clean Catch, Thin Prep:  High grade urothelial carcinoma (HGUC) - see comment  1/3/2020 Initial Diagnosis    Urothelial cancer ([facility] [address] )  T3 N0 (stage IIIA)     1/17/2020 Biopsy    Final Diagnosis    A  Ureter, Right, left renal pelvis biopsy  -Fragments of high grade urothelial carcinoma   -No evidence of lamina propria invasion   -Detrusor muscle/ muscularis propria is not present for evaluation  B  Urinary Bladder, bladder biopsy:  -Fragments of low grade papillary lesion  See note  -No evidence of invasion seen  -Unremarkable fragment of detrusor muscle seen  4/1/2020 Surgery    left robotic assisted laparoscopic nephroureterectomy with bladder cuff excision     Final Diagnosis    A  Left kidney, ureter, and bladder cuff, nephroureterectomy:  - Invasive high grade urothelial carcinoma arising in renal pelvis  - Bladder cuff margin is negative for carcinoma and no evidence of high grade dysplasia  - Ureters with no significant pathologic abnormality  - Two benign simple cysts  - Adrenal gland is negative for malignancy  - One lymph node, negative for malignancy (0/1)          5/14/2020 - 6/3/2020 Chemotherapy    CISplatin (PLATINOL) split dose 35 mg/m2 = 75 3 mg (50 % of original dose 70 mg/m2), Intravenous,   Administration: 75 3 mg (5/14/2020), 75 3 mg (5/21/2020)  gemcitabine (GEMZAR) 2,200 mg in sodium chloride 0 9 % 250 mL infusion, 2,150 2 mg (80 % of original dose 1,250 mg/m2), Intravenous,   Administration: 2,200 mg (5/14/2020), 2,200 mg (5/21/2020)    (only completed 1 cycle- d/c d/t adverse effects and worsening renal dysfunction)     10/9/2020 - 9/9/2021 Chemotherapy    pembrolizumab (KEYTRUDA) IVPB, 200 mg, Intravenous, Once, 16 of 20 cycles  Administration: 200 mg (10/9/2020), 200 mg (10/30/2020), 200 mg (11/20/2020), 200 mg (12/11/2020), 200 mg (12/31/2020), 200 mg (1/22/2021), 200 mg (2/12/2021), 200 mg (3/5/2021), 200 mg (3/26/2021), 200 mg (4/16/2021), 200 mg (5/7/2021), 200 mg (5/28/2021), 200 mg (6/18/2021), 200 mg (7/9/2021), 200 mg (7/30/2021), 200 mg (8/20/2021)     11/19/2020 - 12/3/2020 Radiation    Treatment:  Course: C1    Plan ID Energy Fractions Dose per Fraction (cGy) Dose Correction (cGy) Total Dose Delivered (cGy) Elapsed Days   L Abdomen 6X 10 / 10 300 0 3,000 14        9/10/2021 -  Chemotherapy    enfortumab vedotin-ejfv (PADCEV) IVPB, 1 25 mg/kg = 114 mg, Intravenous, Once, 11 of 16 cycles  Dose modification: 1 mg/kg (original dose 1 25 mg/kg, Cycle 7, Reason: Other (Must fill in a comment), Comment: dose reduction due to side effects ), 1 25 mg/kg (original dose 1 25 mg/kg, Cycle 7, Reason: Other (Must fill in a comment), Comment: patient wants full dose ), 1 mg/kg (original dose 1 25 mg/kg, Cycle 7, Reason: Neuropathy)  Administration: 114 mg (9/10/2021), 114 mg (9/17/2021), 110 mg (10/8/2021), 110 mg (9/24/2021), 110 mg (10/15/2021), 110 mg (11/12/2021), 110 mg (10/22/2021), 110 mg (11/19/2021), 110 mg (12/10/2021), 110 mg (11/26/2021), 110 mg (12/17/2021), 110 mg (1/7/2022), 110 mg (12/23/2021), 110 mg (1/14/2022), 90 mg (4/8/2022), 90 mg (4/15/2022), 90 mg (4/22/2022), 110 mg (1/21/2022), 110 mg (2/18/2022), 110 mg (2/25/2022), 90 mg (5/13/2022), 90 mg (5/20/2022), 90 mg (5/27/2022), 90 mg (3/4/2022), 90 mg (6/10/2022), 90 mg (6/17/2022), 90 mg (6/24/2022), 90 mg (7/8/2022), 90 mg (7/15/2022), 90 mg (7/22/2022)     Cancer of left renal pelvis ([facility] [address] )   4/23/2020 Initial Diagnosis    Cancer of left renal pelvis ([facility] [address] )     10/9/2020 - 9/9/2021 Chemotherapy    pembrolizumab (KEYTRUDA) IVPB, 200 mg, Intravenous, Once, 16 of 20 cycles  Administration: 200 mg (10/9/2020), 200 mg (10/30/2020), 200 mg (11/20/2020), 200 mg (12/11/2020), 200 mg (12/31/2020), 200 mg (1/22/2021), 200 mg (2/12/2021), 200 mg (3/5/2021), 200 mg (3/26/2021), 200 mg (4/16/2021), 200 mg (5/7/2021), 200 mg (5/28/2021), 200 mg (6/18/2021), 200 mg (7/9/2021), 200 mg (7/30/2021), 200 mg (8/20/2021)     9/10/2021 -  Chemotherapy    enfortumab vedotin-ejfv (PADCEV) IVPB, 1 25 mg/kg = 114 mg, Intravenous, Once, 11 of 16 cycles  Dose modification: 1 mg/kg (original dose 1 25 mg/kg, Cycle 7, Reason: Other (Must fill in a comment), Comment: dose reduction due to side effects ), 1 25 mg/kg (original dose 1 25 mg/kg, Cycle 7, Reason: Other (Must fill in a comment), Comment: patient wants full dose ), 1 mg/kg (original dose 1 25 mg/kg, Cycle 7, Reason: Neuropathy)  Administration: 114 mg (9/10/2021), 114 mg (9/17/2021), 110 mg (10/8/2021), 110 mg (9/24/2021), 110 mg (10/15/2021), 110 mg (11/12/2021), 110 mg (10/22/2021), 110 mg (11/19/2021), 110 mg (12/10/2021), 110 mg (11/26/2021), 110 mg (12/17/2021), 110 mg (1/7/2022), 110 mg (12/23/2021), 110 mg (1/14/2022), 90 mg (4/8/2022), 90 mg (4/15/2022), 90 mg (4/22/2022), 110 mg (1/21/2022), 110 mg (2/18/2022), 110 mg (2/25/2022), 90 mg (5/13/2022), 90 mg (5/20/2022), 90 mg (5/27/2022), 90 mg (3/4/2022), 90 mg (6/10/2022), 90 mg (6/17/2022), 90 mg (6/24/2022), 90 mg (7/8/2022), 90 mg (7/15/2022), 90 mg (7/22/2022)      Surgery       Metastatic urothelial carcinoma ([facility] [address] )   8/9/2021 Initial Diagnosis    Metastatic urothelial carcinoma ([facility] [address] )     9/8/2021 - 9/21/2021 Radiation    Treatment:  Course: C2    Plan ID Energy Fractions Dose per Fraction (cGy) Dose Correction (cGy) Total Dose Delivered (cGy) Elapsed Days   R Axil_Shl # 6X 10 / 10 300 0 3,000 13      Treatment dates:  C2: 9/8/2021 - 9/21/2021     9/10/2021 -  Chemotherapy    enfortumab vedotin-ejfv (PADCEV) IVPB, 1 25 mg/kg = 114 mg, Intravenous, Once, 11 of 16 cycles  Dose modification: 1 mg/kg (original dose 1 25 mg/kg, Cycle 7, Reason: Other (Must fill in a comment), Comment: dose reduction due to side effects ), 1 25 mg/kg (original dose 1 25 mg/kg, Cycle 7, Reason: Other (Must fill in a comment), Comment: patient wants full dose ), 1 mg/kg (original dose 1 25 mg/kg, Cycle 7, Reason: Neuropathy)  Administration: 114 mg (9/10/2021), 114 mg (9/17/2021), 110 mg (10/8/2021), 110 mg (9/24/2021), 110 mg (10/15/2021), 110 mg (11/12/2021), 110 mg (10/22/2021), 110 mg (11/19/2021), 110 mg (12/10/2021), 110 mg (11/26/2021), 110 mg (12/17/2021), 110 mg (1/7/2022), 110 mg (12/23/2021), 110 mg (1/14/2022), 90 mg (4/8/2022), 90 mg (4/15/2022), 90 mg (4/22/2022), 110 mg (1/21/2022), 110 mg (2/18/2022), 110 mg (2/25/2022), 90 mg (5/13/2022), 90 mg (5/20/2022), 90 mg (5/27/2022), 90 mg (3/4/2022), 90 mg (6/10/2022), 90 mg (6/17/2022), 90 mg (6/24/2022), 90 mg (7/8/2022), 90 mg (7/15/2022), 90 mg (7/22/2022)         Interval history:    ROS: Review of Systems   Constitutional: Positive for activity change, appetite change and fatigue  Negative for chills, fever and unexpected weight change  HENT: Positive for hearing loss  Negative for congestion, mouth sores, nosebleeds, sore throat and trouble swallowing  Eyes: Negative  Respiratory: Positive for shortness of breath (exertional)  Negative for cough and chest tightness  Cardiovascular: Negative for chest pain, palpitations and leg swelling  Gastrointestinal: Positive for diarrhea (on the looser side not watery/frequent)  Negative for abdominal distention, abdominal pain, blood in stool, constipation, nausea and vomiting     Genitourinary: Negative for difficulty urinating, dysuria, frequency, hematuria and urgency  Musculoskeletal: Positive for arthralgias, back pain, gait problem and myalgias  Negative for joint swelling  Skin: Negative for color change, pallor and rash  Neurological: Positive for dizziness (mild) and numbness  Negative for light-headedness and headaches  Hematological: Negative for adenopathy  Bruises/bleeds easily  Psychiatric/Behavioral: Negative for dysphoric mood and sleep disturbance  The patient is not nervous/anxious          Past Medical History:   Diagnosis Date    Arthritis     Bladder cancer     Cancer ([facility] [address] )     skin melanoma;basal cell    Chronic pain disorder     from arthritis    Colon polyp     Does use hearing aid     bilat    Dry eyes, bilateral     GERD (gastroesophageal reflux disease)     History of kidney stones 10/2019    History of partial knee replacement     bilat    History of vertigo     Hyperlipidemia     Hypertension     Infusion extravasation of chemotherapy vesicant 11/2021    Kidney lesion     Lumbar disc disorder     compression of vertebrae L4-5-6    Muscle weakness     left hip area    Renal mass     left    Right ankle injury 03/05/2020    missed step of ladder     Right ankle pain     Shortness of breath     with activity    Tinnitus     Urothelial cancer     left    Wears glasses        Past Surgical History:   Procedure Laterality Date    COLONOSCOPY      CYSTOSCOPY Left 4/1/2020    Procedure: CYSTOSCOPY; URETERAL CATHETER PLACEMENT;  Surgeon: Bernardo Lazar MD;  Location[facility];  Service: Urology    CYSTOSCOPY  05/11/2020    CYSTOSCOPY  06/04/2021    FL CYSTOGRAM  4/13/2020    FL RETROGRADE PYELOGRAM  1/17/2020    HERNIA REPAIR      umbilical with mesh    INGUINAL HERNIA REPAIR Right     with mesh    IR PORT PLACEMENT  4/30/2020    JOINT REPLACEMENT Bilateral     partials knee    LUMBAR EPIDURAL INJECTION      MI CYSTOURETHROSCOPY,URETER CATHETER Bilateral 2020    Procedure: CYSTOSCOPY; RIGHT RETROGRADE PYELOGRAM WITH RIGHT URETERAL CYTOLOGY SAMPLING; LEFT URETEROSCOPY WITH RENAL PELVIS BIOPSY AND LEFT STENT PLACEMENT;  Surgeon: Shavon Cedeño MD;  [facility];  Service: Urology    KS NEPHRECTOMY, W/PART  URETECTOMY Left 2020    Procedure: ROBOTIC LAPAROSCOPIC NEPHRO-URETERECTOMY;  Surgeon: Shavon Cedeño MD;  Location[facility];  Service: Urology    SKIN CANCER EXCISION      surface melanoma    TONSILLECTOMY      WISDOM TOOTH EXTRACTION         Social History     Socioeconomic History    Marital status: /Civil Union     Spouse name: None    Number of children: None    Years of education: None    Highest education level: None   Occupational History    None   Tobacco Use    Smoking status: Former Smoker     Types: Cigarettes     Quit date:      Years since quittin 7    Smokeless tobacco: Never Used   Vaping Use    Vaping Use: Never used   Substance and Sexual Activity    Alcohol use:  Yes     Alcohol/week: 17 0 standard drinks     Types: 7 Glasses of wine, 10 Cans of beer per week     Comment: socially    Drug use: Never    Sexual activity: Not Currently   Other Topics Concern    None   Social History Narrative    Daily caffeine use - 2 cups coffee      Social Determinants of Health     Financial Resource Strain: Not on file   Food Insecurity: Not on file   Transportation Needs: Not on file   Physical Activity: Not on file   Stress: Not on file   Social Connections: Not on file   Intimate Partner Violence: Not on file   Housing Stability: Not on file       Family History   Problem Relation Age of Onset    Liver cancer Father        Allergies   Allergen Reactions    Poison Ivy Extract Rash         Current Outpatient Medications:     acetaminophen (TYLENOL) 500 mg tablet, Take 2 tablets (1,000 mg total) by mouth every 8 (eight) hours, Disp: , Rfl:     allopurinol (ZYLOPRIM) 300 mg tablet, take 1 tablet by mouth once daily, Disp: 30 tablet, Rfl: 3    apixaban (Eliquis) 5 mg, Take 1 tablet (5 mg total) by mouth 2 (two) times a day For afib, Disp: 60 tablet, Rfl: 11    atorvastatin (LIPITOR) 80 mg tablet, Take 80 mg by mouth every other day evening, Disp: , Rfl:     clotrimazole (MYCELEX) 10 mg rizwan, Take 1 tablet (10 mg total) by mouth 5 (five) times a day, Disp: 70 tablet, Rfl: 2    colchicine (COLCRYS) 0 6 mg tablet, take 1 tablet by mouth twice a day if needed for FOOT PAIN, Disp: , Rfl:     docusate sodium (COLACE) 250 MG capsule, Take 1 capsule (250 mg total) by mouth daily, Disp: 60 capsule, Rfl: 6    DULoxetine (CYMBALTA) 60 mg delayed release capsule, take 1 capsule by mouth once daily, Disp: 30 capsule, Rfl: 1    folic acid (FOLVITE) 1 mg tablet, take 1 tablet by mouth once daily, Disp: 90 tablet, Rfl: 4    Magnesium 250 MG TABS, 1 tablet 2 (two) times a day Taking 500mg in the morning and 500mg at night, Disp: , Rfl:     metoprolol tartrate (LOPRESSOR) 100 mg tablet, Take 50 mg by mouth daily, Disp: , Rfl:     naloxone (NARCAN) 4 mg/0 1 mL nasal spray, Administer 1 spray into a nostril   If breathing does not return to normal or if breathing difficulty resumes after 2-3 minutes, give another dose in the other nostril using a new spray , Disp: 1 each, Rfl: 1    nystatin (MYCOSTATIN) cream, Apply topically 2 (two) times a day, Disp: 30 g, Rfl: 0    predniSONE 10 mg tablet, Take 1 tablet (10 mg total) by mouth daily, Disp: 30 tablet, Rfl: 3    senna (SENOKOT) 8 6 mg, Take 1 tablet (8 6 mg total) by mouth daily at bedtime, Disp: 30 each, Rfl: 0    tadalafil (CIALIS) 20 MG tablet, Take one tablet by mouth one hour before sexual activity, Disp: 15 tablet, Rfl: 3    tamsulosin (FLOMAX) 0 4 mg, take 1 capsule by mouth once daily with dinner, Disp: 30 capsule, Rfl: 1    traMADol (Ultram) 50 mg tablet, Take 1 tab PO Q8 hours PRN pain, on severe days can take a 4th tablet , Disp: 100 tablet, Rfl: 1    VITAMIN D PO, Take 1,000 Units by mouth daily , Disp: , Rfl:     zolpidem (AMBIEN) 5 mg tablet, Take 1 tablet (5 mg total) by mouth daily at bedtime as needed for sleep, Disp: 30 tablet, Rfl: 1    ALPRAZolam (XANAX) 0 25 mg tablet, Take 1 tablet (0 25 mg total) by mouth daily at bedtime as needed for anxiety (Patient not taking: No sig reported), Disp: 30 tablet, Rfl: 0      Physical Exam:  /82 (BP Location: Left arm, Patient Position: Sitting, Cuff Size: Adult)   Pulse 83   Temp (!) 97 °F (36 1 °C)   Resp 18   Ht 5' 9" (1 753 m)   Wt 87 1 kg (192 lb)   SpO2 99%   BMI 28 35 kg/m²     Physical Exam  Vitals reviewed  Constitutional:       General: He is not in acute distress  Appearance: He is well-developed  He is not diaphoretic  HENT:      Head: Normocephalic and atraumatic  Eyes:      General: Lids are normal  No scleral icterus  Conjunctiva/sclera: Conjunctivae normal       Pupils: Pupils are equal, round, and reactive to light  Neck:      Thyroid: No thyromegaly  Cardiovascular:      Rate and Rhythm: Normal rate and regular rhythm  Heart sounds: Normal heart sounds  No murmur heard  Pulmonary:      Effort: Pulmonary effort is normal  No respiratory distress  Breath sounds: Normal breath sounds  Chest:   Breasts:      Right: No axillary adenopathy  Left: No axillary adenopathy  Abdominal:      General: There is no distension  Palpations: Abdomen is soft  There is no hepatomegaly or splenomegaly  Tenderness: There is no abdominal tenderness  Hernia: A hernia (left abdomen proximal to incision) is present  Musculoskeletal:         General: Normal range of motion  Cervical back: Normal range of motion and neck supple  Right lower leg: No edema  Left lower leg: No edema  Lymphadenopathy:      Cervical: No cervical adenopathy  Upper Body:      Right upper body: No axillary adenopathy        Left upper body: No axillary adenopathy  Skin:     General: Skin is warm and dry  Findings: No erythema or rash  Neurological:      General: No focal deficit present  Mental Status: He is alert and oriented to person, place, and time  Psychiatric:         Mood and Affect: Mood normal  Affect is blunt  Behavior: Behavior normal  Behavior is cooperative  Thought Content: Thought content normal          Judgment: Judgment normal            Labs:  Lab Results   Component Value Date    WBC 2 53 (L) 08/02/2022    HGB 12 1 08/02/2022    HCT 37 9 08/02/2022    MCV 97 08/02/2022     08/02/2022     Lab Results   Component Value Date    K 3 9 08/02/2022     08/02/2022    CO2 29 08/02/2022    BUN 15 08/02/2022    CREATININE 1 18 08/02/2022    GLUF 111 (H) 03/01/2022    CALCIUM 9 4 08/02/2022    CORRECTEDCA 9 0 06/21/2022    AST 21 08/02/2022    ALT 34 08/02/2022    ALKPHOS 89 08/02/2022    EGFR 60 08/02/2022       Patient voiced understanding and agreement in the above discussion  Aware to contact our office with questions/symptoms in the interim  This note has been generated by voice recognition software system  Therefore, there may be spelling, grammar, and or syntax errors  Please contact if questions arise

## 2022-08-04 NOTE — Clinical Note
Acosta Joyce, Patient has been noticing increased bruising and bleeding easily with minimal trauma since he started the Eliquis for Afib  He does have a higher risk of falls with his neuropathy in his feet and has had a few falls  Was wondering if he would be a candidate for lower dose Eliquis 2 5???  Thanks

## 2022-08-05 ENCOUNTER — HOSPITAL ENCOUNTER (OUTPATIENT)
Dept: INFUSION CENTER | Facility: HOSPITAL | Age: 72
Discharge: HOME | End: 2022-08-05
Attending: INTERNAL MEDICINE
Payer: MEDICARE

## 2022-08-05 ENCOUNTER — HOSPITAL ENCOUNTER (OUTPATIENT)
Dept: INFUSION CENTER | Facility: HOSPITAL | Age: 72
Discharge: HOME | End: 2022-08-05
Attending: INTERNAL MEDICINE

## 2022-08-05 VITALS
DIASTOLIC BLOOD PRESSURE: 79 MMHG | TEMPERATURE: 97.6 F | RESPIRATION RATE: 18 BRPM | SYSTOLIC BLOOD PRESSURE: 126 MMHG | HEART RATE: 63 BPM

## 2022-08-05 DIAGNOSIS — E83.42 HYPOMAGNESEMIA: ICD-10-CM

## 2022-08-05 DIAGNOSIS — D70.1 CHEMOTHERAPY-INDUCED NEUTROPENIA (HCC): ICD-10-CM

## 2022-08-05 DIAGNOSIS — G89.3 CANCER ASSOCIATED PAIN: ICD-10-CM

## 2022-08-05 DIAGNOSIS — T45.1X5A CHEMOTHERAPY-INDUCED NEUTROPENIA (HCC): ICD-10-CM

## 2022-08-05 DIAGNOSIS — C79.10 METASTATIC UROTHELIAL CARCINOMA (HCC): Primary | ICD-10-CM

## 2022-08-05 DIAGNOSIS — E86.0 DEHYDRATION: ICD-10-CM

## 2022-08-05 LAB
BASOPHILS # BLD AUTO: 0.02 THOUSANDS/ΜL (ref 0–0.1)
BASOPHILS NFR BLD AUTO: 1 % (ref 0–1)
EOSINOPHIL # BLD AUTO: 0 THOUSAND/ΜL (ref 0–0.61)
EOSINOPHIL NFR BLD AUTO: 0 % (ref 0–6)
ERYTHROCYTE [DISTWIDTH] IN BLOOD BY AUTOMATED COUNT: 16 % (ref 11.6–15.1)
HCT VFR BLD AUTO: 37.2 % (ref 36.5–49.3)
HGB BLD-MCNC: 11.9 G/DL (ref 12–17)
IMM GRANULOCYTES # BLD AUTO: 0.01 THOUSAND/UL (ref 0–0.2)
IMM GRANULOCYTES NFR BLD AUTO: 0 % (ref 0–2)
LYMPHOCYTES # BLD AUTO: 1.07 THOUSANDS/ΜL (ref 0.6–4.47)
LYMPHOCYTES NFR BLD AUTO: 31 % (ref 14–44)
MCH RBC QN AUTO: 31.1 PG (ref 26.8–34.3)
MCHC RBC AUTO-ENTMCNC: 32 G/DL (ref 31.4–37.4)
MCV RBC AUTO: 97 FL (ref 82–98)
MONOCYTES # BLD AUTO: 0.85 THOUSAND/ΜL (ref 0.17–1.22)
MONOCYTES NFR BLD AUTO: 24 % (ref 4–12)
NEUTROPHILS # BLD AUTO: 1.55 THOUSANDS/ΜL (ref 1.85–7.62)
NEUTS SEG NFR BLD AUTO: 44 % (ref 43–75)
NRBC BLD AUTO-RTO: 0 /100 WBCS
PLATELET # BLD AUTO: 203 THOUSANDS/UL (ref 149–390)
PMV BLD AUTO: 10 FL (ref 8.9–12.7)
RBC # BLD AUTO: 3.83 MILLION/UL (ref 3.88–5.62)
WBC # BLD AUTO: 3.5 THOUSAND/UL (ref 4.31–10.16)

## 2022-08-05 PROCEDURE — 85025 COMPLETE CBC W/AUTO DIFF WBC: CPT

## 2022-08-05 PROCEDURE — 96365 THER/PROPH/DIAG IV INF INIT: CPT

## 2022-08-05 RX ADMIN — MAGNESIUM SULFATE HEPTAHYDRATE: 500 INJECTION, SOLUTION INTRAMUSCULAR; INTRAVENOUS at 11:10

## 2022-08-05 NOTE — PROGRESS NOTES
Pt tolerated todays mag and hydration well  Port flushed and deaccessed per routine   Discharged ambulatory with avs

## 2022-08-06 ENCOUNTER — OFFICE VISIT (OUTPATIENT)
Dept: URGENT CARE | Facility: CLINIC | Age: 72
End: 2022-08-06
Payer: MEDICARE

## 2022-08-06 ENCOUNTER — HOSPITAL ENCOUNTER (EMERGENCY)
Facility: HOSPITAL | Age: 72
Discharge: HOME | End: 2022-08-06
Attending: EMERGENCY MEDICINE
Payer: MEDICARE

## 2022-08-06 VITALS
OXYGEN SATURATION: 96 % | TEMPERATURE: 97.6 F | HEART RATE: 89 BPM | SYSTOLIC BLOOD PRESSURE: 159 MMHG | DIASTOLIC BLOOD PRESSURE: 88 MMHG | RESPIRATION RATE: 16 BRPM

## 2022-08-06 VITALS
OXYGEN SATURATION: 98 % | SYSTOLIC BLOOD PRESSURE: 124 MMHG | RESPIRATION RATE: 20 BRPM | BODY MASS INDEX: 28.44 KG/M2 | TEMPERATURE: 98.1 F | HEART RATE: 81 BPM | DIASTOLIC BLOOD PRESSURE: 86 MMHG | HEIGHT: 69 IN | WEIGHT: 192 LBS

## 2022-08-06 DIAGNOSIS — R68.2 DRY MOUTH: ICD-10-CM

## 2022-08-06 DIAGNOSIS — J06.9 URI (UPPER RESPIRATORY INFECTION): Primary | ICD-10-CM

## 2022-08-06 DIAGNOSIS — R05.1 ACUTE COUGH: Primary | ICD-10-CM

## 2022-08-06 LAB
SARS-COV-2 AG UPPER RESP QL IA: NEGATIVE
VALID CONTROL: NORMAL

## 2022-08-06 PROCEDURE — 99213 OFFICE O/P EST LOW 20 MIN: CPT

## 2022-08-06 PROCEDURE — 99283 EMERGENCY DEPT VISIT LOW MDM: CPT

## 2022-08-06 PROCEDURE — 99282 EMERGENCY DEPT VISIT SF MDM: CPT | Performed by: EMERGENCY MEDICINE

## 2022-08-06 PROCEDURE — G0463 HOSPITAL OUTPT CLINIC VISIT: HCPCS

## 2022-08-06 PROCEDURE — 87811 SARS-COV-2 COVID19 W/OPTIC: CPT

## 2022-08-06 NOTE — PROGRESS NOTES
[facility]        NAME: Philip Hinton is a 67 y o  male  : 1950    MRN: [id number]  DATE: 2022  TIME: 12:19 PM      Assessment and Plan     Acute cough [R05 1]  1  Acute cough  Poct Covid 19 Rapid Antigen Test    Transfer to other facility   2  Dry mouth  Transfer to other facility     poct rapid covid negative       given cancer history/actively receiving treatments, and signs of dehydration- patient and family agreeable to proceed to the ER for further evaluation  Requesting to go to [facility] by POV  Patient Instructions   Proceed to the ER for further evaluation  [address] [facility], [facility] [address], [address]    Chief Complaint     Chief Complaint   Patient presents with    Cough     Cough and congestion since yesterday  History of Present Illness     Patient is a 65-year-old male who presents with sore throat, cough, chest tightness, and runny nose for one day  States he caught whatever his wife has  Denies n/v/d  Denies CP, SOB, palpitations or wheezing  Denies n/v/d  Patient is currently receiving chemo treatments; has a port on right anterior chest wall  Patient reports dry mouth  States he does receive IV hydration through the infusion center  Wife states his tongue is not normally dry  States he had blood work completed yesterday that showed a low WBC  Review of Systems     Review of Systems   Constitutional: Negative for chills and fever  HENT: Positive for rhinorrhea and sore throat  Negative for congestion  Respiratory: Positive for cough and chest tightness  Negative for shortness of breath and wheezing  Cardiovascular: Negative for chest pain and palpitations  Gastrointestinal: Negative for diarrhea, nausea and vomiting  All other systems reviewed and are negative          Current Medications       Current Outpatient Medications:     acetaminophen (TYLENOL) 500 mg tablet, Take 2 tablets (1,000 mg total) by mouth every 8 (eight) hours, Disp: , Rfl:     allopurinol (ZYLOPRIM) 300 mg tablet, take 1 tablet by mouth once daily, Disp: 30 tablet, Rfl: 3    ALPRAZolam (XANAX) 0 25 mg tablet, Take 1 tablet (0 25 mg total) by mouth daily at bedtime as needed for anxiety (Patient not taking: No sig reported), Disp: 30 tablet, Rfl: 0    apixaban (Eliquis) 5 mg, Take 1 tablet (5 mg total) by mouth 2 (two) times a day For afib, Disp: 60 tablet, Rfl: 11    atorvastatin (LIPITOR) 80 mg tablet, Take 80 mg by mouth every other day evening, Disp: , Rfl:     clotrimazole (MYCELEX) 10 mg rizwan, Take 1 tablet (10 mg total) by mouth 5 (five) times a day, Disp: 70 tablet, Rfl: 2    colchicine (COLCRYS) 0 6 mg tablet, take 1 tablet by mouth twice a day if needed for FOOT PAIN, Disp: , Rfl:     docusate sodium (COLACE) 250 MG capsule, Take 1 capsule (250 mg total) by mouth daily, Disp: 60 capsule, Rfl: 6    DULoxetine (CYMBALTA) 60 mg delayed release capsule, take 1 capsule by mouth once daily, Disp: 30 capsule, Rfl: 1    folic acid (FOLVITE) 1 mg tablet, take 1 tablet by mouth once daily, Disp: 90 tablet, Rfl: 4    Magnesium 250 MG TABS, 1 tablet 2 (two) times a day Taking 500mg in the morning and 500mg at night, Disp: , Rfl:     metoprolol tartrate (LOPRESSOR) 100 mg tablet, Take 50 mg by mouth daily, Disp: , Rfl:     naloxone (NARCAN) 4 mg/0 1 mL nasal spray, Administer 1 spray into a nostril   If breathing does not return to normal or if breathing difficulty resumes after 2-3 minutes, give another dose in the other nostril using a new spray , Disp: 1 each, Rfl: 1    nystatin (MYCOSTATIN) cream, Apply topically 2 (two) times a day, Disp: 30 g, Rfl: 0    predniSONE 10 mg tablet, Take 1 tablet (10 mg total) by mouth daily, Disp: 30 tablet, Rfl: 3    senna (SENOKOT) 8 6 mg, Take 1 tablet (8 6 mg total) by mouth daily at bedtime, Disp: 30 each, Rfl: 0    tadalafil (CIALIS) 20 MG tablet, Take one tablet by mouth one hour before sexual activity, Disp: 15 tablet, Rfl: 3    tamsulosin (FLOMAX) 0 4 mg, take 1 capsule by mouth once daily with dinner, Disp: 30 capsule, Rfl: 1    traMADol (Ultram) 50 mg tablet, Take 1 tab PO Q8 hours PRN pain, on severe days can take a 4th tablet , Disp: 100 tablet, Rfl: 1    VITAMIN D PO, Take 1,000 Units by mouth daily , Disp: , Rfl:     zolpidem (AMBIEN) 5 mg tablet, Take 1 tablet (5 mg total) by mouth daily at bedtime as needed for sleep, Disp: 30 tablet, Rfl: 1    Current Allergies     Allergies as of 08/06/2022 - Reviewed 08/06/2022   Allergen Reaction Noted    Poison ivy extract Rash 07/29/2021              The following portions of the patient's history were reviewed and updated as appropriate: allergies, current medications, past family history, past medical history, past social history, past surgical history and problem list      Past Medical History:   Diagnosis Date    Arthritis     Bladder cancer     Cancer ([facility] [address] )     skin melanoma;basal cell    Chronic pain disorder     from arthritis    Colon polyp     Does use hearing aid     bilat    Dry eyes, bilateral     GERD (gastroesophageal reflux disease)     History of kidney stones 10/2019    History of partial knee replacement     bilat    History of vertigo     Hyperlipidemia     Hypertension     Infusion extravasation of chemotherapy vesicant 11/2021    Kidney lesion     Lumbar disc disorder     compression of vertebrae L4-5-6    Muscle weakness     left hip area    Renal mass     left    Right ankle injury 03/05/2020    missed step of ladder     Right ankle pain     Shortness of breath     with activity    Tinnitus     Urothelial cancer     left    Wears glasses        Past Surgical History:   Procedure Laterality Date    COLONOSCOPY      CYSTOSCOPY Left 4/1/2020    Procedure: CYSTOSCOPY; URETERAL CATHETER PLACEMENT;  Surgeon: Fausto Wick MD;  Location[facility];  Service: Urology    CYSTOSCOPY  05/11/2020    CYSTOSCOPY  06/04/2021    FL CYSTOGRAM  4/13/2020    FL RETROGRADE PYELOGRAM  1/17/2020    HERNIA REPAIR      umbilical with mesh    INGUINAL HERNIA REPAIR Right     with mesh    IR PORT PLACEMENT  4/30/2020    JOINT REPLACEMENT Bilateral     partials knee    LUMBAR EPIDURAL INJECTION      NC CYSTOURETHROSCOPY,URETER CATHETER Bilateral 1/17/2020    Procedure: CYSTOSCOPY; RIGHT RETROGRADE PYELOGRAM WITH RIGHT URETERAL CYTOLOGY SAMPLING; LEFT URETEROSCOPY WITH RENAL PELVIS BIOPSY AND LEFT STENT PLACEMENT;  Surgeon: Martita Calle MD;  [facility];  Service: Urology    NC NEPHRECTOMY, W/PART  URETECTOMY Left 4/1/2020    Procedure: ROBOTIC LAPAROSCOPIC NEPHRO-URETERECTOMY;  Surgeon: Martita Calle MD;  Location[facility];  Service: Urology    SKIN CANCER EXCISION      surface melanoma    TONSILLECTOMY      WISDOM TOOTH EXTRACTION         Family History   Problem Relation Age of Onset    Liver cancer Father          Medications have been verified  Objective     /88   Pulse 89   Temp 97 6 °F (36 4 °C)   Resp 16   SpO2 96%   No LMP for male patient  Physical Exam     Physical Exam  Vitals and nursing note reviewed  Constitutional:       General: He is awake  He is not in acute distress  Appearance: Normal appearance  He is normal weight  He is not ill-appearing, toxic-appearing or diaphoretic  HENT:      Right Ear: Hearing, tympanic membrane, ear canal and external ear normal  There is no impacted cerumen  Tympanic membrane is not injected or erythematous  Left Ear: Hearing, tympanic membrane, ear canal and external ear normal  There is no impacted cerumen  Tympanic membrane is not injected or erythematous  Nose: Mucosal edema and congestion present  No rhinorrhea  Right Sinus: No maxillary sinus tenderness or frontal sinus tenderness  Left Sinus: No maxillary sinus tenderness or frontal sinus tenderness  Mouth/Throat:      Lips: Pink        Mouth: Mucous membranes are dry  Pharynx: Oropharynx is clear  No pharyngeal swelling, oropharyngeal exudate or posterior oropharyngeal erythema  Comments: Dry mouth-dry tongue; slightly cracked lips  Cardiovascular:      Rate and Rhythm: Normal rate and regular rhythm  Pulses: Normal pulses  Heart sounds: Normal heart sounds, S1 normal and S2 normal    Pulmonary:      Effort: Pulmonary effort is normal  No tachypnea  Breath sounds: No stridor or decreased air movement  Examination of the right-upper field reveals wheezing and rhonchi  Examination of the left-upper field reveals wheezing and rhonchi  Wheezing (scant) and rhonchi present  Chest:       Neurological:      Mental Status: He is alert  Psychiatric:         Mood and Affect: Mood normal          Behavior: Behavior normal          Thought Content:  Thought content normal          Judgment: Judgment normal

## 2022-08-06 NOTE — PATIENT INSTRUCTIONS
Proceed to the ER for further evaluation     [address] [place] [address], [address]

## 2022-08-06 NOTE — ED PROVIDER NOTES
History  Chief Complaint   Patient presents with    Dehydration     69-year-old male with history of bladder cancer currently on infusions every Friday presents from urgent care for suspected dehydration  Patient reports that he was told he has dry mucous membranes and was told to proceed to the ER for IV fluids  He originally went to urgent care for evaluation for a cough and congestion  He reports that his wife has had these symptoms for the past week and he believes he caught what she has  COVID testing at urgent care negative  Reports that his symptoms started yesterday with a mild cough  He describes his cough as a dry cough, but occasionally brings up mucus from his chest  No history of asthma or COPD  He also reports some nasal congestion associated with this and rhinorrhea  He is tolerating oral solids and liquids without difficulty  Denies shortness of breath, chest pain, abdominal pain, difficulty swallowing, headache, palpitations, dizziness, unilateral leg swelling, fevers, chills, nausea, vomiting, dysuria, blood in stool  History provided by:  Patient   used: No    Cough  Cough characteristics:  Non-productive  Sputum characteristics:  Nondescript  Severity:  Mild  Onset quality:  Sudden  Duration:  1 day  Timing:  Constant  Progression:  Waxing and waning  Chronicity:  New  Smoker: no    Context: sick contacts and upper respiratory infection    Relieved by:  Nothing  Worsened by:  Nothing  Ineffective treatments:  None tried  Associated symptoms: rhinorrhea    Associated symptoms: no chest pain, no chills, no diaphoresis, no ear pain, no eye discharge, no fever, no headaches, no myalgias, no rash, no shortness of breath, no sore throat and no wheezing        Prior to Admission Medications   Prescriptions Last Dose Informant Patient Reported? Taking?    ALPRAZolam (XANAX) 0 25 mg tablet  Self No No   Sig: Take 1 tablet (0 25 mg total) by mouth daily at bedtime as needed for anxiety   Patient not taking: No sig reported   DULoxetine (CYMBALTA) 60 mg delayed release capsule  Self No No   Sig: take 1 capsule by mouth once daily   Magnesium 250 MG TABS  Self Yes No   Si tablet 2 (two) times a day Taking 500mg in the morning and 500mg at night   VITAMIN D PO  Self Yes No   Sig: Take 1,000 Units by mouth daily    acetaminophen (TYLENOL) 500 mg tablet  Self No No   Sig: Take 2 tablets (1,000 mg total) by mouth every 8 (eight) hours   allopurinol (ZYLOPRIM) 300 mg tablet  Self No No   Sig: take 1 tablet by mouth once daily   apixaban (Eliquis) 5 mg  Self No No   Sig: Take 1 tablet (5 mg total) by mouth 2 (two) times a day For afib   atorvastatin (LIPITOR) 80 mg tablet  Self Yes No   Sig: Take 80 mg by mouth every other day evening   clotrimazole (MYCELEX) 10 mg rizwan  Self No No   Sig: Take 1 tablet (10 mg total) by mouth 5 (five) times a day   colchicine (COLCRYS) 0 6 mg tablet  Self Yes No   Sig: take 1 tablet by mouth twice a day if needed for FOOT PAIN   docusate sodium (COLACE) 250 MG capsule  Self No No   Sig: Take 1 capsule (250 mg total) by mouth daily   folic acid (FOLVITE) 1 mg tablet  Self No No   Sig: take 1 tablet by mouth once daily   metoprolol tartrate (LOPRESSOR) 100 mg tablet  Self Yes No   Sig: Take 50 mg by mouth daily   naloxone (NARCAN) 4 mg/0 1 mL nasal spray  Self No No   Sig: Administer 1 spray into a nostril  If breathing does not return to normal or if breathing difficulty resumes after 2-3 minutes, give another dose in the other nostril using a new spray     nystatin (MYCOSTATIN) cream  Self No No   Sig: Apply topically 2 (two) times a day   predniSONE 10 mg tablet   No No   Sig: Take 1 tablet (10 mg total) by mouth daily   senna (SENOKOT) 8 6 mg  Self No No   Sig: Take 1 tablet (8 6 mg total) by mouth daily at bedtime   tadalafil (CIALIS) 20 MG tablet  Self No No   Sig: Take one tablet by mouth one hour before sexual activity   tamsulosin (FLOMAX) 0 4 mg Self No No   Sig: take 1 capsule by mouth once daily with dinner   traMADol (Ultram) 50 mg tablet  Self No No   Sig: Take 1 tab PO Q8 hours PRN pain, on severe days can take a 4th tablet     zolpidem (AMBIEN) 5 mg tablet  Self No No   Sig: Take 1 tablet (5 mg total) by mouth daily at bedtime as needed for sleep      Facility-Administered Medications: None       Past Medical History:   Diagnosis Date    Arthritis     Bladder cancer     Cancer ([facility] [address] )     skin melanoma;basal cell    Chronic pain disorder     from arthritis    Colon polyp     Does use hearing aid     bilat    Dry eyes, bilateral     GERD (gastroesophageal reflux disease)     History of kidney stones 10/2019    History of partial knee replacement     bilat    History of vertigo     Hyperlipidemia     Hypertension     Infusion extravasation of chemotherapy vesicant 11/2021    Kidney lesion     Lumbar disc disorder     compression of vertebrae L4-5-6    Muscle weakness     left hip area    Renal mass     left    Right ankle injury 03/05/2020    missed step of ladder     Right ankle pain     Shortness of breath     with activity    Tinnitus     Urothelial cancer     left    Wears glasses        Past Surgical History:   Procedure Laterality Date    COLONOSCOPY      CYSTOSCOPY Left 4/1/2020    Procedure: CYSTOSCOPY; URETERAL CATHETER PLACEMENT;  Surgeon: Kristel Ojeda MD;  Location[facility];  Service: Urology    CYSTOSCOPY  05/11/2020    CYSTOSCOPY  06/04/2021    FL CYSTOGRAM  4/13/2020    FL RETROGRADE PYELOGRAM  1/17/2020    HERNIA REPAIR      umbilical with mesh    INGUINAL HERNIA REPAIR Right     with mesh    IR PORT PLACEMENT  4/30/2020    JOINT REPLACEMENT Bilateral     partials knee    LUMBAR EPIDURAL INJECTION      IN CYSTOURETHROSCOPY,URETER CATHETER Bilateral 1/17/2020    Procedure: CYSTOSCOPY; RIGHT RETROGRADE PYELOGRAM WITH RIGHT URETERAL CYTOLOGY SAMPLING; LEFT URETEROSCOPY WITH RENAL PELVIS BIOPSY AND LEFT STENT PLACEMENT;  Surgeon: Peggyann Bosworth, MD;  [facility];  Service: Urology    ME NEPHRECTOMY, W/PART  URETECTOMY Left 2020    Procedure: ROBOTIC LAPAROSCOPIC NEPHRO-URETERECTOMY;  Surgeon: Peggyann Bosworth, MD;  [facility];  Service: Urology    SKIN CANCER EXCISION      surface melanoma    TONSILLECTOMY      WISDOM TOOTH EXTRACTION         Family History   Problem Relation Age of Onset    Liver cancer Father      I have reviewed and agree with the history as documented  E-Cigarette/Vaping    E-Cigarette Use Never User      E-Cigarette/Vaping Substances    Nicotine No     THC No     CBD No     Flavoring No     Other No     Unknown No      Social History     Tobacco Use    Smoking status: Former Smoker     Types: Cigarettes     Quit date:      Years since quittin 6    Smokeless tobacco: Never Used   Vaping Use    Vaping Use: Never used   Substance Use Topics    Alcohol use: Yes     Alcohol/week: 17 0 standard drinks     Types: 7 Glasses of wine, 10 Cans of beer per week     Comment: socially    Drug use: Never       Review of Systems   Constitutional: Negative for chills, diaphoresis and fever  HENT: Positive for congestion and rhinorrhea  Negative for ear pain and sore throat  Eyes: Negative for pain, discharge and visual disturbance  Respiratory: Positive for cough  Negative for chest tightness, shortness of breath and wheezing  Cardiovascular: Negative for chest pain, palpitations and leg swelling  Gastrointestinal: Negative for abdominal pain, diarrhea, nausea and vomiting  Genitourinary: Negative for dysuria and hematuria  Musculoskeletal: Negative for arthralgias, back pain and myalgias  Skin: Negative for color change and rash  Neurological: Negative for seizures, syncope and headaches  Psychiatric/Behavioral: Negative for confusion  All other systems reviewed and are negative        Physical Exam  Physical Exam  Vitals and nursing note reviewed  Constitutional:       General: He is not in acute distress  Appearance: Normal appearance  He is well-developed and normal weight  HENT:      Head: Normocephalic and atraumatic  Right Ear: External ear normal       Left Ear: External ear normal       Nose: Rhinorrhea present  Mouth/Throat:      Mouth: Mucous membranes are moist       Pharynx: Oropharynx is clear  No oropharyngeal exudate or posterior oropharyngeal erythema  Comments: Airway patent  Uvula midline  Mucous membranes are moist   Eyes:      General: No scleral icterus  Right eye: No discharge  Left eye: No discharge  Extraocular Movements: Extraocular movements intact  Conjunctiva/sclera: Conjunctivae normal    Cardiovascular:      Rate and Rhythm: Normal rate and regular rhythm  Pulses: Normal pulses  Heart sounds: Normal heart sounds  No murmur heard  Pulmonary:      Effort: Pulmonary effort is normal  No respiratory distress  Breath sounds: Normal breath sounds  No wheezing or rales  Comments: Lungs clear to auscultation bilaterally  Chest:      Chest wall: No tenderness  Abdominal:      General: Abdomen is flat  Palpations: Abdomen is soft  Tenderness: There is no abdominal tenderness  There is no right CVA tenderness or left CVA tenderness  Musculoskeletal:         General: No swelling, tenderness or signs of injury  Normal range of motion  Cervical back: Normal range of motion and neck supple  No rigidity  Skin:     General: Skin is warm and dry  Capillary Refill: Capillary refill takes less than 2 seconds  Findings: No bruising, erythema or rash  Neurological:      General: No focal deficit present  Mental Status: He is alert and oriented to person, place, and time  Mental status is at baseline        Gait: Gait normal    Psychiatric:         Mood and Affect: Mood normal          Behavior: Behavior normal          Thought Content: Thought content normal          Vital Signs  ED Triage Vitals [08/06/22 1219]   Temperature Pulse Respirations Blood Pressure SpO2   98 1 °F (36 7 °C) 81 20 124/86 98 %      Temp Source Heart Rate Source Patient Position - Orthostatic VS BP Location FiO2 (%)   Oral Monitor Sitting Right arm --      Pain Score       No Pain           Vitals:    08/06/22 1219   BP: 124/86   Pulse: 81   Patient Position - Orthostatic VS: Sitting         Visual Acuity      ED Medications  Medications - No data to display    Diagnostic Studies  Results Reviewed     None                 No orders to display              Procedures  Procedures         ED Course                               SBIRT 20yo+    Flowsheet Row Most Recent Value   SBIRT (23 yo +)    In order to provide better care to our patients, we are screening all of our patients for alcohol and drug use  Would it be okay to ask you these screening questions? Yes Filed at: 08/06/2022 1242   Initial Alcohol Screen: US AUDIT-C     1  How often do you have a drink containing alcohol? 0 Filed at: 08/06/2022 1242   2  How many drinks containing alcohol do you have on a typical day you are drinking? 0 Filed at: 08/06/2022 1242   3a  Male UNDER 65: How often do you have five or more drinks on one occasion? 0 Filed at: 08/06/2022 1242   Audit-C Score 0 Filed at: 08/06/2022 1242   SIMONA: How many times in the past year have you    Used an illegal drug or used a prescription medication for non-medical reasons? Never Filed at: 08/06/2022 1243                    MDM  Number of Diagnoses or Management Options  URI (upper respiratory infection): new and does not require workup  Diagnosis management comments: 67-year-old male with history of bladder cancer presents to the emergency department from urgent care for evaluation of dry mucous membranes    Patient originally presented to urgent care with concern for upper respiratory infection as he had symptoms of cough and congestion for 1 day  Exam consistent with viral URI  Patient already had covid testing done at urgent care  Mucous membranes appear moist   Patient is able to tolerate oral fluids for hydration  No concerning symptoms on review of systems  Unable to contact urgent care for further insight, but patient explains that he was told he needs IV fluids  As patient appears hydrated with oral fluids, will plan to continue with oral hydration  No further workup necessary as patient appears well with an unremarkable exam  Discussed the decision to hold off on imaging and lab work with the patient who is in agreement  Discussed that there is some degree of medical uncertainty without doing testing and the patient is in agreement with no further testing  Strict return precautions were discussed in regards to symptoms including but not limited to shortness of breath, chest pain, severe abdominal pain, fevers, etc  I advised him to obtain a pulseox and if his sats are 90% or less to return to the ED  Patient verbalizes understanding of the assessment and plan and is amenable to discharge  Dr Glenn Morrell evaluated patient as well and discharged the patient  Amount and/or Complexity of Data Reviewed  Review and summarize past medical records: yes  Discuss the patient with other providers: yes    Patient Progress  Patient progress: stable      Disposition  Final diagnoses:   URI (upper respiratory infection)     Time reflects when diagnosis was documented in both MDM as applicable and the Disposition within this note     Time User Action Codes Description Comment    8/6/2022 12:43 PM Ching Gustafson Add [J06 9] URI (upper respiratory infection)       ED Disposition     ED Disposition   Discharge    Condition   Stable    Date/Time   Sat Aug 6, 2022 12:43 PM    Hunter [address] discharge to home/self care                 Follow-up Information     Follow up With Specialties Details Why Contact Info Additional Information    Zackery Ge, DO Internal Medicine Call in 3 days follow up for further evaluation of symptoms [address] [address] Emergency Department Emergency Medicine Go to  If symptoms worsen [address] Jomar Chen [id number] [phone] [facility], [address] [facility], [place], [address] [facility]          Discharge Medication List as of 8/6/2022  1:24 PM      CONTINUE these medications which have NOT CHANGED    Details   acetaminophen (TYLENOL) 500 mg tablet Take 2 tablets (1,000 mg total) by mouth every 8 (eight) hours, Starting Mon 8/23/2021, No Print      allopurinol (ZYLOPRIM) 300 mg tablet take 1 tablet by mouth once daily, Normal      ALPRAZolam (XANAX) 0 25 mg tablet Take 1 tablet (0 25 mg total) by mouth daily at bedtime as needed for anxiety, Starting Thu 2/24/2022, Normal      apixaban (Eliquis) 5 mg Take 1 tablet (5 mg total) by mouth 2 (two) times a day For afib, Starting Tue 6/14/2022, Print      atorvastatin (LIPITOR) 80 mg tablet Take 80 mg by mouth every other day evening, Historical Med      clotrimazole (MYCELEX) 10 mg rizwan Take 1 tablet (10 mg total) by mouth 5 (five) times a day, Starting Thu 7/21/2022, Normal      colchicine (COLCRYS) 0 6 mg tablet take 1 tablet by mouth twice a day if needed for FOOT PAIN, Historical Med      docusate sodium (COLACE) 250 MG capsule Take 1 capsule (250 mg total) by mouth daily, Starting Wed 11/10/2021, Normal      DULoxetine (CYMBALTA) 60 mg delayed release capsule take 1 capsule by mouth once daily, Normal      folic acid (FOLVITE) 1 mg tablet take 1 tablet by mouth once daily, Normal      Magnesium 250 MG TABS 1 tablet 2 (two) times a day Taking 500mg in the morning and 500mg at night, Historical Med      metoprolol tartrate (LOPRESSOR) 100 mg tablet Take 50 mg by mouth daily, Historical Med naloxone (NARCAN) 4 mg/0 1 mL nasal spray Administer 1 spray into a nostril  If breathing does not return to normal or if breathing difficulty resumes after 2-3 minutes, give another dose in the other nostril using a new spray , Normal      nystatin (MYCOSTATIN) cream Apply topically 2 (two) times a day, Starting Tue 10/19/2021, Normal      predniSONE 10 mg tablet Take 1 tablet (10 mg total) by mouth daily, Starting Thu 8/4/2022, Normal      senna (SENOKOT) 8 6 mg Take 1 tablet (8 6 mg total) by mouth daily at bedtime, Starting Fri 1/8/2021, Normal      tadalafil (CIALIS) 20 MG tablet Take one tablet by mouth one hour before sexual activity, Print      tamsulosin (FLOMAX) 0 4 mg take 1 capsule by mouth once daily with dinner, Normal      traMADol (Ultram) 50 mg tablet Take 1 tab PO Q8 hours PRN pain, on severe days can take a 4th tablet , Normal      VITAMIN D PO Take 1,000 Units by mouth daily , Historical Med      zolpidem (AMBIEN) 5 mg tablet Take 1 tablet (5 mg total) by mouth daily at bedtime as needed for sleep, Starting Tue 2/1/2022, Normal             No discharge procedures on file      PDMP Review       Value Time User    PDMP Reviewed  Yes 7/18/2022 11:38 AM RAGHU Mora          ED Provider  Electronically Signed by           Ramona Kirk PA-C  08/07/22 9438

## 2022-08-08 ENCOUNTER — OFFICE VISIT (OUTPATIENT)
Dept: PHYSICAL THERAPY | Facility: CLINIC | Age: 72
End: 2022-08-08
Payer: MEDICARE

## 2022-08-08 DIAGNOSIS — R53.1 WEAKNESS GENERALIZED: ICD-10-CM

## 2022-08-08 DIAGNOSIS — M54.50 CHRONIC LEFT-SIDED LOW BACK PAIN WITHOUT SCIATICA: Primary | ICD-10-CM

## 2022-08-08 DIAGNOSIS — G62.0 CHEMOTHERAPY-INDUCED NEUROPATHY (HCC): ICD-10-CM

## 2022-08-08 DIAGNOSIS — G89.29 CHRONIC LEFT-SIDED LOW BACK PAIN WITHOUT SCIATICA: Primary | ICD-10-CM

## 2022-08-08 DIAGNOSIS — T45.1X5A CHEMOTHERAPY-INDUCED NEUROPATHY (HCC): ICD-10-CM

## 2022-08-08 DIAGNOSIS — M25.562 LEFT KNEE PAIN, UNSPECIFIED CHRONICITY: ICD-10-CM

## 2022-08-08 PROCEDURE — 97112 NEUROMUSCULAR REEDUCATION: CPT

## 2022-08-08 PROCEDURE — 97110 THERAPEUTIC EXERCISES: CPT

## 2022-08-08 NOTE — PROGRESS NOTES
Daily Note     Today's date: 2022  Patient name: Gaurang Dupree  : 1950  MRN: [id number]  Referring provider: RAGHU Matt  Dx:   Encounter Diagnosis     ICD-10-CM    1  Chronic left-sided low back pain without sciatica  M54 50     G89 29    2  Chemotherapy-induced neuropathy ([facility] [address] )  G62 0     T45  1X5A    3  Left knee pain, unspecified chronicity  M25 562    4  Weakness generalized  R53 1                   Subjective: Pt  States "it's getting and feeling better" re: status of LB and L knee regions  Objective: See treatment diary below      Assessment: Tolerated treatment Fairly Well overall with performance of ther exer and NM Re-Ed Acts  Plan: Con't services 2x/week             Re-eval Date: 9/3/22    Date 8/3 8 8 22      Visit Count 1 2      FOTO        Pain In  0/10      Pain Out  0/10           Precautions fall risk, cancer       Manuals  8 8 22                                      Neuro Re-Ed   8 8 22      HKM   **B 2x10        Sidestepping   **4 trials along bar at mirror      BW amb   *Upcoming        Static balance   **2 reps x 1 min ea      Hurdles         Bw amb                 Ther Ex  8 8 22      Nustep        HS  Reviewed for HEP seated  *Reviewed      TAC        SLR - supine, sidelying   **B 1x10 ea      SKC   **B 5x/10"        Prone quad flex S   *NV      LTR   **10x/5"        Leg press  **40# 2x10        Knee ext/flexion machine  **22# 3x10 ea      Ther Activity                        Gait Training                        Modalities

## 2022-08-09 ENCOUNTER — HOSPITAL ENCOUNTER (OUTPATIENT)
Dept: INFUSION CENTER | Facility: HOSPITAL | Age: 72
Discharge: HOME | End: 2022-08-09
Attending: INTERNAL MEDICINE
Payer: MEDICARE

## 2022-08-09 VITALS
SYSTOLIC BLOOD PRESSURE: 133 MMHG | OXYGEN SATURATION: 96 % | RESPIRATION RATE: 16 BRPM | TEMPERATURE: 97 F | DIASTOLIC BLOOD PRESSURE: 80 MMHG | HEART RATE: 64 BPM

## 2022-08-09 DIAGNOSIS — E86.0 DEHYDRATION: Primary | ICD-10-CM

## 2022-08-09 PROCEDURE — 96360 HYDRATION IV INFUSION INIT: CPT

## 2022-08-09 RX ADMIN — SODIUM CHLORIDE 1000 ML: 0.9 INJECTION, SOLUTION INTRAVENOUS at 10:36

## 2022-08-09 NOTE — PLAN OF CARE
Problem: Potential for Falls  Goal: Patient will remain free of falls  Description: INTERVENTIONS:  - Keep Call bell within reach  - Keep bed low and locked with side rails adjusted as appropriate  - Keep care items and personal belongings within reach    Outcome: Progressing     Problem: INFECTION - ADULT  Goal: Absence or prevention of progression during hospitalization  Description: INTERVENTIONS:  - Assess and monitor for signs and symptoms of infection  - Monitor lab/diagnostic results  - Monitor all insertion sites, i e  indwelling lines, tubes, and drains  - Monitor endotracheal if appropriate and nasal secretions for changes in amount and color  - Warner appropriate cooling/warming therapies per order  - Administer medications as ordered  - Instruct and encourage patient and family to use good hand hygiene technique  - Identify and instruct in appropriate isolation precautions for identified infection/condition  Outcome: Progressing     Problem: Knowledge Deficit  Goal: Patient/family/caregiver demonstrates understanding of disease process, treatment plan, medications, and discharge instructions  Description: Complete learning assessment and assess knowledge base    Interventions:  - Provide teaching at level of understanding  - Provide teaching via preferred learning methods  Outcome: Progressing

## 2022-08-11 ENCOUNTER — OFFICE VISIT (OUTPATIENT)
Dept: PHYSICAL THERAPY | Facility: CLINIC | Age: 72
End: 2022-08-11
Payer: MEDICARE

## 2022-08-11 DIAGNOSIS — G89.29 CHRONIC LEFT-SIDED LOW BACK PAIN WITHOUT SCIATICA: ICD-10-CM

## 2022-08-11 DIAGNOSIS — R53.1 WEAKNESS GENERALIZED: Primary | ICD-10-CM

## 2022-08-11 DIAGNOSIS — C79.10 METASTATIC UROTHELIAL CARCINOMA (HCC): ICD-10-CM

## 2022-08-11 DIAGNOSIS — M54.50 CHRONIC LEFT-SIDED LOW BACK PAIN WITHOUT SCIATICA: ICD-10-CM

## 2022-08-11 DIAGNOSIS — G62.0 CHEMOTHERAPY-INDUCED NEUROPATHY (HCC): ICD-10-CM

## 2022-08-11 DIAGNOSIS — M25.562 LEFT KNEE PAIN, UNSPECIFIED CHRONICITY: ICD-10-CM

## 2022-08-11 DIAGNOSIS — T45.1X5A CHEMOTHERAPY-INDUCED NEUROPATHY (HCC): ICD-10-CM

## 2022-08-11 PROCEDURE — 97110 THERAPEUTIC EXERCISES: CPT | Performed by: PHYSICAL THERAPIST

## 2022-08-11 PROCEDURE — 97112 NEUROMUSCULAR REEDUCATION: CPT | Performed by: PHYSICAL THERAPIST

## 2022-08-11 NOTE — PROGRESS NOTES
Daily Note     Today's date: 2022  Patient name: Michael Waters  : 1950  MRN: [id number]  Referring provider: RAGHU Ng  Dx:   Encounter Diagnosis     ICD-10-CM    1  Weakness generalized  R53 1    2  Metastatic urothelial carcinoma (HCC)  C79 10    3  Left knee pain, unspecified chronicity  M25 562    4  Chemotherapy-induced neuropathy ([facility] [address] )  G62 0     T45  1X5A    5  Chronic left-sided low back pain without sciatica  M54 50     G89 29                   Subjective: No knee or back pain today  Just tight  Objective: See treatment diary below      Assessment: Tolerated treatment well  Easily fatigued so multiple seated rest breaks required  He did require UE support during step ups mike L LE due to weakness  Patient would benefit from continued PT      Plan: Progress treatment as tolerated         Re-eval Date: 9/3/22    Date 8/3 8 8 22 8/11     Visit Count 1 2 3     FOTO        Pain In  0/10      Pain Out  0/10           Precautions fall risk, cancer       Manuals  8  22                                      Neuro Re-Ed   8  22      HKM   **B 2x10   6 laps UE as needed      Sidestepping   **4 trials along bar at mirror 6 laps GTB around knees        *Upcoming        STS   5 x 2 no UE, yellow foam      Static balance   **2 reps x 1 min ea      Hurdles         Bw amb    6 laps no UE // bars      Step up    6" fw 2 x 10 1-2 UE      Ther Ex  8  22      Nustep   L4, 10 min      HS  Reviewed for HEP seated  *Reviewed      TAC        SLR - supine, sidelying   **B 1x10 ea      SKC   **B 5x/10"        Prone quad flex S   *NV      LTR   **10x/5"        Leg press  **40# 2x10        Knee ext/flexion machine  **22# 3x10 ea      Ther Activity                        Gait Training                        Modalities

## 2022-08-12 ENCOUNTER — HOSPITAL ENCOUNTER (OUTPATIENT)
Dept: INFUSION CENTER | Facility: HOSPITAL | Age: 72
Discharge: HOME | End: 2022-08-12
Attending: INTERNAL MEDICINE
Payer: MEDICARE

## 2022-08-12 VITALS
OXYGEN SATURATION: 96 % | SYSTOLIC BLOOD PRESSURE: 127 MMHG | TEMPERATURE: 96.9 F | DIASTOLIC BLOOD PRESSURE: 79 MMHG | HEART RATE: 69 BPM | RESPIRATION RATE: 16 BRPM

## 2022-08-12 DIAGNOSIS — C79.89 SECONDARY MALIGNANT NEOPLASM OF MUSCLE OF ABDOMEN (HCC): Primary | ICD-10-CM

## 2022-08-12 DIAGNOSIS — E83.42 HYPOMAGNESEMIA: ICD-10-CM

## 2022-08-12 DIAGNOSIS — E86.0 DEHYDRATION: ICD-10-CM

## 2022-08-12 LAB
ALBUMIN SERPL BCP-MCNC: 4 G/DL (ref 3.5–5)
ALP SERPL-CCNC: 96 U/L (ref 34–104)
ALT SERPL W P-5'-P-CCNC: 29 U/L (ref 7–52)
ANION GAP SERPL CALCULATED.3IONS-SCNC: 5 MMOL/L (ref 4–13)
AST SERPL W P-5'-P-CCNC: 21 U/L (ref 13–39)
BASOPHILS # BLD AUTO: 0.02 THOUSANDS/ΜL (ref 0–0.1)
BASOPHILS NFR BLD AUTO: 0 % (ref 0–1)
BILIRUB SERPL-MCNC: 0.79 MG/DL (ref 0.2–1)
BUN SERPL-MCNC: 20 MG/DL (ref 5–25)
CALCIUM SERPL-MCNC: 9.6 MG/DL (ref 8.4–10.2)
CHLORIDE SERPL-SCNC: 98 MMOL/L (ref 96–108)
CO2 SERPL-SCNC: 31 MMOL/L (ref 21–32)
CREAT SERPL-MCNC: 1.22 MG/DL (ref 0.6–1.3)
EOSINOPHIL # BLD AUTO: 0 THOUSAND/ΜL (ref 0–0.61)
EOSINOPHIL NFR BLD AUTO: 0 % (ref 0–6)
ERYTHROCYTE [DISTWIDTH] IN BLOOD BY AUTOMATED COUNT: 16.9 % (ref 11.6–15.1)
GFR SERPL CREATININE-BSD FRML MDRD: 58 ML/MIN/1.73SQ M
GLUCOSE SERPL-MCNC: 133 MG/DL (ref 65–140)
HCT VFR BLD AUTO: 40 % (ref 36.5–49.3)
HGB BLD-MCNC: 12.9 G/DL (ref 12–17)
IMM GRANULOCYTES # BLD AUTO: 0.02 THOUSAND/UL (ref 0–0.2)
IMM GRANULOCYTES NFR BLD AUTO: 0 % (ref 0–2)
LYMPHOCYTES # BLD AUTO: 0.97 THOUSANDS/ΜL (ref 0.6–4.47)
LYMPHOCYTES NFR BLD AUTO: 14 % (ref 14–44)
MAGNESIUM SERPL-MCNC: 2 MG/DL (ref 1.9–2.7)
MCH RBC QN AUTO: 31.6 PG (ref 26.8–34.3)
MCHC RBC AUTO-ENTMCNC: 32.3 G/DL (ref 31.4–37.4)
MCV RBC AUTO: 98 FL (ref 82–98)
MONOCYTES # BLD AUTO: 0.55 THOUSAND/ΜL (ref 0.17–1.22)
MONOCYTES NFR BLD AUTO: 8 % (ref 4–12)
NEUTROPHILS # BLD AUTO: 5.52 THOUSANDS/ΜL (ref 1.85–7.62)
NEUTS SEG NFR BLD AUTO: 78 % (ref 43–75)
NRBC BLD AUTO-RTO: 0 /100 WBCS
PLATELET # BLD AUTO: 210 THOUSANDS/UL (ref 149–390)
PMV BLD AUTO: 10.1 FL (ref 8.9–12.7)
POTASSIUM SERPL-SCNC: 4.1 MMOL/L (ref 3.5–5.3)
PROT SERPL-MCNC: 6.3 G/DL (ref 6.4–8.4)
RBC # BLD AUTO: 4.08 MILLION/UL (ref 3.88–5.62)
SODIUM SERPL-SCNC: 134 MMOL/L (ref 135–147)
WBC # BLD AUTO: 7.08 THOUSAND/UL (ref 4.31–10.16)

## 2022-08-12 PROCEDURE — 80053 COMPREHEN METABOLIC PANEL: CPT | Performed by: INTERNAL MEDICINE

## 2022-08-12 PROCEDURE — 83735 ASSAY OF MAGNESIUM: CPT | Performed by: INTERNAL MEDICINE

## 2022-08-12 PROCEDURE — 85025 COMPLETE CBC W/AUTO DIFF WBC: CPT | Performed by: INTERNAL MEDICINE

## 2022-08-12 PROCEDURE — 96360 HYDRATION IV INFUSION INIT: CPT

## 2022-08-12 RX ADMIN — SODIUM CHLORIDE 1000 ML: 0.9 INJECTION, SOLUTION INTRAVENOUS at 10:24

## 2022-08-15 ENCOUNTER — OFFICE VISIT (OUTPATIENT)
Dept: PHYSICAL THERAPY | Facility: CLINIC | Age: 72
End: 2022-08-15
Payer: MEDICARE

## 2022-08-15 DIAGNOSIS — R53.1 WEAKNESS GENERALIZED: Primary | ICD-10-CM

## 2022-08-15 DIAGNOSIS — C68.9 UROTHELIAL CANCER (HCC): ICD-10-CM

## 2022-08-15 DIAGNOSIS — C79.10 METASTATIC UROTHELIAL CARCINOMA (HCC): Primary | ICD-10-CM

## 2022-08-15 DIAGNOSIS — T45.1X5A CHEMOTHERAPY-INDUCED NEUROPATHY (HCC): ICD-10-CM

## 2022-08-15 DIAGNOSIS — G62.0 CHEMOTHERAPY-INDUCED NEUROPATHY (HCC): ICD-10-CM

## 2022-08-15 DIAGNOSIS — C65.2 CANCER OF LEFT RENAL PELVIS (HCC): ICD-10-CM

## 2022-08-15 DIAGNOSIS — C79.10 METASTATIC UROTHELIAL CARCINOMA (HCC): ICD-10-CM

## 2022-08-15 DIAGNOSIS — M25.562 LEFT KNEE PAIN, UNSPECIFIED CHRONICITY: ICD-10-CM

## 2022-08-15 PROCEDURE — 97112 NEUROMUSCULAR REEDUCATION: CPT

## 2022-08-15 PROCEDURE — 97110 THERAPEUTIC EXERCISES: CPT

## 2022-08-15 RX ORDER — SODIUM CHLORIDE 9 MG/ML
20 INJECTION, SOLUTION INTRAVENOUS ONCE
Status: CANCELLED | OUTPATIENT
Start: 2022-08-19

## 2022-08-15 RX ORDER — ACETAMINOPHEN 325 MG/1
650 TABLET ORAL ONCE
Status: CANCELLED | OUTPATIENT
Start: 2022-08-19

## 2022-08-15 NOTE — PROGRESS NOTES
Daily Note     Today's date: 8/15/2022  Patient name: Mayte Dinh  : 1950  MRN: [id number]  Referring provider: RAGHU Rayo  Dx:   Encounter Diagnosis     ICD-10-CM    1  Weakness generalized  R53 1    2  Metastatic urothelial carcinoma (HCC)  C79 10    3  Left knee pain, unspecified chronicity  M25 562    4  Chemotherapy-induced neuropathy ([facility] [address] )  G62 0     T45  1X5A                   Subjective: My legs are really weak today  I go for my next CA tx on Friday        Objective: See treatment diary below      Assessment: Tolerated treatment well  Increase reps BL LE wt machines to pt denton  Pt demonstrates difficulty with STS transfers unable without UE  Patient demonstrated fatigue post treatment and would benefit from continued PT      Plan: Continue per plan of care        Re-eval Date: 9/3/22  Precautions fall risk, cancer     Date 8/3 8 8 22 8/11 8/15    Visit Count 1 2 3 4    FOTO        Pain In  0/10      Pain Out  0/10           Manuals  8  22                                      Neuro Re-Ed    22      HKM   **B 2x10   6 laps UE as needed  6 laps UE as needed     Sidestepping   **4 trials along bar at mirror 6 laps GTB around knees  6 laps purple around knees      *Upcoming        STS   5 x 2 no UE, yellow foam  5 x 2 1 UE, yellow foam   Unable 0 UE    Static balance   **2 reps x 1 min ea      Hurdles         Bw amb    6 laps no UE // bars  6 laps no UE // bars     Step up    6" fw 2 x 10 1-2 UE  6" fw   10x R  7x L to pt fatigue   1-2 UE    Ther Ex  8  22      Nustep   L4, 10 min  L4 10 min    HS  Reviewed for HEP seated  *Reviewed      TAC        SLR - supine, sidelying   **B 1x10 ea      SKC   **B 5x/10"        Prone quad flex S   *NV      LTR   **10x/5"        Leg press  **40# 2x10    40# 3x10    Knee ext/flexion machine  **22# 3x10 ea  22# 3x10 ea    Ther Activity                        Gait Training                        Modalities

## 2022-08-16 ENCOUNTER — HOSPITAL ENCOUNTER (OUTPATIENT)
Dept: INFUSION CENTER | Facility: HOSPITAL | Age: 72
Discharge: HOME | End: 2022-08-16
Attending: INTERNAL MEDICINE
Payer: MEDICARE

## 2022-08-16 VITALS
TEMPERATURE: 97.1 F | RESPIRATION RATE: 18 BRPM | SYSTOLIC BLOOD PRESSURE: 123 MMHG | DIASTOLIC BLOOD PRESSURE: 76 MMHG | HEART RATE: 66 BPM

## 2022-08-16 DIAGNOSIS — E83.42 HYPOMAGNESEMIA: ICD-10-CM

## 2022-08-16 DIAGNOSIS — C79.89 SECONDARY MALIGNANT NEOPLASM OF MUSCLE OF ABDOMEN (HCC): Primary | ICD-10-CM

## 2022-08-16 DIAGNOSIS — E86.0 DEHYDRATION: ICD-10-CM

## 2022-08-16 LAB
ALBUMIN SERPL BCP-MCNC: 3.9 G/DL (ref 3.5–5)
ALP SERPL-CCNC: 80 U/L (ref 34–104)
ALT SERPL W P-5'-P-CCNC: 24 U/L (ref 7–52)
ANION GAP SERPL CALCULATED.3IONS-SCNC: 7 MMOL/L (ref 4–13)
AST SERPL W P-5'-P-CCNC: 18 U/L (ref 13–39)
BASOPHILS # BLD AUTO: 0.03 THOUSANDS/ΜL (ref 0–0.1)
BASOPHILS NFR BLD AUTO: 0 % (ref 0–1)
BILIRUB SERPL-MCNC: 0.62 MG/DL (ref 0.2–1)
BUN SERPL-MCNC: 24 MG/DL (ref 5–25)
CALCIUM SERPL-MCNC: 9.5 MG/DL (ref 8.4–10.2)
CHLORIDE SERPL-SCNC: 102 MMOL/L (ref 96–108)
CO2 SERPL-SCNC: 30 MMOL/L (ref 21–32)
CREAT SERPL-MCNC: 1.32 MG/DL (ref 0.6–1.3)
EOSINOPHIL # BLD AUTO: 0.01 THOUSAND/ΜL (ref 0–0.61)
EOSINOPHIL NFR BLD AUTO: 0 % (ref 0–6)
ERYTHROCYTE [DISTWIDTH] IN BLOOD BY AUTOMATED COUNT: 17.7 % (ref 11.6–15.1)
GFR SERPL CREATININE-BSD FRML MDRD: 53 ML/MIN/1.73SQ M
GLUCOSE SERPL-MCNC: 107 MG/DL (ref 65–140)
HCT VFR BLD AUTO: 38.6 % (ref 36.5–49.3)
HGB BLD-MCNC: 12.3 G/DL (ref 12–17)
IMM GRANULOCYTES # BLD AUTO: 0.04 THOUSAND/UL (ref 0–0.2)
IMM GRANULOCYTES NFR BLD AUTO: 1 % (ref 0–2)
LYMPHOCYTES # BLD AUTO: 1.53 THOUSANDS/ΜL (ref 0.6–4.47)
LYMPHOCYTES NFR BLD AUTO: 22 % (ref 14–44)
MAGNESIUM SERPL-MCNC: 1.8 MG/DL (ref 1.9–2.7)
MCH RBC QN AUTO: 31.7 PG (ref 26.8–34.3)
MCHC RBC AUTO-ENTMCNC: 31.9 G/DL (ref 31.4–37.4)
MCV RBC AUTO: 100 FL (ref 82–98)
MONOCYTES # BLD AUTO: 0.82 THOUSAND/ΜL (ref 0.17–1.22)
MONOCYTES NFR BLD AUTO: 12 % (ref 4–12)
NEUTROPHILS # BLD AUTO: 4.68 THOUSANDS/ΜL (ref 1.85–7.62)
NEUTS SEG NFR BLD AUTO: 65 % (ref 43–75)
NRBC BLD AUTO-RTO: 0 /100 WBCS
PLATELET # BLD AUTO: 213 THOUSANDS/UL (ref 149–390)
PMV BLD AUTO: 10 FL (ref 8.9–12.7)
POTASSIUM SERPL-SCNC: 3.8 MMOL/L (ref 3.5–5.3)
PROT SERPL-MCNC: 6.3 G/DL (ref 6.4–8.4)
RBC # BLD AUTO: 3.88 MILLION/UL (ref 3.88–5.62)
SODIUM SERPL-SCNC: 139 MMOL/L (ref 135–147)
WBC # BLD AUTO: 7.11 THOUSAND/UL (ref 4.31–10.16)

## 2022-08-16 PROCEDURE — 80053 COMPREHEN METABOLIC PANEL: CPT | Performed by: INTERNAL MEDICINE

## 2022-08-16 PROCEDURE — 96360 HYDRATION IV INFUSION INIT: CPT

## 2022-08-16 PROCEDURE — 83735 ASSAY OF MAGNESIUM: CPT | Performed by: INTERNAL MEDICINE

## 2022-08-16 PROCEDURE — 96361 HYDRATE IV INFUSION ADD-ON: CPT

## 2022-08-16 PROCEDURE — 85025 COMPLETE CBC W/AUTO DIFF WBC: CPT | Performed by: INTERNAL MEDICINE

## 2022-08-16 RX ADMIN — SODIUM CHLORIDE 1000 ML: 0.9 INJECTION, SOLUTION INTRAVENOUS at 11:05

## 2022-08-17 ENCOUNTER — APPOINTMENT (OUTPATIENT)
Dept: PHYSICAL THERAPY | Facility: CLINIC | Age: 72
End: 2022-08-17
Payer: MEDICARE

## 2022-08-18 ENCOUNTER — TELEPHONE (OUTPATIENT)
Dept: HEMATOLOGY ONCOLOGY | Facility: CLINIC | Age: 72
End: 2022-08-18

## 2022-08-18 ENCOUNTER — OFFICE VISIT (OUTPATIENT)
Dept: HEMATOLOGY ONCOLOGY | Facility: CLINIC | Age: 72
End: 2022-08-18
Payer: MEDICARE

## 2022-08-18 VITALS
OXYGEN SATURATION: 94 % | RESPIRATION RATE: 18 BRPM | SYSTOLIC BLOOD PRESSURE: 122 MMHG | BODY MASS INDEX: 27.63 KG/M2 | DIASTOLIC BLOOD PRESSURE: 80 MMHG | TEMPERATURE: 97.6 F | WEIGHT: 193 LBS | HEART RATE: 89 BPM | HEIGHT: 70 IN

## 2022-08-18 DIAGNOSIS — T45.1X5A CHEMOTHERAPY-INDUCED NEUROPATHY (HCC): Primary | ICD-10-CM

## 2022-08-18 DIAGNOSIS — G62.0 CHEMOTHERAPY-INDUCED NEUROPATHY (HCC): ICD-10-CM

## 2022-08-18 DIAGNOSIS — T45.1X5A CHEMOTHERAPY-INDUCED NEUROPATHY (HCC): ICD-10-CM

## 2022-08-18 DIAGNOSIS — E83.42 HYPOMAGNESEMIA: ICD-10-CM

## 2022-08-18 DIAGNOSIS — G62.0 CHEMOTHERAPY-INDUCED NEUROPATHY (HCC): Primary | ICD-10-CM

## 2022-08-18 DIAGNOSIS — R53.83 OTHER FATIGUE: ICD-10-CM

## 2022-08-18 DIAGNOSIS — C79.10 METASTATIC UROTHELIAL CARCINOMA (HCC): Primary | ICD-10-CM

## 2022-08-18 PROCEDURE — 99215 OFFICE O/P EST HI 40 MIN: CPT | Performed by: NURSE PRACTITIONER

## 2022-08-18 RX ORDER — ACETAMINOPHEN 325 MG/1
650 TABLET ORAL ONCE
Status: CANCELLED | OUTPATIENT
Start: 2022-08-26

## 2022-08-18 RX ORDER — ACETAMINOPHEN 325 MG/1
650 TABLET ORAL ONCE
Status: CANCELLED | OUTPATIENT
Start: 2022-09-02

## 2022-08-18 RX ORDER — GABAPENTIN 100 MG/1
100 CAPSULE ORAL 2 TIMES DAILY
Qty: 60 CAPSULE | Refills: 1 | Status: SHIPPED | OUTPATIENT
Start: 2022-08-18 | End: 2022-09-16 | Stop reason: SDUPTHER

## 2022-08-18 RX ORDER — PREDNISONE 10 MG/1
20 TABLET ORAL DAILY
Qty: 60 TABLET | Refills: 3 | Status: SHIPPED | OUTPATIENT
Start: 2022-08-18

## 2022-08-18 RX ORDER — SODIUM CHLORIDE 9 MG/ML
20 INJECTION, SOLUTION INTRAVENOUS ONCE
Status: CANCELLED | OUTPATIENT
Start: 2022-08-26

## 2022-08-18 RX ORDER — SODIUM CHLORIDE 9 MG/ML
20 INJECTION, SOLUTION INTRAVENOUS ONCE
Status: CANCELLED | OUTPATIENT
Start: 2022-09-02

## 2022-08-18 NOTE — TELEPHONE ENCOUNTER
Title: PADCEV DOSE REDUCTION TO 0 75 MG/KG DUE TO NEUROPATHY    Date patient scheduled: 8/19/22 PT WILL NEED MAGNESIUM REPLACEMENT PRN ORDERS IN HYDRATION PLAN     Original medication ordered: PADCEV 1 MG/KG    New Medication ordered: N/A    Office RN notified patient? ? yes    Is the patient scheduled within 24 hours? ? If yes, follow up with verbal telephone call  Office RN to route to [facility]  Infusion  pool routes to [facility]  Infusion tech to receive message, confirm scheduled treatment duration matches ordered treatment duration or adjust accordingly, and re-link appointment request orders  Infusion tech to notify pharmacy and finance

## 2022-08-18 NOTE — Clinical Note
Hi Dr Logan Hull, [place] patient is experiencing worsening neuropathy from his treatment  I did explain to them that the usual recommendation is to discontinue with worsening neuropathy however he understands risk and would like to continue treatment since he is getting good response  Recently started PT  Is taking Cymbalta 60 mg daily  Was wondering if you had any other recommendations regarding his neuropathy-do you think adding gabapentin would be of benefit? ?  Appreciate your thoughts    Jennifer Genao NP Heme/Onc

## 2022-08-18 NOTE — TELEPHONE ENCOUNTER
Did discuss with patient's palliative care provider Rosa Maria Zavala consideration of starting gabapentin in addition to his Cymbalta for his worsening chemotherapy-induced peripheral neuropathy via Epic inbox  She did agree with trial of gabapentin 100 mg b i d  Will send script to get patient started and she states she will assume management afterward after he follows up  Did discuss with patient's wife Shirley Person  Will send script for the gabapentin 100 mg twice a day to his local pharmacy  Did recommend she schedule follow-up appointment with Dr Stanton Love if he does not already have 1

## 2022-08-18 NOTE — PROGRESS NOTES
TIME OUT:    Call received from Italia Perez dose to be decreased to 0 75mg/kg  Pt also will need 2g Mag Sulfate on 8/18  Mohit Sykes in pharmacy made aware

## 2022-08-19 ENCOUNTER — HOSPITAL ENCOUNTER (OUTPATIENT)
Dept: INFUSION CENTER | Facility: HOSPITAL | Age: 72
Discharge: HOME | End: 2022-08-19
Attending: INTERNAL MEDICINE
Payer: MEDICARE

## 2022-08-19 VITALS
OXYGEN SATURATION: 98 % | DIASTOLIC BLOOD PRESSURE: 84 MMHG | SYSTOLIC BLOOD PRESSURE: 143 MMHG | HEART RATE: 58 BPM | HEIGHT: 70 IN | RESPIRATION RATE: 18 BRPM | WEIGHT: 191.8 LBS | TEMPERATURE: 97.7 F | BODY MASS INDEX: 27.46 KG/M2

## 2022-08-19 DIAGNOSIS — G89.3 CANCER ASSOCIATED PAIN: ICD-10-CM

## 2022-08-19 DIAGNOSIS — C65.2 CANCER OF LEFT RENAL PELVIS (HCC): ICD-10-CM

## 2022-08-19 DIAGNOSIS — E83.42 HYPOMAGNESEMIA: ICD-10-CM

## 2022-08-19 DIAGNOSIS — C79.10 METASTATIC UROTHELIAL CARCINOMA (HCC): ICD-10-CM

## 2022-08-19 DIAGNOSIS — E86.0 DEHYDRATION: ICD-10-CM

## 2022-08-19 DIAGNOSIS — C68.9 UROTHELIAL CANCER (HCC): Primary | ICD-10-CM

## 2022-08-19 PROCEDURE — 96367 TX/PROPH/DG ADDL SEQ IV INF: CPT

## 2022-08-19 PROCEDURE — 96413 CHEMO IV INFUSION 1 HR: CPT

## 2022-08-19 RX ORDER — SODIUM CHLORIDE 9 MG/ML
20 INJECTION, SOLUTION INTRAVENOUS ONCE
Status: COMPLETED | OUTPATIENT
Start: 2022-08-19 | End: 2022-08-19

## 2022-08-19 RX ORDER — ACETAMINOPHEN 325 MG/1
650 TABLET ORAL ONCE
Status: DISCONTINUED | OUTPATIENT
Start: 2022-08-19 | End: 2022-08-23 | Stop reason: HOSPADM

## 2022-08-19 RX ADMIN — DIPHENHYDRAMINE HYDROCHLORIDE 25 MG: 50 INJECTION INTRAMUSCULAR; INTRAVENOUS at 11:42

## 2022-08-19 RX ADMIN — SODIUM CHLORIDE 20 ML/HR: 0.9 INJECTION, SOLUTION INTRAVENOUS at 10:33

## 2022-08-19 RX ADMIN — MAGNESIUM SULFATE HEPTAHYDRATE: 500 INJECTION, SOLUTION INTRAMUSCULAR; INTRAVENOUS at 10:33

## 2022-08-19 RX ADMIN — DEXAMETHASONE SODIUM PHOSPHATE: 10 INJECTION, SOLUTION INTRAMUSCULAR; INTRAVENOUS at 12:02

## 2022-08-19 RX ADMIN — ENFORTUMAB VEDOTIN 68 MG: 30 INJECTION, POWDER, LYOPHILIZED, FOR SOLUTION INTRAVENOUS at 12:32

## 2022-08-19 NOTE — PROGRESS NOTES
Most recent labs and md appt reviewed  Pt tolerated todays padcev and iv magnesium without incident  Port flushed and deaccessed per routine   Discharged ambulatory with avs

## 2022-08-22 ENCOUNTER — OFFICE VISIT (OUTPATIENT)
Dept: PHYSICAL THERAPY | Facility: CLINIC | Age: 72
End: 2022-08-22
Payer: MEDICARE

## 2022-08-22 DIAGNOSIS — G62.0 CHEMOTHERAPY-INDUCED NEUROPATHY (HCC): ICD-10-CM

## 2022-08-22 DIAGNOSIS — R53.1 WEAKNESS GENERALIZED: Primary | ICD-10-CM

## 2022-08-22 DIAGNOSIS — T45.1X5A CHEMOTHERAPY-INDUCED NEUROPATHY (HCC): ICD-10-CM

## 2022-08-22 DIAGNOSIS — M25.562 LEFT KNEE PAIN, UNSPECIFIED CHRONICITY: ICD-10-CM

## 2022-08-22 DIAGNOSIS — C79.10 METASTATIC UROTHELIAL CARCINOMA (HCC): ICD-10-CM

## 2022-08-22 PROCEDURE — 97112 NEUROMUSCULAR REEDUCATION: CPT

## 2022-08-22 PROCEDURE — 97110 THERAPEUTIC EXERCISES: CPT

## 2022-08-22 NOTE — PROGRESS NOTES
Daily Note     Today's date: 2022  Patient name: Pablo Jarvis  : 1950  MRN: [id number]  Referring provider: RAGHU Shay  Dx:   Encounter Diagnosis     ICD-10-CM    1  Weakness generalized  R53 1    2  Metastatic urothelial carcinoma (HCC)  C79 10    3  Left knee pain, unspecified chronicity  M25 562    4  Chemotherapy-induced neuropathy ([facility] [address] )  G62 0     T45  1X5A                   Subjective: I fell last week, going into gazebo, even though I had handles on both sides  Still ended up down onto both knees  No increase pain but small skin abrasion       Objective: See treatment diary below      Assessment: Tolerated treatment fairly well  Modified rx session pt denton  Noted small abrasion BL knees R>L, pt denies increase pain but indicates > BL gastroc mm tightness   Pt reported much relief < mm tightness BL gastroc after MT/self stretch  Encourage carryover at home  Pt ambul with SPC with 1 LOB >CGA / balance recovery   Noted > R knee buckling with ambul   Patient demonstrated fatigue post treatment and would benefit from continued PT  Pt provided WC transport to vehicle end rx session 2* > BL knee buckling      Plan: Continue per plan of care        Re-eval Date: 9/3/22  Precautions fall risk, cancer     Date 8/3 8 8 22 8/11 8/15 8/22   Visit Count 1 2 3 4 5   FOTO     Due NV   Pain In  0/10      Pain Out  0/10           Manuals  8            Self gastroc  With wedge  1 min ea    Manual [id number] 1 min ea   Seated to pt denton                           Neuro Re-Ed   22      HKM   **B 2x10   6 laps UE as needed  6 laps UE as needed  6 laps UE as needed    Sidestepping   **4 trials along bar at mirror 6 laps GTB around knees  6 laps purple around knees 6 laps purple around knees     *Upcoming        STS   5 x 2 no UE, yellow foam  5 x 2 1 UE, yellow foam   Unable 0 UE 5 x 2 1 UE,     Trial w/ 2yellow foam   Unable 0 UE   Static balance   **2 reps x 1 min ea      Hurdles         Bw amb 6 laps no UE // bars  6 laps no UE // bars  6 laps UE as needed   Cues > RENAN   Step up    6" fw 2 x 10 1-2 UE  6" fw   10x R  7x L to pt fatigue   1-2 UE 6" fw   5x R  3x L to pt fatigue   2 UE   Ther Ex  8 8 22      Nustep   L4, 10 min  L4 10 min L4 10 min   HS  Reviewed for HEP seated  *Reviewed      TAC        SLR - supine, sidelying   **B 1x10 ea      SKC   **B 5x/10"        Prone quad flex S   *NV      LTR   **10x/5"        Leg press  **40# 2x10    40# 3x10 40# 3x10   Knee ext/flexion machine  **22# 3x10 ea  22# 3x10 ea 22# 3x10 ea   Ther Activity                        Gait Training                        Modalities

## 2022-08-23 ENCOUNTER — TELEPHONE (OUTPATIENT)
Dept: PAIN MEDICINE | Facility: CLINIC | Age: 72
End: 2022-08-23

## 2022-08-23 ENCOUNTER — HOSPITAL ENCOUNTER (OUTPATIENT)
Dept: INFUSION CENTER | Facility: HOSPITAL | Age: 72
Discharge: HOME | End: 2022-08-23
Attending: INTERNAL MEDICINE
Payer: MEDICARE

## 2022-08-23 VITALS
HEART RATE: 60 BPM | DIASTOLIC BLOOD PRESSURE: 82 MMHG | SYSTOLIC BLOOD PRESSURE: 125 MMHG | RESPIRATION RATE: 16 BRPM | OXYGEN SATURATION: 98 % | TEMPERATURE: 96.9 F

## 2022-08-23 DIAGNOSIS — E86.0 DEHYDRATION: ICD-10-CM

## 2022-08-23 DIAGNOSIS — C79.10 METASTATIC UROTHELIAL CARCINOMA (HCC): Primary | ICD-10-CM

## 2022-08-23 DIAGNOSIS — E83.42 HYPOMAGNESEMIA: ICD-10-CM

## 2022-08-23 DIAGNOSIS — E79.0 HYPERURICEMIA: ICD-10-CM

## 2022-08-23 LAB
ALBUMIN SERPL BCP-MCNC: 3.8 G/DL (ref 3.5–5)
ALP SERPL-CCNC: 70 U/L (ref 34–104)
ALT SERPL W P-5'-P-CCNC: 76 U/L (ref 7–52)
ANION GAP SERPL CALCULATED.3IONS-SCNC: 5 MMOL/L (ref 4–13)
AST SERPL W P-5'-P-CCNC: 38 U/L (ref 13–39)
BASOPHILS # BLD AUTO: 0.01 THOUSANDS/ΜL (ref 0–0.1)
BASOPHILS NFR BLD AUTO: 0 % (ref 0–1)
BILIRUB SERPL-MCNC: 0.76 MG/DL (ref 0.2–1)
BUN SERPL-MCNC: 27 MG/DL (ref 5–25)
CALCIUM SERPL-MCNC: 9.3 MG/DL (ref 8.4–10.2)
CHLORIDE SERPL-SCNC: 102 MMOL/L (ref 96–108)
CO2 SERPL-SCNC: 30 MMOL/L (ref 21–32)
CREAT SERPL-MCNC: 1.21 MG/DL (ref 0.6–1.3)
EOSINOPHIL # BLD AUTO: 0.01 THOUSAND/ΜL (ref 0–0.61)
EOSINOPHIL NFR BLD AUTO: 0 % (ref 0–6)
ERYTHROCYTE [DISTWIDTH] IN BLOOD BY AUTOMATED COUNT: 18.3 % (ref 11.6–15.1)
GFR SERPL CREATININE-BSD FRML MDRD: 59 ML/MIN/1.73SQ M
GLUCOSE SERPL-MCNC: 108 MG/DL (ref 65–140)
HCT VFR BLD AUTO: 39.8 % (ref 36.5–49.3)
HGB BLD-MCNC: 12.5 G/DL (ref 12–17)
IMM GRANULOCYTES # BLD AUTO: 0.02 THOUSAND/UL (ref 0–0.2)
IMM GRANULOCYTES NFR BLD AUTO: 0 % (ref 0–2)
LYMPHOCYTES # BLD AUTO: 1.11 THOUSANDS/ΜL (ref 0.6–4.47)
LYMPHOCYTES NFR BLD AUTO: 14 % (ref 14–44)
MAGNESIUM SERPL-MCNC: 1.9 MG/DL (ref 1.9–2.7)
MCH RBC QN AUTO: 32 PG (ref 26.8–34.3)
MCHC RBC AUTO-ENTMCNC: 31.4 G/DL (ref 31.4–37.4)
MCV RBC AUTO: 102 FL (ref 82–98)
MONOCYTES # BLD AUTO: 0.8 THOUSAND/ΜL (ref 0.17–1.22)
MONOCYTES NFR BLD AUTO: 10 % (ref 4–12)
NEUTROPHILS # BLD AUTO: 5.79 THOUSANDS/ΜL (ref 1.85–7.62)
NEUTS SEG NFR BLD AUTO: 76 % (ref 43–75)
NRBC BLD AUTO-RTO: 0 /100 WBCS
PLATELET # BLD AUTO: 186 THOUSANDS/UL (ref 149–390)
PMV BLD AUTO: 10 FL (ref 8.9–12.7)
POTASSIUM SERPL-SCNC: 3.9 MMOL/L (ref 3.5–5.3)
PROT SERPL-MCNC: 6 G/DL (ref 6.4–8.4)
RBC # BLD AUTO: 3.91 MILLION/UL (ref 3.88–5.62)
SODIUM SERPL-SCNC: 137 MMOL/L (ref 135–147)
WBC # BLD AUTO: 7.74 THOUSAND/UL (ref 4.31–10.16)

## 2022-08-23 PROCEDURE — 80053 COMPREHEN METABOLIC PANEL: CPT

## 2022-08-23 PROCEDURE — 96360 HYDRATION IV INFUSION INIT: CPT

## 2022-08-23 PROCEDURE — 85025 COMPLETE CBC W/AUTO DIFF WBC: CPT

## 2022-08-23 PROCEDURE — 83735 ASSAY OF MAGNESIUM: CPT

## 2022-08-23 RX ORDER — ALLOPURINOL 300 MG/1
TABLET ORAL
Qty: 30 TABLET | Refills: 3 | Status: SHIPPED | OUTPATIENT
Start: 2022-08-23

## 2022-08-23 RX ADMIN — SODIUM CHLORIDE 1000 ML: 0.9 INJECTION, SOLUTION INTRAVENOUS at 11:08

## 2022-08-23 NOTE — TELEPHONE ENCOUNTER
Pts wife called in to cancel pts appt for 8/26/22  Due to pt having an Oncology appt at same time as his appt on 8/26/22  I cancelled the appt for 9/16/22 by a mistake  I have since then rescheduled the appt for 9/16/22 with Dr Davian Curtis at 9 AM, sorry for the confusion if any

## 2022-08-23 NOTE — PROGRESS NOTES
Central labs drawn per orders  Patient tolerated IV hydration without issues  AVS given to patient  Patient taken via wheelchair off unit without incident  All personal belongings taken with patient

## 2022-08-24 ENCOUNTER — APPOINTMENT (OUTPATIENT)
Dept: PHYSICAL THERAPY | Facility: CLINIC | Age: 72
End: 2022-08-24
Payer: MEDICARE

## 2022-08-24 NOTE — PROGRESS NOTES
Daily Note     Today's date: 2022  Patient name: Janna Rico  : 1950  MRN: [id number]  Referring provider: RAGHU Carrillo  Dx:   Encounter Diagnosis     ICD-10-CM    1  Weakness generalized  R53 1    2  Metastatic urothelial carcinoma (HCC)  C79 10    3  Left knee pain, unspecified chronicity  M25 562    4  Chemotherapy-induced neuropathy ([facility] [address] )  G62 0     T45  1X5A    5  Chronic left-sided low back pain without sciatica  M54 50     G89 29                   Subjective: I think I over did things last PT session  Today I am unable to stay full rx session 2* conflict with other apts    Objective: See treatment diary below      Assessment: Tolerated treatment well denied any increase pain/discomfort  Pt deferred additional TE 2* time constraints  Pt unable to perform step up 6" with LLE weakness this date  Consider 4" upcoming  Pt requested WC assist post PT session to vehicle   Patient demonstrated fatigue post treatment and would benefit from continued PT      Plan: Continue per plan of care        Re-eval Date: 9/3/22  Precautions fall risk, cancer     Date        Visit Count 6       FOTO        Pain In        Pain Out             Manuals         NP  Self gastroc  With wedge  1 min ea    Manual [id number] 1 min ea   Seated to pt denton                               Neuro Re-Ed         HKM  NP  6 laps UE as needed        Sidestepping  NP  6 laps purple around knees               STS NP5 x 2 1 UE,     Trial w/ 2yellow foam   Unable 0 UE       Static balance         Hurdles         Bw amb  NP  6 laps UE as needed   Cues > RENAN       Step up  6" fw   10x R  x1 / def addition   L LE   2 UE       Ther Ex        Nustep L4 6 5 min  To pt fatigue       HS         TAC        SLR - supine, sidelying         SKC         Prone quad flex S         LTR         Leg press 40# 2x10       Knee ext/flexion machine 22# 2x10 ea       Ther Activity                        Gait Training Modalities

## 2022-08-25 ENCOUNTER — OFFICE VISIT (OUTPATIENT)
Dept: PHYSICAL THERAPY | Facility: CLINIC | Age: 72
End: 2022-08-25
Payer: MEDICARE

## 2022-08-25 DIAGNOSIS — M54.50 CHRONIC LEFT-SIDED LOW BACK PAIN WITHOUT SCIATICA: ICD-10-CM

## 2022-08-25 DIAGNOSIS — T45.1X5A CHEMOTHERAPY-INDUCED NEUROPATHY (HCC): ICD-10-CM

## 2022-08-25 DIAGNOSIS — G62.0 CHEMOTHERAPY-INDUCED NEUROPATHY (HCC): ICD-10-CM

## 2022-08-25 DIAGNOSIS — C79.10 METASTATIC UROTHELIAL CARCINOMA (HCC): ICD-10-CM

## 2022-08-25 DIAGNOSIS — R53.1 WEAKNESS GENERALIZED: Primary | ICD-10-CM

## 2022-08-25 DIAGNOSIS — G89.29 CHRONIC LEFT-SIDED LOW BACK PAIN WITHOUT SCIATICA: ICD-10-CM

## 2022-08-25 DIAGNOSIS — M25.562 LEFT KNEE PAIN, UNSPECIFIED CHRONICITY: ICD-10-CM

## 2022-08-25 PROCEDURE — 97110 THERAPEUTIC EXERCISES: CPT

## 2022-08-25 NOTE — PROGRESS NOTES
Daily Note     Today's date: 2022  Patient name: Tracy Jackson  : 1950  MRN: [id number]  Referring provider: RAGHU Solano  Dx: No diagnosis found  Subjective: ***      Objective: See treatment diary below      Assessment: Tolerated treatment {Tolerated treatment :}   Patient {assessment:[id number]}      Plan: {PLAN:}     Re-eval Date: 9/3/22  Precautions fall risk, cancer     Date        Visit Count 6       FOTO        Pain In        Pain Out             Manuals         NP  Self gastroc  With wedge  1 min ea    Manual [id number] 1 min ea   Seated to pt denton                               Neuro Re-Ed         HKM  NP  6 laps UE as needed        Sidestepping  NP  6 laps purple around knees               STS NP5 x 2 1 UE,     Trial w/ 2yellow foam   Unable 0 UE       Static balance         Hurdles         Bw amb  NP  6 laps UE as needed   Cues > RENAN       Step up  6" fw   10x R  x1 / def addition   L LE   2 UE       Ther Ex        Nustep L4 6 5 min  To pt fatigue       HS         TAC        SLR - supine, sidelying         SKC         Prone quad flex S         LTR         Leg press 40# 2x10       Knee ext/flexion machine 22# 2x10 ea       Ther Activity                        Gait Training                        Modalities

## 2022-08-26 ENCOUNTER — HOSPITAL ENCOUNTER (OUTPATIENT)
Dept: INFUSION CENTER | Facility: HOSPITAL | Age: 72
End: 2022-08-26
Attending: INTERNAL MEDICINE
Payer: MEDICARE

## 2022-08-26 VITALS
BODY MASS INDEX: 27.77 KG/M2 | TEMPERATURE: 96.9 F | SYSTOLIC BLOOD PRESSURE: 130 MMHG | DIASTOLIC BLOOD PRESSURE: 79 MMHG | HEIGHT: 70 IN | RESPIRATION RATE: 16 BRPM | WEIGHT: 194 LBS | OXYGEN SATURATION: 98 % | HEART RATE: 58 BPM

## 2022-08-26 DIAGNOSIS — C65.2 CANCER OF LEFT RENAL PELVIS (HCC): ICD-10-CM

## 2022-08-26 DIAGNOSIS — C68.9 UROTHELIAL CANCER (HCC): Primary | ICD-10-CM

## 2022-08-26 DIAGNOSIS — C79.10 METASTATIC UROTHELIAL CARCINOMA (HCC): ICD-10-CM

## 2022-08-26 DIAGNOSIS — E86.0 DEHYDRATION: ICD-10-CM

## 2022-08-26 PROCEDURE — 96361 HYDRATE IV INFUSION ADD-ON: CPT

## 2022-08-26 PROCEDURE — 96413 CHEMO IV INFUSION 1 HR: CPT

## 2022-08-26 PROCEDURE — 96367 TX/PROPH/DG ADDL SEQ IV INF: CPT

## 2022-08-26 RX ORDER — ACETAMINOPHEN 325 MG/1
650 TABLET ORAL ONCE
Status: DISCONTINUED | OUTPATIENT
Start: 2022-08-26 | End: 2022-08-30 | Stop reason: HOSPADM

## 2022-08-26 RX ORDER — SODIUM CHLORIDE 9 MG/ML
20 INJECTION, SOLUTION INTRAVENOUS ONCE
Status: COMPLETED | OUTPATIENT
Start: 2022-08-26 | End: 2022-08-26

## 2022-08-26 RX ADMIN — SODIUM CHLORIDE 20 ML/HR: 0.9 INJECTION, SOLUTION INTRAVENOUS at 12:08

## 2022-08-26 RX ADMIN — DEXAMETHASONE SODIUM PHOSPHATE: 10 INJECTION, SOLUTION INTRAMUSCULAR; INTRAVENOUS at 12:12

## 2022-08-26 RX ADMIN — SODIUM CHLORIDE 1000 ML: 0.9 INJECTION, SOLUTION INTRAVENOUS at 11:06

## 2022-08-26 RX ADMIN — ENFORTUMAB VEDOTIN 70 MG: 30 INJECTION, POWDER, LYOPHILIZED, FOR SOLUTION INTRAVENOUS at 13:23

## 2022-08-26 RX ADMIN — DIPHENHYDRAMINE HYDROCHLORIDE 25 MG: 50 INJECTION INTRAMUSCULAR; INTRAVENOUS at 12:36

## 2022-08-26 NOTE — PROGRESS NOTES
Most recent labs and md note reviewed  Pt tolerated todays hydration and padcev with pre meds  Port flushed and deaccessed per routine   Discharged in wc with avs

## 2022-08-29 ENCOUNTER — APPOINTMENT (OUTPATIENT)
Dept: PHYSICAL THERAPY | Facility: CLINIC | Age: 72
End: 2022-08-29
Payer: MEDICARE

## 2022-08-30 ENCOUNTER — HOSPITAL ENCOUNTER (OUTPATIENT)
Dept: INFUSION CENTER | Facility: HOSPITAL | Age: 72
Discharge: HOME | End: 2022-08-30
Attending: INTERNAL MEDICINE
Payer: MEDICARE

## 2022-08-30 VITALS
TEMPERATURE: 97.1 F | DIASTOLIC BLOOD PRESSURE: 78 MMHG | SYSTOLIC BLOOD PRESSURE: 122 MMHG | RESPIRATION RATE: 16 BRPM | HEART RATE: 62 BPM

## 2022-08-30 DIAGNOSIS — E86.0 DEHYDRATION: ICD-10-CM

## 2022-08-30 DIAGNOSIS — C79.89 SECONDARY MALIGNANT NEOPLASM OF MUSCLE OF ABDOMEN (HCC): Primary | ICD-10-CM

## 2022-08-30 DIAGNOSIS — E83.42 HYPOMAGNESEMIA: ICD-10-CM

## 2022-08-30 LAB
ALBUMIN SERPL BCP-MCNC: 4 G/DL (ref 3.5–5)
ALP SERPL-CCNC: 65 U/L (ref 34–104)
ALT SERPL W P-5'-P-CCNC: 59 U/L (ref 7–52)
ANION GAP SERPL CALCULATED.3IONS-SCNC: 8 MMOL/L (ref 4–13)
AST SERPL W P-5'-P-CCNC: 33 U/L (ref 13–39)
BASOPHILS # BLD AUTO: 0.02 THOUSANDS/ΜL (ref 0–0.1)
BASOPHILS NFR BLD AUTO: 0 % (ref 0–1)
BILIRUB SERPL-MCNC: 1.03 MG/DL (ref 0.2–1)
BUN SERPL-MCNC: 25 MG/DL (ref 5–25)
CALCIUM SERPL-MCNC: 9.4 MG/DL (ref 8.4–10.2)
CHLORIDE SERPL-SCNC: 100 MMOL/L (ref 96–108)
CO2 SERPL-SCNC: 30 MMOL/L (ref 21–32)
CREAT SERPL-MCNC: 1.26 MG/DL (ref 0.6–1.3)
EOSINOPHIL # BLD AUTO: 0.02 THOUSAND/ΜL (ref 0–0.61)
EOSINOPHIL NFR BLD AUTO: 0 % (ref 0–6)
ERYTHROCYTE [DISTWIDTH] IN BLOOD BY AUTOMATED COUNT: 18.2 % (ref 11.6–15.1)
GFR SERPL CREATININE-BSD FRML MDRD: 56 ML/MIN/1.73SQ M
GLUCOSE SERPL-MCNC: 105 MG/DL (ref 65–140)
HCT VFR BLD AUTO: 41.1 % (ref 36.5–49.3)
HGB BLD-MCNC: 13.2 G/DL (ref 12–17)
IMM GRANULOCYTES # BLD AUTO: 0.03 THOUSAND/UL (ref 0–0.2)
IMM GRANULOCYTES NFR BLD AUTO: 0 % (ref 0–2)
LYMPHOCYTES # BLD AUTO: 0.65 THOUSANDS/ΜL (ref 0.6–4.47)
LYMPHOCYTES NFR BLD AUTO: 9 % (ref 14–44)
MAGNESIUM SERPL-MCNC: 2 MG/DL (ref 1.9–2.7)
MCH RBC QN AUTO: 32.7 PG (ref 26.8–34.3)
MCHC RBC AUTO-ENTMCNC: 32.1 G/DL (ref 31.4–37.4)
MCV RBC AUTO: 102 FL (ref 82–98)
MONOCYTES # BLD AUTO: 0.52 THOUSAND/ΜL (ref 0.17–1.22)
MONOCYTES NFR BLD AUTO: 7 % (ref 4–12)
NEUTROPHILS # BLD AUTO: 6 THOUSANDS/ΜL (ref 1.85–7.62)
NEUTS SEG NFR BLD AUTO: 84 % (ref 43–75)
NRBC BLD AUTO-RTO: 0 /100 WBCS
PLATELET # BLD AUTO: 180 THOUSANDS/UL (ref 149–390)
PMV BLD AUTO: 10.9 FL (ref 8.9–12.7)
POTASSIUM SERPL-SCNC: 4.7 MMOL/L (ref 3.5–5.3)
PROT SERPL-MCNC: 6.1 G/DL (ref 6.4–8.4)
RBC # BLD AUTO: 4.04 MILLION/UL (ref 3.88–5.62)
SODIUM SERPL-SCNC: 138 MMOL/L (ref 135–147)
WBC # BLD AUTO: 7.24 THOUSAND/UL (ref 4.31–10.16)

## 2022-08-30 PROCEDURE — 83735 ASSAY OF MAGNESIUM: CPT | Performed by: INTERNAL MEDICINE

## 2022-08-30 PROCEDURE — 96360 HYDRATION IV INFUSION INIT: CPT

## 2022-08-30 PROCEDURE — 85025 COMPLETE CBC W/AUTO DIFF WBC: CPT | Performed by: INTERNAL MEDICINE

## 2022-08-30 PROCEDURE — 80053 COMPREHEN METABOLIC PANEL: CPT | Performed by: INTERNAL MEDICINE

## 2022-08-30 RX ADMIN — SODIUM CHLORIDE 1000 ML: 0.9 INJECTION, SOLUTION INTRAVENOUS at 11:32

## 2022-08-30 NOTE — PROGRESS NOTES
Central labs drawn per orders  Patient reported that he had a fall at home and scraped up his knees  Area cleansed with NSS and Band-Aid applied  Patient stated neuropathy remains the same  He is not sure if physical therapy is helping  Bard Tonio RN advised of above to notify Dr Jorja Hamman to discuss with patient at his 9/1/22 office appointment  Patient tolerated IV hydration without issues  AVS declined  Patient taken in wheelcair off unit without incident  All personal belongings taken with patient

## 2022-08-31 ENCOUNTER — OFFICE VISIT (OUTPATIENT)
Dept: PHYSICAL THERAPY | Facility: CLINIC | Age: 72
End: 2022-08-31
Payer: MEDICARE

## 2022-08-31 DIAGNOSIS — R53.1 WEAKNESS GENERALIZED: Primary | ICD-10-CM

## 2022-08-31 DIAGNOSIS — M25.562 LEFT KNEE PAIN, UNSPECIFIED CHRONICITY: ICD-10-CM

## 2022-08-31 DIAGNOSIS — C79.10 METASTATIC UROTHELIAL CARCINOMA (HCC): ICD-10-CM

## 2022-08-31 PROCEDURE — 97112 NEUROMUSCULAR REEDUCATION: CPT | Performed by: PHYSICAL THERAPIST

## 2022-08-31 PROCEDURE — 97110 THERAPEUTIC EXERCISES: CPT | Performed by: PHYSICAL THERAPIST

## 2022-08-31 NOTE — PROGRESS NOTES
Daily Note     Today's date: 2022  Patient name: Katy Escobedo  : 1950  MRN: [id number]  Referring provider: RAGHU Villafana  Dx:   Encounter Diagnosis     ICD-10-CM    1  Weakness generalized  R53 1    2  Metastatic urothelial carcinoma (HCC)  C79 10    3  Left knee pain, unspecified chronicity  M25 562                   Subjective: Fell 2 days ago going up the two steps and went straight down  Feeling very weak  Objective: See treatment diary below      Assessment: Pt presented to session in [facility] due to significant LE weakness  Performed dynamic balance and strengthening activities in SOLO for safety however he fatigued very quickly and needed frequent rest breaks  Patient would benefit from continued PT      Plan: Progress note during next visit        Re-eval Date: 9/3/22  Precautions fall risk, cancer     Date       Visit Count 6       FOTO        Pain In        Pain Out             Manuals         NP  Self gastroc  With wedge  1 min ea    Manual [id number] 1 min ea   Seated to pt denton                               Neuro Re-Ed         HKM  NP  6 laps UE as needed  SOLO 1 lap x 2       Sidestepping  NP  6 laps purple around knees               STS NP5 x 2 1 UE,     Trial w/ 2yellow foam   Unable 0 UE       Static balance         Hurdles         Bw amb  NP  6 laps UE as needed   Cues > RENAN       Step up  6" fw   10x R  x1 / def addition   L LE   2 UE       Ther Ex        Nustep L4 6 5 min  To pt fatigue       HS         TAC        SLR - supine, sidelying         SKC         Prone quad flex S         LTR         Leg press 40# 2x10 45# 2 x 10       Knee ext/flexion machine 22# 2x10 ea 22# ext 2x10     22# flex 2 x 15       Ther Activity                        Gait Training          With SPC 3 laps SOLO               Modalities

## 2022-09-01 ENCOUNTER — OFFICE VISIT (OUTPATIENT)
Dept: HEMATOLOGY ONCOLOGY | Facility: CLINIC | Age: 72
End: 2022-09-01
Payer: MEDICARE

## 2022-09-01 VITALS
RESPIRATION RATE: 16 BRPM | DIASTOLIC BLOOD PRESSURE: 76 MMHG | HEIGHT: 70 IN | BODY MASS INDEX: 27.49 KG/M2 | WEIGHT: 192 LBS | HEART RATE: 62 BPM | TEMPERATURE: 97.8 F | OXYGEN SATURATION: 97 % | SYSTOLIC BLOOD PRESSURE: 126 MMHG

## 2022-09-01 DIAGNOSIS — C67.8 MALIGNANT NEOPLASM OF OVERLAPPING SITES OF BLADDER (HCC): ICD-10-CM

## 2022-09-01 DIAGNOSIS — C68.9 UROTHELIAL CANCER (HCC): ICD-10-CM

## 2022-09-01 DIAGNOSIS — C65.2 CANCER OF LEFT RENAL PELVIS (HCC): ICD-10-CM

## 2022-09-01 DIAGNOSIS — C79.10 METASTATIC UROTHELIAL CARCINOMA (HCC): Primary | ICD-10-CM

## 2022-09-01 PROCEDURE — 99214 OFFICE O/P EST MOD 30 MIN: CPT | Performed by: INTERNAL MEDICINE

## 2022-09-01 RX ORDER — ACETAMINOPHEN 325 MG/1
650 TABLET ORAL ONCE
OUTPATIENT
Start: 2022-09-23

## 2022-09-01 RX ORDER — ACETAMINOPHEN 325 MG/1
650 TABLET ORAL ONCE
OUTPATIENT
Start: 2022-09-30

## 2022-09-01 RX ORDER — ACETAMINOPHEN 325 MG/1
650 TABLET ORAL ONCE
OUTPATIENT
Start: 2022-09-16

## 2022-09-01 RX ORDER — SODIUM CHLORIDE 9 MG/ML
20 INJECTION, SOLUTION INTRAVENOUS ONCE
OUTPATIENT
Start: 2022-09-16

## 2022-09-01 RX ORDER — SODIUM CHLORIDE 9 MG/ML
20 INJECTION, SOLUTION INTRAVENOUS ONCE
OUTPATIENT
Start: 2022-09-30

## 2022-09-01 RX ORDER — SODIUM CHLORIDE 9 MG/ML
20 INJECTION, SOLUTION INTRAVENOUS ONCE
OUTPATIENT
Start: 2022-09-23

## 2022-09-01 NOTE — PROGRESS NOTES
Hematology/Oncology Outpatient Follow-up  Cassandra Win 67 y o  male 1950 [id number]    Date:  9/1/2022        Assessment and Plan:  1  Metastatic urothelial carcinoma ([facility] [address] )  The patient seems to be very enthusiastic about continuing the adjusted dose of enfortumab vedotin at the current dose of 0 75 mg/kg on weekly basis 3 weeks on 1 week off  He is due for cycle 11 day 15 tomorrow  I did discuss with the patient the neuropathy which is a side effect for the current palliative treatment  We did also discuss the changing the frequency of the treatment to every other week instead of 3 weeks on 1 week off  The patient stated that he would rather continue with the current schedule without any changes  We will aim to pursue a PET-CT scan around the end of September before his planned trip to [place]  - Infusion Calculated Appointment Request; Future  - CBC and differential; Future  - Comprehensive metabolic panel; Future  - Infusion Calculated Appointment Request; Future  - CBC and differential; Future  - Infusion Calculated Appointment Request; Future  - CBC and differential; Future  - NM PET CT skull base to mid thigh; Future  - CBC and differential; Future  - Comprehensive metabolic panel; Future  - Magnesium; Future    2  Cancer of left renal pelvis (HCC)    - Infusion Calculated Appointment Request; Future  - CBC and differential; Future  - Comprehensive metabolic panel; Future  - Infusion Calculated Appointment Request; Future  - CBC and differential; Future  - Infusion Calculated Appointment Request; Future  - CBC and differential; Future    3  Urothelial cancer ([facility] )    - Infusion Calculated Appointment Request; Future  - CBC and differential; Future  - Comprehensive metabolic panel; Future  - Infusion Calculated Appointment Request; Future  - CBC and differential; Future  - Infusion Calculated Appointment Request; Future  - CBC and differential; Future    4   Malignant neoplasm of overlapping sites of bladder ([facility] [address] )     - NM PET CT skull base to mid thigh; Future    5  Chemotherapy-induced neuropathy (HCC)  Radiated to the enfortumab vedotin treatment  Currently on Cymbalta and Neurontin with tolerable neuropathy  HPI:  The patient came today for a follow-up visit accompanied by his wife  He continues to complain about significant neuropathy which got better after the start of gabapentin  Blood work on 08/30/2022 showed hemoglobin of 13 2 with normal white cells and platelets  Creatinine 1 2 with normal calcium liver enzymes  Magnesium 2 0  Oncology History Overview Note   Patient has a history of hypertension, hyperlipidemia, chronic lower back pain  He had an MRI of the lower spine for further evaluation of his chronic lower back pain  The MRI on 12/02/2019 revealed Multiple left renal masses to include a left lower pole cyst and a rounded structure in the left upper pole which may also represent cyst   Left upper pole renal masses approximately 3 cm in greatest linear dimension  A CT with renal protocol was done on 12/12/2019 which also showed the infiltrating lesion in the upper pole of the left kidney measuring about the 3 5 cm in greatest dimension without any hint of retroperitoneal lymphadenopathy  A CT scan of the abdomen pelvis on 01/14/2020 showed the same findings with the mild hydronephrosis on the left  The urine cytology on 01/03/2020 showed high-grade urothelial carcinoma  A cystoscopy was then done, a biopsy was taken from the left renal pelvic region which showed high-grade urothelial carcinoma without evidence of lamina propria invasion  The detrusor muscle/muscularis propria were not present for evaluation  The recommendation was to pursue left robotic assisted laparoscopic nephroureterectomy with bladder cuff excision which was done on 04/01/2020  The final pathology revealed;  - Invasive high grade urothelial carcinoma arising in renal pelvis  - Bladder cuff margin is negative for carcinoma and no evidence of high grade dysplasia  - Ureters with no significant pathologic abnormality  - Two benign simple cysts  - Adrenal gland is negative for malignancy  - One lymph node, negative for malignancy (0/1)  The tumor size was 4 5 cm with invasion beyond the muscularis into the periurethral fat or peripelvic fat or the renal parenchyma  The margins were uninvolved by carcinoma or carcinoma in situ  The final pathology was pT3 pN0  Patient barely tolerated 1 cycle of adjuvant chemotherapy with split dose cisplatin and gemcitabine with a lot of side effects  The treatment had to be discontinued due to significant worsening renal dysfunction 6/2020  Patient started to complain about significant abdominal/left inguinal pain around August/September 2020  Unfortunately repeat imaging revealed recurrent/metastatic disease  9/4/20 CT C/A/P- Status post left nephrectomy for resection of urothelial neoplasm  Lymphadenopathy in the left nephrectomy bed has increased since the prior examination, concerning for metastatic disease  Ill-defined hyperattenuating masslike focus in the left rectus muscle, not identified on the prior examination  This is suspicious for a metastatic lesion  A rectus hematoma is also considered  9/23/20 PET scan- 1  Multiple FDG avid lymph nodes in the retrocrural region, retroperitoneum and the left renal bed compatible with metastasis  These have progressed from recent CT  2   FDG avid mass involving the left rectus abdominal musculature compatible with metastasis which is larger from recent CT  3  Several small lymph nodes adjacent to the mid to distal esophagus with FDG uptake suspicious for metastasis  Small focus of FDG uptake also suggested in the right hilar region, metastasis is not excluded  This could be reassessed on follow-up    4   Subtle focus of FDG uptake at the T2 level on the left, nonspecific, could be related to early degenerative changes, developing metastasis is not entirely excluded  This could be reassessed on follow-up  5  Prostate is mildly prominent with heterogeneous FDG uptake  This could be inflammatory  Correlate for prostatitis  He completed palliative radiation to the left abdomen 12/3/20     His PET scan from 08/16/2021 showed progression of his disease with hypermetabolic soft tissue lesions at the level of the right shoulder and right axilla with interval progression of the retroperitoneal and mesenteric hypermetabolic metastasis  He did have the new lesion to his right upper back/shoulder which was causing significant localized pain  This excised by his surgeon 08/09/2021  Pathology confirmed metastatic high-grade carcinoma consistent with his no primary urothelial carcinoma  He received palliative radiation to his right shoulder/axilla region  Urothelial cancer ([facility] [address] )   1/3/2020 Biopsy    Final Diagnosis    A  Urine, Clean Catch, Thin Prep:  High grade urothelial carcinoma (HGUC) - see comment  1/3/2020 Initial Diagnosis    Urothelial cancer ([address] )  T3 N0 (stage IIIA)     1/17/2020 Biopsy    Final Diagnosis    A  Ureter, Right, left renal pelvis biopsy  -Fragments of high grade urothelial carcinoma   -No evidence of lamina propria invasion   -Detrusor muscle/ muscularis propria is not present for evaluation  B  Urinary Bladder, bladder biopsy:  -Fragments of low grade papillary lesion  See note  -No evidence of invasion seen  -Unremarkable fragment of detrusor muscle seen  4/1/2020 Surgery    left robotic assisted laparoscopic nephroureterectomy with bladder cuff excision     Final Diagnosis    A  Left kidney, ureter, and bladder cuff, nephroureterectomy:  - Invasive high grade urothelial carcinoma arising in renal pelvis  - Bladder cuff margin is negative for carcinoma and no evidence of high grade dysplasia    - Ureters with no significant pathologic abnormality  - Two benign simple cysts  - Adrenal gland is negative for malignancy  - One lymph node, negative for malignancy (0/1)          5/14/2020 - 6/3/2020 Chemotherapy    CISplatin (PLATINOL) split dose 35 mg/m2 = 75 3 mg (50 % of original dose 70 mg/m2), Intravenous,   Administration: 75 3 mg (5/14/2020), 75 3 mg (5/21/2020)  gemcitabine (GEMZAR) 2,200 mg in sodium chloride 0 9 % 250 mL infusion, 2,150 2 mg (80 % of original dose 1,250 mg/m2), Intravenous,   Administration: 2,200 mg (5/14/2020), 2,200 mg (5/21/2020)    (only completed 1 cycle- d/c d/t adverse effects and worsening renal dysfunction)     10/9/2020 - 9/9/2021 Chemotherapy    pembrolizumab (KEYTRUDA) IVPB, 200 mg, Intravenous, Once, 16 of 20 cycles  Administration: 200 mg (10/9/2020), 200 mg (10/30/2020), 200 mg (11/20/2020), 200 mg (12/11/2020), 200 mg (12/31/2020), 200 mg (1/22/2021), 200 mg (2/12/2021), 200 mg (3/5/2021), 200 mg (3/26/2021), 200 mg (4/16/2021), 200 mg (5/7/2021), 200 mg (5/28/2021), 200 mg (6/18/2021), 200 mg (7/9/2021), 200 mg (7/30/2021), 200 mg (8/20/2021)     11/19/2020 - 12/3/2020 Radiation    Treatment:  Course: C1    Plan ID Energy Fractions Dose per Fraction (cGy) Dose Correction (cGy) Total Dose Delivered (cGy) Elapsed Days   L Abdomen 6X 10 / 10 300 0 3,000 14        9/10/2021 -  Chemotherapy    enfortumab vedotin-ejfv (PADCEV) IVPB, 1 25 mg/kg = 114 mg, Intravenous, Once, 11 of 16 cycles  Dose modification: 1 mg/kg (original dose 1 25 mg/kg, Cycle 7, Reason: Other (Must fill in a comment), Comment: dose reduction due to side effects ), 1 25 mg/kg (original dose 1 25 mg/kg, Cycle 7, Reason: Other (Must fill in a comment), Comment: patient wants full dose ), 1 mg/kg (original dose 1 25 mg/kg, Cycle 7, Reason: Neuropathy), 0 75 mg/kg (original dose 1 25 mg/kg, Cycle 11, Reason: Neuropathy)  Administration: 114 mg (9/10/2021), 114 mg (9/17/2021), 110 mg (10/8/2021), 110 mg (9/24/2021), 110 mg (10/15/2021), 110 mg (11/12/2021), 110 mg (10/22/2021), 110 mg (11/19/2021), 110 mg (12/10/2021), 110 mg (11/26/2021), 110 mg (12/17/2021), 110 mg (1/7/2022), 110 mg (12/23/2021), 110 mg (1/14/2022), 90 mg (4/8/2022), 90 mg (4/15/2022), 90 mg (4/22/2022), 110 mg (1/21/2022), 110 mg (2/18/2022), 110 mg (2/25/2022), 90 mg (5/13/2022), 90 mg (5/20/2022), 90 mg (5/27/2022), 90 mg (3/4/2022), 90 mg (6/10/2022), 90 mg (6/17/2022), 90 mg (6/24/2022), 90 mg (7/8/2022), 90 mg (7/15/2022), 90 mg (7/22/2022), 68 mg (8/19/2022)     Cancer of left renal pelvis ([facility] [address] )   4/23/2020 Initial Diagnosis    Cancer of left renal pelvis ([facility] [address] )     10/9/2020 - 9/9/2021 Chemotherapy    pembrolizumab (KEYTRUDA) IVPB, 200 mg, Intravenous, Once, 16 of 20 cycles  Administration: 200 mg (10/9/2020), 200 mg (10/30/2020), 200 mg (11/20/2020), 200 mg (12/11/2020), 200 mg (12/31/2020), 200 mg (1/22/2021), 200 mg (2/12/2021), 200 mg (3/5/2021), 200 mg (3/26/2021), 200 mg (4/16/2021), 200 mg (5/7/2021), 200 mg (5/28/2021), 200 mg (6/18/2021), 200 mg (7/9/2021), 200 mg (7/30/2021), 200 mg (8/20/2021)     9/10/2021 -  Chemotherapy    enfortumab vedotin-ejfv (PADCEV) IVPB, 1 25 mg/kg = 114 mg, Intravenous, Once, 11 of 16 cycles  Dose modification: 1 mg/kg (original dose 1 25 mg/kg, Cycle 7, Reason: Other (Must fill in a comment), Comment: dose reduction due to side effects ), 1 25 mg/kg (original dose 1 25 mg/kg, Cycle 7, Reason: Other (Must fill in a comment), Comment: patient wants full dose ), 1 mg/kg (original dose 1 25 mg/kg, Cycle 7, Reason: Neuropathy), 0 75 mg/kg (original dose 1 25 mg/kg, Cycle 11, Reason: Neuropathy)  Administration: 114 mg (9/10/2021), 114 mg (9/17/2021), 110 mg (10/8/2021), 110 mg (9/24/2021), 110 mg (10/15/2021), 110 mg (11/12/2021), 110 mg (10/22/2021), 110 mg (11/19/2021), 110 mg (12/10/2021), 110 mg (11/26/2021), 110 mg (12/17/2021), 110 mg (1/7/2022), 110 mg (12/23/2021), 110 mg (1/14/2022), 90 mg (4/8/2022), 90 mg (4/15/2022), 90 mg (4/22/2022), 110 mg (1/21/2022), 110 mg (2/18/2022), 110 mg (2/25/2022), 90 mg (5/13/2022), 90 mg (5/20/2022), 90 mg (5/27/2022), 90 mg (3/4/2022), 90 mg (6/10/2022), 90 mg (6/17/2022), 90 mg (6/24/2022), 90 mg (7/8/2022), 90 mg (7/15/2022), 90 mg (7/22/2022), 68 mg (8/19/2022)      Surgery       Metastatic urothelial carcinoma ([facility] [address] )   8/9/2021 Initial Diagnosis    Metastatic urothelial carcinoma ([facility] [address] )     9/8/2021 - 9/21/2021 Radiation    Treatment:  Course: C2    Plan ID Energy Fractions Dose per Fraction (cGy) Dose Correction (cGy) Total Dose Delivered (cGy) Elapsed Days   R Axil_Shl # 6X 10 / 10 300 0 3,000 13      Treatment dates:  C2: 9/8/2021 - 9/21/2021     9/10/2021 -  Chemotherapy    enfortumab vedotin-ejfv (PADCEV) IVPB, 1 25 mg/kg = 114 mg, Intravenous, Once, 11 of 16 cycles  Dose modification: 1 mg/kg (original dose 1 25 mg/kg, Cycle 7, Reason: Other (Must fill in a comment), Comment: dose reduction due to side effects ), 1 25 mg/kg (original dose 1 25 mg/kg, Cycle 7, Reason: Other (Must fill in a comment), Comment: patient wants full dose ), 1 mg/kg (original dose 1 25 mg/kg, Cycle 7, Reason: Neuropathy), 0 75 mg/kg (original dose 1 25 mg/kg, Cycle 11, Reason: Neuropathy)  Administration: 114 mg (9/10/2021), 114 mg (9/17/2021), 110 mg (10/8/2021), 110 mg (9/24/2021), 110 mg (10/15/2021), 110 mg (11/12/2021), 110 mg (10/22/2021), 110 mg (11/19/2021), 110 mg (12/10/2021), 110 mg (11/26/2021), 110 mg (12/17/2021), 110 mg (1/7/2022), 110 mg (12/23/2021), 110 mg (1/14/2022), 90 mg (4/8/2022), 90 mg (4/15/2022), 90 mg (4/22/2022), 110 mg (1/21/2022), 110 mg (2/18/2022), 110 mg (2/25/2022), 90 mg (5/13/2022), 90 mg (5/20/2022), 90 mg (5/27/2022), 90 mg (3/4/2022), 90 mg (6/10/2022), 90 mg (6/17/2022), 90 mg (6/24/2022), 90 mg (7/8/2022), 90 mg (7/15/2022), 90 mg (7/22/2022), 68 mg (8/19/2022)         Interval history:    ROS: Review of Systems   Constitutional: Positive for fatigue   Negative for chills and fever    HENT: Positive for hearing loss  Negative for ear pain and sore throat  Eyes: Negative for pain and visual disturbance  Respiratory: Negative for cough and shortness of breath  Cardiovascular: Negative for chest pain and palpitations  Gastrointestinal: Negative for abdominal pain and vomiting  Genitourinary: Negative for dysuria and hematuria  Musculoskeletal: Positive for arthralgias and gait problem  Negative for back pain  Skin: Negative for color change and rash  Neurological: Positive for numbness  Negative for seizures and syncope  All other systems reviewed and are negative        Past Medical History:   Diagnosis Date    Arthritis     Bladder cancer     Cancer ([facility] [address] )     skin melanoma;basal cell    Chronic pain disorder     from arthritis    Colon polyp     Does use hearing aid     bilat    Dry eyes, bilateral     GERD (gastroesophageal reflux disease)     History of kidney stones 10/2019    History of partial knee replacement     bilat    History of vertigo     Hyperlipidemia     Hypertension     Infusion extravasation of chemotherapy vesicant 11/2021    Kidney lesion     Lumbar disc disorder     compression of vertebrae L4-5-6    Muscle weakness     left hip area    Renal mass     left    Right ankle injury 03/05/2020    missed step of ladder     Right ankle pain     Shortness of breath     with activity    Tinnitus     Urothelial cancer     left    Wears glasses        Past Surgical History:   Procedure Laterality Date    COLONOSCOPY      CYSTOSCOPY Left 4/1/2020    Procedure: CYSTOSCOPY; URETERAL CATHETER PLACEMENT;  Surgeon: Dimitri Denney MD;  Location[facility];  Service: Urology    CYSTOSCOPY  05/11/2020    CYSTOSCOPY  06/04/2021    FL CYSTOGRAM  4/13/2020    FL RETROGRADE PYELOGRAM  1/17/2020    HERNIA REPAIR      umbilical with mesh    INGUINAL HERNIA REPAIR Right     with mesh    IR PORT PLACEMENT  4/30/2020    JOINT REPLACEMENT Bilateral     partials knee    LUMBAR EPIDURAL INJECTION      NJ CYSTOURETHROSCOPY,URETER CATHETER Bilateral 2020    Procedure: CYSTOSCOPY; RIGHT RETROGRADE PYELOGRAM WITH RIGHT URETERAL CYTOLOGY SAMPLING; LEFT URETEROSCOPY WITH RENAL PELVIS BIOPSY AND LEFT STENT PLACEMENT;  Surgeon: Martita Calle MD;  [facility];  Service: Urology    [place] NEPHRECTOMY, W/PART  URETECTOMY Left 2020    Procedure: ROBOTIC LAPAROSCOPIC NEPHRO-URETERECTOMY;  Surgeon: Martita Calle MD;  Location[facility];  Service: Urology    SKIN CANCER EXCISION      surface melanoma    TONSILLECTOMY      WISDOM TOOTH EXTRACTION         Social History     Socioeconomic History    Marital status: /Civil Union     Spouse name: None    Number of children: None    Years of education: None    Highest education level: None   Occupational History    None   Tobacco Use    Smoking status: Former Smoker     Types: Cigarettes     Quit date:      Years since quittin 7    Smokeless tobacco: Never Used   Vaping Use    Vaping Use: Never used   Substance and Sexual Activity    Alcohol use:  Yes     Alcohol/week: 17 0 standard drinks     Types: 7 Glasses of wine, 10 Cans of beer per week     Comment: socially    Drug use: Never    Sexual activity: Not Currently   Other Topics Concern    None   Social History Narrative    Daily caffeine use - 2 cups coffee      Social Determinants of Health     Financial Resource Strain: Not on file   Food Insecurity: Not on file   Transportation Needs: Not on file   Physical Activity: Not on file   Stress: Not on file   Social Connections: Not on file   Intimate Partner Violence: Not on file   Housing Stability: Not on file       Family History   Problem Relation Age of Onset    Liver cancer Father        Allergies   Allergen Reactions    Poison Ivy Extract Rash         Current Outpatient Medications:     acetaminophen (TYLENOL) 500 mg tablet, Take 2 tablets (1,000 mg total) by mouth every 8 (eight) hours, Disp: , Rfl:     allopurinol (ZYLOPRIM) 300 mg tablet, take 1 tablet by mouth once daily, Disp: 30 tablet, Rfl: 3    apixaban (Eliquis) 5 mg, Take 1 tablet (5 mg total) by mouth 2 (two) times a day For afib, Disp: 60 tablet, Rfl: 11    atorvastatin (LIPITOR) 80 mg tablet, Take 80 mg by mouth every other day evening, Disp: , Rfl:     clotrimazole (MYCELEX) 10 mg rizwan, Take 1 tablet (10 mg total) by mouth 5 (five) times a day, Disp: 70 tablet, Rfl: 2    colchicine (COLCRYS) 0 6 mg tablet, take 1 tablet by mouth twice a day if needed for FOOT PAIN, Disp: , Rfl:     docusate sodium (COLACE) 250 MG capsule, Take 1 capsule (250 mg total) by mouth daily, Disp: 60 capsule, Rfl: 6    DULoxetine (CYMBALTA) 60 mg delayed release capsule, take 1 capsule by mouth once daily, Disp: 30 capsule, Rfl: 1    folic acid (FOLVITE) 1 mg tablet, take 1 tablet by mouth once daily, Disp: 90 tablet, Rfl: 4    gabapentin (NEURONTIN) 100 mg capsule, Take 1 capsule (100 mg total) by mouth 2 (two) times a day, Disp: 60 capsule, Rfl: 1    Magnesium 250 MG TABS, 1 tablet 2 (two) times a day Taking 500mg in the morning and 500mg at night, Disp: , Rfl:     metoprolol tartrate (LOPRESSOR) 100 mg tablet, Take 50 mg by mouth daily, Disp: , Rfl:     naloxone (NARCAN) 4 mg/0 1 mL nasal spray, Administer 1 spray into a nostril   If breathing does not return to normal or if breathing difficulty resumes after 2-3 minutes, give another dose in the other nostril using a new spray , Disp: 1 each, Rfl: 1    nystatin (MYCOSTATIN) cream, Apply topically 2 (two) times a day, Disp: 30 g, Rfl: 0    predniSONE 10 mg tablet, Take 2 tablets (20 mg total) by mouth daily, Disp: 60 tablet, Rfl: 3    senna (SENOKOT) 8 6 mg, Take 1 tablet (8 6 mg total) by mouth daily at bedtime, Disp: 30 each, Rfl: 0    tadalafil (CIALIS) 20 MG tablet, Take one tablet by mouth one hour before sexual activity, Disp: 15 tablet, Rfl: 3    tamsulosin (FLOMAX) 0 4 mg, take 1 capsule by mouth once daily with dinner, Disp: 30 capsule, Rfl: 1    traMADol (Ultram) 50 mg tablet, Take 1 tab PO Q8 hours PRN pain, on severe days can take a 4th tablet , Disp: 100 tablet, Rfl: 1    VITAMIN D PO, Take 1,000 Units by mouth daily , Disp: , Rfl:     zolpidem (AMBIEN) 5 mg tablet, Take 1 tablet (5 mg total) by mouth daily at bedtime as needed for sleep, Disp: 30 tablet, Rfl: 1    ALPRAZolam (XANAX) 0 25 mg tablet, Take 1 tablet (0 25 mg total) by mouth daily at bedtime as needed for anxiety (Patient not taking: No sig reported), Disp: 30 tablet, Rfl: 0      Physical Exam:  /76 (BP Location: Left arm, Patient Position: Sitting, Cuff Size: Adult)   Pulse 62   Temp 97 8 °F (36 6 °C)   Resp 16   Ht 5' 10" (1 778 m)   Wt 87 1 kg (192 lb)   SpO2 97%   BMI 27 55 kg/m²     Physical Exam  Constitutional:       Appearance: He is well-developed  HENT:      Head: Normocephalic and atraumatic  Eyes:      General: No scleral icterus  Right eye: No discharge  Left eye: No discharge  Conjunctiva/sclera: Conjunctivae normal       Pupils: Pupils are equal, round, and reactive to light  Neck:      Thyroid: No thyromegaly  Trachea: No tracheal deviation  Cardiovascular:      Rate and Rhythm: Normal rate and regular rhythm  Heart sounds: Normal heart sounds  No murmur heard  No friction rub  Pulmonary:      Effort: Pulmonary effort is normal  No respiratory distress  Breath sounds: Normal breath sounds  No wheezing or rales  Chest:      Chest wall: No tenderness  Abdominal:      General: There is no distension  Palpations: Abdomen is soft  There is no hepatomegaly or splenomegaly  Tenderness: There is no abdominal tenderness  There is no guarding or rebound  Musculoskeletal:         General: No tenderness or deformity  Normal range of motion        Cervical back: Normal range of motion and neck supple  Lymphadenopathy:      Cervical: No cervical adenopathy  Skin:     General: Skin is warm and dry  Coloration: Skin is not pale  Findings: No erythema or rash  Neurological:      Mental Status: He is alert and oriented to person, place, and time  Cranial Nerves: No cranial nerve deficit  Coordination: Coordination normal       Deep Tendon Reflexes: Reflexes are normal and symmetric  Psychiatric:         Behavior: Behavior normal          Thought Content: Thought content normal          Judgment: Judgment normal            Labs:  Lab Results   Component Value Date    WBC 7 24 08/30/2022    HGB 13 2 08/30/2022    HCT 41 1 08/30/2022     (H) 08/30/2022     08/30/2022     Lab Results   Component Value Date    K 4 7 08/30/2022     08/30/2022    CO2 30 08/30/2022    BUN 25 08/30/2022    CREATININE 1 26 08/30/2022    GLUF 111 (H) 03/01/2022    CALCIUM 9 4 08/30/2022    CORRECTEDCA 9 0 06/21/2022    AST 33 08/30/2022    ALT 59 (H) 08/30/2022    ALKPHOS 65 08/30/2022    EGFR 56 08/30/2022     No results found for: TSH    Patient voiced understanding and agreement in the above discussion  Aware to contact our office with questions/symptoms in the interim

## 2022-09-02 ENCOUNTER — HOSPITAL ENCOUNTER (OUTPATIENT)
Dept: INFUSION CENTER | Facility: HOSPITAL | Age: 72
End: 2022-09-02
Attending: INTERNAL MEDICINE
Payer: MEDICARE

## 2022-09-02 VITALS
WEIGHT: 192.02 LBS | DIASTOLIC BLOOD PRESSURE: 84 MMHG | TEMPERATURE: 96.9 F | HEART RATE: 58 BPM | OXYGEN SATURATION: 96 % | RESPIRATION RATE: 16 BRPM | SYSTOLIC BLOOD PRESSURE: 136 MMHG | HEIGHT: 70 IN | BODY MASS INDEX: 27.49 KG/M2

## 2022-09-02 DIAGNOSIS — C79.10 METASTATIC UROTHELIAL CARCINOMA (HCC): ICD-10-CM

## 2022-09-02 DIAGNOSIS — C65.2 CANCER OF LEFT RENAL PELVIS (HCC): ICD-10-CM

## 2022-09-02 DIAGNOSIS — C68.9 UROTHELIAL CANCER (HCC): Primary | ICD-10-CM

## 2022-09-02 PROCEDURE — 96413 CHEMO IV INFUSION 1 HR: CPT

## 2022-09-02 PROCEDURE — 96367 TX/PROPH/DG ADDL SEQ IV INF: CPT

## 2022-09-02 RX ORDER — ACETAMINOPHEN 325 MG/1
650 TABLET ORAL ONCE
Status: DISCONTINUED | OUTPATIENT
Start: 2022-09-02 | End: 2022-09-06 | Stop reason: HOSPADM

## 2022-09-02 RX ORDER — SODIUM CHLORIDE 9 MG/ML
20 INJECTION, SOLUTION INTRAVENOUS ONCE
Status: COMPLETED | OUTPATIENT
Start: 2022-09-02 | End: 2022-09-02

## 2022-09-02 RX ADMIN — DIPHENHYDRAMINE HYDROCHLORIDE 25 MG: 50 INJECTION INTRAMUSCULAR; INTRAVENOUS at 11:31

## 2022-09-02 RX ADMIN — DEXAMETHASONE SODIUM PHOSPHATE: 10 INJECTION, SOLUTION INTRAMUSCULAR; INTRAVENOUS at 11:58

## 2022-09-02 RX ADMIN — ENFORTUMAB VEDOTIN 70 MG: 30 INJECTION, POWDER, LYOPHILIZED, FOR SOLUTION INTRAVENOUS at 12:54

## 2022-09-02 RX ADMIN — SODIUM CHLORIDE 20 ML/HR: 0.9 INJECTION, SOLUTION INTRAVENOUS at 11:29

## 2022-09-02 NOTE — PROGRESS NOTES
Pt tolerated todays padcev without incident  Port flushed and deaccessed per routine   Discharged ambulatory with walkerdeferring avs

## 2022-09-06 ENCOUNTER — HOSPITAL ENCOUNTER (OUTPATIENT)
Dept: INFUSION CENTER | Facility: HOSPITAL | Age: 72
Discharge: HOME | End: 2022-09-06
Payer: MEDICARE

## 2022-09-06 VITALS
OXYGEN SATURATION: 96 % | DIASTOLIC BLOOD PRESSURE: 63 MMHG | SYSTOLIC BLOOD PRESSURE: 109 MMHG | RESPIRATION RATE: 16 BRPM | HEART RATE: 65 BPM | TEMPERATURE: 97.2 F

## 2022-09-06 DIAGNOSIS — E86.0 DEHYDRATION: Primary | ICD-10-CM

## 2022-09-06 PROCEDURE — 96360 HYDRATION IV INFUSION INIT: CPT

## 2022-09-06 RX ADMIN — SODIUM CHLORIDE 1000 ML: 0.9 INJECTION, SOLUTION INTRAVENOUS at 12:11

## 2022-09-07 ENCOUNTER — EVALUATION (OUTPATIENT)
Dept: PHYSICAL THERAPY | Facility: CLINIC | Age: 72
End: 2022-09-07
Payer: MEDICARE

## 2022-09-07 DIAGNOSIS — R53.1 WEAKNESS GENERALIZED: Primary | ICD-10-CM

## 2022-09-07 PROCEDURE — 97112 NEUROMUSCULAR REEDUCATION: CPT | Performed by: PHYSICAL THERAPIST

## 2022-09-07 PROCEDURE — 97110 THERAPEUTIC EXERCISES: CPT | Performed by: PHYSICAL THERAPIST

## 2022-09-07 NOTE — PROGRESS NOTES
Progress Note     Today's date: 2022  Patient name: Jose Ramon Schumacher  : 1950  MRN: [id number]  Referring provider: RAGHU Pradhan  Dx: No diagnosis found  Subjective: Does not think there is any improvement truthfully  Needs upper body strength and legs  One fall about 5 days ago when trying to step up into kitchen and legs gave out  No new scrapes  All the abrasions of legs are old  Avg knee pain and LBP rated as 5/10  Has been using RW at home  Has neuropathy and no feeling in fingers so drops a lot things  Would like to add on OT to address UE weakness  Pt's goal: improve strength, pick himself up if he falls, and walk without RW    Objective: See treatment diary below      Assessment: Progress note completed this session  He demo some improvements in L LE strength compared to IE however R LE is weaker unfortunately likely due to fatigue and inactivity from CA treatments  He also demo significantly slower speed and required RW for TUG today due to weakness  Due to c/o UE weakness, pt would benefit from OT eval to address UE impairments  He would benefit from continued to maximize functional mobility and to decrease fall risk  Will focus on balance, strength, and activity tolerance in upcoming sessions  Reviewed LAQ for HEP  Goals  ST weeks  1  L knee flexion AROM improve by 5-10 deg  2  L LE strength improve by 1/2 MMT grade  3  L Knee pain < 5/10 - MET   4  LBP <6/10 - MET      LT weeks   1  TUG < 13 sec to demo improved balance   2  Report no falls  3  L knee pain < 3/10  4  L LE strength improve by 1 MMT grade  5   LBP < 4/10      Plan  Planned therapy interventions: neuromuscular re-education, patient education, balance, balance/weight bearing training, coordination, home exercise program, gait training, functional ROM exercises, stretching, strengthening and therapeutic exercise  Frequency: 2x week  Duration in weeks: 4  Plan of Care beginning date: 8/3/2022  Plan of Care expiration date: 11/3/2022  Treatment plan discussed with: patient        Balance Test 8/3 9/7   6 Minute Walk Test (ft):      2 Minute Walk Test (ft):      Gait Speed (ft/s): 0 6 m/s     5x Sit To Stand (s):      TU 44 with SPC  26 sec with RW      Manual Muscle Testing - Hip Left Right   Flexion 4  4   Extension       Abduction  4- 4-    External Rotation          Manual Muscle Testing - Knee Left Right   Flexion 4-  4+   Extension 4+ 4+       Manual Muscle Testing - Ankle Left Right   Doriflexion 4- 4-   Plantarflexion 4- 4-      Knee AROM   Flexion L 125 R 132  Extension L 0, R 0        Re-eval Date: 9/3/22  Precautions fall risk, cancer     Date      Visit Count 6 7 8     FOTO        Pain In        Pain Out             Manuals         NP  Self gastroc  With wedge  1 min ea    Manual [id number] 1 min ea   Seated to pt denton                               Neuro Re-Ed         HKM  NP  6 laps UE as needed  SOLO 1 lap x 2       Sidestepping  NP  6 laps purple around knees               STS NP5 x 2 1 UE,     Trial w/ 2yellow foam   Unable 0 UE  10x, 9x raised mat 25 in, hands on knees      Static balance         Hurdles         Bw amb  NP  6 laps UE as needed   Cues > RENAN       Step up  6" fw   10x R  x1 / def addition   L LE   2 UE       Ther Ex        Nustep L4 6 5 min  To pt fatigue       HS         TAC        SLR - supine, sidelying         SKC         Prone quad flex S         LTR         Leg press 40# 2x10 45# 2 x 10  45# 3 x 10      Knee ext/flexion machine 22# 2x10 ea 22# ext 2x10      22# flex 2 x 15  22# ext 2 x10      22# flex 2 x 10     Ther Activity                        Gait Training          With SPC 3 laps SOLO               Modalities

## 2022-09-08 ENCOUNTER — OFFICE VISIT (OUTPATIENT)
Dept: CARDIOLOGY CLINIC | Facility: CLINIC | Age: 72
End: 2022-09-08
Payer: MEDICARE

## 2022-09-08 VITALS
WEIGHT: 191 LBS | DIASTOLIC BLOOD PRESSURE: 68 MMHG | HEIGHT: 70 IN | SYSTOLIC BLOOD PRESSURE: 90 MMHG | HEART RATE: 67 BPM | BODY MASS INDEX: 27.35 KG/M2

## 2022-09-08 DIAGNOSIS — G62.0 CHEMOTHERAPY-INDUCED NEUROPATHY (HCC): ICD-10-CM

## 2022-09-08 DIAGNOSIS — I10 ESSENTIAL HYPERTENSION: ICD-10-CM

## 2022-09-08 DIAGNOSIS — I48.0 PAROXYSMAL ATRIAL FIBRILLATION (HCC): Primary | ICD-10-CM

## 2022-09-08 DIAGNOSIS — T45.1X5A CHEMOTHERAPY-INDUCED NEUROPATHY (HCC): ICD-10-CM

## 2022-09-08 PROCEDURE — 99214 OFFICE O/P EST MOD 30 MIN: CPT | Performed by: NURSE PRACTITIONER

## 2022-09-08 RX ORDER — AMITRIPTYLINE HYDROCHLORIDE 25 MG/1
25 TABLET, FILM COATED ORAL
COMMUNITY
Start: 2022-08-21

## 2022-09-08 NOTE — PROGRESS NOTES
Patient ID: Sosa Cohen is a 67 y o  male  Plan:      Paroxysmal atrial fibrillation (HCC)  Sinus on exam  Continue metoprolol 50 mg daily  Discussed anticoagulation  See notes below    Essential hypertension  Blood pressures are low 100's  Will continue metoprolol 50 mg once daily for now  Patient will monitor blood pressures at home and notify us if readings are less than 110 consistently at which time metoprolol dose will be reduced  Chemotherapy-induced neuropathy ([address] )  Currently using a walker, but unfortunately having a lot of falls       Follow up Plan/Summary Comments:  I had an extensive discussion with Constance Ely and his wife today regarding anticoagulation  We recalculated his Chads Vasc score, which technically is 3, however his prior "blood clot" was superficial and therefore a lower risk  His Has Bled score is moderate risk, though due to his neuropathy and gait issues, along with frequent falls, I feel his risk of bleeding is higher that his stroke risk  We discussed this at length  Constance Ely and his wife are going to consider this and will let me know of their decision to continue with Eliquis or not  Blood pressure is noted to be on the low side but he is asymptomatic  I asked that they continue to monitor BP at home and notify us with readings consistently lower than 110  Follow up in 6 months, or sooner if needed    HPI:  I had the pleasure seeing Constance Ely in the office today accompanied by his wife for a follow-up appointment  Constance Ely was evaluated in our office several months ago with new onset AFib  He was started on Eliquis for a Chads Vasc score of 3  Constance Ely continues to follow and has continuing with his chemotherapy  Unfortunately, his neuropathy has progressed and he continues to problems with balance  He has had many falls recently  Fortunately, he has not sustained any major injury  He denies any chest pain, pressure, tightness, burning    He denies any changes in his breathing, dizziness, lightheadedness, syncope  Review of Systems   10  point ROS  was otherwise non pertinent or negative except as per HPI or as below  Gait:  Damaso      Most recent or relevant cardiac/vascular testing:    Nuclear stress test 05/12/2022 normal LV function, no ischemia by perfusion imaging    Echocardiogram 04/06/2022  EF 75%, hyperdynamic  Mild LVH, mild diastolic dysfunction      Objective:     BP 90/68   Pulse 67   Ht 5' 10" (1 778 m)   Wt 86 6 kg (191 lb)   BMI 27 41 kg/m²     PHYSICAL EXAM:    General:  Normal appearance, no acute distress  Eyes:  Anicteric  Oral mucosa:  Moist   Neck:  No JVD  Carotid upstrokes are brisk without bruits  No masses  Chest:  Clear to auscultation   Cardiac:  No palpable PMI  Normal S1 and S2  No murmur gallop or rub  Abdomen:  Soft and nontender  No palpable organomegaly or aortic enlargement  Extremities:  No peripheral edema, multiple scabs on bilat LE, not infectious appearing  Musculoskeletal:  Symmetric  Vascular:  Pedal pulses are intact  Neuro:  Grossly symmetric  Psych:  Alert and oriented x3      Allergies   Allergen Reactions    Poison Ivy Extract Rash       Current Outpatient Medications:     acetaminophen (TYLENOL) 500 mg tablet, Take 2 tablets (1,000 mg total) by mouth every 8 (eight) hours, Disp: , Rfl:     allopurinol (ZYLOPRIM) 300 mg tablet, take 1 tablet by mouth once daily, Disp: 30 tablet, Rfl: 3    ALPRAZolam (XANAX) 0 25 mg tablet, Take 1 tablet (0 25 mg total) by mouth daily at bedtime as needed for anxiety, Disp: 30 tablet, Rfl: 0    apixaban (Eliquis) 5 mg, Take 1 tablet (5 mg total) by mouth 2 (two) times a day For afib, Disp: 60 tablet, Rfl: 11    atorvastatin (LIPITOR) 80 mg tablet, Take 80 mg by mouth every other day evening, Disp: , Rfl:     colchicine (COLCRYS) 0 6 mg tablet, take 1 tablet by mouth twice a day if needed for FOOT PAIN, Disp: , Rfl:     docusate sodium (COLACE) 250 MG capsule, Take 1 capsule (250 mg total) by mouth daily, Disp: 60 capsule, Rfl: 6    DULoxetine (CYMBALTA) 60 mg delayed release capsule, take 1 capsule by mouth once daily, Disp: 30 capsule, Rfl: 1    folic acid (FOLVITE) 1 mg tablet, take 1 tablet by mouth once daily, Disp: 90 tablet, Rfl: 4    gabapentin (NEURONTIN) 100 mg capsule, Take 1 capsule (100 mg total) by mouth 2 (two) times a day, Disp: 60 capsule, Rfl: 1    Magnesium 250 MG TABS, 1 tablet 2 (two) times a day Taking 500mg in the morning and 500mg at night, Disp: , Rfl:     metoprolol tartrate (LOPRESSOR) 100 mg tablet, Take 50 mg by mouth daily, Disp: , Rfl:     naloxone (NARCAN) 4 mg/0 1 mL nasal spray, Administer 1 spray into a nostril   If breathing does not return to normal or if breathing difficulty resumes after 2-3 minutes, give another dose in the other nostril using a new spray , Disp: 1 each, Rfl: 1    nystatin (MYCOSTATIN) cream, Apply topically 2 (two) times a day, Disp: 30 g, Rfl: 0    predniSONE 10 mg tablet, Take 2 tablets (20 mg total) by mouth daily, Disp: 60 tablet, Rfl: 3    senna (SENOKOT) 8 6 mg, Take 1 tablet (8 6 mg total) by mouth daily at bedtime, Disp: 30 each, Rfl: 0    tadalafil (CIALIS) 20 MG tablet, Take one tablet by mouth one hour before sexual activity, Disp: 15 tablet, Rfl: 3    tamsulosin (FLOMAX) 0 4 mg, take 1 capsule by mouth once daily with dinner, Disp: 30 capsule, Rfl: 1    traMADol (Ultram) 50 mg tablet, Take 1 tab PO Q8 hours PRN pain, on severe days can take a 4th tablet , Disp: 100 tablet, Rfl: 1    VITAMIN D PO, Take 1,000 Units by mouth daily , Disp: , Rfl:     zolpidem (AMBIEN) 5 mg tablet, Take 1 tablet (5 mg total) by mouth daily at bedtime as needed for sleep, Disp: 30 tablet, Rfl: 1    amitriptyline (ELAVIL) 25 mg tablet, Take 25 mg by mouth daily at bedtime, Disp: , Rfl:     clotrimazole (MYCELEX) 10 mg rizwan, Take 1 tablet (10 mg total) by mouth 5 (five) times a day (Patient not taking: Reported on 9/8/2022), Disp: 70 tablet, Rfl: 2  Past Medical History:   Diagnosis Date    Arthritis     Bladder cancer     Cancer [address] [address]     skin melanoma;basal cell    Chronic pain disorder     from arthritis    Colon polyp     Does use hearing aid     bilat    Dry eyes, bilateral     GERD (gastroesophageal reflux disease)     History of kidney stones 10/2019    History of partial knee replacement     bilat    History of vertigo     Hyperlipidemia     Hypertension     Infusion extravasation of chemotherapy vesicant 11/2021    Kidney lesion     Lumbar disc disorder     compression of vertebrae L4-5-6    Muscle weakness     left hip area    Renal mass     left    Right ankle injury 03/05/2020    missed step of ladder     Right ankle pain     Shortness of breath     with activity    Tinnitus     Urothelial cancer     left    Wears glasses      Past Surgical History:   Procedure Laterality Date    COLONOSCOPY      CYSTOSCOPY Left 4/1/2020    Procedure: CYSTOSCOPY; URETERAL CATHETER PLACEMENT;  Surgeon: Leidy Olivo MD;  Location[facility];  Service: Urology    CYSTOSCOPY  05/11/2020    CYSTOSCOPY  06/04/2021    FL CYSTOGRAM  4/13/2020    FL RETROGRADE PYELOGRAM  1/17/2020    HERNIA REPAIR      umbilical with mesh    INGUINAL HERNIA REPAIR Right     with mesh    IR PORT PLACEMENT  4/30/2020    JOINT REPLACEMENT Bilateral     partials knee    LUMBAR EPIDURAL INJECTION      IN CYSTOURETHROSCOPY,URETER CATHETER Bilateral 1/17/2020    Procedure: CYSTOSCOPY; RIGHT RETROGRADE PYELOGRAM WITH RIGHT URETERAL CYTOLOGY SAMPLING; LEFT URETEROSCOPY WITH RENAL PELVIS BIOPSY AND LEFT STENT PLACEMENT;  Surgeon: Leidy Olivo MD;  [facility];  Service: Urology    IN NEPHRECTOMY, W/PART   URETECTOMY Left 4/1/2020    Procedure: ROBOTIC LAPAROSCOPIC NEPHRO-URETERECTOMY;  Surgeon: Leidy Olivo MD;  Location[facility];  Service: Urology    SKIN CANCER EXCISION      surface melanoma    TONSILLECTOMY      WISDOM TOOTH EXTRACTION         CMP:   Lab Results   Component Value Date    K 4 7 2022     2022    CO2 30 2022    BUN 25 2022    CREATININE 1 26 2022    EGFR 56 2022     Lipid Profile:    Lab Results   Component Value Date    TRIG 83 2021    HDL 57 2021         Social History     Tobacco Use   Smoking Status Former Smoker    Types: Cigarettes    Quit date: 0    Years since quittin 7   Smokeless Tobacco Never Used

## 2022-09-08 NOTE — PATIENT INSTRUCTIONS
Continue to monitor BP at home  Notify our office if readings are consistently under 110    Discuss the Eliquis--let me know if you have any questions

## 2022-09-08 NOTE — ASSESSMENT & PLAN NOTE
Blood pressures are low 100's  Will continue metoprolol 50 mg once daily for now  Patient will monitor blood pressures at home and notify us if readings are less than 110 consistently at which time metoprolol dose will be reduced

## 2022-09-09 ENCOUNTER — HOSPITAL ENCOUNTER (OUTPATIENT)
Dept: INFUSION CENTER | Facility: HOSPITAL | Age: 72
End: 2022-09-09
Payer: MEDICARE

## 2022-09-09 ENCOUNTER — APPOINTMENT (OUTPATIENT)
Dept: PHYSICAL THERAPY | Facility: CLINIC | Age: 72
End: 2022-09-09
Payer: MEDICARE

## 2022-09-09 VITALS
HEART RATE: 61 BPM | DIASTOLIC BLOOD PRESSURE: 76 MMHG | SYSTOLIC BLOOD PRESSURE: 130 MMHG | TEMPERATURE: 97.9 F | RESPIRATION RATE: 16 BRPM | OXYGEN SATURATION: 94 %

## 2022-09-09 DIAGNOSIS — E83.42 HYPOMAGNESEMIA: ICD-10-CM

## 2022-09-09 DIAGNOSIS — C79.10 METASTATIC UROTHELIAL CARCINOMA (HCC): Primary | ICD-10-CM

## 2022-09-09 DIAGNOSIS — E86.0 DEHYDRATION: ICD-10-CM

## 2022-09-09 LAB
ALBUMIN SERPL BCP-MCNC: 4 G/DL (ref 3.5–5)
ALP SERPL-CCNC: 73 U/L (ref 34–104)
ALT SERPL W P-5'-P-CCNC: 75 U/L (ref 7–52)
ANION GAP SERPL CALCULATED.3IONS-SCNC: 7 MMOL/L (ref 4–13)
AST SERPL W P-5'-P-CCNC: 30 U/L (ref 13–39)
BASOPHILS # BLD AUTO: 0.02 THOUSANDS/ΜL (ref 0–0.1)
BASOPHILS NFR BLD AUTO: 0 % (ref 0–1)
BILIRUB SERPL-MCNC: 0.9 MG/DL (ref 0.2–1)
BUN SERPL-MCNC: 28 MG/DL (ref 5–25)
CALCIUM SERPL-MCNC: 9.2 MG/DL (ref 8.4–10.2)
CHLORIDE SERPL-SCNC: 102 MMOL/L (ref 96–108)
CO2 SERPL-SCNC: 29 MMOL/L (ref 21–32)
CREAT SERPL-MCNC: 1.17 MG/DL (ref 0.6–1.3)
EOSINOPHIL # BLD AUTO: 0 THOUSAND/ΜL (ref 0–0.61)
EOSINOPHIL NFR BLD AUTO: 0 % (ref 0–6)
ERYTHROCYTE [DISTWIDTH] IN BLOOD BY AUTOMATED COUNT: 17.5 % (ref 11.6–15.1)
GFR SERPL CREATININE-BSD FRML MDRD: 61 ML/MIN/1.73SQ M
GLUCOSE SERPL-MCNC: 132 MG/DL (ref 65–140)
HCT VFR BLD AUTO: 39.7 % (ref 36.5–49.3)
HGB BLD-MCNC: 12.7 G/DL (ref 12–17)
IMM GRANULOCYTES # BLD AUTO: 0.07 THOUSAND/UL (ref 0–0.2)
IMM GRANULOCYTES NFR BLD AUTO: 1 % (ref 0–2)
LYMPHOCYTES # BLD AUTO: 0.51 THOUSANDS/ΜL (ref 0.6–4.47)
LYMPHOCYTES NFR BLD AUTO: 7 % (ref 14–44)
MAGNESIUM SERPL-MCNC: 1.8 MG/DL (ref 1.9–2.7)
MCH RBC QN AUTO: 32.5 PG (ref 26.8–34.3)
MCHC RBC AUTO-ENTMCNC: 32 G/DL (ref 31.4–37.4)
MCV RBC AUTO: 102 FL (ref 82–98)
MONOCYTES # BLD AUTO: 0.18 THOUSAND/ΜL (ref 0.17–1.22)
MONOCYTES NFR BLD AUTO: 3 % (ref 4–12)
NEUTROPHILS # BLD AUTO: 6.18 THOUSANDS/ΜL (ref 1.85–7.62)
NEUTS SEG NFR BLD AUTO: 89 % (ref 43–75)
NRBC BLD AUTO-RTO: 0 /100 WBCS
PLATELET # BLD AUTO: 159 THOUSANDS/UL (ref 149–390)
PMV BLD AUTO: 10.6 FL (ref 8.9–12.7)
POTASSIUM SERPL-SCNC: 4.2 MMOL/L (ref 3.5–5.3)
PROT SERPL-MCNC: 6.2 G/DL (ref 6.4–8.4)
RBC # BLD AUTO: 3.91 MILLION/UL (ref 3.88–5.62)
SODIUM SERPL-SCNC: 138 MMOL/L (ref 135–147)
WBC # BLD AUTO: 6.96 THOUSAND/UL (ref 4.31–10.16)

## 2022-09-09 PROCEDURE — 96360 HYDRATION IV INFUSION INIT: CPT

## 2022-09-09 PROCEDURE — 85025 COMPLETE CBC W/AUTO DIFF WBC: CPT

## 2022-09-09 PROCEDURE — 83735 ASSAY OF MAGNESIUM: CPT

## 2022-09-09 PROCEDURE — 80053 COMPREHEN METABOLIC PANEL: CPT

## 2022-09-09 RX ADMIN — SODIUM CHLORIDE 1000 ML: 0.9 INJECTION, SOLUTION INTRAVENOUS at 13:18

## 2022-09-09 NOTE — PROGRESS NOTES
Central labs drawn, port flushed easily with good blood return noted  Patient tolerated hydration without issue   Discharged in stable condition, declined AVS but aware of next appointment

## 2022-09-12 ENCOUNTER — OFFICE VISIT (OUTPATIENT)
Dept: PHYSICAL THERAPY | Facility: CLINIC | Age: 72
End: 2022-09-12
Payer: MEDICARE

## 2022-09-12 DIAGNOSIS — M25.562 LEFT KNEE PAIN, UNSPECIFIED CHRONICITY: ICD-10-CM

## 2022-09-12 DIAGNOSIS — C79.10 METASTATIC UROTHELIAL CARCINOMA (HCC): Primary | ICD-10-CM

## 2022-09-12 DIAGNOSIS — G62.0 CHEMOTHERAPY-INDUCED NEUROPATHY (HCC): ICD-10-CM

## 2022-09-12 DIAGNOSIS — M54.50 CHRONIC LEFT-SIDED LOW BACK PAIN WITHOUT SCIATICA: ICD-10-CM

## 2022-09-12 DIAGNOSIS — T45.1X5A CHEMOTHERAPY-INDUCED NEUROPATHY (HCC): ICD-10-CM

## 2022-09-12 DIAGNOSIS — R53.1 WEAKNESS GENERALIZED: Primary | ICD-10-CM

## 2022-09-12 DIAGNOSIS — G89.29 CHRONIC LEFT-SIDED LOW BACK PAIN WITHOUT SCIATICA: ICD-10-CM

## 2022-09-12 PROCEDURE — 97112 NEUROMUSCULAR REEDUCATION: CPT | Performed by: PHYSICAL THERAPIST

## 2022-09-12 PROCEDURE — 97110 THERAPEUTIC EXERCISES: CPT | Performed by: PHYSICAL THERAPIST

## 2022-09-12 NOTE — PROGRESS NOTES
Daily Note     Today's date: 2022  Patient name: Evelyn Pope  : 1950  MRN: [id number]  Referring provider: RAGHU Ryan  Dx:   Encounter Diagnosis     ICD-10-CM    1  Weakness generalized  R53 1    2  Left knee pain, unspecified chronicity  M25 562    3  Chemotherapy-induced neuropathy ([address] )  G62 0     T45  1X5A    4  Chronic left-sided low back pain without sciatica  M54 50     G89 29                   Subjective: Not a good day again  Objective: See treatment diary below      Assessment: Due to fatigue and weakness, pt continued to present to session in [facility] however he would ambulate in between equipments with RW  He required CGA with any mobility due to weakness  Patient would benefit from continued PT      Plan: Progress treatment as tolerated         Re-eval Date: 10/7/22  Precautions fall risk, cancer     Date     Visit Count 6 7 8 9    FOTO        Pain In        Pain Out             Manuals         NP  Self gastroc  With wedge  1 min ea    Manual [id number] 1 min ea   Seated to pt denton                               Neuro Re-Ed         HKM  NP  6 laps UE as needed  SOLO 1 lap x 2   Static with B UE 2 x 10     Sidestepping  NP  6 laps purple around knees               STS NP5 x 2 1 UE,     Trial w/ 2yellow foam   Unable 0 UE  10x, 9x raised mat 25 in, hands on knees  With 3 foam on chair, R UE    Static balance         Hurdles         Bw amb  NP  6 laps UE as needed   Cues > RENAN       Step up  6" fw   10x R  x1 / def addition   L LE   2 UE       Ther Ex        Nustep L4 6 5 min  To pt fatigue   L3, 10 min     HS         TAC        SLR - supine, sidelying         SKC         Seated TR/HR    20x, attempted in standing but pt unable     Prone quad flex S         LTR         Leg press 40# 2x10 45# 2 x 10  45# 3 x 10  45# 3 x 10     Knee ext/flexion machine 22# 2x10 ea 22# ext 2x10      22# flex 2 x 15  22# ext 2 x10      22# flex 2 x 10 22# ext 2 x10      22# flex 3 x 10    Ther Activity                        Gait Training          With SPC 3 laps SOLO   With RW and CGA around clinic 30' x 4            Modalities

## 2022-09-13 ENCOUNTER — TELEPHONE (OUTPATIENT)
Dept: CARDIOLOGY CLINIC | Facility: CLINIC | Age: 72
End: 2022-09-13

## 2022-09-13 ENCOUNTER — HOSPITAL ENCOUNTER (OUTPATIENT)
Dept: INFUSION CENTER | Facility: HOSPITAL | Age: 72
Discharge: HOME | End: 2022-09-13
Attending: INTERNAL MEDICINE
Payer: MEDICARE

## 2022-09-13 VITALS
OXYGEN SATURATION: 95 % | RESPIRATION RATE: 18 BRPM | SYSTOLIC BLOOD PRESSURE: 108 MMHG | TEMPERATURE: 97.6 F | HEART RATE: 100 BPM | DIASTOLIC BLOOD PRESSURE: 66 MMHG

## 2022-09-13 DIAGNOSIS — E86.0 DEHYDRATION: ICD-10-CM

## 2022-09-13 DIAGNOSIS — E83.42 HYPOMAGNESEMIA: ICD-10-CM

## 2022-09-13 DIAGNOSIS — C79.10 METASTATIC UROTHELIAL CARCINOMA (HCC): ICD-10-CM

## 2022-09-13 DIAGNOSIS — G89.3 CANCER ASSOCIATED PAIN: Primary | ICD-10-CM

## 2022-09-13 LAB
ALBUMIN SERPL BCP-MCNC: 3.9 G/DL (ref 3.5–5)
ALP SERPL-CCNC: 69 U/L (ref 34–104)
ALT SERPL W P-5'-P-CCNC: 60 U/L (ref 7–52)
ANION GAP SERPL CALCULATED.3IONS-SCNC: 9 MMOL/L (ref 4–13)
AST SERPL W P-5'-P-CCNC: 28 U/L (ref 13–39)
BASOPHILS # BLD AUTO: 0.03 THOUSANDS/ΜL (ref 0–0.1)
BASOPHILS NFR BLD AUTO: 0 % (ref 0–1)
BILIRUB SERPL-MCNC: 0.89 MG/DL (ref 0.2–1)
BUN SERPL-MCNC: 25 MG/DL (ref 5–25)
CALCIUM SERPL-MCNC: 9.5 MG/DL (ref 8.4–10.2)
CHLORIDE SERPL-SCNC: 103 MMOL/L (ref 96–108)
CO2 SERPL-SCNC: 28 MMOL/L (ref 21–32)
CREAT SERPL-MCNC: 1.25 MG/DL (ref 0.6–1.3)
EOSINOPHIL # BLD AUTO: 0.21 THOUSAND/ΜL (ref 0–0.61)
EOSINOPHIL NFR BLD AUTO: 3 % (ref 0–6)
ERYTHROCYTE [DISTWIDTH] IN BLOOD BY AUTOMATED COUNT: 17.3 % (ref 11.6–15.1)
GFR SERPL CREATININE-BSD FRML MDRD: 56 ML/MIN/1.73SQ M
GLUCOSE SERPL-MCNC: 112 MG/DL (ref 65–140)
HCT VFR BLD AUTO: 40 % (ref 36.5–49.3)
HGB BLD-MCNC: 13 G/DL (ref 12–17)
IMM GRANULOCYTES # BLD AUTO: 0.05 THOUSAND/UL (ref 0–0.2)
IMM GRANULOCYTES NFR BLD AUTO: 1 % (ref 0–2)
LYMPHOCYTES # BLD AUTO: 1.39 THOUSANDS/ΜL (ref 0.6–4.47)
LYMPHOCYTES NFR BLD AUTO: 20 % (ref 14–44)
MCH RBC QN AUTO: 32.9 PG (ref 26.8–34.3)
MCHC RBC AUTO-ENTMCNC: 32.5 G/DL (ref 31.4–37.4)
MCV RBC AUTO: 101 FL (ref 82–98)
MONOCYTES # BLD AUTO: 0.61 THOUSAND/ΜL (ref 0.17–1.22)
MONOCYTES NFR BLD AUTO: 9 % (ref 4–12)
NEUTROPHILS # BLD AUTO: 4.81 THOUSANDS/ΜL (ref 1.85–7.62)
NEUTS SEG NFR BLD AUTO: 67 % (ref 43–75)
NRBC BLD AUTO-RTO: 0 /100 WBCS
PLATELET # BLD AUTO: 191 THOUSANDS/UL (ref 149–390)
PMV BLD AUTO: 10.7 FL (ref 8.9–12.7)
POTASSIUM SERPL-SCNC: 3.5 MMOL/L (ref 3.5–5.3)
PROT SERPL-MCNC: 6 G/DL (ref 6.4–8.4)
RBC # BLD AUTO: 3.95 MILLION/UL (ref 3.88–5.62)
SODIUM SERPL-SCNC: 140 MMOL/L (ref 135–147)
WBC # BLD AUTO: 7.1 THOUSAND/UL (ref 4.31–10.16)

## 2022-09-13 PROCEDURE — 80053 COMPREHEN METABOLIC PANEL: CPT | Performed by: NURSE PRACTITIONER

## 2022-09-13 PROCEDURE — 85025 COMPLETE CBC W/AUTO DIFF WBC: CPT | Performed by: NURSE PRACTITIONER

## 2022-09-13 PROCEDURE — 96365 THER/PROPH/DIAG IV INF INIT: CPT

## 2022-09-13 RX ADMIN — MAGNESIUM SULFATE HEPTAHYDRATE: 500 INJECTION, SOLUTION INTRAMUSCULAR; INTRAVENOUS at 10:22

## 2022-09-13 NOTE — PROGRESS NOTES
Pt tolerated todays hydration with 2 gm mag over 1 hour  He does admit to having very weak legs  Multiple falls noted over the last month despite PT and using a cane/walker  He states that he has an appt with Petey at Dr [facility] Scheuermann office coming up and they will be discussing holding or discontinuing his medication especially because he has an upcoming trip to [facility] and he feels his legs arent up to the trip  Port flushed and deaccessed by petey smith rn  Discharged in  to [facility]  Central labs drawn for fridays chemo on admit today  Mag level not drawn as he was having supplementation

## 2022-09-14 ENCOUNTER — OFFICE VISIT (OUTPATIENT)
Dept: PHYSICAL THERAPY | Facility: CLINIC | Age: 72
End: 2022-09-14
Payer: MEDICARE

## 2022-09-14 ENCOUNTER — DOCUMENTATION (OUTPATIENT)
Dept: CARDIOLOGY CLINIC | Facility: CLINIC | Age: 72
End: 2022-09-14

## 2022-09-14 ENCOUNTER — EVALUATION (OUTPATIENT)
Dept: OCCUPATIONAL THERAPY | Facility: CLINIC | Age: 72
End: 2022-09-14
Payer: MEDICARE

## 2022-09-14 DIAGNOSIS — G89.29 CHRONIC LEFT-SIDED LOW BACK PAIN WITHOUT SCIATICA: ICD-10-CM

## 2022-09-14 DIAGNOSIS — R53.1 WEAKNESS GENERALIZED: Primary | ICD-10-CM

## 2022-09-14 DIAGNOSIS — T45.1X5A CHEMOTHERAPY-INDUCED NEUROPATHY (HCC): ICD-10-CM

## 2022-09-14 DIAGNOSIS — R53.1 GENERALIZED WEAKNESS: Primary | ICD-10-CM

## 2022-09-14 DIAGNOSIS — M54.50 CHRONIC LEFT-SIDED LOW BACK PAIN WITHOUT SCIATICA: ICD-10-CM

## 2022-09-14 DIAGNOSIS — G62.0 CHEMOTHERAPY-INDUCED NEUROPATHY (HCC): ICD-10-CM

## 2022-09-14 DIAGNOSIS — C79.10 METASTATIC UROTHELIAL CARCINOMA (HCC): ICD-10-CM

## 2022-09-14 DIAGNOSIS — M25.562 LEFT KNEE PAIN, UNSPECIFIED CHRONICITY: ICD-10-CM

## 2022-09-14 PROCEDURE — 97116 GAIT TRAINING THERAPY: CPT

## 2022-09-14 PROCEDURE — 97166 OT EVAL MOD COMPLEX 45 MIN: CPT

## 2022-09-14 PROCEDURE — 97112 NEUROMUSCULAR REEDUCATION: CPT

## 2022-09-14 PROCEDURE — 97110 THERAPEUTIC EXERCISES: CPT

## 2022-09-14 NOTE — PROGRESS NOTES
Daily Note     Today's date: 2022  Patient name: Carley Mccartney  : 1950  MRN: [id number]  Referring provider: RAGHU Cohen  Dx:   Encounter Diagnosis     ICD-10-CM    1  Weakness generalized  R53 1    2  Chemotherapy-induced neuropathy ([facility] [address] )  G62 0     T45  1X5A    3  Left knee pain, unspecified chronicity  M25 562    4  Chronic left-sided low back pain without sciatica  M54 50     G89 29    5  Metastatic urothelial carcinoma (HCC)  C79 10                   Subjective: Patient presents to clinic in wheelchair again and states he is tired  No other complaints at beginning of session  Objective: See treatment diary below      Assessment: Progressed POC as outlined below  Good tolerance to seated hip strengthening additions  Tolerated treatment fair  Significant fatigue throughout session and seated breaks for rest utilized following every exercise  Patient would benefit from continued PT to increase B/L LE strength and overall endurance for improved function in ADLs  Plan: Continue per plan of care        Re-eval Date: 10/7/22  Precautions fall risk, cancer     Date    Visit Count 6 7 8 9 10   FOTO        Pain In        Pain Out             Manuals         NP  Self gastroc  With wedge  1 min ea    Manual [id number] 1 min ea   Seated to pt denton                               Neuro Re-Ed         HKM  NP  6 laps UE as needed  SOLO 1 lap x 2   Static with B UE 2 x 10  Static with B UE 2 x 10    Sidestepping  NP  6 laps purple around knees               STS NP5 x 2 1 UE,     Trial w/ 2yellow foam   Unable 0 UE  10x, 9x raised mat 25 in, hands on knees  With 3 foam on chair, R UE 10x with 3 foam on chair, R UE   Static balance         Hurdles         Bw amb  NP  6 laps UE as needed   Cues > RENAN       Step up  6" fw   10x R  x1 / def addition   L LE   2 UE       Ther Ex        Nustep L4 6 5 min  To pt fatigue   L3, 10 min  L4 10 min   Seated hip abd     BTB 5"/5" 3' Seated hip add     5"/5" 3'   HS         TAC        SLR - supine, sidelying         SKC         Seated TR/HR    20x, attempted in standing but pt unable  25x ea   Prone quad flex S         LTR         Leg press 40# 2x10 45# 2 x 10  45# 3 x 10  45# 3 x 10  45# 3 x 10    Knee ext/flexion machine 22# 2x10 ea 22# ext 2x10      22# flex 2 x 15  22# ext 2 x10      22# flex 2 x 10 22# ext 2 x10      22# flex 3 x 10 22# ext 2 x10      33# flex 3 x 10   Ther Activity                        Gait Training          With SPC 3 laps SOLO   With RW and CGA around clinic 30' x 4 With RW and CGA around clinic and 2 laps           Modalities

## 2022-09-14 NOTE — PROGRESS NOTES
Per discussion at our last office visit, patient has decided to discontinue Eliquis  He called our office with this update

## 2022-09-14 NOTE — PROGRESS NOTES
OT Evaluation     Today's date: 2022  Patient name: Brittnee Vicente  : 1950  MRN: [id number]  Referring provider: RAGHU Guy  Dx:   Encounter Diagnosis     ICD-10-CM    1  Generalized weakness  R53 1                   Assessment  Assessment details: Patient presenting with OP OT services with a dx of generalized weakness  Patient has R neuropathy and not having feeling in hands  He reports having radiation under R arm and shoulder  Patient reports having difficulty holding coffee cups and needing strength to push up out of a chair  Patient reports having treatment three weeks and then having one week off  Patient reports he believes the treatment is making his neuropathy worse  Patient reports being on medications as well  Patient reports treatments began 2 5 years ago  Patient reports weakness started 2 months ago  Patient is R handed  Patient reports R hand gives him more issues  Patient reports being limited in ADL activities and having to modify some things at home  Patient demonstrates with decreases in strength, [facility], and sensation  Patient reports having no services prior to OT for the hands  Patient was a  for 35 years      Impairments: abnormal coordination and impaired physical strength  Other impairment: abnormal FMC    Symptom irritability: moderateUnderstanding of Dx/Px/POC: good   Prognosis: good    Goals  STGs    Pt will increase  strength by 5-10#  - Not Met    Pt will increase shoulder strength by 1/2 grade  - Not Met     Pt will increase wrist and elbow strength by 1/2 grade  - Not Met    Pt will increase in [facility]     Pt will report a decrease in pain of no more than 4/10 with activity - Not Met    Independent with HEP  - Not Met    LTGs     Pt will increase  strength by an additional 5-10#  - Not Met    Pt will increase shoulder strength by 1-2 grade  - Not Met    Pt will report an increase in ADL/IADL participation  - Not Met    Pt will report a decrease in pain of no more than 2/10 with activity - Not Met      Plan  Plan details: Patient has presenting to OP OT services with a dx of generalized weakness  Patient demonstrating increased pain, decreased sensation, strength, and [facility]  Pt would benefit from continued Occupational Therapy services 2 times per week for 4 weeks to return to prior level of function and achieve all established goals  Thank you for the referral!     Patient would benefit from: custom splinting, OT eval and skilled occupational therapy  Referral necessary: Yes  Planned modality interventions: cryotherapy, manual electrical stimulation, TENS, thermotherapy: hydrocollator packs, thermotherapy: paraffin bath and ultrasound  Planned therapy interventions: coordination, fine motor coordination training, functional ROM exercises, home exercise program, joint mobilization, manual therapy, massage, patient education, self care, stretching, therapeutic activities and therapeutic exercise  Frequency: 2x week  Duration in visits: 8  Duration in weeks: 4  Treatment plan discussed with: patient        Subjective Evaluation    History of Present Illness  Onset date: 2 months ago    Mechanism of injury: Generalized Weakness          Not a recurrent problem   Quality of life: good    Pain  Current pain ratin  At best pain ratin  At worst pain ratin  Location: b/l hands  Quality: sharp    Social Support  Lives with: spouse    Employment status: not working  Hand dominance: right      Diagnostic Tests  No diagnostic tests performed  Treatments  Previous treatment: physical therapy  Current treatment: physical therapy and occupational therapy  Patient Goals  Patient goals for therapy: increased strength, independence with ADLs/IADLs and decreased pain          Objective     Neurological Testing     Sensation     Wrist/Hand   Left   Diminished: light touch and dynamic two point discrimination    Right   Diminished: light touch and dynamic two point discrimination    Additional Neurological Details  Patient was unable to consistently distinguish between one and two point discrimination  Active Range of Motion   Left Shoulder   Normal active range of motion    Right Shoulder   Normal active range of motion    Left Elbow   Normal active range of motion    Right Elbow   Normal active range of motion    Strength/Myotome Testing     Left Shoulder     Planes of Motion   Flexion: 3+   Extension: 3+   Abduction: 3+   Adduction: 3+     Right Shoulder     Planes of Motion   Flexion: 3+   Extension: 3+   Abduction: 3+   Adduction: 3+     Left Elbow   Flexion: 3+  Extension: 3+  Forearm supination: 3+  Forearm pronation: 3+    Right Elbow   Flexion: 3+  Extension: 3+  Forearm supination: 3+  Forearm pronation: 3+    Left Wrist/Hand   Wrist extension: 4-  Wrist flexion: 4-  Radial deviation: 4-  Ulnar deviation: 4-     (2nd hand position)     Trial 1: 30    Thumb Strength  Key/Lateral Pinch     Trial 1: 4  Tip/Two-Point Pinch     Trial 1: 2  Palmar/Three-Point Pinch     Trial 1: 2    Right Wrist/Hand   Wrist extension: 4-  Wrist flexion: 4-  Radial deviation: 4-  Ulnar deviation: 4-     (2nd hand position)     Trial 1: 10    Thumb Strength   Key/Lateral Pinch     Trial 1: 2  Tip/Two-Point Pinch     Trial 1: 1  Palmar/Three-Point Pinch     Trial 1: 2    Additional Strength Details  Patient demonstrates with a decrease in  and pinch strength b/l compared to norms  Neuro Exam:     Sensation   Light touch LE: left impaired and right impaired    Functional outcomes   Left 9 peg hole test: 41 97 (seconds)  Right 9 hole peg test: 52 53 (seconds)  Functional outcome comment: Patient had increased difficulty completing the grooved peg board  Patient was able to place 2 in on R in 1:25 80  Patient was able to place 8 in with L hand in 2:14 28  Patient was able to complete the buttoning/unbuttoning  on the ADL vest in 1:47 66   Patient was able to unbutton and unzip on ADL vest in 1:37 47  Unable to rezip                  Precautions generalized weakness  Initial Evaluation completed on: 9/14/2022  Re-Evaluation needs to be completed before: 10/14/2022  Insurance: CMS  FOTO: 9/14/2022  Auth Visits: n/a          Manuals 9/14/2022                                       Neuro Re-Ed  9/14/2022                                                               Ther Ex 9/14/2022       Flex Bar   Bends   Twists   Bottle caps b/l  X 20 2 way  X 20  X 20        Digiflex        Hand Power Pro        Theraband   Ext  ADD  Triceps  ER  IR  Retraction   Bicep curls  ABD  PNF 1  PNF 2    Black x 20    Black x 20      Black x 20  Black x 20       UBE 5F/5B 10 min L3       Shoulder   Shrugs  Retractions  Rolls FW/BW                        Ther Activity 9/14/2022       Stereognosis                                         Modalities 9/14/2022                          Treatment completed by GOSIA Martínez under the supervision of Shiela Hester, OTR/L

## 2022-09-15 ENCOUNTER — OFFICE VISIT (OUTPATIENT)
Dept: HEMATOLOGY ONCOLOGY | Facility: CLINIC | Age: 72
End: 2022-09-15
Payer: MEDICARE

## 2022-09-15 ENCOUNTER — TELEPHONE (OUTPATIENT)
Dept: HEMATOLOGY ONCOLOGY | Facility: CLINIC | Age: 72
End: 2022-09-15

## 2022-09-15 VITALS
WEIGHT: 193 LBS | TEMPERATURE: 97.2 F | SYSTOLIC BLOOD PRESSURE: 118 MMHG | HEIGHT: 70 IN | HEART RATE: 80 BPM | RESPIRATION RATE: 18 BRPM | BODY MASS INDEX: 27.63 KG/M2 | DIASTOLIC BLOOD PRESSURE: 70 MMHG | OXYGEN SATURATION: 96 %

## 2022-09-15 DIAGNOSIS — T45.1X5A CHEMOTHERAPY-INDUCED NEUROPATHY (HCC): ICD-10-CM

## 2022-09-15 DIAGNOSIS — E83.42 HYPOMAGNESEMIA: ICD-10-CM

## 2022-09-15 DIAGNOSIS — C67.8 MALIGNANT NEOPLASM OF OVERLAPPING SITES OF BLADDER (HCC): ICD-10-CM

## 2022-09-15 DIAGNOSIS — C79.10 METASTATIC UROTHELIAL CARCINOMA (HCC): Primary | ICD-10-CM

## 2022-09-15 DIAGNOSIS — G62.0 CHEMOTHERAPY-INDUCED NEUROPATHY (HCC): ICD-10-CM

## 2022-09-15 DIAGNOSIS — G47.09 OTHER INSOMNIA: ICD-10-CM

## 2022-09-15 PROCEDURE — 99215 OFFICE O/P EST HI 40 MIN: CPT | Performed by: NURSE PRACTITIONER

## 2022-09-15 RX ORDER — ZOLPIDEM TARTRATE 5 MG/1
5 TABLET ORAL
Qty: 30 TABLET | Refills: 1 | Status: SHIPPED | OUTPATIENT
Start: 2022-09-15

## 2022-09-15 NOTE — PROGRESS NOTES
Hematology/Oncology Outpatient Follow-up  Deirdre Claudio 67 y o  male 1950 [id number]    Date:  9/15/2022      Assessment and Plan:  1  Metastatic urothelial carcinoma ([address] )  Patient is scheduled to start cycle 12 day 1 of his Padcev cycle tomorrow  His dose was further decreased to 0 75mg/kg with cycle 11  He continues to report significant grade 3 peripheral neuropathy to his hands and feet which is affecting his quality of life and instrumentation  We did discuss prior that is recommended to permanently discontinue Padcev with grade 3 neuropathy but patient was rather enthusiastic about continuing with dose reduction  There has also been some consideration of changing his dosing to every other week dosing rather than 3 weeks on with 1 week of a break  He has been receiving physical and occupational therapy for strengthening which he continues  The patient states today that he is interested in continuing his treatment as planned for the time being; would like to get his treatment tomorrow  Is awaiting his repeat PET imaging which is scheduled 09/26/2022  Is highly considering taking a break from treatment/stopping treatment pending results as he is fearful of the significant quality of life and losing independence as his neuropathy progresses  He is leaving for a 2 week trip to be with his daughter in [place] 10/23/2022 who just completed her chemotherapy for lymphoma and voices he is considering taking the whole entire month of October off for a break as long as his PET imaging is stable  He does mention that if there is any progression of his neuropathy after his treatment tomorrow he may consider cancelling his treatment next week  He will continue to get his labs checked prior to each treatment  Continue additional IV hydration as needed which he is getting twice weekly  Follow-up in 2 weeks to review his PET imaging/plan or sooner should the need arise      Addendum:  Patient got in touch with me later in the afternoon after returning home  Wife states he did have some difficulty getting in the house with his neuropathy  After taking time to further think about things he has decided to place his chemotherapy treatment on hold for now due to the worsening neuropathy/weakness which is definitely appropriate  Does not want to risk worsening quality of life  We will place his treatment plan on hold  He is interested in going to the infusion center tomorrow during his scheduled time to get his p r n  IV hydration  Will have nursing staff update infusion center     - CBC and differential; Standing  - Comprehensive metabolic panel; Standing  - Magnesium; Standing  - CBC and differential  - Comprehensive metabolic panel  - Magnesium    2  Chemotherapy-induced neuropathy ([facility] [address] )  As above  Is taking Cymbalta 60 mg and gabapentin 100 mg twice a day  Recommended a follow-up with palliative Care for further adjustment of his gabapentin as she recommended  3  Hypomagnesemia  Magnesium was not checked this week for some reason  His most recent magnesium from last week was slightly below average 1 8  He states that he did receive some additional IV magnesium in his most recent IV hydration earlier this week  Will repeat his magnesium level next week  He will continue his oral supplements  - Magnesium; Standing  - Magnesium    4  Other insomnia  Patient mentions that he is occasionally having some difficulty sleeping at night while on the prednisone 20 mg  Is asking for refill on his Ambien which she uses on occasion with success  Most recent script was sent March 2022  Refill sent to his local pharmacy  - zolpidem (AMBIEN) 5 mg tablet; Take 1 tablet (5 mg total) by mouth daily at bedtime as needed for sleep  Dispense: 30 tablet; Refill: 1      HPI:  Patient presents today for a follow-up visit accompanied by his wife    He continues to get PT and OT treatment for his weakness/neuropathy/etc   Did meet with Cardiology recently and decision was made to stop his Eliquis due to high risk of bleeding with his weakness and falls which is appropriate  He continues to have significant neuropathy to his hands and feet  His wife mentions that she often has to assist him with cutting his food up/except for a as he cannot feel his fingers  Otherwise he has no new complaints  Continues to get additional IV hydration twice a week in the infusion center  Has been taking prednisone 20 mg daily which is improving his fatigue/stamina  He is already scheduled for his follow-up PET scan 09/26/2022  Is leaving for a trip to be with his daughter in [place] October 23rd and is considering taking a break for treatment for the whole month of October pending his PET results  Is also considering stopping his systemic treatment pending his PET results has he is concerned about worsening quality of life with his worsening neuropathy  His most recent laboratory studies from 2 days ago 09/13/2022 showed normal WBC 7 1, he is not anemic H&H 13 0/40,  platelet count normal 191  Creatinine 1 25, GFR 56, total protein decreased 6, ALT slightly above average 60 remaining metabolic panel is appropriate  Magnesium was not done for some reason however he states that he did get some additional IV magnesium in his most recent hydration  Also been taking his oral supplements  Oncology History Overview Note   Patient has a history of hypertension, hyperlipidemia, chronic lower back pain  He had an MRI of the lower spine for further evaluation of his chronic lower back pain  The MRI on 12/02/2019 revealed Multiple left renal masses to include a left lower pole cyst and a rounded structure in the left upper pole which may also represent cyst   Left upper pole renal masses approximately 3 cm in greatest linear dimension       A CT with renal protocol was done on 12/12/2019 which also showed the infiltrating lesion in the upper pole of the left kidney measuring about the 3 5 cm in greatest dimension without any hint of retroperitoneal lymphadenopathy  A CT scan of the abdomen pelvis on 01/14/2020 showed the same findings with the mild hydronephrosis on the left  The urine cytology on 01/03/2020 showed high-grade urothelial carcinoma  A cystoscopy was then done, a biopsy was taken from the left renal pelvic region which showed high-grade urothelial carcinoma without evidence of lamina propria invasion  The detrusor muscle/muscularis propria were not present for evaluation  The recommendation was to pursue left robotic assisted laparoscopic nephroureterectomy with bladder cuff excision which was done on 04/01/2020  The final pathology revealed;  - Invasive high grade urothelial carcinoma arising in renal pelvis  - Bladder cuff margin is negative for carcinoma and no evidence of high grade dysplasia  - Ureters with no significant pathologic abnormality  - Two benign simple cysts  - Adrenal gland is negative for malignancy  - One lymph node, negative for malignancy (0/1)  The tumor size was 4 5 cm with invasion beyond the muscularis into the periurethral fat or peripelvic fat or the renal parenchyma  The margins were uninvolved by carcinoma or carcinoma in situ  The final pathology was pT3 pN0  Patient barely tolerated 1 cycle of adjuvant chemotherapy with split dose cisplatin and gemcitabine with a lot of side effects  The treatment had to be discontinued due to significant worsening renal dysfunction 6/2020  Patient started to complain about significant abdominal/left inguinal pain around August/September 2020  Unfortunately repeat imaging revealed recurrent/metastatic disease  9/4/20 CT C/A/P- Status post left nephrectomy for resection of urothelial neoplasm    Lymphadenopathy in the left nephrectomy bed has increased since the prior examination, concerning for metastatic disease  Ill-defined hyperattenuating masslike focus in the left rectus muscle, not identified on the prior examination  This is suspicious for a metastatic lesion  A rectus hematoma is also considered  9/23/20 PET scan- 1  Multiple FDG avid lymph nodes in the retrocrural region, retroperitoneum and the left renal bed compatible with metastasis  These have progressed from recent CT  2   FDG avid mass involving the left rectus abdominal musculature compatible with metastasis which is larger from recent CT  3  Several small lymph nodes adjacent to the mid to distal esophagus with FDG uptake suspicious for metastasis  Small focus of FDG uptake also suggested in the right hilar region, metastasis is not excluded  This could be reassessed on follow-up  4   Subtle focus of FDG uptake at the T2 level on the left, nonspecific, could be related to early degenerative changes, developing metastasis is not entirely excluded  This could be reassessed on follow-up  5  Prostate is mildly prominent with heterogeneous FDG uptake  This could be inflammatory  Correlate for prostatitis  He completed palliative radiation to the left abdomen 12/3/20     His PET scan from 08/16/2021 showed progression of his disease with hypermetabolic soft tissue lesions at the level of the right shoulder and right axilla with interval progression of the retroperitoneal and mesenteric hypermetabolic metastasis  He did have the new lesion to his right upper back/shoulder which was causing significant localized pain  This excised by his surgeon 08/09/2021  Pathology confirmed metastatic high-grade carcinoma consistent with his no primary urothelial carcinoma  He received palliative radiation to his right shoulder/axilla region  Urothelial cancer ([facility] [address] )   1/3/2020 Biopsy    Final Diagnosis    A  Urine, Clean Catch, Thin Prep:  High grade urothelial carcinoma (HGUC) - see comment          1/3/2020 Initial Diagnosis Urothelial cancer ([facility] [address] )  T3 N0 (stage IIIA)     1/17/2020 Biopsy    Final Diagnosis    A  Ureter, Right, left renal pelvis biopsy  -Fragments of high grade urothelial carcinoma   -No evidence of lamina propria invasion   -Detrusor muscle/ muscularis propria is not present for evaluation  B  Urinary Bladder, bladder biopsy:  -Fragments of low grade papillary lesion  See note  -No evidence of invasion seen  -Unremarkable fragment of detrusor muscle seen  4/1/2020 Surgery    left robotic assisted laparoscopic nephroureterectomy with bladder cuff excision     Final Diagnosis    A  Left kidney, ureter, and bladder cuff, nephroureterectomy:  - Invasive high grade urothelial carcinoma arising in renal pelvis  - Bladder cuff margin is negative for carcinoma and no evidence of high grade dysplasia  - Ureters with no significant pathologic abnormality  - Two benign simple cysts  - Adrenal gland is negative for malignancy  - One lymph node, negative for malignancy (0/1)          5/14/2020 - 6/3/2020 Chemotherapy    CISplatin (PLATINOL) split dose 35 mg/m2 = 75 3 mg (50 % of original dose 70 mg/m2), Intravenous,   Administration: 75 3 mg (5/14/2020), 75 3 mg (5/21/2020)  gemcitabine (GEMZAR) 2,200 mg in sodium chloride 0 9 % 250 mL infusion, 2,150 2 mg (80 % of original dose 1,250 mg/m2), Intravenous,   Administration: 2,200 mg (5/14/2020), 2,200 mg (5/21/2020)    (only completed 1 cycle- d/c d/t adverse effects and worsening renal dysfunction)     10/9/2020 - 9/9/2021 Chemotherapy    pembrolizumab (KEYTRUDA) IVPB, 200 mg, Intravenous, Once, 16 of 20 cycles  Administration: 200 mg (10/9/2020), 200 mg (10/30/2020), 200 mg (11/20/2020), 200 mg (12/11/2020), 200 mg (12/31/2020), 200 mg (1/22/2021), 200 mg (2/12/2021), 200 mg (3/5/2021), 200 mg (3/26/2021), 200 mg (4/16/2021), 200 mg (5/7/2021), 200 mg (5/28/2021), 200 mg (6/18/2021), 200 mg (7/9/2021), 200 mg (7/30/2021), 200 mg (8/20/2021)     11/19/2020 - 12/3/2020 Radiation    Treatment:  Course: C1    Plan ID Energy Fractions Dose per Fraction (cGy) Dose Correction (cGy) Total Dose Delivered (cGy) Elapsed Days   L Abdomen 6X 10 / 10 300 0 3,000 14        9/10/2021 -  Chemotherapy    enfortumab vedotin-ejfv (PADCEV) IVPB, 1 25 mg/kg = 114 mg, Intravenous, Once, 11 of 16 cycles  Dose modification: 1 mg/kg (original dose 1 25 mg/kg, Cycle 7, Reason: Other (Must fill in a comment), Comment: dose reduction due to side effects ), 1 25 mg/kg (original dose 1 25 mg/kg, Cycle 7, Reason: Other (Must fill in a comment), Comment: patient wants full dose ), 1 mg/kg (original dose 1 25 mg/kg, Cycle 7, Reason: Neuropathy), 0 75 mg/kg (original dose 1 25 mg/kg, Cycle 11, Reason: Neuropathy)  Administration: 114 mg (9/10/2021), 114 mg (9/17/2021), 110 mg (10/8/2021), 110 mg (9/24/2021), 110 mg (10/15/2021), 110 mg (11/12/2021), 110 mg (10/22/2021), 110 mg (11/19/2021), 110 mg (12/10/2021), 110 mg (11/26/2021), 110 mg (12/17/2021), 110 mg (1/7/2022), 110 mg (12/23/2021), 110 mg (1/14/2022), 90 mg (4/8/2022), 90 mg (4/15/2022), 90 mg (4/22/2022), 110 mg (1/21/2022), 110 mg (2/18/2022), 110 mg (2/25/2022), 90 mg (5/13/2022), 90 mg (5/20/2022), 90 mg (5/27/2022), 90 mg (3/4/2022), 90 mg (6/10/2022), 90 mg (6/17/2022), 90 mg (6/24/2022), 90 mg (7/8/2022), 90 mg (7/15/2022), 90 mg (7/22/2022), 68 mg (8/19/2022), 70 mg (9/2/2022)     Cancer of left renal pelvis ([facility] [address] )   4/23/2020 Initial Diagnosis    Cancer of left renal pelvis ([facility] [address] )     10/9/2020 - 9/9/2021 Chemotherapy    pembrolizumab (KEYTRUDA) IVPB, 200 mg, Intravenous, Once, 16 of 20 cycles  Administration: 200 mg (10/9/2020), 200 mg (10/30/2020), 200 mg (11/20/2020), 200 mg (12/11/2020), 200 mg (12/31/2020), 200 mg (1/22/2021), 200 mg (2/12/2021), 200 mg (3/5/2021), 200 mg (3/26/2021), 200 mg (4/16/2021), 200 mg (5/7/2021), 200 mg (5/28/2021), 200 mg (6/18/2021), 200 mg (7/9/2021), 200 mg (7/30/2021), 200 mg (8/20/2021) 9/10/2021 -  Chemotherapy    enfortumab vedotin-ejfv (PADCEV) IVPB, 1 25 mg/kg = 114 mg, Intravenous, Once, 11 of 16 cycles  Dose modification: 1 mg/kg (original dose 1 25 mg/kg, Cycle 7, Reason: Other (Must fill in a comment), Comment: dose reduction due to side effects ), 1 25 mg/kg (original dose 1 25 mg/kg, Cycle 7, Reason: Other (Must fill in a comment), Comment: patient wants full dose ), 1 mg/kg (original dose 1 25 mg/kg, Cycle 7, Reason: Neuropathy), 0 75 mg/kg (original dose 1 25 mg/kg, Cycle 11, Reason: Neuropathy)  Administration: 114 mg (9/10/2021), 114 mg (9/17/2021), 110 mg (10/8/2021), 110 mg (9/24/2021), 110 mg (10/15/2021), 110 mg (11/12/2021), 110 mg (10/22/2021), 110 mg (11/19/2021), 110 mg (12/10/2021), 110 mg (11/26/2021), 110 mg (12/17/2021), 110 mg (1/7/2022), 110 mg (12/23/2021), 110 mg (1/14/2022), 90 mg (4/8/2022), 90 mg (4/15/2022), 90 mg (4/22/2022), 110 mg (1/21/2022), 110 mg (2/18/2022), 110 mg (2/25/2022), 90 mg (5/13/2022), 90 mg (5/20/2022), 90 mg (5/27/2022), 90 mg (3/4/2022), 90 mg (6/10/2022), 90 mg (6/17/2022), 90 mg (6/24/2022), 90 mg (7/8/2022), 90 mg (7/15/2022), 90 mg (7/22/2022), 68 mg (8/19/2022), 70 mg (9/2/2022)      Surgery       Metastatic urothelial carcinoma ([facility] [address] )   8/9/2021 Initial Diagnosis    Metastatic urothelial carcinoma ([facility] [address] )     9/8/2021 - 9/21/2021 Radiation    Treatment:  Course: C2    Plan ID Energy Fractions Dose per Fraction (cGy) Dose Correction (cGy) Total Dose Delivered (cGy) Elapsed Days   R Axil_Shl # 6X 10 / 10 300 0 3,000 13      Treatment dates:  C2: 9/8/2021 - 9/21/2021     9/10/2021 -  Chemotherapy    enfortumab vedotin-ejfv (PADCEV) IVPB, 1 25 mg/kg = 114 mg, Intravenous, Once, 11 of 16 cycles  Dose modification: 1 mg/kg (original dose 1 25 mg/kg, Cycle 7, Reason: Other (Must fill in a comment), Comment: dose reduction due to side effects ), 1 25 mg/kg (original dose 1 25 mg/kg, Cycle 7, Reason: Other (Must fill in a comment), Comment: patient wants full dose ), 1 mg/kg (original dose 1 25 mg/kg, Cycle 7, Reason: Neuropathy), 0 75 mg/kg (original dose 1 25 mg/kg, Cycle 11, Reason: Neuropathy)  Administration: 114 mg (9/10/2021), 114 mg (9/17/2021), 110 mg (10/8/2021), 110 mg (9/24/2021), 110 mg (10/15/2021), 110 mg (11/12/2021), 110 mg (10/22/2021), 110 mg (11/19/2021), 110 mg (12/10/2021), 110 mg (11/26/2021), 110 mg (12/17/2021), 110 mg (1/7/2022), 110 mg (12/23/2021), 110 mg (1/14/2022), 90 mg (4/8/2022), 90 mg (4/15/2022), 90 mg (4/22/2022), 110 mg (1/21/2022), 110 mg (2/18/2022), 110 mg (2/25/2022), 90 mg (5/13/2022), 90 mg (5/20/2022), 90 mg (5/27/2022), 90 mg (3/4/2022), 90 mg (6/10/2022), 90 mg (6/17/2022), 90 mg (6/24/2022), 90 mg (7/8/2022), 90 mg (7/15/2022), 90 mg (7/22/2022), 68 mg (8/19/2022), 70 mg (9/2/2022)         Interval history:    ROS: Review of Systems   Constitutional: Positive for activity change and fatigue  Negative for appetite change, chills, fever and unexpected weight change  HENT: Positive for hearing loss  Negative for congestion, mouth sores, nosebleeds, sore throat and trouble swallowing  Eyes: Negative  Respiratory: Positive for shortness of breath (exertional)  Negative for cough and chest tightness  Cardiovascular: Negative for chest pain, palpitations and leg swelling  Gastrointestinal: Negative for abdominal distention, abdominal pain, blood in stool, constipation, diarrhea, nausea and vomiting  Genitourinary: Negative for difficulty urinating, dysuria, frequency, hematuria and urgency  Musculoskeletal: Positive for arthralgias, back pain, gait problem and myalgias  Negative for joint swelling  Skin: Negative for color change, pallor and rash  Neurological: Positive for weakness and numbness  Negative for dizziness, light-headedness and headaches  Hematological: Negative for adenopathy  Bruises/bleeds easily  Psychiatric/Behavioral: Positive for sleep disturbance   Negative for dysphoric mood  The patient is not nervous/anxious  Past Medical History:   Diagnosis Date    Arthritis     Bladder cancer     Cancer ([address] )     skin melanoma;basal cell    Chronic pain disorder     from arthritis    Colon polyp     Does use hearing aid     bilat    Dry eyes, bilateral     GERD (gastroesophageal reflux disease)     History of kidney stones 10/2019    History of partial knee replacement     bilat    History of vertigo     Hyperlipidemia     Hypertension     Infusion extravasation of chemotherapy vesicant 11/2021    Kidney lesion     Lumbar disc disorder     compression of vertebrae L4-5-6    Muscle weakness     left hip area    Renal mass     left    Right ankle injury 03/05/2020    missed step of ladder     Right ankle pain     Shortness of breath     with activity    Tinnitus     Urothelial cancer     left    Wears glasses        Past Surgical History:   Procedure Laterality Date    COLONOSCOPY      CYSTOSCOPY Left 4/1/2020    Procedure: CYSTOSCOPY; URETERAL CATHETER PLACEMENT;  Surgeon: Dimitri Denney MD;  Location[facility];  Service: Urology    CYSTOSCOPY  05/11/2020    CYSTOSCOPY  06/04/2021    FL CYSTOGRAM  4/13/2020    FL RETROGRADE PYELOGRAM  1/17/2020    HERNIA REPAIR      umbilical with mesh    INGUINAL HERNIA REPAIR Right     with mesh    IR PORT PLACEMENT  4/30/2020    JOINT REPLACEMENT Bilateral     partials knee    LUMBAR EPIDURAL INJECTION      WV CYSTOURETHROSCOPY,URETER CATHETER Bilateral 1/17/2020    Procedure: CYSTOSCOPY; RIGHT RETROGRADE PYELOGRAM WITH RIGHT URETERAL CYTOLOGY SAMPLING; LEFT URETEROSCOPY WITH RENAL PELVIS BIOPSY AND LEFT STENT PLACEMENT;  Surgeon: Dimitri Denney MD;  [facility]: [facility];  Service: Urology    [place] NEPHRECTOMY, W/PART   URETECTOMY Left 4/1/2020    Procedure: ROBOTIC LAPAROSCOPIC NEPHRO-URETERECTOMY;  Surgeon: Dimitri Denney MD;  Location[facility];  Service: Urology    SKIN CANCER EXCISION      surface melanoma    TONSILLECTOMY      WISDOM TOOTH EXTRACTION         Social History     Socioeconomic History    Marital status: /Civil Union     Spouse name: None    Number of children: None    Years of education: None    Highest education level: None   Occupational History    None   Tobacco Use    Smoking status: Former Smoker     Types: Cigarettes     Quit date:      Years since quittin 7    Smokeless tobacco: Never Used   Vaping Use    Vaping Use: Never used   Substance and Sexual Activity    Alcohol use:  Yes     Alcohol/week: 17 0 standard drinks     Types: 7 Glasses of wine, 10 Cans of beer per week     Comment: socially    Drug use: Never    Sexual activity: Not Currently   Other Topics Concern    None   Social History Narrative    Daily caffeine use - 2 cups coffee      Social Determinants of Health     Financial Resource Strain: Not on file   Food Insecurity: Not on file   Transportation Needs: Not on file   Physical Activity: Not on file   Stress: Not on file   Social Connections: Not on file   Intimate Partner Violence: Not on file   Housing Stability: Not on file       Family History   Problem Relation Age of Onset    Liver cancer Father        Allergies   Allergen Reactions    Poison Ivy Extract Rash         Current Outpatient Medications:     acetaminophen (TYLENOL) 500 mg tablet, Take 2 tablets (1,000 mg total) by mouth every 8 (eight) hours, Disp: , Rfl:     allopurinol (ZYLOPRIM) 300 mg tablet, take 1 tablet by mouth once daily, Disp: 30 tablet, Rfl: 3    ALPRAZolam (XANAX) 0 25 mg tablet, Take 1 tablet (0 25 mg total) by mouth daily at bedtime as needed for anxiety, Disp: 30 tablet, Rfl: 0    amitriptyline (ELAVIL) 25 mg tablet, Take 25 mg by mouth daily at bedtime, Disp: , Rfl:     atorvastatin (LIPITOR) 80 mg tablet, Take 80 mg by mouth every other day evening, Disp: , Rfl:     colchicine (COLCRYS) 0 6 mg tablet, take 1 tablet by mouth twice a day if needed for FOOT PAIN, Disp: , Rfl:     docusate sodium (COLACE) 250 MG capsule, Take 1 capsule (250 mg total) by mouth daily, Disp: 60 capsule, Rfl: 6    DULoxetine (CYMBALTA) 60 mg delayed release capsule, take 1 capsule by mouth once daily, Disp: 30 capsule, Rfl: 1    folic acid (FOLVITE) 1 mg tablet, take 1 tablet by mouth once daily, Disp: 90 tablet, Rfl: 4    gabapentin (NEURONTIN) 100 mg capsule, Take 1 capsule (100 mg total) by mouth 2 (two) times a day, Disp: 60 capsule, Rfl: 1    Magnesium 250 MG TABS, 1 tablet 2 (two) times a day Taking 500mg in the morning and 500mg at night, Disp: , Rfl:     metoprolol tartrate (LOPRESSOR) 100 mg tablet, Take 50 mg by mouth daily, Disp: , Rfl:     naloxone (NARCAN) 4 mg/0 1 mL nasal spray, Administer 1 spray into a nostril   If breathing does not return to normal or if breathing difficulty resumes after 2-3 minutes, give another dose in the other nostril using a new spray , Disp: 1 each, Rfl: 1    nystatin (MYCOSTATIN) cream, Apply topically 2 (two) times a day, Disp: 30 g, Rfl: 0    predniSONE 10 mg tablet, Take 2 tablets (20 mg total) by mouth daily, Disp: 60 tablet, Rfl: 3    senna (SENOKOT) 8 6 mg, Take 1 tablet (8 6 mg total) by mouth daily at bedtime, Disp: 30 each, Rfl: 0    tadalafil (CIALIS) 20 MG tablet, Take one tablet by mouth one hour before sexual activity, Disp: 15 tablet, Rfl: 3    tamsulosin (FLOMAX) 0 4 mg, take 1 capsule by mouth once daily with dinner, Disp: 30 capsule, Rfl: 1    traMADol (Ultram) 50 mg tablet, Take 1 tab PO Q8 hours PRN pain, on severe days can take a 4th tablet , Disp: 100 tablet, Rfl: 1    VITAMIN D PO, Take 1,000 Units by mouth daily , Disp: , Rfl:     zolpidem (AMBIEN) 5 mg tablet, Take 1 tablet (5 mg total) by mouth daily at bedtime as needed for sleep, Disp: 30 tablet, Rfl: 1      Physical Exam:  /70 (BP Location: Left arm, Patient Position: Sitting, Cuff Size: Adult)   Pulse 80   Temp (!) 97 2 °F (36 2 °C)   Resp 18   Ht 5' 10" (1 778 m)   Wt 87 5 kg (193 lb)   SpO2 96%   BMI 27 69 kg/m²     Physical Exam  Vitals reviewed  Constitutional:       General: He is not in acute distress  Appearance: He is well-developed  He is not diaphoretic  HENT:      Head: Normocephalic and atraumatic  Eyes:      General: Lids are normal  No scleral icterus  Conjunctiva/sclera: Conjunctivae normal       Pupils: Pupils are equal, round, and reactive to light  Neck:      Thyroid: No thyromegaly  Cardiovascular:      Rate and Rhythm: Normal rate and regular rhythm  Heart sounds: Normal heart sounds  No murmur heard  Pulmonary:      Effort: Pulmonary effort is normal  No respiratory distress  Breath sounds: Normal breath sounds  Chest:   Breasts:      Right: No axillary adenopathy  Left: No axillary adenopathy  Abdominal:      General: There is no distension  Palpations: Abdomen is soft  There is no hepatomegaly or splenomegaly  Tenderness: There is no abdominal tenderness  Musculoskeletal:      Cervical back: Normal range of motion and neck supple  Right lower leg: No edema  Left lower leg: No edema  Lymphadenopathy:      Cervical: No cervical adenopathy  Upper Body:      Right upper body: No axillary adenopathy  Left upper body: No axillary adenopathy  Skin:     General: Skin is warm and dry  Findings: No erythema or rash  Comments: Noted multiple scabbed lesions to the lower extremities  Multiple small scattered bruises to the upper extremities  Neurological:      General: No focal deficit present  Mental Status: He is alert and oriented to person, place, and time  Psychiatric:         Mood and Affect: Mood normal  Affect is blunt  Behavior: Behavior normal  Behavior is cooperative  Thought Content:  Thought content normal          Judgment: Judgment normal            Labs:  Lab Results   Component Value Date WBC 7 10 09/13/2022    HGB 13 0 09/13/2022    HCT 40 0 09/13/2022     (H) 09/13/2022     09/13/2022     Lab Results   Component Value Date    K 3 5 09/13/2022     09/13/2022    CO2 28 09/13/2022    BUN 25 09/13/2022    CREATININE 1 25 09/13/2022    GLUF 111 (H) 03/01/2022    CALCIUM 9 5 09/13/2022    CORRECTEDCA 9 0 06/21/2022    AST 28 09/13/2022    ALT 60 (H) 09/13/2022    ALKPHOS 69 09/13/2022    EGFR 56 09/13/2022       Patient voiced understanding and agreement in the above discussion  Aware to contact our office with questions/symptoms in the interim  This note has been generated by voice recognition software system  Therefore, there may be spelling, grammar, and or syntax errors  Please contact if questions arise

## 2022-09-15 NOTE — TELEPHONE ENCOUNTER
CALL RETURN FORM   Reason for patient call? Patient calling to speak with Claire Rubalcava  He has some addition questions  He also has made the decision to stop his oncology treatments tomorrow 9/16/22  Please give a call back asap     Patient's primary oncologist? Dr Dagoberto Cox   Name of person the patient was calling for? Ann   Any additional information to add, if applicable? [phone] cell# [phone]   Informed patient that the message will be forwarded to the team and someone will get back to them as soon as possible    Did you relay this information to the patient?  yes

## 2022-09-16 ENCOUNTER — OFFICE VISIT (OUTPATIENT)
Dept: PAIN MEDICINE | Facility: CLINIC | Age: 72
End: 2022-09-16
Payer: MEDICARE

## 2022-09-16 ENCOUNTER — TELEPHONE (OUTPATIENT)
Dept: HEMATOLOGY ONCOLOGY | Facility: CLINIC | Age: 72
End: 2022-09-16

## 2022-09-16 ENCOUNTER — HOSPITAL ENCOUNTER (OUTPATIENT)
Dept: INFUSION CENTER | Facility: HOSPITAL | Age: 72
End: 2022-09-16
Attending: INTERNAL MEDICINE
Payer: MEDICARE

## 2022-09-16 VITALS
SYSTOLIC BLOOD PRESSURE: 123 MMHG | DIASTOLIC BLOOD PRESSURE: 76 MMHG | RESPIRATION RATE: 16 BRPM | OXYGEN SATURATION: 96 % | TEMPERATURE: 98 F | HEART RATE: 66 BPM

## 2022-09-16 VITALS
WEIGHT: 195 LBS | BODY MASS INDEX: 27.92 KG/M2 | HEIGHT: 70 IN | DIASTOLIC BLOOD PRESSURE: 75 MMHG | HEART RATE: 67 BPM | SYSTOLIC BLOOD PRESSURE: 124 MMHG

## 2022-09-16 DIAGNOSIS — G89.29 CHRONIC BILATERAL LOW BACK PAIN WITHOUT SCIATICA: ICD-10-CM

## 2022-09-16 DIAGNOSIS — C79.10 METASTATIC UROTHELIAL CARCINOMA (HCC): ICD-10-CM

## 2022-09-16 DIAGNOSIS — Z79.891 ENCOUNTER FOR LONG-TERM OPIATE ANALGESIC USE: ICD-10-CM

## 2022-09-16 DIAGNOSIS — G89.4 CHRONIC PAIN DISORDER: Primary | ICD-10-CM

## 2022-09-16 DIAGNOSIS — F11.20 UNCOMPLICATED OPIOID DEPENDENCE (HCC): ICD-10-CM

## 2022-09-16 DIAGNOSIS — C68.9 UROTHELIAL CANCER (HCC): Primary | ICD-10-CM

## 2022-09-16 DIAGNOSIS — M48.061 SPINAL STENOSIS OF LUMBAR REGION, UNSPECIFIED WHETHER NEUROGENIC CLAUDICATION PRESENT: ICD-10-CM

## 2022-09-16 DIAGNOSIS — M46.1 SACROILIITIS (HCC): ICD-10-CM

## 2022-09-16 DIAGNOSIS — T45.1X5A CHEMOTHERAPY-INDUCED NEUROPATHY (HCC): ICD-10-CM

## 2022-09-16 DIAGNOSIS — G62.0 CHEMOTHERAPY-INDUCED NEUROPATHY (HCC): ICD-10-CM

## 2022-09-16 DIAGNOSIS — M47.816 LUMBAR SPONDYLOSIS: ICD-10-CM

## 2022-09-16 DIAGNOSIS — M54.50 CHRONIC BILATERAL LOW BACK PAIN WITHOUT SCIATICA: ICD-10-CM

## 2022-09-16 DIAGNOSIS — E86.0 DEHYDRATION: ICD-10-CM

## 2022-09-16 DIAGNOSIS — C65.2 CANCER OF LEFT RENAL PELVIS (HCC): ICD-10-CM

## 2022-09-16 PROCEDURE — 80305 DRUG TEST PRSMV DIR OPT OBS: CPT | Performed by: ANESTHESIOLOGY

## 2022-09-16 PROCEDURE — 96360 HYDRATION IV INFUSION INIT: CPT

## 2022-09-16 PROCEDURE — 99214 OFFICE O/P EST MOD 30 MIN: CPT | Performed by: ANESTHESIOLOGY

## 2022-09-16 RX ORDER — GABAPENTIN 100 MG/1
400 CAPSULE ORAL
Qty: 120 CAPSULE | Refills: 2 | Status: SHIPPED | OUTPATIENT
Start: 2022-09-16

## 2022-09-16 RX ORDER — TRAMADOL HYDROCHLORIDE 50 MG/1
TABLET ORAL
Qty: 100 TABLET | Refills: 1 | Status: SHIPPED | OUTPATIENT
Start: 2022-09-19 | End: 2022-10-03

## 2022-09-16 RX ADMIN — SODIUM CHLORIDE 1000 ML: 0.9 INJECTION, SOLUTION INTRAVENOUS at 12:44

## 2022-09-16 NOTE — PROGRESS NOTES
Pt tolerated todays hydration without incident  He will return Tuesday of next week for labs and another liter of nss  Defers avs  Port flushed and deaccessed per routine

## 2022-09-16 NOTE — TELEPHONE ENCOUNTER
Title: Chemo on hold  Date patient scheduled:9/16/22  Original medication ordered: Padcev  New Medication ordered: N/A  Office RN notified patient?? YES   Is the patient scheduled within 24 hours? ? If yes, follow up with verbal telephone call  Office RN to route to [facility]  Infusion  pool routes to [facility]  Infusion tech to receive message, confirm scheduled treatment duration matches ordered treatment duration or adjust accordingly, and re-link appointment request orders  Infusion tech to notify pharmacy and finance

## 2022-09-16 NOTE — PLAN OF CARE
Problem: Potential for Falls  Goal: Patient will remain free of falls  Description: INTERVENTIONS:  - Educate patient/family on patient safety including physical limitations  - Instruct patient to call for assistance with activity   - Consult OT/PT to assist with strengthening/mobility   - Keep Call bell within reach  - Keep bed low and locked with side rails adjusted as appropriate  - Keep care items and personal belongings within reach  - Initiate and maintain comfort rounds  - Make Fall Risk Sign visible to staff    Problem: Potential for Falls  Goal: Patient will remain free of falls  Description: INTERVENTIONS:  - Educate patient/family on patient safety including physical limitations  - Instruct patient to call for assistance with activity   - Consult OT/PT to assist with strengthening/mobility   - Keep Call bell within reach  - Keep bed low and locked with side rails adjusted as appropriate  - Keep care items and personal belongings within reach  - Initiate and maintain comfort rounds  - Make Fall Risk Sign visible to staff    - Apply yellow socks and bracelet for high fall risk patients  - Consider moving patient to room near nurses station  Outcome: Progressing     - Apply yellow socks and bracelet for high fall risk patients  - Consider moving patient to room near nurses station  Outcome: Progressing

## 2022-09-16 NOTE — PROGRESS NOTES
Assessment  1  Chronic pain disorder    2  Lumbar spondylosis    3  Sacroiliitis ([address] )    4  Spinal stenosis of lumbar region, unspecified whether neurogenic claudication present        Plan  78-year-old male with a history of metastatic urothelial carcinoma status post nephrectomy undergoing chemotherapy, AFib on anticoagulation, lumbar spondylosis, stenosis, and chronic pain syndrome returning for interval follow-up  The patient is doing quite well today  His major complaint is his peripheral neuropathy mainly affecting his hands and feet  He does take duloxetine 60 mg daily, gabapentin 200 mg q h s , and tramadol 50 mg q 6 to 8 hours p r n  with 90% relief and denies any side effects of the medication with the exception of some drowsiness with gabapentin with daytime doses  He has not noticed any daytime drowsiness since only taking gabapentin at nighttime  He is status post bilateral SI joint injections completed July 13, 2022 which resolved his low back pain and still has ongoing relief  He is currently participating in physical and occupational therapy for his neuropathy  1  I will continue tramadol 50 mg q 6 8 hours p r n , 100 tablets to last 30 days  Refill was provided today with 2 refills  2  I will gently increase the patient's gabapentin to 400 mg q h s  for his neuropathic complaints  Titration instructions were given to the patient today  3  Patient will continue with duloxetine as prescribed  4  Patient will continue with physical and occupational therapy  5  Will repeat bilateral SI joint injections p r n   6  I will follow up the patient in 3 months or sooner if needed       There are risks associated with opioid medications, including dependence, addiction and tolerance  The patient understands and agrees to use these medications only as prescribed   Potential side effects of the medications include, but are not limited to, constipation, drowsiness, addiction, impaired judgment and risk of fatal overdose if not taken as prescribed  The patient was warned against driving while taking sedation medications  Sharing medications is a felony  At this point in time, the patient is showing no signs of addiction, abuse, diversion or suicidal ideation  A urine drug screen was collected at today's office visit as part of our medication management protocol  The point of care testing results were appropriate for what was being prescribed  The specimen will be sent for confirmatory testing  The drug screen is medically necessary because the patient is either dependent on opioid medication or is being considered for opioid medication therapy and the results could impact ongoing or future treatment  The drug screen is to evaluate for the presences or absence of prescribed, non-prescribed, and/or illicit drugs/substances  [place] Prescription Drug Monitoring Program report was reviewed and was appropriate       My impressions and treatment recommendations were discussed in detail with the patient who verbalized understanding and had no further questions  Discharge instructions were provided  I personally saw and examined the patient and I agree with the above discussed plan of care  No orders of the defined types were placed in this encounter  No orders of the defined types were placed in this encounter  History of Present Illness    Phuong Martínez is a 67 y o  male with a history of metastatic urothelial carcinoma status post nephrectomy undergoing chemotherapy, AFib on anticoagulation, lumbar spondylosis, stenosis, and chronic pain syndrome returning for interval follow-up  The patient is doing quite well today  His major complaint is his peripheral neuropathy mainly affecting his hands and feet  He denies any bladder or bowel incontinence or saddle anesthesia    He does have numbness and tingling in his hands and feet and occasional shooting pains in his arms and legs secondary to neuropathy which is essentially at baseline  He has put his chemotherapy on hold secondary to worsening neuropathy which his oncologist agrees with  He does take duloxetine 60 mg daily, gabapentin 200 mg q h s , and tramadol 50 mg q 6 to 8 hours p r n  with 90% relief and denies any side effects of the medication with the exception of some drowsiness with gabapentin with daytime doses  He has not noticed any daytime drowsiness since only taking gabapentin at nighttime  He is status post bilateral L4-5 and L5-S1 facet joint RFA completed April 12, 2022 which unfortunately did not provide him any relief of his lower back pain and bilateral SI joint injections completed July 13, 2022 which resolved his low back pain and still has ongoing relief  He has undergone L5-S1 LESI in the past as well with the last 1 being completed in September 2019 which were helpful for radicular symptoms which she denies today  He is currently participating in physical and occupational therapy  The patient rates his pain moderate and the pain is not follow any particular pattern throughout the day  The pain is occasional and described as sharp  The pain is worse with standing, walking, and exercise  The pain is alleviated with sitting, relaxation, lying down, injections, and medication  Other than as stated above, the patient denies any interval changes in medications, medical condition, mental condition, symptoms, or allergies since the last office visit  I have personally reviewed and/or updated the patient's past medical history, past surgical history, family history, social history, current medications, allergies, and vital signs today       Review of Systems    Patient Active Problem List   Diagnosis    Lumbar radiculopathy    Lumbar disc herniation    Lumbar spondylosis    [facility]ro[address] [address] [address]    Essential hypertension    Sinus bradycardia    Hyperlipidemia    Cancer of left renal pelvis ([facility] [address] )  Drug-induced neutropenia (HCC)    Chronic pain disorder    Spinal stenosis of lumbar region    Encounter for central line care    Hyperuricemia    Encounter for long-term opiate analgesic use    Uncomplicated opioid dependence ([address] )    Palliative care patient    Decreased appetite    Normocytic anemia    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)    Thrombophlebitis of superficial veins of right lower extremity    Renal dysfunction    Stage 3a chronic kidney disease (HCC)    Hypomagnesemia    Platelets decreased ([address] )    H/O left nephrectomy    Chronic kidney disease-mineral and bone disorder    Vitamin D deficiency    Dehydration    Chronic right shoulder pain    Primary osteoarthritis of right shoulder    Metastatic urothelial carcinoma (HCC)    Drug induced constipation    Secondary malignant neoplasm of bone (HCC)    Cancer associated pain    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)    Early satiety    Dysgeusia    Neuropathy    Chronic bilateral low back pain without sciatica    Chemotherapy-induced neuropathy (HCC)    HERRERA (dyspnea on exertion)    Paroxysmal atrial fibrillation (HCC)    Sacroiliitis (HCC)       Past Medical History:   Diagnosis Date    Arthritis     Bladder cancer     Cancer (HCC)     skin melanoma;basal cell    Chronic pain disorder     from arthritis    Colon polyp     Does use hearing aid     bilat    Dry eyes, bilateral     GERD (gastroesophageal reflux disease)     History of kidney stones 10/2019    History of partial knee replacement     bilat    History of vertigo     Hyperlipidemia     Hypertension     Infusion extravasation of chemotherapy vesicant 11/2021    Kidney lesion     Lumbar disc disorder     compression of vertebrae L4-5-6    Muscle weakness     left hip area    Renal mass     left    Right ankle injury 03/05/2020    missed step of ladder     Right ankle pain     Shortness of breath     with activity    Tinnitus     Urothelial cancer     left    Wears glasses        Past Surgical History:   Procedure Laterality Date    COLONOSCOPY      CYSTOSCOPY Left 2020    Procedure: CYSTOSCOPY; URETERAL CATHETER PLACEMENT;  Surgeon: Ankita Wise MD;  Location[facility];  Service: Urology    CYSTOSCOPY  2020    CYSTOSCOPY  2021    FL CYSTOGRAM  2020    FL RETROGRADE PYELOGRAM  2020    HERNIA REPAIR      umbilical with mesh    INGUINAL HERNIA REPAIR Right     with mesh    IR PORT PLACEMENT  2020    JOINT REPLACEMENT Bilateral     partials knee    LUMBAR EPIDURAL INJECTION      CA CYSTOURETHROSCOPY,URETER CATHETER Bilateral 2020    Procedure: CYSTOSCOPY; RIGHT RETROGRADE PYELOGRAM WITH RIGHT URETERAL CYTOLOGY SAMPLING; LEFT URETEROSCOPY WITH RENAL PELVIS BIOPSY AND LEFT STENT PLACEMENT;  Surgeon: Ankita Wise MD;  [facility];  Service: Urology    CA NEPHRECTOMY, W/PART  URETECTOMY Left 2020    Procedure: ROBOTIC LAPAROSCOPIC NEPHRO-URETERECTOMY;  Surgeon: Ankita Wise MD;  [facility];  Service: Urology    SKIN CANCER EXCISION      surface melanoma    TONSILLECTOMY      WISDOM TOOTH EXTRACTION         Family History   Problem Relation Age of Onset    Liver cancer Father        Social History     Occupational History    Not on file   Tobacco Use    Smoking status: Former Smoker     Types: Cigarettes     Quit date:      Years since quittin 7    Smokeless tobacco: Never Used   Vaping Use    Vaping Use: Never used   Substance and Sexual Activity    Alcohol use:  Yes     Alcohol/week: 17 0 standard drinks     Types: 7 Glasses of wine, 10 Cans of beer per week     Comment: socially    Drug use: Never    Sexual activity: Not Currently       Current Outpatient Medications on File Prior to Visit   Medication Sig    acetaminophen (TYLENOL) 500 mg tablet Take 2 tablets (1,000 mg total) by mouth every 8 (eight) hours    allopurinol (ZYLOPRIM) 300 mg tablet take 1 tablet by mouth once daily    ALPRAZolam (XANAX) 0 25 mg tablet Take 1 tablet (0 25 mg total) by mouth daily at bedtime as needed for anxiety    amitriptyline (ELAVIL) 25 mg tablet Take 25 mg by mouth daily at bedtime    atorvastatin (LIPITOR) 80 mg tablet Take 80 mg by mouth every other day evening    colchicine (COLCRYS) 0 6 mg tablet take 1 tablet by mouth twice a day if needed for FOOT PAIN    docusate sodium (COLACE) 250 MG capsule Take 1 capsule (250 mg total) by mouth daily    DULoxetine (CYMBALTA) 60 mg delayed release capsule take 1 capsule by mouth once daily    folic acid (FOLVITE) 1 mg tablet take 1 tablet by mouth once daily    gabapentin (NEURONTIN) 100 mg capsule Take 1 capsule (100 mg total) by mouth 2 (two) times a day    Magnesium 250 MG TABS 1 tablet 2 (two) times a day Taking 500mg in the morning and 500mg at night    metoprolol tartrate (LOPRESSOR) 100 mg tablet Take 50 mg by mouth daily    nystatin (MYCOSTATIN) cream Apply topically 2 (two) times a day    predniSONE 10 mg tablet Take 2 tablets (20 mg total) by mouth daily    senna (SENOKOT) 8 6 mg Take 1 tablet (8 6 mg total) by mouth daily at bedtime    tadalafil (CIALIS) 20 MG tablet Take one tablet by mouth one hour before sexual activity    tamsulosin (FLOMAX) 0 4 mg take 1 capsule by mouth once daily with dinner    traMADol (Ultram) 50 mg tablet Take 1 tab PO Q8 hours PRN pain, on severe days can take a 4th tablet   VITAMIN D PO Take 1,000 Units by mouth daily     zolpidem (AMBIEN) 5 mg tablet Take 1 tablet (5 mg total) by mouth daily at bedtime as needed for sleep    naloxone (NARCAN) 4 mg/0 1 mL nasal spray Administer 1 spray into a nostril  If breathing does not return to normal or if breathing difficulty resumes after 2-3 minutes, give another dose in the other nostril using a new spray       No current facility-administered medications on file prior to visit  Allergies   Allergen Reactions    Poison Ivy Extract Rash       Physical Exam    /75   Pulse 67   Ht 5' 10" (1 778 m)   Wt 88 5 kg (195 lb)   BMI 27 98 kg/m²     Constitutional: normal, well developed, well nourished, alert, in no distress and non-toxic and no overt pain behavior  Eyes: anicteric  HEENT: grossly intact  Neck: supple, symmetric, trachea midline and no masses   Pulmonary:even and unlabored  Cardiovascular:No edema or pitting edema present  Skin:Normal without rashes or lesions and well hydrated  Psychiatric:Mood and affect appropriate  Neurologic:Cranial Nerves II-XII grossly intact  Musculoskeletal:Steppage gait  Ambulates with a walker  Bilateral lumbar paraspinals and SI joints nontender to palpation  Bilateral lower extremity strength 5/5 in all muscle groups with the exception of right dorsiflexion and EHL which was 4-5  Sensation decreased to light touch in both feet in a sock distribution  Negative seated straight leg raise bilaterally  Imaging  MRI LUMBAR SPINE WITH AND WITHOUT CONTRAST     INDICATION: M54 50: Low back pain, unspecified  G89 29: Other chronic pain      COMPARISON:  PET/CT dated 3/8/2022 and prior MR dated 12/2/2019      TECHNIQUE:  Sagittal T1, sagittal T2, sagittal inversion recovery, axial T1 and axial T2, coronal T2  Sagittal and axial T1 postcontrast      IV Contrast:  9 mL of Gadobutrol injection (SINGLE-DOSE)      IMAGE QUALITY:  Diagnostic     FINDINGS:     VERTEBRAL BODIES:  There are 5 lumbar type vertebral bodies  Normal alignment of the lumbar spine  No spondylolysis or spondylolisthesis  No scoliosis  No compression fracture  T12 vertebral body hemangioma noted as well as an L5 vertebral body   hemangioma  No worrisome marrow lesion      SACRUM:  Normal signal within the sacrum  No evidence of insufficiency or stress fracture      DISTAL CORD AND CONUS:  Normal size and signal within the distal cord and conus    Thin fatty filum terminale noted      PARASPINAL SOFT TISSUES:  Left kidney is surgically absent      LOWER THORACIC DISC SPACES:  Normal disc height and signal   No disc herniation, canal stenosis or foraminal narrowing      LUMBAR DISC SPACES:     L1-L2:  Opposed Schmorl's nodes  Minimal bulge without significant stenosis      L2-L3:  Disc desiccation with loss of disc height  Minor bulge and facet hypertrophy without stenosis      L3-L4:  Disc desiccation with loss of disc height  Diffuse disc bulge with facet hypertrophy resulting in severe central canal stenosis and inducing redundant cauda equina roots  Mild bilateral foraminal stenosis demonstrated  CSF effacement within   the sac as well as lipomatous narrowing of the sac      L4-L5:  Disc desiccation with prominent loss of disc height  Disc osteophyte complex and facet hypertrophy with severe central canal and subarticular recess stenosis  Moderate bilateral foraminal narrowing evident  CSF effacement within the sac as   well as lipomatous narrowing of the sac      L5-S1:  Disc desiccation with mild loss of disc height  Disc osteophyte complex and facet hypertrophy resulting in mild bilateral foraminal narrowing  Central canal remains patent      POSTCONTRAST IMAGING:  No abnormal enhancement      IMPRESSION:  1   Multifactorial spondylotic disease most pronounced at the L3-4 and L4-5 levels with severe central canal encroachment noted  Overall findings are similar to prior examination  2   No evidence of metastatic disease in a patient status post left nephrectomy

## 2022-09-16 NOTE — PROGRESS NOTES
TIME OUT called from Vargas Ellis RN  Patients Chemotherapy on hold due to neuropathy, until further notice  Jackson Fuentes Pharmacist made aware of same

## 2022-09-19 NOTE — PROGRESS NOTES
Daily Note     Today's date: 2022  Patient name: Deirdre Claudio  : 1950  MRN: [id number]  Referring provider: RAGHU Asher  Dx:   Encounter Diagnosis     ICD-10-CM    1  Generalized weakness  R53 1                   Subjective: I haven't fell in two weeks  Objective: See treatment diary below      Assessment: Tolerated treatment well  Patient demonstrated fatigue post treatment and would benefit from continued OT  Pt demonstrated ability to find larger items in rice bin with mod difficulty, increase difficulty finding smaller items in bean bowl  Able to  grooved pegs from a towel, mod-max difficulty manipulating pegs to put in especially with L hand  Pt verbalized not being able to  grooved pegs previously  Plan: Continue per plan of care  Progress treatment as tolerated            Precautions generalized weakness  Initial Evaluation completed on: 2022  Re-Evaluation needs to be completed before: 10/14/2022  Insurance: CMS  FOTO: 2022  Auth Visits: n/a          Manuals 2022                                      Neuro Re-Ed  2022                                                              Ther Ex 2022      Flex Bar   Bends   Twists   Bottle caps b/l  X 20 2 way  X 20  X 20  Cream/Yellow  X 20 2 way  X 20  X 20      Digiflex  Red x 20      Hand Power Pro  Green x 20      Theraband   Ext  ADD  Triceps  ER  IR  Retraction   Bicep curls  ABD  PNF 1  PNF 2    Black x 20    Black x 20      Black x 20  Black x 20       UBE 5F/5B 10 min L3 5F/5B L 60      Shoulder   Shrugs  Retractions  Rolls FW/BW                        Ther Activity 2022      Stereognosis         Tension pin poms  Yellow tension pin      Bean and rice  Bowl search  Rice-12 items  Bean-6 items      Grooved pegs  R 12 pegs 4:30 put in  L 9 pegs 5:30              Modalities 2022

## 2022-09-20 ENCOUNTER — HOSPITAL ENCOUNTER (OUTPATIENT)
Dept: INFUSION CENTER | Facility: HOSPITAL | Age: 72
Discharge: HOME | End: 2022-09-20
Attending: INTERNAL MEDICINE
Payer: MEDICARE

## 2022-09-20 VITALS
DIASTOLIC BLOOD PRESSURE: 73 MMHG | OXYGEN SATURATION: 97 % | TEMPERATURE: 98 F | RESPIRATION RATE: 18 BRPM | SYSTOLIC BLOOD PRESSURE: 133 MMHG | HEART RATE: 88 BPM

## 2022-09-20 DIAGNOSIS — E83.42 HYPOMAGNESEMIA: ICD-10-CM

## 2022-09-20 DIAGNOSIS — E86.0 DEHYDRATION: ICD-10-CM

## 2022-09-20 DIAGNOSIS — C79.10 METASTATIC UROTHELIAL CARCINOMA (HCC): Primary | ICD-10-CM

## 2022-09-20 LAB
6MAM UR QL CFM: NEGATIVE NG/ML
7AMINOCLONAZEPAM UR QL CFM: NEGATIVE NG/ML
A-OH ALPRAZ UR QL CFM: ABNORMAL NG/ML
ALBUMIN SERPL BCP-MCNC: 3.8 G/DL (ref 3.5–5)
ALP SERPL-CCNC: 66 U/L (ref 34–104)
ALT SERPL W P-5'-P-CCNC: 45 U/L (ref 7–52)
AMPHET UR QL CFM: NEGATIVE NG/ML
AMPHET UR QL CFM: NEGATIVE NG/ML
ANION GAP SERPL CALCULATED.3IONS-SCNC: 8 MMOL/L (ref 4–13)
AST SERPL W P-5'-P-CCNC: 21 U/L (ref 13–39)
BASOPHILS # BLD AUTO: 0.02 THOUSANDS/ΜL (ref 0–0.1)
BASOPHILS NFR BLD AUTO: 0 % (ref 0–1)
BILIRUB SERPL-MCNC: 0.81 MG/DL (ref 0.2–1)
BUN SERPL-MCNC: 24 MG/DL (ref 5–25)
BUPRENORPHINE UR QL CFM: NEGATIVE NG/ML
BUTALBITAL UR QL CFM: NEGATIVE NG/ML
BZE UR QL CFM: NEGATIVE NG/ML
CALCIUM SERPL-MCNC: 9.1 MG/DL (ref 8.4–10.2)
CHLORIDE SERPL-SCNC: 102 MMOL/L (ref 96–108)
CO2 SERPL-SCNC: 30 MMOL/L (ref 21–32)
CODEINE UR QL CFM: NEGATIVE NG/ML
CREAT SERPL-MCNC: 1.25 MG/DL (ref 0.6–1.3)
DESIPRAMINE UR QL CFM: NEGATIVE NG/ML
DESIPRAMINE UR QL CFM: NEGATIVE NG/ML
EDDP UR QL CFM: NEGATIVE NG/ML
EOSINOPHIL # BLD AUTO: 0.01 THOUSAND/ΜL (ref 0–0.61)
EOSINOPHIL NFR BLD AUTO: 0 % (ref 0–6)
ERYTHROCYTE [DISTWIDTH] IN BLOOD BY AUTOMATED COUNT: 17.2 % (ref 11.6–15.1)
ETHYL GLUCURONIDE UR QL CFM: ABNORMAL NG/ML
ETHYL SULFATE UR QL SCN: ABNORMAL NG/ML
EUTYLONE UR QL: NEGATIVE NG/ML
FENTANYL UR QL CFM: NEGATIVE NG/ML
GFR SERPL CREATININE-BSD FRML MDRD: 56 ML/MIN/1.73SQ M
GLIADIN IGG SER IA-ACNC: NEGATIVE NG/ML
GLUCOSE 30M P 50 G LAC PO SERPL-MCNC: NEGATIVE NG/ML
GLUCOSE SERPL-MCNC: 113 MG/DL (ref 65–140)
HCT VFR BLD AUTO: 41.5 % (ref 36.5–49.3)
HGB BLD-MCNC: 13.3 G/DL (ref 12–17)
HYDROCODONE UR QL CFM: NEGATIVE NG/ML
HYDROCODONE UR QL CFM: NEGATIVE NG/ML
HYDROMORPHONE UR QL CFM: NEGATIVE NG/ML
IMIPRAMINE UR QL CFM: NEGATIVE NG/ML
IMM GRANULOCYTES # BLD AUTO: 0.03 THOUSAND/UL (ref 0–0.2)
IMM GRANULOCYTES NFR BLD AUTO: 1 % (ref 0–2)
LORAZEPAM UR QL CFM: NEGATIVE NG/ML
LYMPHOCYTES # BLD AUTO: 0.97 THOUSANDS/ΜL (ref 0.6–4.47)
LYMPHOCYTES NFR BLD AUTO: 16 % (ref 14–44)
MAGNESIUM SERPL-MCNC: 1.7 MG/DL (ref 1.9–2.7)
MCH RBC QN AUTO: 32.8 PG (ref 26.8–34.3)
MCHC RBC AUTO-ENTMCNC: 32 G/DL (ref 31.4–37.4)
MCV RBC AUTO: 103 FL (ref 82–98)
MDMA UR QL CFM: NEGATIVE NG/ML
ME-PHENIDATE UR QL CFM: NEGATIVE NG/ML
MEPERIDINE UR QL CFM: NEGATIVE NG/ML
METHADONE UR QL CFM: NEGATIVE NG/ML
METHAMPHET UR QL CFM: NEGATIVE NG/ML
MONOCYTES # BLD AUTO: 0.51 THOUSAND/ΜL (ref 0.17–1.22)
MONOCYTES NFR BLD AUTO: 8 % (ref 4–12)
MORPHINE UR QL CFM: NEGATIVE NG/ML
MORPHINE UR QL CFM: NEGATIVE NG/ML
NEUTROPHILS # BLD AUTO: 4.6 THOUSANDS/ΜL (ref 1.85–7.62)
NEUTS SEG NFR BLD AUTO: 75 % (ref 43–75)
NORBUPRENORPHINE UR QL CFM: NEGATIVE NG/ML
NORDIAZEPAM UR QL CFM: NEGATIVE NG/ML
NORFENTANYL UR QL CFM: NEGATIVE NG/ML
NORHYDROCODONE UR QL CFM: NEGATIVE NG/ML
NORHYDROCODONE UR QL CFM: NEGATIVE NG/ML
NORMEPERIDINE UR QL CFM: NEGATIVE NG/ML
NOROXYCODONE UR QL CFM: NEGATIVE NG/ML
NRBC BLD AUTO-RTO: 0 /100 WBCS
OLANZAPINE QUANTIFICATION: NEGATIVE NG/ML
OPC-3373 QUANTIFICATION: NEGATIVE
OXAZEPAM UR QL CFM: NEGATIVE NG/ML
OXYCODONE UR QL CFM: NEGATIVE NG/ML
OXYMORPHONE UR QL CFM: NEGATIVE NG/ML
OXYMORPHONE UR QL CFM: NEGATIVE NG/ML
PARA-FLUOROFENTANYL QUANTIFICATION: NORMAL NG/ML
PCP UR QL CFM: NEGATIVE NG/ML
PHENOBARB UR QL CFM: NEGATIVE NG/ML
PLATELET # BLD AUTO: 160 THOUSANDS/UL (ref 149–390)
PMV BLD AUTO: 10.4 FL (ref 8.9–12.7)
POTASSIUM SERPL-SCNC: 3.7 MMOL/L (ref 3.5–5.3)
PROT SERPL-MCNC: 6.1 G/DL (ref 6.4–8.4)
RBC # BLD AUTO: 4.05 MILLION/UL (ref 3.88–5.62)
RESULT ALL_PRESCRIBED MEDS AND SPECIAL INSTRUCTIONS: NORMAL
SECOBARBITAL UR QL CFM: NEGATIVE NG/ML
SL AMB 4-ANPP QUANTIFICATION: NORMAL NG/ML
SL AMB 5F-ADB-M7 METABOLITE QUANTIFICATION: NEGATIVE NG/ML
SL AMB 7-OH-MITRAGYNINE (KRATOM ALKALOID) QUANTIFICATION: NEGATIVE NG/ML
SL AMB AB-FUBINACA-M3 METABOLITE QUANTIFICATION: NEGATIVE NG/ML
SL AMB ACETYL FENTANYL QUANTIFICATION: NORMAL NG/ML
SL AMB ACETYL NORFENTANYL QUANTIFICATION: NORMAL NG/ML
SL AMB ACRYL FENTANYL QUANTIFICATION: NORMAL NG/ML
SL AMB CARFENTANIL QUANTIFICATION: NORMAL NG/ML
SL AMB CLOZAPINE QUANTIFICATION: NEGATIVE NG/ML
SL AMB CTHC (MARIJUANA METABOLITE) QUANTIFICATION: NEGATIVE NG/ML
SL AMB DEXTROMETHORPHAN QUANTIFICATION: NEGATIVE NG/ML
SL AMB DEXTRORPHAN (DEXTROMETHORPHAN METABOLITE) QUANT: NEGATIVE NG/ML
SL AMB DEXTRORPHAN (DEXTROMETHORPHAN METABOLITE) QUANT: NEGATIVE NG/ML
SL AMB HALOPERIDOL  QUANTIFICATION: NEGATIVE NG/ML
SL AMB HALOPERIDOL METABOLITE QUANTIFICATION: NEGATIVE NG/ML
SL AMB HYDROXYRISPERIDONE QUANTIFICATION: NEGATIVE NG/ML
SL AMB JWH018 METABOLITE QUANTIFICATION: NEGATIVE NG/ML
SL AMB JWH073 METABOLITE QUANTIFICATION: NEGATIVE NG/ML
SL AMB MDMB-FUBINACA-M1 METABOLITE QUANTIFICATION: NEGATIVE NG/ML
SL AMB METHYLONE QUANTIFICATION: NEGATIVE NG/ML
SL AMB N-DESMETHYL-TRAMADOL QUANTIFICATION: NORMAL NG/ML
SL AMB N-DESMETHYLCLOZAPINE QUANTIFICATION: NEGATIVE NG/ML
SL AMB NORQUETIAPINE QUANTIFICATION: NEGATIVE NG/ML
SL AMB PHENTERMINE QUANTIFICATION: NEGATIVE NG/ML
SL AMB QUETIAPINE QUANTIFICATION: NEGATIVE NG/ML
SL AMB RCS4 METABOLITE QUANTIFICATION: NEGATIVE NG/ML
SL AMB RISPERIDONE QUANTIFICATION: NEGATIVE NG/ML
SL AMB RITALINIC ACID QUANTIFICATION: NEGATIVE NG/ML
SODIUM SERPL-SCNC: 140 MMOL/L (ref 135–147)
SPECIMEN DRAWN SERPL: NEGATIVE NG/ML
TAPENTADOL UR QL CFM: NEGATIVE NG/ML
TEMAZEPAM UR QL CFM: NEGATIVE NG/ML
TEMAZEPAM UR QL CFM: NEGATIVE NG/ML
TRAMADOL UR QL CFM: NORMAL NG/ML
URATE/CREAT 24H UR: NORMAL NG/ML
WBC # BLD AUTO: 6.14 THOUSAND/UL (ref 4.31–10.16)

## 2022-09-20 PROCEDURE — 96360 HYDRATION IV INFUSION INIT: CPT

## 2022-09-20 PROCEDURE — 85025 COMPLETE CBC W/AUTO DIFF WBC: CPT

## 2022-09-20 PROCEDURE — 83735 ASSAY OF MAGNESIUM: CPT

## 2022-09-20 PROCEDURE — 80053 COMPREHEN METABOLIC PANEL: CPT

## 2022-09-20 RX ADMIN — SODIUM CHLORIDE 1000 ML: 0.9 INJECTION, SOLUTION INTRAVENOUS at 10:17

## 2022-09-21 ENCOUNTER — OFFICE VISIT (OUTPATIENT)
Dept: OCCUPATIONAL THERAPY | Facility: CLINIC | Age: 72
End: 2022-09-21
Payer: MEDICARE

## 2022-09-21 ENCOUNTER — OFFICE VISIT (OUTPATIENT)
Dept: PHYSICAL THERAPY | Facility: CLINIC | Age: 72
End: 2022-09-21
Payer: MEDICARE

## 2022-09-21 DIAGNOSIS — R53.1 WEAKNESS GENERALIZED: Primary | ICD-10-CM

## 2022-09-21 DIAGNOSIS — C79.10 METASTATIC UROTHELIAL CARCINOMA (HCC): ICD-10-CM

## 2022-09-21 DIAGNOSIS — G89.29 CHRONIC LEFT-SIDED LOW BACK PAIN WITHOUT SCIATICA: ICD-10-CM

## 2022-09-21 DIAGNOSIS — M25.562 LEFT KNEE PAIN, UNSPECIFIED CHRONICITY: ICD-10-CM

## 2022-09-21 DIAGNOSIS — M54.50 CHRONIC LEFT-SIDED LOW BACK PAIN WITHOUT SCIATICA: ICD-10-CM

## 2022-09-21 DIAGNOSIS — T45.1X5A CHEMOTHERAPY-INDUCED NEUROPATHY (HCC): ICD-10-CM

## 2022-09-21 DIAGNOSIS — G62.0 CHEMOTHERAPY-INDUCED NEUROPATHY (HCC): ICD-10-CM

## 2022-09-21 DIAGNOSIS — R53.1 GENERALIZED WEAKNESS: Primary | ICD-10-CM

## 2022-09-21 PROCEDURE — 97110 THERAPEUTIC EXERCISES: CPT

## 2022-09-21 PROCEDURE — 97116 GAIT TRAINING THERAPY: CPT

## 2022-09-21 PROCEDURE — 97530 THERAPEUTIC ACTIVITIES: CPT

## 2022-09-21 NOTE — PROGRESS NOTES
Daily Note     Today's date: 2022  Patient name: Steve Rueda  : 1950  MRN: [id number]  Referring provider: RAGHU Chan  Dx:   Encounter Diagnosis     ICD-10-CM    1  Weakness generalized  R53 1    2  Chemotherapy-induced neuropathy ([facility] [address] )  G62 0     T45  1X5A    3  Left knee pain, unspecified chronicity  M25 562    4  Chronic left-sided low back pain without sciatica  M54 50     G89 29    5  Metastatic urothelial carcinoma (HCC)  C79 10                   Subjective: Pt states no recent falls  Walking around house with RW      Objective: See treatment diary below      Assessment: Tolerated treatment fairly well    Pt struggled today with STS transfer, pt unable to self perform with chair w/o arm, provided ModA  Pt  Review HEP / encourage carryover  Added prolong standing/dynamic reach to HEP, encourage timing effort   Patient demonstrated fatigue post treatment and would benefit from continued PT      Plan: Continue per plan of care        e-eval Date: 10/7/22  Precautions fall risk, cancer     Date        Visit Count 11       FOTO        Pain In        Pain Out             Manuals                                        Neuro Re-Ed         [facility]  Resume  Static with B UE 2 x 10        Sidestepping                 STS Resume  10x with 3 foam on chair, R UE       Static balance         Hurdles         Bw amb         Step up         Ther Ex        Nustep L4 10 min       Seated hip abd Resume  BTB 5"/5" 3'       Seated hip add Resume  5"/5" 3'       HS         TAC        SLR - supine, sidelying         SKC         Seated TR/HR 25x ea       Prone quad flex S         LTR         Leg press 45# 3 x 10        Knee ext/flexion machine 22# ext 3 x10      33# flex 3 x 10       Ther Activity                        Gait Training         Clinic/between wt machines, 1 rest room break  RW  CS/CGA    Mod A with STS w/chair w/o arms                   Modalities

## 2022-09-23 ENCOUNTER — HOSPITAL ENCOUNTER (OUTPATIENT)
Dept: INFUSION CENTER | Facility: HOSPITAL | Age: 72
End: 2022-09-23
Attending: INTERNAL MEDICINE
Payer: MEDICARE

## 2022-09-23 VITALS
OXYGEN SATURATION: 96 % | TEMPERATURE: 97 F | DIASTOLIC BLOOD PRESSURE: 65 MMHG | SYSTOLIC BLOOD PRESSURE: 111 MMHG | HEART RATE: 61 BPM | RESPIRATION RATE: 18 BRPM

## 2022-09-23 DIAGNOSIS — E86.0 DEHYDRATION: ICD-10-CM

## 2022-09-23 DIAGNOSIS — G89.3 CANCER ASSOCIATED PAIN: Primary | ICD-10-CM

## 2022-09-23 DIAGNOSIS — E83.42 HYPOMAGNESEMIA: ICD-10-CM

## 2022-09-23 PROCEDURE — 96365 THER/PROPH/DIAG IV INF INIT: CPT

## 2022-09-23 RX ADMIN — MAGNESIUM SULFATE HEPTAHYDRATE: 500 INJECTION, SOLUTION INTRAMUSCULAR; INTRAVENOUS at 12:47

## 2022-09-23 NOTE — PROGRESS NOTES
Pt tolerated todays iv magnesium without incident  Port flushed and deaccessed per routine   Discharged in wc to lobby

## 2022-09-23 NOTE — PLAN OF CARE
Problem: Potential for Falls  Goal: Patient will remain free of falls  Description: INTERVENTIONS:  - Educate patient/family on patient safety including physical limitations  - Instruct patient to call for assistance with activity   - Consult OT/PT to assist with strengthening/mobility   - Keep Call bell within reach  - Keep bed low and locked with side rails adjusted as appropriate  - Keep care items and personal belongings within reach  - Initiate and maintain comfort rounds  - Make Fall Risk Sign visible to staff    - Apply yellow socks and bracelet for high fall risk patients  - Consider moving patient to room near nurses station  9/23/2022 1347 by Gail Cruz, RN  Outcome: Progressing  9/23/2022 1248 by Gail Cruz, RN  Outcome: Progressing

## 2022-09-26 ENCOUNTER — HOSPITAL ENCOUNTER (OUTPATIENT)
Dept: NUCLEAR MEDICINE | Facility: HOSPITAL | Age: 72
Discharge: HOME | End: 2022-09-26
Attending: INTERNAL MEDICINE
Payer: MEDICARE

## 2022-09-26 ENCOUNTER — APPOINTMENT (OUTPATIENT)
Dept: PHYSICAL THERAPY | Facility: CLINIC | Age: 72
End: 2022-09-26
Payer: MEDICARE

## 2022-09-26 DIAGNOSIS — C67.8 MALIGNANT NEOPLASM OF OVERLAPPING SITES OF BLADDER (HCC): ICD-10-CM

## 2022-09-26 DIAGNOSIS — C79.10 METASTATIC UROTHELIAL CARCINOMA (HCC): ICD-10-CM

## 2022-09-26 LAB — GLUCOSE SERPL-MCNC: 105 MG/DL (ref 65–140)

## 2022-09-26 PROCEDURE — 82948 REAGENT STRIP/BLOOD GLUCOSE: CPT

## 2022-09-26 PROCEDURE — 78815 PET IMAGE W/CT SKULL-THIGH: CPT

## 2022-09-26 PROCEDURE — A9552 F18 FDG: HCPCS

## 2022-09-27 ENCOUNTER — HOSPITAL ENCOUNTER (OUTPATIENT)
Dept: INFUSION CENTER | Facility: HOSPITAL | Age: 72
Discharge: HOME | End: 2022-09-27
Attending: INTERNAL MEDICINE
Payer: MEDICARE

## 2022-09-27 VITALS
RESPIRATION RATE: 18 BRPM | HEART RATE: 62 BPM | SYSTOLIC BLOOD PRESSURE: 112 MMHG | TEMPERATURE: 96.4 F | DIASTOLIC BLOOD PRESSURE: 69 MMHG

## 2022-09-27 DIAGNOSIS — C79.10 METASTATIC UROTHELIAL CARCINOMA (HCC): Primary | ICD-10-CM

## 2022-09-27 DIAGNOSIS — E86.0 DEHYDRATION: ICD-10-CM

## 2022-09-27 DIAGNOSIS — E83.42 HYPOMAGNESEMIA: ICD-10-CM

## 2022-09-27 LAB
ALBUMIN SERPL BCP-MCNC: 3.8 G/DL (ref 3.5–5)
ALP SERPL-CCNC: 65 U/L (ref 34–104)
ALT SERPL W P-5'-P-CCNC: 51 U/L (ref 7–52)
ANION GAP SERPL CALCULATED.3IONS-SCNC: 7 MMOL/L (ref 4–13)
AST SERPL W P-5'-P-CCNC: 26 U/L (ref 13–39)
BASOPHILS # BLD AUTO: 0.02 THOUSANDS/ΜL (ref 0–0.1)
BASOPHILS NFR BLD AUTO: 0 % (ref 0–1)
BILIRUB SERPL-MCNC: 0.9 MG/DL (ref 0.2–1)
BUN SERPL-MCNC: 26 MG/DL (ref 5–25)
CALCIUM SERPL-MCNC: 9.3 MG/DL (ref 8.4–10.2)
CHLORIDE SERPL-SCNC: 100 MMOL/L (ref 96–108)
CO2 SERPL-SCNC: 31 MMOL/L (ref 21–32)
CREAT SERPL-MCNC: 1.38 MG/DL (ref 0.6–1.3)
EOSINOPHIL # BLD AUTO: 0.01 THOUSAND/ΜL (ref 0–0.61)
EOSINOPHIL NFR BLD AUTO: 0 % (ref 0–6)
ERYTHROCYTE [DISTWIDTH] IN BLOOD BY AUTOMATED COUNT: 16.8 % (ref 11.6–15.1)
GFR SERPL CREATININE-BSD FRML MDRD: 50 ML/MIN/1.73SQ M
GLUCOSE SERPL-MCNC: 128 MG/DL (ref 65–140)
HCT VFR BLD AUTO: 40.9 % (ref 36.5–49.3)
HGB BLD-MCNC: 13.2 G/DL (ref 12–17)
IMM GRANULOCYTES # BLD AUTO: 0.06 THOUSAND/UL (ref 0–0.2)
IMM GRANULOCYTES NFR BLD AUTO: 1 % (ref 0–2)
LYMPHOCYTES # BLD AUTO: 1.03 THOUSANDS/ΜL (ref 0.6–4.47)
LYMPHOCYTES NFR BLD AUTO: 11 % (ref 14–44)
MAGNESIUM SERPL-MCNC: 1.9 MG/DL (ref 1.9–2.7)
MCH RBC QN AUTO: 32.8 PG (ref 26.8–34.3)
MCHC RBC AUTO-ENTMCNC: 32.3 G/DL (ref 31.4–37.4)
MCV RBC AUTO: 102 FL (ref 82–98)
MONOCYTES # BLD AUTO: 0.77 THOUSAND/ΜL (ref 0.17–1.22)
MONOCYTES NFR BLD AUTO: 8 % (ref 4–12)
NEUTROPHILS # BLD AUTO: 7.62 THOUSANDS/ΜL (ref 1.85–7.62)
NEUTS SEG NFR BLD AUTO: 80 % (ref 43–75)
NRBC BLD AUTO-RTO: 0 /100 WBCS
PLATELET # BLD AUTO: 169 THOUSANDS/UL (ref 149–390)
PMV BLD AUTO: 10.1 FL (ref 8.9–12.7)
POTASSIUM SERPL-SCNC: 4 MMOL/L (ref 3.5–5.3)
PROT SERPL-MCNC: 6.2 G/DL (ref 6.4–8.4)
RBC # BLD AUTO: 4.02 MILLION/UL (ref 3.88–5.62)
SODIUM SERPL-SCNC: 138 MMOL/L (ref 135–147)
WBC # BLD AUTO: 9.51 THOUSAND/UL (ref 4.31–10.16)

## 2022-09-27 PROCEDURE — 85025 COMPLETE CBC W/AUTO DIFF WBC: CPT

## 2022-09-27 PROCEDURE — 96360 HYDRATION IV INFUSION INIT: CPT

## 2022-09-27 PROCEDURE — 83735 ASSAY OF MAGNESIUM: CPT

## 2022-09-27 PROCEDURE — 80053 COMPREHEN METABOLIC PANEL: CPT

## 2022-09-27 RX ADMIN — SODIUM CHLORIDE 1000 ML: 0.9 INJECTION, SOLUTION INTRAVENOUS at 11:21

## 2022-09-27 NOTE — PROGRESS NOTES
Daily Note     Today's date: 2022  Patient name: Daryle Baptist  : 1950  MRN: [id number]  Referring provider: RAGHU Boss  Dx:   Encounter Diagnosis     ICD-10-CM    1  Generalized weakness  R53 1                   Subjective: I feel weak  Objective: See treatment diary below      Assessment: Tolerated treatment well  Patient demonstrated fatigue post treatment, exhibited good technique with therapeutic exercises and would benefit from continued OT  Pt verbalized difficulty with control picking up a cup of water without spilling it is very difficult  Treatment focused on BUE strengthening and control  Increase fatigue noted with same  Plan: Continue per plan of care  Progress treatment as tolerated         Manuals 2022                                                               Neuro Re-Ed  2022                                                                                                         Ther Ex 2022       Flex Bar   Bends   Twists   Bottle caps b/l  X 20 2 way  X 20  X 20  Cream/Yellow  X 20 2 way  X 20  X 20  Cream  X 20 2 way  X 20  X 20       Digiflex   Red x 20  B/L Red x 20       Hand Power Pro   Green x 20  Green x 20       Theraband   Ext  ADD  Triceps  ER  IR  Retraction   Bicep curls  ABD  PNF 1  PNF 2     Black x 20     Black x 20        Black x 20  Black x 20           UBE 5F/5B 10 min L3 5F/5B L 60  5F/5B L 60       Shoulder   Shrugs  Retractions  Rolls FW/BW              Weighted dowel suppro      1 weight  X 10/2 b/l        Theraband  Sup/pro  Elbow flex  horziontal abd FF      pink  X 20  X 20  X 20  X 20       Ther Activity 2022       Stereognosis              Tension pin poms   Yellow tension pin  Yellow tension pin b/l       Bean and rice  Bowl search   Rice-12 items  Bean-6 items         Grooved pegs   R 12 pegs 4:30 put in  L 9 pegs 5:30                       Modalities 9/14/2022 9/21/2022 9/28/2022

## 2022-09-28 ENCOUNTER — OFFICE VISIT (OUTPATIENT)
Dept: PHYSICAL THERAPY | Facility: CLINIC | Age: 72
End: 2022-09-28
Payer: MEDICARE

## 2022-09-28 ENCOUNTER — OFFICE VISIT (OUTPATIENT)
Dept: OCCUPATIONAL THERAPY | Facility: CLINIC | Age: 72
End: 2022-09-28
Payer: MEDICARE

## 2022-09-28 DIAGNOSIS — R53.1 GENERALIZED WEAKNESS: Primary | ICD-10-CM

## 2022-09-28 DIAGNOSIS — T45.1X5A CHEMOTHERAPY-INDUCED NEUROPATHY (HCC): ICD-10-CM

## 2022-09-28 DIAGNOSIS — G62.0 CHEMOTHERAPY-INDUCED NEUROPATHY (HCC): ICD-10-CM

## 2022-09-28 DIAGNOSIS — R53.1 WEAKNESS GENERALIZED: Primary | ICD-10-CM

## 2022-09-28 PROCEDURE — 97530 THERAPEUTIC ACTIVITIES: CPT

## 2022-09-28 PROCEDURE — 97112 NEUROMUSCULAR REEDUCATION: CPT | Performed by: PHYSICAL THERAPIST

## 2022-09-28 PROCEDURE — 97110 THERAPEUTIC EXERCISES: CPT | Performed by: PHYSICAL THERAPIST

## 2022-09-28 PROCEDURE — 97110 THERAPEUTIC EXERCISES: CPT

## 2022-09-28 NOTE — PROGRESS NOTES
Daily Note     Today's date: 2022  Patient name: Deirdre Claudio  : 1950  MRN: [id number]  Referring provider: RAGHU Asher  Dx:   Encounter Diagnosis     ICD-10-CM    1  Weakness generalized  R53 1    2  Chemotherapy-induced neuropathy ([facility] [address] )  G62 0     T45  1X5A                   Subjective: Per pt's wife Bridgett Suarez, pt is very weak after PT  Was at Dr Rafy Jones office after last PT session and he needed 3 people to help him up from chair  He's very fatigued at home and does not get up from his chair or use RW  Has stopped cancer treatment for about 2 weeks now because he is in remission per pt's wife  Had PET scan yesterday and will be seeing doctor  Objective: See treatment diary below      Assessment: Discussed holding PT since pt very is fatigued and demo very poor activity tolerance which may be residual effects of cancer treatment  He has declined significantly in function and activity tolerance   since he first started PT  Reviewed and performed updated HEP which included mostly seated exercises for safety and pt reported understanding  At this time, will hold PT and recommended pt to return to PT when he has increased energy  Plan: Hold PT              Re-eval Date: 10/7/22  Precautions fall risk, cancer     Date       Visit Count 11       FOTO        Pain In        Pain Out             Manuals                                        Neuro Re-Ed         [facility]  Resume  Static with B UE 2 x 10  Reviewed and performed updated HEP, see below       Sidestepping                 STS Resume  10x with 3 foam on chair, R UE       Static balance         Hurdles         Bw amb         Step up         Ther Ex        Nustep L4 10 min       Seated hip abd Resume  BTB 5"/5" 3'       Seated hip add Resume  5"/5" 3'       HS         TAC        SLR - supine, sidelying         SKC         Seated TR/HR 25x ea       Prone quad flex S         LTR         Leg press 45# 3 x 10        Knee ext/flexion machine 22# ext 3 x10      33# flex 3 x 10       Ther Activity                        Gait Training         Clinic/between wt machines, 1 rest room break  RW  CS/CGA    Mod A with STS w/chair w/o arms                   Modalities                          Access Code: [email or web address]  URL: [email or web address]  Date: 09/28/2022  Prepared by: Anette Perez    Exercises  · Seated March - 1 x daily - 7 x weekly - 2 sets - 20 reps  · Standing March with Counter Support - 1 x daily - 7 x weekly - 1 sets - 20 reps  · Seated Heel Toe Raises - 1 x daily - 7 x weekly - 2 sets - 20 reps  · Sit to Stand with Counter Support - 1 x daily - 7 x weekly - 1 sets - 10 reps  · Seated Long Arc Quad - 1 x daily - 7 x weekly - 2 sets - 20 reps - 5 hold  · Standing Hip Abduction with Counter Support - 1 x daily - 7 x weekly - 2 sets - 10 reps - 5 hold  · Seated Hip Abduction with Resistance - 1 x daily - 7 x weekly - 2 sets - 20 reps - 5 hold      ·

## 2022-09-29 ENCOUNTER — OFFICE VISIT (OUTPATIENT)
Dept: HEMATOLOGY ONCOLOGY | Facility: CLINIC | Age: 72
End: 2022-09-29
Payer: MEDICARE

## 2022-09-29 ENCOUNTER — TELEPHONE (OUTPATIENT)
Dept: HEMATOLOGY ONCOLOGY | Facility: CLINIC | Age: 72
End: 2022-09-29

## 2022-09-29 VITALS
DIASTOLIC BLOOD PRESSURE: 62 MMHG | HEIGHT: 70 IN | TEMPERATURE: 96.6 F | OXYGEN SATURATION: 96 % | BODY MASS INDEX: 28.72 KG/M2 | SYSTOLIC BLOOD PRESSURE: 120 MMHG | RESPIRATION RATE: 16 BRPM | HEART RATE: 66 BPM | WEIGHT: 200.6 LBS

## 2022-09-29 DIAGNOSIS — R35.0 BENIGN PROSTATIC HYPERPLASIA WITH URINARY FREQUENCY: ICD-10-CM

## 2022-09-29 DIAGNOSIS — C79.10 METASTATIC UROTHELIAL CARCINOMA (HCC): Primary | ICD-10-CM

## 2022-09-29 DIAGNOSIS — N40.1 BENIGN PROSTATIC HYPERPLASIA WITH URINARY FREQUENCY: ICD-10-CM

## 2022-09-29 DIAGNOSIS — G62.0 DRUG-INDUCED POLYNEUROPATHY (HCC): ICD-10-CM

## 2022-09-29 PROCEDURE — 99214 OFFICE O/P EST MOD 30 MIN: CPT | Performed by: INTERNAL MEDICINE

## 2022-09-29 RX ORDER — TAMSULOSIN HYDROCHLORIDE 0.4 MG/1
CAPSULE ORAL
Qty: 30 CAPSULE | Refills: 1 | Status: SHIPPED | OUTPATIENT
Start: 2022-09-29 | End: 2022-10-13 | Stop reason: SDUPTHER

## 2022-09-29 RX ORDER — DULOXETIN HYDROCHLORIDE 60 MG/1
CAPSULE, DELAYED RELEASE ORAL
Qty: 30 CAPSULE | Refills: 1 | Status: SHIPPED | OUTPATIENT
Start: 2022-09-29

## 2022-09-29 NOTE — PROGRESS NOTES
Hematology/Oncology Outpatient Follow-up  Wanda Meadows 67 y o  male 1950 [id number]    Date:  9/29/2022        Assessment and Plan:  1  Metastatic urothelial carcinoma ([facility] [address] )  I did discuss with the patient and his wife the results of the recent PET-CT scan which showed some mild activity in the subcutaneous soft tissue posterior to the right shoulder which has increased slightly in comparison to the prior imaging  He was told that this could represent inflammatory process or viable tumor  There is no obvious hint of the hypermetabolic activity in the rest of the body  The patient stated that he is in the process of going to [place] to see his daughter and will be back by the 2nd of November  He does not seem to be interested in getting enfortumab vedotin at least for the time being with his worsening neuropathy  I did discuss options with the patient including putting the current treatment on hold for the time being to focus on quality of life and consider restarting the treatment once we see progression of his disease on the subsequent imaging  The other option would be to get him back on treatment after he comes back from his trip with enfortumab vedotin at the current adjusted dose with less frequent schedule either once every 4 weeks or on every other week basis  The patient stated that he will think about his options and get back to us in the near future  - CBC and differential; Future  - Comprehensive metabolic panel; Future  - Magnesium; Future        HPI:  The patient came today for follow-up visit accompanied by his wife  He seems to have debilitating neuropathy of his feet to the point that he can not ambulate without the help of the wheelchair  He had a PET-CT scan on 09/26/2022 which showed:  IMPRESSION:     1    Mild FDG activity associated with stable in size subcutaneous soft tissue density posterior to the right shoulder which has slightly increased in FDG avidity since prior exam   Findings could reflect viable malignancy with underlying inflammatory   process not excluded      2   Decreasing in prominence and possibly metabolically resolved cystic intramuscular lesion about the proximal right upper extremity      3  Mild nonspecific patchy FDG activity associated with patient's known enlarged prostate gland of uncertain clinical significance  Blood work on 09/27/2022 showed hemoglobin of 13 2 with normal white cells and platelets  Creatinine 1 3 with normal calcium and liver enzymes  Magnesium 1 9  Oncology History Overview Note   Patient has a history of hypertension, hyperlipidemia, chronic lower back pain  He had an MRI of the lower spine for further evaluation of his chronic lower back pain  The MRI on 12/02/2019 revealed Multiple left renal masses to include a left lower pole cyst and a rounded structure in the left upper pole which may also represent cyst   Left upper pole renal masses approximately 3 cm in greatest linear dimension  A CT with renal protocol was done on 12/12/2019 which also showed the infiltrating lesion in the upper pole of the left kidney measuring about the 3 5 cm in greatest dimension without any hint of retroperitoneal lymphadenopathy  A CT scan of the abdomen pelvis on 01/14/2020 showed the same findings with the mild hydronephrosis on the left  The urine cytology on 01/03/2020 showed high-grade urothelial carcinoma  A cystoscopy was then done, a biopsy was taken from the left renal pelvic region which showed high-grade urothelial carcinoma without evidence of lamina propria invasion  The detrusor muscle/muscularis propria were not present for evaluation  The recommendation was to pursue left robotic assisted laparoscopic nephroureterectomy with bladder cuff excision which was done on 04/01/2020  The final pathology revealed;  - Invasive high grade urothelial carcinoma arising in renal pelvis     - Bladder cuff margin is negative for carcinoma and no evidence of high grade dysplasia  - Ureters with no significant pathologic abnormality  - Two benign simple cysts  - Adrenal gland is negative for malignancy  - One lymph node, negative for malignancy (0/1)  The tumor size was 4 5 cm with invasion beyond the muscularis into the periurethral fat or peripelvic fat or the renal parenchyma  The margins were uninvolved by carcinoma or carcinoma in situ  The final pathology was pT3 pN0  Patient barely tolerated 1 cycle of adjuvant chemotherapy with split dose cisplatin and gemcitabine with a lot of side effects  The treatment had to be discontinued due to significant worsening renal dysfunction 6/2020  Patient started to complain about significant abdominal/left inguinal pain around August/September 2020  Unfortunately repeat imaging revealed recurrent/metastatic disease  9/4/20 CT C/A/P- Status post left nephrectomy for resection of urothelial neoplasm  Lymphadenopathy in the left nephrectomy bed has increased since the prior examination, concerning for metastatic disease  Ill-defined hyperattenuating masslike focus in the left rectus muscle, not identified on the prior examination  This is suspicious for a metastatic lesion  A rectus hematoma is also considered  9/23/20 PET scan- 1  Multiple FDG avid lymph nodes in the retrocrural region, retroperitoneum and the left renal bed compatible with metastasis  These have progressed from recent CT  2   FDG avid mass involving the left rectus abdominal musculature compatible with metastasis which is larger from recent CT  3  Several small lymph nodes adjacent to the mid to distal esophagus with FDG uptake suspicious for metastasis  Small focus of FDG uptake also suggested in the right hilar region, metastasis is not excluded  This could be reassessed on follow-up    4   Subtle focus of FDG uptake at the T2 level on the left, nonspecific, could be related to early degenerative changes, developing metastasis is not entirely excluded  This could be reassessed on follow-up  5  Prostate is mildly prominent with heterogeneous FDG uptake  This could be inflammatory  Correlate for prostatitis  He completed palliative radiation to the left abdomen 12/3/20     His PET scan from 08/16/2021 showed progression of his disease with hypermetabolic soft tissue lesions at the level of the right shoulder and right axilla with interval progression of the retroperitoneal and mesenteric hypermetabolic metastasis  He did have the new lesion to his right upper back/shoulder which was causing significant localized pain  This excised by his surgeon 08/09/2021  Pathology confirmed metastatic high-grade carcinoma consistent with his no primary urothelial carcinoma  He received palliative radiation to his right shoulder/axilla region  Urothelial cancer ([facility] [address] )   1/3/2020 Biopsy    Final Diagnosis    A  Urine, Clean Catch, Thin Prep:  High grade urothelial carcinoma ([facility]UC) - see comment  1/3/2020 Initial Diagnosis    Urothelial cancer ([facility] [address] )  T3 N0 (stage IIIA)     1/17/2020 Biopsy    Final Diagnosis    A  Ureter, Right, left renal pelvis biopsy  -Fragments of high grade urothelial carcinoma   -No evidence of lamina propria invasion   -Detrusor muscle/ muscularis propria is not present for evaluation  B  Urinary Bladder, bladder biopsy:  -Fragments of low grade papillary lesion  See note  -No evidence of invasion seen  -Unremarkable fragment of detrusor muscle seen  4/1/2020 Surgery    left robotic assisted laparoscopic nephroureterectomy with bladder cuff excision     Final Diagnosis    A  Left kidney, ureter, and bladder cuff, nephroureterectomy:  - Invasive high grade urothelial carcinoma arising in renal pelvis  - Bladder cuff margin is negative for carcinoma and no evidence of high grade dysplasia    - Ureters with no significant pathologic abnormality  - Two benign simple cysts  - Adrenal gland is negative for malignancy  - One lymph node, negative for malignancy (0/1)          5/14/2020 - 6/3/2020 Chemotherapy    CISplatin (PLATINOL) split dose 35 mg/m2 = 75 3 mg (50 % of original dose 70 mg/m2), Intravenous,   Administration: 75 3 mg (5/14/2020), 75 3 mg (5/21/2020)  gemcitabine (GEMZAR) 2,200 mg in sodium chloride 0 9 % 250 mL infusion, 2,150 2 mg (80 % of original dose 1,250 mg/m2), Intravenous,   Administration: 2,200 mg (5/14/2020), 2,200 mg (5/21/2020)    (only completed 1 cycle- d/c d/t adverse effects and worsening renal dysfunction)     10/9/2020 - 9/9/2021 Chemotherapy    pembrolizumab (KEYTRUDA) IVPB, 200 mg, Intravenous, Once, 16 of 20 cycles  Administration: 200 mg (10/9/2020), 200 mg (10/30/2020), 200 mg (11/20/2020), 200 mg (12/11/2020), 200 mg (12/31/2020), 200 mg (1/22/2021), 200 mg (2/12/2021), 200 mg (3/5/2021), 200 mg (3/26/2021), 200 mg (4/16/2021), 200 mg (5/7/2021), 200 mg (5/28/2021), 200 mg (6/18/2021), 200 mg (7/9/2021), 200 mg (7/30/2021), 200 mg (8/20/2021)     11/19/2020 - 12/3/2020 Radiation    Treatment:  Course: C1    Plan ID Energy Fractions Dose per Fraction (cGy) Dose Correction (cGy) Total Dose Delivered (cGy) Elapsed Days   L Abdomen 6X 10 / 10 300 0 3,000 14        9/10/2021 -  Chemotherapy    enfortumab vedotin-ejfv (PADCEV) IVPB, 1 25 mg/kg = 114 mg, Intravenous, Once, 11 of 16 cycles  Dose modification: 1 mg/kg (original dose 1 25 mg/kg, Cycle 7, Reason: Other (Must fill in a comment), Comment: dose reduction due to side effects ), 1 25 mg/kg (original dose 1 25 mg/kg, Cycle 7, Reason: Other (Must fill in a comment), Comment: patient wants full dose ), 1 mg/kg (original dose 1 25 mg/kg, Cycle 7, Reason: Neuropathy), 0 75 mg/kg (original dose 1 25 mg/kg, Cycle 11, Reason: Neuropathy)  Administration: 114 mg (9/10/2021), 114 mg (9/17/2021), 110 mg (10/8/2021), 110 mg (9/24/2021), 110 mg (10/15/2021), 110 mg (11/12/2021), 110 mg (10/22/2021), 110 mg (11/19/2021), 110 mg (12/10/2021), 110 mg (11/26/2021), 110 mg (12/17/2021), 110 mg (1/7/2022), 110 mg (12/23/2021), 110 mg (1/14/2022), 90 mg (4/8/2022), 90 mg (4/15/2022), 90 mg (4/22/2022), 110 mg (1/21/2022), 110 mg (2/18/2022), 110 mg (2/25/2022), 90 mg (5/13/2022), 90 mg (5/20/2022), 90 mg (5/27/2022), 90 mg (3/4/2022), 90 mg (6/10/2022), 90 mg (6/17/2022), 90 mg (6/24/2022), 90 mg (7/8/2022), 90 mg (7/15/2022), 90 mg (7/22/2022), 68 mg (8/19/2022), 70 mg (9/2/2022)     Cancer of left renal pelvis ([facility] [address] )   4/23/2020 Initial Diagnosis    Cancer of left renal pelvis ([facility] [address] )     10/9/2020 - 9/9/2021 Chemotherapy    pembrolizumab (KEYTRUDA) IVPB, 200 mg, Intravenous, Once, 16 of 20 cycles  Administration: 200 mg (10/9/2020), 200 mg (10/30/2020), 200 mg (11/20/2020), 200 mg (12/11/2020), 200 mg (12/31/2020), 200 mg (1/22/2021), 200 mg (2/12/2021), 200 mg (3/5/2021), 200 mg (3/26/2021), 200 mg (4/16/2021), 200 mg (5/7/2021), 200 mg (5/28/2021), 200 mg (6/18/2021), 200 mg (7/9/2021), 200 mg (7/30/2021), 200 mg (8/20/2021)     9/10/2021 -  Chemotherapy    enfortumab vedotin-ejfv (PADCEV) IVPB, 1 25 mg/kg = 114 mg, Intravenous, Once, 11 of 16 cycles  Dose modification: 1 mg/kg (original dose 1 25 mg/kg, Cycle 7, Reason: Other (Must fill in a comment), Comment: dose reduction due to side effects ), 1 25 mg/kg (original dose 1 25 mg/kg, Cycle 7, Reason: Other (Must fill in a comment), Comment: patient wants full dose ), 1 mg/kg (original dose 1 25 mg/kg, Cycle 7, Reason: Neuropathy), 0 75 mg/kg (original dose 1 25 mg/kg, Cycle 11, Reason: Neuropathy)  Administration: 114 mg (9/10/2021), 114 mg (9/17/2021), 110 mg (10/8/2021), 110 mg (9/24/2021), 110 mg (10/15/2021), 110 mg (11/12/2021), 110 mg (10/22/2021), 110 mg (11/19/2021), 110 mg (12/10/2021), 110 mg (11/26/2021), 110 mg (12/17/2021), 110 mg (1/7/2022), 110 mg (12/23/2021), 110 mg (1/14/2022), 90 mg (4/8/2022), 90 mg (4/15/2022), 90 mg (4/22/2022), 110 mg (1/21/2022), 110 mg (2/18/2022), 110 mg (2/25/2022), 90 mg (5/13/2022), 90 mg (5/20/2022), 90 mg (5/27/2022), 90 mg (3/4/2022), 90 mg (6/10/2022), 90 mg (6/17/2022), 90 mg (6/24/2022), 90 mg (7/8/2022), 90 mg (7/15/2022), 90 mg (7/22/2022), 68 mg (8/19/2022), 70 mg (9/2/2022)      Surgery       Metastatic urothelial carcinoma ([facility] [address] )   8/9/2021 Initial Diagnosis    Metastatic urothelial carcinoma ([facility] [address] )     9/8/2021 - 9/21/2021 Radiation    Treatment:  Course: C2    Plan ID Energy Fractions Dose per Fraction (cGy) Dose Correction (cGy) Total Dose Delivered (cGy) Elapsed Days   R Axil_Shl # 6X 10 / 10 300 0 3,000 13      Treatment dates:  C2: 9/8/2021 - 9/21/2021     9/10/2021 -  Chemotherapy    enfortumab vedotin-ejfv (PADCEV) IVPB, 1 25 mg/kg = 114 mg, Intravenous, Once, 11 of 16 cycles  Dose modification: 1 mg/kg (original dose 1 25 mg/kg, Cycle 7, Reason: Other (Must fill in a comment), Comment: dose reduction due to side effects ), 1 25 mg/kg (original dose 1 25 mg/kg, Cycle 7, Reason: Other (Must fill in a comment), Comment: patient wants full dose ), 1 mg/kg (original dose 1 25 mg/kg, Cycle 7, Reason: Neuropathy), 0 75 mg/kg (original dose 1 25 mg/kg, Cycle 11, Reason: Neuropathy)  Administration: 114 mg (9/10/2021), 114 mg (9/17/2021), 110 mg (10/8/2021), 110 mg (9/24/2021), 110 mg (10/15/2021), 110 mg (11/12/2021), 110 mg (10/22/2021), 110 mg (11/19/2021), 110 mg (12/10/2021), 110 mg (11/26/2021), 110 mg (12/17/2021), 110 mg (1/7/2022), 110 mg (12/23/2021), 110 mg (1/14/2022), 90 mg (4/8/2022), 90 mg (4/15/2022), 90 mg (4/22/2022), 110 mg (1/21/2022), 110 mg (2/18/2022), 110 mg (2/25/2022), 90 mg (5/13/2022), 90 mg (5/20/2022), 90 mg (5/27/2022), 90 mg (3/4/2022), 90 mg (6/10/2022), 90 mg (6/17/2022), 90 mg (6/24/2022), 90 mg (7/8/2022), 90 mg (7/15/2022), 90 mg (7/22/2022), 68 mg (8/19/2022), 70 mg (9/2/2022)         Interval history:    ROS: Review of Systems   Constitutional: Positive for fatigue  Negative for chills and fever  HENT: Positive for hearing loss  Negative for ear pain and sore throat  Eyes: Negative for pain and visual disturbance  Respiratory: Positive for shortness of breath  Negative for cough  Cardiovascular: Negative for chest pain and palpitations  Gastrointestinal: Negative for abdominal pain and vomiting  Genitourinary: Negative for dysuria and hematuria  Musculoskeletal: Negative for arthralgias and back pain  Skin: Negative for color change and rash  Neurological: Positive for numbness  Negative for seizures and syncope  All other systems reviewed and are negative        Past Medical History:   Diagnosis Date    Arthritis     Bladder cancer     Cancer ([facility] [address] )     skin melanoma;basal cell    Chronic pain disorder     from arthritis    Colon polyp     Does use hearing aid     bilat    Dry eyes, bilateral     GERD (gastroesophageal reflux disease)     History of kidney stones 10/2019    History of partial knee replacement     bilat    History of vertigo     Hyperlipidemia     Hypertension     Infusion extravasation of chemotherapy vesicant 11/2021    Kidney lesion     Lumbar disc disorder     compression of vertebrae L4-5-6    Muscle weakness     left hip area    Renal mass     left    Right ankle injury 03/05/2020    missed step of ladder     Right ankle pain     Shortness of breath     with activity    Tinnitus     Urothelial cancer     left    Wears glasses        Past Surgical History:   Procedure Laterality Date    COLONOSCOPY      CYSTOSCOPY Left 4/1/2020    Procedure: CYSTOSCOPY; URETERAL CATHETER PLACEMENT;  Surgeon: Bernardo Lazar MD;  Location[facility];  Service: Urology    CYSTOSCOPY  05/11/2020    CYSTOSCOPY  06/04/2021    FL CYSTOGRAM  4/13/2020    FL RETROGRADE PYELOGRAM  1/17/2020    HERNIA REPAIR      umbilical with mesh    INGUINAL HERNIA REPAIR Right     with mesh    IR PORT PLACEMENT  4/30/2020    JOINT REPLACEMENT Bilateral     partials knee    LUMBAR EPIDURAL INJECTION      SC CYSTOURETHROSCOPY,URETER CATHETER Bilateral 2020    Procedure: CYSTOSCOPY; RIGHT RETROGRADE PYELOGRAM WITH RIGHT URETERAL CYTOLOGY SAMPLING; LEFT URETEROSCOPY WITH RENAL PELVIS BIOPSY AND LEFT STENT PLACEMENT;  Surgeon: David Eaton MD;  [facility];  Service: Urology    SC NEPHRECTOMY, W/PART  URETECTOMY Left 2020    Procedure: ROBOTIC LAPAROSCOPIC NEPHRO-URETERECTOMY;  Surgeon: David Eaton MD;  Location[facility];  Service: Urology    SKIN CANCER EXCISION      surface melanoma    TONSILLECTOMY      WISDOM TOOTH EXTRACTION         Social History     Socioeconomic History    Marital status: /Civil Union     Spouse name: None    Number of children: None    Years of education: None    Highest education level: None   Occupational History    None   Tobacco Use    Smoking status: Former Smoker     Types: Cigarettes     Quit date:      Years since quittin 7    Smokeless tobacco: Never Used   Vaping Use    Vaping Use: Never used   Substance and Sexual Activity    Alcohol use:  Yes     Alcohol/week: 17 0 standard drinks     Types: 7 Glasses of wine, 10 Cans of beer per week     Comment: socially    Drug use: Never    Sexual activity: Not Currently   Other Topics Concern    None   Social History Narrative    Daily caffeine use - 2 cups coffee      Social Determinants of Health     Financial Resource Strain: Not on file   Food Insecurity: Not on file   Transportation Needs: Not on file   Physical Activity: Not on file   Stress: Not on file   Social Connections: Not on file   Intimate Partner Violence: Not on file   Housing Stability: Not on file       Family History   Problem Relation Age of Onset    Liver cancer Father        Allergies   Allergen Reactions    Poison Ivy Extract Rash         Current Outpatient Medications:     acetaminophen (TYLENOL) 500 mg tablet, Take 2 tablets (1,000 mg total) by mouth every 8 (eight) hours, Disp: , Rfl:     allopurinol (ZYLOPRIM) 300 mg tablet, take 1 tablet by mouth once daily, Disp: 30 tablet, Rfl: 3    ALPRAZolam (XANAX) 0 25 mg tablet, Take 1 tablet (0 25 mg total) by mouth daily at bedtime as needed for anxiety, Disp: 30 tablet, Rfl: 0    amitriptyline (ELAVIL) 25 mg tablet, Take 25 mg by mouth daily at bedtime, Disp: , Rfl:     atorvastatin (LIPITOR) 80 mg tablet, Take 80 mg by mouth every other day evening, Disp: , Rfl:     colchicine (COLCRYS) 0 6 mg tablet, take 1 tablet by mouth twice a day if needed for FOOT PAIN, Disp: , Rfl:     docusate sodium (COLACE) 250 MG capsule, Take 1 capsule (250 mg total) by mouth daily, Disp: 60 capsule, Rfl: 6    DULoxetine (CYMBALTA) 60 mg delayed release capsule, take 1 capsule by mouth once daily, Disp: 30 capsule, Rfl: 1    folic acid (FOLVITE) 1 mg tablet, take 1 tablet by mouth once daily, Disp: 90 tablet, Rfl: 4    gabapentin (NEURONTIN) 100 mg capsule, Take 4 capsules (400 mg total) by mouth daily at bedtime, Disp: 120 capsule, Rfl: 2    Magnesium 250 MG TABS, 1 tablet 2 (two) times a day Taking 500mg in the morning and 500mg at night, Disp: , Rfl:     metoprolol tartrate (LOPRESSOR) 100 mg tablet, Take 50 mg by mouth daily, Disp: , Rfl:     naloxone (NARCAN) 4 mg/0 1 mL nasal spray, Administer 1 spray into a nostril   If breathing does not return to normal or if breathing difficulty resumes after 2-3 minutes, give another dose in the other nostril using a new spray , Disp: 1 each, Rfl: 1    nystatin (MYCOSTATIN) cream, Apply topically 2 (two) times a day, Disp: 30 g, Rfl: 0    predniSONE 10 mg tablet, Take 2 tablets (20 mg total) by mouth daily, Disp: 60 tablet, Rfl: 3    senna (SENOKOT) 8 6 mg, Take 1 tablet (8 6 mg total) by mouth daily at bedtime, Disp: 30 each, Rfl: 0    tadalafil (CIALIS) 20 MG tablet, Take one tablet by mouth one hour before sexual activity, Disp: 15 tablet, Rfl: 3    tamsulosin (FLOMAX) 0 4 mg, take 1 capsule by mouth once daily with dinner, Disp: 30 capsule, Rfl: 1    traMADol (Ultram) 50 mg tablet, Take 1 tab PO Q8 hours PRN pain, on severe days can take a 4th tablet , Disp: 100 tablet, Rfl: 1    VITAMIN D PO, Take 1,000 Units by mouth daily , Disp: , Rfl:     zolpidem (AMBIEN) 5 mg tablet, Take 1 tablet (5 mg total) by mouth daily at bedtime as needed for sleep, Disp: 30 tablet, Rfl: 1      Physical Exam:  /62 (BP Location: Left arm, Patient Position: Sitting, Cuff Size: Adult)   Pulse 66   Temp (!) 96 6 °F (35 9 °C) (Tympanic)   Resp 16   Ht 5' 10" (1 778 m)   Wt 91 kg (200 lb 9 6 oz)   SpO2 96%   BMI 28 78 kg/m²     Physical Exam  Constitutional:       Appearance: He is well-developed  He is ill-appearing  HENT:      Head: Normocephalic and atraumatic  Nose: Nose normal    Eyes:      General: No scleral icterus  Right eye: No discharge  Left eye: No discharge  Conjunctiva/sclera: Conjunctivae normal       Pupils: Pupils are equal, round, and reactive to light  Neck:      Thyroid: No thyromegaly  Trachea: No tracheal deviation  Cardiovascular:      Rate and Rhythm: Normal rate and regular rhythm  Heart sounds: Normal heart sounds  No murmur heard  No friction rub  Pulmonary:      Effort: Pulmonary effort is normal  No respiratory distress  Breath sounds: Normal breath sounds  No wheezing or rales  Chest:      Chest wall: No tenderness  Abdominal:      General: There is no distension  Palpations: Abdomen is soft  There is no hepatomegaly or splenomegaly  Tenderness: There is no abdominal tenderness  There is no guarding or rebound  Musculoskeletal:         General: No tenderness or deformity  Cervical back: Normal range of motion and neck supple  Comments: Ambulatory dysfunction sitting in a wheelchair  Lymphadenopathy:      Cervical: No cervical adenopathy     Skin: General: Skin is warm and dry  Coloration: Skin is not pale  Findings: No erythema or rash  Neurological:      Mental Status: He is alert and oriented to person, place, and time  Cranial Nerves: No cranial nerve deficit  Coordination: Coordination normal       Deep Tendon Reflexes: Reflexes are normal and symmetric  Psychiatric:         Behavior: Behavior normal          Thought Content: Thought content normal          Judgment: Judgment normal            Labs:  Lab Results   Component Value Date    WBC 9 51 09/27/2022    HGB 13 2 09/27/2022    HCT 40 9 09/27/2022     (H) 09/27/2022     09/27/2022     Lab Results   Component Value Date    K 4 0 09/27/2022     09/27/2022    CO2 31 09/27/2022    BUN 26 (H) 09/27/2022    CREATININE 1 38 (H) 09/27/2022    GLUF 111 (H) 03/01/2022    CALCIUM 9 3 09/27/2022    CORRECTEDCA 9 0 06/21/2022    AST 26 09/27/2022    ALT 51 09/27/2022    ALKPHOS 65 09/27/2022    EGFR 50 09/27/2022     No results found for: TSH    Patient voiced understanding and agreement in the above discussion  Aware to contact our office with questions/symptoms in the interim

## 2022-09-30 ENCOUNTER — HOSPITAL ENCOUNTER (OUTPATIENT)
Dept: INFUSION CENTER | Facility: HOSPITAL | Age: 72
End: 2022-09-30
Payer: MEDICARE

## 2022-09-30 VITALS
DIASTOLIC BLOOD PRESSURE: 76 MMHG | SYSTOLIC BLOOD PRESSURE: 117 MMHG | OXYGEN SATURATION: 98 % | RESPIRATION RATE: 16 BRPM | TEMPERATURE: 96.9 F | HEART RATE: 56 BPM

## 2022-09-30 DIAGNOSIS — E83.42 HYPOMAGNESEMIA: ICD-10-CM

## 2022-09-30 DIAGNOSIS — E86.0 DEHYDRATION: ICD-10-CM

## 2022-09-30 DIAGNOSIS — G89.3 CANCER ASSOCIATED PAIN: Primary | ICD-10-CM

## 2022-09-30 PROCEDURE — 96360 HYDRATION IV INFUSION INIT: CPT

## 2022-09-30 RX ADMIN — SODIUM CHLORIDE 1000 ML: 0.9 INJECTION, SOLUTION INTRAVENOUS at 11:13

## 2022-10-03 ENCOUNTER — OFFICE VISIT (OUTPATIENT)
Dept: PALLIATIVE MEDICINE | Facility: CLINIC | Age: 72
End: 2022-10-03
Payer: MEDICARE

## 2022-10-03 ENCOUNTER — OFFICE VISIT (OUTPATIENT)
Dept: OCCUPATIONAL THERAPY | Facility: CLINIC | Age: 72
End: 2022-10-03
Payer: MEDICARE

## 2022-10-03 DIAGNOSIS — T45.1X5A CHEMOTHERAPY-INDUCED NEUROPATHY (HCC): Primary | ICD-10-CM

## 2022-10-03 DIAGNOSIS — G62.0 CHEMOTHERAPY-INDUCED NEUROPATHY (HCC): Primary | ICD-10-CM

## 2022-10-03 DIAGNOSIS — M54.50 CHRONIC BILATERAL LOW BACK PAIN WITHOUT SCIATICA: ICD-10-CM

## 2022-10-03 DIAGNOSIS — C79.10 METASTATIC UROTHELIAL CARCINOMA (HCC): ICD-10-CM

## 2022-10-03 DIAGNOSIS — G89.3 CANCER-RELATED PAIN: ICD-10-CM

## 2022-10-03 DIAGNOSIS — C77.2 METASTASIS TO RETROPERITONEAL LYMPH NODE (HCC): ICD-10-CM

## 2022-10-03 DIAGNOSIS — G89.3 CANCER ASSOCIATED PAIN: ICD-10-CM

## 2022-10-03 DIAGNOSIS — G89.29 CHRONIC BILATERAL LOW BACK PAIN WITHOUT SCIATICA: ICD-10-CM

## 2022-10-03 DIAGNOSIS — R53.1 GENERALIZED WEAKNESS: Primary | ICD-10-CM

## 2022-10-03 DIAGNOSIS — C79.51 SECONDARY MALIGNANT NEOPLASM OF BONE (HCC): ICD-10-CM

## 2022-10-03 PROCEDURE — 99214 OFFICE O/P EST MOD 30 MIN: CPT | Performed by: INTERNAL MEDICINE

## 2022-10-03 PROCEDURE — 97110 THERAPEUTIC EXERCISES: CPT

## 2022-10-03 PROCEDURE — 97112 NEUROMUSCULAR REEDUCATION: CPT

## 2022-10-03 RX ORDER — CAPSAICIN 0.07 G/100G
CREAM TOPICAL 3 TIMES DAILY
Qty: 28.3 G | Refills: 0 | Status: SHIPPED | OUTPATIENT
Start: 2022-10-03

## 2022-10-03 RX ORDER — HYDROCODONE BITARTRATE AND ACETAMINOPHEN 5; 325 MG/1; MG/1
1 TABLET ORAL EVERY 6 HOURS PRN
Qty: 120 TABLET | Refills: 0 | Status: SHIPPED | OUTPATIENT
Start: 2022-10-03

## 2022-10-03 NOTE — PATIENT INSTRUCTIONS
PRESCRIPTION REFILL REMINDER:  All medication refills should be requested prior to [address] [facility] on Friday  Any refill requests after noon on Friday would be addressed the following Monday  Please protect yourself from [place]   = Wash your hands  Soap and water, or hand  with at least 60% alcohol, are both effective at killing the virus  = Wear a mask  This will help protect others from any virus particles you might spread  Your mouth and nose BOTH need to be covered  = Keep the distance  Keep 6 feet of distance from other people, even if they seem healthy  Keeping distance protects you from the other person's virus spread     = Get a vaccine, and boost it  Three vaccines are approved for use in the [place] for all adults  These vaccines do provide protection against all known variants, and the new 'bi-valent' booster is predicted to be more protective against Omicron variants   + Pfizer is FDA approved for all persons age 11 and older   + Paulette Nikolay may be given to all person age 25 and older   - We do NOT recommend [address] vaccine for our Palliative Care patients  - [facility] is no longer offering regular COVID vaccine clinics  You may ask your primary doctor for help and advice  = you may also visit the CDC's complete list of approved sites for new vaccines: Vaccines  gov - Vaccine Location Search Results   (You may also visit Vaccines  gov and search for 'Bi-valent booster' in your zip code )  = [address] ([facility]), [employer], [facility], [facility], and many [facility] locations ALL have shots   + The CDC recommends booster shots on ALL vaccines for ALL adults  = Test to treat  If you have symptoms, get tested, and get treatment!   New drugs like Paxlovid can prevent you from ever having to go to the hospital , and may be covered completely by your insurance or special government programs    - https://aspr hhs gov/TestToTreat/ Informacion en espanol sobre vacunas, de nos companeros de [facility] --  Kinza howard    Check out [employer] for Amie data that are updated daily:    http://www [employer]/     Global Epidemics  Org, from [facility] (OUTPATIENT CAMPUS), will give you Qvjumz-rt-Ynjsme information on virus cases and vaccination rates:    Https://globalepidemics  org/    Frequently Asked Questions about COVID, answered by [facility]    [employer] es

## 2022-10-03 NOTE — PROGRESS NOTES
Daily Note     Today's date: 10/3/2022  Patient name: Nicola Ribeiro  : 1950  MRN: [id number]  Referring provider: RAGHU Dimas  Dx:   Encounter Diagnosis     ICD-10-CM    1  Generalized weakness  R53 1                   Subjective:"I need to leave a little early "      Objective: See treatment diary below      Assessment: Tolerated treatment well  Patient exhibited good technique with therapeutic exercises and would benefit from continued OT  Patient requested to leave early due to travel to MD appointment in [facility] this afternoon  Patient tolerated session well  Patient unable to complete grooved peg board this date due to increased neuropathy in bilateral hands which increase difficulty to manipulate the pegs  Plan: Continue per plan of care  Progress treatment as tolerated            Manuals 2022 2022  2022   10/03/2022                                                             Neuro Re-Ed  2022 2022  2022   10/03/2022                                                                                                       Ther Ex 2022 2022  2022   10/03/2022     Flex Bar   Bends   Twists   Bottle caps b/l  X 20 2 way  X 20  X 20  Cream/Yellow  X 20 2 way  X 20  X 20  Cream  X 20 2 way  X 20  X 20 Yellow  X 20 2 way  X 20  X 20     Digiflex   Red x 20  B/L Red x 20  Red x 20     Hand Power Pro   Green x 20  Green x 20  Green x 20     Theraband   Ext  ADD  Triceps  ER  IR  Retraction   Bicep curls  ABD  PNF 1  PNF 2     Black x 20     Black x 20        Black x 20  Black x 20        Black x 20    Black x 20      Black x 20  Black x 20     UBE 5F/5B 10 min L3 5F/5B L 60  5F/5B L 60  5F/5B L 60     Shoulder   Shrugs  Retractions  Rolls FW/BW              Weighted dowel suppro      1 weight  X 10/2 b/l  1 weight  X 10/2 b/l      Theraband  Sup/pro  Elbow flex  horziontal abd FF      pink  X 20  X 20  X 20  X 20              Ther Activity 9/14/2022 9/21/2022  9/28/2022  10/03/2022     Stereognosis              Tension pin poms   Yellow tension pin  Yellow tension pin b/l Yellow tension pin b/l     Bean and rice  Bowl search   Rice-12 items  Bean-6 items         Grooved pegs   R 12 pegs 4:30 put in  L 9 pegs 5:30   R 1 peg placed  L unable to perform                   Modalities 9/14/2022 9/21/2022  9/28/2022  10/03/2022

## 2022-10-03 NOTE — PROGRESS NOTES
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 ([address] )    4  Metastatic urothelial carcinoma ([address] )    5  Secondary malignant neoplasm of bone ([facility] )    6  Cancer associated pain    7  Chronic bilateral low back pain without sciatica      · Because he is on duloxetine and amitriptyline, will change tramadol to hydrocodone to avoid serotonin syndrome in this elderly gentleman  · Trial low dose hydrocodone 5-325mg PO q6H prn, can try 1/2 tab first  · Of note, he was sleepy on morphine and hydromorphone that we used in the past, but these are relatively more potent opioids  He is understandably worried about being on oxycodone  So we will trial hydrocodone first    · Capsaicin cream to the LEs and hands  · Consider MMJ cream when he comes back from [place]  · Senokot prn to avoid OIC  · RTO in 6 weeks    Controlled Substance Review    [place] or [place]  reviewed: No red flags were identified; safe to proceed with prescription  Lajean Cassette     09/16/2022 09/16/2022   1  Tramadol Hcl 50 Mg Tablet   100 00  25  Nathan Freiberg  [id number]  Annel ([id number])   20 00 MME  [place]     09/15/2022  09/15/2022   1  Zolpidem Tartrate 5 Mg Tablet   30 00  30  [place]  [id number]  Annel ([id number])   0 25 LME  [place]     08/21/2022 07/18/2022   1  Tramadol Hcl 50 Mg Tablet   100 00  25  Ka Haf  [id number]  Annel ([id number])   20 00 MME  [place]        Requested Prescriptions     Signed Prescriptions Disp Refills    HYDROcodone-acetaminophen (NORCO) 5-325 mg per tablet 120 tablet 0     Sig: Take 1 tablet by mouth every 6 (six) hours as needed for pain Max Daily Amount: 4 tablets    capsicum (ZOSTRIX) 0 075 % topical cream 28 3 g 0     Sig: Apply topically 3 (three) times a day     Medications Discontinued During This Encounter   Medication Reason    traMADol (Ultram) 50 mg tablet        Representatives have queried the patient's controlled substance dispensing history in the Prescription Drug Monitoring Program in compliance with regulations before I have prescribed any controlled substances  The prescription history is consistent with prescribed therapy and our practice policies  45 minutes were spent face to face with Janna Rico and his wife with greater than 50% of the time spent in counseling or coordination of care including discussions of etiology of diagnosis, pathogenesis of diagnosis, prognosis of diagnosis, diagnostic results, impression, and recommendations, risks and benefits of treatment, instructions for disease self management, treatment instructions, follow up requirements, risk factors and risk reduction of disease, patient and family counseling/involvement in care and compliance with treatment regimen   All of the patient's questions were answered during this discussion  No follow-ups on file  Subjective:   Chief Complaint  Follow up visit for:  symptom management, pain, neoplasm related, assessment of goals of care, disease process education and discussion of prognosis, advance care planning  HPI     Janna Rico is a 67 y o  male with metastatic urothelial cancer who recently showed disease progression on second line [place]  He was initially diagnosed in 1/2020 and has since underwent left robotic assisted laparoscopic nephroureterectomy with bladder cuff excision and chemotherapy  He tried cisplatin but did not tolerate well and so was switched to [facility] which he had been on since 10/2020  He also underwent RT to the L abdomen form 11-12/2020  He then started to develop new pain in his R shoulder  He went for excision of a soft tissue mass in the R shoulder/axilla which revealed metastatic high-grade carcinoma consistent with his known primary urothelial carcinoma in 8/9/2021   His PET-CT on 8/16/2021 showed disease progression with hypermetabolic soft tissue lesions at the level of the right shoulder and right axilla with interval progression of the retroperitoneal and mesenteric hypermetabolic metastasis  He has been on enfortumab since 9/10/2021  Since around 8-9/2021, his shoulder pain escalated to the point where tramadol is no longer helpful  He was then switched to dilaudid prn  A few days later, morphine ER was added  He was started on RT to the R shoulder on 9/8  He also started on palliative enfortumab vedotin starting 9/10/2021  The pain stabilized around 10/2021 with no further adjustments in medications       On his 11/10/2021 visit, he reported ongoing pain in his R shoulder as well as a new pain in his stomach  Overall, despite the continued pain, his pain was getting better  He voiced his wish to wean off of opioids and we plan on doing so after his next PET-CT  He had a PET-CT on 12/3 that showed significant decrease in activity in the R shoulder and R axillary lesions as well as resolution of upper abdominal hypermetabolic adenopathy  He virtually had no pain since then, until March 2022       On 3/9/2022, he reported increasing pain in his lower back as well as peripheral neuropathy in his hands, legs and fatigue  He talked to oncology about this who ordered a PET CT on 3/8 that showed further response to therapy  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  We increased his duloxetine to 60mg ODHS  He received radiofrequency denervation of lumbar facet joints (L4-5, L5-S1) on 4/12  He was last seen in the office on 6/1/2022  He no longer was requiring opioids at that time  Since his last visit, his pain doctor started him tramadol for ongoing CIPN  He had a PET-CT on that showed some mild activity in the subcutaneous soft tissue posterior to the right shoulder which has increased slightly in comparison to the prior imaging  He was told that this could represent inflammatory process or viable tumor   He last saw oncology on 9/29 where he reported increasing CIPN  Decision was made to stop the treatment for a month or so and re-address treatment in 11/2022  He returns today for his routine follow up  We discussed his ongoing CIPN which appears to be quite significant in that it causes him to fall frequently  He had to be taken off of eliquis because of this  We reviewed his meds - he is on duloxetine and amitriptyline and tramadol  He takes mostly 2 a day of tramadol  We spent time discussing drug-drug interactions and potential side effects of serotonin syndrome being on both SNRI, TCA and tramadol  I discussed trial of low dose hydrocodone for now and hope that it will not cause sleepiness  He is also advised to apply capsaicin cream on his legs and hands  MMJ creams might be helpful too and something he can consider once he is back to [facility]  The following portions of the medical history were reviewed: past medical history, problem list, medication list, and social history      Current Outpatient Medications:     acetaminophen (TYLENOL) 500 mg tablet, Take 2 tablets (1,000 mg total) by mouth every 8 (eight) hours, Disp: , Rfl:     allopurinol (ZYLOPRIM) 300 mg tablet, take 1 tablet by mouth once daily, Disp: 30 tablet, Rfl: 3    ALPRAZolam (XANAX) 0 25 mg tablet, Take 1 tablet (0 25 mg total) by mouth daily at bedtime as needed for anxiety, Disp: 30 tablet, Rfl: 0    amitriptyline (ELAVIL) 25 mg tablet, Take 25 mg by mouth daily at bedtime, Disp: , Rfl:     atorvastatin (LIPITOR) 80 mg tablet, Take 80 mg by mouth every other day evening, Disp: , Rfl:     capsicum (ZOSTRIX) 0 075 % topical cream, Apply topically 3 (three) times a day, Disp: 28 3 g, Rfl: 0    colchicine (COLCRYS) 0 6 mg tablet, take 1 tablet by mouth twice a day if needed for FOOT PAIN, Disp: , Rfl:     docusate sodium (COLACE) 250 MG capsule, Take 1 capsule (250 mg total) by mouth daily, Disp: 60 capsule, Rfl: 6    DULoxetine (CYMBALTA) 60 mg delayed release capsule, take 1 capsule by mouth once daily, Disp: 30 capsule, Rfl: 1    folic acid (FOLVITE) 1 mg tablet, take 1 tablet by mouth once daily, Disp: 90 tablet, Rfl: 4    gabapentin (NEURONTIN) 100 mg capsule, Take 4 capsules (400 mg total) by mouth daily at bedtime, Disp: 120 capsule, Rfl: 2    HYDROcodone-acetaminophen (NORCO) 5-325 mg per tablet, Take 1 tablet by mouth every 6 (six) hours as needed for pain Max Daily Amount: 4 tablets, Disp: 120 tablet, Rfl: 0    Magnesium 250 MG TABS, 1 tablet 2 (two) times a day Taking 500mg in the morning and 500mg at night, Disp: , Rfl:     metoprolol tartrate (LOPRESSOR) 100 mg tablet, Take 50 mg by mouth daily, Disp: , Rfl:     naloxone (NARCAN) 4 mg/0 1 mL nasal spray, Administer 1 spray into a nostril  If breathing does not return to normal or if breathing difficulty resumes after 2-3 minutes, give another dose in the other nostril using a new spray , Disp: 1 each, Rfl: 1    nystatin (MYCOSTATIN) cream, Apply topically 2 (two) times a day, Disp: 30 g, Rfl: 0    predniSONE 10 mg tablet, Take 2 tablets (20 mg total) by mouth daily, Disp: 60 tablet, Rfl: 3    senna (SENOKOT) 8 6 mg, Take 1 tablet (8 6 mg total) by mouth daily at bedtime, Disp: 30 each, Rfl: 0    tadalafil (CIALIS) 20 MG tablet, Take one tablet by mouth one hour before sexual activity, Disp: 15 tablet, Rfl: 3    tamsulosin (FLOMAX) 0 4 mg, take 1 capsule by mouth once daily with dinner, Disp: 30 capsule, Rfl: 1    VITAMIN D PO, Take 1,000 Units by mouth daily , Disp: , Rfl:     zolpidem (AMBIEN) 5 mg tablet, Take 1 tablet (5 mg total) by mouth daily at bedtime as needed for sleep, Disp: 30 tablet, Rfl: 1  No current facility-administered medications for this visit      Facility-Administered Medications Ordered in Other Visits:     magnesium sulfate 2 g in sodium chloride 0 9 % 1,000 mL IV, , Intravenous, Once, Lashon Molina MD  Review of Systems   Constitutional: Negative for activity change, appetite change, fatigue and unexpected weight change  HENT: Negative for trouble swallowing  Respiratory: Negative for shortness of breath  Cardiovascular: Negative for chest pain  Gastrointestinal: Negative for abdominal pain, constipation, diarrhea and vomiting  Musculoskeletal: Negative for back pain  No R shoulder pain   Neurological:        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   Psychiatric/Behavioral: Negative for sleep disturbance  The patient is not nervous/anxious  All other systems negative    Objective:  Vital Signs  There were no vitals taken for this visit  Physical Exam    Constitutional: Appears well-developed and well-nourished  Appears chronically ill looking  Appears healthier and more stable than on previous visit  Well kempt, pleasant, friendly  In no acute physical or emotional distress  Head: Normocephalic and atraumatic  Eyes: EOM are normal  No ocular discharge  No scleral icterus  Neck: No visible adenopathy or masses  Respiratory: Effort normal  No stridor  No respiratory distress  Gastrointestinal: No abdominal distension  Musculoskeletal: No edema  Neurological: Alert, oriented and appropriately conversant  Skin: No diaphoresis, no rashes seen on exposed areas of skin  Psychiatric: Displays a normal mood and affect   Behavior, judgement and thought content appear normal      Betzaida Padron MD  Palliative Medicine & Supportive Care  Internal Medicine  Available via [facility] Text  Office: [phone]  Fax: [phone]

## 2022-10-04 ENCOUNTER — HOSPITAL ENCOUNTER (OUTPATIENT)
Dept: INFUSION CENTER | Facility: HOSPITAL | Age: 72
Discharge: HOME | End: 2022-10-04
Attending: INTERNAL MEDICINE
Payer: MEDICARE

## 2022-10-04 VITALS
TEMPERATURE: 97.1 F | RESPIRATION RATE: 16 BRPM | OXYGEN SATURATION: 95 % | DIASTOLIC BLOOD PRESSURE: 80 MMHG | HEART RATE: 55 BPM | SYSTOLIC BLOOD PRESSURE: 133 MMHG

## 2022-10-04 DIAGNOSIS — E86.0 DEHYDRATION: Primary | ICD-10-CM

## 2022-10-04 PROCEDURE — 96360 HYDRATION IV INFUSION INIT: CPT

## 2022-10-04 RX ADMIN — SODIUM CHLORIDE 1000 ML: 0.9 INJECTION, SOLUTION INTRAVENOUS at 11:44

## 2022-10-04 NOTE — PROGRESS NOTES
Daily Note     Today's date: 10/4/2022  Patient name: Janna Rico  : 1950  MRN: [id number]  Referring provider: RAGHU Carrillo  Dx:   Encounter Diagnosis     ICD-10-CM    1  Generalized weakness  R53 1                   Subjective: I'm tired today  Objective: See treatment diary below      Assessment: Tolerated treatment well  Patient demonstrated fatigue post treatment and would benefit from continued OT  Pt became frustrated with sustained  task, increase difficulty with R hand dropping pegs  Pt needs vc's and reminders to take brief rest periods secondary to fatigue  Plan: Continue per plan of care  Progress treatment as tolerated            Manuals 2022 2022  2022   10/03/2022  10/5/2022                                                           Neuro Re-Ed  2022 2022  2022   10/03/2022  10/5/2022                                                                                                     Ther Ex 2022 2022  2022   10/03/2022  10/5/2022   Flex Bar   Bends   Twists   Bottle caps b/l  X 20 2 way  X 20  X 20  Cream/Yellow  X 20 2 way  X 20  X 20  Cream  X 20 2 way  X 20  X 20 Yellow  X 20 2 way  X 20  X 20  Yellow  X 20 2 way  X 20  X 20   Digiflex   Red x 20  B/L Red x 20  Red x 20  Red x 20   Hand Power Pro   Green x 20  Green x 20  Green x 20  Green x 20   Theraband   Ext  ADD  Triceps  ER  IR  Retraction   Bicep curls  ABD  PNF 1  PNF 2     Black x 20     Black x 20        Black x 20  Black x 20        Black x 20    Black x 20      Black x 20  Black x 20     UBE 5F/5B 10 min L3 5F/5B L 60  5F/5B L 60  5F/5B L 60  5F/5B L 60   Shoulder   Shrugs  Retractions  Rolls FW/BW              Weighted dowel suppro      1 weight  X 10/2 b/l  1 weight  X 10/2 b/l  1 weight   x 10/2 b/l    Theraband  Sup/pro  Elbow flex  horziontal abd FF      pink  X 20  X 20  X 20  X 20   pink      X 20  X 20           Ther Activity 2022  9/28/2022  10/03/2022  10/5/2022   Stereognosis              Tension pin poms   Yellow tension pin  Yellow tension pin b/l Yellow tension pin b/l  Red tension pin   Bean and rice  Bowl search   Rice-12 items  Bean-6 items         Grooved pegs   R 12 pegs 4:30 put in  L 9 pegs 5:30   R 1 peg placed  L unable to perform     MHG w/lg easy  pegs     10# Placed 9 pegs, took out 25 pegs b/l    round pegs          placed 20 pegs ea hand   Modalities 9/14/2022 9/21/2022  9/28/2022  10/03/2022  10/5/2022

## 2022-10-05 ENCOUNTER — OFFICE VISIT (OUTPATIENT)
Dept: OCCUPATIONAL THERAPY | Facility: CLINIC | Age: 72
End: 2022-10-05
Payer: MEDICARE

## 2022-10-05 DIAGNOSIS — R53.1 GENERALIZED WEAKNESS: Primary | ICD-10-CM

## 2022-10-05 PROCEDURE — 97110 THERAPEUTIC EXERCISES: CPT

## 2022-10-05 PROCEDURE — 97530 THERAPEUTIC ACTIVITIES: CPT

## 2022-10-07 ENCOUNTER — APPOINTMENT (OUTPATIENT)
Dept: LAB | Facility: HOSPITAL | Age: 72
End: 2022-10-07
Attending: INTERNAL MEDICINE
Payer: MEDICARE

## 2022-10-07 ENCOUNTER — HOSPITAL ENCOUNTER (OUTPATIENT)
Dept: INFUSION CENTER | Facility: HOSPITAL | Age: 72
End: 2022-10-07
Payer: MEDICARE

## 2022-10-07 VITALS
HEART RATE: 58 BPM | RESPIRATION RATE: 16 BRPM | DIASTOLIC BLOOD PRESSURE: 68 MMHG | TEMPERATURE: 98.4 F | SYSTOLIC BLOOD PRESSURE: 113 MMHG | OXYGEN SATURATION: 98 %

## 2022-10-07 DIAGNOSIS — E83.42 HYPOMAGNESEMIA: ICD-10-CM

## 2022-10-07 DIAGNOSIS — C79.10 METASTATIC UROTHELIAL CARCINOMA (HCC): Primary | ICD-10-CM

## 2022-10-07 DIAGNOSIS — N39.0 URINARY TRACT INFECTION WITHOUT HEMATURIA, SITE UNSPECIFIED: ICD-10-CM

## 2022-10-07 DIAGNOSIS — E86.0 DEHYDRATION: ICD-10-CM

## 2022-10-07 LAB
ALBUMIN SERPL BCP-MCNC: 3.8 G/DL (ref 3.5–5)
ALP SERPL-CCNC: 63 U/L (ref 34–104)
ALT SERPL W P-5'-P-CCNC: 53 U/L (ref 7–52)
ANION GAP SERPL CALCULATED.3IONS-SCNC: 6 MMOL/L (ref 4–13)
AST SERPL W P-5'-P-CCNC: 23 U/L (ref 13–39)
BASOPHILS # BLD AUTO: 0.01 THOUSANDS/ΜL (ref 0–0.1)
BASOPHILS NFR BLD AUTO: 0 % (ref 0–1)
BILIRUB SERPL-MCNC: 0.73 MG/DL (ref 0.2–1)
BUN SERPL-MCNC: 25 MG/DL (ref 5–25)
CALCIUM SERPL-MCNC: 9.4 MG/DL (ref 8.4–10.2)
CHLORIDE SERPL-SCNC: 102 MMOL/L (ref 96–108)
CO2 SERPL-SCNC: 31 MMOL/L (ref 21–32)
CREAT SERPL-MCNC: 1.23 MG/DL (ref 0.6–1.3)
EOSINOPHIL # BLD AUTO: 0.01 THOUSAND/ΜL (ref 0–0.61)
EOSINOPHIL NFR BLD AUTO: 0 % (ref 0–6)
ERYTHROCYTE [DISTWIDTH] IN BLOOD BY AUTOMATED COUNT: 16.2 % (ref 11.6–15.1)
GFR SERPL CREATININE-BSD FRML MDRD: 57 ML/MIN/1.73SQ M
GLUCOSE SERPL-MCNC: 178 MG/DL (ref 65–140)
HCT VFR BLD AUTO: 42.2 % (ref 36.5–49.3)
HGB BLD-MCNC: 13.3 G/DL (ref 12–17)
IMM GRANULOCYTES # BLD AUTO: 0.04 THOUSAND/UL (ref 0–0.2)
IMM GRANULOCYTES NFR BLD AUTO: 1 % (ref 0–2)
LYMPHOCYTES # BLD AUTO: 0.62 THOUSANDS/ΜL (ref 0.6–4.47)
LYMPHOCYTES NFR BLD AUTO: 8 % (ref 14–44)
MAGNESIUM SERPL-MCNC: 1.8 MG/DL (ref 1.9–2.7)
MCH RBC QN AUTO: 32.7 PG (ref 26.8–34.3)
MCHC RBC AUTO-ENTMCNC: 31.5 G/DL (ref 31.4–37.4)
MCV RBC AUTO: 104 FL (ref 82–98)
MONOCYTES # BLD AUTO: 0.19 THOUSAND/ΜL (ref 0.17–1.22)
MONOCYTES NFR BLD AUTO: 3 % (ref 4–12)
NEUTROPHILS # BLD AUTO: 6.51 THOUSANDS/ΜL (ref 1.85–7.62)
NEUTS SEG NFR BLD AUTO: 88 % (ref 43–75)
NRBC BLD AUTO-RTO: 0 /100 WBCS
PLATELET # BLD AUTO: 175 THOUSANDS/UL (ref 149–390)
PMV BLD AUTO: 10.7 FL (ref 8.9–12.7)
POTASSIUM SERPL-SCNC: 4.2 MMOL/L (ref 3.5–5.3)
PROT SERPL-MCNC: 6.2 G/DL (ref 6.4–8.4)
RBC # BLD AUTO: 4.07 MILLION/UL (ref 3.88–5.62)
SODIUM SERPL-SCNC: 139 MMOL/L (ref 135–147)
WBC # BLD AUTO: 7.38 THOUSAND/UL (ref 4.31–10.16)

## 2022-10-07 PROCEDURE — 87086 URINE CULTURE/COLONY COUNT: CPT

## 2022-10-07 PROCEDURE — 96360 HYDRATION IV INFUSION INIT: CPT

## 2022-10-07 PROCEDURE — 83735 ASSAY OF MAGNESIUM: CPT

## 2022-10-07 PROCEDURE — 80053 COMPREHEN METABOLIC PANEL: CPT

## 2022-10-07 PROCEDURE — 85025 COMPLETE CBC W/AUTO DIFF WBC: CPT

## 2022-10-07 RX ADMIN — SODIUM CHLORIDE 1000 ML: 0.9 INJECTION, SOLUTION INTRAVENOUS at 13:00

## 2022-10-08 LAB — BACTERIA UR CULT: NORMAL

## 2022-10-10 ENCOUNTER — APPOINTMENT (OUTPATIENT)
Dept: OCCUPATIONAL THERAPY | Facility: CLINIC | Age: 72
End: 2022-10-10

## 2022-10-11 ENCOUNTER — TELEPHONE (OUTPATIENT)
Dept: HEMATOLOGY ONCOLOGY | Facility: CLINIC | Age: 72
End: 2022-10-11

## 2022-10-11 ENCOUNTER — HOSPITAL ENCOUNTER (OUTPATIENT)
Dept: INFUSION CENTER | Facility: HOSPITAL | Age: 72
Discharge: HOME | End: 2022-10-11
Attending: INTERNAL MEDICINE
Payer: MEDICARE

## 2022-10-11 VITALS
SYSTOLIC BLOOD PRESSURE: 138 MMHG | TEMPERATURE: 96.8 F | OXYGEN SATURATION: 98 % | DIASTOLIC BLOOD PRESSURE: 90 MMHG | RESPIRATION RATE: 16 BRPM | HEART RATE: 59 BPM

## 2022-10-11 DIAGNOSIS — E86.0 DEHYDRATION: ICD-10-CM

## 2022-10-11 DIAGNOSIS — L08.9 WOUND INFECTION: Primary | ICD-10-CM

## 2022-10-11 DIAGNOSIS — T14.8XXA WOUND INFECTION: Primary | ICD-10-CM

## 2022-10-11 DIAGNOSIS — G89.3 CANCER ASSOCIATED PAIN: Primary | ICD-10-CM

## 2022-10-11 DIAGNOSIS — E83.42 HYPOMAGNESEMIA: ICD-10-CM

## 2022-10-11 PROCEDURE — 96365 THER/PROPH/DIAG IV INF INIT: CPT

## 2022-10-11 RX ADMIN — MAGNESIUM SULFATE HEPTAHYDRATE: 500 INJECTION, SOLUTION INTRAMUSCULAR; INTRAVENOUS at 10:58

## 2022-10-11 NOTE — PROGRESS NOTES
Patient presented to unit for magnesium infusion  Patient asked to have his feet assessed as he has several open areas and scabs on his toes  Patient stated this issue started about 2 months ago  Lower legs and feet are cool to the touch and some areas are light to dark purple in color  Message with photo sent to Ambreen Martino RN and referral sent to podiatry  Patient aware and agreeable  Patient tolerated magnesium infusion without issue, discharged in stable condition, declined AVS but aware of next appointment

## 2022-10-11 NOTE — TELEPHONE ENCOUNTER
Left message that I sched him  For a podiatry appt for Oct 19th  At 1:30 across the [place] for where  Are office is located

## 2022-10-12 ENCOUNTER — OFFICE VISIT (OUTPATIENT)
Dept: OCCUPATIONAL THERAPY | Facility: CLINIC | Age: 72
End: 2022-10-12
Payer: MEDICARE

## 2022-10-12 DIAGNOSIS — R53.1 GENERALIZED WEAKNESS: Primary | ICD-10-CM

## 2022-10-12 PROCEDURE — 97530 THERAPEUTIC ACTIVITIES: CPT

## 2022-10-12 PROCEDURE — 97110 THERAPEUTIC EXERCISES: CPT

## 2022-10-12 NOTE — PROGRESS NOTES
Daily Note     Today's date: 10/12/2022  Patient name: Alfred Shafer  : 1950  MRN: [id number]  Referring provider: RAGHU Ramachandran  Dx:   Encounter Diagnosis     ICD-10-CM    1  Generalized weakness  R53 1                   Subjective: I cant really feel in my hands a whole lot either  I dont stand a whole lot other than when Im walking within the house  Objective: See treatment diary below      Assessment: Tolerated treatment well  Patient exhibited good technique with therapeutic exercises and would benefit from continued OT Pt presents this date with no pain, just numbness noted in hands and feet  Pt participated in skilled OT this date with focus on ROM, UE strength/endurance, GMC/GMS, FMC/FMS, and activity tolerance  PT stating he will be going away for two weeks and would like HEP to work on while away  Therapist printing HEP for [employer] and reviewing with pt during session, therapist also providing pt with theraband program upon request  Therapist verbally reviewing shoulder program with pt and making notes and suggestions as needed  Pt states verbal understanding  Therapist reviewing HEP with pt interested due to going away and wanting exercises to continue while gone  Exercises  · Putty Squeezes - 1 x daily - 7 x weekly - 3 sets - 10 reps  · Tip Pinch with Putty - 1 x daily - 7 x weekly - 3 sets - 10 reps  · Seated Finger Extension with Putty - 1 x daily - 7 x weekly - 3 sets - 10 reps  · Finger Pinch and Pull with Putty - 1 x daily - 7 x weekly - 3 sets - 10 reps  · Finger Lumbricals with Putty - 1 x daily - 7 x weekly - 3 sets - 10 reps    Plan: Continue per plan of care  Progress treatment as tolerated  Progress treament per protocol           Manuals 10/12/2022   2022   10/03/2022  10/5/2022                                                   Neuro Re-Ed  10/12/2022   2022   10/03/2022  10/5/2022                                                                                     Ther Ex 10/12/2022   9/28/2022   10/03/2022  10/5/2022   Flex Bar   Bends   Twists   Bottle caps Yellow  X 25 sup x 15pro  X 20 each way   Cream  X 20 2 way  X 20  X 20 Yellow  X 20 2 way  X 20  X 20  Yellow  X 20 2 way  X 20  X 20   Digiflex    B/L Red x 20  Red x 20  Red x 20   Hand Power Pro    Green x 20  Green x 20  Green x 20   Theraband   Ext  ADD  Triceps  ER  IR  Retraction   Bicep curls  ABD  PNF 1  PNF 2         Black x 20    Black x 20      Black x 20  Black x 20     UBE 5F/5B min L 1 5   5F/5B L 60  5F/5B L 60  5F/5B L 60   Shoulder   Shrugs  Retractions  Rolls FW/BW            Weighted dowel suppro    1 weight  X 10/2 b/l  1 weight  X 10/2 b/l  1 weight   x 10/2 b/l    Theraband  Sup/pro  Elbow flex  horziontal abd FF Provided HEP   pink  X 20  X 20  X 20  X 20   pink      X 20  X 20   Interlaced Hand Stretch 10 sec x 5       Theraputty  Grasps  Pinches  Pulls  Intrinsic Squeez  Abduction Rodriguez - gave HEP  X 10 b/l  X 2 b/l  X 3 b/l  X 5 b/l  X 2 b/l       Ther Activity 10/12/2022   9/28/2022  10/03/2022  10/5/2022   Stereognosis            Tension pin poms    Yellow tension pin b/l Yellow tension pin b/l  Red tension pin   Bean and rice  Bowl search           Grooved pegs     R 1 peg placed  L unable to perform     MHG w/lg easy  pegs     10# Placed 9 pegs, took out 25 pegs b/l    round pegs Cylinder pegs yellow pin all in R /out L with Red pin       placed 20 pegs ea hand   Modalities    9/28/2022  10/03/2022  10/5/2022

## 2022-10-13 ENCOUNTER — OFFICE VISIT (OUTPATIENT)
Dept: UROLOGY | Facility: CLINIC | Age: 72
End: 2022-10-13
Payer: MEDICARE

## 2022-10-13 VITALS — DIASTOLIC BLOOD PRESSURE: 80 MMHG | HEART RATE: 68 BPM | SYSTOLIC BLOOD PRESSURE: 110 MMHG

## 2022-10-13 DIAGNOSIS — R39.9 UTI SYMPTOMS: ICD-10-CM

## 2022-10-13 DIAGNOSIS — N40.1 BENIGN PROSTATIC HYPERPLASIA WITH URINARY FREQUENCY: ICD-10-CM

## 2022-10-13 DIAGNOSIS — C68.9 UROTHELIAL CANCER (HCC): ICD-10-CM

## 2022-10-13 DIAGNOSIS — R35.0 BENIGN PROSTATIC HYPERPLASIA WITH URINARY FREQUENCY: ICD-10-CM

## 2022-10-13 DIAGNOSIS — R35.0 URINARY FREQUENCY: ICD-10-CM

## 2022-10-13 DIAGNOSIS — C67.9 MALIGNANT NEOPLASM OF URINARY BLADDER, UNSPECIFIED SITE (HCC): ICD-10-CM

## 2022-10-13 DIAGNOSIS — C79.10 METASTATIC UROTHELIAL CARCINOMA (HCC): Primary | ICD-10-CM

## 2022-10-13 DIAGNOSIS — C65.2 CANCER OF LEFT RENAL PELVIS (HCC): ICD-10-CM

## 2022-10-13 DIAGNOSIS — N32.81 OVERACTIVE BLADDER: ICD-10-CM

## 2022-10-13 LAB
POST-VOID RESIDUAL VOLUME, ML POC: 76 ML
SL AMB  POCT GLUCOSE, UA: NORMAL
SL AMB LEUKOCYTE ESTERASE,UA: NORMAL
SL AMB POCT BILIRUBIN,UA: NORMAL
SL AMB POCT BLOOD,UA: NORMAL
SL AMB POCT CLARITY,UA: CLEAR
SL AMB POCT COLOR,UA: YELLOW
SL AMB POCT KETONES,UA: NORMAL
SL AMB POCT NITRITE,UA: NORMAL
SL AMB POCT PH,UA: 6.5
SL AMB POCT SPECIFIC GRAVITY,UA: 1
SL AMB POCT URINE PROTEIN: NORMAL
SL AMB POCT UROBILINOGEN: 0.2

## 2022-10-13 PROCEDURE — 81002 URINALYSIS NONAUTO W/O SCOPE: CPT | Performed by: NURSE PRACTITIONER

## 2022-10-13 PROCEDURE — 99214 OFFICE O/P EST MOD 30 MIN: CPT | Performed by: NURSE PRACTITIONER

## 2022-10-13 PROCEDURE — 51798 US URINE CAPACITY MEASURE: CPT | Performed by: NURSE PRACTITIONER

## 2022-10-13 RX ORDER — CIPROFLOXACIN 500 MG/1
500 TABLET, FILM COATED ORAL EVERY 12 HOURS
COMMUNITY
Start: 2022-10-07 | End: 2022-12-29 | Stop reason: ALTCHOICE

## 2022-10-13 RX ORDER — TAMSULOSIN HYDROCHLORIDE 0.4 MG/1
0.4 CAPSULE ORAL
Qty: 30 CAPSULE | Refills: 12 | Status: SHIPPED | OUTPATIENT
Start: 2022-10-13

## 2022-10-13 NOTE — ASSESSMENT & PLAN NOTE
· S/p robot assisted left nephroureterectomy April 2020  · pT3 N0 with invasion in the peripelvic fat, also had metastatic lymphadenopathy for which he received palliative radiation immunotherapy  · Recent PET scan with increased activity  · Cystoscopy 12/20/2021 with normal bladder no mucosal lesions  · Follow-up with heme Onc as scheduled

## 2022-10-13 NOTE — ASSESSMENT & PLAN NOTE
· Likely multifactorial  · Repeat urine culture  · Continue Flomax 0 4 mg daily  · Increase hydration with water  · Avoid bladder irritants  · Trial of Myrbetriq 25 mg daily  · Declined pelvic floor physical therapy  · PVR 76 mL  · Follow-up 3 months for recheck

## 2022-10-13 NOTE — PATIENT INSTRUCTIONS
BEHAVIORAL THERAPY FOR IMPROVED BLADDER CONTROL      1  Urge Control Techniques       A  Stop whatever you are doing and concentrate on fady your pelvic floor muscles  B   Contract your pelvic floor muscles repetitively (as in your "flick" exercises)  C   Once the urgency has subsided, realize that sometimes the urgency is because you have a             full bladder and have to urinate  Other times the urgency is a false signal and you do not have a full bladder  D  If you have urgency triggered by running water, "key in the door," getting up from a sitting position, etc , contract your pelvic floor muscles prior to       initiating the activity  2   Daily Fluid Intake       A  Avoid drinking too little (less than 3 cups/day) or drinking too much (more than 6             cups/day)  B   Avoid caffeinated beverages  C   If voiding frequently at night, limit your liquid intake after 7 p m  3   Bowel Regularity--Constipation Makes Bladder Symptoms Worse       A  Drink enough liquids, eat plenty of high fiber food  B  Exercise       C  Avoid laxatives and enemas on a regular basis, this decreases the bowel's normal function  4   Voiding Schedules       A  Empty your bladder at 1½ to 2 hour intervals even if you may not have the urge to urinate             at that time  You may gradually increase the time between voidings to 30  minutes, until you can comfortably void every 3 hours  B  If you are voiding more frequently than every 1½ to 2 hours, resist the urge to go  by using urge control techniques (described in 1 )

## 2022-10-13 NOTE — PROGRESS NOTES
Assessment and plan:     Metastatic urothelial carcinoma (HCC)  · S/p robot assisted left nephroureterectomy April 2020  · pT3 N0 with invasion in the peripelvic fat, also had metastatic lymphadenopathy for which he received palliative radiation immunotherapy  · Recent PET scan with increased activity  · Cystoscopy 12/20/2021 with normal bladder no mucosal lesions  · Follow-up with heme Onc as scheduled    Urinary frequency  · Likely multifactorial  · Repeat urine culture  · Continue Flomax 0 4 mg daily  · Increase hydration with water  · Avoid bladder irritants  · Trial of Myrbetriq 25 mg daily  · Declined pelvic floor physical therapy  · PVR 76 mL  · Follow-up 3 months for recheck    UTI symptoms  · Send urine culture        Isauro Chelsea    History of Present Illness     Juan José Pereira is a 67 y o  male patient of Dr Eitan Bowling with a history of  left upper tract urothelial carcinoma status post robot assisted left nephro ureterectomy April 2020; pathology pT3b with invasion into the mirian pelvic fat  Also had metastatic lymphadenopathy received palliative radiation and immunotherapy for the same  He was receiving chemotherapy which was discontinued due to side effect of NIKUNJ, neuropathy and hearing loss after 1 cycle  A PET scan showed progressive lymphadenopathy he was treated with Keytruda and then more recently adjusted to [facility] which he remains on currently  Has completed 7 of 13 cycles for the year  Received radiation to abdominal rectus/wall lesion as well  PET scans initially showed improved response  Cystoscopy performed in December 2021 showed a normal bladder with no mucosal lesions or drop lesions    He had a recent PET scan 09/26/2022 that revealed mild FDG activity associated with stable in size subcutaneous soft tissue density posterior to the right shoulder which slightly increased in FDG activity since prior exam, decreasing prominence and possibly metabolically resolved cystic intramuscular lesion about the proximal right upper extremity and nonspecific patchy FDG activity with patient's known enlarged prostate gland of uncertain clinical significance  He has since followed up with Dr Johnathan Bragg and is considering restarting treatment with enfortumab vedotin  He has ongoing neuropathy and leg swelling  Was also reporting erectile dysfunction and Viagra was no longer effective  He was started on  Elavil 20 milligrams as needed  He initially did not fill that prescription due to cost of medication  His last good erection was approximately 2 years ago  The Cialis 20 mg on demand is also slightly effective but not strong enough for penetration  He is not sure if he is interested in IC injections at this time  He will let us know  He has to urinate every hour and 15 minutes  He is having incontinence as well  This is urge incontinence  This has been ongoing for at least a month  Denies constipation  He takes magnesium  He is currently on flomax  He feels his urinary symptoms have worsened since April  He had MRI of his spine that was stable  He drinks 2-3 glasses of water mixed with cranberry juice and 2 small mugs of coffee every morning  He also gets hydration infusions on Tuesday and Friday  He feels that this causes his swelling and walking to worsen  He is currently using a walker  He had a negative urine culture and microscopic  He try doubling up on the tamsulosin but did not notice any improvement of his urinary symptoms        Laboratory     Lab Results   Component Value Date    BUN 25 10/07/2022    CREATININE 1 23 10/07/2022       No components found for: GFR    Lab Results   Component Value Date    CALCIUM 9 4 10/07/2022    K 4 2 10/07/2022    CO2 31 10/07/2022     10/07/2022       Lab Results   Component Value Date    WBC 7 38 10/07/2022    HGB 13 3 10/07/2022    HCT 42 2 10/07/2022     (H) 10/07/2022     10/07/2022     Non-gynecologic Cytology                          Case: [id number]                                   Authorizing Provider: Ankita Wise MD   Collected:           12/07/2021 1320               Ordering Location:     [facility]        Received:            12/07/2021 1320                                      Urology [facility]                                                              Pathologist:           Jo Ann Roberson MD                                                            Specimen:    Urine, Other                                                                               Final Diagnosis   A  Urine, Other, :  Negative for high grade urothelial carcinoma ([address]) - see comment  Rare reactive urothelial and squamous cells  Few casts in a background of abundant neutrophils  Fungal organisms  Surgical Pathology Report                         Case: [id number]                                    Authorizing Provider: Lyly Hernandez MD       Collected:           08/09/2021 0855               Ordering Location:     [facility]  Received:            08/09/2021 1354                                      [place]                                                                     Pathologist:           Angelica Merida MD                                                            Specimen:    back                                                                                       Final Diagnosis   A  Skin, Upper Back, Excision:  - Metastatic high grade carcinoma consistent with the patient's known urothelial primary  See note     Electronically signed by aMr Hendricks MD on 8/17/2021 at  5:19 PM     Surgical Pathology Report                         Case: [id number]                                    Authorizing Provider: Ankita Wise MD   Collected:           04/01/2020 1048               Ordering Location:     [facility]        Received:            04/01/2020 1208                                      [facility]                                                      Pathologist:           Kalli Spangler MD                                                                  Specimen:    Kidney, Left, left kidney, ureter and bladder cuff                                         Addendum   At the request of Dr Ingrid Alaniz, unstained slides from paraffin BLOCK A 12 containing the patient's cancer cells were sent to [address] [place] [address] for [facility] testing  Upon completion of testing, the [address] [place] [address] report will be directly sent to the requesting physician as well as posted in the Media Tab of the patient's [employer] EMR by the [facility] [place] [address] Pathology Department      Please note: The [facility]'s analysis and report is performed independently of [facility], and neither the Hospital nor the Pathology Department screen, review or comment upon [address] [place] [address] report  Because the role of testing in cancer diagnosis and management is subject to evolving development, usage and interpretation, we strongly urge that the test limitations described in the [facility] report be carefully read, appropriately shared with the patient, and critically considered when reaching treatment decisions      This pathology material was removed from archive for purpose of molecular testing         Addendum electronically signed by Kalli Spangler MD on 10/28/2020 at  5:03 PM   Final Diagnosis   A  Left kidney, ureter, and bladder cuff, nephroureterectomy:  - Invasive high grade urothelial carcinoma arising in renal pelvis  - Bladder cuff margin is negative for carcinoma and no evidence of high grade dysplasia  - Ureters with no significant pathologic abnormality  - Two benign simple cysts  - Adrenal gland is negative for malignancy  - One lymph node, negative for malignancy (0/1)     Intradepartmental consultation is in agreement    If clinically indicated, the most appropriate tissue block(s) for ancillary testing are block(s):  A12, A13      Immunohistochemical stains performed (on blocks A1, A4) with appropriate controls show the urothelium to be positive for CD44 negative for CK20, supporting the diagnosis         Electronically signed by Taylor Ross MD on 4/6/2020 at  9:33 AM   Note    Interpretation performed at [facility], [address] [place] [address]      Additional Information    All reported additional testing was performed with appropriately reactive controls   These tests were developed and their performance characteristics determined by [facility] or appropriate performing facility, though some tests may be performed on tissues which have not been validated for performance characteristics (such as staining performed on alcohol exposed cell blocks and decalcified tissues)   Results should be interpreted with caution and in the context of the patients’ clinical condition  These tests may not be cleared or approved by the [place]  Food and Drug Administration, though the FDA has determined that such clearance or approval is not necessary  These tests are used for clinical purposes and they should not be regarded as investigational or for research  This laboratory has been approved by [facility], designated as a high-complexity laboratory and is qualified to perform these tests        Synoptic Checklist     RENAL PELVIS AND URETER: Resection  8th Edition - Protocol posted: 8/28/2019  UreterRenalPelvis - All Specimens  SPECIMEN   Procedure  Nephroureterectomy    Specimen Laterality  Left    TUMOR   Tumor Site  Renal pelvis    Histologic Type  Papillary urothelial carcinoma, invasive    Histologic Grade  High-grade    Tumor Size  Greatest Dimension (Centimeters): 4 5 cm   Additional Dimension (Centimeters)  2 2 cm     1 7 cm   Tumor Extension  Tumor invades beyond muscularis into periureteral fat or peripelvic fat or the renal parenchyma Lymphovascular Invasion  Not identified    Tumor Configuration  Papillary    MARGINS   Margins  Uninvolved by invasive carcinoma and carcinoma in situ / noninvasive urothelial carcinoma    LYMPH NODES   Number of Lymph Nodes Involved  0    Number of Lymph Nodes Examined  1    PATHOLOGIC STAGE CLASSIFICATION (pTNM, AJCC 8th Edition)   Primary Tumor (pT)  pT3    Regional Lymph Nodes (pN)  pN0    ADDITIONAL FINDINGS   Associated Epithelial Lesions  Urothelial dysplasia    Additional Findings  Inflammation / regenerative changes    Pathologic Findings in Ipsilateral Nonneoplastic Renal Tissue  Inflammation (type): Chronic intestitial inflammation      No results found for: PSA    Recent Results (from the past 1 hour(s))   POCT urine dip    Collection Time: 10/13/22  1:59 PM   Result Value Ref Range    LEUKOCYTE ESTERASE,UA neg     NITRITE,UA neg     SL AMB POCT UROBILINOGEN 0 2     POCT URINE PROTEIN neg      PH,UA 6 5     BLOOD,UA neg     SPECIFIC GRAVITY,UA 1 005     KETONES,UA neg     BILIRUBIN,UA neg     GLUCOSE, UA neg      COLOR,UA yellow     CLARITY,UA clear    POCT Measure PVR    Collection Time: 10/13/22  2:33 PM   Result Value Ref Range    POST-VOID RESIDUAL VOLUME, ML POC 76 mL       @RESULT(URINEMICROSCOPIC)@    @RESULT(URINECULTURE)@    Radiology     PET/CT SCAN 09/26/2022     INDICATION:  C79 10: Secondary malignant neoplasm of unspecified urinary organs  C67 8: Malignant neoplasm of overlapping sites of bladder   , restaging     The following clinical information [facility]: dx 1/2020 high grade urothelial carcinoma S/P (L) robotic assisted laparoscopic nephroureterectomy with bladder cuff excision  S/P chemo and radiation     MODIFIER: PS      COMPARISON: PET/CT dated 6/22/2022     CELL TYPE:  High grade urothelial carcinoma     TECHNIQUE:   8 6 mCi F-18-FDG administered IV   Multiplanar attenuation corrected and non attenuation corrected PET images were acquired 60 minutes post injection  Contiguous, low dose, axial CT sections were obtained from the skull base through the   femurs   Intravenous contrast material was not utilized  This examination, like all CT scans performed in the [facility], was performed utilizing techniques to minimize radiation dose exposure, including the use of iterative   reconstruction and automated exposure control       Fasting serum glucose: 105 mg/dl     FINDINGS:      VISUALIZED BRAIN:     No acute abnormalities are seen      HEAD/NECK:     There is a physiologic distribution of FDG  No FDG avid cervical adenopathy is seen  CT images: Intracranial atherosclerotic calcification is noted      CHEST:     On image 58 of series 4, there is mild FDG activity associated with stable in size (1 8 x 0 7 cm) subcutaneous soft tissue density involving the right upper posterior chest wall at the level of the scapula/humeral head with max SUV of 2 4, previously   1 8        Previously noted cystic intramuscular lesion involving the proximal right upper extremity is best seen on image 34 where there is a slight rim of FDG activity with max SUV of 1 9, previously 2 6  CT images: Right-sided chest port extends to the distal superior vena cava  Atherosclerotic vascular calcifications including those of the coronary arteries are noted  Cardiomegaly  Respiratory motion artifact limits evaluation for lung nodules on   low-dose unenhanced CT      ABDOMEN:     No FDG avid soft tissue lesions are seen  CT images: Stable low-density hepatic lesions  Tiny nonobstructing right renal calculus  Status post left nephrectomy  Atherosclerotic vascular calcifications are noted  Diverticulosis coli  Bowel containing nonobstructing left spiculated hernia      PELVIS:   Mild nonspecific patchy FDG activity involving the enlarged prostate gland of uncertain clinical significance  CT images: Unremarkable      OSSEOUS STRUCTURES:  No FDG avid lesions are seen    CT images: Degenerative changes are noted involving the spine      IMPRESSION:     1  Mild FDG activity associated with stable in size subcutaneous soft tissue density posterior to the right shoulder which has slightly increased in FDG avidity since prior exam   Findings could reflect viable malignancy with underlying inflammatory   process not excluded      2   Decreasing in prominence and possibly metabolically resolved cystic intramuscular lesion about the proximal right upper extremity      3  Mild nonspecific patchy FDG activity associated with patient's known enlarged prostate gland of uncertain clinical significance      Please see above for additional findings         Review of Systems     Review of Systems   Constitutional: Negative for activity change, appetite change, chills, fatigue, fever and unexpected weight change  HENT: Negative for facial swelling  Eyes: Negative for discharge  Respiratory: Negative  Negative for cough and shortness of breath  Cardiovascular: Negative for chest pain and leg swelling  Gastrointestinal: Negative  Negative for abdominal distention, abdominal pain, constipation, diarrhea, nausea and vomiting  Endocrine: Negative  Genitourinary: Positive for frequency and urgency  Negative for decreased urine volume, difficulty urinating, dysuria, enuresis, flank pain, genital sores and hematuria  Musculoskeletal: Positive for back pain and gait problem  Negative for myalgias  Skin: Negative for pallor and rash  Allergic/Immunologic: Negative  Negative for immunocompromised state  Neurological: Negative for facial asymmetry and speech difficulty  Neuropathy   Psychiatric/Behavioral: Negative for agitation and confusion  AUA SYMPTOM SCORE    Flowsheet Row Most Recent Value   AUA SYMPTOM SCORE    How often have you had a sensation of not emptying your bladder completely after you finished urinating?  4   How often have you had to urinate again less than two hours after you finished urinating? 5   How often have you found you stopped and started again several times when you urinate? 3   How often have you found it difficult to postpone urination? 1   How often have you had a weak urinary stream? 1   How often have you had to push or strain to begin urination? 1   How many times did you most typically get up to urinate from the time you went to bed at night until the time you got up in the morning? 4   Quality of Life: If you were to spend the rest of your life with your urinary condition just the way it is now, how would you feel about that? 6   AUA SYMPTOM SCORE 19                Allergies     Allergies   Allergen Reactions   • Poison Ivy Extract Rash       Physical Exam     Physical Exam  Vitals reviewed  Constitutional:       General: He is not in acute distress  Appearance: Normal appearance  He is normal weight  He is not ill-appearing, toxic-appearing or diaphoretic  HENT:      Head: Normocephalic and atraumatic  Eyes:      General: No scleral icterus  Cardiovascular:      Rate and Rhythm: Normal rate  Pulmonary:      Effort: Pulmonary effort is normal  No respiratory distress  Abdominal:      General: Abdomen is flat  There is no distension  Palpations: Abdomen is soft  Tenderness: There is no abdominal tenderness  There is no guarding or rebound  Musculoskeletal:         General: No swelling  Cervical back: Normal range of motion  Right lower leg: Edema present  Left lower leg: Edema present  Skin:     General: Skin is warm and dry  Coloration: Skin is not jaundiced or pale  Findings: No rash  Neurological:      General: No focal deficit present  Mental Status: He is alert and oriented to person, place, and time  Gait: Gait abnormal    Psychiatric:         Mood and Affect: Mood normal          Behavior: Behavior normal          Thought Content:  Thought content normal          Judgment: Judgment normal          Vital Signs     Vitals:    10/13/22 1330   BP: 110/80   Pulse: 68       Current Medications       Current Outpatient Medications:   •  acetaminophen (TYLENOL) 500 mg tablet, Take 2 tablets (1,000 mg total) by mouth every 8 (eight) hours, Disp: , Rfl:   •  allopurinol (ZYLOPRIM) 300 mg tablet, take 1 tablet by mouth once daily, Disp: 30 tablet, Rfl: 3  •  amitriptyline (ELAVIL) 25 mg tablet, Take 25 mg by mouth daily at bedtime, Disp: , Rfl:   •  atorvastatin (LIPITOR) 80 mg tablet, Take 80 mg by mouth every other day evening, Disp: , Rfl:   •  capsicum (ZOSTRIX) 0 075 % topical cream, Apply topically 3 (three) times a day, Disp: 28 3 g, Rfl: 0  •  DULoxetine (CYMBALTA) 60 mg delayed release capsule, take 1 capsule by mouth once daily, Disp: 30 capsule, Rfl: 1  •  folic acid (FOLVITE) 1 mg tablet, take 1 tablet by mouth once daily, Disp: 90 tablet, Rfl: 4  •  gabapentin (NEURONTIN) 100 mg capsule, Take 4 capsules (400 mg total) by mouth daily at bedtime, Disp: 120 capsule, Rfl: 2  •  Magnesium 250 MG TABS, 1 tablet 2 (two) times a day Taking 500mg in the morning and 500mg at night, Disp: , Rfl:   •  metoprolol tartrate (LOPRESSOR) 100 mg tablet, Take 50 mg by mouth daily, Disp: , Rfl:   •  Mirabegron ER 25 MG TB24, Take 25 mg by mouth in the morning, Disp: 30 tablet, Rfl: 3  •  naloxone (NARCAN) 4 mg/0 1 mL nasal spray, Administer 1 spray into a nostril   If breathing does not return to normal or if breathing difficulty resumes after 2-3 minutes, give another dose in the other nostril using a new spray , Disp: 1 each, Rfl: 1  •  predniSONE 10 mg tablet, Take 2 tablets (20 mg total) by mouth daily, Disp: 60 tablet, Rfl: 3  •  tadalafil (CIALIS) 20 MG tablet, Take one tablet by mouth one hour before sexual activity, Disp: 15 tablet, Rfl: 3  •  tamsulosin (FLOMAX) 0 4 mg, Take 1 capsule (0 4 mg total) by mouth daily with dinner, Disp: 30 capsule, Rfl: 12  •  VITAMIN D PO, Take 1,000 Units by mouth daily , Disp: , Rfl:   •  zolpidem (AMBIEN) 5 mg tablet, Take 1 tablet (5 mg total) by mouth daily at bedtime as needed for sleep, Disp: 30 tablet, Rfl: 1  •  ALPRAZolam (XANAX) 0 25 mg tablet, Take 1 tablet (0 25 mg total) by mouth daily at bedtime as needed for anxiety (Patient not taking: Reported on 10/13/2022), Disp: 30 tablet, Rfl: 0  •  ciprofloxacin (CIPRO) 500 mg tablet, Take 500 mg by mouth every 12 (twelve) hours, Disp: , Rfl:   •  colchicine (COLCRYS) 0 6 mg tablet, take 1 tablet by mouth twice a day if needed for FOOT PAIN (Patient not taking: Reported on 10/13/2022), Disp: , Rfl:   •  docusate sodium (COLACE) 250 MG capsule, Take 1 capsule (250 mg total) by mouth daily (Patient not taking: Reported on 10/13/2022), Disp: 60 capsule, Rfl: 6  •  HYDROcodone-acetaminophen (NORCO) 5-325 mg per tablet, Take 1 tablet by mouth every 6 (six) hours as needed for pain Max Daily Amount: 4 tablets (Patient not taking: Reported on 10/13/2022), Disp: 120 tablet, Rfl: 0  •  nystatin (MYCOSTATIN) cream, Apply topically 2 (two) times a day (Patient not taking: Reported on 10/13/2022), Disp: 30 g, Rfl: 0  •  senna (SENOKOT) 8 6 mg, Take 1 tablet (8 6 mg total) by mouth daily at bedtime (Patient not taking: Reported on 10/13/2022), Disp: 30 each, Rfl: 0    Active Problems     Patient Active Problem List   Diagnosis   • Lumbar radiculopathy   • Lumbar disc herniation   • Lumbar spondylosis   • Urothelial cancer [facility])   • Essential hypertension   • Sinus bradycardia   • Hyperlipidemia   • Cancer of left renal pelvis (HCC)   • Drug-induced neutropenia (HCC)   • Chronic pain disorder   • Spinal stenosis of lumbar region   • Encounter for central line care   • Hyperuricemia   • Encounter for long-term opiate analgesic use   • Uncomplicated opioid dependence ([address] )   • Palliative care patient   • Decreased appetite   • Normocytic anemia   • 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)   • Thrombophlebitis of superficial veins of right lower extremity   • Renal dysfunction   • Stage 3a chronic kidney disease (HCC)   • Hypomagnesemia   • Platelets decreased (HCC)   • H/O left nephrectomy   • Chronic kidney disease-mineral and bone disorder   • Vitamin D deficiency   • Dehydration   • Chronic right shoulder pain   • Primary osteoarthritis of right shoulder   • Metastatic urothelial carcinoma (HCC)   • Drug induced constipation   • Secondary malignant neoplasm of bone (HCC)   • Cancer associated pain   • 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)   • Early satiety   • Dysgeusia   • Neuropathy   • Chronic bilateral low back pain without sciatica   • Chemotherapy-induced neuropathy (HCC)   • HERRERA (dyspnea on exertion)   • Paroxysmal atrial fibrillation (HCC)   • Sacroiliitis (HCC)   • Urinary frequency   • Overactive bladder   • UTI symptoms       Past Medical History     Past Medical History:   Diagnosis Date   • Arthritis    • Bladder cancer    • [facility]     skin melanoma;basal cell   • Chronic pain disorder     from arthritis   • Colon polyp    • Does use hearing aid     bilat   • Dry eyes, bilateral    • GERD (gastroesophageal reflux disease)    • History of kidney stones 10/2019   • History of partial knee replacement     bilat   • History of vertigo    • Hyperlipidemia    • Hypertension    • Infusion extravasation of chemotherapy vesicant 11/2021   • Kidney lesion    • Lumbar disc disorder     compression of vertebrae L4-5-6   • Muscle weakness     left hip area   • Renal mass     left   • Right ankle injury 03/05/2020    missed step of ladder    • Right ankle pain    • Shortness of breath     with activity   • Tinnitus    • Urothelial cancer     left   • Wears glasses        Surgical History     Past Surgical History:   Procedure Laterality Date   • COLONOSCOPY     • CYSTOSCOPY Left 4/1/2020    Procedure: CYSTOSCOPY; URETERAL CATHETER PLACEMENT;  Surgeon: Enma Reagan MD;  Location[facility]; Service: Urology   • CYSTOSCOPY  2020   • CYSTOSCOPY  2021   • FL CYSTOGRAM  2020   • FL RETROGRADE PYELOGRAM  2020   • HERNIA REPAIR      umbilical with mesh   • INGUINAL HERNIA REPAIR Right     with mesh   • IR PORT PLACEMENT  2020   • JOINT REPLACEMENT Bilateral     partials knee   • LUMBAR EPIDURAL INJECTION     • UT CYSTOURETHROSCOPY,URETER CATHETER Bilateral 2020    Procedure: CYSTOSCOPY; RIGHT RETROGRADE PYELOGRAM WITH RIGHT URETERAL CYTOLOGY SAMPLING; LEFT URETEROSCOPY WITH RENAL PELVIS BIOPSY AND LEFT STENT PLACEMENT;  Surgeon: Martita Calle MD;  [facility];  Service: Urology   • UT NEPHRECTOMY, W/PART   URETECTOMY Left 2020    Procedure: ROBOTIC LAPAROSCOPIC NEPHRO-URETERECTOMY;  Surgeon: Martita Calle MD;  [facility];  Service: Urology   • SKIN CANCER EXCISION      surface melanoma   • TONSILLECTOMY     • WISDOM TOOTH EXTRACTION         Family History     Family History   Problem Relation Age of Onset   • Liver cancer Father        Social History     Social History     Social History     Tobacco Use   Smoking Status Former Smoker   • Types: Cigarettes   • Quit date:    • Years since quittin 8   Smokeless Tobacco Never Used       Past Surgical History:   Procedure Laterality Date   • COLONOSCOPY     • CYSTOSCOPY Left 2020    Procedure: CYSTOSCOPY; URETERAL CATHETER PLACEMENT;  Surgeon: Martita Calle MD;  [facility];  Service: Urology   • CYSTOSCOPY  2020   • CYSTOSCOPY  2021   • FL CYSTOGRAM  2020   • FL RETROGRADE PYELOGRAM  2020   • HERNIA REPAIR      umbilical with mesh   • INGUINAL HERNIA REPAIR Right     with mesh   • IR PORT PLACEMENT  2020   • JOINT REPLACEMENT Bilateral     partials knee   • LUMBAR EPIDURAL INJECTION     • UT CYSTOURETHROSCOPY,URETER CATHETER Bilateral 2020    Procedure: CYSTOSCOPY; RIGHT RETROGRADE PYELOGRAM WITH RIGHT URETERAL CYTOLOGY SAMPLING; LEFT URETEROSCOPY WITH RENAL PELVIS BIOPSY AND LEFT STENT PLACEMENT;  Surgeon: Neema Douglas MD;  [facility];  Service: Urology   • MS NEPHRECTOMY, W/PART  URETECTOMY Left 4/1/2020    Procedure: ROBOTIC LAPAROSCOPIC NEPHRO-URETERECTOMY;  Surgeon: Neema Douglas MD;  [facility];  Service: Urology   • SKIN CANCER EXCISION      surface melanoma   • TONSILLECTOMY     • WISDOM TOOTH EXTRACTION           The following portions of the patient's history were reviewed and updated as appropriate: allergies, current medications, past family history, past medical history, past social history, past surgical history and problem list    Please note :  Voice dictation software has been used to create this document  There may be inadvertent transcription errors      [id number] [address]

## 2022-10-14 ENCOUNTER — HOSPITAL ENCOUNTER (OUTPATIENT)
Dept: INFUSION CENTER | Facility: HOSPITAL | Age: 72
End: 2022-10-14
Attending: INTERNAL MEDICINE
Payer: MEDICARE

## 2022-10-14 VITALS
OXYGEN SATURATION: 98 % | DIASTOLIC BLOOD PRESSURE: 63 MMHG | HEART RATE: 101 BPM | RESPIRATION RATE: 16 BRPM | SYSTOLIC BLOOD PRESSURE: 115 MMHG | TEMPERATURE: 97.2 F

## 2022-10-14 DIAGNOSIS — E86.0 DEHYDRATION: ICD-10-CM

## 2022-10-14 DIAGNOSIS — C79.10 METASTATIC UROTHELIAL CARCINOMA (HCC): Primary | ICD-10-CM

## 2022-10-14 LAB
ALBUMIN SERPL BCP-MCNC: 3.6 G/DL (ref 3.5–5)
ALP SERPL-CCNC: 55 U/L (ref 34–104)
ALT SERPL W P-5'-P-CCNC: 63 U/L (ref 7–52)
ANION GAP SERPL CALCULATED.3IONS-SCNC: 10 MMOL/L (ref 4–13)
AST SERPL W P-5'-P-CCNC: 26 U/L (ref 13–39)
BASOPHILS # BLD AUTO: 0.01 THOUSANDS/ΜL (ref 0–0.1)
BASOPHILS NFR BLD AUTO: 0 % (ref 0–1)
BILIRUB SERPL-MCNC: 0.95 MG/DL (ref 0.2–1)
BUN SERPL-MCNC: 29 MG/DL (ref 5–25)
CALCIUM SERPL-MCNC: 9.1 MG/DL (ref 8.4–10.2)
CHLORIDE SERPL-SCNC: 102 MMOL/L (ref 96–108)
CO2 SERPL-SCNC: 29 MMOL/L (ref 21–32)
CREAT SERPL-MCNC: 1.4 MG/DL (ref 0.6–1.3)
EOSINOPHIL # BLD AUTO: 0.02 THOUSAND/ΜL (ref 0–0.61)
EOSINOPHIL NFR BLD AUTO: 0 % (ref 0–6)
ERYTHROCYTE [DISTWIDTH] IN BLOOD BY AUTOMATED COUNT: 15.6 % (ref 11.6–15.1)
GFR SERPL CREATININE-BSD FRML MDRD: 49 ML/MIN/1.73SQ M
GLUCOSE SERPL-MCNC: 109 MG/DL (ref 65–140)
HCT VFR BLD AUTO: 41.6 % (ref 36.5–49.3)
HGB BLD-MCNC: 13.5 G/DL (ref 12–17)
IMM GRANULOCYTES # BLD AUTO: 0.1 THOUSAND/UL (ref 0–0.2)
IMM GRANULOCYTES NFR BLD AUTO: 1 % (ref 0–2)
LYMPHOCYTES # BLD AUTO: 1.33 THOUSANDS/ΜL (ref 0.6–4.47)
LYMPHOCYTES NFR BLD AUTO: 14 % (ref 14–44)
MAGNESIUM SERPL-MCNC: 1.7 MG/DL (ref 1.9–2.7)
MCH RBC QN AUTO: 33 PG (ref 26.8–34.3)
MCHC RBC AUTO-ENTMCNC: 32.5 G/DL (ref 31.4–37.4)
MCV RBC AUTO: 102 FL (ref 82–98)
MONOCYTES # BLD AUTO: 0.89 THOUSAND/ΜL (ref 0.17–1.22)
MONOCYTES NFR BLD AUTO: 9 % (ref 4–12)
NEUTROPHILS # BLD AUTO: 7.23 THOUSANDS/ΜL (ref 1.85–7.62)
NEUTS SEG NFR BLD AUTO: 76 % (ref 43–75)
NRBC BLD AUTO-RTO: 0 /100 WBCS
PLATELET # BLD AUTO: 164 THOUSANDS/UL (ref 149–390)
PMV BLD AUTO: 9.8 FL (ref 8.9–12.7)
POTASSIUM SERPL-SCNC: 4.1 MMOL/L (ref 3.5–5.3)
PROT SERPL-MCNC: 5.9 G/DL (ref 6.4–8.4)
RBC # BLD AUTO: 4.09 MILLION/UL (ref 3.88–5.62)
SODIUM SERPL-SCNC: 141 MMOL/L (ref 135–147)
WBC # BLD AUTO: 9.58 THOUSAND/UL (ref 4.31–10.16)

## 2022-10-14 PROCEDURE — 85025 COMPLETE CBC W/AUTO DIFF WBC: CPT | Performed by: NURSE PRACTITIONER

## 2022-10-14 PROCEDURE — 96360 HYDRATION IV INFUSION INIT: CPT

## 2022-10-14 PROCEDURE — 83735 ASSAY OF MAGNESIUM: CPT | Performed by: NURSE PRACTITIONER

## 2022-10-14 PROCEDURE — 80053 COMPREHEN METABOLIC PANEL: CPT | Performed by: NURSE PRACTITIONER

## 2022-10-14 RX ADMIN — SODIUM CHLORIDE 1000 ML: 0.9 INJECTION, SOLUTION INTRAVENOUS at 08:54

## 2022-10-14 NOTE — PROGRESS NOTES
Central labs drawn per orders  Patient tolerated IV hydration without issues  AVS given to patient  Patient taken in wheelchair off unit without incident  All personal belongings taken with patient

## 2022-10-17 ENCOUNTER — EVALUATION (OUTPATIENT)
Dept: OCCUPATIONAL THERAPY | Facility: CLINIC | Age: 72
End: 2022-10-17
Payer: MEDICARE

## 2022-10-17 DIAGNOSIS — R53.1 GENERALIZED WEAKNESS: Primary | ICD-10-CM

## 2022-10-17 PROCEDURE — 97168 OT RE-EVAL EST PLAN CARE: CPT

## 2022-10-17 PROCEDURE — 97110 THERAPEUTIC EXERCISES: CPT

## 2022-10-17 NOTE — PROGRESS NOTES
Daily Note     Today's date: 10/17/2022  Patient name: Philip Hinton  : 1950  MRN: [id number]  Referring provider: RAGHU Yu  Dx:   Encounter Diagnosis     ICD-10-CM    1  Generalized weakness  R53 1                   Subjective: No, I feel pretty good today other than the neuropathy  Objective: See treatment diary below      Assessment: Tolerated treatment well  Patient exhibited good technique with therapeutic exercises and would benefit from continued OT  Pt participated in skilled OT this date with focus on ROM, UE strength/endurance, GMC/GMS, FMC/FMS, and activity tolerance, for increased safety and engagement in ADL/IADL activities  Pt states he has no questions on various HEPs provided to pt for home use  Pt starting session 25 minutes late, with therapist adjusting as able  Pt states he feels like his hands are stronger and they loosen up and work better after a second set of the digiflex  Plan: Continue per plan of care  Progress treatment as tolerated  Progress treament per protocol           Manuals 10/12/2022 10/17/2022    10/03/2022  10/5/2022                                               Neuro Re-Ed  10/12/2022 10/17/2022    10/03/2022  10/5/2022                                                                                Ther Ex 10/12/2022 10/17/2022    10/03/2022  10/5/2022   Flex Bar   Bends   Twists   Bottle caps Yellow  X 25 sup x 15pro  X 20 each way Yellow  X 20 2 way  X 20 2 way  Yellow  X 20 2 way  X 20  X 20  Yellow  X 20 2 way  X 20  X 20   Digiflex  Red 2 x 20   Red x 20  Red x 20   Hand Power Pro  Green  X 20   Green x 20  Green x 20   Theraband   Ext  ADD  Triceps  ER  IR  Retraction   Bicep curls  ABD  PNF 1  PNF 2        Black x 20    Black x 20      Black x 20  Black x 20     UBE 5F/5B min L 1 5 5F/5B min L 1 5   5F/5B L 60  5F/5B L 60   Shoulder   Shrugs  Retractions  Rolls FW/BW           Weighted dowel suppro     1 weight  X 10/2 b/l  1 weight   x 10/2 b/l    Theraband  Sup/pro  Elbow flex  horziontal abd FF Provided HEP     pink      X 20  X 20   Interlaced Hand Stretch 10 sec x 5 10 sec x 5      Theraputty  Grasps  Pinches  Pulls  Intrinsic Squeez  Abduction Rodriguez - gave HEP  X 10 b/l  X 2 b/l  X 3 b/l  X 5 b/l  X 2 b/l       Ther Activity 10/12/2022 10/17/2022   10/03/2022  10/5/2022   Stereognosis           Tension pin poms  Yellow tension pin  Yellow tension pin b/l  Red tension pin   Bean and rice  Bowl search          Grooved pegs    R 1 peg placed  L unable to perform     MHG w/lg easy  pegs     10# Placed 9 pegs, took out 25 pegs b/l    round pegs Cylinder pegs yellow pin all in R /out L with Red pin      placed 20 pegs ea hand   Modalities     10/03/2022  10/5/2022

## 2022-10-17 NOTE — PROGRESS NOTES
OT Re-Evaluation     Today's date: 10/17/2022  Patient name: Nirmal Cardoso  : 1950  MRN: [id number]  Referring provider: RAGHU Vasquez  Dx:   Encounter Diagnosis     ICD-10-CM    1  Generalized weakness  R53 1        Start Time: 425  Stop Time: 050  Total time in clinic (min): 40 minutes    Assessment  Assessment details: Patient presenting with OP OT services with a dx of generalized weakness  Patient has R neuropathy and not having feeling in hands  He reports having radiation under R arm and shoulder  Patient reports having difficulty holding coffee cups and needing strength to push up out of a chair  Patient reports having treatment three weeks and then having one week off  Patient reports he believes the treatment is making his neuropathy worse  Patient reports being on medications as well  Patient reports treatments began 2 5 years ago  Patient reports weakness started 2 months ago  Patient is R handed  Patient reports R hand gives him more issues  Patient reports being limited in ADL activities and having to modify some things at home  Patient demonstrates with decreases in strength, [address] [place], and sensation  Patient reports having no services prior to OT for the hands  Patient was a  for 35 years  Re-assessment completed on 10/17/2022  Patient has consistently attended OP OT services since start of care  Patient has made steady progress towards established goals  Patient demonstrating with increases in activity tolerance, endurance and fine motor coordination  Patient has not met all established goals and will benefit from continued OT services to maximize level of function  Patient is going to [place] this Saturday for two weeks  Patient reports an improvement in overall function since start of care  Patient will take a break when in [place] but will return to therapy once returning         Impairments: abnormal coordination and impaired physical strength  Other impairment: abnormal FMC    Symptom irritability: moderateUnderstanding of Dx/Px/POC: good   Prognosis: good    Goals  STGs    Pt will increase  strength by 5-10#  - Not Met    Pt will increase shoulder strength by 1/2 grade  - Progressing    Pt will increase wrist and elbow strength by 1/2 grade  - Progressing    Pt will increase in [facility] as evident by an decrease in 9-hole peg test by 2 seconds  - Met    Pt will report a decrease in pain of no more than 4/10 with activity - Progressing    Independent with HEP  - Met    LTGs     Pt will increase  strength by an additional 5-10#  - Not Met    Pt will increase shoulder strength by 1-2 grade  - Progressing    Pt will report an increase in ADL/IADL participation  - Progressing    Pt will report a decrease in pain of no more than 2/10 with activity - Progressing    Pt will improve ADL function with a decrease in time to complete ADL vest  - Progressing      Plan  Plan details: Patient has Consistent attended OP OT services since start of care  Patient has made steady progress towards established goals  Pt has shown improvement start of care, demonstrating decreased pain, increased strength, increased [facility] and increased activity  However, pt remains with impairments including remaining [facility] and strength deficits, as well as remaining pain  Pt would benefit from continuation of skilled therapy services to further progress POC and address remaining deficits at this time  Thank you!       Patient would benefit from: custom splinting, OT eval and skilled occupational therapy  Referral necessary: Yes  Planned modality interventions: cryotherapy, manual electrical stimulation, TENS, thermotherapy: hydrocollator packs, thermotherapy: paraffin bath and ultrasound  Planned therapy interventions: coordination, fine motor coordination training, functional ROM exercises, home exercise program, joint mobilization, manual therapy, massage, patient education, self care, stretching, therapeutic activities and therapeutic exercise  Frequency: 2x week  Duration in visits: 8  Duration in weeks: 4  Treatment plan discussed with: patient        Subjective Evaluation    History of Present Illness  Onset date: 2 months ago  Mechanism of injury: Generalized Weakness          Not a recurrent problem   Quality of life: good    Pain  Current pain ratin  At best pain ratin  At worst pain ratin  Location: B/L Hands  Quality: sharp    Social Support  Lives with: spouse    Employment status: not working  Hand dominance: right      Diagnostic Tests  No diagnostic tests performed  Treatments  Previous treatment: physical therapy  Current treatment: physical therapy and occupational therapy  Patient Goals  Patient goals for therapy: increased strength, independence with ADLs/IADLs and decreased pain          Objective     Neurological Testing     Sensation     Shoulder     Right Shoulder   Paresthesia: light touch    Wrist/Hand   Left   Diminished: light touch and dynamic two point discrimination    Right   Diminished: light touch and dynamic two point discrimination    Additional Neurological Details  Patient was unable to consistently distinguish between one and two point discrimination       Active Range of Motion   Left Shoulder   Normal active range of motion    Right Shoulder   Normal active range of motion    Left Elbow   Normal active range of motion    Right Elbow   Normal active range of motion    Left Wrist   Normal active range of motion    Right Wrist   Normal active range of motion    Left Thumb   Opposition: WNL    Right Thumb   Opposition: WNL    Strength/Myotome Testing     Left Shoulder     Planes of Motion   Flexion: 4-   Extension: 4-   Abduction: 4-   Adduction: 4-     Right Shoulder     Planes of Motion   Flexion: 4-   Extension: 4-   Abduction: 4-   Adduction: 4-     Left Elbow   Flexion: 4-  Extension: 4-  Forearm supination: 4-  Forearm pronation: 4-    Right Elbow   Flexion: 4-  Extension: 4-  Forearm supination: 4-  Forearm pronation: 4-    Left Wrist/Hand   Wrist extension: 4-  Wrist flexion: 4-  Radial deviation: 4-  Ulnar deviation: 4-     (2nd hand position)     Trial 1: 30    Thumb Strength  Key/Lateral Pinch     Trial 1: 4  Tip/Two-Point Pinch     Trial 1: 2  Palmar/Three-Point Pinch     Trial 1: 2    Right Wrist/Hand   Wrist extension: 4-  Wrist flexion: 4-  Radial deviation: 4-  Ulnar deviation: 4-     (2nd hand position)     Trial 1: 10    Thumb Strength   Key/Lateral Pinch     Trial 1: 2  Tip/Two-Point Pinch     Trial 1: 1  Palmar/Three-Point Pinch     Trial 1: 2    Additional Strength Details  No significant change in  strength as compared to start of care  Neuro Exam:     Sensation   Light touch LE: left impaired and right impaired    Functional outcomes   Left 9 peg hole test: 41 97 (seconds)  Right 9 hole peg test: 52 53 (seconds)  Functional outcome comment: Patient had increased difficulty completing the grooved peg board  Patient was able to place 2 in on R in 1:25 80  Patient was able to place 8 in with L hand in 2:14 28  Patient was able to complete the buttoning/unbuttoning  on the ADL vest in 1:47 66  Patient was able to unbutton and unzip on ADL vest in 1:37 47  Unable to rezip      Re-assessment completed on 10/17/2022  Right Hand: 46 seconds  Left hand: 40 seconds  ADL vest: completed 3 buttons in 1:17  Snaps and Zips27 seocnds

## 2022-10-18 ENCOUNTER — HOSPITAL ENCOUNTER (OUTPATIENT)
Dept: INFUSION CENTER | Facility: HOSPITAL | Age: 72
Discharge: HOME | End: 2022-10-18
Attending: INTERNAL MEDICINE
Payer: MEDICARE

## 2022-10-18 VITALS
TEMPERATURE: 96.4 F | DIASTOLIC BLOOD PRESSURE: 90 MMHG | OXYGEN SATURATION: 99 % | RESPIRATION RATE: 16 BRPM | SYSTOLIC BLOOD PRESSURE: 144 MMHG | HEART RATE: 58 BPM

## 2022-10-18 DIAGNOSIS — E86.0 DEHYDRATION: ICD-10-CM

## 2022-10-18 DIAGNOSIS — G89.3 CANCER ASSOCIATED PAIN: Primary | ICD-10-CM

## 2022-10-18 DIAGNOSIS — E83.42 HYPOMAGNESEMIA: ICD-10-CM

## 2022-10-18 PROCEDURE — 96365 THER/PROPH/DIAG IV INF INIT: CPT

## 2022-10-18 RX ADMIN — MAGNESIUM SULFATE HEPTAHYDRATE: 500 INJECTION, SOLUTION INTRAMUSCULAR; INTRAVENOUS at 11:11

## 2022-10-18 NOTE — PROGRESS NOTES
Patient reported that he is having severe nonpitting edema to bilateral feet the day after his hydration appointments  Edema lasts 1 5 days then resolves  Denies SOB  Patient does not have any edema at present time and lungs are clear  Patient canceled his hydration for Friday due to going out of town on Saturday  Patient did not want to have edema when he was out of town  He will only have labs drawn on Friday  Raquel Chen RN advised of above  No new orders  Will continue to monitor  Patient stated that he has an appointment tomorrow with Podiatry

## 2022-10-18 NOTE — PROGRESS NOTES
Patient tolerated IV hydration and IV magnesium without issues  AVS given to patient  Patient taken in wheelchair off unit without incident  All personal belongings taken with patient

## 2022-10-19 ENCOUNTER — TELEPHONE (OUTPATIENT)
Dept: OTHER | Facility: OTHER | Age: 72
End: 2022-10-19

## 2022-10-19 ENCOUNTER — APPOINTMENT (OUTPATIENT)
Dept: OCCUPATIONAL THERAPY | Facility: CLINIC | Age: 72
End: 2022-10-19

## 2022-10-19 ENCOUNTER — OFFICE VISIT (OUTPATIENT)
Dept: PODIATRY | Facility: CLINIC | Age: 72
End: 2022-10-19
Payer: MEDICARE

## 2022-10-19 VITALS — HEART RATE: 61 BPM | DIASTOLIC BLOOD PRESSURE: 71 MMHG | SYSTOLIC BLOOD PRESSURE: 115 MMHG

## 2022-10-19 DIAGNOSIS — B35.1 ONYCHOMYCOSIS: ICD-10-CM

## 2022-10-19 DIAGNOSIS — L08.9 WOUND INFECTION: ICD-10-CM

## 2022-10-19 DIAGNOSIS — T14.8XXA WOUND INFECTION: ICD-10-CM

## 2022-10-19 DIAGNOSIS — N32.81 OAB (OVERACTIVE BLADDER): Primary | ICD-10-CM

## 2022-10-19 DIAGNOSIS — I73.9 PERIPHERAL ARTERIAL DISEASE (HCC): Primary | ICD-10-CM

## 2022-10-19 PROCEDURE — 99203 OFFICE O/P NEW LOW 30 MIN: CPT | Performed by: PODIATRIST

## 2022-10-19 PROCEDURE — 11721 DEBRIDE NAIL 6 OR MORE: CPT | Performed by: PODIATRIST

## 2022-10-19 RX ORDER — OXYBUTYNIN CHLORIDE 10 MG/1
10 TABLET, EXTENDED RELEASE ORAL EVERY MORNING
Qty: 30 TABLET | Refills: 3 | Status: SHIPPED | OUTPATIENT
Start: 2022-10-19

## 2022-10-19 NOTE — PROGRESS NOTES
HISTORY AND PHYSICAL EXAM  - [facility]    PATIENT:  Steve Rueda    1950      Assessment/Plan     Problem List Items Addressed This Visit    None     Visit Diagnoses     Peripheral arterial disease ([address] )    -  Primary    Relevant Orders    VAS lower limb arterial duplex, complete bilateral    Wound infection        Onychomycosis               Diagnoses and all orders for this visit:    Peripheral arterial disease (HCC)  -     VAS lower limb arterial duplex, complete bilateral; Future    Wound infection  -     Ambulatory Referral to Podiatry    Onychomycosis     -she does not have a wound infection, he does have wounds that are somewhat dysvascular in nature which limited the breakdown of skin   -we will obtain arterial duplex complete bilaterally see if there is a stenosis and he does have healing potentials   -does have Q 8 findings for at-risk foot care and nails her debrided as below today   -she is to return in 9 weeks for continued foot care and also following his arterial study for management of his wound advised to continue these covered with antibiotic ointment and Band-Aid may wash with soap and water      Procedure: All mycotic toenails were reduced and debrided in length, width, and girth using a nail nipper and dremel  All hyperkeratotic skin lesion(s) were sharply pared with a scalpel with no bleeding or evidence of ulceration  Patient tolerated procedure(s) well without complications  [id number], Q 8    History of Present Illness   Steve Rueda is a 67 y o  male who presents with wounds to his bilateral lesser digits that are limited to the breakdown of skin is present for the past 2-3 weeks, 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  Can not bend down to cut his own toenails    Review of Systems   Constitutional: Negative for chills and fever  HENT: Negative for ear pain and sore throat  Eyes: Negative for pain and visual disturbance  Respiratory: Negative for cough and shortness of breath  Cardiovascular: Negative for chest pain and palpitations  Gastrointestinal: Negative for abdominal pain and vomiting  Genitourinary: Negative for dysuria and hematuria  Musculoskeletal: Negative for arthralgias and back pain  Skin: Negative for color change and rash  Neurological: Negative for seizures and syncope  All other systems reviewed and are negative        Historical Information   Past Medical History:   Diagnosis Date   • Arthritis    • Bladder cancer    • Cancer ([facility] [address] )     skin melanoma;basal cell   • Chronic pain disorder     from arthritis   • Colon polyp    • Does use hearing aid     bilat   • Dry eyes, bilateral    • GERD (gastroesophageal reflux disease)    • History of kidney stones 10/2019   • History of partial knee replacement     bilat   • History of vertigo    • Hyperlipidemia    • Hypertension    • Infusion extravasation of chemotherapy vesicant 11/2021   • Kidney lesion    • Lumbar disc disorder     compression of vertebrae L4-5-6   • Muscle weakness     left hip area   • Renal mass     left   • Right ankle injury 03/05/2020    missed step of ladder    • Right ankle pain    • Shortness of breath     with activity   • Tinnitus    • Urothelial cancer     left   • Wears glasses      Past Surgical History:   Procedure Laterality Date   • COLONOSCOPY     • CYSTOSCOPY Left 4/1/2020    Procedure: CYSTOSCOPY; URETERAL CATHETER PLACEMENT;  Surgeon: Cara Alan MD;  [facility];  Service: Urology   • CYSTOSCOPY  05/11/2020   • CYSTOSCOPY  06/04/2021   • FL CYSTOGRAM  4/13/2020   • FL RETROGRADE PYELOGRAM  1/17/2020   • HERNIA REPAIR      umbilical with mesh   • INGUINAL HERNIA REPAIR Right     with mesh   • IR PORT PLACEMENT  4/30/2020   • JOINT REPLACEMENT Bilateral     partials knee   • LUMBAR EPIDURAL INJECTION     • PA CYSTOURETHROSCOPY,URETER CATHETER Bilateral 1/17/2020    Procedure: CYSTOSCOPY; RIGHT RETROGRADE PYELOGRAM WITH RIGHT URETERAL CYTOLOGY SAMPLING; LEFT URETEROSCOPY WITH RENAL PELVIS BIOPSY AND LEFT STENT PLACEMENT;  Surgeon: Neema Duoglas MD;  [facility];  Service: Urology   • AR NEPHRECTOMY, W/PART  URETECTOMY Left 2020    Procedure: ROBOTIC LAPAROSCOPIC NEPHRO-URETERECTOMY;  Surgeon: Neema Douglas MD;  [facility];  Service: Urology   • SKIN CANCER EXCISION      surface melanoma   • TONSILLECTOMY     • WISDOM TOOTH EXTRACTION       Social History   Social History     Substance and Sexual Activity   Alcohol Use Yes   • Alcohol/week: 17 0 standard drinks   • Types: 7 Glasses of wine, 10 Cans of beer per week    Comment: socially     Social History     Substance and Sexual Activity   Drug Use Never     Social History     Tobacco Use   Smoking Status Former Smoker   • Types: Cigarettes   • Quit date:    • Years since quittin 8   Smokeless Tobacco Never Used     Family History: non-contributory    Meds/Allergies   all medications and allergies reviewed  Allergies   Allergen Reactions   • Poison Ivy Extract Rash       Objective   Vitals: Blood pressure 115/71, pulse 61 ,There is no height or weight on file to calculate BMI  Physical Exam  Vitals reviewed  Constitutional:       Appearance: Normal appearance  HENT:      Head: Normocephalic and atraumatic  Nose: Nose normal       Mouth/Throat:      Mouth: Mucous membranes are moist    Eyes:      Pupils: Pupils are equal, round, and reactive to light  Pulmonary:      Effort: Pulmonary effort is normal    Musculoskeletal:      Right lower leg: No edema  Left lower leg: Edema present  Comments:  There are multiple wounds on the toes 1 on the right hallux measuring approximately 2 x 2 them to the breakdown of skin, also on the dorsal aspect of the left 2nd toe measuring 1 x 0 5 under the breakdown of skin skin is still covered with slight sanguinous drainage    Nonpalpable pedal pulses with dependent rubor skin is cold to touch  Nails 1 through 5 bilaterally are thickened elongated with notable subungual debris   Skin:     Capillary Refill: Capillary refill takes more than 3 seconds  Neurological:      General: No focal deficit present  Mental Status: He is alert and oriented to person, place, and time  Mental status is at baseline           Ortho Exam

## 2022-10-19 NOTE — TELEPHONE ENCOUNTER
Patient called in to speak provider post OV 10/13/22  The prescription for Mirabegron ER 25 mg denied  [facility] would like to talk with provider to offer alternative medication    Please call Stacia Boswell at [phone]    Patient would like resolution before he goes on vacation on Saturday 10/22/22

## 2022-10-19 NOTE — TELEPHONE ENCOUNTER
Scott Paulino from the [place] called and request c/b in regards to medication prescriped at pt last ov     Call [phone]

## 2022-10-19 NOTE — TELEPHONE ENCOUNTER
Call placed to Jet Chavez at the [address] [facility] and spoke with her  She informed me that Mirabegron ER 25 MG TB24 has been denied because patient needs to trial alternative medication prior to being started on this treatment  Fax was sent to the [facility] office with recommendations  It is recommended that patient trial Ditropan 1st for symptoms relief  Pt would like this addressed by tomorrow as he is leaving for vacation on Friday       Will forward to the provider to review and advise if another medication can be trialed

## 2022-10-20 NOTE — TELEPHONE ENCOUNTER
Zeny Morgan from [facility]  48  calling again because no one got back to her about patient's medication (Mirabegron)  Informed Zeny Morgan that oxybutynin was prescribed and was sent to [facility] in [facility] pass  Zeny Morgan requested that the prescription be faxed to her at [phone]  Prescription routed to her in epic

## 2022-10-21 ENCOUNTER — HOSPITAL ENCOUNTER (OUTPATIENT)
Dept: INFUSION CENTER | Facility: HOSPITAL | Age: 72
End: 2022-10-21
Payer: MEDICARE

## 2022-10-21 VITALS — TEMPERATURE: 96.5 F

## 2022-10-21 DIAGNOSIS — R39.9 UTI SYMPTOMS: ICD-10-CM

## 2022-10-21 DIAGNOSIS — C79.10 METASTATIC UROTHELIAL CARCINOMA (HCC): ICD-10-CM

## 2022-10-21 DIAGNOSIS — C68.9 UROTHELIAL CANCER (HCC): Primary | ICD-10-CM

## 2022-10-21 LAB
ALBUMIN SERPL BCP-MCNC: 3.6 G/DL (ref 3.5–5)
ALP SERPL-CCNC: 59 U/L (ref 34–104)
ALT SERPL W P-5'-P-CCNC: 47 U/L (ref 7–52)
ANION GAP SERPL CALCULATED.3IONS-SCNC: 5 MMOL/L (ref 4–13)
AST SERPL W P-5'-P-CCNC: 19 U/L (ref 13–39)
BASOPHILS # BLD AUTO: 0.01 THOUSANDS/ÂΜL (ref 0–0.1)
BASOPHILS NFR BLD AUTO: 0 % (ref 0–1)
BILIRUB SERPL-MCNC: 0.91 MG/DL (ref 0.2–1)
BUN SERPL-MCNC: 24 MG/DL (ref 5–25)
CALCIUM SERPL-MCNC: 9 MG/DL (ref 8.4–10.2)
CHLORIDE SERPL-SCNC: 100 MMOL/L (ref 96–108)
CO2 SERPL-SCNC: 31 MMOL/L (ref 21–32)
CREAT SERPL-MCNC: 1.22 MG/DL (ref 0.6–1.3)
EOSINOPHIL # BLD AUTO: 0.02 THOUSAND/ÂΜL (ref 0–0.61)
EOSINOPHIL NFR BLD AUTO: 0 % (ref 0–6)
ERYTHROCYTE [DISTWIDTH] IN BLOOD BY AUTOMATED COUNT: 15.4 % (ref 11.6–15.1)
GFR SERPL CREATININE-BSD FRML MDRD: 58 ML/MIN/1.73SQ M
GLUCOSE SERPL-MCNC: 108 MG/DL (ref 65–140)
HCT VFR BLD AUTO: 43.3 % (ref 36.5–49.3)
HGB BLD-MCNC: 13.9 G/DL (ref 12–17)
IMM GRANULOCYTES # BLD AUTO: 0.06 THOUSAND/UL (ref 0–0.2)
IMM GRANULOCYTES NFR BLD AUTO: 1 % (ref 0–2)
LYMPHOCYTES # BLD AUTO: 0.76 THOUSANDS/ÂΜL (ref 0.6–4.47)
LYMPHOCYTES NFR BLD AUTO: 9 % (ref 14–44)
MAGNESIUM SERPL-MCNC: 1.9 MG/DL (ref 1.9–2.7)
MCH RBC QN AUTO: 32.8 PG (ref 26.8–34.3)
MCHC RBC AUTO-ENTMCNC: 32.1 G/DL (ref 31.4–37.4)
MCV RBC AUTO: 102 FL (ref 82–98)
MONOCYTES # BLD AUTO: 0.7 THOUSAND/ÂΜL (ref 0.17–1.22)
MONOCYTES NFR BLD AUTO: 9 % (ref 4–12)
NEUTROPHILS # BLD AUTO: 6.58 THOUSANDS/ÂΜL (ref 1.85–7.62)
NEUTS SEG NFR BLD AUTO: 81 % (ref 43–75)
NRBC BLD AUTO-RTO: 0 /100 WBCS
PLATELET # BLD AUTO: 136 THOUSANDS/UL (ref 149–390)
PMV BLD AUTO: 9.3 FL (ref 8.9–12.7)
POTASSIUM SERPL-SCNC: 4.5 MMOL/L (ref 3.5–5.3)
PROT SERPL-MCNC: 5.9 G/DL (ref 6.4–8.4)
RBC # BLD AUTO: 4.24 MILLION/UL (ref 3.88–5.62)
SODIUM SERPL-SCNC: 136 MMOL/L (ref 135–147)
WBC # BLD AUTO: 8.13 THOUSAND/UL (ref 4.31–10.16)

## 2022-10-21 PROCEDURE — 83735 ASSAY OF MAGNESIUM: CPT | Performed by: NURSE PRACTITIONER

## 2022-10-21 PROCEDURE — 85025 COMPLETE CBC W/AUTO DIFF WBC: CPT | Performed by: NURSE PRACTITIONER

## 2022-10-21 PROCEDURE — 80053 COMPREHEN METABOLIC PANEL: CPT | Performed by: NURSE PRACTITIONER

## 2022-10-21 NOTE — PROGRESS NOTES
Central labs drawn per orders  Port flushed freely  Good blood return noted  Port deaccessed without issue  Patient tolerated well  AVS declined  Patient is aware of next appointment when he returns from [place]  Patient taken in wheelchair off unit without incident  All personal belongings taken with patient

## 2022-10-24 ENCOUNTER — APPOINTMENT (OUTPATIENT)
Dept: OCCUPATIONAL THERAPY | Facility: CLINIC | Age: 72
End: 2022-10-24

## 2022-10-26 ENCOUNTER — APPOINTMENT (OUTPATIENT)
Dept: OCCUPATIONAL THERAPY | Facility: CLINIC | Age: 72
End: 2022-10-26

## 2022-10-31 ENCOUNTER — TELEPHONE (OUTPATIENT)
Dept: HEMATOLOGY ONCOLOGY | Facility: CLINIC | Age: 72
End: 2022-10-31

## 2022-10-31 NOTE — TELEPHONE ENCOUNTER
Returned a call to pt's wife to let her know that if pt is critical he should not be moved  Wife understands this she is just trying to prepare how she can get  home as she was told he can not fly on a commercial airliner  Pt is doing better but does know that the surgeon would have to give OK for pt to be transported  Wife will speak to staff at hospital in [place] to see what arrangements can be made

## 2022-10-31 NOTE — TELEPHONE ENCOUNTER
CALL RETURN FORM   Reason for patient call? Wife of patient calling  Patient in ICU in [place] [address]  Wife needs to have patient tranferred to [facility], [facility]  How can she do this? Patient's primary oncologist?  Sarah Morgan   Name of person the patient was calling for? Habib   Any additional information to add, if applicable? Please call [phone]   Informed patient that the message will be forwarded to the team and someone will get back to them as soon as possible    Did you relay this information to the patient?  yes

## 2022-12-01 DIAGNOSIS — G62.0 DRUG-INDUCED POLYNEUROPATHY (HCC): ICD-10-CM

## 2022-12-01 RX ORDER — DULOXETIN HYDROCHLORIDE 60 MG/1
60 CAPSULE, DELAYED RELEASE ORAL DAILY
Qty: 30 CAPSULE | Refills: 1 | Status: SHIPPED | OUTPATIENT
Start: 2022-12-01

## 2022-12-19 NOTE — PROGRESS NOTES
Assessment:  1  Chronic pain syndrome    2  Uncomplicated opioid dependence ([facility] [address] )    3  Long-term current use of opiate analgesic    4  Lumbar radiculopathy    5  Chemotherapy-induced neuropathy (HCC)    6  Lumbar spondylosis    7  Sacroiliitis ([facility] [address] )    8  Encounter for long-term opiate analgesic use    9  Chronic bilateral low back pain without sciatica    10  Cancer-related pain        Plan:  1  I will prescribe the patient Norco 5/325 mg 1 tablet twice daily as needed pain #60 tablets for 30 days  Patient was given a 1 month RX today, however he still has a good amount of medication left at home from his RX with palliative medicine  I did explain that he either needs to honor his opioid contract with us, or palliative medicine, not both  He states he does not plan to follow with palliative any further at this [facility] since his cancer is in remission and would like to stay with our office for treatment  Palliative medicine changed him from tramadol to [facility] and he felt like this medication provided much better relief  He understands that moving forward, if any other provider prescribes a narcotic, it will need to be cleared through our office  The patient is currently receiving a benzodiazepine and an opioid medication  This places him at a higher-risk for respiratory depression/overdose  Therefore, I  will prescribe naloxone nasal spray  It was explained to the patient that this is an injectable opiate antagonist which is indicated for emergency treatment for opiate overdose  The patient was also instructed on how to use the Narcan nasal spray, in the case of an opioid emergency  [place] Prescription Drug Monitoring Program report was reviewed and was appropriate     A urine drug screen was collected at today's office visit as part of our medication management protocol  The point of care testing results were appropriate for what was being prescribed   The specimen will be sent for confirmatory testing  The drug screen is medically necessary because the patient is either dependent on opioid medication or is being considered for opioid medication therapy and the results could impact ongoing or future treatment  The drug screen is to evaluate for the presences or absence of prescribed, non-prescribed, and/or illicit drugs/substances  There are risks associated with opioid medications, including dependence, addiction and tolerance  The patient understands and agrees to use these medications only as prescribed  Potential side effects of the medications include, but are not limited to, constipation, drowsiness, addiction, impaired judgment and risk of fatal overdose if not taken as prescribed  The patient was warned against driving while taking sedation medications  Sharing medications is a felony  At this point in time, the patient is showing no signs of addiction, abuse, diversion or suicidal ideation  2   Based on patient report and physical exam, the patient's symptomatology does seem to be consistent with sacroiliac mediated pain from sacroiliitis  We will schedule the patient for a bilateral SIJ injection to decrease any inflammatory component of the patient's pain symptoms  Complete risks and benefits including bleeding, infection, tissue reaction, nerve injury and allergic reaction were discussed  The patient was agreeable and verbalized an understanding  3  Patient will continue with home exercise program  4  Follow-up after procedure or sooner if needed    History of Present Illness: The patient is a 67 y o  male with a history of metastatic urothelial carcinoma status post nephrectomy undergoing chemotherapy, chemo induced neuropathy, A  fib, lumbar spondylosis, lumbar stenosis, sacroiliitis and chronic pain syndrome last seen on 9/16/22 who presents for a follow up office visit in regards to chronic low back pain   denies radicular complaints into the lower extremities, bowel or bladder incontinence or saddle anesthesia  He does have numbness in his hands and feet which is baseline  Since last office visit, patient had an emergent stay in a hospital in [place] for what his wife describes as a perforated bowel requiring emergent surgery, hospital stay, and eventually outpatient rehab  The patient and his wife state that during the stay, he was taken off of all of his medications including his anticoagulant, duloxetine, gabapentin, and amitriptyline  He states that since being off of this medication, noticed any significant worsening in his neuropathic complaints  He is managing his pain currently with Norco 5/325 mg which she states he mostly only takes 1 pill a day  This was originally prescribed by palliative medicine but he states he is no longer following with their practice as he is currently in remission for his cancer  He does find that this medication is more helpful than tramadol has been in the past   Not had much relief in the past with lumbar RFA  He had bilateral SI joint injections in July 13, 2022 which resolved his low back pain  His pain has now returned and he would like to repeat this injection at this time  The patient rates his pain an 8 out of 10 on the numeric pain rating scale  He intermittently has pain in the morning which is described as sharp    Pain Contract Signed: 6/20/22  Last Urine Drug Screen: 12/20/22  DALLAS/YESSENIA 6/20/22  Last tramadol per pt  Last Narcan: 12/20/22       I have personally reviewed and/or updated the patient's past medical history, past surgical history, family history, social history, current medications, allergies, and vital signs today  Review of Systems:    Review of Systems   Respiratory: Negative for shortness of breath  Cardiovascular: Negative for chest pain  Gastrointestinal: Negative for constipation, diarrhea, nausea and vomiting     Musculoskeletal: Negative for arthralgias, gait problem, joint swelling and myalgias  Skin: Negative for rash  Neurological: Negative for dizziness, seizures and weakness  All other systems reviewed and are negative  Past Medical History:   Diagnosis Date   • Arthritis    • Bladder cancer    • Cancer ([facility] [address] )     skin melanoma;basal cell   • Chronic pain disorder     from arthritis   • Colon polyp    • Does use hearing aid     bilat   • Dry eyes, bilateral    • GERD (gastroesophageal reflux disease)    • History of kidney stones 10/2019   • History of partial knee replacement     bilat   • History of vertigo    • Hyperlipidemia    • Hypertension    • Infusion extravasation of chemotherapy vesicant 11/2021   • Kidney lesion    • Lumbar disc disorder     compression of vertebrae L4-5-6   • Muscle weakness     left hip area   • Renal mass     left   • Right ankle injury 03/05/2020    missed step of ladder    • Right ankle pain    • Shortness of breath     with activity   • Tinnitus    • Urothelial cancer     left   • Wears glasses        Past Surgical History:   Procedure Laterality Date   • COLONOSCOPY     • CYSTOSCOPY Left 4/1/2020    Procedure: CYSTOSCOPY; URETERAL CATHETER PLACEMENT;  Surgeon: Charisma Crowell MD;  [facility];  Service: Urology   • CYSTOSCOPY  05/11/2020   • CYSTOSCOPY  06/04/2021   • FL CYSTOGRAM  4/13/2020   • FL RETROGRADE PYELOGRAM  1/17/2020   • HERNIA REPAIR      umbilical with mesh   • INGUINAL HERNIA REPAIR Right     with mesh   • IR PORT PLACEMENT  4/30/2020   • JOINT REPLACEMENT Bilateral     partials knee   • LUMBAR EPIDURAL INJECTION     • [place] CYSTOURETHROSCOPY,URETER CATHETER Bilateral 1/17/2020    Procedure: CYSTOSCOPY; RIGHT RETROGRADE PYELOGRAM WITH RIGHT URETERAL CYTOLOGY SAMPLING; LEFT URETEROSCOPY WITH RENAL PELVIS BIOPSY AND LEFT STENT PLACEMENT;  Surgeon: Charisma Crowell MD;  [facility];  Service: Urology   • VA NEPHRECTOMY, W/PART   URETECTOMY Left 4/1/2020    Procedure: ROBOTIC LAPAROSCOPIC NEPHRO-URETERECTOMY;  Surgeon: Ulices Lee MD;  [facility];  Service: Urology   • SKIN CANCER EXCISION      surface melanoma   • TONSILLECTOMY     • WISDOM TOOTH EXTRACTION         Family History   Problem Relation Age of Onset   • Liver cancer Father        Social History     Occupational History   • Not on file   Tobacco Use   • Smoking status: Former     Types: Cigarettes     Quit date:      Years since quittin 0   • Smokeless tobacco: Never   Vaping Use   • Vaping Use: Never used   Substance and Sexual Activity   • Alcohol use: Yes     Alcohol/week: 17 0 standard drinks     Types: 7 Glasses of wine, 10 Cans of beer per week     Comment: socially   • Drug use: Never   • Sexual activity: Not Currently         Current Outpatient Medications:   •  acetaminophen (TYLENOL) 500 mg tablet, Take 2 tablets (1,000 mg total) by mouth every 8 (eight) hours, Disp: , Rfl:   •  atorvastatin (LIPITOR) 80 mg tablet, Take 80 mg by mouth every other day evening, Disp: , Rfl:   •  folic acid (FOLVITE) 1 mg tablet, take 1 tablet by mouth once daily, Disp: 90 tablet, Rfl: 4  •  HYDROcodone-acetaminophen (NORCO) 5-325 mg per tablet, Take 1 tablet by mouth 2 (two) times a day as needed for pain Max Daily Amount: 2 tablets, Disp: 60 tablet, Rfl: 0  •  naloxone (NARCAN) 4 mg/0 1 mL nasal spray, Administer 1 spray into a nostril   If breathing does not return to normal or if breathing difficulty resumes after 2-3 minutes, give another dose in the other nostril using a new spray , Disp: 1 each, Rfl: 1  •  tamsulosin (FLOMAX) 0 4 mg, Take 1 capsule (0 4 mg total) by mouth daily with dinner, Disp: 30 capsule, Rfl: 12  •  VITAMIN D PO, Take 1,000 Units by mouth daily , Disp: , Rfl:   •  zolpidem (AMBIEN) 5 mg tablet, Take 1 tablet (5 mg total) by mouth daily at bedtime as needed for sleep, Disp: 30 tablet, Rfl: 1  •  allopurinol (ZYLOPRIM) 300 mg tablet, take 1 tablet by mouth once daily, Disp: 30 tablet, Rfl: 3  • ALPRAZolam (XANAX) 0 25 mg tablet, Take 1 tablet (0 25 mg total) by mouth daily at bedtime as needed for anxiety (Patient not taking: Reported on 10/13/2022), Disp: 30 tablet, Rfl: 0  •  capsicum (ZOSTRIX) 0 075 % topical cream, Apply topically 3 (three) times a day, Disp: 28 3 g, Rfl: 0  •  ciprofloxacin (CIPRO) 500 mg tablet, Take 500 mg by mouth every 12 (twelve) hours, Disp: , Rfl:   •  colchicine (COLCRYS) 0 6 mg tablet, take 1 tablet by mouth twice a day if needed for FOOT PAIN (Patient not taking: No sig reported), Disp: , Rfl:   •  docusate sodium (COLACE) 250 MG capsule, Take 1 capsule (250 mg total) by mouth daily (Patient not taking: No sig reported), Disp: 60 capsule, Rfl: 6  •  DULoxetine (CYMBALTA) 60 mg delayed release capsule, Take 1 capsule (60 mg total) by mouth daily, Disp: 30 capsule, Rfl: 1  •  Magnesium 250 MG TABS, 1 tablet 2 (two) times a day Taking 500mg in the morning and 500mg at night, Disp: , Rfl:   •  metoprolol tartrate (LOPRESSOR) 100 mg tablet, Take 50 mg by mouth daily, Disp: , Rfl:   •  nystatin (MYCOSTATIN) cream, Apply topically 2 (two) times a day (Patient not taking: No sig reported), Disp: 30 g, Rfl: 0  •  oxybutynin (DITROPAN-XL) 10 MG 24 hr tablet, Take 1 tablet (10 mg total) by mouth every morning, Disp: 30 tablet, Rfl: 3  •  predniSONE 10 mg tablet, Take 2 tablets (20 mg total) by mouth daily, Disp: 60 tablet, Rfl: 3  •  senna (SENOKOT) 8 6 mg, Take 1 tablet (8 6 mg total) by mouth daily at bedtime (Patient not taking: No sig reported), Disp: 30 each, Rfl: 0  •  tadalafil (CIALIS) 20 MG tablet, Take one tablet by mouth one hour before sexual activity, Disp: 15 tablet, Rfl: 3    Allergies   Allergen Reactions   • Poison Ivy Extract Rash       Physical Exam:    /66   Pulse 76   Ht 5' 10" (1 778 m)   Wt 84 8 kg (187 lb)   BMI 26 83 kg/m²     Constitutional:normal, well developed, well nourished, alert, in no distress and non-toxic and no overt pain behavior  Eyes:anicteric  HEENT:grossly intact  Neck:supple, symmetric, trachea midline and no masses   Pulmonary:even and unlabored  Cardiovascular:No edema or pitting edema present  Skin:Normal without rashes or lesions and well hydrated  Psychiatric:Mood and affect appropriate  Neurologic:Cranial Nerves II-XII grossly intact  Musculoskeletal:antalgic gait  Bilateral SIJ tender to palpation    Positive Juan's, Darcie finger and Gaenslen's test bilaterally      Imaging  FL spine and pain procedure    (Results Pending)   FL spine and pain procedure    (Results Pending)         Orders Placed This Encounter   Procedures   • MM OF_Alprazolam Definitive   • MM OF_Amphetamine Definitive Test   • MM OF_Atomoxetine Definitive Test   • MM OF_Buprenorphine Definitive Test   • MM OF_Clonazepam Definitive   • MM OF_Cocaine Definitive Test   • MM OF_Codeine Definitive   • MM OF_Diazepam Definitive   • MM OF_Fentanyl Definitive Test   • MM OF_Heroin Definitive Test   • MM OF_Hydrocodone Definitive   • MM OF_Hydromorphone Definitive   • MM OF_Lorazepam Definitive   • MM OF_MDMA Definitive Test   • MM OF_Meperidine Definitive Test   • MM OF_Methadone Definitive Test   • MM OF_Methylphenidate Definitive Test   • MM OF_Morphine Definitive   • MM OF_Oxazepam Definitive   • MM OF_Oxycodone Definitive Test - Oral Fluid   • MM OF_Oxymorphone Definitive Test   • MM OF_Phencyclidine Definitive Test   • MM OF_Temazepam Definitive   • MM OF_THC Definitive Test   • MM OF_Tramadol Definitive Test   • FL spine and pain procedure   • FL spine and pain procedure   • MM ALL_Prescribed Meds and Special Instructions   • MM DT_Alprazolam Definitive Test   • MM DT_Amphetamine Definitive Test   • MM DT_Aripiprazole Definitive Test   • MM DT_Bath Salts Definitive Test   • MM DT_Buprenorphine Definitive Test   • MM DT_Butalbital Definitive Test   • MM DT_Clonazepam Definitive Test   • MM DT_Clozapine Definitive Test   • MM DT_Cocaine Definitive Test • MM DT_Codeine Definitive Test   • MM DT_Desipramine Definitive Test   • MM DT_Dextromethorphan Definitive Test   • MM Diazepam Definitive Test   • MM DT_Ethyl Glucuronide/Ethyl Sulfate Definitive Test   • MM DT_Fentanyl Definitive Test   • MM DT_Haloperidol Definitive Test   • MM DT_Heroin Definitive Test   • MM DT_Hydrocodone Definitive Test   • MM DT_Hydromorphone Definitive Test   • MM DT_Imipramine Definitive Test   • MM DT_Kratom Definitive Test   • MM DT_Levorphanol Definitive Test   • MM Lorazepam Definitive Test   • MM DT_MDMA Definitive Test   • MM DT_Meperidine Definitive Test   • MM DT_Methadone Definitive Test   • MM DT_Methamphetamine Definitive Test   • MM DT_Morphine Definitive Test   • MM DT_Olanzapine Definitive Test   • MM DT_Oxazepam Definitive Test   • MM DT_Oxycodone Definitive Test   • MM DT_Oxymorphone Definitive Test   • MM DT_Phencyclidine Definitive Test   • MM DT_Phenobarbital Definitive Test   • MM DT_Phentermine Definitive Test   • MM DT_Quetiapine Definitive Test   • MM DT_Risperidone Definitive Test   • MM DT_Secobarbital Definitive Test   • MM DT_Spice Definitive Test   • MM DT_Tapentadol Definitive Test   • MM DT_Temazapam Definitive Test   • MM DT_THC Definitive Test   • MM DT_Tramadol Definitive Test   • MM DT_Methylphenidate Definitive Test

## 2022-12-20 ENCOUNTER — OFFICE VISIT (OUTPATIENT)
Dept: PAIN MEDICINE | Facility: CLINIC | Age: 72
End: 2022-12-20

## 2022-12-20 VITALS
WEIGHT: 187 LBS | BODY MASS INDEX: 26.77 KG/M2 | HEART RATE: 76 BPM | SYSTOLIC BLOOD PRESSURE: 109 MMHG | DIASTOLIC BLOOD PRESSURE: 66 MMHG | HEIGHT: 70 IN

## 2022-12-20 DIAGNOSIS — Z79.891 LONG-TERM CURRENT USE OF OPIATE ANALGESIC: ICD-10-CM

## 2022-12-20 DIAGNOSIS — G89.4 CHRONIC PAIN SYNDROME: Primary | ICD-10-CM

## 2022-12-20 DIAGNOSIS — Z79.891 ENCOUNTER FOR LONG-TERM OPIATE ANALGESIC USE: ICD-10-CM

## 2022-12-20 DIAGNOSIS — M46.1 SACROILIITIS (HCC): ICD-10-CM

## 2022-12-20 DIAGNOSIS — M54.50 CHRONIC BILATERAL LOW BACK PAIN WITHOUT SCIATICA: ICD-10-CM

## 2022-12-20 DIAGNOSIS — G89.29 CHRONIC BILATERAL LOW BACK PAIN WITHOUT SCIATICA: ICD-10-CM

## 2022-12-20 DIAGNOSIS — F11.20 UNCOMPLICATED OPIOID DEPENDENCE (HCC): ICD-10-CM

## 2022-12-20 DIAGNOSIS — G89.3 CANCER-RELATED PAIN: ICD-10-CM

## 2022-12-20 DIAGNOSIS — M47.816 LUMBAR SPONDYLOSIS: ICD-10-CM

## 2022-12-20 DIAGNOSIS — M54.16 LUMBAR RADICULOPATHY: ICD-10-CM

## 2022-12-20 DIAGNOSIS — T45.1X5A CHEMOTHERAPY-INDUCED NEUROPATHY (HCC): ICD-10-CM

## 2022-12-20 DIAGNOSIS — G62.0 CHEMOTHERAPY-INDUCED NEUROPATHY (HCC): ICD-10-CM

## 2022-12-20 RX ORDER — HYDROCODONE BITARTRATE AND ACETAMINOPHEN 5; 325 MG/1; MG/1
1 TABLET ORAL 2 TIMES DAILY PRN
Qty: 60 TABLET | Refills: 0 | Status: SHIPPED | OUTPATIENT
Start: 2022-12-20

## 2022-12-20 RX ORDER — NALOXONE HYDROCHLORIDE 4 MG/.1ML
SPRAY NASAL
Qty: 1 EACH | Refills: 1 | Status: SHIPPED | OUTPATIENT
Start: 2022-12-20

## 2022-12-21 ENCOUNTER — HOSPITAL ENCOUNTER (OUTPATIENT)
Dept: RADIOLOGY | Facility: CLINIC | Age: 72
Discharge: HOME | End: 2022-12-21
Admitting: ANESTHESIOLOGY

## 2022-12-21 VITALS
DIASTOLIC BLOOD PRESSURE: 81 MMHG | TEMPERATURE: 97.6 F | HEART RATE: 73 BPM | SYSTOLIC BLOOD PRESSURE: 144 MMHG | RESPIRATION RATE: 18 BRPM | OXYGEN SATURATION: 97 %

## 2022-12-21 DIAGNOSIS — M46.1 SACROILIITIS (HCC): ICD-10-CM

## 2022-12-21 RX ORDER — METHYLPREDNISOLONE ACETATE 80 MG/ML
80 INJECTION, SUSPENSION INTRA-ARTICULAR; INTRALESIONAL; INTRAMUSCULAR; PARENTERAL; SOFT TISSUE ONCE
Status: COMPLETED | OUTPATIENT
Start: 2022-12-21 | End: 2022-12-21

## 2022-12-21 RX ORDER — BUPIVACAINE HCL/PF 2.5 MG/ML
30 VIAL (ML) INJECTION ONCE
Status: COMPLETED | OUTPATIENT
Start: 2022-12-21 | End: 2022-12-21

## 2022-12-21 RX ORDER — LIDOCAINE HYDROCHLORIDE 10 MG/ML
5 INJECTION, SOLUTION EPIDURAL; INFILTRATION; INTRACAUDAL; PERINEURAL ONCE
Status: COMPLETED | OUTPATIENT
Start: 2022-12-21 | End: 2022-12-21

## 2022-12-21 RX ADMIN — LIDOCAINE HYDROCHLORIDE 3 ML: 10 INJECTION, SOLUTION EPIDURAL; INFILTRATION; INTRACAUDAL; PERINEURAL at 11:46

## 2022-12-21 RX ADMIN — METHYLPREDNISOLONE ACETATE 80 MG: 80 INJECTION, SUSPENSION INTRA-ARTICULAR; INTRALESIONAL; INTRAMUSCULAR; SOFT TISSUE at 11:46

## 2022-12-21 RX ADMIN — BUPIVACAINE HYDROCHLORIDE 3 ML: 2.5 INJECTION, SOLUTION EPIDURAL; INFILTRATION; INTRACAUDAL at 11:46

## 2022-12-21 RX ADMIN — IOHEXOL 1 ML: 300 INJECTION, SOLUTION INTRAVENOUS at 11:46

## 2022-12-21 NOTE — DISCHARGE INSTRUCTIONS
Steroid Joint Injection   WHAT YOU NEED TO KNOW:   A steroid joint injection is a procedure to inject steroid medicine into a joint  Steroid medicine decreases pain and inflammation  The injection may also contain an anesthetic (numbing medicine) to decrease pain  It may be done to treat conditions such as arthritis, gout, or carpal tunnel syndrome  The injections may be given in your knee, ankle, shoulder, elbow, wrist, ankle or sacroiliac joint  Do not apply heat to any area that is numb  If you have discomfort or soreness at the injection site, you may apply ice today, 20 minutes on and 20 minutes off  Tomorrow you may use ice or warm, moist heat  Do not apply ice or heat directly to the skin  You may have an increase or change in the discomfort for 36-48 hours after your treatment  Apply ice and continue with any pain medicine you have been prescribed  Do not do anything strenuous today  You may shower, but no tub baths or hot tubs today  You may resume your normal activities tomorrow, but do not “overdo it”  Resume normal activities slowly when you are feeling better  If you experience redness, drainage or swelling at the injection site, or if you develop a fever above 100 degrees, please call [facility] at [phone] or go to the Emergency Room  Continue to take all routine medicines prescribed by your primary care physician unless otherwise instructed by our staff  Most blood thinners should be started again according to your regularly scheduled dosing  If you have any questions, please give our office a call  As no general anesthesia was used in today's procedure, you should not experience any side effects related to anesthesia  If you are diabetic, the steroids used in today's injection may temporarily increase your blood sugar levels after the first few days after your injection   Please keep a close eye on your sugars and alert the doctor who manages your diabetes if your sugars are significantly high from your baseline or you are symptomatic  If you have a problem specifically related to your procedure, please call our office at [phone]  Problems not related to your procedure should be directed to your primary care physician

## 2022-12-21 NOTE — H&P
History of Present Illness: The patient is a 67 y o  male who presents with complaints of low back and buttock pain  Past Medical History:   Diagnosis Date   • Arthritis    • Bladder cancer    • Cancer ([address] )     skin melanoma;basal cell   • Chronic pain disorder     from arthritis   • Colon polyp    • Does use hearing aid     bilat   • Dry eyes, bilateral    • GERD (gastroesophageal reflux disease)    • History of kidney stones 10/2019   • History of partial knee replacement     bilat   • History of vertigo    • Hyperlipidemia    • Hypertension    • Infusion extravasation of chemotherapy vesicant 11/2021   • Kidney lesion    • Lumbar disc disorder     compression of vertebrae L4-5-6   • Muscle weakness     left hip area   • Renal mass     left   • Right ankle injury 03/05/2020    missed step of ladder    • Right ankle pain    • Shortness of breath     with activity   • Tinnitus    • Urothelial cancer     left   • Wears glasses        Past Surgical History:   Procedure Laterality Date   • COLONOSCOPY     • CYSTOSCOPY Left 4/1/2020    Procedure: CYSTOSCOPY; URETERAL CATHETER PLACEMENT;  Surgeon: Paz Angel MD;  [facility];  Service: Urology   • CYSTOSCOPY  05/11/2020   • CYSTOSCOPY  06/04/2021   • FL CYSTOGRAM  4/13/2020   • FL RETROGRADE PYELOGRAM  1/17/2020   • HERNIA REPAIR      umbilical with mesh   • INGUINAL HERNIA REPAIR Right     with mesh   • IR PORT PLACEMENT  4/30/2020   • JOINT REPLACEMENT Bilateral     partials knee   • LUMBAR EPIDURAL INJECTION     • [place] CYSTOURETHROSCOPY,URETER CATHETER Bilateral 1/17/2020    Procedure: CYSTOSCOPY; RIGHT RETROGRADE PYELOGRAM WITH RIGHT URETERAL CYTOLOGY SAMPLING; LEFT URETEROSCOPY WITH RENAL PELVIS BIOPSY AND LEFT STENT PLACEMENT;  Surgeon: Paz Angel MD;  [facility];  Service: Urology   • [place] NEPHRECTOMY, W/PART   URETECTOMY Left 4/1/2020    Procedure: ROBOTIC LAPAROSCOPIC NEPHRO-URETERECTOMY;  Surgeon: Paz Angel MD;  [facility];  Service: Urology   • SKIN CANCER EXCISION      surface melanoma   • TONSILLECTOMY     • WISDOM TOOTH EXTRACTION           Current Outpatient Medications:   •  acetaminophen (TYLENOL) 500 mg tablet, Take 2 tablets (1,000 mg total) by mouth every 8 (eight) hours, Disp: , Rfl:   •  allopurinol (ZYLOPRIM) 300 mg tablet, take 1 tablet by mouth once daily, Disp: 30 tablet, Rfl: 3  •  ALPRAZolam (XANAX) 0 25 mg tablet, Take 1 tablet (0 25 mg total) by mouth daily at bedtime as needed for anxiety (Patient not taking: Reported on 10/13/2022), Disp: 30 tablet, Rfl: 0  •  atorvastatin (LIPITOR) 80 mg tablet, Take 80 mg by mouth every other day evening, Disp: , Rfl:   •  capsicum (ZOSTRIX) 0 075 % topical cream, Apply topically 3 (three) times a day, Disp: 28 3 g, Rfl: 0  •  ciprofloxacin (CIPRO) 500 mg tablet, Take 500 mg by mouth every 12 (twelve) hours, Disp: , Rfl:   •  colchicine (COLCRYS) 0 6 mg tablet, take 1 tablet by mouth twice a day if needed for FOOT PAIN (Patient not taking: No sig reported), Disp: , Rfl:   •  docusate sodium (COLACE) 250 MG capsule, Take 1 capsule (250 mg total) by mouth daily (Patient not taking: No sig reported), Disp: 60 capsule, Rfl: 6  •  DULoxetine (CYMBALTA) 60 mg delayed release capsule, Take 1 capsule (60 mg total) by mouth daily, Disp: 30 capsule, Rfl: 1  •  folic acid (FOLVITE) 1 mg tablet, take 1 tablet by mouth once daily, Disp: 90 tablet, Rfl: 4  •  HYDROcodone-acetaminophen (NORCO) 5-325 mg per tablet, Take 1 tablet by mouth 2 (two) times a day as needed for pain Max Daily Amount: 2 tablets, Disp: 60 tablet, Rfl: 0  •  Magnesium 250 MG TABS, 1 tablet 2 (two) times a day Taking 500mg in the morning and 500mg at night, Disp: , Rfl:   •  metoprolol tartrate (LOPRESSOR) 100 mg tablet, Take 50 mg by mouth daily, Disp: , Rfl:   •  naloxone (NARCAN) 4 mg/0 1 mL nasal spray, Administer 1 spray into a nostril   If breathing does not return to normal or if breathing difficulty resumes after 2-3 minutes, give another dose in the other nostril using a new spray , Disp: 1 each, Rfl: 1  •  nystatin (MYCOSTATIN) cream, Apply topically 2 (two) times a day (Patient not taking: No sig reported), Disp: 30 g, Rfl: 0  •  oxybutynin (DITROPAN-XL) 10 MG 24 hr tablet, Take 1 tablet (10 mg total) by mouth every morning, Disp: 30 tablet, Rfl: 3  •  predniSONE 10 mg tablet, Take 2 tablets (20 mg total) by mouth daily, Disp: 60 tablet, Rfl: 3  •  senna (SENOKOT) 8 6 mg, Take 1 tablet (8 6 mg total) by mouth daily at bedtime (Patient not taking: No sig reported), Disp: 30 each, Rfl: 0  •  tadalafil (CIALIS) 20 MG tablet, Take one tablet by mouth one hour before sexual activity, Disp: 15 tablet, Rfl: 3  •  tamsulosin (FLOMAX) 0 4 mg, Take 1 capsule (0 4 mg total) by mouth daily with dinner, Disp: 30 capsule, Rfl: 12  •  VITAMIN D PO, Take 1,000 Units by mouth daily , Disp: , Rfl:   •  zolpidem (AMBIEN) 5 mg tablet, Take 1 tablet (5 mg total) by mouth daily at bedtime as needed for sleep, Disp: 30 tablet, Rfl: 1    Current Facility-Administered Medications:   •  bupivacaine (PF) (MARCAINE) 0 25 % injection 30 mL, 30 mL, Intra-articular, Once, Milton Lait, DO  •  iohexol (OMNIPAQUE) 300 mg/mL injection 50 mL, 50 mL, Intra-articular, Once, Milton Lait, DO  •  lidocaine (PF) (XYLOCAINE-MPF) 1 % injection 5 mL, 5 mL, Other, Once, Milton Lait, DO  •  methylPREDNISolone acetate (DEPO-MEDROL) injection 80 mg, 80 mg, Intra-articular, Once, Milton Lait, DO    Allergies   Allergen Reactions   • Poison Ivy Extract Rash       Physical Exam:   Vitals:    12/21/22 1110   BP: 142/77   Pulse: 70   Resp: 20   Temp: 97 6 °F (36 4 °C)   SpO2: 97%     General: Awake, Alert, Oriented x 3, Mood and affect appropriate  Respiratory: Respirations even and unlabored  Cardiovascular: Peripheral pulses intact; no edema  Musculoskeletal Exam: Tenderness palpation over bilateral SI joints    ASA Score: 3    Patient/Chart Verification  Patient ID Verified: Verbal  ID Band Applied: No  Consents Confirmed: Procedural, To be obtained in the Pre-Procedure area  Interval H&P(within 24 hr) Complete (required for Outpatients and Surgery Admit only): To be obtained in the Pre-Procedure area  Allergies Reviewed: Yes  Anticoag/NSAID held?: NA  Currently on antibiotics?: No    Assessment:   1   Sacroiliitis (HCC)        Plan: B/L SIJ injections

## 2022-12-26 ENCOUNTER — APPOINTMENT (OUTPATIENT)
Dept: LAB | Facility: HOSPITAL | Age: 72
End: 2022-12-26

## 2022-12-26 DIAGNOSIS — I10 HYPERTENSION, UNSPECIFIED TYPE: ICD-10-CM

## 2022-12-26 DIAGNOSIS — C79.10 METASTATIC UROTHELIAL CARCINOMA (HCC): ICD-10-CM

## 2022-12-26 DIAGNOSIS — I48.20 CHRONIC ATRIAL FIBRILLATION (HCC): ICD-10-CM

## 2022-12-26 LAB
ALBUMIN SERPL BCP-MCNC: 3.8 G/DL (ref 3.5–5)
ALP SERPL-CCNC: 60 U/L (ref 34–104)
ALT SERPL W P-5'-P-CCNC: 14 U/L (ref 7–52)
ANION GAP SERPL CALCULATED.3IONS-SCNC: 6 MMOL/L (ref 4–13)
AST SERPL W P-5'-P-CCNC: 13 U/L (ref 13–39)
BASOPHILS # BLD AUTO: 0.02 THOUSANDS/ÂΜL (ref 0–0.1)
BASOPHILS NFR BLD AUTO: 0 % (ref 0–1)
BILIRUB DIRECT SERPL-MCNC: 0.11 MG/DL (ref 0–0.2)
BILIRUB SERPL-MCNC: 0.68 MG/DL (ref 0.2–1)
BUN SERPL-MCNC: 29 MG/DL (ref 5–25)
CALCIUM SERPL-MCNC: 9.6 MG/DL (ref 8.4–10.2)
CHLORIDE SERPL-SCNC: 102 MMOL/L (ref 96–108)
CHOLEST SERPL-MCNC: 156 MG/DL
CO2 SERPL-SCNC: 34 MMOL/L (ref 21–32)
CREAT SERPL-MCNC: 1.48 MG/DL (ref 0.6–1.3)
EOSINOPHIL # BLD AUTO: 0.03 THOUSAND/ÂΜL (ref 0–0.61)
EOSINOPHIL NFR BLD AUTO: 1 % (ref 0–6)
ERYTHROCYTE [DISTWIDTH] IN BLOOD BY AUTOMATED COUNT: 18.2 % (ref 11.6–15.1)
GFR SERPL CREATININE-BSD FRML MDRD: 46 ML/MIN/1.73SQ M
GLUCOSE P FAST SERPL-MCNC: 84 MG/DL (ref 65–99)
HCT VFR BLD AUTO: 38.5 % (ref 36.5–49.3)
HDLC SERPL-MCNC: 59 MG/DL
HGB BLD-MCNC: 11.7 G/DL (ref 12–17)
IMM GRANULOCYTES # BLD AUTO: 0.03 THOUSAND/UL (ref 0–0.2)
IMM GRANULOCYTES NFR BLD AUTO: 1 % (ref 0–2)
LDLC SERPL CALC-MCNC: 79 MG/DL (ref 0–100)
LYMPHOCYTES # BLD AUTO: 1.44 THOUSANDS/ÂΜL (ref 0.6–4.47)
LYMPHOCYTES NFR BLD AUTO: 24 % (ref 14–44)
MCH RBC QN AUTO: 29.8 PG (ref 26.8–34.3)
MCHC RBC AUTO-ENTMCNC: 30.4 G/DL (ref 31.4–37.4)
MCV RBC AUTO: 98 FL (ref 82–98)
MONOCYTES # BLD AUTO: 0.58 THOUSAND/ÂΜL (ref 0.17–1.22)
MONOCYTES NFR BLD AUTO: 10 % (ref 4–12)
NEUTROPHILS # BLD AUTO: 3.88 THOUSANDS/ÂΜL (ref 1.85–7.62)
NEUTS SEG NFR BLD AUTO: 64 % (ref 43–75)
NONHDLC SERPL-MCNC: 97 MG/DL
NRBC BLD AUTO-RTO: 0 /100 WBCS
PLATELET # BLD AUTO: 222 THOUSANDS/UL (ref 149–390)
PMV BLD AUTO: 9.7 FL (ref 8.9–12.7)
POTASSIUM SERPL-SCNC: 4 MMOL/L (ref 3.5–5.3)
PROT SERPL-MCNC: 6.6 G/DL (ref 6.4–8.4)
RBC # BLD AUTO: 3.93 MILLION/UL (ref 3.88–5.62)
SODIUM SERPL-SCNC: 142 MMOL/L (ref 135–147)
TRIGL SERPL-MCNC: 92 MG/DL
TSH SERPL DL<=0.05 MIU/L-ACNC: 2.23 UIU/ML (ref 0.45–4.5)
WBC # BLD AUTO: 5.98 THOUSAND/UL (ref 4.31–10.16)

## 2022-12-27 LAB — BACTERIA UR CULT: NORMAL

## 2022-12-28 ENCOUNTER — TELEPHONE (OUTPATIENT)
Dept: PAIN MEDICINE | Facility: CLINIC | Age: 72
End: 2022-12-28

## 2022-12-28 LAB
6MAM UR QL CFM: NEGATIVE NG/ML
7AMINOCLONAZEPAM UR QL CFM: NEGATIVE NG/ML
A-OH ALPRAZ UR QL CFM: ABNORMAL NG/ML
AMPHET UR QL CFM: NEGATIVE NG/ML
AMPHET UR QL CFM: NEGATIVE NG/ML
BUPRENORPHINE UR QL CFM: NEGATIVE NG/ML
BUTALBITAL UR QL CFM: NEGATIVE NG/ML
BZE UR QL CFM: NEGATIVE NG/ML
CODEINE UR QL CFM: ABNORMAL NG/ML
DESIPRAMINE UR QL CFM: NEGATIVE NG/ML
DESIPRAMINE UR QL CFM: NEGATIVE NG/ML
EDDP UR QL CFM: NEGATIVE NG/ML
ETHYL GLUCURONIDE UR QL CFM: ABNORMAL NG/ML
ETHYL SULFATE UR QL SCN: ABNORMAL NG/ML
EUTYLONE UR QL: NEGATIVE NG/ML
FENTANYL UR QL CFM: NEGATIVE NG/ML
GLIADIN IGG SER IA-ACNC: NEGATIVE NG/ML
GLUCOSE 30M P 50 G LAC PO SERPL-MCNC: NEGATIVE NG/ML
HYDROCODONE UR QL CFM: ABNORMAL NG/ML
HYDROCODONE UR QL CFM: NORMAL NG/ML
HYDROMORPHONE UR QL CFM: ABNORMAL NG/ML
HYDROMORPHONE UR QL CFM: NORMAL NG/ML
IMIPRAMINE UR QL CFM: NEGATIVE NG/ML
LORAZEPAM UR QL CFM: NEGATIVE NG/ML
MDMA UR QL CFM: NEGATIVE NG/ML
ME-PHENIDATE UR QL CFM: NEGATIVE NG/ML
MEPERIDINE UR QL CFM: NEGATIVE NG/ML
METHADONE UR QL CFM: NEGATIVE NG/ML
METHAMPHET UR QL CFM: NEGATIVE NG/ML
MORPHINE UR QL CFM: NEGATIVE NG/ML
MORPHINE UR QL CFM: NEGATIVE NG/ML
NORBUPRENORPHINE UR QL CFM: NEGATIVE NG/ML
NORDIAZEPAM UR QL CFM: NEGATIVE NG/ML
NORFENTANYL UR QL CFM: NEGATIVE NG/ML
NORHYDROCODONE UR QL CFM: ABNORMAL NG/ML
NORHYDROCODONE UR QL CFM: NORMAL NG/ML
NORMEPERIDINE UR QL CFM: NEGATIVE NG/ML
NOROXYCODONE UR QL CFM: NEGATIVE NG/ML
OLANZAPINE QUANTIFICATION: NEGATIVE NG/ML
OPC-3373 QUANTIFICATION: NEGATIVE
OXAZEPAM UR QL CFM: NEGATIVE NG/ML
OXYCODONE UR QL CFM: NEGATIVE NG/ML
OXYMORPHONE UR QL CFM: NEGATIVE NG/ML
OXYMORPHONE UR QL CFM: NEGATIVE NG/ML
PARA-FLUOROFENTANYL QUANTIFICATION: NORMAL NG/ML
PCP UR QL CFM: NEGATIVE NG/ML
PHENOBARB UR QL CFM: NEGATIVE NG/ML
SECOBARBITAL UR QL CFM: NEGATIVE NG/ML
SL AMB 4-ANPP QUANTIFICATION: NORMAL NG/ML
SL AMB 5F-ADB-M7 METABOLITE QUANTIFICATION: NEGATIVE NG/ML
SL AMB 7-OH-MITRAGYNINE (KRATOM ALKALOID) QUANTIFICATION: NEGATIVE NG/ML
SL AMB AB-FUBINACA-M3 METABOLITE QUANTIFICATION: NEGATIVE NG/ML
SL AMB ACETYL FENTANYL QUANTIFICATION: NORMAL NG/ML
SL AMB ACETYL NORFENTANYL QUANTIFICATION: NORMAL NG/ML
SL AMB ACRYL FENTANYL QUANTIFICATION: NORMAL NG/ML
SL AMB CARFENTANIL QUANTIFICATION: NORMAL NG/ML
SL AMB CLOZAPINE QUANTIFICATION: NEGATIVE NG/ML
SL AMB CTHC (MARIJUANA METABOLITE) QUANTIFICATION: NEGATIVE NG/ML
SL AMB DEXTROMETHORPHAN QUANTIFICATION: NEGATIVE NG/ML
SL AMB DEXTRORPHAN (DEXTROMETHORPHAN METABOLITE) QUANT: NEGATIVE NG/ML
SL AMB DEXTRORPHAN (DEXTROMETHORPHAN METABOLITE) QUANT: NEGATIVE NG/ML
SL AMB HALOPERIDOL  QUANTIFICATION: NEGATIVE NG/ML
SL AMB HALOPERIDOL METABOLITE QUANTIFICATION: NEGATIVE NG/ML
SL AMB HYDROXYRISPERIDONE QUANTIFICATION: NEGATIVE NG/ML
SL AMB JWH018 METABOLITE QUANTIFICATION: NEGATIVE NG/ML
SL AMB JWH073 METABOLITE QUANTIFICATION: NEGATIVE NG/ML
SL AMB MDMB-FUBINACA-M1 METABOLITE QUANTIFICATION: NEGATIVE NG/ML
SL AMB METHYLONE QUANTIFICATION: NEGATIVE NG/ML
SL AMB N-DESMETHYL-TRAMADOL QUANTIFICATION: NEGATIVE NG/ML
SL AMB N-DESMETHYLCLOZAPINE QUANTIFICATION: NEGATIVE NG/ML
SL AMB NORQUETIAPINE QUANTIFICATION: NEGATIVE NG/ML
SL AMB PHENTERMINE QUANTIFICATION: NEGATIVE NG/ML
SL AMB QUETIAPINE QUANTIFICATION: NEGATIVE NG/ML
SL AMB RCS4 METABOLITE QUANTIFICATION: NEGATIVE NG/ML
SL AMB RISPERIDONE QUANTIFICATION: NEGATIVE NG/ML
SL AMB RITALINIC ACID QUANTIFICATION: NEGATIVE NG/ML
SPECIMEN DRAWN SERPL: NEGATIVE NG/ML
TAPENTADOL UR QL CFM: NEGATIVE NG/ML
TEMAZEPAM UR QL CFM: NEGATIVE NG/ML
TEMAZEPAM UR QL CFM: NEGATIVE NG/ML
TRAMADOL UR QL CFM: NEGATIVE NG/ML
URATE/CREAT 24H UR: NEGATIVE NG/ML

## 2022-12-29 ENCOUNTER — OFFICE VISIT (OUTPATIENT)
Dept: HEMATOLOGY ONCOLOGY | Facility: CLINIC | Age: 72
End: 2022-12-29

## 2022-12-29 ENCOUNTER — TELEPHONE (OUTPATIENT)
Dept: HEMATOLOGY ONCOLOGY | Facility: CLINIC | Age: 72
End: 2022-12-29

## 2022-12-29 ENCOUNTER — TELEPHONE (OUTPATIENT)
Dept: RADIOLOGY | Facility: CLINIC | Age: 72
End: 2022-12-29

## 2022-12-29 VITALS
SYSTOLIC BLOOD PRESSURE: 132 MMHG | TEMPERATURE: 97.8 F | RESPIRATION RATE: 18 BRPM | DIASTOLIC BLOOD PRESSURE: 80 MMHG | OXYGEN SATURATION: 98 % | BODY MASS INDEX: 26.2 KG/M2 | WEIGHT: 183 LBS | HEART RATE: 84 BPM | HEIGHT: 70 IN

## 2022-12-29 DIAGNOSIS — G47.09 OTHER INSOMNIA: ICD-10-CM

## 2022-12-29 DIAGNOSIS — D64.9 NORMOCYTIC ANEMIA: ICD-10-CM

## 2022-12-29 DIAGNOSIS — D53.9 NUTRITIONAL ANEMIA: ICD-10-CM

## 2022-12-29 DIAGNOSIS — C79.10 METASTATIC UROTHELIAL CARCINOMA (HCC): Primary | ICD-10-CM

## 2022-12-29 DIAGNOSIS — C67.8 MALIGNANT NEOPLASM OF OVERLAPPING SITES OF BLADDER (HCC): ICD-10-CM

## 2022-12-29 PROBLEM — Z45.2 ENCOUNTER FOR CARE RELATED TO PORT-A-CATH: Status: ACTIVE | Noted: 2022-12-29

## 2022-12-29 RX ORDER — ZOLPIDEM TARTRATE 5 MG/1
5 TABLET ORAL
Qty: 30 TABLET | Refills: 1 | Status: SHIPPED | OUTPATIENT
Start: 2022-12-29

## 2022-12-29 RX ORDER — ASCORBIC ACID 500 MG
1 TABLET ORAL EVERY 12 HOURS
COMMUNITY
Start: 2022-12-05 | End: 2023-01-04

## 2022-12-29 RX ORDER — AMIODARONE HYDROCHLORIDE 200 MG/1
TABLET ORAL
COMMUNITY
Start: 2022-12-09

## 2022-12-29 RX ORDER — FINASTERIDE 5 MG/1
5 TABLET, FILM COATED ORAL DAILY
COMMUNITY
Start: 2022-12-12

## 2022-12-29 RX ORDER — PANTOPRAZOLE SODIUM 40 MG/1
TABLET, DELAYED RELEASE ORAL
COMMUNITY
Start: 2022-11-21 | End: 2022-12-29 | Stop reason: ALTCHOICE

## 2022-12-29 RX ORDER — OMEPRAZOLE 20 MG/1
CAPSULE, DELAYED RELEASE ORAL
COMMUNITY
Start: 2022-12-09 | End: 2022-12-29 | Stop reason: ALTCHOICE

## 2022-12-29 RX ORDER — DILTIAZEM HYDROCHLORIDE 60 MG/1
TABLET, FILM COATED ORAL 2 TIMES DAILY
COMMUNITY
Start: 2022-12-09

## 2022-12-29 RX ORDER — FUROSEMIDE 40 MG/1
TABLET ORAL
COMMUNITY
Start: 2022-12-09

## 2022-12-29 RX ORDER — ATORVASTATIN CALCIUM 40 MG/1
TABLET, FILM COATED ORAL
COMMUNITY
Start: 2022-12-09

## 2022-12-29 NOTE — TELEPHONE ENCOUNTER
----- Message from REQQI sent at 12/29/2022 12:36 PM EST -----  Please remind the patient that he should not be consuming ETOH while concurrently taking an opioid medication  Thank you

## 2022-12-29 NOTE — PROGRESS NOTES
Hematology/Oncology Outpatient Follow-up  Val Scales 67 y o  male 1950 [id number]    Date:  12/29/2022      Assessment and Plan:  1  Metastatic urothelial carcinoma ([address] )  Has been off any of systemic therapy  His last dose of Padcev cycle 11-day 15 was administered 9/2/2022  Decision was made to hold treatment afterwards since he was developing significant neuropathy which was affecting his ambulation/quality of life  He continues to have significant neuropathy without any changes, however he states that he is no longer as weak as he was before after his recent aggressive rehabilitation; back to using a walker and is no longer wheelchair-bound  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 was discontinued while he was in [place]  Patient unfortunately was hospitalized recently while he was on his trip to [place]  Was found to have perforated bowel/sepsis that was attributed most likely to a perforated hernia  He was found to be critically ill and sepsis and fortunately survived surgery/treatment  Completed about 5 to 6 weeks of inpatient rehabilitation while he was in [place]  He did develop some likely bleeding while he was in rehabilitation/on Eliquis after his hemoglobin dropped less than 7g  Eliquis was discontinued and he was supported with blood transfusions during a short readmission  (notes reviewed on Care Everywhere)    Patient states that he is feeling much better today  We did discuss pursuing repeat imaging to reevaluate his metastatic urothelial carcinoma which he was enthusiastic about  We will arrange for the PET/CT imaging in the next week or 2 and regroup repeat labs in about 3 weeks  Patient is hopeful for stable results and if so would like to continue with supportive care measures  It also be open to resuming systemic treatment if necessary      Is not interested in resuming his additional IV hydration at this point as he is hydrating himself well and feels well at this point  We will continue to maintain his Port-A-Cath flushing it on an every 4 to 6-week basis while it is not in use  - CBC and differential; Future  - Comprehensive metabolic panel; Future  - NM PET CT skull base to mid thigh; Future    2  Normocytic anemia  His most recent CBC showed mild anemia hemoglobin 11 7 which may be related to his recent hospitalization/surgery/blood loss  Chronic renal dysfunction be contributing factor as well  Continue to monitor closely  Evaluate for correctable nutritional deficiencies before his next office visit  - CBC and differential; Future  - Comprehensive metabolic panel; Future  - Iron Panel (Includes Ferritin, Iron Sat%, Iron, and TIBC); Future  - Ferritin; Future  - Folate; Future  - Vitamin B12; Future    3  Other insomnia  Is asking for refill on his Ambien that he is using on occasion when he has significant insomnia  Is not using daily  - zolpidem (AMBIEN) 5 mg tablet; Take 1 tablet (5 mg total) by mouth daily at bedtime as needed for sleep  Dispense: 30 tablet; Refill: 1      HPI:  Presents today for a follow-up visit accompanied by his wife  He had a planned extended trip end of October/November to be with his daughter in [place] unfortunately he was not able to enjoy his trip due to complicated medical issues  Shortly after arriving to his hotel he developed significant abdominal pain and eventually needed to be given to the hospital via EMS  He was later found to be in critical condition due to perforated bowel/sepsis they were told was likely a result of his hernia  He fortunately survived the event recovered; was intubated for some time and admitted to the hospital for at least a week  He was then discharged to rehabilitation center in [place] for about 5 to 6 weeks after discharge from the hospital   Was very pleased with the aggressive rehabilitation that he received and is definitely much stronger at this point    No longer in a wheelchair and back to using a walker  He did develop some bleeding while he was in the rehabilitation center with hemoglobin dropping down less than 7 g; he did have to be admitted again for a day or so for transfusion/etc  Ended up having COVID shortly afterward she recovered from  Multiple changes were made while he was in [place] to his medications  Today he is feeling fairly well  Continues to have significant neuropathy doubt any changes but feels much stronger at this point  Is no longer taking his gabapentin or Cymbalta  He does mention that he is having some pain to his right posterior shoulder/scapula at times  No new complaints otherwise  His most recent laboratory studies from 3 days ago 12/26/2022 showed normal white cells and platelets, he has mild normocytic anemia H&H 11 7/38 5, MCV 98  BUN 29, creatinine 1 48, GFR 46 meaning metabolic panel is appropriate  Knee exam slightly lower than average 1 8  Urine culture was negative for infection  Oncology History Overview Note   Patient has a history of hypertension, hyperlipidemia, chronic lower back pain  He had an MRI of the lower spine for further evaluation of his chronic lower back pain  The MRI on 12/02/2019 revealed Multiple left renal masses to include a left lower pole cyst and a rounded structure in the left upper pole which may also represent cyst   Left upper pole renal masses approximately 3 cm in greatest linear dimension  A CT with renal protocol was done on 12/12/2019 which also showed the infiltrating lesion in the upper pole of the left kidney measuring about the 3 5 cm in greatest dimension without any hint of retroperitoneal lymphadenopathy  A CT scan of the abdomen pelvis on 01/14/2020 showed the same findings with the mild hydronephrosis on the left  The urine cytology on 01/03/2020 showed high-grade urothelial carcinoma    A cystoscopy was then done, a biopsy was taken from the left renal pelvic region which showed high-grade urothelial carcinoma without evidence of lamina propria invasion  The detrusor muscle/muscularis propria were not present for evaluation  The recommendation was to pursue left robotic assisted laparoscopic nephroureterectomy with bladder cuff excision which was done on 04/01/2020  The final pathology revealed;  - Invasive high grade urothelial carcinoma arising in renal pelvis  - Bladder cuff margin is negative for carcinoma and no evidence of high grade dysplasia  - Ureters with no significant pathologic abnormality  - Two benign simple cysts  - Adrenal gland is negative for malignancy  - One lymph node, negative for malignancy (0/1)  The tumor size was 4 5 cm with invasion beyond the muscularis into the periurethral fat or peripelvic fat or the renal parenchyma  The margins were uninvolved by carcinoma or carcinoma in situ  The final pathology was pT3 pN0  Patient barely tolerated 1 cycle of adjuvant chemotherapy with split dose cisplatin and gemcitabine with a lot of side effects  The treatment had to be discontinued due to significant worsening renal dysfunction 6/2020  Patient started to complain about significant abdominal/left inguinal pain around August/September 2020  Unfortunately repeat imaging revealed recurrent/metastatic disease  9/4/20 CT C/A/P- Status post left nephrectomy for resection of urothelial neoplasm  Lymphadenopathy in the left nephrectomy bed has increased since the prior examination, concerning for metastatic disease  Ill-defined hyperattenuating masslike focus in the left rectus muscle, not identified on the prior examination  This is suspicious for a metastatic lesion  A rectus hematoma is also considered  9/23/20 PET scan- 1  Multiple FDG avid lymph nodes in the retrocrural region, retroperitoneum and the left renal bed compatible with metastasis  These have progressed from recent CT    2   FDG avid mass involving the left rectus abdominal musculature compatible with metastasis which is larger from recent CT  3  Several small lymph nodes adjacent to the mid to distal esophagus with FDG uptake suspicious for metastasis  Small focus of FDG uptake also suggested in the right hilar region, metastasis is not excluded  This could be reassessed on follow-up  4   Subtle focus of FDG uptake at the T2 level on the left, nonspecific, could be related to early degenerative changes, developing metastasis is not entirely excluded  This could be reassessed on follow-up  5  Prostate is mildly prominent with heterogeneous FDG uptake  This could be inflammatory  Correlate for prostatitis  He completed palliative radiation to the left abdomen 12/3/20     His PET scan from 08/16/2021 showed progression of his disease with hypermetabolic soft tissue lesions at the level of the right shoulder and right axilla with interval progression of the retroperitoneal and mesenteric hypermetabolic metastasis  He did have the new lesion to his right upper back/shoulder which was causing significant localized pain  This excised by his surgeon 08/09/2021  Pathology confirmed metastatic high-grade carcinoma consistent with his no primary urothelial carcinoma  He received palliative radiation to his right shoulder/axilla region  Urothelial cancer ([address] )   1/3/2020 Biopsy    Final Diagnosis    A  Urine, Clean Catch, Thin Prep:  High grade urothelial carcinoma (HGUC) - see comment  1/3/2020 Initial Diagnosis    Urothelial cancer ([address] )  T3 N0 (stage IIIA)     1/17/2020 Biopsy    Final Diagnosis    A  Ureter, Right, left renal pelvis biopsy  -Fragments of high grade urothelial carcinoma   -No evidence of lamina propria invasion   -Detrusor muscle/ muscularis propria is not present for evaluation  B  Urinary Bladder, bladder biopsy:  -Fragments of low grade papillary lesion   See note  -No evidence of invasion seen  -Unremarkable fragment of detrusor muscle seen  4/1/2020 Surgery    left robotic assisted laparoscopic nephroureterectomy with bladder cuff excision     Final Diagnosis    A  Left kidney, ureter, and bladder cuff, nephroureterectomy:  - Invasive high grade urothelial carcinoma arising in renal pelvis  - Bladder cuff margin is negative for carcinoma and no evidence of high grade dysplasia  - Ureters with no significant pathologic abnormality  - Two benign simple cysts  - Adrenal gland is negative for malignancy  - One lymph node, negative for malignancy (0/1)          5/14/2020 - 6/3/2020 Chemotherapy    CISplatin (PLATINOL) split dose 35 mg/m2 = 75 3 mg (50 % of original dose 70 mg/m2), Intravenous,   Administration: 75 3 mg (5/14/2020), 75 3 mg (5/21/2020)  gemcitabine (GEMZAR) 2,200 mg in sodium chloride 0 9 % 250 mL infusion, 2,150 2 mg (80 % of original dose 1,250 mg/m2), Intravenous,   Administration: 2,200 mg (5/14/2020), 2,200 mg (5/21/2020)    (only completed 1 cycle- d/c d/t adverse effects and worsening renal dysfunction)     10/9/2020 - 9/9/2021 Chemotherapy    pembrolizumab (KEYTRUDA) IVPB, 200 mg, Intravenous, Once, 16 of 20 cycles  Administration: 200 mg (10/9/2020), 200 mg (10/30/2020), 200 mg (11/20/2020), 200 mg (12/11/2020), 200 mg (12/31/2020), 200 mg (1/22/2021), 200 mg (2/12/2021), 200 mg (3/5/2021), 200 mg (3/26/2021), 200 mg (4/16/2021), 200 mg (5/7/2021), 200 mg (5/28/2021), 200 mg (6/18/2021), 200 mg (7/9/2021), 200 mg (7/30/2021), 200 mg (8/20/2021)     11/19/2020 - 12/3/2020 Radiation    Treatment:  Course: C1    Plan ID Energy Fractions Dose per Fraction (cGy) Dose Correction (cGy) Total Dose Delivered (cGy) Elapsed Days   L Abdomen 6X 10 / 10 300 0 3,000 14        9/10/2021 -  Chemotherapy    enfortumab vedotin-ejfv (PADCEV) IVPB, 1 25 mg/kg = 114 mg, Intravenous, Once, 11 of 16 cycles  Dose modification: 1 mg/kg (original dose 1 25 mg/kg, Cycle 7, Reason: Other (Must fill in a comment), Comment: dose reduction due to side effects ), 1 25 mg/kg (original dose 1 25 mg/kg, Cycle 7, Reason: Other (Must fill in a comment), Comment: patient wants full dose ), 1 mg/kg (original dose 1 25 mg/kg, Cycle 7, Reason: Neuropathy), 0 75 mg/kg (original dose 1 25 mg/kg, Cycle 11, Reason: Neuropathy)  Administration: 114 mg (9/10/2021), 114 mg (9/17/2021), 110 mg (10/8/2021), 110 mg (9/24/2021), 110 mg (10/15/2021), 110 mg (11/12/2021), 110 mg (10/22/2021), 110 mg (11/19/2021), 110 mg (12/10/2021), 110 mg (11/26/2021), 110 mg (12/17/2021), 110 mg (1/7/2022), 110 mg (12/23/2021), 110 mg (1/14/2022), 90 mg (4/8/2022), 90 mg (4/15/2022), 90 mg (4/22/2022), 110 mg (1/21/2022), 110 mg (2/18/2022), 110 mg (2/25/2022), 90 mg (5/13/2022), 90 mg (5/20/2022), 90 mg (5/27/2022), 90 mg (3/4/2022), 90 mg (6/10/2022), 90 mg (6/17/2022), 90 mg (6/24/2022), 90 mg (7/8/2022), 90 mg (7/15/2022), 90 mg (7/22/2022), 68 mg (8/19/2022), 70 mg (9/2/2022)     Cancer of left renal pelvis ([address] )   4/23/2020 Initial Diagnosis    Cancer of left renal pelvis (HCC)     10/9/2020 - 9/9/2021 Chemotherapy    pembrolizumab (KEYTRUDA) IVPB, 200 mg, Intravenous, Once, 16 of 20 cycles  Administration: 200 mg (10/9/2020), 200 mg (10/30/2020), 200 mg (11/20/2020), 200 mg (12/11/2020), 200 mg (12/31/2020), 200 mg (1/22/2021), 200 mg (2/12/2021), 200 mg (3/5/2021), 200 mg (3/26/2021), 200 mg (4/16/2021), 200 mg (5/7/2021), 200 mg (5/28/2021), 200 mg (6/18/2021), 200 mg (7/9/2021), 200 mg (7/30/2021), 200 mg (8/20/2021)     9/10/2021 -  Chemotherapy    enfortumab vedotin-ejfv (PADCEV) IVPB, 1 25 mg/kg = 114 mg, Intravenous, Once, 11 of 16 cycles  Dose modification: 1 mg/kg (original dose 1 25 mg/kg, Cycle 7, Reason: Other (Must fill in a comment), Comment: dose reduction due to side effects ), 1 25 mg/kg (original dose 1 25 mg/kg, Cycle 7, Reason: Other (Must fill in a comment), Comment: patient wants full dose ), 1 mg/kg (original dose 1 25 mg/kg, Cycle 7, Reason: Neuropathy), 0 75 mg/kg (original dose 1 25 mg/kg, Cycle 11, Reason: Neuropathy)  Administration: 114 mg (9/10/2021), 114 mg (9/17/2021), 110 mg (10/8/2021), 110 mg (9/24/2021), 110 mg (10/15/2021), 110 mg (11/12/2021), 110 mg (10/22/2021), 110 mg (11/19/2021), 110 mg (12/10/2021), 110 mg (11/26/2021), 110 mg (12/17/2021), 110 mg (1/7/2022), 110 mg (12/23/2021), 110 mg (1/14/2022), 90 mg (4/8/2022), 90 mg (4/15/2022), 90 mg (4/22/2022), 110 mg (1/21/2022), 110 mg (2/18/2022), 110 mg (2/25/2022), 90 mg (5/13/2022), 90 mg (5/20/2022), 90 mg (5/27/2022), 90 mg (3/4/2022), 90 mg (6/10/2022), 90 mg (6/17/2022), 90 mg (6/24/2022), 90 mg (7/8/2022), 90 mg (7/15/2022), 90 mg (7/22/2022), 68 mg (8/19/2022), 70 mg (9/2/2022)      Surgery       Metastatic urothelial carcinoma ([facility] [address] )   8/9/2021 Initial Diagnosis    Metastatic urothelial carcinoma ([facility] [address] )     9/8/2021 - 9/21/2021 Radiation    Treatment:  Course: C2    Plan ID Energy Fractions Dose per Fraction (cGy) Dose Correction (cGy) Total Dose Delivered (cGy) Elapsed Days   R Axil_Shl # 6X 10 / 10 300 0 3,000 13      Treatment dates:  C2: 9/8/2021 - 9/21/2021     9/10/2021 -  Chemotherapy    enfortumab vedotin-ejfv (PADCEV) IVPB, 1 25 mg/kg = 114 mg, Intravenous, Once, 11 of 16 cycles  Dose modification: 1 mg/kg (original dose 1 25 mg/kg, Cycle 7, Reason: Other (Must fill in a comment), Comment: dose reduction due to side effects ), 1 25 mg/kg (original dose 1 25 mg/kg, Cycle 7, Reason: Other (Must fill in a comment), Comment: patient wants full dose ), 1 mg/kg (original dose 1 25 mg/kg, Cycle 7, Reason: Neuropathy), 0 75 mg/kg (original dose 1 25 mg/kg, Cycle 11, Reason: Neuropathy)  Administration: 114 mg (9/10/2021), 114 mg (9/17/2021), 110 mg (10/8/2021), 110 mg (9/24/2021), 110 mg (10/15/2021), 110 mg (11/12/2021), 110 mg (10/22/2021), 110 mg (11/19/2021), 110 mg (12/10/2021), 110 mg (11/26/2021), 110 mg (12/17/2021), 110 mg (1/7/2022), 110 mg (12/23/2021), 110 mg (1/14/2022), 90 mg (4/8/2022), 90 mg (4/15/2022), 90 mg (4/22/2022), 110 mg (1/21/2022), 110 mg (2/18/2022), 110 mg (2/25/2022), 90 mg (5/13/2022), 90 mg (5/20/2022), 90 mg (5/27/2022), 90 mg (3/4/2022), 90 mg (6/10/2022), 90 mg (6/17/2022), 90 mg (6/24/2022), 90 mg (7/8/2022), 90 mg (7/15/2022), 90 mg (7/22/2022), 68 mg (8/19/2022), 70 mg (9/2/2022)         Interval history:    ROS: Review of Systems   Constitutional: Positive for activity change and fatigue  Negative for appetite change, chills, fever and unexpected weight change  HENT: Positive for hearing loss  Negative for congestion, mouth sores, nosebleeds, sore throat and trouble swallowing  Eyes: Negative  Respiratory: Negative for cough, chest tightness and shortness of breath  Cardiovascular: Negative for chest pain, palpitations and leg swelling  Gastrointestinal: Negative for abdominal distention, abdominal pain, blood in stool, constipation, diarrhea, nausea and vomiting  Genitourinary: Negative for difficulty urinating, dysuria, frequency, hematuria and urgency  Musculoskeletal: Positive for back pain and gait problem  Negative for arthralgias, joint swelling and myalgias  Skin: Positive for color change  Negative for pallor and rash  Neurological: Positive for dizziness (mild), weakness and numbness  Negative for light-headedness and headaches  Hematological: Negative for adenopathy  Does not bruise/bleed easily  Psychiatric/Behavioral: Positive for sleep disturbance  Negative for dysphoric mood  The patient is not nervous/anxious          Past Medical History:   Diagnosis Date   • Arthritis    • Bladder cancer    • Cancer ([facility] [address] )     skin melanoma;basal cell   • Chronic pain disorder     from arthritis   • Colon polyp    • Does use hearing aid     bilat   • Dry eyes, bilateral    • GERD (gastroesophageal reflux disease)    • History of kidney stones 10/2019   • History of partial knee replacement     bilat   • History of vertigo    • Hyperlipidemia    • Hypertension    • Infusion extravasation of chemotherapy vesicant 11/2021   • Kidney lesion    • Lumbar disc disorder     compression of vertebrae L4-5-6   • Muscle weakness     left hip area   • Renal mass     left   • Right ankle injury 03/05/2020    missed step of ladder    • Right ankle pain    • Shortness of breath     with activity   • Tinnitus    • Urothelial cancer     left   • Wears glasses        Past Surgical History:   Procedure Laterality Date   • COLONOSCOPY     • CYSTOSCOPY Left 4/1/2020    Procedure: CYSTOSCOPY; URETERAL CATHETER PLACEMENT;  Surgeon: Soraya Post MD;  Location[facility];  Service: Urology   • CYSTOSCOPY  05/11/2020   • CYSTOSCOPY  06/04/2021   • FL CYSTOGRAM  4/13/2020   • FL RETROGRADE PYELOGRAM  1/17/2020   • HERNIA REPAIR      umbilical with mesh   • INGUINAL HERNIA REPAIR Right     with mesh   • IR PORT PLACEMENT  4/30/2020   • JOINT REPLACEMENT Bilateral     partials knee   • LUMBAR EPIDURAL INJECTION     • CA CYSTO BLADDER W/URETERAL CATHETERIZATION Bilateral 1/17/2020    Procedure: CYSTOSCOPY; RIGHT RETROGRADE PYELOGRAM WITH RIGHT URETERAL CYTOLOGY SAMPLING; LEFT URETEROSCOPY WITH RENAL PELVIS BIOPSY AND LEFT STENT PLACEMENT;  Surgeon: Soraya Post MD;  [facility];  Service: Urology   • CA LAPAROSCOPY NEPHRECTOMY W/TOTAL URETERECTOMY Left 4/1/2020    Procedure: ROBOTIC LAPAROSCOPIC NEPHRO-URETERECTOMY;  Surgeon: Soraya Post MD;  Location[facility];  Service: Urology   • SKIN CANCER EXCISION      surface melanoma   • TONSILLECTOMY     • WISDOM TOOTH EXTRACTION         Social History     Socioeconomic History   • Marital status: /Civil Union     Spouse name: Not on file   • Number of children: Not on file   • Years of education: Not on file   • Highest education level: Not on file   Occupational History   • Not on file   Tobacco Use   • Smoking status: Former     Types: Cigarettes     Quit date: 1972     Years since quittin 0   • Smokeless tobacco: Never   Vaping Use   • Vaping Use: Never used   Substance and Sexual Activity   • Alcohol use:  Yes     Alcohol/week: 17 0 standard drinks     Types: 7 Glasses of wine, 10 Cans of beer per week     Comment: socially   • Drug use: Never   • Sexual activity: Not Currently   Other Topics Concern   • Not on file   Social History Narrative    Daily caffeine use - 2 cups coffee      Social Determinants of Health     Financial Resource Strain: Not on file   Food Insecurity: Not on file   Transportation Needs: Not on file   Physical Activity: Not on file   Stress: Not on file   Social Connections: Not on file   Intimate Partner Violence: Not on file   Housing Stability: Not on file       Family History   Problem Relation Age of Onset   • Liver cancer Father        Allergies   Allergen Reactions   • Poison Ivy Extract Rash         Current Outpatient Medications:   •  acetaminophen (TYLENOL) 500 mg tablet, Take 2 tablets (1,000 mg total) by mouth every 8 (eight) hours, Disp: , Rfl:   •  amiodarone 200 mg tablet, , Disp: , Rfl:   •  ascorbic acid (VITAMIN C) 500 MG tablet, Take 1 tablet by mouth Every 12 hours, Disp: , Rfl:   •  atorvastatin (LIPITOR) 40 mg tablet, , Disp: , Rfl:   •  diltiazem (CARDIZEM) 60 mg tablet, 2 (two) times a day, Disp: , Rfl:   •  finasteride (PROSCAR) 5 mg tablet, Take 5 mg by mouth daily, Disp: , Rfl:   •  folic acid (FOLVITE) 1 mg tablet, take 1 tablet by mouth once daily, Disp: 90 tablet, Rfl: 4  •  furosemide (LASIX) 40 mg tablet, , Disp: , Rfl:   •  HYDROcodone-acetaminophen (NORCO) 5-325 mg per tablet, Take 1 tablet by mouth 2 (two) times a day as needed for pain Max Daily Amount: 2 tablets, Disp: 60 tablet, Rfl: 0  •  senna (SENOKOT) 8 6 mg, Take 1 tablet (8 6 mg total) by mouth daily at bedtime, Disp: 30 each, Rfl: 0  •  tamsulosin (FLOMAX) 0 4 mg, Take 1 capsule (0 4 mg total) by mouth daily with dinner, Disp: 30 capsule, Rfl: 12  • VITAMIN D PO, Take 1,000 Units by mouth daily , Disp: , Rfl:   •  capsicum (ZOSTRIX) 0 075 % topical cream, Apply topically 3 (three) times a day (Patient not taking: Reported on 12/29/2022), Disp: 28 3 g, Rfl: 0  •  docusate sodium (COLACE) 250 MG capsule, Take 1 capsule (250 mg total) by mouth daily (Patient not taking: Reported on 10/13/2022), Disp: 60 capsule, Rfl: 6  •  Magnesium 250 MG TABS, 1 tablet 2 (two) times a day Taking 500mg in the morning and 500mg at night (Patient not taking: Reported on 12/29/2022), Disp: , Rfl:   •  metoprolol tartrate (LOPRESSOR) 100 mg tablet, Take 50 mg by mouth daily (Patient not taking: Reported on 12/29/2022), Disp: , Rfl:   •  naloxone (NARCAN) 4 mg/0 1 mL nasal spray, Administer 1 spray into a nostril  If breathing does not return to normal or if breathing difficulty resumes after 2-3 minutes, give another dose in the other nostril using a new spray  (Patient not taking: Reported on 12/29/2022), Disp: 1 each, Rfl: 1  •  nystatin (MYCOSTATIN) cream, Apply topically 2 (two) times a day (Patient not taking: Reported on 10/13/2022), Disp: 30 g, Rfl: 0  •  tadalafil (CIALIS) 20 MG tablet, Take one tablet by mouth one hour before sexual activity (Patient not taking: Reported on 12/29/2022), Disp: 15 tablet, Rfl: 3  •  zolpidem (AMBIEN) 5 mg tablet, Take 1 tablet (5 mg total) by mouth daily at bedtime as needed for sleep (Patient not taking: Reported on 12/29/2022), Disp: 30 tablet, Rfl: 1      Physical Exam:  /80 (BP Location: Right arm, Patient Position: Sitting, Cuff Size: Adult)   Pulse 84   Temp 97 8 °F (36 6 °C)   Resp 18   Ht 5' 10" (1 778 m)   Wt 83 kg (183 lb)   SpO2 98%   BMI 26 26 kg/m²     Physical Exam  Vitals reviewed  Constitutional:       General: He is not in acute distress  Appearance: He is well-developed  He is not diaphoretic  HENT:      Head: Normocephalic and atraumatic  Eyes:      General: Lids are normal  No scleral icterus  Conjunctiva/sclera: Conjunctivae normal       Pupils: Pupils are equal, round, and reactive to light  Comments: Mild orbital edema noted   Neck:      Thyroid: No thyromegaly  Cardiovascular:      Rate and Rhythm: Normal rate and regular rhythm  Heart sounds: Normal heart sounds  No murmur heard  Pulmonary:      Effort: Pulmonary effort is normal  No respiratory distress  Breath sounds: Normal breath sounds  Abdominal:      General: There is no distension  Palpations: Abdomen is soft  There is no hepatomegaly or splenomegaly  Tenderness: There is no abdominal tenderness  Comments: Abdominal incisions well healed without erythema warmth or drainage   Musculoskeletal:      Cervical back: Normal range of motion and neck supple  Right lower leg: No edema  Left lower leg: No edema  Lymphadenopathy:      Cervical: No cervical adenopathy  Upper Body:      Right upper body: No axillary adenopathy  Left upper body: No axillary adenopathy  Skin:     General: Skin is warm and dry  Findings: No erythema or rash  Comments: Extremities are discolored/pink-feet cooler to the touch   Neurological:      General: No focal deficit present  Mental Status: He is alert and oriented to person, place, and time  Psychiatric:         Mood and Affect: Mood and affect normal          Behavior: Behavior normal  Behavior is cooperative  Thought Content:  Thought content normal          Judgment: Judgment normal            Labs:  Lab Results   Component Value Date    WBC 5 98 12/26/2022    HGB 11 7 (L) 12/26/2022    HCT 38 5 12/26/2022    MCV 98 12/26/2022     12/26/2022     Lab Results   Component Value Date    K 4 0 12/26/2022     12/26/2022    CO2 34 (H) 12/26/2022    BUN 29 (H) 12/26/2022    CREATININE 1 48 (H) 12/26/2022    GLUF 84 12/26/2022    CALCIUM 9 6 12/26/2022    CORRECTEDCA 9 0 06/21/2022    AST 13 12/26/2022    ALT 14 12/26/2022 ALKPHOS 60 12/26/2022    EGFR 46 12/26/2022       Patient voiced understanding and agreement in the above discussion  Aware to contact our office with questions/symptoms in the interim  This note has been generated by voice recognition software system  Therefore, there may be spelling, grammar, and or syntax errors  Please contact if questions arise

## 2022-12-29 NOTE — TELEPHONE ENCOUNTER
Please schedule labs and port flush on 1/13  Then please schedule next port flush (every 4-8 weeks)  Thank you!

## 2022-12-30 ENCOUNTER — TELEPHONE (OUTPATIENT)
Dept: UROLOGY | Facility: CLINIC | Age: 72
End: 2022-12-30

## 2022-12-30 NOTE — TELEPHONE ENCOUNTER
Called and spoke with the patient  Questioned the urine testing that was obtained on 12/26/2022  Patient states pain management ordered the urine testing to "see if there are any drugs present in my urine "  Patient denies any voiding issues or irritative symptoms  Has frequency which is related to his Lasix    Will keep appointment with [facility] on 1/16/2023

## 2022-12-30 NOTE — TELEPHONE ENCOUNTER
----- Message from [address] [place] [address] sent at 12/30/2022  1:05 PM EST -----  If patient is still having urinary symptoms and his recent urine culture was negative Dr Bri Avelar recommends scheduling him for cystoscopy    If he is having urinary symptoms and we plan for cystoscopy, his upcoming appointment with me can be canceled until time of cystoscopy

## 2022-12-30 NOTE — TELEPHONE ENCOUNTER
Caller: patient    Doctor: Demetrius Mills    Reason for call: Pain level 7  Improvement 50%    Call back#:

## 2023-01-03 NOTE — TELEPHONE ENCOUNTER
Pt called and left VM stating his PCP also put orders in for urine testing and pt is requesting c/b to discuss    Pt call back-[phone]

## 2023-01-04 NOTE — TELEPHONE ENCOUNTER
Called and spoke with the patient  Essie Severs  when he was hospitalized in [place] he was treated for UTI  When he returned home his PCP ordered another urine culture to determine if the infection was cleared  Urine culture from 12/26/2022 showed no growth  Patient has upcoming appointment with [facility]

## 2023-01-10 ENCOUNTER — HOSPITAL ENCOUNTER (OUTPATIENT)
Dept: NUCLEAR MEDICINE | Facility: HOSPITAL | Age: 73
Discharge: HOME | End: 2023-01-10

## 2023-01-10 ENCOUNTER — TELEPHONE (OUTPATIENT)
Dept: HEMATOLOGY ONCOLOGY | Facility: CLINIC | Age: 73
End: 2023-01-10

## 2023-01-10 DIAGNOSIS — C79.10 METASTATIC UROTHELIAL CARCINOMA (HCC): ICD-10-CM

## 2023-01-10 DIAGNOSIS — C67.8 MALIGNANT NEOPLASM OF OVERLAPPING SITES OF BLADDER (HCC): ICD-10-CM

## 2023-01-10 NOTE — TELEPHONE ENCOUNTER
Received tiger text from radiology with immediate findings on PET from today  [facility]  IMPRESSION:        1  New small metabolically active consolidation involving the right lower lobe, suspicious for pneumonia  Recommend treatment and attention to this area on subsequent follow-up      2  Two new subcentimeter foci of increased uptake along the posterior margin of the spleen, which are suspicious for progression of metastatic disease      3  New subcentimeter focus of increased uptake in the posterior right hepatic lobe, unclear if a true metastatic lesion versus artifact  MRI may be obtained for more definitive evaluation      4  New subcentimeter focus of increased uptake in the left mid abdomen adjacent to the anastomotic suture line, which may be inflammatory given interval abdominal and bowel surgery  Additionally, there is increased uptake corresponding to the midline   abdominal surgical scar compatible with an inflammatory process  Recommend attention to these areas on subsequent follow-up      5   Stable appearance of a soft tissue density posterior to the right shoulder, and stable appearance of a cystic intramuscular lesion involving the proximal right upper extremity    This may represent persistent viable malignancy and/or posttreatment   related changes        For all these findings, recommend follow-up [facility] PET/CT in 3 months to assess for interval change

## 2023-01-11 LAB — GLUCOSE SERPL-MCNC: 87 MG/DL (ref 65–140)

## 2023-01-11 NOTE — RESULT ENCOUNTER NOTE
Called patient and spoke to wife Lakeisha Greenwood to review recent pet imaging  Seems to have small metabolic activity/active consolidation in the right lower lobe which is highly suspicious for pneumonia  Wife states that patient did have COVID recently has been having coughing since then; otherwise he has been clinically stable  We did discuss putting him on an antibiotic Levaquin 500 mg 7 daily treatment case he is in fact having transpiring pneumonia with his weakened/immunocompromised state  She agrees with this  We also reviewed some of the other findings/nonspecific findings which she is aware of as she has already reviewed the results in my chart  Patient will review imaging with Dr Neel Pan at his follow-up appointment next week we will decide if he will need to resume treatment versus close surveillance

## 2023-01-13 ENCOUNTER — HOSPITAL ENCOUNTER (OUTPATIENT)
Dept: INFUSION CENTER | Facility: HOSPITAL | Age: 73
End: 2023-01-13

## 2023-01-13 VITALS — TEMPERATURE: 96.9 F

## 2023-01-13 DIAGNOSIS — D64.9 NORMOCYTIC ANEMIA: ICD-10-CM

## 2023-01-13 DIAGNOSIS — D53.9 NUTRITIONAL ANEMIA: ICD-10-CM

## 2023-01-13 DIAGNOSIS — C79.10 METASTATIC UROTHELIAL CARCINOMA (HCC): ICD-10-CM

## 2023-01-13 DIAGNOSIS — Z45.2 ENCOUNTER FOR CARE RELATED TO PORT-A-CATH: Primary | ICD-10-CM

## 2023-01-13 DIAGNOSIS — C65.2 CANCER OF LEFT RENAL PELVIS (HCC): ICD-10-CM

## 2023-01-13 LAB
ALBUMIN SERPL BCP-MCNC: 3.9 G/DL (ref 3.5–5)
ALP SERPL-CCNC: 56 U/L (ref 34–104)
ALT SERPL W P-5'-P-CCNC: 10 U/L (ref 7–52)
ANION GAP SERPL CALCULATED.3IONS-SCNC: 9 MMOL/L (ref 4–13)
AST SERPL W P-5'-P-CCNC: 10 U/L (ref 13–39)
BASOPHILS # BLD AUTO: 0.01 THOUSANDS/ÂΜL (ref 0–0.1)
BASOPHILS NFR BLD AUTO: 0 % (ref 0–1)
BILIRUB SERPL-MCNC: 0.72 MG/DL (ref 0.2–1)
BUN SERPL-MCNC: 31 MG/DL (ref 5–25)
CALCIUM SERPL-MCNC: 9.3 MG/DL (ref 8.4–10.2)
CHLORIDE SERPL-SCNC: 101 MMOL/L (ref 96–108)
CO2 SERPL-SCNC: 28 MMOL/L (ref 21–32)
CREAT SERPL-MCNC: 1.39 MG/DL (ref 0.6–1.3)
EOSINOPHIL # BLD AUTO: 0.05 THOUSAND/ÂΜL (ref 0–0.61)
EOSINOPHIL NFR BLD AUTO: 1 % (ref 0–6)
ERYTHROCYTE [DISTWIDTH] IN BLOOD BY AUTOMATED COUNT: 18 % (ref 11.6–15.1)
GFR SERPL CREATININE-BSD FRML MDRD: 49 ML/MIN/1.73SQ M
GLUCOSE SERPL-MCNC: 123 MG/DL (ref 65–140)
HCT VFR BLD AUTO: 37.6 % (ref 36.5–49.3)
HGB BLD-MCNC: 11.9 G/DL (ref 12–17)
IMM GRANULOCYTES # BLD AUTO: 0.02 THOUSAND/UL (ref 0–0.2)
IMM GRANULOCYTES NFR BLD AUTO: 0 % (ref 0–2)
LYMPHOCYTES # BLD AUTO: 1.23 THOUSANDS/ÂΜL (ref 0.6–4.47)
LYMPHOCYTES NFR BLD AUTO: 24 % (ref 14–44)
MCH RBC QN AUTO: 30.1 PG (ref 26.8–34.3)
MCHC RBC AUTO-ENTMCNC: 31.6 G/DL (ref 31.4–37.4)
MCV RBC AUTO: 95 FL (ref 82–98)
MONOCYTES # BLD AUTO: 0.46 THOUSAND/ÂΜL (ref 0.17–1.22)
MONOCYTES NFR BLD AUTO: 9 % (ref 4–12)
NEUTROPHILS # BLD AUTO: 3.32 THOUSANDS/ÂΜL (ref 1.85–7.62)
NEUTS SEG NFR BLD AUTO: 66 % (ref 43–75)
NRBC BLD AUTO-RTO: 0 /100 WBCS
PLATELET # BLD AUTO: 156 THOUSANDS/UL (ref 149–390)
PMV BLD AUTO: 9.7 FL (ref 8.9–12.7)
POTASSIUM SERPL-SCNC: 3.7 MMOL/L (ref 3.5–5.3)
PROT SERPL-MCNC: 6.1 G/DL (ref 6.4–8.4)
RBC # BLD AUTO: 3.95 MILLION/UL (ref 3.88–5.62)
SODIUM SERPL-SCNC: 138 MMOL/L (ref 135–147)
WBC # BLD AUTO: 5.09 THOUSAND/UL (ref 4.31–10.16)

## 2023-01-13 NOTE — PROGRESS NOTES
Patient presented to unit for port flush and central labs  Central labs drawn, port flushed easily with good blood return noted  Appointment made for next flush in 6 weeks   Patient discharged in stable condition with AVS

## 2023-01-14 LAB
FERRITIN SERPL-MCNC: 251 NG/ML (ref 8–388)
FOLATE SERPL-MCNC: >20 NG/ML (ref 3.1–17.5)
IRON SATN MFR SERPL: 30 % (ref 20–50)
IRON SERPL-MCNC: 87 UG/DL (ref 65–175)
TIBC SERPL-MCNC: 287 UG/DL (ref 250–450)
VIT B12 SERPL-MCNC: 227 PG/ML (ref 100–900)

## 2023-01-16 ENCOUNTER — TELEPHONE (OUTPATIENT)
Dept: UROLOGY | Facility: CLINIC | Age: 73
End: 2023-01-16

## 2023-01-16 ENCOUNTER — OFFICE VISIT (OUTPATIENT)
Dept: UROLOGY | Facility: CLINIC | Age: 73
End: 2023-01-16

## 2023-01-16 VITALS
DIASTOLIC BLOOD PRESSURE: 74 MMHG | HEIGHT: 70 IN | BODY MASS INDEX: 26.2 KG/M2 | SYSTOLIC BLOOD PRESSURE: 118 MMHG | WEIGHT: 183 LBS | HEART RATE: 72 BPM

## 2023-01-16 DIAGNOSIS — R35.0 URINARY FREQUENCY: ICD-10-CM

## 2023-01-16 DIAGNOSIS — C79.10 METASTATIC UROTHELIAL CARCINOMA (HCC): Primary | ICD-10-CM

## 2023-01-16 DIAGNOSIS — N32.81 OVERACTIVE BLADDER: ICD-10-CM

## 2023-01-16 PROBLEM — R39.9 UTI SYMPTOMS: Status: RESOLVED | Noted: 2022-10-13 | Resolved: 2023-01-16

## 2023-01-16 NOTE — PROGRESS NOTES
Assessment and plan:     Metastatic urothelial carcinoma ([facility] [address] )  S/p robot assisted left nephroureterectomy April 2020  pT3 N0 with invasion in the peripelvic fat, also had metastatic lymphadenopathy for which he received palliative radiation immunotherapy  PET scan 1/10/2023 with increased activity  Treatment on hold due to neuropathy  Follow-up with heme Onc as scheduled on wednesday    Overactive bladder  Did not feel Myrbetriq was helpful  Previously declined pelvic floor physical therapy  Schedule cystoscopy for ongoing urinary symptoms    Urinary frequency  Recently treated for UTI in December  Continue flomax and finasteride  Schedule cystoscopy           RAGHU Claudio    History of Present Illness     Sesar Peck is a 67 y o  male patient of Dr Floresita Pastrana with a history of  left upper tract urothelial carcinoma status post robot assisted left nephro ureterectomy April 2020; pathology pT3b with invasion into the mirian pelvic fat  Also had metastatic lymphadenopathy received palliative radiation and immunotherapy for the same  He was receiving chemotherapy which was discontinued due to side effect of NIKUNJ, neuropathy and hearing loss after 1 cycle  A PET scan showed progressive lymphadenopathy he was treated with Bianca Saltareli then more recently adjusted to Padcev  Received radiation to abdominal rectus/wall lesion as well  PET scans initially showed improved response       Cystoscopy performed in December 2021 showed a normal bladder with no mucosal lesions or drop lesions  He had a recent PET scan January 2023  See results below  He has ongoing neuropathy and leg swelling  His immunotherapy is on hold due to neuropathy, he will decide in the future where to proceed from here  He is seeing oncology on Wednesday      He has ongoing urinary frequency which may be multifactorial Denies constipation  He takes magnesium  He is currently on flomax    He feels his urinary symptoms have worsened since April 2022  He had MRI of his spine that was stable  He drinks 2-3 glasses of water mixed with cranberry juice and 2 small mugs of coffee every morning  He also gets hydration infusions on Tuesday and Friday  He feels that this causes his swelling and walking to worsen  He is currently using a walker  He had a negative urine culture and microscopic  He try doubling up on the tamsulosin but did not notice any improvement of his urinary symptoms  He has frequency in the morning related to his lasix  He denies urinary incontinence which she had in October  He has nocturia 3-5 times  He went to [facility] October 2022  Had a hernia that blocked his small intestine  He was septic in ICU and on a ventilator, his heart "kept trying to stop"  He had blood transfusions and COVID  He was not having any symptoms until just prior to the hospitalization  Prior to going to [facility] he had a UTI and was treated with antibiotics for this  His urine culture 10/7/2022 did not have any growth  His symptoms of UTI did not clear up and was given IV antibiotics while in [facility] which resolved his infection  He came home from rehab 12/7/2022  His wife ended up with the flu while down there  He denies any issues with urination  He has no pain, no hematuria  Laboratory     Lab Results   Component Value Date    BUN 31 (H) 01/13/2023    CREATININE 1 39 (H) 01/13/2023       No components found for: GFR    Lab Results   Component Value Date    CALCIUM 9 3 01/13/2023    K 3 7 01/13/2023    CO2 28 01/13/2023     01/13/2023       Lab Results   Component Value Date    WBC 5 09 01/13/2023    HGB 11 9 (L) 01/13/2023    HCT 37 6 01/13/2023    MCV 95 01/13/2023     01/13/2023       No results found for: PSA    No results found for this or any previous visit (from the past 1 hour(s))      @RESULT(URINEMICROSCOPIC)@    @RESULT(URINECULTURE)@    Radiology     PET/CT SCAN 1/10/2023     INDICATION: C79 10: Secondary malignant neoplasm of unspecified urinary organs  C67 8: Malignant neoplasm of overlapping sites of bladder ; patient had repair of an abdominal hernia recently due to perforated hernia      MODIFIER: PI PS      COMPARISON: FDG PET/CT 9/26/2022     CELL TYPE:  Metastatic high-grade carcinoma consistent with urothelial cell primary, biopsy 8/9/2021     TECHNIQUE:   8 8 mCi F-18-FDG administered IV  Multiplanar attenuation corrected and non attenuation corrected PET images are available for interpretation, and contiguous, low dose, axial CT sections were obtained from the skull base through the femurs   following the administration of oral contrast material (7 5 cc Omnipaque-240 in 300 cc water)  Intravenous contrast material was not utilized  This examination, like all CT scans performed in the [facility], was performed utilizing   techniques to minimize radiation dose exposure, including the use of iterative reconstruction and automated exposure control        Fasting serum glucose: 87 mg/dl     FINDINGS:      VISUALIZED BRAIN:     No acute abnormalities are seen      HEAD/NECK:     There is a physiologic distribution of FDG  No FDG avid cervical adenopathy is seen  CT images: Unremarkable      CHEST:     There is a new metabolically active consolidation involving the posterior right lower lobe, most likely infectious/inflammatory        Stable subcentimeter subcutaneous soft tissue density involving the right upper posterior chest wall with mild uptake (SUV max 2 1, previously 2 4; series 603, image 48)     Stable size of a small intramuscular cystic lesion involving the proximal right upper extremity, with circumferential activity on PET (series 603, image 28)  There is persistent mild uptake in this region (SUV max 1 9, previously 1 9)     CT images: Bibasilar linear atelectasis  Coronary artery calcifications  Right chest port catheter in satisfactory position    Cardiomegaly      ABDOMEN/PELVIS:    -Two new subcentimeter foci of increased uptake along the posterior margin of the spleen (series 603, images 91 and 96; with SUV max 3 7 and 5 4 respectively)     -New subcentimeter focus of uptake in the posterior hepatic dome (series 603, image 73; SUV max 4 8)  It is unclear if this represents a true lesion or related to artifact or noise      -New subcentimeter focus of uptake in the left mid abdomen adjacent to a bowel anastomotic suture line (series 603, image 108; SUV max 8 3)     -Increased uptake corresponding to the midline scar in the anterior abdominal wall (SUV max 5 7), likely inflammatory  Mild activity within the rectum is likely physiologic      CT images: There are a few scattered hepatic cysts  Postsurgical changes from left nephrectomy  Vascular calcifications in the aorta and branching vessels  Midline postsurgical scar  Interval repair of the previously noted left spigelian hernia        There are changes from interval bowel surgery with anastomotic suture line along a bowel loop in the left abdomen  No findings of bowel obstruction  Diverticulosis of the descending and sigmoid colon without findings of diverticulitis      Mildly enlarged prostate gland      OSSEOUS STRUCTURES:  No FDG avid lesions are seen  CT images: No significant findings      IMPRESSION:        1  New small metabolically active consolidation involving the right lower lobe, suspicious for pneumonia  Recommend treatment and attention to this area on subsequent follow-up      2  Two new subcentimeter foci of increased uptake along the posterior margin of the spleen, which are suspicious for progression of metastatic disease      3  New subcentimeter focus of increased uptake in the posterior right hepatic lobe, unclear if a true metastatic lesion versus artifact  MRI may be obtained for more definitive evaluation      4    New subcentimeter focus of increased uptake in the left mid abdomen adjacent to the anastomotic suture line, which may be inflammatory given interval abdominal and bowel surgery  Additionally, there is increased uptake corresponding to the midline   abdominal surgical scar compatible with an inflammatory process  Recommend attention to these areas on subsequent follow-up      5   Stable appearance of a soft tissue density posterior to the right shoulder, and stable appearance of a cystic intramuscular lesion involving the proximal right upper extremity  This may represent persistent viable malignancy and/or posttreatment   related changes        For all these findings, recommend follow-up FDG PET/CT in 3 months to assess for interval change      The study was marked in EPIC for immediate notification      Review of Systems     Review of Systems   Constitutional: Negative for activity change, appetite change, chills, fatigue, fever and unexpected weight change  HENT: Negative for facial swelling  Eyes: Negative for discharge  Respiratory: Negative  Negative for cough and shortness of breath  Cardiovascular: Positive for leg swelling  Negative for chest pain  Gastrointestinal: Negative  Negative for abdominal distention, abdominal pain, constipation, diarrhea, nausea and vomiting  Endocrine: Negative  Genitourinary: Positive for frequency and urgency  Negative for decreased urine volume, difficulty urinating, dysuria, enuresis, flank pain, genital sores and hematuria  Musculoskeletal: Positive for gait problem  Negative for back pain and myalgias  Skin: Negative for pallor and rash  Allergic/Immunologic: Negative  Negative for immunocompromised state  Neurological: Negative for facial asymmetry and speech difficulty  Psychiatric/Behavioral: Negative for agitation and confusion           AUA SYMPTOM SCORE    Flowsheet Row Most Recent Value   AUA SYMPTOM SCORE    How often have you had a sensation of not emptying your bladder completely after you finished urinating? 0 (P)     How often have you had to urinate again less than two hours after you finished urinating? 1 (P)     How often have you found you stopped and started again several times when you urinate? 2 (P)     How often have you found it difficult to postpone urination? 4 (P)     How often have you had a weak urinary stream? 0 (P)     How often have you had to push or strain to begin urination? 0 (P)     How many times did you most typically get up to urinate from the time you went to bed at night until the time you got up in the morning? 3 (P)     Quality of Life: If you were to spend the rest of your life with your urinary condition just the way it is now, how would you feel about that? 5 (P)     AUA SYMPTOM SCORE 10 (P)                 Allergies     Allergies   Allergen Reactions   • Poison Ivy Extract Rash       Physical Exam     Physical Exam  Constitutional:       General: He is not in acute distress  Appearance: Normal appearance  He is not ill-appearing  HENT:      Head: Normocephalic and atraumatic  Eyes:      General: No scleral icterus  Cardiovascular:      Rate and Rhythm: Normal rate  Pulmonary:      Effort: Pulmonary effort is normal  No respiratory distress  Abdominal:      General: Abdomen is flat  There is no distension  Palpations: Abdomen is soft  Tenderness: There is no abdominal tenderness  There is no guarding or rebound  Comments: Midline surgical scar well-healed with no erythema or drainage  Well approximated   Musculoskeletal:         General: No swelling  Skin:     General: Skin is warm and dry  Coloration: Skin is not jaundiced or pale  Findings: No rash  Neurological:      General: No focal deficit present  Mental Status: He is alert and oriented to person, place, and time  Gait: Gait abnormal    Psychiatric:         Mood and Affect: Mood normal          Behavior: Behavior normal          Thought Content:  Thought content normal  Judgment: Judgment normal          Vital Signs     Vitals:    01/16/23 1109   BP: 118/74   BP Location: Left arm   Patient Position: Sitting   Cuff Size: Large   Pulse: 72   Weight: 83 kg (183 lb)   Height: 5' 10" (1 778 m)       Current Medications       Current Outpatient Medications:   •  acetaminophen (TYLENOL) 500 mg tablet, Take 2 tablets (1,000 mg total) by mouth every 8 (eight) hours, Disp: , Rfl:   •  amiodarone 200 mg tablet, , Disp: , Rfl:   •  ascorbic acid (VITAMIN C) 500 MG tablet, Take 1 tablet by mouth Every 12 hours, Disp: , Rfl:   •  atorvastatin (LIPITOR) 40 mg tablet, , Disp: , Rfl:   •  diltiazem (CARDIZEM) 60 mg tablet, 2 (two) times a day, Disp: , Rfl:   •  finasteride (PROSCAR) 5 mg tablet, Take 5 mg by mouth daily, Disp: , Rfl:   •  folic acid (FOLVITE) 1 mg tablet, take 1 tablet by mouth once daily, Disp: 90 tablet, Rfl: 4  •  furosemide (LASIX) 40 mg tablet, , Disp: , Rfl:   •  HYDROcodone-acetaminophen (NORCO) 5-325 mg per tablet, Take 1 tablet by mouth 2 (two) times a day as needed for pain Max Daily Amount: 2 tablets, Disp: 60 tablet, Rfl: 0  •  levofloxacin (LEVAQUIN) 500 mg tablet, Take 1 tablet (500 mg total) by mouth every 24 hours for 7 days, Disp: 7 tablet, Rfl: 0  •  naloxone (NARCAN) 4 mg/0 1 mL nasal spray, Administer 1 spray into a nostril   If breathing does not return to normal or if breathing difficulty resumes after 2-3 minutes, give another dose in the other nostril using a new spray , Disp: 1 each, Rfl: 1  •  tamsulosin (FLOMAX) 0 4 mg, Take 1 capsule (0 4 mg total) by mouth daily with dinner, Disp: 30 capsule, Rfl: 12  •  VITAMIN D PO, Take 1,000 Units by mouth daily , Disp: , Rfl:   •  zolpidem (AMBIEN) 5 mg tablet, Take 1 tablet (5 mg total) by mouth daily at bedtime as needed for sleep, Disp: 30 tablet, Rfl: 1  •  capsicum (ZOSTRIX) 0 075 % topical cream, Apply topically 3 (three) times a day, Disp: 28 3 g, Rfl: 0  •  docusate sodium (COLACE) 250 MG capsule, Take 1 capsule (250 mg total) by mouth daily (Patient not taking: Reported on 10/13/2022), Disp: 60 capsule, Rfl: 6  •  Magnesium 250 MG TABS, 1 tablet 2 (two) times a day Taking 500mg in the morning and 500mg at night (Patient not taking: Reported on 1/16/2023), Disp: , Rfl:   •  nystatin (MYCOSTATIN) cream, Apply topically 2 (two) times a day (Patient not taking: Reported on 10/13/2022), Disp: 30 g, Rfl: 0  •  senna (SENOKOT) 8 6 mg, Take 1 tablet (8 6 mg total) by mouth daily at bedtime, Disp: 30 each, Rfl: 0  •  tadalafil (CIALIS) 20 MG tablet, Take one tablet by mouth one hour before sexual activity (Patient not taking: Reported on 1/16/2023), Disp: 15 tablet, Rfl: 3  No current facility-administered medications for this visit      Facility-Administered Medications Ordered in Other Visits:   •  alteplase (CATHFLO) injection 2 mg, 2 mg, Intracatheter, Q2H PRN, Juana Mccauley MD    Active Problems     Patient Active Problem List   Diagnosis   • Lumbar radiculopathy   • Lumbar disc herniation   • Lumbar spondylosis   • Urothelial cancer [facility])   • Essential hypertension   • Sinus bradycardia   • Hyperlipidemia   • Cancer of left renal pelvis (HCC)   • Drug-induced neutropenia (HCC)   • Chronic pain disorder   • Spinal stenosis of lumbar region   • Encounter for central line care   • Hyperuricemia   • Encounter for long-term opiate analgesic use   • Uncomplicated opioid dependence ([facility] [address] )   • Palliative care patient   • Decreased appetite   • Normocytic anemia   • 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)   • Thrombophlebitis of superficial veins of right lower extremity   • Renal dysfunction   • Stage 3a chronic kidney disease (HCC)   • Hypomagnesemia   • Platelets decreased (HCC)   • H/O left nephrectomy   • Chronic kidney disease-mineral and bone disorder   • Vitamin D deficiency   • Dehydration   • Chronic right shoulder pain   • Primary osteoarthritis of right shoulder   • Metastatic urothelial carcinoma [facility])   • Drug induced constipation   • Secondary malignant [facility] )   • Cancer associated pain   • 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)   • Early satiety   • Dysgeusia   • Neuropathy   • Chronic bilateral low back pain without sciatica   • Chemotherapy-induced neuropathy (HCC)   • HERRERA (dyspnea on exertion)   • Paroxysmal atrial fibrillation (HCC)   • Sacroiliitis (HCC)   • Urinary frequency   • Overactive bladder   • Encounter for care related to Port-a-Cath       Past Medical History     Past Medical History:   Diagnosis Date   • Arthritis    • Bladder cancer    • Cancer ([facility] )     skin melanoma;basal cell   • Chronic pain disorder     from arthritis   • Colon polyp    • Does use hearing aid     bilat   • Dry eyes, bilateral    • GERD (gastroesophageal reflux disease)    • History of kidney stones 10/2019   • History of partial knee replacement     bilat   • History of vertigo    • Hyperlipidemia    • Hypertension    • Infusion extravasation of chemotherapy vesicant 11/2021   • Kidney lesion    • Lumbar disc disorder     compression of vertebrae L4-5-6   • Muscle weakness     left hip area   • Renal mass     left   • Right ankle injury 03/05/2020    missed step of ladder    • Right ankle pain    • Shortness of breath     with activity   • Tinnitus    • Urothelial cancer     left   • Wears glasses        Surgical History     Past Surgical History:   Procedure Laterality Date   • COLONOSCOPY     • CYSTOSCOPY Left 4/1/2020    Procedure: CYSTOSCOPY; URETERAL CATHETER PLACEMENT;  Surgeon: Noemy Rosales MD;  [facility];  Service: Urology   • CYSTOSCOPY  05/11/2020   • CYSTOSCOPY  06/04/2021   • FL CYSTOGRAM  4/13/2020   • FL RETROGRADE PYELOGRAM  1/17/2020   • HERNIA REPAIR      umbilical with mesh   • INGUINAL HERNIA REPAIR Right     with mesh   • IR PORT PLACEMENT  4/30/2020   • JOINT REPLACEMENT Bilateral     partials knee   • LUMBAR EPIDURAL INJECTION     • SD CYSTO BLADDER W/URETERAL CATHETERIZATION Bilateral 2020    Procedure: CYSTOSCOPY; RIGHT RETROGRADE PYELOGRAM WITH RIGHT URETERAL CYTOLOGY SAMPLING; LEFT URETEROSCOPY WITH RENAL PELVIS BIOPSY AND LEFT STENT PLACEMENT;  Surgeon: Jhonny Obrien MD;  [facility];  Service: Urology   • CO LAPAROSCOPY NEPHRECTOMY W/TOTAL URETERECTOMY Left 2020    Procedure: ROBOTIC LAPAROSCOPIC NEPHRO-URETERECTOMY;  Surgeon: Jhonny Obrien MD;  [facility];  Service: Urology   • SKIN CANCER EXCISION      surface melanoma   • TONSILLECTOMY     • WISDOM TOOTH EXTRACTION         Family History     Family History   Problem Relation Age of Onset   • Liver cancer Father        Social History     Social History     Social History     Tobacco Use   Smoking Status Former   • Types: Cigarettes   • Quit date:    • Years since quittin 0   Smokeless Tobacco Never       Past Surgical History:   Procedure Laterality Date   • COLONOSCOPY     • CYSTOSCOPY Left 2020    Procedure: CYSTOSCOPY; URETERAL CATHETER PLACEMENT;  Surgeon: Jhonny Obrien MD;  [facility];  Service: Urology   • CYSTOSCOPY  2020   • CYSTOSCOPY  2021   • FL CYSTOGRAM  2020   • FL RETROGRADE PYELOGRAM  2020   • HERNIA REPAIR      umbilical with mesh   • INGUINAL HERNIA REPAIR Right     with mesh   • IR PORT PLACEMENT  2020   • JOINT REPLACEMENT Bilateral     partials knee   • LUMBAR EPIDURAL INJECTION     • CO CYSTO BLADDER W/URETERAL CATHETERIZATION Bilateral 2020    Procedure: CYSTOSCOPY; RIGHT RETROGRADE PYELOGRAM WITH RIGHT URETERAL CYTOLOGY SAMPLING; LEFT URETEROSCOPY WITH RENAL PELVIS BIOPSY AND LEFT STENT PLACEMENT;  Surgeon: Jhonny Obrien MD;  [facility];  Service: Urology   • CO LAPAROSCOPY NEPHRECTOMY W/TOTAL URETERECTOMY Left 2020    Procedure: ROBOTIC LAPAROSCOPIC NEPHRO-URETERECTOMY;  Surgeon: Jhonny Obrien MD;  [facility];  Service: Urology   • SKIN CANCER EXCISION      surface melanoma   • TONSILLECTOMY     • WISDOM TOOTH EXTRACTION           The following portions of the patient's history were reviewed and updated as appropriate: allergies, current medications, past family history, past medical history, past social history, past surgical history and problem list    Please note :  Voice dictation software has been used to create this document  There may be inadvertent transcription errors      [id number] [id number] Maxwell Mazariegos

## 2023-01-16 NOTE — PATIENT INSTRUCTIONS
BLADDER HEALTH    WHAT IS CONSIDERED NORMAL? The average bladder can hold about 2 cups of urine before it needs to be emptied  The normal range of voiding urine is 6 to 8 times during a 24 hour period  As we get older, our bladder capacity can get smaller and we may need to pass urine more frequently but usually not more than every 2 hours  Urine should flow easily without discomfort in a good, steady stream until the bladder is empty  No pushing or straining is necessary to empty the bladder  An urge is a signal that you feel as the bladder stretches to fill with urine  Urges can be felt even if the bladder is not full  Urges are not commands to go to the toilet, merely a signal and can be controlled  WHAT ARE GOOD BLADDER HABITS? Take your time when emptying your bladder  Don’t strain or push to empty your bladder  Make sure you empty your bladder completely each time you pass urine  Do not rush the process  Consistently ignoring the urge to go (waiting more than 4 hours between toileting) or urinating too infrequently may be convenient but not healthy for your bladder  Avoid going to the toilet “just in case” or more often than every 2 hours  It is usually not necessary to go when you feel the first urge  Try to go only when your bladder is full  Urgency and frequency of urination can be improved by retraining the bladder and spacing your fluid intake throughout the day  Practice good toilet habits  Don’t let your bladder control your life  TIPS TO MAINTAIN GOOD BLADDER HABITS  Maintain a good fluid intake  Depending on your body size and environment, drink 6 -8 cups (8 oz each) of fluid per day unless otherwise advised by your doctor  Not enough fluid creates a foul odor and dark color of the urine  Limit the amount of caffeine (coffee, cola, chocolate or tea) and citrus foods that you consume as these foods can be associated with increased sensation of urinary urgency and frequency  Limit the amount of alcohol you drink  Alcohol increases urine production and makes it difficult for the brain to coordinate bladder control  Avoid constipation by maintaining a balanced diet of dietary fiber  Cigarette smoking is also irritating to the bladder surface and is associated with bladder cancer  In addition, the coughing associated with smoking may lead to increased incontinent episodes because of the increased pressure  HOW DIET CAN AFFECT YOUR BLADDER  Although there is no particular "diet" that can cure bladder control, there are certain dietary suggestions you can use to help control the problem  There are 2 points to consider when evaluating how your habits and diet may affect your bladder:    Foods and fluids can irritate the bladder  Some foods and beverages are thought to contribute to bladder leakage and irritability  However their effect on the bladder is not completely understood and you may want to see if eliminating one or all of these items improves your bladder control  If you are unable to give them up completely, it is recommended that you use the following items in moderation:  Acidic beverages and foods (orange juice, grapefruit juice, lemonade etc)  Alcoholic beverages  Vinegar  Coffee (regular and decaf)  Tea (regular and decaf)  Caffeinated beverages  Carbonated beverages          Drinking enough and the right kinds of fluids  Many people with bladder control issues decrease their intake of liquids in hope that they will need to urinate less frequently or have less urinary leakage  You should not restrict fluids to control your bladder  While a decrease in liquid intake does result in a decrease in the volume of urine, the smaller amount of urine may be more highly concentrated  Highly concentrated, dark yellow urine is irritating to the bladder surface and may actually cause you to go to the bathroom more frequently        It also encourages the growth of bacteria, which may lead to infections resulting in incontinence  Substitutions for Bladder Irritants: water is always the best beverage choice  Grape and apple juice are thirst quenchers are good selections and are not as irritating to the bladder    Low acid fruits:  Pears, apricots, papaya, watermelon  For coffee drinkers: KAVA®, Postum®, Robbin®, Kaffree Tania®  For tea drinkers:  non-citrus or herbal and sun brewed tea

## 2023-01-16 NOTE — TELEPHONE ENCOUNTER
Spoke with patient and offered 1/19/23 with Dr Manuel Bhatt, patient declined    Patient scheduled 2/17/23 with a 1:30 arrival at [facility]

## 2023-01-16 NOTE — TELEPHONE ENCOUNTER
----- Message from [address] [place] [address] sent at 1/16/2023 11:59 AM EST -----  Please schedule cystoscopy with Dr Melisa Gann

## 2023-01-16 NOTE — ASSESSMENT & PLAN NOTE
• S/p robot assisted left nephroureterectomy April 2020  • pT3 N0 with invasion in the peripelvic fat, also had metastatic lymphadenopathy for which he received palliative radiation immunotherapy  • PET scan 1/10/2023 with increased activity  • Treatment on hold due to neuropathy  • Follow-up with heme Onc as scheduled on wednesday

## 2023-01-16 NOTE — ASSESSMENT & PLAN NOTE
· Did not feel Myrbetriq was helpful  · Previously declined pelvic floor physical therapy  · Schedule cystoscopy for ongoing urinary symptoms

## 2023-01-18 ENCOUNTER — OFFICE VISIT (OUTPATIENT)
Dept: HEMATOLOGY ONCOLOGY | Facility: CLINIC | Age: 73
End: 2023-01-18

## 2023-01-18 ENCOUNTER — TELEPHONE (OUTPATIENT)
Dept: HEMATOLOGY ONCOLOGY | Facility: CLINIC | Age: 73
End: 2023-01-18

## 2023-01-18 VITALS
OXYGEN SATURATION: 97 % | BODY MASS INDEX: 27.35 KG/M2 | DIASTOLIC BLOOD PRESSURE: 74 MMHG | TEMPERATURE: 98.3 F | SYSTOLIC BLOOD PRESSURE: 122 MMHG | HEIGHT: 70 IN | RESPIRATION RATE: 18 BRPM | HEART RATE: 71 BPM | WEIGHT: 191 LBS

## 2023-01-18 DIAGNOSIS — E53.8 VITAMIN B 12 DEFICIENCY: ICD-10-CM

## 2023-01-18 DIAGNOSIS — D69.6 PLATELETS DECREASED (HCC): ICD-10-CM

## 2023-01-18 DIAGNOSIS — C79.10 METASTATIC UROTHELIAL CARCINOMA (HCC): Primary | ICD-10-CM

## 2023-01-18 DIAGNOSIS — C67.3 MALIGNANT NEOPLASM OF ANTERIOR WALL OF BLADDER (HCC): ICD-10-CM

## 2023-01-18 NOTE — TELEPHONE ENCOUNTER
Phoned pt to report that his Vit B 12 level was low and he had the option of getting shots or taking orally  Pt wishes to take orally so instructed him to take 1000 mcg SL daily

## 2023-01-18 NOTE — PROGRESS NOTES
Hematology/Oncology Outpatient Follow-up  Anita Latif 67 y o  male 1950 [id number]    Date:  1/18/2023        Assessment and Plan:  1  Metastatic urothelial carcinoma (HCC)  The patient was educated about the PET CT scan result which showed couple new subcentimeter hypermetabolic active lesions 2 of them in the spleen and 1 in the liver  He also seems to have a new subcentimeter focus of increased uptake in the abdominal area where his anastomotic suture line is  The patient was told that there is very good chance that we are dealing with recurrence/progression of his metastatic urothelial carcinoma  The patient unfortunately has relatively poor performance status and does not seem to be a candidate for any type of palliative chemotherapy  We did discuss putting him back on enfortumab vedotin at the adjusted dose or decrease frequency  The patient does not seem to be interested in that option at this point  The decision was made to pursue another PET CT scan in 3 months from now for close monitoring then make a decision regarding further observation versus active treatment  - CBC and differential; Future  - Comprehensive metabolic panel; Future  - Magnesium; Future  - NM PET CT skull base to mid thigh; Future    2  Platelets decreased (HCC)  Platelet count is in the normal range  3  Malignant neoplasm of anterior wall of bladder (HCC)    - NM PET CT skull base to mid thigh; Future    4  Vitamin B 12 deficiency  He was found to have low vitamin B12 level  We will get in touch with him get him started on oral vitamin B12 supplements versus injections  HPI:  The patient came today for follow-up visit accompanied by his wife  He is recovering slowly from his recent abdominal surgery  Is lost dose of enfortumab vedotin was on 9/2/2022  The patient just had a PET CT scan on 1/10/2023 which showed:  IMPRESSION:        1    New small metabolically active consolidation involving the right lower lobe, suspicious for pneumonia  Recommend treatment and attention to this area on subsequent follow-up      2  Two new subcentimeter foci of increased uptake along the posterior margin of the spleen, which are suspicious for progression of metastatic disease      3  New subcentimeter focus of increased uptake in the posterior right hepatic lobe, unclear if a true metastatic lesion versus artifact  MRI may be obtained for more definitive evaluation      4  New subcentimeter focus of increased uptake in the left mid abdomen adjacent to the anastomotic suture line, which may be inflammatory given interval abdominal and bowel surgery  Additionally, there is increased uptake corresponding to the midline   abdominal surgical scar compatible with an inflammatory process  Recommend attention to these areas on subsequent follow-up      5   Stable appearance of a soft tissue density posterior to the right shoulder, and stable appearance of a cystic intramuscular lesion involving the proximal right upper extremity  This may represent persistent viable malignancy and/or posttreatment   related changes  Blood work on 1/13/2023 showed hemoglobin of 11 9 with normal white cells and platelets  Creatinine 1 3 with normal calcium and liver enzymes  Folate was within normal range  Vitamin B12 was low at 227  Iron panel was normal with ferritin of 251 and saturation of 30%  Oncology History Overview Note   Patient has a history of hypertension, hyperlipidemia, chronic lower back pain  He had an MRI of the lower spine for further evaluation of his chronic lower back pain  The MRI on 12/02/2019 revealed Multiple left renal masses to include a left lower pole cyst and a rounded structure in the left upper pole which may also represent cyst   Left upper pole renal masses approximately 3 cm in greatest linear dimension       A CT with renal protocol was done on 12/12/2019 which also showed the infiltrating lesion in the upper pole of the left kidney measuring about the 3 5 cm in greatest dimension without any hint of retroperitoneal lymphadenopathy  A CT scan of the abdomen pelvis on 01/14/2020 showed the same findings with the mild hydronephrosis on the left  The urine cytology on 01/03/2020 showed high-grade urothelial carcinoma  A cystoscopy was then done, a biopsy was taken from the left renal pelvic region which showed high-grade urothelial carcinoma without evidence of lamina propria invasion  The detrusor muscle/muscularis propria were not present for evaluation  The recommendation was to pursue left robotic assisted laparoscopic nephroureterectomy with bladder cuff excision which was done on 04/01/2020  The final pathology revealed;  - Invasive high grade urothelial carcinoma arising in renal pelvis  - Bladder cuff margin is negative for carcinoma and no evidence of high grade dysplasia  - Ureters with no significant pathologic abnormality  - Two benign simple cysts  - Adrenal gland is negative for malignancy  - One lymph node, negative for malignancy (0/1)  The tumor size was 4 5 cm with invasion beyond the muscularis into the periurethral fat or peripelvic fat or the renal parenchyma  The margins were uninvolved by carcinoma or carcinoma in situ  The final pathology was pT3 pN0  Patient barely tolerated 1 cycle of adjuvant chemotherapy with split dose cisplatin and gemcitabine with a lot of side effects  The treatment had to be discontinued due to significant worsening renal dysfunction 6/2020  Patient started to complain about significant abdominal/left inguinal pain around August/September 2020  Unfortunately repeat imaging revealed recurrent/metastatic disease  9/4/20 CT C/A/P- Status post left nephrectomy for resection of urothelial neoplasm  Lymphadenopathy in the left nephrectomy bed has increased since the prior examination, concerning for metastatic disease       Ill-defined hyperattenuating masslike focus in the left rectus muscle, not identified on the prior examination  This is suspicious for a metastatic lesion  A rectus hematoma is also considered  9/23/20 PET scan- 1  Multiple FDG avid lymph nodes in the retrocrural region, retroperitoneum and the left renal bed compatible with metastasis  These have progressed from recent CT  2   FDG avid mass involving the left rectus abdominal musculature compatible with metastasis which is larger from recent CT  3  Several small lymph nodes adjacent to the mid to distal esophagus with FDG uptake suspicious for metastasis  Small focus of FDG uptake also suggested in the right hilar region, metastasis is not excluded  This could be reassessed on follow-up  4   Subtle focus of FDG uptake at the T2 level on the left, nonspecific, could be related to early degenerative changes, developing metastasis is not entirely excluded  This could be reassessed on follow-up  5  Prostate is mildly prominent with heterogeneous FDG uptake  This could be inflammatory  Correlate for prostatitis  He completed palliative radiation to the left abdomen 12/3/20     His PET scan from 08/16/2021 showed progression of his disease with hypermetabolic soft tissue lesions at the level of the right shoulder and right axilla with interval progression of the retroperitoneal and mesenteric hypermetabolic metastasis  He did have the new lesion to his right upper back/shoulder which was causing significant localized pain  This excised by his surgeon 08/09/2021  Pathology confirmed metastatic high-grade carcinoma consistent with his no primary urothelial carcinoma  He received palliative radiation to his right shoulder/axilla region  Urothelial cancer ([facility] [address] )   1/3/2020 Biopsy    Final Diagnosis    A  Urine, Clean Catch, Thin Prep:  High grade urothelial carcinoma (HGUC) - see comment          1/3/2020 Initial Diagnosis    Urothelial cancer (HCC)  T3 N0 (stage IIIA)     1/17/2020 Biopsy    Final Diagnosis    A  Ureter, Right, left renal pelvis biopsy  -Fragments of high grade urothelial carcinoma   -No evidence of lamina propria invasion   -Detrusor muscle/ muscularis propria is not present for evaluation  B  Urinary Bladder, bladder biopsy:  -Fragments of low grade papillary lesion  See note  -No evidence of invasion seen  -Unremarkable fragment of detrusor muscle seen  4/1/2020 Surgery    left robotic assisted laparoscopic nephroureterectomy with bladder cuff excision     Final Diagnosis    A  Left kidney, ureter, and bladder cuff, nephroureterectomy:  - Invasive high grade urothelial carcinoma arising in renal pelvis  - Bladder cuff margin is negative for carcinoma and no evidence of high grade dysplasia  - Ureters with no significant pathologic abnormality  - Two benign simple cysts  - Adrenal gland is negative for malignancy  - One lymph node, negative for malignancy (0/1)          5/14/2020 - 6/3/2020 Chemotherapy    CISplatin (PLATINOL) split dose 35 mg/m2 = 75 3 mg (50 % of original dose 70 mg/m2), Intravenous,   Administration: 75 3 mg (5/14/2020), 75 3 mg (5/21/2020)  gemcitabine (GEMZAR) 2,200 mg in sodium chloride 0 9 % 250 mL infusion, 2,150 2 mg (80 % of original dose 1,250 mg/m2), Intravenous,   Administration: 2,200 mg (5/14/2020), 2,200 mg (5/21/2020)    (only completed 1 cycle- d/c d/t adverse effects and worsening renal dysfunction)     10/9/2020 - 9/9/2021 Chemotherapy    pembrolizumab (KEYTRUDA) IVPB, 200 mg, Intravenous, Once, 16 of 20 cycles  Administration: 200 mg (10/9/2020), 200 mg (10/30/2020), 200 mg (11/20/2020), 200 mg (12/11/2020), 200 mg (12/31/2020), 200 mg (1/22/2021), 200 mg (2/12/2021), 200 mg (3/5/2021), 200 mg (3/26/2021), 200 mg (4/16/2021), 200 mg (5/7/2021), 200 mg (5/28/2021), 200 mg (6/18/2021), 200 mg (7/9/2021), 200 mg (7/30/2021), 200 mg (8/20/2021)     11/19/2020 - 12/3/2020 Radiation    Treatment: Course: C1    Plan ID Energy Fractions Dose per Fraction (cGy) Dose Correction (cGy) Total Dose Delivered (cGy) Elapsed Days   L Abdomen 6X 10 / 10 300 0 3,000 14        9/10/2021 -  Chemotherapy    enfortumab vedotin-ejfv (PADCEV) IVPB, 1 25 mg/kg = 114 mg, Intravenous, Once, 11 of 16 cycles  Dose modification: 1 mg/kg (original dose 1 25 mg/kg, Cycle 7, Reason: Other (Must fill in a comment), Comment: dose reduction due to side effects ), 1 25 mg/kg (original dose 1 25 mg/kg, Cycle 7, Reason: Other (Must fill in a comment), Comment: patient wants full dose ), 1 mg/kg (original dose 1 25 mg/kg, Cycle 7, Reason: Neuropathy), 0 75 mg/kg (original dose 1 25 mg/kg, Cycle 11, Reason: Neuropathy)  Administration: 114 mg (9/10/2021), 114 mg (9/17/2021), 110 mg (10/8/2021), 110 mg (9/24/2021), 110 mg (10/15/2021), 110 mg (11/12/2021), 110 mg (10/22/2021), 110 mg (11/19/2021), 110 mg (12/10/2021), 110 mg (11/26/2021), 110 mg (12/17/2021), 110 mg (1/7/2022), 110 mg (12/23/2021), 110 mg (1/14/2022), 90 mg (4/8/2022), 90 mg (4/15/2022), 90 mg (4/22/2022), 110 mg (1/21/2022), 110 mg (2/18/2022), 110 mg (2/25/2022), 90 mg (5/13/2022), 90 mg (5/20/2022), 90 mg (5/27/2022), 90 mg (3/4/2022), 90 mg (6/10/2022), 90 mg (6/17/2022), 90 mg (6/24/2022), 90 mg (7/8/2022), 90 mg (7/15/2022), 90 mg (7/22/2022), 68 mg (8/19/2022), 70 mg (9/2/2022)     Cancer of left renal pelvis ([facility] [place] [address] )   4/23/2020 Initial Diagnosis    Cancer of left renal pelvis ([facility] [place] [address] )     10/9/2020 - 9/9/2021 Chemotherapy    pembrolizumab (KEYTRUDA) IVPB, 200 mg, Intravenous, Once, 16 of 20 cycles  Administration: 200 mg (10/9/2020), 200 mg (10/30/2020), 200 mg (11/20/2020), 200 mg (12/11/2020), 200 mg (12/31/2020), 200 mg (1/22/2021), 200 mg (2/12/2021), 200 mg (3/5/2021), 200 mg (3/26/2021), 200 mg (4/16/2021), 200 mg (5/7/2021), 200 mg (5/28/2021), 200 mg (6/18/2021), 200 mg (7/9/2021), 200 mg (7/30/2021), 200 mg (8/20/2021)     9/10/2021 -  Chemotherapy    enfortumab vedotin-ejfv (PADCEV) IVPB, 1 25 mg/kg = 114 mg, Intravenous, Once, 11 of 16 cycles  Dose modification: 1 mg/kg (original dose 1 25 mg/kg, Cycle 7, Reason: Other (Must fill in a comment), Comment: dose reduction due to side effects ), 1 25 mg/kg (original dose 1 25 mg/kg, Cycle 7, Reason: Other (Must fill in a comment), Comment: patient wants full dose ), 1 mg/kg (original dose 1 25 mg/kg, Cycle 7, Reason: Neuropathy), 0 75 mg/kg (original dose 1 25 mg/kg, Cycle 11, Reason: Neuropathy)  Administration: 114 mg (9/10/2021), 114 mg (9/17/2021), 110 mg (10/8/2021), 110 mg (9/24/2021), 110 mg (10/15/2021), 110 mg (11/12/2021), 110 mg (10/22/2021), 110 mg (11/19/2021), 110 mg (12/10/2021), 110 mg (11/26/2021), 110 mg (12/17/2021), 110 mg (1/7/2022), 110 mg (12/23/2021), 110 mg (1/14/2022), 90 mg (4/8/2022), 90 mg (4/15/2022), 90 mg (4/22/2022), 110 mg (1/21/2022), 110 mg (2/18/2022), 110 mg (2/25/2022), 90 mg (5/13/2022), 90 mg (5/20/2022), 90 mg (5/27/2022), 90 mg (3/4/2022), 90 mg (6/10/2022), 90 mg (6/17/2022), 90 mg (6/24/2022), 90 mg (7/8/2022), 90 mg (7/15/2022), 90 mg (7/22/2022), 68 mg (8/19/2022), 70 mg (9/2/2022)      Surgery       Metastatic urothelial carcinoma ([address] )   8/9/2021 Initial Diagnosis    Metastatic urothelial carcinoma ([facility] [address] )     9/8/2021 - 9/21/2021 Radiation    Treatment:  Course: C2    Plan ID Energy Fractions Dose per Fraction (cGy) Dose Correction (cGy) Total Dose Delivered (cGy) Elapsed Days   R Axil_Shl # 6X 10 / 10 300 0 3,000 13      Treatment dates:  C2: 9/8/2021 - 9/21/2021     9/10/2021 -  Chemotherapy    enfortumab vedotin-ejfv (PADCEV) IVPB, 1 25 mg/kg = 114 mg, Intravenous, Once, 11 of 16 cycles  Dose modification: 1 mg/kg (original dose 1 25 mg/kg, Cycle 7, Reason: Other (Must fill in a comment), Comment: dose reduction due to side effects ), 1 25 mg/kg (original dose 1 25 mg/kg, Cycle 7, Reason: Other (Must fill in a comment), Comment: patient wants full dose ), 1 mg/kg (original dose 1 25 mg/kg, Cycle 7, Reason: Neuropathy), 0 75 mg/kg (original dose 1 25 mg/kg, Cycle 11, Reason: Neuropathy)  Administration: 114 mg (9/10/2021), 114 mg (9/17/2021), 110 mg (10/8/2021), 110 mg (9/24/2021), 110 mg (10/15/2021), 110 mg (11/12/2021), 110 mg (10/22/2021), 110 mg (11/19/2021), 110 mg (12/10/2021), 110 mg (11/26/2021), 110 mg (12/17/2021), 110 mg (1/7/2022), 110 mg (12/23/2021), 110 mg (1/14/2022), 90 mg (4/8/2022), 90 mg (4/15/2022), 90 mg (4/22/2022), 110 mg (1/21/2022), 110 mg (2/18/2022), 110 mg (2/25/2022), 90 mg (5/13/2022), 90 mg (5/20/2022), 90 mg (5/27/2022), 90 mg (3/4/2022), 90 mg (6/10/2022), 90 mg (6/17/2022), 90 mg (6/24/2022), 90 mg (7/8/2022), 90 mg (7/15/2022), 90 mg (7/22/2022), 68 mg (8/19/2022), 70 mg (9/2/2022)         Interval history:    ROS: Review of Systems   Constitutional: Negative for chills and fever  HENT: Positive for hearing loss  Negative for ear pain and sore throat  Eyes: Negative for pain and visual disturbance  Respiratory: Negative for cough and shortness of breath  Cardiovascular: Negative for chest pain and palpitations  Gastrointestinal: Negative for abdominal pain and vomiting  Genitourinary: Negative for dysuria and hematuria  Musculoskeletal: Positive for arthralgias  Negative for back pain  Skin: Negative for color change and rash  Neurological: Positive for numbness  Negative for seizures and syncope  Psychiatric/Behavioral: Positive for sleep disturbance  All other systems reviewed and are negative        Past Medical History:   Diagnosis Date   • Arthritis    • Bladder cancer    • Cancer ([facility] [address] )     skin melanoma;basal cell   • Chronic pain disorder     from arthritis   • Colon polyp    • Does use hearing aid     bilat   • Dry eyes, bilateral    • GERD (gastroesophageal reflux disease)    • History of kidney stones 10/2019   • History of partial knee replacement     bilat   • History of vertigo    • Hyperlipidemia    • Hypertension    • Infusion extravasation of chemotherapy vesicant 2021   • Kidney lesion    • Lumbar disc disorder     compression of vertebrae L4-5-6   • Muscle weakness     left hip area   • Renal mass     left   • Right ankle injury 2020    missed step of ladder    • Right ankle pain    • Shortness of breath     with activity   • Tinnitus    • Urothelial cancer     left   • Wears glasses        Past Surgical History:   Procedure Laterality Date   • COLONOSCOPY     • CYSTOSCOPY Left 2020    Procedure: CYSTOSCOPY; URETERAL CATHETER PLACEMENT;  Surgeon: Juana Sheriff MD;  Location[facility];  Service: Urology   • CYSTOSCOPY  2020   • CYSTOSCOPY  2021   • FL CYSTOGRAM  2020   • FL RETROGRADE PYELOGRAM  2020   • HERNIA REPAIR      umbilical with mesh   • INGUINAL HERNIA REPAIR Right     with mesh   • IR PORT PLACEMENT  2020   • JOINT REPLACEMENT Bilateral     partials knee   • LUMBAR EPIDURAL INJECTION     • KS CYSTO BLADDER W/URETERAL CATHETERIZATION Bilateral 2020    Procedure: CYSTOSCOPY; RIGHT RETROGRADE PYELOGRAM WITH RIGHT URETERAL CYTOLOGY SAMPLING; LEFT URETEROSCOPY WITH RENAL PELVIS BIOPSY AND LEFT STENT PLACEMENT;  Surgeon: Juana Sheriff MD;  [facility];  Service: Urology   • KS LAPAROSCOPY NEPHRECTOMY W/TOTAL URETERECTOMY Left 2020    Procedure: ROBOTIC LAPAROSCOPIC NEPHRO-URETERECTOMY;  Surgeon: Juana Sheriff MD;  Location[facility];  Service: Urology   • SKIN CANCER EXCISION      surface melanoma   • TONSILLECTOMY     • WISDOM TOOTH EXTRACTION         Social History     Socioeconomic History   • Marital status: /Civil Union     Spouse name: None   • Number of children: None   • Years of education: None   • Highest education level: None   Occupational History   • None   Tobacco Use   • Smoking status: Former     Types: Cigarettes     Quit date:      Years since quittin 0   • Smokeless tobacco: Never Vaping Use   • Vaping Use: Never used   Substance and Sexual Activity   • Alcohol use: Yes     Alcohol/week: 17 0 standard drinks     Types: 7 Glasses of wine, 10 Cans of beer per week     Comment: socially   • Drug use: Never   • Sexual activity: Not Currently   Other Topics Concern   • None   Social History Narrative    Daily caffeine use - 2 cups coffee      Social Determinants of Health     Financial Resource Strain: Not on file   Food Insecurity: Not on file   Transportation Needs: Not on file   Physical Activity: Not on file   Stress: Not on file   Social Connections: Not on file   Intimate Partner Violence: Not on file   Housing Stability: Not on file       Family History   Problem Relation Age of Onset   • Liver cancer Father        Allergies   Allergen Reactions   • Poison Ivy Extract Rash         Current Outpatient Medications:   •  acetaminophen (TYLENOL) 500 mg tablet, Take 2 tablets (1,000 mg total) by mouth every 8 (eight) hours, Disp: , Rfl:   •  amiodarone 200 mg tablet, , Disp: , Rfl:   •  atorvastatin (LIPITOR) 40 mg tablet, , Disp: , Rfl:   •  capsicum (ZOSTRIX) 0 075 % topical cream, Apply topically 3 (three) times a day, Disp: 28 3 g, Rfl: 0  •  diltiazem (CARDIZEM) 60 mg tablet, 2 (two) times a day, Disp: , Rfl:   •  finasteride (PROSCAR) 5 mg tablet, Take 5 mg by mouth daily, Disp: , Rfl:   •  folic acid (FOLVITE) 1 mg tablet, take 1 tablet by mouth once daily, Disp: 90 tablet, Rfl: 4  •  furosemide (LASIX) 40 mg tablet, , Disp: , Rfl:   •  HYDROcodone-acetaminophen (NORCO) 5-325 mg per tablet, Take 1 tablet by mouth 2 (two) times a day as needed for pain Max Daily Amount: 2 tablets, Disp: 60 tablet, Rfl: 0  •  levofloxacin (LEVAQUIN) 500 mg tablet, Take 1 tablet (500 mg total) by mouth every 24 hours for 7 days, Disp: 7 tablet, Rfl: 0  •  naloxone (NARCAN) 4 mg/0 1 mL nasal spray, Administer 1 spray into a nostril   If breathing does not return to normal or if breathing difficulty resumes after 2-3 minutes, give another dose in the other nostril using a new spray , Disp: 1 each, Rfl: 1  •  senna (SENOKOT) 8 6 mg, Take 1 tablet (8 6 mg total) by mouth daily at bedtime, Disp: 30 each, Rfl: 0  •  tamsulosin (FLOMAX) 0 4 mg, Take 1 capsule (0 4 mg total) by mouth daily with dinner, Disp: 30 capsule, Rfl: 12  •  VITAMIN D PO, Take 1,000 Units by mouth daily , Disp: , Rfl:   •  zolpidem (AMBIEN) 5 mg tablet, Take 1 tablet (5 mg total) by mouth daily at bedtime as needed for sleep, Disp: 30 tablet, Rfl: 1  •  ascorbic acid (VITAMIN C) 500 MG tablet, Take 1 tablet by mouth Every 12 hours, Disp: , Rfl:   •  docusate sodium (COLACE) 250 MG capsule, Take 1 capsule (250 mg total) by mouth daily (Patient not taking: Reported on 10/13/2022), Disp: 60 capsule, Rfl: 6  •  Magnesium 250 MG TABS, 1 tablet 2 (two) times a day Taking 500mg in the morning and 500mg at night (Patient not taking: Reported on 1/16/2023), Disp: , Rfl:   •  nystatin (MYCOSTATIN) cream, Apply topically 2 (two) times a day (Patient not taking: Reported on 10/13/2022), Disp: 30 g, Rfl: 0  •  tadalafil (CIALIS) 20 MG tablet, Take one tablet by mouth one hour before sexual activity (Patient not taking: Reported on 1/16/2023), Disp: 15 tablet, Rfl: 3      Physical Exam:  /74 (BP Location: Left arm, Patient Position: Sitting, Cuff Size: Adult)   Pulse 71   Temp 98 3 °F (36 8 °C)   Resp 18   Ht 5' 10" (1 778 m)   Wt 86 6 kg (191 lb)   SpO2 97%   BMI 27 41 kg/m²     Physical Exam  Constitutional:       Appearance: He is well-developed  HENT:      Head: Normocephalic and atraumatic  Nose: Nose normal    Eyes:      General: No scleral icterus  Right eye: No discharge  Left eye: No discharge  Conjunctiva/sclera: Conjunctivae normal       Pupils: Pupils are equal, round, and reactive to light  Neck:      Thyroid: No thyromegaly  Trachea: No tracheal deviation     Cardiovascular:      Rate and Rhythm: Normal rate and regular rhythm  Heart sounds: Normal heart sounds  No murmur heard  No friction rub  Pulmonary:      Effort: Pulmonary effort is normal  No respiratory distress  Breath sounds: Normal breath sounds  No wheezing or rales  Chest:      Chest wall: No tenderness  Abdominal:      General: There is no distension  Palpations: Abdomen is soft  There is no hepatomegaly or splenomegaly  Tenderness: There is no abdominal tenderness  There is no guarding or rebound  Musculoskeletal:         General: No tenderness or deformity  Cervical back: Normal range of motion and neck supple  Comments: Ambulatory dysfunction uses the walker  Lymphadenopathy:      Cervical: No cervical adenopathy  Skin:     General: Skin is warm and dry  Coloration: Skin is not pale  Findings: No erythema or rash  Neurological:      Mental Status: He is alert and oriented to person, place, and time  Cranial Nerves: No cranial nerve deficit  Coordination: Coordination normal       Deep Tendon Reflexes: Reflexes are normal and symmetric  Psychiatric:         Behavior: Behavior normal          Thought Content: Thought content normal          Judgment: Judgment normal            Labs:  Lab Results   Component Value Date    WBC 5 09 01/13/2023    HGB 11 9 (L) 01/13/2023    HCT 37 6 01/13/2023    MCV 95 01/13/2023     01/13/2023     Lab Results   Component Value Date    K 3 7 01/13/2023     01/13/2023    CO2 28 01/13/2023    BUN 31 (H) 01/13/2023    CREATININE 1 39 (H) 01/13/2023    GLUF 84 12/26/2022    CALCIUM 9 3 01/13/2023    CORRECTEDCA 9 0 06/21/2022    AST 10 (L) 01/13/2023    ALT 10 01/13/2023    ALKPHOS 56 01/13/2023    EGFR 49 01/13/2023     No results found for: TSH    Patient voiced understanding and agreement in the above discussion  Aware to contact our office with questions/symptoms in the interim

## 2023-02-17 ENCOUNTER — PROCEDURE VISIT (OUTPATIENT)
Dept: UROLOGY | Facility: CLINIC | Age: 73
End: 2023-02-17

## 2023-02-17 VITALS
WEIGHT: 199.2 LBS | HEART RATE: 62 BPM | SYSTOLIC BLOOD PRESSURE: 126 MMHG | HEIGHT: 70 IN | DIASTOLIC BLOOD PRESSURE: 78 MMHG | BODY MASS INDEX: 28.52 KG/M2

## 2023-02-17 DIAGNOSIS — C79.10 METASTATIC UROTHELIAL CARCINOMA (HCC): Primary | ICD-10-CM

## 2023-02-17 DIAGNOSIS — N32.81 OVERACTIVE BLADDER: ICD-10-CM

## 2023-02-17 LAB
SL AMB  POCT GLUCOSE, UA: NORMAL
SL AMB LEUKOCYTE ESTERASE,UA: NORMAL
SL AMB POCT BILIRUBIN,UA: NORMAL
SL AMB POCT BLOOD,UA: NORMAL
SL AMB POCT CLARITY,UA: CLEAR
SL AMB POCT COLOR,UA: YELLOW
SL AMB POCT KETONES,UA: NORMAL
SL AMB POCT NITRITE,UA: NORMAL
SL AMB POCT PH,UA: 5
SL AMB POCT SPECIFIC GRAVITY,UA: 1.01
SL AMB POCT URINE PROTEIN: NORMAL
SL AMB POCT UROBILINOGEN: 0.2

## 2023-02-17 RX ORDER — FAMOTIDINE 20 MG/1
20 TABLET, FILM COATED ORAL DAILY PRN
COMMUNITY
Start: 2023-01-23

## 2023-02-17 RX ORDER — OMEPRAZOLE 20 MG/1
40 CAPSULE, DELAYED RELEASE ORAL EVERY MORNING
COMMUNITY
Start: 2023-01-16

## 2023-02-17 NOTE — PROGRESS NOTES
Cystoscopy     Date/Time 2/17/2023 2:32 PM     Performed by  Milla Murillo MD     Authorized by RAGHU Davies      Universal Protocol:  Alissa Schirmer called at: 2/17/2023 2:32 PM       Procedure Details:  Procedure type: cystoscopy    Patient tolerance: Patient tolerated the procedure well with no immediate complications    Additional Procedure Details:   Cystoscopy procedure note:  Risk and benefits of flexible cystoscopy were discussed  Informed consent was obtained  Urine dip was adequate for cystoscopy  The patient was placed in the supine position  His genitalia was prepped with Betadine and draped in a sterile fashion  Viscous 2% lidocaine jelly was instilled into the urethra and allowed dwell time for local anesthesia  Flexible cystoscopy was then performed using a 16F scope  The distal urethra and prostatic urethra were evaluated and were normal in course and caliber  Once inside the bladder, it was carefully inspected in a 360 degree fashion  There was no evidence of mucosal abnormalities, lesions, diverticula or stones  Right ureteral orifice intact, left ureteral orifice surgically resected  Retroflexion for evaluation of the bladder neck was normal   Overall this was a negative cystoscopy

## 2023-02-17 NOTE — PROGRESS NOTES
UROLOGY FOLLOWUP NOTE     CHIEF COMPLAINT   Corrie Arshad is a 67 y o  male with a complaint of   Chief Complaint   Patient presents with   • Cystoscopy       History of Present Illness:     67 y o  male with a history of musculoskeletal back pain  Patient sees a pain management team and has been undergoing injections  MR imaging demonstrated a concerning left-sided renal lesion and the patient underwent a CT urogram   This demonstrates a endophytic infiltrating lesion, by my review of the films concerning for urothelial carcinoma  The patient has a remote history of smoking more than 40 years ago  Patient did undergo a robotic assisted left nephroureterectomy 4/1/20  Removal of the kidney was somewhat challenging given perihilar fat  Pathology does return T3 disease with invasion into the peripelvic fat  Patient had been sent for preoperative evaluation for neoadjuvant chemotherapy but had deferred  It was recommended the patient return to see medical oncology postoperatively and he did agree to adjuvant chemotherapy  Unfortunately, with some progressive renal dysfunction and concerns about hearing loss, additional chemotherapy was aborted  Patient tolerated 1 cycle only    In September of 2020, the patient was noted to have progressive lymphadenopathy and concern in the left rectus muscle  Patient underwent PET scan which showed activity  Patient was started on Keytruda and recommended to undergo some palliative radiation  He was then adjusted to Padcev  In October, the patient was in [place]  Developed an incisional hernia with strangulated bowel  Patient was critically ill and taken to the operating room locally for resection  He and his wife remained in [place] for many weeks during his recovery  He now returns  Most recent PET scan does not seem to indicate several areas of activity concerning with recurrence or progression    Recent visit with the medical oncologist to discuss palliative therapy  Unfortunate given the patient's poor performance status, both he and the medical oncology team are not in favor of any additional treatment  Plan for repeat PET scan in 3 months        Past Medical History:     Past Medical History:   Diagnosis Date   • Arthritis    • Bladder cancer    • Cancer ([address] )     skin melanoma;basal cell   • Chronic pain disorder     from arthritis   • Colon polyp    • Does use hearing aid     bilat   • Dry eyes, bilateral    • GERD (gastroesophageal reflux disease)    • History of kidney stones 10/2019   • History of partial knee replacement     bilat   • History of vertigo    • Hyperlipidemia    • Hypertension    • Infusion extravasation of chemotherapy vesicant 11/2021   • Kidney lesion    • Lumbar disc disorder     compression of vertebrae L4-5-6   • Muscle weakness     left hip area   • Renal mass     left   • Right ankle injury 03/05/2020    missed step of ladder    • Right ankle pain    • Shortness of breath     with activity   • Tinnitus    • Urothelial cancer     left   • Wears glasses        PAST SURGICAL HISTORY:     Past Surgical History:   Procedure Laterality Date   • COLONOSCOPY     • CYSTOSCOPY Left 04/01/2020    Procedure: CYSTOSCOPY; URETERAL CATHETER PLACEMENT;  Surgeon: Eliel Centeno MD;  Location: [facility] OR;  Service: Urology   • CYSTOSCOPY  05/11/2020   • CYSTOSCOPY  06/04/2021   • FL CYSTOGRAM  04/13/2020   • FL RETROGRADE PYELOGRAM  01/17/2020   • HERNIA REPAIR  56/90/5346    umbilical with mesh   • INGUINAL HERNIA REPAIR Right     with mesh   • IR PORT PLACEMENT  04/30/2020   • JOINT REPLACEMENT Bilateral     partials knee   • LUMBAR EPIDURAL INJECTION     • OR CYSTO BLADDER W/URETERAL CATHETERIZATION Bilateral 01/17/2020    Procedure: CYSTOSCOPY; RIGHT RETROGRADE PYELOGRAM WITH RIGHT URETERAL CYTOLOGY SAMPLING; LEFT URETEROSCOPY WITH RENAL PELVIS BIOPSY AND LEFT STENT PLACEMENT;  Surgeon: Eliel Centeno MD;  Location: [facility];  Service: Urology   • RI LAPAROSCOPY NEPHRECTOMY W/TOTAL URETERECTOMY Left 04/01/2020    Procedure: ROBOTIC LAPAROSCOPIC NEPHRO-URETERECTOMY;  Surgeon: William Gillespie MD;  Location[facility];  Service: Urology   • SKIN CANCER EXCISION      surface melanoma   • TONSILLECTOMY     • WISDOM TOOTH EXTRACTION         CURRENT MEDICATIONS:     Current Outpatient Medications   Medication Sig Dispense Refill   • acetaminophen (TYLENOL) 500 mg tablet Take 2 tablets (1,000 mg total) by mouth every 8 (eight) hours     • amiodarone 200 mg tablet      • ascorbic acid (VITAMIN C) 500 MG tablet Take 1 tablet by mouth Every 12 hours     • atorvastatin (LIPITOR) 40 mg tablet      • capsicum (ZOSTRIX) 0 075 % topical cream Apply topically 3 (three) times a day 28 3 g 0   • diltiazem (CARDIZEM) 60 mg tablet 2 (two) times a day     • docusate sodium (COLACE) 250 MG capsule Take 1 capsule (250 mg total) by mouth daily 60 capsule 6   • folic acid (FOLVITE) 1 mg tablet take 1 tablet by mouth once daily 90 tablet 4   • furosemide (LASIX) 40 mg tablet      • HYDROcodone-acetaminophen (NORCO) 5-325 mg per tablet Take 1 tablet by mouth 2 (two) times a day as needed for pain Max Daily Amount: 2 tablets 60 tablet 0   • naloxone (NARCAN) 4 mg/0 1 mL nasal spray Administer 1 spray into a nostril  If breathing does not return to normal or if breathing difficulty resumes after 2-3 minutes, give another dose in the other nostril using a new spray   1 each 1   • nystatin (MYCOSTATIN) cream Apply topically 2 (two) times a day 30 g 0   • senna (SENOKOT) 8 6 mg Take 1 tablet (8 6 mg total) by mouth daily at bedtime 30 each 0   • tadalafil (CIALIS) 20 MG tablet Take one tablet by mouth one hour before sexual activity 15 tablet 3   • tamsulosin (FLOMAX) 0 4 mg Take 1 capsule (0 4 mg total) by mouth daily with dinner 30 capsule 12   • VITAMIN D PO Take 1,000 Units by mouth daily      • zolpidem (AMBIEN) 5 mg tablet Take 1 tablet (5 mg total) by mouth daily at bedtime as needed for sleep 30 tablet 1   • famotidine (PEPCID) 20 mg tablet Take 20 mg by mouth daily as needed     • finasteride (PROSCAR) 5 mg tablet Take 5 mg by mouth daily (Patient not taking: Reported on 2023)     • Magnesium 250 MG TABS 1 tablet 2 (two) times a day Taking 500mg in the morning and 500mg at night (Patient not taking: Reported on 2023)     • omeprazole (PriLOSEC) 20 mg delayed release capsule Take 40 mg by mouth every morning       No current facility-administered medications for this visit  ALLERGIES:     Allergies   Allergen Reactions   • Poison Ivy Extract Rash       SOCIAL HISTORY:     Social History     Socioeconomic History   • Marital status: /Civil Union     Spouse name: None   • Number of children: None   • Years of education: None   • Highest education level: None   Occupational History   • None   Tobacco Use   • Smoking status: Former     Types: Cigarettes     Quit date:      Years since quittin 1   • Smokeless tobacco: Never   Vaping Use   • Vaping Use: Never used   Substance and Sexual Activity   • Alcohol use: Yes     Alcohol/week: 17 0 standard drinks     Types: 7 Glasses of wine, 10 Cans of beer per week     Comment: socially, rarely   • Drug use: Never   • Sexual activity: Not Currently   Other Topics Concern   • None   Social History Narrative    Daily caffeine use - 2 cups coffee      Social Determinants of Health     Financial Resource Strain: Not on file   Food Insecurity: Not on file   Transportation Needs: Not on file   Physical Activity: Not on file   Stress: Not on file   Social Connections: Not on file   Intimate Partner Violence: Not on file   Housing Stability: Not on file       SOCIAL HISTORY:     Family History   Problem Relation Age of Onset   • Liver cancer Father        REVIEW OF SYSTEMS:     Review of Systems   Constitutional: Positive for activity change  Respiratory: Negative      Cardiovascular: Negative  Gastrointestinal: Negative  Negative for abdominal pain  Genitourinary: Negative  Negative for dysuria, flank pain and hematuria  Musculoskeletal: Positive for gait problem  Skin: Positive for pallor  Psychiatric/Behavioral: Negative  PHYSICAL EXAM:     /78 (BP Location: Left arm, Patient Position: Sitting, Cuff Size: Adult)   Pulse 62   Ht 5' 10" (1 778 m)   Wt 90 4 kg (199 lb 3 2 oz)   BMI 28 58 kg/m²     General: Patient is pale and more ill-appearing than prior  They have a normal affect  There is not appear to be any gross neurologic defects or abnormalities  HEENT:  Normocephalic, atraumatic  Neck is supple without any palpable lymphadenopathy  Cardiovascular:  Patient has normal palpable distal radial pulses  There is no significant peripheral edema  No JVD is noted  Respiratory:  Patient has unlabored respirations  There is no audible wheeze or rhonchi  Abdomen: Large exploratory laparotomy scar now healed  Abdomen is soft and nontender  There is no tympany  Inguinal and umbilical hernia are not appreciated  : Circumcised phallus orthotopic meatus  Musculoskeletal:  Patient does not have significant CVA tenderness in the  flank with palpation or percussion  They full range of motion in all 4 extremities  Strength in all 4 extremities appears congruent  Patient is able to ambulate without assistance or difficulty  Dermatologic:  Patient has no skin abnormalities or rashes        LABS:     CBC:   Lab Results   Component Value Date    WBC 5 09 2023    HGB 11 9 (L) 2023    HCT 37 6 2023    MCV 95 2023     2023       BMP:   Lab Results   Component Value Date    CALCIUM 9 3 2023    K 3 7 2023    CO2 28 2023     2023    BUN 31 (H) 2023    CREATININE 1 39 (H) 2023     IMAGIN/10/23  PET/CT SCAN     INDICATION: C79 10: Secondary malignant neoplasm of unspecified urinary organs  C67 8: Malignant neoplasm of overlapping sites of bladder ; patient had repair of an abdominal hernia recently due to perforated hernia      MODIFIER: PI PS      COMPARISON: FDG PET/CT 9/26/2022     CELL TYPE:  Metastatic high-grade carcinoma consistent with urothelial cell primary, biopsy 8/9/2021     TECHNIQUE:   8 8 mCi F-18-FDG administered IV  Multiplanar attenuation corrected and non attenuation corrected PET images are available for interpretation, and contiguous, low dose, axial CT sections were obtained from the skull base through the femurs   following the administration of oral contrast material (7 5 cc Omnipaque-240 in 300 cc water)  Intravenous contrast material was not utilized  This examination, like all CT scans performed in the [facility], was performed utilizing   techniques to minimize radiation dose exposure, including the use of iterative reconstruction and automated exposure control        Fasting serum glucose: 87 mg/dl     FINDINGS:      VISUALIZED BRAIN:     No acute abnormalities are seen      HEAD/NECK:     There is a physiologic distribution of FDG  No FDG avid cervical adenopathy is seen  CT images: Unremarkable      CHEST:     There is a new metabolically active consolidation involving the posterior right lower lobe, most likely infectious/inflammatory        Stable subcentimeter subcutaneous soft tissue density involving the right upper posterior chest wall with mild uptake (SUV max 2 1, previously 2 4; series 603, image 48)     Stable size of a small intramuscular cystic lesion involving the proximal right upper extremity, with circumferential activity on PET (series 603, image 28)  There is persistent mild uptake in this region (SUV max 1 9, previously 1 9)     CT images: Bibasilar linear atelectasis  Coronary artery calcifications  Right chest port catheter in satisfactory position    Cardiomegaly      ABDOMEN/PELVIS:        -Two new subcentimeter foci of increased uptake along the posterior margin of the spleen (series 603, images 91 and 96; with SUV max 3 7 and 5 4 respectively)     -New subcentimeter focus of uptake in the posterior hepatic dome (series 603, image 73; SUV max 4 8)  It is unclear if this represents a true lesion or related to artifact or noise      -New subcentimeter focus of uptake in the left mid abdomen adjacent to a bowel anastomotic suture line (series 603, image 108; SUV max 8 3)     -Increased uptake corresponding to the midline scar in the anterior abdominal wall (SUV max 5 7), likely inflammatory  Mild activity within the rectum is likely physiologic      CT images: There are a few scattered hepatic cysts  Postsurgical changes from left nephrectomy  Vascular calcifications in the aorta and branching vessels  Midline postsurgical scar  Interval repair of the previously noted left spigelian hernia        There are changes from interval bowel surgery with anastomotic suture line along a bowel loop in the left abdomen  No findings of bowel obstruction  Diverticulosis of the descending and sigmoid colon without findings of diverticulitis      Mildly enlarged prostate gland      OSSEOUS STRUCTURES:  No FDG avid lesions are seen  CT images: No significant findings      IMPRESSION:        1  New small metabolically active consolidation involving the right lower lobe, suspicious for pneumonia  Recommend treatment and attention to this area on subsequent follow-up      2  Two new subcentimeter foci of increased uptake along the posterior margin of the spleen, which are suspicious for progression of metastatic disease      3  New subcentimeter focus of increased uptake in the posterior right hepatic lobe, unclear if a true metastatic lesion versus artifact  MRI may be obtained for more definitive evaluation      4    New subcentimeter focus of increased uptake in the left mid abdomen adjacent to the anastomotic suture line, which may be inflammatory given interval abdominal and bowel surgery  Additionally, there is increased uptake corresponding to the midline   abdominal surgical scar compatible with an inflammatory process  Recommend attention to these areas on subsequent follow-up      5   Stable appearance of a soft tissue density posterior to the right shoulder, and stable appearance of a cystic intramuscular lesion involving the proximal right upper extremity  This may represent persistent viable malignancy and/or posttreatment   related changes        For all these findings, recommend follow-up FDG PET/CT in 3 months to assess for interval change  PATHOLOGY:     4/1/20  Case Report   Surgical Pathology Report                         Case: [id number]                                    Authorizing Provider: Damian Thompson MD   Collected:           04/01/2020 [id number]               Ordering Location:     [facility]        Received:            04/01/2020 1208                                      [facility]                                                      Pathologist:           Valarie Pham MD                                                                  Specimen:    Kidney, Left, left kidney, ureter and bladder cuff                                         Final Diagnosis   A  Left kidney, ureter, and bladder cuff, nephroureterectomy:  - Invasive high grade urothelial carcinoma arising in renal pelvis  - Bladder cuff margin is negative for carcinoma and no evidence of high grade dysplasia  - Ureters with no significant pathologic abnormality  - Two benign simple cysts  - Adrenal gland is negative for malignancy  - One lymph node, negative for malignancy (0/1)  1/17/20  Final Diagnosis   A   Ureter, Right, left renal pelvis biopsy  -Fragments of high grade urothelial carcinoma   -No evidence of lamina propria invasion   -Detrusor muscle/ muscularis propria is not present for evaluation      B  Urinary Bladder, bladder biopsy:  -Fragments of low grade papillary lesion  See note  -No evidence of invasion seen  -Unremarkable fragment of detrusor muscle seen      Separate fragment of urothelium with mild cytological atypia seen     Note  Differential diagnosis include low grade Papillary urothelial carcinoma vs apillary urothelial neoplasm of low malignant potential     Intradepartmental consultation with more than one staff pathologist is in agreement     PROCEDURE:     SEE NOTE    ASSESSMENT:     67 y o  male with LEFT upper tract urothelial carcinoma s/p robotic LEFT nephroureterectomy for [id number] disease, now with progressive signs of metastatic disease despite first and second line treatments    PLAN:     We discussed the patient's surgical care in [place]  He and very appreciative of the team who cared for him as it sounds as though the patient was critically ill and may not have survived otherwise  I was able to discuss the recent PET scan with the patient and his wife and I read from them in the office note from his medical oncologist   We certainly remain concerned about progressive metastatic disease  Repeat PET scan in 3 months we will add additional information  At this point time, I agree that the patient is probably not an excellent candidate for palliative chemotherapy but this may change as the patient continues to recover  I will plan yearly cystoscopies however I would like to see the patient back in 6 months to assess PET scan and treatment plans    Patient and his wife are agreeable

## 2023-02-24 ENCOUNTER — HOSPITAL ENCOUNTER (OUTPATIENT)
Dept: INFUSION CENTER | Facility: HOSPITAL | Age: 73
Discharge: HOME | End: 2023-02-24
Attending: INTERNAL MEDICINE

## 2023-02-24 VITALS — TEMPERATURE: 96.5 F

## 2023-02-24 DIAGNOSIS — C65.2 CANCER OF LEFT RENAL PELVIS (HCC): ICD-10-CM

## 2023-02-24 DIAGNOSIS — Z45.2 ENCOUNTER FOR CARE RELATED TO PORT-A-CATH: Primary | ICD-10-CM

## 2023-02-24 NOTE — PROGRESS NOTES
Port flushed freely  Good blood return noted  Port deaccessed without issue  Patient tolerated well  AVS declined  Patient given appointment card  Patient ambulated off unit without incident  All personal belongings taken with patient

## 2023-02-28 ENCOUNTER — TELEPHONE (OUTPATIENT)
Dept: PAIN MEDICINE | Facility: CLINIC | Age: 73
End: 2023-02-28

## 2023-02-28 NOTE — TELEPHONE ENCOUNTER
S/W pt and informed pt RFA cannot be repeated until April for insurance coverage, pt to discuss with [facility] and upcoming appointment  Pt verbalized understanding and appreciative of call

## 2023-02-28 NOTE — TELEPHONE ENCOUNTER
Caller: pt    Doctor: Monika Gomez    Reason for call: Pt is calling in stating that he would like an ablation in his low back   Pain level is 8/10, please follow up     Call back#: [phone]

## 2023-02-28 NOTE — TELEPHONE ENCOUNTER
Caller: Deven Al PT    Doctor/Office: Dr Pratima Greene    Call regarding :  Procedure     Call was transferred to: Nurse

## 2023-03-13 ENCOUNTER — OFFICE VISIT (OUTPATIENT)
Dept: PAIN MEDICINE | Facility: CLINIC | Age: 73
End: 2023-03-13

## 2023-03-13 VITALS
WEIGHT: 198 LBS | DIASTOLIC BLOOD PRESSURE: 78 MMHG | HEART RATE: 61 BPM | SYSTOLIC BLOOD PRESSURE: 119 MMHG | BODY MASS INDEX: 28.41 KG/M2

## 2023-03-13 DIAGNOSIS — F11.20 UNCOMPLICATED OPIOID DEPENDENCE (HCC): ICD-10-CM

## 2023-03-13 DIAGNOSIS — M54.16 LUMBAR RADICULOPATHY: ICD-10-CM

## 2023-03-13 DIAGNOSIS — G89.4 CHRONIC PAIN SYNDROME: Primary | ICD-10-CM

## 2023-03-13 DIAGNOSIS — Z79.891 LONG-TERM CURRENT USE OF OPIATE ANALGESIC: ICD-10-CM

## 2023-03-13 DIAGNOSIS — M47.816 LUMBAR SPONDYLOSIS: ICD-10-CM

## 2023-03-13 DIAGNOSIS — M51.26 LUMBAR DISC HERNIATION: ICD-10-CM

## 2023-03-13 DIAGNOSIS — G89.3 CANCER-RELATED PAIN: ICD-10-CM

## 2023-03-13 RX ORDER — NALOXONE HYDROCHLORIDE 4 MG/.1ML
SPRAY NASAL
Qty: 1 EACH | Refills: 1 | Status: SHIPPED | OUTPATIENT
Start: 2023-03-13

## 2023-03-13 RX ORDER — HYDROCODONE BITARTRATE AND ACETAMINOPHEN 5; 325 MG/1; MG/1
1 TABLET ORAL 2 TIMES DAILY PRN
Qty: 60 TABLET | Refills: 0 | Status: SHIPPED | OUTPATIENT
Start: 2023-05-10 | End: 2023-03-24

## 2023-03-13 RX ORDER — HYDROCODONE BITARTRATE AND ACETAMINOPHEN 5; 325 MG/1; MG/1
1 TABLET ORAL 2 TIMES DAILY PRN
Qty: 60 TABLET | Refills: 0 | Status: SHIPPED | OUTPATIENT
Start: 2023-03-13

## 2023-03-13 RX ORDER — HYDROCODONE BITARTRATE AND ACETAMINOPHEN 5; 325 MG/1; MG/1
1 TABLET ORAL 2 TIMES DAILY PRN
Qty: 60 TABLET | Refills: 0 | Status: SHIPPED | OUTPATIENT
Start: 2023-04-11

## 2023-03-13 NOTE — PROGRESS NOTES
Assessment:  1  Chronic pain syndrome    2  Uncomplicated opioid dependence ([facility] [address] )    3  Long-term current use of opiate analgesic    4  Lumbar radiculopathy    5  Lumbar disc herniation    6  Lumbar spondylosis    7  Cancer-related pain        Plan:  1  Patient may continue Norco 5/325 mg 1 tablet twice a day as needed for pain  The patient was given a 3 month supply of prescriptions with a Do Not Fill date(s) of today, April 11, 2023 and May 10, 2023    While the patient was in the office today an opioid contract was thoroughly reviewed and signed by the patient  The patient was given adequate time to ask questions in regards to the contract and a signed copy was sent home for their records  The patient also completed a BECKS depression inventory and SOAPP-R today, as part of our yearly opioid management program     While the patient was in the office today, I explained to them that although I am not prescribing their anti-anxiety/benzodiazapine medications, it has been deemed a black box warning for any patients who are on these types of medications to also be on opioid medications because of the risk for respiratory depression and death  I encouraged the patient to discuss this medication(s) with their prescriber to see if there are any alternative medications  The patient verbalized an understanding  A signed copy of the patient education sheet reviewing the risks and reasons for prescribing this medication is available in the patient's chart and a copy was also sent home with the patient  The patient is currently receiving [a benzodiazepine and an opioid medication  This places him at a higher-risk for respiratory depression/overdose  Therefore, I  will prescribe naloxone nasal spray  It was explained to the patient that this is an injectable opiate antagonist which is indicated for emergency treatment for opiate overdose    The patient was also instructed on how to use the Narcan nasal spray, in the case of an opioid emergency  [place] Prescription Drug Monitoring Program report was reviewed and was appropriate     A urine drug screen was collected at today's office visit as part of our medication management protocol  The point of care testing results were appropriate for what was being prescribed  The specimen will be sent for confirmatory testing  The drug screen is medically necessary because the patient is either dependent on opioid medication or is being considered for opioid medication therapy and the results could impact ongoing or future treatment  The drug screen is to evaluate for the presences or absence of prescribed, non-prescribed, and/or illicit drugs/substances  There are risks associated with opioid medications, including dependence, addiction and tolerance  The patient understands and agrees to use these medications only as prescribed  Potential side effects of the medications include, but are not limited to, constipation, drowsiness, addiction, impaired judgment and risk of fatal overdose if not taken as prescribed  The patient was warned against driving while taking sedation medications  Sharing medications is a felony  At this point in time, the patient is showing no signs of addiction, abuse, diversion or suicidal ideation  2   Patient would like to repeat bilateral L3-5 RFA  This will be scheduled today  Complete risks and benefits including bleeding, infection, tissue reaction, nerve injury and allergic reaction were discussed  The patient was agreeable and verbalized an understanding  3  Continue with home exercise program  4  Follow-up in 3 months or sooner if needed    History of Present Illness:     The patient is a 67 y o  male with a history of metastatic urothelial carcinoma status post nephrectomy undergoing chemotherapy, chemo induced neuropathy, A-fib, lumbar spondylosis, lumbar stenosis and chronic pain syndrome last seen on 12/20/2022 who presents for a follow up office visit in regards to chronic axial low back pain  He denies bowel or bladder incontinence or saddle anesthesia  Patient is status post bilateral SI joint injections on December 21, 2022 without any significant improvement of his pain  He has had much more relief with lumbar RFA which was last completed in March 2022  He continues on Norco 5/325 mg 1 tablet twice a day as needed pain with an 80% improvement of his pain without side effects  He rates his pain a 4 out of 10 on the numeric pain rating scale however pain will flare with ambulation to a 7 out of 10  He constantly has pain in the morning which is described as dull aching    Pain Contract Signed: 3/13/2023  Last Urine Drug Screen: 3/13/2023  BECKS/SOAPP 3/13/2023  Last hydrocodone per pt 3/13/2023  Last Narcan: 3/13/2023    I have personally reviewed and/or updated the patient's past medical history, past surgical history, family history, social history, current medications, allergies, and vital signs today  Review of Systems:    Review of Systems   Respiratory: Negative for shortness of breath  Cardiovascular: Negative for chest pain  Gastrointestinal: Negative for constipation, diarrhea, nausea and vomiting  Musculoskeletal: Positive for gait problem  Negative for arthralgias, joint swelling and myalgias  Skin: Negative for rash  Neurological: Negative for dizziness, seizures and weakness  All other systems reviewed and are negative          Past Medical History:   Diagnosis Date   • Arthritis    • Bladder cancer    • Cancer ([facility] [address] )     skin melanoma;basal cell   • Chronic pain disorder     from arthritis   • Colon polyp    • Does use hearing aid     bilat   • Dry eyes, bilateral    • GERD (gastroesophageal reflux disease)    • History of kidney stones 10/2019   • History of partial knee replacement     bilat   • History of vertigo    • Hyperlipidemia    • Hypertension    • Infusion extravasation of chemotherapy vesicant 2021   • Kidney lesion    • Lumbar disc disorder     compression of vertebrae L4-5-6   • Muscle weakness     left hip area   • Renal mass     left   • Right ankle injury 2020    missed step of ladder    • Right ankle pain    • Shortness of breath     with activity   • Tinnitus    • Urothelial cancer     left   • Wears glasses        Past Surgical History:   Procedure Laterality Date   • COLONOSCOPY     • CYSTOSCOPY Left 2020    Procedure: CYSTOSCOPY; URETERAL CATHETER PLACEMENT;  Surgeon: German Dunn MD;  [facility];  Service: Urology   • CYSTOSCOPY  2020   • CYSTOSCOPY  2021   • FL CYSTOGRAM  2020   • FL RETROGRADE PYELOGRAM  2020   • HERNIA REPAIR      umbilical with mesh   • INGUINAL HERNIA REPAIR Right     with mesh   • IR PORT PLACEMENT  2020   • JOINT REPLACEMENT Bilateral     partials knee   • LUMBAR EPIDURAL INJECTION     • NC CYSTO BLADDER W/URETERAL CATHETERIZATION Bilateral 2020    Procedure: CYSTOSCOPY; RIGHT RETROGRADE PYELOGRAM WITH RIGHT URETERAL CYTOLOGY SAMPLING; LEFT URETEROSCOPY WITH RENAL PELVIS BIOPSY AND LEFT STENT PLACEMENT;  Surgeon: German Dunn MD;  [facility];  Service: Urology   • NC LAPAROSCOPY NEPHRECTOMY W/TOTAL URETERECTOMY Left 2020    Procedure: ROBOTIC LAPAROSCOPIC NEPHRO-URETERECTOMY;  Surgeon: German Dunn MD;  [facility];  Service: Urology   • SKIN CANCER EXCISION      surface melanoma   • TONSILLECTOMY     • WISDOM TOOTH EXTRACTION         Family History   Problem Relation Age of Onset   • Liver cancer Father        Social History     Occupational History   • Not on file   Tobacco Use   • Smoking status: Former     Types: Cigarettes     Quit date:      Years since quittin 2   • Smokeless tobacco: Never   Vaping Use   • Vaping Use: Never used   Substance and Sexual Activity   • Alcohol use:  Yes     Alcohol/week: 17 0 standard drinks Types: 7 Glasses of wine, 10 Cans of beer per week     Comment: socially, rarely   • Drug use: Never   • Sexual activity: Not Currently         Current Outpatient Medications:   •  HYDROcodone-acetaminophen (NORCO) 5-325 mg per tablet, Take 1 tablet by mouth 2 (two) times a day as needed for pain Max Daily Amount: 2 tablets, Disp: 60 tablet, Rfl: 0  •  [START ON 4/11/2023] HYDROcodone-acetaminophen (Norco) 5-325 mg per tablet, Take 1 tablet by mouth 2 (two) times a day as needed for pain Max Daily Amount: 2 tablets Do not start before April 11, 2023 , Disp: 60 tablet, Rfl: 0  •  [START ON 5/10/2023] HYDROcodone-acetaminophen (Norco) 5-325 mg per tablet, Take 1 tablet by mouth 2 (two) times a day as needed for pain Max Daily Amount: 2 tablets Do not start before May 10, 2023 , Disp: 60 tablet, Rfl: 0  •  naloxone (NARCAN) 4 mg/0 1 mL nasal spray, Administer 1 spray into a nostril   If breathing does not return to normal or if breathing difficulty resumes after 2-3 minutes, give another dose in the other nostril using a new spray , Disp: 1 each, Rfl: 1  •  acetaminophen (TYLENOL) 500 mg tablet, Take 2 tablets (1,000 mg total) by mouth every 8 (eight) hours, Disp: , Rfl:   •  amiodarone 200 mg tablet, , Disp: , Rfl:   •  ascorbic acid (VITAMIN C) 500 MG tablet, Take 1 tablet by mouth Every 12 hours, Disp: , Rfl:   •  atorvastatin (LIPITOR) 40 mg tablet, , Disp: , Rfl:   •  capsicum (ZOSTRIX) 0 075 % topical cream, Apply topically 3 (three) times a day, Disp: 28 3 g, Rfl: 0  •  diltiazem (CARDIZEM) 60 mg tablet, 2 (two) times a day, Disp: , Rfl:   •  docusate sodium (COLACE) 250 MG capsule, Take 1 capsule (250 mg total) by mouth daily, Disp: 60 capsule, Rfl: 6  •  famotidine (PEPCID) 20 mg tablet, Take 20 mg by mouth daily as needed, Disp: , Rfl:   •  finasteride (PROSCAR) 5 mg tablet, Take 5 mg by mouth daily (Patient not taking: Reported on 2/17/2023), Disp: , Rfl:   •  folic acid (FOLVITE) 1 mg tablet, take 1 tablet by mouth once daily, Disp: 90 tablet, Rfl: 4  •  furosemide (LASIX) 40 mg tablet, , Disp: , Rfl:   •  Magnesium 250 MG TABS, 1 tablet 2 (two) times a day Taking 500mg in the morning and 500mg at night (Patient not taking: Reported on 1/16/2023), Disp: , Rfl:   •  nystatin (MYCOSTATIN) cream, Apply topically 2 (two) times a day, Disp: 30 g, Rfl: 0  •  omeprazole (PriLOSEC) 20 mg delayed release capsule, Take 40 mg by mouth every morning, Disp: , Rfl:   •  senna (SENOKOT) 8 6 mg, Take 1 tablet (8 6 mg total) by mouth daily at bedtime, Disp: 30 each, Rfl: 0  •  tadalafil (CIALIS) 20 MG tablet, Take one tablet by mouth one hour before sexual activity, Disp: 15 tablet, Rfl: 3  •  tamsulosin (FLOMAX) 0 4 mg, Take 1 capsule (0 4 mg total) by mouth daily with dinner, Disp: 30 capsule, Rfl: 12  •  VITAMIN D PO, Take 1,000 Units by mouth daily , Disp: , Rfl:   •  zolpidem (AMBIEN) 5 mg tablet, Take 1 tablet (5 mg total) by mouth daily at bedtime as needed for sleep, Disp: 30 tablet, Rfl: 1    Allergies   Allergen Reactions   • Poison Ivy Extract Rash       Physical Exam:    /78   Pulse 61   Wt 89 8 kg (198 lb)   BMI 28 41 kg/m²     Constitutional:normal, well developed, well nourished, alert, in no distress and non-toxic and no overt pain behavior    Eyes:anicteric  HEENT:grossly intact  Neck:supple, symmetric, trachea midline and no masses   Pulmonary:even and unlabored  Cardiovascular:No edema or pitting edema present  Skin:Normal without rashes or lesions and well hydrated  Psychiatric:Mood and affect appropriate  Neurologic:Cranial Nerves II-XII grossly intact  Musculoskeletal:antalgic gait but steady without assistive devices      Imaging  FL spine and pain procedure    (Results Pending)   FL spine and pain procedure    (Results Pending)         Orders Placed This Encounter   Procedures   • FL spine and pain procedure   • FL spine and pain procedure   • MM ALL_Prescribed Meds and Special Instructions   • SARAH DT_Alprazolam Definitive Test   • MM DT_Amphetamine Definitive Test   • MM DT_Aripiprazole Definitive Test   • MM DT_Bath Salts Definitive Test   • MM DT_Buprenorphine Definitive Test   • MM DT_Butalbital Definitive Test   • MM DT_Clonazepam Definitive Test   • MM DT_Clozapine Definitive Test   • MM DT_Cocaine Definitive Test   • MM DT_Codeine Definitive Test   • MM DT_Desipramine Definitive Test   • MM DT_Dextromethorphan Definitive Test   • MM Diazepam Definitive Test   • MM DT_Ethyl Glucuronide/Ethyl Sulfate Definitive Test   • MM DT_Fentanyl Definitive Test   • MM DT_Haloperidol Definitive Test   • MM DT_Heroin Definitive Test   • MM DT_Hydrocodone Definitive Test   • MM DT_Hydromorphone Definitive Test   • MM DT_Imipramine Definitive Test   • MM DT_Kratom Definitive Test   • MM DT_Levorphanol Definitive Test   • MM Lorazepam Definitive Test   • MM DT_MDMA Definitive Test   • MM DT_Meperidine Definitive Test   • MM DT_Methadone Definitive Test   • MM DT_Methamphetamine Definitive Test   • MM DT_Morphine Definitive Test   • MM DT_Olanzapine Definitive Test   • MM DT_Oxazepam Definitive Test   • MM DT_Oxycodone Definitive Test   • MM DT_Oxymorphone Definitive Test   • MM DT_Phencyclidine Definitive Test   • MM DT_Phenobarbital Definitive Test   • MM DT_Phentermine Definitive Test   • MM DT_Quetiapine Definitive Test   • MM DT_Risperidone Definitive Test   • MM DT_Secobarbital Definitive Test   • MM DT_Spice Definitive Test   • MM DT_Tapentadol Definitive Test   • MM DT_Temazapam Definitive Test   • MM DT_THC Definitive Test   • MM DT_Tramadol Definitive Test   • MM DT_Methylphenidate Definitive Test

## 2023-03-15 LAB
6MAM UR QL CFM: NEGATIVE NG/ML
7AMINOCLONAZEPAM UR QL CFM: NEGATIVE NG/ML
A-OH ALPRAZ UR QL CFM: NEGATIVE NG/ML
AMPHET UR QL CFM: NEGATIVE NG/ML
AMPHET UR QL CFM: NEGATIVE NG/ML
BUPRENORPHINE UR QL CFM: NEGATIVE NG/ML
BUTALBITAL UR QL CFM: NEGATIVE NG/ML
BZE UR QL CFM: NEGATIVE NG/ML
CODEINE UR QL CFM: NEGATIVE NG/ML
DESIPRAMINE UR QL CFM: NEGATIVE NG/ML
DESIPRAMINE UR QL CFM: NEGATIVE NG/ML
EDDP UR QL CFM: NEGATIVE NG/ML
ETHYL GLUCURONIDE UR QL CFM: NEGATIVE NG/ML
ETHYL SULFATE UR QL SCN: NEGATIVE NG/ML
EUTYLONE UR QL: NEGATIVE NG/ML
FENTANYL UR QL CFM: NEGATIVE NG/ML
GLIADIN IGG SER IA-ACNC: NEGATIVE NG/ML
GLUCOSE 30M P 50 G LAC PO SERPL-MCNC: NEGATIVE NG/ML
HYDROCODONE UR QL CFM: NORMAL NG/ML
HYDROCODONE UR QL CFM: NORMAL NG/ML
HYDROMORPHONE UR QL CFM: NORMAL NG/ML
IMIPRAMINE UR QL CFM: NEGATIVE NG/ML
LORAZEPAM UR QL CFM: NEGATIVE NG/ML
MDMA UR QL CFM: NEGATIVE NG/ML
ME-PHENIDATE UR QL CFM: NEGATIVE NG/ML
MEPERIDINE UR QL CFM: NEGATIVE NG/ML
METHADONE UR QL CFM: NEGATIVE NG/ML
METHAMPHET UR QL CFM: NEGATIVE NG/ML
MORPHINE UR QL CFM: NEGATIVE NG/ML
MORPHINE UR QL CFM: NEGATIVE NG/ML
NORBUPRENORPHINE UR QL CFM: NEGATIVE NG/ML
NORDIAZEPAM UR QL CFM: NEGATIVE NG/ML
NORFENTANYL UR QL CFM: NEGATIVE NG/ML
NORHYDROCODONE UR QL CFM: NORMAL NG/ML
NORHYDROCODONE UR QL CFM: NORMAL NG/ML
NORMEPERIDINE UR QL CFM: NEGATIVE NG/ML
NOROXYCODONE UR QL CFM: NEGATIVE NG/ML
OLANZAPINE QUANTIFICATION: NEGATIVE NG/ML
OPC-3373 QUANTIFICATION: NEGATIVE
OXAZEPAM UR QL CFM: NEGATIVE NG/ML
OXYCODONE UR QL CFM: NEGATIVE NG/ML
OXYMORPHONE UR QL CFM: NEGATIVE NG/ML
OXYMORPHONE UR QL CFM: NEGATIVE NG/ML
PARA-FLUOROFENTANYL QUANTIFICATION: NORMAL NG/ML
PCP UR QL CFM: NEGATIVE NG/ML
PHENOBARB UR QL CFM: NEGATIVE NG/ML
RESULT ALL_PRESCRIBED MEDS AND SPECIAL INSTRUCTIONS: NORMAL
SECOBARBITAL UR QL CFM: NEGATIVE NG/ML
SL AMB 4-ANPP QUANTIFICATION: NORMAL NG/ML
SL AMB 5F-ADB-M7 METABOLITE QUANTIFICATION: NEGATIVE NG/ML
SL AMB 7-OH-MITRAGYNINE (KRATOM ALKALOID) QUANTIFICATION: NEGATIVE NG/ML
SL AMB AB-FUBINACA-M3 METABOLITE QUANTIFICATION: NEGATIVE NG/ML
SL AMB ACETYL FENTANYL QUANTIFICATION: NORMAL NG/ML
SL AMB ACETYL NORFENTANYL QUANTIFICATION: NORMAL NG/ML
SL AMB ACRYL FENTANYL QUANTIFICATION: NORMAL NG/ML
SL AMB CARFENTANIL QUANTIFICATION: NORMAL NG/ML
SL AMB CLOZAPINE QUANTIFICATION: NEGATIVE NG/ML
SL AMB CTHC (MARIJUANA METABOLITE) QUANTIFICATION: NEGATIVE NG/ML
SL AMB DEXTROMETHORPHAN QUANTIFICATION: NEGATIVE NG/ML
SL AMB DEXTRORPHAN (DEXTROMETHORPHAN METABOLITE) QUANT: NEGATIVE NG/ML
SL AMB DEXTRORPHAN (DEXTROMETHORPHAN METABOLITE) QUANT: NEGATIVE NG/ML
SL AMB HALOPERIDOL  QUANTIFICATION: NEGATIVE NG/ML
SL AMB HALOPERIDOL METABOLITE QUANTIFICATION: NEGATIVE NG/ML
SL AMB HYDROXYRISPERIDONE QUANTIFICATION: NEGATIVE NG/ML
SL AMB JWH018 METABOLITE QUANTIFICATION: NEGATIVE NG/ML
SL AMB JWH073 METABOLITE QUANTIFICATION: NEGATIVE NG/ML
SL AMB MDMB-FUBINACA-M1 METABOLITE QUANTIFICATION: NEGATIVE NG/ML
SL AMB METHYLONE QUANTIFICATION: NEGATIVE NG/ML
SL AMB N-DESMETHYL-TRAMADOL QUANTIFICATION: NEGATIVE NG/ML
SL AMB N-DESMETHYLCLOZAPINE QUANTIFICATION: NEGATIVE NG/ML
SL AMB NORQUETIAPINE QUANTIFICATION: NEGATIVE NG/ML
SL AMB PHENTERMINE QUANTIFICATION: NEGATIVE NG/ML
SL AMB QUETIAPINE QUANTIFICATION: NEGATIVE NG/ML
SL AMB RCS4 METABOLITE QUANTIFICATION: NEGATIVE NG/ML
SL AMB RISPERIDONE QUANTIFICATION: NEGATIVE NG/ML
SL AMB RITALINIC ACID QUANTIFICATION: NEGATIVE NG/ML
SPECIMEN DRAWN SERPL: NEGATIVE NG/ML
TAPENTADOL UR QL CFM: NEGATIVE NG/ML
TEMAZEPAM UR QL CFM: NEGATIVE NG/ML
TEMAZEPAM UR QL CFM: NEGATIVE NG/ML
TRAMADOL UR QL CFM: NEGATIVE NG/ML
URATE/CREAT 24H UR: NEGATIVE NG/ML

## 2023-03-24 ENCOUNTER — OFFICE VISIT (OUTPATIENT)
Dept: CARDIOLOGY CLINIC | Facility: CLINIC | Age: 73
End: 2023-03-24

## 2023-03-24 VITALS
HEART RATE: 62 BPM | HEIGHT: 70 IN | BODY MASS INDEX: 28.35 KG/M2 | WEIGHT: 198 LBS | SYSTOLIC BLOOD PRESSURE: 110 MMHG | DIASTOLIC BLOOD PRESSURE: 70 MMHG

## 2023-03-24 DIAGNOSIS — I48.0 PAROXYSMAL ATRIAL FIBRILLATION (HCC): Primary | ICD-10-CM

## 2023-03-24 DIAGNOSIS — E78.5 HYPERLIPIDEMIA, UNSPECIFIED HYPERLIPIDEMIA TYPE: ICD-10-CM

## 2023-03-24 DIAGNOSIS — I10 ESSENTIAL HYPERTENSION: ICD-10-CM

## 2023-03-24 NOTE — ASSESSMENT & PLAN NOTE
Maintaining sinus rhythm by exam  Continue Cardizem 60 mg twice daily, amiodarone 200 mg daily  Anticoagulation has been discontinued due to bleeding issues

## 2023-03-24 NOTE — PROGRESS NOTES
Patient ID: Alfred Shafer is a 67 y o  male  Plan:      Essential hypertension  Blood pressures well controlled      Paroxysmal atrial fibrillation (HCC)  Maintaining sinus rhythm by exam  Continue Cardizem 60 mg twice daily, amiodarone 200 mg daily  Anticoagulation has been discontinued due to bleeding issues    Hyperlipidemia  Continue Lipitor 40 mg daily       Follow up Plan/Summary Comments:  Jane Daley is doing well from a cardiac perspective  I have not recommended any medication changes today  I will see him back in 6 months for follow-up or sooner if needed  HPI: Jane Daley was seen in the office today for a routine visit  Since his last evaluation in the fall, he was hospitalized in [place] with a perforated bowel and sepsis  He was critically ill and required 6 weeks of inpatient rehab before returning to [place]  He had some issues with bleeding after surgery and developed anemia requiring transfusion  Eliquis was discontinued at that time  He was also noted to have rapid A-fib and was started on amiodarone during his hospitalization  In the recent months, Jane Daley notes that he has been doing well  He reports feeling better than he has in a long time  He denies any symptoms concerning for angina, heart failure, arrhythmia  He denies any chest pain, palpitations, lightheadedness, shortness of breath  Review of Systems   10  point ROS  was otherwise non pertinent or negative except as per HPI or as below  Gait: cane      Most recent or relevant cardiac/vascular testing:    Nuclear stress test 05/12/2022 normal LV function, no ischemia by perfusion imaging    Echocardiogram 04/06/2022  EF 75%, hyperdynamic  Mild LVH, mild diastolic dysfunction      Objective:     /70   Pulse 62   Ht 5' 10" (1 778 m)   Wt 89 8 kg (198 lb)   BMI 28 41 kg/m²     PHYSICAL EXAM:    General:  Normal appearance, no acute distress  Eyes:  Anicteric  Oral mucosa:  Moist   Neck:  No JVD   Carotid upstrokes are brisk without bruits  No masses  Chest:  Clear to auscultation   Cardiac:  No palpable PMI  Normal S1 and S2  No murmur gallop or rub  Abdomen:  Soft and nontender  No palpable organomegaly or aortic enlargement  Extremities:  No peripheral edema  Musculoskeletal:  Symmetric  Vascular:  Pedal pulses are intact  Neuro:  Grossly symmetric  Psych:  Alert and oriented x3      Allergies   Allergen Reactions   • Poison Ivy Extract Rash       Current Outpatient Medications:   •  acetaminophen (TYLENOL) 500 mg tablet, Take 2 tablets (1,000 mg total) by mouth every 8 (eight) hours, Disp: , Rfl:   •  amiodarone 200 mg tablet, , Disp: , Rfl:   •  ascorbic acid (VITAMIN C) 500 MG tablet, Take 1 tablet by mouth Every 12 hours, Disp: , Rfl:   •  atorvastatin (LIPITOR) 40 mg tablet, , Disp: , Rfl:   •  capsicum (ZOSTRIX) 0 075 % topical cream, Apply topically 3 (three) times a day, Disp: 28 3 g, Rfl: 0  •  diltiazem (CARDIZEM) 60 mg tablet, 2 (two) times a day, Disp: , Rfl:   •  docusate sodium (COLACE) 250 MG capsule, Take 1 capsule (250 mg total) by mouth daily, Disp: 60 capsule, Rfl: 6  •  famotidine (PEPCID) 20 mg tablet, Take 20 mg by mouth daily as needed, Disp: , Rfl:   •  finasteride (PROSCAR) 5 mg tablet, Take 5 mg by mouth daily, Disp: , Rfl:   •  folic acid (FOLVITE) 1 mg tablet, take 1 tablet by mouth once daily, Disp: 90 tablet, Rfl: 4  •  furosemide (LASIX) 40 mg tablet, , Disp: , Rfl:   •  HYDROcodone-acetaminophen (NORCO) 5-325 mg per tablet, Take 1 tablet by mouth 2 (two) times a day as needed for pain Max Daily Amount: 2 tablets, Disp: 60 tablet, Rfl: 0  •  nystatin (MYCOSTATIN) cream, Apply topically 2 (two) times a day, Disp: 30 g, Rfl: 0  •  omeprazole (PriLOSEC) 20 mg delayed release capsule, Take 40 mg by mouth every morning, Disp: , Rfl:   •  senna (SENOKOT) 8 6 mg, Take 1 tablet (8 6 mg total) by mouth daily at bedtime, Disp: 30 each, Rfl: 0  •  tadalafil (CIALIS) 20 MG tablet, Take one tablet by mouth one hour before sexual activity, Disp: 15 tablet, Rfl: 3  •  tamsulosin (FLOMAX) 0 4 mg, Take 1 capsule (0 4 mg total) by mouth daily with dinner, Disp: 30 capsule, Rfl: 12  •  VITAMIN D PO, Take 1,000 Units by mouth daily , Disp: , Rfl:   •  zolpidem (AMBIEN) 5 mg tablet, Take 1 tablet (5 mg total) by mouth daily at bedtime as needed for sleep, Disp: 30 tablet, Rfl: 1  •  [START ON 4/11/2023] HYDROcodone-acetaminophen (Norco) 5-325 mg per tablet, Take 1 tablet by mouth 2 (two) times a day as needed for pain Max Daily Amount: 2 tablets Do not start before April 11, 2023  (Patient not taking: Reported on 3/24/2023 Do not start before April 11, 2023 ), Disp: 60 tablet, Rfl: 0  •  naloxone (NARCAN) 4 mg/0 1 mL nasal spray, Administer 1 spray into a nostril   If breathing does not return to normal or if breathing difficulty resumes after 2-3 minutes, give another dose in the other nostril using a new spray , Disp: 1 each, Rfl: 1  Past Medical History:   Diagnosis Date   • A-fib [facility])    • Arthritis    • Bladder cancer    • Cancer ([facility] [address] )     skin melanoma;basal cell   • Chronic pain disorder     from arthritis   • Colon polyp    • Does use hearing aid     bilat   • Dry eyes, bilateral    • GERD (gastroesophageal reflux disease)    • History of kidney stones 10/2019   • History of partial knee replacement     bilat   • History of vertigo    • Hyperlipidemia    • Hypertension    • Infusion extravasation of chemotherapy vesicant 11/2021   • Kidney lesion    • Lumbar disc disorder     compression of vertebrae L4-5-6   • Muscle weakness     left hip area   • Renal mass     left   • Right ankle injury 03/05/2020    missed step of ladder    • Right ankle pain    • Shortness of breath     with activity   • Tinnitus    • Urothelial cancer     left   • Wears glasses      Past Surgical History:   Procedure Laterality Date   • COLONOSCOPY     • CYSTOSCOPY Left 04/01/2020    Procedure: CYSTOSCOPY; URETERAL CATHETER PLACEMENT;  Surgeon: Fausto Wick MD;  [facility];  Service: Urology   • CYSTOSCOPY  2020   • CYSTOSCOPY  2021   • FL CYSTOGRAM  2020   • FL RETROGRADE PYELOGRAM  2020   • HERNIA REPAIR      umbilical with mesh   • INGUINAL HERNIA REPAIR Right     with mesh   • IR PORT PLACEMENT  2020   • JOINT REPLACEMENT Bilateral     partials knee   • LUMBAR EPIDURAL INJECTION     • RI CYSTO BLADDER W/URETERAL CATHETERIZATION Bilateral 2020    Procedure: CYSTOSCOPY; RIGHT RETROGRADE PYELOGRAM WITH RIGHT URETERAL CYTOLOGY SAMPLING; LEFT URETEROSCOPY WITH RENAL PELVIS BIOPSY AND LEFT STENT PLACEMENT;  Surgeon: Fausto Wick MD;  [facility];  Service: Urology   • RI LAPAROSCOPY NEPHRECTOMY W/TOTAL URETERECTOMY Left 2020    Procedure: ROBOTIC LAPAROSCOPIC NEPHRO-URETERECTOMY;  Surgeon: Fausto Wick MD;  [facility];  Service: Urology   • SKIN CANCER EXCISION      surface melanoma   • TONSILLECTOMY     • WISDOM TOOTH EXTRACTION         CMP:   Lab Results   Component Value Date    K 3 7 2023     2023    CO2 28 2023    BUN 31 (H) 2023    CREATININE 1 39 (H) 2023    EGFR 49 2023     Lipid Profile:    Lab Results   Component Value Date    TRIG 92 2022    HDL 59 2022         Social History     Tobacco Use   Smoking Status Former   • Types: Cigarettes   • Quit date:    • Years since quittin 2   Smokeless Tobacco Never

## 2023-03-27 ENCOUNTER — APPOINTMENT (OUTPATIENT)
Dept: LAB | Facility: CLINIC | Age: 73
End: 2023-03-27

## 2023-03-27 DIAGNOSIS — I48.0 PAROXYSMAL ATRIAL FIBRILLATION (HCC): ICD-10-CM

## 2023-03-27 DIAGNOSIS — I10 HYPERTENSION, UNSPECIFIED TYPE: ICD-10-CM

## 2023-03-27 LAB
ANION GAP SERPL CALCULATED.3IONS-SCNC: 3 MMOL/L (ref 4–13)
BUN SERPL-MCNC: 27 MG/DL (ref 5–25)
CALCIUM SERPL-MCNC: 9.8 MG/DL (ref 8.3–10.1)
CHLORIDE SERPL-SCNC: 108 MMOL/L (ref 96–108)
CO2 SERPL-SCNC: 29 MMOL/L (ref 21–32)
CREAT SERPL-MCNC: 1.44 MG/DL (ref 0.6–1.3)
ERYTHROCYTE [DISTWIDTH] IN BLOOD BY AUTOMATED COUNT: 14 % (ref 11.6–15.1)
GFR SERPL CREATININE-BSD FRML MDRD: 47 ML/MIN/1.73SQ M
GLUCOSE SERPL-MCNC: 119 MG/DL (ref 65–140)
HCT VFR BLD AUTO: 40.4 % (ref 36.5–49.3)
HGB BLD-MCNC: 12.6 G/DL (ref 12–17)
MCH RBC QN AUTO: 30.7 PG (ref 26.8–34.3)
MCHC RBC AUTO-ENTMCNC: 31.2 G/DL (ref 31.4–37.4)
MCV RBC AUTO: 99 FL (ref 82–98)
PLATELET # BLD AUTO: 179 THOUSANDS/UL (ref 149–390)
PMV BLD AUTO: 10 FL (ref 8.9–12.7)
POTASSIUM SERPL-SCNC: 4.2 MMOL/L (ref 3.5–5.3)
RBC # BLD AUTO: 4.1 MILLION/UL (ref 3.88–5.62)
SODIUM SERPL-SCNC: 140 MMOL/L (ref 135–147)
WBC # BLD AUTO: 6.78 THOUSAND/UL (ref 4.31–10.16)

## 2023-04-17 ENCOUNTER — HOSPITAL ENCOUNTER (OUTPATIENT)
Dept: INFUSION CENTER | Facility: HOSPITAL | Age: 73
Discharge: HOME | End: 2023-04-17
Attending: INTERNAL MEDICINE

## 2023-04-17 DIAGNOSIS — Z45.2 ENCOUNTER FOR CARE RELATED TO PORT-A-CATH: Primary | ICD-10-CM

## 2023-04-17 DIAGNOSIS — C65.2 CANCER OF LEFT RENAL PELVIS (HCC): ICD-10-CM

## 2023-04-17 DIAGNOSIS — C79.10 METASTATIC UROTHELIAL CARCINOMA (HCC): ICD-10-CM

## 2023-04-17 LAB
ALBUMIN SERPL BCP-MCNC: 4 G/DL (ref 3.5–5)
ALP SERPL-CCNC: 75 U/L (ref 34–104)
ALT SERPL W P-5'-P-CCNC: 11 U/L (ref 7–52)
ANION GAP SERPL CALCULATED.3IONS-SCNC: 6 MMOL/L (ref 4–13)
AST SERPL W P-5'-P-CCNC: 10 U/L (ref 13–39)
BASOPHILS # BLD AUTO: 0.02 THOUSANDS/ΜL (ref 0–0.1)
BASOPHILS NFR BLD AUTO: 0 % (ref 0–1)
BILIRUB SERPL-MCNC: 1 MG/DL (ref 0.2–1)
BUN SERPL-MCNC: 21 MG/DL (ref 5–25)
CALCIUM SERPL-MCNC: 9.1 MG/DL (ref 8.4–10.2)
CHLORIDE SERPL-SCNC: 104 MMOL/L (ref 96–108)
CO2 SERPL-SCNC: 30 MMOL/L (ref 21–32)
CREAT SERPL-MCNC: 1.52 MG/DL (ref 0.6–1.3)
EOSINOPHIL # BLD AUTO: 0.11 THOUSAND/ΜL (ref 0–0.61)
EOSINOPHIL NFR BLD AUTO: 2 % (ref 0–6)
ERYTHROCYTE [DISTWIDTH] IN BLOOD BY AUTOMATED COUNT: 13.4 % (ref 11.6–15.1)
GFR SERPL CREATININE-BSD FRML MDRD: 44 ML/MIN/1.73SQ M
GLUCOSE SERPL-MCNC: 125 MG/DL (ref 65–140)
HCT VFR BLD AUTO: 39.7 % (ref 36.5–49.3)
HGB BLD-MCNC: 12.5 G/DL (ref 12–17)
IMM GRANULOCYTES # BLD AUTO: 0.02 THOUSAND/UL (ref 0–0.2)
IMM GRANULOCYTES NFR BLD AUTO: 0 % (ref 0–2)
LYMPHOCYTES # BLD AUTO: 1.78 THOUSANDS/ΜL (ref 0.6–4.47)
LYMPHOCYTES NFR BLD AUTO: 30 % (ref 14–44)
MAGNESIUM SERPL-MCNC: 1.9 MG/DL (ref 1.9–2.7)
MCH RBC QN AUTO: 31.3 PG (ref 26.8–34.3)
MCHC RBC AUTO-ENTMCNC: 31.5 G/DL (ref 31.4–37.4)
MCV RBC AUTO: 100 FL (ref 82–98)
MONOCYTES # BLD AUTO: 0.56 THOUSAND/ΜL (ref 0.17–1.22)
MONOCYTES NFR BLD AUTO: 10 % (ref 4–12)
NEUTROPHILS # BLD AUTO: 3.36 THOUSANDS/ΜL (ref 1.85–7.62)
NEUTS SEG NFR BLD AUTO: 58 % (ref 43–75)
NRBC BLD AUTO-RTO: 0 /100 WBCS
PLATELET # BLD AUTO: 184 THOUSANDS/UL (ref 149–390)
PMV BLD AUTO: 9.4 FL (ref 8.9–12.7)
POTASSIUM SERPL-SCNC: 3.7 MMOL/L (ref 3.5–5.3)
PROT SERPL-MCNC: 6.5 G/DL (ref 6.4–8.4)
RBC # BLD AUTO: 3.99 MILLION/UL (ref 3.88–5.62)
SODIUM SERPL-SCNC: 140 MMOL/L (ref 135–147)
WBC # BLD AUTO: 5.85 THOUSAND/UL (ref 4.31–10.16)

## 2023-04-17 NOTE — PROGRESS NOTES
Central labs drawn via port  Catheter maintenance per protocol without incident  Next port flush appointment scheduled in 6 weeks and AVS provided

## 2023-04-19 ENCOUNTER — HOSPITAL ENCOUNTER (OUTPATIENT)
Dept: RADIOLOGY | Facility: CLINIC | Age: 73
Discharge: HOME | End: 2023-04-19

## 2023-04-19 VITALS
SYSTOLIC BLOOD PRESSURE: 143 MMHG | TEMPERATURE: 98 F | RESPIRATION RATE: 18 BRPM | HEART RATE: 60 BPM | OXYGEN SATURATION: 97 % | DIASTOLIC BLOOD PRESSURE: 84 MMHG

## 2023-04-19 DIAGNOSIS — M47.816 LUMBAR SPONDYLOSIS: ICD-10-CM

## 2023-04-19 RX ORDER — LIDOCAINE HYDROCHLORIDE 10 MG/ML
10 INJECTION, SOLUTION EPIDURAL; INFILTRATION; INTRACAUDAL; PERINEURAL ONCE
Status: COMPLETED | OUTPATIENT
Start: 2023-04-19 | End: 2023-04-19

## 2023-04-19 RX ORDER — BUPIVACAINE HYDROCHLORIDE 5 MG/ML
30 INJECTION, SOLUTION EPIDURAL; INTRACAUDAL ONCE
Status: COMPLETED | OUTPATIENT
Start: 2023-04-19 | End: 2023-04-19

## 2023-04-19 RX ADMIN — BUPIVACAINE HYDROCHLORIDE 3 ML: 5 INJECTION, SOLUTION EPIDURAL; INTRACAUDAL; PERINEURAL at 13:32

## 2023-04-19 RX ADMIN — LIDOCAINE HYDROCHLORIDE 3 ML: 20 INJECTION, SOLUTION EPIDURAL; INFILTRATION; INTRACAUDAL; PERINEURAL at 13:32

## 2023-04-19 RX ADMIN — LIDOCAINE HYDROCHLORIDE 5 ML: 10 INJECTION, SOLUTION EPIDURAL; INFILTRATION; INTRACAUDAL; PERINEURAL at 13:32

## 2023-04-19 NOTE — DISCHARGE INSTRUCTIONS
Medial Branch Radiofrequency Ablation     WHAT YOU NEED TO KNOW:   Medial branch radiofrequency ablation (RFA) is a procedure used to treat facet joint pain in your neck, mid back, or lower back  Facet joints are found at the back of each vertebra  A needle electrode is used to send electrical currents to the nerves in your facet joint  The electrical currents create heat that damages the nerve so it cannot send pain signals  ACTIVITY  Do not drive or operate machinery today  No strenuous activity today - bending, lifting, etc   You may shower today, but do not sit in a tub of water  Resume normal activities tomorrow as tolerated  CARE OF THE INJECTION SITE  If you have soreness or pain, apply ice to the area today (20 minutes on/20 minutes off)  Starting tomorrow, you may use warm, moist heat or ice if needed  Notify [facility] if you have any of the following: redness, drainage, swelling, or fever above 100°F     SPECIAL INSTRUCTIONS  Our office will call you tomorrow for a progress report and make an appointment for a follow up visit in 4 weeks  If you feel a sunburn-like sensation in the area of your procedure, call our office  MEDICATIONS  Continue to take all routine medications  Our office may have instructed you to hold some medications  As no general anesthesia was used in today's procedure, you should not experience any side effects related to anesthesia  If you have a problem specifically related to your procedure, please call our office at [phone]  Problems not related to your procedure should be directed to your primary care physician

## 2023-04-19 NOTE — H&P
History of Present Illness: The patient is a 75 y o  male who presents with complaints of low back pain      Past Medical History:   Diagnosis Date   • A-fib [facility])    • Arthritis    • Bladder cancer    • Cancer ([address] )     skin melanoma;basal cell   • Chronic pain disorder     from arthritis   • Colon polyp    • Does use hearing aid     bilat   • Dry eyes, bilateral    • GERD (gastroesophageal reflux disease)    • History of kidney stones 10/2019   • History of partial knee replacement     bilat   • History of vertigo    • Hyperlipidemia    • Hypertension    • Infusion extravasation of chemotherapy vesicant 11/2021   • Kidney lesion    • Lumbar disc disorder     compression of vertebrae L4-5-6   • Muscle weakness     left hip area   • Renal mass     left   • Right ankle injury 03/05/2020    missed step of ladder    • Right ankle pain    • Shortness of breath     with activity   • Tinnitus    • Urothelial cancer     left   • Wears glasses        Past Surgical History:   Procedure Laterality Date   • COLONOSCOPY     • CYSTOSCOPY Left 04/01/2020    Procedure: CYSTOSCOPY; URETERAL CATHETER PLACEMENT;  Surgeon: Mara Avilez MD;  Location[facility];  Service: Urology   • CYSTOSCOPY  05/11/2020   • CYSTOSCOPY  06/04/2021   • FL CYSTOGRAM  04/13/2020   • FL RETROGRADE PYELOGRAM  01/17/2020   • HERNIA REPAIR  23/97/1320    umbilical with mesh   • INGUINAL HERNIA REPAIR Right     with mesh   • IR PORT PLACEMENT  04/30/2020   • JOINT REPLACEMENT Bilateral     partials knee   • LUMBAR EPIDURAL INJECTION     • NJ CYSTO BLADDER W/URETERAL CATHETERIZATION Bilateral 01/17/2020    Procedure: CYSTOSCOPY; RIGHT RETROGRADE PYELOGRAM WITH RIGHT URETERAL CYTOLOGY SAMPLING; LEFT URETEROSCOPY WITH RENAL PELVIS BIOPSY AND LEFT STENT PLACEMENT;  Surgeon: Mara Avilez MD;  [facility];  Service: Urology   • NJ LAPAROSCOPY NEPHRECTOMY W/TOTAL URETERECTOMY Left 04/01/2020    Procedure: ROBOTIC LAPAROSCOPIC NEPHRO-URETERECTOMY;  Surgeon: Lea Bruce MD;  Location[facility];  Service: Urology   • SKIN CANCER EXCISION      surface melanoma   • TONSILLECTOMY     • WISDOM TOOTH EXTRACTION           Current Outpatient Medications:   •  acetaminophen (TYLENOL) 500 mg tablet, Take 2 tablets (1,000 mg total) by mouth every 8 (eight) hours, Disp: , Rfl:   •  amiodarone 200 mg tablet, , Disp: , Rfl:   •  ascorbic acid (VITAMIN C) 500 MG tablet, Take 1 tablet by mouth Every 12 hours, Disp: , Rfl:   •  atorvastatin (LIPITOR) 40 mg tablet, , Disp: , Rfl:   •  capsicum (ZOSTRIX) 0 075 % topical cream, Apply topically 3 (three) times a day, Disp: 28 3 g, Rfl: 0  •  diltiazem (CARDIZEM) 60 mg tablet, 2 (two) times a day, Disp: , Rfl:   •  docusate sodium (COLACE) 250 MG capsule, Take 1 capsule (250 mg total) by mouth daily, Disp: 60 capsule, Rfl: 6  •  famotidine (PEPCID) 20 mg tablet, Take 20 mg by mouth daily as needed, Disp: , Rfl:   •  finasteride (PROSCAR) 5 mg tablet, Take 5 mg by mouth daily, Disp: , Rfl:   •  folic acid (FOLVITE) 1 mg tablet, take 1 tablet by mouth once daily, Disp: 90 tablet, Rfl: 4  •  furosemide (LASIX) 40 mg tablet, , Disp: , Rfl:   •  HYDROcodone-acetaminophen (NORCO) 5-325 mg per tablet, Take 1 tablet by mouth 2 (two) times a day as needed for pain Max Daily Amount: 2 tablets, Disp: 60 tablet, Rfl: 0  •  HYDROcodone-acetaminophen (Norco) 5-325 mg per tablet, Take 1 tablet by mouth 2 (two) times a day as needed for pain Max Daily Amount: 2 tablets Do not start before April 11, 2023  (Patient not taking: Reported on 3/24/2023 Do not start before April 11, 2023 ), Disp: 60 tablet, Rfl: 0  •  naloxone (NARCAN) 4 mg/0 1 mL nasal spray, Administer 1 spray into a nostril   If breathing does not return to normal or if breathing difficulty resumes after 2-3 minutes, give another dose in the other nostril using a new spray , Disp: 1 each, Rfl: 1  •  nystatin (MYCOSTATIN) cream, Apply topically 2 (two) times a day, Disp: 30 g, Rfl: 0  •  omeprazole (PriLOSEC) 20 mg delayed release capsule, Take 40 mg by mouth every morning, Disp: , Rfl:   •  senna (SENOKOT) 8 6 mg, Take 1 tablet (8 6 mg total) by mouth daily at bedtime, Disp: 30 each, Rfl: 0  •  tadalafil (CIALIS) 20 MG tablet, Take one tablet by mouth one hour before sexual activity, Disp: 15 tablet, Rfl: 3  •  tamsulosin (FLOMAX) 0 4 mg, Take 1 capsule (0 4 mg total) by mouth daily with dinner, Disp: 30 capsule, Rfl: 12  •  VITAMIN D PO, Take 1,000 Units by mouth daily , Disp: , Rfl:   •  zolpidem (AMBIEN) 5 mg tablet, Take 1 tablet (5 mg total) by mouth daily at bedtime as needed for sleep, Disp: 30 tablet, Rfl: 1    Current Facility-Administered Medications:   •  bupivacaine (PF) (MARCAINE) 0 5 % injection 30 mL, 30 mL, Injection, Once, Milton Obrien DO  •  lidocaine (PF) (XYLOCAINE-MPF) 1 % injection 10 mL, 10 mL, Other, Once, Milton Obrien DO  •  lidocaine (PF) (XYLOCAINE-MPF) 2 % injection 4 mL, 4 mL, Other, Once, Ferny Garcia,     Facility-Administered Medications Ordered in Other Encounters:   •  alteplase (CATHFLO) injection 2 mg, 2 mg, Intracatheter, Q2H PRN, Ghulam Nina MD    Allergies   Allergen Reactions   • Poison Ivy Extract Rash       Physical Exam:   Vitals:    04/19/23 1308   BP: 137/87   Pulse: 81   Resp: 18   Temp: 98 °F (36 7 °C)   SpO2: 97%     General: Awake, Alert, Oriented x 3, Mood and affect appropriate  Respiratory: Respirations even and unlabored  Cardiovascular: Peripheral pulses intact; no edema  Musculoskeletal Exam: Right lumbar paraspinals tender to palpation    ASA Score: 3    Patient/Chart Verification  Patient ID Verified: Verbal  ID Band Applied: No  Consents Confirmed: Procedural  H&P( within 30 days) Verified: To be obtained in the Pre-Procedure area  Interval H&P(within 24 hr) Complete (required for Outpatients and Surgery Admit only): To be obtained in the Pre-Procedure area  Allergies Reviewed:  Yes  Anticoag/NSAID held?: No  Currently on antibiotics?: No  Pre-op Lab/Test Results Available: N/A    Assessment:   1   Lumbar spondylosis        Plan: Right L3-5 RFA

## 2023-04-26 ENCOUNTER — OFFICE VISIT (OUTPATIENT)
Dept: HEMATOLOGY ONCOLOGY | Facility: CLINIC | Age: 73
End: 2023-04-26

## 2023-04-26 VITALS
RESPIRATION RATE: 18 BRPM | HEART RATE: 59 BPM | HEIGHT: 70 IN | OXYGEN SATURATION: 95 % | WEIGHT: 206.5 LBS | SYSTOLIC BLOOD PRESSURE: 122 MMHG | BODY MASS INDEX: 29.56 KG/M2 | DIASTOLIC BLOOD PRESSURE: 82 MMHG

## 2023-04-26 DIAGNOSIS — R79.89 ELEVATED SERUM CREATININE: ICD-10-CM

## 2023-04-26 DIAGNOSIS — T45.1X5A CHEMOTHERAPY-INDUCED NEUROPATHY (HCC): ICD-10-CM

## 2023-04-26 DIAGNOSIS — G62.0 CHEMOTHERAPY-INDUCED NEUROPATHY (HCC): ICD-10-CM

## 2023-04-26 DIAGNOSIS — E53.8 VITAMIN B 12 DEFICIENCY: ICD-10-CM

## 2023-04-26 DIAGNOSIS — G47.09 OTHER INSOMNIA: ICD-10-CM

## 2023-04-26 DIAGNOSIS — C79.10 METASTATIC UROTHELIAL CARCINOMA (HCC): Primary | ICD-10-CM

## 2023-04-26 DIAGNOSIS — C67.8 MALIGNANT NEOPLASM OF OVERLAPPING SITES OF BLADDER (HCC): ICD-10-CM

## 2023-04-26 RX ORDER — ZOLPIDEM TARTRATE 5 MG/1
5 TABLET ORAL
Qty: 30 TABLET | Refills: 1 | Status: SHIPPED | OUTPATIENT
Start: 2023-04-26

## 2023-04-26 NOTE — PROGRESS NOTES
Hematology/Oncology Outpatient Follow-up  Darin Breen 75 y o  male 1950 [id number]    Date:  4/26/2023      Assessment and Plan:  1  Metastatic urothelial carcinoma ([facility] [address] )  Patient's palliative chemotherapy was placed on hold in the past after he developed significant neuropathy and declining performance status  His last treatment of Padcev was administered 9/2/2022  Patient continues to do well and feels well off treatment  Has no new symptoms or complaints today; gained 6 pounds since her last office visit  He walked into clinic today is no longer wheelchair/chair bound  He did have a repeat PET CT scan done recently 4/18 which showed increasing FDG in his lesion along the posterior splenic border which could be metastasis versus inflammatory  Also has small hypermetabolic focus to the mid right clavicular region which needs to be monitored closely for developing malignant process  Mild increase FDG activity at the right shoulder  Patient is not interested in proceeding with any systemic treatment at this time since he is feeling well  Decision was made to continue with close surveillance  If he is noted to have obvious progression on future imaging studies would be open to discussion regarding resuming systemic treatment  Request he follow-up again with repeat labs and repeat PET CT imaging in 3 months or definitely sooner should there be a need  - CBC and differential; Future  - Comprehensive metabolic panel; Future  - Magnesium; Future  - Vitamin B12; Future  - NM PET CT skull base to mid thigh; Future    2  Elevated serum creatinine  Patient was noted to have slightly worsening kidney dysfunction which may be due to dehydration/not drinking enough  Encouraged him to continue to hydrate himself well  He is taking Lasix 40 mg daily for his chronic lower extremity edema  Recommended that he trial cutting the dose in half to 20 mg daily      We will repeat his BMP for close monitoring when he goes for his next port flush  - Basic metabolic panel; Future    3  Vitamin B 12 deficiency  Continue supplement  - Vitamin B12; Future    4  Other insomnia  - zolpidem (AMBIEN) 5 mg tablet; Take 1 tablet (5 mg total) by mouth daily at bedtime as needed for sleep  Dispense: 30 tablet; Refill: 1    5  Chemotherapy-induced neuropathy ([address] )  Patient continues to have stable chemotherapy-induced neuropathy  Is not currently on any medication for this  HPI:  Patient presents today for a follow-up visit accompanied by his wife  He reports that he has been doing much better and feels much better  Gained about 6 pounds since his last office visit  Actually walked in the clinic today with a cane  He mentions that he will be meeting with his surgeon at [facility] in the near future as he did develop ventral hernia postsurgery  He is interested in getting his hernia corrected if feasible  He was having some abdominal discomfort to take especially belching and grumbling so he followed up with GI  He had upper endoscopy done recently 3/27/2023 which showed a hiatal hernia  Most recent laboratory studies from 4/17/2023 showed normal white cells and platelets, he has been anemic H&H 12 5/39 5, MCV slightly higher than average 100  Kidney function is slightly worsening from his baseline creatinine 1 52, GFR 77 remaining metabolic panel normal   Magnesium normal     He did have repeat PET/CT 4/18/23 which showed:   IMPRESSION:  1  Mildly increased nodular FDG uptake in the right shoulder for which recurrent metastasis is not excluded  Continued PET CT follow-up recommended  2   Increased FDG uptake in one of the hypermetabolic lesions along the posterior splenic border  This may represent a metastasis versus inflammatory process  The 2nd hypermetabolic focus along the posterior splenic border however has diminished    3   Additional hypermetabolic foci seen on the prior PET/CT in the abdomen appeared diminished as well  4   Small hypermetabolic focus involving either the medial right clavicular cortex or adjacent soft tissue  Attention on follow-up to exclude metastasis  5   Improving right posterior lung base infiltrate/atelectasis    Oncology History Overview Note   Patient has a history of hypertension, hyperlipidemia, chronic lower back pain  He had an MRI of the lower spine for further evaluation of his chronic lower back pain  The MRI on 12/02/2019 revealed Multiple left renal masses to include a left lower pole cyst and a rounded structure in the left upper pole which may also represent cyst   Left upper pole renal masses approximately 3 cm in greatest linear dimension  A CT with renal protocol was done on 12/12/2019 which also showed the infiltrating lesion in the upper pole of the left kidney measuring about the 3 5 cm in greatest dimension without any hint of retroperitoneal lymphadenopathy  A CT scan of the abdomen pelvis on 01/14/2020 showed the same findings with the mild hydronephrosis on the left  The urine cytology on 01/03/2020 showed high-grade urothelial carcinoma  A cystoscopy was then done, a biopsy was taken from the left renal pelvic region which showed high-grade urothelial carcinoma without evidence of lamina propria invasion  The detrusor muscle/muscularis propria were not present for evaluation  The recommendation was to pursue left robotic assisted laparoscopic nephroureterectomy with bladder cuff excision which was done on 04/01/2020  The final pathology revealed;  - Invasive high grade urothelial carcinoma arising in renal pelvis  - Bladder cuff margin is negative for carcinoma and no evidence of high grade dysplasia  - Ureters with no significant pathologic abnormality  - Two benign simple cysts  - Adrenal gland is negative for malignancy  - One lymph node, negative for malignancy (0/1)    The tumor size was 4 5 cm with invasion beyond the muscularis into the periurethral fat or peripelvic fat or the renal parenchyma  The margins were uninvolved by carcinoma or carcinoma in situ  The final pathology was pT3 pN0  Patient barely tolerated 1 cycle of adjuvant chemotherapy with split dose cisplatin and gemcitabine with a lot of side effects  The treatment had to be discontinued due to significant worsening renal dysfunction 6/2020  Patient started to complain about significant abdominal/left inguinal pain around August/September 2020  Unfortunately repeat imaging revealed recurrent/metastatic disease  9/4/20 CT C/A/P- Status post left nephrectomy for resection of urothelial neoplasm  Lymphadenopathy in the left nephrectomy bed has increased since the prior examination, concerning for metastatic disease  Ill-defined hyperattenuating masslike focus in the left rectus muscle, not identified on the prior examination  This is suspicious for a metastatic lesion  A rectus hematoma is also considered  9/23/20 PET scan- 1  Multiple FDG avid lymph nodes in the retrocrural region, retroperitoneum and the left renal bed compatible with metastasis  These have progressed from recent CT  2   FDG avid mass involving the left rectus abdominal musculature compatible with metastasis which is larger from recent CT  3  Several small lymph nodes adjacent to the mid to distal esophagus with FDG uptake suspicious for metastasis  Small focus of FDG uptake also suggested in the right hilar region, metastasis is not excluded  This could be reassessed on follow-up  4   Subtle focus of FDG uptake at the T2 level on the left, nonspecific, could be related to early degenerative changes, developing metastasis is not entirely excluded  This could be reassessed on follow-up  5  Prostate is mildly prominent with heterogeneous FDG uptake  This could be inflammatory  Correlate for prostatitis       He completed palliative radiation to the left abdomen 12/3/20     His PET scan from 08/16/2021 showed progression of his disease with hypermetabolic soft tissue lesions at the level of the right shoulder and right axilla with interval progression of the retroperitoneal and mesenteric hypermetabolic metastasis  He did have the new lesion to his right upper back/shoulder which was causing significant localized pain  This excised by his surgeon 08/09/2021  Pathology confirmed metastatic high-grade carcinoma consistent with his no primary urothelial carcinoma  He received palliative radiation to his right shoulder/axilla region  Urothelial cancer ([facility] [address] )   1/3/2020 Biopsy    Final Diagnosis    A  Urine, Clean Catch, Thin Prep:  High grade urothelial carcinoma (HGUC) - see comment  1/3/2020 Initial Diagnosis    Urothelial cancer ([facility] [address] )  T3 N0 (stage IIIA)     1/17/2020 Biopsy    Final Diagnosis    A  Ureter, Right, left renal pelvis biopsy  -Fragments of high grade urothelial carcinoma   -No evidence of lamina propria invasion   -Detrusor muscle/ muscularis propria is not present for evaluation  B  Urinary Bladder, bladder biopsy:  -Fragments of low grade papillary lesion  See note  -No evidence of invasion seen  -Unremarkable fragment of detrusor muscle seen  4/1/2020 Surgery    left robotic assisted laparoscopic nephroureterectomy with bladder cuff excision     Final Diagnosis    A  Left kidney, ureter, and bladder cuff, nephroureterectomy:  - Invasive high grade urothelial carcinoma arising in renal pelvis  - Bladder cuff margin is negative for carcinoma and no evidence of high grade dysplasia  - Ureters with no significant pathologic abnormality  - Two benign simple cysts  - Adrenal gland is negative for malignancy  - One lymph node, negative for malignancy (0/1)          5/14/2020 - 6/3/2020 Chemotherapy    CISplatin (PLATINOL) split dose 35 mg/m2 = 75 3 mg (50 % of original dose 70 mg/m2), Intravenous,   Administration: 75 3 mg (5/14/2020), 75 3 mg (5/21/2020)  gemcitabine (GEMZAR) 2,200 mg in sodium chloride 0 9 % 250 mL infusion, 2,150 2 mg (80 % of original dose 1,250 mg/m2), Intravenous,   Administration: 2,200 mg (5/14/2020), 2,200 mg (5/21/2020)    (only completed 1 cycle- d/c d/t adverse effects and worsening renal dysfunction)     10/9/2020 - 9/9/2021 Chemotherapy    pembrolizumab (KEYTRUDA) IVPB, 200 mg, Intravenous, Once, 16 of 20 cycles  Administration: 200 mg (10/9/2020), 200 mg (10/30/2020), 200 mg (11/20/2020), 200 mg (12/11/2020), 200 mg (12/31/2020), 200 mg (1/22/2021), 200 mg (2/12/2021), 200 mg (3/5/2021), 200 mg (3/26/2021), 200 mg (4/16/2021), 200 mg (5/7/2021), 200 mg (5/28/2021), 200 mg (6/18/2021), 200 mg (7/9/2021), 200 mg (7/30/2021), 200 mg (8/20/2021)     11/19/2020 - 12/3/2020 Radiation    Treatment:  Course: C1    Plan ID Energy Fractions Dose per Fraction (cGy) Dose Correction (cGy) Total Dose Delivered (cGy) Elapsed Days   L Abdomen 6X 10 / 10 300 0 3,000 14        9/10/2021 -  Chemotherapy    enfortumab vedotin-ejfv (PADCEV) IVPB, 1 25 mg/kg = 114 mg, Intravenous, Once, 11 of 16 cycles  Dose modification: 1 mg/kg (original dose 1 25 mg/kg, Cycle 7, Reason: Other (Must fill in a comment), Comment: dose reduction due to side effects ), 1 25 mg/kg (original dose 1 25 mg/kg, Cycle 7, Reason: Other (Must fill in a comment), Comment: patient wants full dose ), 1 mg/kg (original dose 1 25 mg/kg, Cycle 7, Reason: Neuropathy), 0 75 mg/kg (original dose 1 25 mg/kg, Cycle 11, Reason: Neuropathy)  Administration: 114 mg (9/10/2021), 114 mg (9/17/2021), 110 mg (10/8/2021), 110 mg (9/24/2021), 110 mg (10/15/2021), 110 mg (11/12/2021), 110 mg (10/22/2021), 110 mg (11/19/2021), 110 mg (12/10/2021), 110 mg (11/26/2021), 110 mg (12/17/2021), 110 mg (1/7/2022), 110 mg (12/23/2021), 110 mg (1/14/2022), 90 mg (4/8/2022), 90 mg (4/15/2022), 90 mg (4/22/2022), 110 mg (1/21/2022), 110 mg (2/18/2022), 110 mg (2/25/2022), 90 mg (5/13/2022), 90 mg (5/20/2022), 90 mg (5/27/2022), 90 mg (3/4/2022), 90 mg (6/10/2022), 90 mg (6/17/2022), 90 mg (6/24/2022), 90 mg (7/8/2022), 90 mg (7/15/2022), 90 mg (7/22/2022), 68 mg (8/19/2022), 70 mg (9/2/2022)     Cancer of left renal pelvis ([facility] [address] )   4/23/2020 Initial Diagnosis    Cancer of left renal pelvis ([facility] [address] )     10/9/2020 - 9/9/2021 Chemotherapy    pembrolizumab (KEYTRUDA) IVPB, 200 mg, Intravenous, Once, 16 of 20 cycles  Administration: 200 mg (10/9/2020), 200 mg (10/30/2020), 200 mg (11/20/2020), 200 mg (12/11/2020), 200 mg (12/31/2020), 200 mg (1/22/2021), 200 mg (2/12/2021), 200 mg (3/5/2021), 200 mg (3/26/2021), 200 mg (4/16/2021), 200 mg (5/7/2021), 200 mg (5/28/2021), 200 mg (6/18/2021), 200 mg (7/9/2021), 200 mg (7/30/2021), 200 mg (8/20/2021)     9/10/2021 -  Chemotherapy    enfortumab vedotin-ejfv (PADCEV) IVPB, 1 25 mg/kg = 114 mg, Intravenous, Once, 11 of 16 cycles  Dose modification: 1 mg/kg (original dose 1 25 mg/kg, Cycle 7, Reason: Other (Must fill in a comment), Comment: dose reduction due to side effects ), 1 25 mg/kg (original dose 1 25 mg/kg, Cycle 7, Reason: Other (Must fill in a comment), Comment: patient wants full dose ), 1 mg/kg (original dose 1 25 mg/kg, Cycle 7, Reason: Neuropathy), 0 75 mg/kg (original dose 1 25 mg/kg, Cycle 11, Reason: Neuropathy)  Administration: 114 mg (9/10/2021), 114 mg (9/17/2021), 110 mg (10/8/2021), 110 mg (9/24/2021), 110 mg (10/15/2021), 110 mg (11/12/2021), 110 mg (10/22/2021), 110 mg (11/19/2021), 110 mg (12/10/2021), 110 mg (11/26/2021), 110 mg (12/17/2021), 110 mg (1/7/2022), 110 mg (12/23/2021), 110 mg (1/14/2022), 90 mg (4/8/2022), 90 mg (4/15/2022), 90 mg (4/22/2022), 110 mg (1/21/2022), 110 mg (2/18/2022), 110 mg (2/25/2022), 90 mg (5/13/2022), 90 mg (5/20/2022), 90 mg (5/27/2022), 90 mg (3/4/2022), 90 mg (6/10/2022), 90 mg (6/17/2022), 90 mg (6/24/2022), 90 mg (7/8/2022), 90 mg (7/15/2022), 90 mg (7/22/2022), 68 mg (8/19/2022), 70 mg (9/2/2022)      Surgery       Metastatic urothelial carcinoma ([facility] [address] )   8/9/2021 Initial Diagnosis    Metastatic urothelial carcinoma ([facility] [address] )     9/8/2021 - 9/21/2021 Radiation    Treatment:  Course: C2    Plan ID Energy Fractions Dose per Fraction (cGy) Dose Correction (cGy) Total Dose Delivered (cGy) Elapsed Days   R Axil_Shl # 6X 10 / 10 300 0 3,000 13      Treatment dates:  C2: 9/8/2021 - 9/21/2021     9/10/2021 -  Chemotherapy    enfortumab vedotin-ejfv (PADCEV) IVPB, 1 25 mg/kg = 114 mg, Intravenous, Once, 11 of 16 cycles  Dose modification: 1 mg/kg (original dose 1 25 mg/kg, Cycle 7, Reason: Other (Must fill in a comment), Comment: dose reduction due to side effects ), 1 25 mg/kg (original dose 1 25 mg/kg, Cycle 7, Reason: Other (Must fill in a comment), Comment: patient wants full dose ), 1 mg/kg (original dose 1 25 mg/kg, Cycle 7, Reason: Neuropathy), 0 75 mg/kg (original dose 1 25 mg/kg, Cycle 11, Reason: Neuropathy)  Administration: 114 mg (9/10/2021), 114 mg (9/17/2021), 110 mg (10/8/2021), 110 mg (9/24/2021), 110 mg (10/15/2021), 110 mg (11/12/2021), 110 mg (10/22/2021), 110 mg (11/19/2021), 110 mg (12/10/2021), 110 mg (11/26/2021), 110 mg (12/17/2021), 110 mg (1/7/2022), 110 mg (12/23/2021), 110 mg (1/14/2022), 90 mg (4/8/2022), 90 mg (4/15/2022), 90 mg (4/22/2022), 110 mg (1/21/2022), 110 mg (2/18/2022), 110 mg (2/25/2022), 90 mg (5/13/2022), 90 mg (5/20/2022), 90 mg (5/27/2022), 90 mg (3/4/2022), 90 mg (6/10/2022), 90 mg (6/17/2022), 90 mg (6/24/2022), 90 mg (7/8/2022), 90 mg (7/15/2022), 90 mg (7/22/2022), 68 mg (8/19/2022), 70 mg (9/2/2022)         Interval history:    ROS: Review of Systems   Constitutional: Negative for activity change, appetite change, chills, fatigue, fever and unexpected weight change  HENT: Positive for hearing loss  Negative for congestion, mouth sores, nosebleeds, sore throat and trouble swallowing  Eyes: Negative      Respiratory: Negative for cough, chest tightness and shortness of breath  Cardiovascular: Positive for leg swelling  Negative for chest pain and palpitations  Gastrointestinal: Positive for constipation  Negative for abdominal distention, abdominal pain, blood in stool, diarrhea, nausea and vomiting  Genitourinary: Negative for difficulty urinating, dysuria, frequency, hematuria and urgency  Musculoskeletal: Positive for back pain, gait problem and myalgias  Negative for arthralgias and joint swelling  Skin: Positive for color change  Negative for pallor and rash  Neurological: Positive for dizziness and numbness (no change)  Negative for light-headedness and headaches  Hematological: Negative for adenopathy  Does not bruise/bleed easily  Psychiatric/Behavioral: Positive for sleep disturbance  Negative for dysphoric mood  The patient is not nervous/anxious          Past Medical History:   Diagnosis Date   • A-fib [facility])    • Arthritis    • Bladder cancer    • Cancer ([facility] [address] )     skin melanoma;basal cell   • Chronic pain disorder     from arthritis   • Colon polyp    • Does use hearing aid     bilat   • Dry eyes, bilateral    • GERD (gastroesophageal reflux disease)    • History of kidney stones 10/2019   • History of partial knee replacement     bilat   • History of vertigo    • Hyperlipidemia    • Hypertension    • Infusion extravasation of chemotherapy vesicant 11/2021   • Kidney lesion    • Lumbar disc disorder     compression of vertebrae L4-5-6   • Muscle weakness     left hip area   • Renal mass     left   • Right ankle injury 03/05/2020    missed step of ladder    • Right ankle pain    • Shortness of breath     with activity   • Tinnitus    • Urothelial cancer     left   • Wears glasses        Past Surgical History:   Procedure Laterality Date   • COLONOSCOPY     • CYSTOSCOPY Left 04/01/2020    Procedure: CYSTOSCOPY; URETERAL CATHETER PLACEMENT;  Surgeon: Jessica Jimenez MD;  Location[facility];  Service: Urology   • CYSTOSCOPY  05/11/2020 • CYSTOSCOPY  2021   • FL CYSTOGRAM  2020   • FL RETROGRADE PYELOGRAM  2020   • HERNIA REPAIR      umbilical with mesh   • INGUINAL HERNIA REPAIR Right     with mesh   • IR PORT PLACEMENT  2020   • JOINT REPLACEMENT Bilateral     partials knee   • LUMBAR EPIDURAL INJECTION     • RI CYSTO BLADDER W/URETERAL CATHETERIZATION Bilateral 2020    Procedure: CYSTOSCOPY; RIGHT RETROGRADE PYELOGRAM WITH RIGHT URETERAL CYTOLOGY SAMPLING; LEFT URETEROSCOPY WITH RENAL PELVIS BIOPSY AND LEFT STENT PLACEMENT;  Surgeon: Lea Bruce MD;  [facility];  Service: Urology   • RI LAPAROSCOPY NEPHRECTOMY W/TOTAL URETERECTOMY Left 2020    Procedure: ROBOTIC LAPAROSCOPIC NEPHRO-URETERECTOMY;  Surgeon: Lea Bruce MD;  Location[facility];  Service: Urology   • SKIN CANCER EXCISION      surface melanoma   • TONSILLECTOMY     • WISDOM TOOTH EXTRACTION         Social History     Socioeconomic History   • Marital status: /Civil Union     Spouse name: None   • Number of children: None   • Years of education: None   • Highest education level: None   Occupational History   • None   Tobacco Use   • Smoking status: Former     Types: Cigarettes     Quit date:      Years since quittin 3   • Smokeless tobacco: Never   Vaping Use   • Vaping Use: Never used   Substance and Sexual Activity   • Alcohol use:  Yes     Alcohol/week: 17 0 standard drinks     Types: 7 Glasses of wine, 10 Cans of beer per week     Comment: socially, rarely   • Drug use: Never   • Sexual activity: Not Currently   Other Topics Concern   • None   Social History Narrative    Daily caffeine use - 2 cups coffee      Social Determinants of Health     Financial Resource Strain: Not on file   Food Insecurity: Not on file   Transportation Needs: Not on file   Physical Activity: Not on file   Stress: Not on file   Social Connections: Not on file   Intimate Partner Violence: Not on file   Housing Stability: Not on file       Family History   Problem Relation Age of Onset   • Liver cancer Father        Allergies   Allergen Reactions   • Poison Ivy Extract Rash         Current Outpatient Medications:   •  acetaminophen (TYLENOL) 500 mg tablet, Take 2 tablets (1,000 mg total) by mouth every 8 (eight) hours, Disp: , Rfl:   •  amiodarone 200 mg tablet, , Disp: , Rfl:   •  atorvastatin (LIPITOR) 40 mg tablet, , Disp: , Rfl:   •  capsicum (ZOSTRIX) 0 075 % topical cream, Apply topically 3 (three) times a day, Disp: 28 3 g, Rfl: 0  •  diltiazem (CARDIZEM) 60 mg tablet, 2 (two) times a day, Disp: , Rfl:   •  docusate sodium (COLACE) 250 MG capsule, Take 1 capsule (250 mg total) by mouth daily, Disp: 60 capsule, Rfl: 6  •  famotidine (PEPCID) 20 mg tablet, Take 20 mg by mouth daily as needed, Disp: , Rfl:   •  finasteride (PROSCAR) 5 mg tablet, Take 5 mg by mouth daily, Disp: , Rfl:   •  folic acid (FOLVITE) 1 mg tablet, take 1 tablet by mouth once daily, Disp: 90 tablet, Rfl: 4  •  furosemide (LASIX) 40 mg tablet, , Disp: , Rfl:   •  HYDROcodone-acetaminophen (NORCO) 5-325 mg per tablet, Take 1 tablet by mouth 2 (two) times a day as needed for pain Max Daily Amount: 2 tablets, Disp: 60 tablet, Rfl: 0  •  naloxone (NARCAN) 4 mg/0 1 mL nasal spray, Administer 1 spray into a nostril   If breathing does not return to normal or if breathing difficulty resumes after 2-3 minutes, give another dose in the other nostril using a new spray , Disp: 1 each, Rfl: 1  •  nystatin (MYCOSTATIN) cream, Apply topically 2 (two) times a day, Disp: 30 g, Rfl: 0  •  omeprazole (PriLOSEC) 20 mg delayed release capsule, Take 40 mg by mouth every morning, Disp: , Rfl:   •  senna (SENOKOT) 8 6 mg, Take 1 tablet (8 6 mg total) by mouth daily at bedtime, Disp: 30 each, Rfl: 0  •  tadalafil (CIALIS) 20 MG tablet, Take one tablet by mouth one hour before sexual activity, Disp: 15 tablet, Rfl: 3  •  tamsulosin (FLOMAX) 0 4 mg, Take 1 capsule (0 4 mg "total) by mouth daily with dinner, Disp: 30 capsule, Rfl: 12  •  VITAMIN D PO, Take 1,000 Units by mouth daily , Disp: , Rfl:   •  zolpidem (AMBIEN) 5 mg tablet, Take 1 tablet (5 mg total) by mouth daily at bedtime as needed for sleep, Disp: 30 tablet, Rfl: 1  •  ascorbic acid (VITAMIN C) 500 MG tablet, Take 1 tablet by mouth Every 12 hours, Disp: , Rfl:   •  HYDROcodone-acetaminophen (Norco) 5-325 mg per tablet, Take 1 tablet by mouth 2 (two) times a day as needed for pain Max Daily Amount: 2 tablets Do not start before April 11, 2023  (Patient not taking: Reported on 4/26/2023), Disp: 60 tablet, Rfl: 0      Physical Exam:  /82   Pulse 59   Resp 18   Ht 5' 10\" (1 778 m)   Wt 93 7 kg (206 lb 8 oz)   SpO2 95%   BMI 29 63 kg/m²     Physical Exam  Vitals reviewed  Constitutional:       General: He is not in acute distress  Appearance: He is well-developed  He is not diaphoretic  HENT:      Head: Normocephalic and atraumatic  Eyes:      General: Lids are normal  No scleral icterus  Conjunctiva/sclera: Conjunctivae normal       Pupils: Pupils are equal, round, and reactive to light  Neck:      Thyroid: No thyromegaly  Cardiovascular:      Rate and Rhythm: Normal rate and regular rhythm  Heart sounds: Normal heart sounds  No murmur heard  Pulmonary:      Effort: Pulmonary effort is normal  No respiratory distress  Breath sounds: Normal breath sounds  Abdominal:      General: There is no distension  Palpations: Abdomen is soft  There is no hepatomegaly or splenomegaly  Tenderness: There is no abdominal tenderness  Hernia: A hernia is present  Musculoskeletal:      Cervical back: Normal range of motion and neck supple  Right lower leg: No edema  Left lower leg: No edema  Lymphadenopathy:      Cervical: No cervical adenopathy  Upper Body:      Right upper body: No axillary adenopathy  Left upper body: No axillary adenopathy     Skin:     " General: Skin is warm and dry  Findings: No erythema or rash  Neurological:      General: No focal deficit present  Mental Status: He is alert and oriented to person, place, and time  Psychiatric:         Mood and Affect: Mood and affect normal          Behavior: Behavior normal  Behavior is cooperative  Thought Content: Thought content normal          Judgment: Judgment normal            Labs:  Lab Results   Component Value Date    WBC 5 85 04/17/2023    HGB 12 5 04/17/2023    HCT 39 7 04/17/2023     (H) 04/17/2023     04/17/2023       Patient voiced understanding and agreement in the above discussion  Aware to contact our office with questions/symptoms in the interim  This note has been generated by voice recognition software system  Therefore, there may be spelling, grammar, and or syntax errors  Please contact if questions arise

## 2023-05-23 ENCOUNTER — TELEPHONE (OUTPATIENT)
Dept: PAIN MEDICINE | Facility: CLINIC | Age: 73
End: 2023-05-23

## 2023-05-23 ENCOUNTER — HOSPITAL ENCOUNTER (OUTPATIENT)
Dept: RADIOLOGY | Facility: CLINIC | Age: 73
Discharge: HOME | End: 2023-05-23

## 2023-05-23 VITALS
SYSTOLIC BLOOD PRESSURE: 119 MMHG | HEART RATE: 62 BPM | OXYGEN SATURATION: 93 % | RESPIRATION RATE: 18 BRPM | DIASTOLIC BLOOD PRESSURE: 75 MMHG | TEMPERATURE: 98.7 F

## 2023-05-23 DIAGNOSIS — M47.816 LUMBAR SPONDYLOSIS: ICD-10-CM

## 2023-05-23 RX ORDER — BUPIVACAINE HYDROCHLORIDE 5 MG/ML
3 INJECTION, SOLUTION EPIDURAL; INTRACAUDAL ONCE
Status: COMPLETED | OUTPATIENT
Start: 2023-05-23 | End: 2023-05-23

## 2023-05-23 RX ORDER — LIDOCAINE HYDROCHLORIDE 10 MG/ML
10 INJECTION, SOLUTION EPIDURAL; INFILTRATION; INTRACAUDAL; PERINEURAL ONCE
Status: COMPLETED | OUTPATIENT
Start: 2023-05-23 | End: 2023-05-23

## 2023-05-23 RX ADMIN — BUPIVACAINE HYDROCHLORIDE 3 ML: 5 INJECTION, SOLUTION EPIDURAL; INTRACAUDAL; PERINEURAL at 11:17

## 2023-05-23 RX ADMIN — LIDOCAINE HYDROCHLORIDE 10 ML: 10 INJECTION, SOLUTION EPIDURAL; INFILTRATION; INTRACAUDAL; PERINEURAL at 11:08

## 2023-05-23 RX ADMIN — LIDOCAINE HYDROCHLORIDE 3 ML: 20 INJECTION, SOLUTION EPIDURAL; INFILTRATION; INTRACAUDAL; PERINEURAL at 11:11

## 2023-05-23 NOTE — DISCHARGE INSTRUCTIONS
Medial Branch Radiofrequency Ablation     WHAT YOU NEED TO KNOW:   Medial branch radiofrequency ablation (RFA) is a procedure used to treat facet joint pain in your neck, mid back, or lower back  Facet joints are found at the back of each vertebra  A needle electrode is used to send electrical currents to the nerves in your facet joint  The electrical currents create heat that damages the nerve so it cannot send pain signals  ACTIVITY  Do not drive or operate machinery today  No strenuous activity today - bending, lifting, etc   You may shower today, but do not sit in a tub of water  Resume normal activities tomorrow as tolerated  CARE OF THE INJECTION SITE  If you have soreness or pain, apply ice to the area today (20 minutes on/20 minutes off)  Starting tomorrow, you may use warm, moist heat or ice if needed  Notify the [facility] if you have any of the following: redness, drainage, swelling, or fever above 100°F     SPECIAL INSTRUCTIONS  Our office will call you tomorrow for a progress report and make an appointment for a follow up visit in 4 weeks  If you feel a sunburn-like sensation in the area of your procedure, call our office  MEDICATIONS  Continue to take all routine medications  Our office may have instructed you to hold some medications  As no general anesthesia was used in today's procedure, you should not experience any side effects related to anesthesia  If you have a problem specifically related to your procedure, please call our office at [phone]  Problems not related to your procedure should be directed to your primary care physician

## 2023-05-23 NOTE — TELEPHONE ENCOUNTER
Patient is S/P a Left L3-L5 RFA c/ JW on 5/23/23  Next OVS is scheduled for 6/5/23 for F/U c/ KH  Please call on 5/24/23 post RFA   Thanks

## 2023-05-23 NOTE — H&P
History of Present Illness: The patient is a 75 y o  male who presents with complaints of low back pain      Past Medical History:   Diagnosis Date   • A-fib [facility])    • Arthritis    • Bladder cancer    • Cancer ([address] )     skin melanoma;basal cell   • Chronic pain disorder     from arthritis   • Colon polyp    • Does use hearing aid     bilat   • Dry eyes, bilateral    • GERD (gastroesophageal reflux disease)    • History of kidney stones 10/2019   • History of partial knee replacement     bilat   • History of vertigo    • Hyperlipidemia    • Hypertension    • Infusion extravasation of chemotherapy vesicant 11/2021   • Kidney lesion    • Lumbar disc disorder     compression of vertebrae L4-5-6   • Muscle weakness     left hip area   • Renal mass     left   • Right ankle injury 03/05/2020    missed step of ladder    • Right ankle pain    • Shortness of breath     with activity   • Tinnitus    • Urothelial cancer     left   • Wears glasses        Past Surgical History:   Procedure Laterality Date   • COLONOSCOPY     • CYSTOSCOPY Left 04/01/2020    Procedure: CYSTOSCOPY; URETERAL CATHETER PLACEMENT;  Surgeon: Shaan Thompson MD;  [facility];  Service: Urology   • CYSTOSCOPY  05/11/2020   • CYSTOSCOPY  06/04/2021   • FL CYSTOGRAM  04/13/2020   • FL RETROGRADE PYELOGRAM  01/17/2020   • HERNIA REPAIR  49/23/7780    umbilical with mesh   • INGUINAL HERNIA REPAIR Right     with mesh   • IR PORT PLACEMENT  04/30/2020   • JOINT REPLACEMENT Bilateral     partials knee   • LUMBAR EPIDURAL INJECTION     • OK CYSTO BLADDER W/URETERAL CATHETERIZATION Bilateral 01/17/2020    Procedure: CYSTOSCOPY; RIGHT RETROGRADE PYELOGRAM WITH RIGHT URETERAL CYTOLOGY SAMPLING; LEFT URETEROSCOPY WITH RENAL PELVIS BIOPSY AND LEFT STENT PLACEMENT;  Surgeon: Shaan Thompson MD;  [facility];  Service: Urology   • OK LAPAROSCOPY NEPHRECTOMY W/TOTAL URETERECTOMY Left 04/01/2020    Procedure: ROBOTIC LAPAROSCOPIC NEPHRO-URETERECTOMY;  Surgeon: Nimo Oneal MD;  [facility];  Service: Urology   • SKIN CANCER EXCISION      surface melanoma   • TONSILLECTOMY     • WISDOM TOOTH EXTRACTION           Current Outpatient Medications:   •  acetaminophen (TYLENOL) 500 mg tablet, Take 2 tablets (1,000 mg total) by mouth every 8 (eight) hours, Disp: , Rfl:   •  amiodarone 200 mg tablet, , Disp: , Rfl:   •  ascorbic acid (VITAMIN C) 500 MG tablet, Take 1 tablet by mouth Every 12 hours, Disp: , Rfl:   •  atorvastatin (LIPITOR) 40 mg tablet, , Disp: , Rfl:   •  capsicum (ZOSTRIX) 0 075 % topical cream, Apply topically 3 (three) times a day, Disp: 28 3 g, Rfl: 0  •  diltiazem (CARDIZEM) 60 mg tablet, 2 (two) times a day, Disp: , Rfl:   •  docusate sodium (COLACE) 250 MG capsule, Take 1 capsule (250 mg total) by mouth daily, Disp: 60 capsule, Rfl: 6  •  famotidine (PEPCID) 20 mg tablet, Take 20 mg by mouth daily as needed, Disp: , Rfl:   •  finasteride (PROSCAR) 5 mg tablet, Take 5 mg by mouth daily, Disp: , Rfl:   •  folic acid (FOLVITE) 1 mg tablet, take 1 tablet by mouth once daily, Disp: 90 tablet, Rfl: 4  •  furosemide (LASIX) 40 mg tablet, , Disp: , Rfl:   •  HYDROcodone-acetaminophen (NORCO) 5-325 mg per tablet, Take 1 tablet by mouth 2 (two) times a day as needed for pain Max Daily Amount: 2 tablets, Disp: 60 tablet, Rfl: 0  •  HYDROcodone-acetaminophen (Norco) 5-325 mg per tablet, Take 1 tablet by mouth 2 (two) times a day as needed for pain Max Daily Amount: 2 tablets Do not start before April 11, 2023  (Patient not taking: Reported on 4/26/2023), Disp: 60 tablet, Rfl: 0  •  naloxone (NARCAN) 4 mg/0 1 mL nasal spray, Administer 1 spray into a nostril   If breathing does not return to normal or if breathing difficulty resumes after 2-3 minutes, give another dose in the other nostril using a new spray , Disp: 1 each, Rfl: 1  •  nystatin (MYCOSTATIN) cream, Apply topically 2 (two) times a day, Disp: 30 g, Rfl: 0  • omeprazole (PriLOSEC) 20 mg delayed release capsule, Take 40 mg by mouth every morning, Disp: , Rfl:   •  senna (SENOKOT) 8 6 mg, Take 1 tablet (8 6 mg total) by mouth daily at bedtime, Disp: 30 each, Rfl: 0  •  tadalafil (CIALIS) 20 MG tablet, Take one tablet by mouth one hour before sexual activity, Disp: 15 tablet, Rfl: 3  •  tamsulosin (FLOMAX) 0 4 mg, Take 1 capsule (0 4 mg total) by mouth daily with dinner, Disp: 30 capsule, Rfl: 12  •  VITAMIN D PO, Take 1,000 Units by mouth daily , Disp: , Rfl:   •  zolpidem (AMBIEN) 5 mg tablet, Take 1 tablet (5 mg total) by mouth daily at bedtime as needed for sleep, Disp: 30 tablet, Rfl: 1    Allergies   Allergen Reactions   • Poison Ivy Extract Rash       Physical Exam:   Vitals:    05/23/23 1051   BP: 135/75   Pulse: 60   Resp: 20   Temp: 98 7 °F (37 1 °C)   SpO2: 93%     General: Awake, Alert, Oriented x 3, Mood and affect appropriate  Respiratory: Respirations even and unlabored  Cardiovascular: Peripheral pulses intact; no edema  Musculoskeletal Exam: Left lumbar paraspinals tender to palpation    ASA Score: 3         Assessment: Lumbar spondylosis    Plan: Left L3-5 RFA

## 2023-05-24 NOTE — TELEPHONE ENCOUNTER
Pt did well over night no s/s of infection or sunburn sensation  Aware it takes 2 weeks to notice pain relief and 4-6 weeks for full pain effect to be achieved  Aware to medicate as previous for discomfort and may use ice or heat  Confirmed next appt  6/5  Call if any questions or concerns prior to next appt

## 2023-05-30 ENCOUNTER — HOSPITAL ENCOUNTER (OUTPATIENT)
Dept: INFUSION CENTER | Facility: HOSPITAL | Age: 73
Discharge: HOME | End: 2023-05-30
Attending: INTERNAL MEDICINE
Payer: MEDICARE

## 2023-05-30 DIAGNOSIS — R79.89 ELEVATED SERUM CREATININE: ICD-10-CM

## 2023-05-30 DIAGNOSIS — Z45.2 ENCOUNTER FOR CARE RELATED TO PORT-A-CATH: Primary | ICD-10-CM

## 2023-05-30 DIAGNOSIS — C65.2 CANCER OF LEFT RENAL PELVIS (HCC): ICD-10-CM

## 2023-05-30 LAB
ANION GAP SERPL CALCULATED.3IONS-SCNC: 8 MMOL/L (ref 4–13)
BUN SERPL-MCNC: 23 MG/DL (ref 5–25)
CALCIUM SERPL-MCNC: 8.9 MG/DL (ref 8.4–10.2)
CHLORIDE SERPL-SCNC: 105 MMOL/L (ref 96–108)
CO2 SERPL-SCNC: 27 MMOL/L (ref 21–32)
CREAT SERPL-MCNC: 1.32 MG/DL (ref 0.6–1.3)
GFR SERPL CREATININE-BSD FRML MDRD: 52 ML/MIN/1.73SQ M
GLUCOSE SERPL-MCNC: 110 MG/DL (ref 65–140)
POTASSIUM SERPL-SCNC: 3.9 MMOL/L (ref 3.5–5.3)
SODIUM SERPL-SCNC: 140 MMOL/L (ref 135–147)

## 2023-05-30 PROCEDURE — 80048 BASIC METABOLIC PNL TOTAL CA: CPT

## 2023-05-30 NOTE — PROGRESS NOTES
Central labs drawn via port  Catheter maintenance performed per protocol without incident  Next infusion appointment scheduled in 7 weeks and AVS provided  Pt discharged in stable condition

## 2023-06-02 NOTE — PROGRESS NOTES
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[address] )    5  Lumbar disc herniation    6  Lumbar spondylosis    7  Spinal stenosis of lumbar region, unspecified whether neurogenic claudication present    8  Chronic bilateral low back pain without sciatica    9  Uncomplicated opioid dependence ([address] )    10  Long-term current use of opiate analgesic        Plan:  1  Explained to the patient that we will give the RFA completed on May 23, 2023 little more time to become effective as it can take up to 6 weeks  If no relief at that point, may consider a bilateral L4 TFESI  2  Patient may continue Norco 5/325 mg 1 tablet twice daily as needed pain as prescribed  Patient still has 2 prescriptions at the pharmacy that can be filled therefore does not require refills today    [place] Prescription Drug Monitoring Program report was reviewed and was appropriate     There are risks associated with opioid medications, including dependence, addiction and tolerance  The patient understands and agrees to use these medications only as prescribed  Potential side effects of the medications include, but are not limited to, constipation, drowsiness, addiction, impaired judgment and risk of fatal overdose if not taken as prescribed  The patient was warned against driving while taking sedation medications  Sharing medications is a felony  At this point in time, the patient is showing no signs of addiction, abuse, diversion or suicidal ideation  Patient has up to date Narcan RX    3  Continue with home exercise program  4  Continue to follow with heme-onc as scheduled  5  Follow-up in 5 weeks or sooner if needed    History of Present Illness:     The patient is a 75 y o  male with a history of metastatic urothelial carcinoma status post nephrectomy undergoing chemotherapy, and chemo induced neuropathy, A-fib, and chronic pain syndrome last seen on 03/13/2023 who presents for a follow up office visit in regards to chronic low back pain and bilateral foot pain  he denies bowel or bladder incontinence or saddle anesthesia  Patient is status post bilateral L3-5 RFA, right side completed April 19, 2023 and left side on May 23, 2023  He has not noticed much improvement of his pain at this time  He is also failed bilateral SI joint injections in December 2022  He continues on Norco 5/325 mg twice daily as needed on a sparing basis  He rates his pain an 8 out of 10 on the numeric pain rating scale  He constantly has pain in the morning which is described as sharp    Pain Contract Signed: 3/13/2023  Last Urine Drug Screen: 3/13/2023  BECKS/SOAPP 3/13/2023  Last hydrocodone per pt  6/5/2023  Last Narcan: 3/13/2023    I have personally reviewed and/or updated the patient's past medical history, past surgical history, family history, social history, current medications, allergies, and vital signs today  Review of Systems:    Review of Systems   Respiratory: Negative for shortness of breath  Cardiovascular: Negative for chest pain  Gastrointestinal: Negative for constipation, diarrhea, nausea and vomiting  Musculoskeletal: Negative for arthralgias, gait problem, joint swelling and myalgias  Skin: Negative for rash  Neurological: Negative for dizziness, seizures and weakness  All other systems reviewed and are negative          Past Medical History:   Diagnosis Date   • A-fib [facility])    • Arthritis    • Bladder cancer    • Cancer (H[facility])     skin melanoma;basal cell   • Chronic pain disorder     from arthritis   • Colon polyp    • Does use hearing aid     bilat   • Dry eyes, bilateral    • GERD (gastroesophageal reflux disease)    • History of kidney stones 10/2019   • History of partial knee replacement     bilat   • History of vertigo    • Hyperlipidemia    • Hypertension    • Infusion extravasation of chemotherapy vesicant 11/2021   • Kidney lesion • Lumbar disc disorder     compression of vertebrae L4-5-6   • Muscle weakness     left hip area   • Renal mass     left   • Right ankle injury 2020    missed step of ladder    • Right ankle pain    • Shortness of breath     with activity   • Tinnitus    • Urothelial cancer     left   • Wears glasses        Past Surgical History:   Procedure Laterality Date   • COLONOSCOPY     • CYSTOSCOPY Left 2020    Procedure: CYSTOSCOPY; URETERAL CATHETER PLACEMENT;  Surgeon: Giselle Ascencio MD;  [facility];  Service: Urology   • CYSTOSCOPY  2020   • CYSTOSCOPY  2021   • FL CYSTOGRAM  2020   • FL RETROGRADE PYELOGRAM  2020   • HERNIA REPAIR  15/28/7252    umbilical with mesh   • INGUINAL HERNIA REPAIR Right     with mesh   • IR PORT PLACEMENT  2020   • JOINT REPLACEMENT Bilateral     partials knee   • LUMBAR EPIDURAL INJECTION     • NE CYSTO BLADDER W/URETERAL CATHETERIZATION Bilateral 2020    Procedure: CYSTOSCOPY; RIGHT RETROGRADE PYELOGRAM WITH RIGHT URETERAL CYTOLOGY SAMPLING; LEFT URETEROSCOPY WITH RENAL PELVIS BIOPSY AND LEFT STENT PLACEMENT;  Surgeon: Giselle Ascencio MD;  [facility];  Service: Urology   • NE LAPAROSCOPY NEPHRECTOMY W/TOTAL URETERECTOMY Left 2020    Procedure: ROBOTIC LAPAROSCOPIC NEPHRO-URETERECTOMY;  Surgeon: Giselle Ascencio MD;  [facility];  Service: Urology   • SKIN CANCER EXCISION      surface melanoma   • TONSILLECTOMY     • WISDOM TOOTH EXTRACTION         Family History   Problem Relation Age of Onset   • Liver cancer Father        Social History     Occupational History   • Not on file   Tobacco Use   • Smoking status: Former     Types: Cigarettes     Quit date:      Years since quittin 4   • Smokeless tobacco: Never   Vaping Use   • Vaping Use: Never used   Substance and Sexual Activity   • Alcohol use:  Yes     Alcohol/week: 17 0 standard drinks of alcohol     Types: 7 Glasses of wine, 10 Cans of beer per week     Comment: socially, rarely   • Drug use: Never   • Sexual activity: Not Currently         Current Outpatient Medications:   •  acetaminophen (TYLENOL) 500 mg tablet, Take 2 tablets (1,000 mg total) by mouth every 8 (eight) hours, Disp: , Rfl:   •  amiodarone 200 mg tablet, , Disp: , Rfl:   •  ascorbic acid (VITAMIN C) 500 MG tablet, Take 1 tablet by mouth Every 12 hours, Disp: , Rfl:   •  atorvastatin (LIPITOR) 40 mg tablet, , Disp: , Rfl:   •  capsicum (ZOSTRIX) 0 075 % topical cream, Apply topically 3 (three) times a day, Disp: 28 3 g, Rfl: 0  •  diltiazem (CARDIZEM) 60 mg tablet, 2 (two) times a day, Disp: , Rfl:   •  famotidine (PEPCID) 20 mg tablet, Take 20 mg by mouth daily as needed, Disp: , Rfl:   •  finasteride (PROSCAR) 5 mg tablet, Take 5 mg by mouth daily, Disp: , Rfl:   •  folic acid (FOLVITE) 1 mg tablet, take 1 tablet by mouth once daily, Disp: 90 tablet, Rfl: 4  •  furosemide (LASIX) 40 mg tablet, , Disp: , Rfl:   •  HYDROcodone-acetaminophen (NORCO) 5-325 mg per tablet, Take 1 tablet by mouth 2 (two) times a day as needed for pain Max Daily Amount: 2 tablets, Disp: 60 tablet, Rfl: 0  •  HYDROcodone-acetaminophen (Norco) 5-325 mg per tablet, Take 1 tablet by mouth 2 (two) times a day as needed for pain Max Daily Amount: 2 tablets Do not start before April 11, 2023  (Patient not taking: Reported on 4/26/2023), Disp: 60 tablet, Rfl: 0  •  naloxone (NARCAN) 4 mg/0 1 mL nasal spray, Administer 1 spray into a nostril   If breathing does not return to normal or if breathing difficulty resumes after 2-3 minutes, give another dose in the other nostril using a new spray , Disp: 1 each, Rfl: 1  •  nystatin (MYCOSTATIN) cream, Apply topically 2 (two) times a day, Disp: 30 g, Rfl: 0  •  omeprazole (PriLOSEC) 20 mg delayed release capsule, Take 40 mg by mouth every morning, Disp: , Rfl:   •  senna (SENOKOT) 8 6 mg, Take 1 tablet (8 6 mg total) by mouth daily at bedtime, Disp: 30 "each, Rfl: 0  •  tadalafil (CIALIS) 20 MG tablet, Take one tablet by mouth one hour before sexual activity, Disp: 15 tablet, Rfl: 3  •  tamsulosin (FLOMAX) 0 4 mg, Take 1 capsule (0 4 mg total) by mouth daily with dinner, Disp: 30 capsule, Rfl: 12  •  VITAMIN D PO, Take 1,000 Units by mouth daily , Disp: , Rfl:   •  zolpidem (AMBIEN) 5 mg tablet, Take 1 tablet (5 mg total) by mouth daily at bedtime as needed for sleep, Disp: 30 tablet, Rfl: 1    Allergies   Allergen Reactions   • Poison Ivy Extract Rash       Physical Exam:    /77   Pulse 56   Ht 5' 10\" (1 778 m)   Wt 96 2 kg (212 lb)   BMI 30 42 kg/m²     Constitutional:normal, well developed, well nourished, alert, in no distress and non-toxic and no overt pain behavior  Eyes:anicteric  HEENT:grossly intact  Neck:supple, symmetric, trachea midline and no masses   Pulmonary:even and unlabored  Cardiovascular:No edema or pitting edema present  Skin:Normal without rashes or lesions and well hydrated  Psychiatric:Mood and affect appropriate  Neurologic:Cranial Nerves II-XII grossly intact  Musculoskeletal:antalgic but steady with a cane      Imaging  No orders to display     MRI LUMBAR SPINE WITH AND WITHOUT CONTRAST   INDICATION: M54 50: Low back pain, unspecified   G89 29: Other chronic pain  COMPARISON: PET/CT dated 3/8/2022 and prior MR dated 12/2/2019  TECHNIQUE: Sagittal T1, sagittal T2, sagittal inversion recovery, axial T1 and axial T2, coronal T2  Sagittal and axial T1 postcontrast    IV Contrast: 9 mL of Gadobutrol injection (SINGLE-DOSE)   IMAGE QUALITY: Diagnostic   FINDINGS:   VERTEBRAL BODIES: There are 5 lumbar type vertebral bodies  Normal alignment of the lumbar spine  No spondylolysis or spondylolisthesis  No scoliosis  No compression fracture  T12 vertebral body hemangioma noted as well as an L5 vertebral body   hemangioma  No worrisome marrow lesion  SACRUM: Normal signal within the sacrum   No evidence of insufficiency or stress " fracture  DISTAL CORD AND CONUS: Normal size and signal within the distal cord and conus  Thin fatty filum terminale noted  PARASPINAL SOFT TISSUES: Left kidney is surgically absent  LOWER THORACIC DISC SPACES: Normal disc height and signal  No disc herniation, canal stenosis or foraminal narrowing  LUMBAR DISC SPACES:   L1-L2: Opposed Schmorl's nodes  Minimal bulge without significant stenosis  L2-L3: Disc desiccation with loss of disc height  Minor bulge and facet hypertrophy without stenosis  L3-L4: Disc desiccation with loss of disc height  Diffuse disc bulge with facet hypertrophy resulting in severe central canal stenosis and inducing redundant cauda equina roots  Mild bilateral foraminal stenosis demonstrated  CSF effacement within   the sac as well as lipomatous narrowing of the sac  L4-L5: Disc desiccation with prominent loss of disc height  Disc osteophyte complex and facet hypertrophy with severe central canal and subarticular recess stenosis  Moderate bilateral foraminal narrowing evident  CSF effacement within the sac as   well as lipomatous narrowing of the sac  L5-S1: Disc desiccation with mild loss of disc height  Disc osteophyte complex and facet hypertrophy resulting in mild bilateral foraminal narrowing  Central canal remains patent  POSTCONTRAST IMAGING: No abnormal enhancement  IMPRESSION:   1  Multifactorial spondylotic disease most pronounced at the L3-4 and L4-5 levels with severe central canal encroachment noted  Overall findings are similar to prior examination  2  No evidence of metastatic disease in a patient status post left nephrectomy  Workstation performed: [id number]       No orders of the defined types were placed in this encounter

## 2023-06-05 ENCOUNTER — OFFICE VISIT (OUTPATIENT)
Dept: PAIN MEDICINE | Facility: CLINIC | Age: 73
End: 2023-06-05
Payer: MEDICARE

## 2023-06-05 VITALS
HEART RATE: 56 BPM | DIASTOLIC BLOOD PRESSURE: 77 MMHG | SYSTOLIC BLOOD PRESSURE: 126 MMHG | HEIGHT: 70 IN | BODY MASS INDEX: 30.35 KG/M2 | WEIGHT: 212 LBS

## 2023-06-05 DIAGNOSIS — G89.4 CHRONIC PAIN SYNDROME: Primary | ICD-10-CM

## 2023-06-05 DIAGNOSIS — M48.061 SPINAL STENOSIS OF LUMBAR REGION, UNSPECIFIED WHETHER NEUROGENIC CLAUDICATION PRESENT: ICD-10-CM

## 2023-06-05 DIAGNOSIS — M54.16 LUMBAR RADICULOPATHY: ICD-10-CM

## 2023-06-05 DIAGNOSIS — G62.9 NEUROPATHY: ICD-10-CM

## 2023-06-05 DIAGNOSIS — M54.50 CHRONIC BILATERAL LOW BACK PAIN WITHOUT SCIATICA: ICD-10-CM

## 2023-06-05 DIAGNOSIS — T45.1X5A CHEMOTHERAPY-INDUCED NEUROPATHY (HCC): ICD-10-CM

## 2023-06-05 DIAGNOSIS — M51.26 LUMBAR DISC HERNIATION: ICD-10-CM

## 2023-06-05 DIAGNOSIS — Z79.891 LONG-TERM CURRENT USE OF OPIATE ANALGESIC: ICD-10-CM

## 2023-06-05 DIAGNOSIS — F11.20 UNCOMPLICATED OPIOID DEPENDENCE (HCC): ICD-10-CM

## 2023-06-05 DIAGNOSIS — G89.29 CHRONIC BILATERAL LOW BACK PAIN WITHOUT SCIATICA: ICD-10-CM

## 2023-06-05 DIAGNOSIS — G62.0 CHEMOTHERAPY-INDUCED NEUROPATHY (HCC): ICD-10-CM

## 2023-06-05 DIAGNOSIS — M47.816 LUMBAR SPONDYLOSIS: ICD-10-CM

## 2023-06-05 PROCEDURE — 99213 OFFICE O/P EST LOW 20 MIN: CPT | Performed by: NURSE PRACTITIONER

## 2023-06-05 NOTE — PATIENT INSTRUCTIONS

## 2023-06-14 NOTE — TELEPHONE ENCOUNTER
I spoke w/ Annita's wife  I told her that I was calling to remind Evelia St to have his lab work done prior to his appt on 9/10/2020  I also informed her that the lab will be closed on 9/7/2020 for holiday  Patient does not have an up to-date phone number in chart . This phone is currently disconnected. Patient will have a letter sent to address on chart to contact office for an appointment

## 2023-06-28 DIAGNOSIS — E53.8 FOLIC ACID DEFICIENCY: ICD-10-CM

## 2023-06-28 RX ORDER — FOLIC ACID 1 MG/1
TABLET ORAL
Qty: 90 TABLET | Refills: 4 | Status: SHIPPED | OUTPATIENT
Start: 2023-06-28

## 2023-07-07 ENCOUNTER — TRANSCRIBE ORDERS (OUTPATIENT)
Dept: SURGERY | Facility: CLINIC | Age: 73
End: 2023-07-07

## 2023-07-07 DIAGNOSIS — K46.9 ABDOMINAL HERNIA: Primary | ICD-10-CM

## 2023-07-10 ENCOUNTER — OFFICE VISIT (OUTPATIENT)
Dept: PAIN MEDICINE | Facility: CLINIC | Age: 73
End: 2023-07-10
Payer: MEDICARE

## 2023-07-10 VITALS
DIASTOLIC BLOOD PRESSURE: 75 MMHG | HEIGHT: 70 IN | SYSTOLIC BLOOD PRESSURE: 115 MMHG | WEIGHT: 210 LBS | HEART RATE: 65 BPM | BODY MASS INDEX: 30.06 KG/M2

## 2023-07-10 DIAGNOSIS — G89.4 CHRONIC PAIN SYNDROME: Primary | ICD-10-CM

## 2023-07-10 DIAGNOSIS — M54.16 LUMBAR RADICULOPATHY: ICD-10-CM

## 2023-07-10 DIAGNOSIS — G89.4 CHRONIC PAIN DISORDER: ICD-10-CM

## 2023-07-10 DIAGNOSIS — M51.26 LUMBAR DISC HERNIATION: ICD-10-CM

## 2023-07-10 DIAGNOSIS — Z79.891 LONG-TERM CURRENT USE OF OPIATE ANALGESIC: ICD-10-CM

## 2023-07-10 DIAGNOSIS — F11.20 UNCOMPLICATED OPIOID DEPENDENCE (HCC): ICD-10-CM

## 2023-07-10 DIAGNOSIS — M54.50 CHRONIC BILATERAL LOW BACK PAIN WITHOUT SCIATICA: ICD-10-CM

## 2023-07-10 DIAGNOSIS — M46.1 SACROILIITIS (HCC): ICD-10-CM

## 2023-07-10 DIAGNOSIS — Z79.891 ENCOUNTER FOR LONG-TERM OPIATE ANALGESIC USE: ICD-10-CM

## 2023-07-10 DIAGNOSIS — G89.29 CHRONIC BILATERAL LOW BACK PAIN WITHOUT SCIATICA: ICD-10-CM

## 2023-07-10 DIAGNOSIS — M47.816 LUMBAR SPONDYLOSIS: ICD-10-CM

## 2023-07-10 DIAGNOSIS — M48.061 SPINAL STENOSIS OF LUMBAR REGION, UNSPECIFIED WHETHER NEUROGENIC CLAUDICATION PRESENT: ICD-10-CM

## 2023-07-10 PROCEDURE — 99214 OFFICE O/P EST MOD 30 MIN: CPT | Performed by: NURSE PRACTITIONER

## 2023-07-10 RX ORDER — HYDROCODONE BITARTRATE AND ACETAMINOPHEN 7.5; 325 MG/1; MG/1
1 TABLET ORAL 2 TIMES DAILY PRN
Qty: 60 TABLET | Refills: 0 | Status: SHIPPED | OUTPATIENT
Start: 2023-07-10

## 2023-07-10 RX ORDER — HYDROCODONE BITARTRATE AND ACETAMINOPHEN 7.5; 325 MG/1; MG/1
1 TABLET ORAL 2 TIMES DAILY PRN
Qty: 60 TABLET | Refills: 0 | Status: SHIPPED | OUTPATIENT
Start: 2023-08-08

## 2023-07-10 NOTE — PROGRESS NOTES
Assessment:  1. Chronic pain syndrome    2. Lumbar radiculopathy    3. Lumbar disc herniation    4. Lumbar spondylosis    5. Sacroiliitis ([address])    6. Chronic pain disorder    7. Spinal stenosis of lumbar region, unspecified whether neurogenic claudication present    8. Encounter for long-term opiate analgesic use    9. Uncomplicated opioid dependence ([address])    10. Chronic bilateral low back pain without sciatica    11. Long-term current use of opiate analgesic        Plan:  1. I will increase Norco to 7.5/325 mg 1 tablet twice daily as needed pain. The patient was given a 2 month supply of prescriptions with a Do Not Fill date(s) of today and August 8, 2023    [place] Prescription Drug Monitoring Program report was reviewed and was appropriate     A urine drug screen was collected at today's office visit as part of our medication management protocol. The point of care testing results were appropriate for what was being prescribed. The specimen will be sent for confirmatory testing. The drug screen is medically necessary because the patient is either dependent on opioid medication or is being considered for opioid medication therapy and the results could impact ongoing or future treatment. The drug screen is to evaluate for the presences or absence of prescribed, non-prescribed, and/or illicit drugs/substances. There are risks associated with opioid medications, including dependence, addiction and tolerance. The patient understands and agrees to use these medications only as prescribed. Potential side effects of the medications include, but are not limited to, constipation, drowsiness, addiction, impaired judgment and risk of fatal overdose if not taken as prescribed. The patient was warned against driving while taking sedation medications. Sharing medications is a felony. At this point in time, the patient is showing no signs of addiction, abuse, diversion or suicidal ideation.     Patient has up to date Narcan RX    2. Patient will be scheduled for a bilateral L5 TFESI to address the inflammatory component of the patient's pain. Complete risks and benefits including bleeding, infection, tissue reaction, nerve injury and allergic reaction were discussed. The patient was agreeable and verbalized an understanding  3. Continue to follow with hematology oncology as scheduled  4. Continue with home exercise program  5. Follow-up in 6 weeks or sooner if needed    History of Present Illness: The patient is a 75 y.o. male with a history of metastatic urothelial carcinoma status post nephrectomy with chemo induced neuropathy, A-fib and chronic pain syndrome last seen on 06/05/2023 who presents for a follow up office visit in regards to chronic low back pain  that intermittently radiates into the lower extremities, left greater than right. He does have baseline foot pain from the chemotherapy. He is status post bilateral L3-5 RFA completed in May 2023 without relief. He has also failed SI joint injections. He continues on Norco 5/325 mg twice a day without much relief. He rates his pain a 6 out of 10 on the numeric pain rating scale. He constantly has pain in the morning which is described as sharp  Pain Contract Signed: 3/13/2023  Last Urine Drug Screen: 7/10/2023  BECKS/SOAPP 3/13/2023  Last hydrocodone per pt. 6/5/2023  Last Narcan: 3/13/2023    I have personally reviewed and/or updated the patient's past medical history, past surgical history, family history, social history, current medications, allergies, and vital signs today.        Review of Systems:    Review of Systems      Past Medical History:   Diagnosis Date   • A-fib [facility])    • Arthritis    • Bladder cancer    • Cancer ([address] )     skin melanoma;basal cell   • Chronic pain disorder     from arthritis   • Colon polyp    • Does use hearing aid     bilat   • Dry eyes, bilateral    • GERD (gastroesophageal reflux disease)    • History of kidney stones 10/2019   • History of partial knee replacement     bilat   • History of vertigo    • Hyperlipidemia    • Hypertension    • Infusion extravasation of chemotherapy vesicant 2021   • Kidney lesion    • Lumbar disc disorder     compression of vertebrae L4-5-6   • Muscle weakness     left hip area   • Renal mass     left   • Right ankle injury 2020    missed step of ladder    • Right ankle pain    • Shortness of breath     with activity   • Tinnitus    • Urothelial cancer     left   • Wears glasses        Past Surgical History:   Procedure Laterality Date   • COLONOSCOPY     • CYSTOSCOPY Left 2020    Procedure: CYSTOSCOPY; URETERAL CATHETER PLACEMENT;  Surgeon: Ruiz Yadav MD;  Location[facility];  Service: Urology   • CYSTOSCOPY  2020   • CYSTOSCOPY  2021   • FL CYSTOGRAM  2020   • FL RETROGRADE PYELOGRAM  2020   • HERNIA REPAIR      umbilical with mesh   • INGUINAL HERNIA REPAIR Right     with mesh   • IR PORT PLACEMENT  2020   • JOINT REPLACEMENT Bilateral     partials knee   • LUMBAR EPIDURAL INJECTION     • [place] CYSTO BLADDER W/URETERAL CATHETERIZATION Bilateral 2020    Procedure: CYSTOSCOPY; RIGHT RETROGRADE PYELOGRAM WITH RIGHT URETERAL CYTOLOGY SAMPLING; LEFT URETEROSCOPY WITH RENAL PELVIS BIOPSY AND LEFT STENT PLACEMENT;  Surgeon: Ruiz Yadav MD;  [facility];  Service: Urology   • NY LAPAROSCOPY NEPHRECTOMY W/TOTAL URETERECTOMY Left 2020    Procedure: ROBOTIC LAPAROSCOPIC NEPHRO-URETERECTOMY;  Surgeon: Ruiz Yadav MD;  [facility];  Service: Urology   • SKIN CANCER EXCISION      surface melanoma   • TONSILLECTOMY     • WISDOM TOOTH EXTRACTION         Family History   Problem Relation Age of Onset   • Liver cancer Father        Social History     Occupational History   • Not on file   Tobacco Use   • Smoking status: Former     Types: Cigarettes     Quit date:      Years since quittin.5 • Smokeless tobacco: Never   Vaping Use   • Vaping Use: Never used   Substance and Sexual Activity   • Alcohol use:  Yes     Alcohol/week: 17.0 standard drinks of alcohol     Types: 7 Glasses of wine, 10 Cans of beer per week     Comment: socially, rarely   • Drug use: Never   • Sexual activity: Not Currently         Current Outpatient Medications:   •  acetaminophen (TYLENOL) 500 mg tablet, Take 2 tablets (1,000 mg total) by mouth every 8 (eight) hours, Disp: , Rfl:   •  amiodarone 200 mg tablet, , Disp: , Rfl:   •  atorvastatin (LIPITOR) 40 mg tablet, , Disp: , Rfl:   •  capsicum (ZOSTRIX) 0.075 % topical cream, Apply topically 3 (three) times a day, Disp: 28.3 g, Rfl: 0  •  diltiazem (CARDIZEM) 60 mg tablet, 2 (two) times a day, Disp: , Rfl:   •  famotidine (PEPCID) 20 mg tablet, Take 20 mg by mouth daily as needed, Disp: , Rfl:   •  finasteride (PROSCAR) 5 mg tablet, Take 5 mg by mouth daily, Disp: , Rfl:   •  folic acid (FOLVITE) 1 mg tablet, take 1 tablet by mouth once daily, Disp: 90 tablet, Rfl: 4  •  furosemide (LASIX) 40 mg tablet, , Disp: , Rfl:   •  HYDROcodone-acetaminophen (Norco) 7.5-325 mg per tablet, Take 1 tablet by mouth 2 (two) times a day as needed for pain Max Daily Amount: 2 tablets, Disp: 60 tablet, Rfl: 0  •  omeprazole (PriLOSEC) 20 mg delayed release capsule, Take 40 mg by mouth every morning, Disp: , Rfl:   •  senna (SENOKOT) 8.6 mg, Take 1 tablet (8.6 mg total) by mouth daily at bedtime, Disp: 30 each, Rfl: 0  •  tadalafil (CIALIS) 20 MG tablet, Take one tablet by mouth one hour before sexual activity, Disp: 15 tablet, Rfl: 3  •  tamsulosin (FLOMAX) 0.4 mg, Take 1 capsule (0.4 mg total) by mouth daily with dinner, Disp: 30 capsule, Rfl: 12  •  VITAMIN D PO, Take 1,000 Units by mouth daily , Disp: , Rfl:   •  zolpidem (AMBIEN) 5 mg tablet, Take 1 tablet (5 mg total) by mouth daily at bedtime as needed for sleep, Disp: 30 tablet, Rfl: 1  •  ascorbic acid (VITAMIN C) 500 MG tablet, Take 1 tablet by mouth Every 12 hours, Disp: , Rfl:   •  naloxone (NARCAN) 4 mg/0.1 mL nasal spray, Administer 1 spray into a nostril. If breathing does not return to normal or if breathing difficulty resumes after 2-3 minutes, give another dose in the other nostril using a new spray., Disp: 1 each, Rfl: 1  •  nystatin (MYCOSTATIN) cream, Apply topically 2 (two) times a day, Disp: 30 g, Rfl: 0    Allergies   Allergen Reactions   • Poison Ivy Extract Rash       Physical Exam:    /75   Pulse 65   Ht 5' 10" (1.778 m)   Wt 95.3 kg (210 lb)   BMI 30.13 kg/m²     Constitutional:normal, well developed, well nourished, alert, in no distress and non-toxic and no overt pain behavior.   Eyes:anicteric  HEENT:grossly intact  Neck:supple, symmetric, trachea midline and no masses   Pulmonary:even and unlabored  Cardiovascular:No edema or pitting edema present  Skin:Normal without rashes or lesions and well hydrated  Psychiatric:Mood and affect appropriate  Neurologic:Cranial Nerves II-XII grossly intact  Musculoskeletal:ambulates with cane      Imaging  FL spine and pain procedure    (Results Pending)         Orders Placed This Encounter   Procedures   • FL spine and pain procedure   • MM ALL_Prescribed Meds and Special Instructions   • MM DT_Alprazolam Definitive Test   • MM DT_Amphetamine Definitive Test   • MM DT_Aripiprazole Definitive Test   • MM DT_Bath Salts Definitive Test   • MM DT_Buprenorphine Definitive Test   • MM DT_Butalbital Definitive Test   • MM DT_Clonazepam Definitive Test   • MM DT_Clozapine Definitive Test   • MM DT_Cocaine Definitive Test   • MM DT_Codeine Definitive Test   • MM DT_Desipramine Definitive Test   • MM DT_Dextromethorphan Definitive Test   • MM Diazepam Definitive Test   • MM DT_Ethyl Glucuronide/Ethyl Sulfate Definitive Test   • MM DT_Fentanyl Definitive Test   • MM DT_Haloperidol Definitive Test   • MM DT_Heroin Definitive Test   • MM DT_Hydrocodone Definitive Test   • MM DT_Hydromorphone Definitive Test   • MM DT_Imipramine Definitive Test   • MM DT_Kratom Definitive Test   • MM DT_Levorphanol Definitive Test   • MM Lorazepam Definitive Test   • MM DT_MDMA Definitive Test   • MM DT_Meperidine Definitive Test   • MM DT_Methadone Definitive Test   • MM DT_Methamphetamine Definitive Test   • MM DT_Morphine Definitive Test   • MM DT_Olanzapine Definitive Test   • MM DT_Oxazepam Definitive Test   • MM DT_Oxycodone Definitive Test   • MM DT_Oxymorphone Definitive Test   • MM DT_Phencyclidine Definitive Test   • MM DT_Phenobarbital Definitive Test   • MM DT_Phentermine Definitive Test   • MM DT_Quetiapine Definitive Test   • MM DT_Risperidone Definitive Test   • MM DT_Secobarbital Definitive Test   • MM DT_Spice Definitive Test   • MM DT_Tapentadol Definitive Test   • MM DT_Temazapam Definitive Test   • MM DT_THC Definitive Test   • MM DT_Tramadol Definitive Test   • MM DT_Methylphenidate Definitive Test

## 2023-07-10 NOTE — PATIENT INSTRUCTIONS
Opioid Safety   WHAT YOU NEED TO KNOW:   An opioid medicine is used to treat pain. Examples are oxycodone, morphine, fentanyl, or codeine. Pain control and management may help you rest, heal, and return to your daily activities. You and your family will receive information about how to manage your pain at home. The instructions will include what to do if you have side effects as your pain is managed. You will get information on how to handle opioid medicine safely. You will also get suggestions on how to control pain without opioids. It is important to follow all instructions so your pain is managed effectively. DISCHARGE INSTRUCTIONS:   Call your local emergency number (911 in the ), or have someone else call if:   You have a seizure. You cannot be woken. You have trouble staying awake and your breathing is slow or shallow. Your speech is slurred, or you are confused. You are dizzy or stumble when you walk. Call your doctor, or have someone close to you call if:   You are extremely drowsy, or you have trouble staying awake or speaking. You have pale or clammy skin. You have blue fingernails or lips. Your heartbeat is slower than normal.    You cannot stop vomiting. You have questions or concerns about your condition or care. Use opioids safely:   Take prescribed opioids exactly as directed. Opioids come with directions based on the kind and how it is given. Talk to your healthcare provider or a pharmacist if you have any questions. Do not take more than the recommended amount. Too much can cause a life-threatening overdose. Do not continue to take it after your pain stops. You may develop tolerance. This means you keep needing higher doses to get the same effect. You may also develop opioid use disorder. This means you are not able to control your opioid use. Do not give opioids to others or take opioids that belong to someone else.   The kind or amount one person takes may not be right for another. The person you share them with may also be taking medicines that do not mix with opioids. He or she may drink alcohol or use other drugs that can cause life-threatening problems when mixed with opioids. Do not mix opioids with other medicines or alcohol. The combination can cause an overdose, or cause you to stop breathing. Alcohol, sleeping pills, and medicines such as antihistamines can make you sleepy. A combination with opioids can lead to a coma. Do not drive or operate heavy machinery after you use an opioid. You may feel drowsy or have trouble concentrating. You can injure yourself or others if you drive or use heavy machinery when you are not alert. Your provider or pharmacist can tell you how long to wait after a dose before you do these activities. Talk to your healthcare provider if you have any side effects. Side effects include nausea, sleepiness, itching, and trouble thinking clearly. Your provider may need to make changes to the kind or amount of opioid you are taking. He or she can also help you find ways to prevent or relieve side effects. Manage constipation:  Constipation is the most common side effect of opioid medicine. Constipation is when you have hard, dry bowel movements, or you go longer than usual between bowel movements. Tell your healthcare provider about all changes in your bowel movements while you are taking opioids. He or she may recommend laxative medicine to help you have a bowel movement. He or she may also change the kind of opioid you are taking, or change when you take it. The following are more ways you can prevent or relieve constipation:  Drink liquids as directed. You may need to drink extra liquids to help soften and move your bowels. Ask how much liquid to drink each day and which liquids are best for you. Eat high-fiber foods. This may help decrease constipation by adding bulk to your bowel movements.  High-fiber foods include fruits, vegetables, whole-grain breads and cereals, and beans. Your healthcare provider or dietitian can help you create a high-fiber meal plan. Your provider may also recommend a fiber supplement if you cannot get enough fiber from food. Exercise regularly. Regular physical activity can help stimulate your intestines. Walking is a good exercise to prevent or relieve constipation. Ask which exercises are best for you. Schedule a time each day to have a bowel movement. This may help train your body to have regular bowel movements. Bend forward while you are on the toilet to help move the bowel movement out. Sit on the toilet for at least 10 minutes, even if you do not have a bowel movement. Store opioids safely:   Store opioids where others cannot easily get them. Keep them in a locked cabinet or secure area. Do not  keep them in a purse or other bag you carry with you. A person may be looking for something else and find the opioids. Make sure opioids are stored out of the reach of children. A child can easily overdose on opioids. Opioids may look like candy to a small child. The best way to dispose of opioids: The laws vary by country and area. In the [facility], the best way is to return the opioids through a take-back program. This program is offered by the [employer] (72798.com). The following are options for using the program:  Take the opioids to a LEIGH collection site. The site is often a law enforcement center. Call your local law enforcement center for scheduled take-back days in your area. You will be given information on where to go if the collection site is in a different location. Take the opioids to an approved pharmacy or hospital.  A pharmacy or hospital may be set up as a collection site. You will need to ask if it is a LEIGH collection site if you were not directed there.  A pharmacy or doctor's office may not be able to take back opioids unless it is a LEIGH site.    Use a mail-back system. This means you are given containers to put the opioids into. You will then mail them in the containers. Use a take-back drop box. This is a place to leave the opioids at any time. People and animals will not be able to get into the box. Your local law enforcement agency can tell you where to find a drop box in your area. Other safe ways to dispose of opioids: The medicine may come with disposal instructions. The instructions may vary depending on the brand of medicine you are using. Instructions may come in a Medication Guide, but not every medicine has one. You may instead get instructions from your pharmacy or doctor. Follow instructions carefully. The following are general guidelines to follow:  Find out if you can flush the opioid. Some opioids can be flushed down the toilet or poured into the sink. You will need to contact authorities in your area to see if this is an option for you. The FDA also offers a list of medicines that are safe to flush down the toilet. You can check the list if you cannot get the information for your local area. Ask your waste management company about rules for putting opioids in the trash. The company will be able to give you specific directions. Scratch out personal information on the original medicine label so it cannot be read. Then put it in the trash. Do not label the trash or put any information on it about the opioids. It should look like regular household trash so no one is tempted to look for the opioids. Keep the trash out of the reach of children and animals. Always make sure trash is secure. Talk to officials if you live in a facility. If you live in a nursing home or assisted living center, talk to an official. The person will know the rules for your area. Other ways to manage pain:   Ask your healthcare provider about non-opioid medicines to control pain.   Some medicines may even work better than opioids, depending on the cause of your pain. Nonprescription medicines include NSAIDs (such as ibuprofen) and acetaminophen. Prescription medicines include muscle relaxers, antidepressants, and steroids. Pain may be managed without any medicines. Some ways to relieve pain include massage, aromatherapy, or meditation. Physical or occupational therapy may also help. For more information:   Drug Enforcement Administration  [address] , [address]  Phone: 5- 306 - 606-6712  Web Address: [employer].. [employer]/drug_disposal/    [address] and Drug Administration  [address] , [address]  Phone: 8- 332 - 510-1174  Web Address: http://[employer]/  Follow up with your doctor or pain specialist as directed: You may need to have your dose adjusted. Your doctor or pain specialist can also help you find ways to manage pain without opioids. Write down your questions so you remember to ask them during your visits. © Copyright [employer] 2022 Information is for End User's use only and may not be sold, redistributed or otherwise used for commercial purposes. The above information is an  only. It is not intended as medical advice for individual conditions or treatments. Talk to your doctor, nurse or pharmacist before following any medical regimen to see if it is safe and effective for you.

## 2023-07-12 LAB
6MAM UR QL CFM: NEGATIVE NG/ML
7AMINOCLONAZEPAM UR QL CFM: NEGATIVE NG/ML
A-OH ALPRAZ UR QL CFM: NEGATIVE NG/ML
AMPHET UR QL CFM: NEGATIVE NG/ML
AMPHET UR QL CFM: NEGATIVE NG/ML
BUPRENORPHINE UR QL CFM: NEGATIVE NG/ML
BUTALBITAL UR QL CFM: NEGATIVE NG/ML
BZE UR QL CFM: NEGATIVE NG/ML
CODEINE UR QL CFM: NEGATIVE NG/ML
DESIPRAMINE UR QL CFM: NEGATIVE NG/ML
DESIPRAMINE UR QL CFM: NEGATIVE NG/ML
EDDP UR QL CFM: NEGATIVE NG/ML
ETHYL GLUCURONIDE UR QL CFM: ABNORMAL NG/ML
ETHYL SULFATE UR QL SCN: NEGATIVE NG/ML
EUTYLONE UR QL: NEGATIVE NG/ML
FENTANYL UR QL CFM: NEGATIVE NG/ML
GLIADIN IGG SER IA-ACNC: NEGATIVE NG/ML
GLUCOSE 30M P 50 G LAC PO SERPL-MCNC: NEGATIVE NG/ML
HYDROCODONE UR QL CFM: NORMAL NG/ML
HYDROCODONE UR QL CFM: NORMAL NG/ML
HYDROMORPHONE UR QL CFM: NORMAL NG/ML
IMIPRAMINE UR QL CFM: NEGATIVE NG/ML
LORAZEPAM UR QL CFM: NEGATIVE NG/ML
MDMA UR QL CFM: NEGATIVE NG/ML
ME-PHENIDATE UR QL CFM: NEGATIVE NG/ML
MEPERIDINE UR QL CFM: NEGATIVE NG/ML
METHADONE UR QL CFM: NEGATIVE NG/ML
METHAMPHET UR QL CFM: NEGATIVE NG/ML
MORPHINE UR QL CFM: NEGATIVE NG/ML
MORPHINE UR QL CFM: NEGATIVE NG/ML
NORBUPRENORPHINE UR QL CFM: NEGATIVE NG/ML
NORDIAZEPAM UR QL CFM: NEGATIVE NG/ML
NORFENTANYL UR QL CFM: NEGATIVE NG/ML
NORHYDROCODONE UR QL CFM: NORMAL NG/ML
NORHYDROCODONE UR QL CFM: NORMAL NG/ML
NORMEPERIDINE UR QL CFM: NEGATIVE NG/ML
NOROXYCODONE UR QL CFM: NEGATIVE NG/ML
OLANZAPINE QUANTIFICATION: NEGATIVE NG/ML
OPC-3373 QUANTIFICATION: NEGATIVE
OXAZEPAM UR QL CFM: NEGATIVE NG/ML
OXYCODONE UR QL CFM: NEGATIVE NG/ML
OXYMORPHONE UR QL CFM: NEGATIVE NG/ML
OXYMORPHONE UR QL CFM: NEGATIVE NG/ML
PARA-FLUOROFENTANYL QUANTIFICATION: NORMAL NG/ML
PCP UR QL CFM: NEGATIVE NG/ML
PHENOBARB UR QL CFM: NEGATIVE NG/ML
RESULT ALL_PRESCRIBED MEDS AND SPECIAL INSTRUCTIONS: NORMAL
SECOBARBITAL UR QL CFM: NEGATIVE NG/ML
SL AMB 4-ANPP QUANTIFICATION: NORMAL NG/ML
SL AMB 5F-ADB-M7 METABOLITE QUANTIFICATION: NEGATIVE NG/ML
SL AMB 7-OH-MITRAGYNINE (KRATOM ALKALOID) QUANTIFICATION: NEGATIVE NG/ML
SL AMB AB-FUBINACA-M3 METABOLITE QUANTIFICATION: NEGATIVE NG/ML
SL AMB ACETYL FENTANYL QUANTIFICATION: NORMAL NG/ML
SL AMB ACETYL NORFENTANYL QUANTIFICATION: NORMAL NG/ML
SL AMB ACRYL FENTANYL QUANTIFICATION: NORMAL NG/ML
SL AMB CARFENTANIL QUANTIFICATION: NORMAL NG/ML
SL AMB CLOZAPINE QUANTIFICATION: NEGATIVE NG/ML
SL AMB CTHC (MARIJUANA METABOLITE) QUANTIFICATION: NEGATIVE NG/ML
SL AMB DEXTROMETHORPHAN QUANTIFICATION: NEGATIVE NG/ML
SL AMB DEXTRORPHAN (DEXTROMETHORPHAN METABOLITE) QUANT: NEGATIVE NG/ML
SL AMB DEXTRORPHAN (DEXTROMETHORPHAN METABOLITE) QUANT: NEGATIVE NG/ML
SL AMB HALOPERIDOL  QUANTIFICATION: NEGATIVE NG/ML
SL AMB HALOPERIDOL METABOLITE QUANTIFICATION: NEGATIVE NG/ML
SL AMB HYDROXYRISPERIDONE QUANTIFICATION: NEGATIVE NG/ML
SL AMB JWH018 METABOLITE QUANTIFICATION: NEGATIVE NG/ML
SL AMB JWH073 METABOLITE QUANTIFICATION: NEGATIVE NG/ML
SL AMB MDMB-FUBINACA-M1 METABOLITE QUANTIFICATION: NEGATIVE NG/ML
SL AMB METHYLONE QUANTIFICATION: NEGATIVE NG/ML
SL AMB N-DESMETHYL-TRAMADOL QUANTIFICATION: NEGATIVE NG/ML
SL AMB N-DESMETHYLCLOZAPINE QUANTIFICATION: NEGATIVE NG/ML
SL AMB NORQUETIAPINE QUANTIFICATION: NEGATIVE NG/ML
SL AMB PHENTERMINE QUANTIFICATION: NEGATIVE NG/ML
SL AMB QUETIAPINE QUANTIFICATION: NEGATIVE NG/ML
SL AMB RCS4 METABOLITE QUANTIFICATION: NEGATIVE NG/ML
SL AMB RISPERIDONE QUANTIFICATION: NEGATIVE NG/ML
SL AMB RITALINIC ACID QUANTIFICATION: NEGATIVE NG/ML
SPECIMEN DRAWN SERPL: NEGATIVE NG/ML
TAPENTADOL UR QL CFM: NEGATIVE NG/ML
TEMAZEPAM UR QL CFM: NEGATIVE NG/ML
TEMAZEPAM UR QL CFM: NEGATIVE NG/ML
TRAMADOL UR QL CFM: NEGATIVE NG/ML
URATE/CREAT 24H UR: NEGATIVE NG/ML

## 2023-07-18 ENCOUNTER — LAB REQUISITION (OUTPATIENT)
Dept: LAB | Facility: HOSPITAL | Age: 73
End: 2023-07-18
Payer: MEDICARE

## 2023-07-18 ENCOUNTER — HOSPITAL ENCOUNTER (OUTPATIENT)
Dept: INFUSION CENTER | Facility: HOSPITAL | Age: 73
Discharge: HOME | End: 2023-07-18
Attending: INTERNAL MEDICINE
Payer: MEDICARE

## 2023-07-18 VITALS — TEMPERATURE: 97.6 F

## 2023-07-18 DIAGNOSIS — Z45.2 ENCOUNTER FOR CARE RELATED TO PORT-A-CATH: ICD-10-CM

## 2023-07-18 DIAGNOSIS — E05.90 THYROTOXICOSIS, UNSPECIFIED WITHOUT THYROTOXIC CRISIS OR STORM: ICD-10-CM

## 2023-07-18 DIAGNOSIS — C79.10 METASTATIC UROTHELIAL CARCINOMA (HCC): Primary | ICD-10-CM

## 2023-07-18 DIAGNOSIS — C65.2 CANCER OF LEFT RENAL PELVIS (HCC): ICD-10-CM

## 2023-07-18 DIAGNOSIS — E78.2 MIXED HYPERLIPIDEMIA: ICD-10-CM

## 2023-07-18 DIAGNOSIS — E53.8 VITAMIN B 12 DEFICIENCY: ICD-10-CM

## 2023-07-18 DIAGNOSIS — Z12.5 ENCOUNTER FOR SCREENING FOR MALIGNANT NEOPLASM OF PROSTATE: ICD-10-CM

## 2023-07-18 LAB
ALBUMIN SERPL BCP-MCNC: 3.7 G/DL (ref 3.5–5)
ALBUMIN SERPL BCP-MCNC: 3.7 G/DL (ref 3.5–5)
ALP SERPL-CCNC: 70 U/L (ref 34–104)
ALP SERPL-CCNC: 71 U/L (ref 34–104)
ALT SERPL W P-5'-P-CCNC: 11 U/L (ref 7–52)
ALT SERPL W P-5'-P-CCNC: 11 U/L (ref 7–52)
ANION GAP SERPL CALCULATED.3IONS-SCNC: 6 MMOL/L
AST SERPL W P-5'-P-CCNC: 11 U/L (ref 13–39)
AST SERPL W P-5'-P-CCNC: 11 U/L (ref 13–39)
BASOPHILS # BLD AUTO: 0.03 THOUSANDS/ÂΜL (ref 0–0.1)
BASOPHILS NFR BLD AUTO: 1 % (ref 0–1)
BILIRUB DIRECT SERPL-MCNC: 0.26 MG/DL (ref 0–0.2)
BILIRUB SERPL-MCNC: 0.86 MG/DL (ref 0.2–1)
BILIRUB SERPL-MCNC: 0.87 MG/DL (ref 0.2–1)
BUN SERPL-MCNC: 18 MG/DL (ref 5–25)
CALCIUM SERPL-MCNC: 8.7 MG/DL (ref 8.4–10.2)
CHLORIDE SERPL-SCNC: 105 MMOL/L (ref 96–108)
CO2 SERPL-SCNC: 28 MMOL/L (ref 21–32)
CREAT SERPL-MCNC: 1.47 MG/DL (ref 0.6–1.3)
EOSINOPHIL # BLD AUTO: 0.19 THOUSAND/ÂΜL (ref 0–0.61)
EOSINOPHIL NFR BLD AUTO: 3 % (ref 0–6)
ERYTHROCYTE [DISTWIDTH] IN BLOOD BY AUTOMATED COUNT: 13.6 % (ref 11.6–15.1)
GFR SERPL CREATININE-BSD FRML MDRD: 46 ML/MIN/1.73SQ M
GLUCOSE SERPL-MCNC: 114 MG/DL (ref 65–140)
HCT VFR BLD AUTO: 38.2 % (ref 36.5–49.3)
HGB BLD-MCNC: 12.1 G/DL (ref 12–17)
IMM GRANULOCYTES # BLD AUTO: 0.01 THOUSAND/UL (ref 0–0.2)
IMM GRANULOCYTES NFR BLD AUTO: 0 % (ref 0–2)
LDLC SERPL DIRECT ASSAY-MCNC: 56 MG/DL (ref 0–100)
LYMPHOCYTES # BLD AUTO: 1.43 THOUSANDS/ÂΜL (ref 0.6–4.47)
LYMPHOCYTES NFR BLD AUTO: 23 % (ref 14–44)
MAGNESIUM SERPL-MCNC: 1.7 MG/DL (ref 1.9–2.7)
MCH RBC QN AUTO: 30.7 PG (ref 26.8–34.3)
MCHC RBC AUTO-ENTMCNC: 31.7 G/DL (ref 31.4–37.4)
MCV RBC AUTO: 97 FL (ref 82–98)
MONOCYTES # BLD AUTO: 0.58 THOUSAND/ÂΜL (ref 0.17–1.22)
MONOCYTES NFR BLD AUTO: 9 % (ref 4–12)
NEUTROPHILS # BLD AUTO: 3.98 THOUSANDS/ÂΜL (ref 1.85–7.62)
NEUTS SEG NFR BLD AUTO: 64 % (ref 43–75)
NRBC BLD AUTO-RTO: 0 /100 WBCS
PLATELET # BLD AUTO: 158 THOUSANDS/UL (ref 149–390)
PMV BLD AUTO: 9.9 FL (ref 8.9–12.7)
POTASSIUM SERPL-SCNC: 4 MMOL/L (ref 3.5–5.3)
PROT SERPL-MCNC: 5.6 G/DL (ref 6.4–8.4)
PROT SERPL-MCNC: 6 G/DL (ref 6.4–8.4)
PSA SERPL-MCNC: 0.91 NG/ML (ref 0–4)
RBC # BLD AUTO: 3.94 MILLION/UL (ref 3.88–5.62)
SODIUM SERPL-SCNC: 139 MMOL/L (ref 135–147)
TSH SERPL DL<=0.05 MIU/L-ACNC: 3.11 UIU/ML (ref 0.45–4.5)
VIT B12 SERPL-MCNC: 1500 PG/ML (ref 180–914)
WBC # BLD AUTO: 6.22 THOUSAND/UL (ref 4.31–10.16)

## 2023-07-18 PROCEDURE — 83721 ASSAY OF BLOOD LIPOPROTEIN: CPT | Performed by: INTERNAL MEDICINE

## 2023-07-18 PROCEDURE — 80053 COMPREHEN METABOLIC PANEL: CPT

## 2023-07-18 PROCEDURE — 80076 HEPATIC FUNCTION PANEL: CPT | Performed by: INTERNAL MEDICINE

## 2023-07-18 PROCEDURE — 84443 ASSAY THYROID STIM HORMONE: CPT | Performed by: INTERNAL MEDICINE

## 2023-07-18 PROCEDURE — 84153 ASSAY OF PSA TOTAL: CPT | Performed by: INTERNAL MEDICINE

## 2023-07-18 PROCEDURE — 83735 ASSAY OF MAGNESIUM: CPT

## 2023-07-18 PROCEDURE — 82607 VITAMIN B-12: CPT

## 2023-07-18 PROCEDURE — 85025 COMPLETE CBC W/AUTO DIFF WBC: CPT

## 2023-07-18 NOTE — PROGRESS NOTES
Port flushed freely. Good blood return noted. Central labs drawn per orders. Port deaccessed without issue. Patient tolerated well. AVS given to patient. Patient ambulated off unit without incident. All personal belongings taken with patient.

## 2023-07-26 ENCOUNTER — HOSPITAL ENCOUNTER (OUTPATIENT)
Dept: NUCLEAR MEDICINE | Facility: HOSPITAL | Age: 73
Discharge: HOME | End: 2023-07-26
Payer: MEDICARE

## 2023-07-26 DIAGNOSIS — C79.10 METASTATIC UROTHELIAL CARCINOMA (HCC): ICD-10-CM

## 2023-07-26 DIAGNOSIS — C67.8 MALIGNANT NEOPLASM OF OVERLAPPING SITES OF BLADDER (HCC): ICD-10-CM

## 2023-07-26 LAB — GLUCOSE SERPL-MCNC: 96 MG/DL (ref 65–140)

## 2023-07-26 PROCEDURE — 82948 REAGENT STRIP/BLOOD GLUCOSE: CPT

## 2023-07-26 PROCEDURE — G1004 CDSM NDSC: HCPCS

## 2023-07-26 PROCEDURE — A9552 F18 FDG: HCPCS

## 2023-07-26 PROCEDURE — 78815 PET IMAGE W/CT SKULL-THIGH: CPT

## 2023-07-27 RX ORDER — EAR PLUGS
EACH OTIC (EAR)
COMMUNITY
Start: 2023-04-12

## 2023-07-28 ENCOUNTER — CONSULT (OUTPATIENT)
Dept: SURGERY | Facility: CLINIC | Age: 73
End: 2023-07-28
Payer: MEDICARE

## 2023-07-28 VITALS
SYSTOLIC BLOOD PRESSURE: 120 MMHG | WEIGHT: 210 LBS | BODY MASS INDEX: 30.06 KG/M2 | TEMPERATURE: 97.2 F | HEIGHT: 70 IN | OXYGEN SATURATION: 96 % | RESPIRATION RATE: 18 BRPM | HEART RATE: 66 BPM | DIASTOLIC BLOOD PRESSURE: 70 MMHG

## 2023-07-28 DIAGNOSIS — K43.2 INCISIONAL HERNIA, WITHOUT OBSTRUCTION OR GANGRENE: Primary | ICD-10-CM

## 2023-07-28 DIAGNOSIS — K46.9 ABDOMINAL HERNIA: ICD-10-CM

## 2023-07-28 PROCEDURE — 99214 OFFICE O/P EST MOD 30 MIN: CPT | Performed by: SURGERY

## 2023-07-28 RX ORDER — PANTOPRAZOLE SODIUM 40 MG/1
TABLET, DELAYED RELEASE ORAL
COMMUNITY
Start: 2023-07-27

## 2023-07-28 RX ORDER — SODIUM CHLORIDE, SODIUM LACTATE, POTASSIUM CHLORIDE, CALCIUM CHLORIDE 600; 310; 30; 20 MG/100ML; MG/100ML; MG/100ML; MG/100ML
125 INJECTION, SOLUTION INTRAVENOUS CONTINUOUS
OUTPATIENT
Start: 2023-07-28

## 2023-07-28 NOTE — ASSESSMENT & PLAN NOTE
The patient is a pleasant 44-year-old male with a complex past medical and history significant for hypertension, atrial fibrillation, stage III CKD, left renal cell carcinoma and bladder carcinoma presenting with a large upper midline ventral incisional hernia status post exploratory laparotomy and small bowel resection in October 2022. On physical examination the patient is a well-nourished well-developed male. He is in no acute distress. Pleasant competent reliable as a historian. He has a large reducible upper midline ventral incisional hernia. He may have a smaller left lower quadrant ventral incisional hernia status post nephrectomy. The options benefits risks and alternatives to laparoscopic mesh repair were explained to the patient as were the multiplicity of risks related to anesthesia, bleeding, infection, the use of mesh, 10% risk of recurrent hernia and 1% risk of iatrogenic bowel injury. Alternatives to surgery including the option for no surgery was also discussed as an alternative with the patient and his wife. All questions answered to the satisfaction of the patient and the patient wishes to proceed with definitive repair.

## 2023-07-28 NOTE — H&P (VIEW-ONLY)
Assessment/Plan:    Incisional hernia, without obstruction or gangrene  The patient is a pleasant 69-year-old male with a complex past medical and history significant for hypertension, atrial fibrillation, stage III CKD, left renal cell carcinoma and bladder carcinoma presenting with a large upper midline ventral incisional hernia status post exploratory laparotomy and small bowel resection in October 2022. On physical examination the patient is a well-nourished well-developed male. He is in no acute distress. Bin competent reliable as a historian. He has a large reducible upper midline ventral incisional hernia. He may have a smaller left lower quadrant ventral incisional hernia status post nephrectomy. The options benefits risks and alternatives to laparoscopic mesh repair were explained to the patient as were the multiplicity of risks related to anesthesia, bleeding, infection, the use of mesh, 10% risk of recurrent hernia and 1% risk of iatrogenic bowel injury. Alternatives to surgery including the option for no surgery was also discussed as an alternative with the patient and his wife. All questions answered to the satisfaction of the patient and the patient wishes to proceed with definitive repair. Subjective:     Patient ID: Luis Alberto Romero is a 75 y.o. male. Patient is here today for a Abdomnial wall hernia that he has been dealing with for about 6 months. Patient wife states it develop maybe two months after a left ing hernia operation that was done in 10/2022. The hernia pinched his intestine causing the intestine to burst which then later caused the pt to go sepsis. . The surgeon at the time notice the abd wall hernia but couldn't place any mesh due to the bacteria. Over time the hernia has gotten larger. Patient has a bulge the size of a melon and states he suffers a sharp pain and feel uncomfortable after eating.  Patient denies nausea/vomiting, diarrhea/constipation, trouble with drinking fluids, trouble urinating/ burning urination or fevers/chills. Patient is also dx with urethral carcinoma (Bladder cancer) patient is not on any treatment at the moment. Every 3 months he does a pet scan. Patient also had his left kidney removed in 2020 and states his health started declining since that operation. Martin HERNANDEZ MA      The following portions of the patient's history were reviewed and updated as appropriate: allergies, current medications, past family history, past medical history, past social history, past surgical history and problem list.    Review of Systems   Constitutional: Negative for chills and fever. HENT: Negative for ear pain and sore throat. Eyes: Negative for pain and visual disturbance. Respiratory: Negative for cough and shortness of breath. Cardiovascular: Negative for chest pain and palpitations. Gastrointestinal: Negative for abdominal pain and vomiting. Genitourinary: Negative for dysuria and hematuria. Musculoskeletal: Positive for gait problem. Negative for arthralgias and back pain. Skin: Negative for color change and rash. Neurological: Negative for seizures and syncope. All other systems reviewed and are negative. Objective:      /70 (BP Location: Left arm, Patient Position: Sitting, Cuff Size: Standard)   Pulse 66   Temp (!) 97.2 °F (36.2 °C) (Temporal)   Resp 18   Ht 5' 10" (1.778 m)   Wt 95.3 kg (210 lb)   SpO2 96%   BMI 30.13 kg/m²         Physical Exam  Vitals and nursing note reviewed. Constitutional:       Appearance: He is well-developed. HENT:      Head: Normocephalic and atraumatic. Eyes:      Conjunctiva/sclera: Conjunctivae normal.      Pupils: Pupils are equal, round, and reactive to light. Cardiovascular:      Rate and Rhythm: Normal rate and regular rhythm. Pulmonary:      Effort: Pulmonary effort is normal.      Breath sounds: Normal breath sounds.    Abdominal:      General: Bowel sounds are normal.      Palpations: Abdomen is soft. Musculoskeletal:         General: Normal range of motion. Cervical back: Normal range of motion and neck supple. Skin:     General: Skin is warm and dry. Neurological:      Mental Status: He is alert and oriented to person, place, and time. Psychiatric:         Behavior: Behavior normal.         Thought Content:  Thought content normal.         Judgment: Judgment normal.

## 2023-07-28 NOTE — H&P
Assessment/Plan:    Incisional hernia, without obstruction or gangrene  The patient is a pleasant 59-year-old male with a complex past medical and history significant for hypertension, atrial fibrillation, stage III CKD, left renal cell carcinoma and bladder carcinoma presenting with a large upper midline ventral incisional hernia status post exploratory laparotomy and small bowel resection in October 2022. On physical examination the patient is a well-nourished well-developed male. He is in no acute distress. Bin competent reliable as a historian. He has a large reducible upper midline ventral incisional hernia. He may have a smaller left lower quadrant ventral incisional hernia status post nephrectomy. The options benefits risks and alternatives to laparoscopic mesh repair were explained to the patient as were the multiplicity of risks related to anesthesia, bleeding, infection, the use of mesh, 10% risk of recurrent hernia and 1% risk of iatrogenic bowel injury. Alternatives to surgery including the option for no surgery was also discussed as an alternative with the patient and his wife. All questions answered to the satisfaction of the patient and the patient wishes to proceed with definitive repair. Subjective:     Patient ID: Elmer Lucas is a 75 y.o. male. Patient is here today for a Abdomnial wall hernia that he has been dealing with for about 6 months. Patient wife states it develop maybe two months after a left ing hernia operation that was done in 10/2022. The hernia pinched his intestine causing the intestine to burst which then later caused the pt to go sepsis. . The surgeon at the time notice the abd wall hernia but couldn't place any mesh due to the bacteria. Over time the hernia has gotten larger. Patient has a bulge the size of a melon and states he suffers a sharp pain and feel uncomfortable after eating.  Patient denies nausea/vomiting, diarrhea/constipation, trouble with drinking fluids, trouble urinating/ burning urination or fevers/chills. Patient is also dx with urethral carcinoma (Bladder cancer) patient is not on any treatment at the moment. Every 3 months he does a pet scan. Patient also had his left kidney removed in 2020 and states his health started declining since that operation. Martin HERNANDEZ MA      The following portions of the patient's history were reviewed and updated as appropriate: allergies, current medications, past family history, past medical history, past social history, past surgical history and problem list.    Review of Systems   Constitutional: Negative for chills and fever. HENT: Negative for ear pain and sore throat. Eyes: Negative for pain and visual disturbance. Respiratory: Negative for cough and shortness of breath. Cardiovascular: Negative for chest pain and palpitations. Gastrointestinal: Negative for abdominal pain and vomiting. Genitourinary: Negative for dysuria and hematuria. Musculoskeletal: Positive for gait problem. Negative for arthralgias and back pain. Skin: Negative for color change and rash. Neurological: Negative for seizures and syncope. All other systems reviewed and are negative. Objective:      /70 (BP Location: Left arm, Patient Position: Sitting, Cuff Size: Standard)   Pulse 66   Temp (!) 97.2 °F (36.2 °C) (Temporal)   Resp 18   Ht 5' 10" (1.778 m)   Wt 95.3 kg (210 lb)   SpO2 96%   BMI 30.13 kg/m²         Physical Exam  Vitals and nursing note reviewed. Constitutional:       Appearance: He is well-developed. HENT:      Head: Normocephalic and atraumatic. Eyes:      Conjunctiva/sclera: Conjunctivae normal.      Pupils: Pupils are equal, round, and reactive to light. Cardiovascular:      Rate and Rhythm: Normal rate and regular rhythm. Pulmonary:      Effort: Pulmonary effort is normal.      Breath sounds: Normal breath sounds.    Abdominal:      General: Bowel sounds are normal.      Palpations: Abdomen is soft. Musculoskeletal:         General: Normal range of motion. Cervical back: Normal range of motion and neck supple. Skin:     General: Skin is warm and dry. Neurological:      Mental Status: He is alert and oriented to person, place, and time. Psychiatric:         Behavior: Behavior normal.         Thought Content:  Thought content normal.         Judgment: Judgment normal.

## 2023-07-28 NOTE — PROGRESS NOTES
Assessment/Plan:    Incisional hernia, without obstruction or gangrene  The patient is a pleasant 78-year-old male with a complex past medical and history significant for hypertension, atrial fibrillation, stage III CKD, left renal cell carcinoma and bladder carcinoma presenting with a large upper midline ventral incisional hernia status post exploratory laparotomy and small bowel resection in October 2022. On physical examination the patient is a well-nourished well-developed male. He is in no acute distress. Bin competent reliable as a historian. He has a large reducible upper midline ventral incisional hernia. He may have a smaller left lower quadrant ventral incisional hernia status post nephrectomy. The options benefits risks and alternatives to laparoscopic mesh repair were explained to the patient as were the multiplicity of risks related to anesthesia, bleeding, infection, the use of mesh, 10% risk of recurrent hernia and 1% risk of iatrogenic bowel injury. Alternatives to surgery including the option for no surgery was also discussed as an alternative with the patient and his wife. All questions answered to the satisfaction of the patient and the patient wishes to proceed with definitive repair. Subjective:      Patient ID: Euna Dubin is a 75 y.o. male. Patient is here today for a Abdomnial wall hernia that he has been dealing with for about 6 months. Patient wife states it develop maybe two months after a left ing hernia operation that was done in 10/2022. The hernia pinched his intestine causing the intestine to burst which then later caused the pt to go sepsis. . The surgeon at the time notice the abd wall hernia but couldn't place any mesh due to the bacteria. Over time the hernia has gotten larger. Patient has a bulge the size of a melon and states he suffers a sharp pain and feel uncomfortable after eating.  Patient denies nausea/vomiting, diarrhea/constipation, trouble with drinking fluids, trouble urinating/ burning urination or fevers/chills. Patient is also dx with urethral carcinoma (Bladder cancer) patient is not on any treatment at the moment. Every 3 months he does a pet scan. Patient also had his left kidney removed in 2020 and states his health started declining since that operation. Martin HERNANDEZ MA      The following portions of the patient's history were reviewed and updated as appropriate: allergies, current medications, past family history, past medical history, past social history, past surgical history and problem list.    Review of Systems   Constitutional: Negative for chills and fever. HENT: Negative for ear pain and sore throat. Eyes: Negative for pain and visual disturbance. Respiratory: Negative for cough and shortness of breath. Cardiovascular: Negative for chest pain and palpitations. Gastrointestinal: Negative for abdominal pain and vomiting. Genitourinary: Negative for dysuria and hematuria. Musculoskeletal: Positive for gait problem. Negative for arthralgias and back pain. Skin: Negative for color change and rash. Neurological: Negative for seizures and syncope. All other systems reviewed and are negative. Objective:      /70 (BP Location: Left arm, Patient Position: Sitting, Cuff Size: Standard)   Pulse 66   Temp (!) 97.2 °F (36.2 °C) (Temporal)   Resp 18   Ht 5' 10" (1.778 m)   Wt 95.3 kg (210 lb)   SpO2 96%   BMI 30.13 kg/m²          Physical Exam  Vitals and nursing note reviewed. Constitutional:       Appearance: He is well-developed. HENT:      Head: Normocephalic and atraumatic. Eyes:      Conjunctiva/sclera: Conjunctivae normal.      Pupils: Pupils are equal, round, and reactive to light. Cardiovascular:      Rate and Rhythm: Normal rate and regular rhythm. Pulmonary:      Effort: Pulmonary effort is normal.      Breath sounds: Normal breath sounds.    Abdominal:      General: Bowel sounds are normal.      Palpations: Abdomen is soft. Musculoskeletal:         General: Normal range of motion. Cervical back: Normal range of motion and neck supple. Skin:     General: Skin is warm and dry. Neurological:      Mental Status: He is alert and oriented to person, place, and time. Psychiatric:         Behavior: Behavior normal.         Thought Content:  Thought content normal.         Judgment: Judgment normal.

## 2023-07-28 NOTE — LETTER
July 28, 2023     David Espinoza DO  [address]    Patient: Jaskaran Quijano   YOB: 1950   Date of Visit: 7/28/2023       Dear Dr. Hershall Litten: Thank you for referring Rupal Smiling to me for evaluation. Below are my notes for this consultation. If you have questions, please do not hesitate to call me. I look forward to following your patient along with you. Sincerely,        Marylen Delay, MD        CC: No Recipients    Marylen Delay, MD  7/28/2023  8:42 AM  Sign when Signing Visit  Assessment/Plan:    Incisional hernia, without obstruction or gangrene  The patient is a pleasant 53-year-old male with a complex past medical and history significant for hypertension, atrial fibrillation, stage III CKD, left renal cell carcinoma and bladder carcinoma presenting with a large upper midline ventral incisional hernia status post exploratory laparotomy and small bowel resection in October 2022. On physical examination the patient is a well-nourished well-developed male. He is in no acute distress. Pleasant competent reliable as a historian. He has a large reducible upper midline ventral incisional hernia. He may have a smaller left lower quadrant ventral incisional hernia status post nephrectomy. The options benefits risks and alternatives to laparoscopic mesh repair were explained to the patient as were the multiplicity of risks related to anesthesia, bleeding, infection, the use of mesh, 10% risk of recurrent hernia and 1% risk of iatrogenic bowel injury. Alternatives to surgery including the option for no surgery was also discussed as an alternative with the patient and his wife. All questions answered to the satisfaction of the patient and the patient wishes to proceed with definitive repair. Subjective:     Patient ID: Jaskaran Quijano is a 75 y.o. male.     Patient is here today for a Abdomnial wall hernia that he has been dealing with for about 6 months. Patient wife states it develop maybe two months after a left ing hernia operation that was done in 10/2022. The hernia pinched his intestine causing the intestine to burst which then later caused the pt to go sepsis. . The surgeon at the time notice the abd wall hernia but couldn't place any mesh due to the bacteria. Over time the hernia has gotten larger. Patient has a bulge the size of a melon and states he suffers a sharp pain and feel uncomfortable after eating. Patient denies nausea/vomiting, diarrhea/constipation, trouble with drinking fluids, trouble urinating/ burning urination or fevers/chills. Patient is also dx with urethral carcinoma (Bladder cancer) patient is not on any treatment at the moment. Every 3 months he does a pet scan. Patient also had his left kidney removed in 2020 and states his health started declining since that operation. Martin HERNANDEZ MA      The following portions of the patient's history were reviewed and updated as appropriate: allergies, current medications, past family history, past medical history, past social history, past surgical history and problem list.    Review of Systems   Constitutional: Negative for chills and fever. HENT: Negative for ear pain and sore throat. Eyes: Negative for pain and visual disturbance. Respiratory: Negative for cough and shortness of breath. Cardiovascular: Negative for chest pain and palpitations. Gastrointestinal: Negative for abdominal pain and vomiting. Genitourinary: Negative for dysuria and hematuria. Musculoskeletal: Positive for gait problem. Negative for arthralgias and back pain. Skin: Negative for color change and rash. Neurological: Negative for seizures and syncope. All other systems reviewed and are negative.        Objective:      /70 (BP Location: Left arm, Patient Position: Sitting, Cuff Size: Standard)   Pulse 66   Temp (!) 97.2 °F (36.2 °C) (Temporal)   Resp 18   Ht 5' 10" (1.778 m)   Wt 95.3 kg (210 lb)   SpO2 96%   BMI 30.13 kg/m²         Physical Exam  Vitals and nursing note reviewed. Constitutional:       Appearance: He is well-developed. HENT:      Head: Normocephalic and atraumatic. Eyes:      Conjunctiva/sclera: Conjunctivae normal.      Pupils: Pupils are equal, round, and reactive to light. Cardiovascular:      Rate and Rhythm: Normal rate and regular rhythm. Pulmonary:      Effort: Pulmonary effort is normal.      Breath sounds: Normal breath sounds. Abdominal:      General: Bowel sounds are normal.      Palpations: Abdomen is soft. Musculoskeletal:         General: Normal range of motion. Cervical back: Normal range of motion and neck supple. Skin:     General: Skin is warm and dry. Neurological:      Mental Status: He is alert and oriented to person, place, and time. Psychiatric:         Behavior: Behavior normal.         Thought Content:  Thought content normal.         Judgment: Judgment normal.

## 2023-08-02 ENCOUNTER — HOSPITAL ENCOUNTER (OUTPATIENT)
Dept: RADIOLOGY | Facility: HOSPITAL | Age: 73
Discharge: HOME | End: 2023-08-02
Payer: MEDICARE

## 2023-08-02 ENCOUNTER — OFFICE VISIT (OUTPATIENT)
Dept: LAB | Facility: HOSPITAL | Age: 73
End: 2023-08-02
Payer: MEDICARE

## 2023-08-02 DIAGNOSIS — K43.2 INCISIONAL HERNIA, WITHOUT OBSTRUCTION OR GANGRENE: ICD-10-CM

## 2023-08-02 LAB
ATRIAL RATE: 57 BPM
P AXIS: 58 DEGREES
PR INTERVAL: 208 MS
QRS AXIS: -7 DEGREES
QRSD INTERVAL: 94 MS
QT INTERVAL: 450 MS
QTC INTERVAL: 438 MS
T WAVE AXIS: 37 DEGREES
VENTRICULAR RATE: 57 BPM

## 2023-08-02 PROCEDURE — 93010 ELECTROCARDIOGRAM REPORT: CPT | Performed by: INTERNAL MEDICINE

## 2023-08-02 PROCEDURE — 93005 ELECTROCARDIOGRAM TRACING: CPT

## 2023-08-02 PROCEDURE — 71046 X-RAY EXAM CHEST 2 VIEWS: CPT

## 2023-08-04 ENCOUNTER — TELEPHONE (OUTPATIENT)
Age: 73
End: 2023-08-04

## 2023-08-04 NOTE — TELEPHONE ENCOUNTER
Caller: patient    Doctor: Sariah Barragan    Reason for call: having surgery, please cancel procedure, will c/b to r/s    Call back#: [phone]

## 2023-08-07 ENCOUNTER — ANESTHESIA EVENT (OUTPATIENT)
Dept: PERIOP | Facility: HOSPITAL | Age: 73
End: 2023-08-07
Payer: MEDICARE

## 2023-08-08 ENCOUNTER — OFFICE VISIT (OUTPATIENT)
Dept: HEMATOLOGY ONCOLOGY | Facility: CLINIC | Age: 73
End: 2023-08-08
Payer: MEDICARE

## 2023-08-08 VITALS
DIASTOLIC BLOOD PRESSURE: 70 MMHG | HEIGHT: 70 IN | TEMPERATURE: 97.4 F | BODY MASS INDEX: 29.78 KG/M2 | OXYGEN SATURATION: 94 % | SYSTOLIC BLOOD PRESSURE: 120 MMHG | HEART RATE: 54 BPM | RESPIRATION RATE: 18 BRPM | WEIGHT: 208 LBS

## 2023-08-08 DIAGNOSIS — C67.3 MALIGNANT NEOPLASM OF ANTERIOR WALL OF BLADDER (HCC): ICD-10-CM

## 2023-08-08 DIAGNOSIS — R79.89 ELEVATED SERUM CREATININE: ICD-10-CM

## 2023-08-08 DIAGNOSIS — C79.10 METASTATIC UROTHELIAL CARCINOMA (HCC): Primary | ICD-10-CM

## 2023-08-08 DIAGNOSIS — N18.31 STAGE 3A CHRONIC KIDNEY DISEASE (HCC): ICD-10-CM

## 2023-08-08 PROCEDURE — 99214 OFFICE O/P EST MOD 30 MIN: CPT | Performed by: INTERNAL MEDICINE

## 2023-08-08 NOTE — PRE-PROCEDURE INSTRUCTIONS
Pre-Surgery Instructions:   Medication Instructions   • acetaminophen (TYLENOL) 500 mg tablet Uses PRN- OK to take day of surgery   • amiodarone 200 mg tablet Take day of surgery. • ascorbic acid (VITAMIN C) 500 MG tablet Stop taking   • atorvastatin (LIPITOR) 40 mg tablet Take night before surgery   • capsicum (ZOSTRIX) 0.075 % topical cream Hold day of surgery. • Cyanocobalamin (Vitamin B-12) 1000 MCG/15ML LIQD Stop taking   • diltiazem (CARDIZEM) 60 mg tablet Take day of surgery. • famotidine (PEPCID) 20 mg tablet Uses PRN- OK to take day of surgery   • finasteride (PROSCAR) 5 mg tablet Take day of surgery. • folic acid (FOLVITE) 1 mg tablet Stop taking   • HYDROcodone-acetaminophen (Norco) 7.5-325 mg per tablet Uses PRN- OK to take day of surgery   • naloxone (NARCAN) 4 mg/0.1 mL nasal spray PRN   • nystatin (MYCOSTATIN) cream Hold day of surgery. • pantoprazole (PROTONIX) 40 mg tablet Take day of surgery. • senna (SENOKOT) 8.6 mg Hold day of surgery. • tadalafil (CIALIS) 20 MG tablet Hold day of surgery. • tamsulosin (FLOMAX) 0.4 mg Take night before surgery   • VITAMIN D PO Stop taking   • zolpidem (AMBIEN) 5 mg tablet Take night before surgeryif needed     Review with patient's wife Carla Lee  via phone medications and showering instructions. Advised don't take NSAID's, ok tylenol products. Advised ASC call with surgery schedule time, nothing eat or drink after midnight. Verbalized understanding.

## 2023-08-08 NOTE — PROGRESS NOTES
Hematology/Oncology Outpatient Follow-up  Vargas Rodriguez 75 y.o. male 1950 [id number]    Date:  8/8/2023        Assessment and Plan:  1. Metastatic urothelial carcinoma ([address])  The patient was educated about the recent PET CT scan. We did review the imaging extensively. He does seem to have 1 lesion in the spleen with hypermetabolic activity and right clavicular area. He had his last enfortumab vedotin treatment around September 2022. The patient was told that the activity of the metastatic disease on the recent PET CT scan is not impressive or bulky whatsoever. The patient does not seem to be interested in any type of treatment at any case. However, he seems to be interested in close monitoring with imaging. We did discuss pursuing another PET CT scan in 6 months for close monitoring according to his wish. - CBC and differential; Future  - Comprehensive metabolic panel; Future  - Magnesium  - NM PET CT skull base to mid thigh; Future    2. Elevated serum creatinine    - Comprehensive metabolic panel; Future    3. Stage 3a chronic kidney disease ([address])      4. Malignant neoplasm of anterior wall of bladder (HCC)    - NM PET CT skull base to mid thigh; Future        HPI:  The patient came in today for follow-up visit accompanied by his wife. He continues to be off of any treatment for his metastatic urothelial carcinoma. He did have a PET CT scan for follow-up on 7/26/2023 which showed:  IMPRESSION:  1. Mildly increased FDG uptake in the hypermetabolic nodular foci along the posterior splenic border, suspicious for metastases. Continued PET/CT follow-up recommended. 2. Persistent hypermetabolic focus along the medial right clavicular cortex and adjacent soft tissue. Metastasis not excluded. No new hypermetabolic osseous lesions. 3. Minimal change in right basilar infiltrate/atelectasis with mild FDG activity. New hypermetabolic density in the left posterior lung base may be inflammatory. Attention on follow-up. 4. No significant change in nodular hypermetabolism within the posterior right shoulder soft tissues. Viable metastasis is not excluded. 5. Additional incidental findings as above, for which continued follow-up is also recommended. Oncology History Overview Note   Patient has a history of hypertension, hyperlipidemia, chronic lower back pain. He had an MRI of the lower spine for further evaluation of his chronic lower back pain. The MRI on 12/02/2019 revealed Multiple left renal masses to include a left lower pole cyst and a rounded structure in the left upper pole which may also represent cyst.  Left upper pole renal masses approximately 3 cm in greatest linear dimension. A CT with renal protocol was done on 12/12/2019 which also showed the infiltrating lesion in the upper pole of the left kidney measuring about the 3.5 cm in greatest dimension without any hint of retroperitoneal lymphadenopathy. A CT scan of the abdomen pelvis on 01/14/2020 showed the same findings with the mild hydronephrosis on the left. The urine cytology on 01/03/2020 showed high-grade urothelial carcinoma. A cystoscopy was then done, a biopsy was taken from the left renal pelvic region which showed high-grade urothelial carcinoma without evidence of lamina propria invasion. The detrusor muscle/muscularis propria were not present for evaluation. The recommendation was to pursue left robotic assisted laparoscopic nephroureterectomy with bladder cuff excision which was done on 04/01/2020. The final pathology revealed;  - Invasive high grade urothelial carcinoma arising in renal pelvis. - Bladder cuff margin is negative for carcinoma and no evidence of high grade dysplasia. - Ureters with no significant pathologic abnormality  - Two benign simple cysts. - Adrenal gland is negative for malignancy. - One lymph node, negative for malignancy (0/1).   The tumor size was 4.5 cm with invasion beyond the muscularis into the periurethral fat or peripelvic fat or the renal parenchyma. The margins were uninvolved by carcinoma or carcinoma in situ. The final pathology was pT3 pN0. Patient barely tolerated 1 cycle of adjuvant chemotherapy with split dose cisplatin and gemcitabine with a lot of side effects. The treatment had to be discontinued due to significant worsening renal dysfunction 6/2020. Patient started to complain about significant abdominal/left inguinal pain around August/September 2020. Unfortunately repeat imaging revealed recurrent/metastatic disease. 9/4/20 CT C/A/P- Status post left nephrectomy for resection of urothelial neoplasm. Lymphadenopathy in the left nephrectomy bed has increased since the prior examination, concerning for metastatic disease. Ill-defined hyperattenuating masslike focus in the left rectus muscle, not identified on the prior examination. This is suspicious for a metastatic lesion. A rectus hematoma is also considered. 9/23/20 PET scan- 1. Multiple FDG avid lymph nodes in the retrocrural region, retroperitoneum and the left renal bed compatible with metastasis. These have progressed from recent CT. 2.  FDG avid mass involving the left rectus abdominal musculature compatible with metastasis which is larger from recent CT. 3. Several small lymph nodes adjacent to the mid to distal esophagus with FDG uptake suspicious for metastasis. Small focus of FDG uptake also suggested in the right hilar region, metastasis is not excluded. This could be reassessed on follow-up. 4.  Subtle focus of FDG uptake at the T2 level on the left, nonspecific, could be related to early degenerative changes, developing metastasis is not entirely excluded. This could be reassessed on follow-up. 5. Prostate is mildly prominent with heterogeneous FDG uptake. This could be inflammatory. Correlate for prostatitis.      He completed palliative radiation to the left abdomen 12/3/20     His PET scan from 08/16/2021 showed progression of his disease with hypermetabolic soft tissue lesions at the level of the right shoulder and right axilla with interval progression of the retroperitoneal and mesenteric hypermetabolic metastasis. He did have the new lesion to his right upper back/shoulder which was causing significant localized pain. This excised by his surgeon 08/09/2021. Pathology confirmed metastatic high-grade carcinoma consistent with his no primary urothelial carcinoma. He received palliative radiation to his right shoulder/axilla region. Urothelial cancer ([address])   1/3/2020 Biopsy    Final Diagnosis    A. Urine, Clean Catch, Thin Prep:  High grade urothelial carcinoma ([facility]UC) - see comment. 1/3/2020 Initial Diagnosis    Urothelial cancer ([address])  T3 N0 (stage IIIA)     1/17/2020 Biopsy    Final Diagnosis    A. Ureter, Right, left renal pelvis biopsy  -Fragments of high grade urothelial carcinoma   -No evidence of lamina propria invasion.  -Detrusor muscle/ muscularis propria is not present for evaluation. B. Urinary Bladder, bladder biopsy:  -Fragments of low grade papillary lesion. See note  -No evidence of invasion seen  -Unremarkable fragment of detrusor muscle seen. 4/1/2020 Surgery    left robotic assisted laparoscopic nephroureterectomy with bladder cuff excision     Final Diagnosis    A. Left kidney, ureter, and bladder cuff, nephroureterectomy:  - Invasive high grade urothelial carcinoma arising in renal pelvis. - Bladder cuff margin is negative for carcinoma and no evidence of high grade dysplasia. - Ureters with no significant pathologic abnormality  - Two benign simple cysts. - Adrenal gland is negative for malignancy. - One lymph node, negative for malignancy (0/1).         5/14/2020 - 6/3/2020 Chemotherapy    CISplatin (PLATINOL) split dose 35 mg/m2 = 75.3 mg (50 % of original dose 70 mg/m2), Intravenous,   Administration: 75.3 mg (5/14/2020), 75.3 mg (5/21/2020)  gemcitabine (GEMZAR) 2,200 mg in sodium chloride 0.9 % 250 mL infusion, 2,150.2 mg (80 % of original dose 1,250 mg/m2), Intravenous,   Administration: 2,200 mg (5/14/2020), 2,200 mg (5/21/2020)    (only completed 1 cycle- d/c d/t adverse effects and worsening renal dysfunction)     10/9/2020 - 9/9/2021 Chemotherapy    pembrolizumab (KEYTRUDA) IVPB, 200 mg, Intravenous, Once, 16 of 20 cycles  Administration: 200 mg (10/9/2020), 200 mg (10/30/2020), 200 mg (11/20/2020), 200 mg (12/11/2020), 200 mg (12/31/2020), 200 mg (1/22/2021), 200 mg (2/12/2021), 200 mg (3/5/2021), 200 mg (3/26/2021), 200 mg (4/16/2021), 200 mg (5/7/2021), 200 mg (5/28/2021), 200 mg (6/18/2021), 200 mg (7/9/2021), 200 mg (7/30/2021), 200 mg (8/20/2021)     11/19/2020 - 12/3/2020 Radiation    Treatment:  Course: C1    Plan ID Energy Fractions Dose per Fraction (cGy) Dose Correction (cGy) Total Dose Delivered (cGy) Elapsed Days   L Abdomen 6X 10 / 10 300 0 3,000 14        9/10/2021 -  Chemotherapy    enfortumab vedotin-ejfv (PADCEV) IVPB, 1.25 mg/kg = 114 mg, Intravenous, Once, 11 of 16 cycles  Dose modification: 1 mg/kg (original dose 1.25 mg/kg, Cycle 7, Reason: Other (Must fill in a comment), Comment: dose reduction due to side effects ), 1.25 mg/kg (original dose 1.25 mg/kg, Cycle 7, Reason: Other (Must fill in a comment), Comment: patient wants full dose ), 1 mg/kg (original dose 1.25 mg/kg, Cycle 7, Reason: Neuropathy), 0.75 mg/kg (original dose 1.25 mg/kg, Cycle 11, Reason: Neuropathy)  Administration: 114 mg (9/10/2021), 114 mg (9/17/2021), 110 mg (10/8/2021), 110 mg (9/24/2021), 110 mg (10/15/2021), 110 mg (11/12/2021), 110 mg (10/22/2021), 110 mg (11/19/2021), 110 mg (12/10/2021), 110 mg (11/26/2021), 110 mg (12/17/2021), 110 mg (1/7/2022), 110 mg (12/23/2021), 110 mg (1/14/2022), 90 mg (4/8/2022), 90 mg (4/15/2022), 90 mg (4/22/2022), 110 mg (1/21/2022), 110 mg (2/18/2022), 110 mg (2/25/2022), 90 mg (5/13/2022), 90 mg (5/20/2022), 90 mg (5/27/2022), 90 mg (3/4/2022), 90 mg (6/10/2022), 90 mg (6/17/2022), 90 mg (6/24/2022), 90 mg (7/8/2022), 90 mg (7/15/2022), 90 mg (7/22/2022), 68 mg (8/19/2022), 70 mg (9/2/2022)     Cancer of left renal pelvis ([address])   4/23/2020 Initial Diagnosis    Cancer of left renal pelvis ([address])     10/9/2020 - 9/9/2021 Chemotherapy    pembrolizumab (KEYTRUDA) IVPB, 200 mg, Intravenous, Once, 16 of 20 cycles  Administration: 200 mg (10/9/2020), 200 mg (10/30/2020), 200 mg (11/20/2020), 200 mg (12/11/2020), 200 mg (12/31/2020), 200 mg (1/22/2021), 200 mg (2/12/2021), 200 mg (3/5/2021), 200 mg (3/26/2021), 200 mg (4/16/2021), 200 mg (5/7/2021), 200 mg (5/28/2021), 200 mg (6/18/2021), 200 mg (7/9/2021), 200 mg (7/30/2021), 200 mg (8/20/2021)     9/10/2021 -  Chemotherapy    enfortumab vedotin-ejfv (PADCEV) IVPB, 1.25 mg/kg = 114 mg, Intravenous, Once, 11 of 16 cycles  Dose modification: 1 mg/kg (original dose 1.25 mg/kg, Cycle 7, Reason: Other (Must fill in a comment), Comment: dose reduction due to side effects ), 1.25 mg/kg (original dose 1.25 mg/kg, Cycle 7, Reason: Other (Must fill in a comment), Comment: patient wants full dose ), 1 mg/kg (original dose 1.25 mg/kg, Cycle 7, Reason: Neuropathy), 0.75 mg/kg (original dose 1.25 mg/kg, Cycle 11, Reason: Neuropathy)  Administration: 114 mg (9/10/2021), 114 mg (9/17/2021), 110 mg (10/8/2021), 110 mg (9/24/2021), 110 mg (10/15/2021), 110 mg (11/12/2021), 110 mg (10/22/2021), 110 mg (11/19/2021), 110 mg (12/10/2021), 110 mg (11/26/2021), 110 mg (12/17/2021), 110 mg (1/7/2022), 110 mg (12/23/2021), 110 mg (1/14/2022), 90 mg (4/8/2022), 90 mg (4/15/2022), 90 mg (4/22/2022), 110 mg (1/21/2022), 110 mg (2/18/2022), 110 mg (2/25/2022), 90 mg (5/13/2022), 90 mg (5/20/2022), 90 mg (5/27/2022), 90 mg (3/4/2022), 90 mg (6/10/2022), 90 mg (6/17/2022), 90 mg (6/24/2022), 90 mg (7/8/2022), 90 mg (7/15/2022), 90 mg (7/22/2022), 68 mg (8/19/2022), 70 mg (9/2/2022)      Surgery       Metastatic urothelial carcinoma ([address])   8/9/2021 Initial Diagnosis    Metastatic urothelial carcinoma ([address])     9/8/2021 - 9/21/2021 Radiation    Treatment:  Course: C2    Plan ID Energy Fractions Dose per Fraction (cGy) Dose Correction (cGy) Total Dose Delivered (cGy) Elapsed Days   R Axil_Shl # 6X 10 / 10 300 0 3,000 13      Treatment dates:  C2: 9/8/2021 - 9/21/2021     9/10/2021 -  Chemotherapy    enfortumab vedotin-ejfv (PADCEV) IVPB, 1.25 mg/kg = 114 mg, Intravenous, Once, 11 of 16 cycles  Dose modification: 1 mg/kg (original dose 1.25 mg/kg, Cycle 7, Reason: Other (Must fill in a comment), Comment: dose reduction due to side effects ), 1.25 mg/kg (original dose 1.25 mg/kg, Cycle 7, Reason: Other (Must fill in a comment), Comment: patient wants full dose ), 1 mg/kg (original dose 1.25 mg/kg, Cycle 7, Reason: Neuropathy), 0.75 mg/kg (original dose 1.25 mg/kg, Cycle 11, Reason: Neuropathy)  Administration: 114 mg (9/10/2021), 114 mg (9/17/2021), 110 mg (10/8/2021), 110 mg (9/24/2021), 110 mg (10/15/2021), 110 mg (11/12/2021), 110 mg (10/22/2021), 110 mg (11/19/2021), 110 mg (12/10/2021), 110 mg (11/26/2021), 110 mg (12/17/2021), 110 mg (1/7/2022), 110 mg (12/23/2021), 110 mg (1/14/2022), 90 mg (4/8/2022), 90 mg (4/15/2022), 90 mg (4/22/2022), 110 mg (1/21/2022), 110 mg (2/18/2022), 110 mg (2/25/2022), 90 mg (5/13/2022), 90 mg (5/20/2022), 90 mg (5/27/2022), 90 mg (3/4/2022), 90 mg (6/10/2022), 90 mg (6/17/2022), 90 mg (6/24/2022), 90 mg (7/8/2022), 90 mg (7/15/2022), 90 mg (7/22/2022), 68 mg (8/19/2022), 70 mg (9/2/2022)         Interval history:    ROS: Review of Systems   Constitutional: Positive for fatigue. Negative for chills and fever. HENT: Positive for hearing loss. Negative for ear pain and sore throat. Eyes: Negative for pain and visual disturbance. Respiratory: Positive for shortness of breath. Negative for cough.     Cardiovascular: Negative for chest pain and palpitations. Gastrointestinal: Negative for abdominal pain and vomiting. Genitourinary: Negative for dysuria and hematuria. Musculoskeletal: Negative for arthralgias and back pain. Skin: Negative for color change and rash. Neurological: Positive for numbness. Negative for seizures and syncope. Psychiatric/Behavioral: Positive for sleep disturbance. All other systems reviewed and are negative.       Past Medical History:   Diagnosis Date   • A-fib [facility])    • Arthritis    • Bladder cancer    • Cancer ([address])     skin melanoma;basal cell   • Chronic pain disorder     from arthritis   • Colon polyp    • Does use hearing aid     bilat   • Dry eyes, bilateral    • GERD (gastroesophageal reflux disease)    • History of kidney stones 10/2019   • History of partial knee replacement     bilat   • History of transfusion    • History of vertigo    • Hyperlipidemia    • Hypertension    • Infusion extravasation of chemotherapy vesicant 11/2021   • Kidney lesion    • Lumbar disc disorder     compression of vertebrae L4-5-6   • Muscle weakness     left hip area   • Renal mass     left   • Right ankle injury 03/05/2020    missed step of ladder    • Right ankle pain    • Shortness of breath     with activity   • Tinnitus    • Urothelial cancer     left   • Wears glasses        Past Surgical History:   Procedure Laterality Date   • COLONOSCOPY     • CYSTOSCOPY Left 04/01/2020    Procedure: CYSTOSCOPY; URETERAL CATHETER PLACEMENT;  Surgeon: Mulu Merida MD;  Location[facility];  Service: Urology   • CYSTOSCOPY  05/11/2020   • CYSTOSCOPY  06/04/2021   • FL CYSTOGRAM  04/13/2020   • FL RETROGRADE PYELOGRAM  01/17/2020   • HERNIA REPAIR  77/52/0675    umbilical with mesh   • INGUINAL HERNIA REPAIR Right     with mesh   • IR PORT PLACEMENT  04/30/2020   • JOINT REPLACEMENT Bilateral     partials knee   • LUMBAR EPIDURAL INJECTION     • AR CYSTO BLADDER W/URETERAL CATHETERIZATION Bilateral 01/17/2020    Procedure: CYSTOSCOPY; RIGHT RETROGRADE PYELOGRAM WITH RIGHT URETERAL CYTOLOGY SAMPLING; LEFT URETEROSCOPY WITH RENAL PELVIS BIOPSY AND LEFT STENT PLACEMENT;  Surgeon: Gabo Raymond MD;  [facility];  Service: Urology   • W[place] LAPAROSCOPY NEPHRECTOMY W/TOTAL URETERECTOMY Left 2020    Procedure: ROBOTIC LAPAROSCOPIC NEPHRO-URETERECTOMY;  Surgeon: Gabo Raymond MD;  Location[facility];  Service: Urology   • SKIN CANCER EXCISION      surface melanoma   • TONSILLECTOMY     • WISDOM TOOTH EXTRACTION         Social History     Socioeconomic History   • Marital status: /Civil Union     Spouse name: None   • Number of children: None   • Years of education: None   • Highest education level: None   Occupational History   • None   Tobacco Use   • Smoking status: Former     Types: Cigarettes     Quit date:      Years since quittin.6   • Smokeless tobacco: Never   Vaping Use   • Vaping Use: Never used   Substance and Sexual Activity   • Alcohol use: Not Currently     Comment: socially, rarely   • Drug use: Never   • Sexual activity: Not Currently   Other Topics Concern   • None   Social History Narrative    Daily caffeine use - 2 cups coffee      Social Determinants of Health     Financial Resource Strain: Not on file   Food Insecurity: Not on file   Transportation Needs: Not on file   Physical Activity: Not on file   Stress: Not on file   Social Connections: Not on file   Intimate Partner Violence: Not on file   Housing Stability: Not on file       Family History   Problem Relation Age of Onset   • Liver cancer Father        Allergies   Allergen Reactions   • Poison Ivy Extract Rash         Current Outpatient Medications:   •  acetaminophen (TYLENOL) 500 mg tablet, Take 2 tablets (1,000 mg total) by mouth every 8 (eight) hours (Patient taking differently: Take 1,000 mg by mouth if needed), Disp: , Rfl:   •  amiodarone 200 mg tablet, Take 200 mg by mouth daily, Disp: , Rfl:   •  ascorbic acid (VITAMIN C) 500 MG tablet, Take 1 tablet by mouth Every 12 hours, Disp: , Rfl:   •  atorvastatin (LIPITOR) 40 mg tablet, Take 40 mg by mouth daily at bedtime, Disp: , Rfl:   •  capsicum (ZOSTRIX) 0.075 % topical cream, Apply topically 3 (three) times a day, Disp: 28.3 g, Rfl: 0  •  Cyanocobalamin (Vitamin B-12) 1000 MCG/15ML LIQD, , Disp: , Rfl:   •  diltiazem (CARDIZEM) 60 mg tablet, 2 (two) times a day, Disp: , Rfl:   •  famotidine (PEPCID) 20 mg tablet, Take 20 mg by mouth daily as needed, Disp: , Rfl:   •  finasteride (PROSCAR) 5 mg tablet, Take 5 mg by mouth daily, Disp: , Rfl:   •  folic acid (FOLVITE) 1 mg tablet, take 1 tablet by mouth once daily, Disp: 90 tablet, Rfl: 4  •  HYDROcodone-acetaminophen (Norco) 7.5-325 mg per tablet, Take 1 tablet by mouth 2 (two) times a day as needed for pain Max Daily Amount: 2 tablets, Disp: 60 tablet, Rfl: 0  •  HYDROcodone-acetaminophen (Norco) 7.5-325 mg per tablet, Take 1 tablet by mouth 2 (two) times a day as needed for pain Max Daily Amount: 2 tablets Do not start before August 8, 2023., Disp: 60 tablet, Rfl: 0  •  naloxone (NARCAN) 4 mg/0.1 mL nasal spray, Administer 1 spray into a nostril.  If breathing does not return to normal or if breathing difficulty resumes after 2-3 minutes, give another dose in the other nostril using a new spray., Disp: 1 each, Rfl: 1  •  nystatin (MYCOSTATIN) cream, Apply topically 2 (two) times a day, Disp: 30 g, Rfl: 0  •  pantoprazole (PROTONIX) 40 mg tablet, Take 40 mg by mouth daily, Disp: , Rfl:   •  senna (SENOKOT) 8.6 mg, Take 1 tablet (8.6 mg total) by mouth daily at bedtime, Disp: 30 each, Rfl: 0  •  tadalafil (CIALIS) 20 MG tablet, Take one tablet by mouth one hour before sexual activity, Disp: 15 tablet, Rfl: 3  •  tamsulosin (FLOMAX) 0.4 mg, Take 1 capsule (0.4 mg total) by mouth daily with dinner, Disp: 30 capsule, Rfl: 12  •  VITAMIN D PO, Take 1,000 Units by mouth daily , Disp: , Rfl:   •  zolpidem (AMBIEN) 5 mg tablet, Take 1 tablet (5 mg total) by mouth daily at bedtime as needed for sleep, Disp: 30 tablet, Rfl: 1      Physical Exam:  /70 (BP Location: Left arm, Patient Position: Sitting, Cuff Size: Adult)   Pulse (!) 54   Temp (!) 97.4 °F (36.3 °C)   Resp 18   Ht 5' 10" (1.778 m)   Wt 94.3 kg (208 lb)   SpO2 94%   BMI 29.84 kg/m²     Physical Exam  Constitutional:       Appearance: He is well-developed. HENT:      Head: Normocephalic and atraumatic. Nose: Nose normal.   Eyes:      General: No scleral icterus. Right eye: No discharge. Left eye: No discharge. Conjunctiva/sclera: Conjunctivae normal.      Pupils: Pupils are equal, round, and reactive to light. Neck:      Thyroid: No thyromegaly. Trachea: No tracheal deviation. Cardiovascular:      Rate and Rhythm: Normal rate and regular rhythm. Heart sounds: Normal heart sounds. No murmur heard. No friction rub. Pulmonary:      Effort: Pulmonary effort is normal. No respiratory distress. Breath sounds: Normal breath sounds. No wheezing or rales. Chest:      Chest wall: No tenderness. Abdominal:      General: There is no distension. Palpations: Abdomen is soft. There is no hepatomegaly or splenomegaly. Tenderness: There is no abdominal tenderness. There is no guarding or rebound. Musculoskeletal:         General: No tenderness or deformity. Normal range of motion. Cervical back: Normal range of motion and neck supple. Lymphadenopathy:      Cervical: No cervical adenopathy. Skin:     General: Skin is warm and dry. Coloration: Skin is not pale. Findings: No erythema or rash. Neurological:      Mental Status: He is alert and oriented to person, place, and time. Cranial Nerves: No cranial nerve deficit. Coordination: Coordination normal.      Deep Tendon Reflexes: Reflexes are normal and symmetric.    Psychiatric:         Behavior: Behavior normal.         Thought Content: Thought content normal.         Judgment: Judgment normal.           Labs:  Lab Results   Component Value Date    WBC 6.22 07/18/2023    HGB 12.1 07/18/2023    HCT 38.2 07/18/2023    MCV 97 07/18/2023     07/18/2023     Lab Results   Component Value Date    K 4.0 07/18/2023     07/18/2023    CO2 28 07/18/2023    BUN 18 07/18/2023    CREATININE 1.47 (H) 07/18/2023    GLUF 84 12/26/2022    CALCIUM 8.7 07/18/2023    CORRECTEDCA 9.0 06/21/2022    AST 11 (L) 07/18/2023    AST 11 (L) 07/18/2023    ALT 11 07/18/2023    ALT 11 07/18/2023    ALKPHOS 70 07/18/2023    ALKPHOS 71 07/18/2023    EGFR 46 07/18/2023     No results found for: "TSH"    Patient voiced understanding and agreement in the above discussion. Aware to contact our office with questions/symptoms in the interim.

## 2023-08-09 NOTE — PLAN OF CARE
Problem: INFECTION - ADULT  Goal: Absence or prevention of progression during hospitalization  Description  INTERVENTIONS:  - Assess and monitor for signs and symptoms of infection  - Monitor lab/diagnostic results  - Monitor all insertion sites, i e  indwelling lines, tubes, and drains  - Monitor endotracheal if appropriate and nasal secretions for changes in amount and color  - [place] appropriate cooling/warming therapies per order  - Administer medications as ordered  - Instruct and encourage patient and family to use good hand hygiene technique  - Identify and instruct in appropriate isolation precautions for identified infection/condition  Outcome: Progressing     Problem: Knowledge Deficit  Goal: Patient/family/caregiver demonstrates understanding of disease process, treatment plan, medications, and discharge instructions  Description  Complete learning assessment and assess knowledge base    Interventions:  - Provide teaching at level of understanding  - Provide teaching via preferred learning methods  Outcome: Progressing
20 y/o F here with 2 days of left lower quadrant pain and 2 weeks of brown vaginal discharge with negative STI testing. On exam patient has moderate LLQ tenderness. DDx includes cyst vs cyst rupture vs torsion. Plan for labs, ultrasound, and reassess.

## 2023-08-10 ENCOUNTER — ANESTHESIA (OUTPATIENT)
Dept: PERIOP | Facility: HOSPITAL | Age: 73
End: 2023-08-10
Payer: MEDICARE

## 2023-08-10 ENCOUNTER — HOSPITAL ENCOUNTER (OUTPATIENT)
Facility: HOSPITAL | Age: 73
Setting detail: OUTPATIENT SURGERY
Discharge: HOME | End: 2023-08-10
Attending: SURGERY | Admitting: SURGERY
Payer: MEDICARE

## 2023-08-10 VITALS
RESPIRATION RATE: 21 BRPM | OXYGEN SATURATION: 96 % | SYSTOLIC BLOOD PRESSURE: 161 MMHG | WEIGHT: 206 LBS | BODY MASS INDEX: 29.56 KG/M2 | DIASTOLIC BLOOD PRESSURE: 80 MMHG | TEMPERATURE: 97 F | HEART RATE: 60 BPM

## 2023-08-10 DIAGNOSIS — K43.2 INCISIONAL HERNIA, WITHOUT OBSTRUCTION OR GANGRENE: Primary | ICD-10-CM

## 2023-08-10 PROCEDURE — C1781 MESH (IMPLANTABLE): HCPCS | Performed by: SURGERY

## 2023-08-10 PROCEDURE — 49595 RPR AA HRN 1ST > 10 RDC: CPT | Performed by: SURGERY

## 2023-08-10 DEVICE — FIXATION SECURESTRAP 5 MM ABSORB DISP: Type: IMPLANTABLE DEVICE | Site: ABDOMEN | Status: FUNCTIONAL

## 2023-08-10 DEVICE — VENTRALIGHT ST MESH WITH ECHO PS POSITIONING SYSTEM
Type: IMPLANTABLE DEVICE | Site: ABDOMEN | Status: FUNCTIONAL
Brand: VENTRALIGHT ST MESH WITH ECHO PS POSITIONING SYSTEM

## 2023-08-10 RX ORDER — OXYCODONE HYDROCHLORIDE 5 MG/1
5 TABLET ORAL EVERY 4 HOURS PRN
Status: DISCONTINUED | OUTPATIENT
Start: 2023-08-10 | End: 2023-08-11 | Stop reason: HOSPADM

## 2023-08-10 RX ORDER — FENTANYL CITRATE/PF 50 MCG/ML
25 SYRINGE (ML) INJECTION
Status: DISCONTINUED | OUTPATIENT
Start: 2023-08-10 | End: 2023-08-10 | Stop reason: HOSPADM

## 2023-08-10 RX ORDER — HYDROMORPHONE HCL/PF 1 MG/ML
0.2 SYRINGE (ML) INJECTION
Status: DISCONTINUED | OUTPATIENT
Start: 2023-08-10 | End: 2023-08-10 | Stop reason: HOSPADM

## 2023-08-10 RX ORDER — EPHEDRINE SULFATE 50 MG/ML
INJECTION INTRAVENOUS AS NEEDED
Status: DISCONTINUED | OUTPATIENT
Start: 2023-08-10 | End: 2023-08-10

## 2023-08-10 RX ORDER — LIDOCAINE HYDROCHLORIDE 10 MG/ML
0.5 INJECTION, SOLUTION EPIDURAL; INFILTRATION; INTRACAUDAL; PERINEURAL ONCE AS NEEDED
Status: DISCONTINUED | OUTPATIENT
Start: 2023-08-10 | End: 2023-08-11 | Stop reason: HOSPADM

## 2023-08-10 RX ORDER — DEXAMETHASONE SODIUM PHOSPHATE 10 MG/ML
INJECTION, SOLUTION INTRAMUSCULAR; INTRAVENOUS AS NEEDED
Status: DISCONTINUED | OUTPATIENT
Start: 2023-08-10 | End: 2023-08-10

## 2023-08-10 RX ORDER — CEFAZOLIN SODIUM 2 G/50ML
2000 SOLUTION INTRAVENOUS ONCE
Status: COMPLETED | OUTPATIENT
Start: 2023-08-10 | End: 2023-08-10

## 2023-08-10 RX ORDER — ACETAMINOPHEN 325 MG/1
650 TABLET ORAL EVERY 6 HOURS PRN
Status: DISCONTINUED | OUTPATIENT
Start: 2023-08-10 | End: 2023-08-11 | Stop reason: HOSPADM

## 2023-08-10 RX ORDER — ONDANSETRON 2 MG/ML
INJECTION INTRAMUSCULAR; INTRAVENOUS AS NEEDED
Status: DISCONTINUED | OUTPATIENT
Start: 2023-08-10 | End: 2023-08-10

## 2023-08-10 RX ORDER — HYDROMORPHONE HCL/PF 1 MG/ML
SYRINGE (ML) INJECTION
Status: COMPLETED
Start: 2023-08-10 | End: 2023-08-10

## 2023-08-10 RX ORDER — ONDANSETRON 2 MG/ML
4 INJECTION INTRAMUSCULAR; INTRAVENOUS ONCE AS NEEDED
Status: DISCONTINUED | OUTPATIENT
Start: 2023-08-10 | End: 2023-08-10 | Stop reason: HOSPADM

## 2023-08-10 RX ORDER — PROPOFOL 10 MG/ML
INJECTION, EMULSION INTRAVENOUS AS NEEDED
Status: DISCONTINUED | OUTPATIENT
Start: 2023-08-10 | End: 2023-08-10

## 2023-08-10 RX ORDER — FENTANYL CITRATE 50 UG/ML
INJECTION, SOLUTION INTRAMUSCULAR; INTRAVENOUS AS NEEDED
Status: DISCONTINUED | OUTPATIENT
Start: 2023-08-10 | End: 2023-08-10

## 2023-08-10 RX ORDER — HYDROMORPHONE HCL/PF 1 MG/ML
0.5 SYRINGE (ML) INJECTION
Status: DISCONTINUED | OUTPATIENT
Start: 2023-08-10 | End: 2023-08-11 | Stop reason: HOSPADM

## 2023-08-10 RX ORDER — MIDAZOLAM HYDROCHLORIDE 2 MG/2ML
INJECTION, SOLUTION INTRAMUSCULAR; INTRAVENOUS AS NEEDED
Status: DISCONTINUED | OUTPATIENT
Start: 2023-08-10 | End: 2023-08-10

## 2023-08-10 RX ORDER — MAGNESIUM HYDROXIDE 1200 MG/15ML
LIQUID ORAL AS NEEDED
Status: DISCONTINUED | OUTPATIENT
Start: 2023-08-10 | End: 2023-08-10 | Stop reason: HOSPADM

## 2023-08-10 RX ORDER — ROCURONIUM BROMIDE 10 MG/ML
INJECTION, SOLUTION INTRAVENOUS AS NEEDED
Status: DISCONTINUED | OUTPATIENT
Start: 2023-08-10 | End: 2023-08-10

## 2023-08-10 RX ORDER — BUPIVACAINE HYDROCHLORIDE 5 MG/ML
INJECTION, SOLUTION EPIDURAL; INTRACAUDAL AS NEEDED
Status: DISCONTINUED | OUTPATIENT
Start: 2023-08-10 | End: 2023-08-10 | Stop reason: HOSPADM

## 2023-08-10 RX ORDER — SODIUM CHLORIDE, SODIUM LACTATE, POTASSIUM CHLORIDE, CALCIUM CHLORIDE 600; 310; 30; 20 MG/100ML; MG/100ML; MG/100ML; MG/100ML
125 INJECTION, SOLUTION INTRAVENOUS CONTINUOUS
Status: DISCONTINUED | OUTPATIENT
Start: 2023-08-10 | End: 2023-08-10

## 2023-08-10 RX ORDER — OXYCODONE HYDROCHLORIDE AND ACETAMINOPHEN 5; 325 MG/1; MG/1
1 TABLET ORAL EVERY 4 HOURS PRN
Qty: 12 TABLET | Refills: 0 | Status: SHIPPED | OUTPATIENT
Start: 2023-08-10 | End: 2023-08-20

## 2023-08-10 RX ORDER — LIDOCAINE HYDROCHLORIDE 20 MG/ML
INJECTION, SOLUTION EPIDURAL; INFILTRATION; INTRACAUDAL; PERINEURAL AS NEEDED
Status: DISCONTINUED | OUTPATIENT
Start: 2023-08-10 | End: 2023-08-10

## 2023-08-10 RX ADMIN — PROPOFOL 150 MG: 10 INJECTION, EMULSION INTRAVENOUS at 12:54

## 2023-08-10 RX ADMIN — HYDROMORPHONE HYDROCHLORIDE 0.5 MG: 1 INJECTION, SOLUTION INTRAMUSCULAR; INTRAVENOUS; SUBCUTANEOUS at 14:44

## 2023-08-10 RX ADMIN — SODIUM CHLORIDE, SODIUM LACTATE, POTASSIUM CHLORIDE, AND CALCIUM CHLORIDE: .6; .31; .03; .02 INJECTION, SOLUTION INTRAVENOUS at 14:09

## 2023-08-10 RX ADMIN — FENTANYL CITRATE 25 MCG: 50 INJECTION, SOLUTION INTRAMUSCULAR; INTRAVENOUS at 15:00

## 2023-08-10 RX ADMIN — CEFAZOLIN SODIUM 2000 MG: 2 SOLUTION INTRAVENOUS at 12:32

## 2023-08-10 RX ADMIN — ROCURONIUM BROMIDE 40 MG: 10 INJECTION, SOLUTION INTRAVENOUS at 12:54

## 2023-08-10 RX ADMIN — Medication 0.5 MG: at 14:44

## 2023-08-10 RX ADMIN — FENTANYL CITRATE 50 MCG: 50 INJECTION, SOLUTION INTRAMUSCULAR; INTRAVENOUS at 12:54

## 2023-08-10 RX ADMIN — DEXAMETHASONE SODIUM PHOSPHATE 8 MG: 10 INJECTION, SOLUTION INTRAMUSCULAR; INTRAVENOUS at 12:54

## 2023-08-10 RX ADMIN — EPHEDRINE SULFATE 10 MG: 50 INJECTION, SOLUTION INTRAVENOUS at 13:02

## 2023-08-10 RX ADMIN — EPHEDRINE SULFATE 10 MG: 50 INJECTION, SOLUTION INTRAVENOUS at 13:09

## 2023-08-10 RX ADMIN — SUGAMMADEX 200 MG: 100 INJECTION, SOLUTION INTRAVENOUS at 14:15

## 2023-08-10 RX ADMIN — FENTANYL CITRATE 50 MCG: 50 INJECTION, SOLUTION INTRAMUSCULAR; INTRAVENOUS at 13:20

## 2023-08-10 RX ADMIN — SODIUM CHLORIDE, SODIUM LACTATE, POTASSIUM CHLORIDE, AND CALCIUM CHLORIDE 125 ML/HR: .6; .31; .03; .02 INJECTION, SOLUTION INTRAVENOUS at 11:14

## 2023-08-10 RX ADMIN — LIDOCAINE HYDROCHLORIDE 100 MG: 20 INJECTION, SOLUTION EPIDURAL; INFILTRATION; INTRACAUDAL; PERINEURAL at 12:54

## 2023-08-10 RX ADMIN — ROCURONIUM BROMIDE 10 MG: 10 INJECTION, SOLUTION INTRAVENOUS at 13:31

## 2023-08-10 RX ADMIN — ONDANSETRON 4 MG: 2 INJECTION INTRAMUSCULAR; INTRAVENOUS at 13:20

## 2023-08-10 RX ADMIN — FENTANYL CITRATE 25 MCG: 50 INJECTION, SOLUTION INTRAMUSCULAR; INTRAVENOUS at 15:10

## 2023-08-10 RX ADMIN — MIDAZOLAM 2 MG: 1 INJECTION INTRAMUSCULAR; INTRAVENOUS at 12:48

## 2023-08-10 NOTE — INTERVAL H&P NOTE
H&P reviewed. After examining the patient I find no changes in the patients condition since the H&P had been written.     Vitals:    08/10/23 1102   BP: 169/98   Pulse: 72   Resp: 18   Temp: (!) 96.3 °F (35.7 °C)   SpO2: 96%

## 2023-08-10 NOTE — ANESTHESIA PREPROCEDURE EVALUATION
Procedure:  REPAIR HERNIA INCISIONAL LAPAROSCOPIC WITH MESH (Abdomen)    EF 75%, DD1,     PAF well controlled  - on diltiazem + amiodarone    Metastatic urothelial carcinoma    Relevant Problems   CARDIO   (+) HERRERA (dyspnea on exertion)   (+) Essential hypertension   (+) Hyperlipidemia   (+) Paroxysmal atrial fibrillation (HCC)   (+) Sinus bradycardia      /RENAL   (+) Cancer of left renal pelvis (HCC)   (+) Chronic kidney disease-mineral and bone disorder   (+) Stage 3a chronic kidney disease (HCC)      HEMATOLOGY   (+) Normocytic anemia      MUSCULOSKELETAL   (+) Chronic bilateral low back pain without sciatica   (+) Lumbar spondylosis   (+) Primary osteoarthritis of right shoulder   (+) Sacroiliitis (HCC)   (+) 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)      NEURO/PSYCH   (+) Chronic bilateral low back pain without sciatica   (+) Chronic pain syndrome   (+) Chronic right shoulder pain      PULMONARY   (+) HERRERA (dyspnea on exertion)      Other   (+) 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 Additional Monitors:   Airway Plan: ETT. Comment: Risks/benefits and alternatives discussed including likely possibility of PONV and sore throat, as well as the rare possibilities of aspiration, dental/oropharyngeal/ocular injuries, or grave/life threatening anesthetic and surgical emergencies. .       Plan Factors-Exercise tolerance (METS): >4 METS. Patient summary reviewed. Patient instructed to abstain from smoking on day of procedure. Patient did not smoke on day of surgery.         Induction- intravenous. Postoperative Plan- Plan for postoperative opioid use. Planned trial extubation    Informed Consent- Anesthetic plan and risks discussed with patient. I personally reviewed this patient with the CRNA. Discussed and agreed on the Anesthesia Plan with the CRNA. Steve Medrano

## 2023-08-10 NOTE — OP NOTE
OPERATIVE REPORT  PATIENT NAME: Manju Lara    :  1950  MRN: [id number]  Pt Location: [facility] OR ROOM 01    SURGERY DATE: 8/10/2023    Surgeon(s) and Role:     * Jeimy Vásquez MD - Primary     * Carlota Pandey PA-C - Assisting  The PA was necessary to provide expert assistance; i.e. in the form of providing optimal exposure with retraction, suturing, and assistance with dissection in order to perform the most efficient operation and in order to optimize patient safety in the abscence of a qualified surgical resident. Preop Diagnosis:  Abdominal hernia [K46.9]  Incisional hernia, without obstruction or gangrene [K43.2]    Post-Op Diagnosis Codes:     * Abdominal hernia [K46.9]     * Incisional hernia, without obstruction or gangrene [K43.2]    Procedure(s):  REPAIR HERNIA INCISIONAL LAPAROSCOPIC WITH MESH    Specimen(s):  * No specimens in log *    Estimated Blood Loss:   Minimal    Drains:  NG/OG/Enteral Tube Orogastric 18 Fr Center mouth (Active)   Number of days: 0       Urethral Catheter Double-lumen; Latex 16 Fr. (Active)   Number of days: 0       Ureteral Drain/Stent Left ureter 4.7 Fr. (Active)   Number of days: 1301       Anesthesia Type:   General    Operative Indications:  Abdominal hernia [K46.9]  Incisional hernia, without obstruction or gangrene [K43.2]  The patient is a pleasant 18-year-old male presenting with 2 symptomatic ventral incisional hernias 1 in the upper midline and the other in the left lower quadrant for which definitive treatment by laparoscopic mesh repair is now indicated. Operative Findings:  2 incisional hernias-total diameter 23 cm x 18 cm    Hernia repair completed with the use of a 25 cm x 20 cm Ventralight ST mesh. Complications:   None    Procedure and Technique:  The patient was taken to the operating room where they are properly identified, monitored and anesthetized with general endotracheal anesthesia. They received antibiotics perioperatively. Venodynes used for DVT prophylaxis. Time-out performed. Skin prepped and draped. Skin incised in the right upper quadrant. A blunt-tipped 12 mm trocar was advanced into the peritoneal cavity and pneumoperitoneum established to 15 mm of mercury. A 5 mm 30 degree scope was advanced. The 4 quadrants of the peritoneal cavity was inspected laparoscopically. The ventral hernia was identified. A 2nd and 3rd 5 mm trocar was placed in the right lower quadrant. All adhesions of omentum were taken down laparoscopically. The Ventralight STmesh was rolled up and delivered through the left upper quadrant trocar site. A stab wound made in the anterior abdominal wall. The grasping device used to grasp the blue catheter on the mesh. The green lattice was inflated. The mesh brought up to the anterior abdominal wall and oriented. It was secured in 2 rows with absorabable tacks. The green lattice was removed through the left upper quadrant trocar site. One additional working port was placed on the left side of the abdomen to help secure the mesh circumferentially. Satisfied with the lie of the mesh, the procedure completed with closure of the right upper quadrant trocar site with a suture grasping device and an 0 Vicryl suture. The pneumoperitoneum was released. All skin incisions closed with subcuticular 4 Monocryl suture. Wounds infiltrated with 0.5% Marcaine. Wounds dressed. The patient extubated and taken to recovery in stable condition. I was present for the entire procedure.     Patient Disposition:  PACU         SIGNATURE: Syed Barrientos MD  DATE: August 10, 2023  TIME: 2:09 PM

## 2023-08-10 NOTE — NURSING NOTE
Pt meets all criteria for discharge except hasnt voided. Pt doesn't feel any urge to void yet. Bladder scanner shows 55ml urine. Dr Trevor Verma aware. Ok to discharge pt. They understand if unable to void by later tonight, must return to ER.

## 2023-08-10 NOTE — DISCHARGE INSTR - AVS FIRST PAGE
DISCHARGE INSTRUCTIONS:   Light activity   No heavy lifting, limit lifting to 15-20 pounds for 2 weeks   No limitations for diet   Please take medications as prescribed   Please take acetaminophen/ibuprofen scheduled every 4-6 hours for pain  Please take narcotic pain medication as needed for severe pain   Do not drive if taking narcotic pain medication     If surgical glue is over your incisions, this will peel off on its own  Please do not remove glue. You may shower but do not scrub or submerge incisions    Please notify general surgery office if you experience:  Fever over 101.5F  Persistent nausea or vomiting  Severe uncontrolled pain  Redness, tenderness, or signs of infection near incisions (pain, swelling, redness, yellow/green drainage)  Active or persistent bleeding from incisions  Chest pain, shortness of breath     You have a follow up appointment scheduled on 8/25/2023 in our office. Please keep this appointment.    Please call our office with any additional questions or concerns

## 2023-08-10 NOTE — ANESTHESIA POSTPROCEDURE EVALUATION
Post-Op Assessment Note    CV Status:  Stable    Pain management: satisfactory to patient     Mental Status:  Sleepy and arousable   Hydration Status:  Euvolemic   PONV Controlled:  Controlled   Airway Patency:  Patent      Post Op Vitals Reviewed: Yes      Staff: CRNA, Anesthesiologist         No notable events documented.     BP      Temp     Pulse     Resp      SpO2

## 2023-08-24 NOTE — PROGRESS NOTES
Post-Op Note - General Surgery   Saba Alan 75 y.o. male MRN: [id number]  Encounter: [id number]    Assessment/Plan    Incisional hernia, without obstruction or gangrene  Overall doing well after laparoscopic ventral incisional hernia repair. Ongoing intermittent discomfort that comes and goes with activity. Abdomen soft, repair intact. Copy of the op note provided, questions answered, we will plan to see back on an as-needed basis. Diagnoses and all orders for this visit:    Incisional hernia, without obstruction or gangrene        Subjective      Chief Complaint   Patient presents with   • Follow-up     follow up lap inc hernia 8/10/2023     Patient is here today for a follow up lap inc hernia 8/10/2023. Patient states the incisions are healed and denies drainage. Patient states he suffers from slight discomfort . Patient denies nausea/vomiting, diarrhea/constipation, trouble eating/drinking, or fevers/chills. Margarita. JONNIE HERNANDEZ       Review of Systems   All other systems reviewed and are negative. The following portions of the patient's history were reviewed and updated as appropriate: allergies, current medications, past family history, past medical history, past social history, past surgical history and problem list.    Objective      Blood pressure 126/72, pulse 71, temperature (!) 97.3 °F (36.3 °C), temperature source Temporal, resp. rate 18, height 5' 10" (1.778 m), weight 95.3 kg (210 lb), SpO2 97 %. Physical Exam  Vitals and nursing note reviewed. Constitutional:       General: He is not in acute distress. Appearance: He is well-developed. He is not diaphoretic. HENT:      Head: Normocephalic and atraumatic. Eyes:      Conjunctiva/sclera: Conjunctivae normal.      Pupils: Pupils are equal, round, and reactive to light. Pulmonary:      Effort: No respiratory distress. Abdominal:      Comments: Flat soft, mild tenderness, repair intact.   Incisions clean dry and intact, healing well.   Musculoskeletal:         General: Normal range of motion. Cervical back: Normal range of motion. Skin:     General: Skin is warm and dry. Capillary Refill: Capillary refill takes less than 2 seconds. Neurological:      Mental Status: He is alert and oriented to person, place, and time.    Psychiatric:         Behavior: Behavior normal.         Signature:  David Welch PA-C  Date: 9/19/2023 Time: 1:00 PM

## 2023-08-25 ENCOUNTER — OFFICE VISIT (OUTPATIENT)
Dept: SURGERY | Facility: CLINIC | Age: 73
End: 2023-08-25

## 2023-08-25 VITALS
WEIGHT: 210 LBS | SYSTOLIC BLOOD PRESSURE: 126 MMHG | RESPIRATION RATE: 18 BRPM | BODY MASS INDEX: 30.06 KG/M2 | OXYGEN SATURATION: 97 % | TEMPERATURE: 97.3 F | DIASTOLIC BLOOD PRESSURE: 72 MMHG | HEART RATE: 71 BPM | HEIGHT: 70 IN

## 2023-08-25 DIAGNOSIS — K43.2 INCISIONAL HERNIA, WITHOUT OBSTRUCTION OR GANGRENE: Primary | ICD-10-CM

## 2023-08-25 PROCEDURE — 99024 POSTOP FOLLOW-UP VISIT: CPT | Performed by: PHYSICIAN ASSISTANT

## 2023-08-29 ENCOUNTER — HOSPITAL ENCOUNTER (OUTPATIENT)
Dept: INFUSION CENTER | Facility: HOSPITAL | Age: 73
Discharge: HOME | End: 2023-08-29
Attending: INTERNAL MEDICINE
Payer: MEDICARE

## 2023-08-29 ENCOUNTER — OFFICE VISIT (OUTPATIENT)
Dept: UROLOGY | Facility: CLINIC | Age: 73
End: 2023-08-29
Payer: MEDICARE

## 2023-08-29 VITALS
HEART RATE: 64 BPM | WEIGHT: 210 LBS | SYSTOLIC BLOOD PRESSURE: 122 MMHG | OXYGEN SATURATION: 95 % | DIASTOLIC BLOOD PRESSURE: 70 MMHG | HEIGHT: 70 IN | BODY MASS INDEX: 30.06 KG/M2

## 2023-08-29 DIAGNOSIS — C65.2 CANCER OF LEFT RENAL PELVIS (HCC): ICD-10-CM

## 2023-08-29 DIAGNOSIS — C65.2 CANCER OF LEFT RENAL PELVIS (HCC): Primary | ICD-10-CM

## 2023-08-29 DIAGNOSIS — Z45.2 ENCOUNTER FOR CARE RELATED TO PORT-A-CATH: Primary | ICD-10-CM

## 2023-08-29 PROCEDURE — 99213 OFFICE O/P EST LOW 20 MIN: CPT | Performed by: UROLOGY

## 2023-08-29 PROCEDURE — 96523 IRRIG DRUG DELIVERY DEVICE: CPT

## 2023-08-29 RX ORDER — FUROSEMIDE 40 MG/1
TABLET ORAL
COMMUNITY
Start: 2023-08-27

## 2023-08-29 NOTE — PROGRESS NOTES
UROLOGY FOLLOWUP NOTE     CHIEF COMPLAINT   Donny Silva is a 75 y.o. male with a complaint of   Chief Complaint   Patient presents with   • Follow-up     6 month f/u assess Pet scan treatment plan   Left upper tract urothelia carcinoma       History of Present Illness:     75 y.o. male with a history of musculoskeletal back pain. Patient sees a pain management team and has been undergoing injections. MR imaging demonstrated a concerning left-sided renal lesion and the patient underwent a CT urogram.  This demonstrates a endophytic infiltrating lesion, by my review of the films concerning for urothelial carcinoma. The patient has a remote history of smoking more than 40 years ago. Patient did undergo a robotic assisted left nephroureterectomy 4/1/20. Removal of the kidney was somewhat challenging given perihilar fat. Pathology does return T3 disease with invasion into the peripelvic fat. Patient had been sent for preoperative evaluation for neoadjuvant chemotherapy but had deferred. It was recommended the patient return to see medical oncology postoperatively and he did agree to adjuvant chemotherapy. Unfortunately, with some progressive renal dysfunction and concerns about hearing loss, additional chemotherapy was aborted. Patient tolerated 1 cycle only. In September of 2020, the patient was noted to have progressive lymphadenopathy and concern in the left rectus muscle. Patient underwent PET scan which showed activity. Patient was started on Keytruda and recommended to undergo some palliative radiation. He was then adjusted to Padcev. In October, the patient was in [place]. Developed an incisional hernia with strangulated bowel. Patient was critically ill and taken to the operating room locally for resection. He and his wife remained in [place] for many weeks during his recovery. He now returns.     Most recent PET scan does not seem to indicate several areas of activity concerning with recurrence or progression. Recent visit with the medical oncologist to discuss palliative therapy. Unfortunate given the patient's poor performance status, both he and the medical oncology team are not in favor of any additional treatment. He is steadily improved his overall health. Recent repeat PET scan does show some areas of activity but after discussion with medical oncology, the patient defers treatment. He is feeling the best he has since his prior emergent surgery in [place]. In fact, the patient recently underwent hernia revision with mesh and is doing beautifully after surgery earlier this month.     Past Medical History:     Past Medical History:   Diagnosis Date   • A-fib [facility])    • Arthritis    • Bladder cancer    • Cancer ([address])     skin melanoma;basal cell   • Chronic pain disorder     from arthritis   • Colon polyp    • Does use hearing aid     bilat   • Dry eyes, bilateral    • GERD (gastroesophageal reflux disease)    • History of kidney stones 10/2019   • History of partial knee replacement     bilat   • History of transfusion    • History of vertigo    • Hyperlipidemia    • Hypertension    • Infusion extravasation of chemotherapy vesicant 11/2021   • Kidney lesion    • Lumbar disc disorder     compression of vertebrae L4-5-6   • Muscle weakness     left hip area   • Renal mass     left   • Right ankle injury 03/05/2020    missed step of ladder    • Right ankle pain    • Shortness of breath     with activity   • Tinnitus    • Urothelial cancer     left   • Wears glasses        PAST SURGICAL HISTORY:     Past Surgical History:   Procedure Laterality Date   • COLONOSCOPY     • CYSTOSCOPY Left 04/01/2020    Procedure: CYSTOSCOPY; URETERAL CATHETER PLACEMENT;  Surgeon: Sussy Prakash MD;  Location[facility];  Service: Urology   • CYSTOSCOPY  05/11/2020   • CYSTOSCOPY  06/04/2021   • FL CYSTOGRAM  04/13/2020   • FL RETROGRADE PYELOGRAM  01/17/2020   • HERNIA REPAIR 92/38/2443    umbilical with mesh   • INGUINAL HERNIA REPAIR Right     with mesh   • IR PORT PLACEMENT  04/30/2020   • JOINT REPLACEMENT Bilateral     partials knee   • LUMBAR EPIDURAL INJECTION     • NJ CYSTO BLADDER W/URETERAL CATHETERIZATION Bilateral 01/17/2020    Procedure: CYSTOSCOPY; RIGHT RETROGRADE PYELOGRAM WITH RIGHT URETERAL CYTOLOGY SAMPLING; LEFT URETEROSCOPY WITH RENAL PELVIS BIOPSY AND LEFT STENT PLACEMENT;  Surgeon: Doug Jay MD;  [facility];  Service: Urology   • NJ LAPAROSCOPY NEPHRECTOMY W/TOTAL URETERECTOMY Left 04/01/2020    Procedure: ROBOTIC LAPAROSCOPIC NEPHRO-URETERECTOMY;  Surgeon: Doug Jay MD;  [facility];  Service: Urology   • NJ RPR AA HERNIA 1ST < 3 CM REDUCIBLE N/A 8/10/2023    Procedure: REPAIR HERNIA INCISIONAL LAPAROSCOPIC WITH MESH;  Surgeon: Griselda Revere, MD;  Location[facility];  Service: General   • SKIN CANCER EXCISION      surface melanoma   • TONSILLECTOMY     • WISDOM TOOTH EXTRACTION         CURRENT MEDICATIONS:     Current Outpatient Medications   Medication Sig Dispense Refill   • acetaminophen (TYLENOL) 500 mg tablet Take 2 tablets (1,000 mg total) by mouth every 8 (eight) hours (Patient taking differently: Take 1,000 mg by mouth if needed)     • amiodarone 200 mg tablet Take 200 mg by mouth daily     • atorvastatin (LIPITOR) 40 mg tablet Take 40 mg by mouth daily at bedtime     • capsicum (ZOSTRIX) 0.075 % topical cream Apply topically 3 (three) times a day 28.3 g 0   • Cyanocobalamin (Vitamin B-12) 1000 MCG/15ML LIQD      • diltiazem (CARDIZEM) 60 mg tablet 2 (two) times a day     • famotidine (PEPCID) 20 mg tablet Take 20 mg by mouth daily as needed     • finasteride (PROSCAR) 5 mg tablet Take 5 mg by mouth daily     • folic acid (FOLVITE) 1 mg tablet take 1 tablet by mouth once daily 90 tablet 4   • furosemide (LASIX) 40 mg tablet      • HYDROcodone-acetaminophen (Norco) 7.5-325 mg per tablet Take 1 tablet by mouth 2 (two) times a day as needed for pain Max Daily Amount: 2 tablets 60 tablet 0   • naloxone (NARCAN) 4 mg/0.1 mL nasal spray Administer 1 spray into a nostril. If breathing does not return to normal or if breathing difficulty resumes after 2-3 minutes, give another dose in the other nostril using a new spray. 1 each 1   • nystatin (MYCOSTATIN) cream Apply topically 2 (two) times a day 30 g 0   • pantoprazole (PROTONIX) 40 mg tablet Take 40 mg by mouth daily     • senna (SENOKOT) 8.6 mg Take 1 tablet (8.6 mg total) by mouth daily at bedtime 30 each 0   • tadalafil (CIALIS) 20 MG tablet Take one tablet by mouth one hour before sexual activity 15 tablet 3   • tamsulosin (FLOMAX) 0.4 mg Take 1 capsule (0.4 mg total) by mouth daily with dinner 30 capsule 12   • VITAMIN D PO Take 1,000 Units by mouth daily      • zolpidem (AMBIEN) 5 mg tablet Take 1 tablet (5 mg total) by mouth daily at bedtime as needed for sleep 30 tablet 1   • ascorbic acid (VITAMIN C) 500 MG tablet Take 1 tablet by mouth Every 12 hours       No current facility-administered medications for this visit.      Facility-Administered Medications Ordered in Other Visits   Medication Dose Route Frequency Provider Last Rate Last Admin   • alteplase (CATHFLO) injection 2 mg  2 mg Intracatheter Q2H PRN Mitesh Bruce MD           ALLERGIES:     Allergies   Allergen Reactions   • Poison Ivy Extract Rash       SOCIAL HISTORY:     Social History     Socioeconomic History   • Marital status: /Civil Union     Spouse name: None   • Number of children: None   • Years of education: None   • Highest education level: None   Occupational History   • None   Tobacco Use   • Smoking status: Former     Types: Cigarettes     Quit date:      Years since quittin.6   • Smokeless tobacco: Never   Vaping Use   • Vaping Use: Never used   Substance and Sexual Activity   • Alcohol use: Not Currently     Comment: socially, rarely   • Drug use: Never   • Sexual activity: Yes     Partners: Female   Other Topics Concern   • None   Social History Narrative    Daily caffeine use - 2 cups coffee      Social Determinants of Health     Financial Resource Strain: Not on file   Food Insecurity: Not on file   Transportation Needs: Not on file   Physical Activity: Not on file   Stress: Not on file   Social Connections: Not on file   Intimate Partner Violence: Not on file   Housing Stability: Not on file       SOCIAL HISTORY:     Family History   Problem Relation Age of Onset   • Liver cancer Father        REVIEW OF SYSTEMS:     Review of Systems   Constitutional: Negative for activity change. Respiratory: Negative. Cardiovascular: Negative. Gastrointestinal: Negative. Negative for abdominal pain. Genitourinary: Negative. Negative for dysuria, flank pain and hematuria. Musculoskeletal: Positive for gait problem. Skin: Negative for pallor. Psychiatric/Behavioral: Negative. PHYSICAL EXAM:     /70 (BP Location: Left arm, Patient Position: Sitting, Cuff Size: Adult)   Pulse 64   Ht 5' 10" (1.778 m)   Wt 95.3 kg (210 lb)   SpO2 95%   BMI 30.13 kg/m²     General: Patient is pale and more ill-appearing than prior. They have a normal affect. There is not appear to be any gross neurologic defects or abnormalities. HEENT:  Normocephalic, atraumatic. Neck is supple without any palpable lymphadenopathy  Cardiovascular:  Patient has normal palpable distal radial pulses. There is no significant peripheral edema. No JVD is noted. Respiratory:  Patient has unlabored respirations. There is no audible wheeze or rhonchi. Abdomen: Large exploratory laparotomy scar now healed. Recent robotic incisions healing. Abdomen is soft and nontender. There is no tympany. Inguinal and umbilical hernia are not appreciated. Musculoskeletal:  Patient does not have significant CVA tenderness in the  flank with palpation or percussion.   They full range of motion in all 4 extremities. Strength in all 4 extremities appears congruent. Patient is able to ambulate without assistance or difficulty. Dermatologic:  Patient has no skin abnormalities or rashes. LABS:     CBC:   Lab Results   Component Value Date    WBC 6.22 07/18/2023    HGB 12.1 07/18/2023    HCT 38.2 07/18/2023    MCV 97 07/18/2023     07/18/2023       BMP:   Lab Results   Component Value Date    CALCIUM 8.7 07/18/2023    K 4.0 07/18/2023    CO2 28 07/18/2023     07/18/2023    BUN 18 07/18/2023    CREATININE 1.47 (H) 07/18/2023     IMAGING:   PET/CT SCAN     INDICATION: C79.10: Secondary malignant neoplasm of unspecified urinary organs  C67.8: Malignant neoplasm of overlapping sites of bladder, restaging     MODIFIER: PS     COMPARISON: PET/CT 4/18/2023 and prior     CELL TYPE: High-grade urothelial carcinoma     TECHNIQUE:   10.5 mCi F-18-FDG administered IV. Multiplanar attenuation corrected and non attenuation corrected PET images are available for interpretation, and contiguous, low dose, axial CT sections were obtained from the skull base through the femurs. Intravenous contrast material was not utilized. This examination, like all CT scans performed in the [facility], was performed utilizing techniques to minimize radiation dose exposure, including the use of iterative reconstruction and   automated exposure control.     Fasting serum glucose: 96 mg/dl     FINDINGS:     VISUALIZED BRAIN:  No acute abnormalities are seen.     HEAD/NECK:  Nonspecific asymmetric increased activity in the right larynx region as compared to the right, but not significantly changed from the prior exam, SUV 5.1, prior SUV 5.7.  Corresponding left side has an SUV of 3.9, prior SUV 4.     No FDG avid cervical adenopathy is seen.     CT images: Scattered sinus mucosal thickening.     CHEST:  Minimal change in right basilar infiltrate/atelectasis, SUV 3.3, prior SUV 3.1.     New small nodular density/infiltrate in the left posterior lung base, SUV 3.1. This may be inflammatory.     Stable right upper posterior chest wall subcutaneous density with mild FDG activity, SUV 1.6, prior SUV 1.9.     No significant change in nodular hypermetabolism within the posterior right shoulder soft tissues, SUV 3.6, prior SUV 3.7.     CT images: Coronary atherosclerosis. Right chest wall port.     ABDOMEN/PELVIS:  Persistent hypermetabolic nodule along the posterior splenic border, SUV 8.9, prior SUV 7.9. A second hypermetabolic focus along the posterior splenic border more superiorly also appears increased and FDG uptake, SUV of 3.9, prior SUV in this region 2.9. These are suspicious for metastases.     Scattered mildly hypermetabolic foci along the anterior abdominal wall may be inflammatory. Attention on follow-up to exclude coexistent metastasis.     No hypermetabolic lesions in the left nephrectomy bed.     Nonspecific heterogeneous prostate activity, SUV 4.1, prior SUV 3.4.     CT images: Colon diverticula. Increased midline and left abdominal fat and bowel-containing ventral hernias. Stable hepatic hypodensities. Layering gallbladder sludge. Bowel sutures. Atherosclerotic aorta and branch vessels.     OSSEOUS STRUCTURES:  Persistent hypermetabolic focus along the medial right clavicular cortex and adjacent soft tissue. SUV 4.8, prior SUV 5.5. Metastasis is not excluded, though this is not significantly changed since the prior PET/CT.     No new FDG avid osseous lesions are seen.     CT images: Spine degenerative change.     IMPRESSION:  1. Mildly increased FDG uptake in the hypermetabolic nodular foci along the posterior splenic border, suspicious for metastases. Continued PET/CT follow-up recommended. 2. Persistent hypermetabolic focus along the medial right clavicular cortex and adjacent soft tissue. Metastasis not excluded. No new hypermetabolic osseous lesions.   3. Minimal change in right basilar infiltrate/atelectasis with mild FDG activity. New hypermetabolic density in the left posterior lung base may be inflammatory. Attention on follow-up. 4. No significant change in nodular hypermetabolism within the posterior right shoulder soft tissues. Viable metastasis is not excluded. 5. Additional incidental findings as above, for which continued follow-up is also recommended. PATHOLOGY:     4/1/20  Case Report   Surgical Pathology Report                         Case: [id number]                                    Authorizing Provider: Jass Meier MD   Collected:           04/01/2020 [id number]               Ordering Location:     [address]        Received:            04/01/2020 1208                                      [facility]                                                      Pathologist:           Rachael Soliman MD                                                                  Specimen:    Kidney, Left, left kidney, ureter and bladder cuff                                         Final Diagnosis   A. Left kidney, ureter, and bladder cuff, nephroureterectomy:  - Invasive high grade urothelial carcinoma arising in renal pelvis. - Bladder cuff margin is negative for carcinoma and no evidence of high grade dysplasia. - Ureters with no significant pathologic abnormality  - Two benign simple cysts. - Adrenal gland is negative for malignancy. - One lymph node, negative for malignancy (0/1). 1/17/20  Final Diagnosis   A. Ureter, Right, left renal pelvis biopsy  -Fragments of high grade urothelial carcinoma   -No evidence of lamina propria invasion.  -Detrusor muscle/ muscularis propria is not present for evaluation.     B. Urinary Bladder, bladder biopsy:  -Fragments of low grade papillary lesion.  See note  -No evidence of invasion seen  -Unremarkable fragment of detrusor muscle seen.     Separate fragment of urothelium with mild cytological atypia seen     Note  Differential diagnosis include low grade Papillary urothelial carcinoma vs apillary urothelial neoplasm of low malignant potential     Intradepartmental consultation with more than one staff pathologist is in agreement     ASSESSMENT:     75 y.o. male with LEFT upper tract urothelial carcinoma s/p robotic LEFT nephroureterectomy for [id number] disease, now with progressive signs of metastatic disease despite first and second line treatments    PLAN:     Patient recovering from his hernia repair with mesh. He is recovering beautifully and is finally getting back to overall good performance status. Despite the positive findings of his recent PET scan, both he and medical oncology have agreed to avoid any additional therapy. We will be planning repeat PET scan in 6 months. As the patient is having no urinary symptoms of concern, dysuria or hematuria, I will avoid cystoscopic intervention given his metastatic disease. We will continue to follow to determine if there is any role for cystoscopic inspection to evaluate local recurrences in the future.

## 2023-09-07 NOTE — PROGRESS NOTES
Post-Op Note - General Surgery   Deirdre Irby 75 y.o. male MRN: [id number]  Encounter: [id number]    Assessment/Plan    Incisional hernia, without obstruction or gangrene  Here for second follow-up after laparoscopic ventral incisional hernia repair with mesh by Dr. Radha Prakash. Reports a tender lump to the right of his midline incision as well as pain in the left groin. On examination there is a palpable lump in the right upper quadrant, no abnormality detected in the left groin. Differential to include postoperative seroma, less likely early recurrent hernia, patient also with known metastatic cancer for which abdominal wall mets can be considered. We discussed options for observation alone and close follow-up, however patient requesting further investigation at this time. We will order and complete CT abdomen pelvis without contrast for further characterization and follow-up in the office in 2 weeks to recheck. Diagnoses and all orders for this visit:    Incisional hernia, without obstruction or gangrene  -     CT abdomen pelvis without contrast; Future        Subjective      Chief Complaint   Patient presents with   • Follow-up     f/u new lump on abdomen     Patient is here for a lump on his right lower abd and a sharp pain on his left groin. The lump on his abd he has been dealing with for a week and the pain he has been dealing with since yesterday. Patient has been suffering from shortness of breath but denies any chest pain. Patient denies pain on his abd, denies nausea/vomiting, diarrhea/constipation, trouble eating/drinking, trouble urinating/burning urination or fevers/chills. Margarita. MARY,MA      s/p amirah inc hernia 08/10/23       Review of Systems   All other systems reviewed and are negative.       The following portions of the patient's history were reviewed and updated as appropriate: allergies, current medications, past family history, past medical history, past social history, past surgical history and problem list.    Objective      Blood pressure 143/80, pulse 65, temperature (!) 97.3 °F (36.3 °C), temperature source Temporal, resp. rate 18, height 5' 10" (1.778 m), weight 96.2 kg (212 lb), SpO2 95 %. Physical Exam  Vitals and nursing note reviewed. Constitutional:       General: He is not in acute distress. Appearance: He is well-developed. He is not diaphoretic. HENT:      Head: Normocephalic and atraumatic. Eyes:      Conjunctiva/sclera: Conjunctivae normal.      Pupils: Pupils are equal, round, and reactive to light. Pulmonary:      Effort: No respiratory distress. Abdominal:      Comments: Repair intact, seroma of upper midline, lump to right of midline, no signs of early recurrence or wound infection. No palpable abnormality in area of tenderness to left groin. Musculoskeletal:         General: Normal range of motion. Cervical back: Normal range of motion. Skin:     General: Skin is warm and dry. Capillary Refill: Capillary refill takes less than 2 seconds. Neurological:      Mental Status: He is alert and oriented to person, place, and time.    Psychiatric:         Behavior: Behavior normal.         Signature:  David Welch PA-C  Date: 9/9/2023 Time: 8:38 PM

## 2023-09-08 ENCOUNTER — OFFICE VISIT (OUTPATIENT)
Dept: SURGERY | Facility: CLINIC | Age: 73
End: 2023-09-08
Payer: MEDICARE

## 2023-09-08 VITALS
TEMPERATURE: 97.3 F | HEIGHT: 70 IN | DIASTOLIC BLOOD PRESSURE: 80 MMHG | RESPIRATION RATE: 18 BRPM | WEIGHT: 212 LBS | HEART RATE: 65 BPM | OXYGEN SATURATION: 95 % | SYSTOLIC BLOOD PRESSURE: 143 MMHG | BODY MASS INDEX: 30.35 KG/M2

## 2023-09-08 DIAGNOSIS — K43.2 INCISIONAL HERNIA, WITHOUT OBSTRUCTION OR GANGRENE: Primary | ICD-10-CM

## 2023-09-08 PROCEDURE — 99213 OFFICE O/P EST LOW 20 MIN: CPT | Performed by: PHYSICIAN ASSISTANT

## 2023-09-08 NOTE — ASSESSMENT & PLAN NOTE
Here for second follow-up after laparoscopic ventral incisional hernia repair with mesh by Dr. Leslie Cross. Reports a tender lump to the right of his midline incision as well as pain in the left groin. On examination there is a palpable lump in the right upper quadrant, no abnormality detected in the left groin. Differential to include postoperative seroma, less likely early recurrent hernia, patient also with known metastatic cancer for which abdominal wall mets can be considered. We discussed options for observation alone and close follow-up, however patient requesting further investigation at this time. We will order and complete CT abdomen pelvis without contrast for further characterization and follow-up in the office in 2 weeks to recheck.

## 2023-09-09 ENCOUNTER — HOSPITAL ENCOUNTER (OUTPATIENT)
Dept: CT IMAGING | Facility: HOSPITAL | Age: 73
Discharge: HOME | End: 2023-09-09
Payer: MEDICARE

## 2023-09-09 DIAGNOSIS — K43.2 INCISIONAL HERNIA, WITHOUT OBSTRUCTION OR GANGRENE: ICD-10-CM

## 2023-09-09 PROCEDURE — G1004 CDSM NDSC: HCPCS

## 2023-09-09 PROCEDURE — 74176 CT ABD & PELVIS W/O CONTRAST: CPT

## 2023-09-19 NOTE — ASSESSMENT & PLAN NOTE
Overall doing well after laparoscopic ventral incisional hernia repair. Ongoing intermittent discomfort that comes and goes with activity. Abdomen soft, repair intact. Copy of the op note provided, questions answered, we will plan to see back on an as-needed basis.

## 2023-09-21 NOTE — PROGRESS NOTES
Post-Op Note - General Surgery   Tammy Nate 75 y.o. male MRN: [id number]  Encounter: [id number]    Assessment/Plan    Incisional hernia, without obstruction or gangrene  Continues to do well following laparoscopic ventral incisional hernia repair with mesh. Since last visit pain has been improving. CT completed most consistent with postoperative seroma at the hernia sites, does not appear to be a true recurrent hernia upon my interpretation of the images. Copy of the CT report provided and reviewed. Pain in the lower abdomen most likely related to the fixation tacks which are absorbable for which his pain is expected to subside entirely. Questions answered, we will plan to see back on an as-needed basis. Diagnoses and all orders for this visit:    Incisional hernia, without obstruction or gangrene      Subjective      Chief Complaint   Patient presents with   • Follow-up     2wk f/u after CT scan,  lap inc hernia 8/10/2023     Patient is here for a 2wk f/u after CT scan,  lap inc hernia 8/10/2023. Patient states the incisions are  healed and denies drainage, pain, nausea/vomiting, diarrhea/constipation, trouble eating/drinking or fevers/chillls. Amilia. O,MA       Review of Systems   All other systems reviewed and are negative. The following portions of the patient's history were reviewed and updated as appropriate: allergies, current medications, past family history, past medical history, past social history, past surgical history and problem list.    Objective      Blood pressure 124/76, pulse 61, temperature 97.6 °F (36.4 °C), temperature source Temporal, resp. rate 18, height 5' 10" (1.778 m), weight 93.4 kg (206 lb), SpO2 94 %. Physical Exam  Vitals and nursing note reviewed. Constitutional:       General: He is not in acute distress. Appearance: He is well-developed. He is not diaphoretic. HENT:      Head: Normocephalic and atraumatic.    Eyes:      Conjunctiva/sclera: Conjunctivae normal.      Pupils: Pupils are equal, round, and reactive to light. Pulmonary:      Effort: No respiratory distress. Abdominal:      Comments: Soft seromas x 2, no signs of recurrence or complication. Musculoskeletal:         General: Normal range of motion. Cervical back: Normal range of motion. Skin:     General: Skin is warm and dry. Capillary Refill: Capillary refill takes less than 2 seconds. Neurological:      Mental Status: He is alert and oriented to person, place, and time.    Psychiatric:         Behavior: Behavior normal.         Signature:  David Welch PA-C  Date: 9/26/2023 Time: 12:19 PM

## 2023-09-22 ENCOUNTER — OFFICE VISIT (OUTPATIENT)
Dept: CARDIOLOGY CLINIC | Facility: CLINIC | Age: 73
End: 2023-09-22
Payer: MEDICARE

## 2023-09-22 ENCOUNTER — OFFICE VISIT (OUTPATIENT)
Dept: SURGERY | Facility: CLINIC | Age: 73
End: 2023-09-22

## 2023-09-22 VITALS
SYSTOLIC BLOOD PRESSURE: 124 MMHG | BODY MASS INDEX: 29.49 KG/M2 | TEMPERATURE: 97.6 F | OXYGEN SATURATION: 94 % | DIASTOLIC BLOOD PRESSURE: 76 MMHG | RESPIRATION RATE: 18 BRPM | WEIGHT: 206 LBS | HEIGHT: 70 IN | HEART RATE: 61 BPM

## 2023-09-22 VITALS
HEIGHT: 67 IN | DIASTOLIC BLOOD PRESSURE: 76 MMHG | HEART RATE: 72 BPM | WEIGHT: 207.4 LBS | SYSTOLIC BLOOD PRESSURE: 124 MMHG | RESPIRATION RATE: 16 BRPM | BODY MASS INDEX: 32.55 KG/M2

## 2023-09-22 DIAGNOSIS — K43.2 INCISIONAL HERNIA, WITHOUT OBSTRUCTION OR GANGRENE: Primary | ICD-10-CM

## 2023-09-22 DIAGNOSIS — I48.0 PAROXYSMAL ATRIAL FIBRILLATION (HCC): Primary | ICD-10-CM

## 2023-09-22 DIAGNOSIS — I10 ESSENTIAL HYPERTENSION: ICD-10-CM

## 2023-09-22 PROCEDURE — 99214 OFFICE O/P EST MOD 30 MIN: CPT | Performed by: NURSE PRACTITIONER

## 2023-09-22 PROCEDURE — 99024 POSTOP FOLLOW-UP VISIT: CPT | Performed by: PHYSICIAN ASSISTANT

## 2023-09-22 NOTE — ASSESSMENT & PLAN NOTE
Continues to do well following laparoscopic ventral incisional hernia repair with mesh. Since last visit pain has been improving. CT completed most consistent with postoperative seroma at the hernia sites, does not appear to be a true recurrent hernia upon my interpretation of the images. Copy of the CT report provided and reviewed. Pain in the lower abdomen most likely related to the fixation tacks which are absorbable for which his pain is expected to subside entirely. Questions answered, we will plan to see back on an as-needed basis.

## 2023-09-22 NOTE — PROGRESS NOTES
Patient ID: Donny Silva is a 75 y.o. male. Plan:      Essential hypertension  Blood pressures well controlled      Paroxysmal atrial fibrillation (HCC)  Maintaining sinus rhythm by exam  Continue Cardizem 60 mg twice daily, amiodarone 200 mg daily  Anticoagulation has been discontinued due to bleeding issues       Follow up Plan/Summary Comments:  Brenden Thayer is doing well from a cardiac perspective. I have not recommended any medication changes. No indication for testing at this time. Follow-up arrangements will be made for 1 year. He will call sooner if needed. HPI: Brenden Thayer is seen in the office today for routine follow up. He underwent hernia surgery about 5 weeks ago and is recovering well. He denies any chest pain or palpitations. No episodes of dizziness, lightheadedness, syncope. He does get winded when walking up an inclime or climbing the steps. This has been occurring for a long time without recent change. It is attributed to his decreased activity secondary to neuropathy. Review of Systems   10  point ROS  was otherwise non pertinent or negative except as per HPI or as below. Gait: cane    Most recent or relevant cardiac/vascular testing:    Nuclear stress test 05/12/2022 normal LV function, no ischemia by perfusion imaging    Echocardiogram 04/06/2022  EF 75%, hyperdynamic  Mild LVH, mild diastolic dysfunction    Ambulatory monitor 6/15/2022  Average heart rate 75 bpm, 1 short run of atrial fibrillation  11 beat run of VT with average rate of 1 8 2 bpm.  This occurred in the overnight phase. No significant pauses. No change in rate or rhythm with report of shortness of breath. Objective:     /76 (BP Location: Left arm, Patient Position: Sitting, Cuff Size: Large)   Pulse 72   Resp 16   Ht 5' 7.13" (1.705 m)   Wt 94.1 kg (207 lb 6.4 oz)   BMI 32.36 kg/m²     PHYSICAL EXAM:    General:  Normal appearance, no acute distress  Eyes:  Anicteric.   Oral mucosa: Moist.  Neck:  No JVD. Carotid upstrokes are brisk without bruits. No masses. Chest:  Clear to auscultation   Cardiac:  No palpable PMI. Normal S1 and S2. No murmur gallop or rub. Abdomen:  Soft and nontender. No palpable organomegaly or aortic enlargement. Extremities: Trace bilateral lower extremity edema   Musculoskeletal:  Symmetric. Vascular:  Pedal pulses are intact. Neuro:  Grossly symmetric. Psych:  Alert and oriented x3. Allergies   Allergen Reactions   • Poison Ivy Extract Rash       Current Outpatient Medications:   •  acetaminophen (TYLENOL) 500 mg tablet, Take 2 tablets (1,000 mg total) by mouth every 8 (eight) hours (Patient taking differently: Take 1,000 mg by mouth if needed), Disp: , Rfl:   •  amiodarone 200 mg tablet, Take 200 mg by mouth daily, Disp: , Rfl:   •  atorvastatin (LIPITOR) 40 mg tablet, Take 40 mg by mouth daily at bedtime, Disp: , Rfl:   •  capsicum (ZOSTRIX) 0.075 % topical cream, Apply topically 3 (three) times a day, Disp: 28.3 g, Rfl: 0  •  Cyanocobalamin (Vitamin B-12) 1000 MCG/15ML LIQD, , Disp: , Rfl:   •  diltiazem (CARDIZEM) 60 mg tablet, 2 (two) times a day, Disp: , Rfl:   •  famotidine (PEPCID) 20 mg tablet, Take 20 mg by mouth daily as needed, Disp: , Rfl:   •  finasteride (PROSCAR) 5 mg tablet, Take 5 mg by mouth daily, Disp: , Rfl:   •  folic acid (FOLVITE) 1 mg tablet, take 1 tablet by mouth once daily, Disp: 90 tablet, Rfl: 4  •  HYDROcodone-acetaminophen (Norco) 7.5-325 mg per tablet, Take 1 tablet by mouth 2 (two) times a day as needed for pain Max Daily Amount: 2 tablets, Disp: 60 tablet, Rfl: 0  •  naloxone (NARCAN) 4 mg/0.1 mL nasal spray, Administer 1 spray into a nostril.  If breathing does not return to normal or if breathing difficulty resumes after 2-3 minutes, give another dose in the other nostril using a new spray., Disp: 1 each, Rfl: 1  •  nystatin (MYCOSTATIN) cream, Apply topically 2 (two) times a day, Disp: 30 g, Rfl: 0  •  pantoprazole (PROTONIX) 40 mg tablet, Take 40 mg by mouth daily, Disp: , Rfl:   •  senna (SENOKOT) 8.6 mg, Take 1 tablet (8.6 mg total) by mouth daily at bedtime, Disp: 30 each, Rfl: 0  •  tadalafil (CIALIS) 20 MG tablet, Take one tablet by mouth one hour before sexual activity, Disp: 15 tablet, Rfl: 3  •  tamsulosin (FLOMAX) 0.4 mg, Take 1 capsule (0.4 mg total) by mouth daily with dinner, Disp: 30 capsule, Rfl: 12  •  VITAMIN D PO, Take 1,000 Units by mouth daily , Disp: , Rfl:   •  zolpidem (AMBIEN) 5 mg tablet, Take 1 tablet (5 mg total) by mouth daily at bedtime as needed for sleep, Disp: 30 tablet, Rfl: 1  •  ascorbic acid (VITAMIN C) 500 MG tablet, Take 1 tablet by mouth Every 12 hours, Disp: , Rfl:   •  furosemide (LASIX) 40 mg tablet, , Disp: , Rfl:   Past Medical History:   Diagnosis Date   • A-fib ([address])    • Arthritis    • Bladder cancer    • Cancer ([address])     skin melanoma;basal cell   • Chronic pain disorder     from arthritis   • Colon polyp    • Does use hearing aid     bilat   • Dry eyes, bilateral    • GERD (gastroesophageal reflux disease)    • History of kidney stones 10/2019   • History of partial knee replacement     bilat   • History of transfusion    • History of vertigo    • Hyperlipidemia    • Hypertension    • Infusion extravasation of chemotherapy vesicant 11/2021   • Kidney lesion    • Lumbar disc disorder     compression of vertebrae L4-5-6   • Muscle weakness     left hip area   • Renal mass     left   • Right ankle injury 03/05/2020    missed step of ladder    • Right ankle pain    • Shortness of breath     with activity   • Tinnitus    • Urothelial cancer     left   • Wears glasses      Past Surgical History:   Procedure Laterality Date   • COLONOSCOPY     • CYSTOSCOPY Left 04/01/2020    Procedure: CYSTOSCOPY; URETERAL CATHETER PLACEMENT;  Surgeon: Navya Coto MD;  [facility];  Service: Urology   • CYSTOSCOPY  05/11/2020   • CYSTOSCOPY  06/04/2021   • FL CYSTOGRAM  04/13/2020 • FL RETROGRADE PYELOGRAM  2020   • HERNIA REPAIR      umbilical with mesh   • INGUINAL HERNIA REPAIR Right     with mesh   • IR PORT PLACEMENT  2020   • JOINT REPLACEMENT Bilateral     partials knee   • LUMBAR EPIDURAL INJECTION     • ID CYSTO BLADDER W/URETERAL CATHETERIZATION Bilateral 2020    Procedure: CYSTOSCOPY; RIGHT RETROGRADE PYELOGRAM WITH RIGHT URETERAL CYTOLOGY SAMPLING; LEFT URETEROSCOPY WITH RENAL PELVIS BIOPSY AND LEFT STENT PLACEMENT;  Surgeon: Juliette Zavala MD;  [facility];  Service: Urology   • ID LAPAROSCOPY NEPHRECTOMY W/TOTAL URETERECTOMY Left 2020    Procedure: ROBOTIC LAPAROSCOPIC NEPHRO-URETERECTOMY;  Surgeon: Juliette Zavala MD;  [facility];  Service: Urology   • ID RPR AA HERNIA 1ST < 3 CM REDUCIBLE N/A 8/10/2023    Procedure: REPAIR HERNIA INCISIONAL LAPAROSCOPIC WITH MESH;  Surgeon: Sultana Vides MD;  [facility];  Service: General   • SKIN CANCER EXCISION      surface melanoma   • TONSILLECTOMY     • WISDOM TOOTH EXTRACTION         CMP:   Lab Results   Component Value Date    K 4.0 2023     2023    CO2 28 2023    BUN 18 2023    CREATININE 1.47 (H) 2023    EGFR 46 2023     Lipid Profile:    Lab Results   Component Value Date    TRIG 92 2022    HDL 59 2022         Social History     Tobacco Use   Smoking Status Former   • Types: Cigarettes   • Quit date:    • Years since quittin.7   Smokeless Tobacco Never

## 2023-10-13 ENCOUNTER — TELEPHONE (OUTPATIENT)
Dept: HEMATOLOGY ONCOLOGY | Facility: CLINIC | Age: 73
End: 2023-10-13

## 2023-10-13 NOTE — TELEPHONE ENCOUNTER
TATYANA  DR merle ESTRADA   Who are you speaking with? Spouse   If it is not the patient, are they listed on an active communication consent form? Yes   Is this a TATYANA or DR merle ESTRADA TATYANA   Which provider is patient currently scheduled or established with? RAGHU Gordon   What is the original appointment date and time? 2/6/24 @ 11   At which location is the appointment scheduled to take place? [facility]   Which provider is the patient transitioning care to? Dr. Melissa Jacob   What is the new appointment date and time? 2/6/24 @ 1140   At which location is the new appointment scheduled to take place?  [facility]   What is the reason for this change? provider

## 2023-10-17 ENCOUNTER — HOSPITAL ENCOUNTER (OUTPATIENT)
Dept: INFUSION CENTER | Facility: HOSPITAL | Age: 73
Discharge: HOME | End: 2023-10-17
Attending: INTERNAL MEDICINE
Payer: MEDICARE

## 2023-10-17 DIAGNOSIS — M48.061 SPINAL STENOSIS OF LUMBAR REGION, UNSPECIFIED WHETHER NEUROGENIC CLAUDICATION PRESENT: ICD-10-CM

## 2023-10-17 DIAGNOSIS — M54.16 LUMBAR RADICULOPATHY: ICD-10-CM

## 2023-10-17 DIAGNOSIS — C65.2 CANCER OF LEFT RENAL PELVIS (HCC): ICD-10-CM

## 2023-10-17 DIAGNOSIS — Z45.2 ENCOUNTER FOR CARE RELATED TO PORT-A-CATH: Primary | ICD-10-CM

## 2023-10-17 PROCEDURE — 96523 IRRIG DRUG DELIVERY DEVICE: CPT

## 2023-10-17 RX ORDER — HYDROCODONE BITARTRATE AND ACETAMINOPHEN 7.5; 325 MG/1; MG/1
1 TABLET ORAL 2 TIMES DAILY PRN
Qty: 60 TABLET | Refills: 0 | OUTPATIENT
Start: 2023-10-17

## 2023-10-17 NOTE — TELEPHONE ENCOUNTER
Attempted to reach pt, LMOM with CB# and OH.     If pt calls back please schedule office visit as refill cannot be sent in without office visit, TY.

## 2023-10-17 NOTE — TELEPHONE ENCOUNTER
Reason for call:   [x] Refill   [] Prior Auth  [] Other:     Office:   [] PCP/Provider -   [x] Specialty/Provider - Paris Ruano     Medication: HYDROCODONE-ACETAMINOPHEN    Dose/Frequency: 7.5-325 MG    Quantity: 60    Pharmacy:   [address] [place] [address] #[id number] - JUAN MANUEL OCAMPO    Does the patient have enough for 3 days?    [x] Yes   [] No - Send as HP to POD

## 2023-10-19 NOTE — PROGRESS NOTES
Assessment:  1. Chronic pain syndrome    2. Lumbar radiculopathy    3. Lumbar disc herniation    4. Lumbar spondylosis    5. Spinal stenosis of lumbar region, unspecified whether neurogenic claudication present    6. Long-term current use of opiate analgesic    7. Uncomplicated opioid dependence ([address])        Plan:  Patient may continue Norco 7.5/325 mg 1 tablet twice daily as needed pain as prescribed. The patient was given a 2 month supply of prescriptions with a Do Not Fill date(s) of today and November 18, 2023    [address] [facility] [address] Program report was reviewed and was appropriate     A urine drug screen was collected at today's office visit as part of our medication management protocol. The point of care testing results were appropriate for what was being prescribed. The specimen will be sent for confirmatory testing. The drug screen is medically necessary because the patient is either dependent on opioid medication or is being considered for opioid medication therapy and the results could impact ongoing or future treatment. The drug screen is to evaluate for the presences or absence of prescribed, non-prescribed, and/or illicit drugs/substances. There are risks associated with opioid medications, including dependence, addiction and tolerance. The patient understands and agrees to use these medications only as prescribed. Potential side effects of the medications include, but are not limited to, constipation, drowsiness, addiction, impaired judgment and risk of fatal overdose if not taken as prescribed. The patient was warned against driving while taking sedation medications. Sharing medications is a felony. At this point in time, the patient is showing no signs of addiction, abuse, diversion or suicidal ideation. 2.  We can repeat bilateral L5 TFESI as needed  3. Continue with home exercise program  4.   Follow-up in 3 months or sooner if needed    History of Present Illness: The patient is a 75 y.o. male with a history of metastatic urothelial carcinoma status post nephrectomy with chemo-induced neuropathy, A-fib and chronic pain syndrome last seen on 07/10/2023  who presents for a follow up office visit in regards to chronic low back pain. He states his pain complaints are essentially at baseline at this time. He unfortunately has failed lumbar RFA and bilateral SI joint injections. He continues on Norco 7.5/325 mg 1 tablet twice daily as needed pain with an 80% improvement of his pain without side effects. He rates his pain a 7 out of 10 on the numeric pain rating scale. He cons he has pain in the morning which is described as sharp    Pain Contract Signed: 3/13/2023  Last Urine Drug Screen: 10/20/2023  [place]/YESSENIA 3/13/2023  Last hydrocodone per pt. [id number]  Last Narcan: 3/13/2023     I have personally reviewed and/or updated the patient's past medical history, past surgical history, family history, social history, current medications, allergies, and vital signs today. Review of Systems:    Review of Systems   Respiratory:  Negative for shortness of breath. Cardiovascular:  Negative for chest pain. Gastrointestinal:  Negative for constipation, diarrhea, nausea and vomiting. Musculoskeletal:  Positive for gait problem. Negative for arthralgias, joint swelling and myalgias. Skin:  Negative for rash. Neurological:  Negative for dizziness, seizures and weakness. All other systems reviewed and are negative.         Past Medical History:   Diagnosis Date    A-fib [facility])     Arthritis     Bladder cancer     Cancer ([address])     skin melanoma;basal cell    Chronic pain disorder     from arthritis    Colon polyp     Does use hearing aid     bilat    Dry eyes, bilateral     GERD (gastroesophageal reflux disease)     History of kidney stones 10/2019    History of partial knee replacement     bilat    History of transfusion     History of vertigo     Hyperlipidemia Hypertension     Infusion extravasation of chemotherapy vesicant 11/2021    Kidney lesion     Lumbar disc disorder     compression of vertebrae L4-5-6    Muscle weakness     left hip area    Renal mass     left    Right ankle injury 03/05/2020    missed step of ladder     Right ankle pain     Shortness of breath     with activity    Tinnitus     Urothelial cancer     left    Wears glasses        Past Surgical History:   Procedure Laterality Date    COLONOSCOPY      CYSTOSCOPY Left 04/01/2020    Procedure: CYSTOSCOPY; URETERAL CATHETER PLACEMENT;  Surgeon: Michele Hurley MD;  [facility];  Service: Urology    CYSTOSCOPY  05/11/2020    CYSTOSCOPY  06/04/2021    FL CYSTOGRAM  04/13/2020    FL RETROGRADE PYELOGRAM  01/17/2020    HERNIA REPAIR  12/64/3484    umbilical with mesh    INGUINAL HERNIA REPAIR Right     with mesh    IR PORT PLACEMENT  04/30/2020    JOINT REPLACEMENT Bilateral     partials knee    LUMBAR EPIDURAL INJECTION      AR CYSTO BLADDER W/URETERAL CATHETERIZATION Bilateral 01/17/2020    Procedure: CYSTOSCOPY; RIGHT RETROGRADE PYELOGRAM WITH RIGHT URETERAL CYTOLOGY SAMPLING; LEFT URETEROSCOPY WITH RENAL PELVIS BIOPSY AND LEFT STENT PLACEMENT;  Surgeon: Michele Hurley MD;  [facility];  Service: Urology    AR LAPAROSCOPY NEPHRECTOMY W/TOTAL URETERECTOMY Left 04/01/2020    Procedure: ROBOTIC LAPAROSCOPIC NEPHRO-URETERECTOMY;  Surgeon: Michele Hurley MD;  [facility];  Service: Urology    AR RPR AA HERNIA 1ST < 3 CM REDUCIBLE N/A 8/10/2023    Procedure: REPAIR HERNIA INCISIONAL LAPAROSCOPIC WITH MESH;  Surgeon: Gabriella Renee MD;  [facility];  Service: General    SKIN CANCER EXCISION      surface melanoma    TONSILLECTOMY      WISDOM TOOTH EXTRACTION         Family History   Problem Relation Age of Onset    Liver cancer Father        Social History     Occupational History    Not on file   Tobacco Use    Smoking status: Former     Types: Cigarettes     Quit date: 0     Years since quittin.8    Smokeless tobacco: Never   Vaping Use    Vaping Use: Never used   Substance and Sexual Activity    Alcohol use: Not Currently     Comment: socially, rarely    Drug use: Never    Sexual activity: Yes     Partners: Female         Current Outpatient Medications:     HYDROcodone-acetaminophen (Norco) 7.5-325 mg per tablet, Take 1 tablet by mouth 2 (two) times a day as needed for pain Max Daily Amount: 2 tablets, Disp: 60 tablet, Rfl: 0    [START ON 2023] HYDROcodone-acetaminophen (Norco) 7.5-325 mg per tablet, Take 1 tablet by mouth 2 (two) times a day as needed for pain Max Daily Amount: 2 tablets Do not start before 2023., Disp: 60 tablet, Rfl: 0    acetaminophen (TYLENOL) 500 mg tablet, Take 2 tablets (1,000 mg total) by mouth every 8 (eight) hours (Patient taking differently: Take 1,000 mg by mouth if needed), Disp: , Rfl:     amiodarone 200 mg tablet, Take 200 mg by mouth daily, Disp: , Rfl:     ascorbic acid (VITAMIN C) 500 MG tablet, Take 1 tablet by mouth Every 12 hours, Disp: , Rfl:     atorvastatin (LIPITOR) 40 mg tablet, Take 40 mg by mouth daily at bedtime, Disp: , Rfl:     capsicum (ZOSTRIX) 0.075 % topical cream, Apply topically 3 (three) times a day, Disp: 28.3 g, Rfl: 0    Cyanocobalamin (Vitamin B-12) 1000 MCG/15ML LIQD, , Disp: , Rfl:     diltiazem (CARDIZEM) 60 mg tablet, 2 (two) times a day, Disp: , Rfl:     famotidine (PEPCID) 20 mg tablet, Take 20 mg by mouth daily as needed, Disp: , Rfl:     finasteride (PROSCAR) 5 mg tablet, Take 5 mg by mouth daily, Disp: , Rfl:     folic acid (FOLVITE) 1 mg tablet, take 1 tablet by mouth once daily, Disp: 90 tablet, Rfl: 4    furosemide (LASIX) 40 mg tablet, , Disp: , Rfl:     naloxone (NARCAN) 4 mg/0.1 mL nasal spray, Administer 1 spray into a nostril.  If breathing does not return to normal or if breathing difficulty resumes after 2-3 minutes, give another dose in the other nostril using a new spray., Disp: 1 each, Rfl: 1    nystatin (MYCOSTATIN) cream, Apply topically 2 (two) times a day, Disp: 30 g, Rfl: 0    pantoprazole (PROTONIX) 40 mg tablet, Take 40 mg by mouth daily, Disp: , Rfl:     senna (SENOKOT) 8.6 mg, Take 1 tablet (8.6 mg total) by mouth daily at bedtime, Disp: 30 each, Rfl: 0    tadalafil (CIALIS) 20 MG tablet, Take one tablet by mouth one hour before sexual activity, Disp: 15 tablet, Rfl: 3    tamsulosin (FLOMAX) 0.4 mg, Take 1 capsule (0.4 mg total) by mouth daily with dinner, Disp: 30 capsule, Rfl: 12    VITAMIN D PO, Take 1,000 Units by mouth daily , Disp: , Rfl:     zolpidem (AMBIEN) 5 mg tablet, Take 1 tablet (5 mg total) by mouth daily at bedtime as needed for sleep, Disp: 30 tablet, Rfl: 1  No current facility-administered medications for this visit. Facility-Administered Medications Ordered in Other Visits:     alteplase (CATHFLO) injection 2 mg, 2 mg, Intracatheter, Q2H PRN, Daniela Núñez MD    Allergies   Allergen Reactions    Poison Ivy Extract Rash       Physical Exam:    /77   Pulse 67   Ht 5' 10" (1.778 m)   Wt 93.4 kg (206 lb)   BMI 29.56 kg/m²     Constitutional:normal, well developed, well nourished, alert, in no distress and non-toxic and no overt pain behavior.   Eyes:anicteric  HEENT:grossly intact  Neck:supple, symmetric, trachea midline and no masses   Pulmonary:even and unlabored  Cardiovascular:No edema or pitting edema present  Skin:Normal without rashes or lesions and well hydrated  Psychiatric:Mood and affect appropriate  Neurologic:Cranial Nerves II-XII grossly intact  Musculoskeletal:antalgic and ambulates with cane      Imaging  No orders to display         Orders Placed This Encounter   Procedures    MM ALL_Prescribed Meds and Special Instructions    MM DT_Alprazolam Definitive Test    MM DT_Amphetamine Definitive Test    MM DT_Aripiprazole Definitive Test    MM DT_Bath Salts Definitive Test    MM DT_Buprenorphine Definitive Test    MM DT_Butalbital Definitive Test    MM DT_Clonazepam Definitive Test    MM DT_Clozapine Definitive Test    MM DT_Cocaine Definitive Test    MM DT_Codeine Definitive Test    MM DT_Desipramine Definitive Test    MM DT_Dextromethorphan Definitive Test    MM Diazepam Definitive Test    MM DT_Ethyl Glucuronide/Ethyl Sulfate Definitive Test    MM DT_Fentanyl Definitive Test    MM DT_Haloperidol Definitive Test    MM DT_Heroin Definitive Test    MM DT_Hydrocodone Definitive Test    MM DT_Hydromorphone Definitive Test    MM DT_Imipramine Definitive Test    MM DT_Kratom Definitive Test    MM DT_Levorphanol Definitive Test    MM Lorazepam Definitive Test    MM DT_MDMA Definitive Test    MM DT_Meperidine Definitive Test    MM DT_Methadone Definitive Test    MM DT_Methamphetamine Definitive Test    MM DT_Morphine Definitive Test    MM DT_Olanzapine Definitive Test    MM DT_Oxazepam Definitive Test    MM DT_Oxycodone Definitive Test    MM DT_Oxymorphone Definitive Test    MM DT_Phencyclidine Definitive Test    MM DT_Phenobarbital Definitive Test    MM DT_Phentermine Definitive Test    MM DT_Quetiapine Definitive Test    MM DT_Risperidone Definitive Test    MM DT_Secobarbital Definitive Test    MM DT_Spice Definitive Test    MM DT_Tapentadol Definitive Test    MM DT_Temazapam Definitive Test    MM DT_THC Definitive Test    MM DT_Tramadol Definitive Test    MM DT_Methylphenidate Definitive Test

## 2023-10-20 ENCOUNTER — OFFICE VISIT (OUTPATIENT)
Dept: PAIN MEDICINE | Facility: CLINIC | Age: 73
End: 2023-10-20
Payer: MEDICARE

## 2023-10-20 VITALS
SYSTOLIC BLOOD PRESSURE: 119 MMHG | HEIGHT: 70 IN | DIASTOLIC BLOOD PRESSURE: 77 MMHG | WEIGHT: 206 LBS | BODY MASS INDEX: 29.49 KG/M2 | HEART RATE: 67 BPM

## 2023-10-20 DIAGNOSIS — Z79.891 LONG-TERM CURRENT USE OF OPIATE ANALGESIC: ICD-10-CM

## 2023-10-20 DIAGNOSIS — G89.4 CHRONIC PAIN SYNDROME: Primary | ICD-10-CM

## 2023-10-20 DIAGNOSIS — F11.20 UNCOMPLICATED OPIOID DEPENDENCE (HCC): ICD-10-CM

## 2023-10-20 DIAGNOSIS — M47.816 LUMBAR SPONDYLOSIS: ICD-10-CM

## 2023-10-20 DIAGNOSIS — M54.16 LUMBAR RADICULOPATHY: ICD-10-CM

## 2023-10-20 DIAGNOSIS — M51.26 LUMBAR DISC HERNIATION: ICD-10-CM

## 2023-10-20 DIAGNOSIS — M48.061 SPINAL STENOSIS OF LUMBAR REGION, UNSPECIFIED WHETHER NEUROGENIC CLAUDICATION PRESENT: ICD-10-CM

## 2023-10-20 PROCEDURE — 99214 OFFICE O/P EST MOD 30 MIN: CPT | Performed by: NURSE PRACTITIONER

## 2023-10-20 RX ORDER — HYDROCODONE BITARTRATE AND ACETAMINOPHEN 7.5; 325 MG/1; MG/1
1 TABLET ORAL 2 TIMES DAILY PRN
Qty: 60 TABLET | Refills: 0 | Status: SHIPPED | OUTPATIENT
Start: 2023-11-18

## 2023-10-20 RX ORDER — HYDROCODONE BITARTRATE AND ACETAMINOPHEN 7.5; 325 MG/1; MG/1
1 TABLET ORAL 2 TIMES DAILY PRN
Qty: 60 TABLET | Refills: 0 | Status: SHIPPED | OUTPATIENT
Start: 2023-10-20

## 2023-10-20 NOTE — PATIENT INSTRUCTIONS
Opioid Safety   WHAT YOU NEED TO KNOW:   An opioid medicine is used to treat pain. Examples are oxycodone, morphine, fentanyl, or codeine. Pain control and management may help you rest, heal, and return to your daily activities. You and your family will receive information about how to manage your pain at home. The instructions will include what to do if you have side effects as your pain is managed. You will get information on how to handle opioid medicine safely. You will also get suggestions on how to control pain without opioids. It is important to follow all instructions so your pain is managed effectively. DISCHARGE INSTRUCTIONS:   Call your local emergency number (911 in the ), or have someone else call if:   You have a seizure. You cannot be woken. You have trouble staying awake and your breathing is slow or shallow. Your speech is slurred, or you are confused. You are dizzy or stumble when you walk. Call your doctor, or have someone close to you call if:   You are extremely drowsy, or you have trouble staying awake or speaking. You have pale or clammy skin. You have blue fingernails or lips. Your heartbeat is slower than normal.    You cannot stop vomiting. You have questions or concerns about your condition or care. Use opioids safely:   Take prescribed opioids exactly as directed. Opioids come with directions based on the kind and how it is given. Talk to your healthcare provider or a pharmacist if you have any questions. Do not take more than the recommended amount. Too much can cause a life-threatening overdose. Do not continue to take it after your pain stops. You may develop tolerance. This means you keep needing higher doses to get the same effect. You may also develop opioid use disorder. This means you are not able to control your opioid use. Do not give opioids to others or take opioids that belong to someone else.   The kind or amount one person takes may not be right for another. The person you share them with may also be taking medicines that do not mix with opioids. The person may drink alcohol or use other drugs that can cause life-threatening problems when mixed with opioids. Do not mix opioids with other medicines or alcohol. The combination can cause an overdose, or cause you to stop breathing. Alcohol, sleeping pills, and medicines such as antihistamines can make you sleepy. A combination with opioids can lead to a coma. Do not drive or operate heavy machinery after you use an opioid. You may feel drowsy or have trouble concentrating. You can injure yourself or others if you drive or use heavy machinery when you are not alert. Your provider or pharmacist can tell you how long to wait after a dose before you do these activities. Talk to your healthcare provider if you have any side effects. Side effects include nausea, sleepiness, itching, and trouble thinking clearly. Your provider may need to make changes to the kind or amount of opioid you are taking. Your provider can also help you find ways to prevent or relieve side effects. Manage constipation:  Constipation is the most common side effect of opioid medicine. Constipation is when you have hard, dry bowel movements, or you go longer than usual between bowel movements. Tell your healthcare provider about all changes in your bowel movements while you are taking opioids. Your provider may recommend laxative medicine to help you have a bowel movement. Your provider may also change the kind of opioid you are taking, or change when you take it. The following are more ways you can prevent or relieve constipation:  Drink liquids as directed. You may need to drink extra liquids to help soften and move your bowels. Ask how much liquid to drink each day and which liquids are best for you. Eat high-fiber foods. This may help decrease constipation by adding bulk to your bowel movements.  High-fiber foods include fruits, vegetables, whole-grain breads and cereals, and beans. Your healthcare provider or dietitian can help you create a high-fiber meal plan. Your provider may also recommend a fiber supplement if you cannot get enough fiber from food. Exercise regularly. Regular physical activity can help stimulate your intestines. Walking is a good exercise to prevent or relieve constipation. Ask which exercises are best for you. Schedule a time each day to have a bowel movement. This may help train your body to have regular bowel movements. Bend forward while you are on the toilet to help move the bowel movement out. Sit on the toilet for at least 10 minutes, even if you do not have a bowel movement. Store opioids safely:   Store opioids where others cannot easily get them. Keep them in a locked cabinet or secure area. Do not  keep them in a purse or other bag you carry with you. A person may be looking for something else and find the opioids. Make sure opioids are stored out of the reach of children. A child can easily overdose on opioids. Opioids may look like candy to a small child. The best way to dispose of opioids: The laws vary by country and area. In the [facility], the best way is to return the opioids through a take-back program. This program is offered by the [employer] The following are options for using the program:  Take the opioids to a LEIGH collection site. The site is often a law enforcement center. Call your local law enforcement center for scheduled take-back days in your area. You will be given information on where to go if the collection site is in a different location. Take the opioids to an approved pharmacy or hospital.  A pharmacy or hospital may be set up as a collection site. You will need to ask if it is a LEIGH collection site if you were not directed there.  A pharmacy or doctor's office may not be able to take back opioids unless it is a LEIGH site. Use a mail-back system. This means you are given containers to put the opioids into. You will then mail them in the containers. Use a take-back drop box. This is a place to leave the opioids at any time. People and animals will not be able to get into the box. Your local law enforcement agency can tell you where to find a drop box in your area. Other safe ways to dispose of opioids: The medicine may come with disposal instructions. The instructions may vary depending on the brand of medicine you are using. Instructions may come in a Medication Guide, but not every medicine has one. You may instead get instructions from your pharmacy or doctor. Follow instructions carefully. The following are general guidelines to follow:  Find out if you can flush the opioid. Some opioids can be flushed down the toilet or poured into the sink. You will need to contact authorities in your area to see if this is an option for you. The FDA also offers a list of medicines that are safe to flush down the toilet. You can check the list if you cannot get the information for your local area. Ask your waste management company about rules for putting opioids in the trash. The company will be able to give you specific directions. Scratch out personal information on the original medicine label so it cannot be read. Then put it in the trash. Do not label the trash or put any information on it about the opioids. It should look like regular household trash so no one is tempted to look for the opioids. Keep the trash out of the reach of children and animals. Always make sure trash is secure. Talk to officials if you live in a facility. If you live in a nursing home or assisted living center, talk to an official. The person will know the rules for your area. Other ways to manage pain:   Ask your healthcare provider about non-opioid medicines to control pain.   Some medicines may even work better than opioids, depending on the cause of your pain. Nonprescription medicines include NSAIDs (such as ibuprofen) and acetaminophen. Prescription medicines include muscle relaxers, antidepressants, and steroids. Pain may be managed without any medicines. Some ways to relieve pain include massage, aromatherapy, or meditation. Physical or occupational therapy may also help. Follow up with your doctor or pain specialist as directed: You may need to have your dose adjusted. Your doctor or pain specialist can also help you find ways to manage pain without opioids. Write down your questions so you remember to ask them during your visits. For more information:   Drug Enforcement Administration  [address] [facility] , [address] [facility]  Phone: 0- 442 - [phone]  Web Address: [employer]. [employer].gov/drug_disposal/    [address] [place] [address] and Drug Administration  [address] , [address]  Phone: 9- 433 - [phone]  Web Address: http://[employer]/  © Copyright [employer] 2023 Information is for End User's use only and may not be sold, redistributed or otherwise used for commercial purposes. The above information is an  only. It is not intended as medical advice for individual conditions or treatments. Talk to your doctor, nurse or pharmacist before following any medical regimen to see if it is safe and effective for you.

## 2023-10-22 LAB
6MAM UR QL CFM: NEGATIVE NG/ML
7AMINOCLONAZEPAM UR QL CFM: NEGATIVE NG/ML
A-OH ALPRAZ UR QL CFM: NEGATIVE NG/ML
AMPHET UR QL CFM: NEGATIVE NG/ML
AMPHET UR QL CFM: NEGATIVE NG/ML
BUPRENORPHINE UR QL CFM: NEGATIVE NG/ML
BUTALBITAL UR QL CFM: NEGATIVE NG/ML
BZE UR QL CFM: NEGATIVE NG/ML
CODEINE UR QL CFM: NEGATIVE NG/ML
DESIPRAMINE UR QL CFM: NEGATIVE NG/ML
DESIPRAMINE UR QL CFM: NEGATIVE NG/ML
EDDP UR QL CFM: NEGATIVE NG/ML
ETHYL GLUCURONIDE UR QL CFM: ABNORMAL NG/ML
ETHYL SULFATE UR QL SCN: ABNORMAL NG/ML
EUTYLONE UR QL: NEGATIVE NG/ML
FENTANYL UR QL CFM: NEGATIVE NG/ML
GLIADIN IGG SER IA-ACNC: NEGATIVE NG/ML
GLUCOSE 30M P 50 G LAC PO SERPL-MCNC: NEGATIVE NG/ML
HYDROCODONE UR QL CFM: NORMAL NG/ML
HYDROCODONE UR QL CFM: NORMAL NG/ML
HYDROMORPHONE UR QL CFM: NORMAL NG/ML
IMIPRAMINE UR QL CFM: NEGATIVE NG/ML
LORAZEPAM UR QL CFM: NEGATIVE NG/ML
MDMA UR QL CFM: NEGATIVE NG/ML
ME-PHENIDATE UR QL CFM: NEGATIVE NG/ML
MEPERIDINE UR QL CFM: NEGATIVE NG/ML
METHADONE UR QL CFM: NEGATIVE NG/ML
METHAMPHET UR QL CFM: NEGATIVE NG/ML
MORPHINE UR QL CFM: NEGATIVE NG/ML
MORPHINE UR QL CFM: NEGATIVE NG/ML
NORBUPRENORPHINE UR QL CFM: NEGATIVE NG/ML
NORDIAZEPAM UR QL CFM: NEGATIVE NG/ML
NORFENTANYL UR QL CFM: NEGATIVE NG/ML
NORHYDROCODONE UR QL CFM: NORMAL NG/ML
NORHYDROCODONE UR QL CFM: NORMAL NG/ML
NORMEPERIDINE UR QL CFM: NEGATIVE NG/ML
NOROXYCODONE UR QL CFM: NEGATIVE NG/ML
OLANZAPINE QUANTIFICATION: NEGATIVE NG/ML
OPC-3373 QUANTIFICATION: NEGATIVE
OXAZEPAM UR QL CFM: NEGATIVE NG/ML
OXYCODONE UR QL CFM: NEGATIVE NG/ML
OXYMORPHONE UR QL CFM: NEGATIVE NG/ML
OXYMORPHONE UR QL CFM: NEGATIVE NG/ML
PARA-FLUOROFENTANYL QUANTIFICATION: NORMAL NG/ML
PCP UR QL CFM: NEGATIVE NG/ML
PHENOBARB UR QL CFM: NEGATIVE NG/ML
RESULT ALL_PRESCRIBED MEDS AND SPECIAL INSTRUCTIONS: NORMAL
SECOBARBITAL UR QL CFM: NEGATIVE NG/ML
SL AMB 4-ANPP QUANTIFICATION: NORMAL NG/ML
SL AMB 5F-ADB-M7 METABOLITE QUANTIFICATION: NEGATIVE NG/ML
SL AMB 7-OH-MITRAGYNINE (KRATOM ALKALOID) QUANTIFICATION: NEGATIVE NG/ML
SL AMB AB-FUBINACA-M3 METABOLITE QUANTIFICATION: NEGATIVE NG/ML
SL AMB ACETYL FENTANYL QUANTIFICATION: NORMAL NG/ML
SL AMB ACETYL NORFENTANYL QUANTIFICATION: NORMAL NG/ML
SL AMB ACRYL FENTANYL QUANTIFICATION: NORMAL NG/ML
SL AMB CARFENTANIL QUANTIFICATION: NORMAL NG/ML
SL AMB CLOZAPINE QUANTIFICATION: NEGATIVE NG/ML
SL AMB CTHC (MARIJUANA METABOLITE) QUANTIFICATION: NEGATIVE NG/ML
SL AMB DEXTROMETHORPHAN QUANTIFICATION: NEGATIVE NG/ML
SL AMB DEXTRORPHAN (DEXTROMETHORPHAN METABOLITE) QUANT: NEGATIVE NG/ML
SL AMB DEXTRORPHAN (DEXTROMETHORPHAN METABOLITE) QUANT: NEGATIVE NG/ML
SL AMB HALOPERIDOL  QUANTIFICATION: NEGATIVE NG/ML
SL AMB HALOPERIDOL METABOLITE QUANTIFICATION: NEGATIVE NG/ML
SL AMB HYDROXYRISPERIDONE QUANTIFICATION: NEGATIVE NG/ML
SL AMB JWH018 METABOLITE QUANTIFICATION: NEGATIVE NG/ML
SL AMB JWH073 METABOLITE QUANTIFICATION: NEGATIVE NG/ML
SL AMB MDMB-FUBINACA-M1 METABOLITE QUANTIFICATION: NEGATIVE NG/ML
SL AMB METHYLONE QUANTIFICATION: NEGATIVE NG/ML
SL AMB N-DESMETHYL-TRAMADOL QUANTIFICATION: NEGATIVE NG/ML
SL AMB N-DESMETHYLCLOZAPINE QUANTIFICATION: NEGATIVE NG/ML
SL AMB NORQUETIAPINE QUANTIFICATION: NEGATIVE NG/ML
SL AMB PHENTERMINE QUANTIFICATION: NEGATIVE NG/ML
SL AMB QUETIAPINE QUANTIFICATION: NEGATIVE NG/ML
SL AMB RCS4 METABOLITE QUANTIFICATION: NEGATIVE NG/ML
SL AMB RISPERIDONE QUANTIFICATION: NEGATIVE NG/ML
SL AMB RITALINIC ACID QUANTIFICATION: NEGATIVE NG/ML
SPECIMEN DRAWN SERPL: NEGATIVE NG/ML
TAPENTADOL UR QL CFM: NEGATIVE NG/ML
TEMAZEPAM UR QL CFM: NEGATIVE NG/ML
TEMAZEPAM UR QL CFM: NEGATIVE NG/ML
TRAMADOL UR QL CFM: NEGATIVE NG/ML
URATE/CREAT 24H UR: NEGATIVE NG/ML

## 2023-10-23 ENCOUNTER — TELEPHONE (OUTPATIENT)
Dept: PAIN MEDICINE | Facility: CLINIC | Age: 73
End: 2023-10-23

## 2023-10-23 NOTE — TELEPHONE ENCOUNTER
----- Message from [address] sent at 10/23/2023  7:55 AM EDT -----  Reminder to refrain from EtOH while on narcotics.

## 2023-11-03 NOTE — PROGRESS NOTES
Assessment/Plan:    Incisional hernia, without obstruction or gangrene  Patient is a pleasant 21-year-old male status post laparoscopic incisional herniorrhaphy in August 2023 returning with an epigastric lump for general surgery consultation. On physical examination he has a firm nonreducible lump in the epigastrium most likely representing a postoperative seroma. A recurrent epigastric incisional hernia is in the differential but I believe to be less likely. He is clinically stable to present time. I have discussed my clinical impression with the patient in the presence of his wife. I have recommended follow-up in 3 months time or sooner on an as-needed basis. If he remains symptomatic at the 3-month interval will perform an ultrasound after which we will make additional diagnostic and therapeutic treatment recommendations accordingly. Diagnoses and all orders for this visit:    Incisional hernia, without obstruction or gangrene          Subjective:      Patient ID: Gurmeet Doctor is a 75 y.o. male. Patient is here for a possible return Incisional hernia located above his belly button that return about a month ago. The bulge gets larger everyday. This hernia was repaired by Dr. Leslie curtis in August 10,23. Patient denies nausea/vomiting, diarrhea/constipation, trouble eating/drinking, abd pain or fevers/chills. Margarita. JONNIE HERNANDEZ            Review of Systems   Constitutional:  Negative for chills and fever. HENT:  Negative for ear pain and sore throat. Eyes:  Negative for pain and visual disturbance. Respiratory:  Negative for cough and shortness of breath. Cardiovascular:  Negative for chest pain and palpitations. Gastrointestinal:  Negative for abdominal pain and vomiting. Genitourinary:  Negative for dysuria and hematuria. Musculoskeletal:  Negative for arthralgias and back pain. Skin:  Negative for color change and rash.    Neurological:  Negative for seizures and syncope. All other systems reviewed and are negative. Objective:      /90 (BP Location: Left arm, Patient Position: Sitting, Cuff Size: Standard)   Pulse 73   Temp (!) 97.4 °F (36.3 °C) (Temporal)   Resp 18   Ht 5' 10" (1.778 m)   Wt 93.4 kg (206 lb)   SpO2 94%   BMI 29.56 kg/m²          Physical Exam  Vitals and nursing note reviewed. Constitutional:       Appearance: He is well-developed. HENT:      Head: Normocephalic and atraumatic. Eyes:      Conjunctiva/sclera: Conjunctivae normal.      Pupils: Pupils are equal, round, and reactive to light. Cardiovascular:      Rate and Rhythm: Normal rate and regular rhythm. Pulmonary:      Effort: Pulmonary effort is normal.      Breath sounds: Normal breath sounds. Abdominal:      General: Bowel sounds are normal.      Palpations: Abdomen is soft. Comments: 2 cm epigastric lump at the superior pole of his midline incision. Musculoskeletal:         General: Normal range of motion. Cervical back: Normal range of motion and neck supple. Skin:     General: Skin is warm and dry. Neurological:      Mental Status: He is alert and oriented to person, place, and time. Psychiatric:         Behavior: Behavior normal.         Thought Content:  Thought content normal.         Judgment: Judgment normal.

## 2023-11-06 ENCOUNTER — OFFICE VISIT (OUTPATIENT)
Dept: SURGERY | Facility: CLINIC | Age: 73
End: 2023-11-06
Payer: MEDICARE

## 2023-11-06 VITALS
HEART RATE: 73 BPM | TEMPERATURE: 97.4 F | BODY MASS INDEX: 29.49 KG/M2 | OXYGEN SATURATION: 94 % | SYSTOLIC BLOOD PRESSURE: 130 MMHG | WEIGHT: 206 LBS | DIASTOLIC BLOOD PRESSURE: 90 MMHG | RESPIRATION RATE: 18 BRPM | HEIGHT: 70 IN

## 2023-11-06 DIAGNOSIS — K43.2 INCISIONAL HERNIA, WITHOUT OBSTRUCTION OR GANGRENE: Primary | ICD-10-CM

## 2023-11-06 PROCEDURE — 99213 OFFICE O/P EST LOW 20 MIN: CPT | Performed by: SURGERY

## 2023-11-06 NOTE — ASSESSMENT & PLAN NOTE
Patient is a pleasant 69-year-old male status post laparoscopic incisional herniorrhaphy in August 2023 returning with an epigastric lump for general surgery consultation. On physical examination he has a firm nonreducible lump in the epigastrium most likely representing a postoperative seroma. A recurrent epigastric incisional hernia is in the differential but I believe to be less likely. He is clinically stable to present time. I have discussed my clinical impression with the patient in the presence of his wife. I have recommended follow-up in 3 months time or sooner on an as-needed basis. If he remains symptomatic at the 3-month interval will perform an ultrasound after which we will make additional diagnostic and therapeutic treatment recommendations accordingly.

## 2023-11-17 ENCOUNTER — TELEPHONE (OUTPATIENT)
Dept: PAIN MEDICINE | Facility: CLINIC | Age: 73
End: 2023-11-17

## 2023-11-17 DIAGNOSIS — M54.16 LUMBAR RADICULOPATHY: Primary | ICD-10-CM

## 2023-11-17 NOTE — TELEPHONE ENCOUNTER
Patient would like to be scheduled for a procedure. He complains of low back pain that radiates into the lower extremities with associated numbness and does have significant spinal stenosis on MRI.   Please call and schedule, thank you

## 2023-11-27 ENCOUNTER — HOSPITAL ENCOUNTER (OUTPATIENT)
Dept: INFUSION CENTER | Facility: HOSPITAL | Age: 73
Discharge: HOME | End: 2023-11-27
Attending: INTERNAL MEDICINE
Payer: MEDICARE

## 2023-11-27 VITALS — TEMPERATURE: 96.1 F

## 2023-11-27 DIAGNOSIS — C65.2 CANCER OF LEFT RENAL PELVIS (HCC): ICD-10-CM

## 2023-11-27 DIAGNOSIS — Z45.2 ENCOUNTER FOR CARE RELATED TO PORT-A-CATH: Primary | ICD-10-CM

## 2023-11-27 PROCEDURE — 96523 IRRIG DRUG DELIVERY DEVICE: CPT

## 2023-11-27 NOTE — PROGRESS NOTES
Port flushed freely. Good blood return noted. Port deaccessed without issue. Patient tolerated well. AVS declined. Appointment card given to patient. Patient ambulated off unit without incident. All personal belongings taken with patient.

## 2023-11-28 ENCOUNTER — HOSPITAL ENCOUNTER (OUTPATIENT)
Dept: INFUSION CENTER | Facility: HOSPITAL | Age: 73
End: 2023-11-28

## 2023-12-12 ENCOUNTER — HOSPITAL ENCOUNTER (OUTPATIENT)
Dept: RADIOLOGY | Facility: CLINIC | Age: 73
Discharge: HOME | End: 2023-12-12
Payer: MEDICARE

## 2023-12-12 ENCOUNTER — TELEPHONE (OUTPATIENT)
Dept: PAIN MEDICINE | Facility: CLINIC | Age: 73
End: 2023-12-12

## 2023-12-12 VITALS
TEMPERATURE: 97.7 F | OXYGEN SATURATION: 97 % | RESPIRATION RATE: 18 BRPM | DIASTOLIC BLOOD PRESSURE: 82 MMHG | SYSTOLIC BLOOD PRESSURE: 146 MMHG | HEART RATE: 63 BPM

## 2023-12-12 DIAGNOSIS — M54.16 LUMBAR RADICULOPATHY: ICD-10-CM

## 2023-12-12 PROCEDURE — 64483 NJX AA&/STRD TFRM EPI L/S 1: CPT | Performed by: ANESTHESIOLOGY

## 2023-12-12 RX ORDER — PAPAVERINE HCL 150 MG
15 CAPSULE, EXTENDED RELEASE ORAL ONCE
Status: COMPLETED | OUTPATIENT
Start: 2023-12-12 | End: 2023-12-12

## 2023-12-12 RX ADMIN — DEXAMETHASONE SODIUM PHOSPHATE 15 MG: 10 INJECTION, SOLUTION INTRAMUSCULAR; INTRAVENOUS at 15:02

## 2023-12-12 RX ADMIN — LIDOCAINE HYDROCHLORIDE 2 ML: 20 INJECTION, SOLUTION EPIDURAL; INFILTRATION; INTRACAUDAL; PERINEURAL at 15:02

## 2023-12-12 RX ADMIN — IOHEXOL 2 ML: 300 INJECTION, SOLUTION INTRAVENOUS at 15:01

## 2023-12-12 NOTE — TELEPHONE ENCOUNTER
Pt wants to know if his appt can be pushed out to when his wife's appt is on 2/9. She does opioid refill so she can't be moved sooner but they usually come together for their follow ups. I'm not sure if he will be due for meds on 1/8.      Please advise

## 2023-12-12 NOTE — DISCHARGE INSTRUCTIONS
Epidural Steroid Injection   WHAT YOU NEED TO KNOW:   An epidural steroid injection (JOHANNE) is a procedure to inject steroid medicine into the epidural space. The epidural space is between your spinal cord and vertebrae. Steroids reduce inflammation and fluid buildup in your spine that may be causing pain. You may be given pain medicine along with the steroids. ACTIVITY  Do not drive or operate machinery today. No strenuous activity today - bending, lifting, etc.  You may resume normal activites starting tomorrow - start slowly and as tolerated. You may shower today, but no tub baths or hot tubs. You may have numbness for several hours from the local anesthetic. Please use caution and common sense, especially with weight-bearing activities. CARE OF THE INJECTION SITE  If you have soreness or pain, apply ice to the area today (20 minutes on/20 minutes off). Starting tomorrow, you may use warm, moist heat or ice if needed. You may have an increase or change in your discomfort for 36-48 hours after your treatment. Apply ice and continue with any pain medication you have been prescribed. Notify the [facility] if you have any of the following: redness, drainage, swelling, headache, stiff neck or fever above 100°F.    SPECIAL INSTRUCTIONS  Our office will contact you in approximately 7 days for a progress report. MEDICATIONS  Continue to take all routine medications. Our office may have instructed you to hold some medications. As no general anesthesia was used in today's procedure, you should not experience any side effects related to anesthesia. If you are diabetic, the steroids used in today's injection may temporarily increase your blood sugar levels after the first few days after your injection. Please keep a close eye on your sugars and alert the doctor who manages your diabetes if your sugars are significantly high from your baseline or you are symptomatic.      If you have a problem specifically related to your procedure, please call our office at [phone]. Problems not related to your procedure should be directed to your primary care physician.

## 2023-12-12 NOTE — H&P
History of Present Illness: The patient is a 75 y.o. male who presents with complaints of low back and leg pain.     Past Medical History:   Diagnosis Date    A-fib [facility])     Arthritis     Bladder cancer     Cancer ([address])     skin melanoma;basal cell    Chronic pain disorder     from arthritis    Colon polyp     Does use hearing aid     bilat    Dry eyes, bilateral     GERD (gastroesophageal reflux disease)     History of kidney stones 10/2019    History of partial knee replacement     bilat    History of transfusion     History of vertigo     Hyperlipidemia     Hypertension     Infusion extravasation of chemotherapy vesicant 11/2021    Kidney lesion     Lumbar disc disorder     compression of vertebrae L4-5-6    Muscle weakness     left hip area    Renal mass     left    Right ankle injury 03/05/2020    missed step of ladder     Right ankle pain     Shortness of breath     with activity    Tinnitus     Urothelial cancer     left    Wears glasses        Past Surgical History:   Procedure Laterality Date    COLONOSCOPY      CYSTOSCOPY Left 04/01/2020    Procedure: CYSTOSCOPY; URETERAL CATHETER PLACEMENT;  Surgeon: Aishwarya Wagoner MD;  Location[facility];  Service: Urology    CYSTOSCOPY  05/11/2020    CYSTOSCOPY  06/04/2021    FL CYSTOGRAM  04/13/2020    FL RETROGRADE PYELOGRAM  01/17/2020    HERNIA REPAIR  13/04/0052    umbilical with mesh    INGUINAL HERNIA REPAIR Right     with mesh    IR PORT PLACEMENT  04/30/2020    JOINT REPLACEMENT Bilateral     partials knee    LUMBAR EPIDURAL INJECTION      MT CYSTO BLADDER W/URETERAL CATHETERIZATION Bilateral 01/17/2020    Procedure: CYSTOSCOPY; RIGHT RETROGRADE PYELOGRAM WITH RIGHT URETERAL CYTOLOGY SAMPLING; LEFT URETEROSCOPY WITH RENAL PELVIS BIOPSY AND LEFT STENT PLACEMENT;  Surgeon: Aishwarya Wagoner MD;  [facility];  Service: Urology    MT LAPAROSCOPY NEPHRECTOMY W/TOTAL URETERECTOMY Left 04/01/2020    Procedure: ROBOTIC LAPAROSCOPIC NEPHRO-URETERECTOMY;  Surgeon: Delfino Lundberg MD;  Location[facility];  Service: Urology    DC RPR AA HERNIA 1ST < 3 CM REDUCIBLE N/A 8/10/2023    Procedure: REPAIR HERNIA INCISIONAL LAPAROSCOPIC WITH MESH;  Surgeon: Adriana Means MD;  Location: [facility];  Service: General    SKIN CANCER EXCISION      surface melanoma    TONSILLECTOMY      WISDOM TOOTH EXTRACTION           Current Outpatient Medications:     acetaminophen (TYLENOL) 500 mg tablet, Take 2 tablets (1,000 mg total) by mouth every 8 (eight) hours (Patient taking differently: Take 1,000 mg by mouth if needed), Disp: , Rfl:     amiodarone 200 mg tablet, Take 200 mg by mouth daily, Disp: , Rfl:     ascorbic acid (VITAMIN C) 500 MG tablet, Take 1 tablet by mouth Every 12 hours, Disp: , Rfl:     atorvastatin (LIPITOR) 40 mg tablet, Take 40 mg by mouth daily at bedtime, Disp: , Rfl:     capsicum (ZOSTRIX) 0.075 % topical cream, Apply topically 3 (three) times a day, Disp: 28.3 g, Rfl: 0    diltiazem (CARDIZEM) 60 mg tablet, 2 (two) times a day, Disp: , Rfl:     famotidine (PEPCID) 20 mg tablet, Take 20 mg by mouth daily as needed, Disp: , Rfl:     finasteride (PROSCAR) 5 mg tablet, Take 5 mg by mouth daily, Disp: , Rfl:     folic acid (FOLVITE) 1 mg tablet, take 1 tablet by mouth once daily, Disp: 90 tablet, Rfl: 4    HYDROcodone-acetaminophen (Norco) 7.5-325 mg per tablet, Take 1 tablet by mouth 2 (two) times a day as needed for pain Max Daily Amount: 2 tablets, Disp: 60 tablet, Rfl: 0    naloxone (NARCAN) 4 mg/0.1 mL nasal spray, Administer 1 spray into a nostril.  If breathing does not return to normal or if breathing difficulty resumes after 2-3 minutes, give another dose in the other nostril using a new spray., Disp: 1 each, Rfl: 1    nystatin (MYCOSTATIN) cream, Apply topically 2 (two) times a day, Disp: 30 g, Rfl: 0    pantoprazole (PROTONIX) 40 mg tablet, Take 40 mg by mouth daily, Disp: , Rfl:     senna (SENOKOT) 8.6 mg, Take 1 tablet (8.6 mg total) by mouth daily at bedtime, Disp: 30 each, Rfl: 0    tadalafil (CIALIS) 20 MG tablet, Take one tablet by mouth one hour before sexual activity, Disp: 15 tablet, Rfl: 3    tamsulosin (FLOMAX) 0.4 mg, Take 1 capsule (0.4 mg total) by mouth daily with dinner, Disp: 30 capsule, Rfl: 12    VITAMIN D PO, Take 1,000 Units by mouth daily , Disp: , Rfl:     zolpidem (AMBIEN) 5 mg tablet, Take 1 tablet (5 mg total) by mouth daily at bedtime as needed for sleep, Disp: 30 tablet, Rfl: 1    Current Facility-Administered Medications:     dexamethasone (PF) (DECADRON) injection 15 mg, 15 mg, Epidural, Once, Milton Obrien, DO    iohexol (OMNIPAQUE) 300 mg/mL injection 2 mL, 2 mL, Epidural, Once, Milton Obrien, DO    lidocaine (PF) (XYLOCAINE-MPF) 2 % injection 2 mL, 2 mL, Epidural, Once, Milton Obrien, DO    Allergies   Allergen Reactions    Poison Ivy Extract Rash       Physical Exam:   Vitals:    12/12/23 1437   BP: 122/75   Pulse: 63   Resp: 18   Temp: 97.7 °F (36.5 °C)   SpO2: 98%     General: Awake, Alert, Oriented x 3, Mood and affect appropriate  Respiratory: Respirations even and unlabored  Cardiovascular: Peripheral pulses intact; no edema  Musculoskeletal Exam: Bilateral lumbar paraspinals TTP    ASA Score: 3    Patient/Chart Verification  Patient ID Verified: Verbal  ID Band Applied: No  Consents Confirmed: Procedural, To be obtained in the Pre-Procedure area  Interval H&P(within 24 hr) Complete (required for Outpatients and Surgery Admit only): To be obtained in the Pre-Procedure area  Allergies Reviewed: Yes  Anticoag/NSAID held?: NA  Currently on antibiotics?: No    Assessment:   1.  Lumbar radiculopathy        Plan: B/L L4 TFESI

## 2023-12-13 NOTE — TELEPHONE ENCOUNTER
He can if he feels like he can make his opioid medications last until that appointment as per policy, will not provide refills of opioids without an OV.

## 2023-12-13 NOTE — TELEPHONE ENCOUNTER
Spoke with pt he states he will have enough meds to last him another 4 weeks.  He was moved to 2/9/23 @ 145pm

## 2023-12-19 ENCOUNTER — TELEPHONE (OUTPATIENT)
Dept: PAIN MEDICINE | Facility: CLINIC | Age: 73
End: 2023-12-19

## 2023-12-19 NOTE — TELEPHONE ENCOUNTER
Patient Reports      50   %     improvement post injection    Pain Level   2  /10 7-8 when standing

## 2024-01-09 ENCOUNTER — HOSPITAL ENCOUNTER (OUTPATIENT)
Dept: INFUSION CENTER | Facility: HOSPITAL | Age: 74
Discharge: HOME | End: 2024-01-09

## 2024-01-10 ENCOUNTER — HOSPITAL ENCOUNTER (OUTPATIENT)
Dept: INFUSION CENTER | Facility: HOSPITAL | Age: 74
Discharge: HOME | End: 2024-01-10
Payer: MEDICARE

## 2024-01-10 DIAGNOSIS — C79.10 METASTATIC UROTHELIAL CARCINOMA (HCC): ICD-10-CM

## 2024-01-10 DIAGNOSIS — C65.2 CANCER OF LEFT RENAL PELVIS (HCC): ICD-10-CM

## 2024-01-10 DIAGNOSIS — Z45.2 ENCOUNTER FOR CARE RELATED TO PORT-A-CATH: Primary | ICD-10-CM

## 2024-01-10 DIAGNOSIS — R79.89 ELEVATED SERUM CREATININE: ICD-10-CM

## 2024-01-10 LAB
ALBUMIN SERPL BCP-MCNC: 4.1 G/DL (ref 3.5–5)
ALP SERPL-CCNC: 79 U/L (ref 34–104)
ALT SERPL W P-5'-P-CCNC: 17 U/L (ref 7–52)
ANION GAP SERPL CALCULATED.3IONS-SCNC: 7 MMOL/L
AST SERPL W P-5'-P-CCNC: 12 U/L (ref 13–39)
BASOPHILS # BLD AUTO: 0.04 THOUSANDS/ÂΜL (ref 0–0.1)
BASOPHILS NFR BLD AUTO: 1 % (ref 0–1)
BILIRUB SERPL-MCNC: 0.96 MG/DL (ref 0.2–1)
BUN SERPL-MCNC: 24 MG/DL (ref 5–25)
CALCIUM SERPL-MCNC: 9.1 MG/DL (ref 8.4–10.2)
CHLORIDE SERPL-SCNC: 105 MMOL/L (ref 96–108)
CO2 SERPL-SCNC: 28 MMOL/L (ref 21–32)
CREAT SERPL-MCNC: 1.65 MG/DL (ref 0.6–1.3)
EOSINOPHIL # BLD AUTO: 0.17 THOUSAND/ÂΜL (ref 0–0.61)
EOSINOPHIL NFR BLD AUTO: 3 % (ref 0–6)
ERYTHROCYTE [DISTWIDTH] IN BLOOD BY AUTOMATED COUNT: 14.2 % (ref 11.6–15.1)
GFR SERPL CREATININE-BSD FRML MDRD: 40 ML/MIN/1.73SQ M
GLUCOSE SERPL-MCNC: 116 MG/DL (ref 65–140)
HCT VFR BLD AUTO: 40.5 % (ref 36.5–49.3)
HGB BLD-MCNC: 12.9 G/DL (ref 12–17)
IMM GRANULOCYTES # BLD AUTO: 0.01 THOUSAND/UL (ref 0–0.2)
IMM GRANULOCYTES NFR BLD AUTO: 0 % (ref 0–2)
LYMPHOCYTES # BLD AUTO: 1.57 THOUSANDS/ÂΜL (ref 0.6–4.47)
LYMPHOCYTES NFR BLD AUTO: 24 % (ref 14–44)
MCH RBC QN AUTO: 31.4 PG (ref 26.8–34.3)
MCHC RBC AUTO-ENTMCNC: 31.9 G/DL (ref 31.4–37.4)
MCV RBC AUTO: 99 FL (ref 82–98)
MONOCYTES # BLD AUTO: 0.67 THOUSAND/ÂΜL (ref 0.17–1.22)
MONOCYTES NFR BLD AUTO: 10 % (ref 4–12)
NEUTROPHILS # BLD AUTO: 4.14 THOUSANDS/ÂΜL (ref 1.85–7.62)
NEUTS SEG NFR BLD AUTO: 62 % (ref 43–75)
NRBC BLD AUTO-RTO: 0 /100 WBCS
PLATELET # BLD AUTO: 184 THOUSANDS/UL (ref 149–390)
PMV BLD AUTO: 9.7 FL (ref 8.9–12.7)
POTASSIUM SERPL-SCNC: 4.1 MMOL/L (ref 3.5–5.3)
PROT SERPL-MCNC: 6.3 G/DL (ref 6.4–8.4)
RBC # BLD AUTO: 4.11 MILLION/UL (ref 3.88–5.62)
SODIUM SERPL-SCNC: 140 MMOL/L (ref 135–147)
WBC # BLD AUTO: 6.6 THOUSAND/UL (ref 4.31–10.16)

## 2024-01-10 PROCEDURE — 85025 COMPLETE CBC W/AUTO DIFF WBC: CPT

## 2024-01-10 PROCEDURE — 80053 COMPREHEN METABOLIC PANEL: CPT

## 2024-01-10 NOTE — PROGRESS NOTES
Central labs drawn via port. Catheter maintenance performed per protocol without incident. Next port flush appointment scheduled on 2/21/24 at 1PM and AVs provided. Pt discharged in stable condition.

## 2024-01-11 NOTE — PROGRESS NOTES
Hematology/Oncology Outpatient Follow-up  Tracy Jackson 67 y o  male 1950 [id number]    Date:  8/18/2022      Assessment and Plan:  1  Metastatic urothelial carcinoma ([facility] [address] )  Despite getting 2 additional weeks of a break from treatment patient continues to report significant fatigue and believes his neuropathy is worsening  He seems to have grade 3 neuropathy which is definitely affecting his activities of daily living and his ambulation  Did discuss with patient and his wife that Padcev drug information recommends discontinuing for grade 3 peripheral neuropathy  He has been having good response to his current treatment and is very interested in continuing treatment despite his worsening neuropathy; he does acknowledge that it may worsen on treatment  Did discuss with Dr Jong Weir we will further decrease the dose of his Padcev to 0 75 milligrams/kilogram   He is scheduled to resume treatment cycle 11 day 1 tomorrow and reports that he is ready to resume  He will continue to get his labs before each treatment  Request she follow up again with Dr Jong Weir in 2 weeks for close monitoring or sooner should the need arise  Will likely aim to do PET scan around end of September beginning of October  Patient is interested in getting the PET scan done around the end of September as he is looking for to seeing the results before he leaves on a trip to be with his daughter at the end of October     - CBC and differential; Standing  - Comprehensive metabolic panel; Standing  - Magnesium; Standing  - CBC and differential  - Comprehensive metabolic panel  - Magnesium    2  Hypomagnesemia  He will continue his oral supplements  Is getting additional IV hydration the infusion center as needed  Will ask them to give him IV fluids with magnesium tomorrow  - Magnesium; Standing  - Magnesium    3  Chemotherapy-induced neuropathy ([facility] )  As above #1    Will reach out to his palliative care team to see if they have any other recommendations for his peripheral neuropathy  4  Other fatigue  Patient continues to have significant fatigue despite resuming the low-dose prednisone 10 mg  He did recently start physical therapy  Will trial increasing the prednisone dose temporarily to 20 mg     - predniSONE 10 mg tablet; Take 2 tablets (20 mg total) by mouth daily  Dispense: 60 tablet; Refill: 3    HPI:  Patient presents today for a follow-up visit accompanied by his wife  He had 2 additional weeks of a break from treatment due to fatigue as well as other concerns  Was evaluated in the urgent care recently due to a cough; believes it may have been viral in nature line, recovered well without any treatment  His wife was also ill with viral infection around that time; COVID was negative  He mentions that he continues to have significant fatigue and has not noticed much of a difference with his break; however his wife states that he seems to be doing slightly better  He reports that the peripheral neuropathy seems to be getting worse and is affecting his ambulation and activities of daily living  Recently started physical therapy he does get more fatigued and exhausted afterward and actually had to defer his most recent treatment  He did restart the low-dose prednisone 10 mg but has not noted much of a difference would like to trial 20 mg  He reports that his taste has improved with his break from treatment  He admits that he did have another slight fall on his knees since his last office visit  His most recent laboratory studies from 2 days ago 08/16/2022 showed resolution of his neutropenia- normal WBC was 7 11 with normal ANC 4 68, he is not anemic H&H 12 3/38 6, MCV slightly higher than average 100 with normal platelet count 342  Creatinine 1 32, GFR 53, total protein 6 3 remaining metabolic panel is normal   Magnesium is slightly lower than average 1 8      Oncology History Overview Note   Patient has a history of hypertension, hyperlipidemia, chronic lower back pain  He had an MRI of the lower spine for further evaluation of his chronic lower back pain  The MRI on 12/02/2019 revealed Multiple left renal masses to include a left lower pole cyst and a rounded structure in the left upper pole which may also represent cyst   Left upper pole renal masses approximately 3 cm in greatest linear dimension  A CT with renal protocol was done on 12/12/2019 which also showed the infiltrating lesion in the upper pole of the left kidney measuring about the 3 5 cm in greatest dimension without any hint of retroperitoneal lymphadenopathy  A CT scan of the abdomen pelvis on 01/14/2020 showed the same findings with the mild hydronephrosis on the left  The urine cytology on 01/03/2020 showed high-grade urothelial carcinoma  A cystoscopy was then done, a biopsy was taken from the left renal pelvic region which showed high-grade urothelial carcinoma without evidence of lamina propria invasion  The detrusor muscle/muscularis propria were not present for evaluation  The recommendation was to pursue left robotic assisted laparoscopic nephroureterectomy with bladder cuff excision which was done on 04/01/2020  The final pathology revealed;  - Invasive high grade urothelial carcinoma arising in renal pelvis  - Bladder cuff margin is negative for carcinoma and no evidence of high grade dysplasia  - Ureters with no significant pathologic abnormality  - Two benign simple cysts  - Adrenal gland is negative for malignancy  - One lymph node, negative for malignancy (0/1)  The tumor size was 4 5 cm with invasion beyond the muscularis into the periurethral fat or peripelvic fat or the renal parenchyma  The margins were uninvolved by carcinoma or carcinoma in situ  The final pathology was pT3 pN0  Patient barely tolerated 1 cycle of adjuvant chemotherapy with split dose cisplatin and gemcitabine with a lot of side effects  The treatment had to be discontinued due to significant worsening renal dysfunction 6/2020  Patient started to complain about significant abdominal/left inguinal pain around August/September 2020  Unfortunately repeat imaging revealed recurrent/metastatic disease  9/4/20 CT C/A/P- Status post left nephrectomy for resection of urothelial neoplasm  Lymphadenopathy in the left nephrectomy bed has increased since the prior examination, concerning for metastatic disease  Ill-defined hyperattenuating masslike focus in the left rectus muscle, not identified on the prior examination  This is suspicious for a metastatic lesion  A rectus hematoma is also considered  9/23/20 PET scan- 1  Multiple FDG avid lymph nodes in the retrocrural region, retroperitoneum and the left renal bed compatible with metastasis  These have progressed from recent CT  2   FDG avid mass involving the left rectus abdominal musculature compatible with metastasis which is larger from recent CT  3  Several small lymph nodes adjacent to the mid to distal esophagus with FDG uptake suspicious for metastasis  Small focus of FDG uptake also suggested in the right hilar region, metastasis is not excluded  This could be reassessed on follow-up  4   Subtle focus of FDG uptake at the T2 level on the left, nonspecific, could be related to early degenerative changes, developing metastasis is not entirely excluded  This could be reassessed on follow-up  5  Prostate is mildly prominent with heterogeneous FDG uptake  This could be inflammatory  Correlate for prostatitis  He completed palliative radiation to the left abdomen 12/3/20     His PET scan from 08/16/2021 showed progression of his disease with hypermetabolic soft tissue lesions at the level of the right shoulder and right axilla with interval progression of the retroperitoneal and mesenteric hypermetabolic metastasis         He did have the new lesion to his right upper back/shoulder which was causing significant localized pain  This excised by his surgeon 08/09/2021  Pathology confirmed metastatic high-grade carcinoma consistent with his no primary urothelial carcinoma  He received palliative radiation to his right shoulder/axilla region  Urothelial cancer ([facility] [address] )   1/3/2020 Biopsy    Final Diagnosis    A  Urine, Clean Catch, Thin Prep:  High grade urothelial carcinoma (HGUC) - see comment  1/3/2020 Initial Diagnosis    Urothelial cancer ([facility] [address] )  T3 N0 (stage IIIA)     1/17/2020 Biopsy    Final Diagnosis    A  Ureter, Right, left renal pelvis biopsy  -Fragments of high grade urothelial carcinoma   -No evidence of lamina propria invasion   -Detrusor muscle/ muscularis propria is not present for evaluation  B  Urinary Bladder, bladder biopsy:  -Fragments of low grade papillary lesion  See note  -No evidence of invasion seen  -Unremarkable fragment of detrusor muscle seen  4/1/2020 Surgery    left robotic assisted laparoscopic nephroureterectomy with bladder cuff excision     Final Diagnosis    A  Left kidney, ureter, and bladder cuff, nephroureterectomy:  - Invasive high grade urothelial carcinoma arising in renal pelvis  - Bladder cuff margin is negative for carcinoma and no evidence of high grade dysplasia  - Ureters with no significant pathologic abnormality  - Two benign simple cysts  - Adrenal gland is negative for malignancy  - One lymph node, negative for malignancy (0/1)          5/14/2020 - 6/3/2020 Chemotherapy    CISplatin (PLATINOL) split dose 35 mg/m2 = 75 3 mg (50 % of original dose 70 mg/m2), Intravenous,   Administration: 75 3 mg (5/14/2020), 75 3 mg (5/21/2020)  gemcitabine (GEMZAR) 2,200 mg in sodium chloride 0 9 % 250 mL infusion, 2,150 2 mg (80 % of original dose 1,250 mg/m2), Intravenous,   Administration: 2,200 mg (5/14/2020), 2,200 mg (5/21/2020)    (only completed 1 cycle- d/c d/t adverse effects and worsening renal dysfunction) 10/9/2020 - 9/9/2021 Chemotherapy    pembrolizumab (KEYTRUDA) IVPB, 200 mg, Intravenous, Once, 16 of 20 cycles  Administration: 200 mg (10/9/2020), 200 mg (10/30/2020), 200 mg (11/20/2020), 200 mg (12/11/2020), 200 mg (12/31/2020), 200 mg (1/22/2021), 200 mg (2/12/2021), 200 mg (3/5/2021), 200 mg (3/26/2021), 200 mg (4/16/2021), 200 mg (5/7/2021), 200 mg (5/28/2021), 200 mg (6/18/2021), 200 mg (7/9/2021), 200 mg (7/30/2021), 200 mg (8/20/2021)     11/19/2020 - 12/3/2020 Radiation    Treatment:  Course: C1    Plan ID Energy Fractions Dose per Fraction (cGy) Dose Correction (cGy) Total Dose Delivered (cGy) Elapsed Days   L Abdomen 6X 10 / 10 300 0 3,000 14        9/10/2021 -  Chemotherapy    enfortumab vedotin-ejfv (PADCEV) IVPB, 1 25 mg/kg = 114 mg, Intravenous, Once, 11 of 16 cycles  Dose modification: 1 mg/kg (original dose 1 25 mg/kg, Cycle 7, Reason: Other (Must fill in a comment), Comment: dose reduction due to side effects ), 1 25 mg/kg (original dose 1 25 mg/kg, Cycle 7, Reason: Other (Must fill in a comment), Comment: patient wants full dose ), 1 mg/kg (original dose 1 25 mg/kg, Cycle 7, Reason: Neuropathy), 0 75 mg/kg (original dose 1 25 mg/kg, Cycle 11, Reason: Neuropathy)  Administration: 114 mg (9/10/2021), 114 mg (9/17/2021), 110 mg (10/8/2021), 110 mg (9/24/2021), 110 mg (10/15/2021), 110 mg (11/12/2021), 110 mg (10/22/2021), 110 mg (11/19/2021), 110 mg (12/10/2021), 110 mg (11/26/2021), 110 mg (12/17/2021), 110 mg (1/7/2022), 110 mg (12/23/2021), 110 mg (1/14/2022), 90 mg (4/8/2022), 90 mg (4/15/2022), 90 mg (4/22/2022), 110 mg (1/21/2022), 110 mg (2/18/2022), 110 mg (2/25/2022), 90 mg (5/13/2022), 90 mg (5/20/2022), 90 mg (5/27/2022), 90 mg (3/4/2022), 90 mg (6/10/2022), 90 mg (6/17/2022), 90 mg (6/24/2022), 90 mg (7/8/2022), 90 mg (7/15/2022), 90 mg (7/22/2022)     Cancer of left renal pelvis ([address] )   4/23/2020 Initial Diagnosis    Cancer of left renal pelvis ([address] )     10/9/2020 - 9/9/2021 Chemotherapy    pembrolizumab (KEYTRUDA) IVPB, 200 mg, Intravenous, Once, 16 of 20 cycles  Administration: 200 mg (10/9/2020), 200 mg (10/30/2020), 200 mg (11/20/2020), 200 mg (12/11/2020), 200 mg (12/31/2020), 200 mg (1/22/2021), 200 mg (2/12/2021), 200 mg (3/5/2021), 200 mg (3/26/2021), 200 mg (4/16/2021), 200 mg (5/7/2021), 200 mg (5/28/2021), 200 mg (6/18/2021), 200 mg (7/9/2021), 200 mg (7/30/2021), 200 mg (8/20/2021)     9/10/2021 -  Chemotherapy    enfortumab vedotin-ejfv (PADCEV) IVPB, 1 25 mg/kg = 114 mg, Intravenous, Once, 11 of 16 cycles  Dose modification: 1 mg/kg (original dose 1 25 mg/kg, Cycle 7, Reason: Other (Must fill in a comment), Comment: dose reduction due to side effects ), 1 25 mg/kg (original dose 1 25 mg/kg, Cycle 7, Reason: Other (Must fill in a comment), Comment: patient wants full dose ), 1 mg/kg (original dose 1 25 mg/kg, Cycle 7, Reason: Neuropathy), 0 75 mg/kg (original dose 1 25 mg/kg, Cycle 11, Reason: Neuropathy)  Administration: 114 mg (9/10/2021), 114 mg (9/17/2021), 110 mg (10/8/2021), 110 mg (9/24/2021), 110 mg (10/15/2021), 110 mg (11/12/2021), 110 mg (10/22/2021), 110 mg (11/19/2021), 110 mg (12/10/2021), 110 mg (11/26/2021), 110 mg (12/17/2021), 110 mg (1/7/2022), 110 mg (12/23/2021), 110 mg (1/14/2022), 90 mg (4/8/2022), 90 mg (4/15/2022), 90 mg (4/22/2022), 110 mg (1/21/2022), 110 mg (2/18/2022), 110 mg (2/25/2022), 90 mg (5/13/2022), 90 mg (5/20/2022), 90 mg (5/27/2022), 90 mg (3/4/2022), 90 mg (6/10/2022), 90 mg (6/17/2022), 90 mg (6/24/2022), 90 mg (7/8/2022), 90 mg (7/15/2022), 90 mg (7/22/2022)      Surgery       Metastatic urothelial carcinoma ([facility] [address] )   8/9/2021 Initial Diagnosis    Metastatic urothelial carcinoma ([facility] [address] )     9/8/2021 - 9/21/2021 Radiation    Treatment:  Course: C2    Plan ID Energy Fractions Dose per Fraction (cGy) Dose Correction (cGy) Total Dose Delivered (cGy) Elapsed Days   R Axil_Shl # 6X 10 / 10 300 0 3,000 13      Treatment dates:  C2: 9/8/2021 - 9/21/2021     9/10/2021 -  Chemotherapy    enfortumab vedotin-ejfv (PADCEV) IVPB, 1 25 mg/kg = 114 mg, Intravenous, Once, 11 of 16 cycles  Dose modification: 1 mg/kg (original dose 1 25 mg/kg, Cycle 7, Reason: Other (Must fill in a comment), Comment: dose reduction due to side effects ), 1 25 mg/kg (original dose 1 25 mg/kg, Cycle 7, Reason: Other (Must fill in a comment), Comment: patient wants full dose ), 1 mg/kg (original dose 1 25 mg/kg, Cycle 7, Reason: Neuropathy), 0 75 mg/kg (original dose 1 25 mg/kg, Cycle 11, Reason: Neuropathy)  Administration: 114 mg (9/10/2021), 114 mg (9/17/2021), 110 mg (10/8/2021), 110 mg (9/24/2021), 110 mg (10/15/2021), 110 mg (11/12/2021), 110 mg (10/22/2021), 110 mg (11/19/2021), 110 mg (12/10/2021), 110 mg (11/26/2021), 110 mg (12/17/2021), 110 mg (1/7/2022), 110 mg (12/23/2021), 110 mg (1/14/2022), 90 mg (4/8/2022), 90 mg (4/15/2022), 90 mg (4/22/2022), 110 mg (1/21/2022), 110 mg (2/18/2022), 110 mg (2/25/2022), 90 mg (5/13/2022), 90 mg (5/20/2022), 90 mg (5/27/2022), 90 mg (3/4/2022), 90 mg (6/10/2022), 90 mg (6/17/2022), 90 mg (6/24/2022), 90 mg (7/8/2022), 90 mg (7/15/2022), 90 mg (7/22/2022)         Interval history:    ROS: Review of Systems   Constitutional: Positive for activity change and fatigue  Negative for appetite change, chills, fever and unexpected weight change  HENT: Positive for hearing loss  Negative for congestion, mouth sores, nosebleeds, sore throat and trouble swallowing  Eyes: Negative  Respiratory: Positive for shortness of breath (exertional)  Negative for cough and chest tightness  Cardiovascular: Negative for chest pain, palpitations and leg swelling  Gastrointestinal: Negative for abdominal distention, abdominal pain, blood in stool, constipation, diarrhea, nausea and vomiting  Genitourinary: Negative for difficulty urinating, dysuria, frequency, hematuria and urgency     Musculoskeletal: Positive for arthralgias, back pain, gait problem and myalgias  Negative for joint swelling  Skin: Negative for color change, pallor and rash  Neurological: Positive for weakness and numbness  Negative for dizziness, light-headedness and headaches  Hematological: Negative for adenopathy  Bruises/bleeds easily  Psychiatric/Behavioral: Negative for dysphoric mood and sleep disturbance  The patient is not nervous/anxious          Past Medical History:   Diagnosis Date    Arthritis     Bladder cancer     Cancer ([address] )     skin melanoma;basal cell    Chronic pain disorder     from arthritis    Colon polyp     Does use hearing aid     bilat    Dry eyes, bilateral     GERD (gastroesophageal reflux disease)     History of kidney stones 10/2019    History of partial knee replacement     bilat    History of vertigo     Hyperlipidemia     Hypertension     Infusion extravasation of chemotherapy vesicant 11/2021    Kidney lesion     Lumbar disc disorder     compression of vertebrae L4-5-6    Muscle weakness     left hip area    Renal mass     left    Right ankle injury 03/05/2020    missed step of ladder     Right ankle pain     Shortness of breath     with activity    Tinnitus     Urothelial cancer     left    Wears glasses        Past Surgical History:   Procedure Laterality Date    COLONOSCOPY      CYSTOSCOPY Left 4/1/2020    Procedure: CYSTOSCOPY; URETERAL CATHETER PLACEMENT;  Surgeon: Estee Lugo MD;  Location[facility];  Service: Urology    CYSTOSCOPY  05/11/2020    CYSTOSCOPY  06/04/2021    FL CYSTOGRAM  4/13/2020    FL RETROGRADE PYELOGRAM  1/17/2020    HERNIA REPAIR      umbilical with mesh    INGUINAL HERNIA REPAIR Right     with mesh    IR PORT PLACEMENT  4/30/2020    JOINT REPLACEMENT Bilateral     partials knee    LUMBAR EPIDURAL INJECTION      MN CYSTOURETHROSCOPY,URETER CATHETER Bilateral 1/17/2020    Procedure: CYSTOSCOPY; RIGHT RETROGRADE PYELOGRAM WITH RIGHT URETERAL CYTOLOGY SAMPLING; LEFT URETEROSCOPY WITH RENAL PELVIS BIOPSY AND LEFT STENT PLACEMENT;  Surgeon: Sanya Contreras MD;  [facility];  Service: Urology    DE NEPHRECTOMY, W/PART  URETECTOMY Left 2020    Procedure: ROBOTIC LAPAROSCOPIC NEPHRO-URETERECTOMY;  Surgeon: Sanya Contreras MD;  Location[facility];  Service: Urology    SKIN CANCER EXCISION      surface melanoma    TONSILLECTOMY      WISDOM TOOTH EXTRACTION         Social History     Socioeconomic History    Marital status: /Civil Union     Spouse name: None    Number of children: None    Years of education: None    Highest education level: None   Occupational History    None   Tobacco Use    Smoking status: Former Smoker     Types: Cigarettes     Quit date:      Years since quittin 7    Smokeless tobacco: Never Used   Vaping Use    Vaping Use: Never used   Substance and Sexual Activity    Alcohol use:  Yes     Alcohol/week: 17 0 standard drinks     Types: 7 Glasses of wine, 10 Cans of beer per week     Comment: socially    Drug use: Never    Sexual activity: Not Currently   Other Topics Concern    None   Social History Narrative    Daily caffeine use - 2 cups coffee      Social Determinants of Health     Financial Resource Strain: Not on file   Food Insecurity: Not on file   Transportation Needs: Not on file   Physical Activity: Not on file   Stress: Not on file   Social Connections: Not on file   Intimate Partner Violence: Not on file   Housing Stability: Not on file       Family History   Problem Relation Age of Onset    Liver cancer Father        Allergies   Allergen Reactions    Poison Ivy Extract Rash         Current Outpatient Medications:     acetaminophen (TYLENOL) 500 mg tablet, Take 2 tablets (1,000 mg total) by mouth every 8 (eight) hours, Disp: , Rfl:     allopurinol (ZYLOPRIM) 300 mg tablet, take 1 tablet by mouth once daily, Disp: 30 tablet, Rfl: 3    apixaban (Eliquis) 5 mg, Take 1 tablet (5 mg total) by mouth 2 (two) times a day For afib, Disp: 60 tablet, Rfl: 11    atorvastatin (LIPITOR) 80 mg tablet, Take 80 mg by mouth every other day evening, Disp: , Rfl:     clotrimazole (MYCELEX) 10 mg rizwan, Take 1 tablet (10 mg total) by mouth 5 (five) times a day, Disp: 70 tablet, Rfl: 2    colchicine (COLCRYS) 0 6 mg tablet, take 1 tablet by mouth twice a day if needed for FOOT PAIN, Disp: , Rfl:     docusate sodium (COLACE) 250 MG capsule, Take 1 capsule (250 mg total) by mouth daily, Disp: 60 capsule, Rfl: 6    DULoxetine (CYMBALTA) 60 mg delayed release capsule, take 1 capsule by mouth once daily, Disp: 30 capsule, Rfl: 1    folic acid (FOLVITE) 1 mg tablet, take 1 tablet by mouth once daily, Disp: 90 tablet, Rfl: 4    Magnesium 250 MG TABS, 1 tablet 2 (two) times a day Taking 500mg in the morning and 500mg at night, Disp: , Rfl:     metoprolol tartrate (LOPRESSOR) 100 mg tablet, Take 50 mg by mouth daily, Disp: , Rfl:     naloxone (NARCAN) 4 mg/0 1 mL nasal spray, Administer 1 spray into a nostril   If breathing does not return to normal or if breathing difficulty resumes after 2-3 minutes, give another dose in the other nostril using a new spray , Disp: 1 each, Rfl: 1    nystatin (MYCOSTATIN) cream, Apply topically 2 (two) times a day, Disp: 30 g, Rfl: 0    predniSONE 10 mg tablet, Take 2 tablets (20 mg total) by mouth daily, Disp: 60 tablet, Rfl: 3    senna (SENOKOT) 8 6 mg, Take 1 tablet (8 6 mg total) by mouth daily at bedtime, Disp: 30 each, Rfl: 0    tadalafil (CIALIS) 20 MG tablet, Take one tablet by mouth one hour before sexual activity, Disp: 15 tablet, Rfl: 3    tamsulosin (FLOMAX) 0 4 mg, take 1 capsule by mouth once daily with dinner, Disp: 30 capsule, Rfl: 1    traMADol (Ultram) 50 mg tablet, Take 1 tab PO Q8 hours PRN pain, on severe days can take a 4th tablet , Disp: 100 tablet, Rfl: 1    VITAMIN D PO, Take 1,000 Units by mouth daily , Disp: , Rfl:     zolpidem (AMBIEN) 5 mg tablet, Take 1 tablet (5 mg total) by mouth daily at bedtime as needed for sleep, Disp: 30 tablet, Rfl: 1    ALPRAZolam (XANAX) 0 25 mg tablet, Take 1 tablet (0 25 mg total) by mouth daily at bedtime as needed for anxiety (Patient not taking: No sig reported), Disp: 30 tablet, Rfl: 0      Physical Exam:  /80 (BP Location: Right arm, Patient Position: Sitting, Cuff Size: Adult)   Pulse 89   Temp 97 6 °F (36 4 °C)   Resp 18   Ht 5' 10" (1 778 m)   Wt 87 5 kg (193 lb)   SpO2 94%   BMI 27 69 kg/m²     Physical Exam  Vitals reviewed  Constitutional:       General: He is not in acute distress  Appearance: He is well-developed  He is not diaphoretic  HENT:      Head: Normocephalic and atraumatic  Eyes:      General: Lids are normal  No scleral icterus  Conjunctiva/sclera: Conjunctivae normal       Pupils: Pupils are equal, round, and reactive to light  Neck:      Thyroid: No thyromegaly  Cardiovascular:      Rate and Rhythm: Normal rate and regular rhythm  Heart sounds: Normal heart sounds  No murmur heard  Pulmonary:      Effort: Pulmonary effort is normal  No respiratory distress  Breath sounds: Normal breath sounds  Chest:   Breasts:      Right: No axillary adenopathy  Left: No axillary adenopathy  Abdominal:      General: There is no distension  Palpations: Abdomen is soft  There is no hepatomegaly or splenomegaly  Tenderness: There is no abdominal tenderness  Hernia: A hernia is present  Musculoskeletal:      Cervical back: Normal range of motion and neck supple  Right lower leg: No edema  Left lower leg: No edema  Lymphadenopathy:      Cervical: No cervical adenopathy  Upper Body:      Right upper body: No axillary adenopathy  Left upper body: No axillary adenopathy  Skin:     General: Skin is warm and dry  Findings: No erythema or rash  Neurological:      General: No focal deficit present        Mental Status: He is alert and oriented to person, place, and time  Psychiatric:         Mood and Affect: Mood normal  Affect is blunt  Behavior: Behavior normal  Behavior is cooperative  Thought Content: Thought content normal          Judgment: Judgment normal            Labs:  Lab Results   Component Value Date    WBC 7 11 08/16/2022    HGB 12 3 08/16/2022    HCT 38 6 08/16/2022     (H) 08/16/2022     08/16/2022     Lab Results   Component Value Date    K 3 8 08/16/2022     08/16/2022    CO2 30 08/16/2022    BUN 24 08/16/2022    CREATININE 1 32 (H) 08/16/2022    GLUF 111 (H) 03/01/2022    CALCIUM 9 5 08/16/2022    CORRECTEDCA 9 0 06/21/2022    AST 18 08/16/2022    ALT 24 08/16/2022    ALKPHOS 80 08/16/2022    EGFR 53 08/16/2022       Patient voiced understanding and agreement in the above discussion  Aware to contact our office with questions/symptoms in the interim  This note has been generated by voice recognition software system  Therefore, there may be spelling, grammar, and or syntax errors  Please contact if questions arise  Detail Level: Detailed When Should The Patient Follow-Up For Their Next Full-Body Skin Exam?: 3 Months Quality 137: Melanoma: Continuity Of Care - Recall System: Patient information entered into a recall system that includes: target date for the next exam specified AND a process to follow up with patients regarding missed or unscheduled appointments

## 2024-01-24 ENCOUNTER — TELEPHONE (OUTPATIENT)
Age: 74
End: 2024-01-24

## 2024-01-24 NOTE — TELEPHONE ENCOUNTER
Caller: Kane Amato     Doctor: Dr Obrien     Reason for call: Patient will be going to [place] office to complete ELI since [place] is closer.    Call back#: [phone]

## 2024-02-02 ENCOUNTER — HOSPITAL ENCOUNTER (OUTPATIENT)
Dept: NUCLEAR MEDICINE | Facility: HOSPITAL | Age: 74
End: 2024-02-02
Attending: INTERNAL MEDICINE
Payer: MEDICARE

## 2024-02-02 DIAGNOSIS — C79.10 METASTATIC UROTHELIAL CARCINOMA (HCC): ICD-10-CM

## 2024-02-02 DIAGNOSIS — C67.3 MALIGNANT NEOPLASM OF ANTERIOR WALL OF BLADDER (HCC): ICD-10-CM

## 2024-02-02 LAB — GLUCOSE SERPL-MCNC: 89 MG/DL (ref 65–140)

## 2024-02-02 PROCEDURE — 82948 REAGENT STRIP/BLOOD GLUCOSE: CPT

## 2024-02-02 PROCEDURE — G1004 CDSM NDSC: HCPCS

## 2024-02-02 PROCEDURE — 78815 PET IMAGE W/CT SKULL-THIGH: CPT

## 2024-02-02 PROCEDURE — A9552 F18 FDG: HCPCS

## 2024-02-02 RX ORDER — PREDNISOLONE ACETATE 10 MG/ML
SUSPENSION/ DROPS OPHTHALMIC
COMMUNITY
Start: 2023-12-16

## 2024-02-02 RX ORDER — OFLOXACIN 3 MG/ML
SOLUTION/ DROPS OPHTHALMIC
COMMUNITY
Start: 2023-12-16

## 2024-02-02 NOTE — PROGRESS NOTES
Assessment/Plan:    Recurrent incisional hernia  Patient returns with persistent symptoms referable to his epigastric recurrent incisional hernia.    Patient underwent recent CT imaging which confirms the presence of a recurrent epigastric incisional hernia.    On physical examination he is well-appearing.  He has an obvious defect in the fascia at the superior pole of his prior incisional hernia repair.    The options benefits risks and alternatives to repeat laparoscopic incisional hernia repair with overlapping mesh was reviewed.  He understands that he has the option to proceed with a nonsurgical approach for this recurrent epigastric hernia which likely poses little to no danger to him of an acute incarceration.    All questions answered to the satisfaction of the patient and his wife.  Patient prefers to have it repaired upon his return from vacation in mid April.    I will see him at the end of the month and proceed accordingly.       Diagnoses and all orders for this visit:    Recurrent incisional hernia    Other orders  -     ofloxacin (OCUFLOX) 0.3 % ophthalmic solution; instill 1 drop into right eye four times a day START 3 DAYS BEFOR...  (REFER TO PRESCRIPTION NOTES).  -     prednisoLONE acetate (PRED FORTE) 1 % ophthalmic suspension; instill 1 drop into right eye every 2 hours START AFTER SURGERY          Subjective:      Patient ID: Juan Amato is a 73 y.o. male.    Patient is here for a 3mo f/u on his inc hernia. Patient states since his last visit the hernia has gotten larger and he suffers from sharp, stabbing, burning pain after eating, denies having issues with swallowing. Patient states theres bulging all the time. Patient denies nausea/vomiting, diarrhea/constipation, or fevers/chills. Patient denies allergies to medication and is not on any blood thinners. JONNIE Bee            Review of Systems   Constitutional:  Negative for chills and fever.   HENT:  Negative for ear pain and sore  "throat.    Eyes:  Negative for pain and visual disturbance.   Respiratory:  Negative for cough and shortness of breath.    Cardiovascular:  Negative for chest pain and palpitations.   Gastrointestinal:  Negative for abdominal pain and vomiting.   Genitourinary:  Negative for dysuria and hematuria.   Musculoskeletal:  Negative for arthralgias and back pain.   Skin:  Negative for color change and rash.   Neurological:  Negative for seizures and syncope.   All other systems reviewed and are negative.        Objective:      /70 (BP Location: Left arm, Patient Position: Sitting, Cuff Size: Large)   Pulse 67   Temp 97.6 °F (36.4 °C) (Probe)   Resp 18   Ht 5' 9\" (1.753 m)   Wt 93.9 kg (207 lb)   SpO2 92%   BMI 30.57 kg/m²          Physical Exam  Vitals and nursing note reviewed.   Constitutional:       Appearance: He is well-developed.   HENT:      Head: Normocephalic and atraumatic.   Eyes:      Conjunctiva/sclera: Conjunctivae normal.      Pupils: Pupils are equal, round, and reactive to light.   Cardiovascular:      Rate and Rhythm: Normal rate and regular rhythm.   Pulmonary:      Effort: Pulmonary effort is normal.      Breath sounds: Normal breath sounds.   Abdominal:      General: Bowel sounds are normal.      Palpations: Abdomen is soft.      Comments: Recurrent epigastric incisional hernia reducible   Musculoskeletal:         General: Normal range of motion.      Cervical back: Normal range of motion and neck supple.   Skin:     General: Skin is warm and dry.   Neurological:      Mental Status: He is alert and oriented to person, place, and time.   Psychiatric:         Behavior: Behavior normal.         Thought Content: Thought content normal.         Judgment: Judgment normal.           "

## 2024-02-05 ENCOUNTER — OFFICE VISIT (OUTPATIENT)
Dept: SURGERY | Facility: CLINIC | Age: 74
End: 2024-02-05
Payer: MEDICARE

## 2024-02-05 VITALS
DIASTOLIC BLOOD PRESSURE: 70 MMHG | TEMPERATURE: 97.6 F | RESPIRATION RATE: 18 BRPM | WEIGHT: 207 LBS | SYSTOLIC BLOOD PRESSURE: 100 MMHG | HEART RATE: 67 BPM | HEIGHT: 69 IN | OXYGEN SATURATION: 92 % | BODY MASS INDEX: 30.66 KG/M2

## 2024-02-05 DIAGNOSIS — K43.2 RECURRENT INCISIONAL HERNIA: Primary | ICD-10-CM

## 2024-02-05 PROCEDURE — 99213 OFFICE O/P EST LOW 20 MIN: CPT | Performed by: SURGERY

## 2024-02-05 NOTE — ASSESSMENT & PLAN NOTE
Patient returns with persistent symptoms referable to his epigastric recurrent incisional hernia.    Patient underwent recent CT imaging which confirms the presence of a recurrent epigastric incisional hernia.    On physical examination he is well-appearing.  He has an obvious defect in the fascia at the superior pole of his prior incisional hernia repair.    The options benefits risks and alternatives to repeat laparoscopic incisional hernia repair with overlapping mesh was reviewed.  He understands that he has the option to proceed with a nonsurgical approach for this recurrent epigastric hernia which likely poses little to no danger to him of an acute incarceration.    All questions answered to the satisfaction of the patient and his wife.  Patient prefers to have it repaired upon his return from vacation in mid April.    I will see him at the end of the month and proceed accordingly.

## 2024-02-06 ENCOUNTER — OFFICE VISIT (OUTPATIENT)
Dept: HEMATOLOGY ONCOLOGY | Facility: CLINIC | Age: 74
End: 2024-02-06
Payer: MEDICARE

## 2024-02-06 VITALS
SYSTOLIC BLOOD PRESSURE: 106 MMHG | RESPIRATION RATE: 18 BRPM | HEIGHT: 69 IN | HEART RATE: 62 BPM | DIASTOLIC BLOOD PRESSURE: 70 MMHG | OXYGEN SATURATION: 99 % | TEMPERATURE: 97.8 F | BODY MASS INDEX: 30.36 KG/M2 | WEIGHT: 205 LBS

## 2024-02-06 DIAGNOSIS — C67.3 MALIGNANT NEOPLASM OF ANTERIOR WALL OF BLADDER (HCC): ICD-10-CM

## 2024-02-06 DIAGNOSIS — R79.89 ELEVATED SERUM CREATININE: ICD-10-CM

## 2024-02-06 DIAGNOSIS — C79.10 METASTATIC UROTHELIAL CARCINOMA (HCC): Primary | ICD-10-CM

## 2024-02-06 DIAGNOSIS — N18.31 STAGE 3A CHRONIC KIDNEY DISEASE (HCC): ICD-10-CM

## 2024-02-06 PROCEDURE — 99214 OFFICE O/P EST MOD 30 MIN: CPT | Performed by: INTERNAL MEDICINE

## 2024-02-06 NOTE — PROGRESS NOTES
Hematology/Oncology Outpatient Follow-up  Juan Amato 73 y.o. male 1950 [id number]    Date:  2/6/2024        Assessment and Plan:  1. Metastatic urothelial carcinoma (HCC)  Diagnosed in January 2020, status post multiple lines of treatment.    His last dose of enfortumab vedotin was around September 2022.  The treatment had to be stopped due to severe neuropathy.    We did discuss the result of the recent PET CT scan which surprisingly seems to show stable or even improvement of the activity seen on the prior PET scan.  He does seem to have focal activity at the left rectus muscle sheath of unknown etiology.    Patient stated that he is not interested in any type of treatment at this point to avoid worsening of his multiple comorbidities including neuropathy.  However, he seems to be interested in getting another PET scan in 6 months from now for close monitoring.  He stated that he would consider going back on palliative treatment for his metastatic urothelial carcinoma if he starts to have obvious progression on imaging.    - NM PET CT skull base to mid thigh; Future  - CBC and differential; Future  - Comprehensive metabolic panel; Future  - Magnesium    2. Elevated serum creatinine    - Comprehensive metabolic panel; Future    3. Stage 3a chronic kidney disease (HCC)    - Comprehensive metabolic panel; Future    4. Malignant neoplasm of anterior wall of bladder (HCC)    - NM PET CT skull base to mid thigh; Future        HPI:  The patient came there for a follow-up visit accompanied by his wife.  He continues to complain about residual neuropathy in his feet which is affecting his walking.  His energy level seems to be decent.  He did have a PET CT scan on 2/2/2024 which showed:  IMPRESSION:     1. The only new finding on this examination is focal thickening and increased activity associated with the left rectus muscle sheath, with SUV max of 3.8. However, this is a nonspecific finding. Although  metastatic disease is not excluded, a small rectus   sheath hematoma or muscle inflammation could appear similarly. Close reassessment on follow-up recommended  2. Areas of increased activity posterior and along the superior margin of the spleen have shown a partial diminishment in FDG avidity  3. Previously seen left lung opacity has resolved, and parenchymal changes at the right lung base are stable. Foci of activity about the right shoulder demonstrate unchanged or diminished activity  4. Right medial clavicular abnormality also shows diminished FDG avidity and no change in appearance on limited CT. No new osseous abnormalities are seen    Recent available blood work from 1/10/2024 showed hemoglobin of 12.9 with normal white cells and platelets.  Creatinine 1.6 with normal calcium and liver enzymes.    Oncology History Overview Note   Patient has a history of hypertension, hyperlipidemia, chronic lower back pain.     He had an MRI of the lower spine for further evaluation of his chronic lower back pain.  The MRI on 12/02/2019 revealed Multiple left renal masses to include a left lower pole cyst and a rounded structure in the left upper pole which may also represent cyst.  Left upper pole renal masses approximately 3 cm in greatest linear dimension.     A CT with renal protocol was done on 12/12/2019 which also showed the infiltrating lesion in the upper pole of the left kidney measuring about the 3.5 cm in greatest dimension without any hint of retroperitoneal lymphadenopathy.       A CT scan of the abdomen pelvis on 01/14/2020 showed the same findings with the mild hydronephrosis on the left.  The urine cytology on 01/03/2020 showed high-grade urothelial carcinoma.  A cystoscopy was then done, a biopsy was taken from the left renal pelvic region which showed high-grade urothelial carcinoma without evidence of lamina propria invasion.  The detrusor muscle/muscularis propria were not present for evaluation.      The recommendation was to pursue left robotic assisted laparoscopic nephroureterectomy with bladder cuff excision which was done on 04/01/2020.    The final pathology revealed;  - Invasive high grade urothelial carcinoma arising in renal pelvis.   - Bladder cuff margin is negative for carcinoma and no evidence of high grade dysplasia.  - Ureters with no significant pathologic abnormality  - Two benign simple cysts.  - Adrenal gland is negative for malignancy.  - One lymph node, negative for malignancy (0/1).  The tumor size was 4.5 cm with invasion beyond the muscularis into the periurethral fat or peripelvic fat or the renal parenchyma.  The margins were uninvolved by carcinoma or carcinoma in situ.  The final pathology was pT3 pN0.     Patient barely tolerated 1 cycle of adjuvant chemotherapy with split dose cisplatin and gemcitabine with a lot of side effects.  The treatment had to be discontinued due to significant worsening renal dysfunction 6/2020.     Patient started to complain about significant abdominal/left inguinal pain around August/September 2020. Unfortunately repeat imaging revealed recurrent/metastatic disease.  9/4/20 CT C/A/P- Status post left nephrectomy for resection of urothelial neoplasm.  Lymphadenopathy in the left nephrectomy bed has increased since the prior examination, concerning for metastatic disease.     Ill-defined hyperattenuating masslike focus in the left rectus muscle, not identified on the prior examination.  This is suspicious for a metastatic lesion.  A rectus hematoma is also considered.      9/23/20 PET scan- 1.  Multiple FDG avid lymph nodes in the retrocrural region, retroperitoneum and the left renal bed compatible with metastasis.  These have progressed from recent CT.  2.  FDG avid mass involving the left rectus abdominal musculature compatible with metastasis which is larger from recent CT.  3. Several small lymph nodes adjacent to the mid to distal esophagus with  FDG uptake suspicious for metastasis.  Small focus of FDG uptake also suggested in the right hilar region, metastasis is not excluded.  This could be reassessed on follow-up.  4.  Subtle focus of FDG uptake at the T2 level on the left, nonspecific, could be related to early degenerative changes, developing metastasis is not entirely excluded.  This could be reassessed on follow-up.  5. Prostate is mildly prominent with heterogeneous FDG uptake.  This could be inflammatory.  Correlate for prostatitis.     He completed palliative radiation to the left abdomen 12/3/20     His PET scan from 08/16/2021 showed progression of his disease with hypermetabolic soft tissue lesions at the level of the right shoulder and right axilla with interval progression of the retroperitoneal and mesenteric hypermetabolic metastasis.       He did have the new lesion to his right upper back/shoulder which was causing significant localized pain. This excised by his surgeon 08/09/2021. Pathology confirmed metastatic high-grade carcinoma consistent with his no primary urothelial carcinoma.  He received palliative radiation to his right shoulder/axilla region.     Urothelial cancer (HCC)   1/3/2020 Biopsy    Final Diagnosis    A. Urine, Clean Catch, Thin Prep:  High grade urothelial carcinoma (HGUC) - see comment.        1/3/2020 Initial Diagnosis    Urothelial cancer (HCC)  T3 N0 (stage IIIA)     1/17/2020 Biopsy    Final Diagnosis    A. Ureter, Right, left renal pelvis biopsy  -Fragments of high grade urothelial carcinoma   -No evidence of lamina propria invasion.  -Detrusor muscle/ muscularis propria is not present for evaluation.     B. Urinary Bladder, bladder biopsy:  -Fragments of low grade papillary lesion. See note  -No evidence of invasion seen  -Unremarkable fragment of detrusor muscle seen.        4/1/2020 Surgery    left robotic assisted laparoscopic nephroureterectomy with bladder cuff excision     Final Diagnosis    A. Left  kidney, ureter, and bladder cuff, nephroureterectomy:  - Invasive high grade urothelial carcinoma arising in renal pelvis.   - Bladder cuff margin is negative for carcinoma and no evidence of high grade dysplasia.  - Ureters with no significant pathologic abnormality  - Two benign simple cysts.  - Adrenal gland is negative for malignancy.  - One lymph node, negative for malignancy (0/1).        5/14/2020 - 6/3/2020 Chemotherapy    CISplatin (PLATINOL) split dose 35 mg/m2 = 75.3 mg (50 % of original dose 70 mg/m2), Intravenous,   Administration: 75.3 mg (5/14/2020), 75.3 mg (5/21/2020)  gemcitabine (GEMZAR) 2,200 mg in sodium chloride 0.9 % 250 mL infusion, 2,150.2 mg (80 % of original dose 1,250 mg/m2), Intravenous,   Administration: 2,200 mg (5/14/2020), 2,200 mg (5/21/2020)    (only completed 1 cycle- d/c d/t adverse effects and worsening renal dysfunction)     10/9/2020 - 9/9/2021 Chemotherapy    pembrolizumab (KEYTRUDA) IVPB, 200 mg, Intravenous, Once, 16 of 20 cycles  Administration: 200 mg (10/9/2020), 200 mg (10/30/2020), 200 mg (11/20/2020), 200 mg (12/11/2020), 200 mg (12/31/2020), 200 mg (1/22/2021), 200 mg (2/12/2021), 200 mg (3/5/2021), 200 mg (3/26/2021), 200 mg (4/16/2021), 200 mg (5/7/2021), 200 mg (5/28/2021), 200 mg (6/18/2021), 200 mg (7/9/2021), 200 mg (7/30/2021), 200 mg (8/20/2021)     11/19/2020 - 12/3/2020 Radiation    Treatment:  Course: C1    Plan ID Energy Fractions Dose per Fraction (cGy) Dose Correction (cGy) Total Dose Delivered (cGy) Elapsed Days   L Abdomen 6X 10 / 10 300 0 3,000 14        9/10/2021 -  Chemotherapy    enfortumab vedotin-ejfv (PADCEV) IVPB, 1.25 mg/kg = 114 mg, Intravenous, Once, 11 of 16 cycles  Dose modification: 1 mg/kg (original dose 1.25 mg/kg, Cycle 7, Reason: Other (Must fill in a comment), Comment: dose reduction due to side effects ), 1.25 mg/kg (original dose 1.25 mg/kg, Cycle 7, Reason: Other (Must fill in a comment), Comment: patient wants full dose ), 1  mg/kg (original dose 1.25 mg/kg, Cycle 7, Reason: Neuropathy), 0.75 mg/kg (original dose 1.25 mg/kg, Cycle 11, Reason: Neuropathy)  Administration: 114 mg (9/10/2021), 114 mg (9/17/2021), 110 mg (10/8/2021), 110 mg (9/24/2021), 110 mg (10/15/2021), 110 mg (11/12/2021), 110 mg (10/22/2021), 110 mg (11/19/2021), 110 mg (12/10/2021), 110 mg (11/26/2021), 110 mg (12/17/2021), 110 mg (1/7/2022), 110 mg (12/23/2021), 110 mg (1/14/2022), 90 mg (4/8/2022), 90 mg (4/15/2022), 90 mg (4/22/2022), 110 mg (1/21/2022), 110 mg (2/18/2022), 110 mg (2/25/2022), 90 mg (5/13/2022), 90 mg (5/20/2022), 90 mg (5/27/2022), 90 mg (3/4/2022), 90 mg (6/10/2022), 90 mg (6/17/2022), 90 mg (6/24/2022), 90 mg (7/8/2022), 90 mg (7/15/2022), 90 mg (7/22/2022), 68 mg (8/19/2022), 70 mg (9/2/2022)     Cancer of left renal pelvis (HCC)   4/23/2020 Initial Diagnosis    Cancer of left renal pelvis (HCC)     10/9/2020 - 9/9/2021 Chemotherapy    pembrolizumab (KEYTRUDA) IVPB, 200 mg, Intravenous, Once, 16 of 20 cycles  Administration: 200 mg (10/9/2020), 200 mg (10/30/2020), 200 mg (11/20/2020), 200 mg (12/11/2020), 200 mg (12/31/2020), 200 mg (1/22/2021), 200 mg (2/12/2021), 200 mg (3/5/2021), 200 mg (3/26/2021), 200 mg (4/16/2021), 200 mg (5/7/2021), 200 mg (5/28/2021), 200 mg (6/18/2021), 200 mg (7/9/2021), 200 mg (7/30/2021), 200 mg (8/20/2021)     9/10/2021 -  Chemotherapy    enfortumab vedotin-ejfv (PADCEV) IVPB, 1.25 mg/kg = 114 mg, Intravenous, Once, 11 of 16 cycles  Dose modification: 1 mg/kg (original dose 1.25 mg/kg, Cycle 7, Reason: Other (Must fill in a comment), Comment: dose reduction due to side effects ), 1.25 mg/kg (original dose 1.25 mg/kg, Cycle 7, Reason: Other (Must fill in a comment), Comment: patient wants full dose ), 1 mg/kg (original dose 1.25 mg/kg, Cycle 7, Reason: Neuropathy), 0.75 mg/kg (original dose 1.25 mg/kg, Cycle 11, Reason: Neuropathy)  Administration: 114 mg (9/10/2021), 114 mg (9/17/2021), 110 mg (10/8/2021), 110  mg (9/24/2021), 110 mg (10/15/2021), 110 mg (11/12/2021), 110 mg (10/22/2021), 110 mg (11/19/2021), 110 mg (12/10/2021), 110 mg (11/26/2021), 110 mg (12/17/2021), 110 mg (1/7/2022), 110 mg (12/23/2021), 110 mg (1/14/2022), 90 mg (4/8/2022), 90 mg (4/15/2022), 90 mg (4/22/2022), 110 mg (1/21/2022), 110 mg (2/18/2022), 110 mg (2/25/2022), 90 mg (5/13/2022), 90 mg (5/20/2022), 90 mg (5/27/2022), 90 mg (3/4/2022), 90 mg (6/10/2022), 90 mg (6/17/2022), 90 mg (6/24/2022), 90 mg (7/8/2022), 90 mg (7/15/2022), 90 mg (7/22/2022), 68 mg (8/19/2022), 70 mg (9/2/2022)      Surgery       Metastatic urothelial carcinoma (HCC)   8/9/2021 Initial Diagnosis    Metastatic urothelial carcinoma (HCC)     9/8/2021 - 9/21/2021 Radiation    Treatment:  Course: C2    Plan ID Energy Fractions Dose per Fraction (cGy) Dose Correction (cGy) Total Dose Delivered (cGy) Elapsed Days   R Axil_Shl # 6X 10 / 10 300 0 3,000 13      Treatment dates:  C2: 9/8/2021 - 9/21/2021     9/10/2021 -  Chemotherapy    enfortumab vedotin-ejfv (PADCEV) IVPB, 1.25 mg/kg = 114 mg, Intravenous, Once, 11 of 16 cycles  Dose modification: 1 mg/kg (original dose 1.25 mg/kg, Cycle 7, Reason: Other (Must fill in a comment), Comment: dose reduction due to side effects ), 1.25 mg/kg (original dose 1.25 mg/kg, Cycle 7, Reason: Other (Must fill in a comment), Comment: patient wants full dose ), 1 mg/kg (original dose 1.25 mg/kg, Cycle 7, Reason: Neuropathy), 0.75 mg/kg (original dose 1.25 mg/kg, Cycle 11, Reason: Neuropathy)  Administration: 114 mg (9/10/2021), 114 mg (9/17/2021), 110 mg (10/8/2021), 110 mg (9/24/2021), 110 mg (10/15/2021), 110 mg (11/12/2021), 110 mg (10/22/2021), 110 mg (11/19/2021), 110 mg (12/10/2021), 110 mg (11/26/2021), 110 mg (12/17/2021), 110 mg (1/7/2022), 110 mg (12/23/2021), 110 mg (1/14/2022), 90 mg (4/8/2022), 90 mg (4/15/2022), 90 mg (4/22/2022), 110 mg (1/21/2022), 110 mg (2/18/2022), 110 mg (2/25/2022), 90 mg (5/13/2022), 90 mg (5/20/2022), 90  mg (5/27/2022), 90 mg (3/4/2022), 90 mg (6/10/2022), 90 mg (6/17/2022), 90 mg (6/24/2022), 90 mg (7/8/2022), 90 mg (7/15/2022), 90 mg (7/22/2022), 68 mg (8/19/2022), 70 mg (9/2/2022)         Interval history:    ROS: Review of Systems   Constitutional:  Positive for fatigue. Negative for chills and fever.   HENT:  Positive for hearing loss. Negative for ear pain and sore throat.    Eyes:  Negative for pain and visual disturbance.   Respiratory:  Positive for shortness of breath. Negative for cough.    Cardiovascular:  Negative for chest pain and palpitations.   Gastrointestinal:  Negative for abdominal pain and vomiting.   Genitourinary:  Negative for dysuria and hematuria.   Musculoskeletal:  Negative for arthralgias and back pain.   Skin:  Negative for color change and rash.   Neurological:  Positive for numbness. Negative for seizures and syncope.   All other systems reviewed and are negative.      Past Medical History:   Diagnosis Date    A-fib (HCC)     Arthritis     Bladder cancer     Cancer (HCC)     skin melanoma;basal cell    Chronic pain disorder     from arthritis    Colon polyp     Does use hearing aid     bilat    Dry eyes, bilateral     GERD (gastroesophageal reflux disease)     History of kidney stones 10/2019    History of partial knee replacement     bilat    History of transfusion     History of vertigo     Hyperlipidemia     Hypertension     Infusion extravasation of chemotherapy vesicant 11/2021    Kidney lesion     Lumbar disc disorder     compression of vertebrae L4-5-6    Muscle weakness     left hip area    Renal mass     left    Right ankle injury 03/05/2020    missed step of ladder     Right ankle pain     Shortness of breath     with activity    Tinnitus     Urothelial cancer     left    Wears glasses        Past Surgical History:   Procedure Laterality Date    COLONOSCOPY      CYSTOSCOPY Left 04/01/2020    Procedure: CYSTOSCOPY; URETERAL CATHETER PLACEMENT;  Surgeon: Joe Cortes,  MD;  Location[facility];  Service: Urology    CYSTOSCOPY  2020    CYSTOSCOPY  2021    FL CYSTOGRAM  2020    FL RETROGRADE PYELOGRAM  2020    HERNIA REPAIR  10/25/2022    umbilical with mesh    INGUINAL HERNIA REPAIR Right     with mesh    IR PORT PLACEMENT  2020    JOINT REPLACEMENT Bilateral     partials knee    LUMBAR EPIDURAL INJECTION      FL CYSTO BLADDER W/URETERAL CATHETERIZATION Bilateral 2020    Procedure: CYSTOSCOPY; RIGHT RETROGRADE PYELOGRAM WITH RIGHT URETERAL CYTOLOGY SAMPLING; LEFT URETEROSCOPY WITH RENAL PELVIS BIOPSY AND LEFT STENT PLACEMENT;  Surgeon: Joe Cortes MD;  Location: [facility];  Service: Urology    FL LAPAROSCOPY NEPHRECTOMY W/TOTAL URETERECTOMY Left 2020    Procedure: ROBOTIC LAPAROSCOPIC NEPHRO-URETERECTOMY;  Surgeon: Joe Cortes MD;  Location[facility];  Service: Urology    FL RPR AA HERNIA 1ST < 3 CM REDUCIBLE N/A 8/10/2023    Procedure: REPAIR HERNIA INCISIONAL LAPAROSCOPIC WITH MESH;  Surgeon: Gustavo Schmidt MD;  Location: [facility];  Service: General    SKIN CANCER EXCISION      surface melanoma    TONSILLECTOMY      WISDOM TOOTH EXTRACTION         Social History     Socioeconomic History    Marital status: /Civil Union     Spouse name: None    Number of children: None    Years of education: None    Highest education level: None   Occupational History    None   Tobacco Use    Smoking status: Former     Current packs/day: 0.00     Types: Cigarettes     Quit date:      Years since quittin.1    Smokeless tobacco: Never   Vaping Use    Vaping status: Never Used   Substance and Sexual Activity    Alcohol use: Not Currently     Comment: socially, rarely    Drug use: Never    Sexual activity: Yes     Partners: Female   Other Topics Concern    None   Social History Narrative    Daily caffeine use - 2 cups coffee      Social Determinants of Health     Financial Resource Strain: Not on file   Food  Insecurity: Not on file   Transportation Needs: Not on file   Physical Activity: Not on file   Stress: Not on file   Social Connections: Not on file   Intimate Partner Violence: Not At Risk (8/18/2023)    Received from [facility]     Threatened: Not on file     Insulted: Not on file     Physically Hurt : Not on file     Scream: Not on file   Housing Stability: At Risk (8/18/2023)    Received from [facility]     Living Situation: Not on file     Housing Problems: Not on file       Family History   Problem Relation Age of Onset    Liver cancer Father        Allergies   Allergen Reactions    Poison Ivy Extract Rash         Current Outpatient Medications:     acetaminophen (TYLENOL) 500 mg tablet, Take 2 tablets (1,000 mg total) by mouth every 8 (eight) hours (Patient taking differently: Take 1,000 mg by mouth if needed), Disp: , Rfl:     amiodarone 200 mg tablet, Take 200 mg by mouth daily, Disp: , Rfl:     atorvastatin (LIPITOR) 40 mg tablet, Take 40 mg by mouth daily at bedtime, Disp: , Rfl:     capsicum (ZOSTRIX) 0.075 % topical cream, Apply topically 3 (three) times a day, Disp: 28.3 g, Rfl: 0    diltiazem (CARDIZEM) 60 mg tablet, 2 (two) times a day, Disp: , Rfl:     finasteride (PROSCAR) 5 mg tablet, Take 5 mg by mouth daily, Disp: , Rfl:     folic acid (FOLVITE) 1 mg tablet, take 1 tablet by mouth once daily, Disp: 90 tablet, Rfl: 4    HYDROcodone-acetaminophen (Norco) 7.5-325 mg per tablet, Take 1 tablet by mouth 2 (two) times a day as needed for pain Max Daily Amount: 2 tablets, Disp: 60 tablet, Rfl: 0    naloxone (NARCAN) 4 mg/0.1 mL nasal spray, Administer 1 spray into a nostril. If breathing does not return to normal or if breathing difficulty resumes after 2-3 minutes, give another dose in the other nostril using a new spray., Disp: 1 each, Rfl: 1    nystatin (MYCOSTATIN) cream, Apply topically 2 (two) times a day, Disp: 30 g, Rfl: 0    ofloxacin (OCUFLOX) 0.3 % ophthalmic  "solution, instill 1 drop into right eye four times a day START 3 DAYS BEFOR...  (REFER TO PRESCRIPTION NOTES)., Disp: , Rfl:     prednisoLONE acetate (PRED FORTE) 1 % ophthalmic suspension, instill 1 drop into right eye every 2 hours START AFTER SURGERY, Disp: , Rfl:     tadalafil (CIALIS) 20 MG tablet, Take one tablet by mouth one hour before sexual activity, Disp: 15 tablet, Rfl: 3    tamsulosin (FLOMAX) 0.4 mg, Take 1 capsule (0.4 mg total) by mouth daily with dinner, Disp: 30 capsule, Rfl: 12    VITAMIN D PO, Take 1,000 Units by mouth daily , Disp: , Rfl:     zolpidem (AMBIEN) 5 mg tablet, Take 1 tablet (5 mg total) by mouth daily at bedtime as needed for sleep, Disp: 30 tablet, Rfl: 1    ascorbic acid (VITAMIN C) 500 MG tablet, Take 1 tablet by mouth Every 12 hours, Disp: , Rfl:       Physical Exam:  /70 (BP Location: Left arm, Patient Position: Sitting, Cuff Size: Adult)   Pulse 62   Temp 97.8 °F (36.6 °C)   Resp 18   Ht 5' 9\" (1.753 m)   Wt 93 kg (205 lb)   SpO2 99%   BMI 30.27 kg/m²     Physical Exam  Constitutional:       Appearance: He is well-developed.   HENT:      Head: Normocephalic and atraumatic.      Nose: Nose normal.   Eyes:      General: No scleral icterus.        Right eye: No discharge.         Left eye: No discharge.      Conjunctiva/sclera: Conjunctivae normal.      Pupils: Pupils are equal, round, and reactive to light.   Neck:      Thyroid: No thyromegaly.      Trachea: No tracheal deviation.   Cardiovascular:      Rate and Rhythm: Normal rate and regular rhythm.      Heart sounds: Normal heart sounds. No murmur heard.     No friction rub.   Pulmonary:      Effort: Pulmonary effort is normal. No respiratory distress.      Breath sounds: Normal breath sounds. No wheezing or rales.   Chest:      Chest wall: No tenderness.   Abdominal:      General: There is no distension.      Palpations: Abdomen is soft. There is no hepatomegaly or splenomegaly.      Tenderness: There is no " "abdominal tenderness. There is no guarding or rebound.   Musculoskeletal:         General: No tenderness or deformity. Normal range of motion.      Cervical back: Normal range of motion and neck supple.   Lymphadenopathy:      Cervical: No cervical adenopathy.   Skin:     General: Skin is warm and dry.      Coloration: Skin is not pale.      Findings: No erythema or rash.   Neurological:      Mental Status: He is alert and oriented to person, place, and time.      Cranial Nerves: No cranial nerve deficit.      Coordination: Coordination normal.      Deep Tendon Reflexes: Reflexes are normal and symmetric.   Psychiatric:         Behavior: Behavior normal.         Thought Content: Thought content normal.         Judgment: Judgment normal.           Labs:  Lab Results   Component Value Date    WBC 6.60 01/10/2024    HGB 12.9 01/10/2024    HCT 40.5 01/10/2024    MCV 99 (H) 01/10/2024     01/10/2024     Lab Results   Component Value Date    K 4.1 01/10/2024     01/10/2024    CO2 28 01/10/2024    BUN 24 01/10/2024    CREATININE 1.65 (H) 01/10/2024    GLUF 84 12/26/2022    CALCIUM 9.1 01/10/2024    CORRECTEDCA 9.0 06/21/2022    AST 12 (L) 01/10/2024    ALT 17 01/10/2024    ALKPHOS 79 01/10/2024    EGFR 40 01/10/2024     No results found for: \"TSH\"    Patient voiced understanding and agreement in the above discussion. Aware to contact our office with questions/symptoms in the interim.   "

## 2024-02-09 ENCOUNTER — OFFICE VISIT (OUTPATIENT)
Dept: PAIN MEDICINE | Facility: CLINIC | Age: 74
End: 2024-02-09
Payer: MEDICARE

## 2024-02-09 VITALS
HEART RATE: 56 BPM | SYSTOLIC BLOOD PRESSURE: 110 MMHG | BODY MASS INDEX: 30.66 KG/M2 | DIASTOLIC BLOOD PRESSURE: 71 MMHG | WEIGHT: 207 LBS | HEIGHT: 69 IN

## 2024-02-09 DIAGNOSIS — M48.061 SPINAL STENOSIS OF LUMBAR REGION, UNSPECIFIED WHETHER NEUROGENIC CLAUDICATION PRESENT: ICD-10-CM

## 2024-02-09 DIAGNOSIS — F11.20 UNCOMPLICATED OPIOID DEPENDENCE (HCC): ICD-10-CM

## 2024-02-09 DIAGNOSIS — G89.4 CHRONIC PAIN SYNDROME: Primary | ICD-10-CM

## 2024-02-09 DIAGNOSIS — T45.1X5A CHEMOTHERAPY-INDUCED NEUROPATHY: ICD-10-CM

## 2024-02-09 DIAGNOSIS — M46.1 SACROILIITIS (HCC): ICD-10-CM

## 2024-02-09 DIAGNOSIS — M54.16 LUMBAR RADICULOPATHY: ICD-10-CM

## 2024-02-09 DIAGNOSIS — Z79.891 LONG-TERM CURRENT USE OF OPIATE ANALGESIC: ICD-10-CM

## 2024-02-09 DIAGNOSIS — M51.26 LUMBAR DISC HERNIATION: ICD-10-CM

## 2024-02-09 DIAGNOSIS — G62.0 CHEMOTHERAPY-INDUCED NEUROPATHY: ICD-10-CM

## 2024-02-09 PROCEDURE — 99214 OFFICE O/P EST MOD 30 MIN: CPT | Performed by: NURSE PRACTITIONER

## 2024-02-09 RX ORDER — HYDROCODONE BITARTRATE AND ACETAMINOPHEN 7.5; 325 MG/1; MG/1
1 TABLET ORAL 2 TIMES DAILY PRN
Qty: 60 TABLET | Refills: 0 | Status: SHIPPED | OUTPATIENT
Start: 2024-02-09

## 2024-02-09 RX ORDER — NALOXONE HYDROCHLORIDE 4 MG/.1ML
SPRAY NASAL
Qty: 1 EACH | Refills: 1 | Status: SHIPPED | OUTPATIENT
Start: 2024-02-09

## 2024-02-09 RX ORDER — HYDROCODONE BITARTRATE AND ACETAMINOPHEN 7.5; 325 MG/1; MG/1
1 TABLET ORAL 2 TIMES DAILY PRN
Qty: 60 TABLET | Refills: 0 | Status: SHIPPED | OUTPATIENT
Start: 2024-03-09

## 2024-02-09 RX ORDER — HYDROCODONE BITARTRATE AND ACETAMINOPHEN 7.5; 325 MG/1; MG/1
1 TABLET ORAL 2 TIMES DAILY PRN
Qty: 60 TABLET | Refills: 0 | Status: SHIPPED | OUTPATIENT
Start: 2024-04-07

## 2024-02-09 NOTE — PATIENT INSTRUCTIONS
Naloxone (Into the nose)   Naloxone Hydrochloride (nal-OX-one lalito-droe-KLOR-jena)  Treats opioid overdose in an emergency situation. Must be given as soon as possible.   Brand Name(s): Kloxxado, Narcan   There may be other brand names for this medicine.  When This Medicine Should Not Be Used:   This medicine is not right for everyone. Do not use it if you had an allergic reaction to naloxone.  How to Use This Medicine:   Spray  Your doctor will tell you how much medicine to use. Do not use more than directed.  Your doctor or a pharmacist can tell you where to buy this medicine. It is available without a doctor's order at most major pharmacies. Follow the instructions on the medicine label if you are using this medicine without a prescription.  This medicine must be given to you (the patient) by someone else. Talk with people close to you so they know what to do in case of an emergency.  Read and follow the patient instructions that come with this medicine. Talk to your doctor or pharmacist if you have any questions.  This medicine is for use only in the nose. Do not get any of it in your eyes or on your skin. If it does get on these areas, rinse it off right away.  To use:   Each nasal spray contains only one dose of naloxone. Do not prime or test the nasal spray.  Hold the nasal spray with your thumb on the bottom of the plunger and your first and middle fingers on either side of the nozzle.  Lay the patient on his or her back. Support the patient's neck with your hand and let the head tilt back.  Gently insert the tip of the nozzle into one nostril, until your fingers on either side of the nozzle are against the bottom of the patient's nose.  Press the plunger firmly to give the dose. Remove the nasal spray from the patient's nose.  Move the patient on his or her side (recovery position). Get emergency medical help right away.  Watch the patient closely. If needed, you may give more doses every 2 to 3 minutes until  the patient responds. Use a new nasal spray for each dose and spray the medicine into the other nostril each time.  Store the medicine in a closed container at room temperature, away from heat, moisture, and direct light. Do not freeze or expose to excessive heat. If the spray is frozen and is needed in an emergency, do not wait for the spray to thaw. Get medical help right away. However, the spray may be thawed for 15 minutes in room temperature, and it can still be used if it has been thawed after being frozen before.  Ask your pharmacist about the best way to dispose of medicine that you do not use.  Drugs and Foods to Avoid:      Ask your doctor or pharmacist before using any other medicine, including over-the-counter medicines, vitamins, and herbal products.      Warnings While Using This Medicine:   Tell your doctor if you are pregnant or breastfeeding, or if you have heart or blood vessel disease.  This medicine should be given right away after a suspected or known overdose of an opioid (narcotic) medicine. This will help prevent serious breathing problems and severe sleepiness that can lead to death.  The effects of the opioid medicine may last longer than the effects of the naloxone. This means the breathing problems and sleepiness could come back. Always call for emergency help after the first dose of naloxone.  This medicine could cause withdrawal symptoms from the opioid medicine.  Keep all medicine out of the reach of children. Never share your medicine with anyone.  Possible Side Effects While Using This Medicine:   Call your doctor right away if you notice any of these side effects:  Allergic reaction: Itching or hives, swelling in your face or hands, swelling or tingling in your mouth or throat, chest tightness, trouble breathing  Crying more than usual (in babies)  Diarrhea, nausea, vomiting, stomach cramps or pain  Fast, pounding, or uneven heartbeat  Fever, runny nose, sneezing, sweating, yawning,  discomfort in the nose  Lightheadedness, dizziness, fainting  Ongoing trouble breathing  Seizure, shaking, or feeling restless, nervous, or irritable  Unusual tiredness or weakness  If you notice these less serious side effects, talk with your doctor:   Headache  Joint or muscle pain  If you notice other side effects that you think are caused by this medicine, tell your doctor.   Call your doctor for medical advice about side effects. You may report side effects to FDA at 1-310-MAC-3398  © Copyright [employer] 2023 Information is for End User's use only and may not be sold, redistributed or otherwise used for commercial purposes.  The above information is an  only. It is not intended as medical advice for individual conditions or treatments. Talk to your doctor, nurse or pharmacist before following any medical regimen to see if it is safe and effective for you.  Opioid Safety   WHAT YOU NEED TO KNOW:   An opioid medicine is used to treat pain. Examples are oxycodone, morphine, fentanyl, or codeine. Pain control and management may help you rest, heal, and return to your daily activities. You and your family will receive information about how to manage your pain at home. The instructions will include what to do if you have side effects as your pain is managed. You will get information on how to handle opioid medicine safely. You will also get suggestions on how to control pain without opioids. It is important to follow all instructions so your pain is managed effectively.  DISCHARGE INSTRUCTIONS:   Call your local emergency number (911 in the [place]), or have someone else call if:   You have a seizure.    You cannot be woken.    You have trouble staying awake and your breathing is slow or shallow.    Your speech is slurred, or you are confused.    You are dizzy or stumble when you walk.    Call your doctor, or have someone close to you call if:   You are extremely drowsy, or you have trouble staying awake or  speaking.    You have pale or clammy skin.    You have blue fingernails or lips.    Your heartbeat is slower than normal.    You cannot stop vomiting.    You have questions or concerns about your condition or care.    Use opioids safely:   Take prescribed opioids exactly as directed.  Opioids come with directions based on the kind and how it is given. Talk to your healthcare provider or a pharmacist if you have any questions. Do not take more than the recommended amount. Too much can cause a life-threatening overdose. Do not continue to take it after your pain stops. You may develop tolerance. This means you keep needing higher doses to get the same effect. You may also develop opioid use disorder. This means you are not able to control your opioid use.    Do not give opioids to others or take opioids that belong to someone else.  The kind or amount one person takes may not be right for another. The person you share them with may also be taking medicines that do not mix with opioids. The person may drink alcohol or use other drugs that can cause life-threatening problems when mixed with opioids.    Do not mix opioids with other medicines or alcohol.  The combination can cause an overdose, or cause you to stop breathing. Alcohol, sleeping pills, and medicines such as antihistamines can make you sleepy. A combination with opioids can lead to a coma.    Do not drive or operate heavy machinery after you use an opioid.  You may feel drowsy or have trouble concentrating. You can injure yourself or others if you drive or use heavy machinery when you are not alert. Your provider or pharmacist can tell you how long to wait after a dose before you do these activities.    Talk to your healthcare provider if you have any side effects.  Side effects include nausea, sleepiness, itching, and trouble thinking clearly. Your provider may need to make changes to the kind or amount of opioid you are taking. Your provider can also help  you find ways to prevent or relieve side effects.    Manage constipation:  Constipation is the most common side effect of opioid medicine. Constipation is when you have hard, dry bowel movements, or you go longer than usual between bowel movements. Tell your healthcare provider about all changes in your bowel movements while you are taking opioids. Your provider may recommend laxative medicine to help you have a bowel movement. Your provider may also change the kind of opioid you are taking, or change when you take it. The following are more ways you can prevent or relieve constipation:  Drink liquids as directed.  You may need to drink extra liquids to help soften and move your bowels. Ask how much liquid to drink each day and which liquids are best for you.    Eat high-fiber foods.  This may help decrease constipation by adding bulk to your bowel movements. High-fiber foods include fruits, vegetables, whole-grain breads and cereals, and beans. Your healthcare provider or dietitian can help you create a high-fiber meal plan. Your provider may also recommend a fiber supplement if you cannot get enough fiber from food.         Exercise regularly.  Regular physical activity can help stimulate your intestines. Walking is a good exercise to prevent or relieve constipation. Ask which exercises are best for you.         Schedule a time each day to have a bowel movement.  This may help train your body to have regular bowel movements. Bend forward while you are on the toilet to help move the bowel movement out. Sit on the toilet for at least 10 minutes, even if you do not have a bowel movement.    Store opioids safely:   Store opioids where others cannot easily get them.  Keep them in a locked cabinet or secure area. Do not  keep them in a purse or other bag you carry with you. A person may be looking for something else and find the opioids.         Make sure opioids are stored out of the reach of children.  A child can  easily overdose on opioids. Opioids may look like candy to a small child.    The best way to dispose of opioids:  The laws vary by country and area. In the [place], the best way is to return the opioids through a take-back program. This program is offered by the [place] Drug Enforcement Agency (LEIGH). The following are options for using the program:  Take the opioids to a LEIGH collection site.  The site is often a law enforcement center. Call your local law enforcement center for scheduled take-back days in your area. You will be given information on where to go if the collection site is in a different location.    Take the opioids to an approved pharmacy or hospital.  A pharmacy or hospital may be set up as a collection site. You will need to ask if it is a LEIGH collection site if you were not directed there. A pharmacy or doctor's office may not be able to take back opioids unless it is a LEIGH site.    Use a mail-back system.  This means you are given containers to put the opioids into. You will then mail them in the containers.    Use a take-back drop box.  This is a place to leave the opioids at any time. People and animals will not be able to get into the box. Your local law enforcement agency can tell you where to find a drop box in your area.    Other safe ways to dispose of opioids:  The medicine may come with disposal instructions. The instructions may vary depending on the brand of medicine you are using. Instructions may come in a Medication Guide, but not every medicine has one. You may instead get instructions from your pharmacy or doctor. Follow instructions carefully. The following are general guidelines to follow:  Find out if you can flush the opioid.  Some opioids can be flushed down the toilet or poured into the sink. You will need to contact authorities in your area to see if this is an option for you. The FDA also offers a list of medicines that are safe to flush down the toilet. You can check the  list if you cannot get the information for your local area.    Ask your waste management company about rules for putting opioids in the trash.  The company will be able to give you specific directions. Scratch out personal information on the original medicine label so it cannot be read. Then put it in the trash. Do not label the trash or put any information on it about the opioids. It should look like regular household trash so no one is tempted to look for the opioids. Keep the trash out of the reach of children and animals. Always make sure trash is secure.    Talk to officials if you live in a facility.  If you live in a nursing home or assisted living center, talk to an official. The person will know the rules for your area.    Other ways to manage pain:   Ask your healthcare provider about non-opioid medicines to control pain.  Some medicines may even work better than opioids, depending on the cause of your pain. Nonprescription medicines include NSAIDs (such as ibuprofen) and acetaminophen. Prescription medicines include muscle relaxers, antidepressants, and steroids.    Pain may be managed without any medicines.  Some ways to relieve pain include massage, aromatherapy, or meditation. Physical or occupational therapy may also help.    Follow up with your doctor or pain specialist as directed:  You may need to have your dose adjusted. Your doctor or pain specialist can also help you find ways to manage pain without opioids. Write down your questions so you remember to ask them during your visits.  For more information:   Drug Enforcement Administration  [address] [place] [address]  Phone: 5- 347 - 867-1887  Web Address: https://www.deadiversion.[facility].gov/drug_disposal/     Food and Drug Administration  [address] [place] [address]  Phone: 1- 052 - 125-5090  Web Address: http://www.fda.gov  © Copyright [employer] 2023 Information is for End User's use only and may not  be sold, redistributed or otherwise used for commercial purposes.  The above information is an  only. It is not intended as medical advice for individual conditions or treatments. Talk to your doctor, nurse or pharmacist before following any medical regimen to see if it is safe and effective for you.

## 2024-02-09 NOTE — PROGRESS NOTES
Assessment:  1. Chronic pain syndrome    2. Uncomplicated opioid dependence (HCC)    3. Long-term current use of opiate analgesic    4. Lumbar radiculopathy    5. Lumbar disc herniation    6. Chemotherapy-induced neuropathy     7. Sacroiliitis (HCC)    8. Spinal stenosis of lumbar region, unspecified whether neurogenic claudication present        Plan:  Patient may continue Norco 7.5/325 mg 1 tablet twice daily as needed pain as prescribed.  The patient was given a 3 month supply of prescriptions with a Do Not Fill date(s) of today, March 9, 2024 and April 7, 2024    While the patient was in the office today an opioid contract was thoroughly reviewed and signed by the patient. The patient was given adequate time to ask questions in regards to the contract and a signed copy was sent home for their records.    The patient also completed a BECKS depression inventory and SOAPP-R today, as part of our yearly opioid management program.    The patient is currently receiving opioid medication. This places him at a higher-risk for respiratory depression/overdose.  Therefore, I  will prescribe naloxone nasal spray. It was explained to the patient that this is an injectable opiate antagonist which is indicated for emergency treatment for opiate overdose.  The patient was also instructed on how to use the Narcan nasal spray, in the case of an opioid emergency.    The patient did not have their opioid medications available for confirmation or counting while in the office today. I reviewed with the patient that as per the opioid contract, they are to bring in the last filled prescription for all of their opioid medications, with what they have left to every office visit. I advised the patient that if they would continue to not bring in their prescriptions as discussed, we may not be able to continue prescribing opioid medications in the future. The patient was agreeable and verbalized an understanding.    [place]  Prescription Drug Monitoring Program report was reviewed and was appropriate     A urine drug screen was collected at today's office visit as part of our medication management protocol. The point of care testing results were appropriate for what was being prescribed. The specimen will be sent for confirmatory testing. The drug screen is medically necessary because the patient is either dependent on opioid medication or is being considered for opioid medication therapy and the results could impact ongoing or future treatment. The drug screen is to evaluate for the presences or absence of prescribed, non-prescribed, and/or illicit drugs/substances.    There are risks associated with opioid medications, including dependence, addiction and tolerance. The patient understands and agrees to use these medications only as prescribed. Potential side effects of the medications include, but are not limited to, constipation, drowsiness, addiction, impaired judgment and risk of fatal overdose if not taken as prescribed. The patient was warned against driving while taking sedation medications.  Sharing medications is a felony. At this point in time, the patient is showing no signs of addiction, abuse, diversion or suicidal ideation.    2.  Continue with home exercise program  3.  Follow-up in 3 months or sooner if needed    History of Present Illness:    The patient is a 73 y.o. male with a history of metastatic urothelial carcinoma status post nephrectomy with chemo induced neuropathy, A-fib and chronic pain syndrome last seen on 10/20/2023 who presents for a follow up office visit in regards to chronic low back pain.  He is status post bilateral L4 TFESI December 12, 2023.  Patient reported no significant improvement of his pain from this procedure.  He has not found any relief with lumbar RFA or bilateral SI joint injections in the past.    The patient rates his pain a 6 out of 10 on the numeric pain rating scale.  He  intermittently has pain in the morning which is described as sharp.    Current pain medications includes: Norco 7.5/325 mg 1 tablet twice daily as needed pain.  The patient reports that this regimen is providing 80% pain relief.  The patient is reporting no side effects from this pain medication regimen.    Pain Contract Signed: 2/9/2024  Last Urine Drug Screen: 2/9/2024  BECKS/SOAPP 2/9/2024  Last hydrocodone per pt. 2/9/2024  Last Narcan: 2/9/2024    I have personally reviewed and/or updated the patient's past medical history, past surgical history, family history, social history, current medications, allergies, and vital signs today.       Review of Systems:    Review of Systems   Respiratory:  Negative for shortness of breath.    Cardiovascular:  Negative for chest pain.   Gastrointestinal:  Negative for constipation, diarrhea, nausea and vomiting.   Musculoskeletal:  Negative for arthralgias, gait problem, joint swelling and myalgias.   Skin:  Negative for rash.   Neurological:  Negative for dizziness, seizures and weakness.   All other systems reviewed and are negative.        Past Medical History:   Diagnosis Date    A-fib (HCC)     Arthritis     Bladder cancer     Cancer (HCC)     skin melanoma;basal cell    Chronic pain disorder     from arthritis    Colon polyp     Does use hearing aid     bilat    Dry eyes, bilateral     GERD (gastroesophageal reflux disease)     History of kidney stones 10/2019    History of partial knee replacement     bilat    History of transfusion     History of vertigo     Hyperlipidemia     Hypertension     Infusion extravasation of chemotherapy vesicant 11/2021    Kidney lesion     Lumbar disc disorder     compression of vertebrae L4-5-6    Muscle weakness     left hip area    Renal mass     left    Right ankle injury 03/05/2020    missed step of ladder     Right ankle pain     Shortness of breath     with activity    Tinnitus     Urothelial cancer     left    Wears glasses         Past Surgical History:   Procedure Laterality Date    COLONOSCOPY      CYSTOSCOPY Left 2020    Procedure: CYSTOSCOPY; URETERAL CATHETER PLACEMENT;  Surgeon: Joe Cortes MD;  Location[facility];  Service: Urology    CYSTOSCOPY  2020    CYSTOSCOPY  2021    FL CYSTOGRAM  2020    FL RETROGRADE PYELOGRAM  2020    HERNIA REPAIR  10/25/2022    umbilical with mesh    INGUINAL HERNIA REPAIR Right     with mesh    IR PORT PLACEMENT  2020    JOINT REPLACEMENT Bilateral     partials knee    LUMBAR EPIDURAL INJECTION      TX CYSTO BLADDER W/URETERAL CATHETERIZATION Bilateral 2020    Procedure: CYSTOSCOPY; RIGHT RETROGRADE PYELOGRAM WITH RIGHT URETERAL CYTOLOGY SAMPLING; LEFT URETEROSCOPY WITH RENAL PELVIS BIOPSY AND LEFT STENT PLACEMENT;  Surgeon: Joe Cortes MD;  Location: [facility];  Service: Urology    TX LAPAROSCOPY NEPHRECTOMY W/TOTAL URETERECTOMY Left 2020    Procedure: ROBOTIC LAPAROSCOPIC NEPHRO-URETERECTOMY;  Surgeon: Joe Cortes MD;  Location[facility];  Service: Urology    TX RPR AA HERNIA 1ST < 3 CM REDUCIBLE N/A 8/10/2023    Procedure: REPAIR HERNIA INCISIONAL LAPAROSCOPIC WITH MESH;  Surgeon: Gustavo Schmidt MD;  Location: [facility];  Service: General    SKIN CANCER EXCISION      surface melanoma    TONSILLECTOMY      WISDOM TOOTH EXTRACTION         Family History   Problem Relation Age of Onset    Liver cancer Father        Social History     Occupational History    Not on file   Tobacco Use    Smoking status: Former     Current packs/day: 0.00     Types: Cigarettes     Quit date:      Years since quittin.1    Smokeless tobacco: Never   Vaping Use    Vaping status: Never Used   Substance and Sexual Activity    Alcohol use: Not Currently     Comment: socially, rarely    Drug use: Never    Sexual activity: Yes     Partners: Female         Current Outpatient Medications:     amiodarone 200 mg tablet, Take 200 mg by  mouth daily, Disp: , Rfl:     atorvastatin (LIPITOR) 40 mg tablet, Take 40 mg by mouth daily at bedtime, Disp: , Rfl:     capsicum (ZOSTRIX) 0.075 % topical cream, Apply topically 3 (three) times a day, Disp: 28.3 g, Rfl: 0    finasteride (PROSCAR) 5 mg tablet, Take 5 mg by mouth daily, Disp: , Rfl:     folic acid (FOLVITE) 1 mg tablet, take 1 tablet by mouth once daily, Disp: 90 tablet, Rfl: 4    HYDROcodone-acetaminophen (Norco) 7.5-325 mg per tablet, Take 1 tablet by mouth 2 (two) times a day as needed for pain Max Daily Amount: 2 tablets, Disp: 60 tablet, Rfl: 0    [START ON 3/9/2024] HYDROcodone-acetaminophen (Norco) 7.5-325 mg per tablet, Take 1 tablet by mouth 2 (two) times a day as needed for pain Max Daily Amount: 2 tablets Do not start before March 9, 2024., Disp: 60 tablet, Rfl: 0    [START ON 4/7/2024] HYDROcodone-acetaminophen (Norco) 7.5-325 mg per tablet, Take 1 tablet by mouth 2 (two) times a day as needed for pain Max Daily Amount: 2 tablets Do not start before April 7, 2024., Disp: 60 tablet, Rfl: 0    naloxone (NARCAN) 4 mg/0.1 mL nasal spray, Administer 1 spray into a nostril. If breathing does not return to normal or if breathing difficulty resumes after 2-3 minutes, give another dose in the other nostril using a new spray., Disp: 1 each, Rfl: 1    tamsulosin (FLOMAX) 0.4 mg, Take 1 capsule (0.4 mg total) by mouth daily with dinner, Disp: 30 capsule, Rfl: 12    VITAMIN D PO, Take 1,000 Units by mouth daily , Disp: , Rfl:     zolpidem (AMBIEN) 5 mg tablet, Take 1 tablet (5 mg total) by mouth daily at bedtime as needed for sleep, Disp: 30 tablet, Rfl: 1    acetaminophen (TYLENOL) 500 mg tablet, Take 2 tablets (1,000 mg total) by mouth every 8 (eight) hours (Patient taking differently: Take 1,000 mg by mouth if needed), Disp: , Rfl:     ascorbic acid (VITAMIN C) 500 MG tablet, Take 1 tablet by mouth Every 12 hours, Disp: , Rfl:     diltiazem (CARDIZEM) 60 mg tablet, 2 (two) times a day, Disp: ,  "Rfl:     nystatin (MYCOSTATIN) cream, Apply topically 2 (two) times a day, Disp: 30 g, Rfl: 0    ofloxacin (OCUFLOX) 0.3 % ophthalmic solution, instill 1 drop into right eye four times a day START 3 DAYS BEFOR...  (REFER TO PRESCRIPTION NOTES)., Disp: , Rfl:     prednisoLONE acetate (PRED FORTE) 1 % ophthalmic suspension, instill 1 drop into right eye every 2 hours START AFTER SURGERY, Disp: , Rfl:     tadalafil (CIALIS) 20 MG tablet, Take one tablet by mouth one hour before sexual activity, Disp: 15 tablet, Rfl: 3    Allergies   Allergen Reactions    Poison Ivy Extract Rash       Physical Exam:    /71   Pulse 56   Ht 5' 9\" (1.753 m)   Wt 93.9 kg (207 lb)   BMI 30.57 kg/m²     Constitutional:normal, well developed, well nourished, alert, in no distress and non-toxic and no overt pain behavior.  Eyes:anicteric  HEENT:grossly intact  Neck:supple, symmetric, trachea midline and no masses   Pulmonary:even and unlabored  Cardiovascular:No edema or pitting edema present  Skin:Normal without rashes or lesions and well hydrated  Psychiatric:Mood and affect appropriate  Neurologic:Cranial Nerves II-XII grossly intact  Musculoskeletal: Slightly antalgic gait but steady without assistive devices      Imaging  No orders to display         Orders Placed This Encounter   Procedures    MM ALL_Prescribed Meds and Special Instructions    MM DT_Alprazolam Definitive Test    MM DT_Amphetamine Definitive Test    MM DT_Aripiprazole Definitive Test    MM DT_Bath Salts Definitive Test    MM DT_Buprenorphine Definitive Test    MM DT_Butalbital Definitive Test    MM DT_Clonazepam Definitive Test    MM DT_Clozapine Definitive Test    MM DT_Cocaine Definitive Test    MM DT_Codeine Definitive Test    MM DT_Desipramine Definitive Test    MM DT_Dextromethorphan Definitive Test    MM Diazepam Definitive Test    MM DT_Ethyl Glucuronide/Ethyl Sulfate Definitive Test    MM DT_Fentanyl Definitive Test    MM DT_Haloperidol Definitive Test    " MM DT_Heroin Definitive Test    MM DT_Hydrocodone Definitive Test    MM DT_Hydromorphone Definitive Test    MM DT_Imipramine Definitive Test    MM DT_Kratom Definitive Test    MM DT_Levorphanol Definitive Test    MM Lorazepam Definitive Test    MM DT_MDMA Definitive Test    MM DT_Meperidine Definitive Test    MM DT_Methadone Definitive Test    MM DT_Methamphetamine Definitive Test    MM DT_Morphine Definitive Test    MM DT_Olanzapine Definitive Test    MM DT_Oxazepam Definitive Test    MM DT_Oxycodone Definitive Test    MM DT_Oxymorphone Definitive Test    MM DT_Phencyclidine Definitive Test    MM DT_Phenobarbital Definitive Test    MM DT_Phentermine Definitive Test    MM DT_Quetiapine Definitive Test    MM DT_Risperidone Definitive Test    MM DT_Secobarbital Definitive Test    MM DT_Spice Definitive Test    MM DT_Tapentadol Definitive Test    MM DT_Temazapam Definitive Test    MM DT_THC Definitive Test    MM DT_Tramadol Definitive Test    MM DT_Methylphenidate Definitive Test

## 2024-02-21 ENCOUNTER — HOSPITAL ENCOUNTER (OUTPATIENT)
Dept: INFUSION CENTER | Facility: HOSPITAL | Age: 74
Discharge: HOME | End: 2024-02-21
Attending: INTERNAL MEDICINE
Payer: MEDICARE

## 2024-02-21 DIAGNOSIS — Z45.2 ENCOUNTER FOR CARE RELATED TO PORT-A-CATH: Primary | ICD-10-CM

## 2024-02-21 DIAGNOSIS — C65.2 CANCER OF LEFT RENAL PELVIS (HCC): ICD-10-CM

## 2024-02-21 PROBLEM — E86.0 DEHYDRATION: Status: RESOLVED | Noted: 2021-07-14 | Resolved: 2024-02-21

## 2024-02-21 PROCEDURE — 96523 IRRIG DRUG DELIVERY DEVICE: CPT

## 2024-02-21 NOTE — PROGRESS NOTES
Juan Amato tolerated port flush well with no complications.      Juan Amato is aware of future appt on 4/17/24 at 10AM (pt on vacation prior to that).    AVS declined by Juan Amato.

## 2024-04-17 ENCOUNTER — HOSPITAL ENCOUNTER (OUTPATIENT)
Dept: INFUSION CENTER | Facility: HOSPITAL | Age: 74
Discharge: HOME | End: 2024-04-17
Payer: MEDICARE

## 2024-04-17 DIAGNOSIS — Z45.2 ENCOUNTER FOR CARE RELATED TO PORT-A-CATH: Primary | ICD-10-CM

## 2024-04-17 DIAGNOSIS — C65.2 CANCER OF LEFT RENAL PELVIS (HCC): ICD-10-CM

## 2024-04-17 PROCEDURE — 96523 IRRIG DRUG DELIVERY DEVICE: CPT

## 2024-04-17 NOTE — PROGRESS NOTES
Juan Amato tolerated port flush well with no complications.      Juan Amato is aware of future appt on 5/29 at 10AM.    AVS printed and given to Juan Amato.

## 2024-04-26 NOTE — PROGRESS NOTES
"Assessment/Plan:    Recurrent incisional hernia  Patient is a pleasant 74-year-old male presenting with a recurrent epigastric incisional hernia status post laparoscopic incisional herniorrhaphy with mesh for definitive treatment by laparoscopic ventral herniorrhaphy with mesh.    The technical details of surgery as well as the options benefits risks and alternatives have been thoroughly reviewed with the patient in the presence of his wife.  Specific risks related to anesthesia, bleeding, infection, the use of mesh, recurrent hernia formation and 1% risk of iatrogenic bowel injury all reviewed.    All questions answered to the satisfaction of the patient and informed written consent obtained to proceed.       Diagnoses and all orders for this visit:    Recurrent incisional hernia          Subjective:      Patient ID: Juan Amato is a 74 y.o. male.    Patient is coming into the office for pre op inc hernia He states that he has been dealing with a sharp pain in his abdomen and denies any fever/chills.YULISSA Morgan          Review of Systems   Constitutional:  Negative for chills and fever.   HENT:  Negative for ear pain and sore throat.    Eyes:  Negative for pain and visual disturbance.   Respiratory:  Negative for cough and shortness of breath.    Cardiovascular:  Negative for chest pain and palpitations.   Gastrointestinal:  Negative for abdominal pain and vomiting.   Genitourinary:  Negative for dysuria and hematuria.   Musculoskeletal:  Negative for arthralgias and back pain.   Skin:  Negative for color change and rash.   Neurological:  Negative for seizures and syncope.   All other systems reviewed and are negative.        Objective:      /80   Pulse 65   Temp (!) 96.7 °F (35.9 °C) (Temporal)   Resp 18   Ht 5' 10\" (1.778 m)   Wt 94.8 kg (209 lb)   SpO2 92%   BMI 29.99 kg/m²          Physical Exam  Vitals and nursing note reviewed.   Constitutional:       Appearance: He is well-developed. "   HENT:      Head: Normocephalic and atraumatic.   Eyes:      Conjunctiva/sclera: Conjunctivae normal.      Pupils: Pupils are equal, round, and reactive to light.   Cardiovascular:      Rate and Rhythm: Normal rate and regular rhythm.   Pulmonary:      Effort: Pulmonary effort is normal.      Breath sounds: Normal breath sounds.   Abdominal:      General: Bowel sounds are normal.      Palpations: Abdomen is soft.      Comments: Recurrent epigastric incisional hernia-reducible   Musculoskeletal:         General: Normal range of motion.      Cervical back: Normal range of motion and neck supple.   Skin:     General: Skin is warm and dry.   Neurological:      Mental Status: He is alert and oriented to person, place, and time.   Psychiatric:         Behavior: Behavior normal.         Thought Content: Thought content normal.         Judgment: Judgment normal.

## 2024-04-29 ENCOUNTER — OFFICE VISIT (OUTPATIENT)
Dept: SURGERY | Facility: CLINIC | Age: 74
End: 2024-04-29
Payer: MEDICARE

## 2024-04-29 VITALS
HEART RATE: 65 BPM | RESPIRATION RATE: 18 BRPM | OXYGEN SATURATION: 92 % | HEIGHT: 70 IN | TEMPERATURE: 96.7 F | BODY MASS INDEX: 29.92 KG/M2 | WEIGHT: 209 LBS | DIASTOLIC BLOOD PRESSURE: 80 MMHG | SYSTOLIC BLOOD PRESSURE: 156 MMHG

## 2024-04-29 DIAGNOSIS — K43.2 RECURRENT INCISIONAL HERNIA: Primary | ICD-10-CM

## 2024-04-29 PROBLEM — D69.6 PLATELETS DECREASED (HCC): Status: RESOLVED | Noted: 2021-06-15 | Resolved: 2024-04-29

## 2024-04-29 PROBLEM — G89.29 CHRONIC RIGHT SHOULDER PAIN: Status: RESOLVED | Noted: 2021-07-26 | Resolved: 2024-04-29

## 2024-04-29 PROBLEM — C79.89: Status: RESOLVED | Noted: 2021-01-19 | Resolved: 2024-04-29

## 2024-04-29 PROBLEM — C79.51 SECONDARY MALIGNANT NEOPLASM OF BONE (HCC): Status: RESOLVED | Noted: 2021-09-09 | Resolved: 2024-04-29

## 2024-04-29 PROBLEM — M25.511 CHRONIC RIGHT SHOULDER PAIN: Status: RESOLVED | Noted: 2021-07-26 | Resolved: 2024-04-29

## 2024-04-29 PROBLEM — K59.03 DRUG INDUCED CONSTIPATION: Status: RESOLVED | Noted: 2021-08-23 | Resolved: 2024-04-29

## 2024-04-29 PROBLEM — R63.0 DECREASED APPETITE: Status: RESOLVED | Noted: 2020-11-15 | Resolved: 2024-04-29

## 2024-04-29 PROBLEM — Z51.5 PALLIATIVE CARE PATIENT: Status: RESOLVED | Noted: 2020-11-06 | Resolved: 2024-04-29

## 2024-04-29 PROCEDURE — 99214 OFFICE O/P EST MOD 30 MIN: CPT | Performed by: SURGERY

## 2024-04-29 RX ORDER — SODIUM CHLORIDE, SODIUM LACTATE, POTASSIUM CHLORIDE, CALCIUM CHLORIDE 600; 310; 30; 20 MG/100ML; MG/100ML; MG/100ML; MG/100ML
125 INJECTION, SOLUTION INTRAVENOUS CONTINUOUS
OUTPATIENT
Start: 2024-04-29

## 2024-04-29 NOTE — ASSESSMENT & PLAN NOTE
Patient is a pleasant 74-year-old male presenting with a recurrent epigastric incisional hernia status post laparoscopic incisional herniorrhaphy with mesh for definitive treatment by laparoscopic ventral herniorrhaphy with mesh.    The technical details of surgery as well as the options benefits risks and alternatives have been thoroughly reviewed with the patient in the presence of his wife.  Specific risks related to anesthesia, bleeding, infection, the use of mesh, recurrent hernia formation and 1% risk of iatrogenic bowel injury all reviewed.    All questions answered to the satisfaction of the patient and informed written consent obtained to proceed.

## 2024-04-30 ENCOUNTER — OFFICE VISIT (OUTPATIENT)
Dept: PAIN MEDICINE | Facility: CLINIC | Age: 74
End: 2024-04-30
Payer: MEDICARE

## 2024-04-30 VITALS
WEIGHT: 206 LBS | SYSTOLIC BLOOD PRESSURE: 128 MMHG | HEIGHT: 69 IN | BODY MASS INDEX: 30.51 KG/M2 | HEART RATE: 62 BPM | DIASTOLIC BLOOD PRESSURE: 80 MMHG

## 2024-04-30 DIAGNOSIS — M51.26 LUMBAR DISC HERNIATION: ICD-10-CM

## 2024-04-30 DIAGNOSIS — T45.1X5A CHEMOTHERAPY-INDUCED NEUROPATHY (HCC): ICD-10-CM

## 2024-04-30 DIAGNOSIS — F11.20 UNCOMPLICATED OPIOID DEPENDENCE (HCC): ICD-10-CM

## 2024-04-30 DIAGNOSIS — M54.16 LUMBAR RADICULOPATHY: ICD-10-CM

## 2024-04-30 DIAGNOSIS — M47.816 LUMBAR SPONDYLOSIS: ICD-10-CM

## 2024-04-30 DIAGNOSIS — G62.0 CHEMOTHERAPY-INDUCED NEUROPATHY (HCC): ICD-10-CM

## 2024-04-30 DIAGNOSIS — M48.061 SPINAL STENOSIS OF LUMBAR REGION, UNSPECIFIED WHETHER NEUROGENIC CLAUDICATION PRESENT: ICD-10-CM

## 2024-04-30 DIAGNOSIS — G89.4 CHRONIC PAIN SYNDROME: ICD-10-CM

## 2024-04-30 DIAGNOSIS — M46.1 SACROILIITIS (HCC): Primary | ICD-10-CM

## 2024-04-30 DIAGNOSIS — Z79.891 LONG-TERM CURRENT USE OF OPIATE ANALGESIC: ICD-10-CM

## 2024-04-30 PROCEDURE — G2211 COMPLEX E/M VISIT ADD ON: HCPCS | Performed by: NURSE PRACTITIONER

## 2024-04-30 PROCEDURE — 99214 OFFICE O/P EST MOD 30 MIN: CPT | Performed by: NURSE PRACTITIONER

## 2024-04-30 RX ORDER — HYDROCODONE BITARTRATE AND ACETAMINOPHEN 7.5; 325 MG/1; MG/1
1 TABLET ORAL 3 TIMES DAILY PRN
Qty: 90 TABLET | Refills: 0 | Status: SHIPPED | OUTPATIENT
Start: 2024-04-30

## 2024-04-30 RX ORDER — HYDROCODONE BITARTRATE AND ACETAMINOPHEN 7.5; 325 MG/1; MG/1
1 TABLET ORAL 3 TIMES DAILY PRN
Qty: 90 TABLET | Refills: 0 | Status: SHIPPED | OUTPATIENT
Start: 2024-05-29

## 2024-04-30 NOTE — PATIENT INSTRUCTIONS
Opioid Safety   WHAT YOU NEED TO KNOW:   An opioid medicine is used to treat pain. Examples are oxycodone, morphine, fentanyl, or codeine. Pain control and management may help you rest, heal, and return to your daily activities. You and your family will receive information about how to manage your pain at home. The instructions will include what to do if you have side effects as your pain is managed. You will get information on how to handle opioid medicine safely. You will also get suggestions on how to control pain without opioids. It is important to follow all instructions so your pain is managed effectively.  DISCHARGE INSTRUCTIONS:   Call your local emergency number (911 in the ), or have someone else call if:   You have a seizure.    You cannot be woken.    You have trouble staying awake and your breathing is slow or shallow.    Your speech is slurred, or you are confused.    You are dizzy or stumble when you walk.    Call your doctor, or have someone close to you call if:   You are extremely drowsy, or you have trouble staying awake or speaking.    You have pale or clammy skin.    You have blue fingernails or lips.    Your heartbeat is slower than normal.    You cannot stop vomiting.    You have questions or concerns about your condition or care.    Use opioids safely:   Take prescribed opioids exactly as directed.  Opioids come with directions based on the kind and how it is given. Talk to your healthcare provider or a pharmacist if you have any questions. Do not take more than the recommended amount. Too much can cause a life-threatening overdose. Do not continue to take it after your pain stops. You may develop tolerance. This means you keep needing higher doses to get the same effect. You may also develop opioid use disorder. This means you are not able to control your opioid use.    Do not give opioids to others or take opioids that belong to someone else.  The kind or amount one person takes may not  be right for another. The person you share them with may also be taking medicines that do not mix with opioids. The person may drink alcohol or use other drugs that can cause life-threatening problems when mixed with opioids.    Do not mix opioids with other medicines or alcohol.  The combination can cause an overdose, or cause you to stop breathing. Alcohol, sleeping pills, and medicines such as antihistamines can make you sleepy. A combination with opioids can lead to a coma.    Do not drive or operate heavy machinery after you use an opioid.  You may feel drowsy or have trouble concentrating. You can injure yourself or others if you drive or use heavy machinery when you are not alert. Your provider or pharmacist can tell you how long to wait after a dose before you do these activities.    Talk to your healthcare provider if you have any side effects.  Side effects include nausea, sleepiness, itching, and trouble thinking clearly. Your provider may need to make changes to the kind or amount of opioid you are taking. Your provider can also help you find ways to prevent or relieve side effects.    Manage constipation:  Constipation is the most common side effect of opioid medicine. Constipation is when you have hard, dry bowel movements, or you go longer than usual between bowel movements. Tell your healthcare provider about all changes in your bowel movements while you are taking opioids. Your provider may recommend laxative medicine to help you have a bowel movement. Your provider may also change the kind of opioid you are taking, or change when you take it. The following are more ways you can prevent or relieve constipation:  Drink liquids as directed.  You may need to drink extra liquids to help soften and move your bowels. Ask how much liquid to drink each day and which liquids are best for you.    Eat high-fiber foods.  This may help decrease constipation by adding bulk to your bowel movements. High-fiber  foods include fruits, vegetables, whole-grain breads and cereals, and beans. Your healthcare provider or dietitian can help you create a high-fiber meal plan. Your provider may also recommend a fiber supplement if you cannot get enough fiber from food.         Exercise regularly.  Regular physical activity can help stimulate your intestines. Walking is a good exercise to prevent or relieve constipation. Ask which exercises are best for you.         Schedule a time each day to have a bowel movement.  This may help train your body to have regular bowel movements. Bend forward while you are on the toilet to help move the bowel movement out. Sit on the toilet for at least 10 minutes, even if you do not have a bowel movement.    Store opioids safely:   Store opioids where others cannot easily get them.  Keep them in a locked cabinet or secure area. Do not  keep them in a purse or other bag you carry with you. A person may be looking for something else and find the opioids.         Make sure opioids are stored out of the reach of children.  A child can easily overdose on opioids. Opioids may look like candy to a small child.    The best way to dispose of opioids:  The laws vary by country and area. In the [place], the best way is to return the opioids through a take-back program. This program is offered by the [place] Drug Enforcement Agency (LEIGH). The following are options for using the program:  Take the opioids to a LEIGH collection site.  The site is often a law enforcement center. Call your local law enforcement center for scheduled take-back days in your area. You will be given information on where to go if the collection site is in a different location.    Take the opioids to an approved pharmacy or hospital.  A pharmacy or hospital may be set up as a collection site. You will need to ask if it is a LEIGH collection site if you were not directed there. A pharmacy or doctor's office may not be able to take back opioids  unless it is a LEIGH site.    Use a mail-back system.  This means you are given containers to put the opioids into. You will then mail them in the containers.    Use a take-back drop box.  This is a place to leave the opioids at any time. People and animals will not be able to get into the box. Your local law enforcement agency can tell you where to find a drop box in your area.    Other safe ways to dispose of opioids:  The medicine may come with disposal instructions. The instructions may vary depending on the brand of medicine you are using. Instructions may come in a Medication Guide, but not every medicine has one. You may instead get instructions from your pharmacy or doctor. Follow instructions carefully. The following are general guidelines to follow:  Find out if you can flush the opioid.  Some opioids can be flushed down the toilet or poured into the sink. You will need to contact authorities in your area to see if this is an option for you. The FDA also offers a list of medicines that are safe to flush down the toilet. You can check the list if you cannot get the information for your local area.    Ask your waste management company about rules for putting opioids in the trash.  The company will be able to give you specific directions. Scratch out personal information on the original medicine label so it cannot be read. Then put it in the trash. Do not label the trash or put any information on it about the opioids. It should look like regular household trash so no one is tempted to look for the opioids. Keep the trash out of the reach of children and animals. Always make sure trash is secure.    Talk to officials if you live in a facility.  If you live in a nursing home or assisted living center, talk to an official. The person will know the rules for your area.    Other ways to manage pain:   Ask your healthcare provider about non-opioid medicines to control pain.  Some medicines may even work better than  opioids, depending on the cause of your pain. Nonprescription medicines include NSAIDs (such as ibuprofen) and acetaminophen. Prescription medicines include muscle relaxers, antidepressants, and steroids.    Pain may be managed without any medicines.  Some ways to relieve pain include massage, aromatherapy, or meditation. Physical or occupational therapy may also help.    Follow up with your doctor or pain specialist as directed:  You may need to have your dose adjusted. Your doctor or pain specialist can also help you find ways to manage pain without opioids. Write down your questions so you remember to ask them during your visits.  For more information:   Drug Enforcement Administration  [address] [place] [address]  Phone: 6- 131 - 036-2530  Web Address: https://www.deadiversion.[facility] Centero[facility].gov/drug_disposal/    US Food and Drug Administration  [address] [place] [address]  Phone: 4- 191 - 350-5249  Web Address: http://www.fda.gov  © Copyright [employer]ative 2023 Information is for End User's use only and may not be sold, redistributed or otherwise used for commercial purposes.  The above information is an  only. It is not intended as medical advice for individual conditions or treatments. Talk to your doctor, nurse or pharmacist before following any medical regimen to see if it is safe and effective for you.

## 2024-04-30 NOTE — PROGRESS NOTES
Assessment:  1. Sacroiliitis (HCC)    2. Lumbar radiculopathy    3. Chronic pain syndrome    4. Chemotherapy-induced neuropathy (HCC)    5. Spinal stenosis of lumbar region, unspecified whether neurogenic claudication present    6. Lumbar disc herniation    7. Lumbar spondylosis    8. Long-term current use of opiate analgesic    9. Uncomplicated opioid dependence (HCC)        Plan:   based on patient report and physical exam, the patient's symptomatology does seem to be consistent with sacroiliac mediated pain from sacroiliitis. We will schedule the patient for a bilateral SIJ injection to decrease any inflammatory component of the patient's pain symptoms.  Complete risks and benefits including bleeding, infection, tissue reaction, nerve injury and allergic reaction were discussed. The patient was agreeable and verbalized an understanding  I will increase Norco 7.5/325 mg to 3 times a day as needed pain is the patient  is often finding that he can use an extra tablet.  The patient was given a 2 month supply of prescriptions with a Do Not Fill date(s) of today and May 29, 2024    [place] Prescription Drug Monitoring Program report was reviewed and was appropriate     There are risks associated with opioid medications, including dependence, addiction and tolerance. The patient understands and agrees to use these medications only as prescribed. Potential side effects of the medications include, but are not limited to, constipation, drowsiness, addiction, impaired judgment and risk of fatal overdose if not taken as prescribed. The patient was warned against driving while taking sedation medications.  Sharing medications is a felony. At this point in time, the patient is showing no signs of addiction, abuse, diversion or suicidal ideation.    3.  Continue with home exercise program  4. Follow-up in 8 weeks or sooner if needed    History of Present Illness:    The patient is a 74 y.o. male with a history of  metastatic urothelial carcinoma status post nephrectomy and chemo induced neuropathy, A-fib and chronic pain syndrome last seen on 02/09/2024  who presents for a follow up office visit in regards to chronic low back pain.  Patient's neuropathic symptoms in his lower extremities secondary to chemo induced neuropathy are at baseline.  Patient did have bilateral L4 TFESI without any relief.  He is also had a lumbar RFA without relief.  He did have some relief with previous bilateral SI joint injections.    Patient rates his pain a 7 out of 10 on the numeric pain rating scale.  Pain is constant in the morning and it is described as sharp and throbbing    Current pain medications includes: Norco 7.5/325 mg twice daily as needed .  The patient reports that this regimen is providing 75% of his pain when he takes the medication however is finding that the medication wears off before his next dose is due.  He denies any side effects    Pain Contract Signed: 2/9/2024  Last Urine Drug Screen: 2/9/2024  BECKS/SOAPP 2/9/2024  Last hydrocodone per pt. 4/30/2024  Last Narcan: 2/9/2024    I have personally reviewed and/or updated the patient's past medical history, past surgical history, family history, social history, current medications, allergies, and vital signs today.       Review of Systems:    Review of Systems   Respiratory:  Positive for shortness of breath.    Cardiovascular:  Negative for chest pain.   Gastrointestinal:  Negative for constipation, diarrhea, nausea and vomiting.   Musculoskeletal:  Positive for back pain and gait problem. Negative for arthralgias, joint swelling and myalgias.   Skin:  Negative for rash.   Neurological:  Negative for dizziness, seizures and weakness.   All other systems reviewed and are negative.        Past Medical History:   Diagnosis Date    A-fib (HCC)     Arthritis     Bladder cancer     Cancer (HCC)     skin melanoma;basal cell    Chronic pain disorder     from arthritis    Colon  polyp     Does use hearing aid     bilat    Dry eyes, bilateral     GERD (gastroesophageal reflux disease)     History of kidney stones 10/2019    History of partial knee replacement     bilat    History of transfusion     History of vertigo     Hyperlipidemia     Hypertension     Infusion extravasation of chemotherapy vesicant 11/2021    Kidney lesion     Lumbar disc disorder     compression of vertebrae L4-5-6    Muscle weakness     left hip area    Renal mass     left    Right ankle injury 03/05/2020    missed step of ladder     Right ankle pain     Shortness of breath     with activity    Tinnitus     Urothelial cancer     left    Wears glasses        Past Surgical History:   Procedure Laterality Date    COLONOSCOPY      CYSTOSCOPY Left 04/01/2020    Procedure: CYSTOSCOPY; URETERAL CATHETER PLACEMENT;  Surgeon: Joe Cortes MD;  Location[facility];  Service: Urology    CYSTOSCOPY  05/11/2020    CYSTOSCOPY  06/04/2021    FL CYSTOGRAM  04/13/2020    FL RETROGRADE PYELOGRAM  01/17/2020    HERNIA REPAIR  10/25/2022    umbilical with mesh    INGUINAL HERNIA REPAIR Right     with mesh    IR PORT PLACEMENT  04/30/2020    JOINT REPLACEMENT Bilateral     partials knee    LUMBAR EPIDURAL INJECTION      LA CYSTO BLADDER W/URETERAL CATHETERIZATION Bilateral 01/17/2020    Procedure: CYSTOSCOPY; RIGHT RETROGRADE PYELOGRAM WITH RIGHT URETERAL CYTOLOGY SAMPLING; LEFT URETEROSCOPY WITH RENAL PELVIS BIOPSY AND LEFT STENT PLACEMENT;  Surgeon: Joe Cortes MD;  [facility];  Service: Urology    LA LAPAROSCOPY NEPHRECTOMY W/TOTAL URETERECTOMY Left 04/01/2020    Procedure: ROBOTIC LAPAROSCOPIC NEPHRO-URETERECTOMY;  Surgeon: Joe Cortes MD;  Location[facility];  Service: Urology    LA RPR AA HERNIA 1ST < 3 CM REDUCIBLE N/A 8/10/2023    Procedure: REPAIR HERNIA INCISIONAL LAPAROSCOPIC WITH MESH;  Surgeon: Gustavo Schmidt MD;  [facility];  Service: General    SKIN CANCER EXCISION       surface melanoma    TONSILLECTOMY      WISDOM TOOTH EXTRACTION         Family History   Problem Relation Age of Onset    Liver cancer Father        Social History     Occupational History    Not on file   Tobacco Use    Smoking status: Former     Current packs/day: 0.00     Types: Cigarettes     Quit date:      Years since quittin.3    Smokeless tobacco: Never   Vaping Use    Vaping status: Never Used   Substance and Sexual Activity    Alcohol use: Not Currently     Comment: socially, rarely    Drug use: Never    Sexual activity: Yes     Partners: Female         Current Outpatient Medications:     HYDROcodone-acetaminophen (Norco) 7.5-325 mg per tablet, Take 1 tablet by mouth 3 (three) times a day as needed for pain Max Daily Amount: 3 tablets, Disp: 90 tablet, Rfl: 0    [START ON 2024] HYDROcodone-acetaminophen (Norco) 7.5-325 mg per tablet, Take 1 tablet by mouth 3 (three) times a day as needed for pain Max Daily Amount: 3 tablets Do not start before May 29, 2024., Disp: 90 tablet, Rfl: 0    acetaminophen (TYLENOL) 500 mg tablet, Take 2 tablets (1,000 mg total) by mouth every 8 (eight) hours (Patient taking differently: Take 1,000 mg by mouth if needed), Disp: , Rfl:     amiodarone 200 mg tablet, Take 200 mg by mouth daily, Disp: , Rfl:     ascorbic acid (VITAMIN C) 500 MG tablet, Take 1 tablet by mouth Every 12 hours, Disp: , Rfl:     atorvastatin (LIPITOR) 40 mg tablet, Take 40 mg by mouth daily at bedtime, Disp: , Rfl:     capsicum (ZOSTRIX) 0.075 % topical cream, Apply topically 3 (three) times a day, Disp: 28.3 g, Rfl: 0    diltiazem (CARDIZEM) 60 mg tablet, 2 (two) times a day, Disp: , Rfl:     finasteride (PROSCAR) 5 mg tablet, Take 5 mg by mouth daily, Disp: , Rfl:     folic acid (FOLVITE) 1 mg tablet, take 1 tablet by mouth once daily, Disp: 90 tablet, Rfl: 4    HYDROcodone-acetaminophen (Norco) 7.5-325 mg per tablet, Take 1 tablet by mouth 2 (two) times a day as needed for pain Max Daily  "Amount: 2 tablets Do not start before April 7, 2024., Disp: 60 tablet, Rfl: 0    naloxone (NARCAN) 4 mg/0.1 mL nasal spray, Administer 1 spray into a nostril. If breathing does not return to normal or if breathing difficulty resumes after 2-3 minutes, give another dose in the other nostril using a new spray., Disp: 1 each, Rfl: 1    nystatin (MYCOSTATIN) cream, Apply topically 2 (two) times a day, Disp: 30 g, Rfl: 0    tadalafil (CIALIS) 20 MG tablet, Take one tablet by mouth one hour before sexual activity, Disp: 15 tablet, Rfl: 3    tamsulosin (FLOMAX) 0.4 mg, Take 1 capsule (0.4 mg total) by mouth daily with dinner, Disp: 30 capsule, Rfl: 12    VITAMIN D PO, Take 1,000 Units by mouth daily , Disp: , Rfl:     zolpidem (AMBIEN) 5 mg tablet, Take 1 tablet (5 mg total) by mouth daily at bedtime as needed for sleep, Disp: 30 tablet, Rfl: 1    Allergies   Allergen Reactions    Poison Ivy Extract Rash       Physical Exam:    /80   Pulse 62   Ht 5' 9\" (1.753 m)   Wt 93.4 kg (206 lb)   BMI 30.42 kg/m²     Constitutional:normal, well developed, well nourished, alert, in no distress and non-toxic and no overt pain behavior.  Eyes:anicteric  HEENT:grossly intact  Neck:supple, symmetric, trachea midline and no masses   Pulmonary:even and unlabored  Cardiovascular:No edema or pitting edema present  Skin:Normal without rashes or lesions and well hydrated  Psychiatric:Mood and affect appropriate  Neurologic:Cranial Nerves II-XII grossly intact  Musculoskeletal:antalgic and ambulates with cane.  Bilateral lower extremity strength 5 out of 5 in the muscle groups.  Negative straight leg raise bilaterally.  Positive Juan's, AP compression and Gaenslen's test bilaterally.  Positive lumbar facet loading maneuvers      Imaging  FL spine and pain procedure    (Results Pending)         Orders Placed This Encounter   Procedures    FL spine and pain procedure       "

## 2024-05-01 ENCOUNTER — HOSPITAL ENCOUNTER (OUTPATIENT)
Dept: RADIOLOGY | Facility: CLINIC | Age: 74
Discharge: HOME | End: 2024-05-01
Payer: MEDICARE

## 2024-05-01 VITALS
RESPIRATION RATE: 18 BRPM | TEMPERATURE: 98 F | OXYGEN SATURATION: 94 % | HEART RATE: 55 BPM | SYSTOLIC BLOOD PRESSURE: 155 MMHG | DIASTOLIC BLOOD PRESSURE: 84 MMHG

## 2024-05-01 DIAGNOSIS — M46.1 SACROILIITIS (HCC): ICD-10-CM

## 2024-05-01 PROCEDURE — 27096 INJECT SACROILIAC JOINT: CPT | Performed by: ANESTHESIOLOGY

## 2024-05-01 RX ORDER — METHYLPREDNISOLONE ACETATE 80 MG/ML
80 INJECTION, SUSPENSION INTRA-ARTICULAR; INTRALESIONAL; INTRAMUSCULAR; PARENTERAL; SOFT TISSUE ONCE
Status: COMPLETED | OUTPATIENT
Start: 2024-05-01 | End: 2024-05-01

## 2024-05-01 RX ORDER — ROPIVACAINE HYDROCHLORIDE 2 MG/ML
4 INJECTION, SOLUTION EPIDURAL; INFILTRATION; PERINEURAL ONCE
Status: COMPLETED | OUTPATIENT
Start: 2024-05-01 | End: 2024-05-01

## 2024-05-01 RX ORDER — LIDOCAINE HYDROCHLORIDE 10 MG/ML
5 INJECTION, SOLUTION EPIDURAL; INFILTRATION; INTRACAUDAL; PERINEURAL ONCE
Status: COMPLETED | OUTPATIENT
Start: 2024-05-01 | End: 2024-05-01

## 2024-05-01 RX ADMIN — METHYLPREDNISOLONE ACETATE 80 MG: 80 INJECTION, SUSPENSION INTRA-ARTICULAR; INTRALESIONAL; INTRAMUSCULAR; SOFT TISSUE at 15:50

## 2024-05-01 RX ADMIN — LIDOCAINE HYDROCHLORIDE 5 ML: 10 INJECTION, SOLUTION EPIDURAL; INFILTRATION; INTRACAUDAL; PERINEURAL at 15:50

## 2024-05-01 RX ADMIN — IOHEXOL 1 ML: 300 INJECTION, SOLUTION INTRAVENOUS at 15:50

## 2024-05-01 RX ADMIN — ROPIVACAINE HYDROCHLORIDE 4 ML: 2 INJECTION, SOLUTION EPIDURAL; INFILTRATION at 15:50

## 2024-05-01 NOTE — DISCHARGE INSTR - LAB

## 2024-05-08 ENCOUNTER — TELEPHONE (OUTPATIENT)
Dept: PAIN MEDICINE | Facility: CLINIC | Age: 74
End: 2024-05-08

## 2024-05-08 ENCOUNTER — OFFICE VISIT (OUTPATIENT)
Dept: LAB | Facility: HOSPITAL | Age: 74
End: 2024-05-08
Attending: SURGERY
Payer: MEDICARE

## 2024-05-08 ENCOUNTER — HOSPITAL ENCOUNTER (OUTPATIENT)
Dept: RADIOLOGY | Facility: HOSPITAL | Age: 74
Discharge: HOME | End: 2024-05-08
Payer: MEDICARE

## 2024-05-08 ENCOUNTER — APPOINTMENT (OUTPATIENT)
Dept: LAB | Facility: HOSPITAL | Age: 74
End: 2024-05-08
Attending: SURGERY
Payer: MEDICARE

## 2024-05-08 DIAGNOSIS — K43.2 RECURRENT INCISIONAL HERNIA: ICD-10-CM

## 2024-05-08 LAB
ALBUMIN SERPL BCP-MCNC: 4.1 G/DL (ref 3.5–5)
ALP SERPL-CCNC: 58 U/L (ref 34–104)
ALT SERPL W P-5'-P-CCNC: 14 U/L (ref 7–52)
ANION GAP SERPL CALCULATED.3IONS-SCNC: 6 MMOL/L (ref 4–13)
AST SERPL W P-5'-P-CCNC: 11 U/L (ref 13–39)
BILIRUB SERPL-MCNC: 1.03 MG/DL (ref 0.2–1)
BUN SERPL-MCNC: 34 MG/DL (ref 5–25)
CALCIUM SERPL-MCNC: 9.4 MG/DL (ref 8.4–10.2)
CHLORIDE SERPL-SCNC: 104 MMOL/L (ref 96–108)
CO2 SERPL-SCNC: 29 MMOL/L (ref 21–32)
CREAT SERPL-MCNC: 1.44 MG/DL (ref 0.6–1.3)
ERYTHROCYTE [DISTWIDTH] IN BLOOD BY AUTOMATED COUNT: 14.3 % (ref 11.6–15.1)
GFR SERPL CREATININE-BSD FRML MDRD: 47 ML/MIN/1.73SQ M
GLUCOSE SERPL-MCNC: 98 MG/DL (ref 65–140)
HCT VFR BLD AUTO: 43.3 % (ref 36.5–49.3)
HGB BLD-MCNC: 13.7 G/DL (ref 12–17)
MCH RBC QN AUTO: 31.2 PG (ref 26.8–34.3)
MCHC RBC AUTO-ENTMCNC: 31.6 G/DL (ref 31.4–37.4)
MCV RBC AUTO: 99 FL (ref 82–98)
PLATELET # BLD AUTO: 185 THOUSANDS/UL (ref 149–390)
PMV BLD AUTO: 9.6 FL (ref 8.9–12.7)
POTASSIUM SERPL-SCNC: 4.5 MMOL/L (ref 3.5–5.3)
PROT SERPL-MCNC: 6.5 G/DL (ref 6.4–8.4)
RBC # BLD AUTO: 4.39 MILLION/UL (ref 3.88–5.62)
SODIUM SERPL-SCNC: 139 MMOL/L (ref 135–147)
WBC # BLD AUTO: 9.6 THOUSAND/UL (ref 4.31–10.16)

## 2024-05-08 PROCEDURE — 93005 ELECTROCARDIOGRAM TRACING: CPT

## 2024-05-08 PROCEDURE — 85027 COMPLETE CBC AUTOMATED: CPT

## 2024-05-08 PROCEDURE — 71046 X-RAY EXAM CHEST 2 VIEWS: CPT

## 2024-05-08 PROCEDURE — 36415 COLL VENOUS BLD VENIPUNCTURE: CPT

## 2024-05-08 PROCEDURE — 80053 COMPREHEN METABOLIC PANEL: CPT

## 2024-05-09 LAB
ATRIAL RATE: 53 BPM
P AXIS: 75 DEGREES
PR INTERVAL: 238 MS
QRS AXIS: -3 DEGREES
QRSD INTERVAL: 94 MS
QT INTERVAL: 446 MS
QTC INTERVAL: 418 MS
T WAVE AXIS: 34 DEGREES
VENTRICULAR RATE: 53 BPM

## 2024-05-09 PROCEDURE — 93010 ELECTROCARDIOGRAM REPORT: CPT | Performed by: INTERNAL MEDICINE

## 2024-05-15 NOTE — TELEPHONE ENCOUNTER
Caller: Patient     Doctor: Micah     Reason for call: 70-80% improvement  and pain is at a 2     Call back#: [phone]

## 2024-05-17 ENCOUNTER — TELEPHONE (OUTPATIENT)
Age: 74
End: 2024-05-17

## 2024-05-20 ENCOUNTER — ANESTHESIA EVENT (OUTPATIENT)
Dept: PERIOP | Facility: HOSPITAL | Age: 74
End: 2024-05-20
Payer: MEDICARE

## 2024-05-22 NOTE — ED NOTES
SPENSER paged     Won Vinson RN  01/14/20 [id number] Include Z78.9 (Other Specified Conditions Influencing Health Status) As An Associated Diagnosis?: No How Many Mls Were Removed From The 80 Mg/Ml (5ml) Vial When Preparing The Injectable Solution?: 0 Kenalog Preparation: Kenalog Ndc# For Kenalog Only: 6517-2376-67 Detail Level: Detailed Validate Note Data When Using Inventory: Yes Kenalog Type Of Vial: Multiple Dose Medical Necessity Clause: This procedure was medically necessary because the lesions that were treated were: Administered By (Optional): Dr Daily Total Volume (Ccs): 2 Consent: The risks of atrophy were reviewed with the patient. Concentration Of Kenalog Solution Injected (Mg/Ml): 5.0

## 2024-05-22 NOTE — PRE-PROCEDURE INSTRUCTIONS
Pre-Surgery Instructions:   Medication Instructions    acetaminophen (TYLENOL) 500 mg tablet Uses PRN- OK to take day of surgery    amiodarone 200 mg tablet Take day of surgery.    ascorbic acid (VITAMIN C) 500 MG tablet Stop taking 7 days prior to surgery.    atorvastatin (LIPITOR) 40 mg tablet Take night before surgery    capsicum (ZOSTRIX) 0.075 % topical cream Hold day of surgery.    diltiazem (CARDIZEM) 60 mg tablet Take day of surgery.    finasteride (PROSCAR) 5 mg tablet Take night before surgery    folic acid (FOLVITE) 1 mg tablet Stop taking 7 days prior to surgery.    tamsulosin (FLOMAX) 0.4 mg Take night before surgery    VITAMIN D PO Stop taking 7 days prior to surgery.   Medication instructions for day surgery reviewed. Please use only a sip of water to take your instructed medications. Avoid all over the counter vitamins, supplements and NSAIDS for one week prior to surgery per anesthesia guidelines. Tylenol is ok to take as needed.     You will receive a call one business day prior to surgery with an arrival time and hospital directions. If your surgery is scheduled on a Monday, the hospital will be calling you on the Friday prior to your surgery. If you have not heard from anyone by 8pm, please call the hospital supervisor through the hospital  at [phone]. ([place] [phone] or [place] [phone]).    Do not eat or drink anything after midnight the night before your surgery, including candy, mints, lifesavers, or chewing gum. Do not drink alcohol 24hrs before your surgery. Try not to smoke at least 24hrs before your surgery.       Follow the pre surgery showering instructions as listed in the “My Surgical Experience Booklet” or otherwise provided by your surgeon's office. Do not use a blade to shave the surgical area 1 week before surgery. It is okay to use a clean electric clippers up to 24 hours before surgery. Do not apply any lotions, creams, including makeup, cologne,  deodorant, or perfumes after showering on the day of your surgery. Do not use dry shampoo, hair spray, hair gel, or any type of hair products.     No contact lenses, eye make-up, or artificial eyelashes. Remove nail polish, including gel polish, and any artificial, gel, or acrylic nails if possible. Remove all jewelry including rings and body piercing jewelry.     Wear causal clothing that is easy to take on and off. Consider your type of surgery.    Keep any valuables, jewelry, piercings at home. Please bring any specially ordered equipment (sling, braces) if indicated.    Arrange for a responsible person to drive you to and from the hospital on the day of your surgery. Please confirm the visitor policy for the day of your procedure when you receive your phone call with an arrival time.     Call the surgeon's office with any new illnesses, exposures, or additional questions prior to surgery.    Please reference your “My Surgical Experience Booklet” for additional information to prepare for your upcoming surgery.

## 2024-05-29 ENCOUNTER — HOSPITAL ENCOUNTER (OUTPATIENT)
Dept: INFUSION CENTER | Facility: HOSPITAL | Age: 74
Discharge: HOME | End: 2024-05-29
Attending: INTERNAL MEDICINE
Payer: MEDICARE

## 2024-05-29 DIAGNOSIS — C65.2 CANCER OF LEFT RENAL PELVIS (HCC): ICD-10-CM

## 2024-05-29 DIAGNOSIS — Z45.2 ENCOUNTER FOR CARE RELATED TO PORT-A-CATH: Primary | ICD-10-CM

## 2024-05-29 PROCEDURE — 96523 IRRIG DRUG DELIVERY DEVICE: CPT

## 2024-05-29 NOTE — PROGRESS NOTES
Juan Amato tolerated port flush well with no complications.    Juan Amato is aware of future appt on 7/10 at 10:30AM.     AVS printed and given to Juan Amato.

## 2024-05-30 ENCOUNTER — TELEPHONE (OUTPATIENT)
Age: 74
End: 2024-05-30

## 2024-05-30 NOTE — TELEPHONE ENCOUNTER
Pt's spouse called to reschedule her appt closer to her pt's post op; however, I did not see a post op scheduled for pt; scheduled pt and pt's spouse w/ Dr. Schmidt 6/17 at 930 and 945.

## 2024-05-30 NOTE — TELEPHONE ENCOUNTER
Pt called because he is having surgery on 05/31/24 and does not have the special soap he is instructed to wash with. Warm transfer to nurse triage([place])

## 2024-05-31 ENCOUNTER — ANESTHESIA (OUTPATIENT)
Dept: PERIOP | Facility: HOSPITAL | Age: 74
End: 2024-05-31
Payer: MEDICARE

## 2024-05-31 ENCOUNTER — HOSPITAL ENCOUNTER (OUTPATIENT)
Facility: HOSPITAL | Age: 74
Discharge: HOME | End: 2024-06-02
Attending: SURGERY | Admitting: SURGERY
Payer: MEDICARE

## 2024-05-31 DIAGNOSIS — K43.2 RECURRENT INCISIONAL HERNIA: Primary | ICD-10-CM

## 2024-05-31 PROCEDURE — C9290 INJ, BUPIVACAINE LIPOSOME: HCPCS | Performed by: ANESTHESIOLOGY

## 2024-05-31 PROCEDURE — C1781 MESH (IMPLANTABLE): HCPCS | Performed by: SURGERY

## 2024-05-31 PROCEDURE — 49615 RPR AA HRN RCR 3-10 RDC: CPT | Performed by: SURGERY

## 2024-05-31 PROCEDURE — 49615 RPR AA HRN RCR 3-10 RDC: CPT

## 2024-05-31 DEVICE — ETHICON SECURESTRAP ABSORBABLE FIXATION DEVICE
Type: IMPLANTABLE DEVICE | Site: ABDOMEN | Status: FUNCTIONAL
Brand: ETHICON SECURESTRAP

## 2024-05-31 RX ORDER — BUPIVACAINE HYDROCHLORIDE 5 MG/ML
INJECTION, SOLUTION EPIDURAL; INTRACAUDAL AS NEEDED
Status: DISCONTINUED | OUTPATIENT
Start: 2024-05-31 | End: 2024-05-31 | Stop reason: HOSPADM

## 2024-05-31 RX ORDER — SODIUM CHLORIDE, SODIUM LACTATE, POTASSIUM CHLORIDE, CALCIUM CHLORIDE 600; 310; 30; 20 MG/100ML; MG/100ML; MG/100ML; MG/100ML
50 INJECTION, SOLUTION INTRAVENOUS CONTINUOUS
Status: DISCONTINUED | OUTPATIENT
Start: 2024-05-31 | End: 2024-06-02

## 2024-05-31 RX ORDER — FINASTERIDE 5 MG/1
5 TABLET, FILM COATED ORAL DAILY
Status: DISCONTINUED | OUTPATIENT
Start: 2024-06-01 | End: 2024-06-02 | Stop reason: HOSPADM

## 2024-05-31 RX ORDER — SODIUM CHLORIDE, SODIUM LACTATE, POTASSIUM CHLORIDE, CALCIUM CHLORIDE 600; 310; 30; 20 MG/100ML; MG/100ML; MG/100ML; MG/100ML
125 INJECTION, SOLUTION INTRAVENOUS CONTINUOUS
Status: DISCONTINUED | OUTPATIENT
Start: 2024-05-31 | End: 2024-05-31

## 2024-05-31 RX ORDER — OXYCODONE HYDROCHLORIDE 5 MG/1
5 TABLET ORAL EVERY 4 HOURS PRN
Status: DISCONTINUED | OUTPATIENT
Start: 2024-05-31 | End: 2024-06-02 | Stop reason: HOSPADM

## 2024-05-31 RX ORDER — AMIODARONE HYDROCHLORIDE 100 MG/1
200 TABLET ORAL DAILY
Status: DISCONTINUED | OUTPATIENT
Start: 2024-06-01 | End: 2024-06-02 | Stop reason: HOSPADM

## 2024-05-31 RX ORDER — PROPOFOL 10 MG/ML
INJECTION, EMULSION INTRAVENOUS AS NEEDED
Status: DISCONTINUED | OUTPATIENT
Start: 2024-05-31 | End: 2024-05-31

## 2024-05-31 RX ORDER — ZOLPIDEM TARTRATE 5 MG/1
5 TABLET ORAL
Status: DISCONTINUED | OUTPATIENT
Start: 2024-05-31 | End: 2024-06-02 | Stop reason: HOSPADM

## 2024-05-31 RX ORDER — GLYCOPYRROLATE 0.2 MG/ML
INJECTION INTRAMUSCULAR; INTRAVENOUS AS NEEDED
Status: DISCONTINUED | OUTPATIENT
Start: 2024-05-31 | End: 2024-05-31

## 2024-05-31 RX ORDER — HYDROMORPHONE HCL/PF 1 MG/ML
0.4 SYRINGE (ML) INJECTION
Status: DISCONTINUED | OUTPATIENT
Start: 2024-05-31 | End: 2024-05-31 | Stop reason: HOSPADM

## 2024-05-31 RX ORDER — METOCLOPRAMIDE HYDROCHLORIDE 5 MG/ML
5 INJECTION INTRAMUSCULAR; INTRAVENOUS ONCE AS NEEDED
Status: DISCONTINUED | OUTPATIENT
Start: 2024-05-31 | End: 2024-05-31 | Stop reason: HOSPADM

## 2024-05-31 RX ORDER — DILTIAZEM HYDROCHLORIDE 60 MG/1
60 TABLET, FILM COATED ORAL 2 TIMES DAILY
Status: DISCONTINUED | OUTPATIENT
Start: 2024-06-01 | End: 2024-06-02 | Stop reason: HOSPADM

## 2024-05-31 RX ORDER — CEFAZOLIN SODIUM 2 G/50ML
2000 SOLUTION INTRAVENOUS ONCE
Status: COMPLETED | OUTPATIENT
Start: 2024-05-31 | End: 2024-05-31

## 2024-05-31 RX ORDER — FENTANYL CITRATE/PF 50 MCG/ML
25 SYRINGE (ML) INJECTION
Status: DISCONTINUED | OUTPATIENT
Start: 2024-05-31 | End: 2024-05-31 | Stop reason: HOSPADM

## 2024-05-31 RX ORDER — FENTANYL CITRATE 50 UG/ML
INJECTION, SOLUTION INTRAMUSCULAR; INTRAVENOUS AS NEEDED
Status: DISCONTINUED | OUTPATIENT
Start: 2024-05-31 | End: 2024-05-31

## 2024-05-31 RX ORDER — KETAMINE HCL IN NACL, ISO-OSM 100MG/10ML
SYRINGE (ML) INJECTION AS NEEDED
Status: DISCONTINUED | OUTPATIENT
Start: 2024-05-31 | End: 2024-05-31

## 2024-05-31 RX ORDER — SODIUM CHLORIDE, SODIUM LACTATE, POTASSIUM CHLORIDE, CALCIUM CHLORIDE 600; 310; 30; 20 MG/100ML; MG/100ML; MG/100ML; MG/100ML
INJECTION, SOLUTION INTRAVENOUS CONTINUOUS PRN
Status: DISCONTINUED | OUTPATIENT
Start: 2024-05-31 | End: 2024-05-31

## 2024-05-31 RX ORDER — ONDANSETRON 2 MG/ML
INJECTION INTRAMUSCULAR; INTRAVENOUS AS NEEDED
Status: DISCONTINUED | OUTPATIENT
Start: 2024-05-31 | End: 2024-05-31

## 2024-05-31 RX ORDER — MAGNESIUM HYDROXIDE 1200 MG/15ML
LIQUID ORAL AS NEEDED
Status: DISCONTINUED | OUTPATIENT
Start: 2024-05-31 | End: 2024-05-31 | Stop reason: HOSPADM

## 2024-05-31 RX ORDER — ROCURONIUM BROMIDE 10 MG/ML
INJECTION, SOLUTION INTRAVENOUS AS NEEDED
Status: DISCONTINUED | OUTPATIENT
Start: 2024-05-31 | End: 2024-05-31

## 2024-05-31 RX ORDER — ACETAMINOPHEN 325 MG/1
650 TABLET ORAL EVERY 6 HOURS PRN
Status: DISCONTINUED | OUTPATIENT
Start: 2024-05-31 | End: 2024-06-02 | Stop reason: HOSPADM

## 2024-05-31 RX ORDER — EPHEDRINE SULFATE 50 MG/ML
INJECTION INTRAVENOUS AS NEEDED
Status: DISCONTINUED | OUTPATIENT
Start: 2024-05-31 | End: 2024-05-31

## 2024-05-31 RX ORDER — OXYCODONE HYDROCHLORIDE 5 MG/1
5 TABLET ORAL EVERY 4 HOURS PRN
Qty: 15 TABLET | Refills: 0 | Status: SHIPPED | OUTPATIENT
Start: 2024-05-31 | End: 2024-06-10

## 2024-05-31 RX ORDER — TAMSULOSIN HYDROCHLORIDE 0.4 MG/1
0.4 CAPSULE ORAL
Status: DISCONTINUED | OUTPATIENT
Start: 2024-05-31 | End: 2024-06-02 | Stop reason: HOSPADM

## 2024-05-31 RX ORDER — OXYCODONE HYDROCHLORIDE 10 MG/1
10 TABLET ORAL EVERY 4 HOURS PRN
Status: DISCONTINUED | OUTPATIENT
Start: 2024-05-31 | End: 2024-06-02 | Stop reason: HOSPADM

## 2024-05-31 RX ORDER — BUPIVACAINE HYDROCHLORIDE 2.5 MG/ML
INJECTION, SOLUTION EPIDURAL; INFILTRATION; INTRACAUDAL
Status: DISCONTINUED | OUTPATIENT
Start: 2024-05-31 | End: 2024-05-31

## 2024-05-31 RX ORDER — ONDANSETRON 2 MG/ML
4 INJECTION INTRAMUSCULAR; INTRAVENOUS ONCE AS NEEDED
Status: DISCONTINUED | OUTPATIENT
Start: 2024-05-31 | End: 2024-05-31 | Stop reason: HOSPADM

## 2024-05-31 RX ORDER — ONDANSETRON 2 MG/ML
4 INJECTION INTRAMUSCULAR; INTRAVENOUS EVERY 6 HOURS PRN
Status: DISCONTINUED | OUTPATIENT
Start: 2024-05-31 | End: 2024-06-02 | Stop reason: HOSPADM

## 2024-05-31 RX ADMIN — ROCURONIUM BROMIDE 10 MG: 10 INJECTION, SOLUTION INTRAVENOUS at 08:30

## 2024-05-31 RX ADMIN — TAMSULOSIN HYDROCHLORIDE 0.4 MG: 0.4 CAPSULE ORAL at 16:24

## 2024-05-31 RX ADMIN — CEFAZOLIN SODIUM 2000 MG: 2 SOLUTION INTRAVENOUS at 07:41

## 2024-05-31 RX ADMIN — FENTANYL CITRATE 50 MCG: 50 INJECTION, SOLUTION INTRAMUSCULAR; INTRAVENOUS at 08:48

## 2024-05-31 RX ADMIN — FENTANYL CITRATE 50 MCG: 50 INJECTION, SOLUTION INTRAMUSCULAR; INTRAVENOUS at 08:05

## 2024-05-31 RX ADMIN — BUPIVACAINE 20 ML: 13.3 INJECTION, SUSPENSION, LIPOSOMAL INFILTRATION at 11:32

## 2024-05-31 RX ADMIN — SUGAMMADEX 200 MG: 100 INJECTION, SOLUTION INTRAVENOUS at 09:17

## 2024-05-31 RX ADMIN — Medication 20 MG: at 07:52

## 2024-05-31 RX ADMIN — ONDANSETRON 4 MG: 2 INJECTION INTRAMUSCULAR; INTRAVENOUS at 09:04

## 2024-05-31 RX ADMIN — SODIUM CHLORIDE, SODIUM LACTATE, POTASSIUM CHLORIDE, AND CALCIUM CHLORIDE: .6; .31; .03; .02 INJECTION, SOLUTION INTRAVENOUS at 07:40

## 2024-05-31 RX ADMIN — ROCURONIUM BROMIDE 40 MG: 10 INJECTION, SOLUTION INTRAVENOUS at 07:45

## 2024-05-31 RX ADMIN — Medication 8 MCG: at 08:44

## 2024-05-31 RX ADMIN — OXYCODONE 10 MG: 10 TABLET ORAL at 10:36

## 2024-05-31 RX ADMIN — FENTANYL CITRATE 25 MCG: 50 INJECTION INTRAMUSCULAR; INTRAVENOUS at 10:18

## 2024-05-31 RX ADMIN — EPHEDRINE SULFATE 5 MG: 50 INJECTION, SOLUTION INTRAVENOUS at 07:57

## 2024-05-31 RX ADMIN — Medication 4 MCG: at 07:44

## 2024-05-31 RX ADMIN — BUPIVACAINE HYDROCHLORIDE 30 ML: 2.5 INJECTION, SOLUTION EPIDURAL; INFILTRATION; INTRACAUDAL at 11:32

## 2024-05-31 RX ADMIN — FENTANYL CITRATE 25 MCG: 50 INJECTION INTRAMUSCULAR; INTRAVENOUS at 09:52

## 2024-05-31 RX ADMIN — GLYCOPYRROLATE 0.2 MG: 0.2 INJECTION INTRAMUSCULAR; INTRAVENOUS at 07:45

## 2024-05-31 RX ADMIN — SODIUM CHLORIDE, SODIUM LACTATE, POTASSIUM CHLORIDE, AND CALCIUM CHLORIDE: .6; .31; .03; .02 INJECTION, SOLUTION INTRAVENOUS at 09:10

## 2024-05-31 RX ADMIN — PROPOFOL 150 MG: 10 INJECTION, EMULSION INTRAVENOUS at 07:45

## 2024-05-31 NOTE — ANESTHESIA PREPROCEDURE EVALUATION
Procedure:  REPAIR HERNIA VENTRAL LAPAROSCOPIC WITH MESH (Abdomen)      Last chemo 1 year ago, currently in surveillance    Relevant Problems   CARDIO   (+) HERRERA (dyspnea on exertion)   (+) Essential hypertension   (+) Hyperlipidemia   (+) Paroxysmal atrial fibrillation (HCC)   (+) Sinus bradycardia      /RENAL   (+) Cancer of left renal pelvis (HCC)   (+) Chronic kidney disease-mineral and bone disorder   (+) Stage 3a chronic kidney disease (HCC)      HEMATOLOGY   (+) Normocytic anemia      MUSCULOSKELETAL   (+) Chronic bilateral low back pain without sciatica   (+) Primary osteoarthritis of right shoulder   (+) Sacroiliitis (HCC)      NEURO/PSYCH   (+) Chronic bilateral low back pain without sciatica   (+) Chronic pain syndrome      PULMONARY   (+) HERRERA (dyspnea on exertion)      Oncology   (+) Cancer associated pain   (+) Metastatic urothelial carcinoma (HCC)      Surgery/Wound/Pain   (+) H/O left nephrectomy   (+) Recurrent incisional hernia      Other   (+) Long-term current use of opiate analgesic     EKGT 5/8/24  Narrative & Impression   Sinus bradycardia with 1st degree A-V block  Possible Inferior infarct (cited on or before 02-AUG-2023)  Abnormal ECG     Stress test 2022    Stress ECG: No ST deviation is noted. The ECG was not diagnostic due to pharmacological (vasodilator) stress.    Perfusion: There are no perfusion defects.    Stress Function: Left ventricular function post-stress is normal. Post-stress ejection fraction is 71 %.    Stress Combined Conclusion: The ECG and SPECT imaging portions of the stress study are concordant with no evidence of stress induced myocardial ischemia.     Normal left ventricular systolic function.    No evidence for prior myocardial infarction.    No evidence for ischemia by perfusion imaging.       Holter monitor 2023:  Run of NSVT and afib, otherwise NSR     Physical Exam    Airway    Mallampati score: II  TM Distance: >3 FB  Neck ROM: full     Dental         Cardiovascular      Pulmonary      Other Findings        Anesthesia Plan  ASA Score- 3     Anesthesia Type- general with ASA Monitors.         Additional Monitors:     Airway Plan: ETT.    Comment: Consented for post op rectus sheath blocks for pain control.    Recent labs personally reviewed:  Lab Results       Component                Value               Date                       WBC                      9.60                05/08/2024                 HGB                      13.7                05/08/2024                 PLT                      185                 05/08/2024            Lab Results       Component                Value               Date                       K                        4.5                 05/08/2024                 BUN                      34 (H)              05/08/2024                 CREATININE               1.44 (H)            05/08/2024            Lab Results       Component                Value               Date                       PTT                      30                  03/09/2020             Lab Results       Component                Value               Date                       INR                      0.97                03/09/2020              Blood type A      I, Doris Huff MD, have personally seen and evaluated the patient prior to anesthetic care.  I have reviewed the pre-anesthetic record, medical history, allergies, medications and any other medical records if appropriate to the anesthetic care.  If a CRNA is involved in the case, I have reviewed the CRNA assessment, if present, and agree. I consented the patient for general anesthesia with appropriate airway support as indicated. We reviewed the risks associated including PONV, sore throat, allergic reaction to anesthetics and management plan to address these issues. We discussed the indication and risks associated with any invasive monitors that would be placed. We discussed post op pain control and  expectations. We discussed rare complications including hypoxia, perioperative cardiac and neurologic events, and death based on the patient's baseline risk. All questions and concerns were addressed.   .       Plan Factors-Exercise tolerance (METS): <4 METS.Exercise comment: Ambulates with cane, HERRERA.    Chart reviewed. EKG reviewed. Imaging results reviewed. Existing labs reviewed. Patient summary reviewed.    Patient is not a current smoker.  Patient did not smoke on day of surgery.    Obstructive sleep apnea risk education given perioperatively.        Induction- intravenous.    Postoperative Plan-     Perioperative Resuscitation Plan - Level 1 - Full Code.       Informed Consent- Anesthetic plan and risks discussed with patient and spouse.  I personally reviewed this patient with the CRNA. Discussed and agreed on the Anesthesia Plan with the CRNA..

## 2024-05-31 NOTE — PROGRESS NOTES
Spoke to Dr. Huff regarding pt. C/O of 8/10 pain despite pain meds ordered with adequate time. Dr. Huff present at bedside to evaluate. See. Anesthesia note.

## 2024-05-31 NOTE — ANESTHESIA POSTPROCEDURE EVALUATION
Post-Op Assessment Note    CV Status:  Stable  Pain Score: 0    Pain management: adequate       Mental Status:  Alert and awake   Hydration Status:  Euvolemic   PONV Controlled:  Controlled   Airway Patency:  Patent     Post Op Vitals Reviewed: Yes    No anethesia notable event occurred.    Staff: CRNA               BP   118/56   Temp 99   Pulse 60   Resp 12   SpO2 99

## 2024-05-31 NOTE — ANESTHESIA PROCEDURE NOTES
Peripheral Block    Patient location during procedure: PACU  Start time: 5/31/2024 11:32 AM  Reason for block: at surgeon's request and post-op pain management  Staffing  Performed by: Doris Huff MD  Authorized by: Doris Huff MD    Preanesthetic Checklist  Completed: patient identified, IV checked, risks and benefits discussed, surgical consent, monitors and equipment checked, pre-op evaluation and timeout performed  Peripheral Block  Patient position: supine  Prep: ChloraPrep  Patient monitoring: frequent blood pressure checks, heart rate and continuous pulse oximetry  Block type: Rectus Sheath  Laterality: bilateral  Injection technique: single-shot  Procedures: ultrasound guided, Ultrasound guidance required for the procedure to increase accuracy and safety of medication placement and decrease risk of complications.  Ultrasound permanent image saved  bupivacaine liposomal (EXPAREL) 1.3 % injection 20 mL - Perineural   20 mL - 5/31/2024 11:32:00 AM  bupivacaine (PF) (MARCAINE) 0.25 % injection 20 mL - Perineural   30 mL - 5/31/2024 11:32:00 AM  Needle  Needle type: Tuohy   Needle gauge: 20 G  Needle length: 4 in  Needle localization: ultrasound guidance  Assessment  Injection assessment: frequent aspiration, injected with ease, negative aspiration, no paresthesia on injection, incremental injection, needle tip visualized at all times, no symptoms of intraneural/intravenous injection and negative for heart rate change  Paresthesia pain: none  Post-procedure:  site cleaned  patient tolerated the procedure well with no immediate complications  Additional Notes  Patient with significant post operative pain not controlled with IV or oral opiates. Discussed post op rectus sheath blocks with patient and surgeon. Consent obtained. Procedure was done without any complications. Discussed variable pain control but limited risk. All questions answered.

## 2024-05-31 NOTE — OP NOTE
OPERATIVE REPORT  PATIENT NAME: Juan Amato    :  1950  MRN: [id number]  Pt Location: [facility] OR ROOM 02    SURGERY DATE: 2024    Surgeons and Role:     * Gustavo Schmidt MD - Primary     * Lea Stewart PA-C  The PA was necessary to provide expert assistance; i.e. in the form of providing optimal exposure with retraction, suturing, and assistance with dissection in order to perform the most efficient operation and in order to optimize patient safety in the abscence of a qualified surgical resident.    Preop Diagnosis:  Recurrent incisional hernia [K43.2]    Post-Op Diagnosis Codes:     * Recurrent incisional hernia [K43.2]    Procedure(s):  REPAIR HERNIA VENTRAL LAPAROSCOPIC WITH MESH    Specimen(s):  * No specimens in log *    Estimated Blood Loss:   Minimal    Drains:  NG/OG/Enteral Tube Orogastric 18 Fr Center mouth (Active)   Number of days: 295       Ureteral Drain/Stent Left ureter 4.7 Fr. (Active)   Number of days: 1596       Anesthesia Type:   General    Operative Indications:  Recurrent incisional hernia [K43.2]  The patient is a pleasant 75 yo male presenting with a recurrent epigastric incisional hernia for definitive treatment.    Operative Findings:  9cm x 10cm recurrent epigastric incisional hernia    Repair performed with a 6' x 8' Ventralyte ST mesh    Prior to opening the fascia, or reducing the contents of the hernia, the hernia defect was measured. The defect measured 9 cm by 10 cm. This was repaired as described in the body of the report below.    Complications:   None    Procedure and Technique:  The patient was taken to the operating room where they are properly identified, monitored and anesthetized with general endotracheal anesthesia.  They received antibiotics perioperatively.  Venodynes used for DVT prophylaxis.  Time-out performed.  Skin prepped and draped.  Skin incised in the left upper quadrant. A blunt-tipped 12 mm trocar was advanced into the peritoneal  cavity and pneumoperitoneum established to 15 mm of mercury.  A 5 mm 30 degree scope was advanced.  The 4 quadrants of the peritoneal cavity was inspected laparoscopically.  The ventral hernia was identified. A 2nd 5 mm trocar was placed in the left lower quadrant. All adhesions of omentum were taken down laparoscopically.  The falciform ligament was taken down.  The Ventralight ST mesh was rolled up and delivered through the left upper quadrant trocar site. A stab wound made in the anterior abdominal wall. The grasping device used to grasp the blue catheter on the mesh.  The green lattice was inflated.  The mesh brought up to the anterior abdominal wall and oriented. It was secured in 2 rows with absorabable tacks.  The green lattice was removed through the left upper quadrant trocar site. Two additional working ports were placed on the right side of the abdomen to help secure the mesh circumferentially. Satisfied with the lie of the mesh, the procedure completed with closure of the left upper quadrant trocar site with a suture grasping device and an 0 Vicryl suture. The pneumoperitoneum was released. All skin incisions closed with subcuticular 4 Monocryl suture.  Wounds infiltrated with 0.5% Marcaine.  Wounds dressed.  The patient extubated and taken to recovery in stable condition.      I was present for the entire procedure.    Patient Disposition:  PACU         SIGNATURE: Gustavo Schmidt MD  DATE: May 31, 2024  TIME: 9:22 AM

## 2024-05-31 NOTE — INTERVAL H&P NOTE
H&P reviewed. After examining the patient I find no changes in the patients condition since the H&P had been written.    Vitals:    05/31/24 0657   BP: 155/81   Pulse: 62   Resp: 20   Temp: 97.8 °F (36.6 °C)   SpO2: 97%

## 2024-05-31 NOTE — DISCHARGE INSTR - AVS FIRST PAGE
DISCHARGE INSTRUCTIONS:   Light activity   No heavy lifting, limit lifting to 15-20 pounds for 2 weeks   No limitations for diet   Please take medications as prescribed   Please take acetaminophen/ibuprofen scheduled every 4-6 hours for pain  Please take narcotic pain medication as needed for severe pain   Do not drive if taking narcotic pain medication   You may resume all home medications    If surgical glue is over your incisions, this will peel off on its own  Please do not remove glue prematurely   You may shower but do not scrub or submerge incisions    Please notify general surgery office if you experience:  Fever over 101.5F  Persistent nausea or vomiting  Severe uncontrolled pain  Redness, tenderness, or signs of infection near incisions (pain, swelling, redness, yellow/green drainage)  Active or persistent bleeding from incisions  Chest pain, shortness of breath     You have a follow up appointment scheduled 6/17/24 in the general surgery office, please keep this appointment.  Please call our office with any additional questions or concerns

## 2024-05-31 NOTE — PLAN OF CARE
Problem: PAIN - ADULT  Goal: Verbalizes/displays adequate comfort level or baseline comfort level  Description: Interventions:  - Encourage patient to monitor pain and request assistance  - Assess pain using appropriate pain scale  - Administer analgesics based on type and severity of pain and evaluate response  - Implement non-pharmacological measures as appropriate and evaluate response  - Consider cultural and social influences on pain and pain management  - Notify physician/advanced practitioner if interventions unsuccessful or patient reports new pain  Outcome: Progressing     Problem: INFECTION - ADULT  Goal: Absence or prevention of progression during hospitalization  Description: INTERVENTIONS:  - Assess and monitor for signs and symptoms of infection  - Monitor lab/diagnostic results  - Monitor all insertion sites, i.e. indwelling lines, tubes, and drains  - Monitor endotracheal if appropriate and nasal secretions for changes in amount and color  - [place] appropriate cooling/warming therapies per order  - Administer medications as ordered  - Instruct and encourage patient and family to use good hand hygiene technique  - Identify and instruct in appropriate isolation precautions for identified infection/condition  Outcome: Progressing     Problem: DISCHARGE PLANNING  Goal: Discharge to home or other facility with appropriate resources  Description: INTERVENTIONS:  - Identify barriers to discharge w/patient and caregiver  - Arrange for needed discharge resources and transportation as appropriate  - Identify discharge learning needs (meds, wound care, etc.)  - Arrange for interpretive services to assist at discharge as needed  - Refer to Case Management Department for coordinating discharge planning if the patient needs post-hospital services based on physician/advanced practitioner order or complex needs related to functional status, cognitive ability, or social support system  Outcome: Progressing      Problem: Knowledge Deficit  Goal: Patient/family/caregiver demonstrates understanding of disease process, treatment plan, medications, and discharge instructions  Description: Complete learning assessment and assess knowledge base.  Interventions:  - Provide teaching at level of understanding  - Provide teaching via preferred learning methods  Outcome: Progressing     Problem: METABOLIC, FLUID AND ELECTROLYTES - ADULT  Goal: Electrolytes maintained within normal limits  Description: INTERVENTIONS:  - Monitor labs and assess patient for signs and symptoms of electrolyte imbalances  - Administer electrolyte replacement as ordered  - Monitor response to electrolyte replacements, including repeat lab results as appropriate  - Instruct patient on fluid and nutrition as appropriate  Outcome: Progressing  Goal: Fluid balance maintained  Description: INTERVENTIONS:  - Monitor labs   - Monitor I/O and WT  - Instruct patient on fluid and nutrition as appropriate  - Assess for signs & symptoms of volume excess or deficit  Outcome: Progressing     Problem: HEMATOLOGIC - ADULT  Goal: Maintains hematologic stability  Description: INTERVENTIONS  - Assess for signs and symptoms of bleeding or hemorrhage  - Monitor labs  - Administer supportive blood products/factors as ordered and appropriate  Outcome: Progressing

## 2024-06-01 LAB
ANION GAP SERPL CALCULATED.3IONS-SCNC: 5 MMOL/L (ref 4–13)
BASOPHILS # BLD AUTO: 0.02 THOUSANDS/ÂΜL (ref 0–0.1)
BASOPHILS NFR BLD AUTO: 0 % (ref 0–1)
BUN SERPL-MCNC: 21 MG/DL (ref 5–25)
CALCIUM SERPL-MCNC: 8.4 MG/DL (ref 8.4–10.2)
CHLORIDE SERPL-SCNC: 104 MMOL/L (ref 96–108)
CO2 SERPL-SCNC: 28 MMOL/L (ref 21–32)
CREAT SERPL-MCNC: 1.14 MG/DL (ref 0.6–1.3)
EOSINOPHIL # BLD AUTO: 0.11 THOUSAND/ÂΜL (ref 0–0.61)
EOSINOPHIL NFR BLD AUTO: 2 % (ref 0–6)
ERYTHROCYTE [DISTWIDTH] IN BLOOD BY AUTOMATED COUNT: 15 % (ref 11.6–15.1)
GFR SERPL CREATININE-BSD FRML MDRD: 62 ML/MIN/1.73SQ M
GLUCOSE SERPL-MCNC: 119 MG/DL (ref 65–140)
HCT VFR BLD AUTO: 34.9 % (ref 36.5–49.3)
HGB BLD-MCNC: 11.1 G/DL (ref 12–17)
IMM GRANULOCYTES # BLD AUTO: 0.02 THOUSAND/UL (ref 0–0.2)
IMM GRANULOCYTES NFR BLD AUTO: 0 % (ref 0–2)
LYMPHOCYTES # BLD AUTO: 1.04 THOUSANDS/ÂΜL (ref 0.6–4.47)
LYMPHOCYTES NFR BLD AUTO: 19 % (ref 14–44)
MCH RBC QN AUTO: 31.5 PG (ref 26.8–34.3)
MCHC RBC AUTO-ENTMCNC: 31.8 G/DL (ref 31.4–37.4)
MCV RBC AUTO: 99 FL (ref 82–98)
MONOCYTES # BLD AUTO: 0.66 THOUSAND/ÂΜL (ref 0.17–1.22)
MONOCYTES NFR BLD AUTO: 12 % (ref 4–12)
NEUTROPHILS # BLD AUTO: 3.6 THOUSANDS/ÂΜL (ref 1.85–7.62)
NEUTS SEG NFR BLD AUTO: 67 % (ref 43–75)
NRBC BLD AUTO-RTO: 0 /100 WBCS
PLATELET # BLD AUTO: 154 THOUSANDS/UL (ref 149–390)
PMV BLD AUTO: 9.4 FL (ref 8.9–12.7)
POTASSIUM SERPL-SCNC: 4.2 MMOL/L (ref 3.5–5.3)
RBC # BLD AUTO: 3.52 MILLION/UL (ref 3.88–5.62)
SODIUM SERPL-SCNC: 137 MMOL/L (ref 135–147)
WBC # BLD AUTO: 5.45 THOUSAND/UL (ref 4.31–10.16)

## 2024-06-01 PROCEDURE — 85025 COMPLETE CBC W/AUTO DIFF WBC: CPT

## 2024-06-01 PROCEDURE — 80048 BASIC METABOLIC PNL TOTAL CA: CPT

## 2024-06-01 RX ORDER — DOCUSATE SODIUM 100 MG/1
100 CAPSULE, LIQUID FILLED ORAL 2 TIMES DAILY
Status: DISCONTINUED | OUTPATIENT
Start: 2024-06-01 | End: 2024-06-02 | Stop reason: HOSPADM

## 2024-06-01 RX ORDER — HEPARIN SODIUM 5000 [USP'U]/ML
5000 INJECTION, SOLUTION INTRAVENOUS; SUBCUTANEOUS EVERY 8 HOURS SCHEDULED
Status: DISCONTINUED | OUTPATIENT
Start: 2024-06-01 | End: 2024-06-02 | Stop reason: HOSPADM

## 2024-06-01 RX ADMIN — DOCUSATE SODIUM 100 MG: 100 CAPSULE, LIQUID FILLED ORAL at 08:57

## 2024-06-01 RX ADMIN — ZOLPIDEM TARTRATE 5 MG: 5 TABLET ORAL at 21:13

## 2024-06-01 RX ADMIN — TAMSULOSIN HYDROCHLORIDE 0.4 MG: 0.4 CAPSULE ORAL at 15:40

## 2024-06-01 RX ADMIN — FINASTERIDE 5 MG: 5 TABLET, FILM COATED ORAL at 08:57

## 2024-06-01 RX ADMIN — HEPARIN SODIUM 5000 UNITS: 5000 INJECTION, SOLUTION INTRAVENOUS; SUBCUTANEOUS at 09:02

## 2024-06-01 RX ADMIN — HEPARIN SODIUM 5000 UNITS: 5000 INJECTION, SOLUTION INTRAVENOUS; SUBCUTANEOUS at 21:13

## 2024-06-01 RX ADMIN — DILTIAZEM HYDROCHLORIDE 60 MG: 60 TABLET ORAL at 17:04

## 2024-06-01 RX ADMIN — DILTIAZEM HYDROCHLORIDE 60 MG: 60 TABLET ORAL at 08:57

## 2024-06-01 RX ADMIN — SODIUM CHLORIDE, SODIUM LACTATE, POTASSIUM CHLORIDE, AND CALCIUM CHLORIDE 50 ML/HR: .6; .31; .03; .02 INJECTION, SOLUTION INTRAVENOUS at 09:00

## 2024-06-01 RX ADMIN — HEPARIN SODIUM 5000 UNITS: 5000 INJECTION, SOLUTION INTRAVENOUS; SUBCUTANEOUS at 15:40

## 2024-06-01 RX ADMIN — DOCUSATE SODIUM 100 MG: 100 CAPSULE, LIQUID FILLED ORAL at 17:04

## 2024-06-01 RX ADMIN — AMIODARONE HYDROCHLORIDE 200 MG: 100 TABLET ORAL at 08:57

## 2024-06-01 NOTE — PLAN OF CARE
Problem: PAIN - ADULT  Goal: Verbalizes/displays adequate comfort level or baseline comfort level  Description: Interventions:  - Encourage patient to monitor pain and request assistance  - Assess pain using appropriate pain scale  - Administer analgesics based on type and severity of pain and evaluate response  - Implement non-pharmacological measures as appropriate and evaluate response  - Consider cultural and social influences on pain and pain management  - Notify physician/advanced practitioner if interventions unsuccessful or patient reports new pain  Outcome: Progressing     Problem: INFECTION - ADULT  Goal: Absence or prevention of progression during hospitalization  Description: INTERVENTIONS:  - Assess and monitor for signs and symptoms of infection  - Monitor lab/diagnostic results  - Monitor all insertion sites, i.e. indwelling lines, tubes, and drains  - Monitor endotracheal if appropriate and nasal secretions for changes in amount and color  - [place] appropriate cooling/warming therapies per order  - Administer medications as ordered  - Instruct and encourage patient and family to use good hand hygiene technique  - Identify and instruct in appropriate isolation precautions for identified infection/condition  Outcome: Progressing     Problem: DISCHARGE PLANNING  Goal: Discharge to home or other facility with appropriate resources  Description: INTERVENTIONS:  - Identify barriers to discharge w/patient and caregiver  - Arrange for needed discharge resources and transportation as appropriate  - Identify discharge learning needs (meds, wound care, etc.)  - Arrange for interpretive services to assist at discharge as needed  - Refer to Case Management Department for coordinating discharge planning if the patient needs post-hospital services based on physician/advanced practitioner order or complex needs related to functional status, cognitive ability, or social support system  Outcome: Progressing      Problem: Knowledge Deficit  Goal: Patient/family/caregiver demonstrates understanding of disease process, treatment plan, medications, and discharge instructions  Description: Complete learning assessment and assess knowledge base.  Interventions:  - Provide teaching at level of understanding  - Provide teaching via preferred learning methods  Outcome: Progressing     Problem: METABOLIC, FLUID AND ELECTROLYTES - ADULT  Goal: Electrolytes maintained within normal limits  Description: INTERVENTIONS:  - Monitor labs and assess patient for signs and symptoms of electrolyte imbalances  - Administer electrolyte replacement as ordered  - Monitor response to electrolyte replacements, including repeat lab results as appropriate  - Instruct patient on fluid and nutrition as appropriate  Outcome: Progressing  Goal: Fluid balance maintained  Description: INTERVENTIONS:  - Monitor labs   - Monitor I/O and WT  - Instruct patient on fluid and nutrition as appropriate  - Assess for signs & symptoms of volume excess or deficit  Outcome: Progressing     Problem: HEMATOLOGIC - ADULT  Goal: Maintains hematologic stability  Description: INTERVENTIONS  - Assess for signs and symptoms of bleeding or hemorrhage  - Monitor labs  - Administer supportive blood products/factors as ordered and appropriate  Outcome: Progressing     Problem: Prexisting or High Potential for Compromised Skin Integrity  Goal: Skin integrity is maintained or improved  Description: INTERVENTIONS:  - Identify patients at risk for skin breakdown  - Assess and monitor skin integrity  - Assess and monitor nutrition and hydration status  - Monitor labs   - Assess for incontinence   - Turn and reposition patient  - Assist with mobility/ambulation  - Relieve pressure over bony prominences  - Avoid friction and shearing  - Provide appropriate hygiene as needed including keeping skin clean and dry  - Evaluate need for skin moisturizer/barrier cream  - Collaborate with  interdisciplinary team   - Patient/family teaching  - Consider wound care consult   Outcome: Progressing

## 2024-06-01 NOTE — PLAN OF CARE
Problem: PAIN - ADULT  Goal: Verbalizes/displays adequate comfort level or baseline comfort level  Description: Interventions:  - Encourage patient to monitor pain and request assistance  - Assess pain using appropriate pain scale  - Administer analgesics based on type and severity of pain and evaluate response  - Implement non-pharmacological measures as appropriate and evaluate response  - Consider cultural and social influences on pain and pain management  - Notify physician/advanced practitioner if interventions unsuccessful or patient reports new pain  6/1/2024 1944 by Destinee Rodriguez RN  Outcome: Progressing  6/1/2024 1943 by Destinee Rodriguez RN  Outcome: Progressing     Problem: INFECTION - ADULT  Goal: Absence or prevention of progression during hospitalization  Description: INTERVENTIONS:  - Assess and monitor for signs and symptoms of infection  - Monitor lab/diagnostic results  - Monitor all insertion sites, i.e. indwelling lines, tubes, and drains  - Monitor endotracheal if appropriate and nasal secretions for changes in amount and color  - [place] appropriate cooling/warming therapies per order  - Administer medications as ordered  - Instruct and encourage patient and family to use good hand hygiene technique  - Identify and instruct in appropriate isolation precautions for identified infection/condition  6/1/2024 1944 by Destinee Rodriguez RN  Outcome: Progressing  6/1/2024 1943 by Destinee Rodriguez RN  Outcome: Progressing     Problem: DISCHARGE PLANNING  Goal: Discharge to home or other facility with appropriate resources  Description: INTERVENTIONS:  - Identify barriers to discharge w/patient and caregiver  - Arrange for needed discharge resources and transportation as appropriate  - Identify discharge learning needs (meds, wound care, etc.)  - Arrange for interpretive services to assist at discharge as needed  - Refer to Case Management Department for coordinating discharge planning if the patient needs  post-hospital services based on physician/advanced practitioner order or complex needs related to functional status, cognitive ability, or social support system  6/1/2024 1944 by Destinee Rodriguez RN  Outcome: Progressing  6/1/2024 1943 by Destinee Rodriguez RN  Outcome: Progressing     Problem: Knowledge Deficit  Goal: Patient/family/caregiver demonstrates understanding of disease process, treatment plan, medications, and discharge instructions  Description: Complete learning assessment and assess knowledge base.  Interventions:  - Provide teaching at level of understanding  - Provide teaching via preferred learning methods  6/1/2024 1944 by Destinee Rodriguez RN  Outcome: Progressing  6/1/2024 1943 by Destinee Rodriguez RN  Outcome: Progressing     Problem: METABOLIC, FLUID AND ELECTROLYTES - ADULT  Goal: Electrolytes maintained within normal limits  Description: INTERVENTIONS:  - Monitor labs and assess patient for signs and symptoms of electrolyte imbalances  - Administer electrolyte replacement as ordered  - Monitor response to electrolyte replacements, including repeat lab results as appropriate  - Instruct patient on fluid and nutrition as appropriate  6/1/2024 1944 by Destinee Rodriguez RN  Outcome: Progressing  6/1/2024 1943 by Destinee Rodriguez RN  Outcome: Progressing  Goal: Fluid balance maintained  Description: INTERVENTIONS:  - Monitor labs   - Monitor I/O and WT  - Instruct patient on fluid and nutrition as appropriate  - Assess for signs & symptoms of volume excess or deficit  6/1/2024 1944 by Destinee Rodriguez RN  Outcome: Progressing  6/1/2024 1943 by Destinee Rodriguez RN  Outcome: Progressing     Problem: HEMATOLOGIC - ADULT  Goal: Maintains hematologic stability  Description: INTERVENTIONS  - Assess for signs and symptoms of bleeding or hemorrhage  - Monitor labs  - Administer supportive blood products/factors as ordered and appropriate  6/1/2024 1944 by Destinee Rodriguez RN  Outcome: Progressing  6/1/2024 1943 by Destinee BEDOYA  Michael RN  Outcome: Progressing     Problem: Prexisting or High Potential for Compromised Skin Integrity  Goal: Skin integrity is maintained or improved  Description: INTERVENTIONS:  - Identify patients at risk for skin breakdown  - Assess and monitor skin integrity  - Assess and monitor nutrition and hydration status  - Monitor labs   - Assess for incontinence   - Turn and reposition patient  - Assist with mobility/ambulation  - Relieve pressure over bony prominences  - Avoid friction and shearing  - Provide appropriate hygiene as needed including keeping skin clean and dry  - Evaluate need for skin moisturizer/barrier cream  - Collaborate with interdisciplinary team   - Patient/family teaching  - Consider wound care consult   6/1/2024 1944 by Destinee Rodriguez, RN  Outcome: Progressing  6/1/2024 1943 by Destinee Rodriguez, RN  Outcome: Progressing

## 2024-06-01 NOTE — PLAN OF CARE
Problem: PAIN - ADULT  Goal: Verbalizes/displays adequate comfort level or baseline comfort level  Description: Interventions:  - Encourage patient to monitor pain and request assistance  - Assess pain using appropriate pain scale  - Administer analgesics based on type and severity of pain and evaluate response  - Implement non-pharmacological measures as appropriate and evaluate response  - Consider cultural and social influences on pain and pain management  - Notify physician/advanced practitioner if interventions unsuccessful or patient reports new pain  Outcome: Progressing     Problem: INFECTION - ADULT  Goal: Absence or prevention of progression during hospitalization  Description: INTERVENTIONS:  - Assess and monitor for signs and symptoms of infection  - Monitor lab/diagnostic results  - Monitor all insertion sites, i.e. indwelling lines, tubes, and drains  - Monitor endotracheal if appropriate and nasal secretions for changes in amount and color  - [place] appropriate cooling/warming therapies per order  - Administer medications as ordered  - Instruct and encourage patient and family to use good hand hygiene technique  - Identify and instruct in appropriate isolation precautions for identified infection/condition  Outcome: Progressing     Problem: DISCHARGE PLANNING  Goal: Discharge to home or other facility with appropriate resources  Description: INTERVENTIONS:  - Identify barriers to discharge w/patient and caregiver  - Arrange for needed discharge resources and transportation as appropriate  - Identify discharge learning needs (meds, wound care, etc.)  - Arrange for interpretive services to assist at discharge as needed  - Refer to Case Management Department for coordinating discharge planning if the patient needs post-hospital services based on physician/advanced practitioner order or complex needs related to functional status, cognitive ability, or social support system  Outcome: Progressing      Problem: Knowledge Deficit  Goal: Patient/family/caregiver demonstrates understanding of disease process, treatment plan, medications, and discharge instructions  Description: Complete learning assessment and assess knowledge base.  Interventions:  - Provide teaching at level of understanding  - Provide teaching via preferred learning methods  Outcome: Progressing     Problem: METABOLIC, FLUID AND ELECTROLYTES - ADULT  Goal: Electrolytes maintained within normal limits  Description: INTERVENTIONS:  - Monitor labs and assess patient for signs and symptoms of electrolyte imbalances  - Administer electrolyte replacement as ordered  - Monitor response to electrolyte replacements, including repeat lab results as appropriate  - Instruct patient on fluid and nutrition as appropriate  Outcome: Progressing  Goal: Fluid balance maintained  Description: INTERVENTIONS:  - Monitor labs   - Monitor I/O and WT  - Instruct patient on fluid and nutrition as appropriate  - Assess for signs & symptoms of volume excess or deficit  Outcome: Progressing     Problem: HEMATOLOGIC - ADULT  Goal: Maintains hematologic stability  Description: INTERVENTIONS  - Assess for signs and symptoms of bleeding or hemorrhage  - Monitor labs  - Administer supportive blood products/factors as ordered and appropriate  Outcome: Progressing      Surgical Defect Length In Cm (Optional): 1.8

## 2024-06-01 NOTE — ASSESSMENT & PLAN NOTE
Assessment:  Recurrent incisional hernia, postop day 1 s/p laparoscopic repair.    Plan:  Persistent pain, poor appetite, and constipation reported this morning.  On exam abdomen is round, soft, with moderate generalized tenderness.  Vital signs normalized, hemoglobin 11.1.  Will continue IV fluids at low rate in light of his poor oral intake, start bowel regimen with twice daily Colace, consider MiraLAX if not fully effective.  Continue current pain management regimen.  Encouraged to ambulate and get out of bed at least twice daily today. Consider reducing diet for N/V. Initiate VTE prophylaxis with heparin.  Questions answered, agreeable to the plan.

## 2024-06-01 NOTE — PROGRESS NOTES
"[facility]  Progress Note  Name: Juan Amato I  MRN: [id number]  Unit/Bed#: -01 I Date of Admission: 5/31/2024   Date of Service: 6/1/2024 I Hospital Day: 0    Assessment & Plan   * Recurrent incisional hernia  Assessment & Plan  Assessment:  Recurrent incisional hernia, postop day 1 s/p laparoscopic repair.    Plan:  Persistent pain, poor appetite, and constipation reported this morning.  On exam abdomen is round, soft, with moderate generalized tenderness.  Vital signs normalized, hemoglobin 11.1.  Will continue IV fluids at low rate in light of his poor oral intake, start bowel regimen with twice daily Colace, consider MiraLAX if not fully effective.  Continue current pain management regimen.  Encouraged to ambulate and get out of bed at least twice daily today. Consider reducing diet for N/V. Initiate VTE prophylaxis with heparin.  Questions answered, agreeable to the plan.             Subjective/Objective   Chief Complaint: constipation    Subjective: feels constipated, no BM x 3 days, small amount of flatus, reduced appetite without N/V, minimally active due to pain, no fever/chills    Objective:     Blood pressure 129/80, pulse 77, temperature 98.4 °F (36.9 °C), temperature source Oral, resp. rate 16, height 5' 9\" (1.753 m), weight 93.4 kg (206 lb), SpO2 96%.,Body mass index is 30.42 kg/m².      Intake/Output Summary (Last 24 hours) at 6/1/2024 0855  Last data filed at 6/1/2024 0621  Gross per 24 hour   Intake 1420 ml   Output 1425 ml   Net -5 ml       Invasive Devices       Central Venous Catheter Line  Duration             Port A Cath 04/30/20 Right Chest 1492 days              Peripheral Intravenous Line  Duration             Peripheral IV 05/31/24 Dorsal (posterior);Right Hand 1 day              Drain  Duration             Ureteral Drain/Stent Left ureter 4.7 Fr. 1596 days    NG/OG/Enteral Tube Orogastric 18 Fr Center mouth 295 days                    Physical " Exam  Vitals and nursing note reviewed.   Constitutional:       General: He is not in acute distress.     Appearance: He is well-developed. He is not diaphoretic.   HENT:      Head: Normocephalic and atraumatic.   Eyes:      Extraocular Movements: EOM normal.      Conjunctiva/sclera: Conjunctivae normal.      Pupils: Pupils are equal, round, and reactive to light.   Pulmonary:      Effort: No respiratory distress.   Abdominal:      Comments: Round and mildly distended, soft and compressible but with generalized moderate tenderness to palpation.  No guarding or rigidity.  Incisions clean dry and intact with skin glue.   Musculoskeletal:         General: Normal range of motion.      Cervical back: Normal range of motion.   Skin:     General: Skin is warm and dry.      Capillary Refill: Capillary refill takes less than 2 seconds.   Neurological:      Mental Status: He is alert and oriented to person, place, and time.   Psychiatric:         Mood and Affect: Mood and affect normal.         Behavior: Behavior normal.           Lab, Imaging and other studies:I have personally reviewed pertinent lab results.  , CBC:   Lab Results   Component Value Date    WBC 5.45 06/01/2024    HGB 11.1 (L) 06/01/2024    HCT 34.9 (L) 06/01/2024    MCV 99 (H) 06/01/2024     06/01/2024    RBC 3.52 (L) 06/01/2024    MCH 31.5 06/01/2024    MCHC 31.8 06/01/2024    RDW 15.0 06/01/2024    MPV 9.4 06/01/2024    NRBC 0 06/01/2024   , CMP:   Lab Results   Component Value Date    SODIUM 137 06/01/2024    K 4.2 06/01/2024     06/01/2024    CO2 28 06/01/2024    BUN 21 06/01/2024    CREATININE 1.14 06/01/2024    CALCIUM 8.4 06/01/2024    EGFR 62 06/01/2024     VTE Pharmacologic Prophylaxis: Heparin  VTE Mechanical Prophylaxis: sequential compression device

## 2024-06-02 VITALS
WEIGHT: 206 LBS | SYSTOLIC BLOOD PRESSURE: 135 MMHG | HEART RATE: 59 BPM | OXYGEN SATURATION: 95 % | HEIGHT: 69 IN | TEMPERATURE: 98.3 F | RESPIRATION RATE: 14 BRPM | DIASTOLIC BLOOD PRESSURE: 74 MMHG | BODY MASS INDEX: 30.51 KG/M2

## 2024-06-02 LAB
ANION GAP SERPL CALCULATED.3IONS-SCNC: 5 MMOL/L (ref 4–13)
BUN SERPL-MCNC: 21 MG/DL (ref 5–25)
CALCIUM SERPL-MCNC: 8.9 MG/DL (ref 8.4–10.2)
CHLORIDE SERPL-SCNC: 104 MMOL/L (ref 96–108)
CO2 SERPL-SCNC: 31 MMOL/L (ref 21–32)
CREAT SERPL-MCNC: 1.37 MG/DL (ref 0.6–1.3)
ERYTHROCYTE [DISTWIDTH] IN BLOOD BY AUTOMATED COUNT: 14.7 % (ref 11.6–15.1)
GFR SERPL CREATININE-BSD FRML MDRD: 50 ML/MIN/1.73SQ M
GLUCOSE SERPL-MCNC: 98 MG/DL (ref 65–140)
HCT VFR BLD AUTO: 36.2 % (ref 36.5–49.3)
HGB BLD-MCNC: 11.4 G/DL (ref 12–17)
MCH RBC QN AUTO: 31.2 PG (ref 26.8–34.3)
MCHC RBC AUTO-ENTMCNC: 31.5 G/DL (ref 31.4–37.4)
MCV RBC AUTO: 99 FL (ref 82–98)
PLATELET # BLD AUTO: 148 THOUSANDS/UL (ref 149–390)
PMV BLD AUTO: 9.5 FL (ref 8.9–12.7)
POTASSIUM SERPL-SCNC: 4.3 MMOL/L (ref 3.5–5.3)
RBC # BLD AUTO: 3.65 MILLION/UL (ref 3.88–5.62)
SODIUM SERPL-SCNC: 140 MMOL/L (ref 135–147)
WBC # BLD AUTO: 5.94 THOUSAND/UL (ref 4.31–10.16)

## 2024-06-02 PROCEDURE — 80048 BASIC METABOLIC PNL TOTAL CA: CPT | Performed by: PHYSICIAN ASSISTANT

## 2024-06-02 PROCEDURE — 85027 COMPLETE CBC AUTOMATED: CPT | Performed by: PHYSICIAN ASSISTANT

## 2024-06-02 RX ADMIN — SODIUM CHLORIDE, SODIUM LACTATE, POTASSIUM CHLORIDE, AND CALCIUM CHLORIDE 50 ML/HR: .6; .31; .03; .02 INJECTION, SOLUTION INTRAVENOUS at 03:08

## 2024-06-02 RX ADMIN — AMIODARONE HYDROCHLORIDE 200 MG: 100 TABLET ORAL at 09:16

## 2024-06-02 RX ADMIN — FINASTERIDE 5 MG: 5 TABLET, FILM COATED ORAL at 09:16

## 2024-06-02 RX ADMIN — HEPARIN SODIUM 5000 UNITS: 5000 INJECTION, SOLUTION INTRAVENOUS; SUBCUTANEOUS at 05:54

## 2024-06-02 RX ADMIN — DOCUSATE SODIUM 100 MG: 100 CAPSULE, LIQUID FILLED ORAL at 09:16

## 2024-06-02 RX ADMIN — DILTIAZEM HYDROCHLORIDE 60 MG: 60 TABLET ORAL at 09:16

## 2024-06-02 NOTE — NURSING NOTE
Discharge instructions reviewed with patient and patient's spouse.    Patient discharged home with spouse.

## 2024-06-02 NOTE — PLAN OF CARE
Problem: PAIN - ADULT  Goal: Verbalizes/displays adequate comfort level or baseline comfort level  Description: Interventions:  - Encourage patient to monitor pain and request assistance  - Assess pain using appropriate pain scale  - Administer analgesics based on type and severity of pain and evaluate response  - Implement non-pharmacological measures as appropriate and evaluate response  - Consider cultural and social influences on pain and pain management  - Notify physician/advanced practitioner if interventions unsuccessful or patient reports new pain  6/2/2024 1118 by Vaishnavi Arriola RN  Outcome: Adequate for Discharge  6/2/2024 0943 by Vaishnavi Arriola RN  Outcome: Progressing     Problem: INFECTION - ADULT  Goal: Absence or prevention of progression during hospitalization  Description: INTERVENTIONS:  - Assess and monitor for signs and symptoms of infection  - Monitor lab/diagnostic results  - Monitor all insertion sites, i.e. indwelling lines, tubes, and drains  - Monitor endotracheal if appropriate and nasal secretions for changes in amount and color  - [place] appropriate cooling/warming therapies per order  - Administer medications as ordered  - Instruct and encourage patient and family to use good hand hygiene technique  - Identify and instruct in appropriate isolation precautions for identified infection/condition  6/2/2024 1118 by Vaishnavi Arriola RN  Outcome: Adequate for Discharge  6/2/2024 0943 by Vaishnavi Arriola RN  Outcome: Progressing     Problem: DISCHARGE PLANNING  Goal: Discharge to home or other facility with appropriate resources  Description: INTERVENTIONS:  - Identify barriers to discharge w/patient and caregiver  - Arrange for needed discharge resources and transportation as appropriate  - Identify discharge learning needs (meds, wound care, etc.)  - Arrange for interpretive services to assist at discharge as needed  - Refer to Case Management Department for coordinating discharge planning if the  patient needs post-hospital services based on physician/advanced practitioner order or complex needs related to functional status, cognitive ability, or social support system  6/2/2024 1118 by Vaishnavi Arriola RN  Outcome: Adequate for Discharge  6/2/2024 0943 by Vaishnavi Arriola RN  Outcome: Progressing     Problem: Knowledge Deficit  Goal: Patient/family/caregiver demonstrates understanding of disease process, treatment plan, medications, and discharge instructions  Description: Complete learning assessment and assess knowledge base.  Interventions:  - Provide teaching at level of understanding  - Provide teaching via preferred learning methods  6/2/2024 1118 by Vaishnavi Arriola RN  Outcome: Adequate for Discharge  6/2/2024 0943 by Vaishnavi Arriola RN  Outcome: Progressing     Problem: METABOLIC, FLUID AND ELECTROLYTES - ADULT  Goal: Electrolytes maintained within normal limits  Description: INTERVENTIONS:  - Monitor labs and assess patient for signs and symptoms of electrolyte imbalances  - Administer electrolyte replacement as ordered  - Monitor response to electrolyte replacements, including repeat lab results as appropriate  - Instruct patient on fluid and nutrition as appropriate  6/2/2024 1118 by Vaishnavi Arriola RN  Outcome: Adequate for Discharge  6/2/2024 0943 by Vaishnavi Arriola RN  Outcome: Progressing  Goal: Fluid balance maintained  Description: INTERVENTIONS:  - Monitor labs   - Monitor I/O and WT  - Instruct patient on fluid and nutrition as appropriate  - Assess for signs & symptoms of volume excess or deficit  6/2/2024 1118 by Vaishnavi Arriola RN  Outcome: Adequate for Discharge  6/2/2024 0943 by Vaishnavi Arriola RN  Outcome: Progressing     Problem: HEMATOLOGIC - ADULT  Goal: Maintains hematologic stability  Description: INTERVENTIONS  - Assess for signs and symptoms of bleeding or hemorrhage  - Monitor labs  - Administer supportive blood products/factors as ordered and appropriate  6/2/2024 1118 by Vaishnavi Arriola RN  Outcome: Adequate  for Discharge  6/2/2024 0943 by Vaishnavi Arriola RN  Outcome: Progressing     Problem: Prexisting or High Potential for Compromised Skin Integrity  Goal: Skin integrity is maintained or improved  Description: INTERVENTIONS:  - Identify patients at risk for skin breakdown  - Assess and monitor skin integrity  - Assess and monitor nutrition and hydration status  - Monitor labs   - Assess for incontinence   - Turn and reposition patient  - Assist with mobility/ambulation  - Relieve pressure over bony prominences  - Avoid friction and shearing  - Provide appropriate hygiene as needed including keeping skin clean and dry  - Evaluate need for skin moisturizer/barrier cream  - Collaborate with interdisciplinary team   - Patient/family teaching  - Consider wound care consult   6/2/2024 1118 by Vaishnavi Arriola RN  Outcome: Adequate for Discharge  6/2/2024 0943 by Vaishnavi Arriola RN  Outcome: Progressing

## 2024-06-02 NOTE — PLAN OF CARE
Problem: PAIN - ADULT  Goal: Verbalizes/displays adequate comfort level or baseline comfort level  Description: Interventions:  - Encourage patient to monitor pain and request assistance  - Assess pain using appropriate pain scale  - Administer analgesics based on type and severity of pain and evaluate response  - Implement non-pharmacological measures as appropriate and evaluate response  - Consider cultural and social influences on pain and pain management  - Notify physician/advanced practitioner if interventions unsuccessful or patient reports new pain  Outcome: Progressing     Problem: INFECTION - ADULT  Goal: Absence or prevention of progression during hospitalization  Description: INTERVENTIONS:  - Assess and monitor for signs and symptoms of infection  - Monitor lab/diagnostic results  - Monitor all insertion sites, i.e. indwelling lines, tubes, and drains  - Monitor endotracheal if appropriate and nasal secretions for changes in amount and color  - [place] appropriate cooling/warming therapies per order  - Administer medications as ordered  - Instruct and encourage patient and family to use good hand hygiene technique  - Identify and instruct in appropriate isolation precautions for identified infection/condition  Outcome: Progressing     Problem: DISCHARGE PLANNING  Goal: Discharge to home or other facility with appropriate resources  Description: INTERVENTIONS:  - Identify barriers to discharge w/patient and caregiver  - Arrange for needed discharge resources and transportation as appropriate  - Identify discharge learning needs (meds, wound care, etc.)  - Arrange for interpretive services to assist at discharge as needed  - Refer to Case Management Department for coordinating discharge planning if the patient needs post-hospital services based on physician/advanced practitioner order or complex needs related to functional status, cognitive ability, or social support system  Outcome: Progressing      Problem: Knowledge Deficit  Goal: Patient/family/caregiver demonstrates understanding of disease process, treatment plan, medications, and discharge instructions  Description: Complete learning assessment and assess knowledge base.  Interventions:  - Provide teaching at level of understanding  - Provide teaching via preferred learning methods  Outcome: Progressing     Problem: METABOLIC, FLUID AND ELECTROLYTES - ADULT  Goal: Electrolytes maintained within normal limits  Description: INTERVENTIONS:  - Monitor labs and assess patient for signs and symptoms of electrolyte imbalances  - Administer electrolyte replacement as ordered  - Monitor response to electrolyte replacements, including repeat lab results as appropriate  - Instruct patient on fluid and nutrition as appropriate  Outcome: Progressing  Goal: Fluid balance maintained  Description: INTERVENTIONS:  - Monitor labs   - Monitor I/O and WT  - Instruct patient on fluid and nutrition as appropriate  - Assess for signs & symptoms of volume excess or deficit  Outcome: Progressing     Problem: HEMATOLOGIC - ADULT  Goal: Maintains hematologic stability  Description: INTERVENTIONS  - Assess for signs and symptoms of bleeding or hemorrhage  - Monitor labs  - Administer supportive blood products/factors as ordered and appropriate  Outcome: Progressing     Problem: Prexisting or High Potential for Compromised Skin Integrity  Goal: Skin integrity is maintained or improved  Description: INTERVENTIONS:  - Identify patients at risk for skin breakdown  - Assess and monitor skin integrity  - Assess and monitor nutrition and hydration status  - Monitor labs   - Assess for incontinence   - Turn and reposition patient  - Assist with mobility/ambulation  - Relieve pressure over bony prominences  - Avoid friction and shearing  - Provide appropriate hygiene as needed including keeping skin clean and dry  - Evaluate need for skin moisturizer/barrier cream  - Collaborate with  interdisciplinary team   - Patient/family teaching  - Consider wound care consult   Outcome: Progressing

## 2024-06-02 NOTE — DISCHARGE SUMMARY
[facility]  Discharge- Juan Amato 1950, 74 y.o. male MRN: [id number]  Unit/Bed#: -01 Encounter: [id number]  Primary Care Provider: Jonatan Plummer DO   Date and time admitted to hospital: 5/31/2024  6:33 AM    * Recurrent incisional hernia  Assessment & Plan  Assessment:  Recurrent incisional hernia, postop day 2 s/p laparoscopic repair.    Plan:  Clinically hemodynamically hematologically stable postop day 2 after laparoscopic ventral hernia repair with mesh.  Moved his bowels and pain and distention improved, eating normally and voiding his bladder.  Vital signs stable, blood work shows creatinine back up to baseline with his CKD.  Appears stable for discharge home and outpatient follow-up.              Medical Problems       Resolved Problems  Date Reviewed: 5/31/2024   None         Admission Date:   Admission Orders (From admission, onward)       Ordered        06/02/24 0807  INPATIENT ADMISSION  Once,   Status:  Canceled                            Admitting Diagnosis: Recurrent incisional hernia [K43.2]    HPI: 74-year-old male presenting for scheduled elective laparoscopic recurrent ventral hernia repair with Dr. Schmidt.    Procedures Performed: No orders of the defined types were placed in this encounter.      Summary of Hospital Course: Patient underwent procedure as planned by Dr. Schmidt.  Convalesced for 2 nights postoperatively due to pain and constipation.  On postop day 2 he had moved his bowels and was feeling much better and stable for discharge home and outpatient follow-up.    Significant Findings, Care, Treatment and Services Provided: Laparoscopic recurrent ventral hernia repair with mesh    Complications: None    Condition at Discharge: stable         Discharge instructions/Information to patient and family:   See after visit summary for information provided to patient and family.      Provisions for Follow-Up Care:  See after visit summary for  information related to follow-up care and any pertinent home health orders.      PCP: Jonatan Plummer,     Disposition: Home    Planned Readmission: No    Discharge Statement   I spent 15 minutes discharging the patient. This time was spent on the day of discharge. I had direct contact with the patient on the day of discharge. Additional documentation is required if more than 30 minutes were spent on discharge.     Discharge Medications:  See after visit summary for reconciled discharge medications provided to patient and family.

## 2024-06-02 NOTE — ASSESSMENT & PLAN NOTE
Assessment:  Recurrent incisional hernia, postop day 2 s/p laparoscopic repair.    Plan:  Clinically hemodynamically hematologically stable postop day 2 after laparoscopic ventral hernia repair with mesh.  Moved his bowels and pain and distention improved, eating normally and voiding his bladder.  Vital signs stable, blood work shows creatinine back up to baseline with his CKD.  Appears stable for discharge home and outpatient follow-up.

## 2024-06-02 NOTE — QUICK NOTE
Problem with EPIC not allowing patient discharge related to patient class, but also unable to change patient class. Have a ticket in to IT and awaiting phone call back.    In the meantime will allow patient to discharge home. I have printed off discharge instructions, return precautions, and contact information. His pain script was previously sent to the pharmacy. Will complete epic discharge as soon as able to.    Communicated plan with patients RN.

## 2024-06-17 ENCOUNTER — TELEPHONE (OUTPATIENT)
Dept: SURGERY | Facility: CLINIC | Age: 74
End: 2024-06-17

## 2024-06-17 ENCOUNTER — OFFICE VISIT (OUTPATIENT)
Dept: SURGERY | Facility: CLINIC | Age: 74
End: 2024-06-17

## 2024-06-17 VITALS
TEMPERATURE: 97.8 F | WEIGHT: 212 LBS | BODY MASS INDEX: 31.4 KG/M2 | SYSTOLIC BLOOD PRESSURE: 114 MMHG | RESPIRATION RATE: 16 BRPM | OXYGEN SATURATION: 94 % | HEIGHT: 69 IN | DIASTOLIC BLOOD PRESSURE: 60 MMHG | HEART RATE: 60 BPM

## 2024-06-17 DIAGNOSIS — K43.2 RECURRENT INCISIONAL HERNIA: Primary | ICD-10-CM

## 2024-06-17 PROCEDURE — 99024 POSTOP FOLLOW-UP VISIT: CPT | Performed by: SURGERY

## 2024-06-17 NOTE — PROGRESS NOTES
Assessment:  1. Chronic bilateral low back pain, unspecified whether sciatica present    2. Chronic pain syndrome    3. Uncomplicated opioid dependence (HCC)    4. Long-term current use of opiate analgesic    5. Lumbar radiculopathy    6. Chemotherapy-induced neuropathy (HCC)    7. Lumbar spondylosis    8. Sacroiliitis (HCC)    9. Spinal stenosis of lumbar region, unspecified whether neurogenic claudication present    10. Peripheral arterial disease (HCC)        Plan:  Patient will be scheduled for an L5-S1 LESI to address the inflammatory component of the patient's pain.  Complete risks and benefits including bleeding, infection, tissue reaction, nerve injury and allergic reaction were discussed. The patient was agreeable and verbalized an understanding  Patient may continue Norco 7.5/325 mg every 8 hours as needed pain.  The patient was given a 3 month supply of prescriptions with a Do Not Fill date(s) of today, July 17, 2024 and August 15,024    [place] Prescription Drug Monitoring Program report was reviewed and was appropriate     A urine drug screen was collected at today's office visit as part of our medication management protocol. The point of care testing results were appropriate for what was being prescribed. The specimen will be sent for confirmatory testing. The drug screen is medically necessary because the patient is either dependent on opioid medication or is being considered for opioid medication therapy and the results could impact ongoing or future treatment. The drug screen is to evaluate for the presences or absence of prescribed, non-prescribed, and/or illicit drugs/substances.    There are risks associated with opioid medications, including dependence, addiction and tolerance. The patient understands and agrees to use these medications only as prescribed. Potential side effects of the medications include, but are not limited to, constipation, drowsiness, addiction, impaired judgment and  risk of fatal overdose if not taken as prescribed. The patient was warned against driving while taking sedation medications.  Sharing medications is a felony. At this point in time, the patient is showing no signs of addiction, abuse, diversion or suicidal ideation.    Narcan RX up to date    3.  Continue home exercise program  4.  Follow-up in 3 months or sooner if needed      History of Present Illness:    The patient is a 74 y.o. male with a history of metastatic urothelial carcinoma status post nephrectomy and chemo induced neuropathy, A-fib and chronic pain syndrome last seen on 04/30/2024  who presents for a follow up office visit in regards to chroniclow back pain that will radiate bilaterally into the lower extremities.  Patient did have a bilateral L4 TFESI and lumbar radiofrequency ablation without much relief.  He is status post bilateral SI joint injections May 1, 2024 with relief for about a month.    The patient rates his pain a 3 out of 10 on the numeric pain rating scale.  Pain is intermittent in the morning and is described as dull aching.  He is managing his pain with Norco 7.5/325 mg every 8 hours as needed with 50% relief of his pain without side effects    Pain Contract Signed: 2/9/2024  Last Urine Drug Screen: 6/18/2024  BECKS/SOAPP 2/9/2024  Last hydrocodone per pt. 6/18/2024  Last Narcan: 2/9/2024      I have personally reviewed and/or updated the patient's past medical history, past surgical history, family history, social history, current medications, allergies, and vital signs today.       Review of Systems:    Review of Systems      Past Medical History:   Diagnosis Date    A-fib (HCC)     Arthritis     Bladder cancer     Cancer (HCC)     skin melanoma;basal cell    Chronic pain disorder     from arthritis    Colon polyp     Does use hearing aid     bilat    Dry eyes, bilateral     GERD (gastroesophageal reflux disease)     History of kidney stones 10/2019    History of partial knee  replacement     bilat    History of transfusion     History of vertigo     Hyperlipidemia     Hypertension     Infusion extravasation of chemotherapy vesicant 11/2021    Kidney lesion     Lumbar disc disorder     compression of vertebrae L4-5-6    Muscle weakness     left hip area    Renal mass     left    Right ankle injury 03/05/2020    missed step of ladder     Right ankle pain     Shortness of breath     with activity    Tinnitus     Urothelial cancer     left    Wears glasses        Past Surgical History:   Procedure Laterality Date    COLONOSCOPY      CYSTOSCOPY Left 04/01/2020    Procedure: CYSTOSCOPY; URETERAL CATHETER PLACEMENT;  Surgeon: Joe Cortes MD;  [facility];  Service: Urology    CYSTOSCOPY  05/11/2020    CYSTOSCOPY  06/04/2021    FL CYSTOGRAM  04/13/2020    FL RETROGRADE PYELOGRAM  01/17/2020    HERNIA REPAIR  10/25/2022    umbilical with mesh    INGUINAL HERNIA REPAIR Right     with mesh    IR PORT PLACEMENT  04/30/2020    JOINT REPLACEMENT Bilateral     partials knee    LUMBAR EPIDURAL INJECTION      MT CYSTO BLADDER W/URETERAL CATHETERIZATION Bilateral 01/17/2020    Procedure: CYSTOSCOPY; RIGHT RETROGRADE PYELOGRAM WITH RIGHT URETERAL CYTOLOGY SAMPLING; LEFT URETEROSCOPY WITH RENAL PELVIS BIOPSY AND LEFT STENT PLACEMENT;  Surgeon: Joe Cortes MD;  [facility];  Service: Urology    MT LAPAROSCOPY NEPHRECTOMY W/TOTAL URETERECTOMY Left 04/01/2020    Procedure: ROBOTIC LAPAROSCOPIC NEPHRO-URETERECTOMY;  Surgeon: Joe Cortes MD;  [facility];  Service: Urology    MT RPR AA HERNIA 1ST < 3 CM REDUCIBLE N/A 8/10/2023    Procedure: REPAIR HERNIA INCISIONAL LAPAROSCOPIC WITH MESH;  Surgeon: Gustavo Schmidt MD;  [facility];  Service: General    MT RPR AA HERNIA 1ST < 3 CM REDUCIBLE N/A 5/31/2024    Procedure: REPAIR HERNIA VENTRAL LAPAROSCOPIC WITH MESH;  Surgeon: Gustavo Schmidt MD;  [facility];  Service: General    SKIN  "CANCER EXCISION      surface melanoma    TONSILLECTOMY      WISDOM TOOTH EXTRACTION         Family History   Problem Relation Age of Onset    Liver cancer Father        Social History     Occupational History    Not on file   Tobacco Use    Smoking status: Former     Current packs/day: 0.00     Types: Cigarettes     Quit date:      Years since quittin.4    Smokeless tobacco: Never   Vaping Use    Vaping status: Never Used   Substance and Sexual Activity    Alcohol use: Not Currently     Comment: socially, rarely    Drug use: Never    Sexual activity: Yes     Partners: Female         Current Outpatient Medications:     amiodarone 200 mg tablet, Take 200 mg by mouth daily, Disp: , Rfl:     diltiazem (CARDIZEM) 60 mg tablet, 2 (two) times a day, Disp: , Rfl:     finasteride (PROSCAR) 5 mg tablet, Take 5 mg by mouth daily, Disp: , Rfl:     folic acid (FOLVITE) 1 mg tablet, Take 1 mg by mouth daily, Disp: , Rfl:     HYDROcodone-acetaminophen (Norco) 7.5-325 mg per tablet, Take 1 tablet by mouth every 8 (eight) hours as needed for pain Max Daily Amount: 3 tablets, Disp: 90 tablet, Rfl: 0    [START ON 2024] HYDROcodone-acetaminophen (Norco) 7.5-325 mg per tablet, Take 1 tablet by mouth every 8 (eight) hours as needed for pain Max Daily Amount: 3 tablets Do not start before 2024., Disp: 90 tablet, Rfl: 0    [START ON 8/15/2024] HYDROcodone-acetaminophen (Norco) 7.5-325 mg per tablet, Take 1 tablet by mouth every 8 (eight) hours as needed for pain Max Daily Amount: 3 tablets Do not start before August 15, 2024., Disp: 90 tablet, Rfl: 0    tamsulosin (FLOMAX) 0.4 mg, Take 1 capsule (0.4 mg total) by mouth daily with dinner, Disp: 30 capsule, Rfl: 12    zolpidem (AMBIEN) 5 mg tablet, Take 1 tablet (5 mg total) by mouth daily at bedtime as needed for sleep, Disp: 30 tablet, Rfl: 1    Allergies   Allergen Reactions    Poison Ivy Extract Rash       Physical Exam:    BP 92/62   Pulse 63   Ht 5' 9\" (1.753 " m)   Wt 94.8 kg (209 lb)   BMI 30.86 kg/m²     Constitutional:normal, well developed, well nourished, alert, in no distress and non-toxic and no overt pain behavior.  Eyes:anicteric  HEENT:grossly intact  Neck:supple, symmetric, trachea midline and no masses   Pulmonary:even and unlabored  Cardiovascular:No edema or pitting edema present  Skin:Normal without rashes or lesions and well hydrated  Psychiatric:Mood and affect appropriate  Neurologic:Cranial Nerves II-XII grossly intact  Musculoskeletal:antalgic and ambulates with cane      Imaging  FL spine and pain procedure    (Results Pending)         Orders Placed This Encounter   Procedures    FL spine and pain procedure    MM ALL_Prescribed Meds and Special Instructions    MM DT_Alprazolam Definitive Test    MM DT_Amphetamine Definitive Test    MM DT_Aripiprazole Definitive Test    MM DT_Bath Salts Definitive Test    MM DT_Buprenorphine Definitive Test    MM DT_Butalbital Definitive Test    MM DT_Clonazepam Definitive Test    MM DT_Clozapine Definitive Test    MM DT_Cocaine Definitive Test    MM DT_Codeine Definitive Test    MM DT_Desipramine Definitive Test    MM DT_Dextromethorphan Definitive Test    MM Diazepam Definitive Test    MM DT_Ethyl Glucuronide/Ethyl Sulfate Definitive Test    MM DT_Fentanyl Definitive Test    MM DT_Haloperidol Definitive Test    MM DT_Heroin Definitive Test    MM DT_Hydrocodone Definitive Test    MM DT_Hydromorphone Definitive Test    MM DT_Imipramine Definitive Test    MM DT_Kratom Definitive Test    MM DT_Levorphanol Definitive Test    MM Lorazepam Definitive Test    MM DT_MDMA Definitive Test    MM DT_Meperidine Definitive Test    MM DT_Methadone Definitive Test    MM DT_Methamphetamine Definitive Test    MM DT_Morphine Definitive Test    MM DT_Olanzapine Definitive Test    MM DT_Oxazepam Definitive Test    MM DT_Oxycodone Definitive Test    MM DT_Oxymorphone Definitive Test    MM DT_Phencyclidine Definitive Test    MM  DT_Phenobarbital Definitive Test    MM DT_Phentermine Definitive Test    MM DT_Quetiapine Definitive Test    MM DT_Risperidone Definitive Test    MM DT_Secobarbital Definitive Test    MM DT_Spice Definitive Test    MM DT_Tapentadol Definitive Test    MM DT_Temazapam Definitive Test    MM DT_THC Definitive Test    MM DT_Tramadol Definitive Test    MM DT_Methylphenidate Definitive Test

## 2024-06-17 NOTE — ASSESSMENT & PLAN NOTE
Patient returns for routine scheduled follow-up status post laparoscopic recurrent incisional hernia repair with mesh.    No complaints voiced by patient.    On physical examination the surgical wounds are clean dry and intact.  No evidence of recurrent hernias noted.    Patient appears clinically well status post laparoscopic recurrent incisional herniorrhaphy with mesh.    Patient is been instructed to resume normal levels of activity.  He has been encouraged to follow-up to practice at any point in future with questions or concerns.

## 2024-06-17 NOTE — PROGRESS NOTES
Ambulatory Visit  Name: Juan Amato      : 1950      MRN: [id number]  Encounter Provider: Gustavo Schmidt MD  Encounter Date: 2024   Encounter department: [facility] [place]    Assessment & Plan   1. Recurrent incisional hernia  Assessment & Plan:  Patient returns for routine scheduled follow-up status post laparoscopic recurrent incisional hernia repair with mesh.    No complaints voiced by patient.    On physical examination the surgical wounds are clean dry and intact.  No evidence of recurrent hernias noted.    Patient appears clinically well status post laparoscopic recurrent incisional herniorrhaphy with mesh.    Patient is been instructed to resume normal levels of activity.  He has been encouraged to follow-up to practice at any point in future with questions or concerns.      History of Present Illness     Juan Amato is a 74 y.o. male who presents for a follow up after having a Lap ventral hernia repair w/ mesh 2024. Patient states the incisions are healed and denies drainage or any redness/irritation. Patient denies nausea/vomiting,diarrhea/constipation, issues with urination, abd pain, trouble eating/drinking/swallowing, abd pain or fevers/chills. JONNIE Bee    Review of Systems   Constitutional:  Negative for chills and fever.   HENT:  Negative for ear pain and sore throat.    Eyes:  Negative for pain and visual disturbance.   Respiratory:  Negative for cough and shortness of breath.    Cardiovascular:  Negative for chest pain and palpitations.   Gastrointestinal:  Negative for abdominal pain and vomiting.   Genitourinary:  Negative for dysuria and hematuria.   Musculoskeletal:  Negative for arthralgias and back pain.   Skin:  Negative for color change and rash.   Neurological:  Negative for seizures and syncope.   All other systems reviewed and are negative.      Objective     /60 (BP Location: Left arm, Patient Position: Sitting, Cuff Size:  "Standard)   Pulse 60   Temp 97.8 °F (36.6 °C) (Temporal)   Resp 16   Ht 5' 9\" (1.753 m)   Wt 96.2 kg (212 lb)   SpO2 94%   BMI 31.31 kg/m²     Physical Exam  Vitals and nursing note reviewed.   Constitutional:       General: He is not in acute distress.     Appearance: He is well-developed.   HENT:      Head: Normocephalic and atraumatic.   Eyes:      Conjunctiva/sclera: Conjunctivae normal.   Cardiovascular:      Rate and Rhythm: Normal rate and regular rhythm.      Heart sounds: No murmur heard.  Pulmonary:      Effort: Pulmonary effort is normal. No respiratory distress.      Breath sounds: Normal breath sounds.   Abdominal:      Palpations: Abdomen is soft.      Tenderness: There is no abdominal tenderness.      Comments: Surgical wounds clean dry intact.  No evidence of early recurrent ventral incisional hernia formation in the epigastrium.   Musculoskeletal:         General: No swelling.      Cervical back: Neck supple.   Skin:     General: Skin is warm and dry.      Capillary Refill: Capillary refill takes less than 2 seconds.   Neurological:      Mental Status: He is alert.   Psychiatric:         Mood and Affect: Mood normal.       Administrative Statements           "

## 2024-06-18 ENCOUNTER — OFFICE VISIT (OUTPATIENT)
Dept: PAIN MEDICINE | Facility: CLINIC | Age: 74
End: 2024-06-18
Payer: MEDICARE

## 2024-06-18 VITALS
WEIGHT: 209 LBS | HEART RATE: 63 BPM | SYSTOLIC BLOOD PRESSURE: 92 MMHG | DIASTOLIC BLOOD PRESSURE: 62 MMHG | BODY MASS INDEX: 30.96 KG/M2 | HEIGHT: 69 IN

## 2024-06-18 DIAGNOSIS — F11.20 UNCOMPLICATED OPIOID DEPENDENCE (HCC): ICD-10-CM

## 2024-06-18 DIAGNOSIS — G62.0 CHEMOTHERAPY-INDUCED NEUROPATHY (HCC): ICD-10-CM

## 2024-06-18 DIAGNOSIS — M46.1 SACROILIITIS (HCC): ICD-10-CM

## 2024-06-18 DIAGNOSIS — Z79.891 LONG-TERM CURRENT USE OF OPIATE ANALGESIC: ICD-10-CM

## 2024-06-18 DIAGNOSIS — M47.816 LUMBAR SPONDYLOSIS: ICD-10-CM

## 2024-06-18 DIAGNOSIS — G89.29 CHRONIC BILATERAL LOW BACK PAIN, UNSPECIFIED WHETHER SCIATICA PRESENT: Primary | ICD-10-CM

## 2024-06-18 DIAGNOSIS — M48.061 SPINAL STENOSIS OF LUMBAR REGION, UNSPECIFIED WHETHER NEUROGENIC CLAUDICATION PRESENT: ICD-10-CM

## 2024-06-18 DIAGNOSIS — G89.4 CHRONIC PAIN SYNDROME: ICD-10-CM

## 2024-06-18 DIAGNOSIS — T45.1X5A CHEMOTHERAPY-INDUCED NEUROPATHY (HCC): ICD-10-CM

## 2024-06-18 DIAGNOSIS — M54.50 CHRONIC BILATERAL LOW BACK PAIN, UNSPECIFIED WHETHER SCIATICA PRESENT: Primary | ICD-10-CM

## 2024-06-18 DIAGNOSIS — M54.16 LUMBAR RADICULOPATHY: ICD-10-CM

## 2024-06-18 DIAGNOSIS — I73.9 PERIPHERAL ARTERIAL DISEASE (HCC): ICD-10-CM

## 2024-06-18 PROCEDURE — G2211 COMPLEX E/M VISIT ADD ON: HCPCS | Performed by: NURSE PRACTITIONER

## 2024-06-18 PROCEDURE — 99214 OFFICE O/P EST MOD 30 MIN: CPT | Performed by: NURSE PRACTITIONER

## 2024-06-18 RX ORDER — FOLIC ACID 1 MG/1
1 TABLET ORAL DAILY
COMMUNITY

## 2024-06-18 RX ORDER — HYDROCODONE BITARTRATE AND ACETAMINOPHEN 7.5; 325 MG/1; MG/1
1 TABLET ORAL EVERY 8 HOURS PRN
Qty: 90 TABLET | Refills: 0 | Status: SHIPPED | OUTPATIENT
Start: 2024-08-15

## 2024-06-18 RX ORDER — HYDROCODONE BITARTRATE AND ACETAMINOPHEN 7.5; 325 MG/1; MG/1
1 TABLET ORAL EVERY 8 HOURS PRN
Qty: 90 TABLET | Refills: 0 | Status: SHIPPED | OUTPATIENT
Start: 2024-07-17

## 2024-06-18 RX ORDER — HYDROCODONE BITARTRATE AND ACETAMINOPHEN 7.5; 325 MG/1; MG/1
1 TABLET ORAL EVERY 8 HOURS PRN
Qty: 90 TABLET | Refills: 0 | Status: SHIPPED | OUTPATIENT
Start: 2024-06-18

## 2024-06-20 LAB
6MAM UR QL CFM: NEGATIVE NG/ML
7AMINOCLONAZEPAM UR QL CFM: ABNORMAL NG/ML
A-OH ALPRAZ UR QL CFM: NEGATIVE NG/ML
AMPHET UR QL CFM: NEGATIVE NG/ML
AMPHET UR QL CFM: NEGATIVE NG/ML
BUPRENORPHINE UR QL CFM: NEGATIVE NG/ML
BUTALBITAL UR QL CFM: NEGATIVE NG/ML
BZE UR QL CFM: NEGATIVE NG/ML
CODEINE UR QL CFM: ABNORMAL NG/ML
DESIPRAMINE UR QL CFM: NEGATIVE NG/ML
DESIPRAMINE UR QL CFM: NEGATIVE NG/ML
EDDP UR QL CFM: NEGATIVE NG/ML
ETHYL GLUCURONIDE UR QL CFM: ABNORMAL NG/ML
ETHYL SULFATE UR QL SCN: ABNORMAL NG/ML
EUTYLONE UR QL: NEGATIVE NG/ML
FENTANYL UR QL CFM: NEGATIVE NG/ML
GLIADIN IGG SER IA-ACNC: NEGATIVE NG/ML
GLUCOSE 30M P 50 G LAC PO SERPL-MCNC: NEGATIVE NG/ML
HYDROCODONE UR QL CFM: ABNORMAL NG/ML
HYDROCODONE UR QL CFM: ABNORMAL NG/ML
HYDROMORPHONE UR QL CFM: ABNORMAL NG/ML
IMIPRAMINE UR QL CFM: NEGATIVE NG/ML
LORAZEPAM UR QL CFM: NEGATIVE NG/ML
MDMA UR QL CFM: NEGATIVE NG/ML
ME-PHENIDATE UR QL CFM: NEGATIVE NG/ML
MEPERIDINE UR QL CFM: NEGATIVE NG/ML
METHADONE UR QL CFM: NEGATIVE NG/ML
METHAMPHET UR QL CFM: NEGATIVE NG/ML
MORPHINE UR QL CFM: NEGATIVE NG/ML
MORPHINE UR QL CFM: NEGATIVE NG/ML
NORBUPRENORPHINE UR QL CFM: NEGATIVE NG/ML
NORDIAZEPAM UR QL CFM: NEGATIVE NG/ML
NORFENTANYL UR QL CFM: NEGATIVE NG/ML
NORHYDROCODONE UR QL CFM: ABNORMAL NG/ML
NORHYDROCODONE UR QL CFM: ABNORMAL NG/ML
NORMEPERIDINE UR QL CFM: NEGATIVE NG/ML
NOROXYCODONE UR QL CFM: NEGATIVE NG/ML
OLANZAPINE QUANTIFICATION: NEGATIVE NG/ML
OPC-3373 QUANTIFICATION: NEGATIVE NG/ML
OXAZEPAM UR QL CFM: NEGATIVE NG/ML
OXYCODONE UR QL CFM: NEGATIVE NG/ML
OXYMORPHONE UR QL CFM: NEGATIVE NG/ML
OXYMORPHONE UR QL CFM: NEGATIVE NG/ML
PARA-FLUOROFENTANYL QUANTIFICATION: NORMAL NG/ML
PCP UR QL CFM: NEGATIVE NG/ML
PHENOBARB UR QL CFM: NEGATIVE NG/ML
RESULT ALL_PRESCRIBED MEDS AND SPECIAL INSTRUCTIONS: NORMAL
SECOBARBITAL UR QL CFM: NEGATIVE NG/ML
SL AMB 4-ANPP QUANTIFICATION: NORMAL NG/ML
SL AMB 5F-ADB-M7 METABOLITE QUANTIFICATION: NEGATIVE NG/ML
SL AMB 7-OH-MITRAGYNINE (KRATOM ALKALOID) QUANTIFICATION: NEGATIVE NG/ML
SL AMB AB-FUBINACA-M3 METABOLITE QUANTIFICATION: NEGATIVE NG/ML
SL AMB ACETYL FENTANYL QUANTIFICATION: NORMAL NG/ML
SL AMB ACETYL NORFENTANYL QUANTIFICATION: NORMAL NG/ML
SL AMB ACRYL FENTANYL QUANTIFICATION: NORMAL NG/ML
SL AMB CARFENTANIL QUANTIFICATION: NORMAL NG/ML
SL AMB CLOZAPINE QUANTIFICATION: NEGATIVE NG/ML
SL AMB CTHC (MARIJUANA METABOLITE) QUANTIFICATION: NEGATIVE NG/ML
SL AMB DEXTROMETHORPHAN QUANTIFICATION: NEGATIVE NG/ML
SL AMB DEXTRORPHAN (DEXTROMETHORPHAN METABOLITE) QUANT: NEGATIVE NG/ML
SL AMB DEXTRORPHAN (DEXTROMETHORPHAN METABOLITE) QUANT: NEGATIVE NG/ML
SL AMB HALOPERIDOL  QUANTIFICATION: NEGATIVE NG/ML
SL AMB HALOPERIDOL METABOLITE QUANTIFICATION: NEGATIVE NG/ML
SL AMB HYDROXYRISPERIDONE QUANTIFICATION: NEGATIVE NG/ML
SL AMB JWH018 METABOLITE QUANTIFICATION: NEGATIVE NG/ML
SL AMB JWH073 METABOLITE QUANTIFICATION: NEGATIVE NG/ML
SL AMB MDMB-FUBINACA-M1 METABOLITE QUANTIFICATION: NEGATIVE NG/ML
SL AMB METHYLONE QUANTIFICATION: NEGATIVE NG/ML
SL AMB N-DESMETHYL-TRAMADOL QUANTIFICATION: NEGATIVE NG/ML
SL AMB N-DESMETHYLCLOZAPINE QUANTIFICATION: NEGATIVE NG/ML
SL AMB NORQUETIAPINE QUANTIFICATION: NEGATIVE NG/ML
SL AMB PHENTERMINE QUANTIFICATION: NEGATIVE NG/ML
SL AMB QUETIAPINE QUANTIFICATION: NEGATIVE NG/ML
SL AMB RCS4 METABOLITE QUANTIFICATION: NEGATIVE NG/ML
SL AMB RISPERIDONE QUANTIFICATION: NEGATIVE NG/ML
SL AMB RITALINIC ACID QUANTIFICATION: NEGATIVE NG/ML
SPECIMEN DRAWN SERPL: NEGATIVE NG/ML
TAPENTADOL UR QL CFM: NEGATIVE NG/ML
TEMAZEPAM UR QL CFM: NEGATIVE NG/ML
TEMAZEPAM UR QL CFM: NEGATIVE NG/ML
TRAMADOL UR QL CFM: NEGATIVE NG/ML
URATE/CREAT 24H UR: NEGATIVE NG/ML

## 2024-06-24 ENCOUNTER — TELEPHONE (OUTPATIENT)
Dept: PAIN MEDICINE | Facility: CLINIC | Age: 74
End: 2024-06-24

## 2024-06-24 NOTE — TELEPHONE ENCOUNTER
----- Message from RAGHU Richard sent at 6/24/2024 10:53 AM EDT -----  Please remind patient regarding ETOH and opioid use per opioid policy. Also inquire about codeine being positive in UDS. Any medications he took that had codeine in it? Eat anything with poppy seeds?

## 2024-06-24 NOTE — TELEPHONE ENCOUNTER
Per policy, I have to make patient aware. He has had a positive ETOH screening in every single UDS since July 2023. Codeine has never been positive before but for some reason it is in this screen. Very low concentration. We can re test next OV.

## 2024-06-24 NOTE — TELEPHONE ENCOUNTER
S/w pt and advised of same. Pt denies any cough medicine or syrups or eating any poppy seeds.     Reviewed opioid contract with pt and pt is upset because he s/w JW who advised him that if he takes his Hydrocodone in the am he is able to have 1-2 beers in the evening. Pt reports that he has 2 beers per week socially if out to dinner.

## 2024-07-10 ENCOUNTER — HOSPITAL ENCOUNTER (OUTPATIENT)
Dept: RADIOLOGY | Facility: CLINIC | Age: 74
Discharge: HOME | End: 2024-07-10
Admitting: ANESTHESIOLOGY
Payer: MEDICARE

## 2024-07-10 ENCOUNTER — HOSPITAL ENCOUNTER (OUTPATIENT)
Dept: INFUSION CENTER | Facility: HOSPITAL | Age: 74
End: 2024-07-10

## 2024-07-10 VITALS
SYSTOLIC BLOOD PRESSURE: 126 MMHG | TEMPERATURE: 97.5 F | OXYGEN SATURATION: 97 % | HEART RATE: 56 BPM | DIASTOLIC BLOOD PRESSURE: 69 MMHG | RESPIRATION RATE: 20 BRPM

## 2024-07-10 DIAGNOSIS — M54.16 LUMBAR RADICULOPATHY: ICD-10-CM

## 2024-07-10 PROCEDURE — 62323 NJX INTERLAMINAR LMBR/SAC: CPT | Performed by: ANESTHESIOLOGY

## 2024-07-10 RX ORDER — METHYLPREDNISOLONE ACETATE 80 MG/ML
80 INJECTION, SUSPENSION INTRA-ARTICULAR; INTRALESIONAL; INTRAMUSCULAR; PARENTERAL; SOFT TISSUE ONCE
Status: COMPLETED | OUTPATIENT
Start: 2024-07-10 | End: 2024-07-10

## 2024-07-10 RX ADMIN — IOHEXOL 1 ML: 300 INJECTION, SOLUTION INTRAVENOUS at 11:23

## 2024-07-10 RX ADMIN — METHYLPREDNISOLONE ACETATE 80 MG: 80 INJECTION, SUSPENSION INTRA-ARTICULAR; INTRALESIONAL; INTRAMUSCULAR; SOFT TISSUE at 11:23

## 2024-07-10 NOTE — H&P
History of Present Illness: The patient is a 74 y.o. male who presents with complaints of low back and leg pain.    Past Medical History:   Diagnosis Date    A-fib (HCC)     Arthritis     Bladder cancer     Cancer (HCC)     skin melanoma;basal cell    Chronic pain disorder     from arthritis    Colon polyp     Does use hearing aid     bilat    Dry eyes, bilateral     GERD (gastroesophageal reflux disease)     History of kidney stones 10/2019    History of partial knee replacement     bilat    History of transfusion     History of vertigo     Hyperlipidemia     Hypertension     Infusion extravasation of chemotherapy vesicant 11/2021    Kidney lesion     Lumbar disc disorder     compression of vertebrae L4-5-6    Muscle weakness     left hip area    Renal mass     left    Right ankle injury 03/05/2020    missed step of ladder     Right ankle pain     Shortness of breath     with activity    Tinnitus     Urothelial cancer     left    Wears glasses        Past Surgical History:   Procedure Laterality Date    COLONOSCOPY      CYSTOSCOPY Left 04/01/2020    Procedure: CYSTOSCOPY; URETERAL CATHETER PLACEMENT;  Surgeon: Joe Cortes MD;  [facility];  Service: Urology    CYSTOSCOPY  05/11/2020    CYSTOSCOPY  06/04/2021    FL CYSTOGRAM  04/13/2020    FL RETROGRADE PYELOGRAM  01/17/2020    HERNIA REPAIR  10/25/2022    umbilical with mesh    INGUINAL HERNIA REPAIR Right     with mesh    IR PORT PLACEMENT  04/30/2020    JOINT REPLACEMENT Bilateral     partials knee    LUMBAR EPIDURAL INJECTION      PA CYSTO BLADDER W/URETERAL CATHETERIZATION Bilateral 01/17/2020    Procedure: CYSTOSCOPY; RIGHT RETROGRADE PYELOGRAM WITH RIGHT URETERAL CYTOLOGY SAMPLING; LEFT URETEROSCOPY WITH RENAL PELVIS BIOPSY AND LEFT STENT PLACEMENT;  Surgeon: Joe Cortes MD;  [facility];  Service: Urology    PA LAPAROSCOPY NEPHRECTOMY W/TOTAL URETERECTOMY Left 04/01/2020    Procedure: ROBOTIC LAPAROSCOPIC  NEPHRO-URETERECTOMY;  Surgeon: Joe Cortes MD;  [facility];  Service: Urology    GA RPR AA HERNIA 1ST < 3 CM REDUCIBLE N/A 8/10/2023    Procedure: REPAIR HERNIA INCISIONAL LAPAROSCOPIC WITH MESH;  Surgeon: Gustavo Schmidt MD;  [facility];  Service: General    GA RPR AA HERNIA 1ST < 3 CM REDUCIBLE N/A 5/31/2024    Procedure: REPAIR HERNIA VENTRAL LAPAROSCOPIC WITH MESH;  Surgeon: Gustavo Schmidt MD;  [facility];  Service: General    SKIN CANCER EXCISION      surface melanoma    TONSILLECTOMY      WISDOM TOOTH EXTRACTION           Current Outpatient Medications:     amiodarone 200 mg tablet, Take 200 mg by mouth daily, Disp: , Rfl:     diltiazem (CARDIZEM) 60 mg tablet, 2 (two) times a day, Disp: , Rfl:     finasteride (PROSCAR) 5 mg tablet, Take 5 mg by mouth daily, Disp: , Rfl:     folic acid (FOLVITE) 1 mg tablet, Take 1 mg by mouth daily, Disp: , Rfl:     HYDROcodone-acetaminophen (Norco) 7.5-325 mg per tablet, Take 1 tablet by mouth every 8 (eight) hours as needed for pain Max Daily Amount: 3 tablets, Disp: 90 tablet, Rfl: 0    [START ON 7/17/2024] HYDROcodone-acetaminophen (Norco) 7.5-325 mg per tablet, Take 1 tablet by mouth every 8 (eight) hours as needed for pain Max Daily Amount: 3 tablets Do not start before July 17, 2024., Disp: 90 tablet, Rfl: 0    [START ON 8/15/2024] HYDROcodone-acetaminophen (Norco) 7.5-325 mg per tablet, Take 1 tablet by mouth every 8 (eight) hours as needed for pain Max Daily Amount: 3 tablets Do not start before August 15, 2024., Disp: 90 tablet, Rfl: 0    tamsulosin (FLOMAX) 0.4 mg, Take 1 capsule (0.4 mg total) by mouth daily with dinner, Disp: 30 capsule, Rfl: 12    zolpidem (AMBIEN) 5 mg tablet, Take 1 tablet (5 mg total) by mouth daily at bedtime as needed for sleep, Disp: 30 tablet, Rfl: 1    Allergies   Allergen Reactions    Poison Ivy Extract Rash       Physical Exam:   Vitals:    07/10/24 1106   BP: 119/73   Pulse: 64   Resp: 18    Temp: 97.5 °F (36.4 °C)   SpO2: 92%     General: Awake, Alert, Oriented x 3, Mood and affect appropriate  Respiratory: Respirations even and unlabored  Cardiovascular: Peripheral pulses intact; no edema  Musculoskeletal Exam: Antalgic gait    ASA Score: 3         Assessment:   1. Lumbar radiculopathy        Plan: L5-S1 LESI

## 2024-07-10 NOTE — DISCHARGE INSTRUCTIONS
Epidural Steroid Injection   WHAT YOU NEED TO KNOW:   An epidural steroid injection (JOHANNE) is a procedure to inject steroid medicine into the epidural space. The epidural space is between your spinal cord and vertebrae. Steroids reduce inflammation and fluid buildup in your spine that may be causing pain. You may be given pain medicine along with the steroids.          ACTIVITY  Do not drive or operate machinery today.  No strenuous activity today - bending, lifting, etc.  You may resume normal activites starting tomorrow - start slowly and as tolerated.  You may shower today, but no tub baths or hot tubs.  You may have numbness for several hours from the local anesthetic. Please use caution and common sense, especially with weight-bearing activities.    CARE OF THE INJECTION SITE  If you have soreness or pain, apply ice to the area today (20 minutes on/20 minutes off).  Starting tomorrow, you may use warm, moist heat or ice if needed.  You may have an increase or change in your discomfort for 36-48 hours after your treatment.  Apply ice and continue with any pain medication you have been prescribed.  Notify the [facility] if you have any of the following: redness, drainage, swelling, headache, stiff neck or fever above 100°F.    SPECIAL INSTRUCTIONS  Our office will contact you in approximately 14 days for a progress report.    MEDICATIONS  Continue to take all routine medications.  Our office may have instructed you to hold some medications.    As no general anesthesia was used in today's procedure, you should not experience any side effects related to anesthesia.     If you are diabetic, the steroids used in today's injection may temporarily increase your blood sugar levels after the first few days after your injection. Please keep a close eye on your sugars and alert the doctor who manages your diabetes if your sugars are significantly high from your baseline or you are symptomatic.     If you have a  problem specifically related to your procedure, please call our office at [phone].  Problems not related to your procedure should be directed to your primary care physician.

## 2024-07-11 ENCOUNTER — HOSPITAL ENCOUNTER (OUTPATIENT)
Dept: INFUSION CENTER | Facility: HOSPITAL | Age: 74
End: 2024-07-11
Attending: INTERNAL MEDICINE
Payer: MEDICARE

## 2024-07-11 VITALS — TEMPERATURE: 97.7 F

## 2024-07-11 DIAGNOSIS — Z45.2 ENCOUNTER FOR CARE RELATED TO PORT-A-CATH: Primary | ICD-10-CM

## 2024-07-11 DIAGNOSIS — C79.10 METASTATIC UROTHELIAL CARCINOMA (HCC): Primary | ICD-10-CM

## 2024-07-11 DIAGNOSIS — C65.2 CANCER OF LEFT RENAL PELVIS (HCC): ICD-10-CM

## 2024-07-11 DIAGNOSIS — C79.10 METASTATIC UROTHELIAL CARCINOMA (HCC): ICD-10-CM

## 2024-07-11 LAB
ALBUMIN SERPL BCG-MCNC: 3.7 G/DL (ref 3.5–5)
ALP SERPL-CCNC: 64 U/L (ref 34–104)
ALT SERPL W P-5'-P-CCNC: 10 U/L (ref 7–52)
ANION GAP SERPL CALCULATED.3IONS-SCNC: 8 MMOL/L (ref 4–13)
AST SERPL W P-5'-P-CCNC: 9 U/L (ref 13–39)
BASOPHILS # BLD AUTO: 0.05 THOUSANDS/ÂΜL (ref 0–0.1)
BASOPHILS NFR BLD AUTO: 1 % (ref 0–1)
BILIRUB SERPL-MCNC: 0.68 MG/DL (ref 0.2–1)
BUN SERPL-MCNC: 24 MG/DL (ref 5–25)
CALCIUM SERPL-MCNC: 9.1 MG/DL (ref 8.4–10.2)
CHLORIDE SERPL-SCNC: 105 MMOL/L (ref 96–108)
CO2 SERPL-SCNC: 26 MMOL/L (ref 21–32)
CREAT SERPL-MCNC: 1.29 MG/DL (ref 0.6–1.3)
EOSINOPHIL # BLD AUTO: 0.1 THOUSAND/ÂΜL (ref 0–0.61)
EOSINOPHIL NFR BLD AUTO: 1 % (ref 0–6)
ERYTHROCYTE [DISTWIDTH] IN BLOOD BY AUTOMATED COUNT: 13.9 % (ref 11.6–15.1)
GFR SERPL CREATININE-BSD FRML MDRD: 53 ML/MIN/1.73SQ M
GLUCOSE SERPL-MCNC: 126 MG/DL (ref 65–140)
HCT VFR BLD AUTO: 37.1 % (ref 36.5–49.3)
HGB BLD-MCNC: 11.8 G/DL (ref 12–17)
IMM GRANULOCYTES # BLD AUTO: 0.13 THOUSAND/UL (ref 0–0.2)
IMM GRANULOCYTES NFR BLD AUTO: 2 % (ref 0–2)
LYMPHOCYTES # BLD AUTO: 1.07 THOUSANDS/ÂΜL (ref 0.6–4.47)
LYMPHOCYTES NFR BLD AUTO: 15 % (ref 14–44)
MAGNESIUM SERPL-MCNC: 1.7 MG/DL (ref 1.9–2.7)
MCH RBC QN AUTO: 31.8 PG (ref 26.8–34.3)
MCHC RBC AUTO-ENTMCNC: 31.8 G/DL (ref 31.4–37.4)
MCV RBC AUTO: 100 FL (ref 82–98)
MONOCYTES # BLD AUTO: 0.55 THOUSAND/ÂΜL (ref 0.17–1.22)
MONOCYTES NFR BLD AUTO: 8 % (ref 4–12)
NEUTROPHILS # BLD AUTO: 5.32 THOUSANDS/ÂΜL (ref 1.85–7.62)
NEUTS SEG NFR BLD AUTO: 73 % (ref 43–75)
NRBC BLD AUTO-RTO: 0 /100 WBCS
PLATELET # BLD AUTO: 193 THOUSANDS/UL (ref 149–390)
PMV BLD AUTO: 10.2 FL (ref 8.9–12.7)
POTASSIUM SERPL-SCNC: 4 MMOL/L (ref 3.5–5.3)
PROT SERPL-MCNC: 5.9 G/DL (ref 6.4–8.4)
RBC # BLD AUTO: 3.71 MILLION/UL (ref 3.88–5.62)
SODIUM SERPL-SCNC: 139 MMOL/L (ref 135–147)
WBC # BLD AUTO: 7.22 THOUSAND/UL (ref 4.31–10.16)

## 2024-07-11 PROCEDURE — 80053 COMPREHEN METABOLIC PANEL: CPT | Performed by: INTERNAL MEDICINE

## 2024-07-11 PROCEDURE — 83735 ASSAY OF MAGNESIUM: CPT | Performed by: INTERNAL MEDICINE

## 2024-07-11 PROCEDURE — 85025 COMPLETE CBC W/AUTO DIFF WBC: CPT | Performed by: INTERNAL MEDICINE

## 2024-07-11 NOTE — PROGRESS NOTES
Juan Amato  tolerated port flush and lab draw well with no complications.      Juan Amato is aware of future appt on 8/22 at 10:30AM.     AVS printed and given to Juan Amato.

## 2024-07-24 ENCOUNTER — TELEPHONE (OUTPATIENT)
Dept: PAIN MEDICINE | Facility: CLINIC | Age: 74
End: 2024-07-24

## 2024-08-02 ENCOUNTER — HOSPITAL ENCOUNTER (OUTPATIENT)
Dept: NUCLEAR MEDICINE | Facility: HOSPITAL | Age: 74
End: 2024-08-02
Attending: INTERNAL MEDICINE
Payer: MEDICARE

## 2024-08-02 DIAGNOSIS — C67.3 MALIGNANT NEOPLASM OF ANTERIOR WALL OF BLADDER (HCC): ICD-10-CM

## 2024-08-02 DIAGNOSIS — C79.10 METASTATIC UROTHELIAL CARCINOMA (HCC): ICD-10-CM

## 2024-08-02 LAB — GLUCOSE SERPL-MCNC: 93 MG/DL (ref 65–140)

## 2024-08-02 PROCEDURE — 82948 REAGENT STRIP/BLOOD GLUCOSE: CPT

## 2024-08-02 PROCEDURE — 78815 PET IMAGE W/CT SKULL-THIGH: CPT

## 2024-08-02 PROCEDURE — A9552 F18 FDG: HCPCS

## 2024-08-13 ENCOUNTER — OFFICE VISIT (OUTPATIENT)
Dept: HEMATOLOGY ONCOLOGY | Facility: CLINIC | Age: 74
End: 2024-08-13
Payer: MEDICARE

## 2024-08-13 VITALS
DIASTOLIC BLOOD PRESSURE: 68 MMHG | RESPIRATION RATE: 18 BRPM | SYSTOLIC BLOOD PRESSURE: 124 MMHG | HEIGHT: 69 IN | OXYGEN SATURATION: 94 % | WEIGHT: 207 LBS | BODY MASS INDEX: 30.66 KG/M2 | HEART RATE: 65 BPM | TEMPERATURE: 97.7 F

## 2024-08-13 DIAGNOSIS — C67.3 MALIGNANT NEOPLASM OF ANTERIOR WALL OF BLADDER (HCC): ICD-10-CM

## 2024-08-13 DIAGNOSIS — C79.10 METASTATIC UROTHELIAL CARCINOMA (HCC): Primary | ICD-10-CM

## 2024-08-13 PROBLEM — C77.2 METASTASIS TO RETROPERITONEAL LYMPH NODE (HCC): Status: RESOLVED | Noted: 2021-11-10 | Resolved: 2024-08-13

## 2024-08-13 PROCEDURE — G2211 COMPLEX E/M VISIT ADD ON: HCPCS | Performed by: INTERNAL MEDICINE

## 2024-08-13 PROCEDURE — 99214 OFFICE O/P EST MOD 30 MIN: CPT | Performed by: INTERNAL MEDICINE

## 2024-08-13 NOTE — PROGRESS NOTES
Hematology/Oncology Outpatient Follow-up  Juan Amato 74 y.o. male 1950 [id number]    Date:  8/13/2024        Assessment and Plan:  1. Metastatic urothelial carcinoma (HCC)  Diagnosed in January 2020, status post multiple lines of treatment.    His last dose of enfortumab vedotin was around September 2022.  The treatment had to be stopped due to severe neuropathy.    The patient was educated about the recent PET CT scan from 8-24 which was negative for any avid lesions.  He seems to still be in complete remission even though his last treatment for his metastatic urothelial carcinoma was around September 2022.  The patient stated he would be interested in following up with another imaging.  Will aim for another PET scan in about 6 months from now for close surveillance.      - CBC and differential; Future  - Comprehensive metabolic panel; Future  - Magnesium  - NM PET CT skull base to mid thigh; Future          HPI:  The patient came today for a follow-up visit accompanied by his wife.  He stated that he recently had abdominal wall hernia repair around the beginning of June of this year.  He had a PET CT scan on 8-24 which showed:  IMPRESSION:     There is no new FDG-avid lesion     Previous noted in the nonspecific foci of uptake adjacent to the spleen are decreasing     There is no new mediastinal, abdominal pelvic, or retroperitoneal lymphadenopathy     Evolving seroma in the periumbilical region.  Previously noted rectus sheath hematoma has resolved    Blood work on 7/11/2024 showed a hemoglobin of 11.8 with normal white cells and platelets.  Creatinine 1.2 with normal calcium and liver enzymes.  Magnesium 1.7.    Oncology History Overview Note   Patient has a history of hypertension, hyperlipidemia, chronic lower back pain.     He had an MRI of the lower spine for further evaluation of his chronic lower back pain.  The MRI on 12/02/2019 revealed Multiple left renal masses to include a left lower pole  cyst and a rounded structure in the left upper pole which may also represent cyst.  Left upper pole renal masses approximately 3 cm in greatest linear dimension.     A CT with renal protocol was done on 12/12/2019 which also showed the infiltrating lesion in the upper pole of the left kidney measuring about the 3.5 cm in greatest dimension without any hint of retroperitoneal lymphadenopathy.       A CT scan of the abdomen pelvis on 01/14/2020 showed the same findings with the mild hydronephrosis on the left.  The urine cytology on 01/03/2020 showed high-grade urothelial carcinoma.  A cystoscopy was then done, a biopsy was taken from the left renal pelvic region which showed high-grade urothelial carcinoma without evidence of lamina propria invasion.  The detrusor muscle/muscularis propria were not present for evaluation.     The recommendation was to pursue left robotic assisted laparoscopic nephroureterectomy with bladder cuff excision which was done on 04/01/2020.    The final pathology revealed;  - Invasive high grade urothelial carcinoma arising in renal pelvis.   - Bladder cuff margin is negative for carcinoma and no evidence of high grade dysplasia.  - Ureters with no significant pathologic abnormality  - Two benign simple cysts.  - Adrenal gland is negative for malignancy.  - One lymph node, negative for malignancy (0/1).  The tumor size was 4.5 cm with invasion beyond the muscularis into the periurethral fat or peripelvic fat or the renal parenchyma.  The margins were uninvolved by carcinoma or carcinoma in situ.  The final pathology was pT3 pN0.     Patient barely tolerated 1 cycle of adjuvant chemotherapy with split dose cisplatin and gemcitabine with a lot of side effects.  The treatment had to be discontinued due to significant worsening renal dysfunction 6/2020.     Patient started to complain about significant abdominal/left inguinal pain around August/September 2020. Unfortunately repeat imaging  revealed recurrent/metastatic disease.  9/4/20 CT C/A/P- Status post left nephrectomy for resection of urothelial neoplasm.  Lymphadenopathy in the left nephrectomy bed has increased since the prior examination, concerning for metastatic disease.     Ill-defined hyperattenuating masslike focus in the left rectus muscle, not identified on the prior examination.  This is suspicious for a metastatic lesion.  A rectus hematoma is also considered.      9/23/20 PET scan- 1.  Multiple FDG avid lymph nodes in the retrocrural region, retroperitoneum and the left renal bed compatible with metastasis.  These have progressed from recent CT.  2.  FDG avid mass involving the left rectus abdominal musculature compatible with metastasis which is larger from recent CT.  3. Several small lymph nodes adjacent to the mid to distal esophagus with FDG uptake suspicious for metastasis.  Small focus of FDG uptake also suggested in the right hilar region, metastasis is not excluded.  This could be reassessed on follow-up.  4.  Subtle focus of FDG uptake at the T2 level on the left, nonspecific, could be related to early degenerative changes, developing metastasis is not entirely excluded.  This could be reassessed on follow-up.  5. Prostate is mildly prominent with heterogeneous FDG uptake.  This could be inflammatory.  Correlate for prostatitis.     He completed palliative radiation to the left abdomen 12/3/20     His PET scan from 08/16/2021 showed progression of his disease with hypermetabolic soft tissue lesions at the level of the right shoulder and right axilla with interval progression of the retroperitoneal and mesenteric hypermetabolic metastasis.       He did have the new lesion to his right upper back/shoulder which was causing significant localized pain. This excised by his surgeon 08/09/2021. Pathology confirmed metastatic high-grade carcinoma consistent with his no primary urothelial carcinoma.  He received palliative  radiation to his right shoulder/axilla region.     Urothelial cancer (HCC)   1/3/2020 Biopsy    Final Diagnosis    A. Urine, Clean Catch, Thin Prep:  High grade urothelial carcinoma (HGUC) - see comment.        1/3/2020 Initial Diagnosis    Urothelial cancer (HCC)  T3 N0 (stage IIIA)     1/17/2020 Biopsy    Final Diagnosis    A. Ureter, Right, left renal pelvis biopsy  -Fragments of high grade urothelial carcinoma   -No evidence of lamina propria invasion.  -Detrusor muscle/ muscularis propria is not present for evaluation.     B. Urinary Bladder, bladder biopsy:  -Fragments of low grade papillary lesion. See note  -No evidence of invasion seen  -Unremarkable fragment of detrusor muscle seen.        4/1/2020 Surgery    left robotic assisted laparoscopic nephroureterectomy with bladder cuff excision     Final Diagnosis    A. Left kidney, ureter, and bladder cuff, nephroureterectomy:  - Invasive high grade urothelial carcinoma arising in renal pelvis.   - Bladder cuff margin is negative for carcinoma and no evidence of high grade dysplasia.  - Ureters with no significant pathologic abnormality  - Two benign simple cysts.  - Adrenal gland is negative for malignancy.  - One lymph node, negative for malignancy (0/1).        5/14/2020 - 6/3/2020 Chemotherapy    CISplatin (PLATINOL) split dose 35 mg/m2 = 75.3 mg (50 % of original dose 70 mg/m2), Intravenous,   Administration: 75.3 mg (5/14/2020), 75.3 mg (5/21/2020)  gemcitabine (GEMZAR) 2,200 mg in sodium chloride 0.9 % 250 mL infusion, 2,150.2 mg (80 % of original dose 1,250 mg/m2), Intravenous,   Administration: 2,200 mg (5/14/2020), 2,200 mg (5/21/2020)    (only completed 1 cycle- d/c d/t adverse effects and worsening renal dysfunction)     10/9/2020 - 9/9/2021 Chemotherapy    pembrolizumab (KEYTRUDA) IVPB, 200 mg, Intravenous, Once, 16 of 20 cycles  Administration: 200 mg (10/9/2020), 200 mg (10/30/2020), 200 mg (11/20/2020), 200 mg (12/11/2020), 200 mg (12/31/2020),  200 mg (1/22/2021), 200 mg (2/12/2021), 200 mg (3/5/2021), 200 mg (3/26/2021), 200 mg (4/16/2021), 200 mg (5/7/2021), 200 mg (5/28/2021), 200 mg (6/18/2021), 200 mg (7/9/2021), 200 mg (7/30/2021), 200 mg (8/20/2021)     11/19/2020 - 12/3/2020 Radiation    Treatment:  Course: C1    Plan ID Energy Fractions Dose per Fraction (cGy) Dose Correction (cGy) Total Dose Delivered (cGy) Elapsed Days   L Abdomen 6X 10 / 10 300 0 3,000 14        9/10/2021 -  Chemotherapy    enfortumab vedotin-ejfv (PADCEV) IVPB, 1.25 mg/kg = 114 mg, Intravenous, Once, 11 of 16 cycles  Dose modification: 1 mg/kg (original dose 1.25 mg/kg, Cycle 7, Reason: Other (Must fill in a comment), Comment: dose reduction due to side effects ), 1.25 mg/kg (original dose 1.25 mg/kg, Cycle 7, Reason: Other (Must fill in a comment), Comment: patient wants full dose ), 1 mg/kg (original dose 1.25 mg/kg, Cycle 7, Reason: Neuropathy), 0.75 mg/kg (original dose 1.25 mg/kg, Cycle 11, Reason: Neuropathy)  Administration: 114 mg (9/10/2021), 114 mg (9/17/2021), 110 mg (10/8/2021), 110 mg (9/24/2021), 110 mg (10/15/2021), 110 mg (11/12/2021), 110 mg (10/22/2021), 110 mg (11/19/2021), 110 mg (12/10/2021), 110 mg (11/26/2021), 110 mg (12/17/2021), 110 mg (1/7/2022), 110 mg (12/23/2021), 110 mg (1/14/2022), 90 mg (4/8/2022), 90 mg (4/15/2022), 90 mg (4/22/2022), 110 mg (1/21/2022), 110 mg (2/18/2022), 110 mg (2/25/2022), 90 mg (5/13/2022), 90 mg (5/20/2022), 90 mg (5/27/2022), 90 mg (3/4/2022), 90 mg (6/10/2022), 90 mg (6/17/2022), 90 mg (6/24/2022), 90 mg (7/8/2022), 90 mg (7/15/2022), 90 mg (7/22/2022), 68 mg (8/19/2022), 70 mg (9/2/2022)     Cancer of left renal pelvis (HCC)   4/23/2020 Initial Diagnosis    Cancer of left renal pelvis (HCC)     10/9/2020 - 9/9/2021 Chemotherapy    pembrolizumab (KEYTRUDA) IVPB, 200 mg, Intravenous, Once, 16 of 20 cycles  Administration: 200 mg (10/9/2020), 200 mg (10/30/2020), 200 mg (11/20/2020), 200 mg (12/11/2020), 200 mg  (12/31/2020), 200 mg (1/22/2021), 200 mg (2/12/2021), 200 mg (3/5/2021), 200 mg (3/26/2021), 200 mg (4/16/2021), 200 mg (5/7/2021), 200 mg (5/28/2021), 200 mg (6/18/2021), 200 mg (7/9/2021), 200 mg (7/30/2021), 200 mg (8/20/2021)     9/10/2021 -  Chemotherapy    enfortumab vedotin-ejfv (PADCEV) IVPB, 1.25 mg/kg = 114 mg, Intravenous, Once, 11 of 16 cycles  Dose modification: 1 mg/kg (original dose 1.25 mg/kg, Cycle 7, Reason: Other (Must fill in a comment), Comment: dose reduction due to side effects ), 1.25 mg/kg (original dose 1.25 mg/kg, Cycle 7, Reason: Other (Must fill in a comment), Comment: patient wants full dose ), 1 mg/kg (original dose 1.25 mg/kg, Cycle 7, Reason: Neuropathy), 0.75 mg/kg (original dose 1.25 mg/kg, Cycle 11, Reason: Neuropathy)  Administration: 114 mg (9/10/2021), 114 mg (9/17/2021), 110 mg (10/8/2021), 110 mg (9/24/2021), 110 mg (10/15/2021), 110 mg (11/12/2021), 110 mg (10/22/2021), 110 mg (11/19/2021), 110 mg (12/10/2021), 110 mg (11/26/2021), 110 mg (12/17/2021), 110 mg (1/7/2022), 110 mg (12/23/2021), 110 mg (1/14/2022), 90 mg (4/8/2022), 90 mg (4/15/2022), 90 mg (4/22/2022), 110 mg (1/21/2022), 110 mg (2/18/2022), 110 mg (2/25/2022), 90 mg (5/13/2022), 90 mg (5/20/2022), 90 mg (5/27/2022), 90 mg (3/4/2022), 90 mg (6/10/2022), 90 mg (6/17/2022), 90 mg (6/24/2022), 90 mg (7/8/2022), 90 mg (7/15/2022), 90 mg (7/22/2022), 68 mg (8/19/2022), 70 mg (9/2/2022)      Surgery       Metastatic urothelial carcinoma (HCC)   8/9/2021 Initial Diagnosis    Metastatic urothelial carcinoma (HCC)     9/8/2021 - 9/21/2021 Radiation    Treatment:  Course: C2    Plan ID Energy Fractions Dose per Fraction (cGy) Dose Correction (cGy) Total Dose Delivered (cGy) Elapsed Days   R Axil_Shl # 6X 10 / 10 300 0 3,000 13      Treatment dates:  C2: 9/8/2021 - 9/21/2021     9/10/2021 -  Chemotherapy    enfortumab vedotin-ejfv (PADCEV) IVPB, 1.25 mg/kg = 114 mg, Intravenous, Once, 11 of 16 cycles  Dose modification: 1  mg/kg (original dose 1.25 mg/kg, Cycle 7, Reason: Other (Must fill in a comment), Comment: dose reduction due to side effects ), 1.25 mg/kg (original dose 1.25 mg/kg, Cycle 7, Reason: Other (Must fill in a comment), Comment: patient wants full dose ), 1 mg/kg (original dose 1.25 mg/kg, Cycle 7, Reason: Neuropathy), 0.75 mg/kg (original dose 1.25 mg/kg, Cycle 11, Reason: Neuropathy)  Administration: 114 mg (9/10/2021), 114 mg (9/17/2021), 110 mg (10/8/2021), 110 mg (9/24/2021), 110 mg (10/15/2021), 110 mg (11/12/2021), 110 mg (10/22/2021), 110 mg (11/19/2021), 110 mg (12/10/2021), 110 mg (11/26/2021), 110 mg (12/17/2021), 110 mg (1/7/2022), 110 mg (12/23/2021), 110 mg (1/14/2022), 90 mg (4/8/2022), 90 mg (4/15/2022), 90 mg (4/22/2022), 110 mg (1/21/2022), 110 mg (2/18/2022), 110 mg (2/25/2022), 90 mg (5/13/2022), 90 mg (5/20/2022), 90 mg (5/27/2022), 90 mg (3/4/2022), 90 mg (6/10/2022), 90 mg (6/17/2022), 90 mg (6/24/2022), 90 mg (7/8/2022), 90 mg (7/15/2022), 90 mg (7/22/2022), 68 mg (8/19/2022), 70 mg (9/2/2022)         Interval history:    ROS: Review of Systems   Constitutional:  Negative for chills and fever.   HENT:  Negative for ear pain and sore throat.    Eyes:  Negative for pain and visual disturbance.   Respiratory:  Negative for cough and shortness of breath.    Cardiovascular:  Negative for chest pain and palpitations.   Gastrointestinal:  Negative for abdominal pain and vomiting.   Genitourinary:  Negative for dysuria and hematuria.   Musculoskeletal:  Positive for arthralgias. Negative for back pain.   Skin:  Negative for color change and rash.   Neurological:  Positive for numbness. Negative for seizures and syncope.   All other systems reviewed and are negative.      Past Medical History:   Diagnosis Date    A-fib (HCC)     Arthritis     Bladder cancer     Cancer (HCC)     skin melanoma;basal cell    Chronic pain disorder     from arthritis    Colon polyp     Does use hearing aid     bilat    Dry  eyes, bilateral     GERD (gastroesophageal reflux disease)     History of kidney stones 10/2019    History of partial knee replacement     bilat    History of transfusion     History of vertigo     Hyperlipidemia     Hypertension     Infusion extravasation of chemotherapy vesicant 11/2021    Kidney lesion     Lumbar disc disorder     compression of vertebrae L4-5-6    Muscle weakness     left hip area    Renal mass     left    Right ankle injury 03/05/2020    missed step of ladder     Right ankle pain     Shortness of breath     with activity    Tinnitus     Urothelial cancer     left    Wears glasses        Past Surgical History:   Procedure Laterality Date    COLONOSCOPY      CYSTOSCOPY Left 04/01/2020    Procedure: CYSTOSCOPY; URETERAL CATHETER PLACEMENT;  Surgeon: Joe Cortes MD;  Location[facility];  Service: Urology    CYSTOSCOPY  05/11/2020    CYSTOSCOPY  06/04/2021    FL CYSTOGRAM  04/13/2020    FL RETROGRADE PYELOGRAM  01/17/2020    HERNIA REPAIR  10/25/2022    umbilical with mesh    INGUINAL HERNIA REPAIR Right     with mesh    IR PORT PLACEMENT  04/30/2020    JOINT REPLACEMENT Bilateral     partials knee    LUMBAR EPIDURAL INJECTION      CT CYSTO BLADDER W/URETERAL CATHETERIZATION Bilateral 01/17/2020    Procedure: CYSTOSCOPY; RIGHT RETROGRADE PYELOGRAM WITH RIGHT URETERAL CYTOLOGY SAMPLING; LEFT URETEROSCOPY WITH RENAL PELVIS BIOPSY AND LEFT STENT PLACEMENT;  Surgeon: Joe Cortes MD;  Location: [facility];  Service: Urology    CT LAPAROSCOPY NEPHRECTOMY W/TOTAL URETERECTOMY Left 04/01/2020    Procedure: ROBOTIC LAPAROSCOPIC NEPHRO-URETERECTOMY;  Surgeon: Joe Cortes MD;  Location[facility];  Service: Urology    CT RPR AA HERNIA 1ST < 3 CM REDUCIBLE N/A 8/10/2023    Procedure: REPAIR HERNIA INCISIONAL LAPAROSCOPIC WITH MESH;  Surgeon: Gustavo Schmidt MD;  Location: [facility];  Service: General    CT RPR AA HERNIA 1ST < 3 CM REDUCIBLE N/A 5/31/2024    Procedure:  REPAIR HERNIA VENTRAL LAPAROSCOPIC WITH MESH;  Surgeon: Gustavo Schmidt MD;  Location: [facility];  Service: General    SKIN CANCER EXCISION      surface melanoma    TONSILLECTOMY      WISDOM TOOTH EXTRACTION         Social History     Socioeconomic History    Marital status: /Civil Union     Spouse name: None    Number of children: None    Years of education: None    Highest education level: None   Occupational History    None   Tobacco Use    Smoking status: Former     Current packs/day: 0.00     Types: Cigarettes     Quit date:      Years since quittin.6    Smokeless tobacco: Never   Vaping Use    Vaping status: Never Used   Substance and Sexual Activity    Alcohol use: Not Currently     Comment: socially, rarely    Drug use: Never    Sexual activity: Yes     Partners: Female   Other Topics Concern    None   Social History Narrative    Daily caffeine use - 2 cups coffee      Social Determinants of Health     Financial Resource Strain: Not on file   Food Insecurity: Not on file   Transportation Needs: Not on file   Physical Activity: Not on file   Stress: Not on file   Social Connections: Not on file   Intimate Partner Violence: Not At Risk (2023)    Received from [facility]     Threatened: Not on file     Insulted: Not on file     Physically Hurt : Not on file     Scream: Not on file   Housing Stability: At Risk (2023)    Received from [facility]     Living Situation: Not on file     Housing Problems: Not on file       Family History   Problem Relation Age of Onset    Liver cancer Father        Allergies   Allergen Reactions    Poison Ivy Extract Rash         Current Outpatient Medications:     amiodarone 200 mg tablet, Take 200 mg by mouth daily, Disp: , Rfl:     diltiazem (CARDIZEM) 60 mg tablet, 2 (two) times a day, Disp: , Rfl:     finasteride (PROSCAR) 5 mg tablet, Take 5 mg by mouth daily, Disp: , Rfl:     folic acid  "(FOLVITE) 1 mg tablet, Take 1 mg by mouth daily, Disp: , Rfl:     HYDROcodone-acetaminophen (Norco) 7.5-325 mg per tablet, Take 1 tablet by mouth every 8 (eight) hours as needed for pain Max Daily Amount: 3 tablets Do not start before July 17, 2024., Disp: 90 tablet, Rfl: 0    [START ON 8/15/2024] HYDROcodone-acetaminophen (Norco) 7.5-325 mg per tablet, Take 1 tablet by mouth every 8 (eight) hours as needed for pain Max Daily Amount: 3 tablets Do not start before August 15, 2024., Disp: 90 tablet, Rfl: 0    tamsulosin (FLOMAX) 0.4 mg, Take 1 capsule (0.4 mg total) by mouth daily with dinner, Disp: 30 capsule, Rfl: 12    zolpidem (AMBIEN) 5 mg tablet, Take 1 tablet (5 mg total) by mouth daily at bedtime as needed for sleep, Disp: 30 tablet, Rfl: 1    HYDROcodone-acetaminophen (Norco) 7.5-325 mg per tablet, Take 1 tablet by mouth every 8 (eight) hours as needed for pain Max Daily Amount: 3 tablets (Patient not taking: Reported on 8/13/2024), Disp: 90 tablet, Rfl: 0      Physical Exam:  /68 (BP Location: Left arm, Patient Position: Sitting, Cuff Size: Adult)   Pulse 65   Temp 97.7 °F (36.5 °C)   Resp 18   Ht 5' 9\" (1.753 m)   Wt 93.9 kg (207 lb)   SpO2 94%   BMI 30.57 kg/m²     Physical Exam  Constitutional:       Appearance: He is well-developed.   HENT:      Head: Normocephalic and atraumatic.      Nose: Nose normal.   Eyes:      General: No scleral icterus.        Right eye: No discharge.         Left eye: No discharge.      Conjunctiva/sclera: Conjunctivae normal.      Pupils: Pupils are equal, round, and reactive to light.   Neck:      Thyroid: No thyromegaly.      Trachea: No tracheal deviation.   Cardiovascular:      Rate and Rhythm: Normal rate and regular rhythm.      Heart sounds: Normal heart sounds. No murmur heard.     No friction rub.   Pulmonary:      Effort: Pulmonary effort is normal. No respiratory distress.      Breath sounds: Normal breath sounds. No wheezing or rales.   Chest:      " "Chest wall: No tenderness.   Abdominal:      General: There is no distension.      Palpations: Abdomen is soft. There is no hepatomegaly or splenomegaly.      Tenderness: There is no abdominal tenderness. There is no guarding or rebound.   Musculoskeletal:         General: No tenderness or deformity. Normal range of motion.      Cervical back: Normal range of motion and neck supple.   Lymphadenopathy:      Cervical: No cervical adenopathy.   Skin:     General: Skin is warm and dry.      Coloration: Skin is not pale.      Findings: No erythema or rash.   Neurological:      Mental Status: He is alert and oriented to person, place, and time.      Cranial Nerves: No cranial nerve deficit.      Coordination: Coordination normal.      Deep Tendon Reflexes: Reflexes are normal and symmetric.   Psychiatric:         Behavior: Behavior normal.         Thought Content: Thought content normal.         Judgment: Judgment normal.           Labs:  Lab Results   Component Value Date    WBC 7.22 07/11/2024    HGB 11.8 (L) 07/11/2024    HCT 37.1 07/11/2024     (H) 07/11/2024     07/11/2024     Lab Results   Component Value Date    K 4.0 07/11/2024     07/11/2024    CO2 26 07/11/2024    BUN 24 07/11/2024    CREATININE 1.29 07/11/2024    GLUF 84 12/26/2022    CALCIUM 9.1 07/11/2024    CORRECTEDCA 9.0 06/21/2022    AST 9 (L) 07/11/2024    ALT 10 07/11/2024    ALKPHOS 64 07/11/2024    EGFR 53 07/11/2024     No results found for: \"TSH\"    Patient voiced understanding and agreement in the above discussion. Aware to contact our office with questions/symptoms in the interim.   "

## 2024-08-22 ENCOUNTER — HOSPITAL ENCOUNTER (OUTPATIENT)
Dept: INFUSION CENTER | Facility: HOSPITAL | Age: 74
Discharge: HOME | End: 2024-08-22
Payer: MEDICARE

## 2024-08-22 DIAGNOSIS — Z45.2 ENCOUNTER FOR CARE RELATED TO PORT-A-CATH: Primary | ICD-10-CM

## 2024-08-22 DIAGNOSIS — C65.2 CANCER OF LEFT RENAL PELVIS (HCC): ICD-10-CM

## 2024-08-22 PROCEDURE — 96523 IRRIG DRUG DELIVERY DEVICE: CPT

## 2024-08-22 NOTE — PROGRESS NOTES
Juan Amato  tolerated routine port flushwell with no complications.      Juan Amato is aware of future appt 10/3/24 1100    AVS printed and given to Juan Amato:  No (Declined by Juan Amato)

## 2024-09-06 NOTE — PROGRESS NOTES
Assessment:  1. Lumbar radiculopathy    2. Chemotherapy-induced neuropathy (HCC)    3. Lumbar disc herniation    4. Lumbar spondylosis    5. Uncomplicated opioid dependence (HCC)    6. Chronic pain syndrome        Plan:  Patient may continue Norco as prescribed.  Patient still has 2 prescriptions on file at the pharmacy to be filled therefore does not require refills today    [place] Prescription Drug Monitoring Program report was reviewed and was appropriate     There are risks associated with opioid medications, including dependence, addiction and tolerance. The patient understands and agrees to use these medications only as prescribed. Potential side effects of the medications include, but are not limited to, constipation, drowsiness, addiction, impaired judgment and risk of fatal overdose if not taken as prescribed. The patient was warned against driving while taking sedation medications.  Sharing medications is a felony. At this point in time, the patient is showing no signs of addiction, abuse, diversion or suicidal ideation.    2.  Continue with an exercise program  3.  Follow-up in 3 months or sooner if needed    History of Present Illness:    The patient is a 74 y.o. male with a history of metastatic urothelial carcinoma status post nephrectomy and chemo induced neuropathy, A-fib and chronic pain syndrome last seen on 6/18/2024 who presents for a follow up office visit in regards to chronic lumbosacral back pain that intermittently radiates into the lower extremities.  Patient is status post L5-S1 LESI July 10, 2024.  Patient does report that he received 75% improvement of his pain for about 2 weeks only.  He has also not had much long-term relief with previous bilateral L4 TFESI, bilateral SI joint injections or radiofrequency ablation    The patient rates his pain a 4 out of 10 on the numeric pain rating scale.  Pain is constant and most bothersome in the morning.  The pain is described as sharp.  He  continues on Norco 7.5/325 mg every 8 hours as needed pain with 60% improvement of his pain without side effects    Pain Contract Signed: 2/9/2024  Last Urine Drug Screen: 6/18/2024  BECKS/YESSENIA 2/9/2024  Last hydrocodone per pt. 9/9/2024  Last Narcan: 2/9/2024    I have personally reviewed and/or updated the patient's past medical history, past surgical history, family history, social history, current medications, allergies, and vital signs today.       Review of Systems:    Review of Systems   Respiratory:  Negative for shortness of breath.    Cardiovascular:  Negative for chest pain.   Gastrointestinal:  Negative for constipation, diarrhea, nausea and vomiting.   Musculoskeletal:  Negative for arthralgias, gait problem, joint swelling and myalgias.   Skin:  Negative for rash.   Neurological:  Negative for dizziness, seizures and weakness.   All other systems reviewed and are negative.        Past Medical History:   Diagnosis Date    A-fib (HCC)     Arthritis     Bladder cancer     Cancer (HCC)     skin melanoma;basal cell    Chronic pain disorder     from arthritis    Colon polyp     Does use hearing aid     bilat    Dry eyes, bilateral     GERD (gastroesophageal reflux disease)     History of kidney stones 10/2019    History of partial knee replacement     bilat    History of transfusion     History of vertigo     Hyperlipidemia     Hypertension     Infusion extravasation of chemotherapy vesicant 11/2021    Kidney lesion     Lumbar disc disorder     compression of vertebrae L4-5-6    Muscle weakness     left hip area    Renal mass     left    Right ankle injury 03/05/2020    missed step of ladder     Right ankle pain     Shortness of breath     with activity    Tinnitus     Urothelial cancer     left    Wears glasses        Past Surgical History:   Procedure Laterality Date    COLONOSCOPY      CYSTOSCOPY Left 04/01/2020    Procedure: CYSTOSCOPY; URETERAL CATHETER PLACEMENT;  Surgeon: Joe Cortes MD;   [facility];  Service: Urology    CYSTOSCOPY  2020    CYSTOSCOPY  2021    FL CYSTOGRAM  2020    FL RETROGRADE PYELOGRAM  2020    HERNIA REPAIR  10/25/2022    umbilical with mesh    INGUINAL HERNIA REPAIR Right     with mesh    IR PORT PLACEMENT  2020    JOINT REPLACEMENT Bilateral     partials knee    LUMBAR EPIDURAL INJECTION      NM CYSTO BLADDER W/URETERAL CATHETERIZATION Bilateral 2020    Procedure: CYSTOSCOPY; RIGHT RETROGRADE PYELOGRAM WITH RIGHT URETERAL CYTOLOGY SAMPLING; LEFT URETEROSCOPY WITH RENAL PELVIS BIOPSY AND LEFT STENT PLACEMENT;  Surgeon: Joe Cortes MD;  [facility];  Service: Urology    NM LAPAROSCOPY NEPHRECTOMY W/TOTAL URETERECTOMY Left 2020    Procedure: ROBOTIC LAPAROSCOPIC NEPHRO-URETERECTOMY;  Surgeon: Joe Cortes MD;  [facility];  Service: Urology    NM RPR AA HERNIA 1ST < 3 CM REDUCIBLE N/A 8/10/2023    Procedure: REPAIR HERNIA INCISIONAL LAPAROSCOPIC WITH MESH;  Surgeon: Gustavo Schmidt MD;  [facility];  Service: General    NM RPR AA HERNIA 1ST < 3 CM REDUCIBLE N/A 2024    Procedure: REPAIR HERNIA VENTRAL LAPAROSCOPIC WITH MESH;  Surgeon: Gustavo Schmidt MD;  [facility];  Service: General    SKIN CANCER EXCISION      surface melanoma    TONSILLECTOMY      WISDOM TOOTH EXTRACTION         Family History   Problem Relation Age of Onset    Liver cancer Father        Social History     Occupational History    Not on file   Tobacco Use    Smoking status: Former     Current packs/day: 0.00     Types: Cigarettes     Quit date:      Years since quittin.7    Smokeless tobacco: Never   Vaping Use    Vaping status: Never Used   Substance and Sexual Activity    Alcohol use: Not Currently     Comment: socially, rarely    Drug use: Never    Sexual activity: Yes     Partners: Female         Current Outpatient Medications:     amiodarone 200 mg tablet, Take 200 mg by mouth  "daily, Disp: , Rfl:     diltiazem (CARDIZEM) 60 mg tablet, 2 (two) times a day, Disp: , Rfl:     finasteride (PROSCAR) 5 mg tablet, Take 5 mg by mouth daily, Disp: , Rfl:     folic acid (FOLVITE) 1 mg tablet, Take 1 mg by mouth daily, Disp: , Rfl:     HYDROcodone-acetaminophen (Norco) 7.5-325 mg per tablet, Take 1 tablet by mouth every 8 (eight) hours as needed for pain Max Daily Amount: 3 tablets Do not start before July 17, 2024., Disp: 90 tablet, Rfl: 0    tamsulosin (FLOMAX) 0.4 mg, Take 1 capsule (0.4 mg total) by mouth daily with dinner, Disp: 30 capsule, Rfl: 12    zolpidem (AMBIEN) 5 mg tablet, Take 1 tablet (5 mg total) by mouth daily at bedtime as needed for sleep, Disp: 30 tablet, Rfl: 1    HYDROcodone-acetaminophen (Norco) 7.5-325 mg per tablet, Take 1 tablet by mouth every 8 (eight) hours as needed for pain Max Daily Amount: 3 tablets (Patient not taking: Reported on 8/13/2024), Disp: 90 tablet, Rfl: 0    HYDROcodone-acetaminophen (Norco) 7.5-325 mg per tablet, Take 1 tablet by mouth every 8 (eight) hours as needed for pain Max Daily Amount: 3 tablets Do not start before August 15, 2024. (Patient not taking: Reported on 9/9/2024), Disp: 90 tablet, Rfl: 0    Allergies   Allergen Reactions    Poison Ivy Extract Rash       Physical Exam:    /66   Pulse 56   Ht 5' 9\" (1.753 m)   Wt 93.9 kg (207 lb)   BMI 30.57 kg/m²     Constitutional:normal, well developed, well nourished, alert, in no distress and non-toxic and no overt pain behavior.  Eyes:anicteric  HEENT:grossly intact  Neck:supple, symmetric, trachea midline and no masses   Pulmonary:even and unlabored  Cardiovascular:No edema or pitting edema present  Skin:Normal without rashes or lesions and well hydrated  Psychiatric:Mood and affect appropriate  Neurologic:Cranial Nerves II-XII grossly intact  Musculoskeletal:antalgic gait      Imaging  No orders to display         No orders of the defined types were placed in this encounter.      "

## 2024-09-09 ENCOUNTER — OFFICE VISIT (OUTPATIENT)
Dept: PAIN MEDICINE | Facility: CLINIC | Age: 74
End: 2024-09-09
Payer: MEDICARE

## 2024-09-09 VITALS
BODY MASS INDEX: 30.66 KG/M2 | SYSTOLIC BLOOD PRESSURE: 112 MMHG | HEIGHT: 69 IN | WEIGHT: 207 LBS | HEART RATE: 56 BPM | DIASTOLIC BLOOD PRESSURE: 66 MMHG

## 2024-09-09 DIAGNOSIS — F11.20 UNCOMPLICATED OPIOID DEPENDENCE (HCC): ICD-10-CM

## 2024-09-09 DIAGNOSIS — M51.26 LUMBAR DISC HERNIATION: ICD-10-CM

## 2024-09-09 DIAGNOSIS — G62.0 CHEMOTHERAPY-INDUCED NEUROPATHY (HCC): ICD-10-CM

## 2024-09-09 DIAGNOSIS — M47.816 LUMBAR SPONDYLOSIS: ICD-10-CM

## 2024-09-09 DIAGNOSIS — G89.4 CHRONIC PAIN SYNDROME: ICD-10-CM

## 2024-09-09 DIAGNOSIS — T45.1X5A CHEMOTHERAPY-INDUCED NEUROPATHY (HCC): ICD-10-CM

## 2024-09-09 DIAGNOSIS — M54.16 LUMBAR RADICULOPATHY: Primary | ICD-10-CM

## 2024-09-09 PROCEDURE — 99214 OFFICE O/P EST MOD 30 MIN: CPT | Performed by: NURSE PRACTITIONER

## 2024-09-09 PROCEDURE — G2211 COMPLEX E/M VISIT ADD ON: HCPCS | Performed by: NURSE PRACTITIONER

## 2024-09-13 NOTE — PROGRESS NOTES
Ambulatory Visit  Name: Juan Amato      : 1950      MRN: [id number]  Encounter Provider: Gustavo Schmidt MD  Encounter Date: 2024   Encounter department: [facility] SURG[facility] [place]    Assessment & Plan      History of Present Illness   {Disappearing Hyperlinks I Encounters * My Last Note * Since Last Visit * History :40147}  Juan Amato is a 74 y.o. male who presents for a Recurrent incisional hernia     {History obtained from (Optional):90179}  Review of Systems  {Select to Display PMH (Optional):75627}      Objective   {Disappearing Hyperlinks   Review Vitals * Enter New Vitals * Results Review * Labs * Imaging * Cardiology * Procedures * Lung Cancer Screening * Surgical eConsent :83111}  There were no vitals taken for this visit.    Physical Exam  {Administrative / Billing Section (Optional):95282}

## 2024-09-16 ENCOUNTER — OFFICE VISIT (OUTPATIENT)
Dept: SURGERY | Facility: CLINIC | Age: 74
End: 2024-09-16
Payer: MEDICARE

## 2024-09-16 VITALS
BODY MASS INDEX: 30.81 KG/M2 | WEIGHT: 208 LBS | HEIGHT: 69 IN | OXYGEN SATURATION: 97 % | DIASTOLIC BLOOD PRESSURE: 84 MMHG | TEMPERATURE: 97.3 F | RESPIRATION RATE: 16 BRPM | HEART RATE: 60 BPM | SYSTOLIC BLOOD PRESSURE: 126 MMHG

## 2024-09-16 DIAGNOSIS — K43.2 RECURRENT INCISIONAL HERNIA: Primary | ICD-10-CM

## 2024-09-16 PROCEDURE — 99213 OFFICE O/P EST LOW 20 MIN: CPT | Performed by: SURGERY

## 2024-09-16 NOTE — PROGRESS NOTES
Ambulatory Visit  Name: Juan Amato      : 1950      MRN: [id number]  Encounter Provider: Gustavo Schmidt MD  Encounter Date: 2024   Encounter department: [facility] GENERAL SURGERY [place]    Assessment & Plan  Recurrent incisional hernia  Patient is a pleasant 74-year-old male status post laparoscopic incisional hernia repair on May 31, 2024 returning with a new recurrence of his ventral incisional hernia in the epigastrium at the top left edge of his prior repair.    The patient states that his symptoms are principally psychological.  He denies any true physical discomfort associated with the hernia.    On physical examination he is well-appearing.  Bin competent reliable as a historian.  He is accompanied by his flores wife in the office today.  His abdomen is soft, he has an obvious asymmetry in the epigastrium to the left of midline which is soft and reducible consistent with a diagnosis of recurrent incisional hernia.    The diagnosis confirmed with the patient.  He has been reassured that he is safe.  He is planning to travel to [place] next week which I believe to be safe and reasonable.  I look forward to seeing him back in 2 months time to reassess his symptom complex and to make additional treatment recommendations accordingly.           History of Present Illness     Juan Amato is a 74 y.o. male who presents with a recurrent inc hernia that developed after his ventral hernia rpr on 24. Theres a bugle that keeps getting bigger and he suffers from a mild sharp pain in the morning after eating. Pt denies nausea/vomiting, diarrhea/constipation, issues with urination, trouble eating/drinking/swallowing, or fevers/chills. Pt is not on any blood thinners and has an allergy to poison ilana. [email or web address]    History obtained from : patient  Review of Systems   Constitutional:  Negative for chills and fever.   HENT:  Negative for ear pain and sore throat.    Eyes:  Negative for  pain and visual disturbance.   Respiratory:  Negative for cough and shortness of breath.    Cardiovascular:  Negative for chest pain and palpitations.   Gastrointestinal:  Negative for abdominal pain and vomiting.   Genitourinary:  Negative for dysuria and hematuria.   Musculoskeletal:  Negative for arthralgias and back pain.   Skin:  Negative for color change and rash.   Neurological:  Negative for seizures and syncope.   All other systems reviewed and are negative.    Pertinent Medical History       Medical History Reviewed by provider this encounter:       Past Medical History   Past Medical History:   Diagnosis Date    A-fib (HCC)     Arthritis     Bladder cancer     Cancer (HCC)     skin melanoma;basal cell    Chronic pain disorder     from arthritis    Colon polyp     Does use hearing aid     bilat    Dry eyes, bilateral     GERD (gastroesophageal reflux disease)     History of kidney stones 10/2019    History of partial knee replacement     bilat    History of transfusion     History of vertigo     Hyperlipidemia     Hypertension     Infusion extravasation of chemotherapy vesicant 11/2021    Kidney lesion     Lumbar disc disorder     compression of vertebrae L4-5-6    Muscle weakness     left hip area    Renal mass     left    Right ankle injury 03/05/2020    missed step of ladder     Right ankle pain     Shortness of breath     with activity    Tinnitus     Urothelial cancer     left    Wears glasses      Past Surgical History:   Procedure Laterality Date    COLONOSCOPY      CYSTOSCOPY Left 04/01/2020    Procedure: CYSTOSCOPY; URETERAL CATHETER PLACEMENT;  Surgeon: Joe Cortes MD;  [facility];  Service: Urology    CYSTOSCOPY  05/11/2020    CYSTOSCOPY  06/04/2021    FL CYSTOGRAM  04/13/2020    FL RETROGRADE PYELOGRAM  01/17/2020    HERNIA REPAIR  10/25/2022    umbilical with mesh    INGUINAL HERNIA REPAIR Right     with mesh    IR PORT PLACEMENT  04/30/2020    JOINT REPLACEMENT Bilateral      partials knee    LUMBAR EPIDURAL INJECTION      [place] CYSTO BLADDER W/URETERAL CATHETERIZATION Bilateral 01/17/2020    Procedure: CYSTOSCOPY; RIGHT RETROGRADE PYELOGRAM WITH RIGHT URETERAL CYTOLOGY SAMPLING; LEFT URETEROSCOPY WITH RENAL PELVIS BIOPSY AND LEFT STENT PLACEMENT;  Surgeon: Joe Cortes MD;  [facility];  Service: Urology    TX LAPAROSCOPY NEPHRECTOMY W/TOTAL URETERECTOMY Left 04/01/2020    Procedure: ROBOTIC LAPAROSCOPIC NEPHRO-URETERECTOMY;  Surgeon: Joe Cortes MD;  [facility];  Service: Urology    TX RPR AA HERNIA 1ST < 3 CM REDUCIBLE N/A 8/10/2023    Procedure: REPAIR HERNIA INCISIONAL LAPAROSCOPIC WITH MESH;  Surgeon: Gustavo Schmidt MD;  [facility];  Service: General    TX RPR AA HERNIA 1ST < 3 CM REDUCIBLE N/A 5/31/2024    Procedure: REPAIR HERNIA VENTRAL LAPAROSCOPIC WITH MESH;  Surgeon: Gustavo Schmidt MD;  [facility];  Service: General    SKIN CANCER EXCISION      surface melanoma    TONSILLECTOMY      WISDOM TOOTH EXTRACTION       Family History   Problem Relation Age of Onset    Liver cancer Father      Current Outpatient Medications on File Prior to Visit   Medication Sig Dispense Refill    amiodarone 200 mg tablet Take 200 mg by mouth daily      diltiazem (CARDIZEM) 60 mg tablet 2 (two) times a day      finasteride (PROSCAR) 5 mg tablet Take 5 mg by mouth daily      folic acid (FOLVITE) 1 mg tablet Take 1 mg by mouth daily      HYDROcodone-acetaminophen (Norco) 7.5-325 mg per tablet Take 1 tablet by mouth every 8 (eight) hours as needed for pain Max Daily Amount: 3 tablets Do not start before July 17, 2024. 90 tablet 0    zolpidem (AMBIEN) 5 mg tablet Take 1 tablet (5 mg total) by mouth daily at bedtime as needed for sleep 30 tablet 1    HYDROcodone-acetaminophen (Norco) 7.5-325 mg per tablet Take 1 tablet by mouth every 8 (eight) hours as needed for pain Max Daily Amount: 3 tablets (Patient not taking: Reported on 8/13/2024)  90 tablet 0    HYDROcodone-acetaminophen (Norco) 7.5-325 mg per tablet Take 1 tablet by mouth every 8 (eight) hours as needed for pain Max Daily Amount: 3 tablets Do not start before August 15, 2024. (Patient not taking: Reported on 9/9/2024) 90 tablet 0    tamsulosin (FLOMAX) 0.4 mg Take 1 capsule (0.4 mg total) by mouth daily with dinner (Patient not taking: Reported on 9/16/2024) 30 capsule 12     No current facility-administered medications on file prior to visit.     Allergies   Allergen Reactions    Poison Fior Extract Rash      Current Outpatient Medications on File Prior to Visit   Medication Sig Dispense Refill    amiodarone 200 mg tablet Take 200 mg by mouth daily      diltiazem (CARDIZEM) 60 mg tablet 2 (two) times a day      finasteride (PROSCAR) 5 mg tablet Take 5 mg by mouth daily      folic acid (FOLVITE) 1 mg tablet Take 1 mg by mouth daily      HYDROcodone-acetaminophen (Norco) 7.5-325 mg per tablet Take 1 tablet by mouth every 8 (eight) hours as needed for pain Max Daily Amount: 3 tablets Do not start before July 17, 2024. 90 tablet 0    zolpidem (AMBIEN) 5 mg tablet Take 1 tablet (5 mg total) by mouth daily at bedtime as needed for sleep 30 tablet 1    HYDROcodone-acetaminophen (Norco) 7.5-325 mg per tablet Take 1 tablet by mouth every 8 (eight) hours as needed for pain Max Daily Amount: 3 tablets (Patient not taking: Reported on 8/13/2024) 90 tablet 0    HYDROcodone-acetaminophen (Norco) 7.5-325 mg per tablet Take 1 tablet by mouth every 8 (eight) hours as needed for pain Max Daily Amount: 3 tablets Do not start before August 15, 2024. (Patient not taking: Reported on 9/9/2024) 90 tablet 0    tamsulosin (FLOMAX) 0.4 mg Take 1 capsule (0.4 mg total) by mouth daily with dinner (Patient not taking: Reported on 9/16/2024) 30 capsule 12     No current facility-administered medications on file prior to visit.      Social History     Tobacco Use    Smoking status: Former     Current packs/day: 0.00      "Types: Cigarettes     Quit date: 1972     Years since quittin.7    Smokeless tobacco: Never   Vaping Use    Vaping status: Never Used   Substance and Sexual Activity    Alcohol use: Not Currently     Comment: socially, rarely    Drug use: Never    Sexual activity: Yes     Partners: Female         Objective     /84 (BP Location: Left arm, Patient Position: Sitting, Cuff Size: Standard)   Pulse 60   Temp (!) 97.3 °F (36.3 °C) (Temporal)   Resp 16   Ht 5' 9\" (1.753 m)   Wt 94.3 kg (208 lb)   SpO2 97%   BMI 30.72 kg/m²     Physical Exam  Vitals and nursing note reviewed.   Constitutional:       General: He is not in acute distress.     Appearance: He is well-developed.   HENT:      Head: Normocephalic and atraumatic.   Eyes:      Conjunctiva/sclera: Conjunctivae normal.   Cardiovascular:      Rate and Rhythm: Normal rate and regular rhythm.      Heart sounds: No murmur heard.  Pulmonary:      Effort: Pulmonary effort is normal. No respiratory distress.      Breath sounds: Normal breath sounds.   Abdominal:      Palpations: Abdomen is soft.      Tenderness: There is no abdominal tenderness.      Comments: Abdominal exam as described above   Musculoskeletal:         General: No swelling.      Cervical back: Neck supple.   Skin:     General: Skin is warm and dry.      Capillary Refill: Capillary refill takes less than 2 seconds.   Neurological:      Mental Status: He is alert.   Psychiatric:         Mood and Affect: Mood normal.       Administrative Statements   I have spent a total time of 15 minutes in caring for this patient on the day of the visit/encounter including Risks and benefits of tx options.  "

## 2024-09-16 NOTE — ASSESSMENT & PLAN NOTE
Patient is a pleasant 74-year-old male status post laparoscopic incisional hernia repair on May 31, 2024 returning with a new recurrence of his ventral incisional hernia in the epigastrium at the top left edge of his prior repair.    The patient states that his symptoms are principally psychological.  He denies any true physical discomfort associated with the hernia.    On physical examination he is well-appearing.  Pleasant competent reliable as a historian.  He is accompanied by his flores wife in the office today.  His abdomen is soft, he has an obvious asymmetry in the epigastrium to the left of midline which is soft and reducible consistent with a diagnosis of recurrent incisional hernia.    The diagnosis confirmed with the patient.  He has been reassured that he is safe.  He is planning to travel to [place] next week which I believe to be safe and reasonable.  I look forward to seeing him back in 2 months time to reassess his symptom complex and to make additional treatment recommendations accordingly.

## 2024-09-27 ENCOUNTER — OFFICE VISIT (OUTPATIENT)
Dept: CARDIOLOGY CLINIC | Facility: CLINIC | Age: 74
End: 2024-09-27
Payer: MEDICARE

## 2024-09-27 VITALS
HEART RATE: 60 BPM | HEIGHT: 69 IN | BODY MASS INDEX: 31.1 KG/M2 | WEIGHT: 210 LBS | DIASTOLIC BLOOD PRESSURE: 60 MMHG | SYSTOLIC BLOOD PRESSURE: 110 MMHG | OXYGEN SATURATION: 93 % | RESPIRATION RATE: 16 BRPM

## 2024-09-27 DIAGNOSIS — I48.0 PAROXYSMAL ATRIAL FIBRILLATION (HCC): Primary | ICD-10-CM

## 2024-09-27 DIAGNOSIS — I50.89 OTHER HEART FAILURE (HCC): ICD-10-CM

## 2024-09-27 DIAGNOSIS — I10 ESSENTIAL HYPERTENSION: ICD-10-CM

## 2024-09-27 DIAGNOSIS — Z51.81 THERAPEUTIC DRUG MONITORING: ICD-10-CM

## 2024-09-27 PROCEDURE — 99214 OFFICE O/P EST MOD 30 MIN: CPT | Performed by: NURSE PRACTITIONER

## 2024-09-27 NOTE — ASSESSMENT & PLAN NOTE
Maintaining sinus rhythm by exam  Continue Cardizem 60 mg twice daily and amiodarone 200 mg daily  Anticoagulation has been previously discontinued due to bleeding issues.

## 2024-09-27 NOTE — PROGRESS NOTES
" Patient ID: Juan Amato is a 74 y.o. male.        Plan:      Assessment & Plan  Paroxysmal atrial fibrillation (HCC)  Maintaining sinus rhythm by exam  Continue Cardizem 60 mg twice daily and amiodarone 200 mg daily  Anticoagulation has been previously discontinued due to bleeding issues.    Therapeutic drug monitoring  Check TSH as he is taking amiodarone  Essential hypertension  Blood pressures well controlled        Follow up Plan/Summary Comments:  Annita is doing well from a cardiac perspective.  I have not recommended any changes today.      Thyroid function has not been assessed recently, so I provided him a slip to have this done at his convenience.    Follow-up in 1 year or sooner if needed      HPI: I the pleasure of seeing Annita in the office today for a routine visit.    Annita is followed in our office for PAF and hypertension.    Since his last visit 9/20/2023, Annita has been doing well.  He kept himself busy doing gardening and pulling weeds over the summer.  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    At baseline he has chronic exertional dyspnea though this has not worsened recently.    Annita denies any chest discomfort, palpitations, dizziness, lightheadedness, syncope.        Review of Systems   10  point ROS  was otherwise non pertinent or negative except as per HPI or as below.   Gait: Normal      Most recent or relevant cardiac/vascular testing:    Nuclear stress test 05/12/2022 normal LV function, no ischemia by perfusion imaging    Echocardiogram 04/06/2022  EF 75%, hyperdynamic  Mild LVH, mild diastolic dysfunction      Objective:     /60 (BP Location: Left arm, Patient Position: Sitting, Cuff Size: Large)   Pulse 60   Resp 16   Ht 5' 8.5\" (1.74 m)   Wt 95.3 kg (210 lb)   SpO2 93%   BMI 31.46 kg/m²     PHYSICAL EXAM:    General:  Normal appearance, no acute distress  Eyes:  Anicteric.  Oral mucosa:  Moist.  Neck:  No JVD. Carotid upstrokes " are brisk without bruits.  No masses.  Chest:  Clear to auscultation   Cardiac:  No palpable PMI.  Normal S1 and S2.  No murmur gallop or rub.  Abdomen:  Soft and nontender. No palpable organomegaly or aortic enlargement.  Extremities:  trace bilat LE edema  Musculoskeletal:  Symmetric.   Vascular:  Pedal pulses are intact.  Neuro:  Grossly symmetric.  Psych:  Alert and oriented x3.    Allergies   Allergen Reactions    Poison Ivy Extract Rash       Current Outpatient Medications:     amiodarone 200 mg tablet, Take 200 mg by mouth daily, Disp: , Rfl:     diltiazem (CARDIZEM) 60 mg tablet, 2 (two) times a day, Disp: , Rfl:     finasteride (PROSCAR) 5 mg tablet, Take 5 mg by mouth daily, Disp: , Rfl:     folic acid (FOLVITE) 1 mg tablet, Take 1 mg by mouth daily, Disp: , Rfl:     HYDROcodone-acetaminophen (Norco) 7.5-325 mg per tablet, Take 1 tablet by mouth every 8 (eight) hours as needed for pain Max Daily Amount: 3 tablets Do not start before July 17, 2024., Disp: 90 tablet, Rfl: 0    zolpidem (AMBIEN) 5 mg tablet, Take 1 tablet (5 mg total) by mouth daily at bedtime as needed for sleep, Disp: 30 tablet, Rfl: 1    HYDROcodone-acetaminophen (Norco) 7.5-325 mg per tablet, Take 1 tablet by mouth every 8 (eight) hours as needed for pain Max Daily Amount: 3 tablets (Patient not taking: Reported on 8/13/2024), Disp: 90 tablet, Rfl: 0    HYDROcodone-acetaminophen (Norco) 7.5-325 mg per tablet, Take 1 tablet by mouth every 8 (eight) hours as needed for pain Max Daily Amount: 3 tablets Do not start before August 15, 2024. (Patient not taking: Reported on 9/9/2024), Disp: 90 tablet, Rfl: 0    tamsulosin (FLOMAX) 0.4 mg, Take 1 capsule (0.4 mg total) by mouth daily with dinner (Patient not taking: Reported on 9/16/2024), Disp: 30 capsule, Rfl: 12  Past Medical History:   Diagnosis Date    A-fib (HCC)     Arthritis     Bladder cancer     Cancer (HCC)     skin melanoma;basal cell    Chemotherapy-induced neuropathy (HCC)      Chronic pain disorder     from arthritis    Colon polyp     Does use hearing aid     bilat    Dry eyes, bilateral     GERD (gastroesophageal reflux disease)     History of kidney stones 10/2019    History of partial knee replacement     bilat    History of transfusion     History of vertigo     Hyperlipidemia     Hypertension     Infusion extravasation of chemotherapy vesicant 11/2021    Kidney lesion     Lumbar disc disorder     compression of vertebrae L4-5-6    Muscle weakness     left hip area    Renal mass     left    Right ankle injury 03/05/2020    missed step of ladder     Right ankle pain     Shortness of breath     with activity    Tinnitus     Urothelial cancer     left    Wears glasses      Past Surgical History:   Procedure Laterality Date    COLONOSCOPY      CYSTOSCOPY Left 04/01/2020    Procedure: CYSTOSCOPY; URETERAL CATHETER PLACEMENT;  Surgeon: Joe Cortes MD;  Location[facility];  Service: Urology    CYSTOSCOPY  05/11/2020    CYSTOSCOPY  06/04/2021    FL CYSTOGRAM  04/13/2020    FL RETROGRADE PYELOGRAM  01/17/2020    HERNIA REPAIR  10/25/2022    umbilical with mesh    INGUINAL HERNIA REPAIR Right     with mesh    IR PORT PLACEMENT  04/30/2020    JOINT REPLACEMENT Bilateral     partials knee    LUMBAR EPIDURAL INJECTION      NM CYSTO BLADDER W/URETERAL CATHETERIZATION Bilateral 01/17/2020    Procedure: CYSTOSCOPY; RIGHT RETROGRADE PYELOGRAM WITH RIGHT URETERAL CYTOLOGY SAMPLING; LEFT URETEROSCOPY WITH RENAL PELVIS BIOPSY AND LEFT STENT PLACEMENT;  Surgeon: Joe Cortes MD;  Location: [facility];  Service: Urology    NM LAPAROSCOPY NEPHRECTOMY W/TOTAL URETERECTOMY Left 04/01/2020    Procedure: ROBOTIC LAPAROSCOPIC NEPHRO-URETERECTOMY;  Surgeon: Joe Cortes MD;  Location[facility];  Service: Urology    NM RPR AA HERNIA 1ST < 3 CM REDUCIBLE N/A 8/10/2023    Procedure: REPAIR HERNIA INCISIONAL LAPAROSCOPIC WITH MESH;  Surgeon: Gustavo Schmidt MD;  Location: [facility];  Service: General    PA RPR AA HERNIA 1ST < 3 CM REDUCIBLE N/A 2024    Procedure: REPAIR HERNIA VENTRAL LAPAROSCOPIC WITH MESH;  Surgeon: Gustavo Schmidt MD;  Location: [facility];  Service: General    SKIN CANCER EXCISION      surface melanoma    TONSILLECTOMY      WISDOM TOOTH EXTRACTION         CMP:   Lab Results   Component Value Date    K 4.0 2024     2024    CO2 26 2024    BUN 24 2024    CREATININE 1.29 2024    EGFR 53 2024     Lipid Profile:    Lab Results   Component Value Date    TRIG 92 2022    HDL 59 2022         Social History     Tobacco Use   Smoking Status Former    Current packs/day: 0.00    Types: Cigarettes    Quit date:     Years since quittin.7   Smokeless Tobacco Never

## 2024-10-03 ENCOUNTER — HOSPITAL ENCOUNTER (OUTPATIENT)
Dept: INFUSION CENTER | Facility: HOSPITAL | Age: 74
End: 2024-10-03
Attending: INTERNAL MEDICINE
Payer: MEDICARE

## 2024-10-03 VITALS — TEMPERATURE: 97.6 F

## 2024-10-03 DIAGNOSIS — C65.2 CANCER OF LEFT RENAL PELVIS (HCC): ICD-10-CM

## 2024-10-03 DIAGNOSIS — I48.0 PAROXYSMAL ATRIAL FIBRILLATION (HCC): ICD-10-CM

## 2024-10-03 DIAGNOSIS — Z51.81 THERAPEUTIC DRUG MONITORING: ICD-10-CM

## 2024-10-03 DIAGNOSIS — Z45.2 ENCOUNTER FOR CARE RELATED TO PORT-A-CATH: Primary | ICD-10-CM

## 2024-10-03 LAB — TSH SERPL DL<=0.05 MIU/L-ACNC: 3.8 UIU/ML (ref 0.45–4.5)

## 2024-10-03 PROCEDURE — 84443 ASSAY THYROID STIM HORMONE: CPT

## 2024-10-03 NOTE — PROGRESS NOTES
Port flushed freely.  Good blood return noted.  Central lab drawn per orders.  Port deaccessed without issue.  Patient tolerated well.        Juan Amato is aware of future appt on 11/14/24 at 1130.     AVS -  No (Declined by Juan Amato) Patient has Mychart.    Patient ambulated off unit without incident.  All personal belongings taken with patient.

## 2024-11-14 ENCOUNTER — HOSPITAL ENCOUNTER (OUTPATIENT)
Dept: INFUSION CENTER | Facility: HOSPITAL | Age: 74
Discharge: HOME | End: 2024-11-14
Payer: MEDICARE

## 2024-11-14 VITALS — TEMPERATURE: 96.8 F

## 2024-11-14 DIAGNOSIS — Z45.2 ENCOUNTER FOR CARE RELATED TO PORT-A-CATH: Primary | ICD-10-CM

## 2024-11-14 DIAGNOSIS — C65.2 CANCER OF LEFT RENAL PELVIS (HCC): ICD-10-CM

## 2024-11-14 PROCEDURE — 96523 IRRIG DRUG DELIVERY DEVICE: CPT

## 2024-11-14 NOTE — PROGRESS NOTES
Juan Amato  tolerated treatment well with no complications.  Port flushed per protocol, + blood return.    Juan Amato is aware of future appt on 12/26 at 11:30 am.     AVS declined by Juan Amato.  Appt card provided.

## 2024-11-27 NOTE — PROGRESS NOTES
Hematology/Oncology Outpatient Follow-up  Daryle Baptist 66 y o  male 1950 [id number]    Date:  5/25/2021      Assessment and Plan:  1  Urothelial cancer [facility])  Patient will be continued on his current palliative treatment with immunotherapy Keytruda on every three-week basis which he is tolerating well; is due for treatment again this Friday 05/28/2021  Will aim to do repeat imaging with a PET-CT scan around July or August   He will be back for follow-up again in 3 weeks with repeat labs prior     - CBC and differential; Future  - Comprehensive metabolic panel; Future  - TSH, 3rd generation with Free T4 reflex; Future  - T3, free; Future  - CBC and differential; Future  - Comprehensive metabolic panel; Future  - Magnesium; Future  - LD,Blood; Future  - Uric acid; Future  - TSH, 3rd generation with Free T4 reflex; Future    2  Myalgia  Patient is reporting ongoing myalgias mainly to the neck, shoulders and thighs; is tolerable  Improves for approximately 12 hours after he takes his prednisone 10 mg   Etiology is not entirely clear perhaps secondary to low magnesium, statin use over even immune mediated effect  He was advised to increase his potassium supplement to twice a day  Could also consider changing his prednisone 10 mg dosing to 5 mg in the a m  and 5 mg in the p m  to see if this would improve his symptoms for a longer period of time  Additional workup ordered to be done before his next office visit to rule out immune mediated myalgias  - Comprehensive metabolic panel; Future  - Magnesium; Future  - Aldolase; Future  - CK; Future  - C-reactive protein; Future  - Sedimentation rate, automated; Future  - Cyclic citrul peptide antibody, IgG; Future      HPI:  Patient presents today for a follow-up visit accompanied by his wife  He recently returned from a trip to [place] which he enjoyed  Wife reports that he is doing well has been doing lot of work around the [employer] trees in [place]   He [employer]. Message sent to [employer] to update.   mentions that he has been experiencing some myalgias/ muscular discomfort to the posterior neck, upper shoulders and thighs; discomfort is tolerable but persistent  States that he does not experience the muscular discomfort from 10:00 a m  to 10:00 p m  after he takes his low-dose prednisone 10 mg  Otherwise he is doing well and has no new complaints  His most recent laboratory studies from yesterday 05/24/2021 showed normal white cells and platelets, stable normocytic anemia H&H 12 3/37 5, MCV 94  Glucose 119, total protein 5 9, kidney function is stable if not somewhat improved BUN 24, creatinine 1 37 with GFR 51  His magnesium is low 1 6 despite taking daily magnesium supplement  Uric acid is back to the normal range 4 5 after increasing his allopurinol dose to 300 mg  TSH normal 2 58  Oncology History Overview Note   Patient has a history of hypertension, hyperlipidemia, chronic lower back pain  He had an MRI of the lower spine for further evaluation of his chronic lower back pain  The MRI on 12/02/2019 revealed Multiple left renal masses to include a left lower pole cyst and a rounded structure in the left upper pole which may also represent cyst   Left upper pole renal masses approximately 3 cm in greatest linear dimension  A CT with renal protocol was done on 12/12/2019 which also showed the infiltrating lesion in the upper pole of the left kidney measuring about the 3 5 cm in greatest dimension without any hint of retroperitoneal lymphadenopathy  A CT scan of the abdomen pelvis on 01/14/2020 showed the same findings with the mild hydronephrosis on the left  The urine cytology on 01/03/2020 showed high-grade urothelial carcinoma  A cystoscopy was then done, a biopsy was taken from the left renal pelvic region which showed high-grade urothelial carcinoma without evidence of lamina propria invasion  The detrusor muscle/muscularis propria were not present for evaluation       The recommendation was to pursue left robotic assisted laparoscopic nephroureterectomy with bladder cuff excision which was done on 04/01/2020  The final pathology revealed;  - Invasive high grade urothelial carcinoma arising in renal pelvis  - Bladder cuff margin is negative for carcinoma and no evidence of high grade dysplasia  - Ureters with no significant pathologic abnormality  - Two benign simple cysts  - Adrenal gland is negative for malignancy  - One lymph node, negative for malignancy (0/1)  The tumor size was 4 5 cm with invasion beyond the muscularis into the periurethral fat or peripelvic fat or the renal parenchyma  The margins were uninvolved by carcinoma or carcinoma in situ  The final pathology was pT3 pN0  Patient barely tolerated 1 cycle of adjuvant chemotherapy with split dose cisplatin and gemcitabine with a lot of side effects  The treatment had to be discontinued due to significant worsening renal dysfunction 6/2020  Patient started to complain about significant abdominal/left inguinal pain around August/September 2020  Unfortunately repeat imaging revealed recurrent/metastatic disease  9/4/20 CT C/A/P- Status post left nephrectomy for resection of urothelial neoplasm  Lymphadenopathy in the left nephrectomy bed has increased since the prior examination, concerning for metastatic disease  Ill-defined hyperattenuating masslike focus in the left rectus muscle, not identified on the prior examination  This is suspicious for a metastatic lesion  A rectus hematoma is also considered  9/23/20 PET scan- 1  Multiple FDG avid lymph nodes in the retrocrural region, retroperitoneum and the left renal bed compatible with metastasis  These have progressed from recent CT  2   FDG avid mass involving the left rectus abdominal musculature compatible with metastasis which is larger from recent CT    3  Several small lymph nodes adjacent to the mid to distal esophagus with FDG uptake suspicious for metastasis  Small focus of FDG uptake also suggested in the right hilar region, metastasis is not excluded  This could be reassessed on follow-up  4   Subtle focus of FDG uptake at the T2 level on the left, nonspecific, could be related to early degenerative changes, developing metastasis is not entirely excluded  This could be reassessed on follow-up  5  Prostate is mildly prominent with heterogeneous FDG uptake  This could be inflammatory  Correlate for prostatitis  He completed palliative radiation to the left abdomen 12/3/20     Urothelial cancer ([facility] [address] )   1/3/2020 Biopsy    Final Diagnosis    A  Urine, Clean Catch, Thin Prep:  High grade urothelial carcinoma (HGUC) - see comment  1/3/2020 Initial Diagnosis    Urothelial cancer ([facility] [address] )  T3 N0 (stage IIIA)     1/17/2020 Biopsy    Final Diagnosis    A  Ureter, Right, left renal pelvis biopsy  -Fragments of high grade urothelial carcinoma   -No evidence of lamina propria invasion   -Detrusor muscle/ muscularis propria is not present for evaluation  B  Urinary Bladder, bladder biopsy:  -Fragments of low grade papillary lesion  See note  -No evidence of invasion seen  -Unremarkable fragment of detrusor muscle seen  4/1/2020 Surgery    left robotic assisted laparoscopic nephroureterectomy with bladder cuff excision     Final Diagnosis    A  Left kidney, ureter, and bladder cuff, nephroureterectomy:  - Invasive high grade urothelial carcinoma arising in renal pelvis  - Bladder cuff margin is negative for carcinoma and no evidence of high grade dysplasia  - Ureters with no significant pathologic abnormality  - Two benign simple cysts  - Adrenal gland is negative for malignancy  - One lymph node, negative for malignancy (0/1)          5/14/2020 - 6/3/2020 Chemotherapy    CISplatin (PLATINOL) split dose 35 mg/m2 = 75 3 mg (50 % of original dose 70 mg/m2), Intravenous,   Administration: 75 3 mg (5/14/2020), 75 3 mg (5/21/2020)  gemcitabine (GEMZAR) 2,200 mg in sodium chloride 0 9 % 250 mL infusion, 2,150 2 mg (80 % of original dose 1,250 mg/m2), Intravenous,   Administration: 2,200 mg (5/14/2020), 2,200 mg (5/21/2020)    (only completed 1 cycle- d/c d/t adverse effects and worsening renal dysfunction)     10/9/2020 -  Chemotherapy    pembrolizumab (KEYTRUDA) 200 mg in sodium chloride 0 9 % 50 mL IVPB, 200 mg, Intravenous, Once, 11 of 20 cycles  Administration: 200 mg (10/9/2020), 200 mg (10/30/2020), 200 mg (11/20/2020), 200 mg (12/11/2020), 200 mg (12/31/2020), 200 mg (1/22/2021), 200 mg (2/12/2021), 200 mg (3/5/2021), 200 mg (3/26/2021), 200 mg (4/16/2021), 200 mg (5/7/2021)     11/19/2020 - 12/3/2020 Radiation    Treatment:  Course: C1    Plan ID Energy Fractions Dose per Fraction (cGy) Dose Correction (cGy) Total Dose Delivered (cGy) Elapsed Days   L Abdomen 6X 10 / 10 300 0 3,000 14        Cancer of left renal pelvis ([facility] [address] )   4/23/2020 Initial Diagnosis    Cancer of left renal pelvis ([facility] [address] )     10/9/2020 -  Chemotherapy    pembrolizumab (KEYTRUDA) 200 mg in sodium chloride 0 9 % 50 mL IVPB, 200 mg, Intravenous, Once, 11 of 20 cycles  Administration: 200 mg (10/9/2020), 200 mg (10/30/2020), 200 mg (11/20/2020), 200 mg (12/11/2020), 200 mg (12/31/2020), 200 mg (1/22/2021), 200 mg (2/12/2021), 200 mg (3/5/2021), 200 mg (3/26/2021), 200 mg (4/16/2021), 200 mg (5/7/2021)         Interval history:    ROS: Review of Systems   Constitutional: Negative for activity change, appetite change, chills, fatigue, fever and unexpected weight change  HENT: Positive for hearing loss  Negative for congestion, mouth sores, nosebleeds, sore throat and trouble swallowing  Eyes: Negative  Respiratory: Negative for cough, chest tightness and shortness of breath  Cardiovascular: Negative for chest pain, palpitations and leg swelling     Gastrointestinal: Negative for abdominal distention, abdominal pain, blood in stool, constipation, diarrhea, nausea and vomiting  Genitourinary: Negative for difficulty urinating, dysuria, frequency, hematuria and urgency  Musculoskeletal: Positive for back pain and myalgias  Negative for arthralgias, gait problem and joint swelling  Skin: Positive for rash  Negative for color change and pallor  Neurological: Negative for dizziness, weakness, light-headedness, numbness and headaches  Hematological: Negative for adenopathy  Does not bruise/bleed easily  Psychiatric/Behavioral: Negative for dysphoric mood and sleep disturbance  The patient is not nervous/anxious          Past Medical History:   Diagnosis Date    Arthritis     Bladder cancer     Cancer ([facility] [address] )     skin melanoma;basal cell    Chronic pain disorder     from arthritis    Colon polyp     Does use hearing aid     bilat    Dry eyes, bilateral     GERD (gastroesophageal reflux disease)     History of kidney stones 10/2019    History of partial knee replacement     bilat    History of vertigo     Hyperlipidemia     Hypertension     Kidney lesion     Lumbar disc disorder     compression of vertebrae L4-5-6    Muscle weakness     left hip area    Renal mass     left    Right ankle injury 03/05/2020    missed step of ladder     Right ankle pain     Shortness of breath     with activity    Tinnitus     Urothelial cancer     left    Wears glasses        Past Surgical History:   Procedure Laterality Date    COLONOSCOPY      CYSTOSCOPY Left 4/1/2020    Procedure: CYSTOSCOPY; URETERAL CATHETER PLACEMENT;  Surgeon: Jose F Hu MD;  Location[facility];  Service: Urology    CYSTOSCOPY  05/11/2020    FL CYSTOGRAM  4/13/2020    FL RETROGRADE PYELOGRAM  1/17/2020    HERNIA REPAIR      umbilical with mesh    INGUINAL HERNIA REPAIR Right     with mesh    IR PORT PLACEMENT  4/30/2020    JOINT REPLACEMENT Bilateral     partials knee    LUMBAR EPIDURAL INJECTION      IN CYSTOURETHROSCOPY,URETER CATHETER Bilateral 2020    Procedure: CYSTOSCOPY; RIGHT RETROGRADE PYELOGRAM WITH RIGHT URETERAL CYTOLOGY SAMPLING; LEFT URETEROSCOPY WITH RENAL PELVIS BIOPSY AND LEFT STENT PLACEMENT;  Surgeon: Keven Hill MD;  [facility];  Service: Urology    ME NEPHRECTOMY, W/PART   URETECTOMY Left 2020    Procedure: ROBOTIC LAPAROSCOPIC NEPHRO-URETERECTOMY;  Surgeon: Keven Hill MD;  Location[facility];  Service: Urology    SKIN CANCER EXCISION      surface melanoma    TONSILLECTOMY      WISDOM TOOTH EXTRACTION         Social History     Socioeconomic History    Marital status: /Civil Union     Spouse name: None    Number of children: None    Years of education: None    Highest education level: None   Occupational History    None   Social Needs    Financial resource strain: None    Food insecurity     Worry: None     Inability: None    Transportation needs     Medical: None     Non-medical: None   Tobacco Use    Smoking status: Former Smoker     Quit date:      Years since quittin 4    Smokeless tobacco: Never Used   Substance and Sexual Activity    Alcohol use: Not Currently     Alcohol/week: 4 0 standard drinks     Types: 4 Cans of beer per week     Frequency: 4 or more times a week     Drinks per session: 3 or 4     Binge frequency: Daily or almost daily     Comment: daily/socially    Drug use: Never    Sexual activity: Not Currently   Lifestyle    Physical activity     Days per week: None     Minutes per session: None    Stress: None   Relationships    Social connections     Talks on phone: None     Gets together: None     Attends [employer] service: None     Active member of club or organization: None     Attends meetings of clubs or organizations: None     Relationship status: None    Intimate partner violence     Fear of current or ex partner: None     Emotionally abused: None     Physically abused: None     Forced sexual activity: None   Other Topics Concern    None   Social History Narrative    Daily caffeine use - 2 cups coffee        Family History   Problem Relation Age of Onset    Liver cancer Father        No Known Allergies      Current Outpatient Medications:     acetaminophen (TYLENOL) 325 mg tablet, Take 2 tablets (650 mg total) by mouth every 6 (six) hours as needed for mild pain or fever, Disp: , Rfl: 0    allopurinol (ZYLOPRIM) 300 mg tablet, Take 1 tablet (300 mg total) by mouth daily, Disp: 30 tablet, Rfl: 3    ALPRAZolam (XANAX) 0 25 mg tablet, Take 1 tablet (0 25 mg total) by mouth daily at bedtime as needed for anxiety (restless legs), Disp: 30 tablet, Rfl: 1    amLODIPine (NORVASC) 5 mg tablet, Take 5 mg by mouth daily  , Disp: , Rfl:     atorvastatin (LIPITOR) 80 mg tablet, Take 80 mg by mouth every other day evening, Disp: , Rfl:     folic acid (FOLVITE) 1 mg tablet, Take 1 tablet (1 mg total) by mouth daily, Disp: 90 tablet, Rfl: 4    hydrOXYzine HCL (ATARAX) 25 mg tablet, Take 1 tablet (25 mg total) by mouth every 6 (six) hours as needed for itching or anxiety, Disp: 60 tablet, Rfl: 2    Magnesium 250 MG TABS, 1 tablet 2 (two) times a day, Disp: , Rfl:     metoprolol tartrate (LOPRESSOR) 100 mg tablet, Take 50 mg by mouth daily, Disp: , Rfl:     naloxone (NARCAN) 4 mg/0 1 mL nasal spray, Administer 1 spray into a nostril   If breathing does not return to normal or if breathing difficulty resumes after 2-3 minutes, give another dose in the other nostril using a new spray , Disp: 1 each, Rfl: 1    omeprazole (PriLOSEC) 20 mg delayed release capsule, Take 20 mg by mouth daily  , Disp: , Rfl:     predniSONE 10 mg tablet, Take 1 tablet (10 mg total) by mouth daily, Disp: 30 tablet, Rfl: 0    senna (SENOKOT) 8 6 mg, Take 1 tablet (8 6 mg total) by mouth daily at bedtime, Disp: 30 each, Rfl: 0    sildenafil (VIAGRA) 100 mg tablet, TAKE ONE TABLET BY MOUTH  AS NEEDED PRIOR TO SEXUAL ACTIVITY FOR ERECTILE DYSFUNCTION, Disp: , Rfl:     terazosin (HYTRIN) 5 mg capsule, Take 2 capsules (10 mg total) by mouth daily at bedtime, Disp: 30 capsule, Rfl: 6    triamcinolone (KENALOG) 0 1 % lotion, Apply topically 3 (three) times a day, Disp: 60 mL, Rfl: 5    VITAMIN D PO, Take 1,000 Units by mouth daily , Disp: , Rfl:       Physical Exam:  /78 (BP Location: Left arm, Patient Position: Sitting, Cuff Size: Large)   Pulse 60   Temp 97 6 °F (36 4 °C) (Tympanic)   Resp 16   Ht 5' 8 5" (1 74 m)   Wt 93 4 kg (205 lb 14 4 oz)   SpO2 98%   BMI 30 85 kg/m²     Physical Exam  Vitals signs reviewed  Constitutional:       General: He is not in acute distress  Appearance: He is well-developed  He is not diaphoretic  HENT:      Head: Normocephalic and atraumatic  Eyes:      General: Lids are normal  No scleral icterus  Conjunctiva/sclera: Conjunctivae normal       Pupils: Pupils are equal, round, and reactive to light  Neck:      Musculoskeletal: Normal range of motion and neck supple  Thyroid: No thyromegaly  Cardiovascular:      Rate and Rhythm: Normal rate and regular rhythm  Heart sounds: Normal heart sounds  No murmur  Pulmonary:      Effort: Pulmonary effort is normal  No respiratory distress  Breath sounds: Normal breath sounds  Abdominal:      General: There is no distension  Palpations: Abdomen is soft  There is no hepatomegaly or splenomegaly  Tenderness: There is no abdominal tenderness  Musculoskeletal: Normal range of motion  General: No swelling  Lymphadenopathy:      Cervical: No cervical adenopathy  Upper Body:      Right upper body: No axillary adenopathy  Left upper body: No axillary adenopathy  Skin:     General: Skin is warm and dry  Findings: No erythema or rash  Neurological:      General: No focal deficit present  Mental Status: He is alert and oriented to person, place, and time     Psychiatric:         Mood and Affect: Mood and affect normal          Behavior: Behavior normal  Behavior is cooperative  Thought Content: Thought content normal          Judgment: Judgment normal            Labs:  Lab Results   Component Value Date    WBC 6 40 05/24/2021    HGB 12 3 (L) 05/24/2021    HCT 37 5 (L) 05/24/2021    MCV 94 05/24/2021     05/24/2021     Lab Results   Component Value Date    K 3 8 05/24/2021     05/24/2021    CO2 28 05/24/2021    BUN 24 05/24/2021    CREATININE 1 37 (H) 05/24/2021    GLUF 119 (H) 05/24/2021    CALCIUM 8 9 05/24/2021    CORRECTEDCA 9 8 10/28/2020    AST 17 05/24/2021    ALT 26 05/24/2021    ALKPHOS 59 05/24/2021    EGFR 51 05/24/2021       Patient voiced understanding and agreement in the above discussion  Aware to contact our office with questions/symptoms in the interim  This note has been generated by voice recognition software system  Therefore, there may be spelling, grammar, and or syntax errors  Please contact if questions arise

## 2024-12-20 DIAGNOSIS — T45.1X5A CHEMOTHERAPY-INDUCED NEUROPATHY (HCC): ICD-10-CM

## 2024-12-20 DIAGNOSIS — M54.16 LUMBAR RADICULOPATHY: ICD-10-CM

## 2024-12-20 DIAGNOSIS — G62.0 CHEMOTHERAPY-INDUCED NEUROPATHY (HCC): ICD-10-CM

## 2024-12-20 DIAGNOSIS — M47.816 LUMBAR SPONDYLOSIS: ICD-10-CM

## 2024-12-20 RX ORDER — HYDROCODONE BITARTRATE AND ACETAMINOPHEN 7.5; 325 MG/1; MG/1
1 TABLET ORAL EVERY 8 HOURS PRN
Qty: 90 TABLET | Refills: 0 | Status: SHIPPED | OUTPATIENT
Start: 2024-12-20

## 2024-12-20 NOTE — TELEPHONE ENCOUNTER
Last SOVS was on 9/9 and did not need refills at that time.  Pt has SOVS scheduled for 1/29.  Can script be sent to get him to his appt?  Please advise.

## 2024-12-20 NOTE — TELEPHONE ENCOUNTER
Patient states if he needs a urine sample he will go to the [place] office to give one.     Reason for call:   [x] Refill   [] Prior Auth  [] Other:     Office:   [] PCP/Provider -   [x] Specialty/Provider - Spine and Pain     Medication: Hydrocodone-Acetaminophen     Dose/Frequency: 7.5-325 mg per tablet taken by mouth every 8 hours PRN for pain    Quantity: 90    Pharmacy: [id number] [address] [phone]     Does the patient have enough for 3 days?   [] Yes   [x] No - Send as HP to POD

## 2024-12-20 NOTE — TELEPHONE ENCOUNTER
10/22/2024 06/18/2024 HYDROcodone BITARTRATE 7.5 MG ORAL TABLET/ACETAMINOPHEN 325 MG (Tablet) 90.0 30 325 MG-7.5 MG 22.50 Brandtone  09/10/2024 06/18/2024 HYDROcodone BITARTRATE 7.5 MG ORAL TABLET/ACETAMINOPHEN 325 MG (Tablet) 90.0 30 325 MG-7.5 MG 22.50 Brandtone

## 2024-12-26 ENCOUNTER — HOSPITAL ENCOUNTER (OUTPATIENT)
Dept: INFUSION CENTER | Facility: HOSPITAL | Age: 74
Discharge: HOME | End: 2024-12-26
Attending: INTERNAL MEDICINE
Payer: MEDICARE

## 2024-12-26 VITALS — TEMPERATURE: 96.8 F

## 2024-12-26 DIAGNOSIS — C65.2 CANCER OF LEFT RENAL PELVIS (HCC): ICD-10-CM

## 2024-12-26 DIAGNOSIS — Z45.2 ENCOUNTER FOR CARE RELATED TO PORT-A-CATH: Primary | ICD-10-CM

## 2024-12-26 PROCEDURE — 96523 IRRIG DRUG DELIVERY DEVICE: CPT

## 2024-12-26 NOTE — PROGRESS NOTES
Juan Lm  tolerated port flush well with no complications.      Juan Amato is aware of future appt on 2/6 at 11:30AM.     AVS printed and given to Juan Amato.

## 2025-01-28 NOTE — PROGRESS NOTES
Assessment:  1. Chronic bilateral low back pain without sciatica    2. Long-term current use of opiate analgesic    3. Uncomplicated opioid dependence (HCC)    4. Chronic pain syndrome    5. Lumbar radiculopathy    6. Chemotherapy-induced neuropathy (HCC)    7. Spinal stenosis of lumbar region, unspecified whether neurogenic claudication present    8. Lumbar spondylosis    9. Stage 3a chronic kidney disease (HCC)        Plan:  Patient may continue Norco as prescribed.  The patient was given a 3 month supply of prescriptions with a Do Not Fill date(s) of today, February 27, 2025 and March 27, 2025    While the patient was in the office today an opioid contract was thoroughly reviewed and signed by the patient. The patient was given adequate time to ask questions in regards to the contract and a signed copy was sent home for their records.    The patient is currently receiving opioid medication. This places him at a higher-risk for respiratory depression/overdose.  Therefore, I  will prescribe naloxone nasal spray. It was explained to the patient that this is an injectable opiate antagonist which is indicated for emergency treatment for opiate overdose.  The patient was also instructed on how to use the Narcan nasal spray, in the case of an opioid emergency.    The patient also completed a BECKS depression inventory and SOAPP-R today, as part of our yearly opioid management program.    [place] Prescription Drug Monitoring Program report was reviewed and was appropriate     A urine drug screen was collected at today's office visit as part of our medication management protocol. The point of care testing results were appropriate for what was being prescribed. The specimen will be sent for confirmatory testing. The drug screen is medically necessary because the patient is either dependent on opioid medication or is being considered for opioid medication therapy and the results could impact ongoing or future  treatment. The drug screen is to evaluate for the presences or absence of prescribed, non-prescribed, and/or illicit drugs/substances.    There are risks associated with opioid medications, including dependence, addiction and tolerance. The patient understands and agrees to use these medications only as prescribed. Potential side effects of the medications include, but are not limited to, constipation, drowsiness, addiction, impaired judgment and risk of fatal overdose if not taken as prescribed. The patient was warned against driving while taking sedation medications.  Sharing medications is a felony. At this point in time, the patient is showing no signs of addiction, abuse, diversion or suicidal ideation.    2.  Continue with home exercise program  3.  Follow-up in 3 months or sooner if needed    History of Present Illness:    The patient is a 74 y.o. male with a history of metastatic urothelial carcinoma status post nephrectomy and chemo induced neuropathy, A-fib and chronic pain syndrome last seen on 09/09/2024  who presents for a follow up office visit in regards to chroniclow back pain.  He denies bowel or bladder incontinence or saddle anesthesia.  He unfortunately has not had much long-term relief with lumbar epidural steroid injections, SI joint injections or lumbar radiofrequency ablation.  He rates his pain a 6 out of 10 on the numeric pain rating scale.  Pain is constant and described as sharp, and pressure-like.  He continues to take Norco 1.5/325 mg 2-3 times daily for pain with 70% improvement of pain without side effects.    Pain Contract Signed: 1/29/2025  Last Urine Drug Screen: 1/29/2025  [place]/YESSENIA 1/29/2025  Last hydrocodone per pt. 1/29/2025  Last Narcan: 1/29/2025      I have personally reviewed and/or updated the patient's past medical history, past surgical history, family history, social history, current medications, allergies, and vital signs today.       Review of Systems:    Review of  Systems   Respiratory:  Negative for shortness of breath.    Cardiovascular:  Negative for chest pain.   Gastrointestinal:  Negative for constipation, diarrhea, nausea and vomiting.   Musculoskeletal:  Positive for back pain and gait problem. Negative for arthralgias, joint swelling and myalgias.   Skin:  Negative for rash.   Neurological:  Negative for dizziness, seizures and weakness.   All other systems reviewed and are negative.        Past Medical History:   Diagnosis Date    A-fib (HCC)     Arthritis     Bladder cancer     Cancer (HCC)     skin melanoma;basal cell    Chemotherapy-induced neuropathy (HCC)     Chronic pain disorder     from arthritis    Colon polyp     Does use hearing aid     bilat    Dry eyes, bilateral     GERD (gastroesophageal reflux disease)     History of kidney stones 10/2019    History of partial knee replacement     bilat    History of transfusion     History of vertigo     Hyperlipidemia     Hypertension     Infusion extravasation of chemotherapy vesicant 11/2021    Kidney lesion     Lumbar disc disorder     compression of vertebrae L4-5-6    Muscle weakness     left hip area    Renal mass     left    Right ankle injury 03/05/2020    missed step of ladder     Right ankle pain     Shortness of breath     with activity    Tinnitus     Urothelial cancer     left    Wears glasses        Past Surgical History:   Procedure Laterality Date    COLONOSCOPY      CYSTOSCOPY Left 04/01/2020    Procedure: CYSTOSCOPY; URETERAL CATHETER PLACEMENT;  Surgeon: Joe Cortes MD;  Location[facility];  Service: Urology    CYSTOSCOPY  05/11/2020    CYSTOSCOPY  06/04/2021    FL CYSTOGRAM  04/13/2020    FL RETROGRADE PYELOGRAM  01/17/2020    HERNIA REPAIR  10/25/2022    umbilical with mesh    INGUINAL HERNIA REPAIR Right     with mesh    IR PORT PLACEMENT  04/30/2020    JOINT REPLACEMENT Bilateral     partials knee    LUMBAR EPIDURAL INJECTION      NM CYSTO BLADDER W/URETERAL CATHETERIZATION  Bilateral 2020    Procedure: CYSTOSCOPY; RIGHT RETROGRADE PYELOGRAM WITH RIGHT URETERAL CYTOLOGY SAMPLING; LEFT URETEROSCOPY WITH RENAL PELVIS BIOPSY AND LEFT STENT PLACEMENT;  Surgeon: Joe Cortes MD;  [facility];  Service: Urology    DC LAPAROSCOPY NEPHRECTOMY W/TOTAL URETERECTOMY Left 2020    Procedure: ROBOTIC LAPAROSCOPIC NEPHRO-URETERECTOMY;  Surgeon: Joe Cortes MD;  Location[facility];  Service: Urology    DC RPR AA HERNIA 1ST < 3 CM REDUCIBLE N/A 8/10/2023    Procedure: REPAIR HERNIA INCISIONAL LAPAROSCOPIC WITH MESH;  Surgeon: Gustavo Schmidt MD;  Location: [facility];  Service: General    DC RPR AA HERNIA 1ST < 3 CM REDUCIBLE N/A 2024    Procedure: REPAIR HERNIA VENTRAL LAPAROSCOPIC WITH MESH;  Surgeon: Gustavo Schmidt MD;  Location: [facility];  Service: General    SKIN CANCER EXCISION      surface melanoma    TONSILLECTOMY      WISDOM TOOTH EXTRACTION         Family History   Problem Relation Age of Onset    Liver cancer Father        Social History     Occupational History    Not on file   Tobacco Use    Smoking status: Former     Current packs/day: 0.00     Types: Cigarettes     Quit date:      Years since quittin.1    Smokeless tobacco: Never   Vaping Use    Vaping status: Never Used   Substance and Sexual Activity    Alcohol use: Not Currently     Comment: socially, rarely    Drug use: Never    Sexual activity: Yes     Partners: Female         Current Outpatient Medications:     amiodarone 200 mg tablet, Take 200 mg by mouth daily, Disp: , Rfl:     diltiazem (CARDIZEM) 60 mg tablet, 2 (two) times a day, Disp: , Rfl:     finasteride (PROSCAR) 5 mg tablet, Take 5 mg by mouth daily, Disp: , Rfl:     folic acid (FOLVITE) 1 mg tablet, Take 1 mg by mouth daily, Disp: , Rfl:     HYDROcodone-acetaminophen (Norco) 7.5-325 mg per tablet, Take 1 tablet by mouth every 8 (eight) hours as needed for pain Max Daily Amount: 3 tablets, Disp: 90 tablet,  "Rfl: 0    [START ON 2/27/2025] HYDROcodone-acetaminophen (Norco) 7.5-325 mg per tablet, Take 1 tablet by mouth every 8 (eight) hours as needed for pain Max Daily Amount: 3 tablets Do not start before February 27, 2025., Disp: 90 tablet, Rfl: 0    [START ON 3/27/2025] HYDROcodone-acetaminophen (Norco) 7.5-325 mg per tablet, Take 1 tablet by mouth every 8 (eight) hours as needed for pain Max Daily Amount: 3 tablets Do not start before March 27, 2025., Disp: 90 tablet, Rfl: 0    naloxone (NARCAN) 4 mg/0.1 mL nasal spray, Administer 1 spray into a nostril. If no response after 2-3 minutes, give another dose in the other nostril using a new spray., Disp: 1 each, Rfl: 1    zolpidem (AMBIEN) 5 mg tablet, Take 1 tablet (5 mg total) by mouth daily at bedtime as needed for sleep, Disp: 30 tablet, Rfl: 1    tamsulosin (FLOMAX) 0.4 mg, Take 1 capsule (0.4 mg total) by mouth daily with dinner (Patient not taking: Reported on 9/16/2024), Disp: 30 capsule, Rfl: 12    Allergies   Allergen Reactions    Poison Ivy Extract Rash       Physical Exam:    Ht 5' 8.5\" (1.74 m)   Wt 95.3 kg (210 lb)   BMI 31.47 kg/m²     Constitutional:normal, well developed, well nourished, alert, in no distress and non-toxic and no overt pain behavior.  Eyes:anicteric  HEENT:grossly intact  Neck:supple, symmetric, trachea midline and no masses   Pulmonary:even and unlabored  Cardiovascular:No edema or pitting edema present  Skin:Normal without rashes or lesions and well hydrated  Psychiatric:Mood and affect appropriate  Neurologic:Cranial Nerves II-XII grossly intact  Musculoskeletal: Slightly antalgic gait but steady without assistive devices      Imaging  No orders to display         Orders Placed This Encounter   Procedures    MM ALL_Prescribed Meds and Special Instructions    MM DT_Alprazolam Definitive Test    MM DT_Amphetamine Definitive Test    MM DT_Aripiprazole Definitive Test    MM DT_Bath Salts Definitive Test    MM DT_Buprenorphine Definitive " Test    MM DT_Butalbital Definitive Test    MM DT_Clonazepam Definitive Test    MM DT_Clozapine Definitive Test    MM DT_Cocaine Definitive Test    MM DT_Codeine Definitive Test    MM DT_Desipramine Definitive Test    MM DT_Dextromethorphan Definitive Test    MM Diazepam Definitive Test    MM DT_Ethyl Glucuronide/Ethyl Sulfate Definitive Test    MM DT_Fentanyl Definitive Test    MM DT_Haloperidol Definitive Test    MM DT_Heroin Definitive Test    MM DT_Hydrocodone Definitive Test    MM DT_Hydromorphone Definitive Test    MM DT_Imipramine Definitive Test    MM DT_Kratom Definitive Test    MM DT_Levorphanol Definitive Test    MM Lorazepam Definitive Test    MM DT_MDMA Definitive Test    MM DT_Meperidine Definitive Test    MM DT_Methadone Definitive Test    MM DT_Methamphetamine Definitive Test    MM DT_Morphine Definitive Test    MM DT_Olanzapine Definitive Test    MM DT_Oxazepam Definitive Test    MM DT_Oxycodone Definitive Test    MM DT_Oxymorphone Definitive Test    MM DT_Phencyclidine Definitive Test    MM DT_Phenobarbital Definitive Test    MM DT_Phentermine Definitive Test    MM DT_Quetiapine Definitive Test    MM DT_Risperidone Definitive Test    MM DT_Secobarbital Definitive Test    MM DT_Spice Definitive Test    MM DT_Tapentadol Definitive Test    MM DT_Temazapam Definitive Test    MM DT_THC Definitive Test    MM DT_Tramadol Definitive Test    MM DT_Methylphenidate Definitive Test

## 2025-01-29 ENCOUNTER — OFFICE VISIT (OUTPATIENT)
Dept: PAIN MEDICINE | Facility: CLINIC | Age: 75
End: 2025-01-29
Payer: MEDICARE

## 2025-01-29 VITALS — WEIGHT: 210 LBS | BODY MASS INDEX: 31.1 KG/M2 | HEIGHT: 69 IN

## 2025-01-29 DIAGNOSIS — N18.31 STAGE 3A CHRONIC KIDNEY DISEASE (HCC): ICD-10-CM

## 2025-01-29 DIAGNOSIS — G89.29 CHRONIC BILATERAL LOW BACK PAIN WITHOUT SCIATICA: Primary | ICD-10-CM

## 2025-01-29 DIAGNOSIS — G62.0 CHEMOTHERAPY-INDUCED NEUROPATHY (HCC): ICD-10-CM

## 2025-01-29 DIAGNOSIS — M47.816 LUMBAR SPONDYLOSIS: ICD-10-CM

## 2025-01-29 DIAGNOSIS — M54.50 CHRONIC BILATERAL LOW BACK PAIN WITHOUT SCIATICA: Primary | ICD-10-CM

## 2025-01-29 DIAGNOSIS — M54.16 LUMBAR RADICULOPATHY: ICD-10-CM

## 2025-01-29 DIAGNOSIS — G89.4 CHRONIC PAIN SYNDROME: ICD-10-CM

## 2025-01-29 DIAGNOSIS — T45.1X5A CHEMOTHERAPY-INDUCED NEUROPATHY (HCC): ICD-10-CM

## 2025-01-29 DIAGNOSIS — M48.061 SPINAL STENOSIS OF LUMBAR REGION, UNSPECIFIED WHETHER NEUROGENIC CLAUDICATION PRESENT: ICD-10-CM

## 2025-01-29 DIAGNOSIS — Z79.891 LONG-TERM CURRENT USE OF OPIATE ANALGESIC: ICD-10-CM

## 2025-01-29 DIAGNOSIS — F11.20 UNCOMPLICATED OPIOID DEPENDENCE (HCC): ICD-10-CM

## 2025-01-29 PROCEDURE — 99214 OFFICE O/P EST MOD 30 MIN: CPT | Performed by: NURSE PRACTITIONER

## 2025-01-29 PROCEDURE — G2211 COMPLEX E/M VISIT ADD ON: HCPCS | Performed by: NURSE PRACTITIONER

## 2025-01-29 RX ORDER — HYDROCODONE BITARTRATE AND ACETAMINOPHEN 7.5; 325 MG/1; MG/1
1 TABLET ORAL EVERY 8 HOURS PRN
Qty: 90 TABLET | Refills: 0 | Status: SHIPPED | OUTPATIENT
Start: 2025-03-27

## 2025-01-29 RX ORDER — HYDROCODONE BITARTRATE AND ACETAMINOPHEN 7.5; 325 MG/1; MG/1
1 TABLET ORAL EVERY 8 HOURS PRN
Qty: 90 TABLET | Refills: 0 | Status: SHIPPED | OUTPATIENT
Start: 2025-02-27

## 2025-01-29 RX ORDER — HYDROCODONE BITARTRATE AND ACETAMINOPHEN 7.5; 325 MG/1; MG/1
1 TABLET ORAL EVERY 8 HOURS PRN
Qty: 90 TABLET | Refills: 0 | Status: SHIPPED | OUTPATIENT
Start: 2025-01-29

## 2025-01-29 NOTE — PATIENT INSTRUCTIONS
"Patient Education     Taking opioids safely   The Basics   Written by the doctors and editors at [facility]   What are opioids? -- Opioids are a group of prescription medicines that relieve pain. They work by attaching to \"opioid receptors\" in the body and blocking pain signals.  Opioids are sometimes used when other types of pain medicine do not help enough. Opioids can be helpful for treating short-term, or \"acute,\" pain, like after surgery or an injury. They are also sometimes used to treat long-term, or \"chronic,\" pain, like for people with cancer. But they come with risks.  If your doctor prescribes an opioid medicine, it's important to understand the risks and know how to stay safe.  What are the risks of taking opioids? -- You should know that:   Opioids have side effects. Some are just bothersome, and some can be dangerous. For example, taking too much of an opioid is called an \"overdose.\" An overdose can cause serious problems and even death.   In some cases, taking opioids can lead to misuse. For example, people might take the medicine when they don't need it for pain. Or they might take more than they are supposed to. Sharing or selling opioids are other examples of misuse.   There is a risk of addiction. This is also called \"opioid use disorder.\"  If you take too much, or take opioids with alcohol or certain other drugs, it can cause serious harm. It can even cause death from overdose.  How do I stay safe? -- There are things you can do to stay safe if you need to take an opioid medicine (figure 1). These things help protect yourself and others.  Know your medicines:    Opioids come in different forms. \"Immediate-release\" medicines work quickly and last for a short time. \"Extended-release\" medicines work more slowly and last longer. Make sure that you know what type of opioid you have. Read the label and the information that comes with your prescription.   Follow your treatment plan carefully. Take only " "the dose your doctor prescribes, and only as often as they tell you to.   Never take opioids that were not prescribed to you.   Some opioids come combined with other medicines like acetaminophen or an \"NSAID\" (like ibuprofen). Do not take any extra NSAIDs or acetaminophen without talking to your doctor first.   Make sure that all of your doctors know every medicine you take, even those that are non-prescription. Some medicines can affect the way opioids work. Bring a complete list of all of your pain medicines and other medicines with you whenever you go to a doctor, nurse, dentist, or pharmacist.   Ask your doctor or pharmacist if it is safe to take your other medicines with your opioid medicine.  Use and store your medicine safely:    Do not drink alcohol while you are taking opioids.   Do not take opioids with medicines that make you sleepy, unless your doctor tells you to. Examples include:   \"Benzodiazepines\" like diazepam (sample brand name: Valium) or alprazolam (sample brand name: Xanax)   Gabapentin (sample brand name: Neurontin) or pregabalin (brand name: Lyrica)   Muscle relaxants like baclofen or cyclobenzaprine   Sleeping pills like zolpidem (sample brand name: Ambien)   Talk to your doctor about whether it is safe to drive. Opioids can make you feel tired or have trouble thinking clearly. If you are starting a new prescription or taking a higher dose, you might need to avoid things like driving, using dangerous machinery, or other activities that could be risky.   Store your opioids in a safe place, such as a locked cabinet. This prevents children, teens, or anyone else from getting to them.   Never share your opioids with other people.  Be aware of side effects:    Opioid medicines can cause side effects. There are often ways to prevent or treat these.   Call your doctor or nurse if you have side effects that bother you, such as:   Constipation - Your doctor or nurse might suggest that you take a " "laxative to prevent or treat constipation. If your bowel movements are hard and dry, a stool softener might help. Drink plenty of water, and try to get regular physical activity.   Mild nausea or stomach discomfort - Taking the medicine with or after food can help with this. Nausea usually gets better with time.   Severe nausea, vomiting, or itchiness - If you have any of these problems, your doctor might be able to switch you to a different medicine.   Dry mouth   Feeling dizzy or sleepy, or having trouble thinking clearly   Vision problems   Being clumsy or falling down   Know the signs of an opioid overdose. Get help right away if you think that you or someone else took too much of an opioid medicine. Signs of an overdose are listed below.  Stay safe when stopping your opioid medicine:    When opioids are needed to treat acute pain, doctors usually try to prescribe them for only a short time. This usually means a few days or a week. They also prescribe the lowest dose possible to relieve pain.   Follow your doctor's instructions about how to stop taking your opioid once your pain has improved. This usually involves \"tapering,\" or reducing the dose gradually. If you stop an opioid suddenly, this can cause unpleasant symptoms like stomach ache, diarrhea, or shakes. This is called \"withdrawal.\" Tapering the dose can help prevent withdrawal.   When your pain gets better, get rid of any leftover medicines. Your doctor, nurse, or pharmacist can suggest ways to get rid of them. This might involve flushing them down the toilet or mixing them with something like dirt or cat litter, then putting the mixture in a sealed container in the trash. Some police stations and pharmacies also take leftover medicines.  What is naloxone? -- Naloxone is a medicine that reverses the effects of opioids. It can prevent death from an opioid overdose. Naloxone comes as an injection (shot), or as a spray that goes in the nose. Naloxone nasal " spray (brand name: Narcan) is available without a prescription.  If you or someone you know uses opioids, it's a good idea to keep naloxone with you. Make sure that you and your family and friends know how and when to use it.  When should I call for help? -- If you are taking an opioid, it's important to know when to get help. Signs of an opioid overdose include:   Extreme sleepiness   Slow breathing, or no breathing at all   Very small pupils (the black circles in the center of the eyes)   Very slow heartbeat  If you took too much of your opioid medicine or think that someone is having an opioid overdose:   If you have naloxone, give it immediately. Naloxone can save a person's life. But it needs to be given as soon as possible.   Call for an ambulance right away (in the [place] and [place], call 9-1-1).  Call your doctor or nurse if:   You are having side effects that bother you.   You have questions about how to take your medicine.   You are having trouble managing your pain.  All topics are updated as new evidence becomes available and our peer review process is complete.  This topic retrieved from [employer] on: May 15, 2024.  Topic 758938 Version 1.0  Release: 32.4.3 - C32.134  © 2024 UpToDate, Inc. and/or its affiliates. All rights reserved.  figure 1: Tips for medication safety     Tips to use your medicine safely include:  (A) Read labels so you know how to take your medicines.  (B) Have a routine for taking your medicines.  (C) Make sure you know what foods, drinks, and other medicines are safe for you.  (D) Store medicines in a safe place.  (E) Work with your health care team and ask questions if you have them.  Graphic 504504 Version 2.0  Consumer Information Use and Disclaimer   Disclaimer: This generalized information is a limited summary of diagnosis, treatment, and/or medication information. It is not meant to be comprehensive and should be used as a tool to help the user understand and/or assess potential  diagnostic and treatment options. It does NOT include all information about conditions, treatments, medications, side effects, or risks that may apply to a specific patient. It is not intended to be medical advice or a substitute for the medical advice, diagnosis, or treatment of a health care provider based on the health care provider's examination and assessment of a patient's specific and unique circumstances. Patients must speak with a health care provider for complete information about their health, medical questions, and treatment options, including any risks or benefits regarding use of medications. This information does not endorse any treatments or medications as safe, effective, or approved for treating a specific patient. UpToDate, Inc. and its affiliates disclaim any warranty or liability relating to this information or the use thereof.The use of this information is governed by the Terms of Use, available at https://www.woltersLearneratoruwer.com/en/know/clinical-effectiveness-terms. 2024© UpToDate, Inc. and its affiliates and/or licensors. All rights reserved.  Copyright   © 2024 UpToDate, Inc. and/or its affiliates. All rights reserved.

## 2025-01-31 ENCOUNTER — OFFICE VISIT (OUTPATIENT)
Dept: UROLOGY | Facility: CLINIC | Age: 75
End: 2025-01-31
Payer: MEDICARE

## 2025-01-31 ENCOUNTER — TELEPHONE (OUTPATIENT)
Dept: UROLOGY | Facility: CLINIC | Age: 75
End: 2025-01-31

## 2025-01-31 VITALS
TEMPERATURE: 97.1 F | OXYGEN SATURATION: 97 % | BODY MASS INDEX: 30.51 KG/M2 | HEIGHT: 69 IN | HEART RATE: 63 BPM | WEIGHT: 206 LBS | SYSTOLIC BLOOD PRESSURE: 122 MMHG | RESPIRATION RATE: 16 BRPM | DIASTOLIC BLOOD PRESSURE: 64 MMHG

## 2025-01-31 DIAGNOSIS — R35.0 BENIGN PROSTATIC HYPERPLASIA WITH URINARY FREQUENCY: ICD-10-CM

## 2025-01-31 DIAGNOSIS — N40.1 BENIGN PROSTATIC HYPERPLASIA WITH URINARY FREQUENCY: ICD-10-CM

## 2025-01-31 DIAGNOSIS — C79.10 METASTATIC UROTHELIAL CARCINOMA (HCC): Primary | ICD-10-CM

## 2025-01-31 DIAGNOSIS — R35.1 NOCTURIA: ICD-10-CM

## 2025-01-31 DIAGNOSIS — Z12.5 SCREENING FOR PROSTATE CANCER: ICD-10-CM

## 2025-01-31 DIAGNOSIS — R39.12 BENIGN PROSTATIC HYPERPLASIA WITH WEAK URINARY STREAM: ICD-10-CM

## 2025-01-31 DIAGNOSIS — N40.1 BENIGN PROSTATIC HYPERPLASIA WITH WEAK URINARY STREAM: ICD-10-CM

## 2025-01-31 LAB
6MAM UR QL CFM: NEGATIVE NG/ML
7AMINOCLONAZEPAM UR QL CFM: NEGATIVE NG/ML
A-OH ALPRAZ UR QL CFM: NEGATIVE NG/ML
AMPHET UR QL CFM: NEGATIVE NG/ML
AMPHET UR QL CFM: NEGATIVE NG/ML
BUPRENORPHINE UR QL CFM: NEGATIVE NG/ML
BUTALBITAL UR QL CFM: NEGATIVE NG/ML
BZE UR QL CFM: NEGATIVE NG/ML
CODEINE UR QL CFM: NEGATIVE NG/ML
DESIPRAMINE UR QL CFM: NEGATIVE NG/ML
DESIPRAMINE UR QL CFM: NEGATIVE NG/ML
EDDP UR QL CFM: NEGATIVE NG/ML
ETHYL GLUCURONIDE UR QL CFM: NEGATIVE NG/ML
ETHYL SULFATE UR QL SCN: NEGATIVE NG/ML
EUTYLONE UR QL: NEGATIVE NG/ML
FENTANYL UR QL CFM: NEGATIVE NG/ML
GLIADIN IGG SER IA-ACNC: NEGATIVE NG/ML
GLUCOSE 30M P 50 G LAC PO SERPL-MCNC: NEGATIVE NG/ML
HYDROCODONE UR QL CFM: NORMAL NG/ML
HYDROCODONE UR QL CFM: NORMAL NG/ML
HYDROMORPHONE UR QL CFM: NORMAL NG/ML
IMIPRAMINE UR QL CFM: NEGATIVE NG/ML
LORAZEPAM UR QL CFM: NEGATIVE NG/ML
MDMA UR QL CFM: NEGATIVE NG/ML
ME-PHENIDATE UR QL CFM: NEGATIVE NG/ML
MEPERIDINE UR QL CFM: NEGATIVE NG/ML
METHADONE UR QL CFM: NEGATIVE NG/ML
METHAMPHET UR QL CFM: NEGATIVE NG/ML
MORPHINE UR QL CFM: NEGATIVE NG/ML
MORPHINE UR QL CFM: NEGATIVE NG/ML
NORBUPRENORPHINE UR QL CFM: NEGATIVE NG/ML
NORDIAZEPAM UR QL CFM: NEGATIVE NG/ML
NORFENTANYL UR QL CFM: NEGATIVE NG/ML
NORHYDROCODONE UR QL CFM: NORMAL NG/ML
NORHYDROCODONE UR QL CFM: NORMAL NG/ML
NORMEPERIDINE UR QL CFM: NEGATIVE NG/ML
NOROXYCODONE UR QL CFM: NEGATIVE NG/ML
OLANZAPINE QUANTIFICATION: NEGATIVE NG/ML
OPC-3373 QUANTIFICATION: NEGATIVE NG/ML
OXAZEPAM UR QL CFM: NEGATIVE NG/ML
OXYCODONE UR QL CFM: NEGATIVE NG/ML
OXYMORPHONE UR QL CFM: NEGATIVE NG/ML
OXYMORPHONE UR QL CFM: NEGATIVE NG/ML
PARA-FLUOROFENTANYL QUANTIFICATION: NORMAL NG/ML
PCP UR QL CFM: NEGATIVE NG/ML
PHENOBARB UR QL CFM: NEGATIVE NG/ML
POST-VOID RESIDUAL VOLUME, ML POC: 21 ML
RESULT ALL_PRESCRIBED MEDS AND SPECIAL INSTRUCTIONS: NORMAL
SECOBARBITAL UR QL CFM: NEGATIVE NG/ML
SL AMB  POCT GLUCOSE, UA: NORMAL
SL AMB 5F-ADB-M7 METABOLITE QUANTIFICATION: NEGATIVE NG/ML
SL AMB 7-OH-MITRAGYNINE (KRATOM ALKALOID) QUANTIFICATION: NEGATIVE NG/ML
SL AMB AB-FUBINACA-M3 METABOLITE QUANTIFICATION: NEGATIVE NG/ML
SL AMB ACETYL FENTANYL QUANTIFICATION: NORMAL NG/ML
SL AMB ACETYL NORFENTANYL QUANTIFICATION: NORMAL NG/ML
SL AMB ACRYL FENTANYL QUANTIFICATION: NORMAL NG/ML
SL AMB CARFENTANIL QUANTIFICATION: NORMAL NG/ML
SL AMB CLOZAPINE QUANTIFICATION: NEGATIVE NG/ML
SL AMB CTHC (MARIJUANA METABOLITE) QUANTIFICATION: NEGATIVE NG/ML
SL AMB DEXTROMETHORPHAN QUANTIFICATION: NEGATIVE NG/ML
SL AMB DEXTRORPHAN (DEXTROMETHORPHAN METABOLITE) QUANT: NEGATIVE NG/ML
SL AMB DEXTRORPHAN (DEXTROMETHORPHAN METABOLITE) QUANT: NEGATIVE NG/ML
SL AMB HALOPERIDOL  QUANTIFICATION: NEGATIVE NG/ML
SL AMB HALOPERIDOL METABOLITE QUANTIFICATION: NEGATIVE NG/ML
SL AMB HYDROXYRISPERIDONE QUANTIFICATION: NEGATIVE NG/ML
SL AMB JWH018 METABOLITE QUANTIFICATION: NEGATIVE NG/ML
SL AMB JWH073 METABOLITE QUANTIFICATION: NEGATIVE NG/ML
SL AMB LEUKOCYTE ESTERASE,UA: 25
SL AMB MDMB-FUBINACA-M1 METABOLITE QUANTIFICATION: NEGATIVE NG/ML
SL AMB METHYLONE QUANTIFICATION: NEGATIVE NG/ML
SL AMB N-DESMETHYL-TRAMADOL QUANTIFICATION: NEGATIVE NG/ML
SL AMB N-DESMETHYLCLOZAPINE QUANTIFICATION: NEGATIVE NG/ML
SL AMB NORQUETIAPINE QUANTIFICATION: NEGATIVE NG/ML
SL AMB PHENTERMINE QUANTIFICATION: NEGATIVE NG/ML
SL AMB POCT BILIRUBIN,UA: NEGATIVE
SL AMB POCT BLOOD,UA: NEGATIVE
SL AMB POCT CLARITY,UA: CLEAR
SL AMB POCT COLOR,UA: YELLOW
SL AMB POCT KETONES,UA: NEGATIVE
SL AMB POCT NITRITE,UA: NEGATIVE
SL AMB POCT PH,UA: 5
SL AMB POCT SPECIFIC GRAVITY,UA: 1.01
SL AMB POCT URINE PROTEIN: NEGATIVE
SL AMB POCT UROBILINOGEN: 1
SL AMB QUETIAPINE QUANTIFICATION: NEGATIVE NG/ML
SL AMB RCS4 METABOLITE QUANTIFICATION: NEGATIVE NG/ML
SL AMB RISPERIDONE QUANTIFICATION: NEGATIVE NG/ML
SL AMB RITALINIC ACID QUANTIFICATION: NEGATIVE NG/ML
SPECIMEN DRAWN SERPL: NEGATIVE NG/ML
TAPENTADOL UR QL CFM: NEGATIVE NG/ML
TEMAZEPAM UR QL CFM: NEGATIVE NG/ML
TEMAZEPAM UR QL CFM: NEGATIVE NG/ML
TRAMADOL UR QL CFM: NEGATIVE NG/ML
URATE/CREAT 24H UR: NEGATIVE NG/ML

## 2025-01-31 PROCEDURE — 87086 URINE CULTURE/COLONY COUNT: CPT

## 2025-01-31 PROCEDURE — 51798 US URINE CAPACITY MEASURE: CPT

## 2025-01-31 PROCEDURE — 88112 CYTOPATH CELL ENHANCE TECH: CPT | Performed by: PATHOLOGY

## 2025-01-31 PROCEDURE — 81002 URINALYSIS NONAUTO W/O SCOPE: CPT

## 2025-01-31 PROCEDURE — 99213 OFFICE O/P EST LOW 20 MIN: CPT

## 2025-01-31 RX ORDER — TAMSULOSIN HYDROCHLORIDE 0.4 MG/1
0.4 CAPSULE ORAL
Qty: 60 CAPSULE | Refills: 12 | Status: SHIPPED | OUTPATIENT
Start: 2025-01-31

## 2025-01-31 NOTE — ASSESSMENT & PLAN NOTE
history of LEFT upper tract urothelial carcinoma s/p robotic LEFT nephroureterectomy 4/1/2020 for [id number] disease, now with progressive signs of metastatic disease despite first and second line treatments.  Most recent PET/CT 8/2024 was negative for any avid lesions. He seems to be in remission despite not having received chemotherapy since September 2022.  He is currently under close observation with Hem/Onc with Q6 month PET/CT expected on 2/10 and office follow-up with Dr. Valera on 2/25.  Urine dip today is negative for microscopic blood.  Does show trace leukocytes which I have low suspicion for infection but will send for culture. He does report intermittent dysuria as well, and for this reason I am also sending urine for cytology and recommend cystoscopy evaluation.      Orders:    POCT urine dip    POCT Measure PVR    Urine culture    Cytology, urine    Cystoscopy

## 2025-01-31 NOTE — PROGRESS NOTES
Name: Juan Amato      : 1950      MRN: [id number]  Encounter Provider: RAGHU Parks  Encounter Date: 2025   Encounter department: [facility] [place]    :  Assessment & Plan  Metastatic urothelial carcinoma (HCC)  history of LEFT upper tract urothelial carcinoma s/p robotic LEFT nephroureterectomy 2020 for [id number] disease, now with progressive signs of metastatic disease despite first and second line treatments.  Most recent PET/CT 2024 was negative for any avid lesions. He seems to be in remission despite not having received chemotherapy since 2022.  He is currently under close observation with Hem/Onc with Q6 month PET/CT expected on 2/10 and office follow-up with Dr. Valera on .  Urine dip today is negative for microscopic blood.  Does show trace leukocytes which I have low suspicion for infection but will send for culture. He does report intermittent dysuria as well, and for this reason I am also sending urine for cytology and recommend cystoscopy evaluation.      Orders:    POCT urine dip    POCT Measure PVR    Urine culture    Cytology, urine    Cystoscopy    Nocturia  Symptoms most consistent with BPH however I cannot entirely exclude the possibility of neurogenic cause given history of lumbar radiculopathy, spinal stenosis of the lumbar region, as well as chemotherapy-induced neuropathy.    He mostly complains of nocturia and weak urinary stream at nightime.  During the daytime he is fine but at night he reports getting up on average 3 times voiding only small amounts.  He has been taking triple oral therapy over the past 8 months or so for presumed BPH with terazosin 10 mg, finasteride 5 mg, and Flomax 0.8 mg all at night prior to bedtime.  He was instructed to discontinue terazosin and only continue with finasteride 5 mg and Flomax 0.8 mg.  He is emptying well with a PVR of 21 mL today.   Rectal exam did not reveal significant prostatomegaly  with estimated gland size between 30 to 40 g.  Exam was otherwise benign.    Orders:    POCT urine dip    POCT Measure PVR    Urine culture    Cytology, urine    Screening for prostate cancer  PSA 0.91/1.82 corrected for finasteride as of 7/18/2023  Rectal exam was benign today.  I did place orders for updated PSA.  If this is normal I would recommend discontinuing prostate cancer screening.    Orders:    PSA, Total Screen; Future          Interval HPI:    Patient presents today for overdue follow-up.  He does complain of weak urinary stream and nocturia and on occasion will have some incontinence.  Incontinence last occurred about 2 weeks ago and is otherwise rare for him.  According to him he has been taking triple oral therapy for BPH including terazosin 10 mg at bedtime as well as finasteride 5 mg at bedtime and tamsulosin 0.8 mg at bedtime.  [facility] started him back on terazosin about 8 months ago per the patient's request but he continued with finasteride and Flomax during that time.  Surprisingly he reports no issues with dizziness or low blood pressure.  He is emptying well with PVR of 21 mL today.  Urine dip is positive for trace leukocytes but negative nitrates or blood.  He does report intermittent dysuria which typically only occurs at nighttime.  For the most part during the daytime he has no bothersome urinary symptoms.  Symptoms are mostly bothersome at night.  He denies any gross hematuria, abdominal pain, or flank pain.          History of Present Illness     Established patient last seen by Dr. Cortes in August 2023.  He has a history of LEFT upper tract urothelial carcinoma s/p robotic LEFT nephroureterectomy 4/1/2020 for [id number] disease, now with progressive signs of metastatic disease despite first and second line treatments.    He had MRI imaging of the lumbar spine December 2019 which demonstrated a concerning left-sided renal lesion and the patient underwent a CT urogram. This  demonstrates a endophytic infiltrating lesion, concerning for urothelial carcinoma.  Patient would undergo robotic assisted left nephroureterectomy 4/1/20.  Removal of the kidney was somewhat challenging given perihilar fat.  Pathology returned T3 disease with invasion into the peripelvic fat.    Patient had been sent for preoperative evaluation for neoadjuvant chemotherapy but had deferred.  It was recommended the patient return to see medical oncology postoperatively and he did agree to adjuvant chemotherapy. Unfortunately, with some progressive renal dysfunction and concerns about hearing loss, additional chemotherapy was aborted.  Patient tolerated 1 cycle of adjuvant chemotherapy with split dose cisplatin and gemcitabine.     In September of 2020, the patient was noted to have progressive lymphadenopathy and concern in the left rectus muscle. Patient underwent PET scan which showed activity. Patient was started on Keytruda and recommended to undergo some palliative radiation to the left abdomen completed 12/3/2020. He was then adjusted to Padcev.     His PET scan from 08/16/2021 showed progression of his disease with hypermetabolic soft tissue lesions at the level of the right shoulder and right axilla with interval progression of the retroperitoneal and mesenteric hypermetabolic metastasis.  He did have the new lesion to his right upper back/shoulder which was causing significant localized pain. This excised by his surgeon 08/09/2021. Pathology confirmed metastatic high-grade carcinoma consistent with his no primary urothelial carcinoma.  He received palliative radiation to his right shoulder/axilla region.    In October 2022, the patient was in [place] and developed an incisional hernia with strangulated bowel. Patient was critically ill and taken to the operating room locally for resection. He and his wife remained in [place] for many weeks during his recovery.     PET CT scan done 4/18/2023 showed  increasing FDG in his lesion along the posterior splenic border which could be metastasis versus inflammatory.  Also has small hypermetabolic focus to the mid right clavicular region which needs to be monitored closely for developing malignant process.  Mild increase FDG activity at the right shoulder. He was seen by Hematology Oncology 4/2023 and was not interested in proceeding with any systemic treatment at the time since he is feeling well.  Decision was made to continue with close surveillance.  If he is noted to have obvious progression on future imaging studies would be open to discussion regarding resuming systemic treatment. He last received Padcev 9/02/2022.     8/29/2023: Summary of office visit Urology Dr. Cortes  73 y.o. male with LEFT upper tract urothelial carcinoma s/p robotic LEFT nephroureterectomy for [id number] disease, now with progressive signs of metastatic disease despite first and second line treatments   Patient recovering from his hernia repair with mesh. He is recovering beautifully and is finally getting back to overall good performance status. Despite the positive findings of his recent PET scan, both he and medical oncology have agreed to avoid any additional therapy. We will be planning repeat PET scan in 6 months. As the patient is having no urinary symptoms of concern, dysuria or hematuria, I will avoid cystoscopic intervention given his metastatic disease. We will continue to follow to determine if there is any role for cystoscopic inspection to evaluate local recurrences in the future.     2/06/2024: Summary of Office visit Hematology/Oncology Dr. Valera  We did discuss the result of the recent PET CT scan which surprisingly seems to show stable or even improvement of the activity seen on the prior PET scan.  He does seem to have focal activity at the left rectus muscle sheath of unknown etiology.  Patient stated that he is not interested in any type of treatment at this point to  avoid worsening of his multiple comorbidities including neuropathy.  However, he seems to be interested in getting another PET scan in 6 months from now for close monitoring.  He stated that he would consider going back on palliative treatment for his metastatic urothelial carcinoma if he starts to have obvious progression on imaging.    8/13/2024: Summary of Office visit with Hematology/Oncology Dr. Valera  The patient was educated about the recent PET CT scan from 8-24 which was negative for any avid lesions.  He seems to still be in complete remission even though his last treatment for his metastatic urothelial carcinoma was around September 2022.  The patient stated he would be interested in following up with another imaging.  Will aim for another PET scan in about 6 months from now for close surveillance.                  Objective   There were no vitals taken for this visit.      Urothelial cancer (HCC)   1/3/2020 Biopsy     Final Diagnosis    A. Urine, Clean Catch, Thin Prep:  High grade urothelial carcinoma (HGUC) - see comment.          1/3/2020 Initial Diagnosis     Urothelial cancer (HCC)  T3 N0 (stage IIIA)      1/17/2020 Biopsy     Final Diagnosis    A. Ureter, Right, left renal pelvis biopsy  -Fragments of high grade urothelial carcinoma   -No evidence of lamina propria invasion.  -Detrusor muscle/ muscularis propria is not present for evaluation.     B. Urinary Bladder, bladder biopsy:  -Fragments of low grade papillary lesion. See note  -No evidence of invasion seen  -Unremarkable fragment of detrusor muscle seen.          4/1/2020 Surgery     left robotic assisted laparoscopic nephroureterectomy with bladder cuff excision      Final Diagnosis    A. Left kidney, ureter, and bladder cuff, nephroureterectomy:  - Invasive high grade urothelial carcinoma arising in renal pelvis.   - Bladder cuff margin is negative for carcinoma and no evidence of high grade dysplasia.  - Ureters with no significant  pathologic abnormality  - Two benign simple cysts.  - Adrenal gland is negative for malignancy.  - One lymph node, negative for malignancy (0/1).          5/14/2020 - 6/3/2020 Chemotherapy     CISplatin (PLATINOL) split dose 35 mg/m2 = 75.3 mg (50 % of original dose 70 mg/m2), Intravenous,   Administration: 75.3 mg (5/14/2020), 75.3 mg (5/21/2020)  gemcitabine (GEMZAR) 2,200 mg in sodium chloride 0.9 % 250 mL infusion, 2,150.2 mg (80 % of original dose 1,250 mg/m2), Intravenous,   Administration: 2,200 mg (5/14/2020), 2,200 mg (5/21/2020)     (only completed 1 cycle- d/c d/t adverse effects and worsening renal dysfunction)      10/9/2020 - 9/9/2021 Chemotherapy     pembrolizumab (KEYTRUDA) IVPB, 200 mg, Intravenous, Once, 16 of 20 cycles  Administration: 200 mg (10/9/2020), 200 mg (10/30/2020), 200 mg (11/20/2020), 200 mg (12/11/2020), 200 mg (12/31/2020), 200 mg (1/22/2021), 200 mg (2/12/2021), 200 mg (3/5/2021), 200 mg (3/26/2021), 200 mg (4/16/2021), 200 mg (5/7/2021), 200 mg (5/28/2021), 200 mg (6/18/2021), 200 mg (7/9/2021), 200 mg (7/30/2021), 200 mg (8/20/2021)      11/19/2020 - 12/3/2020 Radiation     Treatment:  Course: C1     Plan ID Energy Fractions Dose per Fraction (cGy) Dose Correction (cGy) Total Dose Delivered (cGy) Elapsed Days   L Abdomen 6X 10 / 10 300 0 3,000 14          9/10/2021 -  Chemotherapy     enfortumab vedotin-ejfv (PADCEV) IVPB, 1.25 mg/kg = 114 mg, Intravenous, Once, 11 of 16 cycles  Dose modification: 1 mg/kg (original dose 1.25 mg/kg, Cycle 7, Reason: Other (Must fill in a comment), Comment: dose reduction due to side effects ), 1.25 mg/kg (original dose 1.25 mg/kg, Cycle 7, Reason: Other (Must fill in a comment), Comment: patient wants full dose ), 1 mg/kg (original dose 1.25 mg/kg, Cycle 7, Reason: Neuropathy), 0.75 mg/kg (original dose 1.25 mg/kg, Cycle 11, Reason: Neuropathy)  Administration: 114 mg (9/10/2021), 114 mg (9/17/2021), 110 mg (10/8/2021), 110 mg (9/24/2021), 110 mg  (10/15/2021), 110 mg (11/12/2021), 110 mg (10/22/2021), 110 mg (11/19/2021), 110 mg (12/10/2021), 110 mg (11/26/2021), 110 mg (12/17/2021), 110 mg (1/7/2022), 110 mg (12/23/2021), 110 mg (1/14/2022), 90 mg (4/8/2022), 90 mg (4/15/2022), 90 mg (4/22/2022), 110 mg (1/21/2022), 110 mg (2/18/2022), 110 mg (2/25/2022), 90 mg (5/13/2022), 90 mg (5/20/2022), 90 mg (5/27/2022), 90 mg (3/4/2022), 90 mg (6/10/2022), 90 mg (6/17/2022), 90 mg (6/24/2022), 90 mg (7/8/2022), 90 mg (7/15/2022), 90 mg (7/22/2022), 68 mg (8/19/2022), 70 mg (9/2/2022)                       Review of Systems   Constitutional:  Negative for chills and fever.   HENT:  Negative for congestion and sore throat.    Respiratory:  Negative for cough and shortness of breath.    Cardiovascular:  Negative for chest pain and leg swelling.   Gastrointestinal:  Negative for abdominal pain, constipation and diarrhea.   Genitourinary:  Negative for difficulty urinating, dysuria, frequency, hematuria and urgency.        Frequency, nocturia   Musculoskeletal:  Negative for back pain and gait problem.   Skin:  Negative for wound.   Allergic/Immunologic: Negative for immunocompromised state.   Hematological:  Does not bruise/bleed easily.       Physical Exam  Vitals and nursing note reviewed.   Constitutional:       General: He is not in acute distress.     Appearance: He is well-developed.   HENT:      Head: Normocephalic and atraumatic.   Eyes:      Conjunctiva/sclera: Conjunctivae normal.   Cardiovascular:      Rate and Rhythm: Normal rate and regular rhythm.      Heart sounds: No murmur heard.  Pulmonary:      Effort: Pulmonary effort is normal. No respiratory distress.      Breath sounds: Normal breath sounds.   Abdominal:      Palpations: Abdomen is soft.      Tenderness: There is no abdominal tenderness.   Genitourinary:     Comments: Rectal exam reveals normal tone, no masses and his prostate is smooth, symmetric, and benign. No nodules.    Musculoskeletal:          General: No swelling.      Cervical back: Neck supple.   Skin:     General: Skin is warm and dry.      Capillary Refill: Capillary refill takes less than 2 seconds.   Neurological:      Mental Status: He is alert.   Psychiatric:         Mood and Affect: Mood normal.           Imagin2024    PET/CT SCAN     INDICATION: C79.10: Secondary malignant neoplasm of unspecified urinary organs  C67.3: Malignant neoplasm of anterior wall of bladder     MODIFIER: PS     COMPARISON: 2024     CELL TYPE: High-grade urothelial carcinoma     TECHNIQUE:   10.6 mCi F-18-FDG administered IV. Multiplanar attenuation corrected and non attenuation corrected PET images are available for interpretation, and contiguous, low dose, axial CT sections were obtained from the skull base through the femurs.   Intravenous contrast material was not utilized. This examination, like all CT scans performed in the [facility], was performed utilizing techniques to minimize radiation dose exposure, including the use of iterative reconstruction and   automated exposure control.     Fasting serum glucose: 93 mg/dl     FINDINGS:     VISUALIZED BRAIN:  No acute abnormalities are seen.     HEAD/NECK:  There is a physiologic distribution of FDG. No FDG avid cervical adenopathy is seen.  CT images: Unremarkable.     CHEST:  There is no new FDG avid lesion. Right basilar opacity likely representing rounded atelectasis measuring about 1.6 cm, stable  A dermal-based area of skin thickening seen in the posterior aspect of right shoulder, measuring 8 mm with a SUV of 1.5 with no hypermetabolism, stable  CT images: Right-sided rib central venous catheter seen  Coronary artery calcification seen     ABDOMEN:  Again noted is focal area of uptake posterior to the spleen which demonstrates maximal SUV of 3.2. Previously the uptake in this area was about 4.7, this is stable in size and this is seen in image 117  Areas of uptake seen  in the periumbilical region and anterior abdominal wall, post surgical and demonstrate uptake of about 2.9.  There is visualization of a fluid collection with fat-fluid level, postsurgical. A hernia mesh is in place, patient had surgery on May 31, 2024 and August 10, 2023  PELVIS:  No FDG avid soft tissue lesions are seen.  CT images: Unremarkable.     OSSEOUS STRUCTURES:  No FDG avid lesions are seen.  CT images: No significant findings.     IMPRESSION:     There is no new FDG-avid lesion     Previous noted in the nonspecific foci of uptake adjacent to the spleen are decreasing     There is no new mediastinal, abdominal pelvic, or retroperitoneal lymphadenopathy     Evolving seroma in the periumbilical region.  Previously noted rectus sheath hematoma has resolved            2/02/2024    PET/CT SCAN     INDICATION: C79.10: Secondary malignant neoplasm of unspecified urinary organs  C67.3: Malignant neoplasm of anterior wall of bladder  Reassessment of abnormal findings on prior examination. History of metastatic urothelial carcinoma. Prior chemotherapy and radiation therapy and left nephro ureterectomy with bladder cuff excision     MODIFIER: PS     COMPARISON: PET/CT 7/26/2023, CT abdomen/pelvis 9/9/2023     CELL TYPE: Invasive high-grade urothelial carcinoma     TECHNIQUE:   10.8 mCi F-18-FDG administered IV. Multiplanar attenuation corrected and non attenuation corrected PET images are available for interpretation, and contiguous, low dose, axial CT sections were obtained from the skull base through the femurs.   Intravenous contrast material was not utilized. This examination, like all CT scans performed in the [facility], was performed utilizing techniques to minimize radiation dose exposure, including the use of iterative reconstruction and   automated exposure control.     Fasting serum glucose: 89 mg/dl     FINDINGS:     VISUALIZED BRAIN:  No acute abnormalities are seen.      HEAD/NECK:  There is a physiologic distribution of FDG. No FDG avid cervical adenopathy is seen.  CT images: Unremarkable.     CHEST:  No FDG avid soft tissue lesions are seen. Previous examination demonstrated a small nodular density in the posterior left lung base with SUV max of 3.1. This has resolved. Right posterior superior chest wall subcutaneous density is unchanged in   appearance from prior PET/CT. The earlier SUV max was 1.6, currently 1.5. On prior study there was an area of activity posterior to the right shoulder in the soft tissues with SUV max 3.6, currently 2.9. Right lung base scarring or unchanged atelectasis.   SUV max 2.8, earlier 3.3.     CT images: Coronary artery calcification. No pleural or pericardial effusion. [id number] catheter tip in the region of the cavoatrial junction.     ABDOMEN/PELVIS:  -Again seen is a focus of activity along the posterior margin of the spleen on image 129, measuring, based on size of activity, approximately 0.7 cm earlier demonstrated SUV max of 8.9 currently 4.7. Activity further superiorly along the posterior   superior margin of the spleen, earlier SUV max 3.9, currently 2.6. Prior study also demonstrated scattered mildly hypermetabolic foci along the anterior abdominal wall there is an area of concern on image 143 involving the left medial rectus sheath which   appears thicker than the previous examination currently transverse diameter 1.1 cm, the SUV max is 3.8, earlier about 2.6 and earlier without focal soft tissue thickening. A soft tissue nodule in the posterior superior right retroperitoneum on image 123   is not FDG avid and is unchanged.  CT images: Status post left nephrectomy. No evidence of right-sided hydronephrosis or ascites. Stable photopenic hepatic hypodensities.     OSSEOUS STRUCTURES:  Again seen is the focus of increased activity in the medial left clavicle. The previous SUV max was 4.8, currently 2.9. This is associated with an area  of subtle sclerosis which has not changed on the limited CT  No new findings are seen.     IMPRESSION:     1. The only new finding on this examination is focal thickening and increased activity associated with the left rectus muscle sheath, with SUV max of 3.8. However, this is a nonspecific finding. Although metastatic disease is not excluded, a small rectus   sheath hematoma or muscle inflammation could appear similarly. Close reassessment on follow-up recommended  2. Areas of increased activity posterior and along the superior margin of the spleen have shown a partial diminishment in FDG avidity  3. Previously seen left lung opacity has resolved, and parenchymal changes at the right lung base are stable. Foci of activity about the right shoulder demonstrate unchanged or diminished activity  4. Right medial clavicular abnormality also shows diminished FDG avidity and no change in appearance on limited CT. No new osseous abnormalities are seen        Please Note:  Voice dictation software has been used to create this document. There may be inadvertent transcriptions errors.     RAGHU Parks 01/31/25

## 2025-01-31 NOTE — TELEPHONE ENCOUNTER
Pt returning call and scheduled for next available cysto appt with Dr Swan in [place] on 2/6/25 at 11am.

## 2025-01-31 NOTE — ASSESSMENT & PLAN NOTE
Symptoms most consistent with BPH however I cannot entirely exclude the possibility of neurogenic cause given history of lumbar radiculopathy, spinal stenosis of the lumbar region, as well as chemotherapy-induced neuropathy.    He mostly complains of nocturia and weak urinary stream at nightime.  During the daytime he is fine but at night he reports getting up on average 3 times voiding only small amounts.  He has been taking triple oral therapy over the past 8 months or so for presumed BPH with terazosin 10 mg, finasteride 5 mg, and Flomax 0.8 mg all at night prior to bedtime.  He was instructed to discontinue terazosin and only continue with finasteride 5 mg and Flomax 0.8 mg.  He is emptying well with a PVR of 21 mL today.   Rectal exam did not reveal significant prostatomegaly with estimated gland size between 30 to 40 g.  Exam was otherwise benign.    Orders:    POCT urine dip    POCT Measure PVR    Urine culture    Cytology, urine

## 2025-01-31 NOTE — TELEPHONE ENCOUNTER
----- Message from RAGHU Cao sent at 1/31/2025  2:42 PM EST -----  Please call patient to schedule cystoscopy, former patient of Dr. Cortes with metastatic urothelial cancer complaining of nocturia.

## 2025-01-31 NOTE — TELEPHONE ENCOUNTER
Called and LMOM for patient to return call to the office to schedule a cystoscopy with Dr. Swan in [place].

## 2025-02-01 LAB — BACTERIA UR CULT: NORMAL

## 2025-02-03 ENCOUNTER — RESULTS FOLLOW-UP (OUTPATIENT)
Dept: UROLOGY | Facility: CLINIC | Age: 75
End: 2025-02-03

## 2025-02-04 PROCEDURE — 88112 CYTOPATH CELL ENHANCE TECH: CPT | Performed by: PATHOLOGY

## 2025-02-06 ENCOUNTER — TELEPHONE (OUTPATIENT)
Dept: UROLOGY | Facility: CLINIC | Age: 75
End: 2025-02-06

## 2025-02-06 ENCOUNTER — HOSPITAL ENCOUNTER (OUTPATIENT)
Dept: INFUSION CENTER | Facility: HOSPITAL | Age: 75
End: 2025-02-06

## 2025-02-06 ENCOUNTER — PROCEDURE VISIT (OUTPATIENT)
Dept: UROLOGY | Facility: CLINIC | Age: 75
End: 2025-02-06
Payer: MEDICARE

## 2025-02-06 VITALS
DIASTOLIC BLOOD PRESSURE: 80 MMHG | WEIGHT: 203 LBS | TEMPERATURE: 98.3 F | HEART RATE: 74 BPM | OXYGEN SATURATION: 97 % | HEIGHT: 68 IN | BODY MASS INDEX: 30.77 KG/M2 | SYSTOLIC BLOOD PRESSURE: 122 MMHG

## 2025-02-06 DIAGNOSIS — N32.81 OVERACTIVE BLADDER: ICD-10-CM

## 2025-02-06 DIAGNOSIS — C79.10 METASTATIC UROTHELIAL CARCINOMA (HCC): Primary | ICD-10-CM

## 2025-02-06 DIAGNOSIS — R35.1 NOCTURIA: ICD-10-CM

## 2025-02-06 DIAGNOSIS — R35.0 BENIGN PROSTATIC HYPERPLASIA WITH URINARY FREQUENCY: ICD-10-CM

## 2025-02-06 DIAGNOSIS — N40.1 BENIGN PROSTATIC HYPERPLASIA WITH URINARY FREQUENCY: ICD-10-CM

## 2025-02-06 PROCEDURE — 52000 CYSTOURETHROSCOPY: CPT | Performed by: UROLOGY

## 2025-02-06 PROCEDURE — 99213 OFFICE O/P EST LOW 20 MIN: CPT | Performed by: UROLOGY

## 2025-02-06 RX ORDER — TOLTERODINE 2 MG/1
2 CAPSULE, EXTENDED RELEASE ORAL
Qty: 30 CAPSULE | Refills: 11 | Status: SHIPPED | OUTPATIENT
Start: 2025-02-06

## 2025-02-06 RX ORDER — CEPHALEXIN 500 MG/1
500 CAPSULE ORAL ONCE
Qty: 1 CAPSULE | Refills: 0 | Status: SHIPPED | OUTPATIENT
Start: 2025-02-06 | End: 2025-02-06

## 2025-02-06 NOTE — TELEPHONE ENCOUNTER
Spoke with patient and made him aware that the office is opening at 1 pm today due to the weather. Patients cystoscopy was adjusted from 11:00 am to 1:30 pm with Dr. Swan.

## 2025-02-06 NOTE — PROGRESS NOTES
UROLOGY PROGRESS NOTE   [facility]  [address] [place] [address] [place]  [address]  JUAN MANUEL [place] [address]  [phone]  Fax:[phone]  [email or web address]      NAME: Juan Amato  AGE: 74 y.o. SEX: male  : 1950   MRN: [id number]    DATE: 2025  TIME: 1:14 PM    Assessment and Plan:    Solitary right kidney, history of left upper tract urothelial carcinoma with metastases as below-under the care of Dr. Valera.  Bladder was clean.    Lower urinary tract symptoms, BPH may be an issue but has spinal stenosis which may be contributing as well.  Continue with medications and follow-up in 6 months.  He may benefit from a TURP to help his stream but first we will give it time dose.  He will let me know if he wants to do this.    Prostate cancer screening: PSA ordered, he will have this done.                   Chief Complaint   No chief complaint on file.      History of Present Illness   74-year-old man, established patient new to me-history of left upper tract urothelial carcinoma, status post robotic assisted left nephro ureterectomy 2020.  Pathology showed T3 disease with invasion into the pericolic fat.  He had been sent for preoperative evaluation for neoadjuvant chemotherapy but he deferred.  He had only 1 cycle of adjuvant chemotherapy due to progressive renal dysfunction and concerns about hearing loss.  2020 the patient was noted to have progressive lymphadenopathy and concern for recurrence of left rectus muscle.  PET scan showed activity new started on Keytruda then adjusted to Padcev.  2020 the patient developed an incisional hernia with strangulated bowel underwent resection.  Last seen by Dr. Cortes 2023 with basically negative PET scan.  He is almost until 2025 and PET scan 2024 was negative for any avid lesions.  He had not received any chemotherapy since .  Under close observation with hematology oncology with every  6 month.  He is to see Dr. Valera this month.  Hematology feel he is complaining of nocturia.  He also has a history of lumbar radiculopathy, spinal stenosis and chemotherapy-induced neuropathy.  He was having nocturia and weak urinary stream at nighttime.  Getting up 3 times only voiding small amounts.  He was taken terazosin 10 mg, finasteride 5 mg and Flomax 0.8 mg oral at night prior to bedtime for the past 8 months.  He was told to discontinue terazosin only continue with finasteride Flomax.  PVR was 21 cc January 31, 2025.  JOE negative.  Cytology January 31, 2025 showed atypical urothelial cells just a few of evidence of acute inflammation and a few fungal spores.  He was sent for cystoscopy for this.  Urine culture that day showed 10-19,000 colonies mixed contaminants. HE has had radiation to his right shoulder and abdomen for all of this.  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  Last creatinine was normal at 1.27 July 11, 2024.       Cystoscopy     Date/Time  2/6/2025 1:30 PM     Performed by  Jong Swan MD   Authorized by  RAGHU Parks     Universal Protocol:  Consent: Verbal consent obtained. Written consent obtained.      Procedure Details:  Procedure type: cystoscopy    Additional Procedure Details: Cystoscopy Procedure Note        Pre-operative Diagnosis: Abnormal cytology, atypical, history of upper tract urothelial carcinoma    Post-operative Diagnosis: Moderate prostate occlusion, no bladder cancer, somewhat fir tree in appearance suggesting neurogenic component    Procedure: Flexible cystoscopy    Surgeon: Jong Swan MD    Anesthesia: 1% Xylocaine per urethra    EBL: Minimal    Complications: none    Procedure Details   The risks, benefits, complications, treatment options, and expected outcomes were discussed with the patient. The patient concurred with the proposed plan, giving informed consent.    Cystoscopy was performed today under local  anesthesia, using sterile technique. The patient was placed in the supine position, prepped with Betadine, and draped in the usual sterile fashion. The flexible cystocope was used to inspect both the urethra and bladder    Findings:  Urethra: Normal without stricture.  Prostate moderately occlusive with bilobar hypertrophy, no median lobe.    Bladder: 2+ trabeculated, somewhat fir tree in appearance and there were no stones tumors or other lesions.  The orifices were orthotopic and intact.           Specimens: None                 Complications:  None           Disposition: To home            Condition:  Stable              The following portions of the patient's history were reviewed and updated as appropriate: allergies, current medications, past family history, past medical history, past social history, past surgical history and problem list.  Past Medical History:   Diagnosis Date    A-fib (HCC)     Arthritis     Bladder cancer     Cancer (HCC)     skin melanoma;basal cell    Chemotherapy-induced neuropathy (HCC)     Chronic pain disorder     from arthritis    Colon polyp     Does use hearing aid     bilat    Dry eyes, bilateral     GERD (gastroesophageal reflux disease)     History of kidney stones 10/2019    History of partial knee replacement     bilat    History of transfusion     History of vertigo     Hyperlipidemia     Hypertension     Infusion extravasation of chemotherapy vesicant 11/2021    Kidney lesion     Lumbar disc disorder     compression of vertebrae L4-5-6    Muscle weakness     left hip area    Renal mass     left    Right ankle injury 03/05/2020    missed step of ladder     Right ankle pain     Shortness of breath     with activity    Tinnitus     Urothelial cancer     left    Wears glasses      Past Surgical History:   Procedure Laterality Date    COLONOSCOPY      CYSTOSCOPY Left 04/01/2020    Procedure: CYSTOSCOPY; URETERAL CATHETER PLACEMENT;  Surgeon: Joe Cortes MD;  [facility];  Service: Urology    CYSTOSCOPY  05/11/2020    CYSTOSCOPY  06/04/2021    FL CYSTOGRAM  04/13/2020    FL RETROGRADE PYELOGRAM  01/17/2020    HERNIA REPAIR  10/25/2022    umbilical with mesh    INGUINAL HERNIA REPAIR Right     with mesh    IR PORT PLACEMENT  04/30/2020    JOINT REPLACEMENT Bilateral     partials knee    LUMBAR EPIDURAL INJECTION      WY CYSTOURETHROSCOPY W/URETERAL CATHETERIZATION Bilateral 01/17/2020    Procedure: CYSTOSCOPY; RIGHT RETROGRADE PYELOGRAM WITH RIGHT URETERAL CYTOLOGY SAMPLING; LEFT URETEROSCOPY WITH RENAL PELVIS BIOPSY AND LEFT STENT PLACEMENT;  Surgeon: Joe Cortes MD;  [facility];  Service: Urology    WY LAPAROSCOPY NEPHRECTOMY W/TOTAL URETERECTOMY Left 04/01/2020    Procedure: ROBOTIC LAPAROSCOPIC NEPHRO-URETERECTOMY;  Surgeon: Joe Cortes MD;  [facility];  Service: Urology    WY RPR AA HERNIA 1ST < 3 CM REDUCIBLE N/A 8/10/2023    Procedure: REPAIR HERNIA INCISIONAL LAPAROSCOPIC WITH MESH;  Surgeon: Gustavo Schmidt MD;  [facility];  Service: General    WY RPR AA HERNIA 1ST < 3 CM REDUCIBLE N/A 5/31/2024    Procedure: REPAIR HERNIA VENTRAL LAPAROSCOPIC WITH MESH;  Surgeon: Gustavo Schmidt MD;  [facility];  Service: General    SKIN CANCER EXCISION      surface melanoma    TONSILLECTOMY      WISDOM TOOTH EXTRACTION       shoulder  Review of Systems   Review of Systems   Genitourinary:         As per hpi       Active Problem List     Patient Active Problem List   Diagnosis    Lumbar radiculopathy    Lumbar disc herniation    Lumbar spondylosis    Urothelial cancer (HCC)    Essential hypertension    Sinus bradycardia    Hyperlipidemia    Cancer of left renal pelvis (HCC)    Drug-induced neutropenia (HCC)    Chronic pain syndrome    Spinal stenosis of lumbar region    Encounter for central line care    Hyperuricemia    Long-term current use of opiate analgesic    Uncomplicated opioid dependence (HCC)     "Normocytic anemia    Thrombophlebitis of superficial veins of right lower extremity    Renal dysfunction    Stage 3a chronic kidney disease (HCC)    Hypomagnesemia    H/O left nephrectomy    Chronic kidney disease-mineral and bone disorder    Vitamin D deficiency    Primary osteoarthritis of right shoulder    Metastatic urothelial carcinoma (HCC)    Cancer associated pain    Early satiety    Dysgeusia    Neuropathy    Chronic bilateral low back pain without sciatica    Chemotherapy-induced neuropathy (HCC)    HERRERA (dyspnea on exertion)    Paroxysmal atrial fibrillation (HCC)    Sacroiliitis (HCC)    Urinary frequency    Overactive bladder    Encounter for care related to Port-a-Cath    Vitamin B 12 deficiency    Recurrent incisional hernia    Peripheral arterial disease (HCC)    Other heart failure (HCC)       Objective   Temp 98.3 °F (36.8 °C)   Ht 5' 8\" (1.727 m)   Wt 92.1 kg (203 lb)   SpO2 97%   BMI 30.87 kg/m²     Physical Exam  Vitals reviewed.   Constitutional:       Appearance: Normal appearance.   HENT:      Head: Normocephalic and atraumatic.   Eyes:      Extraocular Movements: Extraocular movements intact.   Pulmonary:      Effort: Pulmonary effort is normal.   Musculoskeletal:         General: Normal range of motion.      Cervical back: Normal range of motion.   Skin:     Coloration: Skin is not jaundiced or pale.   Neurological:      General: No focal deficit present.      Mental Status: He is alert and oriented to person, place, and time.   Psychiatric:         Mood and Affect: Mood normal.         Behavior: Behavior normal.         Thought Content: Thought content normal.         Judgment: Judgment normal.             Current Medications     Current Outpatient Medications:     amiodarone 200 mg tablet, Take 200 mg by mouth daily, Disp: , Rfl:     cephalexin (KEFLEX) 500 mg capsule, Take 1 capsule (500 mg total) by mouth 1 (one) time for 1 dose Take after procedure, Disp: 1 capsule, Rfl: 0    " diltiazem (CARDIZEM) 60 mg tablet, 2 (two) times a day, Disp: , Rfl:     finasteride (PROSCAR) 5 mg tablet, Take 5 mg by mouth daily, Disp: , Rfl:     folic acid (FOLVITE) 1 mg tablet, Take 1 mg by mouth daily, Disp: , Rfl:     naloxone (NARCAN) 4 mg/0.1 mL nasal spray, Administer 1 spray into a nostril. If no response after 2-3 minutes, give another dose in the other nostril using a new spray., Disp: 1 each, Rfl: 1    tamsulosin (FLOMAX) 0.4 mg, Take 1 capsule (0.4 mg total) by mouth daily with dinner, Disp: 60 capsule, Rfl: 12    zolpidem (AMBIEN) 5 mg tablet, Take 1 tablet (5 mg total) by mouth daily at bedtime as needed for sleep, Disp: 30 tablet, Rfl: 1    HYDROcodone-acetaminophen (Norco) 7.5-325 mg per tablet, Take 1 tablet by mouth every 8 (eight) hours as needed for pain Max Daily Amount: 3 tablets (Patient not taking: Reported on 2/6/2025), Disp: 90 tablet, Rfl: 0    [START ON 2/27/2025] HYDROcodone-acetaminophen (Norco) 7.5-325 mg per tablet, Take 1 tablet by mouth every 8 (eight) hours as needed for pain Max Daily Amount: 3 tablets Do not start before February 27, 2025. (Patient not taking: Reported on 2/6/2025 Do not start before February 27, 2025.), Disp: 90 tablet, Rfl: 0    [START ON 3/27/2025] HYDROcodone-acetaminophen (Norco) 7.5-325 mg per tablet, Take 1 tablet by mouth every 8 (eight) hours as needed for pain Max Daily Amount: 3 tablets Do not start before March 27, 2025. (Patient not taking: Reported on 2/6/2025 Do not start before March 27, 2025.), Disp: 90 tablet, Rfl: 0        Jong Swan MD

## 2025-02-06 NOTE — LETTER
2025     Jonatan Plummer, DO  [address]  [address]  [facility] [address]    Patient: Juan Amato   YOB: 1950   Date of Visit: 2025       Dear Dr. Plummer:    Thank you for referring Juan Amato to me for evaluation. Below are my notes for this consultation.    If you have questions, please do not hesitate to call me. I look forward to following your patient along with you.         Sincerely,        Jong Swan MD        CC: No Recipients    Jong Swan MD  2025  1:37 PM  Sign when Signing Visit  UROLOGY PROGRESS NOTE   [facility]  [address] [place] [address]  [address]  [place] [address]  [phone]  Fax:[phone]  [email or web address]      NAME: Juan Amato  AGE: 74 y.o. SEX: male  : 1950   MRN: [id number]    DATE: 2025  TIME: 1:14 PM    Assessment and Plan               Chief Complaint   No chief complaint on file.      History of Present Illness   74-year-old man, established patient new to me-history of left upper tract urothelial carcinoma, status post robotic assisted left nephro ureterectomy 2020.  Pathology showed T3 disease with invasion into the pericolic fat.  He had been sent for preoperative evaluation for neoadjuvant chemotherapy but he deferred.  He had only 1 cycle of adjuvant chemotherapy due to progressive renal dysfunction and concerns about hearing loss.  2020 the patient was noted to have progressive lymphadenopathy and concern for recurrence of left rectus muscle.  PET scan showed activity new started on Keytruda then adjusted to Padcev.  2020 the patient developed an incisional hernia with strangulated bowel underwent resection.  Last seen by Dr. Cortes 2023 with basically negative PET scan.  He is almost until 2025 and PET scan 2024 was negative for any avid lesions.  He had not received any chemotherapy since .  Under close  observation with hematology oncology with every 6 month.  He is to see Dr. Valera this month.  Hematology feel he is complaining of nocturia.  He also has a history of lumbar radiculopathy, spinal stenosis and chemotherapy-induced neuropathy.  He was having nocturia and weak urinary stream at nighttime.  Getting up 3 times only voiding small amounts.  He was taken terazosin 10 mg, finasteride 5 mg and Flomax 0.8 mg oral at night prior to bedtime for the past 8 months.  He was told to discontinue terazosin only continue with finasteride Flomax.  PVR was 21 cc January 31, 2025.  JOE negative.  Cytology January 31, 2025 showed atypical urothelial cells just a few of evidence of acute inflammation and a few fungal spores.  He was sent for cystoscopy for this.  Urine culture that day showed 10-19,000 colonies mixed contaminants. HE has had radiation to his right shoulder and abdomen for all of this.  Last creatinine was normal at 1.27 July 11, 2024.       Cystoscopy     Date/Time  2/6/2025 1:30 PM     Performed by  Jong Swan MD   Authorized by  RAGHU Parks     Universal Protocol:  Consent: Verbal consent obtained. Written consent obtained.      Procedure Details:  Procedure type: cystoscopy    Additional Procedure Details: Cystoscopy Procedure Note        Pre-operative Diagnosis: Abnormal cytology, atypical, history of upper tract urothelial carcinoma    Post-operative Diagnosis: Moderate prostate occlusion, no bladder cancer, somewhat fir tree in appearance suggesting neurogenic component    Procedure: Flexible cystoscopy    Surgeon: Jong Swan MD    Anesthesia: 1% Xylocaine per urethra    EBL: Minimal    Complications: none    Procedure Details   The risks, benefits, complications, treatment options, and expected outcomes were discussed with the patient. The patient concurred with the proposed plan, giving informed consent.    Cystoscopy was performed today under local anesthesia, using sterile technique.  The patient was placed in the supine position, prepped with Betadine, and draped in the usual sterile fashion. The flexible cystocope was used to inspect both the urethra and bladder    Findings:  Urethra: Normal without stricture.  Prostate moderately occlusive with bilobar hypertrophy, no median lobe.    Bladder: 2+ trabeculated, somewhat fir tree in appearance and there were no stones tumors or other lesions.  The orifices were orthotopic and intact.           Specimens: None                 Complications:  None           Disposition: To home            Condition:  Stable              The following portions of the patient's history were reviewed and updated as appropriate: allergies, current medications, past family history, past medical history, past social history, past surgical history and problem list.  Past Medical History:   Diagnosis Date   • A-fib (HCC)    • Arthritis    • Bladder cancer    • Cancer (HCC)     skin melanoma;basal cell   • Chemotherapy-induced neuropathy (HCC)    • Chronic pain disorder     from arthritis   • Colon polyp    • Does use hearing aid     bilat   • Dry eyes, bilateral    • GERD (gastroesophageal reflux disease)    • History of kidney stones 10/2019   • History of partial knee replacement     bilat   • History of transfusion    • History of vertigo    • Hyperlipidemia    • Hypertension    • Infusion extravasation of chemotherapy vesicant 11/2021   • Kidney lesion    • Lumbar disc disorder     compression of vertebrae L4-5-6   • Muscle weakness     left hip area   • Renal mass     left   • Right ankle injury 03/05/2020    missed step of ladder    • Right ankle pain    • Shortness of breath     with activity   • Tinnitus    • Urothelial cancer     left   • Wears glasses      Past Surgical History:   Procedure Laterality Date   • COLONOSCOPY     • CYSTOSCOPY Left 04/01/2020    Procedure: CYSTOSCOPY; URETERAL CATHETER PLACEMENT;  Surgeon: Joe Cortes MD;  [facility]: [facility]  OR;  Service: Urology   • CYSTOSCOPY  05/11/2020   • CYSTOSCOPY  06/04/2021   • FL CYSTOGRAM  04/13/2020   • FL RETROGRADE PYELOGRAM  01/17/2020   • HERNIA REPAIR  10/25/2022    umbilical with mesh   • INGUINAL HERNIA REPAIR Right     with mesh   • IR PORT PLACEMENT  04/30/2020   • JOINT REPLACEMENT Bilateral     partials knee   • LUMBAR EPIDURAL INJECTION     • SC CYSTOURETHROSCOPY W/URETERAL CATHETERIZATION Bilateral 01/17/2020    Procedure: CYSTOSCOPY; RIGHT RETROGRADE PYELOGRAM WITH RIGHT URETERAL CYTOLOGY SAMPLING; LEFT URETEROSCOPY WITH RENAL PELVIS BIOPSY AND LEFT STENT PLACEMENT;  Surgeon: Joe Cortes MD;  [facility];  Service: Urology   • SC LAPAROSCOPY NEPHRECTOMY W/TOTAL URETERECTOMY Left 04/01/2020    Procedure: ROBOTIC LAPAROSCOPIC NEPHRO-URETERECTOMY;  Surgeon: Joe Cortes MD;  [facility];  Service: Urology   • SC RPR AA HERNIA 1ST < 3 CM REDUCIBLE N/A 8/10/2023    Procedure: REPAIR HERNIA INCISIONAL LAPAROSCOPIC WITH MESH;  Surgeon: Gustavo Schmidt MD;  [facility];  Service: General   • SC RPR AA HERNIA 1ST < 3 CM REDUCIBLE N/A 5/31/2024    Procedure: REPAIR HERNIA VENTRAL LAPAROSCOPIC WITH MESH;  Surgeon: Gustavo Schmidt MD;  [facility];  Service: General   • SKIN CANCER EXCISION      surface melanoma   • TONSILLECTOMY     • WISDOM TOOTH EXTRACTION       shoulder  Review of Systems   Review of Systems    Active Problem List     Patient Active Problem List   Diagnosis   • Lumbar radiculopathy   • Lumbar disc herniation   • Lumbar spondylosis   • Urothelial cancer (HCC)   • Essential hypertension   • Sinus bradycardia   • Hyperlipidemia   • Cancer of left renal pelvis (HCC)   • Drug-induced neutropenia (HCC)   • Chronic pain syndrome   • Spinal stenosis of lumbar region   • Encounter for central line care   • Hyperuricemia   • Long-term current use of opiate analgesic   • Uncomplicated opioid dependence (HCC)   • Normocytic anemia   •  "Thrombophlebitis of superficial veins of right lower extremity   • Renal dysfunction   • Stage 3a chronic kidney disease (HCC)   • Hypomagnesemia   • H/O left nephrectomy   • Chronic kidney disease-mineral and bone disorder   • Vitamin D deficiency   • Primary osteoarthritis of right shoulder   • Metastatic urothelial carcinoma (HCC)   • Cancer associated pain   • Early satiety   • Dysgeusia   • Neuropathy   • Chronic bilateral low back pain without sciatica   • Chemotherapy-induced neuropathy (HCC)   • HERRERA (dyspnea on exertion)   • Paroxysmal atrial fibrillation (HCC)   • Sacroiliitis (HCC)   • Urinary frequency   • Overactive bladder   • Encounter for care related to Port-a-Cath   • Vitamin B 12 deficiency   • Recurrent incisional hernia   • Peripheral arterial disease (HCC)   • Other heart failure (HCC)       Objective   Temp 98.3 °F (36.8 °C)   Ht 5' 8\" (1.727 m)   Wt 92.1 kg (203 lb)   SpO2 97%   BMI 30.87 kg/m²     Physical Exam  Vitals reviewed.   Constitutional:       Appearance: Normal appearance.   HENT:      Head: Normocephalic and atraumatic.   Eyes:      Extraocular Movements: Extraocular movements intact.   Pulmonary:      Effort: Pulmonary effort is normal.   Musculoskeletal:         General: Normal range of motion.      Cervical back: Normal range of motion.   Skin:     Coloration: Skin is not jaundiced or pale.   Neurological:      General: No focal deficit present.      Mental Status: He is alert and oriented to person, place, and time.   Psychiatric:         Mood and Affect: Mood normal.         Behavior: Behavior normal.         Thought Content: Thought content normal.         Judgment: Judgment normal.             Current Medications     Current Outpatient Medications:   •  amiodarone 200 mg tablet, Take 200 mg by mouth daily, Disp: , Rfl:   •  cephalexin (KEFLEX) 500 mg capsule, Take 1 capsule (500 mg total) by mouth 1 (one) time for 1 dose Take after procedure, Disp: 1 capsule, Rfl: 0  •  " diltiazem (CARDIZEM) 60 mg tablet, 2 (two) times a day, Disp: , Rfl:   •  finasteride (PROSCAR) 5 mg tablet, Take 5 mg by mouth daily, Disp: , Rfl:   •  folic acid (FOLVITE) 1 mg tablet, Take 1 mg by mouth daily, Disp: , Rfl:   •  naloxone (NARCAN) 4 mg/0.1 mL nasal spray, Administer 1 spray into a nostril. If no response after 2-3 minutes, give another dose in the other nostril using a new spray., Disp: 1 each, Rfl: 1  •  tamsulosin (FLOMAX) 0.4 mg, Take 1 capsule (0.4 mg total) by mouth daily with dinner, Disp: 60 capsule, Rfl: 12  •  zolpidem (AMBIEN) 5 mg tablet, Take 1 tablet (5 mg total) by mouth daily at bedtime as needed for sleep, Disp: 30 tablet, Rfl: 1  •  HYDROcodone-acetaminophen (Norco) 7.5-325 mg per tablet, Take 1 tablet by mouth every 8 (eight) hours as needed for pain Max Daily Amount: 3 tablets (Patient not taking: Reported on 2/6/2025), Disp: 90 tablet, Rfl: 0  •  [START ON 2/27/2025] HYDROcodone-acetaminophen (Norco) 7.5-325 mg per tablet, Take 1 tablet by mouth every 8 (eight) hours as needed for pain Max Daily Amount: 3 tablets Do not start before February 27, 2025. (Patient not taking: Reported on 2/6/2025 Do not start before February 27, 2025.), Disp: 90 tablet, Rfl: 0  •  [START ON 3/27/2025] HYDROcodone-acetaminophen (Norco) 7.5-325 mg per tablet, Take 1 tablet by mouth every 8 (eight) hours as needed for pain Max Daily Amount: 3 tablets Do not start before March 27, 2025. (Patient not taking: Reported on 2/6/2025 Do not start before March 27, 2025.), Disp: 90 tablet, Rfl: 0        Jong Swan MD

## 2025-02-07 ENCOUNTER — HOSPITAL ENCOUNTER (OUTPATIENT)
Dept: INFUSION CENTER | Facility: HOSPITAL | Age: 75
Discharge: HOME | End: 2025-02-07
Attending: INTERNAL MEDICINE
Payer: MEDICARE

## 2025-02-07 DIAGNOSIS — C65.2 CANCER OF LEFT RENAL PELVIS (HCC): ICD-10-CM

## 2025-02-07 DIAGNOSIS — Z45.2 ENCOUNTER FOR CARE RELATED TO PORT-A-CATH: Primary | ICD-10-CM

## 2025-02-07 PROCEDURE — 96523 IRRIG DRUG DELIVERY DEVICE: CPT

## 2025-02-07 NOTE — PROGRESS NOTES
Juan Lm  tolerated port flush well with no complications.      Juan Amato is aware of future appt on 3/21 at 10AM.     AVS printed and given to Juan Amato: appointment card provided.

## 2025-02-10 ENCOUNTER — HOSPITAL ENCOUNTER (OUTPATIENT)
Dept: NUCLEAR MEDICINE | Facility: HOSPITAL | Age: 75
Discharge: HOME | End: 2025-02-10
Attending: INTERNAL MEDICINE
Payer: MEDICARE

## 2025-02-10 DIAGNOSIS — C79.10 METASTATIC UROTHELIAL CARCINOMA (HCC): ICD-10-CM

## 2025-02-10 DIAGNOSIS — C67.3 MALIGNANT NEOPLASM OF ANTERIOR WALL OF BLADDER (HCC): ICD-10-CM

## 2025-02-10 LAB — GLUCOSE SERPL-MCNC: 76 MG/DL (ref 65–140)

## 2025-02-10 PROCEDURE — A9552 F18 FDG: HCPCS

## 2025-02-10 PROCEDURE — 78815 PET IMAGE W/CT SKULL-THIGH: CPT

## 2025-02-10 PROCEDURE — 82948 REAGENT STRIP/BLOOD GLUCOSE: CPT

## 2025-02-20 ENCOUNTER — TELEPHONE (OUTPATIENT)
Age: 75
End: 2025-02-20

## 2025-02-20 DIAGNOSIS — C68.9 UROTHELIAL CANCER (HCC): Primary | ICD-10-CM

## 2025-02-20 DIAGNOSIS — D64.9 NORMOCYTIC ANEMIA: ICD-10-CM

## 2025-02-20 NOTE — TELEPHONE ENCOUNTER
Patient called requesting lab orders be placed for his upcoming visit with Dr. Valera. Reviewed last office note and placed orders for CBC/CMP/Mag.

## 2025-02-21 ENCOUNTER — APPOINTMENT (OUTPATIENT)
Dept: LAB | Facility: CLINIC | Age: 75
End: 2025-02-21
Payer: MEDICARE

## 2025-02-21 DIAGNOSIS — Z12.5 SCREENING FOR PROSTATE CANCER: ICD-10-CM

## 2025-02-21 DIAGNOSIS — N40.1 BENIGN PROSTATIC HYPERPLASIA WITH WEAK URINARY STREAM: ICD-10-CM

## 2025-02-21 DIAGNOSIS — D64.9 NORMOCYTIC ANEMIA: ICD-10-CM

## 2025-02-21 DIAGNOSIS — C79.10 METASTATIC UROTHELIAL CARCINOMA (HCC): ICD-10-CM

## 2025-02-21 DIAGNOSIS — R39.12 BENIGN PROSTATIC HYPERPLASIA WITH WEAK URINARY STREAM: ICD-10-CM

## 2025-02-21 DIAGNOSIS — C68.9 UROTHELIAL CANCER (HCC): ICD-10-CM

## 2025-02-21 LAB
ALBUMIN SERPL BCG-MCNC: 4.1 G/DL (ref 3.5–5)
ALP SERPL-CCNC: 71 U/L (ref 34–104)
ALT SERPL W P-5'-P-CCNC: 12 U/L (ref 7–52)
ANION GAP SERPL CALCULATED.3IONS-SCNC: 11 MMOL/L (ref 4–13)
AST SERPL W P-5'-P-CCNC: 11 U/L (ref 13–39)
BASOPHILS # BLD AUTO: 0.05 THOUSANDS/ΜL (ref 0–0.1)
BASOPHILS NFR BLD AUTO: 1 % (ref 0–1)
BILIRUB SERPL-MCNC: 1.1 MG/DL (ref 0.2–1)
BUN SERPL-MCNC: 19 MG/DL (ref 5–25)
CALCIUM SERPL-MCNC: 9.7 MG/DL (ref 8.4–10.2)
CHLORIDE SERPL-SCNC: 103 MMOL/L (ref 96–108)
CO2 SERPL-SCNC: 29 MMOL/L (ref 21–32)
CREAT SERPL-MCNC: 1.47 MG/DL (ref 0.6–1.3)
EOSINOPHIL # BLD AUTO: 0.17 THOUSAND/ΜL (ref 0–0.61)
EOSINOPHIL NFR BLD AUTO: 3 % (ref 0–6)
ERYTHROCYTE [DISTWIDTH] IN BLOOD BY AUTOMATED COUNT: 14 % (ref 11.6–15.1)
GFR SERPL CREATININE-BSD FRML MDRD: 46 ML/MIN/1.73SQ M
GLUCOSE SERPL-MCNC: 107 MG/DL (ref 65–140)
HCT VFR BLD AUTO: 44.5 % (ref 36.5–49.3)
HGB BLD-MCNC: 14.1 G/DL (ref 12–17)
IMM GRANULOCYTES # BLD AUTO: 0.02 THOUSAND/UL (ref 0–0.2)
IMM GRANULOCYTES NFR BLD AUTO: 0 % (ref 0–2)
LYMPHOCYTES # BLD AUTO: 1.62 THOUSANDS/ΜL (ref 0.6–4.47)
LYMPHOCYTES NFR BLD AUTO: 24 % (ref 14–44)
MAGNESIUM SERPL-MCNC: 1.8 MG/DL (ref 1.9–2.7)
MCH RBC QN AUTO: 30.5 PG (ref 26.8–34.3)
MCHC RBC AUTO-ENTMCNC: 31.7 G/DL (ref 31.4–37.4)
MCV RBC AUTO: 96 FL (ref 82–98)
MONOCYTES # BLD AUTO: 0.66 THOUSAND/ΜL (ref 0.17–1.22)
MONOCYTES NFR BLD AUTO: 10 % (ref 4–12)
NEUTROPHILS # BLD AUTO: 4.29 THOUSANDS/ΜL (ref 1.85–7.62)
NEUTS SEG NFR BLD AUTO: 62 % (ref 43–75)
NRBC BLD AUTO-RTO: 0 /100 WBCS
PLATELET # BLD AUTO: 173 THOUSANDS/UL (ref 149–390)
PMV BLD AUTO: 10.2 FL (ref 8.9–12.7)
POTASSIUM SERPL-SCNC: 4.7 MMOL/L (ref 3.5–5.3)
PROT SERPL-MCNC: 6.6 G/DL (ref 6.4–8.4)
PSA SERPL-MCNC: 1.2 NG/ML (ref 0–4)
RBC # BLD AUTO: 4.62 MILLION/UL (ref 3.88–5.62)
SODIUM SERPL-SCNC: 143 MMOL/L (ref 135–147)
WBC # BLD AUTO: 6.81 THOUSAND/UL (ref 4.31–10.16)

## 2025-02-21 PROCEDURE — 36415 COLL VENOUS BLD VENIPUNCTURE: CPT

## 2025-02-21 PROCEDURE — 80053 COMPREHEN METABOLIC PANEL: CPT

## 2025-02-21 PROCEDURE — 85025 COMPLETE CBC W/AUTO DIFF WBC: CPT

## 2025-02-21 PROCEDURE — G0103 PSA SCREENING: HCPCS

## 2025-02-25 ENCOUNTER — OFFICE VISIT (OUTPATIENT)
Dept: HEMATOLOGY ONCOLOGY | Facility: CLINIC | Age: 75
End: 2025-02-25
Payer: MEDICARE

## 2025-02-25 VITALS
SYSTOLIC BLOOD PRESSURE: 122 MMHG | RESPIRATION RATE: 18 BRPM | HEART RATE: 54 BPM | WEIGHT: 203 LBS | BODY MASS INDEX: 30.77 KG/M2 | OXYGEN SATURATION: 94 % | DIASTOLIC BLOOD PRESSURE: 72 MMHG | TEMPERATURE: 98.1 F | HEIGHT: 68 IN

## 2025-02-25 DIAGNOSIS — C68.9 UROTHELIAL CANCER (HCC): Primary | ICD-10-CM

## 2025-02-25 DIAGNOSIS — C64.2 UROTHELIAL CARCINOMA OF KIDNEY, LEFT (HCC): ICD-10-CM

## 2025-02-25 PROCEDURE — G2211 COMPLEX E/M VISIT ADD ON: HCPCS | Performed by: INTERNAL MEDICINE

## 2025-02-25 PROCEDURE — 99215 OFFICE O/P EST HI 40 MIN: CPT | Performed by: INTERNAL MEDICINE

## 2025-02-25 NOTE — PROGRESS NOTES
Name: Juan Amato      : 1950      MRN: [id number]  Encounter Provider: Tyree Valera MD  Encounter Date: 2025   Encounter department: [facility] [place]  :  Assessment & Plan  Urothelial cancer (HCC)  Diagnosed in 2020, status post multiple lines of treatment.    His last dose of enfortumab vedotin was around 2022.  The treatment had to be stopped due to severe neuropathy.    I did review with the patient the result of the recent PET CT scan and reviewed the imaging extensively.  He seems to have hypermetabolic nodule along the posterior aspect of the spleen with an SUV of 5.5 which seems to be compatible with malignant process which could be due to metastatic urothelial carcinoma.    The patient was told that getting a biopsy from that site may be challenging due to the close upper extremity of the spleen.  We decided to pursue another PET CT scan in about 4 months from now for close follow-up.  Will then discuss the results findings and act accordingly.    Orders:    CBC and differential; Future    Comprehensive metabolic panel; Future    Magnesium; Future    NM PET CT skull base to mid thigh; Future    Urothelial carcinoma of kidney, left (HCC)    Orders:    NM PET CT skull base to mid thigh; Future        Return in about 4 months (around 2025) for Labs, Imaging, Office Visit 20 min.    History of Present Illness   Chief Complaint   Patient presents with    Follow-up   The patient came today for a follow-up visit accompanied by his wife.  He had the follow-up PET CT scan on 2/10/2025 which showed:  IMPRESSION:  1.  Interval increased FDG uptake in subcentimeter hypermetabolic nodule along the posterior spleen, suspicious for metastasis.    Blood work from 2025 was reviewed with him extensively.    Oncology History   Cancer Staging   Cancer of left renal pelvis (HCC)  Staging form: Renal Pelvis and Ureter, AJCC 8th Edition  -  Clinical stage from 1/6/2020: Stage IV (cTX, cM1) - Signed by Tyree Valera MD on 8/13/2024  Histologic grade (G): GX  Histologic grading system: 3 grade system  Oncology History Overview Note   Patient has a history of hypertension, hyperlipidemia, chronic lower back pain.     He had an MRI of the lower spine for further evaluation of his chronic lower back pain.  The MRI on 12/02/2019 revealed Multiple left renal masses to include a left lower pole cyst and a rounded structure in the left upper pole which may also represent cyst.  Left upper pole renal masses approximately 3 cm in greatest linear dimension.     A CT with renal protocol was done on 12/12/2019 which also showed the infiltrating lesion in the upper pole of the left kidney measuring about the 3.5 cm in greatest dimension without any hint of retroperitoneal lymphadenopathy.       A CT scan of the abdomen pelvis on 01/14/2020 showed the same findings with the mild hydronephrosis on the left.  The urine cytology on 01/03/2020 showed high-grade urothelial carcinoma.  A cystoscopy was then done, a biopsy was taken from the left renal pelvic region which showed high-grade urothelial carcinoma without evidence of lamina propria invasion.  The detrusor muscle/muscularis propria were not present for evaluation.     The recommendation was to pursue left robotic assisted laparoscopic nephroureterectomy with bladder cuff excision which was done on 04/01/2020.    The final pathology revealed;  - Invasive high grade urothelial carcinoma arising in renal pelvis.   - Bladder cuff margin is negative for carcinoma and no evidence of high grade dysplasia.  - Ureters with no significant pathologic abnormality  - Two benign simple cysts.  - Adrenal gland is negative for malignancy.  - One lymph node, negative for malignancy (0/1).  The tumor size was 4.5 cm with invasion beyond the muscularis into the periurethral fat or peripelvic fat or the renal parenchyma.  The margins  were uninvolved by carcinoma or carcinoma in situ.  The final pathology was pT3 pN0.     Patient barely tolerated 1 cycle of adjuvant chemotherapy with split dose cisplatin and gemcitabine with a lot of side effects.  The treatment had to be discontinued due to significant worsening renal dysfunction 6/2020.     Patient started to complain about significant abdominal/left inguinal pain around August/September 2020. Unfortunately repeat imaging revealed recurrent/metastatic disease.  9/4/20 CT C/A/P- Status post left nephrectomy for resection of urothelial neoplasm.  Lymphadenopathy in the left nephrectomy bed has increased since the prior examination, concerning for metastatic disease.     Ill-defined hyperattenuating masslike focus in the left rectus muscle, not identified on the prior examination.  This is suspicious for a metastatic lesion.  A rectus hematoma is also considered.      9/23/20 PET scan- 1.  Multiple FDG avid lymph nodes in the retrocrural region, retroperitoneum and the left renal bed compatible with metastasis.  These have progressed from recent CT.  2.  FDG avid mass involving the left rectus abdominal musculature compatible with metastasis which is larger from recent CT.  3. Several small lymph nodes adjacent to the mid to distal esophagus with FDG uptake suspicious for metastasis.  Small focus of FDG uptake also suggested in the right hilar region, metastasis is not excluded.  This could be reassessed on follow-up.  4.  Subtle focus of FDG uptake at the T2 level on the left, nonspecific, could be related to early degenerative changes, developing metastasis is not entirely excluded.  This could be reassessed on follow-up.  5. Prostate is mildly prominent with heterogeneous FDG uptake.  This could be inflammatory.  Correlate for prostatitis.     He completed palliative radiation to the left abdomen 12/3/20     His PET scan from 08/16/2021 showed progression of his disease with hypermetabolic soft  tissue lesions at the level of the right shoulder and right axilla with interval progression of the retroperitoneal and mesenteric hypermetabolic metastasis.       He did have the new lesion to his right upper back/shoulder which was causing significant localized pain. This excised by his surgeon 08/09/2021. Pathology confirmed metastatic high-grade carcinoma consistent with his no primary urothelial carcinoma.  He received palliative radiation to his right shoulder/axilla region.     Urothelial cancer (HCC)   1/3/2020 Biopsy    Final Diagnosis    A. Urine, Clean Catch, Thin Prep:  High grade urothelial carcinoma (HGUC) - see comment.        1/3/2020 Initial Diagnosis    Urothelial cancer (HCC)  T3 N0 (stage IIIA)     1/17/2020 Biopsy    Final Diagnosis    A. Ureter, Right, left renal pelvis biopsy  -Fragments of high grade urothelial carcinoma   -No evidence of lamina propria invasion.  -Detrusor muscle/ muscularis propria is not present for evaluation.     B. Urinary Bladder, bladder biopsy:  -Fragments of low grade papillary lesion. See note  -No evidence of invasion seen  -Unremarkable fragment of detrusor muscle seen.        4/1/2020 Surgery    left robotic assisted laparoscopic nephroureterectomy with bladder cuff excision     Final Diagnosis    A. Left kidney, ureter, and bladder cuff, nephroureterectomy:  - Invasive high grade urothelial carcinoma arising in renal pelvis.   - Bladder cuff margin is negative for carcinoma and no evidence of high grade dysplasia.  - Ureters with no significant pathologic abnormality  - Two benign simple cysts.  - Adrenal gland is negative for malignancy.  - One lymph node, negative for malignancy (0/1).        5/14/2020 - 6/3/2020 Chemotherapy    CISplatin (PLATINOL) split dose 35 mg/m2 = 75.3 mg (50 % of original dose 70 mg/m2), Intravenous,   Administration: 75.3 mg (5/14/2020), 75.3 mg (5/21/2020)  gemcitabine (GEMZAR) 2,200 mg in sodium chloride 0.9 % 250 mL infusion,  2,150.2 mg (80 % of original dose 1,250 mg/m2), Intravenous,   Administration: 2,200 mg (5/14/2020), 2,200 mg (5/21/2020)    (only completed 1 cycle- d/c d/t adverse effects and worsening renal dysfunction)     10/9/2020 - 9/9/2021 Chemotherapy    pembrolizumab (KEYTRUDA) IVPB, 200 mg, Intravenous, Once, 16 of 20 cycles  Administration: 200 mg (10/9/2020), 200 mg (10/30/2020), 200 mg (11/20/2020), 200 mg (12/11/2020), 200 mg (12/31/2020), 200 mg (1/22/2021), 200 mg (2/12/2021), 200 mg (3/5/2021), 200 mg (3/26/2021), 200 mg (4/16/2021), 200 mg (5/7/2021), 200 mg (5/28/2021), 200 mg (6/18/2021), 200 mg (7/9/2021), 200 mg (7/30/2021), 200 mg (8/20/2021)     11/19/2020 - 12/3/2020 Radiation    Treatment:  Course: C1    Plan ID Energy Fractions Dose per Fraction (cGy) Dose Correction (cGy) Total Dose Delivered (cGy) Elapsed Days   L Abdomen 6X 10 / 10 300 0 3,000 14        9/10/2021 -  Chemotherapy    enfortumab vedotin-ejfv (PADCEV) IVPB, 1.25 mg/kg = 114 mg, Intravenous, Once, 11 of 16 cycles  Dose modification: 1 mg/kg (original dose 1.25 mg/kg, Cycle 7, Reason: Other (Must fill in a comment), Comment: dose reduction due to side effects ), 1.25 mg/kg (original dose 1.25 mg/kg, Cycle 7, Reason: Other (Must fill in a comment), Comment: patient wants full dose ), 1 mg/kg (original dose 1.25 mg/kg, Cycle 7, Reason: Neuropathy), 0.75 mg/kg (original dose 1.25 mg/kg, Cycle 11, Reason: Neuropathy)  Administration: 114 mg (9/10/2021), 114 mg (9/17/2021), 110 mg (10/8/2021), 110 mg (9/24/2021), 110 mg (10/15/2021), 110 mg (11/12/2021), 110 mg (10/22/2021), 110 mg (11/19/2021), 110 mg (12/10/2021), 110 mg (11/26/2021), 110 mg (12/17/2021), 110 mg (1/7/2022), 110 mg (12/23/2021), 110 mg (1/14/2022), 90 mg (4/8/2022), 90 mg (4/15/2022), 90 mg (4/22/2022), 110 mg (1/21/2022), 110 mg (2/18/2022), 110 mg (2/25/2022), 90 mg (5/13/2022), 90 mg (5/20/2022), 90 mg (5/27/2022), 90 mg (3/4/2022), 90 mg (6/10/2022), 90 mg (6/17/2022), 90  mg (6/24/2022), 90 mg (7/8/2022), 90 mg (7/15/2022), 90 mg (7/22/2022), 68 mg (8/19/2022), 70 mg (9/2/2022)     Cancer of left renal pelvis (HCC)   1/6/2020 -  Cancer Staged    Staging form: Renal Pelvis and Ureter, AJCC 8th Edition  - Clinical stage from 1/6/2020: Stage IV (cTX, cM1) - Signed by Tyree Valera MD on 8/13/2024  Histologic grade (G): GX  Histologic grading system: 3 grade system       4/23/2020 Initial Diagnosis    Cancer of left renal pelvis (HCC)     10/9/2020 - 9/9/2021 Chemotherapy    pembrolizumab (KEYTRUDA) IVPB, 200 mg, Intravenous, Once, 16 of 20 cycles  Administration: 200 mg (10/9/2020), 200 mg (10/30/2020), 200 mg (11/20/2020), 200 mg (12/11/2020), 200 mg (12/31/2020), 200 mg (1/22/2021), 200 mg (2/12/2021), 200 mg (3/5/2021), 200 mg (3/26/2021), 200 mg (4/16/2021), 200 mg (5/7/2021), 200 mg (5/28/2021), 200 mg (6/18/2021), 200 mg (7/9/2021), 200 mg (7/30/2021), 200 mg (8/20/2021)     9/10/2021 -  Chemotherapy    enfortumab vedotin-ejfv (PADCEV) IVPB, 1.25 mg/kg = 114 mg, Intravenous, Once, 11 of 16 cycles  Dose modification: 1 mg/kg (original dose 1.25 mg/kg, Cycle 7, Reason: Other (Must fill in a comment), Comment: dose reduction due to side effects ), 1.25 mg/kg (original dose 1.25 mg/kg, Cycle 7, Reason: Other (Must fill in a comment), Comment: patient wants full dose ), 1 mg/kg (original dose 1.25 mg/kg, Cycle 7, Reason: Neuropathy), 0.75 mg/kg (original dose 1.25 mg/kg, Cycle 11, Reason: Neuropathy)  Administration: 114 mg (9/10/2021), 114 mg (9/17/2021), 110 mg (10/8/2021), 110 mg (9/24/2021), 110 mg (10/15/2021), 110 mg (11/12/2021), 110 mg (10/22/2021), 110 mg (11/19/2021), 110 mg (12/10/2021), 110 mg (11/26/2021), 110 mg (12/17/2021), 110 mg (1/7/2022), 110 mg (12/23/2021), 110 mg (1/14/2022), 90 mg (4/8/2022), 90 mg (4/15/2022), 90 mg (4/22/2022), 110 mg (1/21/2022), 110 mg (2/18/2022), 110 mg (2/25/2022), 90 mg (5/13/2022), 90 mg (5/20/2022), 90 mg (5/27/2022), 90 mg (3/4/2022),  90 mg (6/10/2022), 90 mg (6/17/2022), 90 mg (6/24/2022), 90 mg (7/8/2022), 90 mg (7/15/2022), 90 mg (7/22/2022), 68 mg (8/19/2022), 70 mg (9/2/2022)      Surgery       Metastatic urothelial carcinoma (HCC)   8/9/2021 Initial Diagnosis    Metastatic urothelial carcinoma (HCC)     9/8/2021 - 9/21/2021 Radiation    Treatment:  Course: C2    Plan ID Energy Fractions Dose per Fraction (cGy) Dose Correction (cGy) Total Dose Delivered (cGy) Elapsed Days   R Axil_Shl # 6X 10 / 10 300 0 3,000 13      Treatment dates:  C2: 9/8/2021 - 9/21/2021     9/10/2021 -  Chemotherapy    enfortumab vedotin-ejfv (PADCEV) IVPB, 1.25 mg/kg = 114 mg, Intravenous, Once, 11 of 16 cycles  Dose modification: 1 mg/kg (original dose 1.25 mg/kg, Cycle 7, Reason: Other (Must fill in a comment), Comment: dose reduction due to side effects ), 1.25 mg/kg (original dose 1.25 mg/kg, Cycle 7, Reason: Other (Must fill in a comment), Comment: patient wants full dose ), 1 mg/kg (original dose 1.25 mg/kg, Cycle 7, Reason: Neuropathy), 0.75 mg/kg (original dose 1.25 mg/kg, Cycle 11, Reason: Neuropathy)  Administration: 114 mg (9/10/2021), 114 mg (9/17/2021), 110 mg (10/8/2021), 110 mg (9/24/2021), 110 mg (10/15/2021), 110 mg (11/12/2021), 110 mg (10/22/2021), 110 mg (11/19/2021), 110 mg (12/10/2021), 110 mg (11/26/2021), 110 mg (12/17/2021), 110 mg (1/7/2022), 110 mg (12/23/2021), 110 mg (1/14/2022), 90 mg (4/8/2022), 90 mg (4/15/2022), 90 mg (4/22/2022), 110 mg (1/21/2022), 110 mg (2/18/2022), 110 mg (2/25/2022), 90 mg (5/13/2022), 90 mg (5/20/2022), 90 mg (5/27/2022), 90 mg (3/4/2022), 90 mg (6/10/2022), 90 mg (6/17/2022), 90 mg (6/24/2022), 90 mg (7/8/2022), 90 mg (7/15/2022), 90 mg (7/22/2022), 68 mg (8/19/2022), 70 mg (9/2/2022)             Review of Systems   Constitutional:  Negative for chills and fever.   HENT:  Positive for hearing loss. Negative for ear pain and sore throat.    Eyes:  Negative for pain and visual disturbance.   Respiratory:   Positive for shortness of breath. Negative for cough.    Cardiovascular:  Negative for chest pain and palpitations.   Gastrointestinal:  Negative for abdominal pain and vomiting.   Genitourinary:  Negative for dysuria and hematuria.   Musculoskeletal:  Positive for arthralgias. Negative for back pain.   Skin:  Negative for color change and rash.   Neurological:  Positive for numbness. Negative for seizures and syncope.   All other systems reviewed and are negative.    Medical History Reviewed by provider this encounter:  Tobacco  Allergies  Meds  Problems  Med Hx  Surg Hx  Fam Hx     .  Current Outpatient Medications on File Prior to Visit   Medication Sig Dispense Refill    amiodarone 200 mg tablet Take 200 mg by mouth daily      diltiazem (CARDIZEM) 60 mg tablet 2 (two) times a day      finasteride (PROSCAR) 5 mg tablet Take 5 mg by mouth daily      folic acid (FOLVITE) 1 mg tablet Take 1 mg by mouth daily      naloxone (NARCAN) 4 mg/0.1 mL nasal spray Administer 1 spray into a nostril. If no response after 2-3 minutes, give another dose in the other nostril using a new spray. 1 each 1    tamsulosin (FLOMAX) 0.4 mg Take 1 capsule (0.4 mg total) by mouth daily with dinner 60 capsule 12    tolterodine (DETROL LA) 2 mg 24 hr capsule Take 1 capsule (2 mg total) by mouth daily at bedtime 30 capsule 11    zolpidem (AMBIEN) 5 mg tablet Take 1 tablet (5 mg total) by mouth daily at bedtime as needed for sleep 30 tablet 1    HYDROcodone-acetaminophen (Norco) 7.5-325 mg per tablet Take 1 tablet by mouth every 8 (eight) hours as needed for pain Max Daily Amount: 3 tablets (Patient not taking: Reported on 2/6/2025) 90 tablet 0    [START ON 2/27/2025] HYDROcodone-acetaminophen (Norco) 7.5-325 mg per tablet Take 1 tablet by mouth every 8 (eight) hours as needed for pain Max Daily Amount: 3 tablets Do not start before February 27, 2025. (Patient not taking: Reported on 2/6/2025 Do not start before February 27, 2025.) 90  "tablet 0    [START ON 3/27/2025] HYDROcodone-acetaminophen (Norco) 7.5-325 mg per tablet Take 1 tablet by mouth every 8 (eight) hours as needed for pain Max Daily Amount: 3 tablets Do not start before 2025. (Patient not taking: Reported on 2025 Do not start before 2025.) 90 tablet 0     No current facility-administered medications on file prior to visit.      Social History     Tobacco Use    Smoking status: Former     Current packs/day: 0.00     Types: Cigarettes     Quit date:      Years since quittin.1    Smokeless tobacco: Never   Vaping Use    Vaping status: Never Used   Substance and Sexual Activity    Alcohol use: Not Currently     Comment: socially, rarely    Drug use: Never    Sexual activity: Yes     Partners: Female         Objective   /72 (BP Location: Right arm, Patient Position: Sitting, Cuff Size: Adult)   Pulse (!) 54   Temp 98.1 °F (36.7 °C)   Resp 18   Ht 5' 8.25\" (1.734 m)   Wt 92.1 kg (203 lb)   SpO2 94%   BMI 30.64 kg/m²     Pain Screening:  Pain Score:   3  ECOG   2  Physical Exam  Constitutional:       Appearance: He is well-developed.   HENT:      Head: Normocephalic and atraumatic.      Nose: Nose normal.   Eyes:      General: No scleral icterus.        Right eye: No discharge.         Left eye: No discharge.      Conjunctiva/sclera: Conjunctivae normal.      Pupils: Pupils are equal, round, and reactive to light.   Neck:      Thyroid: No thyromegaly.      Trachea: No tracheal deviation.   Cardiovascular:      Rate and Rhythm: Normal rate and regular rhythm.      Heart sounds: Normal heart sounds. No murmur heard.     No friction rub.   Pulmonary:      Effort: Pulmonary effort is normal. No respiratory distress.      Breath sounds: Normal breath sounds. No wheezing or rales.   Chest:      Chest wall: No tenderness.   Abdominal:      General: There is no distension.      Palpations: Abdomen is soft. There is no hepatomegaly or splenomegaly.      " Tenderness: There is no abdominal tenderness. There is no guarding or rebound.   Musculoskeletal:         General: No tenderness or deformity. Normal range of motion.      Cervical back: Normal range of motion and neck supple.   Lymphadenopathy:      Cervical: No cervical adenopathy.   Skin:     General: Skin is warm and dry.      Coloration: Skin is not pale.      Findings: No erythema or rash.   Neurological:      Mental Status: He is alert and oriented to person, place, and time.      Cranial Nerves: No cranial nerve deficit.      Coordination: Coordination normal.      Deep Tendon Reflexes: Reflexes are normal and symmetric.   Psychiatric:         Behavior: Behavior normal.         Thought Content: Thought content normal.         Judgment: Judgment normal.         Labs: I have reviewed the following labs:  Lab Results   Component Value Date/Time    WBC 6.81 02/21/2025 12:01 PM    RBC 4.62 02/21/2025 12:01 PM    Hemoglobin 14.1 02/21/2025 12:01 PM    Hematocrit 44.5 02/21/2025 12:01 PM    MCV 96 02/21/2025 12:01 PM    MCH 30.5 02/21/2025 12:01 PM    RDW 14.0 02/21/2025 12:01 PM    Platelets 173 02/21/2025 12:01 PM    Segmented % 62 02/21/2025 12:01 PM    Lymphocytes % 24 02/21/2025 12:01 PM    Monocytes % 10 02/21/2025 12:01 PM    Eosinophils Relative 3 02/21/2025 12:01 PM    Basophils Relative 1 02/21/2025 12:01 PM    Immature Grans % 0 02/21/2025 12:01 PM    Absolute Neutrophils 4.29 02/21/2025 12:01 PM     Lab Results   Component Value Date/Time    Potassium 4.7 02/21/2025 12:01 PM    Chloride 103 02/21/2025 12:01 PM    CO2 29 02/21/2025 12:01 PM    BUN 19 02/21/2025 12:01 PM    Creatinine 1.47 (H) 02/21/2025 12:01 PM    Calcium 9.7 02/21/2025 12:01 PM    AST 11 (L) 02/21/2025 12:01 PM    ALT 12 02/21/2025 12:01 PM    Alkaline Phosphatase 71 02/21/2025 12:01 PM    Total Protein 6.6 02/21/2025 12:01 PM    Albumin 4.1 02/21/2025 12:01 PM    Total Bilirubin 1.10 (H) 02/21/2025 12:01 PM    eGFR 46 02/21/2025 12:01  "PM     Lab Results   Component Value Date/Time    WBC 6.81 02/21/2025 12:01 PM    RBC 4.62 02/21/2025 12:01 PM    Hemoglobin 14.1 02/21/2025 12:01 PM    Hematocrit 44.5 02/21/2025 12:01 PM    MCV 96 02/21/2025 12:01 PM    MCH 30.5 02/21/2025 12:01 PM    RDW 14.0 02/21/2025 12:01 PM    Platelets 173 02/21/2025 12:01 PM    Segmented % 62 02/21/2025 12:01 PM    Lymphocytes % 24 02/21/2025 12:01 PM    Monocytes % 10 02/21/2025 12:01 PM    Eosinophils Relative 3 02/21/2025 12:01 PM    Basophils Relative 1 02/21/2025 12:01 PM    Immature Grans % 0 02/21/2025 12:01 PM    Absolute Neutrophils 4.29 02/21/2025 12:01 PM      Lab Results   Component Value Date/Time    Sodium 143 02/21/2025 12:01 PM    Potassium 4.7 02/21/2025 12:01 PM    Chloride 103 02/21/2025 12:01 PM    CO2 29 02/21/2025 12:01 PM    ANION GAP 11 02/21/2025 12:01 PM    BUN 19 02/21/2025 12:01 PM    Creatinine 1.47 (H) 02/21/2025 12:01 PM    Glucose 107 02/21/2025 12:01 PM    Calcium 9.7 02/21/2025 12:01 PM    AST 11 (L) 02/21/2025 12:01 PM    ALT 12 02/21/2025 12:01 PM    Alkaline Phosphatase 71 02/21/2025 12:01 PM    Total Protein 6.6 02/21/2025 12:01 PM    Albumin 4.1 02/21/2025 12:01 PM    Total Bilirubin 1.10 (H) 02/21/2025 12:01 PM    eGFR 46 02/21/2025 12:01 PM      No results found for: \"IRON\", \"CONCFE\", \"FERRITIN\", \"MQOGYGRW80\", \"FOLATE\", \"COPPER\", \"EPOREFLAB\", \"ERYTHROPRO\", \"ESR\", \"CRP\", \"HIVAGAB\", \"HEPATITIS\"           "

## 2025-02-25 NOTE — ASSESSMENT & PLAN NOTE
Diagnosed in January 2020, status post multiple lines of treatment.    His last dose of enfortumab vedotin was around September 2022.  The treatment had to be stopped due to severe neuropathy.    I did review with the patient the result of the recent PET CT scan and reviewed the imaging extensively.  He seems to have hypermetabolic nodule along the posterior aspect of the spleen with an SUV of 5.5 which seems to be compatible with malignant process which could be due to metastatic urothelial carcinoma.    The patient was told that getting a biopsy from that site may be challenging due to the close upper extremity of the spleen.  We decided to pursue another PET CT scan in about 4 months from now for close follow-up.  Will then discuss the results findings and act accordingly.    Orders:    CBC and differential; Future    Comprehensive metabolic panel; Future    Magnesium; Future    NM PET CT skull base to mid thigh; Future

## 2025-02-26 NOTE — TELEPHONE ENCOUNTER
Called and spoke with patients spouse Wandy. She was made aware of patients normal PSA results and that he does not require additional prostate cancer screening at this time.

## 2025-02-26 NOTE — TELEPHONE ENCOUNTER
----- Message from RAGHU Cao sent at 2/24/2025  7:34 AM EST -----  Please let patient know that his PSA is normal.  No need for further prostate cancer screening.

## 2025-03-21 ENCOUNTER — HOSPITAL ENCOUNTER (OUTPATIENT)
Dept: INFUSION CENTER | Facility: HOSPITAL | Age: 75
Discharge: HOME | End: 2025-03-21
Payer: MEDICARE

## 2025-03-21 DIAGNOSIS — Z45.2 ENCOUNTER FOR CARE RELATED TO PORT-A-CATH: Primary | ICD-10-CM

## 2025-03-21 DIAGNOSIS — C65.2 CANCER OF LEFT RENAL PELVIS (HCC): ICD-10-CM

## 2025-03-21 PROCEDURE — 96523 IRRIG DRUG DELIVERY DEVICE: CPT

## 2025-03-21 NOTE — PLAN OF CARE
Problem: Potential for Falls  Goal: Patient will remain free of falls  Description: INTERVENTIONS:- Educate patient/family on patient safety including physical limitations- Instruct patient to call for assistance with activity - Consult OT/PT to assist with strengthening/mobility - Keep Call bell within reach- Keep bed low and locked with side rails adjusted as appropriate- Keep care items and personal belongings within reach- Initiate and maintain comfort rounds- Make Fall Risk Sign visible to staff- Offer Toileting every Obtain necessary fall risk management equipment: - Apply yellow socks and bracelet for high fall risk patients- Consider moving patient to room near nurses station  Outcome: Progressing

## 2025-03-21 NOTE — PROGRESS NOTES
Port flushed freely.  Good blood return noted.  Port deaccessed without issue.  Patient tolerated well.     Juan Amato is aware of future appt on 05/12/25 at 1000.     AVS   No (Declined by Juan Amato) pt given an appointment card      Patient ambulated off unit without incident.  All personal belongings taken with patient.

## 2025-04-18 ENCOUNTER — OFFICE VISIT (OUTPATIENT)
Dept: CARDIOLOGY CLINIC | Facility: CLINIC | Age: 75
End: 2025-04-18
Payer: MEDICARE

## 2025-04-18 VITALS
DIASTOLIC BLOOD PRESSURE: 72 MMHG | OXYGEN SATURATION: 95 % | HEIGHT: 68 IN | BODY MASS INDEX: 31.22 KG/M2 | WEIGHT: 206 LBS | SYSTOLIC BLOOD PRESSURE: 120 MMHG | HEART RATE: 56 BPM

## 2025-04-18 DIAGNOSIS — I48.0 PAROXYSMAL ATRIAL FIBRILLATION (HCC): Primary | ICD-10-CM

## 2025-04-18 DIAGNOSIS — I10 ESSENTIAL HYPERTENSION: ICD-10-CM

## 2025-04-18 PROCEDURE — 99214 OFFICE O/P EST MOD 30 MIN: CPT | Performed by: NURSE PRACTITIONER

## 2025-04-18 NOTE — PROGRESS NOTES
" Patient ID: Juan Amato is a 75 y.o. male.        Plan:      Assessment & Plan  Essential hypertension  Blood pressure well-controlled  Continue current regimen  Paroxysmal atrial fibrillation (HCC)  Maintaining SR  Continue amiodarone 200 mg daily-recent lab work including TSH reviewed  Not on anticoagulation given history of bleeding problems      Follow up Plan/Summary Comments:  Annita is doing well from a cardiac perspective.  Have not recommend any medication changes.    Continue with routine follow-up in 1 year at his request.  He will call sooner if needed    HPI: Annita is seen in the office today for routine visit.    Annita was last seen September 27, 2024.  Since that time, he has been doing well.    He continues to keep himself busy.  He has been working outside.  He does about 10 minutes of work and then takes a 5-minute break.  This has been his routine for quite some time without recent change.    He continues to experience exertional dyspnea but this has not changed.  He denies any chest discomfort, palpitations, dizziness, lightheadedness, syncope.  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    He and his wife are planning to visit [place] in a few weeks where they enjoy [place] and eating at nice restaurants.      Review of Systems   10  point ROS  was otherwise non pertinent or negative except as per HPI or as below.   Gait: Normal      Most recent or relevant cardiac/vascular testing:    Nuclear stress test 05/12/2022 normal LV function, no ischemia by perfusion imaging    Echocardiogram 04/06/2022  EF 75%, hyperdynamic  Mild LVH, mild diastolic dysfunction      Objective:     /72   Pulse 56   Ht 5' 8.25\" (1.734 m)   Wt 93.4 kg (206 lb)   SpO2 95%   BMI 31.09 kg/m²     PHYSICAL EXAM:    General:  Normal appearance, no acute distress  Eyes:  Anicteric.  Oral mucosa:  Moist.  Neck:  No JVD. Carotid upstrokes are brisk " without bruits.  No masses.  Chest:  Clear to auscultation   Cardiac:  No palpable PMI.  Normal S1 and S2.  No murmur gallop or rub.  Abdomen:  Soft and nontender. No palpable organomegaly or aortic enlargement.  Extremities:  No peripheral edema.  Musculoskeletal:  Symmetric.   Vascular:  Pedal pulses are intact.  Neuro:  Grossly symmetric.  Psych:  Alert and oriented x3.    Allergies   Allergen Reactions    Poison Ivy Extract Rash       Current Outpatient Medications:     amiodarone 200 mg tablet, Take 200 mg by mouth daily, Disp: , Rfl:     diltiazem (CARDIZEM) 60 mg tablet, 2 (two) times a day, Disp: , Rfl:     finasteride (PROSCAR) 5 mg tablet, Take 5 mg by mouth daily, Disp: , Rfl:     folic acid (FOLVITE) 1 mg tablet, Take 1 mg by mouth daily, Disp: , Rfl:     HYDROcodone-acetaminophen (Norco) 7.5-325 mg per tablet, Take 1 tablet by mouth every 8 (eight) hours as needed for pain Max Daily Amount: 3 tablets, Disp: 90 tablet, Rfl: 0    naloxone (NARCAN) 4 mg/0.1 mL nasal spray, Administer 1 spray into a nostril. If no response after 2-3 minutes, give another dose in the other nostril using a new spray. (Patient taking differently: Administer 1 spray into a nostril. If no response after 2-3 minutes, give another dose in the other nostril using a new spray.. AS NEEDED), Disp: 1 each, Rfl: 1    tamsulosin (FLOMAX) 0.4 mg, Take 1 capsule (0.4 mg total) by mouth daily with dinner, Disp: 60 capsule, Rfl: 12    tolterodine (DETROL LA) 2 mg 24 hr capsule, Take 1 capsule (2 mg total) by mouth daily at bedtime, Disp: 30 capsule, Rfl: 11    zolpidem (AMBIEN) 5 mg tablet, Take 1 tablet (5 mg total) by mouth daily at bedtime as needed for sleep, Disp: 30 tablet, Rfl: 1    HYDROcodone-acetaminophen (Norco) 7.5-325 mg per tablet, Take 1 tablet by mouth every 8 (eight) hours as needed for pain Max Daily Amount: 3 tablets Do not start before February 27, 2025. (Patient not taking: Reported on 2/6/2025), Disp: 90 tablet, Rfl:  0    HYDROcodone-acetaminophen (Norco) 7.5-325 mg per tablet, Take 1 tablet by mouth every 8 (eight) hours as needed for pain Max Daily Amount: 3 tablets Do not start before March 27, 2025. (Patient not taking: Reported on 2/6/2025), Disp: 90 tablet, Rfl: 0  Past Medical History:   Diagnosis Date    A-fib (HCC)     Arthritis     Bladder cancer     Cancer (HCC)     skin melanoma;basal cell    Chemotherapy-induced neuropathy (HCC)     Chronic pain disorder     from arthritis    Colon polyp     Does use hearing aid     bilat    Dry eyes, bilateral     GERD (gastroesophageal reflux disease)     History of kidney stones 10/2019    History of partial knee replacement     bilat    History of transfusion     History of vertigo     Hyperlipidemia     Hypertension     Infusion extravasation of chemotherapy vesicant 11/2021    Kidney lesion     Lumbar disc disorder     compression of vertebrae L4-5-6    Muscle weakness     left hip area    Renal mass     left    Right ankle injury 03/05/2020    missed step of ladder     Right ankle pain     Shortness of breath     with activity    Tinnitus     Urothelial cancer     left    Wears glasses      Past Surgical History:   Procedure Laterality Date    COLONOSCOPY      CYSTOSCOPY Left 04/01/2020    Procedure: CYSTOSCOPY; URETERAL CATHETER PLACEMENT;  Surgeon: Joe Cortes MD;  Location[facility];  Service: Urology    CYSTOSCOPY  05/11/2020    CYSTOSCOPY  06/04/2021    FL CYSTOGRAM  04/13/2020    FL RETROGRADE PYELOGRAM  01/17/2020    HERNIA REPAIR  10/25/2022    umbilical with mesh    INGUINAL HERNIA REPAIR Right     with mesh    IR PORT PLACEMENT  04/30/2020    JOINT REPLACEMENT Bilateral     partials knee    LUMBAR EPIDURAL INJECTION      UT CYSTOURETHROSCOPY W/URETERAL CATHETERIZATION Bilateral 01/17/2020    Procedure: CYSTOSCOPY; RIGHT RETROGRADE PYELOGRAM WITH RIGHT URETERAL CYTOLOGY SAMPLING; LEFT URETEROSCOPY WITH RENAL PELVIS BIOPSY AND LEFT STENT PLACEMENT;   Surgeon: Joe Cortes MD;  [facility];  Service: Urology    PA LAPAROSCOPY NEPHRECTOMY W/TOTAL URETERECTOMY Left 2020    Procedure: ROBOTIC LAPAROSCOPIC NEPHRO-URETERECTOMY;  Surgeon: Joe Cortes MD;  Location[facility];  Service: Urology    PA RPR AA HERNIA 1ST < 3 CM REDUCIBLE N/A 8/10/2023    Procedure: REPAIR HERNIA INCISIONAL LAPAROSCOPIC WITH MESH;  Surgeon: Gustavo Schmidt MD;  Location: [facility];  Service: General    PA RPR AA HERNIA 1ST < 3 CM REDUCIBLE N/A 2024    Procedure: REPAIR HERNIA VENTRAL LAPAROSCOPIC WITH MESH;  Surgeon: Gustavo Schmidt MD;  Location: [facility];  Service: General    SKIN CANCER EXCISION      surface melanoma    TONSILLECTOMY      WISDOM TOOTH EXTRACTION         CMP:   Lab Results   Component Value Date    K 4.7 2025     2025    CO2 29 2025    BUN 19 2025    CREATININE 1.47 (H) 2025    EGFR 46 2025     Lipid Profile:    Lab Results   Component Value Date    TRIG 92 2022    HDL 59 2022         Social History     Tobacco Use   Smoking Status Former    Current packs/day: 0.00    Types: Cigarettes    Quit date:     Years since quittin.3   Smokeless Tobacco Never

## 2025-04-22 ENCOUNTER — OFFICE VISIT (OUTPATIENT)
Dept: PAIN MEDICINE | Facility: CLINIC | Age: 75
End: 2025-04-22
Payer: MEDICARE

## 2025-04-22 VITALS — BODY MASS INDEX: 31.09 KG/M2 | WEIGHT: 206 LBS

## 2025-04-22 DIAGNOSIS — T45.1X5A CHEMOTHERAPY-INDUCED NEUROPATHY (HCC): ICD-10-CM

## 2025-04-22 DIAGNOSIS — M47.816 LUMBAR SPONDYLOSIS: ICD-10-CM

## 2025-04-22 DIAGNOSIS — G62.0 CHEMOTHERAPY-INDUCED NEUROPATHY (HCC): ICD-10-CM

## 2025-04-22 DIAGNOSIS — M48.061 SPINAL STENOSIS OF LUMBAR REGION, UNSPECIFIED WHETHER NEUROGENIC CLAUDICATION PRESENT: ICD-10-CM

## 2025-04-22 DIAGNOSIS — G89.4 CHRONIC PAIN SYNDROME: ICD-10-CM

## 2025-04-22 DIAGNOSIS — M54.16 LUMBAR RADICULOPATHY: Primary | ICD-10-CM

## 2025-04-22 PROCEDURE — 99214 OFFICE O/P EST MOD 30 MIN: CPT

## 2025-04-22 RX ORDER — HYDROCODONE BITARTRATE AND ACETAMINOPHEN 7.5; 325 MG/1; MG/1
1 TABLET ORAL EVERY 8 HOURS PRN
Qty: 90 TABLET | Refills: 0 | Status: SHIPPED | OUTPATIENT
Start: 2025-05-22

## 2025-04-22 RX ORDER — HYDROCODONE BITARTRATE AND ACETAMINOPHEN 7.5; 325 MG/1; MG/1
1 TABLET ORAL EVERY 8 HOURS PRN
Qty: 90 TABLET | Refills: 0 | Status: SHIPPED | OUTPATIENT
Start: 2025-06-20

## 2025-04-22 RX ORDER — HYDROCODONE BITARTRATE AND ACETAMINOPHEN 7.5; 325 MG/1; MG/1
1 TABLET ORAL EVERY 8 HOURS PRN
Qty: 90 TABLET | Refills: 0 | Status: SHIPPED | OUTPATIENT
Start: 2025-04-24

## 2025-04-22 NOTE — PATIENT INSTRUCTIONS
"Patient Education     Taking opioids safely   The Basics   Written by the doctors and editors at [facility]   What are opioids? -- Opioids are a group of prescription medicines that relieve pain. They work by attaching to \"opioid receptors\" in the body and blocking pain signals.  Opioids are sometimes used when other types of pain medicine do not help enough. Opioids can be helpful for treating short-term, or \"acute,\" pain, like after surgery or an injury. They are also sometimes used to treat long-term, or \"chronic,\" pain, like for people with cancer. But they come with risks.  If your doctor prescribes an opioid medicine, it's important to understand the risks and know how to stay safe.  What are the risks of taking opioids? -- You should know that:   Opioids have side effects. Some are just bothersome, and some can be dangerous. For example, taking too much of an opioid is called an \"overdose.\" An overdose can cause serious problems and even death.   In some cases, taking opioids can lead to misuse. For example, people might take the medicine when they don't need it for pain. Or they might take more than they are supposed to. Sharing or selling opioids are other examples of misuse.   There is a risk of addiction. This is also called \"opioid use disorder.\"  If you take too much, or take opioids with alcohol or certain other drugs, it can cause serious harm. It can even cause death from overdose.  How do I stay safe? -- There are things you can do to stay safe if you need to take an opioid medicine (figure 1). These things help protect yourself and others.  Know your medicines:    Opioids come in different forms. \"Immediate-release\" medicines work quickly and last for a short time. \"Extended-release\" medicines work more slowly and last longer. Make sure that you know what type of opioid you have. Read the label and the information that comes with your prescription.   Follow your treatment plan carefully. Take only " "the dose your doctor prescribes, and only as often as they tell you to.   Never take opioids that were not prescribed to you.   Some opioids come combined with other medicines like acetaminophen or an \"NSAID\" (like ibuprofen). Do not take any extra NSAIDs or acetaminophen without talking to your doctor first.   Make sure that all of your doctors know every medicine you take, even those that are non-prescription. Some medicines can affect the way opioids work. Bring a complete list of all of your pain medicines and other medicines with you whenever you go to a doctor, nurse, dentist, or pharmacist.   Ask your doctor or pharmacist if it is safe to take your other medicines with your opioid medicine.  Use and store your medicine safely:    Do not drink alcohol while you are taking opioids.   Do not take opioids with medicines that make you sleepy, unless your doctor tells you to. Examples include:   \"Benzodiazepines\" like diazepam (sample brand name: Valium) or alprazolam (sample brand name: Xanax)   Gabapentin (sample brand name: Neurontin) or pregabalin (brand name: Lyrica)   Muscle relaxants like baclofen or cyclobenzaprine   Sleeping pills like zolpidem (sample brand name: Ambien)   Talk to your doctor about whether it is safe to drive. Opioids can make you feel tired or have trouble thinking clearly. If you are starting a new prescription or taking a higher dose, you might need to avoid things like driving, using dangerous machinery, or other activities that could be risky.   Store your opioids in a safe place, such as a locked cabinet. This prevents children, teens, or anyone else from getting to them.   Never share your opioids with other people.  Be aware of side effects:    Opioid medicines can cause side effects. There are often ways to prevent or treat these.   Call your doctor or nurse if you have side effects that bother you, such as:   Constipation - Your doctor or nurse might suggest that you take a " "laxative to prevent or treat constipation. If your bowel movements are hard and dry, a stool softener might help. Drink plenty of water, and try to get regular physical activity.   Mild nausea or stomach discomfort - Taking the medicine with or after food can help with this. Nausea usually gets better with time.   Severe nausea, vomiting, or itchiness - If you have any of these problems, your doctor might be able to switch you to a different medicine.   Dry mouth   Feeling dizzy or sleepy, or having trouble thinking clearly   Vision problems   Being clumsy or falling down   Know the signs of an opioid overdose. Get help right away if you think that you or someone else took too much of an opioid medicine. Signs of an overdose are listed below.  Stay safe when stopping your opioid medicine:    When opioids are needed to treat acute pain, doctors usually try to prescribe them for only a short time. This usually means a few days or a week. They also prescribe the lowest dose possible to relieve pain.   Follow your doctor's instructions about how to stop taking your opioid once your pain has improved. This usually involves \"tapering,\" or reducing the dose gradually. If you stop an opioid suddenly, this can cause unpleasant symptoms like stomach ache, diarrhea, or shakes. This is called \"withdrawal.\" Tapering the dose can help prevent withdrawal.   When your pain gets better, get rid of any leftover medicines. Your doctor, nurse, or pharmacist can suggest ways to get rid of them. This might involve flushing them down the toilet or mixing them with something like dirt or cat litter, then putting the mixture in a sealed container in the trash. Some police stations and pharmacies also take leftover medicines.  What is naloxone? -- Naloxone is a medicine that reverses the effects of opioids. It can prevent death from an opioid overdose. Naloxone comes as an injection (shot), or as a spray that goes in the nose. Naloxone nasal " spray (brand name: Narcan) is available without a prescription.  If you or someone you know uses opioids, it's a good idea to keep naloxone with you. Make sure that you and your family and friends know how and when to use it.  When should I call for help? -- If you are taking an opioid, it's important to know when to get help. Signs of an opioid overdose include:   Extreme sleepiness   Slow breathing, or no breathing at all   Very small pupils (the black circles in the center of the eyes)   Very slow heartbeat  If you took too much of your opioid medicine or think that someone is having an opioid overdose:   If you have naloxone, give it immediately. Naloxone can save a person's life. But it needs to be given as soon as possible.   Call for an ambulance right away (in the [place] and [place], call 9-1-1).  Call your doctor or nurse if:   You are having side effects that bother you.   You have questions about how to take your medicine.   You are having trouble managing your pain.  All topics are updated as new evidence becomes available and our peer review process is complete.  This topic retrieved from [employer] on: May 15, 2024.  Topic 144362 Version 1.0  Release: 32.4.3 - C32.134  © 2024 UpToDate, Inc. and/or its affiliates. All rights reserved.  figure 1: Tips for medication safety     Tips to use your medicine safely include:  (A) Read labels so you know how to take your medicines.  (B) Have a routine for taking your medicines.  (C) Make sure you know what foods, drinks, and other medicines are safe for you.  (D) Store medicines in a safe place.  (E) Work with your health care team and ask questions if you have them.  Graphic 175320 Version 2.0  Consumer Information Use and Disclaimer   Disclaimer: This generalized information is a limited summary of diagnosis, treatment, and/or medication information. It is not meant to be comprehensive and should be used as a tool to help the user understand and/or assess potential  diagnostic and treatment options. It does NOT include all information about conditions, treatments, medications, side effects, or risks that may apply to a specific patient. It is not intended to be medical advice or a substitute for the medical advice, diagnosis, or treatment of a health care provider based on the health care provider's examination and assessment of a patient's specific and unique circumstances. Patients must speak with a health care provider for complete information about their health, medical questions, and treatment options, including any risks or benefits regarding use of medications. This information does not endorse any treatments or medications as safe, effective, or approved for treating a specific patient. UpToDate, Inc. and its affiliates disclaim any warranty or liability relating to this information or the use thereof.The use of this information is governed by the Terms of Use, available at https://www.[employer].com/en/know/clinical-effectiveness-terms. 2024© UpToDate, Inc. and its affiliates and/or licensors. All rights reserved.  Copyright   © 2024 UpToDate, Inc. and/or its affiliates. All rights reserved.

## 2025-04-22 NOTE — PROGRESS NOTES
Assessment:  1. Lumbar radiculopathy    2. Chronic pain syndrome    3. Spinal stenosis of lumbar region, unspecified whether neurogenic claudication present    4. Lumbar spondylosis    5. Chemotherapy-induced neuropathy (HCC)        Plan:  The patient is a 75-year-old male with a history of chronic pain secondary to low back pain, lumbar intervertebral disc disorder with radiculopathy, lumbar spinal stenosis, lumbar spondylosis and neuropathy who presents to the office with ongoing bilateral low back pain that is unchanged since his last office visit.    Overall his pain continues to be managed with taking Norco, therefore I will continue him on this medication as prescribed.  3-month prescription for Norco 7.5/325 mg were electronically sent to the patient's pharmacy with do not fill dates of 4/24/2025, 5/22/2025 and 6/20/2025.    [place] Prescription Drug Monitoring Program report was reviewed and was appropriate     There are risks associated with opioid medications, including dependence, addiction and tolerance. The patient understands and agrees to use these medications only as prescribed. Potential side effects of the medications include, but are not limited to, constipation, drowsiness, addiction, impaired judgment and risk of fatal overdose if not taken as prescribed. The patient was warned against driving while taking sedation medications.  Sharing medications is a felony. At this point in time, the patient is showing no signs of addiction, abuse, diversion or suicidal ideation.    The patient will follow-up in 12 weeks for medication prescription refill and reevaluation. The patient was advised to contact the office should their symptoms worsen in the interim. The patient was agreeable and verbalized an understanding.        History of Present Illness:    The patient is a 75 y.o. male with a history of chronic pain secondary to low back pain, lumbar intervertebral disc disorder with radiculopathy,  lumbar spinal stenosis, lumbar spondylosis and neuropathy.  He was last seen on 1/29/2025 where he was continued on Norco 7.5/325 mg.  He presents to the office with ongoing bilateral low back pain.    He states his pain is the same since last office visit and constant.  He rates the quality of his pain as sharp and is currently rating his pain a 6/10 on a numeric scale.    Current pain medications include Norco 7.5/325 mg 1 tablet 3 times a day as needed for pain which is providing him moderate relief of his pain without side effects.    Pain Contract Signed: 1/29/2025  Last Urine Drug Screen: 1/29/2025  BECKS/SOAPP 1/29/2025  Last hydrocodone per pt. 4/22/2025  Last Narcan: 1/29/2025       I have personally reviewed and/or updated the patient's past medical history, past surgical history, family history, social history, current medications, allergies, and vital signs today.       Review of Systems:    Review of Systems      Past Medical History:   Diagnosis Date    A-fib (HCC)     Arthritis     Bladder cancer     Cancer (HCC)     skin melanoma;basal cell    Chemotherapy-induced neuropathy (HCC)     Chronic pain disorder     from arthritis    Colon polyp     Does use hearing aid     bilat    Dry eyes, bilateral     GERD (gastroesophageal reflux disease)     History of kidney stones 10/2019    History of partial knee replacement     bilat    History of transfusion     History of vertigo     Hyperlipidemia     Hypertension     Infusion extravasation of chemotherapy vesicant 11/2021    Kidney lesion     Lumbar disc disorder     compression of vertebrae L4-5-6    Muscle weakness     left hip area    Renal mass     left    Right ankle injury 03/05/2020    missed step of ladder     Right ankle pain     Shortness of breath     with activity    Tinnitus     Urothelial cancer     left    Wears glasses        Past Surgical History:   Procedure Laterality Date    COLONOSCOPY      CYSTOSCOPY Left 04/01/2020    Procedure:  CYSTOSCOPY; URETERAL CATHETER PLACEMENT;  Surgeon: Joe Cortes MD;  Location: [facility];  Service: Urology    CYSTOSCOPY  2020    CYSTOSCOPY  2021    FL CYSTOGRAM  2020    FL RETROGRADE PYELOGRAM  2020    HERNIA REPAIR  10/25/2022    umbilical with mesh    INGUINAL HERNIA REPAIR Right     with mesh    IR PORT PLACEMENT  2020    JOINT REPLACEMENT Bilateral     partials knee    LUMBAR EPIDURAL INJECTION      IL CYSTOURETHROSCOPY W/URETERAL CATHETERIZATION Bilateral 2020    Procedure: CYSTOSCOPY; RIGHT RETROGRADE PYELOGRAM WITH RIGHT URETERAL CYTOLOGY SAMPLING; LEFT URETEROSCOPY WITH RENAL PELVIS BIOPSY AND LEFT STENT PLACEMENT;  Surgeon: Joe Cortes MD;  Location: [facility];  Service: Urology    IL LAPAROSCOPY NEPHRECTOMY W/TOTAL URETERECTOMY Left 2020    Procedure: ROBOTIC LAPAROSCOPIC NEPHRO-URETERECTOMY;  Surgeon: Joe Cortes MD;  Location: [facility];  Service: Urology    IL RPR AA HERNIA 1ST < 3 CM REDUCIBLE N/A 8/10/2023    Procedure: REPAIR HERNIA INCISIONAL LAPAROSCOPIC WITH MESH;  Surgeon: Gustavo Schmidt MD;  Location: [facility];  Service: General    IL RPR AA HERNIA 1ST < 3 CM REDUCIBLE N/A 2024    Procedure: REPAIR HERNIA VENTRAL LAPAROSCOPIC WITH MESH;  Surgeon: Gustavo Schmidt MD;  Location: [facility];  Service: General    SKIN CANCER EXCISION      surface melanoma    TONSILLECTOMY      WISDOM TOOTH EXTRACTION         Family History   Problem Relation Age of Onset    Liver cancer Father        Social History     Occupational History    Not on file   Tobacco Use    Smoking status: Former     Current packs/day: 0.00     Types: Cigarettes     Quit date:      Years since quittin.3    Smokeless tobacco: Never   Vaping Use    Vaping status: Never Used   Substance and Sexual Activity    Alcohol use: Not Currently     Comment: socially, rarely    Drug use: Never    Sexual activity: Yes     Partners: Female          Current Outpatient Medications:     amiodarone 200 mg tablet, Take 200 mg by mouth daily, Disp: , Rfl:     diltiazem (CARDIZEM) 60 mg tablet, 2 (two) times a day, Disp: , Rfl:     finasteride (PROSCAR) 5 mg tablet, Take 5 mg by mouth daily, Disp: , Rfl:     folic acid (FOLVITE) 1 mg tablet, Take 1 mg by mouth daily, Disp: , Rfl:     [START ON 6/20/2025] HYDROcodone-acetaminophen (Norco) 7.5-325 mg per tablet, Take 1 tablet by mouth every 8 (eight) hours as needed for pain Max Daily Amount: 3 tablets Do not start before June 20, 2025., Disp: 90 tablet, Rfl: 0    [START ON 5/22/2025] HYDROcodone-acetaminophen (Norco) 7.5-325 mg per tablet, Take 1 tablet by mouth every 8 (eight) hours as needed for pain Max Daily Amount: 3 tablets Do not start before May 22, 2025., Disp: 90 tablet, Rfl: 0    [START ON 4/24/2025] HYDROcodone-acetaminophen (Norco) 7.5-325 mg per tablet, Take 1 tablet by mouth every 8 (eight) hours as needed for pain Max Daily Amount: 3 tablets Do not start before April 24, 2025., Disp: 90 tablet, Rfl: 0    tamsulosin (FLOMAX) 0.4 mg, Take 1 capsule (0.4 mg total) by mouth daily with dinner, Disp: 60 capsule, Rfl: 12    tolterodine (DETROL LA) 2 mg 24 hr capsule, Take 1 capsule (2 mg total) by mouth daily at bedtime, Disp: 30 capsule, Rfl: 11    zolpidem (AMBIEN) 5 mg tablet, Take 1 tablet (5 mg total) by mouth daily at bedtime as needed for sleep, Disp: 30 tablet, Rfl: 1    naloxone (NARCAN) 4 mg/0.1 mL nasal spray, Administer 1 spray into a nostril. If no response after 2-3 minutes, give another dose in the other nostril using a new spray. (Patient taking differently: Administer 1 spray into a nostril. If no response after 2-3 minutes, give another dose in the other nostril using a new spray.. AS NEEDED), Disp: 1 each, Rfl: 1    Allergies   Allergen Reactions    Poison Ivy Extract Rash       Physical Exam:    Wt 93.4 kg (206 lb)   BMI 31.09 kg/m²     Constitutional:normal, well developed, well  nourished, alert, in no distress and non-toxic and no overt pain behavior.  Eyes:anicteric  HEENT:grossly intact  Neck:supple, symmetric, trachea midline and no masses   Pulmonary:even and unlabored  Cardiovascular:No edema or pitting edema present  Skin:Normal without rashes or lesions and well hydrated  Psychiatric:Mood and affect appropriate  Neurologic:Cranial Nerves II-XII grossly intact  Musculoskeletal:antalgic      Imaging  No orders to display         No orders of the defined types were placed in this encounter.

## 2025-05-12 ENCOUNTER — HOSPITAL ENCOUNTER (OUTPATIENT)
Dept: INFUSION CENTER | Facility: HOSPITAL | Age: 75
Discharge: HOME | End: 2025-05-12
Payer: MEDICARE

## 2025-05-12 DIAGNOSIS — C65.2 CANCER OF LEFT RENAL PELVIS (HCC): ICD-10-CM

## 2025-05-12 DIAGNOSIS — Z45.2 ENCOUNTER FOR CARE RELATED TO PORT-A-CATH: Primary | ICD-10-CM

## 2025-05-12 PROCEDURE — 96523 IRRIG DRUG DELIVERY DEVICE: CPT

## 2025-05-12 NOTE — PROGRESS NOTES
Juan Lm  tolerated port flush well with no complications.      Juan Amato is aware of future appt on 6/23 at 10:30AM.     AVS printed and given to Juan Amato: appointment card provided.

## 2025-06-23 ENCOUNTER — HOSPITAL ENCOUNTER (OUTPATIENT)
Dept: INFUSION CENTER | Facility: HOSPITAL | Age: 75
Discharge: HOME | End: 2025-06-23
Attending: INTERNAL MEDICINE
Payer: MEDICARE

## 2025-06-23 DIAGNOSIS — Z45.2 ENCOUNTER FOR CARE RELATED TO PORT-A-CATH: Primary | ICD-10-CM

## 2025-06-23 DIAGNOSIS — C65.2 CANCER OF LEFT RENAL PELVIS (HCC): ICD-10-CM

## 2025-06-23 DIAGNOSIS — C68.9 UROTHELIAL CANCER (HCC): ICD-10-CM

## 2025-06-23 LAB
ALBUMIN SERPL BCG-MCNC: 3.7 G/DL (ref 3.5–5)
ALP SERPL-CCNC: 53 U/L (ref 34–104)
ALT SERPL W P-5'-P-CCNC: 11 U/L (ref 7–52)
ANION GAP SERPL CALCULATED.3IONS-SCNC: 5 MMOL/L (ref 4–13)
AST SERPL W P-5'-P-CCNC: 9 U/L (ref 13–39)
BASOPHILS # BLD AUTO: 0.03 THOUSANDS/ÂΜL (ref 0–0.1)
BASOPHILS NFR BLD AUTO: 1 % (ref 0–1)
BILIRUB SERPL-MCNC: 0.93 MG/DL (ref 0.2–1)
BUN SERPL-MCNC: 29 MG/DL (ref 5–25)
CALCIUM SERPL-MCNC: 9 MG/DL (ref 8.4–10.2)
CHLORIDE SERPL-SCNC: 106 MMOL/L (ref 96–108)
CO2 SERPL-SCNC: 27 MMOL/L (ref 21–32)
CREAT SERPL-MCNC: 1.27 MG/DL (ref 0.6–1.3)
EOSINOPHIL # BLD AUTO: 0.14 THOUSAND/ÂΜL (ref 0–0.61)
EOSINOPHIL NFR BLD AUTO: 2 % (ref 0–6)
ERYTHROCYTE [DISTWIDTH] IN BLOOD BY AUTOMATED COUNT: 13.6 % (ref 11.6–15.1)
GFR SERPL CREATININE-BSD FRML MDRD: 54 ML/MIN/1.73SQ M
GLUCOSE SERPL-MCNC: 131 MG/DL (ref 65–140)
HCT VFR BLD AUTO: 39.6 % (ref 36.5–49.3)
HGB BLD-MCNC: 12.4 G/DL (ref 12–17)
IMM GRANULOCYTES # BLD AUTO: 0.03 THOUSAND/UL (ref 0–0.2)
IMM GRANULOCYTES NFR BLD AUTO: 1 % (ref 0–2)
LYMPHOCYTES # BLD AUTO: 1.16 THOUSANDS/ÂΜL (ref 0.6–4.47)
LYMPHOCYTES NFR BLD AUTO: 19 % (ref 14–44)
MAGNESIUM SERPL-MCNC: 1.7 MG/DL (ref 1.9–2.7)
MCH RBC QN AUTO: 31.3 PG (ref 26.8–34.3)
MCHC RBC AUTO-ENTMCNC: 31.3 G/DL (ref 31.4–37.4)
MCV RBC AUTO: 100 FL (ref 82–98)
MONOCYTES # BLD AUTO: 0.58 THOUSAND/ÂΜL (ref 0.17–1.22)
MONOCYTES NFR BLD AUTO: 9 % (ref 4–12)
NEUTROPHILS # BLD AUTO: 4.2 THOUSANDS/ÂΜL (ref 1.85–7.62)
NEUTS SEG NFR BLD AUTO: 68 % (ref 43–75)
NRBC BLD AUTO-RTO: 0 /100 WBCS
PLATELET # BLD AUTO: 147 THOUSANDS/UL (ref 149–390)
PMV BLD AUTO: 10 FL (ref 8.9–12.7)
POTASSIUM SERPL-SCNC: 4 MMOL/L (ref 3.5–5.3)
PROT SERPL-MCNC: 6 G/DL (ref 6.4–8.4)
RBC # BLD AUTO: 3.96 MILLION/UL (ref 3.88–5.62)
SODIUM SERPL-SCNC: 138 MMOL/L (ref 135–147)
WBC # BLD AUTO: 6.14 THOUSAND/UL (ref 4.31–10.16)

## 2025-06-23 PROCEDURE — 80053 COMPREHEN METABOLIC PANEL: CPT

## 2025-06-23 PROCEDURE — 85025 COMPLETE CBC W/AUTO DIFF WBC: CPT

## 2025-06-23 PROCEDURE — 83735 ASSAY OF MAGNESIUM: CPT

## 2025-06-23 NOTE — PROGRESS NOTES
Juan Amato  tolerated treatment well with no complications.      Juan Amato is aware of future appt on 8/4 at 0930.     AVS printed and given to Juan Amato:  No (Declined by Juan Amato)

## 2025-07-01 ENCOUNTER — HOSPITAL ENCOUNTER (OUTPATIENT)
Dept: NUCLEAR MEDICINE | Facility: HOSPITAL | Age: 75
Discharge: HOME | End: 2025-07-01
Attending: INTERNAL MEDICINE
Payer: MEDICARE

## 2025-07-01 DIAGNOSIS — C68.9 UROTHELIAL CANCER (HCC): ICD-10-CM

## 2025-07-01 DIAGNOSIS — C64.2 UROTHELIAL CARCINOMA OF KIDNEY, LEFT (HCC): ICD-10-CM

## 2025-07-01 LAB — GLUCOSE SERPL-MCNC: 95 MG/DL (ref 65–140)

## 2025-07-01 PROCEDURE — 78815 PET IMAGE W/CT SKULL-THIGH: CPT

## 2025-07-01 PROCEDURE — A9552 F18 FDG: HCPCS

## 2025-07-01 PROCEDURE — 82948 REAGENT STRIP/BLOOD GLUCOSE: CPT

## 2025-07-08 ENCOUNTER — OFFICE VISIT (OUTPATIENT)
Dept: HEMATOLOGY ONCOLOGY | Facility: CLINIC | Age: 75
End: 2025-07-08
Payer: MEDICARE

## 2025-07-08 VITALS
BODY MASS INDEX: 30.77 KG/M2 | HEIGHT: 68 IN | OXYGEN SATURATION: 93 % | TEMPERATURE: 97.9 F | WEIGHT: 203 LBS | RESPIRATION RATE: 18 BRPM | HEART RATE: 82 BPM | SYSTOLIC BLOOD PRESSURE: 128 MMHG | DIASTOLIC BLOOD PRESSURE: 72 MMHG

## 2025-07-08 DIAGNOSIS — C65.2 CANCER OF LEFT RENAL PELVIS (HCC): ICD-10-CM

## 2025-07-08 DIAGNOSIS — C64.2 UROTHELIAL CARCINOMA OF KIDNEY, LEFT (HCC): Primary | ICD-10-CM

## 2025-07-08 DIAGNOSIS — I50.89 OTHER HEART FAILURE (HCC): ICD-10-CM

## 2025-07-08 PROCEDURE — G2211 COMPLEX E/M VISIT ADD ON: HCPCS | Performed by: INTERNAL MEDICINE

## 2025-07-08 PROCEDURE — 99214 OFFICE O/P EST MOD 30 MIN: CPT | Performed by: INTERNAL MEDICINE

## 2025-07-08 NOTE — PROGRESS NOTES
Name: Juan Amato      : 1950      MRN: [id number]  Encounter Provider: Tyree Valera MD  Encounter Date: 2025   Encounter department: [facility] [place]  :  Assessment & Plan  Urothelial carcinoma of kidney, left (HCC)  Diagnosed in 2020, status post multiple lines of treatment.    His last dose of enfortumab vedotin was around 2022.  The treatment had to be stopped due to severe neuropathy.  We discussed the recent PET CT scan from 2025 which was negative for any suspicious hypermetabolic activity.    I had a lengthy discussion with the patient and his wife regarding close monitoring.  We decided not to pursue imaging prior to his next visit since he does not seem to be interested in any type of treatment in case of recurrence.    He will be back in 6 months from now for close follow-up with blood work prior.  Orders:    CBC and differential; Future    Comprehensive metabolic panel; Future    Magnesium; Future    Other heart failure (HCC)         Cancer of left renal pelvis (HCC)             Return in about 27 weeks (around 2026) for Office Visit 20 min, Labs.    History of Present Illness   Chief Complaint   Patient presents with    Follow-up   The patient came today for a follow-up visit accompanied by his wife.  He continues to complain about his chronic neuropathy in his lower extremities.  PET CT scan on 2025 showed:  IMPRESSION:        1. Previously described perisplenic hypermetabolic nodule is barely visible with metabolic activity similar to mediastinal blood flow.  2. No new findings suspicious for malignancy.    Blood work on 2025 showed normal hemoglobin of 12.4 with normal white cell count.  Platelet count 147.  Creatinine 1.2 with normal calcium and liver enzymes.  Magnesium 1.7.    Oncology History   Cancer Staging   Cancer of left renal pelvis (HCC)  Staging form: Renal Pelvis and Ureter, AJCC 8th Edition  -  Clinical stage from 1/6/2020: Stage IV (cTX, cM1) - Signed by Tyree Valera MD on 8/13/2024  Histologic grade (G): GX  Histologic grading system: 3 grade system  Oncology History Overview Note   Patient has a history of hypertension, hyperlipidemia, chronic lower back pain.     He had an MRI of the lower spine for further evaluation of his chronic lower back pain.  The MRI on 12/02/2019 revealed Multiple left renal masses to include a left lower pole cyst and a rounded structure in the left upper pole which may also represent cyst.  Left upper pole renal masses approximately 3 cm in greatest linear dimension.     A CT with renal protocol was done on 12/12/2019 which also showed the infiltrating lesion in the upper pole of the left kidney measuring about the 3.5 cm in greatest dimension without any hint of retroperitoneal lymphadenopathy.       A CT scan of the abdomen pelvis on 01/14/2020 showed the same findings with the mild hydronephrosis on the left.  The urine cytology on 01/03/2020 showed high-grade urothelial carcinoma.  A cystoscopy was then done, a biopsy was taken from the left renal pelvic region which showed high-grade urothelial carcinoma without evidence of lamina propria invasion.  The detrusor muscle/muscularis propria were not present for evaluation.     The recommendation was to pursue left robotic assisted laparoscopic nephroureterectomy with bladder cuff excision which was done on 04/01/2020.    The final pathology revealed;  - Invasive high grade urothelial carcinoma arising in renal pelvis.   - Bladder cuff margin is negative for carcinoma and no evidence of high grade dysplasia.  - Ureters with no significant pathologic abnormality  - Two benign simple cysts.  - Adrenal gland is negative for malignancy.  - One lymph node, negative for malignancy (0/1).  The tumor size was 4.5 cm with invasion beyond the muscularis into the periurethral fat or peripelvic fat or the renal parenchyma.  The margins  were uninvolved by carcinoma or carcinoma in situ.  The final pathology was pT3 pN0.     Patient barely tolerated 1 cycle of adjuvant chemotherapy with split dose cisplatin and gemcitabine with a lot of side effects.  The treatment had to be discontinued due to significant worsening renal dysfunction 6/2020.     Patient started to complain about significant abdominal/left inguinal pain around August/September 2020. Unfortunately repeat imaging revealed recurrent/metastatic disease.  9/4/20 CT C/A/P- Status post left nephrectomy for resection of urothelial neoplasm.  Lymphadenopathy in the left nephrectomy bed has increased since the prior examination, concerning for metastatic disease.     Ill-defined hyperattenuating masslike focus in the left rectus muscle, not identified on the prior examination.  This is suspicious for a metastatic lesion.  A rectus hematoma is also considered.      9/23/20 PET scan- 1.  Multiple FDG avid lymph nodes in the retrocrural region, retroperitoneum and the left renal bed compatible with metastasis.  These have progressed from recent CT.  2.  FDG avid mass involving the left rectus abdominal musculature compatible with metastasis which is larger from recent CT.  3. Several small lymph nodes adjacent to the mid to distal esophagus with FDG uptake suspicious for metastasis.  Small focus of FDG uptake also suggested in the right hilar region, metastasis is not excluded.  This could be reassessed on follow-up.  4.  Subtle focus of FDG uptake at the T2 level on the left, nonspecific, could be related to early degenerative changes, developing metastasis is not entirely excluded.  This could be reassessed on follow-up.  5. Prostate is mildly prominent with heterogeneous FDG uptake.  This could be inflammatory.  Correlate for prostatitis.     He completed palliative radiation to the left abdomen 12/3/20     His PET scan from 08/16/2021 showed progression of his disease with hypermetabolic soft  tissue lesions at the level of the right shoulder and right axilla with interval progression of the retroperitoneal and mesenteric hypermetabolic metastasis.       He did have the new lesion to his right upper back/shoulder which was causing significant localized pain. This excised by his surgeon 08/09/2021. Pathology confirmed metastatic high-grade carcinoma consistent with his no primary urothelial carcinoma.  He received palliative radiation to his right shoulder/axilla region.     Urothelial cancer (HCC)   1/3/2020 Biopsy    Final Diagnosis    A. Urine, Clean Catch, Thin Prep:  High grade urothelial carcinoma (HGUC) - see comment.        1/3/2020 Initial Diagnosis    Urothelial cancer (HCC)  T3 N0 (stage IIIA)     1/17/2020 Biopsy    Final Diagnosis    A. Ureter, Right, left renal pelvis biopsy  -Fragments of high grade urothelial carcinoma   -No evidence of lamina propria invasion.  -Detrusor muscle/ muscularis propria is not present for evaluation.     B. Urinary Bladder, bladder biopsy:  -Fragments of low grade papillary lesion. See note  -No evidence of invasion seen  -Unremarkable fragment of detrusor muscle seen.        4/1/2020 Surgery    left robotic assisted laparoscopic nephroureterectomy with bladder cuff excision     Final Diagnosis    A. Left kidney, ureter, and bladder cuff, nephroureterectomy:  - Invasive high grade urothelial carcinoma arising in renal pelvis.   - Bladder cuff margin is negative for carcinoma and no evidence of high grade dysplasia.  - Ureters with no significant pathologic abnormality  - Two benign simple cysts.  - Adrenal gland is negative for malignancy.  - One lymph node, negative for malignancy (0/1).        5/14/2020 - 6/3/2020 Chemotherapy    CISplatin (PLATINOL) split dose 35 mg/m2 = 75.3 mg (50 % of original dose 70 mg/m2), Intravenous,   Administration: 75.3 mg (5/14/2020), 75.3 mg (5/21/2020)  gemcitabine (GEMZAR) 2,200 mg in sodium chloride 0.9 % 250 mL infusion,  2,150.2 mg (80 % of original dose 1,250 mg/m2), Intravenous,   Administration: 2,200 mg (5/14/2020), 2,200 mg (5/21/2020)    (only completed 1 cycle- d/c d/t adverse effects and worsening renal dysfunction)     10/9/2020 - 9/9/2021 Chemotherapy    pembrolizumab (KEYTRUDA) IVPB, 200 mg, Intravenous, Once, 16 of 20 cycles  Administration: 200 mg (10/9/2020), 200 mg (10/30/2020), 200 mg (11/20/2020), 200 mg (12/11/2020), 200 mg (12/31/2020), 200 mg (1/22/2021), 200 mg (2/12/2021), 200 mg (3/5/2021), 200 mg (3/26/2021), 200 mg (4/16/2021), 200 mg (5/7/2021), 200 mg (5/28/2021), 200 mg (6/18/2021), 200 mg (7/9/2021), 200 mg (7/30/2021), 200 mg (8/20/2021)     11/19/2020 - 12/3/2020 Radiation    Treatment:  Course: C1    Plan ID Energy Fractions Dose per Fraction (cGy) Dose Correction (cGy) Total Dose Delivered (cGy) Elapsed Days   L Abdomen 6X 10 / 10 300 0 3,000 14        9/10/2021 -  Chemotherapy    enfortumab vedotin-ejfv (PADCEV) IVPB, 1.25 mg/kg = 114 mg, Intravenous, Once, 11 of 16 cycles  Dose modification: 1 mg/kg (original dose 1.25 mg/kg, Cycle 7, Reason: Other (Must fill in a comment), Comment: dose reduction due to side effects ), 1.25 mg/kg (original dose 1.25 mg/kg, Cycle 7, Reason: Other (Must fill in a comment), Comment: patient wants full dose ), 1 mg/kg (original dose 1.25 mg/kg, Cycle 7, Reason: Neuropathy), 0.75 mg/kg (original dose 1.25 mg/kg, Cycle 11, Reason: Neuropathy)  Administration: 114 mg (9/10/2021), 114 mg (9/17/2021), 110 mg (10/8/2021), 110 mg (9/24/2021), 110 mg (10/15/2021), 110 mg (11/12/2021), 110 mg (10/22/2021), 110 mg (11/19/2021), 110 mg (12/10/2021), 110 mg (11/26/2021), 110 mg (12/17/2021), 110 mg (1/7/2022), 110 mg (12/23/2021), 110 mg (1/14/2022), 90 mg (4/8/2022), 90 mg (4/15/2022), 90 mg (4/22/2022), 110 mg (1/21/2022), 110 mg (2/18/2022), 110 mg (2/25/2022), 90 mg (5/13/2022), 90 mg (5/20/2022), 90 mg (5/27/2022), 90 mg (3/4/2022), 90 mg (6/10/2022), 90 mg (6/17/2022), 90  mg (6/24/2022), 90 mg (7/8/2022), 90 mg (7/15/2022), 90 mg (7/22/2022), 68 mg (8/19/2022), 70 mg (9/2/2022)     Cancer of left renal pelvis (HCC)   1/6/2020 -  Cancer Staged    Staging form: Renal Pelvis and Ureter, AJCC 8th Edition  - Clinical stage from 1/6/2020: Stage IV (cTX, cM1) - Signed by Tyree Valera MD on 8/13/2024  Histologic grade (G): GX  Histologic grading system: 3 grade system       4/23/2020 Initial Diagnosis    Cancer of left renal pelvis (HCC)     10/9/2020 - 9/9/2021 Chemotherapy    pembrolizumab (KEYTRUDA) IVPB, 200 mg, Intravenous, Once, 16 of 20 cycles  Administration: 200 mg (10/9/2020), 200 mg (10/30/2020), 200 mg (11/20/2020), 200 mg (12/11/2020), 200 mg (12/31/2020), 200 mg (1/22/2021), 200 mg (2/12/2021), 200 mg (3/5/2021), 200 mg (3/26/2021), 200 mg (4/16/2021), 200 mg (5/7/2021), 200 mg (5/28/2021), 200 mg (6/18/2021), 200 mg (7/9/2021), 200 mg (7/30/2021), 200 mg (8/20/2021)     9/10/2021 -  Chemotherapy    enfortumab vedotin-ejfv (PADCEV) IVPB, 1.25 mg/kg = 114 mg, Intravenous, Once, 11 of 16 cycles  Dose modification: 1 mg/kg (original dose 1.25 mg/kg, Cycle 7, Reason: Other (Must fill in a comment), Comment: dose reduction due to side effects ), 1.25 mg/kg (original dose 1.25 mg/kg, Cycle 7, Reason: Other (Must fill in a comment), Comment: patient wants full dose ), 1 mg/kg (original dose 1.25 mg/kg, Cycle 7, Reason: Neuropathy), 0.75 mg/kg (original dose 1.25 mg/kg, Cycle 11, Reason: Neuropathy)  Administration: 114 mg (9/10/2021), 114 mg (9/17/2021), 110 mg (10/8/2021), 110 mg (9/24/2021), 110 mg (10/15/2021), 110 mg (11/12/2021), 110 mg (10/22/2021), 110 mg (11/19/2021), 110 mg (12/10/2021), 110 mg (11/26/2021), 110 mg (12/17/2021), 110 mg (1/7/2022), 110 mg (12/23/2021), 110 mg (1/14/2022), 90 mg (4/8/2022), 90 mg (4/15/2022), 90 mg (4/22/2022), 110 mg (1/21/2022), 110 mg (2/18/2022), 110 mg (2/25/2022), 90 mg (5/13/2022), 90 mg (5/20/2022), 90 mg (5/27/2022), 90 mg (3/4/2022),  90 mg (6/10/2022), 90 mg (6/17/2022), 90 mg (6/24/2022), 90 mg (7/8/2022), 90 mg (7/15/2022), 90 mg (7/22/2022), 68 mg (8/19/2022), 70 mg (9/2/2022)      Surgery       Metastatic urothelial carcinoma (HCC)   8/9/2021 Initial Diagnosis    Metastatic urothelial carcinoma (HCC)     9/8/2021 - 9/21/2021 Radiation    Treatment:  Course: C2    Plan ID Energy Fractions Dose per Fraction (cGy) Dose Correction (cGy) Total Dose Delivered (cGy) Elapsed Days   R Axil_Shl # 6X 10 / 10 300 0 3,000 13      Treatment dates:  C2: 9/8/2021 - 9/21/2021     9/10/2021 -  Chemotherapy    enfortumab vedotin-ejfv (PADCEV) IVPB, 1.25 mg/kg = 114 mg, Intravenous, Once, 11 of 16 cycles  Dose modification: 1 mg/kg (original dose 1.25 mg/kg, Cycle 7, Reason: Other (Must fill in a comment), Comment: dose reduction due to side effects ), 1.25 mg/kg (original dose 1.25 mg/kg, Cycle 7, Reason: Other (Must fill in a comment), Comment: patient wants full dose ), 1 mg/kg (original dose 1.25 mg/kg, Cycle 7, Reason: Neuropathy), 0.75 mg/kg (original dose 1.25 mg/kg, Cycle 11, Reason: Neuropathy)  Administration: 114 mg (9/10/2021), 114 mg (9/17/2021), 110 mg (10/8/2021), 110 mg (9/24/2021), 110 mg (10/15/2021), 110 mg (11/12/2021), 110 mg (10/22/2021), 110 mg (11/19/2021), 110 mg (12/10/2021), 110 mg (11/26/2021), 110 mg (12/17/2021), 110 mg (1/7/2022), 110 mg (12/23/2021), 110 mg (1/14/2022), 90 mg (4/8/2022), 90 mg (4/15/2022), 90 mg (4/22/2022), 110 mg (1/21/2022), 110 mg (2/18/2022), 110 mg (2/25/2022), 90 mg (5/13/2022), 90 mg (5/20/2022), 90 mg (5/27/2022), 90 mg (3/4/2022), 90 mg (6/10/2022), 90 mg (6/17/2022), 90 mg (6/24/2022), 90 mg (7/8/2022), 90 mg (7/15/2022), 90 mg (7/22/2022), 68 mg (8/19/2022), 70 mg (9/2/2022)             Review of Systems   Constitutional:  Positive for fatigue. Negative for chills and fever.   HENT:  Positive for hearing loss. Negative for ear pain and sore throat.    Eyes:  Negative for pain and visual disturbance.  "  Respiratory:  Positive for shortness of breath. Negative for cough.    Cardiovascular:  Negative for chest pain and palpitations.   Gastrointestinal:  Negative for abdominal pain and vomiting.   Genitourinary:  Negative for dysuria and hematuria.   Musculoskeletal:  Negative for arthralgias and back pain.   Skin:  Negative for color change and rash.   Neurological:  Positive for numbness. Negative for seizures and syncope.   All other systems reviewed and are negative.    Medical History Reviewed by provider this encounter:  Tobacco  Allergies  Meds  Problems  Med Hx  Surg Hx  Fam Hx     .  Medications Ordered Prior to Encounter[1]   Social History[2]      Objective   /72 (BP Location: Left arm, Patient Position: Sitting, Cuff Size: Adult)   Pulse 82   Temp 97.9 °F (36.6 °C) (Temporal)   Resp 18   Ht 5' 8.25\" (1.734 m)   Wt 92.1 kg (203 lb)   SpO2 93%   BMI 30.64 kg/m²     Pain Screening:  Pain Score: 0-No pain  ECOG   3  Physical Exam  Constitutional:       Appearance: He is well-developed.   HENT:      Head: Normocephalic and atraumatic.      Nose: Nose normal.     Eyes:      General: No scleral icterus.        Right eye: No discharge.         Left eye: No discharge.      Conjunctiva/sclera: Conjunctivae normal.      Pupils: Pupils are equal, round, and reactive to light.     Neck:      Thyroid: No thyromegaly.      Trachea: No tracheal deviation.     Cardiovascular:      Rate and Rhythm: Normal rate and regular rhythm.      Heart sounds: Normal heart sounds. No murmur heard.     No friction rub.   Pulmonary:      Effort: Pulmonary effort is normal. No respiratory distress.      Breath sounds: Normal breath sounds. No wheezing or rales.   Chest:      Chest wall: No tenderness.   Abdominal:      General: Bowel sounds are normal. There is no distension.      Palpations: Abdomen is soft. There is no hepatomegaly, splenomegaly or mass.      Tenderness: There is no abdominal tenderness. There is " no guarding or rebound.     Musculoskeletal:         General: No tenderness or deformity. Normal range of motion.      Cervical back: Normal range of motion and neck supple.   Lymphadenopathy:      Cervical: No cervical adenopathy.     Skin:     General: Skin is warm and dry.      Coloration: Skin is not pale.      Findings: No erythema or rash.     Neurological:      Mental Status: He is alert and oriented to person, place, and time.      Cranial Nerves: No cranial nerve deficit.      Coordination: Coordination normal.      Deep Tendon Reflexes: Reflexes are normal and symmetric.     Psychiatric:         Behavior: Behavior normal.         Thought Content: Thought content normal.         Judgment: Judgment normal.         Labs: I have reviewed the following labs:  Lab Results   Component Value Date/Time    WBC 6.14 06/23/2025 10:46 AM    RBC 3.96 06/23/2025 10:46 AM    Hemoglobin 12.4 06/23/2025 10:46 AM    Hematocrit 39.6 06/23/2025 10:46 AM     (H) 06/23/2025 10:46 AM    MCH 31.3 06/23/2025 10:46 AM    RDW 13.6 06/23/2025 10:46 AM    Platelets 147 (L) 06/23/2025 10:46 AM    Segmented % 68 06/23/2025 10:46 AM    Lymphocytes % 19 06/23/2025 10:46 AM    Monocytes % 9 06/23/2025 10:46 AM    Eosinophils Relative 2 06/23/2025 10:46 AM    Basophils Relative 1 06/23/2025 10:46 AM    Immature Grans % 1 06/23/2025 10:46 AM    Absolute Neutrophils 4.20 06/23/2025 10:46 AM     Lab Results   Component Value Date/Time    Potassium 4.0 06/23/2025 10:46 AM    Chloride 106 06/23/2025 10:46 AM    CO2 27 06/23/2025 10:46 AM    BUN 29 (H) 06/23/2025 10:46 AM    Creatinine 1.27 06/23/2025 10:46 AM    Calcium 9.0 06/23/2025 10:46 AM    AST 9 (L) 06/23/2025 10:46 AM    ALT 11 06/23/2025 10:46 AM    Alkaline Phosphatase 53 06/23/2025 10:46 AM    Total Protein 6.0 (L) 06/23/2025 10:46 AM    Albumin 3.7 06/23/2025 10:46 AM    Total Bilirubin 0.93 06/23/2025 10:46 AM    eGFR 54 06/23/2025 10:46 AM     Lab Results   Component Value  "Date/Time    WBC 6.14 06/23/2025 10:46 AM    RBC 3.96 06/23/2025 10:46 AM    Hemoglobin 12.4 06/23/2025 10:46 AM    Hematocrit 39.6 06/23/2025 10:46 AM     (H) 06/23/2025 10:46 AM    MCH 31.3 06/23/2025 10:46 AM    RDW 13.6 06/23/2025 10:46 AM    Platelets 147 (L) 06/23/2025 10:46 AM    Segmented % 68 06/23/2025 10:46 AM    Lymphocytes % 19 06/23/2025 10:46 AM    Monocytes % 9 06/23/2025 10:46 AM    Eosinophils Relative 2 06/23/2025 10:46 AM    Basophils Relative 1 06/23/2025 10:46 AM    Immature Grans % 1 06/23/2025 10:46 AM    Absolute Neutrophils 4.20 06/23/2025 10:46 AM      Lab Results   Component Value Date/Time    Sodium 138 06/23/2025 10:46 AM    Potassium 4.0 06/23/2025 10:46 AM    Chloride 106 06/23/2025 10:46 AM    CO2 27 06/23/2025 10:46 AM    ANION GAP 5 06/23/2025 10:46 AM    BUN 29 (H) 06/23/2025 10:46 AM    Creatinine 1.27 06/23/2025 10:46 AM    Glucose 131 06/23/2025 10:46 AM    Calcium 9.0 06/23/2025 10:46 AM    AST 9 (L) 06/23/2025 10:46 AM    ALT 11 06/23/2025 10:46 AM    Alkaline Phosphatase 53 06/23/2025 10:46 AM    Total Protein 6.0 (L) 06/23/2025 10:46 AM    Albumin 3.7 06/23/2025 10:46 AM    Total Bilirubin 0.93 06/23/2025 10:46 AM    eGFR 54 06/23/2025 10:46 AM      No results found for: \"IRON\", \"CONCFE\", \"FERRITIN\", \"BNDXQQGZ15\", \"FOLATE\", \"COPPER\", \"EPOREFLAB\", \"ERYTHROPRO\", \"ESR\", \"CRP\", \"HIVAGAB\", \"HEPATITIS\"   Results for orders placed or performed during the hospital encounter of 07/01/25   Fingerstick Glucose (POCT)   Result Value Ref Range    POC Glucose 95 65 - 140 mg/dl     *Note: Due to a large number of results and/or encounters for the requested time period, some results have not been displayed. A complete set of results can be found in Results Review.                   [1]   Current Outpatient Medications on File Prior to Visit   Medication Sig Dispense Refill    amiodarone 200 mg tablet Take 200 mg by mouth daily      diltiazem (CARDIZEM) 60 mg tablet 2 (two) times a " day      finasteride (PROSCAR) 5 mg tablet Take 5 mg by mouth daily      folic acid (FOLVITE) 1 mg tablet Take 1 mg by mouth daily      HYDROcodone-acetaminophen (Norco) 7.5-325 mg per tablet Take 1 tablet by mouth every 8 (eight) hours as needed for pain Max Daily Amount: 3 tablets Do not start before 2025. 90 tablet 0    HYDROcodone-acetaminophen (Norco) 7.5-325 mg per tablet Take 1 tablet by mouth every 8 (eight) hours as needed for pain Max Daily Amount: 3 tablets Do not start before May 22, 2025. 90 tablet 0    HYDROcodone-acetaminophen (Norco) 7.5-325 mg per tablet Take 1 tablet by mouth every 8 (eight) hours as needed for pain Max Daily Amount: 3 tablets Do not start before 2025. 90 tablet 0    naloxone (NARCAN) 4 mg/0.1 mL nasal spray Administer 1 spray into a nostril. If no response after 2-3 minutes, give another dose in the other nostril using a new spray. (Patient taking differently: Administer 1 spray into a nostril. If no response after 2-3 minutes, give another dose in the other nostril using a new spray.. AS NEEDED) 1 each 1    tamsulosin (FLOMAX) 0.4 mg Take 1 capsule (0.4 mg total) by mouth daily with dinner 60 capsule 12    tolterodine (DETROL LA) 2 mg 24 hr capsule Take 1 capsule (2 mg total) by mouth daily at bedtime 30 capsule 11    zolpidem (AMBIEN) 5 mg tablet Take 1 tablet (5 mg total) by mouth daily at bedtime as needed for sleep 30 tablet 1     No current facility-administered medications on file prior to visit.   [2]   Social History  Tobacco Use    Smoking status: Former     Current packs/day: 0.00     Types: Cigarettes     Quit date: 1972     Years since quittin.5    Smokeless tobacco: Never   Vaping Use    Vaping status: Never Used   Substance and Sexual Activity    Alcohol use: Not Currently     Comment: socially, rarely    Drug use: Never    Sexual activity: Yes     Partners: Female

## 2025-07-14 NOTE — PROGRESS NOTES
Name: Juan Amato      : 1950      MRN: [id number]  Encounter Provider: RAGHU Trujillo  Encounter Date: 7/15/2025   Encounter department: [facility] [place]  :  Assessment & Plan  Lumbar radiculopathy    Orders:    HYDROcodone-acetaminophen (Norco) 7.5-325 mg per tablet; Take 1 tablet by mouth every 8 (eight) hours as needed for pain Max Daily Amount: 3 tablets Do not start before 2025.    HYDROcodone-acetaminophen (Norco) 7.5-325 mg per tablet; Take 1 tablet by mouth every 8 (eight) hours as needed for pain Max Daily Amount: 3 tablets    HYDROcodone-acetaminophen (Norco) 7.5-325 mg per tablet; Take 1 tablet by mouth every 8 (eight) hours as needed for pain Max Daily Amount: 3 tablets Do not start before 2025.    Chemotherapy-induced neuropathy (HCC)    Orders:    HYDROcodone-acetaminophen (Norco) 7.5-325 mg per tablet; Take 1 tablet by mouth every 8 (eight) hours as needed for pain Max Daily Amount: 3 tablets Do not start before 2025.    HYDROcodone-acetaminophen (Norco) 7.5-325 mg per tablet; Take 1 tablet by mouth every 8 (eight) hours as needed for pain Max Daily Amount: 3 tablets    HYDROcodone-acetaminophen (Norco) 7.5-325 mg per tablet; Take 1 tablet by mouth every 8 (eight) hours as needed for pain Max Daily Amount: 3 tablets Do not start before 2025.    Lumbar spondylosis    Orders:    HYDROcodone-acetaminophen (Norco) 7.5-325 mg per tablet; Take 1 tablet by mouth every 8 (eight) hours as needed for pain Max Daily Amount: 3 tablets    HYDROcodone-acetaminophen (Norco) 7.5-325 mg per tablet; Take 1 tablet by mouth every 8 (eight) hours as needed for pain Max Daily Amount: 3 tablets Do not start before 2025.    Spinal stenosis of lumbar region, unspecified whether neurogenic claudication present    Orders:    HYDROcodone-acetaminophen (Norco) 7.5-325 mg per tablet; Take 1 tablet by mouth every 8 (eight) hours as  Patient has discharge orders. Patient will discharge home/selfcare.    will transport at 12pm. needed for pain Max Daily Amount: 3 tablets Do not start before September 11, 2025.    Long-term current use of opiate analgesic    Orders:    MM ALL_Prescribed Meds and Special Instructions    MM DT_Alprazolam Definitive Test    MM DT_Amphetamine Definitive Test    MM DT_Aripiprazole Definitive Test    MM DT_Bath Salts Definitive Test    MM DT_Buprenorphine Definitive Test    MM DT_Butalbital Definitive Test    MM DT_Clonazepam Definitive Test    MM DT_Clozapine Definitive Test    MM DT_Cocaine Definitive Test    MM DT_Codeine Definitive Test    MM DT_Desipramine Definitive Test    MM DT_Dextromethorphan Definitive Test    MM Diazepam Definitive Test    MM DT_Ethyl Glucuronide/Ethyl Sulfate Definitive Test    MM DT_Fentanyl Definitive Test    MM DT_Haloperidol Definitive Test    MM DT_Heroin Definitive Test    MM DT_Hydrocodone Definitive Test    MM DT_Hydromorphone Definitive Test    MM DT_Imipramine Definitive Test    MM DT_Kratom Definitive Test    MM DT_Levorphanol Definitive Test    MM Lorazepam Definitive Test    MM DT_MDMA Definitive Test    MM DT_Meperidine Definitive Test    MM DT_Methadone Definitive Test    MM DT_Methamphetamine Definitive Test    MM DT_Morphine Definitive Test    MM DT_Olanzapine Definitive Test    MM DT_Oxazepam Definitive Test    MM DT_Oxycodone Definitive Test    MM DT_Oxymorphone Definitive Test    MM DT_Phencyclidine Definitive Test    MM DT_Phenobarbital Definitive Test    MM DT_Phentermine Definitive Test    MM DT_Quetiapine Definitive Test    MM DT_Risperidone Definitive Test    MM DT_Secobarbital Definitive Test    MM DT_Spice Definitive Test    MM DT_Tapentadol Definitive Test    MM DT_Temazapam Definitive Test    MM DT_THC Definitive Test    MM DT_Tramadol Definitive Test    MM DT_Methylphenidate Definitive Test    Chronic pain syndrome    Orders:    MM ALL_Prescribed Meds and Special Instructions    MM DT_Alprazolam Definitive Test    MM DT_Amphetamine Definitive Test    MM  DT_Aripiprazole Definitive Test    MM DT_Bath Salts Definitive Test    MM DT_Buprenorphine Definitive Test    MM DT_Butalbital Definitive Test    MM DT_Clonazepam Definitive Test    MM DT_Clozapine Definitive Test    MM DT_Cocaine Definitive Test    MM DT_Codeine Definitive Test    MM DT_Desipramine Definitive Test    MM DT_Dextromethorphan Definitive Test    MM Diazepam Definitive Test    MM DT_Ethyl Glucuronide/Ethyl Sulfate Definitive Test    MM DT_Fentanyl Definitive Test    MM DT_Haloperidol Definitive Test    MM DT_Heroin Definitive Test    MM DT_Hydrocodone Definitive Test    MM DT_Hydromorphone Definitive Test    MM DT_Imipramine Definitive Test    MM DT_Kratom Definitive Test    MM DT_Levorphanol Definitive Test    MM Lorazepam Definitive Test    MM DT_MDMA Definitive Test    MM DT_Meperidine Definitive Test    MM DT_Methadone Definitive Test    MM DT_Methamphetamine Definitive Test    MM DT_Morphine Definitive Test    MM DT_Olanzapine Definitive Test    MM DT_Oxazepam Definitive Test    MM DT_Oxycodone Definitive Test    MM DT_Oxymorphone Definitive Test    MM DT_Phencyclidine Definitive Test    MM DT_Phenobarbital Definitive Test    MM DT_Phentermine Definitive Test    MM DT_Quetiapine Definitive Test    MM DT_Risperidone Definitive Test    MM DT_Secobarbital Definitive Test    MM DT_Spice Definitive Test    MM DT_Tapentadol Definitive Test    MM DT_Temazapam Definitive Test    MM DT_THC Definitive Test    MM DT_Tramadol Definitive Test    MM DT_Methylphenidate Definitive Test    Uncomplicated opioid dependence (HCC)    Orders:    MM ALL_Prescribed Meds and Special Instructions    MM DT_Alprazolam Definitive Test    MM DT_Amphetamine Definitive Test    MM DT_Aripiprazole Definitive Test    MM DT_Bath Salts Definitive Test    MM DT_Buprenorphine Definitive Test    MM DT_Butalbital Definitive Test    MM DT_Clonazepam Definitive Test    MM DT_Clozapine Definitive Test    MM DT_Cocaine Definitive Test    MM  DT_Codeine Definitive Test    MM DT_Desipramine Definitive Test    MM DT_Dextromethorphan Definitive Test    MM Diazepam Definitive Test    MM DT_Ethyl Glucuronide/Ethyl Sulfate Definitive Test    MM DT_Fentanyl Definitive Test    MM DT_Haloperidol Definitive Test    MM DT_Heroin Definitive Test    MM DT_Hydrocodone Definitive Test    MM DT_Hydromorphone Definitive Test    MM DT_Imipramine Definitive Test    MM DT_Kratom Definitive Test    MM DT_Levorphanol Definitive Test    MM Lorazepam Definitive Test    MM DT_MDMA Definitive Test    MM DT_Meperidine Definitive Test    MM DT_Methadone Definitive Test    MM DT_Methamphetamine Definitive Test    MM DT_Morphine Definitive Test    MM DT_Olanzapine Definitive Test    MM DT_Oxazepam Definitive Test    MM DT_Oxycodone Definitive Test    MM DT_Oxymorphone Definitive Test    MM DT_Phencyclidine Definitive Test    MM DT_Phenobarbital Definitive Test    MM DT_Phentermine Definitive Test    MM DT_Quetiapine Definitive Test    MM DT_Risperidone Definitive Test    MM DT_Secobarbital Definitive Test    MM DT_Spice Definitive Test    MM DT_Tapentadol Definitive Test    MM DT_Temazapam Definitive Test    MM DT_THC Definitive Test    MM DT_Tramadol Definitive Test    MM DT_Methylphenidate Definitive Test      Patient may continue Norco 7.5/325 mg 1 tablet every 8 hours as needed for pain.  The patient was given a 3 month supply of prescriptions with a Do Not Fill date(s) of today, August 13, 2025 and September 11, 2025    There are risks associated with opioid medications, including dependence, addiction and tolerance. The patient understands and agrees to use these medications only as prescribed. Potential side effects of the medications include, but are not limited to, constipation, drowsiness, addiction, impaired judgment and risk of fatal overdose if not taken as prescribed. The patient was warned against driving while taking sedation medications.  Sharing medications is a  sara. At this point in time, the patient is showing no signs of addiction, abuse, diversion or suicidal ideation.  A urine drug screen was collected at today's office visit as part of our medication management protocol. The point of care testing results were appropriate for what was being prescribed. The specimen will be sent for confirmatory testing. The drug screen is medically necessary because the patient is either dependent on opioid medication or is being considered for opioid medication therapy and the results could impact ongoing or future treatment. The drug screen is to evaluate for the presences or absence of prescribed, non-prescribed, and/or illicit drugs/substances.  [place] Prescription Drug Monitoring Program report was reviewed and was appropriate      2.  Continue with home exercise program  3.  Follow-up in 3 months or sooner if needed    My impressions and treatment recommendations were discussed in detail with the patient who verbalized understanding and had no further questions.  Discharge instructions were provided. I personally saw and examined the patient and I agree with the above discussed plan of care.    History of Present Illness     Juan Amato is a 75 y.o. male with a history of metastatic urothelial carcinoma status post nephrectomy and chemo induced neuropathy, A-fib and chronic pain syndrome last seen in the office on 04/22/2025  who presents to [facility] for a follow up office visit in regards to Low back pain and peripheral neuropathy.  The patient rates their pain a 6 out of 10 on the numeric pain rating scale. Pain is described as Constant, Dull-aching, and Sharp. The patient's medications for pain include Norco 7.5/325 1 tablet 2-3 times daily for pain with relief without side effects.     Pain Contract Signed: 1/29/2025  Last Urine Drug Screen: 7/15/2025  [place]/YESSENIA 1/29/2025  Last hydrocodone per pt. 7/15/2025  Last Narcan: 1/29/2025        Review of Systems   Respiratory:  Negative for shortness of breath.    Cardiovascular:  Negative for chest pain.   Gastrointestinal:  Negative for constipation, diarrhea, nausea and vomiting.   Musculoskeletal:  Positive for back pain and gait problem. Negative for arthralgias, joint swelling and myalgias.   Skin:  Negative for rash.   Neurological:  Negative for dizziness, seizures and weakness.   All other systems reviewed and are negative.      Medical History Reviewed by provider this encounter:     .       Objective   Wt 91.6 kg (202 lb)   BMI 30.49 kg/m²      Pain Score:   6  Physical Exam  Constitutional: normal, well developed, well nourished, alert, in no distress and non-toxic and no overt pain behavior.  Eyes: anicteric  HEENT: grossly intact  Neck: supple, symmetric, trachea midline and no masses   Pulmonary: even and unlabored  Cardiovascular: No edema or pitting edema present  Skin: Normal without rashes or lesions and well hydrated  Psychiatric: Mood and affect appropriate  Neurologic: Cranial Nerves II-XII grossly intact  Musculoskeletal: normal gait

## 2025-07-15 ENCOUNTER — OFFICE VISIT (OUTPATIENT)
Dept: PAIN MEDICINE | Facility: CLINIC | Age: 75
End: 2025-07-15
Payer: MEDICARE

## 2025-07-15 VITALS — WEIGHT: 202 LBS | BODY MASS INDEX: 30.49 KG/M2

## 2025-07-15 DIAGNOSIS — M48.061 SPINAL STENOSIS OF LUMBAR REGION, UNSPECIFIED WHETHER NEUROGENIC CLAUDICATION PRESENT: ICD-10-CM

## 2025-07-15 DIAGNOSIS — T45.1X5A CHEMOTHERAPY-INDUCED NEUROPATHY (HCC): ICD-10-CM

## 2025-07-15 DIAGNOSIS — M47.816 LUMBAR SPONDYLOSIS: ICD-10-CM

## 2025-07-15 DIAGNOSIS — G89.4 CHRONIC PAIN SYNDROME: ICD-10-CM

## 2025-07-15 DIAGNOSIS — M54.16 LUMBAR RADICULOPATHY: Primary | ICD-10-CM

## 2025-07-15 DIAGNOSIS — G62.0 CHEMOTHERAPY-INDUCED NEUROPATHY (HCC): ICD-10-CM

## 2025-07-15 DIAGNOSIS — Z79.891 LONG-TERM CURRENT USE OF OPIATE ANALGESIC: ICD-10-CM

## 2025-07-15 DIAGNOSIS — F11.20 UNCOMPLICATED OPIOID DEPENDENCE (HCC): ICD-10-CM

## 2025-07-15 PROCEDURE — G2211 COMPLEX E/M VISIT ADD ON: HCPCS | Performed by: NURSE PRACTITIONER

## 2025-07-15 PROCEDURE — 99214 OFFICE O/P EST MOD 30 MIN: CPT | Performed by: NURSE PRACTITIONER

## 2025-07-15 RX ORDER — HYDROCODONE BITARTRATE AND ACETAMINOPHEN 7.5; 325 MG/1; MG/1
1 TABLET ORAL EVERY 8 HOURS PRN
Qty: 90 TABLET | Refills: 0 | Status: SHIPPED | OUTPATIENT
Start: 2025-07-15

## 2025-07-15 RX ORDER — HYDROCODONE BITARTRATE AND ACETAMINOPHEN 7.5; 325 MG/1; MG/1
1 TABLET ORAL EVERY 8 HOURS PRN
Qty: 90 TABLET | Refills: 0 | Status: SHIPPED | OUTPATIENT
Start: 2025-08-13

## 2025-07-15 RX ORDER — HYDROCODONE BITARTRATE AND ACETAMINOPHEN 7.5; 325 MG/1; MG/1
1 TABLET ORAL EVERY 8 HOURS PRN
Qty: 90 TABLET | Refills: 0 | Status: SHIPPED | OUTPATIENT
Start: 2025-09-11

## 2025-07-17 LAB
6MAM UR QL CFM: NEGATIVE NG/ML
7AMINOCLONAZEPAM UR QL CFM: NEGATIVE NG/ML
A-OH ALPRAZ UR QL CFM: NEGATIVE NG/ML
AMPHET UR QL CFM: NEGATIVE NG/ML
AMPHET UR QL CFM: NEGATIVE NG/ML
BUPRENORPHINE UR QL CFM: NEGATIVE NG/ML
BUTALBITAL UR QL CFM: NEGATIVE NG/ML
BZE UR QL CFM: NEGATIVE NG/ML
CODEINE UR QL CFM: NEGATIVE NG/ML
DESIPRAMINE UR QL CFM: NEGATIVE NG/ML
DESIPRAMINE UR QL CFM: NEGATIVE NG/ML
EDDP UR QL CFM: NEGATIVE NG/ML
ETHYL GLUCURONIDE UR QL CFM: NEGATIVE NG/ML
ETHYL SULFATE UR QL SCN: NEGATIVE NG/ML
EUTYLONE UR QL: NEGATIVE NG/ML
FENTANYL UR QL CFM: NEGATIVE NG/ML
GLIADIN IGG SER IA-ACNC: NEGATIVE NG/ML
GLUCOSE 30M P 50 G LAC PO SERPL-MCNC: NEGATIVE NG/ML
HYDROCODONE UR QL CFM: NORMAL NG/ML
HYDROCODONE UR QL CFM: NORMAL NG/ML
HYDROMORPHONE UR QL CFM: NORMAL NG/ML
IMIPRAMINE UR QL CFM: NEGATIVE NG/ML
LORAZEPAM UR QL CFM: NEGATIVE NG/ML
MDMA UR QL CFM: NEGATIVE NG/ML
ME-PHENIDATE UR QL CFM: NEGATIVE NG/ML
MEPERIDINE UR QL CFM: NEGATIVE NG/ML
METHADONE UR QL CFM: NEGATIVE NG/ML
METHAMPHET UR QL CFM: NEGATIVE NG/ML
MORPHINE UR QL CFM: NEGATIVE NG/ML
MORPHINE UR QL CFM: NEGATIVE NG/ML
NORBUPRENORPHINE UR QL CFM: NEGATIVE NG/ML
NORDIAZEPAM UR QL CFM: NEGATIVE NG/ML
NORFENTANYL UR QL CFM: NEGATIVE NG/ML
NORHYDROCODONE UR QL CFM: NORMAL NG/ML
NORHYDROCODONE UR QL CFM: NORMAL NG/ML
NORMEPERIDINE UR QL CFM: NEGATIVE NG/ML
NOROXYCODONE UR QL CFM: NEGATIVE NG/ML
OLANZAPINE QUANTIFICATION: NEGATIVE NG/ML
OPC-3373 QUANTIFICATION: NEGATIVE NG/ML
OXAZEPAM UR QL CFM: NEGATIVE NG/ML
OXYCODONE UR QL CFM: NEGATIVE NG/ML
OXYMORPHONE UR QL CFM: NEGATIVE NG/ML
OXYMORPHONE UR QL CFM: NEGATIVE NG/ML
PARA-FLUOROFENTANYL QUANTIFICATION: NORMAL NG/ML
PCP UR QL CFM: NEGATIVE NG/ML
PHENOBARB UR QL CFM: NEGATIVE NG/ML
RESULT ALL_PRESCRIBED MEDS AND SPECIAL INSTRUCTIONS: NORMAL
SECOBARBITAL UR QL CFM: NEGATIVE NG/ML
SL AMB 5F-ADB-M7 METABOLITE QUANTIFICATION: NEGATIVE NG/ML
SL AMB 7-OH-MITRAGYNINE (KRATOM ALKALOID) QUANTIFICATION: NEGATIVE NG/ML
SL AMB AB-FUBINACA-M3 METABOLITE QUANTIFICATION: NEGATIVE NG/ML
SL AMB ACETYL FENTANYL QUANTIFICATION: NORMAL NG/ML
SL AMB ACETYL NORFENTANYL QUANTIFICATION: NORMAL NG/ML
SL AMB ACRYL FENTANYL QUANTIFICATION: NORMAL NG/ML
SL AMB CARFENTANIL QUANTIFICATION: NORMAL NG/ML
SL AMB CLOZAPINE QUANTIFICATION: NEGATIVE NG/ML
SL AMB CTHC (MARIJUANA METABOLITE) QUANTIFICATION: NEGATIVE NG/ML
SL AMB DEXTROMETHORPHAN QUANTIFICATION: NEGATIVE NG/ML
SL AMB DEXTRORPHAN (DEXTROMETHORPHAN METABOLITE) QUANT: NEGATIVE NG/ML
SL AMB DEXTRORPHAN (DEXTROMETHORPHAN METABOLITE) QUANT: NEGATIVE NG/ML
SL AMB HALOPERIDOL  QUANTIFICATION: NEGATIVE NG/ML
SL AMB HALOPERIDOL METABOLITE QUANTIFICATION: NEGATIVE NG/ML
SL AMB HYDROXYRISPERIDONE QUANTIFICATION: NEGATIVE NG/ML
SL AMB JWH018 METABOLITE QUANTIFICATION: NEGATIVE NG/ML
SL AMB JWH073 METABOLITE QUANTIFICATION: NEGATIVE NG/ML
SL AMB MDMB-FUBINACA-M1 METABOLITE QUANTIFICATION: NEGATIVE NG/ML
SL AMB METHYLONE QUANTIFICATION: NEGATIVE NG/ML
SL AMB N-DESMETHYL-TRAMADOL QUANTIFICATION: NEGATIVE NG/ML
SL AMB N-DESMETHYLCLOZAPINE QUANTIFICATION: NEGATIVE NG/ML
SL AMB NORQUETIAPINE QUANTIFICATION: NEGATIVE NG/ML
SL AMB PHENTERMINE QUANTIFICATION: NEGATIVE NG/ML
SL AMB QUETIAPINE QUANTIFICATION: NEGATIVE NG/ML
SL AMB RCS4 METABOLITE QUANTIFICATION: NEGATIVE NG/ML
SL AMB RISPERIDONE QUANTIFICATION: NEGATIVE NG/ML
SL AMB RITALINIC ACID QUANTIFICATION: NEGATIVE NG/ML
SPECIMEN DRAWN SERPL: NEGATIVE NG/ML
TAPENTADOL UR QL CFM: NEGATIVE NG/ML
TEMAZEPAM UR QL CFM: NEGATIVE NG/ML
TEMAZEPAM UR QL CFM: NEGATIVE NG/ML
TRAMADOL UR QL CFM: NEGATIVE NG/ML
URATE/CREAT 24H UR: NEGATIVE NG/ML

## 2025-08-04 ENCOUNTER — HOSPITAL ENCOUNTER (OUTPATIENT)
Dept: INFUSION CENTER | Facility: HOSPITAL | Age: 75
Discharge: HOME | End: 2025-08-04
Attending: INTERNAL MEDICINE
Payer: MEDICARE

## (undated) DEVICE — 4-PORT MANIFOLD: Brand: NEPTUNE 2

## (undated) DEVICE — PACK PBDS LAP CHOLE RF

## (undated) DEVICE — CATH URET .038 10FR 50CM DUAL LUMEN

## (undated) DEVICE — DRAPE SHEET X-LG

## (undated) DEVICE — IRRIG ENDO FLO TUBING

## (undated) DEVICE — INTENDED FOR TISSUE SEPARATION, AND OTHER PROCEDURES THAT REQUIRE A SHARP SURGICAL BLADE TO PUNCTURE OR CUT.: Brand: BARD-PARKER ® CARBON RIB-BACK BLADES

## (undated) DEVICE — TRAY FOLEY 16FR URIMETER SURESTEP

## (undated) DEVICE — SURGICEL 4 X 8

## (undated) DEVICE — SHEATH URETERAL ACCESS 12/14FR 35CM PROXIS

## (undated) DEVICE — CATH URETERAL 5FR X 70 CM FLEX TIP POLYUR BARD

## (undated) DEVICE — STRL PENROSE DRAIN 18" X 1/4": Brand: CARDINAL HEALTH

## (undated) DEVICE — NEEDLE 25G X 1 1/2

## (undated) DEVICE — 1200CC GUARDIAN II: Brand: GUARDIAN

## (undated) DEVICE — TROCAR SITE CLOSURE DEVICE: Brand: ENDO CLOSE

## (undated) DEVICE — DRAIN SPONGES,6 PLY: Brand: EXCILON

## (undated) DEVICE — 5 MM CURVED DISSECTORS WITH MONOPOLAR CAUTERY: Brand: ENDOPATH

## (undated) DEVICE — PREMIUM DRY TRAY LF: Brand: MEDLINE INDUSTRIES, INC.

## (undated) DEVICE — SUT VICRYL 0 UR-6 27 IN J603H

## (undated) DEVICE — UROCATCH BAG

## (undated) DEVICE — CATH FOLEY COUDE 20FR 5ML 2 WAY TIEMANN LUBRICATH

## (undated) DEVICE — GLOVE SRG BIOGEL 7

## (undated) DEVICE — SUT PDS II 0 CT-1 27 IN Z340H

## (undated) DEVICE — GLOVE SRG BIOGEL 7.5

## (undated) DEVICE — BINDER ABDOMINAL 12 X 45IN 4 PANEL UNIVERSAL UNISEX

## (undated) DEVICE — SCD SEQUENTIAL COMPRESSION COMFORT SLEEVE MEDIUM KNEE LENGTH: Brand: KENDALL SCD

## (undated) DEVICE — LAPAROSCOPIC SCISSORS: Brand: EPIX LAPAROSCOPIC SCISSORS

## (undated) DEVICE — NEEDLE SPINAL 22G X 1 1/2

## (undated) DEVICE — PACK TUR

## (undated) DEVICE — SUT ETHILON 3-0 FS-1 18 IN 663G

## (undated) DEVICE — Device

## (undated) DEVICE — VISUALIZATION SYSTEM: Brand: CLEARIFY

## (undated) DEVICE — 3000CC GUARDIAN II: Brand: GUARDIAN

## (undated) DEVICE — GARMENT,MEDLINE,DVT,INT,CALF,FOAM,MED: Brand: MEDLINE

## (undated) DEVICE — DRAPE LAPAROTOMY W/POUCHES

## (undated) DEVICE — TROCAR: Brand: KII FIOS FIRST ENTRY

## (undated) DEVICE — BASKET SPECIMEN RETRIVAL 1.9FR 120CM

## (undated) DEVICE — TUBING SMOKE EVAC W/FILTRATION DEVICE PLUMEPORT ACTIV

## (undated) DEVICE — GLOVE INDICATOR PI UNDERGLOVE SZ 7 BLUE

## (undated) DEVICE — GLOVE SRG BIOGEL 8

## (undated) DEVICE — TROCAR: Brand: KII SLEEVE

## (undated) DEVICE — CANNULA SEAL

## (undated) DEVICE — PENCIL ELECTROSURG E-Z CLEAN -0035H

## (undated) DEVICE — SUT VLOC 90 3-0 V-20 9IN VLOCM0644

## (undated) DEVICE — CHLORAPREP HI-LITE 26ML ORANGE

## (undated) DEVICE — GLOVE INDICATOR PI UNDERGLOVE SZ 7.5 BLUE

## (undated) DEVICE — HARMONIC 1100 SHEARS, 36CM SHAFT LENGTH: Brand: HARMONIC

## (undated) DEVICE — TIP COVER ACCESSORY

## (undated) DEVICE — ENDOSCOPIC LINEAR CUTTER RELOADS WHITE 2.5 MM: Brand: ECHELON; ENDOPATH

## (undated) DEVICE — INTENDED FOR TISSUE SEPARATION, AND OTHER PROCEDURES THAT REQUIRE A SHARP SURGICAL BLADE TO PUNCTURE OR CUT.: Brand: BARD-PARKER SAFETY BLADES SIZE 11, STERILE

## (undated) DEVICE — GAUZE SPONGES,8 PLY: Brand: CURITY

## (undated) DEVICE — SUT MONOCRYL 4-0 PS-2 27 IN Y426H

## (undated) DEVICE — HEM-O-LOK CLIP CARTRIDGE LARGE DA VINCI SI/XI

## (undated) DEVICE — NEEDLE SPINAL 22G X 5IN QUINCKE

## (undated) DEVICE — CHLORHEXIDINE 4PCT 4 OZ

## (undated) DEVICE — GUIDEWIRE STRGHT TIP 0.035 IN  SOLO PLUS

## (undated) DEVICE — SUT VICRYL 0 CT-1 27 IN J260H

## (undated) DEVICE — BETHLEHEM UNIVERSAL LAPAROTOMY: Brand: CARDINAL HEALTH

## (undated) DEVICE — TUBING SUCTION 5MM X 12 FT

## (undated) DEVICE — THE ECHELON FLEX POWERED PLUS ARTICULATING ENDOSCOPIC LINEAR CUTTERS ARE STERILE, SINGLE PATIENT USE INSTRUMENTS THAT SIMULTANEOUSLYCUT AND STAPLE TISSUE. THERE ARE SIX STAGGERED ROWS OF STAPLES, THREE ON EITHER SIDE OF THE CUT LINE. THE ECHELON FLEX 45 POWERED PLUSINSTRUMENTS HAVE A STAPLE LINE THAT IS APPROXIMATELY 45 MM LONG AND A CUT LINE THAT IS APPROXIMATELY 42 MM LONG. THE SHAFT CAN ROTATE FREELYIN BOTH DIRECTIONS AND AN ARTICULATION MECHANISM ENABLES THE DISTAL PORTION OF THE SHAFT TO PIVOT TO FACILITATE LATERAL ACCESS TO THE OPERATIVESITE.THE INSTRUMENTS ARE PACKAGED WITH A PRIMARY LITHIUM BATTERY PACK THAT MUST BE INSTALLED PRIOR TO USE. THERE ARE SPECIFIC REQUIREMENTS FORDISPOSING OF THE BATTERY PACK. REFER TO THE BATTERY PACK DISPOSAL SECTION.THE INSTRUMENTS ARE PACKAGED WITHOUT A RELOAD AND MUST BE LOADED PRIOR TO USE. A STAPLE RETAINING CAP ON THE RELOAD PROTECTS THE STAPLE LEGPOINTS DURING SHIPPING AND TRANSPORTATION. THE INSTRUMENTS’ LOCK-OUT FEATURE IS DESIGNED TO PREVENT A USED OR IMPROPERLY INSTALLED RELOADFROM BEING REFIRED OR AN INSTRUMENT FROM BEING FIRED WITHOUT A RELOAD.: Brand: ECHELON FLEX

## (undated) DEVICE — CATHETER PLUG WITH CAP: Brand: DOVER

## (undated) DEVICE — 3M™ TEGADERM™ TRANSPARENT FILM DRESSING FRAME STYLE, 1624W, 2-3/8 IN X 2-3/4 IN (6 CM X 7 CM), 100/CT 4CT/CASE: Brand: 3M™ TEGADERM™

## (undated) DEVICE — CYSTO TUBING TUR Y IRRIGATION

## (undated) DEVICE — ENDOPATH XCEL BLADELESS TROCARS WITH STABILITY SLEEVES: Brand: ENDOPATH XCEL

## (undated) DEVICE — EXIDINE 4 PCT

## (undated) DEVICE — BAG URINE DRAINAGE 2000ML ANTI RFLX LF

## (undated) DEVICE — NEEDLE SPINAL 22G X 3.5IN  QUINCKE

## (undated) DEVICE — AIRSEAL TUBE SMOKE EVAC LUMENX3 FILTERED

## (undated) DEVICE — SURGICAL CLIPPER BLADE GENERAL USE

## (undated) DEVICE — TUBE SET SMOKE EVAC PNEUMOCLEAR HIGH FLOW

## (undated) DEVICE — GLOVE INDICATOR PI UNDERGLOVE SZ 6.5 BLUE

## (undated) DEVICE — SUT VICRYL 2-0 SH 27 IN UNDYED J417H

## (undated) DEVICE — LUBRICANT INST ELECTROLUBE ANTISTK WO PAD

## (undated) DEVICE — GLOVE INDICATOR PI UNDERGLOVE SZ 8 BLUE

## (undated) DEVICE — ARM DRAPE

## (undated) DEVICE — HEMOSTATIC MATRIX SURGIFLO 8ML W/THROMBIN

## (undated) DEVICE — TROCAR PORT ACCESS 12 X120MM W/BLDLS OPTICAL TIP AIRSEAL

## (undated) DEVICE — ADHESIVE SKIN HIGH VISCOSITY EXOFIN 1ML

## (undated) DEVICE — SUT PROLENE 4-0 RB1 36 IN 8357H

## (undated) DEVICE — INSUFFLATION NEEDLE TO ESTABLISH PNEUMOPERITONEUM.: Brand: INSUFFLATION NEEDLE

## (undated) DEVICE — STERILE SURGICAL LUBRICANT,  TUBE: Brand: SURGILUBE

## (undated) DEVICE — MICRO HVTSA, 0.5G AND HVTSA SOURCEMARK PRODUCT CODE M1206 AND M1206-01: Brand: EXOFIN MICRO HVTSA, 0.5G

## (undated) DEVICE — INLAY OPTIMA URETERAL STENT W/O GUIDEWIRE
Type: IMPLANTABLE DEVICE | Status: NON-FUNCTIONAL
Brand: BARD® INLAY OPTIMA® URETERAL STENT

## (undated) DEVICE — ENDOPATH 5 MM GRASPERS WITH RATCHET HANDLES: Brand: ENDOPATH

## (undated) DEVICE — 3M™ IOBAN™ 2 ANTIMICROBIAL INCISE DRAPE 6650EZ: Brand: IOBAN™ 2

## (undated) DEVICE — COLUMN DRAPE